# Patient Record
Sex: MALE | Race: WHITE | Employment: OTHER
[De-identification: names, ages, dates, MRNs, and addresses within clinical notes are randomized per-mention and may not be internally consistent; named-entity substitution may affect disease eponyms.]

---

## 2017-01-06 ENCOUNTER — OFFICE VISIT (OUTPATIENT)
Dept: INTERNAL MEDICINE | Facility: CLINIC | Age: 57
End: 2017-01-06

## 2017-01-06 VITALS
SYSTOLIC BLOOD PRESSURE: 138 MMHG | DIASTOLIC BLOOD PRESSURE: 70 MMHG | WEIGHT: 268 LBS | HEIGHT: 76 IN | BODY MASS INDEX: 32.63 KG/M2

## 2017-01-06 DIAGNOSIS — E11.42 TYPE 2 DIABETES MELLITUS WITH DIABETIC POLYNEUROPATHY, WITH LONG-TERM CURRENT USE OF INSULIN (HCC): Primary | ICD-10-CM

## 2017-01-06 DIAGNOSIS — N18.2 CKD (CHRONIC KIDNEY DISEASE) STAGE 2, GFR 60-89 ML/MIN: ICD-10-CM

## 2017-01-06 DIAGNOSIS — M89.8X8 ILIAC CREST BONE PAIN: ICD-10-CM

## 2017-01-06 DIAGNOSIS — Z79.4 TYPE 2 DIABETES MELLITUS WITH DIABETIC POLYNEUROPATHY, WITH LONG-TERM CURRENT USE OF INSULIN (HCC): Primary | ICD-10-CM

## 2017-01-06 DIAGNOSIS — I73.9 PVD (PERIPHERAL VASCULAR DISEASE) (HCC): ICD-10-CM

## 2017-01-06 DIAGNOSIS — E03.9 ACQUIRED HYPOTHYROIDISM: ICD-10-CM

## 2017-01-06 PROCEDURE — 99214 OFFICE O/P EST MOD 30 MIN: CPT | Performed by: INTERNAL MEDICINE

## 2017-01-06 ASSESSMENT — ENCOUNTER SYMPTOMS
COUGH: 0
SHORTNESS OF BREATH: 0
TROUBLE SWALLOWING: 0
WHEEZING: 0
BLOOD IN STOOL: 0
COLOR CHANGE: 0
DIARRHEA: 0
EYE PAIN: 0
ABDOMINAL DISTENTION: 0
EYE DISCHARGE: 0

## 2017-01-11 ENCOUNTER — TELEPHONE (OUTPATIENT)
Dept: GASTROENTEROLOGY | Facility: CLINIC | Age: 57
End: 2017-01-11

## 2017-01-12 ENCOUNTER — TELEPHONE (OUTPATIENT)
Dept: GASTROENTEROLOGY | Facility: CLINIC | Age: 57
End: 2017-01-12

## 2017-01-18 ENCOUNTER — TELEPHONE (OUTPATIENT)
Dept: INTERNAL MEDICINE | Facility: CLINIC | Age: 57
End: 2017-01-18

## 2017-01-23 DIAGNOSIS — K59.00 CONSTIPATION, UNSPECIFIED CONSTIPATION TYPE: ICD-10-CM

## 2017-01-24 RX ORDER — ISOSORBIDE MONONITRATE 30 MG/1
TABLET, EXTENDED RELEASE ORAL
Qty: 30 TABLET | Refills: 5 | Status: SHIPPED | OUTPATIENT
Start: 2017-01-24 | End: 2017-07-24 | Stop reason: SDUPTHER

## 2017-02-06 ENCOUNTER — TELEPHONE (OUTPATIENT)
Dept: GASTROENTEROLOGY | Facility: CLINIC | Age: 57
End: 2017-02-06

## 2017-02-09 ENCOUNTER — PROCEDURE VISIT (OUTPATIENT)
Dept: PODIATRY | Facility: CLINIC | Age: 57
End: 2017-02-09

## 2017-02-09 VITALS
HEIGHT: 76 IN | BODY MASS INDEX: 32.88 KG/M2 | DIASTOLIC BLOOD PRESSURE: 84 MMHG | WEIGHT: 270 LBS | HEART RATE: 77 BPM | SYSTOLIC BLOOD PRESSURE: 130 MMHG

## 2017-02-09 DIAGNOSIS — Z79.4 TYPE 2 DIABETES MELLITUS WITH DIABETIC POLYNEUROPATHY, WITH LONG-TERM CURRENT USE OF INSULIN (HCC): ICD-10-CM

## 2017-02-09 DIAGNOSIS — E11.51 TYPE 2 DIABETES MELLITUS WITH PERIPHERAL VASCULAR DISEASE (HCC): ICD-10-CM

## 2017-02-09 DIAGNOSIS — B35.1 ONYCHOMYCOSIS OF TOENAIL: Primary | ICD-10-CM

## 2017-02-09 DIAGNOSIS — E11.42 TYPE 2 DIABETES MELLITUS WITH DIABETIC POLYNEUROPATHY, WITH LONG-TERM CURRENT USE OF INSULIN (HCC): ICD-10-CM

## 2017-02-09 PROCEDURE — 11720 DEBRIDE NAIL 1-5: CPT | Performed by: PODIATRIST

## 2017-02-09 PROCEDURE — 1036F TOBACCO NON-USER: CPT | Performed by: PODIATRIST

## 2017-02-09 ASSESSMENT — ENCOUNTER SYMPTOMS
SHORTNESS OF BREATH: 0
COLOR CHANGE: 0
BACK PAIN: 0
NAUSEA: 0
DIARRHEA: 0

## 2017-03-17 ENCOUNTER — OFFICE VISIT (OUTPATIENT)
Dept: INTERNAL MEDICINE CLINIC | Age: 57
End: 2017-03-17
Payer: COMMERCIAL

## 2017-03-17 VITALS
WEIGHT: 272 LBS | DIASTOLIC BLOOD PRESSURE: 70 MMHG | SYSTOLIC BLOOD PRESSURE: 124 MMHG | BODY MASS INDEX: 33.12 KG/M2 | HEIGHT: 76 IN

## 2017-03-17 DIAGNOSIS — N18.2 CKD (CHRONIC KIDNEY DISEASE) STAGE 2, GFR 60-89 ML/MIN: Primary | ICD-10-CM

## 2017-03-17 DIAGNOSIS — E11.41 DIABETIC MONONEUROPATHY ASSOCIATED WITH TYPE 2 DIABETES MELLITUS (HCC): ICD-10-CM

## 2017-03-17 DIAGNOSIS — I73.9 PVD (PERIPHERAL VASCULAR DISEASE) (HCC): ICD-10-CM

## 2017-03-17 DIAGNOSIS — Z79.4 TYPE 2 DIABETES MELLITUS WITH DIABETIC POLYNEUROPATHY, WITH LONG-TERM CURRENT USE OF INSULIN (HCC): ICD-10-CM

## 2017-03-17 DIAGNOSIS — M25.511 CHRONIC RIGHT SHOULDER PAIN: ICD-10-CM

## 2017-03-17 DIAGNOSIS — G89.29 CHRONIC RIGHT SHOULDER PAIN: ICD-10-CM

## 2017-03-17 DIAGNOSIS — E11.42 TYPE 2 DIABETES MELLITUS WITH DIABETIC POLYNEUROPATHY, WITH LONG-TERM CURRENT USE OF INSULIN (HCC): ICD-10-CM

## 2017-03-17 PROCEDURE — G8417 CALC BMI ABV UP PARAM F/U: HCPCS | Performed by: INTERNAL MEDICINE

## 2017-03-17 PROCEDURE — G8427 DOCREV CUR MEDS BY ELIG CLIN: HCPCS | Performed by: INTERNAL MEDICINE

## 2017-03-17 PROCEDURE — 1036F TOBACCO NON-USER: CPT | Performed by: INTERNAL MEDICINE

## 2017-03-17 PROCEDURE — 3017F COLORECTAL CA SCREEN DOC REV: CPT | Performed by: INTERNAL MEDICINE

## 2017-03-17 PROCEDURE — 3045F PR MOST RECENT HEMOGLOBIN A1C LEVEL 7.0-9.0%: CPT | Performed by: INTERNAL MEDICINE

## 2017-03-17 PROCEDURE — 99214 OFFICE O/P EST MOD 30 MIN: CPT | Performed by: INTERNAL MEDICINE

## 2017-03-17 PROCEDURE — G8484 FLU IMMUNIZE NO ADMIN: HCPCS | Performed by: INTERNAL MEDICINE

## 2017-03-17 RX ORDER — PREGABALIN 100 MG/1
100 CAPSULE ORAL 3 TIMES DAILY
Qty: 90 CAPSULE | Refills: 3 | Status: SHIPPED | OUTPATIENT
Start: 2017-03-17 | End: 2017-04-20 | Stop reason: SDUPTHER

## 2017-03-17 ASSESSMENT — ENCOUNTER SYMPTOMS
BLOOD IN STOOL: 0
EYE PAIN: 0
ABDOMINAL DISTENTION: 0
COLOR CHANGE: 0
EYE DISCHARGE: 0
WHEEZING: 0
SHORTNESS OF BREATH: 0
TROUBLE SWALLOWING: 0
DIARRHEA: 0
COUGH: 0

## 2017-03-17 ASSESSMENT — PATIENT HEALTH QUESTIONNAIRE - PHQ9
1. LITTLE INTEREST OR PLEASURE IN DOING THINGS: 0
SUM OF ALL RESPONSES TO PHQ QUESTIONS 1-9: 0
SUM OF ALL RESPONSES TO PHQ9 QUESTIONS 1 & 2: 0
2. FEELING DOWN, DEPRESSED OR HOPELESS: 0

## 2017-03-21 ENCOUNTER — HOSPITAL ENCOUNTER (OUTPATIENT)
Facility: CLINIC | Age: 57
Discharge: HOME | End: 2017-03-21
Payer: COMMERCIAL

## 2017-03-21 ENCOUNTER — HOSPITAL ENCOUNTER (OUTPATIENT)
Dept: GENERAL RADIOLOGY | Facility: CLINIC | Age: 57
Discharge: HOME | End: 2017-03-21
Payer: COMMERCIAL

## 2017-03-21 DIAGNOSIS — G89.29 CHRONIC RIGHT SHOULDER PAIN: ICD-10-CM

## 2017-03-21 DIAGNOSIS — Z79.4 TYPE 2 DIABETES MELLITUS WITH DIABETIC POLYNEUROPATHY, WITH LONG-TERM CURRENT USE OF INSULIN (HCC): ICD-10-CM

## 2017-03-21 DIAGNOSIS — M25.511 CHRONIC RIGHT SHOULDER PAIN: ICD-10-CM

## 2017-03-21 DIAGNOSIS — E11.42 TYPE 2 DIABETES MELLITUS WITH DIABETIC POLYNEUROPATHY, WITH LONG-TERM CURRENT USE OF INSULIN (HCC): ICD-10-CM

## 2017-03-21 LAB
CREATININE URINE: 68.7 MG/DL (ref 39–259)
ESTIMATED AVERAGE GLUCOSE: 151 MG/DL
HBA1C MFR BLD: 6.9 % (ref 4–6)
MICROALBUMIN/CREAT 24H UR: <12 MG/L
MICROALBUMIN/CREAT UR-RTO: 17 MCG/MG CREAT

## 2017-03-21 PROCEDURE — 36415 COLL VENOUS BLD VENIPUNCTURE: CPT

## 2017-03-21 PROCEDURE — 82043 UR ALBUMIN QUANTITATIVE: CPT

## 2017-03-21 PROCEDURE — 73030 X-RAY EXAM OF SHOULDER: CPT

## 2017-03-21 PROCEDURE — 83036 HEMOGLOBIN GLYCOSYLATED A1C: CPT

## 2017-03-21 PROCEDURE — 82570 ASSAY OF URINE CREATININE: CPT

## 2017-03-24 RX ORDER — BUDESONIDE AND FORMOTEROL FUMARATE DIHYDRATE 160; 4.5 UG/1; UG/1
AEROSOL RESPIRATORY (INHALATION)
Qty: 10.2 G | Refills: 5 | Status: SHIPPED | OUTPATIENT
Start: 2017-03-24 | End: 2017-09-13 | Stop reason: SDUPTHER

## 2017-04-20 DIAGNOSIS — E11.41 DIABETIC MONONEUROPATHY ASSOCIATED WITH TYPE 2 DIABETES MELLITUS (HCC): ICD-10-CM

## 2017-04-20 RX ORDER — ATORVASTATIN CALCIUM 20 MG/1
TABLET, FILM COATED ORAL
Qty: 30 TABLET | Refills: 5 | Status: SHIPPED | OUTPATIENT
Start: 2017-04-20 | End: 2017-10-20 | Stop reason: SDUPTHER

## 2017-04-20 RX ORDER — LEVOTHYROXINE SODIUM 0.2 MG/1
TABLET ORAL
Qty: 30 TABLET | Refills: 10 | Status: SHIPPED | OUTPATIENT
Start: 2017-04-20 | End: 2018-01-10 | Stop reason: SDUPTHER

## 2017-04-24 ENCOUNTER — OFFICE VISIT (OUTPATIENT)
Dept: INTERNAL MEDICINE CLINIC | Age: 57
End: 2017-04-24
Payer: COMMERCIAL

## 2017-04-24 VITALS
WEIGHT: 267 LBS | SYSTOLIC BLOOD PRESSURE: 116 MMHG | DIASTOLIC BLOOD PRESSURE: 80 MMHG | HEIGHT: 76 IN | BODY MASS INDEX: 32.51 KG/M2

## 2017-04-24 DIAGNOSIS — G89.29 CHRONIC RIGHT SHOULDER PAIN: Primary | ICD-10-CM

## 2017-04-24 DIAGNOSIS — E11.42 TYPE 2 DIABETES MELLITUS WITH DIABETIC POLYNEUROPATHY, WITH LONG-TERM CURRENT USE OF INSULIN (HCC): ICD-10-CM

## 2017-04-24 DIAGNOSIS — I10 ESSENTIAL HYPERTENSION: ICD-10-CM

## 2017-04-24 DIAGNOSIS — M25.511 CHRONIC RIGHT SHOULDER PAIN: Primary | ICD-10-CM

## 2017-04-24 DIAGNOSIS — Z79.4 TYPE 2 DIABETES MELLITUS WITH DIABETIC POLYNEUROPATHY, WITH LONG-TERM CURRENT USE OF INSULIN (HCC): ICD-10-CM

## 2017-04-24 PROCEDURE — 99213 OFFICE O/P EST LOW 20 MIN: CPT | Performed by: INTERNAL MEDICINE

## 2017-04-24 PROCEDURE — G8427 DOCREV CUR MEDS BY ELIG CLIN: HCPCS | Performed by: INTERNAL MEDICINE

## 2017-04-24 PROCEDURE — 3044F HG A1C LEVEL LT 7.0%: CPT | Performed by: INTERNAL MEDICINE

## 2017-04-24 PROCEDURE — G8417 CALC BMI ABV UP PARAM F/U: HCPCS | Performed by: INTERNAL MEDICINE

## 2017-04-24 PROCEDURE — 20610 DRAIN/INJ JOINT/BURSA W/O US: CPT | Performed by: INTERNAL MEDICINE

## 2017-04-24 PROCEDURE — 1036F TOBACCO NON-USER: CPT | Performed by: INTERNAL MEDICINE

## 2017-04-24 PROCEDURE — 3017F COLORECTAL CA SCREEN DOC REV: CPT | Performed by: INTERNAL MEDICINE

## 2017-04-24 RX ORDER — METHYLPREDNISOLONE ACETATE 80 MG/ML
40 INJECTION, SUSPENSION INTRA-ARTICULAR; INTRALESIONAL; INTRAMUSCULAR; SOFT TISSUE ONCE
Status: COMPLETED | OUTPATIENT
Start: 2017-04-24 | End: 2017-04-24

## 2017-04-24 RX ADMIN — METHYLPREDNISOLONE ACETATE 40 MG: 80 INJECTION, SUSPENSION INTRA-ARTICULAR; INTRALESIONAL; INTRAMUSCULAR; SOFT TISSUE at 16:35

## 2017-04-25 ENCOUNTER — OFFICE VISIT (OUTPATIENT)
Dept: PODIATRY | Age: 57
End: 2017-04-25
Payer: COMMERCIAL

## 2017-04-25 VITALS
HEIGHT: 76 IN | HEART RATE: 79 BPM | WEIGHT: 266 LBS | SYSTOLIC BLOOD PRESSURE: 137 MMHG | DIASTOLIC BLOOD PRESSURE: 69 MMHG | BODY MASS INDEX: 32.39 KG/M2

## 2017-04-25 DIAGNOSIS — E11.42 TYPE 2 DIABETES MELLITUS WITH DIABETIC POLYNEUROPATHY, WITH LONG-TERM CURRENT USE OF INSULIN (HCC): ICD-10-CM

## 2017-04-25 DIAGNOSIS — B35.1 ONYCHOMYCOSIS OF TOENAIL: Primary | ICD-10-CM

## 2017-04-25 DIAGNOSIS — E11.51 TYPE 2 DIABETES MELLITUS WITH PERIPHERAL VASCULAR DISEASE (HCC): ICD-10-CM

## 2017-04-25 DIAGNOSIS — Z79.4 TYPE 2 DIABETES MELLITUS WITH DIABETIC POLYNEUROPATHY, WITH LONG-TERM CURRENT USE OF INSULIN (HCC): ICD-10-CM

## 2017-04-25 PROCEDURE — 1036F TOBACCO NON-USER: CPT | Performed by: PODIATRIST

## 2017-04-25 PROCEDURE — 11720 DEBRIDE NAIL 1-5: CPT | Performed by: PODIATRIST

## 2017-04-27 ASSESSMENT — ENCOUNTER SYMPTOMS
SHORTNESS OF BREATH: 0
BACK PAIN: 0
COLOR CHANGE: 0
DIARRHEA: 0
NAUSEA: 0

## 2017-04-29 ASSESSMENT — ENCOUNTER SYMPTOMS
EYE PAIN: 0
COUGH: 0
ABDOMINAL PAIN: 0
CHEST TIGHTNESS: 0
ABDOMINAL DISTENTION: 0
ANAL BLEEDING: 0
EYE REDNESS: 0
EYE DISCHARGE: 0
APNEA: 0
CHOKING: 0
BACK PAIN: 0
EYE ITCHING: 0

## 2017-05-01 ENCOUNTER — HOSPITAL ENCOUNTER (OUTPATIENT)
Dept: PHYSICAL THERAPY | Age: 57
Setting detail: THERAPIES SERIES
Discharge: HOME | End: 2017-05-01
Payer: COMMERCIAL

## 2017-05-01 PROCEDURE — 97162 PT EVAL MOD COMPLEX 30 MIN: CPT

## 2017-05-01 PROCEDURE — G8985 CARRY GOAL STATUS: HCPCS

## 2017-05-01 PROCEDURE — G8984 CARRY CURRENT STATUS: HCPCS

## 2017-05-01 ASSESSMENT — PAIN DESCRIPTION - INTENSITY: RATING_2: 4

## 2017-05-01 ASSESSMENT — PAIN DESCRIPTION - ORIENTATION
ORIENTATION_2: LEFT;ANTERIOR
ORIENTATION: RIGHT

## 2017-05-01 ASSESSMENT — PAIN SCALES - GENERAL: PAINLEVEL_OUTOF10: 6

## 2017-05-03 ENCOUNTER — HOSPITAL ENCOUNTER (OUTPATIENT)
Dept: PHYSICAL THERAPY | Age: 57
Setting detail: THERAPIES SERIES
Discharge: HOME | End: 2017-05-03
Payer: COMMERCIAL

## 2017-05-03 PROCEDURE — 97140 MANUAL THERAPY 1/> REGIONS: CPT

## 2017-05-03 PROCEDURE — 97110 THERAPEUTIC EXERCISES: CPT

## 2017-05-03 ASSESSMENT — PAIN SCALES - GENERAL: PAINLEVEL_OUTOF10: 5

## 2017-05-03 ASSESSMENT — PAIN DESCRIPTION - ORIENTATION
ORIENTATION: RIGHT
ORIENTATION_2: LEFT;ANTERIOR

## 2017-05-03 ASSESSMENT — PAIN DESCRIPTION - INTENSITY: RATING_2: 4

## 2017-05-09 ENCOUNTER — HOSPITAL ENCOUNTER (OUTPATIENT)
Dept: PHYSICAL THERAPY | Age: 57
Setting detail: THERAPIES SERIES
Discharge: HOME | End: 2017-05-09
Payer: COMMERCIAL

## 2017-05-09 PROCEDURE — 97110 THERAPEUTIC EXERCISES: CPT

## 2017-05-09 ASSESSMENT — PAIN DESCRIPTION - INTENSITY: RATING_2: 4

## 2017-05-09 ASSESSMENT — PAIN SCALES - GENERAL: PAINLEVEL_OUTOF10: 5

## 2017-05-09 ASSESSMENT — PAIN DESCRIPTION - ORIENTATION
ORIENTATION: RIGHT
ORIENTATION_2: ANTERIOR;LEFT

## 2017-05-11 ENCOUNTER — HOSPITAL ENCOUNTER (OUTPATIENT)
Dept: PHYSICAL THERAPY | Age: 57
Setting detail: THERAPIES SERIES
Discharge: HOME | End: 2017-05-11
Payer: COMMERCIAL

## 2017-05-23 ENCOUNTER — HOSPITAL ENCOUNTER (OUTPATIENT)
Dept: PHYSICAL THERAPY | Age: 57
Setting detail: THERAPIES SERIES
Discharge: HOME | End: 2017-05-23
Payer: COMMERCIAL

## 2017-05-23 PROCEDURE — 97110 THERAPEUTIC EXERCISES: CPT

## 2017-05-23 PROCEDURE — 97140 MANUAL THERAPY 1/> REGIONS: CPT

## 2017-05-23 ASSESSMENT — PAIN DESCRIPTION - ORIENTATION
ORIENTATION_2: ANTERIOR;LEFT
ORIENTATION: RIGHT

## 2017-05-23 ASSESSMENT — PAIN DESCRIPTION - INTENSITY: RATING_2: 4

## 2017-05-23 ASSESSMENT — PAIN SCALES - GENERAL: PAINLEVEL_OUTOF10: 4

## 2017-05-24 RX ORDER — ROPINIROLE 2 MG/1
TABLET, FILM COATED ORAL
Qty: 30 TABLET | Refills: 11 | Status: SHIPPED | OUTPATIENT
Start: 2017-05-24 | End: 2018-05-12 | Stop reason: SDUPTHER

## 2017-05-24 RX ORDER — CANAGLIFLOZIN 100 MG/1
TABLET, FILM COATED ORAL
Qty: 30 TABLET | Refills: 11 | Status: SHIPPED | OUTPATIENT
Start: 2017-05-24 | End: 2017-10-06

## 2017-05-24 RX ORDER — LOSARTAN POTASSIUM 100 MG/1
TABLET ORAL
Qty: 30 TABLET | Refills: 11 | Status: SHIPPED | OUTPATIENT
Start: 2017-05-24 | End: 2018-05-12 | Stop reason: SDUPTHER

## 2017-05-24 RX ORDER — GEMFIBROZIL 600 MG/1
TABLET, FILM COATED ORAL
Qty: 30 TABLET | Refills: 11 | Status: SHIPPED | OUTPATIENT
Start: 2017-05-24 | End: 2018-05-12 | Stop reason: SDUPTHER

## 2017-05-25 ENCOUNTER — HOSPITAL ENCOUNTER (OUTPATIENT)
Dept: PHYSICAL THERAPY | Age: 57
Setting detail: THERAPIES SERIES
Discharge: HOME | End: 2017-05-25
Payer: COMMERCIAL

## 2017-05-25 PROCEDURE — 97140 MANUAL THERAPY 1/> REGIONS: CPT

## 2017-05-25 PROCEDURE — 97110 THERAPEUTIC EXERCISES: CPT

## 2017-05-25 ASSESSMENT — PAIN SCALES - GENERAL: PAINLEVEL_OUTOF10: 4

## 2017-05-25 ASSESSMENT — PAIN DESCRIPTION - INTENSITY: RATING_2: 4

## 2017-05-25 ASSESSMENT — PAIN DESCRIPTION - ORIENTATION
ORIENTATION_2: ANTERIOR;LEFT
ORIENTATION: RIGHT

## 2017-05-30 ENCOUNTER — HOSPITAL ENCOUNTER (OUTPATIENT)
Dept: PHYSICAL THERAPY | Age: 57
Setting detail: THERAPIES SERIES
Discharge: HOME | End: 2017-05-30
Payer: COMMERCIAL

## 2017-05-30 PROCEDURE — 97140 MANUAL THERAPY 1/> REGIONS: CPT

## 2017-05-30 PROCEDURE — 97110 THERAPEUTIC EXERCISES: CPT

## 2017-05-30 ASSESSMENT — PAIN DESCRIPTION - INTENSITY: RATING_2: 4

## 2017-05-30 ASSESSMENT — PAIN DESCRIPTION - ORIENTATION
ORIENTATION: RIGHT
ORIENTATION_2: ANTERIOR;LEFT

## 2017-05-30 ASSESSMENT — PAIN SCALES - GENERAL: PAINLEVEL_OUTOF10: 4

## 2017-06-01 ENCOUNTER — HOSPITAL ENCOUNTER (OUTPATIENT)
Dept: PHYSICAL THERAPY | Age: 57
Setting detail: THERAPIES SERIES
Discharge: HOME | End: 2017-06-01
Payer: COMMERCIAL

## 2017-06-01 PROCEDURE — 97110 THERAPEUTIC EXERCISES: CPT

## 2017-06-01 PROCEDURE — 97140 MANUAL THERAPY 1/> REGIONS: CPT

## 2017-06-01 ASSESSMENT — PAIN DESCRIPTION - ORIENTATION
ORIENTATION: RIGHT
ORIENTATION_2: ANTERIOR;LEFT

## 2017-06-01 ASSESSMENT — PAIN DESCRIPTION - INTENSITY: RATING_2: 4

## 2017-06-01 ASSESSMENT — PAIN SCALES - GENERAL: PAINLEVEL_OUTOF10: 4

## 2017-06-07 ENCOUNTER — HOSPITAL ENCOUNTER (OUTPATIENT)
Dept: PHYSICAL THERAPY | Age: 57
Setting detail: THERAPIES SERIES
Discharge: HOME | End: 2017-06-07
Payer: COMMERCIAL

## 2017-06-07 PROCEDURE — 97110 THERAPEUTIC EXERCISES: CPT

## 2017-06-07 PROCEDURE — 97140 MANUAL THERAPY 1/> REGIONS: CPT

## 2017-06-07 ASSESSMENT — PAIN DESCRIPTION - INTENSITY: RATING_2: 4

## 2017-06-07 ASSESSMENT — PAIN DESCRIPTION - ORIENTATION
ORIENTATION_2: ANTERIOR;LEFT
ORIENTATION: RIGHT

## 2017-06-07 ASSESSMENT — PAIN SCALES - GENERAL: PAINLEVEL_OUTOF10: 4

## 2017-06-12 ENCOUNTER — HOSPITAL ENCOUNTER (OUTPATIENT)
Dept: PHYSICAL THERAPY | Age: 57
Setting detail: THERAPIES SERIES
Discharge: HOME | End: 2017-06-12
Payer: COMMERCIAL

## 2017-06-14 ENCOUNTER — HOSPITAL ENCOUNTER (OUTPATIENT)
Dept: PHYSICAL THERAPY | Age: 57
Setting detail: THERAPIES SERIES
Discharge: HOME | End: 2017-06-14
Payer: COMMERCIAL

## 2017-06-14 PROCEDURE — 97140 MANUAL THERAPY 1/> REGIONS: CPT

## 2017-06-14 PROCEDURE — 97110 THERAPEUTIC EXERCISES: CPT

## 2017-06-14 ASSESSMENT — PAIN DESCRIPTION - ORIENTATION
ORIENTATION_2: ANTERIOR;LEFT
ORIENTATION: RIGHT

## 2017-06-14 ASSESSMENT — PAIN SCALES - GENERAL: PAINLEVEL_OUTOF10: 4

## 2017-06-14 ASSESSMENT — PAIN DESCRIPTION - INTENSITY: RATING_2: 4

## 2017-06-19 ENCOUNTER — HOSPITAL ENCOUNTER (OUTPATIENT)
Dept: PHYSICAL THERAPY | Age: 57
Setting detail: THERAPIES SERIES
Discharge: HOME | End: 2017-06-19
Payer: COMMERCIAL

## 2017-06-19 PROCEDURE — 97140 MANUAL THERAPY 1/> REGIONS: CPT

## 2017-06-19 PROCEDURE — G8985 CARRY GOAL STATUS: HCPCS

## 2017-06-19 PROCEDURE — 97110 THERAPEUTIC EXERCISES: CPT

## 2017-06-19 PROCEDURE — G8984 CARRY CURRENT STATUS: HCPCS

## 2017-06-19 ASSESSMENT — PAIN DESCRIPTION - ORIENTATION
ORIENTATION_2: ANTERIOR;LEFT
ORIENTATION: RIGHT

## 2017-06-19 ASSESSMENT — PAIN DESCRIPTION - INTENSITY: RATING_2: 4

## 2017-06-19 ASSESSMENT — PAIN SCALES - GENERAL: PAINLEVEL_OUTOF10: 4

## 2017-06-21 ENCOUNTER — HOSPITAL ENCOUNTER (OUTPATIENT)
Dept: PHYSICAL THERAPY | Age: 57
Setting detail: THERAPIES SERIES
Discharge: HOME | End: 2017-06-21
Payer: COMMERCIAL

## 2017-06-21 PROCEDURE — G8986 CARRY D/C STATUS: HCPCS

## 2017-06-21 PROCEDURE — 97140 MANUAL THERAPY 1/> REGIONS: CPT

## 2017-06-21 PROCEDURE — G8985 CARRY GOAL STATUS: HCPCS

## 2017-06-21 PROCEDURE — 97110 THERAPEUTIC EXERCISES: CPT

## 2017-06-21 ASSESSMENT — PAIN DESCRIPTION - ORIENTATION
ORIENTATION: RIGHT
ORIENTATION_2: ANTERIOR;LEFT

## 2017-06-21 ASSESSMENT — PAIN DESCRIPTION - INTENSITY: RATING_2: 4

## 2017-06-21 ASSESSMENT — PAIN SCALES - GENERAL: PAINLEVEL_OUTOF10: 4

## 2017-06-26 ENCOUNTER — OFFICE VISIT (OUTPATIENT)
Dept: INTERNAL MEDICINE CLINIC | Age: 57
End: 2017-06-26
Payer: COMMERCIAL

## 2017-06-26 VITALS
SYSTOLIC BLOOD PRESSURE: 102 MMHG | BODY MASS INDEX: 32.76 KG/M2 | HEIGHT: 76 IN | WEIGHT: 269 LBS | DIASTOLIC BLOOD PRESSURE: 60 MMHG

## 2017-06-26 DIAGNOSIS — Z86.14 HISTORY OF MRSA INFECTION: ICD-10-CM

## 2017-06-26 DIAGNOSIS — T87.40 AMPUTATION STUMP INFECTION (HCC): Primary | ICD-10-CM

## 2017-06-26 PROCEDURE — 99213 OFFICE O/P EST LOW 20 MIN: CPT | Performed by: NURSE PRACTITIONER

## 2017-06-26 PROCEDURE — G8427 DOCREV CUR MEDS BY ELIG CLIN: HCPCS | Performed by: NURSE PRACTITIONER

## 2017-06-26 PROCEDURE — 3017F COLORECTAL CA SCREEN DOC REV: CPT | Performed by: NURSE PRACTITIONER

## 2017-06-26 PROCEDURE — G8417 CALC BMI ABV UP PARAM F/U: HCPCS | Performed by: NURSE PRACTITIONER

## 2017-06-26 PROCEDURE — 1036F TOBACCO NON-USER: CPT | Performed by: NURSE PRACTITIONER

## 2017-06-26 RX ORDER — DOXYCYCLINE HYCLATE 100 MG/1
100 CAPSULE ORAL 2 TIMES DAILY
Qty: 20 CAPSULE | Refills: 0 | Status: SHIPPED | OUTPATIENT
Start: 2017-06-26 | End: 2017-07-06

## 2017-06-29 ENCOUNTER — OFFICE VISIT (OUTPATIENT)
Dept: PODIATRY | Age: 57
End: 2017-06-29
Payer: COMMERCIAL

## 2017-06-29 VITALS
HEART RATE: 76 BPM | SYSTOLIC BLOOD PRESSURE: 135 MMHG | HEIGHT: 76 IN | BODY MASS INDEX: 32.39 KG/M2 | WEIGHT: 266 LBS | DIASTOLIC BLOOD PRESSURE: 71 MMHG

## 2017-06-29 DIAGNOSIS — Z79.4 TYPE 2 DIABETES MELLITUS WITH DIABETIC POLYNEUROPATHY, WITH LONG-TERM CURRENT USE OF INSULIN (HCC): ICD-10-CM

## 2017-06-29 DIAGNOSIS — E11.51 TYPE 2 DIABETES MELLITUS WITH PERIPHERAL VASCULAR DISEASE (HCC): ICD-10-CM

## 2017-06-29 DIAGNOSIS — B35.1 ONYCHOMYCOSIS OF TOENAIL: Primary | ICD-10-CM

## 2017-06-29 DIAGNOSIS — E11.42 TYPE 2 DIABETES MELLITUS WITH DIABETIC POLYNEUROPATHY, WITH LONG-TERM CURRENT USE OF INSULIN (HCC): ICD-10-CM

## 2017-06-29 PROCEDURE — 1036F TOBACCO NON-USER: CPT | Performed by: PODIATRIST

## 2017-06-29 PROCEDURE — 11721 DEBRIDE NAIL 6 OR MORE: CPT | Performed by: PODIATRIST

## 2017-06-29 ASSESSMENT — ENCOUNTER SYMPTOMS
BACK PAIN: 0
COLOR CHANGE: 0
NAUSEA: 0
SHORTNESS OF BREATH: 0
DIARRHEA: 0

## 2017-06-30 ENCOUNTER — OFFICE VISIT (OUTPATIENT)
Dept: INTERNAL MEDICINE CLINIC | Age: 57
End: 2017-06-30
Payer: COMMERCIAL

## 2017-06-30 VITALS
DIASTOLIC BLOOD PRESSURE: 84 MMHG | SYSTOLIC BLOOD PRESSURE: 130 MMHG | BODY MASS INDEX: 32.88 KG/M2 | WEIGHT: 270 LBS | HEIGHT: 76 IN

## 2017-06-30 DIAGNOSIS — E11.42 TYPE 2 DIABETES MELLITUS WITH DIABETIC POLYNEUROPATHY, WITH LONG-TERM CURRENT USE OF INSULIN (HCC): Primary | ICD-10-CM

## 2017-06-30 DIAGNOSIS — Z79.4 TYPE 2 DIABETES MELLITUS WITH DIABETIC POLYNEUROPATHY, WITH LONG-TERM CURRENT USE OF INSULIN (HCC): Primary | ICD-10-CM

## 2017-06-30 DIAGNOSIS — N18.2 CKD (CHRONIC KIDNEY DISEASE) STAGE 2, GFR 60-89 ML/MIN: ICD-10-CM

## 2017-06-30 DIAGNOSIS — I73.9 PVD (PERIPHERAL VASCULAR DISEASE) (HCC): ICD-10-CM

## 2017-06-30 DIAGNOSIS — E78.00 PURE HYPERCHOLESTEROLEMIA: ICD-10-CM

## 2017-06-30 DIAGNOSIS — E13.621 FOOT ULCER DUE TO SECONDARY DM (HCC): ICD-10-CM

## 2017-06-30 DIAGNOSIS — L97.509 FOOT ULCER DUE TO SECONDARY DM (HCC): ICD-10-CM

## 2017-06-30 DIAGNOSIS — E03.9 ACQUIRED HYPOTHYROIDISM: ICD-10-CM

## 2017-06-30 PROCEDURE — 1036F TOBACCO NON-USER: CPT | Performed by: INTERNAL MEDICINE

## 2017-06-30 PROCEDURE — 3046F HEMOGLOBIN A1C LEVEL >9.0%: CPT | Performed by: INTERNAL MEDICINE

## 2017-06-30 PROCEDURE — G8427 DOCREV CUR MEDS BY ELIG CLIN: HCPCS | Performed by: INTERNAL MEDICINE

## 2017-06-30 PROCEDURE — G8417 CALC BMI ABV UP PARAM F/U: HCPCS | Performed by: INTERNAL MEDICINE

## 2017-06-30 PROCEDURE — 99214 OFFICE O/P EST MOD 30 MIN: CPT | Performed by: INTERNAL MEDICINE

## 2017-06-30 PROCEDURE — 3017F COLORECTAL CA SCREEN DOC REV: CPT | Performed by: INTERNAL MEDICINE

## 2017-06-30 ASSESSMENT — ENCOUNTER SYMPTOMS
TROUBLE SWALLOWING: 0
EYE DISCHARGE: 0
WHEEZING: 0
BLOOD IN STOOL: 0
DIARRHEA: 0
SHORTNESS OF BREATH: 0
ABDOMINAL DISTENTION: 0
COLOR CHANGE: 0
COUGH: 0
EYE PAIN: 0

## 2017-07-07 RX ORDER — INSULIN HUMAN 100 [IU]/ML
INJECTION, SUSPENSION SUBCUTANEOUS
Qty: 30 ML | Refills: 11 | Status: SHIPPED | OUTPATIENT
Start: 2017-07-07 | End: 2018-04-27 | Stop reason: SDUPTHER

## 2017-07-19 ENCOUNTER — TELEPHONE (OUTPATIENT)
Dept: INTERNAL MEDICINE CLINIC | Age: 57
End: 2017-07-19

## 2017-07-24 ENCOUNTER — TELEPHONE (OUTPATIENT)
Dept: INTERNAL MEDICINE CLINIC | Age: 57
End: 2017-07-24

## 2017-07-24 ENCOUNTER — HOSPITAL ENCOUNTER (OUTPATIENT)
Age: 57
Setting detail: SPECIMEN
Discharge: HOME | End: 2017-07-24
Payer: COMMERCIAL

## 2017-07-24 DIAGNOSIS — E11.42 TYPE 2 DIABETES MELLITUS WITH DIABETIC POLYNEUROPATHY, WITH LONG-TERM CURRENT USE OF INSULIN (HCC): ICD-10-CM

## 2017-07-24 DIAGNOSIS — Z51.81 ENCOUNTER FOR THERAPEUTIC DRUG MONITORING: Primary | ICD-10-CM

## 2017-07-24 DIAGNOSIS — Z79.4 TYPE 2 DIABETES MELLITUS WITH DIABETIC POLYNEUROPATHY, WITH LONG-TERM CURRENT USE OF INSULIN (HCC): ICD-10-CM

## 2017-07-24 LAB
CREATININE URINE: 75.8 MG/DL (ref 39–259)
ESTIMATED AVERAGE GLUCOSE: 183 MG/DL
HBA1C MFR BLD: 8 % (ref 4–6)
MICROALBUMIN/CREAT 24H UR: <12 MG/L
MICROALBUMIN/CREAT UR-RTO: 16 MCG/MG CREAT

## 2017-07-24 RX ORDER — ISOSORBIDE MONONITRATE 30 MG/1
TABLET, EXTENDED RELEASE ORAL
Qty: 30 TABLET | Refills: 11 | Status: SHIPPED | OUTPATIENT
Start: 2017-07-24 | End: 2018-05-12 | Stop reason: SDUPTHER

## 2017-07-26 ENCOUNTER — HOSPITAL ENCOUNTER (OUTPATIENT)
Age: 57
Setting detail: SPECIMEN
Discharge: HOME | End: 2017-07-26
Payer: COMMERCIAL

## 2017-07-26 DIAGNOSIS — Z51.81 ENCOUNTER FOR THERAPEUTIC DRUG MONITORING: ICD-10-CM

## 2017-07-30 LAB
6-ACETYLMORPHINE, UR: NOT DETECTED
7-AMINOCLONAZEPAM, URINE: NOT DETECTED
ALPHA-OH-ALPRAZ, URINE: NOT DETECTED
ALPRAZOLAM, URINE: NOT DETECTED
AMPHETAMINES, URINE: NOT DETECTED
BARBITURATES, URINE: NOT DETECTED
BENZOYLECGONINE, UR: NOT DETECTED
BUPRENORPHINE URINE: NOT DETECTED
CARISOPRODOL, UR: NOT DETECTED
CLONAZEPAM, URINE: NOT DETECTED
CODEINE, URINE: NOT DETECTED
CREATININE URINE: 48.3 MG/DL (ref 20–400)
DIAZEPAM, URINE: NOT DETECTED
DRUGS EXPECTED, UR: NORMAL
EER HI RES INTERP UR: NORMAL
ETHYL GLUCURONIDE UR: NOT DETECTED
FENTANYL URINE: NOT DETECTED
HYDROCODONE, URINE: NOT DETECTED
HYDROMORPHONE, URINE: NOT DETECTED
LORAZEPAM, URINE: NOT DETECTED
MARIJUANA METAB, UR: NOT DETECTED
MDA, UR: NOT DETECTED
MDEA, EVE, UR: NOT DETECTED
MDMA URINE: NOT DETECTED
MEPERIDINE METAB, UR: NOT DETECTED
METHADONE, URINE: NOT DETECTED
METHAMPHETAMINE, URINE: NOT DETECTED
METHYLPHENIDATE: NOT DETECTED
MIDAZOLAM, URINE: NOT DETECTED
MORPHINE URINE: NOT DETECTED
NORBUPRENORPHINE, URINE: NOT DETECTED
NORDIAZEPAM, URINE: NOT DETECTED
NORFENTANYL, URINE: NOT DETECTED
NORHYDROCODONE, URINE: NOT DETECTED
NOROXYCODONE, URINE: NOT DETECTED
NOROXYMORPHONE, URINE: NOT DETECTED
OXAZEPAM, URINE: NOT DETECTED
OXYCODONE URINE: NOT DETECTED
OXYMORPHONE, URINE: NOT DETECTED
PAIN MANAGEMENT DRUG PANEL INTERP, URINE: NORMAL
PAIN MGT DRUG PANEL, HI RES, UR: NORMAL
PCP,URINE: NOT DETECTED
PHENTERMINE, UR: NOT DETECTED
PROPOXYPHENE, URINE: NOT DETECTED
TAPENTADOL, URINE: NOT DETECTED
TAPENTADOL-O-SULFATE, URINE: NOT DETECTED
TEMAZEPAM, URINE: NOT DETECTED
TRAMADOL, URINE: NOT DETECTED
ZOLPIDEM, URINE: NOT DETECTED

## 2017-07-31 ENCOUNTER — TELEPHONE (OUTPATIENT)
Dept: GASTROENTEROLOGY | Age: 57
End: 2017-07-31

## 2017-08-31 ENCOUNTER — PROCEDURE VISIT (OUTPATIENT)
Dept: PODIATRY | Age: 57
End: 2017-08-31
Payer: COMMERCIAL

## 2017-08-31 VITALS
SYSTOLIC BLOOD PRESSURE: 114 MMHG | BODY MASS INDEX: 32.39 KG/M2 | HEIGHT: 76 IN | WEIGHT: 266 LBS | HEART RATE: 65 BPM | DIASTOLIC BLOOD PRESSURE: 69 MMHG

## 2017-08-31 DIAGNOSIS — E11.42 TYPE 2 DIABETES MELLITUS WITH DIABETIC POLYNEUROPATHY, WITH LONG-TERM CURRENT USE OF INSULIN (HCC): ICD-10-CM

## 2017-08-31 DIAGNOSIS — E11.51 TYPE 2 DIABETES MELLITUS WITH PERIPHERAL VASCULAR DISEASE (HCC): ICD-10-CM

## 2017-08-31 DIAGNOSIS — Z79.4 TYPE 2 DIABETES MELLITUS WITH DIABETIC POLYNEUROPATHY, WITH LONG-TERM CURRENT USE OF INSULIN (HCC): ICD-10-CM

## 2017-08-31 DIAGNOSIS — B35.1 ONYCHOMYCOSIS OF TOENAIL: Primary | ICD-10-CM

## 2017-08-31 PROCEDURE — 1036F TOBACCO NON-USER: CPT | Performed by: PODIATRIST

## 2017-08-31 PROCEDURE — 11720 DEBRIDE NAIL 1-5: CPT | Performed by: PODIATRIST

## 2017-08-31 ASSESSMENT — ENCOUNTER SYMPTOMS
SHORTNESS OF BREATH: 0
BACK PAIN: 0
COLOR CHANGE: 0
NAUSEA: 0
DIARRHEA: 0

## 2017-09-13 ENCOUNTER — OFFICE VISIT (OUTPATIENT)
Dept: INTERNAL MEDICINE CLINIC | Age: 57
End: 2017-09-13
Payer: COMMERCIAL

## 2017-09-13 VITALS
BODY MASS INDEX: 32.03 KG/M2 | SYSTOLIC BLOOD PRESSURE: 110 MMHG | HEIGHT: 76 IN | DIASTOLIC BLOOD PRESSURE: 60 MMHG | OXYGEN SATURATION: 94 % | WEIGHT: 263 LBS | HEART RATE: 70 BPM

## 2017-09-13 DIAGNOSIS — B99.9 INFECTION: Primary | ICD-10-CM

## 2017-09-13 DIAGNOSIS — Z89.512 STATUS POST BELOW KNEE AMPUTATION OF LEFT LOWER EXTREMITY: ICD-10-CM

## 2017-09-13 PROCEDURE — 1036F TOBACCO NON-USER: CPT | Performed by: INTERNAL MEDICINE

## 2017-09-13 PROCEDURE — G8417 CALC BMI ABV UP PARAM F/U: HCPCS | Performed by: INTERNAL MEDICINE

## 2017-09-13 PROCEDURE — 99213 OFFICE O/P EST LOW 20 MIN: CPT | Performed by: INTERNAL MEDICINE

## 2017-09-13 PROCEDURE — 3017F COLORECTAL CA SCREEN DOC REV: CPT | Performed by: INTERNAL MEDICINE

## 2017-09-13 PROCEDURE — G8427 DOCREV CUR MEDS BY ELIG CLIN: HCPCS | Performed by: INTERNAL MEDICINE

## 2017-09-13 RX ORDER — BUDESONIDE AND FORMOTEROL FUMARATE DIHYDRATE 160; 4.5 UG/1; UG/1
AEROSOL RESPIRATORY (INHALATION)
Qty: 10.2 G | Refills: 3 | Status: SHIPPED | OUTPATIENT
Start: 2017-09-13 | End: 2017-09-21 | Stop reason: SDUPTHER

## 2017-09-13 RX ORDER — DOXYCYCLINE HYCLATE 100 MG
100 TABLET ORAL 2 TIMES DAILY
Qty: 14 TABLET | Refills: 0 | Status: SHIPPED | OUTPATIENT
Start: 2017-09-13 | End: 2017-09-20

## 2017-09-13 RX ORDER — SYRINGE AND NEEDLE,INSULIN,1ML 31 GX5/16"
SYRINGE, EMPTY DISPOSABLE MISCELLANEOUS
Qty: 100 EACH | Refills: 11 | Status: SHIPPED | OUTPATIENT
Start: 2017-09-13 | End: 2019-02-05 | Stop reason: SDUPTHER

## 2017-09-21 RX ORDER — BUDESONIDE AND FORMOTEROL FUMARATE DIHYDRATE 160; 4.5 UG/1; UG/1
AEROSOL RESPIRATORY (INHALATION)
Qty: 10.2 G | Refills: 0 | Status: SHIPPED | OUTPATIENT
Start: 2017-09-21 | End: 2017-11-17 | Stop reason: SDUPTHER

## 2017-09-24 ASSESSMENT — ENCOUNTER SYMPTOMS
COUGH: 0
CHEST TIGHTNESS: 0
DIARRHEA: 0
ABDOMINAL DISTENTION: 0
COLOR CHANGE: 0
BACK PAIN: 0
CONSTIPATION: 0
FACIAL SWELLING: 0
ABDOMINAL PAIN: 0
WHEEZING: 0
APNEA: 0
SHORTNESS OF BREATH: 0

## 2017-09-28 ENCOUNTER — OFFICE VISIT (OUTPATIENT)
Dept: INTERNAL MEDICINE CLINIC | Age: 57
End: 2017-09-28
Payer: COMMERCIAL

## 2017-09-28 VITALS
DIASTOLIC BLOOD PRESSURE: 80 MMHG | SYSTOLIC BLOOD PRESSURE: 108 MMHG | WEIGHT: 269 LBS | HEART RATE: 87 BPM | BODY MASS INDEX: 32.76 KG/M2 | OXYGEN SATURATION: 93 % | HEIGHT: 76 IN

## 2017-09-28 DIAGNOSIS — T87.40 AMPUTATION STUMP INFECTION (HCC): Primary | ICD-10-CM

## 2017-09-28 PROCEDURE — G8427 DOCREV CUR MEDS BY ELIG CLIN: HCPCS | Performed by: INTERNAL MEDICINE

## 2017-09-28 PROCEDURE — 99212 OFFICE O/P EST SF 10 MIN: CPT | Performed by: INTERNAL MEDICINE

## 2017-09-28 PROCEDURE — 1036F TOBACCO NON-USER: CPT | Performed by: INTERNAL MEDICINE

## 2017-09-28 PROCEDURE — G8417 CALC BMI ABV UP PARAM F/U: HCPCS | Performed by: INTERNAL MEDICINE

## 2017-09-28 PROCEDURE — 3017F COLORECTAL CA SCREEN DOC REV: CPT | Performed by: INTERNAL MEDICINE

## 2017-09-28 ASSESSMENT — ENCOUNTER SYMPTOMS
COUGH: 0
SHORTNESS OF BREATH: 0
WHEEZING: 0
BACK PAIN: 0
APNEA: 0
ABDOMINAL DISTENTION: 0
DIARRHEA: 0
CONSTIPATION: 0
ABDOMINAL PAIN: 0
COLOR CHANGE: 0
FACIAL SWELLING: 0
CHEST TIGHTNESS: 0

## 2017-10-06 ENCOUNTER — OFFICE VISIT (OUTPATIENT)
Dept: INTERNAL MEDICINE CLINIC | Age: 57
End: 2017-10-06
Payer: COMMERCIAL

## 2017-10-06 VITALS
HEART RATE: 82 BPM | OXYGEN SATURATION: 94 % | DIASTOLIC BLOOD PRESSURE: 78 MMHG | HEIGHT: 76 IN | SYSTOLIC BLOOD PRESSURE: 128 MMHG | BODY MASS INDEX: 32.76 KG/M2 | WEIGHT: 269 LBS

## 2017-10-06 DIAGNOSIS — E11.42 TYPE 2 DIABETES MELLITUS WITH DIABETIC POLYNEUROPATHY, WITH LONG-TERM CURRENT USE OF INSULIN (HCC): Primary | ICD-10-CM

## 2017-10-06 DIAGNOSIS — N40.1 BPH W URINARY OBS/LUTS: ICD-10-CM

## 2017-10-06 DIAGNOSIS — E03.9 ACQUIRED HYPOTHYROIDISM: ICD-10-CM

## 2017-10-06 DIAGNOSIS — Z79.4 TYPE 2 DIABETES MELLITUS WITH DIABETIC POLYNEUROPATHY, WITH LONG-TERM CURRENT USE OF INSULIN (HCC): Primary | ICD-10-CM

## 2017-10-06 DIAGNOSIS — I73.9 PVD (PERIPHERAL VASCULAR DISEASE) (HCC): ICD-10-CM

## 2017-10-06 DIAGNOSIS — N13.8 BPH W URINARY OBS/LUTS: ICD-10-CM

## 2017-10-06 DIAGNOSIS — L82.1 SEBORRHEIC KERATOSES: ICD-10-CM

## 2017-10-06 DIAGNOSIS — Z89.512 STATUS POST BELOW KNEE AMPUTATION OF LEFT LOWER EXTREMITY: ICD-10-CM

## 2017-10-06 PROBLEM — T87.40 AMPUTATION STUMP INFECTION (HCC): Status: RESOLVED | Noted: 2017-09-28 | Resolved: 2017-10-06

## 2017-10-06 PROCEDURE — G8484 FLU IMMUNIZE NO ADMIN: HCPCS | Performed by: INTERNAL MEDICINE

## 2017-10-06 PROCEDURE — G8417 CALC BMI ABV UP PARAM F/U: HCPCS | Performed by: INTERNAL MEDICINE

## 2017-10-06 PROCEDURE — 3017F COLORECTAL CA SCREEN DOC REV: CPT | Performed by: INTERNAL MEDICINE

## 2017-10-06 PROCEDURE — G8427 DOCREV CUR MEDS BY ELIG CLIN: HCPCS | Performed by: INTERNAL MEDICINE

## 2017-10-06 PROCEDURE — 1036F TOBACCO NON-USER: CPT | Performed by: INTERNAL MEDICINE

## 2017-10-06 PROCEDURE — 3046F HEMOGLOBIN A1C LEVEL >9.0%: CPT | Performed by: INTERNAL MEDICINE

## 2017-10-06 PROCEDURE — 99214 OFFICE O/P EST MOD 30 MIN: CPT | Performed by: INTERNAL MEDICINE

## 2017-10-06 RX ORDER — TAMSULOSIN HYDROCHLORIDE 0.4 MG/1
0.4 CAPSULE ORAL DAILY
Qty: 30 CAPSULE | Refills: 11 | Status: SHIPPED | OUTPATIENT
Start: 2017-10-06 | End: 2018-08-09 | Stop reason: SDUPTHER

## 2017-10-06 ASSESSMENT — ENCOUNTER SYMPTOMS
SHORTNESS OF BREATH: 0
TROUBLE SWALLOWING: 0
BLOOD IN STOOL: 0
WHEEZING: 0
ABDOMINAL DISTENTION: 0
EYE PAIN: 0
COUGH: 0
EYE DISCHARGE: 0
COLOR CHANGE: 0
DIARRHEA: 0

## 2017-10-06 NOTE — MR AVS SNAPSHOT
After Visit Summary             [facility] Colon   10/6/2017 9:00 AM   Office Visit    Description:  Male : 1960   Provider:  Cinthia Barrera MD   Department:  [facility]              Your Follow-Up and Future Appointments         Below is a list of your follow-up and future appointments. This may not be a complete list as you may have made appointments directly with providers that we are not aware of or your providers may have made some for you. Please call your providers to confirm appointments. It is important to keep your appointments. Please bring your current insurance card, photo ID, co-pay, and all medication bottles to your appointment. If self-pay, payment is expected at the time of service. Your To-Do List     Future Appointments Provider Department Dept Phone    2017 11:00 AM Onofre Duran [facility] [phone]    Please arrive 15 minutes prior to appointment, bring photo ID and insurance card. 2017 2:45 PM Cinthia Barrera MD [facility] [phone]    Please arrive 15 minutes prior to appointment, bring photo ID and insurance card. Future Orders Complete By Expires    PSA Screening [[id number] Custom]  10/6/2017 10/6/2018    Follow-Up    Return in about 1 month (around 2017) for HTN, DM.          Information from Your Visit        Department     Name Address Phone Fax    [facility] [id number] [phone]      You Were Seen for:         Comments    Type 2 diabetes mellitus with diabetic polyneuropathy, with long-term current use of insulin [facility])   [[id number]]         Vital Signs     Blood Pressure Pulse Height Weight Oxygen Saturation Body Mass Index    128/78 82 6' 3.98\" (1.93 m) 269 lb (122 kg) 94% 32.76 kg/m2    Smoking Status                   Former Smoker           Additional Information about your Body Mass Index (BMI)

## 2017-10-06 NOTE — PROGRESS NOTES
diaphoresis and fatigue. HENT: Negative for ear discharge and trouble swallowing. Eyes: Negative for pain and discharge. Respiratory: Negative for cough, shortness of breath and wheezing. Cardiovascular: Negative for chest pain and palpitations. Gastrointestinal: Negative for abdominal distention, blood in stool and diarrhea. Endocrine: Negative for polydipsia and polyphagia. Genitourinary: Positive for frequency. Negative for difficulty urinating. Musculoskeletal: Negative for gait problem, myalgias and neck pain. Skin: Negative for color change and rash. Allergic/Immunologic: Negative for environmental allergies and food allergies. Neurological: Negative for dizziness and headaches. Hematological: Negative for adenopathy. Does not bruise/bleed easily. Psychiatric/Behavioral: Negative for behavioral problems and sleep disturbance. Objective:   Physical Exam   Constitutional: He is oriented to person, place, and time. He appears well-developed and well-nourished. HENT:   Head: Normocephalic and atraumatic. Eyes: Conjunctivae and EOM are normal. Right eye exhibits no discharge. Left eye exhibits no discharge. Right conjunctiva is not injected. Left conjunctiva is not injected. Right eye exhibits normal extraocular motion. Left eye exhibits normal extraocular motion. Neck: Normal range of motion. Neck supple. No JVD present. No edema and no erythema present. No thyroid mass and no thyromegaly present. Cardiovascular: Normal rate and regular rhythm. Exam reveals no friction rub. No murmur heard. Pulmonary/Chest: Effort normal and breath sounds normal. No accessory muscle usage. No tachypnea and no bradypnea. No respiratory distress. He has no wheezes. He has no rales. Abdominal: Soft. Bowel sounds are normal. He exhibits no distension. There is no tenderness. There is no rebound. Musculoskeletal: Normal range of motion. He exhibits no edema or tenderness.    Left below knee amputaion  Rt foot toe toes amputation         Lymphadenopathy:        Head (right side): No submental and no submandibular adenopathy present. Head (left side): No submental and no submandibular adenopathy present. He has no cervical adenopathy. Neurological: He is alert and oriented to person, place, and time. He displays no atrophy. No cranial nerve deficit or sensory deficit. He exhibits normal muscle tone. Coordination normal.   Skin: Skin is warm. No bruising, no ecchymosis and no rash noted. He is not diaphoretic. No pallor. Psychiatric: He has a normal mood and affect. His behavior is normal. His mood appears not anxious. His affect is not angry. His speech is not slurred. He is not aggressive. Cognition and memory are not impaired. He expresses no homicidal ideation. I have personally reviewed and agree with the patient GUIDO, ABIDA and Ul. Maddie Chinchilla is a 62 y.o. male who presents today for follow up on his chronic medical conditions as noted below. Jonna Fernandes is c/o of   Chief Complaint   Patient presents with    Discuss Medications     pt would like to increase lyrica, also pt states the he is in class action law suit against [employer] due to the loss of his leg.      Diabetes     bs elevated        Patient Active Problem List:     Anemia     Neuropathy in diabetes ([address]     Charcot ankle     Hypothyroid     PVD (peripheral vascular disease) ([address]     Below knee amputation status (HCC)     Type 2 diabetes mellitus with diabetic polyneuropathy (HCC)     Pure hypercholesterolemia     CKD (chronic kidney disease) stage 2, GFR 60-89 ml/min     Constipation     Past Medical History:   Diagnosis Date    CAD (coronary artery disease)     Charcot foot due to diabetes mellitus ([address]     COPD (chronic obstructive pulmonary disease) ([address]     Diabetes mellitus ([address]     Diabetic neuropathy ([address]     Foot ulcer ([address]     Hyperlipidemia     Hypertension     Hypoglycemia 4/2/2015    MRSA (methicillin resistant staph aureus) culture positive 4/16/2015    ankle    OA (osteoarthritis)     Other disorders of kidney and ureter in diseases classified elsewhere     Paroxysmal atrial fibrillation (HCC)     Thyroid disease       Past Surgical History:   Procedure Laterality Date    COLONOSCOPY  06/17/2016    poor prep, done up to the IC valve, redundant colon    COLONOSCOPY  01/23/2017    VIRTUAL COLONOSCOPY DONE-no polyps or masses    FINGER AMPUTATION      FOOT SURGERY      LEG AMPUTATION BELOW KNEE Left 4/29/15    OTHER SURGICAL HISTORY Left 5-5-15 and 11/2015    Revision BKA    TEMPOROMANDIBULAR JOINT SURGERY Bilateral     TOE AMPUTATION      Right 2 and 4     Family History   Problem Relation Age of Onset    Diabetes Mother     Hypertension Mother     Cancer Mother      Stomach    Hypertension Father     Cancer Father      Kidney    Alcohol Abuse Father     Diabetes Brother      Current Outpatient Prescriptions   Medication Sig Dispense Refill    tamsulosin (FLOMAX) 0.4 MG capsule Take 1 capsule by mouth daily 30 capsule 11    SYMBICORT 160-4.5 MCG/ACT AERO INHALE 2 PUFFS TWICE A DAY 10.2 g 0    LYRICA 100 MG capsule TAKE 1 CAPSULE BY MOUTH 3 TIMES A DAY 90 capsule 0    insulin lispro (HUMALOG) 100 UNIT/ML injection vial Inject into the skin      TRUEPLUS INSULIN SYRINGE 31G X 5/16\" 1 ML MISC USE AS DIRECTED WITH HUMULIN AND HUMALOG INSULIN 5 TIMES DAILY 100 each 11    isosorbide mononitrate (IMDUR) 30 MG extended release tablet TAKE 1 TABLET BY MOUTH DAILY 30 tablet 11    HUMULIN N 100 UNIT/ML injection vial INJECT 50 UNITS EVERY MORNING AND 70 UNITS AT BEDTIME 30 mL 11    gemfibrozil (LOPID) 600 MG tablet TAKE 1 TABLET BY MOUTH ONCE DAILY 30 tablet 11    losartan (COZAAR) 100 MG tablet TAKE 1 TABLET BY MOUTH ONCE DAILY 30 tablet 11    rOPINIRole (REQUIP) 2 MG tablet TAKE 1 TABLET BY MOUTH ONCE DAILY 30 tablet 11    atorvastatin (LIPITOR) 20 MG tablet TAKE 1 TABLET BY MOUTH DAILY 30 tablet 5    levothyroxine (SYNTHROID) 200 MCG tablet TAKE 1 TABLET BY MOUTH ONCE A DAY 30 tablet 10    Omega-3 Fatty Acids (FISH OIL CONCENTRATE) 300 MG CAPS Take 300 mg by mouth daily      Glucosamine Sulfate 500 MG TABS Take 500 mg by mouth 3 times daily      atorvastatin (LIPITOR) 40 MG tablet Take 1 tablet by mouth daily 30 tablet 6    naproxen-esomeprazole (VIMOVO) 500-20 MG TBEC Take 1 tablet by mouth 2 times daily (with meals) 60 tablet 3    aspirin 81 MG tablet Take 81 mg by mouth daily      albuterol (PROVENTIL HFA;VENTOLIN HFA) 108 (90 BASE) MCG/ACT inhaler Inhale 2 puffs into the lungs every 6 hours as needed. No current facility-administered medications for this visit. ALLERGIES:  No Known Allergies    Social History   Substance Use Topics    Smoking status: Former Smoker     Packs/day: 2.00     Years: 25.00     Types: Cigars    Smokeless tobacco: Never Used      Comment: about 4-5 years    Alcohol use 0.0 oz/week     0 Standard drinks or equivalent per week      Comment: social      Body mass index is 32.76 kg/(m^2).   /78  Pulse 82  Ht 6' 3.98\" (1.93 m)  Wt 269 lb (122 kg)  SpO2 94%  BMI 32.76 kg/m2    Lab Results   Component Value Date     09/19/2016    K 3.9 09/19/2016     09/19/2016    CO2 24 09/19/2016    BUN 22 12/21/2016    CREATININE 1.19 12/21/2016    GLUCOSE 124 09/19/2016    CALCIUM 9.3 09/19/2016    PROT 7.5 09/19/2016    LABALBU 4.0 09/19/2016    BILITOT 0.41 09/19/2016    ALKPHOS 166 09/19/2016    AST 21 09/19/2016    ALT 19 09/19/2016       Lab Results   Component Value Date    WBC 8.3 09/19/2016    RBC 5.45 09/19/2016    HGB 16.4 09/19/2016    HCT 48.2 09/19/2016    MCV 88.3 09/19/2016    MCH 30.0 09/19/2016    MCHC 34.0 09/19/2016    RDW 14.1 09/19/2016     09/19/2016    MPV 9.6 09/19/2016       Lab Results   Component Value Date    LABA1C 8.0 07/24/2017       Lab Results   Component Value Date    HDL 47 03/31/2016    LDLCHOLESTEROL 46 03/31/2016       Assessment:      1. Type 2 diabetes mellitus with diabetic polyneuropathy, with long-term current use of insulin ([facility]     2. Acquired hypothyroidism     3. Status post below knee amputation of left lower extremity ([facility]     4. PVD (peripheral vascular disease) ([facility]     5. BPH w urinary obs/LUTS  PSA Screening    tamsulosin (FLOMAX) 0.4 MG capsule   6. Seborrheic keratoses             Plan:   No Follow-up on file.   Orders Placed This Encounter   Procedures    PSA Screening     Standing Status:   Future     Standing Expiration Date:   10/6/2018     Orders Placed This Encounter   Medications    tamsulosin (FLOMAX) 0.4 MG capsule     Sig: Take 1 capsule by mouth daily     Dispense:  30 capsule     Refill:  11         Uncontrolled dm  Will incrase NPH to 60 in and am and keep pm to 70  premeal insulin short acting  Per dr Teagan Vega action law suit for left BKA on [employer]

## 2017-10-21 RX ORDER — ATORVASTATIN CALCIUM 20 MG/1
TABLET, FILM COATED ORAL
Qty: 30 TABLET | Refills: 11 | Status: SHIPPED | OUTPATIENT
Start: 2017-10-21 | End: 2018-08-09 | Stop reason: SDUPTHER

## 2017-10-23 ENCOUNTER — HOSPITAL ENCOUNTER (OUTPATIENT)
Age: 57
Setting detail: SPECIMEN
Discharge: HOME | End: 2017-10-23
Payer: COMMERCIAL

## 2017-10-23 LAB — PROSTATE SPECIFIC ANTIGEN: 4.62 UG/L

## 2017-11-02 ENCOUNTER — OFFICE VISIT (OUTPATIENT)
Dept: INTERNAL MEDICINE CLINIC | Age: 57
End: 2017-11-02
Payer: COMMERCIAL

## 2017-11-02 VITALS
SYSTOLIC BLOOD PRESSURE: 130 MMHG | HEIGHT: 76 IN | OXYGEN SATURATION: 91 % | BODY MASS INDEX: 32.88 KG/M2 | WEIGHT: 270 LBS | HEART RATE: 91 BPM | DIASTOLIC BLOOD PRESSURE: 72 MMHG

## 2017-11-02 DIAGNOSIS — E03.9 ACQUIRED HYPOTHYROIDISM: ICD-10-CM

## 2017-11-02 DIAGNOSIS — R97.20 PSA ELEVATION: ICD-10-CM

## 2017-11-02 DIAGNOSIS — E11.41 DIABETIC MONONEUROPATHY ASSOCIATED WITH TYPE 2 DIABETES MELLITUS (HCC): ICD-10-CM

## 2017-11-02 DIAGNOSIS — Z79.4 TYPE 2 DIABETES MELLITUS WITH DIABETIC POLYNEUROPATHY, WITH LONG-TERM CURRENT USE OF INSULIN (HCC): Primary | ICD-10-CM

## 2017-11-02 DIAGNOSIS — L02.92 BOIL: ICD-10-CM

## 2017-11-02 DIAGNOSIS — E78.00 PURE HYPERCHOLESTEROLEMIA: ICD-10-CM

## 2017-11-02 DIAGNOSIS — E11.42 TYPE 2 DIABETES MELLITUS WITH DIABETIC POLYNEUROPATHY, WITH LONG-TERM CURRENT USE OF INSULIN (HCC): Primary | ICD-10-CM

## 2017-11-02 PROCEDURE — G8417 CALC BMI ABV UP PARAM F/U: HCPCS | Performed by: INTERNAL MEDICINE

## 2017-11-02 PROCEDURE — G8427 DOCREV CUR MEDS BY ELIG CLIN: HCPCS | Performed by: INTERNAL MEDICINE

## 2017-11-02 PROCEDURE — 3017F COLORECTAL CA SCREEN DOC REV: CPT | Performed by: INTERNAL MEDICINE

## 2017-11-02 PROCEDURE — 3045F PR MOST RECENT HEMOGLOBIN A1C LEVEL 7.0-9.0%: CPT | Performed by: INTERNAL MEDICINE

## 2017-11-02 PROCEDURE — G8484 FLU IMMUNIZE NO ADMIN: HCPCS | Performed by: INTERNAL MEDICINE

## 2017-11-02 PROCEDURE — 99214 OFFICE O/P EST MOD 30 MIN: CPT | Performed by: INTERNAL MEDICINE

## 2017-11-02 PROCEDURE — 1036F TOBACCO NON-USER: CPT | Performed by: INTERNAL MEDICINE

## 2017-11-02 RX ORDER — DOXYCYCLINE HYCLATE 100 MG/1
100 CAPSULE ORAL 2 TIMES DAILY
Qty: 20 CAPSULE | Refills: 0 | Status: SHIPPED | OUTPATIENT
Start: 2017-11-02 | End: 2017-11-12

## 2017-11-02 ASSESSMENT — ENCOUNTER SYMPTOMS
SHORTNESS OF BREATH: 0
COLOR CHANGE: 0
ABDOMINAL DISTENTION: 0
EYE PAIN: 0
EYE DISCHARGE: 0
WHEEZING: 0
COUGH: 0
BLOOD IN STOOL: 0
TROUBLE SWALLOWING: 0
DIARRHEA: 0

## 2017-11-02 NOTE — PROGRESS NOTES
Subjective:      Visit Information    Have you changed or started any medications since your last visit including any over-the-counter medicines, vitamins, or herbal medicines? no   Have you stopped taking any of your medications? Is so, why? -  no  Are you having any side effects from any of your medications? - no    Have you seen any other physician or provider since your last visit?  no   Have you had any other diagnostic tests since your last visit?  no   Have you been seen in the emergency room and/or had an admission in a hospital since we last saw you?  no   Have you had your routine dental cleaning in the past 6 months?  no     Do you have an active MyChart account? If no, what is the barrier? No: does not want    Patient Care Team:  Lina Velez MD as PCP - Narda Heck MD as PCP - S Attributed Provider    Medical History Review  Past Medical, Family, and Social History reviewed and does not contribute to the patient presenting condition    Health Maintenance   Topic Date Due    Hepatitis C screen  1960    HIV screen  01/07/1975    DTaP/Tdap/Td vaccine (1 - Tdap) 01/07/1979    Pneumococcal med risk (1 of 1 - PPSV23) 01/07/1979    Diabetic retinal exam  01/04/2017    Lipid screen  03/31/2017    Flu vaccine (1) 09/01/2017    Diabetic foot exam  06/29/2018    Diabetic hemoglobin A1C test  07/24/2018    Colon cancer screen colonoscopy  01/23/2027             Patient ID: Lenin Del Rosario is a 62 y.o. male. HTN  Onset more than 2 years ago  gustavo mild to mod  Controlled with current po meds  Not associated with headaches or blurry vision  No chest pain  Diabetes   Duration more than 7 years  Modifying factors on Glucophage and other med  Severity uncontrolled sever  Associated signs and symtoms neuropathy/ckd/ CAD. aggravated with sugar diet and better with low sugar diet             Review of Systems   Constitutional: Negative for appetite change, diaphoresis and fatigue. knee amputaion  Rt foot toe toes amputation         Lymphadenopathy:        Head (right side): No submental and no submandibular adenopathy present. Head (left side): No submental and no submandibular adenopathy present. He has no cervical adenopathy. Neurological: He is alert and oriented to person, place, and time. He displays no atrophy. No cranial nerve deficit or sensory deficit. He exhibits normal muscle tone. Coordination normal.   Skin: Skin is warm. No bruising, no ecchymosis and no rash noted. He is not diaphoretic. No pallor. Boil in nose left nostirl     Psychiatric: He has a normal mood and affect. His behavior is normal. His mood appears not anxious. His affect is not angry. His speech is not slurred. He is not aggressive. Cognition and memory are not impaired. He expresses no homicidal ideation. I have personally reviewed and agree with the patient ROS, HPI and Ul. Maddie Chinchilla is a 62 y.o. male who presents today for follow up on his chronic medical conditions as noted below. Lynn Brunner is c/o of   Chief Complaint   Patient presents with    Lesion(s)     c/o sore in lt side of nose and sore under rt arm.  pt is concerned this is MRSA due to h/o of it in past.        Patient Active Problem List:     Anemia     Neuropathy in diabetes ([address]     Charcot ankle     Hypothyroid     PVD (peripheral vascular disease) ([address]     Below knee amputation status ([address]     Type 2 diabetes mellitus with diabetic polyneuropathy (HCC)     Pure hypercholesterolemia     CKD (chronic kidney disease) stage 2, GFR 60-89 ml/min     Constipation     PSA elevation     Past Medical History:   Diagnosis Date    CAD (coronary artery disease)     Charcot foot due to diabetes mellitus ([address]     COPD (chronic obstructive pulmonary disease) ([address]     Diabetes mellitus ([address]     Diabetic neuropathy ([address]     Foot ulcer ([address]     Hyperlipidemia     Hypertension     Hypoglycemia 4/2/2015    MRSA (methicillin resistant staph aureus) culture positive 4/16/2015    ankle    OA (osteoarthritis)     Other disorders of kidney and ureter in diseases classified elsewhere     Paroxysmal atrial fibrillation ([facility]     Thyroid disease       Past Surgical History:   Procedure Laterality Date    COLONOSCOPY  06/17/2016    poor prep, done up to the IC valve, redundant colon    COLONOSCOPY  01/23/2017    VIRTUAL COLONOSCOPY DONE-no polyps or masses    FINGER AMPUTATION      FOOT SURGERY      LEG AMPUTATION BELOW KNEE Left 4/29/15    OTHER SURGICAL HISTORY Left 5-5-15 and 11/2015    Revision BKA    TEMPOROMANDIBULAR JOINT SURGERY Bilateral     TOE AMPUTATION      Right 2 and 4     Family History   Problem Relation Age of Onset    Diabetes Mother     Hypertension Mother     Cancer Mother      Stomach    Hypertension Father     Cancer Father      Kidney    Alcohol Abuse Father     Diabetes Brother      Current Outpatient Prescriptions   Medication Sig Dispense Refill    sitaGLIPtan-metformin (JANUMET)  MG per tablet Take 1 tablet by mouth 2 times daily (with meals) 60 tablet 3    doxycycline hyclate (VIBRAMYCIN) 100 MG capsule Take 1 capsule by mouth 2 times daily for 10 days Take with food, but avoid dairy, calcium and MTV's 2 hours before and after the dose 20 capsule 0    atorvastatin (LIPITOR) 20 MG tablet TAKE 1 TABLET BY MOUTH DAILY 30 tablet 11    tamsulosin (FLOMAX) 0.4 MG capsule Take 1 capsule by mouth daily 30 capsule 11    SYMBICORT 160-4.5 MCG/ACT AERO INHALE 2 PUFFS TWICE A DAY 10.2 g 0    LYRICA 100 MG capsule TAKE 1 CAPSULE BY MOUTH 3 TIMES A DAY 90 capsule 0    insulin lispro (HUMALOG) 100 UNIT/ML injection vial Inject into the skin      TRUEPLUS INSULIN SYRINGE 31G X 5/16\" 1 ML MISC USE AS DIRECTED WITH HUMULIN AND HUMALOG INSULIN 5 TIMES DAILY 100 each 11    isosorbide mononitrate (IMDUR) 30 MG extended release tablet TAKE 1 TABLET BY MOUTH DAILY 30 tablet 11    HUMULIN N 09/19/2016    MCV 88.3 09/19/2016    MCH 30.0 09/19/2016    MCHC 34.0 09/19/2016    RDW 14.1 09/19/2016     09/19/2016    MPV 9.6 09/19/2016       Lab Results   Component Value Date    LABA1C 8.0 07/24/2017       Lab Results   Component Value Date    HDL 47 03/31/2016    LDLCHOLESTEROL 46 03/31/2016       Assessment:      1. Type 2 diabetes mellitus with diabetic polyneuropathy, with long-term current use of insulin ([facility])  Hemoglobin A1C    Microalbumin, Ur   2. Pure hypercholesterolemia     3. Acquired hypothyroidism     4. Diabetic mononeuropathy associated with type 2 diabetes mellitus ([facility]     5. Boil  doxycycline hyclate (VIBRAMYCIN) 100 MG capsule   6. PSA elevation             Plan:      Return in about 1 month (around 12/2/2017) for DM, HTN.   Orders Placed This Encounter   Procedures    Hemoglobin A1C     Standing Status:   Future     Standing Expiration Date:   1/31/2018    Microalbumin, Ur     Standing Status:   Future     Standing Expiration Date:   1/31/2018     Orders Placed This Encounter   Medications    sitaGLIPtan-metformin (JANUMET)  MG per tablet     Sig: Take 1 tablet by mouth 2 times daily (with meals)     Dispense:  60 tablet     Refill:  3    doxycycline hyclate (VIBRAMYCIN) 100 MG capsule     Sig: Take 1 capsule by mouth 2 times daily for 10 days Take with food, but avoid dairy, calcium and MTV's 2 hours before and after the dose     Dispense:  20 capsule     Refill:  0   borderkline hihg psa  No family hx  No bph symtms  Will repeta in three months

## 2017-11-09 ENCOUNTER — OFFICE VISIT (OUTPATIENT)
Dept: PODIATRY | Age: 57
End: 2017-11-09
Payer: COMMERCIAL

## 2017-11-09 VITALS
HEIGHT: 76 IN | HEART RATE: 74 BPM | SYSTOLIC BLOOD PRESSURE: 127 MMHG | DIASTOLIC BLOOD PRESSURE: 74 MMHG | BODY MASS INDEX: 32.88 KG/M2 | WEIGHT: 270 LBS

## 2017-11-09 DIAGNOSIS — E11.42 TYPE 2 DIABETES MELLITUS WITH DIABETIC POLYNEUROPATHY, WITH LONG-TERM CURRENT USE OF INSULIN (HCC): ICD-10-CM

## 2017-11-09 DIAGNOSIS — E11.51 TYPE 2 DIABETES MELLITUS WITH PERIPHERAL VASCULAR DISEASE (HCC): ICD-10-CM

## 2017-11-09 DIAGNOSIS — B35.1 ONYCHOMYCOSIS OF TOENAIL: Primary | ICD-10-CM

## 2017-11-09 DIAGNOSIS — Z79.4 TYPE 2 DIABETES MELLITUS WITH DIABETIC POLYNEUROPATHY, WITH LONG-TERM CURRENT USE OF INSULIN (HCC): ICD-10-CM

## 2017-11-09 PROCEDURE — 11720 DEBRIDE NAIL 1-5: CPT | Performed by: PODIATRIST

## 2017-11-09 ASSESSMENT — ENCOUNTER SYMPTOMS
SHORTNESS OF BREATH: 0
BACK PAIN: 0
DIARRHEA: 0
NAUSEA: 0
COLOR CHANGE: 0

## 2017-11-09 NOTE — PROGRESS NOTES
SUBJECTIVE: Jere Rodriguez is a 62 y.o. male who returns to the office with chief complaint of painful fungal toenails. Patient relates toe nails are thickened/difficult to trim as well as painful with ambulation and with shoe gear. Review of Systems   Constitutional: Negative for activity change, appetite change, chills, diaphoresis, fatigue and fever. Respiratory: Negative for shortness of breath. Cardiovascular: Negative for leg swelling. Gastrointestinal: Negative for diarrhea and nausea. Endocrine: Negative for cold intolerance, heat intolerance and polyuria. Musculoskeletal: Positive for arthralgias. Negative for back pain, gait problem, joint swelling and myalgias. Skin: Negative for color change, pallor, rash and wound. Allergic/Immunologic: Negative for environmental allergies and food allergies. Neurological: Positive for numbness. Negative for dizziness, weakness and light-headedness. Hematological: Does not bruise/bleed easily. Psychiatric/Behavioral: Negative for behavioral problems, confusion and self-injury. The patient is not nervous/anxious. OBJECTIVE: Clinical evaluation of patient reveals nails 1,4,5 of the right foot to present with thickness, elongation, discoloration, brittleness, and subungual debris. There was pain with palpation and debridement of the toenails of the bilateral feet. No open lesions noted to either foot today. The right DP pulse is not palpable. The right PT pulse is not palpable. Protective sensation is absent to the right plantar foot as noted with a 5.07 [place]-Gaudencio monofilament. Glucose: Patient states that they do not know their current blood sugar level. ASSESSMENT:   1. Onychomycosis of toenail  KS DEBRIDEMENT OF NAIL(S), 1-5    HM DIABETES FOOT EXAM   2. Type 2 diabetes mellitus with peripheral vascular disease (HCC)  KS DEBRIDEMENT OF NAIL(S), 1-5    HM DIABETES FOOT EXAM   3.  Type 2 diabetes mellitus with diabetic

## 2017-11-17 ENCOUNTER — HOSPITAL ENCOUNTER (OUTPATIENT)
Age: 57
Setting detail: SPECIMEN
Discharge: HOME | End: 2017-11-17
Payer: COMMERCIAL

## 2017-11-17 DIAGNOSIS — E11.42 TYPE 2 DIABETES MELLITUS WITH DIABETIC POLYNEUROPATHY, WITH LONG-TERM CURRENT USE OF INSULIN (HCC): ICD-10-CM

## 2017-11-17 DIAGNOSIS — Z79.4 TYPE 2 DIABETES MELLITUS WITH DIABETIC POLYNEUROPATHY, WITH LONG-TERM CURRENT USE OF INSULIN (HCC): ICD-10-CM

## 2017-11-17 LAB
CREATININE URINE: 52.1 MG/DL (ref 39–259)
ESTIMATED AVERAGE GLUCOSE: 163 MG/DL
HBA1C MFR BLD: 7.3 % (ref 4–6)
MICROALBUMIN/CREAT 24H UR: <12 MG/L
MICROALBUMIN/CREAT UR-RTO: NORMAL MCG/MG CREAT

## 2017-11-17 RX ORDER — BUDESONIDE AND FORMOTEROL FUMARATE DIHYDRATE 160; 4.5 UG/1; UG/1
AEROSOL RESPIRATORY (INHALATION)
Qty: 10 INHALER | Refills: 3 | Status: SHIPPED | OUTPATIENT
Start: 2017-11-17 | End: 2018-06-10 | Stop reason: SDUPTHER

## 2017-11-22 ENCOUNTER — TELEPHONE (OUTPATIENT)
Dept: ENDOCRINOLOGY | Age: 57
End: 2017-11-22

## 2017-11-22 NOTE — TELEPHONE ENCOUNTER
Patient called asking to come back to Dr. Bryce Holden. Spoke to pt and per Dr. Bryce Holden, pt can come back to see him as Endo and we need a referral from pt's PCP. Pt verbalized complete understanding.

## 2017-12-05 ENCOUNTER — OFFICE VISIT (OUTPATIENT)
Dept: INTERNAL MEDICINE CLINIC | Age: 57
End: 2017-12-05
Payer: COMMERCIAL

## 2017-12-05 VITALS
DIASTOLIC BLOOD PRESSURE: 64 MMHG | BODY MASS INDEX: 32.63 KG/M2 | HEIGHT: 76 IN | SYSTOLIC BLOOD PRESSURE: 108 MMHG | WEIGHT: 268 LBS

## 2017-12-05 DIAGNOSIS — E11.42 TYPE 2 DIABETES MELLITUS WITH DIABETIC POLYNEUROPATHY, WITH LONG-TERM CURRENT USE OF INSULIN (HCC): Primary | ICD-10-CM

## 2017-12-05 DIAGNOSIS — I73.9 PVD (PERIPHERAL VASCULAR DISEASE) (HCC): ICD-10-CM

## 2017-12-05 DIAGNOSIS — E03.9 ACQUIRED HYPOTHYROIDISM: ICD-10-CM

## 2017-12-05 DIAGNOSIS — N18.2 CKD (CHRONIC KIDNEY DISEASE) STAGE 2, GFR 60-89 ML/MIN: ICD-10-CM

## 2017-12-05 DIAGNOSIS — Z79.4 TYPE 2 DIABETES MELLITUS WITH DIABETIC POLYNEUROPATHY, WITH LONG-TERM CURRENT USE OF INSULIN (HCC): Primary | ICD-10-CM

## 2017-12-05 DIAGNOSIS — E78.00 PURE HYPERCHOLESTEROLEMIA: ICD-10-CM

## 2017-12-05 PROCEDURE — 1036F TOBACCO NON-USER: CPT | Performed by: INTERNAL MEDICINE

## 2017-12-05 PROCEDURE — 3017F COLORECTAL CA SCREEN DOC REV: CPT | Performed by: INTERNAL MEDICINE

## 2017-12-05 PROCEDURE — G8417 CALC BMI ABV UP PARAM F/U: HCPCS | Performed by: INTERNAL MEDICINE

## 2017-12-05 PROCEDURE — 99214 OFFICE O/P EST MOD 30 MIN: CPT | Performed by: INTERNAL MEDICINE

## 2017-12-05 PROCEDURE — G8427 DOCREV CUR MEDS BY ELIG CLIN: HCPCS | Performed by: INTERNAL MEDICINE

## 2017-12-05 PROCEDURE — G8484 FLU IMMUNIZE NO ADMIN: HCPCS | Performed by: INTERNAL MEDICINE

## 2017-12-05 PROCEDURE — 3045F PR MOST RECENT HEMOGLOBIN A1C LEVEL 7.0-9.0%: CPT | Performed by: INTERNAL MEDICINE

## 2017-12-05 ASSESSMENT — ENCOUNTER SYMPTOMS
BLOOD IN STOOL: 0
EYE PAIN: 0
ABDOMINAL DISTENTION: 0
WHEEZING: 0
BACK PAIN: 1
TROUBLE SWALLOWING: 0
EYE DISCHARGE: 0
COLOR CHANGE: 0
COUGH: 0
DIARRHEA: 0
SHORTNESS OF BREATH: 0

## 2017-12-05 NOTE — PROGRESS NOTES
Subjective:      Visit Information    Have you changed or started any medications since your last visit including any over-the-counter medicines, vitamins, or herbal medicines? no   Have you stopped taking any of your medications? Is so, why? -  no  Are you having any side effects from any of your medications? - no    Have you seen any other physician or provider since your last visit?  no   Have you had any other diagnostic tests since your last visit?  no   Have you been seen in the emergency room and/or had an admission in a hospital since we last saw you?  no   Have you had your routine dental cleaning in the past 6 months?  no     Do you have an active MyChart account? If no, what is the barrier? No: does not want    Patient Care Team:  Mark Goodman MD as PCP - America Conde MD as PCP - S Attributed Provider    Medical History Review  Past Medical, Family, and Social History reviewed and does not contribute to the patient presenting condition    Health Maintenance   Topic Date Due    Hepatitis C screen  1960    HIV screen  01/07/1975    DTaP/Tdap/Td vaccine (1 - Tdap) 01/07/1979    Pneumococcal med risk (1 of 1 - PPSV23) 01/07/1979    Diabetic retinal exam  01/04/2017    Lipid screen  03/31/2017    Flu vaccine (1) 09/01/2017    Diabetic foot exam  11/09/2018    Diabetic hemoglobin A1C test  11/17/2018    Colon cancer screen colonoscopy  01/23/2027             Patient ID: Jonna Fernandes is a 62 y.o. male. Diabetes   Duration more than 7 years  Modifying factors on Glucophage and other med  Severity uncontrolled sever  Associated signs and symtoms neuropathy/ckd/ CAD.    aggravated with sugar diet and better with low sugar diet     HTN  Onset more than 2 years ago  gustavo mild to mod  Controlled with current po meds  Not associated with headaches or blurry vision  No chest pain          Review of Systems   Constitutional: Negative for appetite change, diaphoresis and fatigue. HENT: Negative for ear discharge and trouble swallowing. Eyes: Negative for pain and discharge. Respiratory: Negative for cough, shortness of breath and wheezing. Cardiovascular: Negative for chest pain and palpitations. Gastrointestinal: Negative for abdominal distention, blood in stool and diarrhea. Endocrine: Negative for polydipsia and polyphagia. Genitourinary: Negative for difficulty urinating and frequency. Musculoskeletal: Positive for arthralgias and back pain. Negative for gait problem, myalgias and neck pain. Skin: Negative for color change and rash. Allergic/Immunologic: Negative for environmental allergies and food allergies. Neurological: Negative for dizziness and headaches. Excessive sweating     Hematological: Negative for adenopathy. Does not bruise/bleed easily. Psychiatric/Behavioral: Negative for behavioral problems and sleep disturbance. Objective:   Physical Exam   Constitutional: He is oriented to person, place, and time. He appears well-developed and well-nourished. HENT:   Head: Normocephalic and atraumatic. Eyes: Conjunctivae and EOM are normal. Right eye exhibits no discharge. Left eye exhibits no discharge. Right conjunctiva is not injected. Left conjunctiva is not injected. Right eye exhibits normal extraocular motion. Left eye exhibits normal extraocular motion. Neck: Normal range of motion. Neck supple. No JVD present. No edema and no erythema present. No thyroid mass and no thyromegaly present. Cardiovascular: Normal rate and regular rhythm. Exam reveals no friction rub. No murmur heard. Pulmonary/Chest: Effort normal and breath sounds normal. No accessory muscle usage. No tachypnea and no bradypnea. No respiratory distress. He has no wheezes. He has no rales. Abdominal: Soft. Bowel sounds are normal. He exhibits no distension. There is no tenderness. There is no rebound. Musculoskeletal: Normal range of motion.  He exhibits no edema or tenderness. Left below knee amputaion  Rt foot toe toes amputation         Lymphadenopathy:        Head (right side): No submental and no submandibular adenopathy present. Head (left side): No submental and no submandibular adenopathy present. He has no cervical adenopathy. Neurological: He is alert and oriented to person, place, and time. He displays no atrophy. No cranial nerve deficit or sensory deficit. He exhibits normal muscle tone. Coordination normal.   Skin: Skin is warm. No bruising, no ecchymosis and no rash noted. He is not diaphoretic. No pallor. Psychiatric: He has a normal mood and affect. His behavior is normal. His mood appears not anxious. His affect is not angry. His speech is not slurred. He is not aggressive. Cognition and memory are not impaired. He expresses no homicidal ideation. I have personally reviewed and agree with the patient GUIDO, ABIDA and Deborah. Maddie Chinchilla is a 62 y.o. male who presents today for follow up on his chronic medical conditions as noted below. Avery Castaneda is c/o of   Chief Complaint   Patient presents with    Gastroesophageal Reflux     c/o acid reflux in the middle of the night that wakes him up, sx present for 1 month    Sweats     pt states he is sweating all the time, sx present for 1 year, sx worsening.      Diabetes     management       Patient Active Problem List:     Anemia     Neuropathy in diabetes ([address]     Charcot ankle     Hypothyroid     PVD (peripheral vascular disease) ([address]     Below knee amputation status (HCC)     Type 2 diabetes mellitus with diabetic polyneuropathy (HCC)     Pure hypercholesterolemia     CKD (chronic kidney disease) stage 2, GFR 60-89 ml/min     Constipation     PSA elevation     Past Medical History:   Diagnosis Date    CAD (coronary artery disease)     Charcot foot due to diabetes mellitus ([address]     COPD (chronic obstructive pulmonary disease) ([address]     Diabetes mellitus EVERY MORNING AND 70 UNITS AT BEDTIME 30 mL 11    gemfibrozil (LOPID) 600 MG tablet TAKE 1 TABLET BY MOUTH ONCE DAILY 30 tablet 11    losartan (COZAAR) 100 MG tablet TAKE 1 TABLET BY MOUTH ONCE DAILY 30 tablet 11    rOPINIRole (REQUIP) 2 MG tablet TAKE 1 TABLET BY MOUTH ONCE DAILY 30 tablet 11    levothyroxine (SYNTHROID) 200 MCG tablet TAKE 1 TABLET BY MOUTH ONCE A DAY 30 tablet 10    Omega-3 Fatty Acids (FISH OIL CONCENTRATE) 300 MG CAPS Take 300 mg by mouth daily      Glucosamine Sulfate 500 MG TABS Take 500 mg by mouth 3 times daily      atorvastatin (LIPITOR) 40 MG tablet Take 1 tablet by mouth daily 30 tablet 6    naproxen-esomeprazole (VIMOVO) 500-20 MG TBEC Take 1 tablet by mouth 2 times daily (with meals) 60 tablet 3    aspirin 81 MG tablet Take 81 mg by mouth daily      albuterol (PROVENTIL HFA;VENTOLIN HFA) 108 (90 BASE) MCG/ACT inhaler Inhale 2 puffs into the lungs every 6 hours as needed. No current facility-administered medications for this visit. ALLERGIES:  No Known Allergies    Social History   Substance Use Topics    Smoking status: Former Smoker     Packs/day: 2.00     Years: 25.00     Types: Cigars    Smokeless tobacco: Never Used      Comment: about 4-5 years    Alcohol use 0.0 oz/week      Comment: social      Body mass index is 32.64 kg/m².   /64   Ht 6' 3.98\" (1.93 m)   Wt 268 lb (121.6 kg)   BMI 32.64 kg/m²     Lab Results   Component Value Date     09/19/2016    K 3.9 09/19/2016     09/19/2016    CO2 24 09/19/2016    BUN 22 12/21/2016    CREATININE 1.19 12/21/2016    GLUCOSE 124 09/19/2016    CALCIUM 9.3 09/19/2016    PROT 7.5 09/19/2016    LABALBU 4.0 09/19/2016    BILITOT 0.41 09/19/2016    ALKPHOS 166 09/19/2016    AST 21 09/19/2016    ALT 19 09/19/2016       Lab Results   Component Value Date    WBC 8.3 09/19/2016    RBC 5.45 09/19/2016    HGB 16.4 09/19/2016    HCT 48.2 09/19/2016    MCV 88.3 09/19/2016    MCH 30.0 09/19/2016    MCHC 34.0 09/19/2016    RDW 14.1 09/19/2016     09/19/2016    MPV 9.6 09/19/2016       Lab Results   Component Value Date    LABA1C 7.3 11/17/2017       Lab Results   Component Value Date    HDL 47 03/31/2016    LDLCHOLESTEROL 46 03/31/2016       Assessment:      1. Type 2 diabetes mellitus with diabetic polyneuropathy, with long-term current use of insulin ([address]     2. Acquired hypothyroidism  TSH With Reflex Ft4   3. CKD (chronic kidney disease) stage 2, GFR 60-89 ml/min     4. Pure hypercholesterolemia     5. PVD (peripheral vascular disease) [facility])  Stress Test W Pharm           Plan:      No Follow-up on file. Orders Placed This Encounter   Procedures    TSH With Reflex Ft4     Standing Status:   Future     Standing Expiration Date:   12/5/2018   Tammy Root Stress Test W Pharm     Standing Status:   Future     Standing Expiration Date:   12/5/2018     Order Specific Question:   Reason for Exam?     Answer:   EKG abnormalities     No orders of the defined types were placed in this encounter.     Humulin  n   60 in am and 70 bedtime  a1c noted good  But fasting number are high in am    Will check 5 am sugar in am for jazmyn phenomenon

## 2017-12-20 ENCOUNTER — TELEPHONE (OUTPATIENT)
Dept: INTERNAL MEDICINE CLINIC | Age: 57
End: 2017-12-20

## 2017-12-20 DIAGNOSIS — E11.42 TYPE 2 DIABETES MELLITUS WITH DIABETIC POLYNEUROPATHY, WITH LONG-TERM CURRENT USE OF INSULIN (HCC): ICD-10-CM

## 2017-12-20 DIAGNOSIS — R94.31 ABNORMAL EKG: Primary | ICD-10-CM

## 2017-12-20 DIAGNOSIS — I25.10 ATHEROSCLEROSIS OF NATIVE CORONARY ARTERY, ANGINA PRESENCE UNSPECIFIED, UNSPECIFIED WHETHER NATIVE OR TRANSPLANTED HEART: ICD-10-CM

## 2017-12-20 DIAGNOSIS — Z79.4 TYPE 2 DIABETES MELLITUS WITH DIABETIC POLYNEUROPATHY, WITH LONG-TERM CURRENT USE OF INSULIN (HCC): ICD-10-CM

## 2017-12-20 DIAGNOSIS — I10 ESSENTIAL HYPERTENSION, MALIGNANT: ICD-10-CM

## 2017-12-20 DIAGNOSIS — I48.0 PAROXYSMAL ATRIAL FIBRILLATION (HCC): ICD-10-CM

## 2017-12-20 DIAGNOSIS — E78.5 HYPERLIPIDEMIA, UNSPECIFIED HYPERLIPIDEMIA TYPE: ICD-10-CM

## 2018-01-04 ENCOUNTER — HOSPITAL ENCOUNTER (OUTPATIENT)
Age: 58
Setting detail: SPECIMEN
Discharge: HOME | End: 2018-01-04
Payer: COMMERCIAL

## 2018-01-04 DIAGNOSIS — E03.9 ACQUIRED HYPOTHYROIDISM: ICD-10-CM

## 2018-01-04 LAB
THYROXINE, FREE: 1.54 NG/DL (ref 0.93–1.7)
TSH SERPL DL<=0.05 MIU/L-ACNC: 0.02 MIU/L (ref 0.3–5)

## 2018-01-05 DIAGNOSIS — E78.5 HYPERLIPIDEMIA, UNSPECIFIED HYPERLIPIDEMIA TYPE: ICD-10-CM

## 2018-01-05 DIAGNOSIS — I48.0 PAROXYSMAL ATRIAL FIBRILLATION (HCC): ICD-10-CM

## 2018-01-05 DIAGNOSIS — I10 ESSENTIAL HYPERTENSION, MALIGNANT: ICD-10-CM

## 2018-01-05 DIAGNOSIS — R94.31 ABNORMAL EKG: ICD-10-CM

## 2018-01-05 DIAGNOSIS — I25.10 ATHEROSCLEROSIS OF NATIVE CORONARY ARTERY, ANGINA PRESENCE UNSPECIFIED, UNSPECIFIED WHETHER NATIVE OR TRANSPLANTED HEART: ICD-10-CM

## 2018-01-05 DIAGNOSIS — Z79.4 TYPE 2 DIABETES MELLITUS WITH DIABETIC POLYNEUROPATHY, WITH LONG-TERM CURRENT USE OF INSULIN (HCC): ICD-10-CM

## 2018-01-05 DIAGNOSIS — E11.42 TYPE 2 DIABETES MELLITUS WITH DIABETIC POLYNEUROPATHY, WITH LONG-TERM CURRENT USE OF INSULIN (HCC): ICD-10-CM

## 2018-01-10 ENCOUNTER — OFFICE VISIT (OUTPATIENT)
Dept: INTERNAL MEDICINE CLINIC | Age: 58
End: 2018-01-10
Payer: COMMERCIAL

## 2018-01-10 VITALS
OXYGEN SATURATION: 96 % | HEIGHT: 76 IN | DIASTOLIC BLOOD PRESSURE: 80 MMHG | BODY MASS INDEX: 33.24 KG/M2 | SYSTOLIC BLOOD PRESSURE: 120 MMHG | HEART RATE: 78 BPM | WEIGHT: 273 LBS

## 2018-01-10 DIAGNOSIS — E03.9 ACQUIRED HYPOTHYROIDISM: ICD-10-CM

## 2018-01-10 DIAGNOSIS — Z89.512 STATUS POST BELOW KNEE AMPUTATION OF LEFT LOWER EXTREMITY: ICD-10-CM

## 2018-01-10 DIAGNOSIS — N18.2 CKD (CHRONIC KIDNEY DISEASE) STAGE 2, GFR 60-89 ML/MIN: ICD-10-CM

## 2018-01-10 DIAGNOSIS — E11.41 DIABETIC MONONEUROPATHY ASSOCIATED WITH TYPE 2 DIABETES MELLITUS (HCC): Primary | ICD-10-CM

## 2018-01-10 DIAGNOSIS — I73.9 PVD (PERIPHERAL VASCULAR DISEASE) (HCC): ICD-10-CM

## 2018-01-10 DIAGNOSIS — E11.42 TYPE 2 DIABETES MELLITUS WITH DIABETIC POLYNEUROPATHY, WITH LONG-TERM CURRENT USE OF INSULIN (HCC): ICD-10-CM

## 2018-01-10 DIAGNOSIS — E78.00 PURE HYPERCHOLESTEROLEMIA: ICD-10-CM

## 2018-01-10 DIAGNOSIS — Z79.4 TYPE 2 DIABETES MELLITUS WITH DIABETIC POLYNEUROPATHY, WITH LONG-TERM CURRENT USE OF INSULIN (HCC): ICD-10-CM

## 2018-01-10 PROCEDURE — 3046F HEMOGLOBIN A1C LEVEL >9.0%: CPT | Performed by: INTERNAL MEDICINE

## 2018-01-10 PROCEDURE — G8598 ASA/ANTIPLAT THER USED: HCPCS | Performed by: INTERNAL MEDICINE

## 2018-01-10 PROCEDURE — 90686 IIV4 VACC NO PRSV 0.5 ML IM: CPT | Performed by: INTERNAL MEDICINE

## 2018-01-10 PROCEDURE — G8417 CALC BMI ABV UP PARAM F/U: HCPCS | Performed by: INTERNAL MEDICINE

## 2018-01-10 PROCEDURE — 90471 IMMUNIZATION ADMIN: CPT | Performed by: INTERNAL MEDICINE

## 2018-01-10 PROCEDURE — 1036F TOBACCO NON-USER: CPT | Performed by: INTERNAL MEDICINE

## 2018-01-10 PROCEDURE — G8484 FLU IMMUNIZE NO ADMIN: HCPCS | Performed by: INTERNAL MEDICINE

## 2018-01-10 PROCEDURE — 3017F COLORECTAL CA SCREEN DOC REV: CPT | Performed by: INTERNAL MEDICINE

## 2018-01-10 PROCEDURE — G8427 DOCREV CUR MEDS BY ELIG CLIN: HCPCS | Performed by: INTERNAL MEDICINE

## 2018-01-10 PROCEDURE — 99214 OFFICE O/P EST MOD 30 MIN: CPT | Performed by: INTERNAL MEDICINE

## 2018-01-10 RX ORDER — LEVOTHYROXINE SODIUM 0.2 MG/1
175 TABLET ORAL DAILY
Qty: 30 TABLET | Refills: 10 | Status: SHIPPED | OUTPATIENT
Start: 2018-01-10 | End: 2018-01-11 | Stop reason: SDUPTHER

## 2018-01-10 ASSESSMENT — ENCOUNTER SYMPTOMS
DIARRHEA: 0
ABDOMINAL DISTENTION: 0
COLOR CHANGE: 0
TROUBLE SWALLOWING: 0
SHORTNESS OF BREATH: 0
WHEEZING: 0
EYE PAIN: 0
COUGH: 0
EYE DISCHARGE: 0
BLOOD IN STOOL: 0

## 2018-01-10 NOTE — PROGRESS NOTES
Subjective:      Patient ID: Aquilino Fabian is a 62 y.o. male. Pt is here today for a follow up on his dm and htn. Pt states that he had a physical for work and his a1c 7.0 and his bp was a little elevated. Visit Information    Have you changed or started any medications since your last visit including any over-the-counter medicines, vitamins, or herbal medicines? no   Are you having any side effects from any of your medications? -  no  Have you stopped taking any of your medications? Is so, why? -  no    Have you seen any other physician or provider since your last visit? No  Have you had any other diagnostic tests since your last visit? Yes - Records Obtained  Have you been seen in the emergency room and/or had an admission to a hospital since we last saw you? No  Have you had your routine dental cleaning in the past 6 months? no    Have you activated your [employer] account? If not, what are your barriers?  Yes     Patient Care Team:  Baldo Clemons MD as PCP - Holly Joy MD as PCP - S Attributed Provider    Medical History Review  Past Medical, Family, and Social History reviewed and does contribute to the patient presenting condition    Health Maintenance   Topic Date Due    Hepatitis C screen  1960    HIV screen  01/07/1975    DTaP/Tdap/Td vaccine (1 - Tdap) 01/07/1979    Pneumococcal med risk (1 of 1 - PPSV23) 01/07/1979    Diabetic retinal exam  01/04/2017    Lipid screen  03/31/2017    Flu vaccine (1) 09/01/2017    Potassium monitoring  09/19/2017    Creatinine monitoring  12/21/2017    Diabetic foot exam  11/09/2018    A1C test (Diabetic or Prediabetic)  11/17/2018    TSH testing  01/04/2019    Colon cancer screen colonoscopy  01/23/2027     HTN  Onset more than 2 years ago  gustavo mild to mod  Controlled with current po meds  Not associated with headaches or blurry vision  No chest pain  Diabetes   Duration more than 7 years  Modifying factors on inslin   and other no wheezes. He has no rales. Abdominal: Soft. Bowel sounds are normal. He exhibits no distension. There is no tenderness. There is no rebound. Musculoskeletal: Normal range of motion. He exhibits no edema or tenderness. Left below knee amputaion  Rt foot toe toes amputation         Lymphadenopathy:        Head (right side): No submental and no submandibular adenopathy present. Head (left side): No submental and no submandibular adenopathy present. He has no cervical adenopathy. Neurological: He is alert and oriented to person, place, and time. He displays no atrophy. No cranial nerve deficit or sensory deficit. He exhibits normal muscle tone. Coordination normal.   Skin: Skin is warm. No bruising, no ecchymosis and no rash noted. He is not diaphoretic. No pallor. Psychiatric: He has a normal mood and affect. His behavior is normal. His mood appears not anxious. His affect is not angry. His speech is not slurred. He is not aggressive. Cognition and memory are not impaired. He expresses no homicidal ideation. I have personally reviewed and agree with the patient GUIDO, HPI and Ul. Maddie Chinchilla is a 62 y.o. male who presents today for follow up on his chronic medical conditions as noted below.   Ty Eubanks is c/o of   Chief Complaint   Patient presents with    Diabetes     1 month follow up        Patient Active Problem List:     Anemia     Neuropathy in diabetes ([address]     Charcot ankle     Hypothyroid     PVD (peripheral vascular disease) [facility]     Below knee amputation status ([facility]     Type 2 diabetes mellitus with diabetic polyneuropathy (HCC)     Pure hypercholesterolemia     CKD (chronic kidney disease) stage 2, GFR 60-89 ml/min     Constipation     PSA elevation     CAD (coronary artery disease)     Past Medical History:   Diagnosis Date    CAD (coronary artery disease)     Charcot foot due to diabetes mellitus ([address]     COPD (chronic obstructive pulmonary disease) [facility]     Diabetes mellitus ([facility]     Diabetic neuropathy ([facility]     Foot ulcer ([facility]     Hyperlipidemia     Hypertension     Hypoglycemia 4/2/2015    MRSA (methicillin resistant staph aureus) culture positive 4/16/2015    ankle    OA (osteoarthritis)     Other disorders of kidney and ureter in diseases classified elsewhere     Paroxysmal atrial fibrillation ([facility]     Thyroid disease       Past Surgical History:   Procedure Laterality Date    COLONOSCOPY  06/17/2016    poor prep, done up to the IC valve, redundant colon    COLONOSCOPY  01/23/2017    VIRTUAL COLONOSCOPY DONE-no polyps or masses    FINGER AMPUTATION      FOOT SURGERY      LEG AMPUTATION BELOW KNEE Left 4/29/15    OTHER SURGICAL HISTORY Left 5-5-15 and 11/2015    Revision BKA    TEMPOROMANDIBULAR JOINT SURGERY Bilateral     TOE AMPUTATION      Right 2 and 4     Family History   Problem Relation Age of Onset    Diabetes Mother     Hypertension Mother     Cancer Mother      Stomach    Hypertension Father     Cancer Father      Kidney    Alcohol Abuse Father     Diabetes Brother      Current Outpatient Prescriptions   Medication Sig Dispense Refill    levothyroxine (SYNTHROID) 200 MCG tablet Take 1 tablet by mouth daily 30 tablet 10    HUMALOG 100 UNIT/ML injection vial INJECT 12 UNITS UNDER THE SKIN 3 TIMES DAILY + SLIDING SCALE (TAKE 5 UNITS IF SUGAR IS OVER 150) 20 mL 3    LYRICA 100 MG capsule TAKE 1 CAPSULE BY MOUTH 3 TIMES A DAY 90 capsule 0    budesonide-formoterol (SYMBICORT) 160-4.5 MCG/ACT AERO 2 puffs twice daily  10 Inhaler 3    sitaGLIPtan-metformin (JANUMET)  MG per tablet Take 1 tablet by mouth 2 times daily (with meals) 60 tablet 3    tamsulosin (FLOMAX) 0.4 MG capsule Take 1 capsule by mouth daily 30 capsule 11    TRUEPLUS INSULIN SYRINGE 31G X 5/16\" 1 ML MISC USE AS DIRECTED WITH HUMULIN AND HUMALOG INSULIN 5 TIMES DAILY 100 each 11    isosorbide mononitrate (IMDUR) 30 MG extended release tablet TAKE 1 TABLET BY MOUTH DAILY 30 tablet 11    HUMULIN N 100 UNIT/ML injection vial INJECT 50 UNITS EVERY MORNING AND 70 UNITS AT BEDTIME 30 mL 11    gemfibrozil (LOPID) 600 MG tablet TAKE 1 TABLET BY MOUTH ONCE DAILY 30 tablet 11    losartan (COZAAR) 100 MG tablet TAKE 1 TABLET BY MOUTH ONCE DAILY 30 tablet 11    rOPINIRole (REQUIP) 2 MG tablet TAKE 1 TABLET BY MOUTH ONCE DAILY 30 tablet 11    Omega-3 Fatty Acids (FISH OIL CONCENTRATE) 300 MG CAPS Take 300 mg by mouth daily      Glucosamine Sulfate 500 MG TABS Take 500 mg by mouth 3 times daily      atorvastatin (LIPITOR) 40 MG tablet Take 1 tablet by mouth daily 30 tablet 6    naproxen-esomeprazole (VIMOVO) 500-20 MG TBEC Take 1 tablet by mouth 2 times daily (with meals) 60 tablet 3    aspirin 81 MG tablet Take 81 mg by mouth daily      albuterol (PROVENTIL HFA;VENTOLIN HFA) 108 (90 BASE) MCG/ACT inhaler Inhale 2 puffs into the lungs every 6 hours as needed.  atorvastatin (LIPITOR) 20 MG tablet TAKE 1 TABLET BY MOUTH DAILY 30 tablet 11     No current facility-administered medications for this visit. ALLERGIES:  No Known Allergies    Social History   Substance Use Topics    Smoking status: Former Smoker     Packs/day: 2.00     Years: 25.00     Types: Cigars    Smokeless tobacco: Never Used      Comment: about 4-5 years    Alcohol use 0.0 oz/week      Comment: social      Body mass index is 33.23 kg/m².   /80   Pulse 78   Ht 6' 4\" (1.93 m)   Wt 273 lb (123.8 kg)   SpO2 96%   BMI 33.23 kg/m²     Lab Results   Component Value Date     09/19/2016    K 3.9 09/19/2016     09/19/2016    CO2 24 09/19/2016    BUN 22 12/21/2016    CREATININE 1.19 12/21/2016    GLUCOSE 124 09/19/2016    CALCIUM 9.3 09/19/2016    PROT 7.5 09/19/2016    LABALBU 4.0 09/19/2016    BILITOT 0.41 09/19/2016    ALKPHOS 166 09/19/2016    AST 21 09/19/2016    ALT 19 09/19/2016       Lab Results   Component Value Date    WBC 8.3 09/19/2016    RBC 5.45

## 2018-01-11 ENCOUNTER — PROCEDURE VISIT (OUTPATIENT)
Dept: PODIATRY | Age: 58
End: 2018-01-11
Payer: COMMERCIAL

## 2018-01-11 VITALS
WEIGHT: 270 LBS | HEART RATE: 85 BPM | BODY MASS INDEX: 32.88 KG/M2 | HEIGHT: 76 IN | DIASTOLIC BLOOD PRESSURE: 72 MMHG | SYSTOLIC BLOOD PRESSURE: 114 MMHG

## 2018-01-11 DIAGNOSIS — Z79.4 TYPE 2 DIABETES MELLITUS WITH DIABETIC POLYNEUROPATHY, WITH LONG-TERM CURRENT USE OF INSULIN (HCC): ICD-10-CM

## 2018-01-11 DIAGNOSIS — E11.42 TYPE 2 DIABETES MELLITUS WITH DIABETIC POLYNEUROPATHY, WITH LONG-TERM CURRENT USE OF INSULIN (HCC): ICD-10-CM

## 2018-01-11 DIAGNOSIS — B35.1 ONYCHOMYCOSIS OF TOENAIL: Primary | ICD-10-CM

## 2018-01-11 DIAGNOSIS — E11.51 TYPE 2 DIABETES MELLITUS WITH PERIPHERAL VASCULAR DISEASE (HCC): ICD-10-CM

## 2018-01-11 PROCEDURE — 11720 DEBRIDE NAIL 1-5: CPT | Performed by: PODIATRIST

## 2018-01-11 RX ORDER — LEVOTHYROXINE SODIUM 175 UG/1
175 TABLET ORAL DAILY
Qty: 60 TABLET | Refills: 3 | Status: SHIPPED | OUTPATIENT
Start: 2018-01-11 | End: 2018-09-04 | Stop reason: SDUPTHER

## 2018-01-11 ASSESSMENT — ENCOUNTER SYMPTOMS
SHORTNESS OF BREATH: 0
BACK PAIN: 0
NAUSEA: 0
COLOR CHANGE: 0
DIARRHEA: 0

## 2018-01-12 ENCOUNTER — TELEPHONE (OUTPATIENT)
Dept: INTERNAL MEDICINE CLINIC | Age: 58
End: 2018-01-12

## 2018-02-21 ENCOUNTER — OFFICE VISIT (OUTPATIENT)
Dept: PODIATRY | Age: 58
End: 2018-02-21
Payer: COMMERCIAL

## 2018-02-21 ENCOUNTER — OFFICE VISIT (OUTPATIENT)
Dept: ORTHOPEDIC SURGERY | Age: 58
End: 2018-02-21
Payer: COMMERCIAL

## 2018-02-21 VITALS
SYSTOLIC BLOOD PRESSURE: 88 MMHG | HEART RATE: 144 BPM | HEIGHT: 76 IN | WEIGHT: 270 LBS | BODY MASS INDEX: 32.88 KG/M2 | DIASTOLIC BLOOD PRESSURE: 60 MMHG

## 2018-02-21 DIAGNOSIS — E11.51 TYPE 2 DIABETES MELLITUS WITH PERIPHERAL VASCULAR DISEASE (HCC): ICD-10-CM

## 2018-02-21 DIAGNOSIS — Z79.4 TYPE 2 DIABETES MELLITUS WITH DIABETIC POLYNEUROPATHY, WITH LONG-TERM CURRENT USE OF INSULIN (HCC): ICD-10-CM

## 2018-02-21 DIAGNOSIS — L97.512 SKIN ULCER OF RIGHT FOOT WITH FAT LAYER EXPOSED (HCC): Primary | ICD-10-CM

## 2018-02-21 DIAGNOSIS — M25.562 CHRONIC PAIN OF LEFT KNEE: Primary | ICD-10-CM

## 2018-02-21 DIAGNOSIS — G89.29 CHRONIC PAIN OF LEFT KNEE: Primary | ICD-10-CM

## 2018-02-21 DIAGNOSIS — E11.42 TYPE 2 DIABETES MELLITUS WITH DIABETIC POLYNEUROPATHY, WITH LONG-TERM CURRENT USE OF INSULIN (HCC): ICD-10-CM

## 2018-02-21 PROCEDURE — 11042 DBRDMT SUBQ TIS 1ST 20SQCM/<: CPT | Performed by: PODIATRIST

## 2018-02-21 PROCEDURE — G8427 DOCREV CUR MEDS BY ELIG CLIN: HCPCS | Performed by: PODIATRIST

## 2018-02-21 PROCEDURE — 1036F TOBACCO NON-USER: CPT | Performed by: ORTHOPAEDIC SURGERY

## 2018-02-21 PROCEDURE — 1036F TOBACCO NON-USER: CPT | Performed by: PODIATRIST

## 2018-02-21 PROCEDURE — 3017F COLORECTAL CA SCREEN DOC REV: CPT | Performed by: ORTHOPAEDIC SURGERY

## 2018-02-21 PROCEDURE — 99213 OFFICE O/P EST LOW 20 MIN: CPT | Performed by: PODIATRIST

## 2018-02-21 PROCEDURE — G8484 FLU IMMUNIZE NO ADMIN: HCPCS | Performed by: PODIATRIST

## 2018-02-21 PROCEDURE — G8427 DOCREV CUR MEDS BY ELIG CLIN: HCPCS | Performed by: ORTHOPAEDIC SURGERY

## 2018-02-21 PROCEDURE — G8417 CALC BMI ABV UP PARAM F/U: HCPCS | Performed by: PODIATRIST

## 2018-02-21 PROCEDURE — 3046F HEMOGLOBIN A1C LEVEL >9.0%: CPT | Performed by: PODIATRIST

## 2018-02-21 PROCEDURE — G8598 ASA/ANTIPLAT THER USED: HCPCS | Performed by: ORTHOPAEDIC SURGERY

## 2018-02-21 PROCEDURE — G8417 CALC BMI ABV UP PARAM F/U: HCPCS | Performed by: ORTHOPAEDIC SURGERY

## 2018-02-21 PROCEDURE — G8484 FLU IMMUNIZE NO ADMIN: HCPCS | Performed by: ORTHOPAEDIC SURGERY

## 2018-02-21 PROCEDURE — G8598 ASA/ANTIPLAT THER USED: HCPCS | Performed by: PODIATRIST

## 2018-02-21 PROCEDURE — 3017F COLORECTAL CA SCREEN DOC REV: CPT | Performed by: PODIATRIST

## 2018-02-21 PROCEDURE — 99203 OFFICE O/P NEW LOW 30 MIN: CPT | Performed by: ORTHOPAEDIC SURGERY

## 2018-02-21 ASSESSMENT — ENCOUNTER SYMPTOMS
DIARRHEA: 0
BACK PAIN: 0
COLOR CHANGE: 0
SHORTNESS OF BREATH: 0
NAUSEA: 0

## 2018-02-21 NOTE — PROGRESS NOTES
Bal Walker M.D.            [address] [place] [address]., [address] [facility], [address] [facility]             Dept Phone: [phone]             Dept Fax:  [phone]  Sonali Dudley is a 77-year-old gentleman who I seen in the past for his left knee. This was many years ago. Since that time he has gone on to have a below-knee up rotation secondary to his diabetes. This was 3 years ago. He's having increasing knee pain. No complaints regarding his prosthesis. Physical examination notes that he is about a 10° flexion contracture flexes to about 100°. No obvious instability of his stump. Obviously crepitus and grinding noted    Standing x-rays taken today reviewed by me show pretty significant degenerative joint disease left knee. He is approaching bone-on-bone but not quite there. His patellofemoral joint is markedly arthritic as well. Impression  Status post left below-knee up rotation  Significant DJD left knee    Plan  Patient was advised is get a probably require an arthroplasty future but hopefully can put this off at age 62. We will put in for prior authorization from out of this. We'll see him back here once available          Review of Systems   Constitutional: Negative. HENT: Negative. Respiratory: Negative. Cardiovascular: Negative. Musculoskeletal: Negative. Neurological: Negative.          Past Medical History:   Diagnosis Date    CAD (coronary artery disease)     Charcot foot due to diabetes mellitus ([address]     COPD (chronic obstructive pulmonary disease) ([address]     Diabetes mellitus ([address]     Diabetic neuropathy ([address]     Foot ulcer ([address]     Hyperlipidemia     Hypertension     Hypoglycemia 4/2/2015    MRSA (methicillin resistant staph aureus) culture positive 4/16/2015    ankle    OA (osteoarthritis)     Other disorders of kidney and ureter in diseases classified elsewhere     Paroxysmal atrial fibrillation ([address]

## 2018-02-22 RX ORDER — PREGABALIN 100 MG/1
100 CAPSULE ORAL 3 TIMES DAILY
Qty: 90 CAPSULE | Refills: 0 | Status: SHIPPED | OUTPATIENT
Start: 2018-02-22 | End: 2018-03-21 | Stop reason: SDUPTHER

## 2018-02-23 ENCOUNTER — TELEPHONE (OUTPATIENT)
Dept: ORTHOPEDIC SURGERY | Age: 58
End: 2018-02-23

## 2018-02-23 NOTE — TELEPHONE ENCOUNTER
No PA needed for this pt. for Monovisc L knee per Leticia at [facility].      Need to call pt. to schedule, etc.

## 2018-02-27 ENCOUNTER — OFFICE VISIT (OUTPATIENT)
Dept: PODIATRY | Age: 58
End: 2018-02-27
Payer: COMMERCIAL

## 2018-02-27 VITALS
SYSTOLIC BLOOD PRESSURE: 142 MMHG | WEIGHT: 270 LBS | BODY MASS INDEX: 32.88 KG/M2 | HEIGHT: 76 IN | HEART RATE: 77 BPM | DIASTOLIC BLOOD PRESSURE: 80 MMHG

## 2018-02-27 DIAGNOSIS — E11.42 TYPE 2 DIABETES MELLITUS WITH DIABETIC POLYNEUROPATHY, WITH LONG-TERM CURRENT USE OF INSULIN (HCC): ICD-10-CM

## 2018-02-27 DIAGNOSIS — E11.51 TYPE 2 DIABETES MELLITUS WITH PERIPHERAL VASCULAR DISEASE (HCC): ICD-10-CM

## 2018-02-27 DIAGNOSIS — L97.512 SKIN ULCER OF RIGHT FOOT WITH FAT LAYER EXPOSED (HCC): Primary | ICD-10-CM

## 2018-02-27 DIAGNOSIS — Z79.4 TYPE 2 DIABETES MELLITUS WITH DIABETIC POLYNEUROPATHY, WITH LONG-TERM CURRENT USE OF INSULIN (HCC): ICD-10-CM

## 2018-02-27 PROCEDURE — 11042 DBRDMT SUBQ TIS 1ST 20SQCM/<: CPT | Performed by: PODIATRIST

## 2018-02-28 ENCOUNTER — OFFICE VISIT (OUTPATIENT)
Dept: ORTHOPEDIC SURGERY | Age: 58
End: 2018-02-28
Payer: COMMERCIAL

## 2018-02-28 DIAGNOSIS — M25.562 CHRONIC PAIN OF LEFT KNEE: Primary | ICD-10-CM

## 2018-02-28 DIAGNOSIS — G89.29 CHRONIC PAIN OF LEFT KNEE: Primary | ICD-10-CM

## 2018-02-28 PROCEDURE — 3017F COLORECTAL CA SCREEN DOC REV: CPT | Performed by: ORTHOPAEDIC SURGERY

## 2018-02-28 PROCEDURE — 99213 OFFICE O/P EST LOW 20 MIN: CPT | Performed by: ORTHOPAEDIC SURGERY

## 2018-02-28 PROCEDURE — G8417 CALC BMI ABV UP PARAM F/U: HCPCS | Performed by: ORTHOPAEDIC SURGERY

## 2018-02-28 PROCEDURE — 1036F TOBACCO NON-USER: CPT | Performed by: ORTHOPAEDIC SURGERY

## 2018-02-28 PROCEDURE — G8598 ASA/ANTIPLAT THER USED: HCPCS | Performed by: ORTHOPAEDIC SURGERY

## 2018-02-28 PROCEDURE — G8484 FLU IMMUNIZE NO ADMIN: HCPCS | Performed by: ORTHOPAEDIC SURGERY

## 2018-02-28 PROCEDURE — 20610 DRAIN/INJ JOINT/BURSA W/O US: CPT | Performed by: ORTHOPAEDIC SURGERY

## 2018-02-28 PROCEDURE — G8427 DOCREV CUR MEDS BY ELIG CLIN: HCPCS | Performed by: ORTHOPAEDIC SURGERY

## 2018-02-28 RX ORDER — LIDOCAINE HYDROCHLORIDE 10 MG/ML
2 INJECTION, SOLUTION EPIDURAL; INFILTRATION; INTRACAUDAL; PERINEURAL ONCE
Status: COMPLETED | OUTPATIENT
Start: 2018-02-28 | End: 2018-02-28

## 2018-02-28 RX ORDER — BETAMETHASONE SODIUM PHOSPHATE AND BETAMETHASONE ACETATE 3; 3 MG/ML; MG/ML
12 INJECTION, SUSPENSION INTRA-ARTICULAR; INTRALESIONAL; INTRAMUSCULAR; SOFT TISSUE ONCE
Status: DISCONTINUED | OUTPATIENT
Start: 2018-02-28 | End: 2018-05-31

## 2018-02-28 RX ORDER — BUPIVACAINE HYDROCHLORIDE 5 MG/ML
2 INJECTION, SOLUTION PERINEURAL ONCE
Status: COMPLETED | OUTPATIENT
Start: 2018-02-28 | End: 2018-02-28

## 2018-02-28 RX ADMIN — LIDOCAINE HYDROCHLORIDE 2 ML: 10 INJECTION, SOLUTION EPIDURAL; INFILTRATION; INTRACAUDAL; PERINEURAL at 11:57

## 2018-02-28 RX ADMIN — BUPIVACAINE HYDROCHLORIDE 10 MG: 5 INJECTION, SOLUTION PERINEURAL at 11:57

## 2018-03-01 ASSESSMENT — ENCOUNTER SYMPTOMS
NAUSEA: 0
DIARRHEA: 0
COLOR CHANGE: 0
BACK PAIN: 0
SHORTNESS OF BREATH: 0

## 2018-03-01 NOTE — PROGRESS NOTES
Follow-Up Wound Progress Note   Supirya Newsome  AGE: 62 y.o. GENDER: male  : 1960  TODAY'S DATE:  3/1/2018  Subjective:    Supriya Newsome is a 62 y.o. male who presents today for wound check. Wound Location : Right foot. The patients pain is 0 out of 10. Patient states that they have been changing the dressing to the right lower extremity(s) as instructed since last visit. Patient denies any fever, chills, nausea, vomiting, or night sweats. Review of Systems   Constitutional: Negative for activity change, appetite change, chills, diaphoresis, fatigue and fever. Respiratory: Negative for shortness of breath. Cardiovascular: Negative for leg swelling. Gastrointestinal: Negative for diarrhea and nausea. Endocrine: Negative for cold intolerance, heat intolerance and polyuria. Musculoskeletal: Positive for arthralgias. Negative for back pain, gait problem, joint swelling and myalgias. Skin: Positive for wound. Negative for color change, pallor and rash. Allergic/Immunologic: Negative for environmental allergies and food allergies. Neurological: Negative for dizziness, weakness, light-headedness and numbness. Hematological: Does not bruise/bleed easily. Psychiatric/Behavioral: Negative for behavioral problems, confusion and self-injury. The patient is not nervous/anxious. Objective:   Exam:  Clinical evaluation of the patient reveals full-thickness wound submetatarsal head 5 of the right foot. There is no erythema or calor surrounding this wound. There is extensive hyperkeratotic tissue surrounding this wound. There is no tunneling or undermining noted to the wound of the right foot. Excisional debridement of this wound with a curette produces adequate bleeding. There is no purulence or malodor noted to this wound. The wound base is granular. There is no tunneling or undermining noted. This wound measures 0.9 cm x 0.8 cm x 0.2 cm.   Wound Care Documentation:  Diabetic Ulcer 07/10/13 Foot Distal;Right dime sise wound scab to area no drain (Active)   Number of days: 1695       Incision 04/29/15 Leg Left (Active)   Number of days: 1037       Procedure: The wound(s) to the right foot was debrided with removal of fibrotic, necrotic, and hyperkeratotic tissue, including a layer of surrounding healthy tissue down through and including subcutaneous tissue,using curette. Excisional Debridement  Percent of Wound Debrided: 100%    Wound: is unchanged    Bleeding: Minimal    Hemostasis:   by pressure    Response to treatment:  Well tolerated by patient. Assessment:     1. Skin ulcer of right foot with fat layer exposed ([address]    2. Type 2 diabetes mellitus with peripheral vascular disease ([address]    3. Type 2 diabetes mellitus with diabetic polyneuropathy, with long-term current use of insulin ([address]            Plan:   Plan for wound -   Treatment:                Dressed with: Antibiotic ointment and a dry sterile dressing. Home care: Patient to change this dressing daily. Abx :No Cultures :were notobtained. Return in about 1 week (around 3/6/2018) for Wound Care.    2/27/2018        Steve Mike DPM

## 2018-03-06 ENCOUNTER — OFFICE VISIT (OUTPATIENT)
Dept: PODIATRY | Age: 58
End: 2018-03-06
Payer: COMMERCIAL

## 2018-03-06 ENCOUNTER — OFFICE VISIT (OUTPATIENT)
Dept: INTERNAL MEDICINE CLINIC | Age: 58
End: 2018-03-06
Payer: COMMERCIAL

## 2018-03-06 VITALS
SYSTOLIC BLOOD PRESSURE: 114 MMHG | HEIGHT: 76 IN | BODY MASS INDEX: 33.36 KG/M2 | DIASTOLIC BLOOD PRESSURE: 66 MMHG | WEIGHT: 274 LBS

## 2018-03-06 VITALS
BODY MASS INDEX: 32.88 KG/M2 | HEART RATE: 83 BPM | SYSTOLIC BLOOD PRESSURE: 132 MMHG | WEIGHT: 270 LBS | HEIGHT: 76 IN | DIASTOLIC BLOOD PRESSURE: 74 MMHG

## 2018-03-06 DIAGNOSIS — N18.2 CKD (CHRONIC KIDNEY DISEASE) STAGE 2, GFR 60-89 ML/MIN: ICD-10-CM

## 2018-03-06 DIAGNOSIS — E11.51 TYPE 2 DIABETES MELLITUS WITH PERIPHERAL VASCULAR DISEASE (HCC): ICD-10-CM

## 2018-03-06 DIAGNOSIS — E78.00 PURE HYPERCHOLESTEROLEMIA: Primary | ICD-10-CM

## 2018-03-06 DIAGNOSIS — L97.512 SKIN ULCER OF RIGHT FOOT WITH FAT LAYER EXPOSED (HCC): Primary | ICD-10-CM

## 2018-03-06 DIAGNOSIS — E03.9 ACQUIRED HYPOTHYROIDISM: ICD-10-CM

## 2018-03-06 DIAGNOSIS — E11.42 TYPE 2 DIABETES MELLITUS WITH DIABETIC POLYNEUROPATHY, WITH LONG-TERM CURRENT USE OF INSULIN (HCC): ICD-10-CM

## 2018-03-06 DIAGNOSIS — Z79.4 TYPE 2 DIABETES MELLITUS WITH DIABETIC POLYNEUROPATHY, WITH LONG-TERM CURRENT USE OF INSULIN (HCC): ICD-10-CM

## 2018-03-06 DIAGNOSIS — Z11.59 NEED FOR HEPATITIS C SCREENING TEST: ICD-10-CM

## 2018-03-06 DIAGNOSIS — E11.41 DIABETIC MONONEUROPATHY ASSOCIATED WITH TYPE 2 DIABETES MELLITUS (HCC): ICD-10-CM

## 2018-03-06 PROCEDURE — G8484 FLU IMMUNIZE NO ADMIN: HCPCS | Performed by: PODIATRIST

## 2018-03-06 PROCEDURE — 3046F HEMOGLOBIN A1C LEVEL >9.0%: CPT | Performed by: INTERNAL MEDICINE

## 2018-03-06 PROCEDURE — G8598 ASA/ANTIPLAT THER USED: HCPCS | Performed by: INTERNAL MEDICINE

## 2018-03-06 PROCEDURE — 99213 OFFICE O/P EST LOW 20 MIN: CPT | Performed by: INTERNAL MEDICINE

## 2018-03-06 PROCEDURE — G8427 DOCREV CUR MEDS BY ELIG CLIN: HCPCS | Performed by: INTERNAL MEDICINE

## 2018-03-06 PROCEDURE — 3017F COLORECTAL CA SCREEN DOC REV: CPT | Performed by: PODIATRIST

## 2018-03-06 PROCEDURE — G8427 DOCREV CUR MEDS BY ELIG CLIN: HCPCS | Performed by: PODIATRIST

## 2018-03-06 PROCEDURE — 3017F COLORECTAL CA SCREEN DOC REV: CPT | Performed by: INTERNAL MEDICINE

## 2018-03-06 PROCEDURE — 1036F TOBACCO NON-USER: CPT | Performed by: INTERNAL MEDICINE

## 2018-03-06 PROCEDURE — G8598 ASA/ANTIPLAT THER USED: HCPCS | Performed by: PODIATRIST

## 2018-03-06 PROCEDURE — 1036F TOBACCO NON-USER: CPT | Performed by: PODIATRIST

## 2018-03-06 PROCEDURE — G8484 FLU IMMUNIZE NO ADMIN: HCPCS | Performed by: INTERNAL MEDICINE

## 2018-03-06 PROCEDURE — G8417 CALC BMI ABV UP PARAM F/U: HCPCS | Performed by: PODIATRIST

## 2018-03-06 PROCEDURE — G8417 CALC BMI ABV UP PARAM F/U: HCPCS | Performed by: INTERNAL MEDICINE

## 2018-03-06 PROCEDURE — 99213 OFFICE O/P EST LOW 20 MIN: CPT | Performed by: PODIATRIST

## 2018-03-06 PROCEDURE — 3046F HEMOGLOBIN A1C LEVEL >9.0%: CPT | Performed by: PODIATRIST

## 2018-03-06 ASSESSMENT — ENCOUNTER SYMPTOMS
COUGH: 0
COLOR CHANGE: 0
BLOOD IN STOOL: 0
EYE PAIN: 0
WHEEZING: 0
TROUBLE SWALLOWING: 0
EYE DISCHARGE: 0
ABDOMINAL DISTENTION: 0
DIARRHEA: 0
SHORTNESS OF BREATH: 0

## 2018-03-06 NOTE — PROGRESS NOTES
tablet Take 1 tablet by mouth daily 60 tablet 3    HUMALOG 100 UNIT/ML injection vial INJECT 12 UNITS UNDER THE SKIN 3 TIMES DAILY + SLIDING SCALE (TAKE 5 UNITS IF SUGAR IS OVER 150) 20 mL 3    budesonide-formoterol (SYMBICORT) 160-4.5 MCG/ACT AERO 2 puffs twice daily  10 Inhaler 3    sitaGLIPtan-metformin (JANUMET)  MG per tablet Take 1 tablet by mouth 2 times daily (with meals) 60 tablet 3    atorvastatin (LIPITOR) 20 MG tablet TAKE 1 TABLET BY MOUTH DAILY 30 tablet 11    tamsulosin (FLOMAX) 0.4 MG capsule Take 1 capsule by mouth daily 30 capsule 11    TRUEPLUS INSULIN SYRINGE 31G X 5/16\" 1 ML MISC USE AS DIRECTED WITH HUMULIN AND HUMALOG INSULIN 5 TIMES DAILY 100 each 11    isosorbide mononitrate (IMDUR) 30 MG extended release tablet TAKE 1 TABLET BY MOUTH DAILY 30 tablet 11    HUMULIN N 100 UNIT/ML injection vial INJECT 50 UNITS EVERY MORNING AND 70 UNITS AT BEDTIME (Patient taking differently: INJECT 60 UNITS EVERY MORNING AND 70 UNITS AT BEDTIME) 30 mL 11    gemfibrozil (LOPID) 600 MG tablet TAKE 1 TABLET BY MOUTH ONCE DAILY 30 tablet 11    losartan (COZAAR) 100 MG tablet TAKE 1 TABLET BY MOUTH ONCE DAILY 30 tablet 11    rOPINIRole (REQUIP) 2 MG tablet TAKE 1 TABLET BY MOUTH ONCE DAILY 30 tablet 11    Omega-3 Fatty Acids (FISH OIL CONCENTRATE) 300 MG CAPS Take 300 mg by mouth daily      Glucosamine Sulfate 500 MG TABS Take 500 mg by mouth 3 times daily      atorvastatin (LIPITOR) 40 MG tablet Take 1 tablet by mouth daily 30 tablet 6    naproxen-esomeprazole (VIMOVO) 500-20 MG TBEC Take 1 tablet by mouth 2 times daily (with meals) 60 tablet 3    aspirin 81 MG tablet Take 81 mg by mouth daily      albuterol (PROVENTIL HFA;VENTOLIN HFA) 108 (90 BASE) MCG/ACT inhaler Inhale 2 puffs into the lungs every 6 hours as needed.        Current Facility-Administered Medications   Medication Dose Route Frequency Provider Last Rate Last Dose    betamethasone acetate-betamethasone sodium phosphate (CELESTONE) injection 12 mg  12 mg Intramuscular Once Tim Cohen MD         ALLERGIES:  No Known Allergies    Social History   Substance Use Topics    Smoking status: Former Smoker     Packs/day: 2.00     Years: 25.00     Types: Cigars    Smokeless tobacco: Never Used      Comment: about 4-5 years    Alcohol use 0.0 oz/week      Comment: social      Body mass index is 33.37 kg/m². /66   Ht 6' 3.98\" (1.93 m)   Wt 274 lb (124.3 kg)   BMI 33.37 kg/m²     Lab Results   Component Value Date     09/19/2016    K 3.9 09/19/2016     09/19/2016    CO2 24 09/19/2016    BUN 22 12/21/2016    CREATININE 1.19 12/21/2016    GLUCOSE 124 09/19/2016    CALCIUM 9.3 09/19/2016    PROT 7.5 09/19/2016    LABALBU 4.0 09/19/2016    BILITOT 0.41 09/19/2016    ALKPHOS 166 09/19/2016    AST 21 09/19/2016    ALT 19 09/19/2016       Lab Results   Component Value Date    WBC 8.3 09/19/2016    RBC 5.45 09/19/2016    HGB 16.4 09/19/2016    HCT 48.2 09/19/2016    MCV 88.3 09/19/2016    MCH 30.0 09/19/2016    MCHC 34.0 09/19/2016    RDW 14.1 09/19/2016     09/19/2016    MPV 9.6 09/19/2016       Lab Results   Component Value Date    LABA1C 7.3 11/17/2017       Lab Results   Component Value Date    HDL 47 03/31/2016    LDLCHOLESTEROL 46 03/31/2016       Assessment:      1. Pure hypercholesterolemia  Lipid Panel   2. CKD (chronic kidney disease) stage 2, GFR 60-89 ml/min     3. Type 2 diabetes mellitus with diabetic polyneuropathy, with long-term current use of insulin (HCC)  Hemoglobin A1C    Microalbumin, Ur   4. Diabetic mononeuropathy associated with type 2 diabetes mellitus ([address]     5. Acquired hypothyroidism  TSH With Reflex Ft4   6. Need for hepatitis C screening test  Hepatitis C Antibody           Plan:      No Follow-up on file.   Orders Placed This Encounter   Procedures    Lipid Panel     Standing Status:   Future     Standing Expiration Date:   3/6/2019     Order Specific Question:   Is Patient

## 2018-03-07 ASSESSMENT — ENCOUNTER SYMPTOMS
NAUSEA: 0
DIARRHEA: 0
COLOR CHANGE: 0
BACK PAIN: 0
SHORTNESS OF BREATH: 0

## 2018-03-07 NOTE — PROGRESS NOTES
Subjective: Lissa Sales 62 y.o. male that presents for follow up evaluation of wound to the bottom of the right foot. Chief Complaint   Patient presents with    Wound Check     right foot     Other     last blood sugar 185    Patient's treatment thus far has included daily dressing changes with antibiotic ointment and a Band-Aid. Pain is rated 0 out of 10 and is described as none. Patient has been following my prescribed course of therapy as instructed. Patient states that he thinks the wound has healed. Review of Systems   Constitutional: Negative for activity change, appetite change, chills, diaphoresis, fatigue and fever. Respiratory: Negative for shortness of breath. Cardiovascular: Negative for leg swelling. Gastrointestinal: Negative for diarrhea and nausea. Endocrine: Negative for cold intolerance, heat intolerance and polyuria. Musculoskeletal: Positive for arthralgias. Negative for back pain, gait problem, joint swelling and myalgias. Skin: Positive for wound. Negative for color change, pallor and rash. Allergic/Immunologic: Negative for environmental allergies and food allergies. Neurological: Negative for dizziness, weakness, light-headedness and numbness. Hematological: Does not bruise/bleed easily. Psychiatric/Behavioral: Negative for behavioral problems, confusion and self-injury. The patient is not nervous/anxious. Objective: Clinical evaluation of the patient reveals eschar formation to the lateral aspect of the right foot submetatarsal head one. There is no fluctuance or instability noted with manipulation of this eschar. There is no surrounding erythema or calor noted. There is no drainage or malodor noted. There is no pain with manipulation of this lesion as the patient is neuropathic. Assessment:   1. Skin ulcer of right foot with fat layer exposed ([facility]     2. Type 2 diabetes mellitus with peripheral vascular disease ([facility]     3.  Type 2 diabetes

## 2018-03-09 ENCOUNTER — HOSPITAL ENCOUNTER (OUTPATIENT)
Age: 58
Setting detail: SPECIMEN
Discharge: HOME | End: 2018-03-09
Payer: COMMERCIAL

## 2018-03-09 DIAGNOSIS — E78.00 PURE HYPERCHOLESTEROLEMIA: ICD-10-CM

## 2018-03-09 DIAGNOSIS — Z79.4 TYPE 2 DIABETES MELLITUS WITH DIABETIC POLYNEUROPATHY, WITH LONG-TERM CURRENT USE OF INSULIN (HCC): ICD-10-CM

## 2018-03-09 DIAGNOSIS — E11.42 TYPE 2 DIABETES MELLITUS WITH DIABETIC POLYNEUROPATHY, WITH LONG-TERM CURRENT USE OF INSULIN (HCC): ICD-10-CM

## 2018-03-09 DIAGNOSIS — E03.9 ACQUIRED HYPOTHYROIDISM: ICD-10-CM

## 2018-03-09 DIAGNOSIS — Z11.59 NEED FOR HEPATITIS C SCREENING TEST: ICD-10-CM

## 2018-03-09 LAB
CHOLESTEROL/HDL RATIO: 2.9
CHOLESTEROL: 134 MG/DL
CREATININE URINE: 71.9 MG/DL (ref 39–259)
ESTIMATED AVERAGE GLUCOSE: 157 MG/DL
HBA1C MFR BLD: 7.1 % (ref 4–6)
HDLC SERPL-MCNC: 47 MG/DL
HEPATITIS C ANTIBODY: NONREACTIVE
LDL CHOLESTEROL: 56 MG/DL (ref 0–130)
MICROALBUMIN/CREAT 24H UR: <12 MG/L
MICROALBUMIN/CREAT UR-RTO: NORMAL MCG/MG CREAT
TRIGL SERPL-MCNC: 156 MG/DL
TSH SERPL DL<=0.05 MIU/L-ACNC: 0.08 MIU/L (ref 0.3–5)
VLDLC SERPL CALC-MCNC: ABNORMAL MG/DL (ref 1–30)

## 2018-03-10 LAB — THYROXINE, FREE: 1.14 NG/DL (ref 0.93–1.7)

## 2018-03-12 ENCOUNTER — OFFICE VISIT (OUTPATIENT)
Dept: ORTHOPEDIC SURGERY | Age: 58
End: 2018-03-12
Payer: COMMERCIAL

## 2018-03-12 VITALS — WEIGHT: 274 LBS | HEIGHT: 76 IN | BODY MASS INDEX: 33.36 KG/M2

## 2018-03-12 DIAGNOSIS — M75.41 ROTATOR CUFF IMPINGEMENT SYNDROME, RIGHT: Primary | ICD-10-CM

## 2018-03-12 PROCEDURE — 99203 OFFICE O/P NEW LOW 30 MIN: CPT | Performed by: ORTHOPAEDIC SURGERY

## 2018-03-12 NOTE — PROGRESS NOTES
assessed  RTC = Rotator cuff  RCT = Rotator cuff tear  ER = External rotation  IR = Internal rotation  AC = Acromioclavicular  GH = Glenohumeral  n = No  y = Yes

## 2018-03-20 ENCOUNTER — OFFICE VISIT (OUTPATIENT)
Dept: PODIATRY | Age: 58
End: 2018-03-20
Payer: COMMERCIAL

## 2018-03-20 VITALS
BODY MASS INDEX: 32.27 KG/M2 | HEART RATE: 82 BPM | DIASTOLIC BLOOD PRESSURE: 62 MMHG | HEIGHT: 76 IN | SYSTOLIC BLOOD PRESSURE: 129 MMHG | WEIGHT: 265 LBS

## 2018-03-20 DIAGNOSIS — Z79.4 TYPE 2 DIABETES MELLITUS WITH DIABETIC POLYNEUROPATHY, WITH LONG-TERM CURRENT USE OF INSULIN (HCC): ICD-10-CM

## 2018-03-20 DIAGNOSIS — E11.42 TYPE 2 DIABETES MELLITUS WITH DIABETIC POLYNEUROPATHY, WITH LONG-TERM CURRENT USE OF INSULIN (HCC): ICD-10-CM

## 2018-03-20 DIAGNOSIS — L97.512 SKIN ULCER OF RIGHT FOOT WITH FAT LAYER EXPOSED (HCC): Primary | ICD-10-CM

## 2018-03-20 DIAGNOSIS — E11.51 TYPE 2 DIABETES MELLITUS WITH PERIPHERAL VASCULAR DISEASE (HCC): ICD-10-CM

## 2018-03-20 PROCEDURE — 11042 DBRDMT SUBQ TIS 1ST 20SQCM/<: CPT | Performed by: PODIATRIST

## 2018-03-20 RX ORDER — SITAGLIPTIN AND METFORMIN HYDROCHLORIDE 1000; 50 MG/1; MG/1
TABLET, FILM COATED ORAL
Qty: 60 TABLET | Refills: 11 | Status: SHIPPED | OUTPATIENT
Start: 2018-03-20 | End: 2019-03-22 | Stop reason: SDUPTHER

## 2018-03-21 NOTE — TELEPHONE ENCOUNTER
Tried to run oarrs on 03/21/18 but was not able to see report. System was not working properly and kept logging me out while I was in the middle of running report along with freezing up.

## 2018-03-22 RX ORDER — PREGABALIN 100 MG/1
100 CAPSULE ORAL 3 TIMES DAILY
Qty: 90 CAPSULE | Refills: 0 | Status: SHIPPED | OUTPATIENT
Start: 2018-03-22 | End: 2018-04-23 | Stop reason: SDUPTHER

## 2018-03-22 ASSESSMENT — ENCOUNTER SYMPTOMS
BACK PAIN: 0
NAUSEA: 0
COLOR CHANGE: 0
DIARRHEA: 0
SHORTNESS OF BREATH: 0

## 2018-03-27 ENCOUNTER — OFFICE VISIT (OUTPATIENT)
Dept: PODIATRY | Age: 58
End: 2018-03-27
Payer: COMMERCIAL

## 2018-03-27 ENCOUNTER — HOSPITAL ENCOUNTER (OUTPATIENT)
Dept: MRI IMAGING | Age: 58
Discharge: HOME | End: 2018-03-29
Payer: COMMERCIAL

## 2018-03-27 VITALS
SYSTOLIC BLOOD PRESSURE: 112 MMHG | WEIGHT: 260 LBS | DIASTOLIC BLOOD PRESSURE: 71 MMHG | HEIGHT: 76 IN | BODY MASS INDEX: 31.66 KG/M2 | HEART RATE: 97 BPM

## 2018-03-27 DIAGNOSIS — Z79.4 TYPE 2 DIABETES MELLITUS WITH DIABETIC POLYNEUROPATHY, WITH LONG-TERM CURRENT USE OF INSULIN (HCC): ICD-10-CM

## 2018-03-27 DIAGNOSIS — E11.42 TYPE 2 DIABETES MELLITUS WITH DIABETIC POLYNEUROPATHY, WITH LONG-TERM CURRENT USE OF INSULIN (HCC): ICD-10-CM

## 2018-03-27 DIAGNOSIS — E11.51 TYPE 2 DIABETES MELLITUS WITH PERIPHERAL VASCULAR DISEASE (HCC): ICD-10-CM

## 2018-03-27 DIAGNOSIS — M75.41 ROTATOR CUFF IMPINGEMENT SYNDROME, RIGHT: ICD-10-CM

## 2018-03-27 DIAGNOSIS — L97.512 SKIN ULCER OF RIGHT FOOT WITH FAT LAYER EXPOSED (HCC): Primary | ICD-10-CM

## 2018-03-27 PROCEDURE — 11042 DBRDMT SUBQ TIS 1ST 20SQCM/<: CPT | Performed by: PODIATRIST

## 2018-03-27 PROCEDURE — 73221 MRI JOINT UPR EXTREM W/O DYE: CPT

## 2018-04-02 ASSESSMENT — ENCOUNTER SYMPTOMS
NAUSEA: 0
DIARRHEA: 0
BACK PAIN: 0
COLOR CHANGE: 0
SHORTNESS OF BREATH: 0

## 2018-04-05 ENCOUNTER — PROCEDURE VISIT (OUTPATIENT)
Dept: PODIATRY | Age: 58
End: 2018-04-05
Payer: COMMERCIAL

## 2018-04-05 VITALS
HEART RATE: 98 BPM | WEIGHT: 250 LBS | BODY MASS INDEX: 30.44 KG/M2 | SYSTOLIC BLOOD PRESSURE: 107 MMHG | HEIGHT: 76 IN | DIASTOLIC BLOOD PRESSURE: 69 MMHG

## 2018-04-05 DIAGNOSIS — E11.42 TYPE 2 DIABETES MELLITUS WITH DIABETIC POLYNEUROPATHY, WITH LONG-TERM CURRENT USE OF INSULIN (HCC): ICD-10-CM

## 2018-04-05 DIAGNOSIS — E11.51 TYPE 2 DIABETES MELLITUS WITH PERIPHERAL VASCULAR DISEASE (HCC): ICD-10-CM

## 2018-04-05 DIAGNOSIS — L85.3 XEROSIS OF SKIN: ICD-10-CM

## 2018-04-05 DIAGNOSIS — Z79.4 TYPE 2 DIABETES MELLITUS WITH DIABETIC POLYNEUROPATHY, WITH LONG-TERM CURRENT USE OF INSULIN (HCC): ICD-10-CM

## 2018-04-05 DIAGNOSIS — M79.674 PAIN OF TOE OF RIGHT FOOT: ICD-10-CM

## 2018-04-05 DIAGNOSIS — B35.1 ONYCHOMYCOSIS OF TOENAIL: ICD-10-CM

## 2018-04-05 DIAGNOSIS — L84 CORNS AND CALLOSITIES: Primary | ICD-10-CM

## 2018-04-05 PROCEDURE — 11720 DEBRIDE NAIL 1-5: CPT | Performed by: PODIATRIST

## 2018-04-05 PROCEDURE — 11055 PARING/CUTG B9 HYPRKER LES 1: CPT | Performed by: PODIATRIST

## 2018-04-05 RX ORDER — UREA 200 MG/G
GEL TOPICAL
Qty: 480 G | Refills: 3 | Status: SHIPPED | OUTPATIENT
Start: 2018-04-05 | End: 2019-09-24

## 2018-04-05 ASSESSMENT — ENCOUNTER SYMPTOMS
COLOR CHANGE: 0
BACK PAIN: 0
NAUSEA: 0
DIARRHEA: 0
SHORTNESS OF BREATH: 0

## 2018-04-06 ENCOUNTER — OFFICE VISIT (OUTPATIENT)
Dept: ORTHOPEDIC SURGERY | Age: 58
End: 2018-04-06
Payer: COMMERCIAL

## 2018-04-06 VITALS — HEIGHT: 76 IN | WEIGHT: 250 LBS | BODY MASS INDEX: 30.44 KG/M2

## 2018-04-06 DIAGNOSIS — M75.41 ROTATOR CUFF IMPINGEMENT SYNDROME, RIGHT: Primary | ICD-10-CM

## 2018-04-06 PROCEDURE — 99212 OFFICE O/P EST SF 10 MIN: CPT | Performed by: ORTHOPAEDIC SURGERY

## 2018-04-06 PROCEDURE — 20610 DRAIN/INJ JOINT/BURSA W/O US: CPT | Performed by: ORTHOPAEDIC SURGERY

## 2018-04-06 RX ORDER — TRIAMCINOLONE ACETONIDE 40 MG/ML
40 INJECTION, SUSPENSION INTRA-ARTICULAR; INTRAMUSCULAR ONCE
Status: COMPLETED | OUTPATIENT
Start: 2018-04-06 | End: 2018-04-06

## 2018-04-06 RX ORDER — LIDOCAINE HYDROCHLORIDE 10 MG/ML
5 INJECTION, SOLUTION INFILTRATION; PERINEURAL ONCE
Status: COMPLETED | OUTPATIENT
Start: 2018-04-06 | End: 2018-04-06

## 2018-04-06 RX ADMIN — TRIAMCINOLONE ACETONIDE 40 MG: 40 INJECTION, SUSPENSION INTRA-ARTICULAR; INTRAMUSCULAR at 12:25

## 2018-04-06 RX ADMIN — LIDOCAINE HYDROCHLORIDE 5 ML: 10 INJECTION, SOLUTION INFILTRATION; PERINEURAL at 12:25

## 2018-04-09 ENCOUNTER — HOSPITAL ENCOUNTER (OUTPATIENT)
Dept: PHYSICAL THERAPY | Age: 58
Setting detail: THERAPIES SERIES
End: 2018-04-09
Payer: COMMERCIAL

## 2018-04-24 RX ORDER — PREGABALIN 100 MG/1
100 CAPSULE ORAL 3 TIMES DAILY
Qty: 90 CAPSULE | Refills: 0 | Status: SHIPPED | OUTPATIENT
Start: 2018-04-24 | End: 2018-05-22 | Stop reason: SDUPTHER

## 2018-04-25 ENCOUNTER — HOSPITAL ENCOUNTER (OUTPATIENT)
Dept: PHYSICAL THERAPY | Age: 58
Setting detail: THERAPIES SERIES
Discharge: HOME | End: 2018-04-25
Payer: COMMERCIAL

## 2018-04-25 PROCEDURE — 97161 PT EVAL LOW COMPLEX 20 MIN: CPT

## 2018-04-25 PROCEDURE — G0283 ELEC STIM OTHER THAN WOUND: HCPCS

## 2018-04-25 PROCEDURE — 97110 THERAPEUTIC EXERCISES: CPT

## 2018-04-25 PROCEDURE — G8991 OTHER PT/OT GOAL STATUS: HCPCS

## 2018-04-25 PROCEDURE — G8990 OTHER PT/OT CURRENT STATUS: HCPCS

## 2018-04-25 ASSESSMENT — PAIN DESCRIPTION - PAIN TYPE: TYPE: CHRONIC PAIN

## 2018-04-25 ASSESSMENT — PAIN DESCRIPTION - DESCRIPTORS: DESCRIPTORS: SHARP;CONSTANT

## 2018-04-25 ASSESSMENT — PAIN SCALES - GENERAL: PAINLEVEL_OUTOF10: 8

## 2018-04-25 ASSESSMENT — PAIN DESCRIPTION - ORIENTATION: ORIENTATION: RIGHT

## 2018-04-25 ASSESSMENT — PAIN DESCRIPTION - LOCATION: LOCATION: SHOULDER

## 2018-04-25 ASSESSMENT — PAIN DESCRIPTION - FREQUENCY: FREQUENCY: CONTINUOUS

## 2018-04-27 ENCOUNTER — OFFICE VISIT (OUTPATIENT)
Dept: INTERNAL MEDICINE CLINIC | Age: 58
End: 2018-04-27
Payer: COMMERCIAL

## 2018-04-27 VITALS
BODY MASS INDEX: 30.44 KG/M2 | HEIGHT: 76 IN | DIASTOLIC BLOOD PRESSURE: 78 MMHG | WEIGHT: 250 LBS | SYSTOLIC BLOOD PRESSURE: 120 MMHG

## 2018-04-27 DIAGNOSIS — M25.521 RIGHT ELBOW PAIN: Primary | ICD-10-CM

## 2018-04-27 PROCEDURE — G8427 DOCREV CUR MEDS BY ELIG CLIN: HCPCS | Performed by: INTERNAL MEDICINE

## 2018-04-27 PROCEDURE — G8417 CALC BMI ABV UP PARAM F/U: HCPCS | Performed by: INTERNAL MEDICINE

## 2018-04-27 PROCEDURE — G8598 ASA/ANTIPLAT THER USED: HCPCS | Performed by: INTERNAL MEDICINE

## 2018-04-27 PROCEDURE — 1036F TOBACCO NON-USER: CPT | Performed by: INTERNAL MEDICINE

## 2018-04-27 PROCEDURE — 3017F COLORECTAL CA SCREEN DOC REV: CPT | Performed by: INTERNAL MEDICINE

## 2018-04-27 PROCEDURE — 99213 OFFICE O/P EST LOW 20 MIN: CPT | Performed by: INTERNAL MEDICINE

## 2018-04-27 RX ORDER — INSULIN HUMAN 100 [IU]/ML
INJECTION, SUSPENSION SUBCUTANEOUS
Qty: 30 ML | Refills: 11 | Status: ON HOLD | OUTPATIENT
Start: 2018-04-27 | End: 2018-05-21

## 2018-04-27 RX ORDER — PREDNISONE 20 MG/1
20 TABLET ORAL DAILY
Qty: 5 TABLET | Refills: 0 | Status: SHIPPED | OUTPATIENT
Start: 2018-04-27 | End: 2018-05-02

## 2018-04-27 ASSESSMENT — PATIENT HEALTH QUESTIONNAIRE - PHQ9
SUM OF ALL RESPONSES TO PHQ9 QUESTIONS 1 & 2: 0
SUM OF ALL RESPONSES TO PHQ QUESTIONS 1-9: 0
1. LITTLE INTEREST OR PLEASURE IN DOING THINGS: 0
2. FEELING DOWN, DEPRESSED OR HOPELESS: 0

## 2018-04-27 ASSESSMENT — ENCOUNTER SYMPTOMS
CHEST TIGHTNESS: 0
SHORTNESS OF BREATH: 0
BACK PAIN: 0
FACIAL SWELLING: 0
ABDOMINAL DISTENTION: 0
COUGH: 0
APNEA: 0
DIARRHEA: 0
ABDOMINAL PAIN: 0
CONSTIPATION: 0
COLOR CHANGE: 0
WHEEZING: 0

## 2018-04-30 ENCOUNTER — HOSPITAL ENCOUNTER (OUTPATIENT)
Facility: CLINIC | Age: 58
Discharge: HOME | End: 2018-05-02
Payer: COMMERCIAL

## 2018-04-30 ENCOUNTER — HOSPITAL ENCOUNTER (OUTPATIENT)
Age: 58
Setting detail: SPECIMEN
Discharge: HOME | End: 2018-04-30
Payer: COMMERCIAL

## 2018-04-30 ENCOUNTER — HOSPITAL ENCOUNTER (OUTPATIENT)
Dept: GENERAL RADIOLOGY | Facility: CLINIC | Age: 58
Discharge: HOME | End: 2018-05-02
Payer: COMMERCIAL

## 2018-04-30 DIAGNOSIS — M25.521 RIGHT ELBOW PAIN: ICD-10-CM

## 2018-04-30 LAB — URIC ACID: 5 MG/DL (ref 3.4–7)

## 2018-04-30 PROCEDURE — 73070 X-RAY EXAM OF ELBOW: CPT

## 2018-05-01 ENCOUNTER — HOSPITAL ENCOUNTER (OUTPATIENT)
Dept: PHYSICAL THERAPY | Age: 58
Setting detail: THERAPIES SERIES
Discharge: HOME | End: 2018-05-01
Payer: COMMERCIAL

## 2018-05-03 ENCOUNTER — OFFICE VISIT (OUTPATIENT)
Dept: INTERNAL MEDICINE CLINIC | Age: 58
End: 2018-05-03
Payer: COMMERCIAL

## 2018-05-03 VITALS
DIASTOLIC BLOOD PRESSURE: 71 MMHG | BODY MASS INDEX: 30.44 KG/M2 | SYSTOLIC BLOOD PRESSURE: 102 MMHG | WEIGHT: 250 LBS | HEIGHT: 76 IN | TEMPERATURE: 97.5 F

## 2018-05-03 DIAGNOSIS — N18.2 CKD (CHRONIC KIDNEY DISEASE) STAGE 2, GFR 60-89 ML/MIN: ICD-10-CM

## 2018-05-03 DIAGNOSIS — M70.31 BURSITIS OF RIGHT ELBOW, UNSPECIFIED BURSA: ICD-10-CM

## 2018-05-03 DIAGNOSIS — L02.416 CELLULITIS AND ABSCESS OF LEFT LEG: ICD-10-CM

## 2018-05-03 DIAGNOSIS — Z89.512 HX OF BKA, LEFT (HCC): ICD-10-CM

## 2018-05-03 DIAGNOSIS — Z87.39 HISTORY OF OSTEOMYELITIS: ICD-10-CM

## 2018-05-03 DIAGNOSIS — Z79.4 TYPE 2 DIABETES MELLITUS WITH DIABETIC POLYNEUROPATHY, WITH LONG-TERM CURRENT USE OF INSULIN (HCC): ICD-10-CM

## 2018-05-03 DIAGNOSIS — L03.116 CELLULITIS AND ABSCESS OF LEFT LEG: ICD-10-CM

## 2018-05-03 DIAGNOSIS — T87.40 AMPUTATION STUMP INFECTION (HCC): ICD-10-CM

## 2018-05-03 DIAGNOSIS — E11.42 TYPE 2 DIABETES MELLITUS WITH DIABETIC POLYNEUROPATHY, WITH LONG-TERM CURRENT USE OF INSULIN (HCC): ICD-10-CM

## 2018-05-03 DIAGNOSIS — I73.9 PVD (PERIPHERAL VASCULAR DISEASE) (HCC): Primary | ICD-10-CM

## 2018-05-03 PROCEDURE — G8417 CALC BMI ABV UP PARAM F/U: HCPCS | Performed by: INTERNAL MEDICINE

## 2018-05-03 PROCEDURE — 3017F COLORECTAL CA SCREEN DOC REV: CPT | Performed by: INTERNAL MEDICINE

## 2018-05-03 PROCEDURE — G8598 ASA/ANTIPLAT THER USED: HCPCS | Performed by: INTERNAL MEDICINE

## 2018-05-03 PROCEDURE — 3045F PR MOST RECENT HEMOGLOBIN A1C LEVEL 7.0-9.0%: CPT | Performed by: INTERNAL MEDICINE

## 2018-05-03 PROCEDURE — 1036F TOBACCO NON-USER: CPT | Performed by: INTERNAL MEDICINE

## 2018-05-03 PROCEDURE — G8427 DOCREV CUR MEDS BY ELIG CLIN: HCPCS | Performed by: INTERNAL MEDICINE

## 2018-05-03 PROCEDURE — 99213 OFFICE O/P EST LOW 20 MIN: CPT | Performed by: INTERNAL MEDICINE

## 2018-05-03 PROCEDURE — 2022F DILAT RTA XM EVC RTNOPTHY: CPT | Performed by: INTERNAL MEDICINE

## 2018-05-03 RX ORDER — CLINDAMYCIN HYDROCHLORIDE 300 MG/1
300 CAPSULE ORAL 3 TIMES DAILY
Qty: 42 CAPSULE | Refills: 0 | Status: SHIPPED | OUTPATIENT
Start: 2018-05-03 | End: 2018-05-17

## 2018-05-03 RX ORDER — SULFAMETHOXAZOLE AND TRIMETHOPRIM 400; 80 MG/1; MG/1
2 TABLET ORAL 2 TIMES DAILY
Qty: 20 TABLET | Refills: 0 | Status: SHIPPED | OUTPATIENT
Start: 2018-05-03 | End: 2018-05-03 | Stop reason: DRUGHIGH

## 2018-05-04 ENCOUNTER — HOSPITAL ENCOUNTER (OUTPATIENT)
Dept: GENERAL RADIOLOGY | Facility: CLINIC | Age: 58
Discharge: HOME | End: 2018-05-06
Payer: COMMERCIAL

## 2018-05-04 ENCOUNTER — HOSPITAL ENCOUNTER (OUTPATIENT)
Facility: CLINIC | Age: 58
Discharge: HOME | End: 2018-05-06
Payer: COMMERCIAL

## 2018-05-04 ENCOUNTER — HOSPITAL ENCOUNTER (OUTPATIENT)
Age: 58
Setting detail: SPECIMEN
Discharge: HOME | End: 2018-05-04
Payer: COMMERCIAL

## 2018-05-04 ENCOUNTER — HOSPITAL ENCOUNTER (OUTPATIENT)
Dept: WOUND CARE | Age: 58
Discharge: HOME | End: 2018-05-04
Payer: COMMERCIAL

## 2018-05-04 VITALS
SYSTOLIC BLOOD PRESSURE: 113 MMHG | TEMPERATURE: 99 F | RESPIRATION RATE: 18 BRPM | BODY MASS INDEX: 31.08 KG/M2 | WEIGHT: 250 LBS | HEART RATE: 97 BPM | HEIGHT: 75 IN | DIASTOLIC BLOOD PRESSURE: 78 MMHG

## 2018-05-04 DIAGNOSIS — L02.416 CELLULITIS AND ABSCESS OF LEFT LEG: ICD-10-CM

## 2018-05-04 DIAGNOSIS — L03.116 CELLULITIS AND ABSCESS OF LEFT LEG: ICD-10-CM

## 2018-05-04 DIAGNOSIS — T87.40 AMPUTATION STUMP INFECTION (HCC): ICD-10-CM

## 2018-05-04 DIAGNOSIS — E11.42 TYPE 2 DIABETES MELLITUS WITH DIABETIC POLYNEUROPATHY, WITH LONG-TERM CURRENT USE OF INSULIN (HCC): ICD-10-CM

## 2018-05-04 DIAGNOSIS — Z87.39 HISTORY OF OSTEOMYELITIS: ICD-10-CM

## 2018-05-04 DIAGNOSIS — E11.628 TYPE 2 DIABETES MELLITUS WITH LEFT DIABETIC FOOT INFECTION (HCC): ICD-10-CM

## 2018-05-04 DIAGNOSIS — L08.9 TYPE 2 DIABETES MELLITUS WITH LEFT DIABETIC FOOT INFECTION (HCC): ICD-10-CM

## 2018-05-04 DIAGNOSIS — Z89.512 STATUS POST BELOW KNEE AMPUTATION OF LEFT LOWER EXTREMITY: Primary | ICD-10-CM

## 2018-05-04 DIAGNOSIS — Z79.4 TYPE 2 DIABETES MELLITUS WITH DIABETIC POLYNEUROPATHY, WITH LONG-TERM CURRENT USE OF INSULIN (HCC): ICD-10-CM

## 2018-05-04 LAB
ABSOLUTE EOS #: 0.12 K/UL (ref 0–0.44)
ABSOLUTE IMMATURE GRANULOCYTE: 0.1 K/UL (ref 0–0.3)
ABSOLUTE LYMPH #: 0.86 K/UL (ref 1.1–3.7)
ABSOLUTE MONO #: 1.2 K/UL (ref 0.1–1.2)
ANION GAP SERPL CALCULATED.3IONS-SCNC: 18 MMOL/L (ref 9–17)
BASOPHILS # BLD: 0 % (ref 0–2)
BASOPHILS ABSOLUTE: 0.04 K/UL (ref 0–0.2)
BUN BLDV-MCNC: 30 MG/DL (ref 6–20)
BUN/CREAT BLD: ABNORMAL (ref 9–20)
C-REACTIVE PROTEIN: 171 MG/L (ref 0–5)
CALCIUM SERPL-MCNC: 8.5 MG/DL (ref 8.6–10.4)
CHLORIDE BLD-SCNC: 102 MMOL/L (ref 98–107)
CO2: 19 MMOL/L (ref 20–31)
CREAT SERPL-MCNC: 1.42 MG/DL (ref 0.7–1.2)
DIFFERENTIAL TYPE: ABNORMAL
EOSINOPHILS RELATIVE PERCENT: 1 % (ref 1–4)
GFR AFRICAN AMERICAN: >60 ML/MIN
GFR NON-AFRICAN AMERICAN: 51 ML/MIN
GFR SERPL CREATININE-BSD FRML MDRD: ABNORMAL ML/MIN/{1.73_M2}
GFR SERPL CREATININE-BSD FRML MDRD: ABNORMAL ML/MIN/{1.73_M2}
GLUCOSE BLD-MCNC: 168 MG/DL (ref 70–99)
HCT VFR BLD CALC: 44.8 % (ref 40.7–50.3)
HEMOGLOBIN: 14.5 G/DL (ref 13–17)
IMMATURE GRANULOCYTES: 1 %
LYMPHOCYTES # BLD: 7 % (ref 24–43)
MCH RBC QN AUTO: 29 PG (ref 25.2–33.5)
MCHC RBC AUTO-ENTMCNC: 32.4 G/DL (ref 28.4–34.8)
MCV RBC AUTO: 89.6 FL (ref 82.6–102.9)
MONOCYTES # BLD: 9 % (ref 3–12)
NRBC AUTOMATED: 0 PER 100 WBC
PDW BLD-RTO: 14.3 % (ref 11.8–14.4)
PLATELET # BLD: 242 K/UL (ref 138–453)
PLATELET ESTIMATE: ABNORMAL
PMV BLD AUTO: 12 FL (ref 8.1–13.5)
POTASSIUM SERPL-SCNC: 4.7 MMOL/L (ref 3.7–5.3)
RBC # BLD: 5 M/UL (ref 4.21–5.77)
RBC # BLD: ABNORMAL 10*6/UL
SEG NEUTROPHILS: 82 % (ref 36–65)
SEGMENTED NEUTROPHILS ABSOLUTE COUNT: 10.99 K/UL (ref 1.5–8.1)
SODIUM BLD-SCNC: 139 MMOL/L (ref 135–144)
WBC # BLD: 13.3 K/UL (ref 3.5–11.3)
WBC # BLD: ABNORMAL 10*3/UL

## 2018-05-04 PROCEDURE — 73590 X-RAY EXAM OF LOWER LEG: CPT

## 2018-05-04 PROCEDURE — 86403 PARTICLE AGGLUT ANTBDY SCRN: CPT

## 2018-05-04 PROCEDURE — 10060 I&D ABSCESS SIMPLE/SINGLE: CPT

## 2018-05-04 PROCEDURE — 99214 OFFICE O/P EST MOD 30 MIN: CPT

## 2018-05-04 PROCEDURE — 87075 CULTR BACTERIA EXCEPT BLOOD: CPT

## 2018-05-04 PROCEDURE — 87205 SMEAR GRAM STAIN: CPT

## 2018-05-04 PROCEDURE — 87070 CULTURE OTHR SPECIMN AEROBIC: CPT

## 2018-05-04 PROCEDURE — 10060 I&D ABSCESS SIMPLE/SINGLE: CPT | Performed by: PODIATRIST

## 2018-05-04 PROCEDURE — 99213 OFFICE O/P EST LOW 20 MIN: CPT | Performed by: PODIATRIST

## 2018-05-04 PROCEDURE — 87186 SC STD MICRODIL/AGAR DIL: CPT

## 2018-05-04 ASSESSMENT — ENCOUNTER SYMPTOMS
ABDOMINAL PAIN: 0
SHORTNESS OF BREATH: 0

## 2018-05-04 ASSESSMENT — PAIN SCALES - GENERAL: PAINLEVEL_OUTOF10: 5

## 2018-05-04 ASSESSMENT — PAIN DESCRIPTION - LOCATION: LOCATION: LEG

## 2018-05-04 ASSESSMENT — PAIN DESCRIPTION - ORIENTATION: ORIENTATION: LEFT;LOWER

## 2018-05-04 ASSESSMENT — PAIN DESCRIPTION - PAIN TYPE: TYPE: ACUTE PAIN

## 2018-05-04 ASSESSMENT — PAIN DESCRIPTION - DESCRIPTORS: DESCRIPTORS: SHOOTING;SHARP

## 2018-05-04 ASSESSMENT — PAIN DESCRIPTION - FREQUENCY: FREQUENCY: INTERMITTENT

## 2018-05-09 LAB
CULTURE: ABNORMAL
DIRECT EXAM: ABNORMAL
DIRECT EXAM: ABNORMAL
Lab: ABNORMAL
ORGANISM: ABNORMAL
SPECIMEN DESCRIPTION: ABNORMAL
STATUS: ABNORMAL

## 2018-05-09 RX ORDER — SULFAMETHOXAZOLE AND TRIMETHOPRIM 800; 160 MG/1; MG/1
1 TABLET ORAL 2 TIMES DAILY
Qty: 20 TABLET | Refills: 0 | Status: ON HOLD
Start: 2018-05-09 | End: 2018-05-21 | Stop reason: HOSPADM

## 2018-05-11 ENCOUNTER — HOSPITAL ENCOUNTER (OUTPATIENT)
Dept: WOUND CARE | Age: 58
Discharge: HOME | End: 2018-05-11
Payer: COMMERCIAL

## 2018-05-11 VITALS
TEMPERATURE: 97 F | WEIGHT: 250 LBS | HEART RATE: 91 BPM | BODY MASS INDEX: 31.08 KG/M2 | HEIGHT: 75 IN | DIASTOLIC BLOOD PRESSURE: 67 MMHG | SYSTOLIC BLOOD PRESSURE: 116 MMHG | RESPIRATION RATE: 18 BRPM

## 2018-05-11 DIAGNOSIS — E11.628 TYPE 2 DIABETES MELLITUS WITH LEFT DIABETIC FOOT INFECTION (HCC): ICD-10-CM

## 2018-05-11 DIAGNOSIS — Z89.512 STATUS POST BELOW KNEE AMPUTATION OF LEFT LOWER EXTREMITY: ICD-10-CM

## 2018-05-11 DIAGNOSIS — Z79.4 TYPE 2 DIABETES MELLITUS WITH DIABETIC POLYNEUROPATHY, WITH LONG-TERM CURRENT USE OF INSULIN (HCC): ICD-10-CM

## 2018-05-11 DIAGNOSIS — L02.416 CELLULITIS AND ABSCESS OF LEFT LEG: Primary | ICD-10-CM

## 2018-05-11 DIAGNOSIS — L08.9 TYPE 2 DIABETES MELLITUS WITH LEFT DIABETIC FOOT INFECTION (HCC): ICD-10-CM

## 2018-05-11 DIAGNOSIS — M86.062 ACUTE HEMATOGENOUS OSTEOMYELITIS OF LEFT TIBIA (HCC): ICD-10-CM

## 2018-05-11 DIAGNOSIS — L03.116 CELLULITIS AND ABSCESS OF LEFT LEG: Primary | ICD-10-CM

## 2018-05-11 DIAGNOSIS — E11.42 TYPE 2 DIABETES MELLITUS WITH DIABETIC POLYNEUROPATHY, WITH LONG-TERM CURRENT USE OF INSULIN (HCC): ICD-10-CM

## 2018-05-11 PROCEDURE — 11042 DBRDMT SUBQ TIS 1ST 20SQCM/<: CPT | Performed by: PODIATRIST

## 2018-05-11 PROCEDURE — 11042 DBRDMT SUBQ TIS 1ST 20SQCM/<: CPT

## 2018-05-11 RX ORDER — LIDOCAINE HYDROCHLORIDE 40 MG/ML
SOLUTION TOPICAL ONCE
Status: DISCONTINUED | OUTPATIENT
Start: 2018-05-11 | End: 2018-05-12 | Stop reason: HOSPADM

## 2018-05-15 RX ORDER — CANAGLIFLOZIN 100 MG/1
TABLET, FILM COATED ORAL
Qty: 30 TABLET | Refills: 11 | Status: ON HOLD | OUTPATIENT
Start: 2018-05-15 | End: 2018-05-21 | Stop reason: HOSPADM

## 2018-05-15 RX ORDER — LOSARTAN POTASSIUM 100 MG/1
TABLET ORAL
Qty: 30 TABLET | Refills: 11 | Status: SHIPPED | OUTPATIENT
Start: 2018-05-15 | End: 2019-03-23 | Stop reason: SDUPTHER

## 2018-05-15 RX ORDER — GEMFIBROZIL 600 MG/1
TABLET, FILM COATED ORAL
Qty: 30 TABLET | Refills: 11 | Status: SHIPPED | OUTPATIENT
Start: 2018-05-15 | End: 2019-03-23 | Stop reason: SDUPTHER

## 2018-05-15 RX ORDER — ROPINIROLE 2 MG/1
TABLET, FILM COATED ORAL
Qty: 30 TABLET | Refills: 11 | Status: SHIPPED | OUTPATIENT
Start: 2018-05-15 | End: 2019-03-23 | Stop reason: SDUPTHER

## 2018-05-15 RX ORDER — ISOSORBIDE MONONITRATE 30 MG/1
TABLET, EXTENDED RELEASE ORAL
Qty: 30 TABLET | Refills: 5 | Status: SHIPPED | OUTPATIENT
Start: 2018-05-15 | End: 2018-10-11 | Stop reason: SDUPTHER

## 2018-05-16 ENCOUNTER — HOSPITAL ENCOUNTER (OUTPATIENT)
Dept: MRI IMAGING | Facility: CLINIC | Age: 58
Discharge: HOME | End: 2018-05-18
Payer: COMMERCIAL

## 2018-05-16 DIAGNOSIS — E11.42 TYPE 2 DIABETES MELLITUS WITH DIABETIC POLYNEUROPATHY, WITH LONG-TERM CURRENT USE OF INSULIN (HCC): ICD-10-CM

## 2018-05-16 DIAGNOSIS — L02.416 CELLULITIS AND ABSCESS OF LEFT LEG: ICD-10-CM

## 2018-05-16 DIAGNOSIS — E11.628 TYPE 2 DIABETES MELLITUS WITH LEFT DIABETIC FOOT INFECTION (HCC): ICD-10-CM

## 2018-05-16 DIAGNOSIS — L08.9 TYPE 2 DIABETES MELLITUS WITH LEFT DIABETIC FOOT INFECTION (HCC): ICD-10-CM

## 2018-05-16 DIAGNOSIS — L03.116 CELLULITIS AND ABSCESS OF LEFT LEG: ICD-10-CM

## 2018-05-16 DIAGNOSIS — Z79.4 TYPE 2 DIABETES MELLITUS WITH DIABETIC POLYNEUROPATHY, WITH LONG-TERM CURRENT USE OF INSULIN (HCC): ICD-10-CM

## 2018-05-16 DIAGNOSIS — Z89.512 STATUS POST BELOW KNEE AMPUTATION OF LEFT LOWER EXTREMITY: ICD-10-CM

## 2018-05-16 PROCEDURE — 73720 MRI LWR EXTREMITY W/O&W/DYE: CPT

## 2018-05-16 PROCEDURE — 6360000004 HC RX CONTRAST MEDICATION: Performed by: PODIATRIST

## 2018-05-16 PROCEDURE — A9579 GAD-BASE MR CONTRAST NOS,1ML: HCPCS | Performed by: PODIATRIST

## 2018-05-16 RX ADMIN — GADOTERIDOL 20 ML: 279.3 INJECTION, SOLUTION INTRAVENOUS at 10:06

## 2018-05-18 ENCOUNTER — HOSPITAL ENCOUNTER (INPATIENT)
Age: 58
LOS: 3 days | Discharge: HOME HEALTH | DRG: 565 | End: 2018-05-21
Attending: PODIATRIST | Admitting: PODIATRIST
Payer: COMMERCIAL

## 2018-05-18 ENCOUNTER — INITIAL CONSULT (OUTPATIENT)
Dept: VASCULAR SURGERY | Age: 58
End: 2018-05-18
Payer: COMMERCIAL

## 2018-05-18 ENCOUNTER — HOSPITAL ENCOUNTER (OUTPATIENT)
Dept: WOUND CARE | Age: 58
Discharge: HOME | End: 2018-05-18
Payer: COMMERCIAL

## 2018-05-18 VITALS
HEIGHT: 75 IN | TEMPERATURE: 96.1 F | BODY MASS INDEX: 31.08 KG/M2 | HEART RATE: 84 BPM | RESPIRATION RATE: 18 BRPM | SYSTOLIC BLOOD PRESSURE: 137 MMHG | WEIGHT: 250 LBS | DIASTOLIC BLOOD PRESSURE: 79 MMHG

## 2018-05-18 VITALS
SYSTOLIC BLOOD PRESSURE: 124 MMHG | RESPIRATION RATE: 18 BRPM | HEART RATE: 81 BPM | WEIGHT: 250 LBS | DIASTOLIC BLOOD PRESSURE: 70 MMHG | BODY MASS INDEX: 31.08 KG/M2 | HEIGHT: 75 IN | OXYGEN SATURATION: 94 %

## 2018-05-18 DIAGNOSIS — E11.628 TYPE 2 DIABETES MELLITUS WITH LEFT DIABETIC FOOT INFECTION (HCC): ICD-10-CM

## 2018-05-18 DIAGNOSIS — M86.062 ACUTE HEMATOGENOUS OSTEOMYELITIS OF LEFT TIBIA (HCC): ICD-10-CM

## 2018-05-18 DIAGNOSIS — A49.02 MRSA INFECTION: ICD-10-CM

## 2018-05-18 DIAGNOSIS — L08.9 TYPE 2 DIABETES MELLITUS WITH LEFT DIABETIC FOOT INFECTION (HCC): ICD-10-CM

## 2018-05-18 DIAGNOSIS — L02.416 CELLULITIS AND ABSCESS OF LEFT LEG: Primary | ICD-10-CM

## 2018-05-18 DIAGNOSIS — T87.9 BKA STUMP COMPLICATION (HCC): Primary | ICD-10-CM

## 2018-05-18 DIAGNOSIS — L03.116 CELLULITIS AND ABSCESS OF LEFT LEG: Primary | ICD-10-CM

## 2018-05-18 DIAGNOSIS — G89.29 CHRONIC PAIN OF LEFT KNEE: ICD-10-CM

## 2018-05-18 DIAGNOSIS — L97.922 NON-HEALING ULCER OF LOWER LEG, LEFT, WITH FAT LAYER EXPOSED (HCC): ICD-10-CM

## 2018-05-18 DIAGNOSIS — M86.062 ACUTE HEMATOGENOUS OSTEOMYELITIS OF LEFT TIBIA (HCC): Primary | ICD-10-CM

## 2018-05-18 DIAGNOSIS — M25.562 CHRONIC PAIN OF LEFT KNEE: ICD-10-CM

## 2018-05-18 DIAGNOSIS — Z89.512 STATUS POST BELOW KNEE AMPUTATION OF LEFT LOWER EXTREMITY: ICD-10-CM

## 2018-05-18 PROBLEM — I10 ESSENTIAL HYPERTENSION: Status: ACTIVE | Noted: 2018-05-18

## 2018-05-18 PROBLEM — M86.9 OSTEOMYELITIS OF LEFT TIBIA (HCC): Status: ACTIVE | Noted: 2018-05-18

## 2018-05-18 LAB
ABSOLUTE EOS #: 0.11 K/UL (ref 0–0.44)
ABSOLUTE IMMATURE GRANULOCYTE: 0.05 K/UL (ref 0–0.3)
ABSOLUTE LYMPH #: 1.5 K/UL (ref 1.1–3.7)
ABSOLUTE MONO #: 0.55 K/UL (ref 0.1–1.2)
ANION GAP SERPL CALCULATED.3IONS-SCNC: 13 MMOL/L (ref 9–17)
BASOPHILS # BLD: 1 % (ref 0–2)
BASOPHILS ABSOLUTE: 0.05 K/UL (ref 0–0.2)
BUN BLDV-MCNC: 18 MG/DL (ref 6–20)
BUN/CREAT BLD: ABNORMAL (ref 9–20)
CALCIUM SERPL-MCNC: 8.7 MG/DL (ref 8.6–10.4)
CHLORIDE BLD-SCNC: 105 MMOL/L (ref 98–107)
CO2: 25 MMOL/L (ref 20–31)
CREAT SERPL-MCNC: 0.95 MG/DL (ref 0.7–1.2)
DIFFERENTIAL TYPE: ABNORMAL
EOSINOPHILS RELATIVE PERCENT: 2 % (ref 1–4)
GFR AFRICAN AMERICAN: >60 ML/MIN
GFR NON-AFRICAN AMERICAN: >60 ML/MIN
GFR SERPL CREATININE-BSD FRML MDRD: ABNORMAL ML/MIN/{1.73_M2}
GFR SERPL CREATININE-BSD FRML MDRD: ABNORMAL ML/MIN/{1.73_M2}
GLUCOSE BLD-MCNC: 104 MG/DL (ref 75–110)
GLUCOSE BLD-MCNC: 122 MG/DL (ref 70–99)
GLUCOSE BLD-MCNC: 122 MG/DL (ref 75–110)
GLUCOSE BLD-MCNC: 261 MG/DL (ref 75–110)
HCT VFR BLD CALC: 42.2 % (ref 40.7–50.3)
HEMOGLOBIN: 14 G/DL (ref 13–17)
IMMATURE GRANULOCYTES: 1 %
LYMPHOCYTES # BLD: 20 % (ref 24–43)
MCH RBC QN AUTO: 30.2 PG (ref 25.2–33.5)
MCHC RBC AUTO-ENTMCNC: 33.2 G/DL (ref 28.4–34.8)
MCV RBC AUTO: 90.9 FL (ref 82.6–102.9)
MONOCYTES # BLD: 7 % (ref 3–12)
NRBC AUTOMATED: 0 PER 100 WBC
PDW BLD-RTO: 14.6 % (ref 11.8–14.4)
PLATELET # BLD: 228 K/UL (ref 138–453)
PLATELET ESTIMATE: ABNORMAL
PMV BLD AUTO: 11.2 FL (ref 8.1–13.5)
POTASSIUM SERPL-SCNC: 4.1 MMOL/L (ref 3.7–5.3)
RBC # BLD: 4.64 M/UL (ref 4.21–5.77)
RBC # BLD: ABNORMAL 10*6/UL
SEG NEUTROPHILS: 69 % (ref 36–65)
SEGMENTED NEUTROPHILS ABSOLUTE COUNT: 5.3 K/UL (ref 1.5–8.1)
SODIUM BLD-SCNC: 143 MMOL/L (ref 135–144)
WBC # BLD: 7.6 K/UL (ref 3.5–11.3)
WBC # BLD: ABNORMAL 10*3/UL

## 2018-05-18 PROCEDURE — 6370000000 HC RX 637 (ALT 250 FOR IP): Performed by: PODIATRIST

## 2018-05-18 PROCEDURE — 1200000000 HC SEMI PRIVATE

## 2018-05-18 PROCEDURE — 6360000002 HC RX W HCPCS: Performed by: INTERNAL MEDICINE

## 2018-05-18 PROCEDURE — 2580000003 HC RX 258: Performed by: INTERNAL MEDICINE

## 2018-05-18 PROCEDURE — G8598 ASA/ANTIPLAT THER USED: HCPCS | Performed by: SURGERY

## 2018-05-18 PROCEDURE — 11042 DBRDMT SUBQ TIS 1ST 20SQCM/<: CPT

## 2018-05-18 PROCEDURE — 11042 DBRDMT SUBQ TIS 1ST 20SQCM/<: CPT | Performed by: PODIATRIST

## 2018-05-18 PROCEDURE — 85025 COMPLETE CBC W/AUTO DIFF WBC: CPT

## 2018-05-18 PROCEDURE — 94640 AIRWAY INHALATION TREATMENT: CPT

## 2018-05-18 PROCEDURE — 80048 BASIC METABOLIC PNL TOTAL CA: CPT

## 2018-05-18 PROCEDURE — 99221 1ST HOSP IP/OBS SF/LOW 40: CPT | Performed by: INTERNAL MEDICINE

## 2018-05-18 PROCEDURE — 1036F TOBACCO NON-USER: CPT | Performed by: SURGERY

## 2018-05-18 PROCEDURE — 99203 OFFICE O/P NEW LOW 30 MIN: CPT | Performed by: SURGERY

## 2018-05-18 PROCEDURE — 82947 ASSAY GLUCOSE BLOOD QUANT: CPT

## 2018-05-18 PROCEDURE — G8427 DOCREV CUR MEDS BY ELIG CLIN: HCPCS | Performed by: SURGERY

## 2018-05-18 PROCEDURE — G8417 CALC BMI ABV UP PARAM F/U: HCPCS | Performed by: SURGERY

## 2018-05-18 PROCEDURE — 94664 DEMO&/EVAL PT USE INHALER: CPT

## 2018-05-18 PROCEDURE — 99222 1ST HOSP IP/OBS MODERATE 55: CPT | Performed by: INTERNAL MEDICINE

## 2018-05-18 PROCEDURE — 3017F COLORECTAL CA SCREEN DOC REV: CPT | Performed by: SURGERY

## 2018-05-18 PROCEDURE — 36415 COLL VENOUS BLD VENIPUNCTURE: CPT

## 2018-05-18 PROCEDURE — 6360000002 HC RX W HCPCS: Performed by: PODIATRIST

## 2018-05-18 PROCEDURE — 2580000003 HC RX 258: Performed by: PODIATRIST

## 2018-05-18 RX ORDER — BETAMETHASONE SODIUM PHOSPHATE AND BETAMETHASONE ACETATE 3; 3 MG/ML; MG/ML
12 INJECTION, SUSPENSION INTRA-ARTICULAR; INTRALESIONAL; INTRAMUSCULAR; SOFT TISSUE ONCE
Status: DISCONTINUED | OUTPATIENT
Start: 2018-05-18 | End: 2018-05-18

## 2018-05-18 RX ORDER — SULFAMETHOXAZOLE AND TRIMETHOPRIM 800; 160 MG/1; MG/1
1 TABLET ORAL 2 TIMES DAILY
Status: DISCONTINUED | OUTPATIENT
Start: 2018-05-18 | End: 2018-05-18

## 2018-05-18 RX ORDER — LOSARTAN POTASSIUM 50 MG/1
100 TABLET ORAL DAILY
Status: DISCONTINUED | OUTPATIENT
Start: 2018-05-18 | End: 2018-05-21 | Stop reason: HOSPADM

## 2018-05-18 RX ORDER — LIDOCAINE HYDROCHLORIDE 40 MG/ML
SOLUTION TOPICAL ONCE
Status: COMPLETED | OUTPATIENT
Start: 2018-05-18 | End: 2018-05-18

## 2018-05-18 RX ORDER — DEXTROSE MONOHYDRATE 50 MG/ML
100 INJECTION, SOLUTION INTRAVENOUS PRN
Status: DISCONTINUED | OUTPATIENT
Start: 2018-05-18 | End: 2018-05-21 | Stop reason: HOSPADM

## 2018-05-18 RX ORDER — GLUCAGON 1 MG/ML
1 KIT INJECTION PRN
Status: DISCONTINUED | OUTPATIENT
Start: 2018-05-18 | End: 2018-05-21 | Stop reason: HOSPADM

## 2018-05-18 RX ORDER — SODIUM CHLORIDE 0.9 % (FLUSH) 0.9 %
10 SYRINGE (ML) INJECTION EVERY 12 HOURS SCHEDULED
Status: DISCONTINUED | OUTPATIENT
Start: 2018-05-18 | End: 2018-05-21 | Stop reason: HOSPADM

## 2018-05-18 RX ORDER — METHYLPREDNISOLONE 4 MG
500 TABLET, DOSE PACK ORAL 3 TIMES DAILY
Status: DISCONTINUED | OUTPATIENT
Start: 2018-05-18 | End: 2018-05-18 | Stop reason: RX

## 2018-05-18 RX ORDER — ROPINIROLE 1 MG/1
2 TABLET, FILM COATED ORAL DAILY
Status: DISCONTINUED | OUTPATIENT
Start: 2018-05-18 | End: 2018-05-21 | Stop reason: HOSPADM

## 2018-05-18 RX ORDER — NICOTINE POLACRILEX 4 MG
15 LOZENGE BUCCAL PRN
Status: DISCONTINUED | OUTPATIENT
Start: 2018-05-18 | End: 2018-05-21 | Stop reason: HOSPADM

## 2018-05-18 RX ORDER — MULTIVITAMIN WITH IRON
300 TABLET ORAL DAILY
Status: DISCONTINUED | OUTPATIENT
Start: 2018-05-18 | End: 2018-05-18 | Stop reason: RX

## 2018-05-18 RX ORDER — GEMFIBROZIL 600 MG/1
600 TABLET, FILM COATED ORAL DAILY
Status: DISCONTINUED | OUTPATIENT
Start: 2018-05-18 | End: 2018-05-21 | Stop reason: HOSPADM

## 2018-05-18 RX ORDER — ALBUTEROL SULFATE 90 UG/1
2 AEROSOL, METERED RESPIRATORY (INHALATION) EVERY 6 HOURS PRN
Status: DISCONTINUED | OUTPATIENT
Start: 2018-05-18 | End: 2018-05-21 | Stop reason: HOSPADM

## 2018-05-18 RX ORDER — PREGABALIN 100 MG/1
100 CAPSULE ORAL 3 TIMES DAILY
Status: DISCONTINUED | OUTPATIENT
Start: 2018-05-18 | End: 2018-05-21 | Stop reason: HOSPADM

## 2018-05-18 RX ORDER — TAMSULOSIN HYDROCHLORIDE 0.4 MG/1
0.4 CAPSULE ORAL DAILY
Status: DISCONTINUED | OUTPATIENT
Start: 2018-05-18 | End: 2018-05-21 | Stop reason: HOSPADM

## 2018-05-18 RX ORDER — ISOSORBIDE MONONITRATE 30 MG/1
30 TABLET, EXTENDED RELEASE ORAL DAILY
Status: DISCONTINUED | OUTPATIENT
Start: 2018-05-18 | End: 2018-05-21 | Stop reason: HOSPADM

## 2018-05-18 RX ORDER — ONDANSETRON 2 MG/ML
4 INJECTION INTRAMUSCULAR; INTRAVENOUS EVERY 6 HOURS PRN
Status: DISCONTINUED | OUTPATIENT
Start: 2018-05-18 | End: 2018-05-21 | Stop reason: HOSPADM

## 2018-05-18 RX ORDER — ASPIRIN 81 MG/1
81 TABLET ORAL DAILY
Status: DISCONTINUED | OUTPATIENT
Start: 2018-05-18 | End: 2018-05-21 | Stop reason: HOSPADM

## 2018-05-18 RX ORDER — SODIUM CHLORIDE 0.9 % (FLUSH) 0.9 %
10 SYRINGE (ML) INJECTION PRN
Status: DISCONTINUED | OUTPATIENT
Start: 2018-05-18 | End: 2018-05-21 | Stop reason: HOSPADM

## 2018-05-18 RX ORDER — DEXTROSE MONOHYDRATE 25 G/50ML
12.5 INJECTION, SOLUTION INTRAVENOUS PRN
Status: DISCONTINUED | OUTPATIENT
Start: 2018-05-18 | End: 2018-05-21 | Stop reason: HOSPADM

## 2018-05-18 RX ORDER — HEPARIN SODIUM 5000 [USP'U]/ML
5000 INJECTION, SOLUTION INTRAVENOUS; SUBCUTANEOUS EVERY 8 HOURS SCHEDULED
Status: DISCONTINUED | OUTPATIENT
Start: 2018-05-18 | End: 2018-05-21 | Stop reason: HOSPADM

## 2018-05-18 RX ORDER — ATORVASTATIN CALCIUM 20 MG/1
20 TABLET, FILM COATED ORAL DAILY
Status: DISCONTINUED | OUTPATIENT
Start: 2018-05-18 | End: 2018-05-21 | Stop reason: HOSPADM

## 2018-05-18 RX ADMIN — Medication 10 ML: at 21:54

## 2018-05-18 RX ADMIN — HEPARIN SODIUM 5000 UNITS: 5000 INJECTION, SOLUTION INTRAVENOUS; SUBCUTANEOUS at 21:55

## 2018-05-18 RX ADMIN — ATORVASTATIN CALCIUM 20 MG: 20 TABLET, FILM COATED ORAL at 21:54

## 2018-05-18 RX ADMIN — VANCOMYCIN HYDROCHLORIDE 1750 MG: 1 INJECTION, POWDER, LYOPHILIZED, FOR SOLUTION INTRAVENOUS at 19:13

## 2018-05-18 RX ADMIN — PREGABALIN 100 MG: 100 CAPSULE ORAL at 22:08

## 2018-05-18 RX ADMIN — MOMETASONE FUROATE AND FORMOTEROL FUMARATE DIHYDRATE 2 PUFF: 200; 5 AEROSOL RESPIRATORY (INHALATION) at 21:25

## 2018-05-18 RX ADMIN — INSULIN HUMAN 70 UNITS: 100 INJECTION, SUSPENSION SUBCUTANEOUS at 21:55

## 2018-05-18 RX ADMIN — LIDOCAINE HYDROCHLORIDE 5 ML: 40 SOLUTION TOPICAL at 08:35

## 2018-05-18 ASSESSMENT — PAIN SCALES - GENERAL
PAINLEVEL_OUTOF10: 0
PAINLEVEL_OUTOF10: 0

## 2018-05-18 ASSESSMENT — PAIN DESCRIPTION - PROGRESSION
CLINICAL_PROGRESSION: NOT CHANGED

## 2018-05-18 ASSESSMENT — PAIN DESCRIPTION - FREQUENCY: FREQUENCY: CONTINUOUS

## 2018-05-18 ASSESSMENT — PAIN DESCRIPTION - ONSET: ONSET: ON-GOING

## 2018-05-18 ASSESSMENT — ENCOUNTER SYMPTOMS
ALLERGIC/IMMUNOLOGIC NEGATIVE: 1
GASTROINTESTINAL NEGATIVE: 1
EYES NEGATIVE: 1
RESPIRATORY NEGATIVE: 1

## 2018-05-18 ASSESSMENT — PAIN DESCRIPTION - LOCATION: LOCATION: LEG

## 2018-05-18 ASSESSMENT — PAIN DESCRIPTION - PAIN TYPE: TYPE: CHRONIC PAIN

## 2018-05-18 ASSESSMENT — PAIN DESCRIPTION - ORIENTATION: ORIENTATION: LEFT

## 2018-05-18 ASSESSMENT — PAIN DESCRIPTION - DESCRIPTORS: DESCRIPTORS: DISCOMFORT

## 2018-05-19 ENCOUNTER — APPOINTMENT (OUTPATIENT)
Dept: GENERAL RADIOLOGY | Age: 58
DRG: 565 | End: 2018-05-19
Attending: PODIATRIST
Payer: COMMERCIAL

## 2018-05-19 LAB
ABSOLUTE EOS #: 0.14 K/UL (ref 0–0.44)
ABSOLUTE IMMATURE GRANULOCYTE: 0.05 K/UL (ref 0–0.3)
ABSOLUTE LYMPH #: 1.09 K/UL (ref 1.1–3.7)
ABSOLUTE MONO #: 0.68 K/UL (ref 0.1–1.2)
ANION GAP SERPL CALCULATED.3IONS-SCNC: 14 MMOL/L (ref 9–17)
BASOPHILS # BLD: 1 % (ref 0–2)
BASOPHILS ABSOLUTE: 0.07 K/UL (ref 0–0.2)
BUN BLDV-MCNC: 18 MG/DL (ref 6–20)
BUN/CREAT BLD: NORMAL (ref 9–20)
C-REACTIVE PROTEIN: 3.3 MG/L (ref 0–5)
CALCIUM SERPL-MCNC: 8.9 MG/DL (ref 8.6–10.4)
CHLORIDE BLD-SCNC: 105 MMOL/L (ref 98–107)
CO2: 25 MMOL/L (ref 20–31)
CREAT SERPL-MCNC: 0.92 MG/DL (ref 0.7–1.2)
DIFFERENTIAL TYPE: ABNORMAL
EOSINOPHILS RELATIVE PERCENT: 2 % (ref 1–4)
GFR AFRICAN AMERICAN: >60 ML/MIN
GFR NON-AFRICAN AMERICAN: >60 ML/MIN
GFR SERPL CREATININE-BSD FRML MDRD: NORMAL ML/MIN/{1.73_M2}
GFR SERPL CREATININE-BSD FRML MDRD: NORMAL ML/MIN/{1.73_M2}
GLUCOSE BLD-MCNC: 118 MG/DL (ref 75–110)
GLUCOSE BLD-MCNC: 167 MG/DL (ref 75–110)
GLUCOSE BLD-MCNC: 191 MG/DL (ref 75–110)
GLUCOSE BLD-MCNC: 43 MG/DL (ref 75–110)
GLUCOSE BLD-MCNC: 94 MG/DL (ref 75–110)
GLUCOSE BLD-MCNC: 99 MG/DL (ref 70–99)
HCT VFR BLD CALC: 46.1 % (ref 40.7–50.3)
HEMOGLOBIN: 14.9 G/DL (ref 13–17)
IMMATURE GRANULOCYTES: 1 %
LYMPHOCYTES # BLD: 17 % (ref 24–43)
MCH RBC QN AUTO: 29 PG (ref 25.2–33.5)
MCHC RBC AUTO-ENTMCNC: 32.3 G/DL (ref 28.4–34.8)
MCV RBC AUTO: 89.9 FL (ref 82.6–102.9)
MONOCYTES # BLD: 10 % (ref 3–12)
NRBC AUTOMATED: 0 PER 100 WBC
PDW BLD-RTO: 14.6 % (ref 11.8–14.4)
PLATELET # BLD: 222 K/UL (ref 138–453)
PLATELET ESTIMATE: ABNORMAL
PMV BLD AUTO: 10.9 FL (ref 8.1–13.5)
POTASSIUM SERPL-SCNC: 3.9 MMOL/L (ref 3.7–5.3)
RBC # BLD: 5.13 M/UL (ref 4.21–5.77)
RBC # BLD: ABNORMAL 10*6/UL
SEDIMENTATION RATE, ERYTHROCYTE: 10 MM (ref 0–10)
SEG NEUTROPHILS: 69 % (ref 36–65)
SEGMENTED NEUTROPHILS ABSOLUTE COUNT: 4.52 K/UL (ref 1.5–8.1)
SODIUM BLD-SCNC: 144 MMOL/L (ref 135–144)
WBC # BLD: 6.6 K/UL (ref 3.5–11.3)
WBC # BLD: ABNORMAL 10*3/UL

## 2018-05-19 PROCEDURE — 6360000002 HC RX W HCPCS: Performed by: INTERNAL MEDICINE

## 2018-05-19 PROCEDURE — 94640 AIRWAY INHALATION TREATMENT: CPT

## 2018-05-19 PROCEDURE — 85651 RBC SED RATE NONAUTOMATED: CPT

## 2018-05-19 PROCEDURE — 73080 X-RAY EXAM OF ELBOW: CPT

## 2018-05-19 PROCEDURE — 2580000003 HC RX 258: Performed by: INTERNAL MEDICINE

## 2018-05-19 PROCEDURE — 36415 COLL VENOUS BLD VENIPUNCTURE: CPT

## 2018-05-19 PROCEDURE — 80048 BASIC METABOLIC PNL TOTAL CA: CPT

## 2018-05-19 PROCEDURE — 99232 SBSQ HOSP IP/OBS MODERATE 35: CPT | Performed by: INTERNAL MEDICINE

## 2018-05-19 PROCEDURE — 86140 C-REACTIVE PROTEIN: CPT

## 2018-05-19 PROCEDURE — 6360000002 HC RX W HCPCS: Performed by: STUDENT IN AN ORGANIZED HEALTH CARE EDUCATION/TRAINING PROGRAM

## 2018-05-19 PROCEDURE — 6370000000 HC RX 637 (ALT 250 FOR IP): Performed by: INTERNAL MEDICINE

## 2018-05-19 PROCEDURE — 99233 SBSQ HOSP IP/OBS HIGH 50: CPT | Performed by: INTERNAL MEDICINE

## 2018-05-19 PROCEDURE — 2580000003 HC RX 258: Performed by: PODIATRIST

## 2018-05-19 PROCEDURE — 82947 ASSAY GLUCOSE BLOOD QUANT: CPT

## 2018-05-19 PROCEDURE — 6360000002 HC RX W HCPCS: Performed by: PODIATRIST

## 2018-05-19 PROCEDURE — 1200000000 HC SEMI PRIVATE

## 2018-05-19 PROCEDURE — 85025 COMPLETE CBC W/AUTO DIFF WBC: CPT

## 2018-05-19 PROCEDURE — 6370000000 HC RX 637 (ALT 250 FOR IP): Performed by: PODIATRIST

## 2018-05-19 PROCEDURE — 99231 SBSQ HOSP IP/OBS SF/LOW 25: CPT | Performed by: ORTHOPAEDIC SURGERY

## 2018-05-19 RX ADMIN — GEMFIBROZIL 600 MG: 600 TABLET ORAL at 08:48

## 2018-05-19 RX ADMIN — DEXTROSE 15 G: 15 GEL ORAL at 07:35

## 2018-05-19 RX ADMIN — ROPINIROLE HYDROCHLORIDE 2 MG: 1 TABLET, FILM COATED ORAL at 08:47

## 2018-05-19 RX ADMIN — INSULIN LISPRO 12 UNITS: 100 INJECTION, SOLUTION INTRAVENOUS; SUBCUTANEOUS at 13:04

## 2018-05-19 RX ADMIN — LEVOTHYROXINE SODIUM 175 MCG: 125 TABLET ORAL at 08:48

## 2018-05-19 RX ADMIN — HEPARIN SODIUM 5000 UNITS: 5000 INJECTION, SOLUTION INTRAVENOUS; SUBCUTANEOUS at 21:27

## 2018-05-19 RX ADMIN — DEXTROSE 15 G: 15 GEL ORAL at 07:37

## 2018-05-19 RX ADMIN — Medication 10 ML: at 19:36

## 2018-05-19 RX ADMIN — METFORMIN HYDROCHLORIDE 1000 MG: 500 TABLET ORAL at 17:11

## 2018-05-19 RX ADMIN — TAZOBACTAM SODIUM AND PIPERACILLIN SODIUM 3.38 G: 375; 3 INJECTION, SOLUTION INTRAVENOUS at 13:55

## 2018-05-19 RX ADMIN — HEPARIN SODIUM 5000 UNITS: 5000 INJECTION, SOLUTION INTRAVENOUS; SUBCUTANEOUS at 13:54

## 2018-05-19 RX ADMIN — TAMSULOSIN HYDROCHLORIDE 0.4 MG: 0.4 CAPSULE ORAL at 08:47

## 2018-05-19 RX ADMIN — INSULIN HUMAN 50 UNITS: 100 INJECTION, SUSPENSION SUBCUTANEOUS at 21:26

## 2018-05-19 RX ADMIN — METFORMIN HYDROCHLORIDE 1000 MG: 500 TABLET ORAL at 08:48

## 2018-05-19 RX ADMIN — CEFTAROLINE FOSAMIL 600 MG: 600 POWDER, FOR SOLUTION INTRAVENOUS at 17:37

## 2018-05-19 RX ADMIN — ISOSORBIDE MONONITRATE 30 MG: 30 TABLET ORAL at 08:48

## 2018-05-19 RX ADMIN — PREGABALIN 100 MG: 100 CAPSULE ORAL at 13:54

## 2018-05-19 RX ADMIN — VANCOMYCIN HYDROCHLORIDE 1750 MG: 1 INJECTION, POWDER, LYOPHILIZED, FOR SOLUTION INTRAVENOUS at 08:42

## 2018-05-19 RX ADMIN — ASPIRIN 81 MG: 81 TABLET, COATED ORAL at 08:48

## 2018-05-19 RX ADMIN — MOMETASONE FUROATE AND FORMOTEROL FUMARATE DIHYDRATE 2 PUFF: 200; 5 AEROSOL RESPIRATORY (INHALATION) at 07:42

## 2018-05-19 RX ADMIN — MOMETASONE FUROATE AND FORMOTEROL FUMARATE DIHYDRATE 2 PUFF: 200; 5 AEROSOL RESPIRATORY (INHALATION) at 21:12

## 2018-05-19 RX ADMIN — PREGABALIN 100 MG: 100 CAPSULE ORAL at 19:35

## 2018-05-19 RX ADMIN — HEPARIN SODIUM 5000 UNITS: 5000 INJECTION, SOLUTION INTRAVENOUS; SUBCUTANEOUS at 07:30

## 2018-05-19 RX ADMIN — ATORVASTATIN CALCIUM 20 MG: 20 TABLET, FILM COATED ORAL at 19:35

## 2018-05-19 RX ADMIN — LOSARTAN POTASSIUM 100 MG: 50 TABLET, FILM COATED ORAL at 08:47

## 2018-05-19 RX ADMIN — PREGABALIN 100 MG: 100 CAPSULE ORAL at 08:47

## 2018-05-19 RX ADMIN — INSULIN LISPRO 12 UNITS: 100 INJECTION, SOLUTION INTRAVENOUS; SUBCUTANEOUS at 17:10

## 2018-05-19 RX ADMIN — LINAGLIPTIN 5 MG: 5 TABLET, FILM COATED ORAL at 08:48

## 2018-05-19 RX ADMIN — TAZOBACTAM SODIUM AND PIPERACILLIN SODIUM 3.38 G: 375; 3 INJECTION, SOLUTION INTRAVENOUS at 07:30

## 2018-05-20 LAB
ABSOLUTE EOS #: 0.13 K/UL (ref 0–0.44)
ABSOLUTE IMMATURE GRANULOCYTE: 0.03 K/UL (ref 0–0.3)
ABSOLUTE LYMPH #: 0.98 K/UL (ref 1.1–3.7)
ABSOLUTE MONO #: 0.62 K/UL (ref 0.1–1.2)
ANION GAP SERPL CALCULATED.3IONS-SCNC: 13 MMOL/L (ref 9–17)
BASOPHILS # BLD: 1 % (ref 0–2)
BASOPHILS ABSOLUTE: 0.07 K/UL (ref 0–0.2)
BUN BLDV-MCNC: 22 MG/DL (ref 6–20)
BUN/CREAT BLD: ABNORMAL (ref 9–20)
CALCIUM SERPL-MCNC: 8.7 MG/DL (ref 8.6–10.4)
CHLORIDE BLD-SCNC: 106 MMOL/L (ref 98–107)
CO2: 22 MMOL/L (ref 20–31)
CREAT SERPL-MCNC: 1.1 MG/DL (ref 0.7–1.2)
DIFFERENTIAL TYPE: ABNORMAL
EOSINOPHILS RELATIVE PERCENT: 2 % (ref 1–4)
GFR AFRICAN AMERICAN: >60 ML/MIN
GFR NON-AFRICAN AMERICAN: >60 ML/MIN
GFR SERPL CREATININE-BSD FRML MDRD: ABNORMAL ML/MIN/{1.73_M2}
GFR SERPL CREATININE-BSD FRML MDRD: ABNORMAL ML/MIN/{1.73_M2}
GLUCOSE BLD-MCNC: 101 MG/DL (ref 70–99)
GLUCOSE BLD-MCNC: 111 MG/DL (ref 75–110)
GLUCOSE BLD-MCNC: 165 MG/DL (ref 75–110)
GLUCOSE BLD-MCNC: 28 MG/DL (ref 75–110)
GLUCOSE BLD-MCNC: 33 MG/DL (ref 75–110)
GLUCOSE BLD-MCNC: 49 MG/DL (ref 75–110)
GLUCOSE BLD-MCNC: 53 MG/DL (ref 75–110)
GLUCOSE BLD-MCNC: 55 MG/DL (ref 75–110)
GLUCOSE BLD-MCNC: 63 MG/DL (ref 75–110)
GLUCOSE BLD-MCNC: 94 MG/DL (ref 75–110)
GLUCOSE BLD-MCNC: 99 MG/DL (ref 75–110)
HCT VFR BLD CALC: 46.9 % (ref 40.7–50.3)
HEMOGLOBIN: 15.1 G/DL (ref 13–17)
IMMATURE GRANULOCYTES: 1 %
LYMPHOCYTES # BLD: 16 % (ref 24–43)
MCH RBC QN AUTO: 29.7 PG (ref 25.2–33.5)
MCHC RBC AUTO-ENTMCNC: 32.2 G/DL (ref 28.4–34.8)
MCV RBC AUTO: 92.3 FL (ref 82.6–102.9)
MONOCYTES # BLD: 10 % (ref 3–12)
NRBC AUTOMATED: 0 PER 100 WBC
PDW BLD-RTO: 14.5 % (ref 11.8–14.4)
PLATELET # BLD: 225 K/UL (ref 138–453)
PLATELET ESTIMATE: ABNORMAL
PMV BLD AUTO: 11.4 FL (ref 8.1–13.5)
POTASSIUM SERPL-SCNC: 3.7 MMOL/L (ref 3.7–5.3)
RBC # BLD: 5.08 M/UL (ref 4.21–5.77)
RBC # BLD: ABNORMAL 10*6/UL
SEG NEUTROPHILS: 70 % (ref 36–65)
SEGMENTED NEUTROPHILS ABSOLUTE COUNT: 4.39 K/UL (ref 1.5–8.1)
SODIUM BLD-SCNC: 141 MMOL/L (ref 135–144)
WBC # BLD: 6.2 K/UL (ref 3.5–11.3)
WBC # BLD: ABNORMAL 10*3/UL

## 2018-05-20 PROCEDURE — 85025 COMPLETE CBC W/AUTO DIFF WBC: CPT

## 2018-05-20 PROCEDURE — 94640 AIRWAY INHALATION TREATMENT: CPT

## 2018-05-20 PROCEDURE — 99233 SBSQ HOSP IP/OBS HIGH 50: CPT | Performed by: INTERNAL MEDICINE

## 2018-05-20 PROCEDURE — 6360000002 HC RX W HCPCS: Performed by: PODIATRIST

## 2018-05-20 PROCEDURE — 1200000000 HC SEMI PRIVATE

## 2018-05-20 PROCEDURE — 2580000003 HC RX 258: Performed by: INTERNAL MEDICINE

## 2018-05-20 PROCEDURE — 80048 BASIC METABOLIC PNL TOTAL CA: CPT

## 2018-05-20 PROCEDURE — 2580000003 HC RX 258: Performed by: PODIATRIST

## 2018-05-20 PROCEDURE — 6370000000 HC RX 637 (ALT 250 FOR IP): Performed by: INTERNAL MEDICINE

## 2018-05-20 PROCEDURE — 6370000000 HC RX 637 (ALT 250 FOR IP): Performed by: PODIATRIST

## 2018-05-20 PROCEDURE — 99232 SBSQ HOSP IP/OBS MODERATE 35: CPT | Performed by: INTERNAL MEDICINE

## 2018-05-20 PROCEDURE — 36415 COLL VENOUS BLD VENIPUNCTURE: CPT

## 2018-05-20 PROCEDURE — 82947 ASSAY GLUCOSE BLOOD QUANT: CPT

## 2018-05-20 PROCEDURE — 6360000002 HC RX W HCPCS: Performed by: INTERNAL MEDICINE

## 2018-05-20 RX ORDER — DEXTROSE MONOHYDRATE 100 MG/ML
INJECTION, SOLUTION INTRAVENOUS CONTINUOUS
Status: DISCONTINUED | OUTPATIENT
Start: 2018-05-20 | End: 2018-05-21 | Stop reason: HOSPADM

## 2018-05-20 RX ORDER — DEXTROSE AND SODIUM CHLORIDE 5; .45 G/100ML; G/100ML
INJECTION, SOLUTION INTRAVENOUS CONTINUOUS
Status: DISCONTINUED | OUTPATIENT
Start: 2018-05-20 | End: 2018-05-21 | Stop reason: HOSPADM

## 2018-05-20 RX ADMIN — ASPIRIN 81 MG: 81 TABLET, COATED ORAL at 08:21

## 2018-05-20 RX ADMIN — DEXTROSE 15 G: 15 GEL ORAL at 12:25

## 2018-05-20 RX ADMIN — HEPARIN SODIUM 5000 UNITS: 5000 INJECTION, SOLUTION INTRAVENOUS; SUBCUTANEOUS at 14:05

## 2018-05-20 RX ADMIN — MOMETASONE FUROATE AND FORMOTEROL FUMARATE DIHYDRATE 2 PUFF: 200; 5 AEROSOL RESPIRATORY (INHALATION) at 07:54

## 2018-05-20 RX ADMIN — CEFTAROLINE FOSAMIL 600 MG: 600 POWDER, FOR SOLUTION INTRAVENOUS at 05:30

## 2018-05-20 RX ADMIN — METFORMIN HYDROCHLORIDE 1000 MG: 500 TABLET ORAL at 08:21

## 2018-05-20 RX ADMIN — ATORVASTATIN CALCIUM 20 MG: 20 TABLET, FILM COATED ORAL at 21:29

## 2018-05-20 RX ADMIN — HEPARIN SODIUM 5000 UNITS: 5000 INJECTION, SOLUTION INTRAVENOUS; SUBCUTANEOUS at 06:07

## 2018-05-20 RX ADMIN — DEXTROSE MONOHYDRATE: 100 INJECTION, SOLUTION INTRAVENOUS at 15:35

## 2018-05-20 RX ADMIN — DEXTROSE 15 G: 15 GEL ORAL at 13:28

## 2018-05-20 RX ADMIN — DEXTROSE AND SODIUM CHLORIDE: 5; 450 INJECTION, SOLUTION INTRAVENOUS at 14:04

## 2018-05-20 RX ADMIN — MOMETASONE FUROATE AND FORMOTEROL FUMARATE DIHYDRATE 2 PUFF: 200; 5 AEROSOL RESPIRATORY (INHALATION) at 21:37

## 2018-05-20 RX ADMIN — DEXTROSE 15 G: 15 GEL ORAL at 13:27

## 2018-05-20 RX ADMIN — LOSARTAN POTASSIUM 100 MG: 50 TABLET, FILM COATED ORAL at 08:21

## 2018-05-20 RX ADMIN — PREGABALIN 100 MG: 100 CAPSULE ORAL at 07:10

## 2018-05-20 RX ADMIN — PREGABALIN 100 MG: 100 CAPSULE ORAL at 14:05

## 2018-05-20 RX ADMIN — INSULIN HUMAN 30 UNITS: 100 INJECTION, SUSPENSION SUBCUTANEOUS at 21:30

## 2018-05-20 RX ADMIN — TAMSULOSIN HYDROCHLORIDE 0.4 MG: 0.4 CAPSULE ORAL at 08:21

## 2018-05-20 RX ADMIN — HEPARIN SODIUM 5000 UNITS: 5000 INJECTION, SOLUTION INTRAVENOUS; SUBCUTANEOUS at 21:29

## 2018-05-20 RX ADMIN — LEVOTHYROXINE SODIUM 175 MCG: 125 TABLET ORAL at 07:10

## 2018-05-20 RX ADMIN — Medication 10 ML: at 08:24

## 2018-05-20 RX ADMIN — CEFTAROLINE FOSAMIL 600 MG: 600 POWDER, FOR SOLUTION INTRAVENOUS at 17:21

## 2018-05-20 RX ADMIN — ROPINIROLE HYDROCHLORIDE 2 MG: 1 TABLET, FILM COATED ORAL at 08:21

## 2018-05-20 RX ADMIN — METFORMIN HYDROCHLORIDE 1000 MG: 500 TABLET ORAL at 17:21

## 2018-05-20 RX ADMIN — DEXTROSE MONOHYDRATE 100 ML/HR: 50 INJECTION, SOLUTION INTRAVENOUS at 13:47

## 2018-05-20 RX ADMIN — Medication 10 ML: at 21:00

## 2018-05-20 RX ADMIN — INSULIN HUMAN 60 UNITS: 100 INJECTION, SUSPENSION SUBCUTANEOUS at 08:24

## 2018-05-20 RX ADMIN — ISOSORBIDE MONONITRATE 30 MG: 30 TABLET ORAL at 08:21

## 2018-05-20 RX ADMIN — PREGABALIN 100 MG: 100 CAPSULE ORAL at 21:29

## 2018-05-20 RX ADMIN — GEMFIBROZIL 600 MG: 600 TABLET ORAL at 08:21

## 2018-05-20 RX ADMIN — LINAGLIPTIN 5 MG: 5 TABLET, FILM COATED ORAL at 08:21

## 2018-05-20 ASSESSMENT — PAIN SCALES - GENERAL: PAINLEVEL_OUTOF10: 0

## 2018-05-21 ENCOUNTER — HOSPITAL ENCOUNTER (OUTPATIENT)
Dept: WOUND CARE | Age: 58
Discharge: HOME | End: 2018-05-21
Payer: COMMERCIAL

## 2018-05-21 VITALS
TEMPERATURE: 97.8 F | DIASTOLIC BLOOD PRESSURE: 52 MMHG | OXYGEN SATURATION: 94 % | RESPIRATION RATE: 17 BRPM | SYSTOLIC BLOOD PRESSURE: 138 MMHG | HEIGHT: 76 IN | BODY MASS INDEX: 30.44 KG/M2 | WEIGHT: 250 LBS | HEART RATE: 76 BPM

## 2018-05-21 LAB
ABSOLUTE EOS #: 0.18 K/UL (ref 0–0.44)
ABSOLUTE IMMATURE GRANULOCYTE: 0.04 K/UL (ref 0–0.3)
ABSOLUTE LYMPH #: 1.51 K/UL (ref 1.1–3.7)
ABSOLUTE MONO #: 0.8 K/UL (ref 0.1–1.2)
ANION GAP SERPL CALCULATED.3IONS-SCNC: 10 MMOL/L (ref 9–17)
BASOPHILS # BLD: 1 % (ref 0–2)
BASOPHILS ABSOLUTE: 0.07 K/UL (ref 0–0.2)
BUN BLDV-MCNC: 19 MG/DL (ref 6–20)
BUN/CREAT BLD: NORMAL (ref 9–20)
CALCIUM SERPL-MCNC: 8.6 MG/DL (ref 8.6–10.4)
CHLORIDE BLD-SCNC: 105 MMOL/L (ref 98–107)
CO2: 21 MMOL/L (ref 20–31)
CREAT SERPL-MCNC: 0.99 MG/DL (ref 0.7–1.2)
DIFFERENTIAL TYPE: ABNORMAL
EOSINOPHILS RELATIVE PERCENT: 3 % (ref 1–4)
GFR AFRICAN AMERICAN: >60 ML/MIN
GFR NON-AFRICAN AMERICAN: >60 ML/MIN
GFR SERPL CREATININE-BSD FRML MDRD: NORMAL ML/MIN/{1.73_M2}
GFR SERPL CREATININE-BSD FRML MDRD: NORMAL ML/MIN/{1.73_M2}
GLUCOSE BLD-MCNC: 129 MG/DL (ref 75–110)
GLUCOSE BLD-MCNC: 232 MG/DL (ref 75–110)
GLUCOSE BLD-MCNC: 288 MG/DL (ref 75–110)
GLUCOSE BLD-MCNC: 76 MG/DL (ref 75–110)
GLUCOSE BLD-MCNC: 78 MG/DL (ref 75–110)
GLUCOSE BLD-MCNC: 85 MG/DL (ref 70–99)
HCT VFR BLD CALC: 45.7 % (ref 40.7–50.3)
HEMOGLOBIN: 14.6 G/DL (ref 13–17)
IMMATURE GRANULOCYTES: 1 %
LYMPHOCYTES # BLD: 21 % (ref 24–43)
MCH RBC QN AUTO: 30 PG (ref 25.2–33.5)
MCHC RBC AUTO-ENTMCNC: 31.9 G/DL (ref 28.4–34.8)
MCV RBC AUTO: 94 FL (ref 82.6–102.9)
MONOCYTES # BLD: 11 % (ref 3–12)
NRBC AUTOMATED: 0 PER 100 WBC
PDW BLD-RTO: 14.6 % (ref 11.8–14.4)
PLATELET # BLD: 198 K/UL (ref 138–453)
PLATELET ESTIMATE: ABNORMAL
PMV BLD AUTO: 11.3 FL (ref 8.1–13.5)
POTASSIUM SERPL-SCNC: 4.2 MMOL/L (ref 3.7–5.3)
RBC # BLD: 4.86 M/UL (ref 4.21–5.77)
RBC # BLD: ABNORMAL 10*6/UL
SEG NEUTROPHILS: 63 % (ref 36–65)
SEGMENTED NEUTROPHILS ABSOLUTE COUNT: 4.51 K/UL (ref 1.5–8.1)
SODIUM BLD-SCNC: 136 MMOL/L (ref 135–144)
WBC # BLD: 7.1 K/UL (ref 3.5–11.3)
WBC # BLD: ABNORMAL 10*3/UL

## 2018-05-21 PROCEDURE — 6360000002 HC RX W HCPCS: Performed by: INTERNAL MEDICINE

## 2018-05-21 PROCEDURE — 6370000000 HC RX 637 (ALT 250 FOR IP): Performed by: FAMILY MEDICINE

## 2018-05-21 PROCEDURE — 2580000003 HC RX 258: Performed by: INTERNAL MEDICINE

## 2018-05-21 PROCEDURE — 6370000000 HC RX 637 (ALT 250 FOR IP): Performed by: PODIATRIST

## 2018-05-21 PROCEDURE — 36569 INSJ PICC 5 YR+ W/O IMAGING: CPT

## 2018-05-21 PROCEDURE — 80048 BASIC METABOLIC PNL TOTAL CA: CPT

## 2018-05-21 PROCEDURE — 6360000002 HC RX W HCPCS: Performed by: PODIATRIST

## 2018-05-21 PROCEDURE — 85025 COMPLETE CBC W/AUTO DIFF WBC: CPT

## 2018-05-21 PROCEDURE — 99232 SBSQ HOSP IP/OBS MODERATE 35: CPT | Performed by: INTERNAL MEDICINE

## 2018-05-21 PROCEDURE — C1751 CATH, INF, PER/CENT/MIDLINE: HCPCS

## 2018-05-21 PROCEDURE — 2580000003 HC RX 258: Performed by: PODIATRIST

## 2018-05-21 PROCEDURE — 99232 SBSQ HOSP IP/OBS MODERATE 35: CPT | Performed by: FAMILY MEDICINE

## 2018-05-21 PROCEDURE — 02HV33Z INSERTION OF INFUSION DEVICE INTO SUPERIOR VENA CAVA, PERCUTANEOUS APPROACH: ICD-10-PCS | Performed by: INTERNAL MEDICINE

## 2018-05-21 PROCEDURE — 76937 US GUIDE VASCULAR ACCESS: CPT

## 2018-05-21 PROCEDURE — 36415 COLL VENOUS BLD VENIPUNCTURE: CPT

## 2018-05-21 PROCEDURE — 82947 ASSAY GLUCOSE BLOOD QUANT: CPT

## 2018-05-21 PROCEDURE — 94640 AIRWAY INHALATION TREATMENT: CPT

## 2018-05-21 RX ORDER — GREEN TEA/HOODIA GORDONII 315-12.5MG
1 CAPSULE ORAL 3 TIMES DAILY
Qty: 87 TABLET | Refills: 0 | Status: SHIPPED | OUTPATIENT
Start: 2018-05-21 | End: 2018-06-19

## 2018-05-21 RX ADMIN — INSULIN HUMAN 20 UNITS: 100 INJECTION, SUSPENSION SUBCUTANEOUS at 12:52

## 2018-05-21 RX ADMIN — METFORMIN HYDROCHLORIDE 1000 MG: 500 TABLET ORAL at 15:49

## 2018-05-21 RX ADMIN — LINAGLIPTIN 5 MG: 5 TABLET, FILM COATED ORAL at 09:50

## 2018-05-21 RX ADMIN — PREGABALIN 100 MG: 100 CAPSULE ORAL at 09:50

## 2018-05-21 RX ADMIN — PREGABALIN 100 MG: 100 CAPSULE ORAL at 15:49

## 2018-05-21 RX ADMIN — TAMSULOSIN HYDROCHLORIDE 0.4 MG: 0.4 CAPSULE ORAL at 09:51

## 2018-05-21 RX ADMIN — ISOSORBIDE MONONITRATE 30 MG: 30 TABLET ORAL at 09:51

## 2018-05-21 RX ADMIN — CEFTAROLINE FOSAMIL 600 MG: 600 POWDER, FOR SOLUTION INTRAVENOUS at 16:54

## 2018-05-21 RX ADMIN — ROPINIROLE HYDROCHLORIDE 2 MG: 1 TABLET, FILM COATED ORAL at 09:51

## 2018-05-21 RX ADMIN — LOSARTAN POTASSIUM 100 MG: 50 TABLET, FILM COATED ORAL at 09:51

## 2018-05-21 RX ADMIN — LEVOTHYROXINE SODIUM 175 MCG: 125 TABLET ORAL at 09:51

## 2018-05-21 RX ADMIN — GEMFIBROZIL 600 MG: 600 TABLET ORAL at 09:51

## 2018-05-21 RX ADMIN — Medication 10 ML: at 09:49

## 2018-05-21 RX ADMIN — METFORMIN HYDROCHLORIDE 1000 MG: 500 TABLET ORAL at 09:50

## 2018-05-21 RX ADMIN — ASPIRIN 81 MG: 81 TABLET, COATED ORAL at 09:51

## 2018-05-21 RX ADMIN — MOMETASONE FUROATE AND FORMOTEROL FUMARATE DIHYDRATE 2 PUFF: 200; 5 AEROSOL RESPIRATORY (INHALATION) at 08:21

## 2018-05-21 RX ADMIN — INSULIN LISPRO 3 UNITS: 100 INJECTION, SOLUTION INTRAVENOUS; SUBCUTANEOUS at 16:54

## 2018-05-21 RX ADMIN — CEFTAROLINE FOSAMIL 600 MG: 600 POWDER, FOR SOLUTION INTRAVENOUS at 06:00

## 2018-05-21 ASSESSMENT — ENCOUNTER SYMPTOMS
ALLERGIC/IMMUNOLOGIC NEGATIVE: 1
EYES NEGATIVE: 1
RESPIRATORY NEGATIVE: 1
DIARRHEA: 1
CONSTIPATION: 0
NAUSEA: 0
COUGH: 0
ABDOMINAL PAIN: 0
VOICE CHANGE: 0
DIARRHEA: 0
WHEEZING: 0
SHORTNESS OF BREATH: 0
BLOOD IN STOOL: 0
SINUS PRESSURE: 0
VOMITING: 0
SORE THROAT: 0

## 2018-05-21 ASSESSMENT — PAIN SCALES - GENERAL: PAINLEVEL_OUTOF10: 0

## 2018-05-22 ENCOUNTER — TELEPHONE (OUTPATIENT)
Dept: INTERNAL MEDICINE CLINIC | Age: 58
End: 2018-05-22

## 2018-05-22 ENCOUNTER — CARE COORDINATION (OUTPATIENT)
Dept: CASE MANAGEMENT | Age: 58
End: 2018-05-22

## 2018-05-22 DIAGNOSIS — M86.262 SUBACUTE OSTEOMYELITIS OF LEFT TIBIA (HCC): ICD-10-CM

## 2018-05-22 DIAGNOSIS — L97.922 NON-HEALING ULCER OF LOWER LEG, LEFT, WITH FAT LAYER EXPOSED (HCC): Primary | ICD-10-CM

## 2018-05-22 PROCEDURE — 1111F DSCHRG MED/CURRENT MED MERGE: CPT | Performed by: INTERNAL MEDICINE

## 2018-05-22 RX ORDER — PREGABALIN 100 MG/1
100 CAPSULE ORAL 3 TIMES DAILY
Qty: 90 CAPSULE | Refills: 0 | Status: SHIPPED | OUTPATIENT
Start: 2018-05-22 | End: 2018-06-19 | Stop reason: SDUPTHER

## 2018-05-25 ENCOUNTER — CARE COORDINATION (OUTPATIENT)
Dept: CASE MANAGEMENT | Age: 58
End: 2018-05-25

## 2018-05-28 LAB
BASOPHILS ABSOLUTE: NORMAL /ΜL
BASOPHILS RELATIVE PERCENT: NORMAL %
EOSINOPHILS ABSOLUTE: NORMAL /ΜL
EOSINOPHILS RELATIVE PERCENT: NORMAL %
HCT VFR BLD CALC: NORMAL % (ref 41–53)
HEMOGLOBIN: NORMAL G/DL (ref 13.5–17.5)
LYMPHOCYTES ABSOLUTE: NORMAL /ΜL
LYMPHOCYTES RELATIVE PERCENT: NORMAL %
MCH RBC QN AUTO: NORMAL PG
MCHC RBC AUTO-ENTMCNC: NORMAL G/DL
MCV RBC AUTO: NORMAL FL
MONOCYTES ABSOLUTE: NORMAL /ΜL
MONOCYTES RELATIVE PERCENT: NORMAL %
NEUTROPHILS ABSOLUTE: NORMAL /ΜL
NEUTROPHILS RELATIVE PERCENT: NORMAL %
PDW BLD-RTO: NORMAL %
PLATELET # BLD: NORMAL K/ΜL
PMV BLD AUTO: NORMAL FL
RBC # BLD: NORMAL 10^6/ΜL
WBC # BLD: NORMAL 10^3/ML

## 2018-05-29 ENCOUNTER — HOSPITAL ENCOUNTER (OUTPATIENT)
Dept: PREADMISSION TESTING | Age: 58
Setting detail: SURGERY ADMIT
Discharge: HOME | DRG: 475 | End: 2018-06-02
Payer: COMMERCIAL

## 2018-05-29 VITALS
WEIGHT: 250 LBS | SYSTOLIC BLOOD PRESSURE: 124 MMHG | RESPIRATION RATE: 16 BRPM | OXYGEN SATURATION: 95 % | HEART RATE: 76 BPM | DIASTOLIC BLOOD PRESSURE: 72 MMHG | BODY MASS INDEX: 30.44 KG/M2 | HEIGHT: 76 IN | TEMPERATURE: 98.1 F

## 2018-05-29 LAB
ABSOLUTE EOS #: 0.2 K/UL (ref 0–0.4)
ABSOLUTE IMMATURE GRANULOCYTE: ABNORMAL K/UL (ref 0–0.3)
ABSOLUTE LYMPH #: 1.1 K/UL (ref 1–4.8)
ABSOLUTE MONO #: 0.7 K/UL (ref 0.1–1.3)
ANION GAP SERPL CALCULATED.3IONS-SCNC: 13 MMOL/L (ref 9–17)
BASOPHILS # BLD: 1 % (ref 0–2)
BASOPHILS ABSOLUTE: 0.1 K/UL (ref 0–0.2)
BUN BLDV-MCNC: 18 MG/DL (ref 6–20)
BUN/CREAT BLD: ABNORMAL (ref 9–20)
CALCIUM SERPL-MCNC: 9.3 MG/DL (ref 8.6–10.4)
CHLORIDE BLD-SCNC: 105 MMOL/L (ref 98–107)
CO2: 25 MMOL/L (ref 20–31)
CREAT SERPL-MCNC: 1.12 MG/DL (ref 0.7–1.2)
DIFFERENTIAL TYPE: ABNORMAL
EKG ATRIAL RATE: 67 BPM
EKG P AXIS: 55 DEGREES
EKG P-R INTERVAL: 164 MS
EKG Q-T INTERVAL: 402 MS
EKG QRS DURATION: 88 MS
EKG QTC CALCULATION (BAZETT): 424 MS
EKG R AXIS: 54 DEGREES
EKG T AXIS: 71 DEGREES
EKG VENTRICULAR RATE: 67 BPM
EOSINOPHILS RELATIVE PERCENT: 3 % (ref 0–4)
GFR AFRICAN AMERICAN: >60 ML/MIN
GFR NON-AFRICAN AMERICAN: >60 ML/MIN
GFR SERPL CREATININE-BSD FRML MDRD: ABNORMAL ML/MIN/{1.73_M2}
GFR SERPL CREATININE-BSD FRML MDRD: ABNORMAL ML/MIN/{1.73_M2}
GLUCOSE BLD-MCNC: 152 MG/DL (ref 70–99)
HCT VFR BLD CALC: 46.4 % (ref 41–53)
HEMOGLOBIN: 15.8 G/DL (ref 13.5–17.5)
IMMATURE GRANULOCYTES: ABNORMAL %
LYMPHOCYTES # BLD: 16 % (ref 24–44)
MCH RBC QN AUTO: 31 PG (ref 26–34)
MCHC RBC AUTO-ENTMCNC: 34.1 G/DL (ref 31–37)
MCV RBC AUTO: 90.8 FL (ref 80–100)
MONOCYTES # BLD: 11 % (ref 1–7)
NRBC AUTOMATED: ABNORMAL PER 100 WBC
PARTIAL THROMBOPLASTIN TIME: 27.2 SEC (ref 23–31)
PDW BLD-RTO: 15.5 % (ref 11.5–14.9)
PLATELET # BLD: 170 K/UL (ref 150–450)
PLATELET ESTIMATE: ABNORMAL
PMV BLD AUTO: 9.8 FL (ref 6–12)
POTASSIUM SERPL-SCNC: 4.4 MMOL/L (ref 3.7–5.3)
RBC # BLD: 5.11 M/UL (ref 4.5–5.9)
RBC # BLD: ABNORMAL 10*6/UL
SEG NEUTROPHILS: 69 % (ref 36–66)
SEGMENTED NEUTROPHILS ABSOLUTE COUNT: 4.6 K/UL (ref 1.3–9.1)
SODIUM BLD-SCNC: 143 MMOL/L (ref 135–144)
WBC # BLD: 6.7 K/UL (ref 3.5–11)
WBC # BLD: ABNORMAL 10*3/UL

## 2018-05-29 PROCEDURE — 80048 BASIC METABOLIC PNL TOTAL CA: CPT

## 2018-05-29 PROCEDURE — 93005 ELECTROCARDIOGRAM TRACING: CPT

## 2018-05-29 PROCEDURE — 36415 COLL VENOUS BLD VENIPUNCTURE: CPT

## 2018-05-29 PROCEDURE — 85025 COMPLETE CBC W/AUTO DIFF WBC: CPT

## 2018-05-29 PROCEDURE — 85730 THROMBOPLASTIN TIME PARTIAL: CPT

## 2018-05-30 ENCOUNTER — ANESTHESIA EVENT (OUTPATIENT)
Dept: OPERATING ROOM | Age: 58
DRG: 475 | End: 2018-05-30
Payer: COMMERCIAL

## 2018-05-30 ENCOUNTER — ANESTHESIA (OUTPATIENT)
Dept: OPERATING ROOM | Age: 58
DRG: 475 | End: 2018-05-30
Payer: COMMERCIAL

## 2018-05-30 ENCOUNTER — HOSPITAL ENCOUNTER (INPATIENT)
Age: 58
LOS: 1 days | Discharge: HOME | DRG: 475 | End: 2018-06-01
Attending: SURGERY | Admitting: SURGERY
Payer: COMMERCIAL

## 2018-05-30 VITALS — OXYGEN SATURATION: 98 % | SYSTOLIC BLOOD PRESSURE: 110 MMHG | DIASTOLIC BLOOD PRESSURE: 55 MMHG | TEMPERATURE: 95.7 F

## 2018-05-30 DIAGNOSIS — I73.9 PVD (PERIPHERAL VASCULAR DISEASE) (HCC): Primary | ICD-10-CM

## 2018-05-30 DIAGNOSIS — M25.562 CHRONIC PAIN OF LEFT KNEE: ICD-10-CM

## 2018-05-30 DIAGNOSIS — Z89.512 STATUS POST BELOW KNEE AMPUTATION OF LEFT LOWER EXTREMITY: ICD-10-CM

## 2018-05-30 DIAGNOSIS — G89.29 CHRONIC PAIN OF LEFT KNEE: ICD-10-CM

## 2018-05-30 DIAGNOSIS — M86.062 ACUTE HEMATOGENOUS OSTEOMYELITIS OF LEFT TIBIA (HCC): ICD-10-CM

## 2018-05-30 PROBLEM — M86.9 OSTEOMYELITIS OF LEFT LEG (HCC): Status: ACTIVE | Noted: 2018-05-30

## 2018-05-30 LAB
CULTURE: NORMAL
DIRECT EXAM: NORMAL
GLUCOSE BLD-MCNC: 159 MG/DL (ref 75–110)
GLUCOSE BLD-MCNC: 170 MG/DL (ref 75–110)
GLUCOSE BLD-MCNC: 59 MG/DL (ref 75–110)
Lab: NORMAL
SPECIMEN DESCRIPTION: NORMAL
SPECIMEN DESCRIPTION: NORMAL
STATUS: NORMAL

## 2018-05-30 PROCEDURE — 6370000000 HC RX 637 (ALT 250 FOR IP): Performed by: INTERNAL MEDICINE

## 2018-05-30 PROCEDURE — 3600000004 HC SURGERY LEVEL 4 BASE: Performed by: SURGERY

## 2018-05-30 PROCEDURE — 6360000002 HC RX W HCPCS: Performed by: ANESTHESIOLOGY

## 2018-05-30 PROCEDURE — 88311 DECALCIFY TISSUE: CPT

## 2018-05-30 PROCEDURE — 87205 SMEAR GRAM STAIN: CPT

## 2018-05-30 PROCEDURE — 3700000000 HC ANESTHESIA ATTENDED CARE: Performed by: SURGERY

## 2018-05-30 PROCEDURE — 96366 THER/PROPH/DIAG IV INF ADDON: CPT

## 2018-05-30 PROCEDURE — 88307 TISSUE EXAM BY PATHOLOGIST: CPT

## 2018-05-30 PROCEDURE — G0378 HOSPITAL OBSERVATION PER HR: HCPCS

## 2018-05-30 PROCEDURE — 87070 CULTURE OTHR SPECIMN AEROBIC: CPT

## 2018-05-30 PROCEDURE — 2500000003 HC RX 250 WO HCPCS: Performed by: ANESTHESIOLOGY

## 2018-05-30 PROCEDURE — 2580000003 HC RX 258: Performed by: INTERNAL MEDICINE

## 2018-05-30 PROCEDURE — 2580000003 HC RX 258: Performed by: ANESTHESIOLOGY

## 2018-05-30 PROCEDURE — 82947 ASSAY GLUCOSE BLOOD QUANT: CPT

## 2018-05-30 PROCEDURE — 6360000002 HC RX W HCPCS: Performed by: SURGERY

## 2018-05-30 PROCEDURE — 7100000000 HC PACU RECOVERY - FIRST 15 MIN: Performed by: SURGERY

## 2018-05-30 PROCEDURE — 3600000014 HC SURGERY LEVEL 4 ADDTL 15MIN: Performed by: SURGERY

## 2018-05-30 PROCEDURE — 96365 THER/PROPH/DIAG IV INF INIT: CPT

## 2018-05-30 PROCEDURE — 87075 CULTR BACTERIA EXCEPT BLOOD: CPT

## 2018-05-30 PROCEDURE — 3700000001 HC ADD 15 MINUTES (ANESTHESIA): Performed by: SURGERY

## 2018-05-30 PROCEDURE — 6360000002 HC RX W HCPCS: Performed by: INTERNAL MEDICINE

## 2018-05-30 PROCEDURE — 2700000000 HC OXYGEN THERAPY PER DAY

## 2018-05-30 PROCEDURE — 94664 DEMO&/EVAL PT USE INHALER: CPT

## 2018-05-30 PROCEDURE — 7100000001 HC PACU RECOVERY - ADDTL 15 MIN: Performed by: SURGERY

## 2018-05-30 RX ORDER — LIDOCAINE HYDROCHLORIDE 10 MG/ML
1 INJECTION, SOLUTION EPIDURAL; INFILTRATION; INTRACAUDAL; PERINEURAL
Status: DISCONTINUED | OUTPATIENT
Start: 2018-05-30 | End: 2018-05-30 | Stop reason: HOSPADM

## 2018-05-30 RX ORDER — BUDESONIDE AND FORMOTEROL FUMARATE DIHYDRATE 160; 4.5 UG/1; UG/1
2 AEROSOL RESPIRATORY (INHALATION) 2 TIMES DAILY
Status: DISCONTINUED | OUTPATIENT
Start: 2018-05-30 | End: 2018-05-30 | Stop reason: CLARIF

## 2018-05-30 RX ORDER — DIPHENHYDRAMINE HYDROCHLORIDE 50 MG/ML
12.5 INJECTION INTRAMUSCULAR; INTRAVENOUS
Status: DISCONTINUED | OUTPATIENT
Start: 2018-05-30 | End: 2018-05-30 | Stop reason: HOSPADM

## 2018-05-30 RX ORDER — SODIUM CHLORIDE, SODIUM LACTATE, POTASSIUM CHLORIDE, CALCIUM CHLORIDE 600; 310; 30; 20 MG/100ML; MG/100ML; MG/100ML; MG/100ML
INJECTION, SOLUTION INTRAVENOUS CONTINUOUS
Status: CANCELLED | OUTPATIENT
Start: 2018-05-30

## 2018-05-30 RX ORDER — DEXTROSE MONOHYDRATE 25 G/50ML
25 INJECTION, SOLUTION INTRAVENOUS PRN
Status: DISCONTINUED | OUTPATIENT
Start: 2018-05-30 | End: 2018-05-30 | Stop reason: HOSPADM

## 2018-05-30 RX ORDER — SODIUM CHLORIDE 0.9 % (FLUSH) 0.9 %
10 SYRINGE (ML) INJECTION PRN
Status: DISCONTINUED | OUTPATIENT
Start: 2018-05-30 | End: 2018-05-30 | Stop reason: HOSPADM

## 2018-05-30 RX ORDER — ONDANSETRON 2 MG/ML
4 INJECTION INTRAMUSCULAR; INTRAVENOUS EVERY 6 HOURS PRN
Status: DISCONTINUED | OUTPATIENT
Start: 2018-05-30 | End: 2018-06-01 | Stop reason: HOSPADM

## 2018-05-30 RX ORDER — MEPERIDINE HYDROCHLORIDE 50 MG/ML
12.5 INJECTION INTRAMUSCULAR; INTRAVENOUS; SUBCUTANEOUS EVERY 5 MIN PRN
Status: DISCONTINUED | OUTPATIENT
Start: 2018-05-30 | End: 2018-05-30 | Stop reason: HOSPADM

## 2018-05-30 RX ORDER — LOSARTAN POTASSIUM 25 MG/1
25 TABLET ORAL ONCE
Status: DISCONTINUED | OUTPATIENT
Start: 2018-05-30 | End: 2018-05-31

## 2018-05-30 RX ORDER — TAMSULOSIN HYDROCHLORIDE 0.4 MG/1
0.4 CAPSULE ORAL ONCE
Status: COMPLETED | OUTPATIENT
Start: 2018-05-30 | End: 2018-05-30

## 2018-05-30 RX ORDER — ATORVASTATIN CALCIUM 10 MG/1
10 TABLET, FILM COATED ORAL ONCE
Status: COMPLETED | OUTPATIENT
Start: 2018-05-30 | End: 2018-05-30

## 2018-05-30 RX ORDER — FENTANYL CITRATE 50 UG/ML
INJECTION, SOLUTION INTRAMUSCULAR; INTRAVENOUS PRN
Status: DISCONTINUED | OUTPATIENT
Start: 2018-05-30 | End: 2018-05-30 | Stop reason: SDUPTHER

## 2018-05-30 RX ORDER — DEXTROSE MONOHYDRATE 25 G/50ML
12.5 INJECTION, SOLUTION INTRAVENOUS PRN
Status: DISCONTINUED | OUTPATIENT
Start: 2018-05-30 | End: 2018-06-01 | Stop reason: HOSPADM

## 2018-05-30 RX ORDER — ISOSORBIDE MONONITRATE 30 MG/1
30 TABLET, EXTENDED RELEASE ORAL ONCE
Status: DISCONTINUED | OUTPATIENT
Start: 2018-05-30 | End: 2018-05-31

## 2018-05-30 RX ORDER — CEFAZOLIN SODIUM 1 G/3ML
INJECTION, POWDER, FOR SOLUTION INTRAMUSCULAR; INTRAVENOUS
Status: DISPENSED
Start: 2018-05-30 | End: 2018-05-31

## 2018-05-30 RX ORDER — SODIUM CHLORIDE 0.9 % (FLUSH) 0.9 %
10 SYRINGE (ML) INJECTION EVERY 12 HOURS SCHEDULED
Status: CANCELLED | OUTPATIENT
Start: 2018-05-30

## 2018-05-30 RX ORDER — PROPOFOL 10 MG/ML
INJECTION, EMULSION INTRAVENOUS PRN
Status: DISCONTINUED | OUTPATIENT
Start: 2018-05-30 | End: 2018-05-30 | Stop reason: SDUPTHER

## 2018-05-30 RX ORDER — NICOTINE POLACRILEX 4 MG
15 LOZENGE BUCCAL PRN
Status: DISCONTINUED | OUTPATIENT
Start: 2018-05-30 | End: 2018-06-01 | Stop reason: HOSPADM

## 2018-05-30 RX ORDER — MORPHINE SULFATE 2 MG/ML
2 INJECTION, SOLUTION INTRAMUSCULAR; INTRAVENOUS EVERY 5 MIN PRN
Status: DISCONTINUED | OUTPATIENT
Start: 2018-05-30 | End: 2018-05-30 | Stop reason: HOSPADM

## 2018-05-30 RX ORDER — PREGABALIN 75 MG/1
75 CAPSULE ORAL ONCE
Status: DISCONTINUED | OUTPATIENT
Start: 2018-05-30 | End: 2018-06-01 | Stop reason: HOSPADM

## 2018-05-30 RX ORDER — OXYCODONE HYDROCHLORIDE AND ACETAMINOPHEN 5; 325 MG/1; MG/1
1 TABLET ORAL EVERY 4 HOURS PRN
Status: DISCONTINUED | OUTPATIENT
Start: 2018-05-30 | End: 2018-06-01 | Stop reason: HOSPADM

## 2018-05-30 RX ORDER — ALBUTEROL SULFATE 90 UG/1
2 AEROSOL, METERED RESPIRATORY (INHALATION) EVERY 6 HOURS PRN
Status: DISCONTINUED | OUTPATIENT
Start: 2018-05-30 | End: 2018-06-01 | Stop reason: HOSPADM

## 2018-05-30 RX ORDER — LABETALOL HYDROCHLORIDE 5 MG/ML
5 INJECTION, SOLUTION INTRAVENOUS EVERY 10 MIN PRN
Status: DISCONTINUED | OUTPATIENT
Start: 2018-05-30 | End: 2018-05-30 | Stop reason: HOSPADM

## 2018-05-30 RX ORDER — ONDANSETRON 2 MG/ML
4 INJECTION INTRAMUSCULAR; INTRAVENOUS
Status: DISCONTINUED | OUTPATIENT
Start: 2018-05-30 | End: 2018-05-30 | Stop reason: HOSPADM

## 2018-05-30 RX ORDER — EPHEDRINE SULFATE 50 MG/ML
INJECTION, SOLUTION INTRAVENOUS PRN
Status: DISCONTINUED | OUTPATIENT
Start: 2018-05-30 | End: 2018-05-30 | Stop reason: SDUPTHER

## 2018-05-30 RX ORDER — SODIUM CHLORIDE 0.9 % (FLUSH) 0.9 %
10 SYRINGE (ML) INJECTION EVERY 12 HOURS SCHEDULED
Status: DISCONTINUED | OUTPATIENT
Start: 2018-05-30 | End: 2018-05-30 | Stop reason: HOSPADM

## 2018-05-30 RX ORDER — LIDOCAINE HYDROCHLORIDE 10 MG/ML
1 INJECTION, SOLUTION EPIDURAL; INFILTRATION; INTRACAUDAL; PERINEURAL
Status: CANCELLED | OUTPATIENT
Start: 2018-05-30 | End: 2018-05-30

## 2018-05-30 RX ORDER — SODIUM CHLORIDE 0.9 % (FLUSH) 0.9 %
10 SYRINGE (ML) INJECTION PRN
Status: CANCELLED | OUTPATIENT
Start: 2018-05-30

## 2018-05-30 RX ORDER — MIDAZOLAM HYDROCHLORIDE 1 MG/ML
INJECTION INTRAMUSCULAR; INTRAVENOUS PRN
Status: DISCONTINUED | OUTPATIENT
Start: 2018-05-30 | End: 2018-05-30 | Stop reason: SDUPTHER

## 2018-05-30 RX ORDER — ROPINIROLE 0.5 MG/1
0.5 TABLET, FILM COATED ORAL ONCE
Status: COMPLETED | OUTPATIENT
Start: 2018-05-30 | End: 2018-05-30

## 2018-05-30 RX ORDER — ONDANSETRON 2 MG/ML
INJECTION INTRAMUSCULAR; INTRAVENOUS PRN
Status: DISCONTINUED | OUTPATIENT
Start: 2018-05-30 | End: 2018-05-30 | Stop reason: SDUPTHER

## 2018-05-30 RX ORDER — DEXTROSE MONOHYDRATE 50 MG/ML
100 INJECTION, SOLUTION INTRAVENOUS PRN
Status: DISCONTINUED | OUTPATIENT
Start: 2018-05-30 | End: 2018-06-01 | Stop reason: HOSPADM

## 2018-05-30 RX ORDER — SODIUM CHLORIDE, SODIUM LACTATE, POTASSIUM CHLORIDE, CALCIUM CHLORIDE 600; 310; 30; 20 MG/100ML; MG/100ML; MG/100ML; MG/100ML
INJECTION, SOLUTION INTRAVENOUS CONTINUOUS
Status: DISCONTINUED | OUTPATIENT
Start: 2018-05-30 | End: 2018-06-01

## 2018-05-30 RX ADMIN — FENTANYL CITRATE 50 MCG: 50 INJECTION, SOLUTION INTRAMUSCULAR; INTRAVENOUS at 17:27

## 2018-05-30 RX ADMIN — DEXTROSE MONOHYDRATE 25 G: 25 INJECTION, SOLUTION INTRAVENOUS at 18:02

## 2018-05-30 RX ADMIN — Medication 2 G: at 15:59

## 2018-05-30 RX ADMIN — EPHEDRINE SULFATE 10 MG: 50 INJECTION INTRAMUSCULAR; INTRAVENOUS; SUBCUTANEOUS at 17:00

## 2018-05-30 RX ADMIN — FENTANYL CITRATE 50 MCG: 50 INJECTION, SOLUTION INTRAMUSCULAR; INTRAVENOUS at 16:31

## 2018-05-30 RX ADMIN — EPHEDRINE SULFATE 10 MG: 50 INJECTION INTRAMUSCULAR; INTRAVENOUS; SUBCUTANEOUS at 16:10

## 2018-05-30 RX ADMIN — PROPOFOL 200 MG: 10 INJECTION, EMULSION INTRAVENOUS at 16:03

## 2018-05-30 RX ADMIN — TAMSULOSIN HYDROCHLORIDE 0.4 MG: 0.4 CAPSULE ORAL at 22:27

## 2018-05-30 RX ADMIN — VANCOMYCIN HYDROCHLORIDE 1750 MG: 1 INJECTION, POWDER, LYOPHILIZED, FOR SOLUTION INTRAVENOUS at 22:28

## 2018-05-30 RX ADMIN — MOMETASONE FUROATE AND FORMOTEROL FUMARATE DIHYDRATE 2 PUFF: 200; 5 AEROSOL RESPIRATORY (INHALATION) at 22:28

## 2018-05-30 RX ADMIN — INSULIN LISPRO 1 UNITS: 100 INJECTION, SOLUTION INTRAVENOUS; SUBCUTANEOUS at 22:56

## 2018-05-30 RX ADMIN — SODIUM CHLORIDE, POTASSIUM CHLORIDE, SODIUM LACTATE AND CALCIUM CHLORIDE: 600; 310; 30; 20 INJECTION, SOLUTION INTRAVENOUS at 12:53

## 2018-05-30 RX ADMIN — ATORVASTATIN CALCIUM 10 MG: 10 TABLET, FILM COATED ORAL at 22:27

## 2018-05-30 RX ADMIN — MIDAZOLAM 2 MG: 1 INJECTION INTRAMUSCULAR; INTRAVENOUS at 15:59

## 2018-05-30 RX ADMIN — ROPINIROLE HYDROCHLORIDE 0.5 MG: 0.5 TABLET, FILM COATED ORAL at 22:27

## 2018-05-30 RX ADMIN — ONDANSETRON 4 MG: 2 INJECTION INTRAMUSCULAR; INTRAVENOUS at 16:14

## 2018-05-30 RX ADMIN — EPHEDRINE SULFATE 10 MG: 50 INJECTION INTRAMUSCULAR; INTRAVENOUS; SUBCUTANEOUS at 17:19

## 2018-05-30 RX ADMIN — OXYCODONE HYDROCHLORIDE AND ACETAMINOPHEN 1 TABLET: 5; 325 TABLET ORAL at 22:27

## 2018-05-30 ASSESSMENT — PULMONARY FUNCTION TESTS
PIF_VALUE: 19
PIF_VALUE: 22
PIF_VALUE: 23
PIF_VALUE: 11
PIF_VALUE: 20
PIF_VALUE: 21
PIF_VALUE: 21
PIF_VALUE: 4
PIF_VALUE: 23
PIF_VALUE: 23
PIF_VALUE: 5
PIF_VALUE: 20
PIF_VALUE: 20
PIF_VALUE: 11
PIF_VALUE: 4
PIF_VALUE: 4
PIF_VALUE: 11
PIF_VALUE: 18
PIF_VALUE: 10
PIF_VALUE: 23
PIF_VALUE: 30
PIF_VALUE: 24
PIF_VALUE: 12
PIF_VALUE: 20
PIF_VALUE: 25
PIF_VALUE: 20
PIF_VALUE: 22
PIF_VALUE: 2
PIF_VALUE: 22
PIF_VALUE: 22
PIF_VALUE: 19
PIF_VALUE: 24
PIF_VALUE: 22
PIF_VALUE: 22
PIF_VALUE: 30
PIF_VALUE: 23
PIF_VALUE: 26
PIF_VALUE: 22
PIF_VALUE: 26
PIF_VALUE: 0
PIF_VALUE: 19
PIF_VALUE: 23
PIF_VALUE: 19
PIF_VALUE: 19
PIF_VALUE: 22
PIF_VALUE: 21
PIF_VALUE: 23
PIF_VALUE: 20
PIF_VALUE: 22
PIF_VALUE: 30
PIF_VALUE: 2
PIF_VALUE: 24
PIF_VALUE: 21
PIF_VALUE: 20
PIF_VALUE: 24
PIF_VALUE: 19
PIF_VALUE: 23
PIF_VALUE: 12
PIF_VALUE: 22
PIF_VALUE: 0
PIF_VALUE: 0
PIF_VALUE: 30
PIF_VALUE: 26
PIF_VALUE: 20
PIF_VALUE: 19
PIF_VALUE: 19
PIF_VALUE: 22
PIF_VALUE: 13
PIF_VALUE: 21
PIF_VALUE: 0
PIF_VALUE: 26
PIF_VALUE: 29
PIF_VALUE: 22
PIF_VALUE: 24
PIF_VALUE: 20
PIF_VALUE: 22
PIF_VALUE: 22
PIF_VALUE: 20
PIF_VALUE: 26
PIF_VALUE: 5
PIF_VALUE: 19
PIF_VALUE: 18
PIF_VALUE: 24
PIF_VALUE: 12
PIF_VALUE: 4
PIF_VALUE: 15
PIF_VALUE: 24
PIF_VALUE: 20
PIF_VALUE: 29
PIF_VALUE: 22
PIF_VALUE: 5
PIF_VALUE: 19
PIF_VALUE: 11

## 2018-05-30 ASSESSMENT — LIFESTYLE VARIABLES: SMOKING_STATUS: 0

## 2018-05-30 ASSESSMENT — PAIN SCALES - GENERAL
PAINLEVEL_OUTOF10: 7
PAINLEVEL_OUTOF10: 7
PAINLEVEL_OUTOF10: 5
PAINLEVEL_OUTOF10: 0
PAINLEVEL_OUTOF10: 0

## 2018-05-30 ASSESSMENT — PAIN DESCRIPTION - DESCRIPTORS: DESCRIPTORS: SHARP

## 2018-05-30 ASSESSMENT — PAIN DESCRIPTION - LOCATION: LOCATION: LEG

## 2018-05-30 ASSESSMENT — PAIN DESCRIPTION - ORIENTATION: ORIENTATION: LEFT

## 2018-05-30 ASSESSMENT — PAIN DESCRIPTION - PAIN TYPE: TYPE: SURGICAL PAIN

## 2018-05-30 ASSESSMENT — COPD QUESTIONNAIRES: CAT_SEVERITY: NO INTERVAL CHANGE

## 2018-05-30 ASSESSMENT — PAIN DESCRIPTION - FREQUENCY: FREQUENCY: INTERMITTENT

## 2018-05-30 ASSESSMENT — ENCOUNTER SYMPTOMS
SHORTNESS OF BREATH: 0
STRIDOR: 0

## 2018-05-30 ASSESSMENT — PAIN - FUNCTIONAL ASSESSMENT: PAIN_FUNCTIONAL_ASSESSMENT: 0-10

## 2018-05-31 LAB
ABSOLUTE EOS #: 0.1 K/UL (ref 0–0.4)
ABSOLUTE IMMATURE GRANULOCYTE: ABNORMAL K/UL (ref 0–0.3)
ABSOLUTE LYMPH #: 0.7 K/UL (ref 1–4.8)
ABSOLUTE MONO #: 0.8 K/UL (ref 0.1–1.3)
ANION GAP SERPL CALCULATED.3IONS-SCNC: 12 MMOL/L (ref 9–17)
BASOPHILS # BLD: 1 % (ref 0–2)
BASOPHILS ABSOLUTE: 0.1 K/UL (ref 0–0.2)
BUN BLDV-MCNC: 13 MG/DL (ref 6–20)
BUN/CREAT BLD: ABNORMAL (ref 9–20)
CALCIUM SERPL-MCNC: 8.4 MG/DL (ref 8.6–10.4)
CHLORIDE BLD-SCNC: 103 MMOL/L (ref 98–107)
CO2: 25 MMOL/L (ref 20–31)
CREAT SERPL-MCNC: 0.99 MG/DL (ref 0.7–1.2)
DIFFERENTIAL TYPE: ABNORMAL
EOSINOPHILS RELATIVE PERCENT: 2 % (ref 0–4)
GFR AFRICAN AMERICAN: >60 ML/MIN
GFR NON-AFRICAN AMERICAN: >60 ML/MIN
GFR SERPL CREATININE-BSD FRML MDRD: ABNORMAL ML/MIN/{1.73_M2}
GFR SERPL CREATININE-BSD FRML MDRD: ABNORMAL ML/MIN/{1.73_M2}
GLUCOSE BLD-MCNC: 118 MG/DL (ref 75–110)
GLUCOSE BLD-MCNC: 167 MG/DL (ref 75–110)
GLUCOSE BLD-MCNC: 234 MG/DL (ref 75–110)
GLUCOSE BLD-MCNC: 238 MG/DL (ref 70–99)
GLUCOSE BLD-MCNC: 286 MG/DL (ref 75–110)
HCT VFR BLD CALC: 41.5 % (ref 41–53)
HEMOGLOBIN: 14 G/DL (ref 13.5–17.5)
IMMATURE GRANULOCYTES: ABNORMAL %
INR BLD: 1.1
LYMPHOCYTES # BLD: 10 % (ref 24–44)
MCH RBC QN AUTO: 30.9 PG (ref 26–34)
MCHC RBC AUTO-ENTMCNC: 33.8 G/DL (ref 31–37)
MCV RBC AUTO: 91.4 FL (ref 80–100)
MONOCYTES # BLD: 11 % (ref 1–7)
NRBC AUTOMATED: ABNORMAL PER 100 WBC
PDW BLD-RTO: 15.6 % (ref 11.5–14.9)
PLATELET # BLD: 150 K/UL (ref 150–450)
PLATELET ESTIMATE: ABNORMAL
PMV BLD AUTO: 10.2 FL (ref 6–12)
POTASSIUM SERPL-SCNC: 4.7 MMOL/L (ref 3.7–5.3)
PROTHROMBIN TIME: 11.2 SEC (ref 9.7–12)
RBC # BLD: 4.54 M/UL (ref 4.5–5.9)
RBC # BLD: ABNORMAL 10*6/UL
SEG NEUTROPHILS: 76 % (ref 36–66)
SEGMENTED NEUTROPHILS ABSOLUTE COUNT: 5.6 K/UL (ref 1.3–9.1)
SODIUM BLD-SCNC: 140 MMOL/L (ref 135–144)
WBC # BLD: 7.3 K/UL (ref 3.5–11)
WBC # BLD: ABNORMAL 10*3/UL

## 2018-05-31 PROCEDURE — 6370000000 HC RX 637 (ALT 250 FOR IP): Performed by: NURSE PRACTITIONER

## 2018-05-31 PROCEDURE — 0Y6J0Z1 DETACHMENT AT LEFT LOWER LEG, HIGH, OPEN APPROACH: ICD-10-PCS | Performed by: SURGERY

## 2018-05-31 PROCEDURE — 85610 PROTHROMBIN TIME: CPT

## 2018-05-31 PROCEDURE — 80048 BASIC METABOLIC PNL TOTAL CA: CPT

## 2018-05-31 PROCEDURE — 6370000000 HC RX 637 (ALT 250 FOR IP): Performed by: INTERNAL MEDICINE

## 2018-05-31 PROCEDURE — 96372 THER/PROPH/DIAG INJ SC/IM: CPT

## 2018-05-31 PROCEDURE — 2580000003 HC RX 258: Performed by: NURSE PRACTITIONER

## 2018-05-31 PROCEDURE — 85025 COMPLETE CBC W/AUTO DIFF WBC: CPT

## 2018-05-31 PROCEDURE — 1200000000 HC SEMI PRIVATE

## 2018-05-31 PROCEDURE — 2580000003 HC RX 258: Performed by: ANESTHESIOLOGY

## 2018-05-31 PROCEDURE — 6360000002 HC RX W HCPCS: Performed by: INTERNAL MEDICINE

## 2018-05-31 PROCEDURE — 2580000003 HC RX 258: Performed by: INTERNAL MEDICINE

## 2018-05-31 PROCEDURE — 36415 COLL VENOUS BLD VENIPUNCTURE: CPT

## 2018-05-31 PROCEDURE — 82947 ASSAY GLUCOSE BLOOD QUANT: CPT

## 2018-05-31 PROCEDURE — 6360000002 HC RX W HCPCS: Performed by: NURSE PRACTITIONER

## 2018-05-31 PROCEDURE — 99255 IP/OBS CONSLTJ NEW/EST HI 80: CPT | Performed by: INTERNAL MEDICINE

## 2018-05-31 RX ORDER — MAGNESIUM SULFATE 1 G/100ML
1 INJECTION INTRAVENOUS PRN
Status: DISCONTINUED | OUTPATIENT
Start: 2018-05-31 | End: 2018-06-01 | Stop reason: HOSPADM

## 2018-05-31 RX ORDER — ASPIRIN 81 MG/1
81 TABLET ORAL DAILY
Status: DISCONTINUED | OUTPATIENT
Start: 2018-05-31 | End: 2018-06-01 | Stop reason: HOSPADM

## 2018-05-31 RX ORDER — POTASSIUM CHLORIDE 20 MEQ/1
40 TABLET, EXTENDED RELEASE ORAL PRN
Status: DISCONTINUED | OUTPATIENT
Start: 2018-05-31 | End: 2018-06-01 | Stop reason: HOSPADM

## 2018-05-31 RX ORDER — BISACODYL 10 MG
10 SUPPOSITORY, RECTAL RECTAL DAILY PRN
Status: DISCONTINUED | OUTPATIENT
Start: 2018-05-31 | End: 2018-06-01 | Stop reason: HOSPADM

## 2018-05-31 RX ORDER — LACTOBACILLUS RHAMNOSUS GG 10B CELL
1 CAPSULE ORAL 3 TIMES DAILY
Status: DISCONTINUED | OUTPATIENT
Start: 2018-05-31 | End: 2018-06-01 | Stop reason: HOSPADM

## 2018-05-31 RX ORDER — ALBUTEROL SULFATE 90 UG/1
2 AEROSOL, METERED RESPIRATORY (INHALATION) EVERY 6 HOURS PRN
Status: DISCONTINUED | OUTPATIENT
Start: 2018-05-31 | End: 2018-05-31 | Stop reason: SDUPTHER

## 2018-05-31 RX ORDER — TAMSULOSIN HYDROCHLORIDE 0.4 MG/1
0.4 CAPSULE ORAL DAILY
Status: DISCONTINUED | OUTPATIENT
Start: 2018-05-31 | End: 2018-06-01 | Stop reason: HOSPADM

## 2018-05-31 RX ORDER — NICOTINE 21 MG/24HR
1 PATCH, TRANSDERMAL 24 HOURS TRANSDERMAL DAILY PRN
Status: DISCONTINUED | OUTPATIENT
Start: 2018-05-31 | End: 2018-06-01 | Stop reason: HOSPADM

## 2018-05-31 RX ORDER — SODIUM CHLORIDE 0.9 % (FLUSH) 0.9 %
10 SYRINGE (ML) INJECTION PRN
Status: DISCONTINUED | OUTPATIENT
Start: 2018-05-31 | End: 2018-06-01 | Stop reason: HOSPADM

## 2018-05-31 RX ORDER — SODIUM CHLORIDE 0.9 % (FLUSH) 0.9 %
10 SYRINGE (ML) INJECTION EVERY 12 HOURS SCHEDULED
Status: DISCONTINUED | OUTPATIENT
Start: 2018-05-31 | End: 2018-06-01 | Stop reason: HOSPADM

## 2018-05-31 RX ORDER — POTASSIUM CHLORIDE 20MEQ/15ML
40 LIQUID (ML) ORAL PRN
Status: DISCONTINUED | OUTPATIENT
Start: 2018-05-31 | End: 2018-06-01 | Stop reason: HOSPADM

## 2018-05-31 RX ORDER — GEMFIBROZIL 600 MG/1
600 TABLET, FILM COATED ORAL DAILY
Status: DISCONTINUED | OUTPATIENT
Start: 2018-05-31 | End: 2018-06-01 | Stop reason: HOSPADM

## 2018-05-31 RX ORDER — ATORVASTATIN CALCIUM 20 MG/1
20 TABLET, FILM COATED ORAL DAILY
Status: DISCONTINUED | OUTPATIENT
Start: 2018-05-31 | End: 2018-06-01 | Stop reason: HOSPADM

## 2018-05-31 RX ORDER — POTASSIUM CHLORIDE 7.45 MG/ML
10 INJECTION INTRAVENOUS PRN
Status: DISCONTINUED | OUTPATIENT
Start: 2018-05-31 | End: 2018-06-01 | Stop reason: HOSPADM

## 2018-05-31 RX ORDER — ISOSORBIDE MONONITRATE 30 MG/1
30 TABLET, EXTENDED RELEASE ORAL DAILY
Status: DISCONTINUED | OUTPATIENT
Start: 2018-05-31 | End: 2018-06-01 | Stop reason: HOSPADM

## 2018-05-31 RX ORDER — PREGABALIN 100 MG/1
100 CAPSULE ORAL 3 TIMES DAILY
Status: DISCONTINUED | OUTPATIENT
Start: 2018-05-31 | End: 2018-06-01 | Stop reason: HOSPADM

## 2018-05-31 RX ORDER — ROPINIROLE 2 MG/1
2 TABLET, FILM COATED ORAL DAILY
Status: DISCONTINUED | OUTPATIENT
Start: 2018-05-31 | End: 2018-06-01 | Stop reason: HOSPADM

## 2018-05-31 RX ORDER — MULTIVITAMIN WITH IRON
300 TABLET ORAL DAILY
Status: DISCONTINUED | OUTPATIENT
Start: 2018-05-31 | End: 2018-05-31 | Stop reason: RX

## 2018-05-31 RX ORDER — LOSARTAN POTASSIUM 100 MG/1
100 TABLET ORAL DAILY
Status: DISCONTINUED | OUTPATIENT
Start: 2018-05-31 | End: 2018-06-01 | Stop reason: HOSPADM

## 2018-05-31 RX ADMIN — LEVOTHYROXINE SODIUM 175 MCG: 50 TABLET ORAL at 06:45

## 2018-05-31 RX ADMIN — OXYCODONE HYDROCHLORIDE AND ACETAMINOPHEN 1 TABLET: 5; 325 TABLET ORAL at 17:29

## 2018-05-31 RX ADMIN — ISOSORBIDE MONONITRATE 30 MG: 30 TABLET, EXTENDED RELEASE ORAL at 08:18

## 2018-05-31 RX ADMIN — OXYCODONE HYDROCHLORIDE AND ACETAMINOPHEN 1 TABLET: 5; 325 TABLET ORAL at 13:12

## 2018-05-31 RX ADMIN — ROPINIROLE HYDROCHLORIDE 2 MG: 2 TABLET, FILM COATED ORAL at 08:23

## 2018-05-31 RX ADMIN — Medication 1 CAPSULE: at 21:16

## 2018-05-31 RX ADMIN — MOMETASONE FUROATE AND FORMOTEROL FUMARATE DIHYDRATE 2 PUFF: 200; 5 AEROSOL RESPIRATORY (INHALATION) at 21:15

## 2018-05-31 RX ADMIN — INSULIN LISPRO 6 UNITS: 100 INJECTION, SOLUTION INTRAVENOUS; SUBCUTANEOUS at 11:27

## 2018-05-31 RX ADMIN — Medication 1 CAPSULE: at 08:18

## 2018-05-31 RX ADMIN — MOMETASONE FUROATE AND FORMOTEROL FUMARATE DIHYDRATE 2 PUFF: 200; 5 AEROSOL RESPIRATORY (INHALATION) at 08:18

## 2018-05-31 RX ADMIN — INSULIN HUMAN 50 UNITS: 100 INJECTION, SUSPENSION SUBCUTANEOUS at 08:23

## 2018-05-31 RX ADMIN — Medication 10 ML: at 08:29

## 2018-05-31 RX ADMIN — LOSARTAN POTASSIUM 100 MG: 100 TABLET ORAL at 08:18

## 2018-05-31 RX ADMIN — PREGABALIN 100 MG: 100 CAPSULE ORAL at 21:16

## 2018-05-31 RX ADMIN — INSULIN LISPRO 4 UNITS: 100 INJECTION, SOLUTION INTRAVENOUS; SUBCUTANEOUS at 08:19

## 2018-05-31 RX ADMIN — PREGABALIN 100 MG: 100 CAPSULE ORAL at 13:12

## 2018-05-31 RX ADMIN — OXYCODONE HYDROCHLORIDE AND ACETAMINOPHEN 1 TABLET: 5; 325 TABLET ORAL at 03:32

## 2018-05-31 RX ADMIN — OXYCODONE HYDROCHLORIDE AND ACETAMINOPHEN 1 TABLET: 5; 325 TABLET ORAL at 08:18

## 2018-05-31 RX ADMIN — ENOXAPARIN SODIUM 30 MG: 30 INJECTION SUBCUTANEOUS at 08:18

## 2018-05-31 RX ADMIN — INSULIN LISPRO 2 UNITS: 100 INJECTION, SOLUTION INTRAVENOUS; SUBCUTANEOUS at 17:15

## 2018-05-31 RX ADMIN — Medication 10 ML: at 21:18

## 2018-05-31 RX ADMIN — ATORVASTATIN CALCIUM 20 MG: 20 TABLET, FILM COATED ORAL at 08:18

## 2018-05-31 RX ADMIN — GEMFIBROZIL 600 MG: 600 TABLET ORAL at 08:18

## 2018-05-31 RX ADMIN — Medication 1 CAPSULE: at 13:12

## 2018-05-31 RX ADMIN — VANCOMYCIN HYDROCHLORIDE 1750 MG: 1 INJECTION, POWDER, LYOPHILIZED, FOR SOLUTION INTRAVENOUS at 21:17

## 2018-05-31 RX ADMIN — SODIUM CHLORIDE, POTASSIUM CHLORIDE, SODIUM LACTATE AND CALCIUM CHLORIDE: 600; 310; 30; 20 INJECTION, SOLUTION INTRAVENOUS at 08:21

## 2018-05-31 RX ADMIN — PREGABALIN 100 MG: 100 CAPSULE ORAL at 08:18

## 2018-05-31 RX ADMIN — VANCOMYCIN HYDROCHLORIDE 1750 MG: 1 INJECTION, POWDER, LYOPHILIZED, FOR SOLUTION INTRAVENOUS at 10:24

## 2018-05-31 RX ADMIN — SODIUM CHLORIDE, POTASSIUM CHLORIDE, SODIUM LACTATE AND CALCIUM CHLORIDE: 600; 310; 30; 20 INJECTION, SOLUTION INTRAVENOUS at 18:17

## 2018-05-31 RX ADMIN — TAMSULOSIN HYDROCHLORIDE 0.4 MG: 0.4 CAPSULE ORAL at 08:18

## 2018-05-31 RX ADMIN — ENOXAPARIN SODIUM 30 MG: 30 INJECTION SUBCUTANEOUS at 21:15

## 2018-05-31 ASSESSMENT — PAIN SCALES - GENERAL
PAINLEVEL_OUTOF10: 4
PAINLEVEL_OUTOF10: 0
PAINLEVEL_OUTOF10: 7
PAINLEVEL_OUTOF10: 6
PAINLEVEL_OUTOF10: 3
PAINLEVEL_OUTOF10: 4
PAINLEVEL_OUTOF10: 7
PAINLEVEL_OUTOF10: 8

## 2018-06-01 VITALS
RESPIRATION RATE: 18 BRPM | TEMPERATURE: 98.1 F | OXYGEN SATURATION: 95 % | DIASTOLIC BLOOD PRESSURE: 70 MMHG | WEIGHT: 254.19 LBS | HEART RATE: 67 BPM | SYSTOLIC BLOOD PRESSURE: 113 MMHG | BODY MASS INDEX: 30.95 KG/M2 | HEIGHT: 76 IN

## 2018-06-01 PROBLEM — M86.9 OSTEOMYELITIS OF LEFT LEG (HCC): Status: RESOLVED | Noted: 2018-06-01 | Resolved: 2018-06-01

## 2018-06-01 PROBLEM — M86.9 OSTEOMYELITIS OF LEFT LEG (HCC): Status: ACTIVE | Noted: 2018-06-01

## 2018-06-01 LAB
ABSOLUTE EOS #: 0.2 K/UL (ref 0–0.4)
ABSOLUTE IMMATURE GRANULOCYTE: ABNORMAL K/UL (ref 0–0.3)
ABSOLUTE LYMPH #: 0.8 K/UL (ref 1–4.8)
ABSOLUTE MONO #: 0.8 K/UL (ref 0.1–1.3)
ANION GAP SERPL CALCULATED.3IONS-SCNC: 10 MMOL/L (ref 9–17)
BASOPHILS # BLD: 1 % (ref 0–2)
BASOPHILS ABSOLUTE: 0.1 K/UL (ref 0–0.2)
BUN BLDV-MCNC: 11 MG/DL (ref 6–20)
BUN/CREAT BLD: ABNORMAL (ref 9–20)
CALCIUM SERPL-MCNC: 8.7 MG/DL (ref 8.6–10.4)
CHLORIDE BLD-SCNC: 102 MMOL/L (ref 98–107)
CO2: 26 MMOL/L (ref 20–31)
CREAT SERPL-MCNC: 0.88 MG/DL (ref 0.7–1.2)
DIFFERENTIAL TYPE: ABNORMAL
EOSINOPHILS RELATIVE PERCENT: 3 % (ref 0–4)
GFR AFRICAN AMERICAN: >60 ML/MIN
GFR NON-AFRICAN AMERICAN: >60 ML/MIN
GFR SERPL CREATININE-BSD FRML MDRD: ABNORMAL ML/MIN/{1.73_M2}
GFR SERPL CREATININE-BSD FRML MDRD: ABNORMAL ML/MIN/{1.73_M2}
GLUCOSE BLD-MCNC: 227 MG/DL (ref 75–110)
GLUCOSE BLD-MCNC: 241 MG/DL (ref 75–110)
GLUCOSE BLD-MCNC: 270 MG/DL (ref 70–99)
HCT VFR BLD CALC: 42.5 % (ref 41–53)
HEMOGLOBIN: 14.4 G/DL (ref 13.5–17.5)
IMMATURE GRANULOCYTES: ABNORMAL %
LYMPHOCYTES # BLD: 12 % (ref 24–44)
MCH RBC QN AUTO: 30.9 PG (ref 26–34)
MCHC RBC AUTO-ENTMCNC: 33.9 G/DL (ref 31–37)
MCV RBC AUTO: 91.1 FL (ref 80–100)
MONOCYTES # BLD: 13 % (ref 1–7)
NRBC AUTOMATED: ABNORMAL PER 100 WBC
PDW BLD-RTO: 15.1 % (ref 11.5–14.9)
PLATELET # BLD: 155 K/UL (ref 150–450)
PLATELET ESTIMATE: ABNORMAL
PMV BLD AUTO: 9.7 FL (ref 6–12)
POTASSIUM SERPL-SCNC: 4.1 MMOL/L (ref 3.7–5.3)
RBC # BLD: 4.67 M/UL (ref 4.5–5.9)
RBC # BLD: ABNORMAL 10*6/UL
SEG NEUTROPHILS: 71 % (ref 36–66)
SEGMENTED NEUTROPHILS ABSOLUTE COUNT: 4.7 K/UL (ref 1.3–9.1)
SODIUM BLD-SCNC: 138 MMOL/L (ref 135–144)
VANCOMYCIN TROUGH DATE LAST DOSE: ABNORMAL
VANCOMYCIN TROUGH DOSE AMOUNT: ABNORMAL
VANCOMYCIN TROUGH TIME LAST DOSE: 2117
VANCOMYCIN TROUGH: 21.7 UG/ML (ref 10–20)
WBC # BLD: 6.6 K/UL (ref 3.5–11)
WBC # BLD: ABNORMAL 10*3/UL

## 2018-06-01 PROCEDURE — 80048 BASIC METABOLIC PNL TOTAL CA: CPT

## 2018-06-01 PROCEDURE — 6370000000 HC RX 637 (ALT 250 FOR IP): Performed by: NURSE PRACTITIONER

## 2018-06-01 PROCEDURE — 99254 IP/OBS CNSLTJ NEW/EST MOD 60: CPT | Performed by: INTERNAL MEDICINE

## 2018-06-01 PROCEDURE — 36415 COLL VENOUS BLD VENIPUNCTURE: CPT

## 2018-06-01 PROCEDURE — 80202 ASSAY OF VANCOMYCIN: CPT

## 2018-06-01 PROCEDURE — 2580000003 HC RX 258: Performed by: ANESTHESIOLOGY

## 2018-06-01 PROCEDURE — 6370000000 HC RX 637 (ALT 250 FOR IP): Performed by: INTERNAL MEDICINE

## 2018-06-01 PROCEDURE — 2580000003 HC RX 258: Performed by: NURSE PRACTITIONER

## 2018-06-01 PROCEDURE — 85025 COMPLETE CBC W/AUTO DIFF WBC: CPT

## 2018-06-01 PROCEDURE — 99239 HOSP IP/OBS DSCHRG MGMT >30: CPT | Performed by: INTERNAL MEDICINE

## 2018-06-01 PROCEDURE — 6360000002 HC RX W HCPCS: Performed by: NURSE PRACTITIONER

## 2018-06-01 PROCEDURE — 82947 ASSAY GLUCOSE BLOOD QUANT: CPT

## 2018-06-01 RX ORDER — NICOTINE 21 MG/24HR
1 PATCH, TRANSDERMAL 24 HOURS TRANSDERMAL DAILY PRN
Qty: 30 PATCH | Refills: 3 | Status: CANCELLED | OUTPATIENT
Start: 2018-06-01

## 2018-06-01 RX ORDER — OXYCODONE HYDROCHLORIDE AND ACETAMINOPHEN 5; 325 MG/1; MG/1
1 TABLET ORAL EVERY 8 HOURS PRN
Qty: 21 TABLET | Refills: 0 | Status: SHIPPED | OUTPATIENT
Start: 2018-06-01 | End: 2018-06-08

## 2018-06-01 RX ORDER — SODIUM HYPOCHLORITE 1.25 MG/ML
SOLUTION TOPICAL 2 TIMES DAILY
Status: DISCONTINUED | OUTPATIENT
Start: 2018-06-01 | End: 2018-06-01 | Stop reason: HOSPADM

## 2018-06-01 RX ADMIN — INSULIN LISPRO 4 UNITS: 100 INJECTION, SOLUTION INTRAVENOUS; SUBCUTANEOUS at 12:24

## 2018-06-01 RX ADMIN — Medication 1 CAPSULE: at 07:51

## 2018-06-01 RX ADMIN — LOSARTAN POTASSIUM 100 MG: 100 TABLET ORAL at 07:52

## 2018-06-01 RX ADMIN — SODIUM CHLORIDE, POTASSIUM CHLORIDE, SODIUM LACTATE AND CALCIUM CHLORIDE: 600; 310; 30; 20 INJECTION, SOLUTION INTRAVENOUS at 04:22

## 2018-06-01 RX ADMIN — ENOXAPARIN SODIUM 30 MG: 30 INJECTION SUBCUTANEOUS at 07:52

## 2018-06-01 RX ADMIN — Medication 10 ML: at 07:56

## 2018-06-01 RX ADMIN — DAKIN'S SOLUTION 0.125% (QUARTER STRENGTH) 473 ML: 0.12 SOLUTION at 07:57

## 2018-06-01 RX ADMIN — LEVOTHYROXINE SODIUM 175 MCG: 50 TABLET ORAL at 06:18

## 2018-06-01 RX ADMIN — TAMSULOSIN HYDROCHLORIDE 0.4 MG: 0.4 CAPSULE ORAL at 07:51

## 2018-06-01 RX ADMIN — MOMETASONE FUROATE AND FORMOTEROL FUMARATE DIHYDRATE 2 PUFF: 200; 5 AEROSOL RESPIRATORY (INHALATION) at 07:52

## 2018-06-01 RX ADMIN — ATORVASTATIN CALCIUM 20 MG: 20 TABLET, FILM COATED ORAL at 07:52

## 2018-06-01 RX ADMIN — PREGABALIN 100 MG: 100 CAPSULE ORAL at 07:51

## 2018-06-01 RX ADMIN — INSULIN LISPRO 4 UNITS: 100 INJECTION, SOLUTION INTRAVENOUS; SUBCUTANEOUS at 07:52

## 2018-06-01 RX ADMIN — INSULIN HUMAN 50 UNITS: 100 INJECTION, SUSPENSION SUBCUTANEOUS at 07:52

## 2018-06-01 RX ADMIN — ASPIRIN 81 MG: 81 TABLET, COATED ORAL at 07:51

## 2018-06-01 RX ADMIN — PREGABALIN 100 MG: 100 CAPSULE ORAL at 13:45

## 2018-06-01 RX ADMIN — ROPINIROLE HYDROCHLORIDE 2 MG: 2 TABLET, FILM COATED ORAL at 07:51

## 2018-06-01 RX ADMIN — ISOSORBIDE MONONITRATE 30 MG: 30 TABLET, EXTENDED RELEASE ORAL at 07:52

## 2018-06-01 RX ADMIN — Medication 1 CAPSULE: at 13:45

## 2018-06-01 RX ADMIN — GEMFIBROZIL 600 MG: 600 TABLET ORAL at 07:52

## 2018-06-01 RX ADMIN — OXYCODONE HYDROCHLORIDE AND ACETAMINOPHEN 1 TABLET: 5; 325 TABLET ORAL at 07:51

## 2018-06-01 ASSESSMENT — PAIN SCALES - GENERAL
PAINLEVEL_OUTOF10: 3
PAINLEVEL_OUTOF10: 6

## 2018-06-02 ENCOUNTER — CARE COORDINATION (OUTPATIENT)
Dept: CASE MANAGEMENT | Age: 58
End: 2018-06-02

## 2018-06-02 DIAGNOSIS — M86.9 OSTEOMYELITIS OF LEFT LEG (HCC): Primary | ICD-10-CM

## 2018-06-02 PROCEDURE — 1111F DSCHRG MED/CURRENT MED MERGE: CPT | Performed by: INTERNAL MEDICINE

## 2018-06-04 LAB
CULTURE: ABNORMAL
CULTURE: ABNORMAL
DIRECT EXAM: ABNORMAL
DIRECT EXAM: ABNORMAL
Lab: ABNORMAL
SPECIMEN DESCRIPTION: ABNORMAL
STATUS: ABNORMAL

## 2018-06-07 ENCOUNTER — OFFICE VISIT (OUTPATIENT)
Dept: PODIATRY | Age: 58
End: 2018-06-07
Payer: COMMERCIAL

## 2018-06-07 VITALS
BODY MASS INDEX: 30.44 KG/M2 | HEART RATE: 83 BPM | DIASTOLIC BLOOD PRESSURE: 71 MMHG | WEIGHT: 250 LBS | HEIGHT: 76 IN | SYSTOLIC BLOOD PRESSURE: 120 MMHG

## 2018-06-07 DIAGNOSIS — M79.674 PAIN OF TOE OF RIGHT FOOT: ICD-10-CM

## 2018-06-07 DIAGNOSIS — Z79.4 TYPE 2 DIABETES MELLITUS WITH DIABETIC POLYNEUROPATHY, WITH LONG-TERM CURRENT USE OF INSULIN (HCC): ICD-10-CM

## 2018-06-07 DIAGNOSIS — E11.42 TYPE 2 DIABETES MELLITUS WITH DIABETIC POLYNEUROPATHY, WITH LONG-TERM CURRENT USE OF INSULIN (HCC): ICD-10-CM

## 2018-06-07 DIAGNOSIS — B35.1 ONYCHOMYCOSIS OF TOENAIL: Primary | ICD-10-CM

## 2018-06-07 DIAGNOSIS — E11.51 TYPE 2 DIABETES MELLITUS WITH PERIPHERAL VASCULAR DISEASE (HCC): ICD-10-CM

## 2018-06-07 PROCEDURE — 99999 PR OFFICE/OUTPT VISIT,PROCEDURE ONLY: CPT | Performed by: PODIATRIST

## 2018-06-07 PROCEDURE — 11720 DEBRIDE NAIL 1-5: CPT | Performed by: PODIATRIST

## 2018-06-08 DIAGNOSIS — M86.062 ACUTE HEMATOGENOUS OSTEOMYELITIS OF LEFT TIBIA (HCC): ICD-10-CM

## 2018-06-08 LAB — SURGICAL PATHOLOGY REPORT: NORMAL

## 2018-06-08 ASSESSMENT — ENCOUNTER SYMPTOMS
NAUSEA: 0
BACK PAIN: 0
COLOR CHANGE: 0
SHORTNESS OF BREATH: 0
DIARRHEA: 0

## 2018-06-12 RX ORDER — BUDESONIDE AND FORMOTEROL FUMARATE DIHYDRATE 160; 4.5 UG/1; UG/1
AEROSOL RESPIRATORY (INHALATION)
Qty: 10.2 INHALER | Refills: 3 | Status: SHIPPED | OUTPATIENT
Start: 2018-06-12 | End: 2018-10-22 | Stop reason: SDUPTHER

## 2018-06-13 ENCOUNTER — TELEPHONE (OUTPATIENT)
Dept: INFECTIOUS DISEASES | Age: 58
End: 2018-06-13

## 2018-06-13 ENCOUNTER — OFFICE VISIT (OUTPATIENT)
Dept: INFECTIOUS DISEASES | Age: 58
End: 2018-06-13
Payer: COMMERCIAL

## 2018-06-13 VITALS
HEART RATE: 84 BPM | SYSTOLIC BLOOD PRESSURE: 107 MMHG | HEIGHT: 60 IN | OXYGEN SATURATION: 95 % | TEMPERATURE: 97.8 F | DIASTOLIC BLOOD PRESSURE: 68 MMHG

## 2018-06-13 DIAGNOSIS — M86.9 OSTEOMYELITIS OF LEFT LEG (HCC): Primary | ICD-10-CM

## 2018-06-13 PROCEDURE — G8598 ASA/ANTIPLAT THER USED: HCPCS | Performed by: INTERNAL MEDICINE

## 2018-06-13 PROCEDURE — 99214 OFFICE O/P EST MOD 30 MIN: CPT | Performed by: INTERNAL MEDICINE

## 2018-06-13 PROCEDURE — 3017F COLORECTAL CA SCREEN DOC REV: CPT | Performed by: INTERNAL MEDICINE

## 2018-06-13 PROCEDURE — 1111F DSCHRG MED/CURRENT MED MERGE: CPT | Performed by: INTERNAL MEDICINE

## 2018-06-13 PROCEDURE — 1036F TOBACCO NON-USER: CPT | Performed by: INTERNAL MEDICINE

## 2018-06-13 PROCEDURE — G8417 CALC BMI ABV UP PARAM F/U: HCPCS | Performed by: INTERNAL MEDICINE

## 2018-06-13 PROCEDURE — G8427 DOCREV CUR MEDS BY ELIG CLIN: HCPCS | Performed by: INTERNAL MEDICINE

## 2018-06-13 RX ORDER — DOXYCYCLINE HYCLATE 100 MG
100 TABLET ORAL 2 TIMES DAILY
Qty: 20 TABLET | Refills: 0 | Status: SHIPPED | OUTPATIENT
Start: 2018-06-13 | End: 2018-06-23

## 2018-06-18 ASSESSMENT — ENCOUNTER SYMPTOMS
RESPIRATORY NEGATIVE: 1
GASTROINTESTINAL NEGATIVE: 1
EYES NEGATIVE: 1
ALLERGIC/IMMUNOLOGIC NEGATIVE: 1

## 2018-06-19 ENCOUNTER — OFFICE VISIT (OUTPATIENT)
Dept: INTERNAL MEDICINE CLINIC | Age: 58
End: 2018-06-19
Payer: COMMERCIAL

## 2018-06-19 VITALS
BODY MASS INDEX: 30.44 KG/M2 | WEIGHT: 250 LBS | HEART RATE: 81 BPM | HEIGHT: 76 IN | DIASTOLIC BLOOD PRESSURE: 70 MMHG | SYSTOLIC BLOOD PRESSURE: 110 MMHG

## 2018-06-19 DIAGNOSIS — M86.9 OSTEOMYELITIS OF RIGHT LEG (HCC): ICD-10-CM

## 2018-06-19 DIAGNOSIS — I10 ESSENTIAL HYPERTENSION: ICD-10-CM

## 2018-06-19 DIAGNOSIS — Z79.4 TYPE 2 DIABETES MELLITUS WITH DIABETIC POLYNEUROPATHY, WITH LONG-TERM CURRENT USE OF INSULIN (HCC): Primary | ICD-10-CM

## 2018-06-19 DIAGNOSIS — E11.42 TYPE 2 DIABETES MELLITUS WITH DIABETIC POLYNEUROPATHY, WITH LONG-TERM CURRENT USE OF INSULIN (HCC): Primary | ICD-10-CM

## 2018-06-19 DIAGNOSIS — E11.41 DIABETIC MONONEUROPATHY ASSOCIATED WITH TYPE 2 DIABETES MELLITUS (HCC): ICD-10-CM

## 2018-06-19 LAB — HBA1C MFR BLD: 6.6 %

## 2018-06-19 PROCEDURE — 83036 HEMOGLOBIN GLYCOSYLATED A1C: CPT | Performed by: INTERNAL MEDICINE

## 2018-06-19 PROCEDURE — 1111F DSCHRG MED/CURRENT MED MERGE: CPT | Performed by: INTERNAL MEDICINE

## 2018-06-19 PROCEDURE — 99214 OFFICE O/P EST MOD 30 MIN: CPT | Performed by: INTERNAL MEDICINE

## 2018-06-19 RX ORDER — PREGABALIN 100 MG/1
100 CAPSULE ORAL 3 TIMES DAILY
Qty: 90 CAPSULE | Refills: 0 | Status: SHIPPED | OUTPATIENT
Start: 2018-06-19 | End: 2018-07-24 | Stop reason: SDUPTHER

## 2018-06-19 ASSESSMENT — ENCOUNTER SYMPTOMS
FACIAL SWELLING: 0
SHORTNESS OF BREATH: 0
APNEA: 0
CONSTIPATION: 0
ABDOMINAL PAIN: 0
WHEEZING: 0
ABDOMINAL DISTENTION: 0
DIARRHEA: 0
COUGH: 0
COLOR CHANGE: 0
BACK PAIN: 0
CHEST TIGHTNESS: 0

## 2018-06-19 ASSESSMENT — PATIENT HEALTH QUESTIONNAIRE - PHQ9
SUM OF ALL RESPONSES TO PHQ9 QUESTIONS 1 & 2: 0
1. LITTLE INTEREST OR PLEASURE IN DOING THINGS: 0
SUM OF ALL RESPONSES TO PHQ QUESTIONS 1-9: 0
2. FEELING DOWN, DEPRESSED OR HOPELESS: 0

## 2018-07-24 DIAGNOSIS — E11.41 DIABETIC MONONEUROPATHY ASSOCIATED WITH TYPE 2 DIABETES MELLITUS (HCC): ICD-10-CM

## 2018-07-24 RX ORDER — PREGABALIN 100 MG/1
100 CAPSULE ORAL 3 TIMES DAILY
Qty: 90 CAPSULE | Refills: 0 | Status: SHIPPED | OUTPATIENT
Start: 2018-07-24 | End: 2018-08-28 | Stop reason: SDUPTHER

## 2018-07-24 NOTE — TELEPHONE ENCOUNTER
Medication Requested: lyrica      Last visit: 06/19/18  Next visit: 9/21/2018  Last refill: 06/19/18    Med contract on file:  [x] yes   [] no    Last urine drug screen: 07/26/17  Consistent with medication(s):    [x] yes   [] no--lyrica does not show in uds but no other medications were present in system    Last OARRS ran: unknown    Quantity of medication remaining: 3 days?     Who will be picking rx up: self    Pharmacy if escribed: [address]

## 2018-08-09 ENCOUNTER — OFFICE VISIT (OUTPATIENT)
Dept: PODIATRY | Age: 58
End: 2018-08-09
Payer: COMMERCIAL

## 2018-08-09 VITALS — SYSTOLIC BLOOD PRESSURE: 131 MMHG | HEIGHT: 76 IN | HEART RATE: 85 BPM | DIASTOLIC BLOOD PRESSURE: 78 MMHG

## 2018-08-09 DIAGNOSIS — M79.674 PAIN OF TOE OF RIGHT FOOT: Primary | ICD-10-CM

## 2018-08-09 DIAGNOSIS — N40.1 BENIGN PROSTATIC HYPERPLASIA WITH URINARY OBSTRUCTION: ICD-10-CM

## 2018-08-09 DIAGNOSIS — Z79.4 TYPE 2 DIABETES MELLITUS WITH DIABETIC POLYNEUROPATHY, WITH LONG-TERM CURRENT USE OF INSULIN (HCC): ICD-10-CM

## 2018-08-09 DIAGNOSIS — E11.42 TYPE 2 DIABETES MELLITUS WITH DIABETIC POLYNEUROPATHY, WITH LONG-TERM CURRENT USE OF INSULIN (HCC): ICD-10-CM

## 2018-08-09 DIAGNOSIS — N13.8 BENIGN PROSTATIC HYPERPLASIA WITH URINARY OBSTRUCTION: ICD-10-CM

## 2018-08-09 PROCEDURE — 11720 DEBRIDE NAIL 1-5: CPT | Performed by: PODIATRIST

## 2018-08-09 PROCEDURE — 99999 PR OFFICE/OUTPT VISIT,PROCEDURE ONLY: CPT | Performed by: PODIATRIST

## 2018-08-09 RX ORDER — ATORVASTATIN CALCIUM 20 MG/1
TABLET, FILM COATED ORAL
Qty: 30 TABLET | Refills: 3 | Status: SHIPPED | OUTPATIENT
Start: 2018-08-09 | End: 2018-11-12 | Stop reason: SDUPTHER

## 2018-08-09 RX ORDER — TAMSULOSIN HYDROCHLORIDE 0.4 MG/1
CAPSULE ORAL
Qty: 30 CAPSULE | Refills: 3 | Status: SHIPPED | OUTPATIENT
Start: 2018-08-09 | End: 2018-11-12 | Stop reason: SDUPTHER

## 2018-08-13 ASSESSMENT — ENCOUNTER SYMPTOMS
SHORTNESS OF BREATH: 0
NAUSEA: 0
DIARRHEA: 0
COLOR CHANGE: 0
BACK PAIN: 0

## 2018-08-21 DIAGNOSIS — E11.42 TYPE 2 DIABETES MELLITUS WITH DIABETIC POLYNEUROPATHY, WITH LONG-TERM CURRENT USE OF INSULIN (HCC): Primary | ICD-10-CM

## 2018-08-21 DIAGNOSIS — Z79.4 TYPE 2 DIABETES MELLITUS WITH DIABETIC POLYNEUROPATHY, WITH LONG-TERM CURRENT USE OF INSULIN (HCC): Primary | ICD-10-CM

## 2018-08-23 DIAGNOSIS — Z79.4 TYPE 2 DIABETES MELLITUS WITH DIABETIC POLYNEUROPATHY, WITH LONG-TERM CURRENT USE OF INSULIN (HCC): ICD-10-CM

## 2018-08-23 DIAGNOSIS — E11.42 TYPE 2 DIABETES MELLITUS WITH DIABETIC POLYNEUROPATHY, WITH LONG-TERM CURRENT USE OF INSULIN (HCC): ICD-10-CM

## 2018-08-23 NOTE — TELEPHONE ENCOUNTER
These were sent to [address] [place] [address] and the prescription needs to go to [facility].  Please send.

## 2018-08-24 ENCOUNTER — TELEPHONE (OUTPATIENT)
Dept: INTERNAL MEDICINE CLINIC | Age: 58
End: 2018-08-24

## 2018-08-24 NOTE — TELEPHONE ENCOUNTER
[employer] faxed a CMN on 8/14/18 for his diabetic testing supplies and did not receive it back yet. They said we keep sending them new orders for the testing supplies, but that's not what they need. Please complete CMN and send chart notes also need chart notes to support the need for him to be testing QID.     Fax to [phone]

## 2018-08-28 DIAGNOSIS — E11.41 DIABETIC MONONEUROPATHY ASSOCIATED WITH TYPE 2 DIABETES MELLITUS (HCC): ICD-10-CM

## 2018-08-28 RX ORDER — PREGABALIN 100 MG/1
100 CAPSULE ORAL 3 TIMES DAILY
Qty: 90 CAPSULE | Refills: 0 | Status: SHIPPED | OUTPATIENT
Start: 2018-08-28 | End: 2018-09-28 | Stop reason: SDUPTHER

## 2018-08-28 NOTE — TELEPHONE ENCOUNTER
Medication Requested: Lyrica    Last visit: 6/19/18  Next visit: 9/21/2018  Last refill: 7/24/18    Med contract on file:  [x] yes   [] no    Last urine drug screen: 7/26/17  Consistent with medication(s):    [] yes   [] no no--lyrica does not show in uds but no other medications were present in system    Last OARRS ran: unknown    Quantity of medication remaining: 3 days    Who will be picking rx up: self    Pharmacy [facility] escribed[facility]

## 2018-09-04 ENCOUNTER — TELEPHONE (OUTPATIENT)
Dept: INTERNAL MEDICINE CLINIC | Age: 58
End: 2018-09-04

## 2018-09-04 DIAGNOSIS — Z79.4 TYPE 2 DIABETES MELLITUS WITH DIABETIC POLYNEUROPATHY, WITH LONG-TERM CURRENT USE OF INSULIN (HCC): ICD-10-CM

## 2018-09-04 DIAGNOSIS — E11.42 TYPE 2 DIABETES MELLITUS WITH DIABETIC POLYNEUROPATHY, WITH LONG-TERM CURRENT USE OF INSULIN (HCC): ICD-10-CM

## 2018-09-04 RX ORDER — LEVOTHYROXINE SODIUM 175 UG/1
175 TABLET ORAL DAILY
Qty: 60 TABLET | Refills: 3 | Status: SHIPPED | OUTPATIENT
Start: 2018-09-04 | End: 2018-11-05 | Stop reason: SDUPTHER

## 2018-09-04 NOTE — TELEPHONE ENCOUNTER
Patient called stating that the CM order qty for the glucose test strips was written incorrectly , it is suppose to be written for 6 bottles / 90 day supply.

## 2018-09-05 ENCOUNTER — TELEPHONE (OUTPATIENT)
Dept: INTERNAL MEDICINE CLINIC | Age: 58
End: 2018-09-05

## 2018-09-05 ENCOUNTER — OFFICE VISIT (OUTPATIENT)
Dept: PHYSICAL MEDICINE AND REHAB | Age: 58
End: 2018-09-05
Payer: COMMERCIAL

## 2018-09-05 VITALS
HEIGHT: 76 IN | HEART RATE: 120 BPM | SYSTOLIC BLOOD PRESSURE: 116 MMHG | DIASTOLIC BLOOD PRESSURE: 72 MMHG | BODY MASS INDEX: 32.42 KG/M2 | WEIGHT: 266.2 LBS | TEMPERATURE: 98.4 F

## 2018-09-05 DIAGNOSIS — I10 ESSENTIAL HYPERTENSION: ICD-10-CM

## 2018-09-05 DIAGNOSIS — M25.562 CHRONIC PAIN OF LEFT KNEE: ICD-10-CM

## 2018-09-05 DIAGNOSIS — M14.671 CHARCOT'S JOINT OF RIGHT ANKLE: Chronic | ICD-10-CM

## 2018-09-05 DIAGNOSIS — G89.29 CHRONIC PAIN OF LEFT KNEE: ICD-10-CM

## 2018-09-05 DIAGNOSIS — Z89.512 STATUS POST BELOW KNEE AMPUTATION OF LEFT LOWER EXTREMITY: Primary | ICD-10-CM

## 2018-09-05 DIAGNOSIS — E08.42 DIABETIC POLYNEUROPATHY ASSOCIATED WITH DIABETES MELLITUS DUE TO UNDERLYING CONDITION (HCC): ICD-10-CM

## 2018-09-05 PROCEDURE — 2022F DILAT RTA XM EVC RTNOPTHY: CPT | Performed by: PHYSICAL MEDICINE & REHABILITATION

## 2018-09-05 PROCEDURE — G8598 ASA/ANTIPLAT THER USED: HCPCS | Performed by: PHYSICAL MEDICINE & REHABILITATION

## 2018-09-05 PROCEDURE — 1036F TOBACCO NON-USER: CPT | Performed by: PHYSICAL MEDICINE & REHABILITATION

## 2018-09-05 PROCEDURE — 99214 OFFICE O/P EST MOD 30 MIN: CPT | Performed by: PHYSICAL MEDICINE & REHABILITATION

## 2018-09-05 PROCEDURE — G8417 CALC BMI ABV UP PARAM F/U: HCPCS | Performed by: PHYSICAL MEDICINE & REHABILITATION

## 2018-09-05 PROCEDURE — 3017F COLORECTAL CA SCREEN DOC REV: CPT | Performed by: PHYSICAL MEDICINE & REHABILITATION

## 2018-09-05 PROCEDURE — G8427 DOCREV CUR MEDS BY ELIG CLIN: HCPCS | Performed by: PHYSICAL MEDICINE & REHABILITATION

## 2018-09-05 NOTE — LETTER
[facility]  [address] [place] [address]  Dept: [phone]  Dept Fax: [phone]      9/8/18    Patient: Neela Multani  YOB: 1960    Dear  Gilbert Bronson MD ,    I had the pleasure of seeing your patient, Neela Multani today in the office today. Below are the relevant portions of my assessment and plan of care. IMPRESSION:      Diagnosis Orders   1. Status post below knee amputation of left lower extremity ([address]     2. Diabetic polyneuropathy associated with diabetes mellitus due to underlying condition ([address]     3. Essential hypertension     4. Chronic pain of left knee     5. Charcot's joint of right ankle       PLAN:     1. Patient underwent revision left below-knee amputation due to infected distal stump. This is improved. Stump site is well healed. Patient would benefit new liners and socket replacement due to revision of his below knee amputation. He appears to be a K3 ambulator with ability for ambulation of variable katlyn. We discussed consideration of resuming physical therapy, however, he feels as if he is doing well and does not need further therapies at this time. He will notify us if there is any changes once he pays his prosthesis. 2.  Osteoarthritis of left knee. Continue follow-up orthopedics and possible need for total knee arthroscopy. He has tried injections with minimal help. 3.  Fall precautions, stump precautions and skin care as well as footcare were discussed at length with the patient  4. Follow with patient approximately 6 weeks to follow-up on new prosthesis and monitor stump and need for therapies. He will receive his new prosthesis approximately 4 weeks per prosthetists    Follow-up as needed        Thank you for allowing me to participate in the care of this patient.   I will keep you updated on this patient's follow up and I look forward to

## 2018-09-08 NOTE — PROGRESS NOTES
closure on 5/5/2015. He was discharged at [facility] rehab from 5/7/2015 through 5/15/2015. He had progressed well. He underwent revision left BKA approximately May 2018 due to infected distal stump.     Past Medical History:   Diagnosis Date    CAD (coronary artery disease)     Charcot foot due to diabetes mellitus ([address]     COPD (chronic obstructive pulmonary disease) (HCC)     Diabetes mellitus ([address]     Diabetic neuropathy ([address]     Foot ulcer ([address]     Hyperlipidemia     Hypertension     Hypoglycemia 4/2/2015    MRSA (methicillin resistant staph aureus) culture positive 4/16/2015    ankle    OA (osteoarthritis)     Osteomyelitis (HCC)     left stump  BKA    Other disorders of kidney and ureter in diseases classified elsewhere     Paroxysmal atrial fibrillation ([facility]     Thyroid disease     Wears dentures     upper    Wears glasses       Past Surgical History:   Procedure Laterality Date    CARDIAC CATHETERIZATION      no stenting    COLONOSCOPY  06/17/2016    poor prep, done up to the IC valve, redundant colon    COLONOSCOPY  01/23/2017    VIRTUAL COLONOSCOPY DONE-no polyps or masses    FINGER AMPUTATION Right     middle    FOOT SURGERY Bilateral     r-bone removed      PICC [facility] DOUBLE  5/21/2018         LEG AMPUTATION BELOW KNEE Left 4/29/15    OTHER SURGICAL HISTORY Left 5-5-15 and 11/2015    Revision BKA    OTHER SURGICAL HISTORY      left stump revision 5/30/2018    OR RE-AMPUTATION LOWER LEG Left 5/30/2018    LEG AMPUTATION BELOW KNEE REVISION performed by Luis Pham MD at [address] Bilateral     TOE AMPUTATION      Right 2 and 4       Family History   Problem Relation Age of Onset    Diabetes Mother     Hypertension Mother     Cancer Mother         Stomach    Hypertension Father     Cancer Father         Kidney    Alcohol Abuse Father     Diabetes Brother        Social History   Substance Use Topics    Smoking status: Former Smoker     Packs/day: 2.00     Years: 25.00     Types: Cigars     Quit date: 5/29/2011    Smokeless tobacco: Never Used    Alcohol use 0.0 oz/week      Comment: social and not that often        Current Outpatient Prescriptions   Medication Sig Dispense Refill    levothyroxine (SYNTHROID) 175 MCG tablet TAKE 1 TABLET BY MOUTH DAILY 60 tablet 3    blood glucose test strips (ASCENSIA AUTODISC VI;ONE TOUCH ULTRA TEST VI) strip 1 each by In Vitro route 4 times daily As needed. 400 each 3    pregabalin (LYRICA) 100 MG capsule Take 1 capsule by mouth 3 times daily for 30 days. . 90 capsule 0    atorvastatin (LIPITOR) 20 MG tablet TAKE 1 TABLET BY MOUTH DAILY 30 tablet 3    tamsulosin (FLOMAX) 0.4 MG capsule TAKE 1 CAPSULE DAILY 30 capsule 3    HUMALOG 100 UNIT/ML injection vial INJECT 12 UNITS UNDER THE SKIN 3 TIMES DAILY + SLIDING SCALE (TAKE 5 UNITS IF SUGAR IS OVER 150) 20 mL 3    SYMBICORT 160-4.5 MCG/ACT AERO INHALE 2 PUFFS TWICE DAILY BY MOUTH 10.2 Inhaler 3    insulin NPH (HUMULIN N;NOVOLIN N) 100 UNIT/ML injection vial Inject 70 Units into the skin nightly      insulin lispro (HUMALOG KWIKPEN) 100 UNIT/ML pen Blood Sugar - 151 to 199 = 2 Units , Blood Sugar - 200 to 249 = 4 units , Blood Sugar - 250  to 299 = 6 Units , Blood Sugar - 300 to 349 = 8 Units , Blood Sugar - 350 to 399 = 10 Units , Blood Sugar - 400 to 449 = 12 Units , Blood Sugar - > 450 - 15 Units and call Physician or go to ER 5 pen 3    insulin NPH (HUMULIN N) 100 UNIT/ML injection vial Inject 20 Units into the skin 2 times daily (before meals) (Patient taking differently: Inject 50 Units into the skin every morning ) 30 mL 1    losartan (COZAAR) 100 MG tablet TAKE 1 TABLET BY MOUTH ONCE DAILY 30 tablet 11    rOPINIRole (REQUIP) 2 MG tablet TAKE 1 TABLET BY MOUTH ONCE DAILY 30 tablet 11    isosorbide mononitrate (IMDUR) 30 MG extended release tablet TAKE 1 TABLET BY MOUTH DAILY 30 tablet 5    gemfibrozil (LOPID) 600 MG tablet TAKE 1 TABLET BY MOUTH ONCE DAILY 30 tablet 11    urea (CARMOL) 20 % cream Apply topically as needed. 480 g 3    JANUMET  MG per tablet TAKE (1) TABLET TWICE A DAY WITH MEALS 60 tablet 11    TRUEPLUS INSULIN SYRINGE 31G X 5/16\" 1 ML MISC USE AS DIRECTED WITH HUMULIN AND HUMALOG INSULIN 5 TIMES DAILY 100 each 11    Omega-3 Fatty Acids (FISH OIL CONCENTRATE) 300 MG CAPS Take 300 mg by mouth daily      Glucosamine Sulfate 500 MG TABS Take 500 mg by mouth 3 times daily      aspirin 81 MG tablet Take 81 mg by mouth daily      albuterol (PROVENTIL HFA;VENTOLIN HFA) 108 (90 BASE) MCG/ACT inhaler Inhale 2 puffs into the lungs every 6 hours as needed. No current facility-administered medications for this visit. No Known Allergies      Subjective:      Review of Systems  CONSTITUTIONAL: Negative for fever, chills, sweat, fatigue and unexpected weight change. HEENT:  Negative for diplopia, blind spots, blurred vision, hearing loss, trouble chewing or swallowing, denies coughing while eating or drinking denies nosebleed    RESPIRATORY: Negative for wheezing, coughing or shortness of breath    CARDIOVASCULAR: Negative for chest pain, palpitations, lightheadedness  GASTROINTESTINAL: Negative for heartburn, nausea, constipation, diarrhea, abdominal pain  or incontinence  GENTIOURNIARY: Negative for dysuria, urgency, frequency, incontinence and difficulty urinating. ENDOCRINE: Negative for hot or cold intolerance, denies any significant weight change  MUSCULOSKELETAL: Negative for focal joint pain, back pain, neck pain and arthralgias.    NUEROLOGIIC: Negative for focal weakness, numbness, tingling, balance loss, headaches or falls  BEHAVIOR/PSYCY: Denies depression, anxiety, memory loss or insomnia  SKIN: Denies ulcers, rashes or bruises         Objective:     Physical Exam  /72   Pulse 120   Temp 98.4 °F (36.9 °C) (Tympanic)   Ht 6' 4\" (1.93 m)   Wt 266 lb 3.2 oz (120.7 kg) Comment: wearing prost. leg  BMI 32.40 kg/m²     Nursing notes and vitals reviewed    CONSTITUTIONAL: He is oriented to person, place, and time. He appears well-developed and well-nourished. HEENT: Pupils equal reactive to light, Extraocular motion intact, visual fields are full, no facial asymmetry, speech fluent, no dysarthria, comprehension intact, object naming intact, repetition intact, basic cognition intact  CARDIOVASCULAR: Heart regular rate and rhythm with no murmurs rubs or gallops   PULMONARY/CHEST: Clear to auscultation with no wheezes or crackles noted  ABDOMEN: Soft, nontender with positive bowel sounds, no guarding or rebound   NEUROLOGIC:    Orientation: Alert and oriented ×3,    cranial nerves:  II through XII are intact,   Strength:5 out of 5 throughout bilateral upper extremities except for intrinsics are 4 out of 5-no change   Sensation: Intact to light touch and pinprick-upper extremities   Balance: Impaired   Deep tendon reflexes: Bilaterally equal and symmetric of upper extremities, diminished to absent in lower extremities,   EXTREMITIES: No calf tenderness, , pulses are intact on the right. There is mild crepitus of the right knee. As well as left knee. .  Left knee has a 12° extension lag - 110° flexion  Left stump is clean dry and intact, no skin irritation or breakdown noted. Skin is intact. Right lower extremity notes amputation of 2  toes. Pulses are intact, skin is intact, there is limited range of motion in the ankle due to Charcot joint, strength proximally is 5 out of 5-no change  Upper extremities reveals flexion contracture fifth digits bilaterally, atrophy of intrinsic muscles bilaterally otherwise strength is 5 out of 5 proximally has good range of motion-no change  SKIN: Skin is warm and dry.    PSYCIATIRIC: normal mood and affect, behavior is normal. Thought content normal.   He ambulates with good gait pattern without the use of of a single-point cane in his left upper

## 2018-09-11 ENCOUNTER — TELEPHONE (OUTPATIENT)
Dept: INTERNAL MEDICINE CLINIC | Age: 58
End: 2018-09-11

## 2018-09-11 NOTE — TELEPHONE ENCOUNTER
Patient called stating that [id number] received fax back on his glucose test strips with testing only 3 times a day, when he tests 4 times a day. Contacted pharmacy and pharmacist informed me that they received office notes 6/19/18 and Rx for testing 4 times a day along with CMN but without the mention of patient notes stating that he tests 4 times a day, other wise insurance company will not cover.  Please advise

## 2018-09-14 NOTE — TELEPHONE ENCOUNTER
Can you please addend office note from June to reflect pt needs to test BS 4 times a day? Thank you!

## 2018-09-28 ENCOUNTER — HOSPITAL ENCOUNTER (OUTPATIENT)
Age: 58
Setting detail: SPECIMEN
Discharge: HOME | End: 2018-09-28
Payer: COMMERCIAL

## 2018-09-28 ENCOUNTER — OFFICE VISIT (OUTPATIENT)
Dept: INTERNAL MEDICINE CLINIC | Age: 58
End: 2018-09-28
Payer: COMMERCIAL

## 2018-09-28 VITALS
BODY MASS INDEX: 32.51 KG/M2 | WEIGHT: 267 LBS | SYSTOLIC BLOOD PRESSURE: 134 MMHG | TEMPERATURE: 97.9 F | HEART RATE: 84 BPM | HEIGHT: 76 IN | DIASTOLIC BLOOD PRESSURE: 82 MMHG

## 2018-09-28 DIAGNOSIS — E11.41 DIABETIC MONONEUROPATHY ASSOCIATED WITH TYPE 2 DIABETES MELLITUS (HCC): ICD-10-CM

## 2018-09-28 DIAGNOSIS — Z01.00 DIABETIC EYE EXAM (HCC): ICD-10-CM

## 2018-09-28 DIAGNOSIS — E11.9 DIABETIC EYE EXAM (HCC): ICD-10-CM

## 2018-09-28 DIAGNOSIS — L03.115 CELLULITIS OF RIGHT LOWER EXTREMITY: ICD-10-CM

## 2018-09-28 DIAGNOSIS — Z87.891 PERSONAL HISTORY OF TOBACCO USE: Primary | ICD-10-CM

## 2018-09-28 PROCEDURE — 2022F DILAT RTA XM EVC RTNOPTHY: CPT | Performed by: PHYSICIAN ASSISTANT

## 2018-09-28 PROCEDURE — 1036F TOBACCO NON-USER: CPT | Performed by: PHYSICIAN ASSISTANT

## 2018-09-28 PROCEDURE — G0296 VISIT TO DETERM LDCT ELIG: HCPCS | Performed by: PHYSICIAN ASSISTANT

## 2018-09-28 PROCEDURE — 3044F HG A1C LEVEL LT 7.0%: CPT | Performed by: PHYSICIAN ASSISTANT

## 2018-09-28 PROCEDURE — G8417 CALC BMI ABV UP PARAM F/U: HCPCS | Performed by: PHYSICIAN ASSISTANT

## 2018-09-28 PROCEDURE — 99214 OFFICE O/P EST MOD 30 MIN: CPT | Performed by: PHYSICIAN ASSISTANT

## 2018-09-28 PROCEDURE — 3017F COLORECTAL CA SCREEN DOC REV: CPT | Performed by: PHYSICIAN ASSISTANT

## 2018-09-28 PROCEDURE — G8598 ASA/ANTIPLAT THER USED: HCPCS | Performed by: PHYSICIAN ASSISTANT

## 2018-09-28 PROCEDURE — G8427 DOCREV CUR MEDS BY ELIG CLIN: HCPCS | Performed by: PHYSICIAN ASSISTANT

## 2018-09-28 RX ORDER — PREGABALIN 100 MG/1
100 CAPSULE ORAL 3 TIMES DAILY
Qty: 90 CAPSULE | Refills: 0 | Status: SHIPPED | OUTPATIENT
Start: 2018-09-28 | End: 2018-10-30 | Stop reason: SDUPTHER

## 2018-09-28 RX ORDER — SULFAMETHOXAZOLE AND TRIMETHOPRIM 800; 160 MG/1; MG/1
1 TABLET ORAL 2 TIMES DAILY
Qty: 20 TABLET | Refills: 0 | Status: SHIPPED | OUTPATIENT
Start: 2018-09-28 | End: 2018-10-08

## 2018-09-28 ASSESSMENT — ENCOUNTER SYMPTOMS
COUGH: 0
DOUBLE VISION: 0
SINUS PAIN: 0
VOMITING: 0
SORE THROAT: 0
ABDOMINAL PAIN: 0
SHORTNESS OF BREATH: 0
DIARRHEA: 0
BACK PAIN: 0
BLURRED VISION: 0
NAUSEA: 0
CONSTIPATION: 0
BLOOD IN STOOL: 0

## 2018-09-28 NOTE — PROGRESS NOTES
DIABETES and HYPERTENSION visit    BP Readings from Last 3 Encounters:   09/05/18 116/72   08/09/18 131/78   06/19/18 110/70        Hemoglobin A1C (%)   Date Value   06/19/2018 6.6   03/09/2018 7.1 (H)   11/17/2017 7.3 (H)     Microalb/Crt. Ratio (mcg/mg creat)   Date Value   03/09/2018 CANNOT BE CALCULATED     LDL Cholesterol (mg/dL)   Date Value   03/09/2018 56     HDL (mg/dL)   Date Value   03/09/2018 47     BUN (mg/dL)   Date Value   06/01/2018 11     CREATININE (mg/dL)   Date Value   06/01/2018 0.88     Glucose (mg/dL)   Date Value   06/01/2018 270 (H)            Have you changed or started any medications since your last visit including any over-the-counter medicines, vitamins, or herbal medicines? no   Have you stopped taking any of your medications? Is so, why? -  no  Are you having any side effects from any of your medications? - no    Have you seen any other physician or provider since your last visit? yes -    Have you had any other diagnostic tests since your last visit?  no   Have you been seen in the emergency room and/or had an admission in a hospital since we last saw you?  no   Have you had your routine dental cleaning in the past 6 months?  no     Have you had your annual diabetic retinal (eye) exam? No   (ensure copy of exam is in the chart)    Do you have an active MyChart account? If no, what is the barrier?   Yes    Patient Care Team:  Myesha Cole MD as PCP - Bryan Lockwood MD as PCP - S Attributed Provider  Joaquin Hogan MD as Consulting Physician (Infectious Diseases)    Medical History Review  Past Medical, Family, and Social History reviewed and does not contribute to the patient presenting condition    Health Maintenance   Topic Date Due    HIV screen  01/07/1975    Shingles Vaccine (1 of 2 - 2 Dose Series) 01/07/2010    Low dose CT lung screening  01/07/2015    Diabetic retinal exam  01/04/2017    Flu vaccine (1) 09/01/2018    DTaP/Tdap/Td vaccine (1 - Tdap) 03/05/2019 (Originally 1/7/1979)    Lipid screen  03/09/2019    TSH testing  03/09/2019    Potassium monitoring  06/01/2019    Diabetic foot exam  06/08/2019    Creatinine monitoring  06/18/2019    A1C test (Diabetic or Prediabetic)  06/19/2019    Colon cancer screen colonoscopy  01/23/2027    Pneumococcal med risk  Completed    Hepatitis C screen  Completed

## 2018-09-28 NOTE — TELEPHONE ENCOUNTER
Medication Requested: allan    Last visit: 06/19/18  Next visit: 10/8/2018  Last refill: 08/28/18    Med contract on file:  [x] yes   [] no    Last urine drug screen: 07/26/17  Consistent with medication(s):    [] yes   [] no Lyrica does not show is UDS    Last OARRS ran: unknown    Quantity of medication remaining: until Monday    Who will be picking rx up: self    [facility]

## 2018-10-01 ENCOUNTER — TELEPHONE (OUTPATIENT)
Dept: ONCOLOGY | Age: 58
End: 2018-10-01

## 2018-10-01 NOTE — TELEPHONE ENCOUNTER
Patient does not meet criteria for lung screening. Criteria is smokes/smoked cigarettes, patient smoked cigars.

## 2018-10-03 ENCOUNTER — TELEPHONE (OUTPATIENT)
Dept: INTERNAL MEDICINE CLINIC | Age: 58
End: 2018-10-03

## 2018-10-08 ENCOUNTER — OFFICE VISIT (OUTPATIENT)
Dept: INTERNAL MEDICINE CLINIC | Age: 58
End: 2018-10-08
Payer: COMMERCIAL

## 2018-10-08 VITALS
TEMPERATURE: 98.2 F | WEIGHT: 268 LBS | DIASTOLIC BLOOD PRESSURE: 68 MMHG | BODY MASS INDEX: 32.63 KG/M2 | HEIGHT: 76 IN | SYSTOLIC BLOOD PRESSURE: 132 MMHG

## 2018-10-08 DIAGNOSIS — L08.9 INFECTION OF SKIN DUE TO METHICILLIN RESISTANT STAPHYLOCOCCUS AUREUS (MRSA): Primary | ICD-10-CM

## 2018-10-08 DIAGNOSIS — E08.42 DIABETIC POLYNEUROPATHY ASSOCIATED WITH DIABETES MELLITUS DUE TO UNDERLYING CONDITION (HCC): ICD-10-CM

## 2018-10-08 DIAGNOSIS — Z79.4 TYPE 2 DIABETES MELLITUS WITH DIABETIC POLYNEUROPATHY, WITH LONG-TERM CURRENT USE OF INSULIN (HCC): ICD-10-CM

## 2018-10-08 DIAGNOSIS — E03.9 ACQUIRED HYPOTHYROIDISM: ICD-10-CM

## 2018-10-08 DIAGNOSIS — B95.62 INFECTION OF SKIN DUE TO METHICILLIN RESISTANT STAPHYLOCOCCUS AUREUS (MRSA): Primary | ICD-10-CM

## 2018-10-08 DIAGNOSIS — Z89.512 STATUS POST BELOW KNEE AMPUTATION OF LEFT LOWER EXTREMITY: ICD-10-CM

## 2018-10-08 DIAGNOSIS — E11.42 TYPE 2 DIABETES MELLITUS WITH DIABETIC POLYNEUROPATHY, WITH LONG-TERM CURRENT USE OF INSULIN (HCC): ICD-10-CM

## 2018-10-08 PROCEDURE — 1036F TOBACCO NON-USER: CPT | Performed by: INTERNAL MEDICINE

## 2018-10-08 PROCEDURE — 3044F HG A1C LEVEL LT 7.0%: CPT | Performed by: INTERNAL MEDICINE

## 2018-10-08 PROCEDURE — G8427 DOCREV CUR MEDS BY ELIG CLIN: HCPCS | Performed by: INTERNAL MEDICINE

## 2018-10-08 PROCEDURE — G8598 ASA/ANTIPLAT THER USED: HCPCS | Performed by: INTERNAL MEDICINE

## 2018-10-08 PROCEDURE — G8417 CALC BMI ABV UP PARAM F/U: HCPCS | Performed by: INTERNAL MEDICINE

## 2018-10-08 PROCEDURE — 99214 OFFICE O/P EST MOD 30 MIN: CPT | Performed by: INTERNAL MEDICINE

## 2018-10-08 PROCEDURE — 3017F COLORECTAL CA SCREEN DOC REV: CPT | Performed by: INTERNAL MEDICINE

## 2018-10-08 PROCEDURE — 2022F DILAT RTA XM EVC RTNOPTHY: CPT | Performed by: INTERNAL MEDICINE

## 2018-10-08 PROCEDURE — G8484 FLU IMMUNIZE NO ADMIN: HCPCS | Performed by: INTERNAL MEDICINE

## 2018-10-08 RX ORDER — SULFAMETHOXAZOLE AND TRIMETHOPRIM 800; 160 MG/1; MG/1
1 TABLET ORAL 2 TIMES DAILY
Qty: 20 TABLET | Refills: 0 | Status: SHIPPED | OUTPATIENT
Start: 2018-10-08 | End: 2018-10-18

## 2018-10-08 ASSESSMENT — ENCOUNTER SYMPTOMS
EYE DISCHARGE: 0
ABDOMINAL DISTENTION: 0
TROUBLE SWALLOWING: 0
BLOOD IN STOOL: 0
DIARRHEA: 0
SHORTNESS OF BREATH: 0
EYE PAIN: 0
WHEEZING: 0
COLOR CHANGE: 0
COUGH: 0

## 2018-10-08 ASSESSMENT — PATIENT HEALTH QUESTIONNAIRE - PHQ9
SUM OF ALL RESPONSES TO PHQ QUESTIONS 1-9: 0
2. FEELING DOWN, DEPRESSED OR HOPELESS: 0
1. LITTLE INTEREST OR PLEASURE IN DOING THINGS: 0
SUM OF ALL RESPONSES TO PHQ QUESTIONS 1-9: 0
SUM OF ALL RESPONSES TO PHQ9 QUESTIONS 1 & 2: 0

## 2018-10-08 NOTE — PROGRESS NOTES
tachypnea and no bradypnea. No respiratory distress. He has no wheezes. He has no rales. Abdominal: Soft. Bowel sounds are normal. He exhibits no distension. There is no tenderness. There is no rebound. Musculoskeletal: Normal range of motion. He exhibits no edema or tenderness. Left bka     Lymphadenopathy:        Head (right side): No submental and no submandibular adenopathy present. Head (left side): No submental and no submandibular adenopathy present. He has no cervical adenopathy. Neurological: He is alert and oriented to person, place, and time. He displays no atrophy. No cranial nerve deficit or sensory deficit. He exhibits normal muscle tone. Coordination normal.   Skin: Skin is warm. No bruising, no ecchymosis and no rash noted. He is not diaphoretic. No pallor. Rt thigh skn boil with mrsa     Psychiatric: He has a normal mood and affect. His behavior is normal. His mood appears not anxious. His affect is not angry. His speech is not slurred. He is not aggressive. Cognition and memory are not impaired. He expresses no homicidal ideation. I have personally reviewed and agree with the patient ROS, HPI and Ul. Maddie Chinchilla is a 62 y.o. male who presents today for follow up on his chronic medical conditions as noted below. Manasa Christy is c/o of   Chief Complaint   Patient presents with    Wound Check     pt had wound culture done last week positive for MRSA - on bactrim. pt denies pain at wound site, rt upper leg.         Patient Active Problem List:     Anemia     Neuropathy in diabetes ([facility] [address]     Charcot ankle     Hypothyroid     PVD (peripheral vascular disease) ([facility] [address]     Below knee amputation status ([facility] [address]     Type 2 diabetes mellitus with diabetic polyneuropathy (HCC)     Pure hypercholesterolemia     Constipation     PSA elevation     CAD (coronary artery disease)     Cellulitis and abscess of left leg     Type 2 diabetes mellitus with left diabetic foot infection tablet TAKE 1 TABLET BY MOUTH ONCE DAILY 30 tablet 11    isosorbide mononitrate (IMDUR) 30 MG extended release tablet TAKE 1 TABLET BY MOUTH DAILY 30 tablet 5    gemfibrozil (LOPID) 600 MG tablet TAKE 1 TABLET BY MOUTH ONCE DAILY 30 tablet 11    urea (CARMOL) 20 % cream Apply topically as needed. 480 g 3    JANUMET  MG per tablet TAKE (1) TABLET TWICE A DAY WITH MEALS 60 tablet 11    TRUEPLUS INSULIN SYRINGE 31G X 5/16\" 1 ML MISC USE AS DIRECTED WITH HUMULIN AND HUMALOG INSULIN 5 TIMES DAILY 100 each 11    Omega-3 Fatty Acids (FISH OIL CONCENTRATE) 300 MG CAPS Take 300 mg by mouth daily      Glucosamine Sulfate 500 MG TABS Take 500 mg by mouth 3 times daily      aspirin 81 MG tablet Take 81 mg by mouth daily      albuterol (PROVENTIL HFA;VENTOLIN HFA) 108 (90 BASE) MCG/ACT inhaler Inhale 2 puffs into the lungs every 6 hours as needed. No current facility-administered medications for this visit. ALLERGIES:  No Known Allergies    Social History   Substance Use Topics    Smoking status: Former Smoker     Packs/day: 2.00     Years: 25.00     Types: Cigars     Quit date: 5/29/2011    Smokeless tobacco: Never Used    Alcohol use 0.0 oz/week      Comment: social and not that often      Body mass index is 32.62 kg/m².   /68   Temp 98.2 °F (36.8 °C) (Oral)   Ht 6' 4\" (1.93 m)   Wt 268 lb (121.6 kg)   BMI 32.62 kg/m²     Lab Results   Component Value Date     06/01/2018    K 4.1 06/01/2018     06/01/2018    CO2 26 06/01/2018    BUN 11 06/01/2018    CREATININE 0.88 06/01/2018    GLUCOSE 270 06/01/2018    CALCIUM 8.7 06/01/2018    PROT 7.5 09/19/2016    LABALBU 4.0 09/19/2016    BILITOT 0.41 09/19/2016    ALKPHOS 166 09/19/2016    AST 21 09/19/2016    ALT 19 09/19/2016       Lab Results   Component Value Date    WBC 6.6 06/01/2018    RBC 4.67 06/01/2018    HGB 14.4 06/01/2018    HCT 42.5 06/01/2018    MCV 91.1 06/01/2018    MCH 30.9 06/01/2018    MCHC 33.9 06/01/2018    RDW

## 2018-10-11 ENCOUNTER — OFFICE VISIT (OUTPATIENT)
Dept: PODIATRY | Age: 58
End: 2018-10-11
Payer: COMMERCIAL

## 2018-10-11 VITALS
HEART RATE: 75 BPM | BODY MASS INDEX: 32.63 KG/M2 | DIASTOLIC BLOOD PRESSURE: 74 MMHG | WEIGHT: 268 LBS | SYSTOLIC BLOOD PRESSURE: 115 MMHG | HEIGHT: 76 IN

## 2018-10-11 DIAGNOSIS — E11.42 TYPE 2 DIABETES MELLITUS WITH DIABETIC POLYNEUROPATHY, WITH LONG-TERM CURRENT USE OF INSULIN (HCC): ICD-10-CM

## 2018-10-11 DIAGNOSIS — E11.51 TYPE 2 DIABETES MELLITUS WITH PERIPHERAL VASCULAR DISEASE (HCC): ICD-10-CM

## 2018-10-11 DIAGNOSIS — M79.674 PAIN OF TOE OF RIGHT FOOT: ICD-10-CM

## 2018-10-11 DIAGNOSIS — B35.1 ONYCHOMYCOSIS OF TOENAIL: Primary | ICD-10-CM

## 2018-10-11 DIAGNOSIS — Z79.4 TYPE 2 DIABETES MELLITUS WITH DIABETIC POLYNEUROPATHY, WITH LONG-TERM CURRENT USE OF INSULIN (HCC): ICD-10-CM

## 2018-10-11 PROCEDURE — 11720 DEBRIDE NAIL 1-5: CPT | Performed by: PODIATRIST

## 2018-10-11 PROCEDURE — 99999 PR OFFICE/OUTPT VISIT,PROCEDURE ONLY: CPT | Performed by: PODIATRIST

## 2018-10-11 RX ORDER — ISOSORBIDE MONONITRATE 30 MG/1
TABLET, EXTENDED RELEASE ORAL
Qty: 30 TABLET | Refills: 2 | Status: SHIPPED | OUTPATIENT
Start: 2018-10-11 | End: 2019-01-14 | Stop reason: SDUPTHER

## 2018-10-11 ASSESSMENT — ENCOUNTER SYMPTOMS
COLOR CHANGE: 0
SHORTNESS OF BREATH: 0
NAUSEA: 0
BACK PAIN: 0
DIARRHEA: 0

## 2018-10-18 ENCOUNTER — HOSPITAL ENCOUNTER (OUTPATIENT)
Age: 58
Setting detail: SPECIMEN
Discharge: HOME | End: 2018-10-18
Payer: COMMERCIAL

## 2018-10-18 DIAGNOSIS — Z79.4 TYPE 2 DIABETES MELLITUS WITH DIABETIC POLYNEUROPATHY, WITH LONG-TERM CURRENT USE OF INSULIN (HCC): ICD-10-CM

## 2018-10-18 DIAGNOSIS — B95.62 INFECTION OF SKIN DUE TO METHICILLIN RESISTANT STAPHYLOCOCCUS AUREUS (MRSA): ICD-10-CM

## 2018-10-18 DIAGNOSIS — L08.9 INFECTION OF SKIN DUE TO METHICILLIN RESISTANT STAPHYLOCOCCUS AUREUS (MRSA): ICD-10-CM

## 2018-10-18 DIAGNOSIS — E11.42 TYPE 2 DIABETES MELLITUS WITH DIABETIC POLYNEUROPATHY, WITH LONG-TERM CURRENT USE OF INSULIN (HCC): ICD-10-CM

## 2018-10-18 LAB
ANION GAP SERPL CALCULATED.3IONS-SCNC: 16 MMOL/L (ref 9–17)
BUN BLDV-MCNC: 23 MG/DL (ref 6–20)
BUN/CREAT BLD: ABNORMAL (ref 9–20)
CALCIUM SERPL-MCNC: 8.9 MG/DL (ref 8.6–10.4)
CHLORIDE BLD-SCNC: 108 MMOL/L (ref 98–107)
CO2: 20 MMOL/L (ref 20–31)
CREAT SERPL-MCNC: 1.18 MG/DL (ref 0.7–1.2)
CREATININE URINE: 68.6 MG/DL (ref 39–259)
ESTIMATED AVERAGE GLUCOSE: 163 MG/DL
GFR AFRICAN AMERICAN: >60 ML/MIN
GFR NON-AFRICAN AMERICAN: >60 ML/MIN
GFR SERPL CREATININE-BSD FRML MDRD: ABNORMAL ML/MIN/{1.73_M2}
GFR SERPL CREATININE-BSD FRML MDRD: ABNORMAL ML/MIN/{1.73_M2}
GLUCOSE BLD-MCNC: 236 MG/DL (ref 70–99)
HBA1C MFR BLD: 7.3 % (ref 4–6)
MICROALBUMIN/CREAT 24H UR: <12 MG/L
MICROALBUMIN/CREAT UR-RTO: NORMAL MCG/MG CREAT
POTASSIUM SERPL-SCNC: 4.9 MMOL/L (ref 3.7–5.3)
SODIUM BLD-SCNC: 144 MMOL/L (ref 135–144)

## 2018-10-22 ENCOUNTER — OFFICE VISIT (OUTPATIENT)
Dept: INTERNAL MEDICINE CLINIC | Age: 58
End: 2018-10-22
Payer: COMMERCIAL

## 2018-10-22 VITALS
DIASTOLIC BLOOD PRESSURE: 62 MMHG | WEIGHT: 268 LBS | SYSTOLIC BLOOD PRESSURE: 104 MMHG | BODY MASS INDEX: 32.63 KG/M2 | HEIGHT: 76 IN

## 2018-10-22 DIAGNOSIS — I10 ESSENTIAL HYPERTENSION: ICD-10-CM

## 2018-10-22 DIAGNOSIS — Z87.891 PERSONAL HISTORY OF TOBACCO USE: ICD-10-CM

## 2018-10-22 DIAGNOSIS — F17.200 SMOKER: ICD-10-CM

## 2018-10-22 DIAGNOSIS — E11.42 TYPE 2 DIABETES MELLITUS WITH DIABETIC POLYNEUROPATHY, WITH LONG-TERM CURRENT USE OF INSULIN (HCC): ICD-10-CM

## 2018-10-22 DIAGNOSIS — E08.42 DIABETIC POLYNEUROPATHY ASSOCIATED WITH DIABETES MELLITUS DUE TO UNDERLYING CONDITION (HCC): Primary | ICD-10-CM

## 2018-10-22 DIAGNOSIS — Z79.4 TYPE 2 DIABETES MELLITUS WITH DIABETIC POLYNEUROPATHY, WITH LONG-TERM CURRENT USE OF INSULIN (HCC): ICD-10-CM

## 2018-10-22 DIAGNOSIS — E03.9 ACQUIRED HYPOTHYROIDISM: ICD-10-CM

## 2018-10-22 DIAGNOSIS — L97.922 NON-HEALING ULCER OF LOWER LEG, LEFT, WITH FAT LAYER EXPOSED (HCC): ICD-10-CM

## 2018-10-22 PROCEDURE — 90471 IMMUNIZATION ADMIN: CPT | Performed by: INTERNAL MEDICINE

## 2018-10-22 PROCEDURE — G8598 ASA/ANTIPLAT THER USED: HCPCS | Performed by: INTERNAL MEDICINE

## 2018-10-22 PROCEDURE — 3017F COLORECTAL CA SCREEN DOC REV: CPT | Performed by: INTERNAL MEDICINE

## 2018-10-22 PROCEDURE — 90686 IIV4 VACC NO PRSV 0.5 ML IM: CPT | Performed by: INTERNAL MEDICINE

## 2018-10-22 PROCEDURE — G8417 CALC BMI ABV UP PARAM F/U: HCPCS | Performed by: INTERNAL MEDICINE

## 2018-10-22 PROCEDURE — 99214 OFFICE O/P EST MOD 30 MIN: CPT | Performed by: INTERNAL MEDICINE

## 2018-10-22 PROCEDURE — 2022F DILAT RTA XM EVC RTNOPTHY: CPT | Performed by: INTERNAL MEDICINE

## 2018-10-22 PROCEDURE — 1036F TOBACCO NON-USER: CPT | Performed by: INTERNAL MEDICINE

## 2018-10-22 PROCEDURE — G0296 VISIT TO DETERM LDCT ELIG: HCPCS | Performed by: INTERNAL MEDICINE

## 2018-10-22 PROCEDURE — G8427 DOCREV CUR MEDS BY ELIG CLIN: HCPCS | Performed by: INTERNAL MEDICINE

## 2018-10-22 PROCEDURE — 3045F PR MOST RECENT HEMOGLOBIN A1C LEVEL 7.0-9.0%: CPT | Performed by: INTERNAL MEDICINE

## 2018-10-22 PROCEDURE — G8482 FLU IMMUNIZE ORDER/ADMIN: HCPCS | Performed by: INTERNAL MEDICINE

## 2018-10-22 RX ORDER — BUDESONIDE AND FORMOTEROL FUMARATE DIHYDRATE 160; 4.5 UG/1; UG/1
2 AEROSOL RESPIRATORY (INHALATION) 2 TIMES DAILY
Qty: 3 INHALER | Refills: 3 | Status: SHIPPED | OUTPATIENT
Start: 2018-10-22 | End: 2019-10-29 | Stop reason: SDUPTHER

## 2018-10-22 ASSESSMENT — ENCOUNTER SYMPTOMS
SHORTNESS OF BREATH: 0
DIARRHEA: 0
TROUBLE SWALLOWING: 0
COLOR CHANGE: 0
COUGH: 0
EYE DISCHARGE: 0
BLOOD IN STOOL: 0
EYE PAIN: 0
ABDOMINAL DISTENTION: 0
WHEEZING: 0

## 2018-10-22 NOTE — PROGRESS NOTES
Ct uSubjective:      Visit Information    Have you changed or started any medications since your last visit including any over-the-counter medicines, vitamins, or herbal medicines? no   Have you stopped taking any of your medications? Is so, why? -  no  Are you having any side effects from any of your medications? - no    Have you seen any other physician or provider since your last visit?  no   Have you had any other diagnostic tests since your last visit?  no   Have you been seen in the emergency room and/or had an admission in a hospital since we last saw you?  no   Have you had your routine dental cleaning in the past 6 months?  no     Do you have an active MyChart account? If no, what is the barrier? Yes    Patient Care Team:  Boni Webber MD as PCP - Wilner Basilio MD as PCP - S Attributed Provider  Kike De La Torre MD as Consulting Physician (Infectious Diseases)    Medical History Review  Past Medical, Family, and Social History reviewed and does not contribute to the patient presenting condition    Health Maintenance   Topic Date Due    HIV screen  01/07/1975    Shingles Vaccine (1 of 2 - 2 Dose Series) 01/07/2010    Low dose CT lung screening  01/07/2015    Diabetic retinal exam  01/04/2017    Flu vaccine (1) 09/01/2018    DTaP/Tdap/Td vaccine (1 - Tdap) 03/05/2019 (Originally 1/7/1979)    Lipid screen  03/09/2019    TSH testing  03/09/2019    Diabetic foot exam  10/11/2019    A1C test (Diabetic or Prediabetic)  10/18/2019    Diabetic microalbuminuria test  10/18/2019    Potassium monitoring  10/18/2019    Creatinine monitoring  10/18/2019    Colon cancer screen colonoscopy  01/23/2027    Pneumococcal med risk  Completed    Hepatitis C screen  Completed     Vaccine Information Sheet, \"Influenza - Inactivated\"  given to Colletta Marry, or parent/legal guardian of  Colletta Marry and verbalized understanding.     Patient responses:    Have you ever had a reaction to a  OTHER SURGICAL HISTORY      left stump revision 5/30/2018    PA RE-AMPUTATION LOWER LEG Left 5/30/2018    LEG AMPUTATION BELOW KNEE REVISION performed by Craig Choi MD at [address] Bilateral     TOE AMPUTATION      Right 2 and 4     Family History   Problem Relation Age of Onset    Diabetes Mother     Hypertension Mother     Cancer Mother         Stomach    Hypertension Father     Cancer Father         Kidney    Alcohol Abuse Father     Diabetes Brother      Current Outpatient Prescriptions   Medication Sig Dispense Refill    isosorbide mononitrate (IMDUR) 30 MG extended release tablet TAKE 1 TABLET BY MOUTH DAILY 30 tablet 2    pregabalin (LYRICA) 100 MG capsule Take 1 capsule by mouth 3 times daily for 30 days. . 90 capsule 0    levothyroxine (SYNTHROID) 175 MCG tablet TAKE 1 TABLET BY MOUTH DAILY 60 tablet 3    blood glucose test strips (ASCENSIA AUTODISC VI;ONE TOUCH ULTRA TEST VI) strip 1 each by In Vitro route 4 times daily As needed.  400 each 3    atorvastatin (LIPITOR) 20 MG tablet TAKE 1 TABLET BY MOUTH DAILY 30 tablet 3    tamsulosin (FLOMAX) 0.4 MG capsule TAKE 1 CAPSULE DAILY 30 capsule 3    HUMALOG 100 UNIT/ML injection vial INJECT 12 UNITS UNDER THE SKIN 3 TIMES DAILY + SLIDING SCALE (TAKE 5 UNITS IF SUGAR IS OVER 150) 20 mL 3    SYMBICORT 160-4.5 MCG/ACT AERO INHALE 2 PUFFS TWICE DAILY BY MOUTH 10.2 Inhaler 3    insulin NPH (HUMULIN N;NOVOLIN N) 100 UNIT/ML injection vial Inject 70 Units into the skin nightly      insulin lispro (HUMALOG KWIKPEN) 100 UNIT/ML pen Blood Sugar - 151 to 199 = 2 Units , Blood Sugar - 200 to 249 = 4 units , Blood Sugar - 250  to 299 = 6 Units , Blood Sugar - 300 to 349 = 8 Units , Blood Sugar - 350 to 399 = 10 Units , Blood Sugar - 400 to 449 = 12 Units , Blood Sugar - > 450 - 15 Units and call Physician or go to ER 5 pen 3    insulin NPH (HUMULIN N) 100 UNIT/ML injection vial Inject 20 Units into the skin 2 times daily (before meals) (Patient taking differently: Inject 50 Units into the skin every morning Taking 60 units qam and 70 unites qpm) 30 mL 1    losartan (COZAAR) 100 MG tablet TAKE 1 TABLET BY MOUTH ONCE DAILY 30 tablet 11    rOPINIRole (REQUIP) 2 MG tablet TAKE 1 TABLET BY MOUTH ONCE DAILY 30 tablet 11    gemfibrozil (LOPID) 600 MG tablet TAKE 1 TABLET BY MOUTH ONCE DAILY 30 tablet 11    urea (CARMOL) 20 % cream Apply topically as needed. 480 g 3    JANUMET  MG per tablet TAKE (1) TABLET TWICE A DAY WITH MEALS 60 tablet 11    TRUEPLUS INSULIN SYRINGE 31G X 5/16\" 1 ML MISC USE AS DIRECTED WITH HUMULIN AND HUMALOG INSULIN 5 TIMES DAILY 100 each 11    Omega-3 Fatty Acids (FISH OIL CONCENTRATE) 300 MG CAPS Take 300 mg by mouth daily      Glucosamine Sulfate 500 MG TABS Take 500 mg by mouth 3 times daily      aspirin 81 MG tablet Take 81 mg by mouth daily      albuterol (PROVENTIL HFA;VENTOLIN HFA) 108 (90 BASE) MCG/ACT inhaler Inhale 2 puffs into the lungs every 6 hours as needed. No current facility-administered medications for this visit. ALLERGIES:  No Known Allergies    Social History   Substance Use Topics    Smoking status: Former Smoker     Packs/day: 2.00     Years: 25.00     Types: Cigars     Quit date: 5/29/2011    Smokeless tobacco: Never Used    Alcohol use 0.0 oz/week      Comment: social and not that often      Body mass index is 32.64 kg/m².   /62   Ht 6' 3.98\" (1.93 m)   Wt 268 lb (121.6 kg)   BMI 32.64 kg/m²     Lab Results   Component Value Date     10/18/2018    K 4.9 10/18/2018     10/18/2018    CO2 20 10/18/2018    BUN 23 10/18/2018    CREATININE 1.18 10/18/2018    GLUCOSE 236 10/18/2018    CALCIUM 8.9 10/18/2018    PROT 7.5 09/19/2016    LABALBU 4.0 09/19/2016    BILITOT 0.41 09/19/2016    ALKPHOS 166 09/19/2016    AST 21 09/19/2016    ALT 19 09/19/2016       Lab Results   Component Value Date    WBC 6.6 06/01/2018    RBC 4.67 Smoker                                                              Packs/day: 2.00      Years: 25.00        Types: Cigars     Quit date: 5/29/2011  Smokeless tobacco: Never Used                      Alcohol use: Yes           0.0 oz/week     Comment: social and not that often   Pack years: 50     Standing Status:   Future     Standing Expiration Date:   10/22/2019     Order Specific Question:   Is there documentation of shared decision making? Answer:   Yes     Order Specific Question:   Is this a low dose CT or a routine CT? Answer:   Low Dose CT [1]     Order Specific Question:   Is this the first (baseline) CT or an annual exam?     Answer: Annual [2]     Order Specific Question:   Does the patient show any signs or symptoms of lung cancer? Answer:   No     Order Specific Question:   Smoking Status? Answer: Former Smoker [4]     Order Specific Question:   Date quit smoking? Answer:   5/29/2011     Order Specific Question:   Smoking packs per day? Answer:   2     Order Specific Question:   Years smoking? Answer:   25    INFLUENZA, QUADV, 3 YRS AND OLDER, IM, PF, PREFILL SYR OR SDV, 0.5ML (FLUZONE QUADV, PF)    Hemoglobin A1C     Standing Status:   Future     Standing Expiration Date:   10/22/2019    Microalbumin, Ur     Standing Status:   Future     Standing Expiration Date:   10/22/2019    [place] VISIT TO DISCUSS LUNG CA SCREEN W LDCT     novolin N  60in am and  70 at night  Lunch supper  12 novolog plus scale          Renny Calvin Jaime MD    Low Dose CT (LDCT) Lung Screening criteria met   Age 50-69   Pack year smoking >30   Still smoking or less than 15 year since quit   No sign or symptoms of lung cancer   > 11 months since last LDCT     Risks and benefits of lung cancer screening with LDCT scans discussed:    Significance of positive screen - False-positive LDCT results often occur. 95% of all positive results do not lead to a diagnosis of cancer.  Usually further imaging can

## 2018-10-30 DIAGNOSIS — E11.41 DIABETIC MONONEUROPATHY ASSOCIATED WITH TYPE 2 DIABETES MELLITUS (HCC): ICD-10-CM

## 2018-10-30 NOTE — TELEPHONE ENCOUNTER
Medication Requested: Diane    Last visit:10/22/2018  Next visit: 2019  Last refill:  2018    Med contract on file:  [x] yes   [] no    Last urine drug screen: 17  Consistent with medication(s):    [] yes   [] no    Last OARRS ran: ?     Quantity of medication remainin days    Who will be picking rx up: self    Pharmacy [facility] escribed: [facility]

## 2018-10-31 RX ORDER — PREGABALIN 100 MG/1
100 CAPSULE ORAL 3 TIMES DAILY
Qty: 90 CAPSULE | Refills: 0 | Status: SHIPPED | OUTPATIENT
Start: 2018-10-31 | End: 2018-11-27 | Stop reason: SDUPTHER

## 2018-11-01 ENCOUNTER — HOSPITAL ENCOUNTER (OUTPATIENT)
Dept: CT IMAGING | Age: 58
Discharge: HOME | End: 2018-11-03
Payer: COMMERCIAL

## 2018-11-01 DIAGNOSIS — Z87.891 PERSONAL HISTORY OF TOBACCO USE: ICD-10-CM

## 2018-11-01 PROCEDURE — G0297 LDCT FOR LUNG CA SCREEN: HCPCS

## 2018-11-02 ENCOUNTER — OFFICE VISIT (OUTPATIENT)
Dept: INTERNAL MEDICINE CLINIC | Age: 58
End: 2018-11-02
Payer: COMMERCIAL

## 2018-11-02 VITALS
WEIGHT: 272 LBS | BODY MASS INDEX: 33.12 KG/M2 | SYSTOLIC BLOOD PRESSURE: 98 MMHG | HEIGHT: 76 IN | DIASTOLIC BLOOD PRESSURE: 50 MMHG

## 2018-11-02 DIAGNOSIS — E03.9 ACQUIRED HYPOTHYROIDISM: ICD-10-CM

## 2018-11-02 DIAGNOSIS — Z79.4 TYPE 2 DIABETES MELLITUS WITH DIABETIC POLYNEUROPATHY, WITH LONG-TERM CURRENT USE OF INSULIN (HCC): ICD-10-CM

## 2018-11-02 DIAGNOSIS — E08.42 DIABETIC POLYNEUROPATHY ASSOCIATED WITH DIABETES MELLITUS DUE TO UNDERLYING CONDITION (HCC): ICD-10-CM

## 2018-11-02 DIAGNOSIS — J40 TRACHEOBRONCHITIS: Primary | ICD-10-CM

## 2018-11-02 DIAGNOSIS — E11.42 TYPE 2 DIABETES MELLITUS WITH DIABETIC POLYNEUROPATHY, WITH LONG-TERM CURRENT USE OF INSULIN (HCC): ICD-10-CM

## 2018-11-02 PROCEDURE — G8482 FLU IMMUNIZE ORDER/ADMIN: HCPCS | Performed by: INTERNAL MEDICINE

## 2018-11-02 PROCEDURE — 1036F TOBACCO NON-USER: CPT | Performed by: INTERNAL MEDICINE

## 2018-11-02 PROCEDURE — G8417 CALC BMI ABV UP PARAM F/U: HCPCS | Performed by: INTERNAL MEDICINE

## 2018-11-02 PROCEDURE — G8427 DOCREV CUR MEDS BY ELIG CLIN: HCPCS | Performed by: INTERNAL MEDICINE

## 2018-11-02 PROCEDURE — 99214 OFFICE O/P EST MOD 30 MIN: CPT | Performed by: INTERNAL MEDICINE

## 2018-11-02 PROCEDURE — 3045F PR MOST RECENT HEMOGLOBIN A1C LEVEL 7.0-9.0%: CPT | Performed by: INTERNAL MEDICINE

## 2018-11-02 PROCEDURE — 3017F COLORECTAL CA SCREEN DOC REV: CPT | Performed by: INTERNAL MEDICINE

## 2018-11-02 PROCEDURE — 2022F DILAT RTA XM EVC RTNOPTHY: CPT | Performed by: INTERNAL MEDICINE

## 2018-11-02 PROCEDURE — G8598 ASA/ANTIPLAT THER USED: HCPCS | Performed by: INTERNAL MEDICINE

## 2018-11-02 RX ORDER — BENZONATATE 200 MG/1
200 CAPSULE ORAL 3 TIMES DAILY PRN
Qty: 21 CAPSULE | Refills: 0 | Status: SHIPPED | OUTPATIENT
Start: 2018-11-02 | End: 2018-11-09

## 2018-11-02 RX ORDER — CEPHALEXIN 500 MG/1
500 CAPSULE ORAL 3 TIMES DAILY
Qty: 21 CAPSULE | Refills: 0 | Status: SHIPPED | OUTPATIENT
Start: 2018-11-02 | End: 2018-11-09

## 2018-11-02 ASSESSMENT — PATIENT HEALTH QUESTIONNAIRE - PHQ9
SUM OF ALL RESPONSES TO PHQ9 QUESTIONS 1 & 2: 0
SUM OF ALL RESPONSES TO PHQ QUESTIONS 1-9: 0
SUM OF ALL RESPONSES TO PHQ QUESTIONS 1-9: 0
1. LITTLE INTEREST OR PLEASURE IN DOING THINGS: 0
2. FEELING DOWN, DEPRESSED OR HOPELESS: 0

## 2018-11-02 ASSESSMENT — ENCOUNTER SYMPTOMS
SHORTNESS OF BREATH: 0
COUGH: 0
WHEEZING: 0
EYE DISCHARGE: 0
TROUBLE SWALLOWING: 0
BLOOD IN STOOL: 0
EYE PAIN: 0
ABDOMINAL DISTENTION: 0
COLOR CHANGE: 0
DIARRHEA: 0

## 2018-11-02 NOTE — PROGRESS NOTES
Constipation     PSA elevation     CAD (coronary artery disease)     Cellulitis and abscess of left leg     Type 2 diabetes mellitus with left diabetic foot infection (HCC)     Acute hematogenous osteomyelitis of left tibia (HCC)     Non-healing ulcer of lower leg, left, with fat layer exposed ([address]     Osteomyelitis of left tibia (HCC)     Chronic pain of left knee     Acute hematogenous osteomyelitis of left fibula (HCC)     Essential hypertension     Olecranon bursitis of right elbow     Osteomyelitis of left leg (HCC)     Past Medical History:   Diagnosis Date    CAD (coronary artery disease)     Charcot foot due to diabetes mellitus ([address]     COPD (chronic obstructive pulmonary disease) ([address]     Diabetes mellitus ([address]     Diabetic neuropathy ([address]     Foot ulcer ([address]     Hyperlipidemia     Hypertension     Hypoglycemia 4/2/2015    MRSA (methicillin resistant staph aureus) culture positive 4/16/2015    ankle    OA (osteoarthritis)     Osteomyelitis (HCC)     left stump  BKA    Other disorders of kidney and ureter in diseases classified elsewhere     Paroxysmal atrial fibrillation ([address]     Thyroid disease     Wears dentures     upper    Wears glasses       Past Surgical History:   Procedure Laterality Date    CARDIAC CATHETERIZATION      no stenting    COLONOSCOPY  06/17/2016    poor prep, done up to the IC valve, redundant colon    COLONOSCOPY  01/23/2017    VIRTUAL COLONOSCOPY DONE-no polyps or masses    FINGER AMPUTATION Right     middle    FOOT SURGERY Bilateral     r-bone removed    HC  PICC [facility] DOUBLE  5/21/2018         LEG AMPUTATION BELOW KNEE Left 4/29/15    OTHER SURGICAL HISTORY Left 5-5-15 and 11/2015    Revision BKA    OTHER SURGICAL HISTORY      left stump revision 5/30/2018    VA RE-AMPUTATION LOWER LEG Left 5/30/2018    LEG AMPUTATION BELOW KNEE REVISION performed by Kishore Maguire MD at [address] Bilateral     TOE 14.4 06/01/2018    HCT 42.5 06/01/2018    MCV 91.1 06/01/2018    MCH 30.9 06/01/2018    MCHC 33.9 06/01/2018    RDW 15.1 06/01/2018     06/01/2018    MPV 9.7 06/01/2018       Lab Results   Component Value Date    LABA1C 7.3 10/18/2018       Lab Results   Component Value Date    HDL 47 03/09/2018    LDLCHOLESTEROL 56 03/09/2018       Assessment:       Diagnosis Orders   1. Tracheobronchitis  cephALEXin (KEFLEX) 500 MG capsule    benzonatate (TESSALON) 200 MG capsule   2. Type 2 diabetes mellitus with diabetic polyneuropathy, with long-term current use of insulin ([facility] [address]     3. Diabetic polyneuropathy associated with diabetes mellitus due to underlying condition ([facility] [address]     4. Acquired hypothyroidism  TSH With Reflex Ft4           Plan:      No Follow-up on file.   Orders Placed This Encounter   Procedures    TSH With Reflex Ft4     Standing Status:   Future     Standing Expiration Date:   11/2/2019     Orders Placed This Encounter   Medications    cephALEXin (KEFLEX) 500 MG capsule     Sig: Take 1 capsule by mouth 3 times daily for 7 days     Dispense:  21 capsule     Refill:  0    benzonatate (TESSALON) 200 MG capsule     Sig: Take 1 capsule by mouth 3 times daily as needed for Cough     Dispense:  21 capsule     Refill:  0     raepteat tsh may need to but back on synthroid          Willie Norwood MD

## 2018-11-05 RX ORDER — LEVOTHYROXINE SODIUM 175 UG/1
175 TABLET ORAL DAILY
Qty: 90 TABLET | Refills: 3 | Status: SHIPPED | OUTPATIENT
Start: 2018-11-05 | End: 2019-01-23 | Stop reason: SDUPTHER

## 2018-11-05 NOTE — TELEPHONE ENCOUNTER
Medication: levothyroxine  Last visit: 11/02/18  Next visit: 12/6/2018  Last refill: 09/2018  [facility]

## 2018-11-12 DIAGNOSIS — N13.8 BENIGN PROSTATIC HYPERPLASIA WITH URINARY OBSTRUCTION: ICD-10-CM

## 2018-11-12 DIAGNOSIS — N40.1 BENIGN PROSTATIC HYPERPLASIA WITH URINARY OBSTRUCTION: ICD-10-CM

## 2018-11-13 RX ORDER — TAMSULOSIN HYDROCHLORIDE 0.4 MG/1
0.4 CAPSULE ORAL DAILY
Qty: 90 CAPSULE | Refills: 3 | Status: SHIPPED | OUTPATIENT
Start: 2018-11-13 | End: 2019-11-20 | Stop reason: SDUPTHER

## 2018-11-13 RX ORDER — ATORVASTATIN CALCIUM 20 MG/1
20 TABLET, FILM COATED ORAL DAILY
Qty: 90 TABLET | Refills: 3 | Status: SHIPPED | OUTPATIENT
Start: 2018-11-13 | End: 2019-11-20 | Stop reason: SDUPTHER

## 2018-11-27 DIAGNOSIS — E11.41 DIABETIC MONONEUROPATHY ASSOCIATED WITH TYPE 2 DIABETES MELLITUS (HCC): ICD-10-CM

## 2018-11-28 RX ORDER — PREGABALIN 100 MG/1
100 CAPSULE ORAL 3 TIMES DAILY
Qty: 90 CAPSULE | Refills: 0 | Status: SHIPPED | OUTPATIENT
Start: 2018-11-28 | End: 2018-12-19 | Stop reason: SDUPTHER

## 2018-12-13 ENCOUNTER — OFFICE VISIT (OUTPATIENT)
Dept: PODIATRY | Age: 58
End: 2018-12-13
Payer: COMMERCIAL

## 2018-12-13 VITALS — BODY MASS INDEX: 32.27 KG/M2 | HEIGHT: 76 IN | WEIGHT: 265 LBS

## 2018-12-13 DIAGNOSIS — M79.674 PAIN OF TOE OF RIGHT FOOT: ICD-10-CM

## 2018-12-13 DIAGNOSIS — Z79.4 TYPE 2 DIABETES MELLITUS WITH DIABETIC POLYNEUROPATHY, WITH LONG-TERM CURRENT USE OF INSULIN (HCC): ICD-10-CM

## 2018-12-13 DIAGNOSIS — E11.51 TYPE 2 DIABETES MELLITUS WITH PERIPHERAL VASCULAR DISEASE (HCC): ICD-10-CM

## 2018-12-13 DIAGNOSIS — B35.1 ONYCHOMYCOSIS OF TOENAIL: Primary | ICD-10-CM

## 2018-12-13 DIAGNOSIS — E11.42 TYPE 2 DIABETES MELLITUS WITH DIABETIC POLYNEUROPATHY, WITH LONG-TERM CURRENT USE OF INSULIN (HCC): ICD-10-CM

## 2018-12-13 PROCEDURE — 11720 DEBRIDE NAIL 1-5: CPT | Performed by: PODIATRIST

## 2018-12-13 PROCEDURE — 99999 PR OFFICE/OUTPT VISIT,PROCEDURE ONLY: CPT | Performed by: PODIATRIST

## 2018-12-13 ASSESSMENT — ENCOUNTER SYMPTOMS
SHORTNESS OF BREATH: 0
NAUSEA: 0
COLOR CHANGE: 0
BACK PAIN: 0
DIARRHEA: 0

## 2018-12-19 DIAGNOSIS — E11.41 DIABETIC MONONEUROPATHY ASSOCIATED WITH TYPE 2 DIABETES MELLITUS (HCC): ICD-10-CM

## 2018-12-19 NOTE — TELEPHONE ENCOUNTER
Medication Requested: allan    Last visit: 11/02/18  Next visit: 1/23/2019  Last refill: 11/28/18    Med contract on file:  [x] yes   [] no    Last urine drug screen: 07/26/17  Consistent with medication(s):    [] yes   [] no    Last OARRS ran: unsure    Quantity of medication remaining: until the 12/28    Who will be picking rx up: self    [facility]

## 2018-12-20 RX ORDER — PREGABALIN 100 MG/1
100 CAPSULE ORAL 3 TIMES DAILY
Qty: 90 CAPSULE | Refills: 0 | Status: SHIPPED | OUTPATIENT
Start: 2018-12-20 | End: 2019-01-23 | Stop reason: SDUPTHER

## 2018-12-21 NOTE — TELEPHONE ENCOUNTER
rx was printed and not signed, writer called into [employer] and printed rx destroyed in presence of Itz Whitaker, [address].

## 2019-01-07 ENCOUNTER — TELEPHONE (OUTPATIENT)
Dept: INTERNAL MEDICINE CLINIC | Age: 59
End: 2019-01-07

## 2019-01-07 DIAGNOSIS — L03.313 CELLULITIS OF CHEST WALL: Primary | ICD-10-CM

## 2019-01-08 RX ORDER — CLINDAMYCIN HYDROCHLORIDE 300 MG/1
300 CAPSULE ORAL 3 TIMES DAILY
Qty: 30 CAPSULE | Refills: 0 | Status: SHIPPED | OUTPATIENT
Start: 2019-01-08 | End: 2019-04-18 | Stop reason: SDUPTHER

## 2019-01-15 ENCOUNTER — HOSPITAL ENCOUNTER (OUTPATIENT)
Age: 59
Setting detail: SPECIMEN
Discharge: HOME | End: 2019-01-15
Payer: COMMERCIAL

## 2019-01-15 DIAGNOSIS — Z79.4 TYPE 2 DIABETES MELLITUS WITH DIABETIC POLYNEUROPATHY, WITH LONG-TERM CURRENT USE OF INSULIN (HCC): ICD-10-CM

## 2019-01-15 DIAGNOSIS — E11.42 TYPE 2 DIABETES MELLITUS WITH DIABETIC POLYNEUROPATHY, WITH LONG-TERM CURRENT USE OF INSULIN (HCC): ICD-10-CM

## 2019-01-15 LAB
CREATININE URINE: 85.3 MG/DL (ref 39–259)
ESTIMATED AVERAGE GLUCOSE: 154 MG/DL
HBA1C MFR BLD: 7 % (ref 4–6)
MICROALBUMIN/CREAT 24H UR: 15 MG/L
MICROALBUMIN/CREAT UR-RTO: 18 MCG/MG CREAT

## 2019-01-15 RX ORDER — ISOSORBIDE MONONITRATE 30 MG/1
30 TABLET, EXTENDED RELEASE ORAL DAILY
Qty: 90 TABLET | Refills: 3 | Status: SHIPPED | OUTPATIENT
Start: 2019-01-15 | End: 2020-01-16

## 2019-01-16 ENCOUNTER — HOSPITAL ENCOUNTER (OUTPATIENT)
Age: 59
Setting detail: SPECIMEN
Discharge: HOME | End: 2019-01-16
Payer: COMMERCIAL

## 2019-01-16 DIAGNOSIS — E03.9 ACQUIRED HYPOTHYROIDISM: ICD-10-CM

## 2019-01-16 LAB
THYROXINE, FREE: 1.54 NG/DL (ref 0.93–1.7)
TSH SERPL DL<=0.05 MIU/L-ACNC: 0.06 MIU/L (ref 0.3–5)

## 2019-01-23 ENCOUNTER — OFFICE VISIT (OUTPATIENT)
Dept: INTERNAL MEDICINE CLINIC | Age: 59
End: 2019-01-23
Payer: COMMERCIAL

## 2019-01-23 VITALS
SYSTOLIC BLOOD PRESSURE: 130 MMHG | WEIGHT: 272 LBS | HEIGHT: 76 IN | BODY MASS INDEX: 33.12 KG/M2 | DIASTOLIC BLOOD PRESSURE: 70 MMHG

## 2019-01-23 DIAGNOSIS — E08.42 DIABETIC POLYNEUROPATHY ASSOCIATED WITH DIABETES MELLITUS DUE TO UNDERLYING CONDITION (HCC): ICD-10-CM

## 2019-01-23 DIAGNOSIS — L97.922 NON-HEALING ULCER OF LOWER LEG, LEFT, WITH FAT LAYER EXPOSED (HCC): ICD-10-CM

## 2019-01-23 DIAGNOSIS — E11.41 DIABETIC MONONEUROPATHY ASSOCIATED WITH TYPE 2 DIABETES MELLITUS (HCC): ICD-10-CM

## 2019-01-23 DIAGNOSIS — E11.628 TYPE 2 DIABETES MELLITUS WITH LEFT DIABETIC FOOT INFECTION (HCC): ICD-10-CM

## 2019-01-23 DIAGNOSIS — L02.92 BOIL: ICD-10-CM

## 2019-01-23 DIAGNOSIS — E78.00 PURE HYPERCHOLESTEROLEMIA: ICD-10-CM

## 2019-01-23 DIAGNOSIS — E03.9 ACQUIRED HYPOTHYROIDISM: ICD-10-CM

## 2019-01-23 DIAGNOSIS — L08.9 TYPE 2 DIABETES MELLITUS WITH LEFT DIABETIC FOOT INFECTION (HCC): ICD-10-CM

## 2019-01-23 DIAGNOSIS — E11.42 TYPE 2 DIABETES MELLITUS WITH DIABETIC POLYNEUROPATHY, UNSPECIFIED WHETHER LONG TERM INSULIN USE (HCC): Primary | ICD-10-CM

## 2019-01-23 DIAGNOSIS — I73.9 PVD (PERIPHERAL VASCULAR DISEASE) (HCC): ICD-10-CM

## 2019-01-23 DIAGNOSIS — I10 ESSENTIAL HYPERTENSION: ICD-10-CM

## 2019-01-23 DIAGNOSIS — Z89.512 STATUS POST BELOW KNEE AMPUTATION OF LEFT LOWER EXTREMITY: ICD-10-CM

## 2019-01-23 PROCEDURE — G8427 DOCREV CUR MEDS BY ELIG CLIN: HCPCS | Performed by: INTERNAL MEDICINE

## 2019-01-23 PROCEDURE — 2022F DILAT RTA XM EVC RTNOPTHY: CPT | Performed by: INTERNAL MEDICINE

## 2019-01-23 PROCEDURE — 1036F TOBACCO NON-USER: CPT | Performed by: INTERNAL MEDICINE

## 2019-01-23 PROCEDURE — 99214 OFFICE O/P EST MOD 30 MIN: CPT | Performed by: INTERNAL MEDICINE

## 2019-01-23 PROCEDURE — 3017F COLORECTAL CA SCREEN DOC REV: CPT | Performed by: INTERNAL MEDICINE

## 2019-01-23 PROCEDURE — G8417 CALC BMI ABV UP PARAM F/U: HCPCS | Performed by: INTERNAL MEDICINE

## 2019-01-23 PROCEDURE — 3045F PR MOST RECENT HEMOGLOBIN A1C LEVEL 7.0-9.0%: CPT | Performed by: INTERNAL MEDICINE

## 2019-01-23 PROCEDURE — G8482 FLU IMMUNIZE ORDER/ADMIN: HCPCS | Performed by: INTERNAL MEDICINE

## 2019-01-23 PROCEDURE — G8598 ASA/ANTIPLAT THER USED: HCPCS | Performed by: INTERNAL MEDICINE

## 2019-01-23 RX ORDER — PREGABALIN 100 MG/1
100 CAPSULE ORAL 3 TIMES DAILY
Qty: 90 CAPSULE | Refills: 0 | Status: SHIPPED | OUTPATIENT
Start: 2019-01-23 | End: 2019-02-25 | Stop reason: SDUPTHER

## 2019-01-23 RX ORDER — LEVOTHYROXINE SODIUM 0.15 MG/1
150 TABLET ORAL DAILY
Qty: 90 TABLET | Refills: 1 | Status: SHIPPED | OUTPATIENT
Start: 2019-01-23 | End: 2019-07-18 | Stop reason: SDUPTHER

## 2019-01-23 RX ORDER — DOXYCYCLINE HYCLATE 100 MG/1
100 CAPSULE ORAL 2 TIMES DAILY
Qty: 20 CAPSULE | Refills: 0 | Status: SHIPPED | OUTPATIENT
Start: 2019-01-23 | End: 2019-02-02

## 2019-01-23 ASSESSMENT — PATIENT HEALTH QUESTIONNAIRE - PHQ9
SUM OF ALL RESPONSES TO PHQ9 QUESTIONS 1 & 2: 0
SUM OF ALL RESPONSES TO PHQ QUESTIONS 1-9: 0
2. FEELING DOWN, DEPRESSED OR HOPELESS: 0
SUM OF ALL RESPONSES TO PHQ QUESTIONS 1-9: 0
1. LITTLE INTEREST OR PLEASURE IN DOING THINGS: 0

## 2019-01-23 ASSESSMENT — ENCOUNTER SYMPTOMS
COLOR CHANGE: 0
COUGH: 0
ABDOMINAL DISTENTION: 0
DIARRHEA: 0
TROUBLE SWALLOWING: 0
SHORTNESS OF BREATH: 0
EYE PAIN: 0
EYE DISCHARGE: 0
BLOOD IN STOOL: 0
WHEEZING: 0

## 2019-02-05 RX ORDER — SYRINGE AND NEEDLE,INSULIN,1ML 31 GX5/16"
SYRINGE, EMPTY DISPOSABLE MISCELLANEOUS
Qty: 100 EACH | Refills: 3 | Status: SHIPPED | OUTPATIENT
Start: 2019-02-05 | End: 2020-01-27

## 2019-02-15 ENCOUNTER — OFFICE VISIT (OUTPATIENT)
Dept: PODIATRY | Age: 59
End: 2019-02-15
Payer: COMMERCIAL

## 2019-02-15 VITALS — WEIGHT: 270 LBS | BODY MASS INDEX: 32.88 KG/M2 | HEIGHT: 76 IN

## 2019-02-15 DIAGNOSIS — E11.42 TYPE 2 DIABETES MELLITUS WITH DIABETIC POLYNEUROPATHY, WITH LONG-TERM CURRENT USE OF INSULIN (HCC): ICD-10-CM

## 2019-02-15 DIAGNOSIS — E11.51 TYPE 2 DIABETES MELLITUS WITH PERIPHERAL VASCULAR DISEASE (HCC): ICD-10-CM

## 2019-02-15 DIAGNOSIS — Z79.4 TYPE 2 DIABETES MELLITUS WITH DIABETIC POLYNEUROPATHY, WITH LONG-TERM CURRENT USE OF INSULIN (HCC): ICD-10-CM

## 2019-02-15 DIAGNOSIS — B35.1 ONYCHOMYCOSIS OF TOENAIL: Primary | ICD-10-CM

## 2019-02-15 DIAGNOSIS — M79.674 PAIN OF TOE OF RIGHT FOOT: ICD-10-CM

## 2019-02-15 PROCEDURE — 11720 DEBRIDE NAIL 1-5: CPT | Performed by: PODIATRIST

## 2019-02-15 PROCEDURE — 99999 PR OFFICE/OUTPT VISIT,PROCEDURE ONLY: CPT | Performed by: PODIATRIST

## 2019-02-15 ASSESSMENT — ENCOUNTER SYMPTOMS
DIARRHEA: 0
BACK PAIN: 0
NAUSEA: 0
SHORTNESS OF BREATH: 0
COLOR CHANGE: 0

## 2019-02-25 DIAGNOSIS — E11.41 DIABETIC MONONEUROPATHY ASSOCIATED WITH TYPE 2 DIABETES MELLITUS (HCC): ICD-10-CM

## 2019-02-26 RX ORDER — PREGABALIN 100 MG/1
100 CAPSULE ORAL 3 TIMES DAILY
Qty: 90 CAPSULE | Refills: 3 | Status: SHIPPED | OUTPATIENT
Start: 2019-02-26 | End: 2019-04-04 | Stop reason: SDUPTHER

## 2019-02-28 RX ORDER — INSULIN HUMAN 100 [IU]/ML
INJECTION, SUSPENSION SUBCUTANEOUS
Qty: 30 ML | Refills: 11 | Status: SHIPPED | OUTPATIENT
Start: 2019-02-28 | End: 2019-09-24 | Stop reason: DRUGHIGH

## 2019-03-22 RX ORDER — SITAGLIPTIN AND METFORMIN HYDROCHLORIDE 1000; 50 MG/1; MG/1
TABLET, FILM COATED ORAL
Qty: 60 TABLET | Refills: 11 | Status: SHIPPED | OUTPATIENT
Start: 2019-03-22 | End: 2020-05-22

## 2019-03-25 RX ORDER — LOSARTAN POTASSIUM 100 MG/1
TABLET ORAL
Qty: 30 TABLET | Refills: 10 | Status: SHIPPED | OUTPATIENT
Start: 2019-03-25 | End: 2019-11-21

## 2019-03-25 RX ORDER — GEMFIBROZIL 600 MG/1
TABLET, FILM COATED ORAL
Qty: 30 TABLET | Refills: 10 | Status: SHIPPED | OUTPATIENT
Start: 2019-03-25 | End: 2019-12-17 | Stop reason: SDUPTHER

## 2019-03-25 RX ORDER — ROPINIROLE 2 MG/1
TABLET, FILM COATED ORAL
Qty: 30 TABLET | Refills: 10 | Status: SHIPPED | OUTPATIENT
Start: 2019-03-25 | End: 2019-12-17 | Stop reason: SDUPTHER

## 2019-04-01 DIAGNOSIS — E11.41 DIABETIC MONONEUROPATHY ASSOCIATED WITH TYPE 2 DIABETES MELLITUS (HCC): ICD-10-CM

## 2019-04-01 DIAGNOSIS — Z02.83 ENCOUNTER FOR DRUG SCREENING: Primary | ICD-10-CM

## 2019-04-01 NOTE — TELEPHONE ENCOUNTER
Medication Requested:  Diane    Last visit:  1/23/2019  Next visit: 4/23/2019  Last refill: : 2/26/2019    Med contract on file:  [x] yes   [] no    Last urine drug screen: 7/26/17 will order future lab  Consistent with medication(s):    [x] yes   [] no    Last OARRS ran: ?   Quantity of medication remaining: 3  Who will be picking rx up: self    Pharmacy if escribed: [facility] [place]

## 2019-04-04 RX ORDER — PREGABALIN 100 MG/1
100 CAPSULE ORAL 3 TIMES DAILY
Qty: 90 CAPSULE | Refills: 3 | Status: SHIPPED | OUTPATIENT
Start: 2019-04-04 | End: 2019-07-01 | Stop reason: SDUPTHER

## 2019-04-17 ENCOUNTER — TELEPHONE (OUTPATIENT)
Dept: INTERNAL MEDICINE CLINIC | Age: 59
End: 2019-04-17

## 2019-04-17 DIAGNOSIS — L03.313 CELLULITIS OF CHEST WALL: ICD-10-CM

## 2019-04-17 NOTE — TELEPHONE ENCOUNTER
Sick Call    Mae Duane   1960   [id number]   Chuck Doe MD   No Known Allergies     Last Visit: 1/23/19  Reason for No Same Day Appt: does not want to come in with this    Complaint:patient states that he is sure he has MRSA on his buttocks. States he has open sores and they are seeping. He is prone to this often, last time was in January. Requesting atbx. States that Nina usually gives him clindamycin.       Pharmacy: [facility]

## 2019-04-18 RX ORDER — CLINDAMYCIN HYDROCHLORIDE 300 MG/1
300 CAPSULE ORAL 3 TIMES DAILY
Qty: 30 CAPSULE | Refills: 0 | Status: SHIPPED | OUTPATIENT
Start: 2019-04-18 | End: 2019-04-28

## 2019-04-26 ENCOUNTER — OFFICE VISIT (OUTPATIENT)
Dept: PODIATRY | Age: 59
End: 2019-04-26
Payer: COMMERCIAL

## 2019-04-26 VITALS — HEIGHT: 76 IN | WEIGHT: 270 LBS | BODY MASS INDEX: 32.88 KG/M2

## 2019-04-26 DIAGNOSIS — Z79.4 TYPE 2 DIABETES MELLITUS WITH DIABETIC POLYNEUROPATHY, WITH LONG-TERM CURRENT USE OF INSULIN (HCC): ICD-10-CM

## 2019-04-26 DIAGNOSIS — B35.1 ONYCHOMYCOSIS OF TOENAIL: Primary | ICD-10-CM

## 2019-04-26 DIAGNOSIS — M79.674 PAIN OF TOE OF RIGHT FOOT: ICD-10-CM

## 2019-04-26 DIAGNOSIS — E11.42 TYPE 2 DIABETES MELLITUS WITH DIABETIC POLYNEUROPATHY, WITH LONG-TERM CURRENT USE OF INSULIN (HCC): ICD-10-CM

## 2019-04-26 DIAGNOSIS — E11.51 TYPE 2 DIABETES MELLITUS WITH PERIPHERAL VASCULAR DISEASE (HCC): ICD-10-CM

## 2019-04-26 PROCEDURE — 99999 PR OFFICE/OUTPT VISIT,PROCEDURE ONLY: CPT | Performed by: PODIATRIST

## 2019-04-26 PROCEDURE — 11720 DEBRIDE NAIL 1-5: CPT | Performed by: PODIATRIST

## 2019-04-26 RX ORDER — LANOLIN ALCOHOL/MO/W.PET/CERES
1 CREAM (GRAM) TOPICAL DAILY
COMMUNITY
End: 2019-10-30 | Stop reason: CLARIF

## 2019-04-26 RX ORDER — LEVOTHYROXINE SODIUM 175 UG/1
TABLET ORAL
COMMUNITY
Start: 2019-02-20 | End: 2019-04-26 | Stop reason: DRUGHIGH

## 2019-04-26 ASSESSMENT — ENCOUNTER SYMPTOMS
COLOR CHANGE: 0
DIARRHEA: 0
BACK PAIN: 0
SHORTNESS OF BREATH: 0
NAUSEA: 0

## 2019-04-26 NOTE — PROGRESS NOTES
SUBJECTIVE: Verner Embs is a 61 y.o. male who returns to the office with chief complaint of painful fungal toenails. Patient relates toe nails are thickened/difficult to trim as well as painful with ambulation and with shoe gear. Chief Complaint   Patient presents with    Nail Problem     B/L DIABETIC FOOT CHECK/NAIL TRIM    Diabetes     B/L DIABETIC FOOT CHECK/BLOODSUGAR: 80    Other     PCP: LAST VISIT 01/23/2019 DR Joel Valdovinos     Review of Systems   Constitutional: Negative for activity change, appetite change, chills, diaphoresis, fatigue and fever. Respiratory: Negative for shortness of breath. Cardiovascular: Negative for leg swelling. Gastrointestinal: Negative for diarrhea and nausea. Endocrine: Negative for cold intolerance, heat intolerance and polyuria. Musculoskeletal: Positive for arthralgias and gait problem. Negative for back pain, joint swelling and myalgias. Skin: Negative for color change, pallor, rash and wound. Allergic/Immunologic: Negative for environmental allergies and food allergies. Neurological: Positive for numbness. Negative for dizziness, weakness and light-headedness. Hematological: Does not bruise/bleed easily. Psychiatric/Behavioral: Negative for behavioral problems, confusion and self-injury. The patient is not nervous/anxious. OBJECTIVE: Clinical evaluation of patient reveals nails 1,4,5 of the right foot to present with thickness, elongation, discoloration, brittleness, and subungual debris. There was pain with palpation and debridement of the toenails of the right foot. The right DP pulse is not palpable.    The right PT pulse is not palpable.    Protective sensation is absent to the right plantar foot as noted with a 5.07 [place] monofilament. Glucose: 82 mg/dl. ASSESSMENT:    Diagnosis Orders   1. Onychomycosis of toenail  MN DEBRIDEMENT OF NAIL(S), 1-5    HM DIABETES FOOT EXAM   2.  Pain of toe of right foot  MN DEBRIDEMENT OF NAIL(S), 1-5    HM DIABETES FOOT EXAM   3. Type 2 diabetes mellitus with peripheral vascular disease (HCC)  MI DEBRIDEMENT OF NAIL(S), 1-5    HM DIABETES FOOT EXAM   4. Type 2 diabetes mellitus with diabetic polyneuropathy, with long-term current use of insulin (HCC)  MI DEBRIDEMENT OF NAIL(S), 1-5    HM DIABETES FOOT EXAM     PLAN: Toenails 1,4,5 of the right foot were debrided in length and thickness using a nail nipper and a . Return in about 2 months (around 6/28/2019) for At risk diabetic foot care.    4/26/2019      Donal Arora DPM

## 2019-05-01 ENCOUNTER — HOSPITAL ENCOUNTER (OUTPATIENT)
Age: 59
Setting detail: SPECIMEN
Discharge: HOME | End: 2019-05-01
Payer: COMMERCIAL

## 2019-05-01 DIAGNOSIS — E11.42 TYPE 2 DIABETES MELLITUS WITH DIABETIC POLYNEUROPATHY, UNSPECIFIED WHETHER LONG TERM INSULIN USE (HCC): ICD-10-CM

## 2019-05-01 DIAGNOSIS — E11.41 DIABETIC MONONEUROPATHY ASSOCIATED WITH TYPE 2 DIABETES MELLITUS (HCC): ICD-10-CM

## 2019-05-01 LAB
ESTIMATED AVERAGE GLUCOSE: 154 MG/DL
HBA1C MFR BLD: 7 % (ref 4–6)

## 2019-05-13 ENCOUNTER — OFFICE VISIT (OUTPATIENT)
Dept: INTERNAL MEDICINE CLINIC | Age: 59
End: 2019-05-13
Payer: COMMERCIAL

## 2019-05-13 VITALS
HEIGHT: 76 IN | WEIGHT: 278 LBS | DIASTOLIC BLOOD PRESSURE: 70 MMHG | BODY MASS INDEX: 33.85 KG/M2 | SYSTOLIC BLOOD PRESSURE: 118 MMHG

## 2019-05-13 DIAGNOSIS — E08.42 DIABETIC POLYNEUROPATHY ASSOCIATED WITH DIABETES MELLITUS DUE TO UNDERLYING CONDITION (HCC): ICD-10-CM

## 2019-05-13 DIAGNOSIS — E78.00 PURE HYPERCHOLESTEROLEMIA: ICD-10-CM

## 2019-05-13 DIAGNOSIS — E03.9 ACQUIRED HYPOTHYROIDISM: ICD-10-CM

## 2019-05-13 DIAGNOSIS — E11.628 TYPE 2 DIABETES MELLITUS WITH LEFT DIABETIC FOOT INFECTION (HCC): Primary | ICD-10-CM

## 2019-05-13 DIAGNOSIS — L08.9 TYPE 2 DIABETES MELLITUS WITH LEFT DIABETIC FOOT INFECTION (HCC): Primary | ICD-10-CM

## 2019-05-13 PROCEDURE — G8427 DOCREV CUR MEDS BY ELIG CLIN: HCPCS | Performed by: INTERNAL MEDICINE

## 2019-05-13 PROCEDURE — 99214 OFFICE O/P EST MOD 30 MIN: CPT | Performed by: INTERNAL MEDICINE

## 2019-05-13 PROCEDURE — G8598 ASA/ANTIPLAT THER USED: HCPCS | Performed by: INTERNAL MEDICINE

## 2019-05-13 PROCEDURE — 1036F TOBACCO NON-USER: CPT | Performed by: INTERNAL MEDICINE

## 2019-05-13 PROCEDURE — 2022F DILAT RTA XM EVC RTNOPTHY: CPT | Performed by: INTERNAL MEDICINE

## 2019-05-13 PROCEDURE — G8417 CALC BMI ABV UP PARAM F/U: HCPCS | Performed by: INTERNAL MEDICINE

## 2019-05-13 PROCEDURE — 3045F PR MOST RECENT HEMOGLOBIN A1C LEVEL 7.0-9.0%: CPT | Performed by: INTERNAL MEDICINE

## 2019-05-13 PROCEDURE — 3017F COLORECTAL CA SCREEN DOC REV: CPT | Performed by: INTERNAL MEDICINE

## 2019-05-13 ASSESSMENT — ENCOUNTER SYMPTOMS
COLOR CHANGE: 0
WHEEZING: 0
DIARRHEA: 0
EYE DISCHARGE: 0
SHORTNESS OF BREATH: 0
TROUBLE SWALLOWING: 0
ABDOMINAL DISTENTION: 0
EYE PAIN: 0
BLOOD IN STOOL: 0
COUGH: 0

## 2019-05-20 ENCOUNTER — HOSPITAL ENCOUNTER (OUTPATIENT)
Dept: VASCULAR LAB | Age: 59
Discharge: HOME | End: 2019-05-20
Payer: COMMERCIAL

## 2019-05-20 DIAGNOSIS — E08.42 DIABETIC POLYNEUROPATHY ASSOCIATED WITH DIABETES MELLITUS DUE TO UNDERLYING CONDITION (HCC): ICD-10-CM

## 2019-05-20 DIAGNOSIS — E11.628 TYPE 2 DIABETES MELLITUS WITH LEFT DIABETIC FOOT INFECTION (HCC): ICD-10-CM

## 2019-05-20 DIAGNOSIS — L08.9 TYPE 2 DIABETES MELLITUS WITH LEFT DIABETIC FOOT INFECTION (HCC): ICD-10-CM

## 2019-05-20 DIAGNOSIS — E78.00 PURE HYPERCHOLESTEROLEMIA: ICD-10-CM

## 2019-05-20 PROCEDURE — 93880 EXTRACRANIAL BILAT STUDY: CPT

## 2019-05-21 ENCOUNTER — HOSPITAL ENCOUNTER (OUTPATIENT)
Age: 59
Setting detail: SPECIMEN
Discharge: HOME | End: 2019-05-21
Payer: COMMERCIAL

## 2019-05-21 DIAGNOSIS — E03.9 ACQUIRED HYPOTHYROIDISM: ICD-10-CM

## 2019-05-21 DIAGNOSIS — E78.00 PURE HYPERCHOLESTEROLEMIA: ICD-10-CM

## 2019-05-21 LAB
CHOLESTEROL/HDL RATIO: 3
CHOLESTEROL: 134 MG/DL
HDLC SERPL-MCNC: 44 MG/DL
LDL CHOLESTEROL: 68 MG/DL (ref 0–130)
TRIGL SERPL-MCNC: 109 MG/DL
TSH SERPL DL<=0.05 MIU/L-ACNC: 1.33 MIU/L (ref 0.3–5)
VLDLC SERPL CALC-MCNC: NORMAL MG/DL (ref 1–30)

## 2019-05-29 ENCOUNTER — NURSE ONLY (OUTPATIENT)
Dept: PODIATRY | Age: 59
End: 2019-05-29

## 2019-06-03 ENCOUNTER — TELEPHONE (OUTPATIENT)
Dept: INTERNAL MEDICINE CLINIC | Age: 59
End: 2019-06-03

## 2019-06-03 DIAGNOSIS — R94.39 ABNORMAL CAROTID DUPLEX SCAN: Primary | ICD-10-CM

## 2019-06-03 NOTE — TELEPHONE ENCOUNTER
Pt called & is requesting results of carotid scan. Pt states he is sleeping all the time & is concerned. Please advise.

## 2019-06-10 NOTE — TELEPHONE ENCOUNTER
Ref to dr Neida Foster pt has more than 50 percent block  May nee3d surg vs watch  referigng to phu for opinion

## 2019-06-11 ENCOUNTER — NURSE ONLY (OUTPATIENT)
Dept: PODIATRY | Age: 59
End: 2019-06-11
Payer: COMMERCIAL

## 2019-06-11 DIAGNOSIS — E11.51 TYPE 2 DIABETES MELLITUS WITH PERIPHERAL VASCULAR DISEASE (HCC): Primary | ICD-10-CM

## 2019-06-11 DIAGNOSIS — E11.42 TYPE 2 DIABETES MELLITUS WITH DIABETIC POLYNEUROPATHY, WITH LONG-TERM CURRENT USE OF INSULIN (HCC): ICD-10-CM

## 2019-06-11 DIAGNOSIS — Z79.4 TYPE 2 DIABETES MELLITUS WITH DIABETIC POLYNEUROPATHY, WITH LONG-TERM CURRENT USE OF INSULIN (HCC): ICD-10-CM

## 2019-06-11 DIAGNOSIS — R09.89 POOR CIRCULATION: ICD-10-CM

## 2019-06-11 PROCEDURE — A5513 MULTI DEN INSERT CUSTOM MOLD: HCPCS | Performed by: PODIATRIST

## 2019-06-11 PROCEDURE — A5500 DIAB SHOE FOR DENSITY INSERT: HCPCS | Performed by: PODIATRIST

## 2019-06-17 ENCOUNTER — OFFICE VISIT (OUTPATIENT)
Dept: INTERNAL MEDICINE CLINIC | Age: 59
End: 2019-06-17
Payer: COMMERCIAL

## 2019-06-17 VITALS
HEIGHT: 76 IN | SYSTOLIC BLOOD PRESSURE: 110 MMHG | BODY MASS INDEX: 33.63 KG/M2 | WEIGHT: 276.2 LBS | DIASTOLIC BLOOD PRESSURE: 68 MMHG

## 2019-06-17 DIAGNOSIS — I65.23 CAROTID STENOSIS, ASYMPTOMATIC, BILATERAL: ICD-10-CM

## 2019-06-17 DIAGNOSIS — E03.9 ACQUIRED HYPOTHYROIDISM: ICD-10-CM

## 2019-06-17 DIAGNOSIS — E78.00 PURE HYPERCHOLESTEROLEMIA: ICD-10-CM

## 2019-06-17 DIAGNOSIS — E11.42 TYPE 2 DIABETES MELLITUS WITH DIABETIC POLYNEUROPATHY, WITH LONG-TERM CURRENT USE OF INSULIN (HCC): Primary | ICD-10-CM

## 2019-06-17 DIAGNOSIS — Z89.512 STATUS POST BELOW KNEE AMPUTATION OF LEFT LOWER EXTREMITY: ICD-10-CM

## 2019-06-17 DIAGNOSIS — I73.9 PVD (PERIPHERAL VASCULAR DISEASE) (HCC): ICD-10-CM

## 2019-06-17 DIAGNOSIS — Z79.4 TYPE 2 DIABETES MELLITUS WITH DIABETIC POLYNEUROPATHY, WITH LONG-TERM CURRENT USE OF INSULIN (HCC): Primary | ICD-10-CM

## 2019-06-17 DIAGNOSIS — I10 ESSENTIAL HYPERTENSION: ICD-10-CM

## 2019-06-17 DIAGNOSIS — R53.83 OTHER FATIGUE: ICD-10-CM

## 2019-06-17 PROBLEM — E11.628 TYPE 2 DIABETES MELLITUS WITH LEFT DIABETIC FOOT INFECTION (HCC): Status: RESOLVED | Noted: 2018-05-04 | Resolved: 2019-06-17

## 2019-06-17 PROBLEM — L08.9 TYPE 2 DIABETES MELLITUS WITH LEFT DIABETIC FOOT INFECTION (HCC): Status: RESOLVED | Noted: 2018-05-04 | Resolved: 2019-06-17

## 2019-06-17 PROBLEM — M86.9 OSTEOMYELITIS OF LEFT TIBIA (HCC): Status: RESOLVED | Noted: 2018-05-18 | Resolved: 2019-06-17

## 2019-06-17 PROCEDURE — G8417 CALC BMI ABV UP PARAM F/U: HCPCS | Performed by: INTERNAL MEDICINE

## 2019-06-17 PROCEDURE — G8427 DOCREV CUR MEDS BY ELIG CLIN: HCPCS | Performed by: INTERNAL MEDICINE

## 2019-06-17 PROCEDURE — G8598 ASA/ANTIPLAT THER USED: HCPCS | Performed by: INTERNAL MEDICINE

## 2019-06-17 PROCEDURE — 3017F COLORECTAL CA SCREEN DOC REV: CPT | Performed by: INTERNAL MEDICINE

## 2019-06-17 PROCEDURE — 2022F DILAT RTA XM EVC RTNOPTHY: CPT | Performed by: INTERNAL MEDICINE

## 2019-06-17 PROCEDURE — 1036F TOBACCO NON-USER: CPT | Performed by: INTERNAL MEDICINE

## 2019-06-17 PROCEDURE — 3045F PR MOST RECENT HEMOGLOBIN A1C LEVEL 7.0-9.0%: CPT | Performed by: INTERNAL MEDICINE

## 2019-06-17 PROCEDURE — 99214 OFFICE O/P EST MOD 30 MIN: CPT | Performed by: INTERNAL MEDICINE

## 2019-06-17 ASSESSMENT — ENCOUNTER SYMPTOMS
COUGH: 0
BLOOD IN STOOL: 0
TROUBLE SWALLOWING: 0
EYE DISCHARGE: 0
SHORTNESS OF BREATH: 0
ABDOMINAL DISTENTION: 0
DIARRHEA: 0
EYE PAIN: 0
COLOR CHANGE: 0
WHEEZING: 0

## 2019-06-17 NOTE — PROGRESS NOTES
HENT: Negative for ear discharge and trouble swallowing. Eyes: Negative for pain and discharge. Respiratory: Negative for cough, shortness of breath and wheezing. Cardiovascular: Negative for chest pain and palpitations. Gastrointestinal: Negative for abdominal distention, blood in stool and diarrhea. Endocrine: Negative for polydipsia and polyphagia. Genitourinary: Negative for difficulty urinating and frequency. Musculoskeletal: Positive for arthralgias. Negative for gait problem, myalgias and neck pain. Skin: Negative for color change and rash. Allergic/Immunologic: Negative for environmental allergies and food allergies. Neurological: Positive for numbness. Negative for dizziness and headaches. Hematological: Negative for adenopathy. Does not bruise/bleed easily. Psychiatric/Behavioral: Negative for behavioral problems and sleep disturbance. Objective:   Physical Exam   Constitutional: He is oriented to person, place, and time. He appears well-developed and well-nourished. obese   HENT:   Head: Normocephalic and atraumatic. Eyes: Conjunctivae and EOM are normal. Right eye exhibits no discharge. Left eye exhibits no discharge. Right conjunctiva is not injected. Left conjunctiva is not injected. Right eye exhibits normal extraocular motion. Left eye exhibits normal extraocular motion. Neck: Normal range of motion. Neck supple. No JVD present. No edema and no erythema present. No thyroid mass and no thyromegaly present. Cardiovascular: Normal rate and regular rhythm. Exam reveals no friction rub. No murmur heard. Pulmonary/Chest: Effort normal and breath sounds normal. No accessory muscle usage. No tachypnea and no bradypnea. No respiratory distress. He has no wheezes. He has no rales. Abdominal: Soft. Bowel sounds are normal. He exhibits no distension. There is no tenderness. There is no rebound. Musculoskeletal: Normal range of motion.  He exhibits no edema or tenderness. Left bka     Lymphadenopathy:        Head (right side): No submental and no submandibular adenopathy present. Head (left side): No submental and no submandibular adenopathy present. He has no cervical adenopathy. Neurological: He is alert and oriented to person, place, and time. He displays no atrophy. No cranial nerve deficit or sensory deficit. He exhibits normal muscle tone. Coordination normal.   Skin: Skin is warm. No bruising, no ecchymosis and no rash noted. He is not diaphoretic. No pallor. Psychiatric: He has a normal mood and affect. His behavior is normal. His mood appears not anxious. His affect is not angry. His speech is not slurred. He is not aggressive. Cognition and memory are not impaired. He expresses no homicidal ideation. I have personally reviewed and agree with the patient GUIDO, ABIDA and Ul. Maddie Chinchilla is a 61 y.o. male who presents today for follow up on his chronic medical conditions as noted below.   Darlene Rice is c/o of   Chief Complaint   Patient presents with    1 Month 100 [facility]     labs in [facility]       Patient Active Problem List:     Anemia     Neuropathy in diabetes ([facility] [place] [address]     Charcot ankle     Hypothyroid     PVD (peripheral vascular disease) ([facility] [place] [address]     Below knee amputation status ([facility] [address]     Type 2 diabetes mellitus with diabetic polyneuropathy (HCC)     Pure hypercholesterolemia     Constipation     PSA elevation     CAD (coronary artery disease)     Cellulitis and abscess of left leg     Acute hematogenous osteomyelitis of left tibia (HCC)     Non-healing ulcer of lower leg, left, with fat layer exposed ([address]     Chronic pain of left knee     Acute hematogenous osteomyelitis of left fibula (HCC)     Essential hypertension     Olecranon bursitis of right elbow     Osteomyelitis of left leg (HCC)     Acquired hypothyroidism     Carotid stenosis, asymptomatic, bilateral     Past Medical History:   Diagnosis Date    CAD (coronary artery disease)     Charcot foot due to diabetes mellitus (HCC)     COPD (chronic obstructive pulmonary disease) (HCC)     Diabetes mellitus [facility]     Diabetic neuropathy (HCC)     Foot ulcer ([facility]     Hyperlipidemia     Hypertension     Hypoglycemia 4/2/2015    MRSA (methicillin resistant staph aureus) culture positive 4/16/2015    ankle    OA (osteoarthritis)     Osteomyelitis (HCC)     left stump  BKA    Other disorders of kidney and ureter in diseases classified elsewhere     Paroxysmal atrial fibrillation ([facility]     Thyroid disease     Wears dentures     upper    Wears glasses       Past Surgical History:   Procedure Laterality Date    CARDIAC CATHETERIZATION      no stenting    COLONOSCOPY  06/17/2016    poor prep, done up to the IC valve, redundant colon    COLONOSCOPY  01/23/2017    VIRTUAL COLONOSCOPY DONE-no polyps or masses    FINGER AMPUTATION Right     middle    FOOT SURGERY Bilateral     r-bone removed    HC  PICC POWERPICC DOUBLE  5/21/2018         LEG AMPUTATION BELOW KNEE Left 4/29/15    OTHER SURGICAL HISTORY Left 5-5-15 and 11/2015    Revision BKA    OTHER SURGICAL HISTORY      left stump revision 5/30/2018    [place] RE-AMPUTATION LOWER LEG Left 5/30/2018    LEG AMPUTATION BELOW KNEE REVISION performed by Moraima Herman MD at [address] Bilateral     TOE AMPUTATION      Right 2 and 4     Family History   Problem Relation Age of Onset    Diabetes Mother     Hypertension Mother     Cancer Mother         Stomach    Hypertension Father     Cancer Father         Kidney    Alcohol Abuse Father     Diabetes Brother      Current Outpatient Medications   Medication Sig Dispense Refill    glucosamine-chondroitin 500-400 MG tablet Take 1 tablet by mouth      pregabalin (LYRICA) 100 MG capsule Take 1 capsule by mouth 3 times daily for 120 days.  90 capsule 3    gemfibrozil (LOPID) 600 MG tablet TAKE 1 TABLET BY MOUTH ONCE DAILY 30 tablet 10    rOPINIRole (REQUIP) 2 MG tablet TAKE 1 TABLET BY MOUTH ONCE DAILY 30 tablet 10    losartan (COZAAR) 100 MG tablet TAKE 1 TABLET BY MOUTH ONCE DAILY 30 tablet 10    JANUMET  MG per tablet TAKE (1) TABLET TWICE A DAY WITH MEALS 60 tablet 11    HUMULIN N 100 UNIT/ML injection vial INJECT 50 UNITS EVERY MORNING AND 70 UNITS AT BEDTIME 30 mL 11    ULTICARE INSULIN SYRINGE 31G X 5/16\" 1 ML MISC USE AS DIRECTED WITH HUMULIN AND HUMALOG INSULIN 5 TIMES DAILY 100 each 3    levothyroxine (SYNTHROID) 150 MCG tablet Take 1 tablet by mouth daily 90 tablet 1    isosorbide mononitrate (IMDUR) 30 MG extended release tablet Take 1 tablet by mouth daily 90 tablet 3    tamsulosin (FLOMAX) 0.4 MG capsule Take 1 capsule by mouth daily 90 capsule 3    atorvastatin (LIPITOR) 20 MG tablet Take 1 tablet by mouth daily 90 tablet 3    budesonide-formoterol (SYMBICORT) 160-4.5 MCG/ACT AERO Inhale 2 puffs into the lungs 2 times daily 3 Inhaler 3    blood glucose test strips (ASCENSIA AUTODISC VI;ONE TOUCH ULTRA TEST VI) strip 1 each by In Vitro route 4 times daily As needed.  400 each 3    HUMALOG 100 UNIT/ML injection vial INJECT 12 UNITS UNDER THE SKIN 3 TIMES DAILY + SLIDING SCALE (TAKE 5 UNITS IF SUGAR IS OVER 150) 20 mL 3    insulin NPH (HUMULIN N;NOVOLIN N) 100 UNIT/ML injection vial Inject 70 Units into the skin nightly      insulin lispro (HUMALOG KWIKPEN) 100 UNIT/ML pen Blood Sugar - 151 to 199 = 2 Units , Blood Sugar - 200 to 249 = 4 units , Blood Sugar - 250  to 299 = 6 Units , Blood Sugar - 300 to 349 = 8 Units , Blood Sugar - 350 to 399 = 10 Units , Blood Sugar - 400 to 449 = 12 Units , Blood Sugar - > 450 - 15 Units and call Physician or go to ER 5 pen 3    insulin NPH (HUMULIN N) 100 UNIT/ML injection vial Inject 20 Units into the skin 2 times daily (before meals) (Patient taking differently: Inject 50 Units into the skin every morning Taking 60 units qam and 70 unites qpm) 30 mL 1    urea (CARMOL) 20 % cream Apply topically as needed. 480 g 3    Omega-3 Fatty Acids (FISH OIL CONCENTRATE) 300 MG CAPS Take 300 mg by mouth daily      Glucosamine Sulfate 500 MG TABS Take 500 mg by mouth 3 times daily      aspirin 81 MG tablet Take 81 mg by mouth daily      albuterol (PROVENTIL HFA;VENTOLIN HFA) 108 (90 BASE) MCG/ACT inhaler Inhale 2 puffs into the lungs every 6 hours as needed. No current facility-administered medications for this visit. ALLERGIES:  No Known Allergies    Social History     Tobacco Use    Smoking status: Former Smoker     Packs/day: 2.00     Years: 25.00     Pack years: 50.00     Types: Cigars     Last attempt to quit: 2011     Years since quittin.0    Smokeless tobacco: Never Used   Substance Use Topics    Alcohol use: Yes     Alcohol/week: 0.0 oz     Comment: social and not that often      Body mass index is 33.63 kg/m². /68 (Site: Left Upper Arm, Position: Sitting, Cuff Size: Large Adult)   Ht 6' 3.98\" (1.93 m)   Wt 276 lb 3.2 oz (125.3 kg)   BMI 33.63 kg/m²     Lab Results   Component Value Date     10/18/2018    K 4.9 10/18/2018     10/18/2018    CO2 20 10/18/2018    BUN 23 10/18/2018    CREATININE 1.18 10/18/2018    GLUCOSE 236 10/18/2018    CALCIUM 8.9 10/18/2018    PROT 7.5 2016    LABALBU 4.0 2016    BILITOT 0.41 2016    ALKPHOS 166 2016    AST 21 2016    ALT 19 2016       Lab Results   Component Value Date    WBC 6.6 2018    RBC 4.67 2018    HGB 14.4 2018    HCT 42.5 2018    MCV 91.1 2018    MCH 30.9 2018    MCHC 33.9 2018    RDW 15.1 2018     2018    MPV 9.7 2018       Lab Results   Component Value Date    LABA1C 7.0 2019       Lab Results   Component Value Date    HDL 44 2019    LDLCHOLESTEROL 68 2019       Assessment / Plan:       Diagnosis Orders   1.  Type 2 diabetes mellitus with diabetic polyneuropathy, with long-term current use of insulin ([address]     2. Pure hypercholesterolemia     3. Acquired hypothyroidism     4. Essential hypertension     5. Status post below knee amputation of left lower extremity (HCC)     6. Carotid stenosis, asymptomatic, bilateral     7. Other fatigue       No follow-ups on file. No orders of the defined types were placed in this encounter. No orders of the defined types were placed in this encounter.        fatiguee rule oout angina equivalent  Will do stress test  caroid scan done 50 percent blo  Vascular ref    tsh is good  a1c is 7.0  Lipid panel good controll  On low dose asa

## 2019-06-24 ENCOUNTER — INITIAL CONSULT (OUTPATIENT)
Dept: VASCULAR SURGERY | Age: 59
End: 2019-06-24
Payer: COMMERCIAL

## 2019-06-24 VITALS
BODY MASS INDEX: 33.64 KG/M2 | WEIGHT: 276.24 LBS | HEIGHT: 76 IN | TEMPERATURE: 97.6 F | DIASTOLIC BLOOD PRESSURE: 79 MMHG | SYSTOLIC BLOOD PRESSURE: 139 MMHG | HEART RATE: 81 BPM

## 2019-06-24 DIAGNOSIS — I65.23 CAROTID STENOSIS, ASYMPTOMATIC, BILATERAL: Primary | ICD-10-CM

## 2019-06-24 PROCEDURE — G8417 CALC BMI ABV UP PARAM F/U: HCPCS | Performed by: SURGERY

## 2019-06-24 PROCEDURE — 99243 OFF/OP CNSLTJ NEW/EST LOW 30: CPT | Performed by: SURGERY

## 2019-06-24 PROCEDURE — G8427 DOCREV CUR MEDS BY ELIG CLIN: HCPCS | Performed by: SURGERY

## 2019-06-24 ASSESSMENT — ENCOUNTER SYMPTOMS
COLOR CHANGE: 0
CHEST TIGHTNESS: 0
ALLERGIC/IMMUNOLOGIC NEGATIVE: 1
SHORTNESS OF BREATH: 0
ABDOMINAL PAIN: 0

## 2019-06-24 NOTE — PROGRESS NOTES
Division of Vascular Surgery        New Consult      Physician Requesting Consult:  Dr. Loretta Cardona    Reason for Consult:   Carotid stenosis    Chief Complaint:     Sleepy all the time    History of Present Illness:      Megan Saul is a 61 y.o. gentleman who presents with asymptomatic bilateral carotid stenosis. He had a carotid duplex done for workup of being tired all the time. He denies any unilateral weakness/numbness, facial droop, thick/slurred speech. He has some blurry vision, related to cataracts. He has long standing DM, has undergone toe amputation on the right and left BKA. Most recently revised due to an infection 1 year ago.      Medical History:     Past Medical History:   Diagnosis Date    CAD (coronary artery disease)     Charcot foot due to diabetes mellitus ([address]     [facility]     Diabetes mellitus ([address]     Diabetic neuropathy ([address]     Foot ulcer ([address]     Hyperlipidemia     Hypertension     Hypoglycemia 4/2/2015    MRSA (methicillin resistant staph aureus) culture positive 4/16/2015    ankle    OA (osteoarthritis)     Osteomyelitis ([facility])     left stump  BKA    Other disorders of kidney and ureter in diseases classified elsewhere     Paroxysmal atrial fibrillation ([address]     Thyroid disease     Wears dentures     upper    Wears glasses        Surgical History:     Past Surgical History:   Procedure Laterality Date    CARDIAC CATHETERIZATION      no stenting    COLONOSCOPY  06/17/2016    poor prep, done up to the IC valve, redundant colon    COLONOSCOPY  01/23/2017    VIRTUAL COLONOSCOPY DONE-no polyps or masses    FINGER AMPUTATION Right     middle    FOOT SURGERY Bilateral     r-bone removed    HC  PICC [address] DOUBLE  5/21/2018         LEG AMPUTATION BELOW KNEE Left 4/29/15    OTHER SURGICAL HISTORY Left 5-5-15 and 11/2015    Revision BKA    OTHER SURGICAL HISTORY      left stump revision 5/30/2018    [place] RE-AMPUTATION LOWER LEG Left 5/30/2018    LEG AMPUTATION BELOW KNEE REVISION performed by Leonard Cook MD at [address] Bilateral     TOE AMPUTATION      Right 2 and 4       Family History:     Family History   Problem Relation Age of Onset    Diabetes Mother     Hypertension Mother     Cancer Mother         Stomach    Hypertension Father     Cancer Father         Kidney    Alcohol Abuse Father     Diabetes Brother        Allergies:       Patient has no known allergies. Medications:      Current Outpatient Medications   Medication Sig Dispense Refill    glucosamine-chondroitin 500-400 MG tablet Take 1 tablet by mouth      pregabalin (LYRICA) 100 MG capsule Take 1 capsule by mouth 3 times daily for 120 days.  90 capsule 3    gemfibrozil (LOPID) 600 MG tablet TAKE 1 TABLET BY MOUTH ONCE DAILY 30 tablet 10    rOPINIRole (REQUIP) 2 MG tablet TAKE 1 TABLET BY MOUTH ONCE DAILY 30 tablet 10    losartan (COZAAR) 100 MG tablet TAKE 1 TABLET BY MOUTH ONCE DAILY 30 tablet 10    JANUMET  MG per tablet TAKE (1) TABLET TWICE A DAY WITH MEALS 60 tablet 11    HUMULIN N 100 UNIT/ML injection vial INJECT 50 UNITS EVERY MORNING AND 70 UNITS AT BEDTIME 30 mL 11    ULTICARE INSULIN SYRINGE 31G X 5/16\" 1 ML MISC USE AS DIRECTED WITH HUMULIN AND HUMALOG INSULIN 5 TIMES DAILY 100 each 3    levothyroxine (SYNTHROID) 150 MCG tablet Take 1 tablet by mouth daily 90 tablet 1    isosorbide mononitrate (IMDUR) 30 MG extended release tablet Take 1 tablet by mouth daily 90 tablet 3    tamsulosin (FLOMAX) 0.4 MG capsule Take 1 capsule by mouth daily 90 capsule 3    atorvastatin (LIPITOR) 20 MG tablet Take 1 tablet by mouth daily 90 tablet 3    budesonide-formoterol (SYMBICORT) 160-4.5 MCG/ACT AERO Inhale 2 puffs into the lungs 2 times daily 3 Inhaler 3    blood glucose test strips (ASCENSIA AUTODISC VI;ONE TOUCH ULTRA TEST VI) strip 1 each by In Vitro route 4 times daily As needed. 400 each 3    HUMALOG 100 UNIT/ML injection vial INJECT 12 UNITS UNDER THE SKIN 3 TIMES DAILY + SLIDING SCALE (TAKE 5 UNITS IF SUGAR IS OVER 150) 20 mL 3    insulin NPH (HUMULIN N;NOVOLIN N) 100 UNIT/ML injection vial Inject 70 Units into the skin nightly      insulin lispro (HUMALOG KWIKPEN) 100 UNIT/ML pen Blood Sugar - 151 to 199 = 2 Units , Blood Sugar - 200 to 249 = 4 units , Blood Sugar - 250  to 299 = 6 Units , Blood Sugar - 300 to 349 = 8 Units , Blood Sugar - 350 to 399 = 10 Units , Blood Sugar - 400 to 449 = 12 Units , Blood Sugar - > 450 - 15 Units and call Physician or go to ER 5 pen 3    insulin NPH (HUMULIN N) 100 UNIT/ML injection vial Inject 20 Units into the skin 2 times daily (before meals) (Patient taking differently: Inject 50 Units into the skin every morning Taking 60 units qam and 70 unites qpm) 30 mL 1    urea (CARMOL) 20 % cream Apply topically as needed. 480 g 3    Omega-3 Fatty Acids (FISH OIL CONCENTRATE) 300 MG CAPS Take 300 mg by mouth daily      Glucosamine Sulfate 500 MG TABS Take 500 mg by mouth 3 times daily      aspirin 81 MG tablet Take 81 mg by mouth daily      albuterol (PROVENTIL HFA;VENTOLIN HFA) 108 (90 BASE) MCG/ACT inhaler Inhale 2 puffs into the lungs every 6 hours as needed. No current facility-administered medications for this visit. Social History:     Tobacco:    reports that he quit smoking about 8 years ago. His smoking use included cigars. He has a 50.00 pack-year smoking history. He has never used smokeless tobacco.  Alcohol:      reports that he drinks alcohol. Drug Use:  reports that he does not use drugs. Review of Systems:     Review of Systems   Constitutional: Negative for chills and fever. HENT: Negative. Eyes: Positive for visual disturbance. Respiratory: Negative for chest tightness and shortness of breath. Cardiovascular: Negative for chest pain and leg swelling. Gastrointestinal: Negative for abdominal pain. for asymptomatic carotid stenosis when it is 80-99% for best stroke reduction benefits along with optimal medical therapy    Optimal medical management is an important part of overall treatment of all patients with carotid bifurcation disease, regardless of the degree of stenosis or the plan for intervention. This therapy is directed both at the reduction of stroke and overall cardiovascular events, including cardiovascular-related mortality. Clinical guidelines issued by the American Heart Association and the American Stroke Association recommends:    I. Lowering blood pressure to a target 140/90 mm Hg by lifestyle interventions and antihypertensive treatment is recommended in individuals who have hypertension with asymptomatic carotid atherosclerosis. II. Glucose control to nearly normoglycemic levels (target hemoglobin A1C 7%) is recommended among diabetic patients to reduce microvascular complications and, with lesser certainty, macrovascular complications other than stroke. III. Patients with known atherosclerosis have demonstrated reduced stroke rates when treated with lipid-lowering therapy. And continued low dose statin will help stabilize plaque and decrease the inflammatory response of atherosclerosis. IV. Smoking nearly doubles the risk of stroke and with cessation there is a reduction in the risk of stroke. V. Antiplatelet therapy in asymptomatic patients with carotid atherosclerosis is recommended to reduce overall cardiovascular morbidity although it has not been shown to be effective in the primary prevention of stroke. Electronically signed by John Wisdom MD on 6/24/19 at 11:00 AM      [address] [place] [address],[address] [facility]: [phone]  C: [phone]  Email: [email or web address]

## 2019-06-25 ENCOUNTER — TELEPHONE (OUTPATIENT)
Dept: INTERNAL MEDICINE CLINIC | Age: 59
End: 2019-06-25

## 2019-06-25 DIAGNOSIS — I25.119 CORONARY ARTERY DISEASE WITH ANGINA PECTORIS, UNSPECIFIED VESSEL OR LESION TYPE, UNSPECIFIED WHETHER NATIVE OR TRANSPLANTED HEART (HCC): Primary | ICD-10-CM

## 2019-06-25 NOTE — TELEPHONE ENCOUNTER
Per [facility], dx codes used to place order for stress test by Dr. Maria Del Carmen Hagen are not approved by pts ins Medicare. Writer placed new orders using CAD as a new dx which is approved.

## 2019-06-27 DIAGNOSIS — I25.119 CORONARY ARTERY DISEASE WITH ANGINA PECTORIS, UNSPECIFIED VESSEL OR LESION TYPE, UNSPECIFIED WHETHER NATIVE OR TRANSPLANTED HEART (HCC): Primary | ICD-10-CM

## 2019-06-28 ENCOUNTER — HOSPITAL ENCOUNTER (OUTPATIENT)
Dept: NUCLEAR MEDICINE | Age: 59
Discharge: HOME | End: 2019-06-30
Payer: COMMERCIAL

## 2019-06-28 ENCOUNTER — HOSPITAL ENCOUNTER (OUTPATIENT)
Dept: NON INVASIVE DIAGNOSTICS | Age: 59
Discharge: HOME | End: 2019-06-28
Payer: COMMERCIAL

## 2019-06-28 VITALS — WEIGHT: 275 LBS | HEIGHT: 76 IN | BODY MASS INDEX: 33.49 KG/M2

## 2019-06-28 VITALS — HEART RATE: 76 BPM

## 2019-06-28 DIAGNOSIS — I25.119 CORONARY ARTERY DISEASE WITH ANGINA PECTORIS, UNSPECIFIED VESSEL OR LESION TYPE, UNSPECIFIED WHETHER NATIVE OR TRANSPLANTED HEART (HCC): ICD-10-CM

## 2019-06-28 LAB
LV EF: 40 %
LVEF MODALITY: NORMAL

## 2019-06-28 PROCEDURE — 78452 HT MUSCLE IMAGE SPECT MULT: CPT

## 2019-06-28 PROCEDURE — 2580000003 HC RX 258: Performed by: INTERNAL MEDICINE

## 2019-06-28 PROCEDURE — 93017 CV STRESS TEST TRACING ONLY: CPT

## 2019-06-28 PROCEDURE — A9500 TC99M SESTAMIBI: HCPCS | Performed by: INTERNAL MEDICINE

## 2019-06-28 PROCEDURE — 3430000000 HC RX DIAGNOSTIC RADIOPHARMACEUTICAL: Performed by: INTERNAL MEDICINE

## 2019-06-28 PROCEDURE — 6360000002 HC RX W HCPCS: Performed by: INTERNAL MEDICINE

## 2019-06-28 RX ORDER — SODIUM CHLORIDE 0.9 % (FLUSH) 0.9 %
10 SYRINGE (ML) INJECTION PRN
Status: ACTIVE | OUTPATIENT
Start: 2019-06-28 | End: 2019-06-28

## 2019-06-28 RX ORDER — ATROPINE SULFATE 0.1 MG/ML
0.5 INJECTION INTRAVENOUS EVERY 5 MIN PRN
Status: ACTIVE | OUTPATIENT
Start: 2019-06-28 | End: 2019-06-28

## 2019-06-28 RX ORDER — NITROGLYCERIN 0.4 MG/1
0.4 TABLET SUBLINGUAL EVERY 5 MIN PRN
Status: ACTIVE | OUTPATIENT
Start: 2019-06-28 | End: 2019-06-28

## 2019-06-28 RX ORDER — SODIUM CHLORIDE 9 MG/ML
500 INJECTION, SOLUTION INTRAVENOUS CONTINUOUS PRN
Status: ACTIVE | OUTPATIENT
Start: 2019-06-28 | End: 2019-06-28

## 2019-06-28 RX ORDER — METOPROLOL TARTRATE 5 MG/5ML
5 INJECTION INTRAVENOUS EVERY 5 MIN PRN
Status: ACTIVE | OUTPATIENT
Start: 2019-06-28 | End: 2019-06-28

## 2019-06-28 RX ORDER — SODIUM CHLORIDE 0.9 % (FLUSH) 0.9 %
10 SYRINGE (ML) INJECTION PRN
Status: DISCONTINUED | OUTPATIENT
Start: 2019-06-28 | End: 2019-07-01 | Stop reason: HOSPADM

## 2019-06-28 RX ORDER — AMINOPHYLLINE DIHYDRATE 25 MG/ML
50 INJECTION, SOLUTION INTRAVENOUS PRN
Status: ACTIVE | OUTPATIENT
Start: 2019-06-28 | End: 2019-06-28

## 2019-06-28 RX ORDER — ALBUTEROL SULFATE 90 UG/1
2 AEROSOL, METERED RESPIRATORY (INHALATION) PRN
Status: ACTIVE | OUTPATIENT
Start: 2019-06-28 | End: 2019-06-28

## 2019-06-28 RX ADMIN — Medication 10 ML: at 07:07

## 2019-06-28 RX ADMIN — Medication 10 ML: at 08:34

## 2019-06-28 RX ADMIN — Medication 10 ML: at 07:08

## 2019-06-28 RX ADMIN — TETRAKIS(2-METHOXYISOBUTYLISOCYANIDE)COPPER(I) TETRAFLUOROBORATE 11.3 MILLICURIE: 1 INJECTION, POWDER, LYOPHILIZED, FOR SOLUTION INTRAVENOUS at 07:08

## 2019-06-28 RX ADMIN — Medication 10 ML: at 10:14

## 2019-06-28 RX ADMIN — TETRAKIS(2-METHOXYISOBUTYLISOCYANIDE)COPPER(I) TETRAFLUOROBORATE 35.6 MILLICURIE: 1 INJECTION, POWDER, LYOPHILIZED, FOR SOLUTION INTRAVENOUS at 10:15

## 2019-06-28 RX ADMIN — REGADENOSON 0.4 MG: 0.08 INJECTION, SOLUTION INTRAVENOUS at 10:14

## 2019-06-28 NOTE — PROGRESS NOTES
PATIENT EXPLAINED LEXISCNA STRESS TEST AND CONSENT SIGNED, PLACED ON ECG AND VITLAS MACHINE, IV FLUSHED WITH NORMAL SALINE, PATIENT DENIES CHEST PAIN AND SHORTNESS OF BREATH AT THIS TIME.   PATIENT IS NSR ON MONITOR, /78, HR 76

## 2019-06-28 NOTE — PROGRESS NOTES
LEXISCAN COMPLETED, /59, HR 88, PATIENT C/O SHORTNESS OF BREATH, CAFFEINE GIVEN AFTER ONE MINUTE AND HE STATED HE STARTED TO FEEL BETTER AND SHORTNESS OF BREATH WAS BETTER. PATIENT REMAINED NSR ON MONITOR.

## 2019-06-28 NOTE — PROCEDURES
[address] [place], [place] [address]                              CARDIAC STRESS TEST    PATIENT NAME: Lorena Rojas                   :        1960  MED REC NO:   [id number]                              ROOM:  ACCOUNT NO:   [de-identified]                           ADMIT DATE: 2019  PROVIDER:     Elta Spatz    DATE OF STUDY:  2019    ORDERING PROVIDER:  Osiris Garcia MD    INTERPRETING PHYSICIAN:  MAC Rain MD    LEXISCAN STRESS TEST    INDICATION:  SHORTNESS OF BREATH. INTERPRETATION:  Resting heart rate:  76 bpm.  Resting blood pressure:  134/78 mmhg. Resting Ekg:  Normal sinus rhythm at 76 bpm.  Stress heart response:  Normal heart rate response. Blood pressure response:  Appropriate. Stress EKGs:  No changes seen. No ischemic Ekg changes. IMPRESSION:  NEGATIVE TREADMILL STRESS TEST. THE NUCLEAR SCAN TO FOLLOW.         Codi Taylor    D: 2019 12:09:35       T: 2019 12:10:35     AS/JOHNATHAN  Job#: [id number]     Doc#: Unknown    CC:    (Retain this field even if not dictated or not decipherable)

## 2019-07-01 ENCOUNTER — OFFICE VISIT (OUTPATIENT)
Dept: PODIATRY | Age: 59
End: 2019-07-01
Payer: COMMERCIAL

## 2019-07-01 VITALS — HEIGHT: 76 IN | BODY MASS INDEX: 32.88 KG/M2 | WEIGHT: 270 LBS

## 2019-07-01 DIAGNOSIS — E11.42 TYPE 2 DIABETES MELLITUS WITH DIABETIC POLYNEUROPATHY, WITH LONG-TERM CURRENT USE OF INSULIN (HCC): ICD-10-CM

## 2019-07-01 DIAGNOSIS — E11.41 DIABETIC MONONEUROPATHY ASSOCIATED WITH TYPE 2 DIABETES MELLITUS (HCC): ICD-10-CM

## 2019-07-01 DIAGNOSIS — Z79.4 TYPE 2 DIABETES MELLITUS WITH DIABETIC POLYNEUROPATHY, WITH LONG-TERM CURRENT USE OF INSULIN (HCC): ICD-10-CM

## 2019-07-01 DIAGNOSIS — E11.51 TYPE 2 DIABETES MELLITUS WITH PERIPHERAL VASCULAR DISEASE (HCC): ICD-10-CM

## 2019-07-01 DIAGNOSIS — B35.1 ONYCHOMYCOSIS OF TOENAIL: Primary | ICD-10-CM

## 2019-07-01 DIAGNOSIS — M79.674 PAIN OF TOE OF RIGHT FOOT: ICD-10-CM

## 2019-07-01 PROCEDURE — 11720 DEBRIDE NAIL 1-5: CPT | Performed by: PODIATRIST

## 2019-07-01 PROCEDURE — 99999 PR OFFICE/OUTPT VISIT,PROCEDURE ONLY: CPT | Performed by: PODIATRIST

## 2019-07-01 RX ORDER — PREGABALIN 100 MG/1
100 CAPSULE ORAL 3 TIMES DAILY
Qty: 90 CAPSULE | Refills: 3 | Status: SHIPPED | OUTPATIENT
Start: 2019-07-01 | End: 2019-11-04 | Stop reason: SDUPTHER

## 2019-07-02 ASSESSMENT — ENCOUNTER SYMPTOMS
COLOR CHANGE: 0
DIARRHEA: 0
SHORTNESS OF BREATH: 0
BACK PAIN: 0
NAUSEA: 0

## 2019-07-17 ENCOUNTER — OFFICE VISIT (OUTPATIENT)
Dept: INTERNAL MEDICINE CLINIC | Age: 59
End: 2019-07-17
Payer: COMMERCIAL

## 2019-07-17 VITALS
HEIGHT: 76 IN | DIASTOLIC BLOOD PRESSURE: 68 MMHG | WEIGHT: 277 LBS | BODY MASS INDEX: 33.73 KG/M2 | SYSTOLIC BLOOD PRESSURE: 124 MMHG

## 2019-07-17 DIAGNOSIS — I10 ESSENTIAL HYPERTENSION: ICD-10-CM

## 2019-07-17 DIAGNOSIS — R94.39 ABNORMAL STRESS TEST: ICD-10-CM

## 2019-07-17 DIAGNOSIS — E11.42 TYPE 2 DIABETES MELLITUS WITH DIABETIC POLYNEUROPATHY, WITH LONG-TERM CURRENT USE OF INSULIN (HCC): Primary | ICD-10-CM

## 2019-07-17 DIAGNOSIS — E03.9 ACQUIRED HYPOTHYROIDISM: ICD-10-CM

## 2019-07-17 DIAGNOSIS — Z79.4 TYPE 2 DIABETES MELLITUS WITH DIABETIC POLYNEUROPATHY, WITH LONG-TERM CURRENT USE OF INSULIN (HCC): Primary | ICD-10-CM

## 2019-07-17 DIAGNOSIS — I73.9 PVD (PERIPHERAL VASCULAR DISEASE) (HCC): ICD-10-CM

## 2019-07-17 DIAGNOSIS — R53.83 OTHER FATIGUE: ICD-10-CM

## 2019-07-17 PROCEDURE — 1036F TOBACCO NON-USER: CPT | Performed by: INTERNAL MEDICINE

## 2019-07-17 PROCEDURE — G8427 DOCREV CUR MEDS BY ELIG CLIN: HCPCS | Performed by: INTERNAL MEDICINE

## 2019-07-17 PROCEDURE — G8417 CALC BMI ABV UP PARAM F/U: HCPCS | Performed by: INTERNAL MEDICINE

## 2019-07-17 PROCEDURE — 3045F PR MOST RECENT HEMOGLOBIN A1C LEVEL 7.0-9.0%: CPT | Performed by: INTERNAL MEDICINE

## 2019-07-17 PROCEDURE — 2022F DILAT RTA XM EVC RTNOPTHY: CPT | Performed by: INTERNAL MEDICINE

## 2019-07-17 PROCEDURE — G8598 ASA/ANTIPLAT THER USED: HCPCS | Performed by: INTERNAL MEDICINE

## 2019-07-17 PROCEDURE — 3017F COLORECTAL CA SCREEN DOC REV: CPT | Performed by: INTERNAL MEDICINE

## 2019-07-17 PROCEDURE — 99214 OFFICE O/P EST MOD 30 MIN: CPT | Performed by: INTERNAL MEDICINE

## 2019-07-17 ASSESSMENT — ENCOUNTER SYMPTOMS
WHEEZING: 0
TROUBLE SWALLOWING: 0
COLOR CHANGE: 0
EYE PAIN: 0
BLOOD IN STOOL: 0
SHORTNESS OF BREATH: 0
COUGH: 0
EYE DISCHARGE: 0
ABDOMINAL DISTENTION: 0
DIARRHEA: 0

## 2019-07-17 NOTE — PROGRESS NOTES
sever  Associated signs and symtoms neuropathy/ckd/ CAD. aggravated with sugar diet and better with low sugar diet     HTN  Onset more than 2 years ago  gustavo mild to mod  Controlled with current po meds  Not associated with headaches or blurry vision  No chest pain  fatigube  Sleepy all day        Review of Systems   Constitutional: Positive for fatigue. Negative for appetite change and diaphoresis. HENT: Negative for ear discharge and trouble swallowing. Eyes: Negative for pain and discharge. Respiratory: Negative for cough, shortness of breath and wheezing. Cardiovascular: Negative for chest pain and palpitations. Gastrointestinal: Negative for abdominal distention, blood in stool and diarrhea. Endocrine: Negative for polydipsia and polyphagia. Genitourinary: Negative for difficulty urinating and frequency. Musculoskeletal: Positive for arthralgias. Negative for gait problem, myalgias and neck pain. Skin: Negative for color change and rash. Allergic/Immunologic: Negative for environmental allergies and food allergies. Neurological: Negative for dizziness and headaches. Hematological: Negative for adenopathy. Does not bruise/bleed easily. Psychiatric/Behavioral: Negative for behavioral problems and sleep disturbance. Objective:   Physical Exam   Constitutional: He is oriented to person, place, and time. He appears well-developed and well-nourished. obese   HENT:   Head: Normocephalic and atraumatic. Eyes: Conjunctivae and EOM are normal. Right eye exhibits no discharge. Left eye exhibits no discharge. Right conjunctiva is not injected. Left conjunctiva is not injected. Right eye exhibits normal extraocular motion. Left eye exhibits normal extraocular motion. Neck: Normal range of motion. Neck supple. No JVD present. No edema and no erythema present. No thyroid mass and no thyromegaly present. Cardiovascular: Normal rate and regular rhythm. Exam reveals no friction rub.

## 2019-07-18 RX ORDER — LEVOTHYROXINE SODIUM 0.15 MG/1
150 TABLET ORAL DAILY
Qty: 90 TABLET | Refills: 3 | Status: SHIPPED | OUTPATIENT
Start: 2019-07-18 | End: 2020-03-18

## 2019-08-20 ENCOUNTER — HOSPITAL ENCOUNTER (OUTPATIENT)
Dept: SLEEP CENTER | Age: 59
Discharge: HOME | End: 2019-08-22
Payer: COMMERCIAL

## 2019-08-20 DIAGNOSIS — G47.33 OSA (OBSTRUCTIVE SLEEP APNEA): Primary | ICD-10-CM

## 2019-08-20 PROCEDURE — 95810 POLYSOM 6/> YRS 4/> PARAM: CPT

## 2019-08-21 VITALS
BODY MASS INDEX: 33.73 KG/M2 | RESPIRATION RATE: 22 BRPM | SYSTOLIC BLOOD PRESSURE: 124 MMHG | HEART RATE: 73 BPM | HEIGHT: 76 IN | WEIGHT: 277 LBS | OXYGEN SATURATION: 93 % | DIASTOLIC BLOOD PRESSURE: 68 MMHG

## 2019-08-21 ASSESSMENT — SLEEP AND FATIGUE QUESTIONNAIRES
HOW LIKELY ARE YOU TO NOD OFF OR FALL ASLEEP WHEN YOU ARE A PASSENGER IN A CAR FOR AN HOUR WITHOUT A BREAK: 1
HOW LIKELY ARE YOU TO NOD OFF OR FALL ASLEEP WHILE LYING DOWN TO REST IN THE AFTERNOON WHEN CIRCUMSTANCES PERMIT: 3
HOW LIKELY ARE YOU TO NOD OFF OR FALL ASLEEP IN A CAR, WHILE STOPPED FOR A FEW MINUTES IN TRAFFIC: 0
HOW LIKELY ARE YOU TO NOD OFF OR FALL ASLEEP WHILE SITTING QUIETLY AFTER LUNCH WITHOUT ALCOHOL: 3
HOW LIKELY ARE YOU TO NOD OFF OR FALL ASLEEP WHILE SITTING INACTIVE IN A PUBLIC PLACE: 2
HOW LIKELY ARE YOU TO NOD OFF OR FALL ASLEEP WHILE SITTING AND READING: 3
HOW LIKELY ARE YOU TO NOD OFF OR FALL ASLEEP WHILE SITTING AND TALKING TO SOMEONE: 3
HOW LIKELY ARE YOU TO NOD OFF OR FALL ASLEEP WHILE WATCHING TV: 3
ESS TOTAL SCORE: 18

## 2019-09-03 ENCOUNTER — OFFICE VISIT (OUTPATIENT)
Dept: PODIATRY | Age: 59
End: 2019-09-03
Payer: COMMERCIAL

## 2019-09-03 VITALS — WEIGHT: 280 LBS | BODY MASS INDEX: 34.1 KG/M2 | HEIGHT: 76 IN

## 2019-09-03 DIAGNOSIS — M79.674 PAIN OF TOE OF RIGHT FOOT: ICD-10-CM

## 2019-09-03 DIAGNOSIS — Z79.4 TYPE 2 DIABETES MELLITUS WITH DIABETIC POLYNEUROPATHY, WITH LONG-TERM CURRENT USE OF INSULIN (HCC): ICD-10-CM

## 2019-09-03 DIAGNOSIS — B35.1 ONYCHOMYCOSIS OF TOENAIL: Primary | ICD-10-CM

## 2019-09-03 DIAGNOSIS — E11.51 TYPE 2 DIABETES MELLITUS WITH PERIPHERAL VASCULAR DISEASE (HCC): ICD-10-CM

## 2019-09-03 DIAGNOSIS — E11.42 TYPE 2 DIABETES MELLITUS WITH DIABETIC POLYNEUROPATHY, WITH LONG-TERM CURRENT USE OF INSULIN (HCC): ICD-10-CM

## 2019-09-03 PROCEDURE — 11720 DEBRIDE NAIL 1-5: CPT | Performed by: PODIATRIST

## 2019-09-03 ASSESSMENT — ENCOUNTER SYMPTOMS
SHORTNESS OF BREATH: 0
COLOR CHANGE: 0
DIARRHEA: 0
BACK PAIN: 0
NAUSEA: 0

## 2019-09-04 ENCOUNTER — OFFICE VISIT (OUTPATIENT)
Dept: INTERNAL MEDICINE CLINIC | Age: 59
End: 2019-09-04
Payer: COMMERCIAL

## 2019-09-04 VITALS
DIASTOLIC BLOOD PRESSURE: 72 MMHG | SYSTOLIC BLOOD PRESSURE: 118 MMHG | BODY MASS INDEX: 33.36 KG/M2 | HEIGHT: 76 IN | WEIGHT: 274 LBS

## 2019-09-04 DIAGNOSIS — I73.9 PVD (PERIPHERAL VASCULAR DISEASE) (HCC): ICD-10-CM

## 2019-09-04 DIAGNOSIS — Z79.4 TYPE 2 DIABETES MELLITUS WITH DIABETIC POLYNEUROPATHY, WITH LONG-TERM CURRENT USE OF INSULIN (HCC): ICD-10-CM

## 2019-09-04 DIAGNOSIS — E11.42 TYPE 2 DIABETES MELLITUS WITH DIABETIC POLYNEUROPATHY, WITH LONG-TERM CURRENT USE OF INSULIN (HCC): ICD-10-CM

## 2019-09-04 DIAGNOSIS — M54.41 CHRONIC MIDLINE LOW BACK PAIN WITH RIGHT-SIDED SCIATICA: Primary | ICD-10-CM

## 2019-09-04 DIAGNOSIS — G89.29 CHRONIC MIDLINE LOW BACK PAIN WITH RIGHT-SIDED SCIATICA: Primary | ICD-10-CM

## 2019-09-04 DIAGNOSIS — E03.9 ACQUIRED HYPOTHYROIDISM: ICD-10-CM

## 2019-09-04 DIAGNOSIS — E08.41 DIABETIC MONONEUROPATHY ASSOCIATED WITH DIABETES MELLITUS DUE TO UNDERLYING CONDITION (HCC): ICD-10-CM

## 2019-09-04 DIAGNOSIS — E78.00 PURE HYPERCHOLESTEROLEMIA: ICD-10-CM

## 2019-09-04 PROCEDURE — G8417 CALC BMI ABV UP PARAM F/U: HCPCS | Performed by: INTERNAL MEDICINE

## 2019-09-04 PROCEDURE — 2022F DILAT RTA XM EVC RTNOPTHY: CPT | Performed by: INTERNAL MEDICINE

## 2019-09-04 PROCEDURE — 3017F COLORECTAL CA SCREEN DOC REV: CPT | Performed by: INTERNAL MEDICINE

## 2019-09-04 PROCEDURE — G8598 ASA/ANTIPLAT THER USED: HCPCS | Performed by: INTERNAL MEDICINE

## 2019-09-04 PROCEDURE — 99214 OFFICE O/P EST MOD 30 MIN: CPT | Performed by: INTERNAL MEDICINE

## 2019-09-04 PROCEDURE — 1036F TOBACCO NON-USER: CPT | Performed by: INTERNAL MEDICINE

## 2019-09-04 PROCEDURE — G8427 DOCREV CUR MEDS BY ELIG CLIN: HCPCS | Performed by: INTERNAL MEDICINE

## 2019-09-04 PROCEDURE — 3045F PR MOST RECENT HEMOGLOBIN A1C LEVEL 7.0-9.0%: CPT | Performed by: INTERNAL MEDICINE

## 2019-09-04 ASSESSMENT — ENCOUNTER SYMPTOMS
WHEEZING: 0
BLOOD IN STOOL: 0
SHORTNESS OF BREATH: 0
ABDOMINAL DISTENTION: 0
EYE PAIN: 0
COUGH: 0
COLOR CHANGE: 0
TROUBLE SWALLOWING: 0
DIARRHEA: 0
EYE DISCHARGE: 0

## 2019-09-04 NOTE — PROGRESS NOTES
low back pain for a long time getting worse       Back pain  Onset more than 1year ago  Severity is moderate to severe  Not associated wt lossbut associated with radiation of pain on the legs  No bowel bladder incontinence   Aggravated with bending and better with pain meds and rest.  Controlled with current pain meds. HTN  Onset more than 2 years ago  gustavo mild to mod  Controlled with current po meds  Not associated with headaches or blurry vision  No chest pain  Diabetes   Duration more than 30 years  Modifying factors on  isnlin    Severity uncontrolled sever  Associated signs and symtoms neuropathy/ckd/ CAD. aggravated with sugar diet and better with low sugar diet           Review of Systems   Constitutional: Positive for fatigue. Negative for appetite change and diaphoresis. HENT: Negative for ear discharge and trouble swallowing. Eyes: Negative for pain and discharge. Respiratory: Negative for cough, shortness of breath and wheezing. Cardiovascular: Negative for chest pain and palpitations. Gastrointestinal: Negative for abdominal distention, blood in stool and diarrhea. Endocrine: Negative for polydipsia and polyphagia. Genitourinary: Negative for difficulty urinating and frequency. Musculoskeletal: Positive for arthralgias. Negative for gait problem, myalgias and neck pain. Post left bka     Skin: Negative for color change and rash. Allergic/Immunologic: Negative for environmental allergies and food allergies. Neurological: Negative for dizziness and headaches. Hematological: Negative for adenopathy. Does not bruise/bleed easily. Psychiatric/Behavioral: Negative for behavioral problems and sleep disturbance. Objective:   Physical Exam   Constitutional: He is oriented to person, place, and time. He appears well-developed and well-nourished. obese   HENT:   Head: Normocephalic and atraumatic.    Eyes: Conjunctivae and EOM are normal. Right eye exhibits no

## 2019-09-08 LAB — STATUS: NORMAL

## 2019-09-11 ENCOUNTER — TELEPHONE (OUTPATIENT)
Dept: INTERNAL MEDICINE CLINIC | Age: 59
End: 2019-09-11

## 2019-09-17 ENCOUNTER — HOSPITAL ENCOUNTER (OUTPATIENT)
Dept: MRI IMAGING | Age: 59
Discharge: HOME | End: 2019-09-19
Payer: COMMERCIAL

## 2019-09-17 ENCOUNTER — TELEPHONE (OUTPATIENT)
Dept: INTERNAL MEDICINE CLINIC | Age: 59
End: 2019-09-17

## 2019-09-17 DIAGNOSIS — G89.29 CHRONIC MIDLINE LOW BACK PAIN WITH RIGHT-SIDED SCIATICA: ICD-10-CM

## 2019-09-17 DIAGNOSIS — M54.41 CHRONIC MIDLINE LOW BACK PAIN WITH RIGHT-SIDED SCIATICA: ICD-10-CM

## 2019-09-17 DIAGNOSIS — N28.89 RENAL MASS: Primary | ICD-10-CM

## 2019-09-17 PROCEDURE — 72148 MRI LUMBAR SPINE W/O DYE: CPT

## 2019-09-23 ENCOUNTER — OFFICE VISIT (OUTPATIENT)
Dept: UROLOGY | Age: 59
End: 2019-09-23
Payer: COMMERCIAL

## 2019-09-23 VITALS
DIASTOLIC BLOOD PRESSURE: 81 MMHG | HEIGHT: 76 IN | WEIGHT: 274 LBS | HEART RATE: 83 BPM | SYSTOLIC BLOOD PRESSURE: 138 MMHG | BODY MASS INDEX: 33.36 KG/M2 | TEMPERATURE: 97.9 F

## 2019-09-23 DIAGNOSIS — N28.89 RENAL MASS: Primary | ICD-10-CM

## 2019-09-23 DIAGNOSIS — R35.0 FREQUENCY OF URINATION: ICD-10-CM

## 2019-09-23 PROCEDURE — G8427 DOCREV CUR MEDS BY ELIG CLIN: HCPCS | Performed by: UROLOGY

## 2019-09-23 PROCEDURE — G8417 CALC BMI ABV UP PARAM F/U: HCPCS | Performed by: UROLOGY

## 2019-09-23 PROCEDURE — 1036F TOBACCO NON-USER: CPT | Performed by: UROLOGY

## 2019-09-23 PROCEDURE — 99204 OFFICE O/P NEW MOD 45 MIN: CPT | Performed by: UROLOGY

## 2019-09-23 PROCEDURE — G8598 ASA/ANTIPLAT THER USED: HCPCS | Performed by: UROLOGY

## 2019-09-23 PROCEDURE — 3017F COLORECTAL CA SCREEN DOC REV: CPT | Performed by: UROLOGY

## 2019-09-23 ASSESSMENT — ENCOUNTER SYMPTOMS
ABDOMINAL PAIN: 0
VOMITING: 0
BACK PAIN: 1
COLOR CHANGE: 0
COUGH: 0
WHEEZING: 0
NAUSEA: 0
EYE PAIN: 0
SHORTNESS OF BREATH: 0
EYE REDNESS: 0

## 2019-09-25 ENCOUNTER — ANESTHESIA EVENT (OUTPATIENT)
Dept: OPERATING ROOM | Age: 59
End: 2019-09-25
Payer: COMMERCIAL

## 2019-09-25 ENCOUNTER — ANESTHESIA (OUTPATIENT)
Dept: OPERATING ROOM | Age: 59
End: 2019-09-25
Payer: COMMERCIAL

## 2019-09-25 ENCOUNTER — HOSPITAL ENCOUNTER (OUTPATIENT)
Age: 59
Setting detail: OUTPATIENT SURGERY
Discharge: HOME | End: 2019-09-25
Attending: OPHTHALMOLOGY | Admitting: OPHTHALMOLOGY
Payer: COMMERCIAL

## 2019-09-25 VITALS
OXYGEN SATURATION: 94 % | HEART RATE: 65 BPM | TEMPERATURE: 97.3 F | BODY MASS INDEX: 32.88 KG/M2 | HEIGHT: 76 IN | DIASTOLIC BLOOD PRESSURE: 77 MMHG | WEIGHT: 270 LBS | SYSTOLIC BLOOD PRESSURE: 147 MMHG | RESPIRATION RATE: 14 BRPM

## 2019-09-25 VITALS — DIASTOLIC BLOOD PRESSURE: 86 MMHG | OXYGEN SATURATION: 100 % | SYSTOLIC BLOOD PRESSURE: 147 MMHG

## 2019-09-25 LAB
EKG ATRIAL RATE: 73 BPM
EKG P AXIS: 37 DEGREES
EKG P-R INTERVAL: 174 MS
EKG Q-T INTERVAL: 384 MS
EKG QRS DURATION: 86 MS
EKG QTC CALCULATION (BAZETT): 423 MS
EKG R AXIS: 25 DEGREES
EKG T AXIS: 75 DEGREES
EKG VENTRICULAR RATE: 73 BPM
GFR NON-AFRICAN AMERICAN: >60 ML/MIN
GFR SERPL CREATININE-BSD FRML MDRD: >60 ML/MIN
GFR SERPL CREATININE-BSD FRML MDRD: NORMAL ML/MIN/{1.73_M2}
GLUCOSE BLD-MCNC: 149 MG/DL (ref 74–100)
GLUCOSE BLD-MCNC: 154 MG/DL (ref 75–110)
POC CREATININE: 0.88 MG/DL (ref 0.51–1.19)

## 2019-09-25 PROCEDURE — 6370000000 HC RX 637 (ALT 250 FOR IP): Performed by: OPHTHALMOLOGY

## 2019-09-25 PROCEDURE — 3600000004 HC SURGERY LEVEL 4 BASE: Performed by: OPHTHALMOLOGY

## 2019-09-25 PROCEDURE — 2580000003 HC RX 258: Performed by: ANESTHESIOLOGY

## 2019-09-25 PROCEDURE — 93005 ELECTROCARDIOGRAM TRACING: CPT

## 2019-09-25 PROCEDURE — 7100000041 HC SPAR PHASE II RECOVERY - ADDTL 15 MIN: Performed by: OPHTHALMOLOGY

## 2019-09-25 PROCEDURE — 3700000000 HC ANESTHESIA ATTENDED CARE: Performed by: OPHTHALMOLOGY

## 2019-09-25 PROCEDURE — 3700000001 HC ADD 15 MINUTES (ANESTHESIA): Performed by: OPHTHALMOLOGY

## 2019-09-25 PROCEDURE — 7100000040 HC SPAR PHASE II RECOVERY - FIRST 15 MIN: Performed by: OPHTHALMOLOGY

## 2019-09-25 PROCEDURE — 2500000003 HC RX 250 WO HCPCS: Performed by: NURSE ANESTHETIST, CERTIFIED REGISTERED

## 2019-09-25 PROCEDURE — 3600000014 HC SURGERY LEVEL 4 ADDTL 15MIN: Performed by: OPHTHALMOLOGY

## 2019-09-25 PROCEDURE — 82947 ASSAY GLUCOSE BLOOD QUANT: CPT

## 2019-09-25 PROCEDURE — 2500000003 HC RX 250 WO HCPCS: Performed by: OPHTHALMOLOGY

## 2019-09-25 PROCEDURE — 6360000002 HC RX W HCPCS: Performed by: OPHTHALMOLOGY

## 2019-09-25 PROCEDURE — 6360000002 HC RX W HCPCS: Performed by: NURSE ANESTHETIST, CERTIFIED REGISTERED

## 2019-09-25 PROCEDURE — 2580000003 HC RX 258: Performed by: OPHTHALMOLOGY

## 2019-09-25 PROCEDURE — 2709999900 HC NON-CHARGEABLE SUPPLY: Performed by: OPHTHALMOLOGY

## 2019-09-25 PROCEDURE — 82565 ASSAY OF CREATININE: CPT

## 2019-09-25 RX ORDER — CYCLOPENTOLATE HYDROCHLORIDE 10 MG/ML
SOLUTION/ DROPS OPHTHALMIC PRN
Status: DISCONTINUED | OUTPATIENT
Start: 2019-09-25 | End: 2019-09-25 | Stop reason: ALTCHOICE

## 2019-09-25 RX ORDER — SCOLOPAMINE TRANSDERMAL SYSTEM 1 MG/1
1 PATCH, EXTENDED RELEASE TRANSDERMAL ONCE
Status: DISCONTINUED | OUTPATIENT
Start: 2019-09-25 | End: 2019-09-25 | Stop reason: HOSPADM

## 2019-09-25 RX ORDER — 0.9 % SODIUM CHLORIDE 0.9 %
VIAL (ML) INJECTION PRN
Status: DISCONTINUED | OUTPATIENT
Start: 2019-09-25 | End: 2019-09-25 | Stop reason: ALTCHOICE

## 2019-09-25 RX ORDER — POLYMYXIN B SULFATE AND TRIMETHOPRIM 1; 10000 MG/ML; [USP'U]/ML
1 SOLUTION OPHTHALMIC
Status: COMPLETED | OUTPATIENT
Start: 2019-09-25 | End: 2019-09-25

## 2019-09-25 RX ORDER — CYCLOPENTOLATE HYDROCHLORIDE 10 MG/ML
1 SOLUTION/ DROPS OPHTHALMIC
Status: COMPLETED | OUTPATIENT
Start: 2019-09-25 | End: 2019-09-25

## 2019-09-25 RX ORDER — SODIUM CHLORIDE, SODIUM LACTATE, POTASSIUM CHLORIDE, CALCIUM CHLORIDE 600; 310; 30; 20 MG/100ML; MG/100ML; MG/100ML; MG/100ML
INJECTION, SOLUTION INTRAVENOUS CONTINUOUS
Status: DISCONTINUED | OUTPATIENT
Start: 2019-09-25 | End: 2019-09-25 | Stop reason: HOSPADM

## 2019-09-25 RX ORDER — TROPICAMIDE 10 MG/ML
1 SOLUTION/ DROPS OPHTHALMIC
Status: COMPLETED | OUTPATIENT
Start: 2019-09-25 | End: 2019-09-25

## 2019-09-25 RX ORDER — NEOMYCIN SULFATE, POLYMYXIN B SULFATE, AND DEXAMETHASONE 3.5; 10000; 1 MG/G; [USP'U]/G; MG/G
OINTMENT OPHTHALMIC PRN
Status: DISCONTINUED | OUTPATIENT
Start: 2019-09-25 | End: 2019-09-25 | Stop reason: ALTCHOICE

## 2019-09-25 RX ORDER — VANCOMYCIN HYDROCHLORIDE 1 G/20ML
INJECTION, POWDER, LYOPHILIZED, FOR SOLUTION INTRAVENOUS PRN
Status: DISCONTINUED | OUTPATIENT
Start: 2019-09-25 | End: 2019-09-25 | Stop reason: ALTCHOICE

## 2019-09-25 RX ORDER — PROPOFOL 10 MG/ML
INJECTION, EMULSION INTRAVENOUS PRN
Status: DISCONTINUED | OUTPATIENT
Start: 2019-09-25 | End: 2019-09-25 | Stop reason: SDUPTHER

## 2019-09-25 RX ORDER — LIDOCAINE HYDROCHLORIDE 10 MG/ML
1 INJECTION, SOLUTION EPIDURAL; INFILTRATION; INTRACAUDAL; PERINEURAL
Status: DISCONTINUED | OUTPATIENT
Start: 2019-09-25 | End: 2019-09-25 | Stop reason: HOSPADM

## 2019-09-25 RX ORDER — FENTANYL CITRATE 50 UG/ML
INJECTION, SOLUTION INTRAMUSCULAR; INTRAVENOUS PRN
Status: DISCONTINUED | OUTPATIENT
Start: 2019-09-25 | End: 2019-09-25 | Stop reason: SDUPTHER

## 2019-09-25 RX ORDER — LIDOCAINE HYDROCHLORIDE 10 MG/ML
INJECTION, SOLUTION EPIDURAL; INFILTRATION; INTRACAUDAL; PERINEURAL PRN
Status: DISCONTINUED | OUTPATIENT
Start: 2019-09-25 | End: 2019-09-25 | Stop reason: SDUPTHER

## 2019-09-25 RX ORDER — BUPIVACAINE HYDROCHLORIDE 7.5 MG/ML
INJECTION, SOLUTION EPIDURAL; RETROBULBAR PRN
Status: DISCONTINUED | OUTPATIENT
Start: 2019-09-25 | End: 2019-09-25 | Stop reason: ALTCHOICE

## 2019-09-25 RX ORDER — DEXTROSE MONOHYDRATE 25 G/50ML
INJECTION, SOLUTION INTRAVENOUS PRN
Status: DISCONTINUED | OUTPATIENT
Start: 2019-09-25 | End: 2019-09-25 | Stop reason: ALTCHOICE

## 2019-09-25 RX ORDER — ONDANSETRON 2 MG/ML
4 INJECTION INTRAMUSCULAR; INTRAVENOUS DAILY PRN
Status: DISCONTINUED | OUTPATIENT
Start: 2019-09-25 | End: 2019-09-25 | Stop reason: HOSPADM

## 2019-09-25 RX ORDER — FENTANYL CITRATE 50 UG/ML
25 INJECTION, SOLUTION INTRAMUSCULAR; INTRAVENOUS EVERY 5 MIN PRN
Status: DISCONTINUED | OUTPATIENT
Start: 2019-09-25 | End: 2019-09-25 | Stop reason: HOSPADM

## 2019-09-25 RX ORDER — BALANCED SALT SOLUTION ENRICHED WITH BICARBONATE, DEXTROSE, AND GLUTATHIONE
KIT INTRAOCULAR PRN
Status: DISCONTINUED | OUTPATIENT
Start: 2019-09-25 | End: 2019-09-25 | Stop reason: ALTCHOICE

## 2019-09-25 RX ORDER — TETRACAINE HYDROCHLORIDE 5 MG/ML
SOLUTION OPHTHALMIC PRN
Status: DISCONTINUED | OUTPATIENT
Start: 2019-09-25 | End: 2019-09-25 | Stop reason: ALTCHOICE

## 2019-09-25 RX ORDER — PHENYLEPHRINE HYDROCHLORIDE 100 MG/ML
1 SOLUTION/ DROPS OPHTHALMIC
Status: COMPLETED | OUTPATIENT
Start: 2019-09-25 | End: 2019-09-25

## 2019-09-25 RX ORDER — MIDAZOLAM HYDROCHLORIDE 1 MG/ML
1 INJECTION INTRAMUSCULAR; INTRAVENOUS EVERY 10 MIN PRN
Status: DISCONTINUED | OUTPATIENT
Start: 2019-09-25 | End: 2019-09-25 | Stop reason: HOSPADM

## 2019-09-25 RX ADMIN — CYCLOPENTOLATE HYDROCHLORIDE 1 DROP: 10 SOLUTION/ DROPS OPHTHALMIC at 07:45

## 2019-09-25 RX ADMIN — FENTANYL CITRATE 25 MCG: 50 INJECTION INTRAMUSCULAR; INTRAVENOUS at 09:16

## 2019-09-25 RX ADMIN — TROPICAMIDE 1 DROP: 10 SOLUTION/ DROPS OPHTHALMIC at 07:44

## 2019-09-25 RX ADMIN — FENTANYL CITRATE 25 MCG: 50 INJECTION INTRAMUSCULAR; INTRAVENOUS at 08:36

## 2019-09-25 RX ADMIN — POLYMYXIN B SULFATE, TRIMETHOPRIM SULFATE 1 DROP: 10000; 1 SOLUTION/ DROPS OPHTHALMIC at 07:30

## 2019-09-25 RX ADMIN — TROPICAMIDE 1 DROP: 10 SOLUTION/ DROPS OPHTHALMIC at 07:01

## 2019-09-25 RX ADMIN — FENTANYL CITRATE 50 MCG: 50 INJECTION INTRAMUSCULAR; INTRAVENOUS at 08:30

## 2019-09-25 RX ADMIN — PHENYLEPHRINE HYDROCHLORIDE 1 DROP: 100 SOLUTION/ DROPS OPHTHALMIC at 07:30

## 2019-09-25 RX ADMIN — POLYMYXIN B SULFATE, TRIMETHOPRIM SULFATE 1 DROP: 10000; 1 SOLUTION/ DROPS OPHTHALMIC at 07:00

## 2019-09-25 RX ADMIN — PHENYLEPHRINE HYDROCHLORIDE 1 DROP: 100 SOLUTION/ DROPS OPHTHALMIC at 07:02

## 2019-09-25 RX ADMIN — FENTANYL CITRATE 25 MCG: 50 INJECTION INTRAMUSCULAR; INTRAVENOUS at 09:09

## 2019-09-25 RX ADMIN — PROPOFOL 20 MG: 10 INJECTION, EMULSION INTRAVENOUS at 08:30

## 2019-09-25 RX ADMIN — POLYMYXIN B SULFATE, TRIMETHOPRIM SULFATE 1 DROP: 10000; 1 SOLUTION/ DROPS OPHTHALMIC at 07:45

## 2019-09-25 RX ADMIN — CYCLOPENTOLATE HYDROCHLORIDE 1 DROP: 10 SOLUTION/ DROPS OPHTHALMIC at 07:17

## 2019-09-25 RX ADMIN — POLYMYXIN B SULFATE, TRIMETHOPRIM SULFATE 1 DROP: 10000; 1 SOLUTION/ DROPS OPHTHALMIC at 07:18

## 2019-09-25 RX ADMIN — PHENYLEPHRINE HYDROCHLORIDE 1 DROP: 100 SOLUTION/ DROPS OPHTHALMIC at 07:18

## 2019-09-25 RX ADMIN — CYCLOPENTOLATE HYDROCHLORIDE 1 DROP: 10 SOLUTION/ DROPS OPHTHALMIC at 07:02

## 2019-09-25 RX ADMIN — LIDOCAINE HYDROCHLORIDE 50 MG: 10 INJECTION, SOLUTION EPIDURAL; INFILTRATION; INTRACAUDAL; PERINEURAL at 08:30

## 2019-09-25 RX ADMIN — PROPOFOL 80 MG: 10 INJECTION, EMULSION INTRAVENOUS at 08:36

## 2019-09-25 RX ADMIN — CYCLOPENTOLATE HYDROCHLORIDE 1 DROP: 10 SOLUTION/ DROPS OPHTHALMIC at 07:29

## 2019-09-25 RX ADMIN — FENTANYL CITRATE 25 MCG: 50 INJECTION INTRAMUSCULAR; INTRAVENOUS at 08:57

## 2019-09-25 RX ADMIN — FENTANYL CITRATE 25 MCG: 50 INJECTION INTRAMUSCULAR; INTRAVENOUS at 08:52

## 2019-09-25 RX ADMIN — TROPICAMIDE 1 DROP: 10 SOLUTION/ DROPS OPHTHALMIC at 07:19

## 2019-09-25 RX ADMIN — SODIUM CHLORIDE, POTASSIUM CHLORIDE, SODIUM LACTATE AND CALCIUM CHLORIDE: 600; 310; 30; 20 INJECTION, SOLUTION INTRAVENOUS at 08:03

## 2019-09-25 RX ADMIN — TROPICAMIDE 1 DROP: 10 SOLUTION/ DROPS OPHTHALMIC at 07:31

## 2019-09-25 RX ADMIN — FENTANYL CITRATE 25 MCG: 50 INJECTION INTRAMUSCULAR; INTRAVENOUS at 09:01

## 2019-09-25 ASSESSMENT — PULMONARY FUNCTION TESTS
PIF_VALUE: 1

## 2019-09-25 ASSESSMENT — PAIN SCALES - GENERAL
PAINLEVEL_OUTOF10: 0

## 2019-09-25 ASSESSMENT — ENCOUNTER SYMPTOMS
SHORTNESS OF BREATH: 0
STRIDOR: 0

## 2019-09-25 ASSESSMENT — LIFESTYLE VARIABLES: SMOKING_STATUS: 0

## 2019-09-25 ASSESSMENT — PAIN - FUNCTIONAL ASSESSMENT: PAIN_FUNCTIONAL_ASSESSMENT: 0-10

## 2019-09-25 ASSESSMENT — COPD QUESTIONNAIRES: CAT_SEVERITY: NO INTERVAL CHANGE

## 2019-09-25 NOTE — ANESTHESIA PRE PROCEDURE
Department of Anesthesiology  Preprocedure Note       Name:  Lg Lo   Age:  61 y.o.  :  1960                                          MRN:  [id number]         Date:  2019      Surgeon: Chandni Sepulveda):  Jeremías Reed MD    Procedure: Procedure(s):  LEG AMPUTATION BELOW KNEE REVISION    Medications prior to admission:   Prior to Admission medications    Medication Sig Start Date End Date Taking? Authorizing Provider   insulin NPH (HUMULIN N) 100 UNIT/ML injection vial Inject 60 Units into the skin daily Indications: IN THE MORNING    Historical Provider, MD   insulin NPH (HUMULIN N) 100 UNIT/ML injection vial Inject 70 Units into the skin nightly    Historical Provider, MD   blood glucose test strips (ASCENSIA AUTODISC VI;ONE TOUCH ULTRA TEST VI) strip 1 each by In Vitro route 4 times daily As needed. 19   Vlad Brasher MD   levothyroxine (SYNTHROID) 150 MCG tablet Take 1 tablet by mouth daily 19  Vlad Brasher MD   pregabalin (LYRICA) 100 MG capsule Take 1 capsule by mouth 3 times daily for 120 days.  7/1/19 10/29/19  Vlad Brasher MD   glucosamine-chondroitin 500-400 MG tablet Take 1 tablet by mouth daily     Historical Provider, MD   gemfibrozil (LOPID) 600 MG tablet TAKE 1 TABLET BY MOUTH ONCE DAILY  Patient taking differently: Take 600 mg by mouth daily  3/25/19   Pratibha Natarajan PA-C   rOPINIRole (REQUIP) 2 MG tablet TAKE 1 TABLET BY MOUTH ONCE DAILY  Patient taking differently: Take 2 mg by mouth daily  3/25/19   Pratibha Natarajan PA-C   losartan (COZAAR) 100 MG tablet TAKE 1 TABLET BY MOUTH ONCE DAILY  Patient taking differently: Take 100 mg by mouth nightly  3/25/19   Mary Johnson PA-C   JANUMET  MG per tablet TAKE (1) TABLET TWICE A DAY WITH MEALS  Patient taking differently: Take 1 tablet by mouth  3/22/19   MD Amaury Briggs INSULIN SYRINGE 31G X \" 1 ML MISC USE AS DIRECTED WITH HUMULIN AND HUMALOG INSULIN 5 TIMES DAILY 19 Terence Bush MD   isosorbide mononitrate (IMDUR) 30 MG extended release tablet Take 1 tablet by mouth daily 1/15/19   Terence Bush MD   tamsulosin (FLOMAX) 0.4 MG capsule Take 1 capsule by mouth daily 11/13/18   Terence Bush MD   atorvastatin (LIPITOR) 20 MG tablet Take 1 tablet by mouth daily 11/13/18   Terence Bush MD   budesonide-formoterol (SYMBICORT) 160-4.5 MCG/ACT AERO Inhale 2 puffs into the lungs 2 times daily 10/22/18   Shahrzad Meyers MD   HUMALOG 100 UNIT/ML injection vial INJECT 12 UNITS UNDER THE SKIN 3 TIMES DAILY + SLIDING SCALE (TAKE 5 UNITS IF SUGAR IS OVER 150) 8/6/18   Terence Bush MD   Omega-3 Fatty Acids (FISH OIL CONCENTRATE) 300 MG CAPS Take 300 mg by mouth nightly     Pipe Thomson MD   Glucosamine Sulfate 500 MG TABS Take 500 mg by mouth 3 times daily    Christina Ba MD   aspirin 81 MG tablet Take 81 mg by mouth nightly     Kiera Henderson MD       Current medications:    No current outpatient medications on file. No current facility-administered medications for this visit.         Allergies:  No Known Allergies    Problem List:    Patient Active Problem List   Diagnosis Code    Anemia D64.9    Neuropathy in diabetes ([facility] E11.40    Charcot ankle M14.679    Hypothyroid E03.9    PVD (peripheral vascular disease) ([facility] I73.9    Below knee amputation status ([facility] Z89.519    Type 2 diabetes mellitus with diabetic polyneuropathy ([facility] E11.42    Pure hypercholesterolemia E78.00    Constipation K59.00    PSA elevation R97.20    CAD (coronary artery disease) I25.10    Cellulitis and abscess of left leg L03.116, L02.416    Acute hematogenous osteomyelitis of left tibia (HCC) M86.062    Non-healing ulcer of lower leg, left, with fat layer exposed ([facility] X47.151    Chronic pain of left knee M25.562, G89.29    Acute hematogenous osteomyelitis of left fibula (HCC) M86.062    Essential hypertension I10    Olecranon bursitis of right elbow

## 2019-09-25 NOTE — ANESTHESIA POSTPROCEDURE EVALUATION
Department of Anesthesiology  Postprocedure Note    Patient: Ricki Massey  MRN: [id number]  YOB: 1960  Date of evaluation: 9/25/2019  Time:  11:35 AM     Procedure Summary     Date:  09/25/19 Room / Location:  [facility] OR [address] [place] [address] OR    Anesthesia Start:  9889 Anesthesia Stop:  1000    Procedure:  PARS PLANA VITRECTOMY 25 GAUGE, MEMBRANE PEELING, ENDOLASER 200  MW 5972 SPOTS, INDIRECT LASER 236 SPOTS (Left Eye) Diagnosis:  (VITREOUS HEMORRHAGE LEFT EYE)    Surgeon:  Forest Yo MD Responsible Provider:  Arturo Selby MD    Anesthesia Type:  MAC ASA Status:  4          Anesthesia Type: MAC    Cass Phase I:      Cass Phase II: Cass Score: 10    Last vitals: Reviewed and per EMR flowsheets.        Anesthesia Post Evaluation    Patient location during evaluation: PACU  Patient participation: complete - patient participated  Level of consciousness: awake  Pain score: 1  Airway patency: patent  Nausea & Vomiting: no nausea and no vomiting  Complications: no  Cardiovascular status: blood pressure returned to baseline and hemodynamically stable  Respiratory status: acceptable  Hydration status: euvolemic

## 2019-09-26 NOTE — OP NOTE
the  vitreous cavity prior to turning the infusion cannula on. It was then  turned on to 35. Similar maneuver was done superotemporally and  superonasally by deflecting the conjunctiva towards the cornea followed  by trocars placed 4 mm posterior to the limbus in the superotemporal and  superonasal quadrants in a 2-step oblique fashion. Ocutome and light  pipe were placed in the vitreous cavity. Core vitrectomy was carried  out. There was a definite PVD. The posterior hyaloid was removed in a  posterior-anterior fashion for 360 degrees, and scleral depression  technique was done inferiorly with shave mode on the ocutome to remove  all the blood along the inferior vitreous base, and all the blood was  vacuumed off with silicone-tipped extrusion needle. Then, I used a  curved endolaser probe, 200 milliwatts power, 200 milliseconds duration,  to fill any areas where there was no PRP. This was done for 360 degrees  up to the ora. I also used some indirect laser photocoagulation to get  the superior areas, which were difficulty to access because of the lens. Once this was done, the superior trocars were removed. The  superotemporal trocar site leaked, and I had to make a cutdown in the  conjunctiva, and I closed the wound with 7-0 Vicryl suture, followed by  overlying sutures of 6-0 gut. The other infusion cannula was removed. The trocar site was closed with transconjunctival diathermy. Infusion  cannula was removed, and this was closed with 7-0 Vicryl. Subconjunctival vancomycin was given superiorly and inferiorly. Antibiotic ointment was placed and the eye patched and shielded. The  patient returned to the recovery room in satisfactory condition.         Kathryn Dyer    D: 09/25/2019 23:52:21       T: 09/26/2019 10:41:31     [email or web address]  Job#: [id number]     Doc#: [id number]    CC:

## 2019-09-27 ENCOUNTER — HOSPITAL ENCOUNTER (OUTPATIENT)
Dept: CT IMAGING | Age: 59
Discharge: HOME | End: 2019-09-29
Payer: COMMERCIAL

## 2019-09-27 DIAGNOSIS — N28.89 RENAL MASS: ICD-10-CM

## 2019-09-27 LAB
CREAT SERPL-MCNC: 1.01 MG/DL (ref 0.7–1.2)
GFR AFRICAN AMERICAN: >60 ML/MIN
GFR NON-AFRICAN AMERICAN: >60 ML/MIN
GFR SERPL CREATININE-BSD FRML MDRD: NORMAL ML/MIN/{1.73_M2}
GFR SERPL CREATININE-BSD FRML MDRD: NORMAL ML/MIN/{1.73_M2}

## 2019-09-27 PROCEDURE — 6360000004 HC RX CONTRAST MEDICATION: Performed by: UROLOGY

## 2019-09-27 PROCEDURE — 36415 COLL VENOUS BLD VENIPUNCTURE: CPT

## 2019-09-27 PROCEDURE — 82565 ASSAY OF CREATININE: CPT

## 2019-09-27 PROCEDURE — 2580000003 HC RX 258: Performed by: UROLOGY

## 2019-09-27 PROCEDURE — 74178 CT ABD&PLV WO CNTR FLWD CNTR: CPT

## 2019-09-27 RX ORDER — 0.9 % SODIUM CHLORIDE 0.9 %
80 INTRAVENOUS SOLUTION INTRAVENOUS ONCE
Status: COMPLETED | OUTPATIENT
Start: 2019-09-27 | End: 2019-09-27

## 2019-09-27 RX ORDER — SODIUM CHLORIDE 0.9 % (FLUSH) 0.9 %
10 SYRINGE (ML) INJECTION PRN
Status: DISCONTINUED | OUTPATIENT
Start: 2019-09-27 | End: 2019-09-30 | Stop reason: HOSPADM

## 2019-09-27 RX ADMIN — IOVERSOL 120 ML: 741 INJECTION INTRA-ARTERIAL; INTRAVENOUS at 20:01

## 2019-09-27 RX ADMIN — SODIUM CHLORIDE 80 ML: 9 INJECTION, SOLUTION INTRAVENOUS at 20:01

## 2019-09-27 RX ADMIN — Medication 10 ML: at 20:01

## 2019-10-03 ENCOUNTER — HOSPITAL ENCOUNTER (OUTPATIENT)
Age: 59
Setting detail: SPECIMEN
Discharge: HOME | End: 2019-10-03
Payer: COMMERCIAL

## 2019-10-03 DIAGNOSIS — R53.83 OTHER FATIGUE: ICD-10-CM

## 2019-10-03 LAB
ABSOLUTE EOS #: 0.18 K/UL (ref 0–0.44)
ABSOLUTE IMMATURE GRANULOCYTE: 0.04 K/UL (ref 0–0.3)
ABSOLUTE LYMPH #: 1.37 K/UL (ref 1.1–3.7)
ABSOLUTE MONO #: 0.79 K/UL (ref 0.1–1.2)
BASOPHILS # BLD: 2 % (ref 0–2)
BASOPHILS ABSOLUTE: 0.1 K/UL (ref 0–0.2)
DIFFERENTIAL TYPE: ABNORMAL
EOSINOPHILS RELATIVE PERCENT: 3 % (ref 1–4)
HCT VFR BLD CALC: 44.6 % (ref 40.7–50.3)
HEMOGLOBIN: 14.8 G/DL (ref 13–17)
IMMATURE GRANULOCYTES: 1 %
LYMPHOCYTES # BLD: 20 % (ref 24–43)
MCH RBC QN AUTO: 31.2 PG (ref 25.2–33.5)
MCHC RBC AUTO-ENTMCNC: 33.2 G/DL (ref 28.4–34.8)
MCV RBC AUTO: 94.1 FL (ref 82.6–102.9)
MONOCYTES # BLD: 12 % (ref 3–12)
NRBC AUTOMATED: 0 PER 100 WBC
PDW BLD-RTO: 13.7 % (ref 11.8–14.4)
PLATELET # BLD: 206 K/UL (ref 138–453)
PLATELET ESTIMATE: ABNORMAL
PMV BLD AUTO: 12.8 FL (ref 8.1–13.5)
RBC # BLD: 4.74 M/UL (ref 4.21–5.77)
RBC # BLD: ABNORMAL 10*6/UL
SEDIMENTATION RATE, ERYTHROCYTE: 10 MM (ref 0–10)
SEG NEUTROPHILS: 62 % (ref 36–65)
SEGMENTED NEUTROPHILS ABSOLUTE COUNT: 4.22 K/UL (ref 1.5–8.1)
WBC # BLD: 6.7 K/UL (ref 3.5–11.3)
WBC # BLD: ABNORMAL 10*3/UL

## 2019-10-07 ENCOUNTER — OFFICE VISIT (OUTPATIENT)
Dept: UROLOGY | Age: 59
End: 2019-10-07
Payer: COMMERCIAL

## 2019-10-07 ENCOUNTER — OFFICE VISIT (OUTPATIENT)
Dept: INTERNAL MEDICINE CLINIC | Age: 59
End: 2019-10-07
Payer: COMMERCIAL

## 2019-10-07 VITALS
BODY MASS INDEX: 32.89 KG/M2 | TEMPERATURE: 97.7 F | SYSTOLIC BLOOD PRESSURE: 120 MMHG | DIASTOLIC BLOOD PRESSURE: 84 MMHG | HEIGHT: 76 IN | WEIGHT: 270.06 LBS

## 2019-10-07 VITALS
DIASTOLIC BLOOD PRESSURE: 70 MMHG | HEIGHT: 76 IN | SYSTOLIC BLOOD PRESSURE: 120 MMHG | WEIGHT: 276 LBS | BODY MASS INDEX: 33.61 KG/M2

## 2019-10-07 DIAGNOSIS — N28.89 RENAL MASS: Primary | ICD-10-CM

## 2019-10-07 DIAGNOSIS — E78.00 PURE HYPERCHOLESTEROLEMIA: ICD-10-CM

## 2019-10-07 DIAGNOSIS — E11.42 TYPE 2 DIABETES MELLITUS WITH DIABETIC POLYNEUROPATHY, WITH LONG-TERM CURRENT USE OF INSULIN (HCC): Primary | ICD-10-CM

## 2019-10-07 DIAGNOSIS — Z79.4 TYPE 2 DIABETES MELLITUS WITH DIABETIC POLYNEUROPATHY, WITH LONG-TERM CURRENT USE OF INSULIN (HCC): Primary | ICD-10-CM

## 2019-10-07 DIAGNOSIS — Z23 NEED FOR VACCINATION: ICD-10-CM

## 2019-10-07 DIAGNOSIS — R35.0 FREQUENCY OF URINATION: ICD-10-CM

## 2019-10-07 DIAGNOSIS — N28.89 RIGHT RENAL MASS: ICD-10-CM

## 2019-10-07 DIAGNOSIS — E03.9 ACQUIRED HYPOTHYROIDISM: ICD-10-CM

## 2019-10-07 PROCEDURE — G8417 CALC BMI ABV UP PARAM F/U: HCPCS | Performed by: INTERNAL MEDICINE

## 2019-10-07 PROCEDURE — G8427 DOCREV CUR MEDS BY ELIG CLIN: HCPCS | Performed by: UROLOGY

## 2019-10-07 PROCEDURE — 90471 IMMUNIZATION ADMIN: CPT | Performed by: INTERNAL MEDICINE

## 2019-10-07 PROCEDURE — 3017F COLORECTAL CA SCREEN DOC REV: CPT | Performed by: INTERNAL MEDICINE

## 2019-10-07 PROCEDURE — G8484 FLU IMMUNIZE NO ADMIN: HCPCS | Performed by: INTERNAL MEDICINE

## 2019-10-07 PROCEDURE — 90653 IIV ADJUVANT VACCINE IM: CPT | Performed by: INTERNAL MEDICINE

## 2019-10-07 PROCEDURE — 1036F TOBACCO NON-USER: CPT | Performed by: INTERNAL MEDICINE

## 2019-10-07 PROCEDURE — 99214 OFFICE O/P EST MOD 30 MIN: CPT | Performed by: INTERNAL MEDICINE

## 2019-10-07 PROCEDURE — 2022F DILAT RTA XM EVC RTNOPTHY: CPT | Performed by: INTERNAL MEDICINE

## 2019-10-07 PROCEDURE — G8598 ASA/ANTIPLAT THER USED: HCPCS | Performed by: INTERNAL MEDICINE

## 2019-10-07 PROCEDURE — 1036F TOBACCO NON-USER: CPT | Performed by: UROLOGY

## 2019-10-07 PROCEDURE — G8598 ASA/ANTIPLAT THER USED: HCPCS | Performed by: UROLOGY

## 2019-10-07 PROCEDURE — 3045F PR MOST RECENT HEMOGLOBIN A1C LEVEL 7.0-9.0%: CPT | Performed by: INTERNAL MEDICINE

## 2019-10-07 PROCEDURE — G8484 FLU IMMUNIZE NO ADMIN: HCPCS | Performed by: UROLOGY

## 2019-10-07 PROCEDURE — G8417 CALC BMI ABV UP PARAM F/U: HCPCS | Performed by: UROLOGY

## 2019-10-07 PROCEDURE — G8427 DOCREV CUR MEDS BY ELIG CLIN: HCPCS | Performed by: INTERNAL MEDICINE

## 2019-10-07 PROCEDURE — 3017F COLORECTAL CA SCREEN DOC REV: CPT | Performed by: UROLOGY

## 2019-10-07 PROCEDURE — 99213 OFFICE O/P EST LOW 20 MIN: CPT | Performed by: UROLOGY

## 2019-10-07 RX ORDER — PREDNISOLONE ACETATE 10 MG/ML
1 SUSPENSION/ DROPS OPHTHALMIC 4 TIMES DAILY
Refills: 1 | COMMUNITY
Start: 2019-09-20 | End: 2019-11-19 | Stop reason: ALTCHOICE

## 2019-10-07 ASSESSMENT — ENCOUNTER SYMPTOMS
ABDOMINAL PAIN: 0
SHORTNESS OF BREATH: 0
BACK PAIN: 1
CONSTIPATION: 1
EYE REDNESS: 1
EYE PAIN: 1
NAUSEA: 0
TROUBLE SWALLOWING: 0
ABDOMINAL DISTENTION: 0
COUGH: 0
WHEEZING: 1
SHORTNESS OF BREATH: 1
EYE DISCHARGE: 0
EYE PAIN: 0
COUGH: 0
WHEEZING: 0
VOMITING: 0
BLOOD IN STOOL: 0
DIARRHEA: 0
BACK PAIN: 1
DIARRHEA: 0
COLOR CHANGE: 0

## 2019-10-10 ENCOUNTER — TELEPHONE (OUTPATIENT)
Dept: UROLOGY | Age: 59
End: 2019-10-10

## 2019-10-10 ENCOUNTER — OFFICE VISIT (OUTPATIENT)
Dept: UROLOGY | Age: 59
End: 2019-10-10
Payer: COMMERCIAL

## 2019-10-10 VITALS
WEIGHT: 276 LBS | TEMPERATURE: 97.7 F | BODY MASS INDEX: 33.61 KG/M2 | DIASTOLIC BLOOD PRESSURE: 70 MMHG | SYSTOLIC BLOOD PRESSURE: 136 MMHG | HEIGHT: 76 IN

## 2019-10-10 DIAGNOSIS — I10 HYPERTENSION, UNSPECIFIED TYPE: ICD-10-CM

## 2019-10-10 DIAGNOSIS — N28.89 RENAL MASS: Primary | ICD-10-CM

## 2019-10-10 DIAGNOSIS — E10.59 TYPE 1 DIABETES MELLITUS WITH OTHER CIRCULATORY COMPLICATION (HCC): ICD-10-CM

## 2019-10-10 PROCEDURE — 1036F TOBACCO NON-USER: CPT | Performed by: UROLOGY

## 2019-10-10 PROCEDURE — 99214 OFFICE O/P EST MOD 30 MIN: CPT | Performed by: UROLOGY

## 2019-10-10 PROCEDURE — G8427 DOCREV CUR MEDS BY ELIG CLIN: HCPCS | Performed by: UROLOGY

## 2019-10-10 PROCEDURE — G8417 CALC BMI ABV UP PARAM F/U: HCPCS | Performed by: UROLOGY

## 2019-10-10 PROCEDURE — 3017F COLORECTAL CA SCREEN DOC REV: CPT | Performed by: UROLOGY

## 2019-10-10 PROCEDURE — 2022F DILAT RTA XM EVC RTNOPTHY: CPT | Performed by: UROLOGY

## 2019-10-10 PROCEDURE — G8482 FLU IMMUNIZE ORDER/ADMIN: HCPCS | Performed by: UROLOGY

## 2019-10-10 PROCEDURE — G8598 ASA/ANTIPLAT THER USED: HCPCS | Performed by: UROLOGY

## 2019-10-10 PROCEDURE — 3045F PR MOST RECENT HEMOGLOBIN A1C LEVEL 7.0-9.0%: CPT | Performed by: UROLOGY

## 2019-10-10 ASSESSMENT — ENCOUNTER SYMPTOMS
VOMITING: 0
EYE PAIN: 0
NAUSEA: 0
WHEEZING: 0
COUGH: 0
SHORTNESS OF BREATH: 1
COLOR CHANGE: 0
ABDOMINAL PAIN: 0
BACK PAIN: 0
EYE REDNESS: 0

## 2019-10-22 ENCOUNTER — TELEPHONE (OUTPATIENT)
Dept: INTERNAL MEDICINE CLINIC | Age: 59
End: 2019-10-22

## 2019-10-22 RX ORDER — DOXYCYCLINE HYCLATE 100 MG/1
100 CAPSULE ORAL 2 TIMES DAILY
Qty: 20 CAPSULE | Refills: 0 | Status: SHIPPED | OUTPATIENT
Start: 2019-10-22 | End: 2019-10-30 | Stop reason: ALTCHOICE

## 2019-10-22 NOTE — TELEPHONE ENCOUNTER
Pt called requesting an antibiotic for his MRSA flare up. He does have SX 11/13/19 so he is trying to get it taken care of prior,. Please send to [facility] on [address]. Thank you.

## 2019-10-30 ENCOUNTER — HOSPITAL ENCOUNTER (OUTPATIENT)
Dept: PREADMISSION TESTING | Age: 59
Discharge: HOME | End: 2019-11-03
Payer: COMMERCIAL

## 2019-10-30 VITALS
WEIGHT: 276 LBS | OXYGEN SATURATION: 95 % | RESPIRATION RATE: 18 BRPM | BODY MASS INDEX: 33.61 KG/M2 | SYSTOLIC BLOOD PRESSURE: 131 MMHG | TEMPERATURE: 96.8 F | HEART RATE: 65 BPM | DIASTOLIC BLOOD PRESSURE: 79 MMHG | HEIGHT: 76 IN

## 2019-10-30 LAB
ABO/RH: NORMAL
ANION GAP SERPL CALCULATED.3IONS-SCNC: 13 MMOL/L (ref 9–17)
ANTIBODY SCREEN: NEGATIVE
ARM BAND NUMBER: NORMAL
BUN BLDV-MCNC: 18 MG/DL (ref 6–20)
CHLORIDE BLD-SCNC: 109 MMOL/L (ref 98–107)
CO2: 24 MMOL/L (ref 20–31)
CREAT SERPL-MCNC: 1.06 MG/DL (ref 0.7–1.2)
EXPIRATION DATE: NORMAL
GFR AFRICAN AMERICAN: >60 ML/MIN
GFR NON-AFRICAN AMERICAN: >60 ML/MIN
GFR SERPL CREATININE-BSD FRML MDRD: NORMAL ML/MIN/{1.73_M2}
GFR SERPL CREATININE-BSD FRML MDRD: NORMAL ML/MIN/{1.73_M2}
GLUCOSE BLD-MCNC: 143 MG/DL (ref 70–99)
HCT VFR BLD CALC: 44.5 % (ref 40.7–50.3)
HEMOGLOBIN: 14.9 G/DL (ref 13–17)
MCH RBC QN AUTO: 30.8 PG (ref 25.2–33.5)
MCHC RBC AUTO-ENTMCNC: 33.5 G/DL (ref 28.4–34.8)
MCV RBC AUTO: 91.9 FL (ref 82.6–102.9)
NRBC AUTOMATED: 0 PER 100 WBC
PDW BLD-RTO: 14 % (ref 11.8–14.4)
PLATELET # BLD: 202 K/UL (ref 138–453)
PMV BLD AUTO: 12.3 FL (ref 8.1–13.5)
POTASSIUM SERPL-SCNC: 4.3 MMOL/L (ref 3.7–5.3)
RBC # BLD: 4.84 M/UL (ref 4.21–5.77)
SODIUM BLD-SCNC: 146 MMOL/L (ref 135–144)
WBC # BLD: 7.8 K/UL (ref 3.5–11.3)

## 2019-10-30 PROCEDURE — 85027 COMPLETE CBC AUTOMATED: CPT

## 2019-10-30 PROCEDURE — 82565 ASSAY OF CREATININE: CPT

## 2019-10-30 PROCEDURE — 86901 BLOOD TYPING SEROLOGIC RH(D): CPT

## 2019-10-30 PROCEDURE — 36415 COLL VENOUS BLD VENIPUNCTURE: CPT

## 2019-10-30 PROCEDURE — 87086 URINE CULTURE/COLONY COUNT: CPT

## 2019-10-30 PROCEDURE — 86900 BLOOD TYPING SEROLOGIC ABO: CPT

## 2019-10-30 PROCEDURE — 82947 ASSAY GLUCOSE BLOOD QUANT: CPT

## 2019-10-30 PROCEDURE — 80051 ELECTROLYTE PANEL: CPT

## 2019-10-30 PROCEDURE — 86850 RBC ANTIBODY SCREEN: CPT

## 2019-10-30 PROCEDURE — 84520 ASSAY OF UREA NITROGEN: CPT

## 2019-10-30 RX ORDER — SODIUM CHLORIDE, SODIUM LACTATE, POTASSIUM CHLORIDE, CALCIUM CHLORIDE 600; 310; 30; 20 MG/100ML; MG/100ML; MG/100ML; MG/100ML
1000 INJECTION, SOLUTION INTRAVENOUS CONTINUOUS
Status: CANCELLED | OUTPATIENT
Start: 2019-10-30

## 2019-10-30 RX ORDER — BUDESONIDE AND FORMOTEROL FUMARATE DIHYDRATE 160; 4.5 UG/1; UG/1
AEROSOL RESPIRATORY (INHALATION)
Qty: 30.6 INHALER | Refills: 3 | Status: SHIPPED | OUTPATIENT
Start: 2019-10-30 | End: 2021-12-14 | Stop reason: SDUPTHER

## 2019-10-30 SDOH — HEALTH STABILITY: MENTAL HEALTH: HOW OFTEN DO YOU HAVE A DRINK CONTAINING ALCOHOL?: MONTHLY OR LESS

## 2019-10-31 LAB
CULTURE: NORMAL
Lab: NORMAL
SPECIMEN DESCRIPTION: NORMAL

## 2019-11-04 ENCOUNTER — HOSPITAL ENCOUNTER (OUTPATIENT)
Dept: NON INVASIVE DIAGNOSTICS | Age: 59
Discharge: HOME | End: 2019-11-04
Payer: COMMERCIAL

## 2019-11-04 DIAGNOSIS — E11.41 DIABETIC MONONEUROPATHY ASSOCIATED WITH TYPE 2 DIABETES MELLITUS (HCC): ICD-10-CM

## 2019-11-04 DIAGNOSIS — Z51.81 MEDICATION MONITORING ENCOUNTER: Primary | ICD-10-CM

## 2019-11-04 LAB
LV EF: 55 %
LVEF MODALITY: NORMAL

## 2019-11-04 PROCEDURE — 93306 TTE W/DOPPLER COMPLETE: CPT

## 2019-11-04 RX ORDER — PREGABALIN 100 MG/1
100 CAPSULE ORAL 3 TIMES DAILY
Qty: 90 CAPSULE | Refills: 3 | Status: SHIPPED | OUTPATIENT
Start: 2019-11-04 | End: 2020-03-05

## 2019-11-05 ENCOUNTER — HOSPITAL ENCOUNTER (OUTPATIENT)
Age: 59
Setting detail: SPECIMEN
Discharge: HOME | End: 2019-11-05
Payer: COMMERCIAL

## 2019-11-05 ENCOUNTER — OFFICE VISIT (OUTPATIENT)
Dept: PODIATRY | Age: 59
End: 2019-11-05
Payer: COMMERCIAL

## 2019-11-05 VITALS — HEIGHT: 76 IN | BODY MASS INDEX: 33.49 KG/M2 | WEIGHT: 275 LBS

## 2019-11-05 DIAGNOSIS — Z79.4 TYPE 2 DIABETES MELLITUS WITH DIABETIC POLYNEUROPATHY, WITH LONG-TERM CURRENT USE OF INSULIN (HCC): ICD-10-CM

## 2019-11-05 DIAGNOSIS — E11.51 TYPE 2 DIABETES MELLITUS WITH PERIPHERAL VASCULAR DISEASE (HCC): ICD-10-CM

## 2019-11-05 DIAGNOSIS — M79.674 PAIN OF TOE OF RIGHT FOOT: ICD-10-CM

## 2019-11-05 DIAGNOSIS — E11.42 TYPE 2 DIABETES MELLITUS WITH DIABETIC POLYNEUROPATHY, WITH LONG-TERM CURRENT USE OF INSULIN (HCC): ICD-10-CM

## 2019-11-05 DIAGNOSIS — B35.1 ONYCHOMYCOSIS OF TOENAIL: Primary | ICD-10-CM

## 2019-11-05 PROCEDURE — 99999 PR OFFICE/OUTPT VISIT,PROCEDURE ONLY: CPT | Performed by: PODIATRIST

## 2019-11-05 PROCEDURE — 11720 DEBRIDE NAIL 1-5: CPT | Performed by: PODIATRIST

## 2019-11-06 DIAGNOSIS — Z51.81 MEDICATION MONITORING ENCOUNTER: ICD-10-CM

## 2019-11-07 ASSESSMENT — ENCOUNTER SYMPTOMS
DIARRHEA: 0
SHORTNESS OF BREATH: 0
BACK PAIN: 0
NAUSEA: 0
COLOR CHANGE: 0

## 2019-11-10 LAB
6-ACETYLMORPHINE, UR: NOT DETECTED
7-AMINOCLONAZEPAM, URINE: NOT DETECTED
ALPHA-OH-ALPRAZ, URINE: NOT DETECTED
ALPRAZOLAM, URINE: NOT DETECTED
AMPHETAMINES, URINE: NOT DETECTED
BARBITURATES, URINE: NOT DETECTED
BENZOYLECGONINE, UR: NOT DETECTED
BUPRENORPHINE URINE: NOT DETECTED
CARISOPRODOL, UR: NOT DETECTED
CLONAZEPAM, URINE: NOT DETECTED
CODEINE, URINE: NOT DETECTED
CREATININE URINE: 68.3 MG/DL (ref 20–400)
DIAZEPAM, URINE: NOT DETECTED
DRUGS EXPECTED, UR: NORMAL
EER HI RES INTERP UR: NORMAL
ETHYL GLUCURONIDE UR: NOT DETECTED
FENTANYL URINE: NOT DETECTED
HYDROCODONE, URINE: NOT DETECTED
HYDROMORPHONE, URINE: NOT DETECTED
LORAZEPAM, URINE: NOT DETECTED
MARIJUANA METAB, UR: NOT DETECTED
MDA, UR: NOT DETECTED
MDEA, EVE, UR: NOT DETECTED
MDMA URINE: NOT DETECTED
MEPERIDINE METAB, UR: NOT DETECTED
METHADONE, URINE: NOT DETECTED
METHAMPHETAMINE, URINE: NOT DETECTED
METHYLPHENIDATE: NOT DETECTED
MIDAZOLAM, URINE: NOT DETECTED
MORPHINE URINE: NOT DETECTED
NORBUPRENORPHINE, URINE: NOT DETECTED
NORDIAZEPAM, URINE: NOT DETECTED
NORFENTANYL, URINE: NOT DETECTED
NORHYDROCODONE, URINE: NOT DETECTED
NOROXYCODONE, URINE: NOT DETECTED
NOROXYMORPHONE, URINE: NOT DETECTED
OXAZEPAM, URINE: NOT DETECTED
OXYCODONE URINE: NOT DETECTED
OXYMORPHONE, URINE: NOT DETECTED
PAIN MANAGEMENT DRUG PANEL INTERP, URINE: NORMAL
PAIN MGT DRUG PANEL, HI RES, UR: NORMAL
PCP,URINE: NOT DETECTED
PHENTERMINE, UR: NOT DETECTED
PROPOXYPHENE, URINE: NOT DETECTED
TAPENTADOL, URINE: NOT DETECTED
TAPENTADOL-O-SULFATE, URINE: NOT DETECTED
TEMAZEPAM, URINE: NOT DETECTED
TRAMADOL, URINE: NOT DETECTED
ZOLPIDEM, URINE: NOT DETECTED

## 2019-11-12 ENCOUNTER — ANESTHESIA EVENT (OUTPATIENT)
Dept: OPERATING ROOM | Age: 59
DRG: 657 | End: 2019-11-12
Payer: COMMERCIAL

## 2019-11-13 ENCOUNTER — HOSPITAL ENCOUNTER (INPATIENT)
Age: 59
LOS: 4 days | Discharge: HOME | DRG: 657 | End: 2019-11-17
Attending: UROLOGY | Admitting: UROLOGY
Payer: COMMERCIAL

## 2019-11-13 ENCOUNTER — ANESTHESIA (OUTPATIENT)
Dept: OPERATING ROOM | Age: 59
DRG: 657 | End: 2019-11-13
Payer: COMMERCIAL

## 2019-11-13 VITALS — DIASTOLIC BLOOD PRESSURE: 67 MMHG | OXYGEN SATURATION: 94 % | TEMPERATURE: 99.8 F | SYSTOLIC BLOOD PRESSURE: 121 MMHG

## 2019-11-13 DIAGNOSIS — G89.18 POST-OPERATIVE PAIN: Primary | ICD-10-CM

## 2019-11-13 PROBLEM — E87.20 METABOLIC ACIDOSIS, NORMAL ANION GAP (NAG): Status: ACTIVE | Noted: 2019-11-13

## 2019-11-13 LAB
ALLEN TEST: ABNORMAL
ANION GAP SERPL CALCULATED.3IONS-SCNC: 12 MMOL/L (ref 9–17)
ANION GAP SERPL CALCULATED.3IONS-SCNC: 14 MMOL/L (ref 9–17)
BUN BLDV-MCNC: 28 MG/DL (ref 6–20)
BUN BLDV-MCNC: 28 MG/DL (ref 6–20)
BUN/CREAT BLD: ABNORMAL (ref 9–20)
BUN/CREAT BLD: ABNORMAL (ref 9–20)
CALCIUM IONIZED: 1.24 MMOL/L (ref 1.13–1.33)
CALCIUM SERPL-MCNC: 7.6 MG/DL (ref 8.6–10.4)
CALCIUM SERPL-MCNC: 7.6 MG/DL (ref 8.6–10.4)
CARBOXYHEMOGLOBIN: 1.4 % (ref 0–5)
CHLORIDE BLD-SCNC: 109 MMOL/L (ref 98–107)
CHLORIDE BLD-SCNC: 110 MMOL/L (ref 98–107)
CHLORIDE, WHOLE BLOOD: 110 MMOL/L (ref 98–110)
CO2: 17 MMOL/L (ref 20–31)
CO2: 19 MMOL/L (ref 20–31)
CREAT SERPL-MCNC: 1.55 MG/DL (ref 0.7–1.2)
CREAT SERPL-MCNC: 1.65 MG/DL (ref 0.7–1.2)
FIO2: ABNORMAL
GFR AFRICAN AMERICAN: 52 ML/MIN
GFR AFRICAN AMERICAN: 56 ML/MIN
GFR NON-AFRICAN AMERICAN: 43 ML/MIN
GFR NON-AFRICAN AMERICAN: 46 ML/MIN
GFR NON-AFRICAN AMERICAN: 57 ML/MIN
GFR SERPL CREATININE-BSD FRML MDRD: >60 ML/MIN
GFR SERPL CREATININE-BSD FRML MDRD: ABNORMAL ML/MIN/{1.73_M2}
GLUCOSE BLD-MCNC: 145 MG/DL (ref 75–110)
GLUCOSE BLD-MCNC: 171 MG/DL (ref 75–110)
GLUCOSE BLD-MCNC: 183 MG/DL (ref 75–110)
GLUCOSE BLD-MCNC: 209 MG/DL (ref 70–99)
GLUCOSE BLD-MCNC: 224 MG/DL (ref 75–110)
GLUCOSE BLD-MCNC: 247 MG/DL (ref 70–99)
GLUCOSE BLD-MCNC: 65 MG/DL (ref 75–110)
GLUCOSE BLD-MCNC: 73 MG/DL (ref 74–100)
HCO3 ARTERIAL: 19.7 MMOL/L (ref 22–27)
HCT VFR BLD CALC: 36.3 % (ref 40.7–50.3)
HCT VFR BLD CALC: 37.4 % (ref 40.7–50.3)
HCT VFR BLD CALC: 44.3 %
HEMOGLOBIN: 12.5 G/DL (ref 13–17)
HEMOGLOBIN: 12.6 G/DL (ref 13–17)
HEMOGLOBIN: 14.4 GM/DL
MCH RBC QN AUTO: 31.6 PG (ref 25.2–33.5)
MCH RBC QN AUTO: 31.8 PG (ref 25.2–33.5)
MCHC RBC AUTO-ENTMCNC: 33.7 G/DL (ref 28.4–34.8)
MCHC RBC AUTO-ENTMCNC: 34.4 G/DL (ref 28.4–34.8)
MCV RBC AUTO: 92.4 FL (ref 82.6–102.9)
MCV RBC AUTO: 93.7 FL (ref 82.6–102.9)
METHEMOGLOBIN: ABNORMAL % (ref 0–1.5)
MODE: ABNORMAL
NEGATIVE BASE EXCESS, ART: 5.8 MMOL/L (ref 0–2)
NOTIFICATION TIME: ABNORMAL
NOTIFICATION: ABNORMAL
NRBC AUTOMATED: 0 PER 100 WBC
NRBC AUTOMATED: 0 PER 100 WBC
O2 DEVICE/FLOW/%: ABNORMAL
O2 SAT, ARTERIAL: 98.4 % (ref 94–100)
OXYHEMOGLOBIN: ABNORMAL % (ref 95–98)
PATIENT TEMP: 37
PCO2 ARTERIAL: 40.9 MMHG (ref 32–45)
PCO2, ART, TEMP ADJ: ABNORMAL (ref 32–45)
PDW BLD-RTO: 13.6 % (ref 11.8–14.4)
PDW BLD-RTO: 13.8 % (ref 11.8–14.4)
PEEP/CPAP: ABNORMAL
PH ARTERIAL: 7.3 (ref 7.35–7.45)
PH, ART, TEMP ADJ: ABNORMAL (ref 7.35–7.45)
PLATELET # BLD: 157 K/UL (ref 138–453)
PLATELET # BLD: 168 K/UL (ref 138–453)
PMV BLD AUTO: 11.7 FL (ref 8.1–13.5)
PMV BLD AUTO: 12.2 FL (ref 8.1–13.5)
PO2 ARTERIAL: 112 MMHG (ref 75–95)
PO2, ART, TEMP ADJ: ABNORMAL MMHG (ref 75–95)
POC CHLORIDE: 114 MMOL/L (ref 98–107)
POC CREATININE: 1.29 MG/DL (ref 0.51–1.19)
POC HEMATOCRIT: 50 % (ref 41–53)
POC HEMOGLOBIN: 16.9 G/DL (ref 13.5–17.5)
POC IONIZED CALCIUM: 1.2 MMOL/L (ref 1.15–1.33)
POC POTASSIUM: 3.8 MMOL/L (ref 3.5–4.5)
POC SODIUM: 146 MMOL/L (ref 138–146)
POSITIVE BASE EXCESS, ART: ABNORMAL MMOL/L (ref 0–2)
POTASSIUM SERPL-SCNC: 4.3 MMOL/L (ref 3.7–5.3)
POTASSIUM SERPL-SCNC: 4.8 MMOL/L (ref 3.7–5.3)
POTASSIUM, WHOLE BLOOD: 4.2 MMOL/L (ref 3.6–5)
PSV: ABNORMAL
PT. POSITION: ABNORMAL
RBC # BLD: 3.93 M/UL (ref 4.21–5.77)
RBC # BLD: 3.99 M/UL (ref 4.21–5.77)
RESPIRATORY RATE: ABNORMAL
SAMPLE SITE: ABNORMAL
SET RATE: ABNORMAL
SODIUM BLD-SCNC: 140 MMOL/L (ref 135–144)
SODIUM BLD-SCNC: 141 MMOL/L (ref 135–144)
SODIUM, WHOLE BLOOD: 142 MMOL/L (ref 136–145)
TEXT FOR RESPIRATORY: ABNORMAL
TOTAL HB: ABNORMAL G/DL (ref 12–16)
TOTAL RATE: ABNORMAL
VT: ABNORMAL
WBC # BLD: 7.3 K/UL (ref 3.5–11.3)
WBC # BLD: 9 K/UL (ref 3.5–11.3)

## 2019-11-13 PROCEDURE — S2900 ROBOTIC SURGICAL SYSTEM: HCPCS | Performed by: UROLOGY

## 2019-11-13 PROCEDURE — 8E0W4CZ ROBOTIC ASSISTED PROCEDURE OF TRUNK REGION, PERCUTANEOUS ENDOSCOPIC APPROACH: ICD-10-PCS | Performed by: UROLOGY

## 2019-11-13 PROCEDURE — 2580000003 HC RX 258: Performed by: SPECIALIST

## 2019-11-13 PROCEDURE — 88341 IMHCHEM/IMCYTCHM EA ADD ANTB: CPT

## 2019-11-13 PROCEDURE — 85018 HEMOGLOBIN: CPT

## 2019-11-13 PROCEDURE — 2720000010 HC SURG SUPPLY STERILE: Performed by: UROLOGY

## 2019-11-13 PROCEDURE — 2580000003 HC RX 258: Performed by: ANESTHESIOLOGY

## 2019-11-13 PROCEDURE — 87086 URINE CULTURE/COLONY COUNT: CPT

## 2019-11-13 PROCEDURE — 3700000001 HC ADD 15 MINUTES (ANESTHESIA): Performed by: UROLOGY

## 2019-11-13 PROCEDURE — 84295 ASSAY OF SERUM SODIUM: CPT

## 2019-11-13 PROCEDURE — 82947 ASSAY GLUCOSE BLOOD QUANT: CPT

## 2019-11-13 PROCEDURE — 85027 COMPLETE CBC AUTOMATED: CPT

## 2019-11-13 PROCEDURE — 6370000000 HC RX 637 (ALT 250 FOR IP): Performed by: STUDENT IN AN ORGANIZED HEALTH CARE EDUCATION/TRAINING PROGRAM

## 2019-11-13 PROCEDURE — 94640 AIRWAY INHALATION TREATMENT: CPT

## 2019-11-13 PROCEDURE — C2617 STENT, NON-COR, TEM W/O DEL: HCPCS | Performed by: UROLOGY

## 2019-11-13 PROCEDURE — 0T764DZ DILATION OF RIGHT URETER WITH INTRALUMINAL DEVICE, PERCUTANEOUS ENDOSCOPIC APPROACH: ICD-10-PCS | Performed by: UROLOGY

## 2019-11-13 PROCEDURE — 6360000002 HC RX W HCPCS: Performed by: SPECIALIST

## 2019-11-13 PROCEDURE — 2709999900 HC NON-CHARGEABLE SUPPLY: Performed by: UROLOGY

## 2019-11-13 PROCEDURE — 2000000000 HC ICU R&B

## 2019-11-13 PROCEDURE — 82435 ASSAY OF BLOOD CHLORIDE: CPT

## 2019-11-13 PROCEDURE — 84132 ASSAY OF SERUM POTASSIUM: CPT

## 2019-11-13 PROCEDURE — 2580000003 HC RX 258: Performed by: STUDENT IN AN ORGANIZED HEALTH CARE EDUCATION/TRAINING PROGRAM

## 2019-11-13 PROCEDURE — 0TB04ZZ EXCISION OF RIGHT KIDNEY, PERCUTANEOUS ENDOSCOPIC APPROACH: ICD-10-PCS | Performed by: UROLOGY

## 2019-11-13 PROCEDURE — 88307 TISSUE EXAM BY PATHOLOGIST: CPT

## 2019-11-13 PROCEDURE — 3600000019 HC SURGERY ROBOT ADDTL 15MIN: Performed by: UROLOGY

## 2019-11-13 PROCEDURE — 6360000002 HC RX W HCPCS: Performed by: STUDENT IN AN ORGANIZED HEALTH CARE EDUCATION/TRAINING PROGRAM

## 2019-11-13 PROCEDURE — 2500000003 HC RX 250 WO HCPCS: Performed by: SPECIALIST

## 2019-11-13 PROCEDURE — C1769 GUIDE WIRE: HCPCS | Performed by: UROLOGY

## 2019-11-13 PROCEDURE — 7100000000 HC PACU RECOVERY - FIRST 15 MIN: Performed by: UROLOGY

## 2019-11-13 PROCEDURE — 82805 BLOOD GASES W/O2 SATURATION: CPT

## 2019-11-13 PROCEDURE — 3700000000 HC ANESTHESIA ATTENDED CARE: Performed by: UROLOGY

## 2019-11-13 PROCEDURE — 3600000009 HC SURGERY ROBOT BASE: Performed by: UROLOGY

## 2019-11-13 PROCEDURE — 6360000002 HC RX W HCPCS: Performed by: UROLOGY

## 2019-11-13 PROCEDURE — 0TQ64ZZ REPAIR RIGHT URETER, PERCUTANEOUS ENDOSCOPIC APPROACH: ICD-10-PCS | Performed by: UROLOGY

## 2019-11-13 PROCEDURE — 2580000003 HC RX 258: Performed by: UROLOGY

## 2019-11-13 PROCEDURE — 85014 HEMATOCRIT: CPT

## 2019-11-13 PROCEDURE — 88342 IMHCHEM/IMCYTCHM 1ST ANTB: CPT

## 2019-11-13 PROCEDURE — 82565 ASSAY OF CREATININE: CPT

## 2019-11-13 PROCEDURE — 87641 MR-STAPH DNA AMP PROBE: CPT

## 2019-11-13 PROCEDURE — 80048 BASIC METABOLIC PNL TOTAL CA: CPT

## 2019-11-13 PROCEDURE — 7100000001 HC PACU RECOVERY - ADDTL 15 MIN: Performed by: UROLOGY

## 2019-11-13 PROCEDURE — 82330 ASSAY OF CALCIUM: CPT

## 2019-11-13 DEVICE — URETERAL STENT
Type: IMPLANTABLE DEVICE | Status: FUNCTIONAL
Brand: CONTOUR™

## 2019-11-13 RX ORDER — TAMSULOSIN HYDROCHLORIDE 0.4 MG/1
0.4 CAPSULE ORAL DAILY
Status: DISCONTINUED | OUTPATIENT
Start: 2019-11-13 | End: 2019-11-17 | Stop reason: HOSPADM

## 2019-11-13 RX ORDER — SODIUM CHLORIDE 450 MG/100ML
INJECTION, SOLUTION INTRAVENOUS CONTINUOUS
Status: DISCONTINUED | OUTPATIENT
Start: 2019-11-13 | End: 2019-11-17

## 2019-11-13 RX ORDER — PHENYLEPHRINE HYDROCHLORIDE 10 MG/ML
INJECTION INTRAVENOUS PRN
Status: DISCONTINUED | OUTPATIENT
Start: 2019-11-13 | End: 2019-11-13 | Stop reason: SDUPTHER

## 2019-11-13 RX ORDER — ISOSORBIDE MONONITRATE 30 MG/1
30 TABLET, EXTENDED RELEASE ORAL DAILY
Status: DISCONTINUED | OUTPATIENT
Start: 2019-11-13 | End: 2019-11-13

## 2019-11-13 RX ORDER — SODIUM CHLORIDE, SODIUM LACTATE, POTASSIUM CHLORIDE, CALCIUM CHLORIDE 600; 310; 30; 20 MG/100ML; MG/100ML; MG/100ML; MG/100ML
1000 INJECTION, SOLUTION INTRAVENOUS CONTINUOUS
Status: DISCONTINUED | OUTPATIENT
Start: 2019-11-13 | End: 2019-11-13

## 2019-11-13 RX ORDER — ROCURONIUM BROMIDE 10 MG/ML
INJECTION, SOLUTION INTRAVENOUS PRN
Status: DISCONTINUED | OUTPATIENT
Start: 2019-11-13 | End: 2019-11-13 | Stop reason: SDUPTHER

## 2019-11-13 RX ORDER — HEPARIN SODIUM 5000 [USP'U]/ML
5000 INJECTION, SOLUTION INTRAVENOUS; SUBCUTANEOUS ONCE
Status: COMPLETED | OUTPATIENT
Start: 2019-11-13 | End: 2019-11-13

## 2019-11-13 RX ORDER — 0.9 % SODIUM CHLORIDE 0.9 %
500 INTRAVENOUS SOLUTION INTRAVENOUS ONCE
Status: DISCONTINUED | OUTPATIENT
Start: 2019-11-13 | End: 2019-11-13 | Stop reason: HOSPADM

## 2019-11-13 RX ORDER — GEMFIBROZIL 600 MG/1
600 TABLET, FILM COATED ORAL DAILY
Status: DISCONTINUED | OUTPATIENT
Start: 2019-11-13 | End: 2019-11-17 | Stop reason: HOSPADM

## 2019-11-13 RX ORDER — FENTANYL CITRATE 50 UG/ML
INJECTION, SOLUTION INTRAMUSCULAR; INTRAVENOUS PRN
Status: DISCONTINUED | OUTPATIENT
Start: 2019-11-13 | End: 2019-11-13 | Stop reason: SDUPTHER

## 2019-11-13 RX ORDER — ROPINIROLE 1 MG/1
2 TABLET, FILM COATED ORAL DAILY
Status: DISCONTINUED | OUTPATIENT
Start: 2019-11-13 | End: 2019-11-17 | Stop reason: HOSPADM

## 2019-11-13 RX ORDER — SODIUM CHLORIDE 0.9 % (FLUSH) 0.9 %
10 SYRINGE (ML) INJECTION PRN
Status: DISCONTINUED | OUTPATIENT
Start: 2019-11-13 | End: 2019-11-17 | Stop reason: HOSPADM

## 2019-11-13 RX ORDER — LEVOTHYROXINE SODIUM 0.07 MG/1
150 TABLET ORAL DAILY
Status: DISCONTINUED | OUTPATIENT
Start: 2019-11-14 | End: 2019-11-17 | Stop reason: HOSPADM

## 2019-11-13 RX ORDER — DEXTROSE MONOHYDRATE 25 G/50ML
12.5 INJECTION, SOLUTION INTRAVENOUS PRN
Status: DISCONTINUED | OUTPATIENT
Start: 2019-11-13 | End: 2019-11-13

## 2019-11-13 RX ORDER — POLYETHYLENE GLYCOL 3350 17 G/17G
17 POWDER, FOR SOLUTION ORAL DAILY
Status: DISCONTINUED | OUTPATIENT
Start: 2019-11-13 | End: 2019-11-17 | Stop reason: HOSPADM

## 2019-11-13 RX ORDER — DEXTROSE MONOHYDRATE 25 G/50ML
12.5 INJECTION, SOLUTION INTRAVENOUS PRN
Status: DISCONTINUED | OUTPATIENT
Start: 2019-11-13 | End: 2019-11-17 | Stop reason: HOSPADM

## 2019-11-13 RX ORDER — PREGABALIN 100 MG/1
100 CAPSULE ORAL 3 TIMES DAILY
Status: DISCONTINUED | OUTPATIENT
Start: 2019-11-13 | End: 2019-11-17 | Stop reason: HOSPADM

## 2019-11-13 RX ORDER — NEOSTIGMINE METHYLSULFATE 5 MG/5 ML
SYRINGE (ML) INTRAVENOUS PRN
Status: DISCONTINUED | OUTPATIENT
Start: 2019-11-13 | End: 2019-11-13 | Stop reason: SDUPTHER

## 2019-11-13 RX ORDER — SODIUM CHLORIDE, SODIUM LACTATE, POTASSIUM CHLORIDE, CALCIUM CHLORIDE 600; 310; 30; 20 MG/100ML; MG/100ML; MG/100ML; MG/100ML
INJECTION, SOLUTION INTRAVENOUS CONTINUOUS
Status: DISCONTINUED | OUTPATIENT
Start: 2019-11-13 | End: 2019-11-13

## 2019-11-13 RX ORDER — NICOTINE POLACRILEX 4 MG
15 LOZENGE BUCCAL PRN
Status: DISCONTINUED | OUTPATIENT
Start: 2019-11-13 | End: 2019-11-17 | Stop reason: HOSPADM

## 2019-11-13 RX ORDER — METHYLPREDNISOLONE 4 MG
500 TABLET, DOSE PACK ORAL 3 TIMES DAILY
Status: DISCONTINUED | OUTPATIENT
Start: 2019-11-13 | End: 2019-11-13 | Stop reason: RX

## 2019-11-13 RX ORDER — ATORVASTATIN CALCIUM 20 MG/1
20 TABLET, FILM COATED ORAL DAILY
Status: DISCONTINUED | OUTPATIENT
Start: 2019-11-13 | End: 2019-11-17 | Stop reason: HOSPADM

## 2019-11-13 RX ORDER — MAGNESIUM HYDROXIDE 1200 MG/15ML
LIQUID ORAL PRN
Status: DISCONTINUED | OUTPATIENT
Start: 2019-11-13 | End: 2019-11-13 | Stop reason: HOSPADM

## 2019-11-13 RX ORDER — MIDAZOLAM HYDROCHLORIDE 1 MG/ML
INJECTION INTRAMUSCULAR; INTRAVENOUS PRN
Status: DISCONTINUED | OUTPATIENT
Start: 2019-11-13 | End: 2019-11-13 | Stop reason: SDUPTHER

## 2019-11-13 RX ORDER — MAGNESIUM HYDROXIDE 1200 MG/15ML
LIQUID ORAL CONTINUOUS PRN
Status: COMPLETED | OUTPATIENT
Start: 2019-11-13 | End: 2019-11-13

## 2019-11-13 RX ORDER — METOPROLOL TARTRATE 50 MG/1
50 TABLET, FILM COATED ORAL 2 TIMES DAILY
Status: DISCONTINUED | OUTPATIENT
Start: 2019-11-13 | End: 2019-11-17 | Stop reason: HOSPADM

## 2019-11-13 RX ORDER — PROPOFOL 10 MG/ML
INJECTION, EMULSION INTRAVENOUS PRN
Status: DISCONTINUED | OUTPATIENT
Start: 2019-11-13 | End: 2019-11-13 | Stop reason: SDUPTHER

## 2019-11-13 RX ORDER — SODIUM CHLORIDE 0.9 % (FLUSH) 0.9 %
10 SYRINGE (ML) INJECTION EVERY 12 HOURS SCHEDULED
Status: DISCONTINUED | OUTPATIENT
Start: 2019-11-13 | End: 2019-11-17 | Stop reason: HOSPADM

## 2019-11-13 RX ORDER — OXYCODONE HYDROCHLORIDE 5 MG/1
5 TABLET ORAL EVERY 6 HOURS PRN
Status: DISCONTINUED | OUTPATIENT
Start: 2019-11-13 | End: 2019-11-17 | Stop reason: HOSPADM

## 2019-11-13 RX ORDER — ONDANSETRON 2 MG/ML
4 INJECTION INTRAMUSCULAR; INTRAVENOUS EVERY 6 HOURS PRN
Status: DISCONTINUED | OUTPATIENT
Start: 2019-11-13 | End: 2019-11-17 | Stop reason: HOSPADM

## 2019-11-13 RX ORDER — CEFAZOLIN SODIUM 1 G/3ML
INJECTION, POWDER, FOR SOLUTION INTRAMUSCULAR; INTRAVENOUS PRN
Status: DISCONTINUED | OUTPATIENT
Start: 2019-11-13 | End: 2019-11-13 | Stop reason: SDUPTHER

## 2019-11-13 RX ORDER — DEXTROSE MONOHYDRATE 50 MG/ML
100 INJECTION, SOLUTION INTRAVENOUS PRN
Status: DISCONTINUED | OUTPATIENT
Start: 2019-11-13 | End: 2019-11-17 | Stop reason: HOSPADM

## 2019-11-13 RX ORDER — SODIUM CHLORIDE 9 MG/ML
INJECTION, SOLUTION INTRAVENOUS CONTINUOUS
Status: DISCONTINUED | OUTPATIENT
Start: 2019-11-13 | End: 2019-11-13

## 2019-11-13 RX ORDER — ACETAMINOPHEN 325 MG/1
650 TABLET ORAL EVERY 6 HOURS
Status: DISCONTINUED | OUTPATIENT
Start: 2019-11-13 | End: 2019-11-17 | Stop reason: HOSPADM

## 2019-11-13 RX ORDER — LIDOCAINE HYDROCHLORIDE 10 MG/ML
INJECTION, SOLUTION EPIDURAL; INFILTRATION; INTRACAUDAL; PERINEURAL PRN
Status: DISCONTINUED | OUTPATIENT
Start: 2019-11-13 | End: 2019-11-13 | Stop reason: SDUPTHER

## 2019-11-13 RX ORDER — PREDNISOLONE ACETATE 10 MG/ML
1 SUSPENSION/ DROPS OPHTHALMIC 4 TIMES DAILY
Status: DISCONTINUED | OUTPATIENT
Start: 2019-11-13 | End: 2019-11-17 | Stop reason: HOSPADM

## 2019-11-13 RX ORDER — GLYCOPYRROLATE 1 MG/5 ML
SYRINGE (ML) INTRAVENOUS PRN
Status: DISCONTINUED | OUTPATIENT
Start: 2019-11-13 | End: 2019-11-13 | Stop reason: SDUPTHER

## 2019-11-13 RX ORDER — OXYCODONE HYDROCHLORIDE 5 MG/1
10 TABLET ORAL EVERY 6 HOURS PRN
Status: DISCONTINUED | OUTPATIENT
Start: 2019-11-13 | End: 2019-11-17 | Stop reason: HOSPADM

## 2019-11-13 RX ADMIN — ROPINIROLE HYDROCHLORIDE 2 MG: 1 TABLET, FILM COATED ORAL at 20:39

## 2019-11-13 RX ADMIN — FENTANYL CITRATE 25 MCG: 50 INJECTION, SOLUTION INTRAMUSCULAR; INTRAVENOUS at 10:13

## 2019-11-13 RX ADMIN — ROCURONIUM BROMIDE 20 MG: 10 INJECTION INTRAVENOUS at 10:17

## 2019-11-13 RX ADMIN — MIDAZOLAM HYDROCHLORIDE 1 MG: 1 INJECTION, SOLUTION INTRAMUSCULAR; INTRAVENOUS at 09:41

## 2019-11-13 RX ADMIN — GEMFIBROZIL 600 MG: 600 TABLET ORAL at 20:40

## 2019-11-13 RX ADMIN — PREGABALIN 100 MG: 100 CAPSULE ORAL at 20:40

## 2019-11-13 RX ADMIN — CEFAZOLIN 3000 MG: 1 INJECTION, POWDER, FOR SOLUTION INTRAMUSCULAR; INTRAVENOUS at 09:12

## 2019-11-13 RX ADMIN — SODIUM CHLORIDE: 9 INJECTION, SOLUTION INTRAVENOUS at 15:19

## 2019-11-13 RX ADMIN — FENTANYL CITRATE 50 MCG: 50 INJECTION, SOLUTION INTRAMUSCULAR; INTRAVENOUS at 11:01

## 2019-11-13 RX ADMIN — DEXTROSE MONOHYDRATE 12.5 G: 25 INJECTION, SOLUTION INTRAVENOUS at 07:38

## 2019-11-13 RX ADMIN — INSULIN LISPRO 1 UNITS: 100 INJECTION, SOLUTION INTRAVENOUS; SUBCUTANEOUS at 20:52

## 2019-11-13 RX ADMIN — SODIUM CHLORIDE, POTASSIUM CHLORIDE, SODIUM LACTATE AND CALCIUM CHLORIDE: 600; 310; 30; 20 INJECTION, SOLUTION INTRAVENOUS at 11:14

## 2019-11-13 RX ADMIN — ROCURONIUM BROMIDE 20 MG: 10 INJECTION INTRAVENOUS at 09:52

## 2019-11-13 RX ADMIN — PHENYLEPHRINE HYDROCHLORIDE 100 MCG: 10 INJECTION INTRAVENOUS at 09:53

## 2019-11-13 RX ADMIN — MOMETASONE FUROATE AND FORMOTEROL FUMARATE DIHYDRATE 2 PUFF: 100; 5 AEROSOL RESPIRATORY (INHALATION) at 19:37

## 2019-11-13 RX ADMIN — PHENYLEPHRINE HYDROCHLORIDE 50 MCG: 10 INJECTION INTRAVENOUS at 10:33

## 2019-11-13 RX ADMIN — SODIUM CHLORIDE: 9 INJECTION, SOLUTION INTRAVENOUS at 13:00

## 2019-11-13 RX ADMIN — SODIUM CHLORIDE: 4.5 INJECTION, SOLUTION INTRAVENOUS at 20:35

## 2019-11-13 RX ADMIN — PHENYLEPHRINE HYDROCHLORIDE 200 MCG: 10 INJECTION INTRAVENOUS at 09:11

## 2019-11-13 RX ADMIN — PHENYLEPHRINE HYDROCHLORIDE 200 MCG: 10 INJECTION INTRAVENOUS at 09:51

## 2019-11-13 RX ADMIN — PHENYLEPHRINE HYDROCHLORIDE 200 MCG: 10 INJECTION INTRAVENOUS at 09:41

## 2019-11-13 RX ADMIN — ROCURONIUM BROMIDE 30 MG: 10 INJECTION INTRAVENOUS at 09:42

## 2019-11-13 RX ADMIN — PHENYLEPHRINE HYDROCHLORIDE 100 MCG: 10 INJECTION INTRAVENOUS at 11:18

## 2019-11-13 RX ADMIN — ACETAMINOPHEN 650 MG: 325 TABLET ORAL at 20:39

## 2019-11-13 RX ADMIN — PHENYLEPHRINE HYDROCHLORIDE 70 MCG/MIN: 10 INJECTION INTRAVENOUS at 09:12

## 2019-11-13 RX ADMIN — TAMSULOSIN HYDROCHLORIDE 0.4 MG: 0.4 CAPSULE ORAL at 20:39

## 2019-11-13 RX ADMIN — ROCURONIUM BROMIDE 20 MG: 10 INJECTION INTRAVENOUS at 11:30

## 2019-11-13 RX ADMIN — PROPOFOL 100 MG: 10 INJECTION, EMULSION INTRAVENOUS at 09:05

## 2019-11-13 RX ADMIN — Medication 0.4 MG: at 12:22

## 2019-11-13 RX ADMIN — ROCURONIUM BROMIDE 20 MG: 10 INJECTION INTRAVENOUS at 10:50

## 2019-11-13 RX ADMIN — ROCURONIUM BROMIDE 50 MG: 10 INJECTION INTRAVENOUS at 09:05

## 2019-11-13 RX ADMIN — LIDOCAINE HYDROCHLORIDE 50 MG: 10 INJECTION, SOLUTION EPIDURAL; INFILTRATION; INTRACAUDAL; PERINEURAL at 09:05

## 2019-11-13 RX ADMIN — MIDAZOLAM HYDROCHLORIDE 1 MG: 1 INJECTION, SOLUTION INTRAMUSCULAR; INTRAVENOUS at 12:15

## 2019-11-13 RX ADMIN — Medication 10 ML: at 20:54

## 2019-11-13 RX ADMIN — FENTANYL CITRATE 50 MCG: 50 INJECTION, SOLUTION INTRAMUSCULAR; INTRAVENOUS at 09:42

## 2019-11-13 RX ADMIN — Medication 4 MG: at 12:21

## 2019-11-13 RX ADMIN — HEPARIN SODIUM 5000 UNITS: 5000 INJECTION INTRAVENOUS; SUBCUTANEOUS at 07:38

## 2019-11-13 RX ADMIN — SODIUM CHLORIDE, POTASSIUM CHLORIDE, SODIUM LACTATE AND CALCIUM CHLORIDE: 600; 310; 30; 20 INJECTION, SOLUTION INTRAVENOUS at 09:12

## 2019-11-13 RX ADMIN — DEXTROSE MONOHYDRATE 2 G: 50 INJECTION, SOLUTION INTRAVENOUS at 20:49

## 2019-11-13 RX ADMIN — ATORVASTATIN CALCIUM 20 MG: 20 TABLET, FILM COATED ORAL at 20:40

## 2019-11-13 RX ADMIN — DEXTROSE MONOHYDRATE 12.5 G: 25 INJECTION, SOLUTION INTRAVENOUS at 08:32

## 2019-11-13 RX ADMIN — SODIUM CHLORIDE, POTASSIUM CHLORIDE, SODIUM LACTATE AND CALCIUM CHLORIDE: 600; 310; 30; 20 INJECTION, SOLUTION INTRAVENOUS at 08:50

## 2019-11-13 RX ADMIN — FENTANYL CITRATE 25 MCG: 50 INJECTION, SOLUTION INTRAMUSCULAR; INTRAVENOUS at 10:53

## 2019-11-13 RX ADMIN — FENTANYL CITRATE 100 MCG: 50 INJECTION, SOLUTION INTRAMUSCULAR; INTRAVENOUS at 09:06

## 2019-11-13 RX ADMIN — SODIUM CHLORIDE, POTASSIUM CHLORIDE, SODIUM LACTATE AND CALCIUM CHLORIDE: 600; 310; 30; 20 INJECTION, SOLUTION INTRAVENOUS at 07:38

## 2019-11-13 RX ADMIN — PHENYLEPHRINE HYDROCHLORIDE 200 MCG: 10 INJECTION INTRAVENOUS at 09:31

## 2019-11-13 ASSESSMENT — PULMONARY FUNCTION TESTS
PIF_VALUE: 32
PIF_VALUE: 33
PIF_VALUE: 32
PIF_VALUE: 28
PIF_VALUE: 33
PIF_VALUE: 28
PIF_VALUE: 31
PIF_VALUE: 27
PIF_VALUE: 10
PIF_VALUE: 22
PIF_VALUE: 28
PIF_VALUE: 28
PIF_VALUE: 22
PIF_VALUE: 28
PIF_VALUE: 5
PIF_VALUE: 32
PIF_VALUE: 28
PIF_VALUE: 30
PIF_VALUE: 32
PIF_VALUE: 25
PIF_VALUE: 21
PIF_VALUE: 28
PIF_VALUE: 3
PIF_VALUE: 26
PIF_VALUE: 33
PIF_VALUE: 32
PIF_VALUE: 32
PIF_VALUE: 31
PIF_VALUE: 20
PIF_VALUE: 22
PIF_VALUE: 20
PIF_VALUE: 28
PIF_VALUE: 22
PIF_VALUE: 2
PIF_VALUE: 32
PIF_VALUE: 22
PIF_VALUE: 27
PIF_VALUE: 28
PIF_VALUE: 1
PIF_VALUE: 33
PIF_VALUE: 32
PIF_VALUE: 33
PIF_VALUE: 30
PIF_VALUE: 29
PIF_VALUE: 28
PIF_VALUE: 27
PIF_VALUE: 34
PIF_VALUE: 1
PIF_VALUE: 29
PIF_VALUE: 24
PIF_VALUE: 5
PIF_VALUE: 28
PIF_VALUE: 29
PIF_VALUE: 24
PIF_VALUE: 28
PIF_VALUE: 22
PIF_VALUE: 1
PIF_VALUE: 38
PIF_VALUE: 21
PIF_VALUE: 17
PIF_VALUE: 28
PIF_VALUE: 21
PIF_VALUE: 28
PIF_VALUE: 23
PIF_VALUE: 33
PIF_VALUE: 18
PIF_VALUE: 30
PIF_VALUE: 22
PIF_VALUE: 1
PIF_VALUE: 30
PIF_VALUE: 17
PIF_VALUE: 33
PIF_VALUE: 32
PIF_VALUE: 25
PIF_VALUE: 33
PIF_VALUE: 32
PIF_VALUE: 22
PIF_VALUE: 27
PIF_VALUE: 33
PIF_VALUE: 35
PIF_VALUE: 28
PIF_VALUE: 23
PIF_VALUE: 28
PIF_VALUE: 27
PIF_VALUE: 15
PIF_VALUE: 33
PIF_VALUE: 28
PIF_VALUE: 23
PIF_VALUE: 15
PIF_VALUE: 1
PIF_VALUE: 18
PIF_VALUE: 32
PIF_VALUE: 29
PIF_VALUE: 29
PIF_VALUE: 27
PIF_VALUE: 28
PIF_VALUE: 27
PIF_VALUE: 28
PIF_VALUE: 28
PIF_VALUE: 3
PIF_VALUE: 2
PIF_VALUE: 33
PIF_VALUE: 31
PIF_VALUE: 32
PIF_VALUE: 3
PIF_VALUE: 2
PIF_VALUE: 3
PIF_VALUE: 28
PIF_VALUE: 28
PIF_VALUE: 5
PIF_VALUE: 27
PIF_VALUE: 33
PIF_VALUE: 28
PIF_VALUE: 28
PIF_VALUE: 32
PIF_VALUE: 29
PIF_VALUE: 21
PIF_VALUE: 28
PIF_VALUE: 30
PIF_VALUE: 33
PIF_VALUE: 27
PIF_VALUE: 33
PIF_VALUE: 28
PIF_VALUE: 32
PIF_VALUE: 23
PIF_VALUE: 24
PIF_VALUE: 32
PIF_VALUE: 28
PIF_VALUE: 32
PIF_VALUE: 28
PIF_VALUE: 13
PIF_VALUE: 6
PIF_VALUE: 22
PIF_VALUE: 18
PIF_VALUE: 32
PIF_VALUE: 23
PIF_VALUE: 31
PIF_VALUE: 35
PIF_VALUE: 28
PIF_VALUE: 33
PIF_VALUE: 33
PIF_VALUE: 22
PIF_VALUE: 29
PIF_VALUE: 16
PIF_VALUE: 2
PIF_VALUE: 3
PIF_VALUE: 32
PIF_VALUE: 22
PIF_VALUE: 33
PIF_VALUE: 31
PIF_VALUE: 29
PIF_VALUE: 27
PIF_VALUE: 32
PIF_VALUE: 21
PIF_VALUE: 29
PIF_VALUE: 22
PIF_VALUE: 1
PIF_VALUE: 28
PIF_VALUE: 32
PIF_VALUE: 33
PIF_VALUE: 30
PIF_VALUE: 31
PIF_VALUE: 28
PIF_VALUE: 22
PIF_VALUE: 35
PIF_VALUE: 31
PIF_VALUE: 33
PIF_VALUE: 26
PIF_VALUE: 15
PIF_VALUE: 3
PIF_VALUE: 1
PIF_VALUE: 22
PIF_VALUE: 1
PIF_VALUE: 22
PIF_VALUE: 22
PIF_VALUE: 2
PIF_VALUE: 22
PIF_VALUE: 29
PIF_VALUE: 3
PIF_VALUE: 26
PIF_VALUE: 22
PIF_VALUE: 28
PIF_VALUE: 22
PIF_VALUE: 30
PIF_VALUE: 5
PIF_VALUE: 28
PIF_VALUE: 27
PIF_VALUE: 3
PIF_VALUE: 28
PIF_VALUE: 32
PIF_VALUE: 5
PIF_VALUE: 29
PIF_VALUE: 32
PIF_VALUE: 28
PIF_VALUE: 27
PIF_VALUE: 2
PIF_VALUE: 28
PIF_VALUE: 33
PIF_VALUE: 28
PIF_VALUE: 32
PIF_VALUE: 1
PIF_VALUE: 3
PIF_VALUE: 28
PIF_VALUE: 5
PIF_VALUE: 33
PIF_VALUE: 12
PIF_VALUE: 32
PIF_VALUE: 22
PIF_VALUE: 32
PIF_VALUE: 34
PIF_VALUE: 3
PIF_VALUE: 32
PIF_VALUE: 28
PIF_VALUE: 32
PIF_VALUE: 27
PIF_VALUE: 34
PIF_VALUE: 28
PIF_VALUE: 14
PIF_VALUE: 28
PIF_VALUE: 32
PIF_VALUE: 22
PIF_VALUE: 32
PIF_VALUE: 4
PIF_VALUE: 33
PIF_VALUE: 28
PIF_VALUE: 28
PIF_VALUE: 22
PIF_VALUE: 29
PIF_VALUE: 28
PIF_VALUE: 29

## 2019-11-13 ASSESSMENT — PAIN DESCRIPTION - LOCATION
LOCATION: ABDOMEN

## 2019-11-13 ASSESSMENT — PAIN DESCRIPTION - ORIENTATION: ORIENTATION: RIGHT

## 2019-11-13 ASSESSMENT — PAIN DESCRIPTION - PAIN TYPE
TYPE: SURGICAL PAIN

## 2019-11-13 ASSESSMENT — PAIN SCALES - GENERAL
PAINLEVEL_OUTOF10: 0
PAINLEVEL_OUTOF10: 2
PAINLEVEL_OUTOF10: 0
PAINLEVEL_OUTOF10: 0
PAINLEVEL_OUTOF10: 2
PAINLEVEL_OUTOF10: 2

## 2019-11-13 ASSESSMENT — COPD QUESTIONNAIRES: CAT_SEVERITY: MODERATE

## 2019-11-14 LAB
ANION GAP SERPL CALCULATED.3IONS-SCNC: 14 MMOL/L (ref 9–17)
BUN BLDV-MCNC: 25 MG/DL (ref 6–20)
BUN/CREAT BLD: ABNORMAL (ref 9–20)
CALCIUM SERPL-MCNC: 7.5 MG/DL (ref 8.6–10.4)
CHLORIDE BLD-SCNC: 109 MMOL/L (ref 98–107)
CO2: 16 MMOL/L (ref 20–31)
CREAT SERPL-MCNC: 1.45 MG/DL (ref 0.7–1.2)
CREATININE FLUID: 1.4 MG/DL
CULTURE: NO GROWTH
GFR AFRICAN AMERICAN: >60 ML/MIN
GFR NON-AFRICAN AMERICAN: 50 ML/MIN
GFR SERPL CREATININE-BSD FRML MDRD: ABNORMAL ML/MIN/{1.73_M2}
GFR SERPL CREATININE-BSD FRML MDRD: ABNORMAL ML/MIN/{1.73_M2}
GLUCOSE BLD-MCNC: 262 MG/DL (ref 75–110)
GLUCOSE BLD-MCNC: 310 MG/DL (ref 75–110)
GLUCOSE BLD-MCNC: 317 MG/DL (ref 70–99)
GLUCOSE BLD-MCNC: 325 MG/DL (ref 75–110)
GLUCOSE BLD-MCNC: 327 MG/DL (ref 75–110)
HCT VFR BLD CALC: 34.3 % (ref 40.7–50.3)
HCT VFR BLD CALC: 34.9 % (ref 40.7–50.3)
HEMOGLOBIN: 11.6 G/DL (ref 13–17)
HEMOGLOBIN: 11.7 G/DL (ref 13–17)
Lab: NORMAL
MCH RBC QN AUTO: 31.8 PG (ref 25.2–33.5)
MCHC RBC AUTO-ENTMCNC: 34.1 G/DL (ref 28.4–34.8)
MCV RBC AUTO: 93.2 FL (ref 82.6–102.9)
MRSA, DNA, NASAL: NORMAL
NRBC AUTOMATED: 0 PER 100 WBC
PDW BLD-RTO: 13.5 % (ref 11.8–14.4)
PLATELET # BLD: 139 K/UL (ref 138–453)
PMV BLD AUTO: 12.3 FL (ref 8.1–13.5)
POTASSIUM SERPL-SCNC: 4 MMOL/L (ref 3.7–5.3)
RBC # BLD: 3.68 M/UL (ref 4.21–5.77)
SODIUM BLD-SCNC: 139 MMOL/L (ref 135–144)
SPECIMEN DESCRIPTION: NORMAL
SPECIMEN DESCRIPTION: NORMAL
SPECIMEN TYPE: NORMAL
WBC # BLD: 5.7 K/UL (ref 3.5–11.3)

## 2019-11-14 PROCEDURE — 85014 HEMATOCRIT: CPT

## 2019-11-14 PROCEDURE — 6370000000 HC RX 637 (ALT 250 FOR IP): Performed by: STUDENT IN AN ORGANIZED HEALTH CARE EDUCATION/TRAINING PROGRAM

## 2019-11-14 PROCEDURE — 2580000003 HC RX 258: Performed by: STUDENT IN AN ORGANIZED HEALTH CARE EDUCATION/TRAINING PROGRAM

## 2019-11-14 PROCEDURE — 6370000000 HC RX 637 (ALT 250 FOR IP): Performed by: INTERNAL MEDICINE

## 2019-11-14 PROCEDURE — 6360000002 HC RX W HCPCS: Performed by: STUDENT IN AN ORGANIZED HEALTH CARE EDUCATION/TRAINING PROGRAM

## 2019-11-14 PROCEDURE — 1200000000 HC SEMI PRIVATE

## 2019-11-14 PROCEDURE — 85027 COMPLETE CBC AUTOMATED: CPT

## 2019-11-14 PROCEDURE — 94640 AIRWAY INHALATION TREATMENT: CPT

## 2019-11-14 PROCEDURE — 82947 ASSAY GLUCOSE BLOOD QUANT: CPT

## 2019-11-14 PROCEDURE — 80048 BASIC METABOLIC PNL TOTAL CA: CPT

## 2019-11-14 PROCEDURE — 99291 CRITICAL CARE FIRST HOUR: CPT | Performed by: INTERNAL MEDICINE

## 2019-11-14 PROCEDURE — 82570 ASSAY OF URINE CREATININE: CPT

## 2019-11-14 PROCEDURE — 85018 HEMOGLOBIN: CPT

## 2019-11-14 RX ORDER — ALBUTEROL SULFATE 90 UG/1
2 AEROSOL, METERED RESPIRATORY (INHALATION) EVERY 6 HOURS PRN
COMMUNITY
End: 2020-05-22 | Stop reason: SDUPTHER

## 2019-11-14 RX ADMIN — SODIUM CHLORIDE: 4.5 INJECTION, SOLUTION INTRAVENOUS at 08:06

## 2019-11-14 RX ADMIN — INSULIN LISPRO 12 UNITS: 100 INJECTION, SOLUTION INTRAVENOUS; SUBCUTANEOUS at 13:27

## 2019-11-14 RX ADMIN — Medication 10 ML: at 09:01

## 2019-11-14 RX ADMIN — METOPROLOL TARTRATE 50 MG: 50 TABLET, FILM COATED ORAL at 08:59

## 2019-11-14 RX ADMIN — DEXTROSE MONOHYDRATE 2 G: 50 INJECTION, SOLUTION INTRAVENOUS at 02:59

## 2019-11-14 RX ADMIN — INSULIN LISPRO 9 UNITS: 100 INJECTION, SOLUTION INTRAVENOUS; SUBCUTANEOUS at 20:38

## 2019-11-14 RX ADMIN — INSULIN LISPRO 12 UNITS: 100 INJECTION, SOLUTION INTRAVENOUS; SUBCUTANEOUS at 09:16

## 2019-11-14 RX ADMIN — ACETAMINOPHEN 650 MG: 325 TABLET ORAL at 06:16

## 2019-11-14 RX ADMIN — PREGABALIN 100 MG: 100 CAPSULE ORAL at 20:38

## 2019-11-14 RX ADMIN — GEMFIBROZIL 600 MG: 600 TABLET ORAL at 08:59

## 2019-11-14 RX ADMIN — INSULIN HUMAN 60 UNITS: 100 INJECTION, SUSPENSION SUBCUTANEOUS at 09:18

## 2019-11-14 RX ADMIN — LEVOTHYROXINE SODIUM 150 MCG: 75 TABLET ORAL at 06:16

## 2019-11-14 RX ADMIN — ACETAMINOPHEN 650 MG: 325 TABLET ORAL at 13:24

## 2019-11-14 RX ADMIN — Medication 10 ML: at 21:28

## 2019-11-14 RX ADMIN — TAMSULOSIN HYDROCHLORIDE 0.4 MG: 0.4 CAPSULE ORAL at 08:59

## 2019-11-14 RX ADMIN — MOMETASONE FUROATE AND FORMOTEROL FUMARATE DIHYDRATE 2 PUFF: 100; 5 AEROSOL RESPIRATORY (INHALATION) at 07:44

## 2019-11-14 RX ADMIN — ATORVASTATIN CALCIUM 20 MG: 20 TABLET, FILM COATED ORAL at 08:58

## 2019-11-14 RX ADMIN — DEXTROSE MONOHYDRATE 2 G: 50 INJECTION, SOLUTION INTRAVENOUS at 11:02

## 2019-11-14 RX ADMIN — ACETAMINOPHEN 650 MG: 325 TABLET ORAL at 17:43

## 2019-11-14 RX ADMIN — INSULIN LISPRO 12 UNITS: 100 INJECTION, SOLUTION INTRAVENOUS; SUBCUTANEOUS at 17:34

## 2019-11-14 RX ADMIN — PREGABALIN 100 MG: 100 CAPSULE ORAL at 08:59

## 2019-11-14 RX ADMIN — ROPINIROLE HYDROCHLORIDE 2 MG: 1 TABLET, FILM COATED ORAL at 08:59

## 2019-11-14 RX ADMIN — INSULIN HUMAN 70 UNITS: 100 INJECTION, SUSPENSION SUBCUTANEOUS at 21:27

## 2019-11-14 RX ADMIN — MOMETASONE FUROATE AND FORMOTEROL FUMARATE DIHYDRATE 2 PUFF: 100; 5 AEROSOL RESPIRATORY (INHALATION) at 20:59

## 2019-11-14 RX ADMIN — PREGABALIN 100 MG: 100 CAPSULE ORAL at 13:24

## 2019-11-14 ASSESSMENT — PAIN SCALES - GENERAL
PAINLEVEL_OUTOF10: 0
PAINLEVEL_OUTOF10: 0
PAINLEVEL_OUTOF10: 3
PAINLEVEL_OUTOF10: 3
PAINLEVEL_OUTOF10: 0
PAINLEVEL_OUTOF10: 0

## 2019-11-15 ENCOUNTER — APPOINTMENT (OUTPATIENT)
Dept: GENERAL RADIOLOGY | Age: 59
DRG: 657 | End: 2019-11-15
Attending: UROLOGY
Payer: COMMERCIAL

## 2019-11-15 LAB
ANION GAP SERPL CALCULATED.3IONS-SCNC: 15 MMOL/L (ref 9–17)
BUN BLDV-MCNC: 20 MG/DL (ref 6–20)
BUN/CREAT BLD: ABNORMAL (ref 9–20)
CALCIUM SERPL-MCNC: 8.3 MG/DL (ref 8.6–10.4)
CHLORIDE BLD-SCNC: 108 MMOL/L (ref 98–107)
CO2: 18 MMOL/L (ref 20–31)
CREAT SERPL-MCNC: 1.23 MG/DL (ref 0.7–1.2)
GFR AFRICAN AMERICAN: >60 ML/MIN
GFR NON-AFRICAN AMERICAN: >60 ML/MIN
GFR SERPL CREATININE-BSD FRML MDRD: ABNORMAL ML/MIN/{1.73_M2}
GFR SERPL CREATININE-BSD FRML MDRD: ABNORMAL ML/MIN/{1.73_M2}
GLUCOSE BLD-MCNC: 211 MG/DL (ref 70–99)
GLUCOSE BLD-MCNC: 220 MG/DL (ref 75–110)
GLUCOSE BLD-MCNC: 228 MG/DL (ref 75–110)
GLUCOSE BLD-MCNC: 228 MG/DL (ref 75–110)
GLUCOSE BLD-MCNC: 233 MG/DL (ref 75–110)
GLUCOSE BLD-MCNC: 287 MG/DL (ref 75–110)
GLUCOSE BLD-MCNC: 289 MG/DL (ref 75–110)
HCT VFR BLD CALC: 36.8 % (ref 40.7–50.3)
HCT VFR BLD CALC: 38 % (ref 40.7–50.3)
HEMOGLOBIN: 12.3 G/DL (ref 13–17)
HEMOGLOBIN: 12.5 G/DL (ref 13–17)
MAGNESIUM: 1.8 MG/DL (ref 1.6–2.6)
MCH RBC QN AUTO: 31.2 PG (ref 25.2–33.5)
MCH RBC QN AUTO: 31.6 PG (ref 25.2–33.5)
MCHC RBC AUTO-ENTMCNC: 32.9 G/DL (ref 28.4–34.8)
MCHC RBC AUTO-ENTMCNC: 33.4 G/DL (ref 28.4–34.8)
MCV RBC AUTO: 93.4 FL (ref 82.6–102.9)
MCV RBC AUTO: 96 FL (ref 82.6–102.9)
NRBC AUTOMATED: 0 PER 100 WBC
NRBC AUTOMATED: 0 PER 100 WBC
PDW BLD-RTO: 13.2 % (ref 11.8–14.4)
PDW BLD-RTO: 13.2 % (ref 11.8–14.4)
PLATELET # BLD: 162 K/UL (ref 138–453)
PLATELET # BLD: 210 K/UL (ref 138–453)
PMV BLD AUTO: 11.9 FL (ref 8.1–13.5)
PMV BLD AUTO: 12.1 FL (ref 8.1–13.5)
POTASSIUM SERPL-SCNC: 3.9 MMOL/L (ref 3.7–5.3)
RBC # BLD: 3.94 M/UL (ref 4.21–5.77)
RBC # BLD: 3.96 M/UL (ref 4.21–5.77)
SODIUM BLD-SCNC: 141 MMOL/L (ref 135–144)
SURGICAL PATHOLOGY REPORT: NORMAL
TROPONIN INTERP: ABNORMAL
TROPONIN T: ABNORMAL NG/ML
TROPONIN, HIGH SENSITIVITY: 39 NG/L (ref 0–22)
TROPONIN, HIGH SENSITIVITY: 41 NG/L (ref 0–22)
TROPONIN, HIGH SENSITIVITY: 47 NG/L (ref 0–22)
WBC # BLD: 8.6 K/UL (ref 3.5–11.3)
WBC # BLD: 8.9 K/UL (ref 3.5–11.3)

## 2019-11-15 PROCEDURE — 82947 ASSAY GLUCOSE BLOOD QUANT: CPT

## 2019-11-15 PROCEDURE — 80048 BASIC METABOLIC PNL TOTAL CA: CPT

## 2019-11-15 PROCEDURE — 2060000000 HC ICU INTERMEDIATE R&B

## 2019-11-15 PROCEDURE — 93005 ELECTROCARDIOGRAM TRACING: CPT | Performed by: STUDENT IN AN ORGANIZED HEALTH CARE EDUCATION/TRAINING PROGRAM

## 2019-11-15 PROCEDURE — 2580000003 HC RX 258: Performed by: INTERNAL MEDICINE

## 2019-11-15 PROCEDURE — 2500000003 HC RX 250 WO HCPCS: Performed by: STUDENT IN AN ORGANIZED HEALTH CARE EDUCATION/TRAINING PROGRAM

## 2019-11-15 PROCEDURE — 97535 SELF CARE MNGMENT TRAINING: CPT

## 2019-11-15 PROCEDURE — 2500000003 HC RX 250 WO HCPCS: Performed by: INTERNAL MEDICINE

## 2019-11-15 PROCEDURE — 83735 ASSAY OF MAGNESIUM: CPT

## 2019-11-15 PROCEDURE — 6360000002 HC RX W HCPCS: Performed by: STUDENT IN AN ORGANIZED HEALTH CARE EDUCATION/TRAINING PROGRAM

## 2019-11-15 PROCEDURE — 2700000000 HC OXYGEN THERAPY PER DAY

## 2019-11-15 PROCEDURE — 6370000000 HC RX 637 (ALT 250 FOR IP): Performed by: STUDENT IN AN ORGANIZED HEALTH CARE EDUCATION/TRAINING PROGRAM

## 2019-11-15 PROCEDURE — 6370000000 HC RX 637 (ALT 250 FOR IP): Performed by: INTERNAL MEDICINE

## 2019-11-15 PROCEDURE — 85027 COMPLETE CBC AUTOMATED: CPT

## 2019-11-15 PROCEDURE — 6360000002 HC RX W HCPCS: Performed by: INTERNAL MEDICINE

## 2019-11-15 PROCEDURE — 97165 OT EVAL LOW COMPLEX 30 MIN: CPT

## 2019-11-15 PROCEDURE — 71045 X-RAY EXAM CHEST 1 VIEW: CPT

## 2019-11-15 PROCEDURE — 6370000000 HC RX 637 (ALT 250 FOR IP): Performed by: PHYSICIAN ASSISTANT

## 2019-11-15 PROCEDURE — 2580000003 HC RX 258: Performed by: STUDENT IN AN ORGANIZED HEALTH CARE EDUCATION/TRAINING PROGRAM

## 2019-11-15 PROCEDURE — 94640 AIRWAY INHALATION TREATMENT: CPT

## 2019-11-15 PROCEDURE — 84484 ASSAY OF TROPONIN QUANT: CPT

## 2019-11-15 PROCEDURE — 36415 COLL VENOUS BLD VENIPUNCTURE: CPT

## 2019-11-15 PROCEDURE — 94761 N-INVAS EAR/PLS OXIMETRY MLT: CPT

## 2019-11-15 PROCEDURE — 6360000002 HC RX W HCPCS: Performed by: PHYSICIAN ASSISTANT

## 2019-11-15 RX ORDER — POLYETHYLENE GLYCOL 3350 17 G/17G
17 POWDER, FOR SOLUTION ORAL DAILY
Qty: 527 G | Refills: 1 | Status: SHIPPED | OUTPATIENT
Start: 2019-11-16 | End: 2019-12-16

## 2019-11-15 RX ORDER — METOPROLOL TARTRATE 5 MG/5ML
5 INJECTION INTRAVENOUS ONCE
Status: COMPLETED | OUTPATIENT
Start: 2019-11-15 | End: 2019-11-15

## 2019-11-15 RX ORDER — OXYCODONE HYDROCHLORIDE AND ACETAMINOPHEN 5; 325 MG/1; MG/1
1 TABLET ORAL EVERY 6 HOURS PRN
Qty: 20 TABLET | Refills: 0 | Status: SHIPPED | OUTPATIENT
Start: 2019-11-15 | End: 2019-11-20

## 2019-11-15 RX ORDER — HEPARIN SODIUM 5000 [USP'U]/ML
5000 INJECTION, SOLUTION INTRAVENOUS; SUBCUTANEOUS 2 TIMES DAILY
Status: DISCONTINUED | OUTPATIENT
Start: 2019-11-15 | End: 2019-11-17

## 2019-11-15 RX ORDER — GINSENG 100 MG
CAPSULE ORAL 3 TIMES DAILY
Status: DISCONTINUED | OUTPATIENT
Start: 2019-11-15 | End: 2019-11-17 | Stop reason: HOSPADM

## 2019-11-15 RX ORDER — PROPAFENONE HYDROCHLORIDE 150 MG/1
150 TABLET, FILM COATED ORAL EVERY 8 HOURS SCHEDULED
Status: DISCONTINUED | OUTPATIENT
Start: 2019-11-15 | End: 2019-11-17 | Stop reason: HOSPADM

## 2019-11-15 RX ORDER — DIGOXIN 0.25 MG/ML
250 INJECTION INTRAMUSCULAR; INTRAVENOUS ONCE
Status: COMPLETED | OUTPATIENT
Start: 2019-11-15 | End: 2019-11-15

## 2019-11-15 RX ADMIN — PREGABALIN 100 MG: 100 CAPSULE ORAL at 12:50

## 2019-11-15 RX ADMIN — INSULIN LISPRO 9 UNITS: 100 INJECTION, SOLUTION INTRAVENOUS; SUBCUTANEOUS at 21:18

## 2019-11-15 RX ADMIN — ATORVASTATIN CALCIUM 20 MG: 20 TABLET, FILM COATED ORAL at 08:34

## 2019-11-15 RX ADMIN — Medication 10 ML: at 21:19

## 2019-11-15 RX ADMIN — ACETAMINOPHEN 650 MG: 325 TABLET ORAL at 17:06

## 2019-11-15 RX ADMIN — METOPROLOL TARTRATE 50 MG: 50 TABLET, FILM COATED ORAL at 08:34

## 2019-11-15 RX ADMIN — ACETAMINOPHEN 650 MG: 325 TABLET ORAL at 00:57

## 2019-11-15 RX ADMIN — ROPINIROLE HYDROCHLORIDE 2 MG: 1 TABLET, FILM COATED ORAL at 08:34

## 2019-11-15 RX ADMIN — ACETAMINOPHEN 650 MG: 325 TABLET ORAL at 12:50

## 2019-11-15 RX ADMIN — METOPROLOL TARTRATE 50 MG: 50 TABLET, FILM COATED ORAL at 21:19

## 2019-11-15 RX ADMIN — LEVOTHYROXINE SODIUM 150 MCG: 75 TABLET ORAL at 06:49

## 2019-11-15 RX ADMIN — PREGABALIN 100 MG: 100 CAPSULE ORAL at 21:20

## 2019-11-15 RX ADMIN — INSULIN LISPRO 6 UNITS: 100 INJECTION, SOLUTION INTRAVENOUS; SUBCUTANEOUS at 01:44

## 2019-11-15 RX ADMIN — INSULIN HUMAN 60 UNITS: 100 INJECTION, SUSPENSION SUBCUTANEOUS at 09:13

## 2019-11-15 RX ADMIN — MOMETASONE FUROATE AND FORMOTEROL FUMARATE DIHYDRATE 2 PUFF: 100; 5 AEROSOL RESPIRATORY (INHALATION) at 21:23

## 2019-11-15 RX ADMIN — INSULIN LISPRO 9 UNITS: 100 INJECTION, SOLUTION INTRAVENOUS; SUBCUTANEOUS at 17:05

## 2019-11-15 RX ADMIN — INSULIN HUMAN 70 UNITS: 100 INJECTION, SUSPENSION SUBCUTANEOUS at 22:01

## 2019-11-15 RX ADMIN — PROPAFENONE HYDROCHLORIDE 150 MG: 150 TABLET, FILM COATED ORAL at 21:20

## 2019-11-15 RX ADMIN — TAMSULOSIN HYDROCHLORIDE 0.4 MG: 0.4 CAPSULE ORAL at 08:34

## 2019-11-15 RX ADMIN — BACITRACIN: 500 OINTMENT TOPICAL at 21:17

## 2019-11-15 RX ADMIN — AMIODARONE HYDROCHLORIDE 0.5 MG/MIN: 50 INJECTION, SOLUTION INTRAVENOUS at 22:50

## 2019-11-15 RX ADMIN — METOPROLOL TARTRATE 5 MG: 5 INJECTION, SOLUTION INTRAVENOUS at 03:26

## 2019-11-15 RX ADMIN — BACITRACIN: 500 OINTMENT TOPICAL at 12:58

## 2019-11-15 RX ADMIN — METOPROLOL TARTRATE 5 MG: 5 INJECTION, SOLUTION INTRAVENOUS at 12:52

## 2019-11-15 RX ADMIN — PREGABALIN 100 MG: 100 CAPSULE ORAL at 08:34

## 2019-11-15 RX ADMIN — INSULIN LISPRO 9 UNITS: 100 INJECTION, SOLUTION INTRAVENOUS; SUBCUTANEOUS at 12:51

## 2019-11-15 RX ADMIN — GEMFIBROZIL 600 MG: 600 TABLET ORAL at 08:34

## 2019-11-15 RX ADMIN — AMIODARONE HYDROCHLORIDE 1 MG/MIN: 50 INJECTION, SOLUTION INTRAVENOUS at 14:08

## 2019-11-15 RX ADMIN — PROPAFENONE HYDROCHLORIDE 150 MG: 150 TABLET, FILM COATED ORAL at 17:10

## 2019-11-15 RX ADMIN — POLYETHYLENE GLYCOL 3350 17 G: 17 POWDER, FOR SOLUTION ORAL at 08:34

## 2019-11-15 RX ADMIN — DIGOXIN 250 MCG: 0.25 INJECTION INTRAMUSCULAR; INTRAVENOUS at 04:40

## 2019-11-15 RX ADMIN — HEPARIN SODIUM 5000 UNITS: 5000 INJECTION INTRAVENOUS; SUBCUTANEOUS at 21:18

## 2019-11-15 RX ADMIN — HEPARIN SODIUM 5000 UNITS: 5000 INJECTION INTRAVENOUS; SUBCUTANEOUS at 17:04

## 2019-11-15 RX ADMIN — MOMETASONE FUROATE AND FORMOTEROL FUMARATE DIHYDRATE 2 PUFF: 100; 5 AEROSOL RESPIRATORY (INHALATION) at 09:09

## 2019-11-15 RX ADMIN — ACETAMINOPHEN 650 MG: 325 TABLET ORAL at 06:48

## 2019-11-15 RX ADMIN — AMIODARONE HYDROCHLORIDE 150 MG: 50 INJECTION, SOLUTION INTRAVENOUS at 13:54

## 2019-11-15 RX ADMIN — INSULIN LISPRO 6 UNITS: 100 INJECTION, SOLUTION INTRAVENOUS; SUBCUTANEOUS at 08:36

## 2019-11-15 ASSESSMENT — PAIN SCALES - GENERAL
PAINLEVEL_OUTOF10: 1
PAINLEVEL_OUTOF10: 0
PAINLEVEL_OUTOF10: 4
PAINLEVEL_OUTOF10: 3
PAINLEVEL_OUTOF10: 0
PAINLEVEL_OUTOF10: 3
PAINLEVEL_OUTOF10: 0
PAINLEVEL_OUTOF10: 0

## 2019-11-16 LAB
ANION GAP SERPL CALCULATED.3IONS-SCNC: 11 MMOL/L (ref 9–17)
BUN BLDV-MCNC: 17 MG/DL (ref 6–20)
BUN/CREAT BLD: ABNORMAL (ref 9–20)
CALCIUM SERPL-MCNC: 8.1 MG/DL (ref 8.6–10.4)
CHLORIDE BLD-SCNC: 110 MMOL/L (ref 98–107)
CO2: 20 MMOL/L (ref 20–31)
CREAT SERPL-MCNC: 1.15 MG/DL (ref 0.7–1.2)
EKG ATRIAL RATE: 51 BPM
EKG Q-T INTERVAL: 324 MS
EKG QRS DURATION: 92 MS
EKG QTC CALCULATION (BAZETT): 531 MS
EKG R AXIS: 13 DEGREES
EKG T AXIS: -159 DEGREES
EKG VENTRICULAR RATE: 162 BPM
GFR AFRICAN AMERICAN: >60 ML/MIN
GFR NON-AFRICAN AMERICAN: >60 ML/MIN
GFR SERPL CREATININE-BSD FRML MDRD: ABNORMAL ML/MIN/{1.73_M2}
GFR SERPL CREATININE-BSD FRML MDRD: ABNORMAL ML/MIN/{1.73_M2}
GLUCOSE BLD-MCNC: 105 MG/DL (ref 70–99)
GLUCOSE BLD-MCNC: 117 MG/DL (ref 75–110)
GLUCOSE BLD-MCNC: 118 MG/DL (ref 75–110)
GLUCOSE BLD-MCNC: 147 MG/DL (ref 75–110)
GLUCOSE BLD-MCNC: 166 MG/DL (ref 75–110)
GLUCOSE BLD-MCNC: 167 MG/DL (ref 75–110)
GLUCOSE BLD-MCNC: 190 MG/DL (ref 75–110)
HCT VFR BLD CALC: 35.2 % (ref 40.7–50.3)
HEMOGLOBIN: 12 G/DL (ref 13–17)
MCH RBC QN AUTO: 31.4 PG (ref 25.2–33.5)
MCHC RBC AUTO-ENTMCNC: 34.1 G/DL (ref 28.4–34.8)
MCV RBC AUTO: 92.1 FL (ref 82.6–102.9)
NRBC AUTOMATED: 0 PER 100 WBC
PDW BLD-RTO: 13.4 % (ref 11.8–14.4)
PLATELET # BLD: 185 K/UL (ref 138–453)
PMV BLD AUTO: 11.8 FL (ref 8.1–13.5)
POTASSIUM SERPL-SCNC: 3.7 MMOL/L (ref 3.7–5.3)
RBC # BLD: 3.82 M/UL (ref 4.21–5.77)
SODIUM BLD-SCNC: 141 MMOL/L (ref 135–144)
WBC # BLD: 9 K/UL (ref 3.5–11.3)

## 2019-11-16 PROCEDURE — 6360000002 HC RX W HCPCS: Performed by: PHYSICIAN ASSISTANT

## 2019-11-16 PROCEDURE — 94640 AIRWAY INHALATION TREATMENT: CPT

## 2019-11-16 PROCEDURE — 6370000000 HC RX 637 (ALT 250 FOR IP): Performed by: STUDENT IN AN ORGANIZED HEALTH CARE EDUCATION/TRAINING PROGRAM

## 2019-11-16 PROCEDURE — 80048 BASIC METABOLIC PNL TOTAL CA: CPT

## 2019-11-16 PROCEDURE — 85027 COMPLETE CBC AUTOMATED: CPT

## 2019-11-16 PROCEDURE — 6370000000 HC RX 637 (ALT 250 FOR IP): Performed by: INTERNAL MEDICINE

## 2019-11-16 PROCEDURE — 93010 ELECTROCARDIOGRAM REPORT: CPT | Performed by: INTERNAL MEDICINE

## 2019-11-16 PROCEDURE — 36415 COLL VENOUS BLD VENIPUNCTURE: CPT

## 2019-11-16 PROCEDURE — 82947 ASSAY GLUCOSE BLOOD QUANT: CPT

## 2019-11-16 PROCEDURE — 94760 N-INVAS EAR/PLS OXIMETRY 1: CPT

## 2019-11-16 PROCEDURE — 2060000000 HC ICU INTERMEDIATE R&B

## 2019-11-16 PROCEDURE — 2580000003 HC RX 258: Performed by: STUDENT IN AN ORGANIZED HEALTH CARE EDUCATION/TRAINING PROGRAM

## 2019-11-16 RX ORDER — ACETAMINOPHEN 325 MG/1
TABLET ORAL
Status: DISPENSED
Start: 2019-11-16 | End: 2019-11-16

## 2019-11-16 RX ADMIN — TAMSULOSIN HYDROCHLORIDE 0.4 MG: 0.4 CAPSULE ORAL at 09:39

## 2019-11-16 RX ADMIN — METOPROLOL TARTRATE 50 MG: 50 TABLET, FILM COATED ORAL at 09:41

## 2019-11-16 RX ADMIN — ACETAMINOPHEN 650 MG: 325 TABLET ORAL at 06:00

## 2019-11-16 RX ADMIN — PROPAFENONE HYDROCHLORIDE 150 MG: 150 TABLET, FILM COATED ORAL at 06:01

## 2019-11-16 RX ADMIN — GEMFIBROZIL 600 MG: 600 TABLET ORAL at 09:10

## 2019-11-16 RX ADMIN — ROPINIROLE HYDROCHLORIDE 2 MG: 1 TABLET, FILM COATED ORAL at 09:40

## 2019-11-16 RX ADMIN — PREGABALIN 100 MG: 100 CAPSULE ORAL at 09:10

## 2019-11-16 RX ADMIN — BACITRACIN: 500 OINTMENT TOPICAL at 14:23

## 2019-11-16 RX ADMIN — INSULIN HUMAN 70 UNITS: 100 INJECTION, SUSPENSION SUBCUTANEOUS at 21:24

## 2019-11-16 RX ADMIN — MOMETASONE FUROATE AND FORMOTEROL FUMARATE DIHYDRATE 2 PUFF: 100; 5 AEROSOL RESPIRATORY (INHALATION) at 08:26

## 2019-11-16 RX ADMIN — POLYETHYLENE GLYCOL 3350 17 G: 17 POWDER, FOR SOLUTION ORAL at 09:39

## 2019-11-16 RX ADMIN — ATORVASTATIN CALCIUM 20 MG: 20 TABLET, FILM COATED ORAL at 09:40

## 2019-11-16 RX ADMIN — Medication 10 ML: at 21:26

## 2019-11-16 RX ADMIN — INSULIN LISPRO 3 UNITS: 100 INJECTION, SOLUTION INTRAVENOUS; SUBCUTANEOUS at 01:20

## 2019-11-16 RX ADMIN — ACETAMINOPHEN 650 MG: 325 TABLET ORAL at 01:20

## 2019-11-16 RX ADMIN — INSULIN LISPRO 3 UNITS: 100 INJECTION, SOLUTION INTRAVENOUS; SUBCUTANEOUS at 14:23

## 2019-11-16 RX ADMIN — INSULIN HUMAN 60 UNITS: 100 INJECTION, SUSPENSION SUBCUTANEOUS at 09:50

## 2019-11-16 RX ADMIN — HEPARIN SODIUM 5000 UNITS: 5000 INJECTION INTRAVENOUS; SUBCUTANEOUS at 21:24

## 2019-11-16 RX ADMIN — METOPROLOL TARTRATE 50 MG: 50 TABLET, FILM COATED ORAL at 21:23

## 2019-11-16 RX ADMIN — BACITRACIN: 500 OINTMENT TOPICAL at 21:24

## 2019-11-16 RX ADMIN — HEPARIN SODIUM 5000 UNITS: 5000 INJECTION INTRAVENOUS; SUBCUTANEOUS at 09:40

## 2019-11-16 RX ADMIN — PREGABALIN 100 MG: 100 CAPSULE ORAL at 21:24

## 2019-11-16 RX ADMIN — PROPAFENONE HYDROCHLORIDE 150 MG: 150 TABLET, FILM COATED ORAL at 21:23

## 2019-11-16 RX ADMIN — ACETAMINOPHEN 650 MG: 325 TABLET ORAL at 14:22

## 2019-11-16 RX ADMIN — MOMETASONE FUROATE AND FORMOTEROL FUMARATE DIHYDRATE 2 PUFF: 100; 5 AEROSOL RESPIRATORY (INHALATION) at 20:55

## 2019-11-16 RX ADMIN — LEVOTHYROXINE SODIUM 150 MCG: 75 TABLET ORAL at 06:01

## 2019-11-16 RX ADMIN — BACITRACIN: 500 OINTMENT TOPICAL at 09:41

## 2019-11-16 RX ADMIN — PREGABALIN 100 MG: 100 CAPSULE ORAL at 14:23

## 2019-11-16 RX ADMIN — PROPAFENONE HYDROCHLORIDE 150 MG: 150 TABLET, FILM COATED ORAL at 14:23

## 2019-11-16 ASSESSMENT — PAIN SCALES - GENERAL
PAINLEVEL_OUTOF10: 0
PAINLEVEL_OUTOF10: 7

## 2019-11-17 VITALS
HEIGHT: 76 IN | DIASTOLIC BLOOD PRESSURE: 67 MMHG | SYSTOLIC BLOOD PRESSURE: 133 MMHG | HEART RATE: 74 BPM | WEIGHT: 268.08 LBS | OXYGEN SATURATION: 94 % | BODY MASS INDEX: 32.65 KG/M2 | TEMPERATURE: 98.6 F | RESPIRATION RATE: 16 BRPM

## 2019-11-17 LAB
ANION GAP SERPL CALCULATED.3IONS-SCNC: 12 MMOL/L (ref 9–17)
BUN BLDV-MCNC: 16 MG/DL (ref 6–20)
BUN/CREAT BLD: ABNORMAL (ref 9–20)
CALCIUM SERPL-MCNC: 8.3 MG/DL (ref 8.6–10.4)
CHLORIDE BLD-SCNC: 108 MMOL/L (ref 98–107)
CO2: 23 MMOL/L (ref 20–31)
CREAT SERPL-MCNC: 1.18 MG/DL (ref 0.7–1.2)
GFR AFRICAN AMERICAN: >60 ML/MIN
GFR NON-AFRICAN AMERICAN: >60 ML/MIN
GFR SERPL CREATININE-BSD FRML MDRD: ABNORMAL ML/MIN/{1.73_M2}
GFR SERPL CREATININE-BSD FRML MDRD: ABNORMAL ML/MIN/{1.73_M2}
GLUCOSE BLD-MCNC: 165 MG/DL (ref 75–110)
GLUCOSE BLD-MCNC: 55 MG/DL (ref 75–110)
GLUCOSE BLD-MCNC: 62 MG/DL (ref 70–99)
GLUCOSE BLD-MCNC: 72 MG/DL (ref 75–110)
HCT VFR BLD CALC: 35.1 % (ref 40.7–50.3)
HEMOGLOBIN: 12 G/DL (ref 13–17)
MCH RBC QN AUTO: 31.3 PG (ref 25.2–33.5)
MCHC RBC AUTO-ENTMCNC: 34.2 G/DL (ref 28.4–34.8)
MCV RBC AUTO: 91.6 FL (ref 82.6–102.9)
NRBC AUTOMATED: 0 PER 100 WBC
PDW BLD-RTO: 13.2 % (ref 11.8–14.4)
PLATELET # BLD: 208 K/UL (ref 138–453)
PMV BLD AUTO: 11.8 FL (ref 8.1–13.5)
POTASSIUM SERPL-SCNC: 3.6 MMOL/L (ref 3.7–5.3)
RBC # BLD: 3.83 M/UL (ref 4.21–5.77)
SODIUM BLD-SCNC: 143 MMOL/L (ref 135–144)
WBC # BLD: 9.2 K/UL (ref 3.5–11.3)

## 2019-11-17 PROCEDURE — 6360000002 HC RX W HCPCS: Performed by: PHYSICIAN ASSISTANT

## 2019-11-17 PROCEDURE — 80048 BASIC METABOLIC PNL TOTAL CA: CPT

## 2019-11-17 PROCEDURE — 82947 ASSAY GLUCOSE BLOOD QUANT: CPT

## 2019-11-17 PROCEDURE — 94640 AIRWAY INHALATION TREATMENT: CPT

## 2019-11-17 PROCEDURE — 36415 COLL VENOUS BLD VENIPUNCTURE: CPT

## 2019-11-17 PROCEDURE — 85027 COMPLETE CBC AUTOMATED: CPT

## 2019-11-17 PROCEDURE — 2580000003 HC RX 258: Performed by: STUDENT IN AN ORGANIZED HEALTH CARE EDUCATION/TRAINING PROGRAM

## 2019-11-17 PROCEDURE — 94760 N-INVAS EAR/PLS OXIMETRY 1: CPT

## 2019-11-17 PROCEDURE — 6370000000 HC RX 637 (ALT 250 FOR IP): Performed by: STUDENT IN AN ORGANIZED HEALTH CARE EDUCATION/TRAINING PROGRAM

## 2019-11-17 PROCEDURE — 6370000000 HC RX 637 (ALT 250 FOR IP): Performed by: INTERNAL MEDICINE

## 2019-11-17 RX ORDER — PROPAFENONE HYDROCHLORIDE 150 MG/1
150 TABLET, FILM COATED ORAL EVERY 8 HOURS SCHEDULED
Qty: 90 TABLET | Refills: 3 | Status: ON HOLD | OUTPATIENT
Start: 2019-11-17 | End: 2021-12-31 | Stop reason: HOSPADM

## 2019-11-17 RX ADMIN — BACITRACIN: 500 OINTMENT TOPICAL at 08:12

## 2019-11-17 RX ADMIN — PREGABALIN 100 MG: 100 CAPSULE ORAL at 08:10

## 2019-11-17 RX ADMIN — ROPINIROLE HYDROCHLORIDE 2 MG: 1 TABLET, FILM COATED ORAL at 08:11

## 2019-11-17 RX ADMIN — ACETAMINOPHEN 650 MG: 325 TABLET ORAL at 12:24

## 2019-11-17 RX ADMIN — INSULIN LISPRO 1 UNITS: 100 INJECTION, SOLUTION INTRAVENOUS; SUBCUTANEOUS at 12:16

## 2019-11-17 RX ADMIN — Medication 10 ML: at 08:15

## 2019-11-17 RX ADMIN — ATORVASTATIN CALCIUM 20 MG: 20 TABLET, FILM COATED ORAL at 08:09

## 2019-11-17 RX ADMIN — LEVOTHYROXINE SODIUM 150 MCG: 75 TABLET ORAL at 05:44

## 2019-11-17 RX ADMIN — METOPROLOL TARTRATE 50 MG: 50 TABLET, FILM COATED ORAL at 08:11

## 2019-11-17 RX ADMIN — TAMSULOSIN HYDROCHLORIDE 0.4 MG: 0.4 CAPSULE ORAL at 08:09

## 2019-11-17 RX ADMIN — MOMETASONE FUROATE AND FORMOTEROL FUMARATE DIHYDRATE 2 PUFF: 100; 5 AEROSOL RESPIRATORY (INHALATION) at 08:56

## 2019-11-17 RX ADMIN — HEPARIN SODIUM 5000 UNITS: 5000 INJECTION INTRAVENOUS; SUBCUTANEOUS at 08:12

## 2019-11-17 RX ADMIN — GEMFIBROZIL 600 MG: 600 TABLET ORAL at 08:09

## 2019-11-17 RX ADMIN — PROPAFENONE HYDROCHLORIDE 150 MG: 150 TABLET, FILM COATED ORAL at 05:44

## 2019-11-17 RX ADMIN — POLYETHYLENE GLYCOL 3350 17 G: 17 POWDER, FOR SOLUTION ORAL at 08:10

## 2019-11-17 ASSESSMENT — PAIN SCALES - GENERAL: PAINLEVEL_OUTOF10: 7

## 2019-11-18 ENCOUNTER — CARE COORDINATION (OUTPATIENT)
Dept: CARE COORDINATION | Age: 59
End: 2019-11-18

## 2019-11-18 ENCOUNTER — CARE COORDINATION (OUTPATIENT)
Dept: CASE MANAGEMENT | Age: 59
End: 2019-11-18

## 2019-11-18 DIAGNOSIS — N28.89 RIGHT RENAL MASS: Primary | ICD-10-CM

## 2019-11-19 ENCOUNTER — OFFICE VISIT (OUTPATIENT)
Dept: UROLOGY | Age: 59
End: 2019-11-19

## 2019-11-19 VITALS
BODY MASS INDEX: 33.8 KG/M2 | WEIGHT: 277.6 LBS | HEIGHT: 76 IN | DIASTOLIC BLOOD PRESSURE: 58 MMHG | SYSTOLIC BLOOD PRESSURE: 86 MMHG | TEMPERATURE: 97.9 F

## 2019-11-19 DIAGNOSIS — D30.01 BENIGN NEOPLASM OF RIGHT KIDNEY: Primary | ICD-10-CM

## 2019-11-19 PROCEDURE — 99024 POSTOP FOLLOW-UP VISIT: CPT | Performed by: UROLOGY

## 2019-11-19 ASSESSMENT — ENCOUNTER SYMPTOMS
NAUSEA: 0
WHEEZING: 0
SHORTNESS OF BREATH: 0
EYE REDNESS: 0
VOMITING: 0
COLOR CHANGE: 0
BACK PAIN: 0
COUGH: 0
EYE PAIN: 0
ABDOMINAL PAIN: 0

## 2019-11-20 DIAGNOSIS — N13.8 BENIGN PROSTATIC HYPERPLASIA WITH URINARY OBSTRUCTION: ICD-10-CM

## 2019-11-20 DIAGNOSIS — N40.1 BENIGN PROSTATIC HYPERPLASIA WITH URINARY OBSTRUCTION: ICD-10-CM

## 2019-11-20 RX ORDER — TAMSULOSIN HYDROCHLORIDE 0.4 MG/1
CAPSULE ORAL
Qty: 90 CAPSULE | Refills: 3 | Status: SHIPPED | OUTPATIENT
Start: 2019-11-20 | End: 2020-08-17

## 2019-11-20 RX ORDER — ATORVASTATIN CALCIUM 20 MG/1
TABLET, FILM COATED ORAL
Qty: 90 TABLET | Refills: 3 | Status: SHIPPED | OUTPATIENT
Start: 2019-11-20 | End: 2020-11-13 | Stop reason: SDUPTHER

## 2019-11-21 ENCOUNTER — TELEPHONE (OUTPATIENT)
Dept: UROLOGY | Age: 59
End: 2019-11-21

## 2019-11-21 ENCOUNTER — OFFICE VISIT (OUTPATIENT)
Dept: INTERNAL MEDICINE CLINIC | Age: 59
End: 2019-11-21
Payer: COMMERCIAL

## 2019-11-21 VITALS
BODY MASS INDEX: 33 KG/M2 | WEIGHT: 271 LBS | SYSTOLIC BLOOD PRESSURE: 80 MMHG | HEIGHT: 76 IN | DIASTOLIC BLOOD PRESSURE: 50 MMHG

## 2019-11-21 DIAGNOSIS — Z79.4 TYPE 2 DIABETES MELLITUS WITH DIABETIC POLYNEUROPATHY, WITH LONG-TERM CURRENT USE OF INSULIN (HCC): Primary | ICD-10-CM

## 2019-11-21 DIAGNOSIS — I95.2 HYPOTENSION DUE TO DRUGS: ICD-10-CM

## 2019-11-21 DIAGNOSIS — E11.42 TYPE 2 DIABETES MELLITUS WITH DIABETIC POLYNEUROPATHY, WITH LONG-TERM CURRENT USE OF INSULIN (HCC): Primary | ICD-10-CM

## 2019-11-21 DIAGNOSIS — E78.00 PURE HYPERCHOLESTEROLEMIA: ICD-10-CM

## 2019-11-21 DIAGNOSIS — E03.9 ACQUIRED HYPOTHYROIDISM: ICD-10-CM

## 2019-11-21 PROCEDURE — 2022F DILAT RTA XM EVC RTNOPTHY: CPT | Performed by: INTERNAL MEDICINE

## 2019-11-21 PROCEDURE — 99214 OFFICE O/P EST MOD 30 MIN: CPT | Performed by: INTERNAL MEDICINE

## 2019-11-21 PROCEDURE — 3045F PR MOST RECENT HEMOGLOBIN A1C LEVEL 7.0-9.0%: CPT | Performed by: INTERNAL MEDICINE

## 2019-11-21 PROCEDURE — G8427 DOCREV CUR MEDS BY ELIG CLIN: HCPCS | Performed by: INTERNAL MEDICINE

## 2019-11-21 PROCEDURE — 3017F COLORECTAL CA SCREEN DOC REV: CPT | Performed by: INTERNAL MEDICINE

## 2019-11-21 PROCEDURE — 1111F DSCHRG MED/CURRENT MED MERGE: CPT | Performed by: INTERNAL MEDICINE

## 2019-11-21 PROCEDURE — G8482 FLU IMMUNIZE ORDER/ADMIN: HCPCS | Performed by: INTERNAL MEDICINE

## 2019-11-21 PROCEDURE — G8598 ASA/ANTIPLAT THER USED: HCPCS | Performed by: INTERNAL MEDICINE

## 2019-11-21 PROCEDURE — G8417 CALC BMI ABV UP PARAM F/U: HCPCS | Performed by: INTERNAL MEDICINE

## 2019-11-21 PROCEDURE — 1036F TOBACCO NON-USER: CPT | Performed by: INTERNAL MEDICINE

## 2019-11-21 ASSESSMENT — ENCOUNTER SYMPTOMS
TROUBLE SWALLOWING: 0
BACK PAIN: 1
ABDOMINAL DISTENTION: 0
COLOR CHANGE: 0
EYE PAIN: 0
BLOOD IN STOOL: 0
WHEEZING: 0
SHORTNESS OF BREATH: 0
COUGH: 0
EYE DISCHARGE: 0
DIARRHEA: 0

## 2019-11-22 ENCOUNTER — CARE COORDINATION (OUTPATIENT)
Dept: CASE MANAGEMENT | Age: 59
End: 2019-11-22

## 2019-11-26 ENCOUNTER — HOSPITAL ENCOUNTER (OUTPATIENT)
Facility: CLINIC | Age: 59
Discharge: HOME | End: 2019-11-28
Payer: COMMERCIAL

## 2019-11-26 ENCOUNTER — HOSPITAL ENCOUNTER (OUTPATIENT)
Dept: GENERAL RADIOLOGY | Facility: CLINIC | Age: 59
Discharge: HOME | End: 2019-11-28
Payer: COMMERCIAL

## 2019-11-26 ENCOUNTER — CARE COORDINATION (OUTPATIENT)
Dept: CASE MANAGEMENT | Age: 59
End: 2019-11-26

## 2019-11-26 DIAGNOSIS — I10 HYPERTENSION, UNSPECIFIED TYPE: ICD-10-CM

## 2019-11-26 DIAGNOSIS — R06.02 SOB (SHORTNESS OF BREATH): ICD-10-CM

## 2019-11-26 PROCEDURE — 71046 X-RAY EXAM CHEST 2 VIEWS: CPT

## 2019-12-04 ENCOUNTER — CARE COORDINATION (OUTPATIENT)
Dept: CASE MANAGEMENT | Age: 59
End: 2019-12-04

## 2019-12-05 ENCOUNTER — TELEPHONE (OUTPATIENT)
Dept: UROLOGY | Age: 59
End: 2019-12-05

## 2019-12-05 NOTE — TELEPHONE ENCOUNTER
Check if he wants to come in for Dr Jaspreet Loredo to look at tomorrow.  I would have worked him in today had I seen this earlier

## 2019-12-05 NOTE — TELEPHONE ENCOUNTER
The pt called with c/o The big incision on his abdomen  Is tender . He thinks it is red but his wife does not . Tiffany Peopels It is not hot to the touch , no fevers. Had some seeping  the other day but none today . It is still intact , no noticeable swelling . He is concerned . And doesn't want to get an infection. Please advise

## 2019-12-06 RX ORDER — CEPHALEXIN 500 MG/1
500 CAPSULE ORAL 2 TIMES DAILY
Qty: 6 CAPSULE | Refills: 0 | Status: SHIPPED | OUTPATIENT
Start: 2019-12-06 | End: 2019-12-09

## 2019-12-06 NOTE — TELEPHONE ENCOUNTER
Keflex was called in per DR Bella . He wanted the pt to come in next week to see Chuck Shoulders however the pt stated today that he is doing much better and will just come to See DR Merry Zamudio for his follow up on 12/17/2019 .  He will call is he needs to come sooner

## 2019-12-09 ENCOUNTER — HOSPITAL ENCOUNTER (OUTPATIENT)
Age: 59
Setting detail: SPECIMEN
Discharge: HOME | End: 2019-12-09
Payer: COMMERCIAL

## 2019-12-09 DIAGNOSIS — D30.01 BENIGN NEOPLASM OF RIGHT KIDNEY: ICD-10-CM

## 2019-12-09 LAB
ANION GAP SERPL CALCULATED.3IONS-SCNC: 16 MMOL/L (ref 9–17)
BUN BLDV-MCNC: 14 MG/DL (ref 6–20)
BUN/CREAT BLD: ABNORMAL (ref 9–20)
CALCIUM SERPL-MCNC: 9.4 MG/DL (ref 8.6–10.4)
CHLORIDE BLD-SCNC: 108 MMOL/L (ref 98–107)
CO2: 23 MMOL/L (ref 20–31)
CREAT SERPL-MCNC: 1.16 MG/DL (ref 0.7–1.2)
GFR AFRICAN AMERICAN: >60 ML/MIN
GFR NON-AFRICAN AMERICAN: >60 ML/MIN
GFR SERPL CREATININE-BSD FRML MDRD: ABNORMAL ML/MIN/{1.73_M2}
GFR SERPL CREATININE-BSD FRML MDRD: ABNORMAL ML/MIN/{1.73_M2}
GLUCOSE BLD-MCNC: 206 MG/DL (ref 70–99)
HCT VFR BLD CALC: 39.5 % (ref 40.7–50.3)
HEMOGLOBIN: 12.5 G/DL (ref 13–17)
MCH RBC QN AUTO: 30.6 PG (ref 25.2–33.5)
MCHC RBC AUTO-ENTMCNC: 31.6 G/DL (ref 28.4–34.8)
MCV RBC AUTO: 96.8 FL (ref 82.6–102.9)
NRBC AUTOMATED: 0 PER 100 WBC
PDW BLD-RTO: 13.9 % (ref 11.8–14.4)
PLATELET # BLD: 295 K/UL (ref 138–453)
PMV BLD AUTO: 12 FL (ref 8.1–13.5)
POTASSIUM SERPL-SCNC: 4.8 MMOL/L (ref 3.7–5.3)
RBC # BLD: 4.08 M/UL (ref 4.21–5.77)
SODIUM BLD-SCNC: 147 MMOL/L (ref 135–144)
WBC # BLD: 8.3 K/UL (ref 3.5–11.3)

## 2019-12-10 ENCOUNTER — CARE COORDINATION (OUTPATIENT)
Dept: CARE COORDINATION | Age: 59
End: 2019-12-10

## 2019-12-12 ENCOUNTER — TELEPHONE (OUTPATIENT)
Dept: UROLOGY | Age: 59
End: 2019-12-12

## 2019-12-17 ENCOUNTER — OFFICE VISIT (OUTPATIENT)
Dept: UROLOGY | Age: 59
End: 2019-12-17
Payer: COMMERCIAL

## 2019-12-17 VITALS
HEART RATE: 77 BPM | WEIGHT: 271.17 LBS | HEIGHT: 76 IN | SYSTOLIC BLOOD PRESSURE: 123 MMHG | DIASTOLIC BLOOD PRESSURE: 74 MMHG | BODY MASS INDEX: 33.02 KG/M2 | TEMPERATURE: 98.1 F

## 2019-12-17 DIAGNOSIS — Z96.0 URETERAL STENT RETAINED: Primary | ICD-10-CM

## 2019-12-17 DIAGNOSIS — N28.89 RENAL MASS: ICD-10-CM

## 2019-12-17 PROCEDURE — 52310 CYSTOSCOPY AND TREATMENT: CPT | Performed by: UROLOGY

## 2019-12-17 RX ORDER — APIXABAN 5 MG/1
1 TABLET, FILM COATED ORAL 2 TIMES DAILY
Refills: 2 | COMMUNITY
Start: 2019-11-26 | End: 2021-07-09

## 2019-12-17 RX ORDER — CEPHALEXIN 500 MG/1
500 CAPSULE ORAL 2 TIMES DAILY
Qty: 8 CAPSULE | Refills: 0 | Status: SHIPPED | OUTPATIENT
Start: 2019-12-17 | End: 2020-01-14 | Stop reason: ALTCHOICE

## 2019-12-18 RX ORDER — ROPINIROLE 2 MG/1
TABLET, FILM COATED ORAL
Qty: 90 TABLET | Refills: 3 | Status: SHIPPED | OUTPATIENT
Start: 2019-12-18 | End: 2020-03-13

## 2019-12-18 RX ORDER — LOSARTAN POTASSIUM 100 MG/1
TABLET ORAL
Qty: 90 TABLET | Refills: 3 | Status: SHIPPED | OUTPATIENT
Start: 2019-12-18 | End: 2020-03-18 | Stop reason: DRUGHIGH

## 2019-12-18 RX ORDER — GEMFIBROZIL 600 MG/1
TABLET, FILM COATED ORAL
Qty: 90 TABLET | Refills: 3 | Status: SHIPPED | OUTPATIENT
Start: 2019-12-18 | End: 2020-02-21 | Stop reason: SDUPTHER

## 2020-01-02 ENCOUNTER — OFFICE VISIT (OUTPATIENT)
Dept: INTERNAL MEDICINE CLINIC | Age: 60
End: 2020-01-02
Payer: COMMERCIAL

## 2020-01-02 VITALS
RESPIRATION RATE: 15 BRPM | HEART RATE: 76 BPM | WEIGHT: 257 LBS | SYSTOLIC BLOOD PRESSURE: 124 MMHG | BODY MASS INDEX: 31.29 KG/M2 | OXYGEN SATURATION: 97 % | HEIGHT: 76 IN | DIASTOLIC BLOOD PRESSURE: 74 MMHG

## 2020-01-02 PROCEDURE — 2022F DILAT RTA XM EVC RTNOPTHY: CPT | Performed by: INTERNAL MEDICINE

## 2020-01-02 PROCEDURE — 3017F COLORECTAL CA SCREEN DOC REV: CPT | Performed by: INTERNAL MEDICINE

## 2020-01-02 PROCEDURE — G8417 CALC BMI ABV UP PARAM F/U: HCPCS | Performed by: INTERNAL MEDICINE

## 2020-01-02 PROCEDURE — 99213 OFFICE O/P EST LOW 20 MIN: CPT | Performed by: INTERNAL MEDICINE

## 2020-01-02 PROCEDURE — G8482 FLU IMMUNIZE ORDER/ADMIN: HCPCS | Performed by: INTERNAL MEDICINE

## 2020-01-02 PROCEDURE — 3046F HEMOGLOBIN A1C LEVEL >9.0%: CPT | Performed by: INTERNAL MEDICINE

## 2020-01-02 PROCEDURE — G8427 DOCREV CUR MEDS BY ELIG CLIN: HCPCS | Performed by: INTERNAL MEDICINE

## 2020-01-02 PROCEDURE — 1036F TOBACCO NON-USER: CPT | Performed by: INTERNAL MEDICINE

## 2020-01-02 RX ORDER — METHOCARBAMOL 750 MG/1
750 TABLET, FILM COATED ORAL 2 TIMES DAILY PRN
Qty: 20 TABLET | Refills: 0 | Status: SHIPPED | OUTPATIENT
Start: 2020-01-02 | End: 2020-01-12

## 2020-01-02 ASSESSMENT — PATIENT HEALTH QUESTIONNAIRE - PHQ9
SUM OF ALL RESPONSES TO PHQ QUESTIONS 1-9: 0
1. LITTLE INTEREST OR PLEASURE IN DOING THINGS: 0
2. FEELING DOWN, DEPRESSED OR HOPELESS: 0
SUM OF ALL RESPONSES TO PHQ9 QUESTIONS 1 & 2: 0
SUM OF ALL RESPONSES TO PHQ QUESTIONS 1-9: 0

## 2020-01-02 NOTE — PROGRESS NOTES
Visit Information    Have you changed or started any medications since your last visit including any over-the-counter medicines, vitamins, or herbal medicines? no   Are you having any side effects from any of your medications? -  no  Have you stopped taking any of your medications? Is so, why? -  no    Have you seen any other physician or provider since your last visit? No  Have you had any other diagnostic tests since your last visit? No  Have you been seen in the emergency room and/or had an admission to a hospital since we last saw you? No  Have you had your routine dental cleaning in the past 6 months? no    Have you activated your [employer] account? If not, what are your barriers?  Yes     Patient Care Team:  Mayco Amezquita MD as PCP - Kary Philippe MD as PCP - [facility]  Dequan Lomax MD as Consulting Physician (Infectious Diseases)    Medical History Review  Past Medical, Family, and Social History reviewed and does not contribute to the patient presenting condition    Health Maintenance   Topic Date Due    DTaP/Tdap/Td vaccine (1 - Tdap) 01/07/1971    HIV screen  01/07/1975    Hepatitis B vaccine (1 of 3 - Risk 3-dose series) 01/07/1979    Shingles Vaccine (1 of 2) 10/23/2014    Diabetic retinal exam  01/04/2017    Low dose CT lung screening  11/01/2019    Diabetic microalbuminuria test  01/15/2020    A1C test (Diabetic or Prediabetic)  05/01/2020    Lipid screen  05/21/2020    TSH testing  05/21/2020    Diabetic foot exam  11/07/2020    Potassium monitoring  12/09/2020    Creatinine monitoring  12/09/2020    Colon cancer screen colonoscopy  01/23/2027    Flu vaccine  Completed    Pneumococcal 0-64 years Vaccine  Completed    Hepatitis C screen  Completed     SUBJECTIVE:  Virgil Gonzalez is a 61 y.o. male patient who  comes for complaints of   Chief Complaint   Patient presents with    Back Pain     pinched nerve        Back pain  2 days  Has prosthetic leg- was getting out of wife's car, and seems to have twisted it  No meds tried  Heating pad did not help  No radiation of pain, no numbness/tingling  Denies any bowel/bladder problems. No saddle anesthesia. Type 2 dm  Generally well controlled  S/p left BKA ~5yr ago due to diabetes      REVIEW OF SYSTEMS (except Subjective (HPI))  GENERAL: No fevers / chills  RESPIRATORY: Negative for cough, wheezing or shortness of breath  CARDIOVASCULAR: Negative for chest pain or palpitations. GI: no nausea, vomiting, or diarrhea  NEURO: No history of headaches    Past Medical History:   Diagnosis Date    Asymptomatic bilateral carotid artery stenosis     Boil, thigh 10/2019    10/30/19: right inner thigh region, says PCP Rxd Doxy for this, area is much better, has a couple days left to complete Doxy    CAD (coronary artery disease)     saw Dr[facility] Chris Grimm ([facility]) on Oct. 22/2019 for pre-op clearance, to be cleared pending cardiac echo, not yet schedule for this as of 10/30/19    Charcot foot due to diabetes mellitus ([address] 2014    LEFT    COPD (chronic obstructive pulmonary disease) ([address] 2009    INHALER USE DAILY    Diabetes mellitus ([address] 1989    IDDM, On Insulin    Diabetic neuropathy ([address]     Difficult intravenous access     VEINS ROLL    Employs prosthetic leg     s/p left BKA    Foot ulcer ([address] PRIOR TO 2015    BILAT    Full dentures     UPPER ONLY    Hyperlipidemia 2004    ON RX    Hypertension 2004    ON RX, PCP Dr. Arvin Longoria, seen Oct. 2019    Hypoglycemia 4/2/2015    MRSA (methicillin resistant staph aureus) culture positive 4/16/2015    ankle    OA (osteoarthritis)     Osteomyelitis ([address]     left stump  BKA    Paroxysmal atrial fibrillation ([address]     Renal mass 09/2019    ACCIDENTAL  FINDING IS FOLLOWING UP WITH KIDNEY DR John Ledezma     Suspected sleep apnea     had a sleep study in august, says no results have been sent to PCP yet!     Thyroid disease 2004    PT HAD HYPERTHYROIDISM-UNCONTROLED THYROID Medications   Medication Sig Dispense Refill    losartan (COZAAR) 100 MG tablet TAKE 1 TABLET BY MOUTH EVERY DAY 90 tablet 3    rOPINIRole (REQUIP) 2 MG tablet TAKE 1 TABLET BY MOUTH EVERY DAY 90 tablet 3    gemfibrozil (LOPID) 600 MG tablet TAKE 1 TABLET BY MOUTH EVERY DAY 90 tablet 3    ELIQUIS 5 MG TABS tablet Take 1 tablet by mouth 2 times daily  2    cephALEXin (KEFLEX) 500 MG capsule Take 1 capsule by mouth 2 times daily 8 capsule 0    atorvastatin (LIPITOR) 20 MG tablet TAKE 1 TABLET BY MOUTH EVERY DAY 90 tablet 3    tamsulosin (FLOMAX) 0.4 MG capsule TAKE 1 CAPSULE BY MOUTH EVERY DAY 90 capsule 3    propafenone (RYTHMOL) 150 MG tablet Take 1 tablet by mouth every 8 hours 90 tablet 3    albuterol sulfate  (90 Base) MCG/ACT inhaler Inhale 2 puffs into the lungs every 6 hours as needed for Wheezing      pregabalin (LYRICA) 100 MG capsule Take 1 capsule by mouth 3 times daily for 121 days. 90 capsule 3    SYMBICORT 160-4.5 MCG/ACT AERO TAKE 2 PUFFS BY MOUTH TWICE A DAY (Patient taking differently: Inhale 2 puffs into the lungs 2 times daily ) 30.6 Inhaler 3    METOPROLOL TARTRATE PO Take 50 mg by mouth 2 times daily      insulin NPH (HUMULIN N) 100 UNIT/ML injection vial Inject into the skin 2 times daily (before meals) Indications: 60 Units in AM, 70 Units bed time       blood glucose test strips (ASCENSIA AUTODISC VI;ONE TOUCH ULTRA TEST VI) strip 1 each by In Vitro route 4 times daily As needed.  400 each 3    JANUMET  MG per tablet TAKE (1) TABLET TWICE A DAY WITH MEALS (Patient taking differently: Take 1 tablet by mouth 2 times daily (with meals) ) 60 tablet 11    ULTICARE INSULIN SYRINGE 31G X 5/16\" 1 ML MISC USE AS DIRECTED WITH HUMULIN AND HUMALOG INSULIN 5 TIMES DAILY 100 each 3    isosorbide mononitrate (IMDUR) 30 MG extended release tablet Take 1 tablet by mouth daily 90 tablet 3    HUMALOG 100 UNIT/ML injection vial INJECT 12 UNITS UNDER THE SKIN 3 TIMES DAILY + SLIDING SCALE (TAKE 5 UNITS IF SUGAR IS OVER 150) (Patient taking differently: Inject into the skin 3 times daily (before meals) ) 20 mL 3    Omega-3 Fatty Acids (FISH OIL CONCENTRATE) 300 MG CAPS Take 300 mg by mouth nightly       Glucosamine Sulfate 500 MG TABS Take 500 mg by mouth 3 times daily      aspirin 81 MG tablet Take 81 mg by mouth nightly       levothyroxine (SYNTHROID) 150 MCG tablet Take 1 tablet by mouth daily 90 tablet 3     No current facility-administered medications for this visit. OBJECTIVE:  Vitals:    01/02/20 1525   BP: 124/74   Pulse: 76   Resp: 15   SpO2: 97%     Body mass index is 31.3 kg/m². Focal exam:    General exam (except above):  General appearance - well appearing, alert, in no acute distress  Head - Atraumatic, normocephalic  Eyes - EOMI, no jaundice or pallor  Lungs - Lungs clear to auscultation. No wheezing, rhonchi, rales  Heart - RRR without murmur, gallop, or rubs. No ectopy  Abdomen - Abdomen soft, non-tender. Bowel sounds normal. No masses, organomegaly  Extremities -No significant edema, or skin discoloration. Good capillary refill. Neuro - Pt Alert, awake and oriented x 3. No gross focal neurological deficits    ASSESSMENT AND PLAN (MEDICAL DECISION MAKING):         Discussed with patient potential side effects of increased drowsiness/dizziness. Do not combine with alcohol  Do not drive or operate heavy machinery if it makes you drowsy. Follow up in:     Greater than 25 minutes spent face-to-face with patient of which more than 50% was spent reviewing the past medical history, addressing the current concerns, counseling and formulating a mutual plan to help coordinate the care    This note was partially generated using Dragon voice recognition system, any errors noted are unintentional and are due to this technology.     Carlos Gabriel MD

## 2020-01-03 ENCOUNTER — TELEPHONE (OUTPATIENT)
Dept: ONCOLOGY | Age: 60
End: 2020-01-03

## 2020-01-03 NOTE — TELEPHONE ENCOUNTER
Our records indicate that your patient is overdue for their annual lung cancer screening follow up testing. For your convenience, we have pended the order for the scan for you. If you do not agree with the need for the test, please cancel the order and let us know.      Sincerely,    [address] [facility]

## 2020-01-07 NOTE — TELEPHONE ENCOUNTER
Order received for ct lung screening does not contain all CMS required data. Order must include NPI number. New Pending order pended, please have a provider sign so that required information shows on order.   Thank you

## 2020-01-08 ENCOUNTER — OFFICE VISIT (OUTPATIENT)
Dept: INTERNAL MEDICINE CLINIC | Age: 60
End: 2020-01-08
Payer: COMMERCIAL

## 2020-01-08 VITALS
SYSTOLIC BLOOD PRESSURE: 136 MMHG | DIASTOLIC BLOOD PRESSURE: 82 MMHG | BODY MASS INDEX: 32.88 KG/M2 | OXYGEN SATURATION: 96 % | HEART RATE: 90 BPM | HEIGHT: 76 IN | WEIGHT: 270 LBS

## 2020-01-08 PROCEDURE — G8417 CALC BMI ABV UP PARAM F/U: HCPCS | Performed by: INTERNAL MEDICINE

## 2020-01-08 PROCEDURE — G8427 DOCREV CUR MEDS BY ELIG CLIN: HCPCS | Performed by: INTERNAL MEDICINE

## 2020-01-08 PROCEDURE — 1036F TOBACCO NON-USER: CPT | Performed by: INTERNAL MEDICINE

## 2020-01-08 PROCEDURE — 2022F DILAT RTA XM EVC RTNOPTHY: CPT | Performed by: INTERNAL MEDICINE

## 2020-01-08 PROCEDURE — 3046F HEMOGLOBIN A1C LEVEL >9.0%: CPT | Performed by: INTERNAL MEDICINE

## 2020-01-08 PROCEDURE — G8482 FLU IMMUNIZE ORDER/ADMIN: HCPCS | Performed by: INTERNAL MEDICINE

## 2020-01-08 PROCEDURE — 3017F COLORECTAL CA SCREEN DOC REV: CPT | Performed by: INTERNAL MEDICINE

## 2020-01-08 PROCEDURE — 99214 OFFICE O/P EST MOD 30 MIN: CPT | Performed by: INTERNAL MEDICINE

## 2020-01-08 RX ORDER — DOXYCYCLINE HYCLATE 100 MG/1
100 CAPSULE ORAL 2 TIMES DAILY
Qty: 20 CAPSULE | Refills: 0 | Status: SHIPPED | OUTPATIENT
Start: 2020-01-08 | End: 2020-01-18

## 2020-01-08 RX ORDER — CLINDAMYCIN HYDROCHLORIDE 300 MG/1
300 CAPSULE ORAL 3 TIMES DAILY
Qty: 30 CAPSULE | Refills: 0 | Status: SHIPPED | OUTPATIENT
Start: 2020-01-08 | End: 2020-01-18

## 2020-01-08 ASSESSMENT — ENCOUNTER SYMPTOMS
EYE DISCHARGE: 0
COUGH: 0
COLOR CHANGE: 0
ABDOMINAL DISTENTION: 0
DIARRHEA: 0
EYE PAIN: 0
SHORTNESS OF BREATH: 0
TROUBLE SWALLOWING: 0
BLOOD IN STOOL: 0
WHEEZING: 0

## 2020-01-08 NOTE — PROGRESS NOTES
[address] [address]  Dept: [phone]  Dept Fax: [phone]     Eddie Mack is a 61 y.o. male who presents today for his medical conditions/complaintsas noted below. Eddie Mack is c/o of   Chief Complaint   Patient presents with    Back Pain    Other     MRSA          HPI:      HTN  Onset more than 2 years ago  gustavo mild to mod  Controlled with current po meds  Not associated with headaches or blurry vision  No chest pain  Diabetes   Duration more than 7 years  Modifying factors on Glucophage and other med  Severity uncontrolled sever  Associated signs and symtoms neuropathy/ckd/ CAD. aggravated with sugar diet and better with low sugar diet           Hemoglobin A1C (%)   Date Value   05/01/2019 7.0 (H)   01/15/2019 7.0 (H)   10/18/2018 7.3 (H)             ( goal A1Cis < 7)   Microalb/Crt.  Ratio (mcg/mg creat)   Date Value   01/15/2019 18 (H)     LDL Cholesterol (mg/dL)   Date Value   05/21/2019 68   03/09/2018 56   03/31/2016 46       (goal LDL is <100)   AST (U/L)   Date Value   09/19/2016 21     ALT (U/L)   Date Value   09/19/2016 19     BUN (mg/dL)   Date Value   12/09/2019 14     BP Readings from Last 3 Encounters:   01/08/20 136/82   01/02/20 124/74   12/17/19 123/74          (goal 120/80)    Past Medical History:   Diagnosis Date    Asymptomatic bilateral carotid artery stenosis     Boil, thigh 10/2019    10/30/19: right inner thigh region, says PCP Rxd Doxy for this, area is much better, has a couple days left to complete Doxy    CAD (coronary artery disease)     saw Dr. Justino Mccord ([facility]) on Oct. 22/2019 for pre-op clearance, to be cleared pending cardiac echo, not yet schedule for this as of 10/30/19    Charcot foot due to diabetes mellitus ([address] 2014    LEFT    COPD (chronic obstructive pulmonary disease) ([address] 2009    INHALER USE DAILY    Diabetes mellitus ([address] 1989    IDDM, On Insulin    Diabetic neuropathy ([address]     Difficult intravenous access     VEINS ROLL    Employs prosthetic leg     s/p left BKA    Foot ulcer ([address] PRIOR TO 2015    BILAT    Full dentures     UPPER ONLY    Hyperlipidemia 2004    ON RX    Hypertension 2004    ON RX, PCP Dr. Deedee Shaw, seen Oct. 2019    Hypoglycemia 4/2/2015    MRSA (methicillin resistant staph aureus) culture positive 4/16/2015    ankle    OA (osteoarthritis)     Osteomyelitis ([address]     left stump  BKA    Paroxysmal atrial fibrillation ([address]     Renal mass 09/2019    ACCIDENTAL  FINDING IS FOLLOWING UP WITH KIDNEY DR Enoch Ledezma     Suspected sleep apnea     had a sleep study in august, says no results have been sent to PCP yet!     Thyroid disease 2004    PT HAD HYPERTHYROIDISM-UNCONTROLED THYROID DESTROYED WITH RADI IODINE NOW HAS HYPOTHYRIDISM    Vision abnormalities 09/2019    LEFT NO VISION    Vitreous hemorrhage of left eye due to diabetes mellitus ([address] 09/2019    s/p Vitrectomy 9/2019    Wears glasses     Wears partial dentures     upper      Past Surgical History:   Procedure Laterality Date    CARDIAC CATHETERIZATION      no stenting    CARDIOVASCULAR STRESS TEST  06/28/2019    small fixed apical, likely normal, EF 48%    COLONOSCOPY  06/17/2016    poor prep, done up to the IC valve, redundant colon    COLONOSCOPY  01/23/2017    VIRTUAL COLONOSCOPY DONE-no polyps or masses    FINGER AMPUTATION Right 1995    middle-CRUSHING INJURY AT WORK    FOOT SURGERY Right     r-bone removed    HC  PICC [address] DOUBLE  5/21/2018         KIDNEY SURGERY Right 11/13/2019    XI ROBOTIC PARTIAL NEPHRECTOMY, INTRAOP ULTRASOUND, RIGHT URETEROURETEROSTOMY, RIGHT URETERAL STENT PLACEMENT **SHORT STAY** performed by Jeremías Duncan MD at [address] Left 4/29/15    OTHER SURGICAL HISTORY Left 5-5-15 and 11/2015    Revision BKA    OTHER SURGICAL HISTORY      left stump revision 5/30/2018    PARTIAL NEPHRECTOMY Right 11/13/2019    ROBOTIC PARTIAL NEPHRECTOMY    TN RE-AMPUTATION LOWER LEG Left 2018    LEG AMPUTATION BELOW KNEE REVISION performed by Kelly Story MD at [address] Bilateral 80'S    TOE AMPUTATION      Right 2 and 4    URETEROURETEROSTOMY Right 2019    RIGHT URETEROURETEROSTOMY, RIGHT URETERAL STENT PLACEMENT     VITRECTOMY Left 2019    PARS PLANA VITRECTOMY 25 GAUGE, MEMBRANE PEELING, ENDOLASER 200  MW 1044 SPOTS, INDIRECT LASER 26 SPOTS performed by Aiden Burgos MD at [place] History   Problem Relation Age of Onset    Diabetes Mother     Hypertension Mother     Stomach Cancer Mother     Hypertension Father     Alcohol Abuse Father     COPD Father     Kidney Cancer Father     Diabetes Brother         IDDM-PUMP    Other Sister         BRAIN TUMOR          Social History     Tobacco Use    Smoking status: Former Smoker     Packs/day: 2.00     Years: 25.00     Pack years: 50.00     Types: Cigars     Start date:      Last attempt to quit: 2011     Years since quittin.6    Smokeless tobacco: Never Used   Substance Use Topics    Alcohol use:  Yes     Alcohol/week: 0.0 standard drinks     Frequency: Monthly or less     Comment: BEER 1 A MONTH         Current Outpatient Medications   Medication Sig Dispense Refill    clindamycin (CLEOCIN) 300 MG capsule Take 1 capsule by mouth 3 times daily for 10 days 30 capsule 0    doxycycline hyclate (VIBRAMYCIN) 100 MG capsule Take 1 capsule by mouth 2 times daily for 10 days Take with food, but avoid dairy, calcium and MTV's 2 hours before and after the dose 20 capsule 0    methocarbamol (ROBAXIN-750) 750 MG tablet Take 1 tablet by mouth 2 times daily as needed (muscle spasm) 20 tablet 0    Handicap Placard MISC by Does not apply route Duration - 5years  Reason- prosthetic leg 1 each 0    losartan (COZAAR) 100 MG tablet TAKE 1 TABLET BY MOUTH EVERY DAY 90 tablet 3    rOPINIRole (REQUIP) 2 MG tablet TAKE 1 TABLET BY MOUTH EVERY DAY 90 tablet 3    gemfibrozil (LOPID) 600 MG tablet TAKE 1 TABLET BY MOUTH EVERY DAY 90 tablet 3    ELIQUIS 5 MG TABS tablet Take 1 tablet by mouth 2 times daily  2    cephALEXin (KEFLEX) 500 MG capsule Take 1 capsule by mouth 2 times daily 8 capsule 0    atorvastatin (LIPITOR) 20 MG tablet TAKE 1 TABLET BY MOUTH EVERY DAY 90 tablet 3    tamsulosin (FLOMAX) 0.4 MG capsule TAKE 1 CAPSULE BY MOUTH EVERY DAY 90 capsule 3    propafenone (RYTHMOL) 150 MG tablet Take 1 tablet by mouth every 8 hours 90 tablet 3    albuterol sulfate  (90 Base) MCG/ACT inhaler Inhale 2 puffs into the lungs every 6 hours as needed for Wheezing      pregabalin (LYRICA) 100 MG capsule Take 1 capsule by mouth 3 times daily for 121 days. 90 capsule 3    SYMBICORT 160-4.5 MCG/ACT AERO TAKE 2 PUFFS BY MOUTH TWICE A DAY (Patient taking differently: Inhale 2 puffs into the lungs 2 times daily ) 30.6 Inhaler 3    METOPROLOL TARTRATE PO Take 50 mg by mouth 2 times daily      insulin NPH (HUMULIN N) 100 UNIT/ML injection vial Inject into the skin 2 times daily (before meals) Indications: 60 Units in AM, 70 Units bed time       blood glucose test strips (ASCENSIA AUTODISC VI;ONE TOUCH ULTRA TEST VI) strip 1 each by In Vitro route 4 times daily As needed.  400 each 3    JANUMET  MG per tablet TAKE (1) TABLET TWICE A DAY WITH MEALS (Patient taking differently: Take 1 tablet by mouth 2 times daily (with meals) ) 60 tablet 11    ULTICARE INSULIN SYRINGE 31G X 5/16\" 1 ML MISC USE AS DIRECTED WITH HUMULIN AND HUMALOG INSULIN 5 TIMES DAILY 100 each 3    isosorbide mononitrate (IMDUR) 30 MG extended release tablet Take 1 tablet by mouth daily 90 tablet 3    HUMALOG 100 UNIT/ML injection vial INJECT 12 UNITS UNDER THE SKIN 3 TIMES DAILY + SLIDING SCALE (TAKE 5 UNITS IF SUGAR IS OVER 150) (Patient taking differently: Inject into the skin 3 times daily (before meals) ) 20 mL 3    Omega-3 Fatty Acids Psychiatric/Behavioral: Negative for behavioral problems and sleep disturbance. Objective:     Physical Exam  Constitutional:       Appearance: He is well-developed. He is not diaphoretic. Comments: obese   HENT:      Head: Normocephalic and atraumatic. Eyes:      General:         Right eye: No discharge. Left eye: No discharge. Extraocular Movements:      Right eye: Normal extraocular motion. Left eye: Normal extraocular motion. Conjunctiva/sclera: Conjunctivae normal.      Right eye: Right conjunctiva is not injected. Left eye: Left conjunctiva is not injected. Neck:      Musculoskeletal: Normal range of motion and neck supple. No edema or erythema. Thyroid: No thyroid mass or thyromegaly. Vascular: No JVD. Cardiovascular:      Rate and Rhythm: Normal rate and regular rhythm. Heart sounds: No murmur. No friction rub. Pulmonary:      Effort: Pulmonary effort is normal. No tachypnea, bradypnea, accessory muscle usage or respiratory distress. Breath sounds: Normal breath sounds. No wheezing or rales. Abdominal:      General: Bowel sounds are normal. There is no distension. Palpations: Abdomen is soft. Tenderness: There is no tenderness. There is no rebound. Musculoskeletal: Normal range of motion. General: No tenderness. Comments: Left bka     Lymphadenopathy:      Head:      Right side of head: No submental or submandibular adenopathy. Left side of head: No submental or submandibular adenopathy. Cervical: No cervical adenopathy. Skin:     General: Skin is warm. Coloration: Skin is not pale. Findings: No bruising, ecchymosis or rash. Comments: CELLulits   skin mrsa     Neurological:      Mental Status: He is alert and oriented to person, place, and time. Cranial Nerves: No cranial nerve deficit. Sensory: No sensory deficit. Motor: No atrophy or abnormal muscle tone. Coordination: Coordination normal.   Psychiatric:         Mood and Affect: Mood is not anxious. Affect is not angry. Speech: Speech is not slurred. Behavior: Behavior normal. Behavior is not aggressive. Thought Content: Thought content does not include homicidal ideation. Cognition and Memory: Memory is not impaired. /82   Pulse 90   Ht 6' 3.98\" (1.93 m)   Wt 270 lb (122.5 kg)   SpO2 96%   BMI 32.88 kg/m²     Assessment:          Diagnosis Orders   1. Cellulitis of skin     2. PVD (peripheral vascular disease) ([facility]     3. Type 2 diabetes mellitus with diabetic polyneuropathy, with long-term current use of insulin ([facility])  Microalbumin, Ur    Lipid Panel    Hemoglobin A1C    Microalbumin, Ur    Comprehensive Metabolic Panel    CBC Auto Differential    Ambulatory referral to Physical Therapy   4. Diabetic mononeuropathy associated with diabetes mellitus due to underlying condition ([facility]     5. Chronic pain of left knee     6. Charcot's joint of ankle, unspecified laterality     7. Essential hypertension               Plan:      No follow-ups on file.     Orders Placed This Encounter   Procedures    Microalbumin, Ur     Standing Status:   Future     Standing Expiration Date:   1/7/2021    Lipid Panel     Standing Status:   Future     Standing Expiration Date:   4/7/2020     Order Specific Question:   Is Patient Fasting?/# of Hours     Answer:   10 to 12    Hemoglobin A1C     Standing Status:   Future     Standing Expiration Date:   4/7/2020    Microalbumin, Ur     Standing Status:   Future     Standing Expiration Date:   4/7/2020    Comprehensive Metabolic Panel     Standing Status:   Future     Standing Expiration Date:   4/7/2020    CBC Auto Differential     Standing Status:   Future     Standing Expiration Date:   4/7/2020    Ambulatory referral to Physical Therapy     Referral Priority:   Routine     Referral Type:   Physical Medicine     Requested Specialty:

## 2020-01-13 ENCOUNTER — HOSPITAL ENCOUNTER (OUTPATIENT)
Age: 60
Setting detail: SPECIMEN
Discharge: HOME | End: 2020-01-13
Payer: COMMERCIAL

## 2020-01-13 LAB
ABSOLUTE EOS #: 0.22 K/UL (ref 0–0.44)
ABSOLUTE IMMATURE GRANULOCYTE: 0.04 K/UL (ref 0–0.3)
ABSOLUTE LYMPH #: 1.15 K/UL (ref 1.1–3.7)
ABSOLUTE MONO #: 0.69 K/UL (ref 0.1–1.2)
ALBUMIN SERPL-MCNC: 4.4 G/DL (ref 3.5–5.2)
ALBUMIN/GLOBULIN RATIO: 1.1 (ref 1–2.5)
ALP BLD-CCNC: 145 U/L (ref 40–129)
ALT SERPL-CCNC: 28 U/L (ref 5–41)
ANION GAP SERPL CALCULATED.3IONS-SCNC: 16 MMOL/L (ref 9–17)
ANION GAP SERPL CALCULATED.3IONS-SCNC: 16 MMOL/L (ref 9–17)
AST SERPL-CCNC: 39 U/L
BASOPHILS # BLD: 1 % (ref 0–2)
BASOPHILS ABSOLUTE: 0.11 K/UL (ref 0–0.2)
BILIRUB SERPL-MCNC: 0.27 MG/DL (ref 0.3–1.2)
BUN BLDV-MCNC: 19 MG/DL (ref 8–23)
BUN BLDV-MCNC: 19 MG/DL (ref 8–23)
BUN/CREAT BLD: ABNORMAL (ref 9–20)
BUN/CREAT BLD: ABNORMAL (ref 9–20)
CALCIUM SERPL-MCNC: 9.7 MG/DL (ref 8.6–10.4)
CALCIUM SERPL-MCNC: 9.7 MG/DL (ref 8.6–10.4)
CHLORIDE BLD-SCNC: 108 MMOL/L (ref 98–107)
CHLORIDE BLD-SCNC: 108 MMOL/L (ref 98–107)
CHOLESTEROL/HDL RATIO: 2.7
CHOLESTEROL: 139 MG/DL
CO2: 23 MMOL/L (ref 20–31)
CO2: 23 MMOL/L (ref 20–31)
CREAT SERPL-MCNC: 1.25 MG/DL (ref 0.7–1.2)
CREAT SERPL-MCNC: 1.25 MG/DL (ref 0.7–1.2)
CREATININE URINE: 91.6 MG/DL (ref 39–259)
DIFFERENTIAL TYPE: ABNORMAL
EOSINOPHILS RELATIVE PERCENT: 3 % (ref 1–4)
ESTIMATED AVERAGE GLUCOSE: 140 MG/DL
GFR AFRICAN AMERICAN: >60 ML/MIN
GFR AFRICAN AMERICAN: >60 ML/MIN
GFR NON-AFRICAN AMERICAN: 59 ML/MIN
GFR NON-AFRICAN AMERICAN: 59 ML/MIN
GFR SERPL CREATININE-BSD FRML MDRD: ABNORMAL ML/MIN/{1.73_M2}
GLUCOSE BLD-MCNC: 123 MG/DL (ref 70–99)
GLUCOSE BLD-MCNC: 123 MG/DL (ref 70–99)
HBA1C MFR BLD: 6.5 % (ref 4–6)
HCT VFR BLD CALC: 41.1 % (ref 40.7–50.3)
HDLC SERPL-MCNC: 51 MG/DL
HEMOGLOBIN: 13.7 G/DL (ref 13–17)
IMMATURE GRANULOCYTES: 1 %
LDL CHOLESTEROL: 70 MG/DL (ref 0–130)
LYMPHOCYTES # BLD: 14 % (ref 24–43)
MCH RBC QN AUTO: 31.6 PG (ref 25.2–33.5)
MCHC RBC AUTO-ENTMCNC: 33.3 G/DL (ref 28.4–34.8)
MCV RBC AUTO: 94.7 FL (ref 82.6–102.9)
MICROALBUMIN/CREAT 24H UR: 75 MG/L
MICROALBUMIN/CREAT UR-RTO: 82 MCG/MG CREAT
MONOCYTES # BLD: 9 % (ref 3–12)
NRBC AUTOMATED: 0 PER 100 WBC
PDW BLD-RTO: 14.4 % (ref 11.8–14.4)
PLATELET # BLD: 218 K/UL (ref 138–453)
PLATELET ESTIMATE: ABNORMAL
PMV BLD AUTO: 12.6 FL (ref 8.1–13.5)
POTASSIUM SERPL-SCNC: 4.4 MMOL/L (ref 3.7–5.3)
POTASSIUM SERPL-SCNC: 4.4 MMOL/L (ref 3.7–5.3)
RBC # BLD: 4.34 M/UL (ref 4.21–5.77)
RBC # BLD: ABNORMAL 10*6/UL
SEG NEUTROPHILS: 73 % (ref 36–65)
SEGMENTED NEUTROPHILS ABSOLUTE COUNT: 5.87 K/UL (ref 1.5–8.1)
SODIUM BLD-SCNC: 147 MMOL/L (ref 135–144)
SODIUM BLD-SCNC: 147 MMOL/L (ref 135–144)
TOTAL PROTEIN: 8.3 G/DL (ref 6.4–8.3)
TRIGL SERPL-MCNC: 89 MG/DL
VLDLC SERPL CALC-MCNC: NORMAL MG/DL (ref 1–30)
WBC # BLD: 8.1 K/UL (ref 3.5–11.3)
WBC # BLD: ABNORMAL 10*3/UL

## 2020-01-14 ENCOUNTER — OFFICE VISIT (OUTPATIENT)
Dept: PODIATRY | Age: 60
End: 2020-01-14
Payer: COMMERCIAL

## 2020-01-14 VITALS — WEIGHT: 270 LBS | BODY MASS INDEX: 32.88 KG/M2 | HEIGHT: 76 IN

## 2020-01-14 PROCEDURE — 11720 DEBRIDE NAIL 1-5: CPT | Performed by: PODIATRIST

## 2020-01-14 PROCEDURE — 99999 PR OFFICE/OUTPT VISIT,PROCEDURE ONLY: CPT | Performed by: PODIATRIST

## 2020-01-14 RX ORDER — LEVOTHYROXINE SODIUM 0.15 MG/1
TABLET ORAL
COMMUNITY
Start: 2020-01-08 | End: 2020-07-13 | Stop reason: SDUPTHER

## 2020-01-14 NOTE — PROGRESS NOTES
New order placed per Dr Helen Olivia. Registration used previous order yesterday during a blood draw ordered by PCP. New order placed per Dr Helen Olivia, to be completed at the time of CT scan, prior to office visit 4/21/20.

## 2020-01-15 ENCOUNTER — HOSPITAL ENCOUNTER (OUTPATIENT)
Dept: PHYSICAL THERAPY | Age: 60
Setting detail: THERAPIES SERIES
Discharge: HOME | End: 2020-01-15
Payer: COMMERCIAL

## 2020-01-15 PROCEDURE — 97110 THERAPEUTIC EXERCISES: CPT

## 2020-01-15 PROCEDURE — 97161 PT EVAL LOW COMPLEX 20 MIN: CPT

## 2020-01-15 ASSESSMENT — ENCOUNTER SYMPTOMS
DIARRHEA: 0
SHORTNESS OF BREATH: 0
BACK PAIN: 0
COLOR CHANGE: 0
NAUSEA: 0

## 2020-01-15 NOTE — PROGRESS NOTES
SUBJECTIVE: Ha Pizano is a 61 y.o. male who returns to the office with chief complaint of painful fungal toenails. Patient relates toe nails are thickened/difficult to trim as well as painful with ambulation and with shoe gear. Chief Complaint   Patient presents with    Nail Problem     B/L NAIL TRIM    Diabetes     B/L DM FOOT CHECK/BS: 200 (last night)    Other     PCP LAST VISIT 01/08/2020 DR Mirian Goodson     Review of Systems   Constitutional: Negative for activity change, appetite change, chills, diaphoresis, fatigue and fever. Respiratory: Negative for shortness of breath. Cardiovascular: Negative for leg swelling. Gastrointestinal: Negative for diarrhea and nausea. Endocrine: Negative for cold intolerance, heat intolerance and polyuria. Musculoskeletal: Positive for arthralgias and gait problem. Negative for back pain, joint swelling and myalgias. Skin: Negative for color change, pallor, rash and wound. Allergic/Immunologic: Negative for environmental allergies and food allergies. Neurological: Positive for numbness. Negative for dizziness, weakness and light-headedness. Hematological: Does not bruise/bleed easily. Psychiatric/Behavioral: Negative for behavioral problems, confusion and self-injury. The patient is not nervous/anxious. OBJECTIVE: Clinical evaluation of patient reveals nails 1,4,5 of the right foot to present with thickness, elongation, discoloration, brittleness, and subungual debris. There was pain with palpation and debridement of the toenails of the right foot. The right DP pulse is not palpable.    The right PT pulse is not palpable.    Protective sensation is absent to the right plantar foot as noted with a 5.07 [place] monofilament. Glucose: 200 mg/dl. ASSESSMENT:    Diagnosis Orders   1. Onychomycosis of toenail  MT DEBRIDEMENT OF NAIL(S), 1-5   2. Pain of toe of right foot  MT DEBRIDEMENT OF NAIL(S), 1-5   3.  Type 2 diabetes mellitus with peripheral vascular disease (HCC)  ND DEBRIDEMENT OF NAIL(S), 1-5   4. Type 2 diabetes mellitus with diabetic polyneuropathy, with long-term current use of insulin (HCC)  ND DEBRIDEMENT OF NAIL(S), 1-5     PLAN: Toenails 1,4,5 of the right foot were debrided in length and thickness using a nail nipper and a . Return in about 9 weeks (around 3/17/2020) for At risk diabetic foot care.    1/14/2020      Saqib Martines DPM

## 2020-01-15 NOTE — CONSULTS
Location Low back   Pain Rating currently 2/10   Pain at worse 7/10   Pain at best 0/10   Description of pain Shooting, stabbing, sharp   Altered Sensation Numbness and tingling in legs d/t diabetic neuropathy   What makes it worse Standing up   What makes it better --   Symptom progression Gotten better   Sleep Sleeping okay             Objective:    ROM  ° A/P STRENGTH    Left Right Left Right   Hip Flex   4-/5 4/5   Ext       ER       IR       ABD   4/5 4/5   ADD       Knee Flex    5/5   Ext    5/5   Ankle DF       PF       INV       EVER                  Lumbar ROM: Flexion limited 75% with pain    Sidebend limited 50% with pain, L >R    Rotation limited 50% with no pain      OBSERVATION No Deficit Deficit Not Tested Comments   Posture       Forward Head [] [] []    Rounded Shoulders [] [] []    Kyphosis [] [] []    Lordosis [] [] []    Lateral Shift [] [] []    Scoliosis [] [] []    Iliac Crest [] [] []    PSIS [] [] []    ASIS [] [] []    Genu Valgus [] [] []    Genu Varus [] [] []    Genu Recurvatum [] [] []    Pronation [] [] []    Supination [] [] []    Leg Length Discrp [] [] []    Slumped Sitting [] [] []    Palpation [x] [] []    Sensation [] [x] [] Numbness and tingling present, but due to diabetic neuropathy   Edema [] [] [x]    Neurological [] [] [x]        FUNCTION Normal Difficult Unable   Sitting [] [] []   Standing [] [] []   Ambulation [] [] []   Groom/Dress [] [] []   Lift/Carry [] [] []   Stairs [] [] []   Bending [] [x] []   Squat [] [] []   Kneel [] [] []          Flexibility Normal Left tight Right tight   Hip flexor [] [] []   quad [] [] [x]   HS [] [] [x]   piriformis [] [x] [x]   ITB [] [] []   gastroc [] [] [x]   Soleus  [] [] [x]    [] [] []    [] [] []        Assessment:        STG: (to be met in 6 treatments)  1. ? Pain: Decrease pain levels to 3/10 with all activities to ease ability to perform ADLs.   2. ? ROM: Increase pain free AROM of lumbar spine to limited 25% in all motions to improve mechanics with ADLs to avoid compensatory strategies. 3. ? Strength: Pt will demonstrate hip and core strength of 5/5 to improve core stability with ADL completion to decrease need for compensatory techniques. 4. Independent with Home Exercise Programs  5. Demonstrate Knowledge of fall prevention    LTG: (to be met in 12 treatments)  1. Improve score on Modified Oswestry from 62% impaired to <30% impaired to ease ability to complete ADLs. 2. Reduce pain levels to 0/10                   Patient goals: \"No pain\"    Rehab Potential:  [x] Good  [] Fair  [] Poor   Suggested Professional Referral:  [x] No  [] Yes:  Barriers to Goal Achievement[de-identified]  [x] No  [] Yes:  Domestic Concerns:  [x] No  [] Yes:    Pt. Education:  [x] Plans/Goals, Risks/Benefits discussed  [x] Home exercise program    Method of Education: [x] Verbal  [x] Demo  [x] Written  Comprehension of Education:  [x] Verbalizes understanding. [x] Demonstrates understanding. [x] Needs Review. [] Demonstrates/verbalizes understanding of HEP/Ed previously given. Treatment Plan:  [x] Therapeutic Exercise    [] Aquatic Therapy   [x] Manual Therapy     [] Electrical Stimulation  [x] Instruction in HEP      [] Lumbar/Cervical Traction  [] Neuromuscular Re-education [x] Cold/hotpack  [] Iontophoresis: 4 mg/mL  [] Vasocompression (GameReady)                    Dexamethasone Sodium  [] Gait Training             Phosphate 40-80 mAmin         []  Medication allergies reviewed for use of    Dexamethasone Sodium Phosphate 4mg/ml     with iontophoresis treatments. Pt is not allergic.     Frequency:  2 x/week for 12 visits    Todays Treatment:    Exercises:  Exercise  Low Back   Reps/ Time Weight/ Level Comments   Step HS S 3x30\"  R Only   SB S 3x30\"  R Only   Seated towel gastroc S 3x30\"  R Only   Supine trunk rot 5x10\"                       SL Heel hover                                                Other:    Specific Instructions for next treatment: Continue tx per POC, Complete AMIN fall risk     Evaluation Complexity:  History (Personal factors, comorbidities) [] 0 [] 1-2 [x] 3+   Exam (limitations, restrictions) [x] 1-2 [] 3 [] 4+   Clinical presentation (progression) [x] Stable [] Evolving  [] Unstable   Decision Making [x] Low [] Moderate [] High    [x] Low Complexity [] Moderate Complexity [] High Complexity       Treatment Charges: Mins Units   [x] Evaluation       [x]  Low       []  Moderate       []  High 43 1   []  Modalities     [x]  Ther Exercise 12 1   []  Manual Therapy     []  Ther Activities     []  Aquatics     []  Vasocompression     []  Other       TOTAL TREATMENT TIME: 55    Time in: 0535   Time Out: 1268    Electronically signed by: Bailey Longoria PT        Physician Signature:________________________________Date:__________________  By signing above or cosigning this note, I have reviewed this plan of care and certify a need for medically necessary rehabilitation services.      *PLEASE SIGN ABOVE AND FAX BACK ALL PAGES*

## 2020-01-16 RX ORDER — ISOSORBIDE MONONITRATE 30 MG/1
TABLET, EXTENDED RELEASE ORAL
Qty: 90 TABLET | Refills: 3 | Status: SHIPPED | OUTPATIENT
Start: 2020-01-16 | End: 2021-01-28

## 2020-01-17 ENCOUNTER — HOSPITAL ENCOUNTER (OUTPATIENT)
Dept: PHYSICAL THERAPY | Age: 60
Setting detail: THERAPIES SERIES
Discharge: HOME | End: 2020-01-17
Payer: COMMERCIAL

## 2020-01-17 PROCEDURE — 97110 THERAPEUTIC EXERCISES: CPT

## 2020-01-17 NOTE — FLOWSHEET NOTE
[address] [facility]   [address] [facility] [address]   Phone: [phone]   Fax: [phone]    Physical Therapy Daily Treatment Note      Date:  2020  Patient Name:  Andrew Goddard    :  1960  MRN: [id number]  Physician: Attila Crook MD                                         Insurance: Newton Harris (Medicare secondary)  Medical Diagnosis: Type 2 diabetes mellitus with diabetic polyneuropathy, with long-term current use of insulin ([facility] Low back pain                     Rehab Codes: E11.42, Z79.4; M54. 5  Onset date: 2019                            Next 's appt. : 20  Visit# / total visits: 2  Cancels/No Shows: 0/0    Subjective:    Pain:  [x] Yes  [] No Location: LB Pain Rating: (0-10 scale) 4/10  Pain altered Tx:  [x] No  [] Yes  Action:  Comments: Patient reports today feeling well overall. Noted pain is worse in morning. Objective:  Modalities:   Precautions:  Exercises:  Exercise Reps/ Time Weight/ Level Comments   Step HS S 3x30\"     SB S 3x30\"     Seated Towel Gastroc S 3x30\"     Supine Trunk Rotation 5x10\"           PPT 10x5\"     SL Heel Hover 10x     PPT with march 10x     3-way hip 10x2     Mini Squats 10x     Supine Clamshells 10x3 Blue 1 set without band     Other:    Specific Instructions for next treatment:    Treatment Charges: Mins Units   []  Modalities     []  Ther Exercise     []  Manual Therapy     [x]  Ther Activities 45 3   []  Aquatics     []  Neuromuscular     [] Vasocompression     []  Other     Total Treatment time 45 3       Assessment: [x] Progressing toward goals. [] No change. [x] Other: Initiated exercises to increase core and hip strength. Cues provided through standing exercises to improve posture with min improvement demonstrated with cues. Patient noted no pain post exercises.       STG: (to be met in 6 treatments)  1. ? Pain: Decrease pain levels to 3/10 with all activities to ease ability to perform

## 2020-01-20 ENCOUNTER — HOSPITAL ENCOUNTER (OUTPATIENT)
Dept: PHYSICAL THERAPY | Age: 60
Setting detail: THERAPIES SERIES
Discharge: HOME | End: 2020-01-20
Payer: COMMERCIAL

## 2020-01-20 PROCEDURE — 97110 THERAPEUTIC EXERCISES: CPT

## 2020-01-20 NOTE — FLOWSHEET NOTE
[facility]   [address] [facility] [address]   Phone: [phone]   Fax: [phone]    Physical Therapy Daily Treatment Note      Date:  2020  Patient Name:  Edilson Peterson    :  1960  MRN: [id number]  Physician: Cristobal Ramos MD                                         Insurance: Raz Panchal (Medicare secondary)  Medical Diagnosis: Type 2 diabetes mellitus with diabetic polyneuropathy, with long-term current use of insulin ([facility] Low back pain                     Rehab Codes: E11.42, Z79.4; M54. 5  Onset date: 2019                            Next 's appt. : 20  Visit# / total visits: 3/12  Cancels/No Shows: 0/0    Subjective:    Pain:  [x] Yes  [] No Location: LB Pain Rating: (0-10 scale) 4/10  Pain altered Tx:  [x] No  [] Yes  Action:  Comments: Patient has no new complaints this date. States he is overall sore in lower back. Objective:  Modalities:   Precautions:  Exercises:  Exercise Reps/ Time Weight/ Level Comments   Step HS S 3x30\"     SB S 3x30\"     Seated Towel Gastroc S 3x30\"     Supine Trunk Rotation 5x10\"           PPT 10x5\"     SL Heel Hover 10x     PPT with march 10x     3-way hip 10x2     Mini Squats 10x     Supine Clamshells 10x3 Blue 1 set without band     Other:    Specific Instructions for next treatment:    Treatment Charges: Mins Units   []  Modalities     []  Ther Exercise     []  Manual Therapy     [x]  Ther Activities 45 3   []  Aquatics     []  Neuromuscular     [] Vasocompression     []  Other     Total Treatment time 45 3       Assessment: [x] Progressing toward goals. [] No change. [x] Other: Continued with most recent progressions with cueing required for postural awareness during standing exercise. STG: (to be met in 6 treatments)  1. ? Pain: Decrease pain levels to 3/10 with all activities to ease ability to perform ADLs.   2. ? ROM: Increase pain free AROM of lumbar spine to limited 25% in all motions

## 2020-01-21 ENCOUNTER — HOSPITAL ENCOUNTER (OUTPATIENT)
Dept: CT IMAGING | Age: 60
Discharge: HOME | End: 2020-01-23
Payer: COMMERCIAL

## 2020-01-21 PROCEDURE — G0297 LDCT FOR LUNG CA SCREEN: HCPCS

## 2020-01-23 ENCOUNTER — HOSPITAL ENCOUNTER (OUTPATIENT)
Dept: PHYSICAL THERAPY | Age: 60
Setting detail: THERAPIES SERIES
Discharge: HOME | End: 2020-01-23
Payer: COMMERCIAL

## 2020-01-23 PROCEDURE — 97110 THERAPEUTIC EXERCISES: CPT

## 2020-01-23 RX ORDER — INSULIN HUMAN 100 [IU]/ML
INJECTION, SUSPENSION SUBCUTANEOUS
Qty: 30 ML | Refills: 11 | Status: SHIPPED | OUTPATIENT
Start: 2020-01-23 | End: 2020-12-21 | Stop reason: SDUPTHER

## 2020-01-23 NOTE — FLOWSHEET NOTE
509 [facility]   [address]   Phone: [phone]   Fax: [phone]    Physical Therapy Daily Treatment Note      Date:  2020  Patient Name:  Rigoberto Sanford    :  1960  MRN: [id number]  Physician: Sonali Fontanez MD                                         Insurance: [facility] (Medicare secondary)  Medical Diagnosis: Type 2 diabetes mellitus with diabetic polyneuropathy, with long-term current use of insulin ([facility] [address] Low back pain                     Rehab Codes: E11.42, Z79.4; M54. 5  Onset date: 2019                            Next Dr's appt. : 20  Visit# / total visits:   Cancels/No Shows: 0/0    Subjective: Pt reports to PT with no pain today, stating that he is feeling pretty good. Pt reports that he feels that the exercises he has been completing have been helping him thus far. Pain:  [] Yes  [x] No  Location: [facility]  Pain Rating: (0-10 scale) 0/10  Pain altered Tx:  [x] No  [] Yes  Action:  Comments:       Objective:  Modalities:   Precautions:  Exercises:  Exercise Reps/ Time Weight/ Level Comments   Step HS S 3x30\"     SB S 3x30\"     Seated Towel Gastroc S 3x30\"     Supine Trunk Rotation 10x10\"           PPT 10x5\"     SL Heel Hover 2x10     PPT with march 2x10     3-way hip 10x2     Mini Squats 10x     Supine Clamshells 10x3 Blue 1 set without band   Clamshells 2x10       Other:    Specific Instructions for next treatment: Continue tx per POC    Treatment Charges: Mins Units   []  Modalities     []  Ther Exercise     []  Manual Therapy     [x]  Ther Activities 44 3   []  Aquatics     []  Neuromuscular     [] Vasocompression     []  Other     Total Treatment time 44 3       Assessment: [x] Progressing toward goals. [] No change. [x] Other: Continued per log above. Added clamshells this date with no discomfort reported. Increased reps with PPT marches and SL heel hovers with good tolerance.  Pt denies any pain following exercises today. STG: (to be met in 6 treatments)  1. ? Pain: Decrease pain levels to 3/10 with all activities to ease ability to perform ADLs. 2. ? ROM: Increase pain free AROM of lumbar spine to limited 25% in all motions to improve mechanics with ADLs to avoid compensatory strategies. 3. ? Strength: Pt will demonstrate hip and core strength of 5/5 to improve core stability with ADL completion to decrease need for compensatory techniques. 4. Independent with Home Exercise Programs  5. Demonstrate Knowledge of fall prevention     LTG: (to be met in 12 treatments)  1. Improve score on Modified Oswestry from 62% impaired to <30% impaired to ease ability to complete ADLs. 2. Reduce pain levels to 0/10                    Patient goals: \"No pain\"    Pt. Education:  [x] Yes  [] No  [x] Reviewed Prior HEP/Ed  Method of Education: [x] Verbal  [x] Demo  [] Written  Comprehension of Education: Educated patient on importance of posture with exercises to help decrease pain. [x] Verbalizes understanding. [x] Demonstrates understanding. [x] Needs review. [] Demonstrates/verbalizes HEP/Ed previously given. Plan: [x] Continue per plan of care. [] Other:      Time In: 0830            Time Out: 6181    Electronically signed by:   Camille Kimbrough PT

## 2020-01-27 ENCOUNTER — HOSPITAL ENCOUNTER (OUTPATIENT)
Dept: PHYSICAL THERAPY | Age: 60
Setting detail: THERAPIES SERIES
Discharge: HOME | End: 2020-01-27
Payer: COMMERCIAL

## 2020-01-27 PROCEDURE — 97110 THERAPEUTIC EXERCISES: CPT

## 2020-01-27 RX ORDER — BLOOD SUGAR DIAGNOSTIC
STRIP MISCELLANEOUS
Qty: 1 EACH | Refills: 3 | Status: SHIPPED | OUTPATIENT
Start: 2020-01-27 | End: 2021-07-06 | Stop reason: SDUPTHER

## 2020-01-27 NOTE — FLOWSHEET NOTE
509 [facility]   [address]   Phone: [phone]   Fax: [phone]    Physical Therapy Daily Treatment Note      Date:  2020  Patient Name:  Pal Cavanaugh    :  1960  MRN: [id number]  Physician: Rebecca Bower MD                                         Insurance: [facility] (Medicare secondary)  Medical Diagnosis: Type 2 diabetes mellitus with diabetic polyneuropathy, with long-term current use of insulin ([facility] [address] Low back pain                     Rehab Codes: E11.42, Z79.4; M54. 5  Onset date: 2019                            Next 's appt. : 20  Visit# / total visits:   Cancels/No Shows: 0/0    Subjective: Pt reports to therapy with no pain in low back this morning, stating that he is still feeling really good. States that he has been without pain over the last week after starting therapy and completing exercises in HEP. Pain:  [] Yes  [x] No  Location: [facility]  Pain Rating: (0-10 scale) 0/10  Pain altered Tx:  [x] No  [] Yes  Action:  Comments:       Objective:  Modalities:   Precautions:  Exercises:  Exercise Reps/ Time Weight/ Level Comments   Step HS S 3x30\"     SB S 3x30\"     Seated Towel Gastroc S 3x30\"     Supine Trunk Rotation 10x10\"           PPT 10x5\"     SL Heel Hover 2x12     PPT with march 2x10     3-way hip 10x2     Mini Squats 10x     Supine Clamshells 10x3 Blue 1 set without band   Clamshells 2x10     SL hip Abd x10     Other:    Specific Instructions for next treatment: Continue tx per POC    Treatment Charges: Mins Units   []  Modalities     []  Ther Exercise     []  Manual Therapy     [x]  Ther Activities 40 3   []  Aquatics     []  Neuromuscular     [] Vasocompression     []  Other     Total Treatment time 40 3       Assessment: [x] Progressing toward goals. [] No change. [x] Other: Continued per log above. Added SL hip abduction today with good tolerance from pt.  Pt continues to deny pain following

## 2020-01-30 ENCOUNTER — HOSPITAL ENCOUNTER (OUTPATIENT)
Dept: PHYSICAL THERAPY | Age: 60
Setting detail: THERAPIES SERIES
Discharge: HOME | End: 2020-01-30
Payer: COMMERCIAL

## 2020-01-30 PROCEDURE — 97110 THERAPEUTIC EXERCISES: CPT

## 2020-01-30 NOTE — FLOWSHEET NOTE
509 [facility] Out[facility] Therapy   [address]   Phone: [phone]   Fax: [phone]    Physical Therapy Daily Treatment Note/ Discharge Note      Date:  2020  Patient Name:  Rigoberto Sanford    :  1960  MRN: [id number]  Physician: Sonali Fontanez MD                                         Insurance: [facility] (Medicare secondary)  Medical Diagnosis: Type 2 diabetes mellitus with diabetic polyneuropathy, with long-term current use of insulin ([facility] [address] Low back pain                     Rehab Codes: E11.42, Z79.4; M54. 5  Onset date: 2019                            Next Dr's appt. : 20  Visit# / total visits:   Cancels/No Shows: 0/0    Subjective: Pt reports today feeling well overall. Noted that he has been happy with progress since beginning therapy and that he feel that he can proceed on own. Pain:  [] Yes  [x] No  Location: [facility]  Pain Rating: (0-10 scale) 0/10  Pain altered Tx:  [x] No  [] Yes  Action:  Comments:       Objective:  Modalities:   Precautions:  Exercises:  Exercise Reps/ Time Weight/ Level Comments   Step HS S 3x30\"     SB S 3x30\"     Seated Towel Gastroc S      Supine Trunk Rotation            PPT      SL Heel Hover      PPT with march      3-way hip 10x2 Blue    Mini Squats 10x3     Supine Clamshells  Blue 1 set without band   Clamshells 2x10 Blue    SL hip Abd x10     Other:     MMT: 5/5 gross hip strength    Administered Oswestry: Score of 5 or 10% disability    Specific Instructions for next treatment: Continue tx per POC    Treatment Charges: Mins Units   []  Modalities     []  Ther Exercise     []  Manual Therapy     [x]  Ther Activities 40 3   []  Aquatics     []  Neuromuscular     [] Vasocompression     []  Other     Total Treatment time 40 3       Assessment: [x] Progressing toward goals. [] No change. [x] Other: Continued exercises per log above. Progressed 3-way hip to PRE by adding resistance band.  Patient with good tolerance to exercises overall, with no pain post.    - At this time, pt to be discharged from therapy secondary to meeting all goals for PT. Educated pt to continue with exercises at home to maintain progress made in therapy.- SB, PT    All goals assessed by Danette Rdz, PT  STG: (to be met in 6 treatments)  1. ? Pain: Decrease pain levels to 3/10 with all activities to ease ability to perform ADLs. : MET (1/30/20)  2. ? ROM: Increase pain free AROM of lumbar spine to limited 25% in all motions to improve mechanics with ADLs to avoid compensatory strategies.: MET (1/30/20)  3. ? Strength: Pt will demonstrate hip and core strength of 5/5 to improve core stability with ADL completion to decrease need for compensatory techniques.: MET (1/30/20)   4. Independent with Home Exercise Programs: MET (1/30/20)  5. Demonstrate Knowledge of fall prevention: MET (1/30/20)     LTG: (to be met in 12 treatments)  1. Improve score on Modified Oswestry from 62% impaired to <30% impaired to ease ability to complete ADLs. : MET (1/30/20)  2. Reduce pain levels to 0/10: MET (1/30/20)                    Patient goals: \"No pain\"    Pt. Education:  [x] Yes  [] No  [x] Reviewed Prior HEP/Ed  Method of Education: [x] Verbal  [x] Demo  [] Written  Comprehension of Education: Reviewed exercises to perform on own. Patient demonstrated recall of exercises and demonstrated proper technique. Patient denied any questions with HEP   [x] Verbalizes understanding. [x] Demonstrates understanding. [] Needs review. [] Demonstrates/verbalizes HEP/Ed previously given. Plan: [] Continue per plan of care. [x] Other: Patient wishes to be discharged at this time.        Time In: 6409            Time Out: 9:30    Electronically signed by:  Adalid Espinoza PTA

## 2020-02-03 RX ORDER — GLUCOSAMINE HCL/CHONDROITIN SU 500-400 MG
CAPSULE ORAL 4 TIMES DAILY
Qty: 100 STRIP | Refills: 11 | Status: SHIPPED | OUTPATIENT
Start: 2020-02-03 | End: 2020-11-24

## 2020-02-18 ENCOUNTER — OFFICE VISIT (OUTPATIENT)
Dept: INTERNAL MEDICINE CLINIC | Age: 60
End: 2020-02-18
Payer: COMMERCIAL

## 2020-02-18 VITALS
OXYGEN SATURATION: 97 % | SYSTOLIC BLOOD PRESSURE: 132 MMHG | DIASTOLIC BLOOD PRESSURE: 80 MMHG | HEIGHT: 76 IN | HEART RATE: 72 BPM | BODY MASS INDEX: 33.61 KG/M2 | WEIGHT: 276 LBS

## 2020-02-18 PROCEDURE — G8417 CALC BMI ABV UP PARAM F/U: HCPCS | Performed by: INTERNAL MEDICINE

## 2020-02-18 PROCEDURE — 1036F TOBACCO NON-USER: CPT | Performed by: INTERNAL MEDICINE

## 2020-02-18 PROCEDURE — 3044F HG A1C LEVEL LT 7.0%: CPT | Performed by: INTERNAL MEDICINE

## 2020-02-18 PROCEDURE — 99214 OFFICE O/P EST MOD 30 MIN: CPT | Performed by: INTERNAL MEDICINE

## 2020-02-18 PROCEDURE — 2022F DILAT RTA XM EVC RTNOPTHY: CPT | Performed by: INTERNAL MEDICINE

## 2020-02-18 PROCEDURE — G8482 FLU IMMUNIZE ORDER/ADMIN: HCPCS | Performed by: INTERNAL MEDICINE

## 2020-02-18 PROCEDURE — 3017F COLORECTAL CA SCREEN DOC REV: CPT | Performed by: INTERNAL MEDICINE

## 2020-02-18 PROCEDURE — G8427 DOCREV CUR MEDS BY ELIG CLIN: HCPCS | Performed by: INTERNAL MEDICINE

## 2020-02-18 ASSESSMENT — ENCOUNTER SYMPTOMS
BACK PAIN: 1
COUGH: 0
EYE PAIN: 0
ABDOMINAL PAIN: 1
WHEEZING: 0
BLOOD IN STOOL: 0
SHORTNESS OF BREATH: 0
ABDOMINAL DISTENTION: 0
EYE DISCHARGE: 0
DIARRHEA: 1
TROUBLE SWALLOWING: 0
COLOR CHANGE: 0

## 2020-02-18 NOTE — PROGRESS NOTES
[address] [address]  Dept: [phone]  Dept Fax: [phone]     Austin Pinedo is a 61 y.o. male who presents today for his medical conditions/complaintsas noted below. Austin Pinedo is c/o of   Chief Complaint   Patient presents with    Diabetes     a1c on 1/13/20 was 6.5         HPI:      HTN  Onset more than 2 years ago  gustavo mild to mod  Controlled with current po meds  Not associated with headaches or blurry vision  No chest pain  Diabetes   Duration more than 7 years  Modifying factors on Glucophage and other med  Severity uncontrolled sever  Associated signs and symtoms neuropathy/ckd/ CAD. aggravated with sugar diet and better with low sugar diet           Hemoglobin A1C (%)   Date Value   01/13/2020 6.5 (H)   05/01/2019 7.0 (H)   01/15/2019 7.0 (H)             ( goal A1Cis < 7)   Microalb/Crt.  Ratio (mcg/mg creat)   Date Value   01/13/2020 82 (H)     LDL Cholesterol (mg/dL)   Date Value   01/13/2020 70   05/21/2019 68   03/09/2018 56       (goal LDL is <100)   AST (U/L)   Date Value   01/13/2020 39     ALT (U/L)   Date Value   01/13/2020 28     BUN (mg/dL)   Date Value   01/13/2020 19   01/13/2020 19     BP Readings from Last 3 Encounters:   02/18/20 132/80   01/08/20 136/82   01/02/20 124/74          (goal 120/80)    Past Medical History:   Diagnosis Date    Asymptomatic bilateral carotid artery stenosis     Boil, thigh 10/2019    10/30/19: right inner thigh region, says PCP Rxd Doxy for this, area is much better, has a couple days left to complete Doxy    CAD (coronary artery disease)     saw Dr. Julius Abel ([facility]) on Oct. 22/2019 for pre-op clearance, to be cleared pending cardiac echo, not yet schedule for this as of 10/30/19    Charcot foot due to diabetes mellitus ([address] 2014    LEFT    COPD (chronic obstructive pulmonary disease) ([address] 2009    INHALER USE DAILY    Diabetes mellitus ([address] 1989    IDDM, On Insulin    Diabetic neuropathy ([address]     Difficult intravenous access     VEINS ROLL    Employs prosthetic leg     s/p left BKA    Foot ulcer ([address] PRIOR TO 2015    BILAT    Full dentures     UPPER ONLY    Hyperlipidemia 2004    ON RX    Hypertension 2004    ON RX, PCP Dr. Deedee Shaw, seen Oct. 2019    Hypoglycemia 4/2/2015    MRSA (methicillin resistant staph aureus) culture positive 4/16/2015    ankle    OA (osteoarthritis)     Osteomyelitis ([address]     left stump  BKA    Paroxysmal atrial fibrillation ([address]     Renal mass 09/2019    ACCIDENTAL  FINDING IS FOLLOWING UP WITH KIDNEY DR GUTIERREZ [facility] [place]     Suspected sleep apnea     had a sleep study in august, says no results have been sent to PCP yet!     Thyroid disease 2004    PT HAD HYPERTHYROIDISM-UNCONTROLED THYROID DESTROYED WITH RADI IODINE NOW HAS HYPOTHYRIDISM    Vision abnormalities 09/2019    LEFT NO VISION    Vitreous hemorrhage of left eye due to diabetes mellitus ([address] 09/2019    s/p Vitrectomy 9/2019    Wears glasses     Wears partial dentures     upper      Past Surgical History:   Procedure Laterality Date    CARDIAC CATHETERIZATION      no stenting    CARDIOVASCULAR STRESS TEST  06/28/2019    small fixed apical, likely normal, EF 48%    COLONOSCOPY  06/17/2016    poor prep, done up to the IC valve, redundant colon    COLONOSCOPY  01/23/2017    VIRTUAL COLONOSCOPY DONE-no polyps or masses    FINGER AMPUTATION Right 1995    middle-CRUSHING INJURY AT WORK    FOOT SURGERY Right     r-bone removed      PICC [facility] DOUBLE  5/21/2018         KIDNEY SURGERY Right 11/13/2019    XI ROBOTIC PARTIAL NEPHRECTOMY, INTRAOP ULTRASOUND, RIGHT URETEROURETEROSTOMY, RIGHT URETERAL STENT PLACEMENT **SHORT STAY** performed by Jeremías Duncan MD at [address] Left 4/29/15    OTHER SURGICAL HISTORY Left 5-5-15 and 11/2015    Revision BKA    OTHER SURGICAL HISTORY      left stump revision 5/30/2018    PARTIAL NEPHRECTOMY Right 11/13/2019 each 3    HUMULIN N 100 UNIT/ML injection vial INJECT 50 UNITS EVERY MORNING AND 70 UNITS AT BEDTIME 30 mL 11    isosorbide mononitrate (IMDUR) 30 MG extended release tablet TAKE 1 TABLET BY MOUTH EVERY DAY 90 tablet 3    levothyroxine (SYNTHROID) 150 MCG tablet       Handicap Placard MISC by Does not apply route Duration - 5years  Reason- prosthetic leg 1 each 0    losartan (COZAAR) 100 MG tablet TAKE 1 TABLET BY MOUTH EVERY DAY 90 tablet 3    rOPINIRole (REQUIP) 2 MG tablet TAKE 1 TABLET BY MOUTH EVERY DAY 90 tablet 3    gemfibrozil (LOPID) 600 MG tablet TAKE 1 TABLET BY MOUTH EVERY DAY 90 tablet 3    ELIQUIS 5 MG TABS tablet Take 1 tablet by mouth 2 times daily  2    atorvastatin (LIPITOR) 20 MG tablet TAKE 1 TABLET BY MOUTH EVERY DAY 90 tablet 3    tamsulosin (FLOMAX) 0.4 MG capsule TAKE 1 CAPSULE BY MOUTH EVERY DAY 90 capsule 3    propafenone (RYTHMOL) 150 MG tablet Take 1 tablet by mouth every 8 hours 90 tablet 3    albuterol sulfate  (90 Base) MCG/ACT inhaler Inhale 2 puffs into the lungs every 6 hours as needed for Wheezing      pregabalin (LYRICA) 100 MG capsule Take 1 capsule by mouth 3 times daily for 121 days. 90 capsule 3    SYMBICORT 160-4.5 MCG/ACT AERO TAKE 2 PUFFS BY MOUTH TWICE A DAY (Patient taking differently: Inhale 2 puffs into the lungs 2 times daily ) 30.6 Inhaler 3    METOPROLOL TARTRATE PO Take 50 mg by mouth 2 times daily      blood glucose test strips (ASCENSIA AUTODISC VI;ONE TOUCH ULTRA TEST VI) strip 1 each by In Vitro route 4 times daily As needed.  400 each 3    JANUMET  MG per tablet TAKE (1) TABLET TWICE A DAY WITH MEALS (Patient taking differently: Take 1 tablet by mouth 2 times daily (with meals) ) 60 tablet 11    HUMALOG 100 UNIT/ML injection vial INJECT 12 UNITS UNDER THE SKIN 3 TIMES DAILY + SLIDING SCALE (TAKE 5 UNITS IF SUGAR IS OVER 150) (Patient taking differently: Inject into the skin 3 times daily (before meals) ) 20 mL 3    Omega-3 Fatty is alert and oriented to person, place, and time. Cranial Nerves: No cranial nerve deficit. Sensory: No sensory deficit. Motor: No atrophy or abnormal muscle tone. Coordination: Coordination normal.   Psychiatric:         Mood and Affect: Mood is not anxious. Affect is not angry. Speech: Speech is not slurred. Behavior: Behavior normal. Behavior is not aggressive. Thought Content: Thought content does not include homicidal ideation. Cognition and Memory: Memory is not impaired. /80   Pulse 72   Ht 6' 3.98\" (1.93 m)   Wt 276 lb (125.2 kg)   SpO2 97%   BMI 33.61 kg/m²     Assessment:          Diagnosis Orders   1. Type 2 diabetes mellitus with diabetic polyneuropathy, with long-term current use of insulin ([facility] [address]     2. Diabetic mononeuropathy associated with diabetes mellitus due to underlying condition ([facility] [address]     3. Acquired hypothyroidism     4. Essential hypertension     5. Hyperactive bowel sounds     6. Bloating symptom     7. Tinnitus of both ears     8. Impacted cerumen of left ear               Plan:      Return in about 3 months (around 5/18/2020). No orders of the defined types were placed in this encounter. Orders Placed This Encounter   Medications    carbamide peroxide (DEBROX) 6.5 % otic solution     Sig: Place 5 drops in ear(s) 2 times daily     Dispense:  1 Bottle     Refill:  0    blood glucose test strips (ASCENSIA AUTODISC VI;ONE TOUCH ULTRA TEST VI) strip     Sig: Check blood sugar four times a day     Dispense:  100 strip     Refill:  11    LEFT EAR IMPACTED crumen  debox and see nruse for washout  tininits  Needs to see ent  He had seen before  Dm labs rev  conucelled   Bloating and gas  Will do a trial of stopping of jaumet  Ok to restart in two weeks     Patient given educational materials - see patient instructions. Discussed use, benefit, and side effects of prescribed medications. All patientquestions answered.  Pt

## 2020-02-24 RX ORDER — GEMFIBROZIL 600 MG/1
TABLET, FILM COATED ORAL
Qty: 90 TABLET | Refills: 3 | Status: SHIPPED | OUTPATIENT
Start: 2020-02-24 | End: 2021-03-04 | Stop reason: SDUPTHER

## 2020-02-28 NOTE — TELEPHONE ENCOUNTER
Medication: Verio One Touch test strips  Last visit: 2/18/2020  Next visit: 6/9/2020  Last refill: 1/8/2020  Pharmacy: [facility] (pt wants to pickup rx)

## 2020-03-05 RX ORDER — PREGABALIN 100 MG/1
CAPSULE ORAL
Qty: 90 CAPSULE | Refills: 2 | Status: SHIPPED | OUTPATIENT
Start: 2020-03-05 | End: 2020-07-23 | Stop reason: SDUPTHER

## 2020-03-13 RX ORDER — ROPINIROLE 2 MG/1
TABLET, FILM COATED ORAL
Qty: 90 TABLET | Refills: 3 | Status: SHIPPED | OUTPATIENT
Start: 2020-03-13 | End: 2021-04-08 | Stop reason: SDUPTHER

## 2020-03-18 ENCOUNTER — OFFICE VISIT (OUTPATIENT)
Dept: INTERNAL MEDICINE CLINIC | Age: 60
End: 2020-03-18
Payer: COMMERCIAL

## 2020-03-18 ENCOUNTER — TELEPHONE (OUTPATIENT)
Dept: INTERNAL MEDICINE CLINIC | Age: 60
End: 2020-03-18

## 2020-03-18 VITALS
TEMPERATURE: 97.6 F | DIASTOLIC BLOOD PRESSURE: 62 MMHG | OXYGEN SATURATION: 98 % | BODY MASS INDEX: 33.61 KG/M2 | SYSTOLIC BLOOD PRESSURE: 96 MMHG | HEIGHT: 76 IN | HEART RATE: 64 BPM

## 2020-03-18 PROCEDURE — 3017F COLORECTAL CA SCREEN DOC REV: CPT | Performed by: INTERNAL MEDICINE

## 2020-03-18 PROCEDURE — 3044F HG A1C LEVEL LT 7.0%: CPT | Performed by: INTERNAL MEDICINE

## 2020-03-18 PROCEDURE — 99214 OFFICE O/P EST MOD 30 MIN: CPT | Performed by: INTERNAL MEDICINE

## 2020-03-18 PROCEDURE — 2022F DILAT RTA XM EVC RTNOPTHY: CPT | Performed by: INTERNAL MEDICINE

## 2020-03-18 PROCEDURE — 1036F TOBACCO NON-USER: CPT | Performed by: INTERNAL MEDICINE

## 2020-03-18 PROCEDURE — G8482 FLU IMMUNIZE ORDER/ADMIN: HCPCS | Performed by: INTERNAL MEDICINE

## 2020-03-18 PROCEDURE — G8427 DOCREV CUR MEDS BY ELIG CLIN: HCPCS | Performed by: INTERNAL MEDICINE

## 2020-03-18 PROCEDURE — G8417 CALC BMI ABV UP PARAM F/U: HCPCS | Performed by: INTERNAL MEDICINE

## 2020-03-18 RX ORDER — LOSARTAN POTASSIUM 50 MG/1
50 TABLET ORAL DAILY
Qty: 90 TABLET | Refills: 1 | Status: SHIPPED | OUTPATIENT
Start: 2020-03-18 | End: 2020-05-22 | Stop reason: ALTCHOICE

## 2020-03-18 NOTE — TELEPHONE ENCOUNTER
Per Dr. Ivone Malik communication, work note issued and e-mailed to:           [email or web address]. [email or web address]  Cc: [email or web address]

## 2020-03-18 NOTE — PATIENT INSTRUCTIONS
Change you losartan dose from 100mg to 50mg daily. Lower dose pills have been ordered to your pharmacy. Come back in 1 week for BP recheck. Sooner if dizziness persists.

## 2020-03-18 NOTE — PROGRESS NOTES
colonoscopy  01/23/2027    Flu vaccine  Completed    Pneumococcal 0-64 years Vaccine  Completed    Hepatitis C screen  Completed    Hepatitis A vaccine  Aged Out    Hib vaccine  Aged Out    Meningococcal (ACWY) vaccine  Aged Out     SUBJECTIVE:  Erinn Delarosa is a 61 y.o. male patient who  comes for complaints of   Chief Complaint   Patient presents with    Dizziness     sx started today     Hypertension     Pt was told at wound care his BP was elevated, BP was normal today     Fatigue     started today, Pt states his body feel different, He do not have any SOB cough fever chest congestion or body aches        Dizziness  Denies spinning  Feels lightheaded  Duration-few hours  Severity-  Context-h/o A-fib/CAD, type 2 DM, with polyneuropathy- on nsulin 60in am, 70 at night, prandial 12U TID + SSI  H/o LLE amputation BKA      Associated signs and symptoms-  Started when he was standing, then later in the day dizzy even sitting at a desk  Reports weakness in upper body  No new visiion changes  Denies any CP/palpitaions/sweating   sugars have been up and down today    Denies fevers. No recent sinusitis/ear infection/hearing changes    Modifying factors-no changes with neck movement  Worse if he stands up    No recent med changes   Has been eating and drinking ok  deneis any diarrhea/Urinary symptoms      REVIEW OF SYSTEMS (except Subjective (HPI))  GENERAL: No fevers / chills  RESPIRATORY: Negative for cough, wheezing or shortness of breath  CARDIOVASCULAR: Negative for chest pain or palpitations.   GI: no nausea, vomiting, or diarrhea  NEURO: No history of headaches    Past Medical History:   Diagnosis Date    Asymptomatic bilateral carotid artery stenosis     Boil, thigh 10/2019    10/30/19: right inner thigh region, says PCP Rxd Doxy for this, area is much better, has a couple days left to complete Doxy    CAD (coronary artery disease)     saw [facility] Leander Booker ([facility] on Oct. 22/2019 for pre-op clearance, to be cleared pending cardiac echo, not yet schedule for this as of 10/30/19    Charcot foot due to diabetes mellitus ([address] 2014    LEFT    COPD (chronic obstructive pulmonary disease) ([address] 2009    INHALER USE DAILY    Diabetes mellitus ([address] 1989    IDDM, On Insulin    Diabetic neuropathy ([address]     Difficult intravenous access     VEINS ROLL    Employs prosthetic leg     s/p left BKA    Foot ulcer ([address] PRIOR TO 2015    BILAT    Full dentures     UPPER ONLY    Hyperlipidemia 2004    ON RX    Hypertension 2004    ON RX, PCP Dr. Antonino France, seen Oct. 2019    Hypoglycemia 4/2/2015    MRSA (methicillin resistant staph aureus) culture positive 4/16/2015    ankle    OA (osteoarthritis)     Osteomyelitis ([address]     left stump  BKA    Paroxysmal atrial fibrillation ([address]     Renal mass 09/2019    ACCIDENTAL  FINDING IS FOLLOWING UP WITH KIDNEY DR GUTIERREZ [facility] Snores     Suspected sleep apnea     had a sleep study in august, says no results have been sent to PCP yet!     Thyroid disease 2004    PT HAD HYPERTHYROIDISM-UNCONTROLED THYROID DESTROYED WITH RADI IODINE NOW HAS HYPOTHYRIDISM    Vision abnormalities 09/2019    LEFT NO VISION    Vitreous hemorrhage of left eye due to diabetes mellitus ([address] 09/2019    s/p Vitrectomy 9/2019    Wears glasses     Wears partial dentures     upper       SOCIAL HISTORY:  Social History     Socioeconomic History    Marital status:      Spouse name: Bear Mcdermott Number of children: 2    Years of education: Not on file    Highest education level: Not on file   Occupational History    Occupation: Disabled   Social Needs    Financial resource strain: Not on file    Food insecurity     Worry: Not on file     Inability: Not on file   [employer] needs     Medical: Not on file     Non-medical: Not on file   Tobacco Use    Smoking status: Former Smoker     Packs/day: 2.00     Years: 25.00     Pack years: 50.00     Types: Cigars     Start date: 1978     Last attempt to quit: 2011     Years since quittin.8    Smokeless tobacco: Never Used   Substance and Sexual Activity    Alcohol use: Yes     Alcohol/week: 0.0 standard drinks     Frequency: Monthly or less     Comment: BEER 1 A MONTH    Drug use: Not Currently    Sexual activity: Yes     Partners: Female   Lifestyle    Physical activity     Days per week: Not on file     Minutes per session: Not on file    Stress: Not on file   Relationships    Social connections     Talks on phone: Not on file     Gets together: Not on file     Attends Tenriism service: Not on file     Active member of club or organization: Not on file     Attends meetings of clubs or organizations: Not on file     Relationship status: Not on file    Intimate partner violence     Fear of current or ex partner: Not on file     Emotionally abused: Not on file     Physically abused: Not on file     Forced sexual activity: Not on file   Other Topics Concern    Not on file   Social History Narrative    Not on file           CURRENT MEDICATIONS:  Current Outpatient Medications   Medication Sig Dispense Refill    rOPINIRole (REQUIP) 2 MG tablet TAKE 1 TABLET BY MOUTH EVERY DAY 90 tablet 3    pregabalin (LYRICA) 100 MG capsule TAKE 1 CAPSULE BY MOUTH 3 TIMES A DAY 90 capsule 2    blood glucose test strips (ASCENSIA AUTODISC VI;ONE TOUCH ULTRA TEST VI) strip 1 each by In Vitro route 4 times daily As needed.  400 each 3    gemfibrozil (LOPID) 600 MG tablet TAKE 1 TABLET BY MOUTH EVERY DAY 90 tablet 3    carbamide peroxide (DEBROX) 6.5 % otic solution Place 5 drops in ear(s) 2 times daily 1 Bottle 0    blood glucose monitor strips by Other route 4 times daily Test strips to use w/ Accucheck Smart View Glucometer (Patient taking differently: by Other route 4 times daily Test strips to use w/ ONE TOUCH VERIO TEST STRIPS) 100 strip 11    Insulin Syringe-Needle U-100 (ULTICARE INSULIN SYRINGE) 31G X 16\" 1 ML MISC USE AS DIRECTED WITH HUMULIN AND HUMALOG INSULIN 5 TIMES DAILY 1 each 3    HUMULIN N 100 UNIT/ML injection vial INJECT 50 UNITS EVERY MORNING AND 70 UNITS AT BEDTIME 30 mL 11    isosorbide mononitrate (IMDUR) 30 MG extended release tablet TAKE 1 TABLET BY MOUTH EVERY DAY 90 tablet 3    levothyroxine (SYNTHROID) 150 MCG tablet       Handicap Placard MISC by Does not apply route Duration - 5years  Reason- prosthetic leg 1 each 0    losartan (COZAAR) 100 MG tablet TAKE 1 TABLET BY MOUTH EVERY DAY 90 tablet 3    ELIQUIS 5 MG TABS tablet Take 1 tablet by mouth 2 times daily  2    atorvastatin (LIPITOR) 20 MG tablet TAKE 1 TABLET BY MOUTH EVERY DAY 90 tablet 3    tamsulosin (FLOMAX) 0.4 MG capsule TAKE 1 CAPSULE BY MOUTH EVERY DAY 90 capsule 3    propafenone (RYTHMOL) 150 MG tablet Take 1 tablet by mouth every 8 hours 90 tablet 3    albuterol sulfate  (90 Base) MCG/ACT inhaler Inhale 2 puffs into the lungs every 6 hours as needed for Wheezing      SYMBICORT 160-4.5 MCG/ACT AERO TAKE 2 PUFFS BY MOUTH TWICE A DAY (Patient taking differently: Inhale 2 puffs into the lungs 2 times daily ) 30.6 Inhaler 3    METOPROLOL TARTRATE PO Take 50 mg by mouth 2 times daily      JANUMET  MG per tablet TAKE (1) TABLET TWICE A DAY WITH MEALS (Patient taking differently: Take 1 tablet by mouth 2 times daily (with meals) ) 60 tablet 11    HUMALOG 100 UNIT/ML injection vial INJECT 12 UNITS UNDER THE SKIN 3 TIMES DAILY + SLIDING SCALE (TAKE 5 UNITS IF SUGAR IS OVER 150) (Patient taking differently: Inject into the skin 3 times daily (before meals) ) 20 mL 3    Omega-3 Fatty Acids (FISH OIL CONCENTRATE) 300 MG CAPS Take 300 mg by mouth nightly       Glucosamine Sulfate 500 MG TABS Take 500 mg by mouth 3 times daily      aspirin 81 MG tablet Take 81 mg by mouth nightly        No current facility-administered medications for this visit.           OBJECTIVE:  Vitals:    03/18/20 1535   BP: 96/62   Pulse: 64   Temp:    SpO2: 98%     Body mass index is 33.61

## 2020-03-24 ENCOUNTER — TELEPHONE (OUTPATIENT)
Dept: INTERNAL MEDICINE CLINIC | Age: 60
End: 2020-03-24

## 2020-03-25 RX ORDER — BLOOD PRESSURE TEST KIT
1 KIT MISCELLANEOUS WEEKLY
Qty: 1 KIT | Refills: 0 | Status: SHIPPED | OUTPATIENT
Start: 2020-03-25

## 2020-05-11 ENCOUNTER — TELEPHONE (OUTPATIENT)
Dept: INTERNAL MEDICINE CLINIC | Age: 60
End: 2020-05-11

## 2020-05-21 ENCOUNTER — TELEPHONE (OUTPATIENT)
Dept: INTERNAL MEDICINE CLINIC | Age: 60
End: 2020-05-21

## 2020-05-21 ENCOUNTER — HOSPITAL ENCOUNTER (OUTPATIENT)
Age: 60
Discharge: HOME | End: 2020-05-21
Payer: COMMERCIAL

## 2020-05-21 ENCOUNTER — HOSPITAL ENCOUNTER (OUTPATIENT)
Dept: CT IMAGING | Age: 60
Discharge: HOME | End: 2020-05-23
Payer: COMMERCIAL

## 2020-05-21 LAB
ABSOLUTE EOS #: 0.2 K/UL (ref 0–0.4)
ABSOLUTE IMMATURE GRANULOCYTE: ABNORMAL K/UL (ref 0–0.3)
ABSOLUTE LYMPH #: 1.3 K/UL (ref 1–4.8)
ABSOLUTE MONO #: 0.8 K/UL (ref 0.1–1.3)
ALBUMIN SERPL-MCNC: 4.2 G/DL (ref 3.5–5.2)
ALBUMIN/GLOBULIN RATIO: ABNORMAL (ref 1–2.5)
ALP BLD-CCNC: 127 U/L (ref 40–129)
ALT SERPL-CCNC: 19 U/L (ref 5–41)
ANION GAP SERPL CALCULATED.3IONS-SCNC: 12 MMOL/L (ref 9–17)
AST SERPL-CCNC: 24 U/L
BASOPHILS # BLD: 1 % (ref 0–2)
BASOPHILS ABSOLUTE: 0.1 K/UL (ref 0–0.2)
BILIRUB SERPL-MCNC: 0.42 MG/DL (ref 0.3–1.2)
BUN BLDV-MCNC: 33 MG/DL (ref 8–23)
BUN/CREAT BLD: ABNORMAL (ref 9–20)
CALCIUM SERPL-MCNC: 8.9 MG/DL (ref 8.6–10.4)
CHLORIDE BLD-SCNC: 110 MMOL/L (ref 98–107)
CO2: 24 MMOL/L (ref 20–31)
CREAT SERPL-MCNC: 2.21 MG/DL (ref 0.7–1.2)
DIFFERENTIAL TYPE: ABNORMAL
EOSINOPHILS RELATIVE PERCENT: 3 % (ref 0–4)
GFR AFRICAN AMERICAN: 37 ML/MIN
GFR NON-AFRICAN AMERICAN: 31 ML/MIN
GFR SERPL CREATININE-BSD FRML MDRD: ABNORMAL ML/MIN/{1.73_M2}
GFR SERPL CREATININE-BSD FRML MDRD: ABNORMAL ML/MIN/{1.73_M2}
GLUCOSE BLD-MCNC: 103 MG/DL (ref 70–99)
HCT VFR BLD CALC: 39.6 % (ref 41–53)
HEMOGLOBIN: 13.5 G/DL (ref 13.5–17.5)
IMMATURE GRANULOCYTES: ABNORMAL %
LYMPHOCYTES # BLD: 19 % (ref 24–44)
MCH RBC QN AUTO: 30.8 PG (ref 26–34)
MCHC RBC AUTO-ENTMCNC: 34.1 G/DL (ref 31–37)
MCV RBC AUTO: 90.5 FL (ref 80–100)
MONOCYTES # BLD: 11 % (ref 1–7)
NRBC AUTOMATED: ABNORMAL PER 100 WBC
PDW BLD-RTO: 14.9 % (ref 11.5–14.9)
PLATELET # BLD: 174 K/UL (ref 150–450)
PLATELET ESTIMATE: ABNORMAL
PMV BLD AUTO: 9.9 FL (ref 6–12)
POTASSIUM SERPL-SCNC: 4.5 MMOL/L (ref 3.7–5.3)
RBC # BLD: 4.38 M/UL (ref 4.5–5.9)
RBC # BLD: ABNORMAL 10*6/UL
SEG NEUTROPHILS: 66 % (ref 36–66)
SEGMENTED NEUTROPHILS ABSOLUTE COUNT: 4.8 K/UL (ref 1.3–9.1)
SODIUM BLD-SCNC: 146 MMOL/L (ref 135–144)
TOTAL PROTEIN: 7.4 G/DL (ref 6.4–8.3)
TROPONIN INTERP: ABNORMAL
TROPONIN T: ABNORMAL NG/ML
TROPONIN, HIGH SENSITIVITY: 50 NG/L (ref 0–22)
WBC # BLD: 7.2 K/UL (ref 3.5–11)
WBC # BLD: ABNORMAL 10*3/UL

## 2020-05-21 PROCEDURE — 74176 CT ABD & PELVIS W/O CONTRAST: CPT

## 2020-05-21 PROCEDURE — 84484 ASSAY OF TROPONIN QUANT: CPT

## 2020-05-21 PROCEDURE — 80053 COMPREHEN METABOLIC PANEL: CPT

## 2020-05-21 PROCEDURE — 6360000004 HC RX CONTRAST MEDICATION: Performed by: UROLOGY

## 2020-05-21 PROCEDURE — 85025 COMPLETE CBC W/AUTO DIFF WBC: CPT

## 2020-05-21 PROCEDURE — 36415 COLL VENOUS BLD VENIPUNCTURE: CPT

## 2020-05-21 RX ADMIN — IOHEXOL 50 ML: 240 INJECTION, SOLUTION INTRATHECAL; INTRAVASCULAR; INTRAVENOUS; ORAL at 07:45

## 2020-05-22 ENCOUNTER — OFFICE VISIT (OUTPATIENT)
Dept: INTERNAL MEDICINE CLINIC | Age: 60
End: 2020-05-22
Payer: COMMERCIAL

## 2020-05-22 VITALS
DIASTOLIC BLOOD PRESSURE: 70 MMHG | SYSTOLIC BLOOD PRESSURE: 126 MMHG | WEIGHT: 285.2 LBS | TEMPERATURE: 97.4 F | HEIGHT: 76 IN | OXYGEN SATURATION: 96 % | HEART RATE: 64 BPM | BODY MASS INDEX: 34.73 KG/M2

## 2020-05-22 PROCEDURE — 99214 OFFICE O/P EST MOD 30 MIN: CPT | Performed by: PHYSICIAN ASSISTANT

## 2020-05-22 PROCEDURE — G8417 CALC BMI ABV UP PARAM F/U: HCPCS | Performed by: PHYSICIAN ASSISTANT

## 2020-05-22 PROCEDURE — 3017F COLORECTAL CA SCREEN DOC REV: CPT | Performed by: PHYSICIAN ASSISTANT

## 2020-05-22 PROCEDURE — G8427 DOCREV CUR MEDS BY ELIG CLIN: HCPCS | Performed by: PHYSICIAN ASSISTANT

## 2020-05-22 PROCEDURE — 1036F TOBACCO NON-USER: CPT | Performed by: PHYSICIAN ASSISTANT

## 2020-05-22 RX ORDER — ACETAMINOPHEN 325 MG/1
650 TABLET ORAL
COMMUNITY
Start: 2019-11-13 | End: 2020-07-16 | Stop reason: ALTCHOICE

## 2020-05-22 RX ORDER — GINSENG 100 MG
CAPSULE ORAL
COMMUNITY
Start: 2019-11-15 | End: 2020-05-22 | Stop reason: ALTCHOICE

## 2020-05-22 RX ORDER — ONDANSETRON 2 MG/ML
4 INJECTION INTRAMUSCULAR; INTRAVENOUS
COMMUNITY
Start: 2019-11-13 | End: 2020-05-22 | Stop reason: ALTCHOICE

## 2020-05-22 RX ORDER — ALBUTEROL SULFATE 90 UG/1
2 AEROSOL, METERED RESPIRATORY (INHALATION) EVERY 6 HOURS PRN
Qty: 1 INHALER | Refills: 0 | Status: SHIPPED | OUTPATIENT
Start: 2020-05-22 | End: 2020-06-15

## 2020-05-22 RX ORDER — PREGABALIN 100 MG/1
CAPSULE ORAL
COMMUNITY
Start: 2020-05-08 | End: 2020-06-09

## 2020-05-22 RX ORDER — TRAMADOL HYDROCHLORIDE 50 MG/1
50 TABLET ORAL EVERY 6 HOURS PRN
Qty: 20 TABLET | Refills: 0 | Status: SHIPPED | OUTPATIENT
Start: 2020-05-22 | End: 2020-05-27

## 2020-05-22 NOTE — PROGRESS NOTES
bursitis of right elbow    Osteomyelitis of left leg (HCC)    Acquired hypothyroidism    Carotid stenosis, asymptomatic, bilateral    Right renal mass    Metabolic acidosis, normal anion gap (NAG)    Postoperative hypotension    Hypotension due to drugs     Health Maintenance Due   Topic Date Due    HIV screen  01/07/1975    DTaP/Tdap/Td vaccine (1 - Tdap) 01/07/1979    Shingles Vaccine (1 of 2) 10/23/2014    Diabetic retinal exam  01/04/2017    PSA counseling  10/23/2018    TSH testing  05/21/2020     MEDICATIONS:      Current Outpatient Medications   Medication Sig Dispense Refill    glucagon, rDNA, 1 MG injection Inject 1 mL into the muscle      albuterol sulfate  (90 Base) MCG/ACT inhaler Inhale 2 puffs into the lungs every 6 hours as needed for Wheezing or Shortness of Breath 1 Inhaler 0    traMADol (ULTRAM) 50 MG tablet Take 1 tablet by mouth every 6 hours as needed for Pain for up to 5 days. Intended supply: 5 days. Take lowest dose possible to manage pain 20 tablet 0    Blood Pressure KIT 1 actuation by Does not apply route once a week 1 kit 0    rOPINIRole (REQUIP) 2 MG tablet TAKE 1 TABLET BY MOUTH EVERY DAY 90 tablet 3    pregabalin (LYRICA) 100 MG capsule TAKE 1 CAPSULE BY MOUTH 3 TIMES A DAY 90 capsule 2    blood glucose test strips (ASCENSIA AUTODISC VI;ONE TOUCH ULTRA TEST VI) strip 1 each by In Vitro route 4 times daily As needed.  400 each 3    gemfibrozil (LOPID) 600 MG tablet TAKE 1 TABLET BY MOUTH EVERY DAY 90 tablet 3    Insulin Syringe-Needle U-100 (ULTICARE INSULIN SYRINGE) 31G X 5/16\" 1 ML MISC USE AS DIRECTED WITH HUMULIN AND HUMALOG INSULIN 5 TIMES DAILY 1 each 3    HUMULIN N 100 UNIT/ML injection vial INJECT 50 UNITS EVERY MORNING AND 70 UNITS AT BEDTIME 30 mL 11    isosorbide mononitrate (IMDUR) 30 MG extended release tablet TAKE 1 TABLET BY MOUTH EVERY DAY 90 tablet 3    levothyroxine (SYNTHROID) 150 MCG tablet       Handicap Placard MISC by Does not apply murmur  Abdomen - soft, nontender, nondistended, no masses or organomegaly  Back - no flank tenderness bilaterally   Neurological - alert, oriented, normal speech, no focal sensory or motor deficit  Extremities - left bka, peripheral pulses normal, no pedal edema    DIAGNOSTIC IMAGING:      Narrative   EXAMINATION:   CT OF THE ABDOMEN AND PELVIS WITHOUT CONTRAST 5/21/2020 7:39 am       TECHNIQUE:   CT of the abdomen and pelvis was performed without the administration of   intravenous contrast. Multiplanar reformatted images are provided for review.    Dose modulation, iterative reconstruction, and/or weight based adjustment of   the mA/kV was utilized to reduce the radiation dose to as low as reasonably   achievable.       COMPARISON:   27 September 2019       HISTORY:   ORDERING SYSTEM PROVIDED HISTORY: Renal mass   TECHNOLOGIST PROVIDED HISTORY:       Reason for Exam: Pt states had mass removed from right kidney and this is   follow up       FINDINGS:   Lower Chest: Mild bilateral posterior basilar pleural thickening.  No   effusions, significant nodules or areas of focal consolidation are noted.       Organs: Lack of IV contrast limits assessment.  Liver, spleen, gallbladder,   and adrenals are unremarkable.  Pancreas is fatty infiltrated without focal   mass, although stable calcifications are noted in the area of the pancreatic   head.  The left kidney is unremarkable without nephrolithiasis,   hydronephrosis or mass.  Right kidney demonstrates hypodense mass in the   inferior pole left kidney of 4.2 x 5.6 cm with heterogeneous appearance and   some scattered calcifications, some peripheral.  Findings may be related to   complicated cyst although other etiology is not excluded.  Further imaging is   required for best characterization Moderate right hydronephrosis and   hydroureter are noted extending to the right UP junction.  No ureteral   obstructing calculus is noted.  Findings may be related to recently Value Date     05/21/2020    K 4.5 05/21/2020     05/21/2020    CO2 24 05/21/2020    BUN 33 05/21/2020    CREATININE 2.21 05/21/2020    GLUCOSE 103 05/21/2020     HEMOGLOBIN A1C:   Lab Results   Component Value Date    LABA1C 6.5 01/13/2020     FASTING LIPID PANEL:  Lab Results   Component Value Date    CHOL 139 01/13/2020    HDL 51 01/13/2020    TRIG 89 01/13/2020     ASSESSMENT AND PLAN:      1. MARK (acute kidney injury) ([facility]  2. Right renal mass  - Discussed with Dr. Iram Manzano, secondary to renal mass/hydronephrosis, stop Losartan, repeat labs, f/u urologist   -- Urinalysis; Future  -- Basic Metabolic Panel; Future  - Advised to present to hospital for new/worsening symptoms, patient understands and agrees     3. Essential hypertension  - Basic Metabolic Panel; Future    4. Acquired hypothyroidism  - TSH without Reflex; Future    5. Chronic midline low back pain without sciatica  - traMADol (ULTRAM) 50 MG tablet; Take 1 tablet by mouth every 6 hours as needed for Pain for up to 5 days. Intended supply: 5 days. Take lowest dose possible to manage pain  Dispense: 20 tablet; Refill: 0    FOLLOW UP AND INSTRUCTIONS:   Return in about 2 weeks (around 6/5/2020), earlier if needed. Patient educated on signs and symptoms which require more urgent evaluation and treatment, instructed to present to emergency room should these develop, he understands and agrees with plan. Deneen Horan received counseling on the following healthy behaviors: medication adherence    NATAN Garcia [facility]  5/23/2020, 2:16 PM    Please note that this chart was generated using voice recognition Dragon dictation software. Although everyeffort was made to ensure the accuracy of this automated transcription, some errors in transcription may have occurred.

## 2020-05-23 ASSESSMENT — ENCOUNTER SYMPTOMS
SORE THROAT: 0
CONSTIPATION: 0
BLOOD IN STOOL: 0
NAUSEA: 0
EYE DISCHARGE: 0
EYE PAIN: 0
VOMITING: 0
CHEST TIGHTNESS: 0
EYE ITCHING: 0
COUGH: 0
RHINORRHEA: 0
COLOR CHANGE: 0
BACK PAIN: 0
DIARRHEA: 0
ABDOMINAL PAIN: 0
SHORTNESS OF BREATH: 0
SINUS PAIN: 0

## 2020-05-26 ENCOUNTER — OFFICE VISIT (OUTPATIENT)
Dept: PODIATRY | Age: 60
End: 2020-05-26
Payer: COMMERCIAL

## 2020-05-26 VITALS — BODY MASS INDEX: 34.1 KG/M2 | HEIGHT: 76 IN | WEIGHT: 280 LBS

## 2020-05-26 PROCEDURE — 11720 DEBRIDE NAIL 1-5: CPT | Performed by: PODIATRIST

## 2020-05-26 ASSESSMENT — ENCOUNTER SYMPTOMS
NAUSEA: 0
SHORTNESS OF BREATH: 0
DIARRHEA: 0
COLOR CHANGE: 0
BACK PAIN: 0

## 2020-05-26 NOTE — PROGRESS NOTES
pulse   3. [] Advanced trophic changes; three of the following are required:   ·         [] hair growth (decrease or absence)   ·         [] nail changes (thickening)   ·         [] pigmentary changes (discoloration)   ·         [] skin texture (thin, shiny)   ·         [] skin color (rubor or redness)   [x] Q8.      Class C Findings (1 Class B, 2 Class C needed)   1. [] Claudication   2. [] Temperature changes   3. [] Edema   4. [] Paresthesia   5. [] Burning   [] Q9.     ASSESSMENT:    Diagnosis Orders   1. Onychomycosis of toenail  TN DEBRIDEMENT OF NAIL(S), 1-5   2. Pain of toe of right foot  TN DEBRIDEMENT OF NAIL(S), 1-5   3. Type 2 diabetes mellitus with peripheral vascular disease (HCC)  TN DEBRIDEMENT OF NAIL(S), 1-5   4. Type 2 diabetes mellitus with diabetic polyneuropathy, with long-term current use of insulin (HCC)  TN DEBRIDEMENT OF NAIL(S), 1-5     PLAN: Toenails 1,4,5 of the right foot were debrided in length and thickness using a nail nipper and a . Return in about 9 weeks (around 7/28/2020) for At risk diabetic foot care.    5/26/2020      Pal Castro DPM

## 2020-05-28 ENCOUNTER — OFFICE VISIT (OUTPATIENT)
Dept: UROLOGY | Age: 60
End: 2020-05-28
Payer: COMMERCIAL

## 2020-05-28 VITALS — HEIGHT: 76 IN | TEMPERATURE: 98.3 F | BODY MASS INDEX: 34.09 KG/M2 | WEIGHT: 279.98 LBS

## 2020-05-28 PROCEDURE — G8427 DOCREV CUR MEDS BY ELIG CLIN: HCPCS | Performed by: UROLOGY

## 2020-05-28 PROCEDURE — 1036F TOBACCO NON-USER: CPT | Performed by: UROLOGY

## 2020-05-28 PROCEDURE — 99214 OFFICE O/P EST MOD 30 MIN: CPT | Performed by: UROLOGY

## 2020-05-28 PROCEDURE — G8417 CALC BMI ABV UP PARAM F/U: HCPCS | Performed by: UROLOGY

## 2020-05-28 PROCEDURE — 3017F COLORECTAL CA SCREEN DOC REV: CPT | Performed by: UROLOGY

## 2020-05-28 ASSESSMENT — ENCOUNTER SYMPTOMS
COUGH: 0
NAUSEA: 0
WHEEZING: 0
VOMITING: 0
ABDOMINAL PAIN: 0
DIARRHEA: 0
EYE PAIN: 0
SHORTNESS OF BREATH: 0
CONSTIPATION: 0
EYE REDNESS: 0
BACK PAIN: 0

## 2020-05-28 NOTE — PROGRESS NOTES
[facility]  [facility] - [address] [address]  Dept: [address] [facility] Urology Office Note - Established    Patient:  Monie Castillo  YOB: 1960  Date: 5/28/2020    The patient is a 61 y.o. male who presents todayfor evaluation of the following problems:   Chief Complaint   Patient presents with    Results     CT       HPI  Had robo partial neph on right side in past. Now here with surveillance imaging. Has unusual appreance to right kidney with hydro also. Also having flank pain, interittent and 3/10  Summary of old records: N/A    Additional History: N/A    Procedures Today: N/A    Urinalysis today:  No results found for this visit on 05/28/20.   Last several PSA's:  Lab Results   Component Value Date    PSA 4.62 (H) 10/23/2017     Last total testosterone:  No results found for: TESTOSTERONE    AUA Symptom Score (5/28/2020):  INCOMPLETE EMPTYING: How often have you had the sensation of not emptying your bladder?: Not at all  FREQUENCY: How often do you have to urinate less than every two hours?: Less than 1 to 5 times  INTERMITTENCY: How often have you found you stopped and started again several times when you urinated?: Not at all  URGENCY: How often have you found it difficult to postpone urination?: Less than 1 to 5 times  WEAK STREAM: How often have you had a weak urinary stream?: Not at all  STRAINING: How often have you had to strain to start  urination?: Not at all  NOCTURIA: How many times did you typically get up at night to uriniate?: 2 Times  TOTAL I-PSS SCORE[de-identified] 4  How would you feel if you were to spend the rest of your life with your urinary condition?: Mostly Satisfied    Last BUN and creatinine:  Lab Results   Component Value Date    BUN 33 (H) 05/21/2020     Lab Results   Component Value Date    CREATININE 2.21 (H) 05/21/2020       Additional Lab/Culture results: none    Imaging Reviewed during this Office Visit:

## 2020-06-01 ENCOUNTER — TELEPHONE (OUTPATIENT)
Dept: INTERNAL MEDICINE CLINIC | Age: 60
End: 2020-06-01

## 2020-06-01 ENCOUNTER — OFFICE VISIT (OUTPATIENT)
Dept: INTERNAL MEDICINE CLINIC | Age: 60
End: 2020-06-01
Payer: COMMERCIAL

## 2020-06-01 VITALS
WEIGHT: 285 LBS | TEMPERATURE: 96.5 F | DIASTOLIC BLOOD PRESSURE: 68 MMHG | BODY MASS INDEX: 34.7 KG/M2 | OXYGEN SATURATION: 96 % | HEART RATE: 63 BPM | HEIGHT: 76 IN | SYSTOLIC BLOOD PRESSURE: 122 MMHG

## 2020-06-01 PROCEDURE — G8427 DOCREV CUR MEDS BY ELIG CLIN: HCPCS | Performed by: PHYSICIAN ASSISTANT

## 2020-06-01 PROCEDURE — 1036F TOBACCO NON-USER: CPT | Performed by: PHYSICIAN ASSISTANT

## 2020-06-01 PROCEDURE — G8417 CALC BMI ABV UP PARAM F/U: HCPCS | Performed by: PHYSICIAN ASSISTANT

## 2020-06-01 PROCEDURE — 99213 OFFICE O/P EST LOW 20 MIN: CPT | Performed by: PHYSICIAN ASSISTANT

## 2020-06-01 PROCEDURE — 3017F COLORECTAL CA SCREEN DOC REV: CPT | Performed by: PHYSICIAN ASSISTANT

## 2020-06-01 RX ORDER — MUPIROCIN CALCIUM 20 MG/G
CREAM TOPICAL
Qty: 30 G | Refills: 0 | Status: SHIPPED | OUTPATIENT
Start: 2020-06-01 | End: 2020-06-04 | Stop reason: ALTCHOICE

## 2020-06-01 ASSESSMENT — ENCOUNTER SYMPTOMS
CHEST TIGHTNESS: 0
BACK PAIN: 1
VOMITING: 0
SHORTNESS OF BREATH: 0
COUGH: 0
ABDOMINAL PAIN: 0
NAUSEA: 0
WHEEZING: 0

## 2020-06-01 NOTE — PROGRESS NOTES
[address] [address]  Dept: [phone]  Dept Fax: [phone]    OfficeProgress/Follow Up Note  Date of patient's visit: 6/1/2020  Patient's Name:  Erin Huffman YOB: 1960            Patient Care Team:  Rolanda Vogt MD as PCP - Roseanne Alvarez MD as PCP - [facility]  Jasmin Espinal MD as Consulting Physician (Infectious Diseases)    REASON FOR VISIT:  Same day visit    HISTORY OF PRESENT ILLNESS:      Chief Complaint   Patient presents with    Blister     blister the size of a quarter at the bottom of the stump, left leg, no bleeding or seeping     History was obtained from the patient. Erin Huffman is a 61 y.o. male here today for above. Skin wound. Located left leg stump. Present for the past 2-3 days. Described as small blister which opened. He denies redness, warmth or pain. Patient has follow up with wound care clinic this week. No fever/chills. No nausea or vomiting. MEDICATIONS:      Current Outpatient Medications   Medication Sig Dispense Refill    mupirocin (BACTROBAN) 2 % cream Apply 3 times daily. 30 g 0    pregabalin (LYRICA) 100 MG capsule       acetaminophen (TYLENOL) 325 MG tablet Take 650 mg by mouth      Aspirin Buf,CaCarb-MgCarb-MgO, 81 MG TABS Take by mouth      glucagon, rDNA, 1 MG injection Inject 1 mL into the muscle      albuterol sulfate  (90 Base) MCG/ACT inhaler Inhale 2 puffs into the lungs every 6 hours as needed for Wheezing or Shortness of Breath 1 Inhaler 0    Blood Pressure KIT 1 actuation by Does not apply route once a week 1 kit 0    rOPINIRole (REQUIP) 2 MG tablet TAKE 1 TABLET BY MOUTH EVERY DAY 90 tablet 3    blood glucose test strips (ASCENSIA AUTODISC VI;ONE TOUCH ULTRA TEST VI) strip 1 each by In Vitro route 4 times daily As needed.  400 each 3    gemfibrozil (LOPID) 600 MG tablet TAKE 1 TABLET BY MOUTH EVERY DAY 90 tablet 3

## 2020-06-03 ENCOUNTER — TELEPHONE (OUTPATIENT)
Dept: INTERNAL MEDICINE CLINIC | Age: 60
End: 2020-06-03

## 2020-06-03 ENCOUNTER — FOLLOWUP TELEPHONE ENCOUNTER (OUTPATIENT)
Dept: WOUND CARE | Age: 60
End: 2020-06-03

## 2020-06-03 NOTE — TELEPHONE ENCOUNTER
Old humalog may not work  See if we hve any short actiing insulin  humalog or novolog  Give him if we have

## 2020-06-03 NOTE — TELEPHONE ENCOUNTER
Pt called & stated that he has been using humalog samples that he got from his previous pcp before Dr. Arielle Hayes that have . Pt wants to know if the  humalog may posslibly not be effective. Pt's bs was 190 @ 8 am this morning before breakfast and medications. Pt chris'd his bs @ 12:15 pm and his bs is 276 w/ no lunch. Pt has taken 60 units of humalin and 12 units of humalog and then used a sliding scale & took a few extra units of humalog     Please advise.

## 2020-06-04 ENCOUNTER — HOSPITAL ENCOUNTER (OUTPATIENT)
Dept: WOUND CARE | Age: 60
Discharge: HOME | End: 2020-06-04
Payer: COMMERCIAL

## 2020-06-04 VITALS
DIASTOLIC BLOOD PRESSURE: 68 MMHG | HEART RATE: 71 BPM | SYSTOLIC BLOOD PRESSURE: 127 MMHG | TEMPERATURE: 97 F | RESPIRATION RATE: 20 BRPM

## 2020-06-04 PROCEDURE — 99202 OFFICE O/P NEW SF 15 MIN: CPT | Performed by: SURGERY

## 2020-06-04 PROCEDURE — 99211 OFF/OP EST MAY X REQ PHY/QHP: CPT

## 2020-06-04 NOTE — TELEPHONE ENCOUNTER
Medication: Humalog  Last visit: 6/1/2020  Next visit: 6/9/2020  Last refill: 8/6/2018  Pharmacy: [facility]

## 2020-06-04 NOTE — TELEPHONE ENCOUNTER
Patient called back and informed that we could give samples. Mr James Greene informed to just put in a refill request on the Humalog.  Please review dosing in pended refill request

## 2020-06-04 NOTE — PROGRESS NOTES
Judy Perry 37   Progress Note and Procedure Note      [facility] RECORD NUMBER:  [id number]  AGE: 61 y.o. GENDER: male  : 1960  EPISODE DATE:  2020    Subjective:     Chief Complaint   Patient presents with    Wound Check     Left stump         HISTORY of PRESENT ILLNESS HPI     Taylor Knox is a 61 y.o. male who presents today for wound/ulcer evaluation. History of Wound Context: Patient seen today for reported skin tear of a left BKA site. Approximately 4 days ago after taking off his prosthetic he noticed an small skin defect of his left stump. He denies any drainage or bleeding. He has had previous revisions.           PAST MEDICAL HISTORY        Diagnosis Date    Asymptomatic bilateral carotid artery stenosis     Boil, thigh 10/2019    10/30/19: right inner thigh region, says PCP Rxd Doxy for this, area is much better, has a couple days left to complete Doxy    CAD (coronary artery disease)     saw Dr. Elizabeth Lam ([facility]) on Oct.  for pre-op clearance, to be cleared pending cardiac echo, not yet schedule for this as of 10/30/19    Charcot foot due to diabetes mellitus ([address]     LEFT    COPD (chronic obstructive pulmonary disease) ([address]     INHALER USE DAILY    Diabetes mellitus ([address]     IDDM, On Insulin    Diabetic neuropathy ([address]     Difficult intravenous access     VEINS ROLL    Employs prosthetic leg     s/p left BKA    Foot ulcer ([address] PRIOR TO     BILAT    Full dentures     UPPER ONLY    Hyperlipidemia 2004    ON RX    Hypertension     ON RX, PCP Dr. Heladio Hess, seen Oct. 2019    Hypoglycemia 2015    MRSA (methicillin resistant staph aureus) culture positive 2015    ankle    OA (osteoarthritis)     Osteomyelitis ([address]     left stump  BKA    Paroxysmal atrial fibrillation ([address]     Renal mass 2019    ACCIDENTAL  FINDING IS FOLLOWING UP WITH KIDNEY DR Berna Ledezma     Suspected sleep apnea     had a sleep study Syringe-Needle U-100 ([employer] INSULIN SYRINGE) 31G X 5/16\" 1 ML MISC USE AS DIRECTED WITH HUMULIN AND HUMALOG INSULIN 5 TIMES DAILY 1 each 3    HUMULIN N 100 UNIT/ML injection vial INJECT 50 UNITS EVERY MORNING AND 70 UNITS AT BEDTIME 30 mL 11    isosorbide mononitrate (IMDUR) 30 MG extended release tablet TAKE 1 TABLET BY MOUTH EVERY DAY 90 tablet 3    levothyroxine (SYNTHROID) 150 MCG tablet       Handicap Placard MISC by Does not apply route Duration - 5years  Reason- prosthetic leg 1 each 0    ELIQUIS 5 MG TABS tablet Take 1 tablet by mouth 2 times daily  2    atorvastatin (LIPITOR) 20 MG tablet TAKE 1 TABLET BY MOUTH EVERY DAY 90 tablet 3    tamsulosin (FLOMAX) 0.4 MG capsule TAKE 1 CAPSULE BY MOUTH EVERY DAY 90 capsule 3    propafenone (RYTHMOL) 150 MG tablet Take 1 tablet by mouth every 8 hours 90 tablet 3    SYMBICORT 160-4.5 MCG/ACT AERO TAKE 2 PUFFS BY MOUTH TWICE A DAY (Patient taking differently: Inhale 2 puffs into the lungs 2 times daily ) 30.6 Inhaler 3    METOPROLOL TARTRATE PO Take 50 mg by mouth 2 times daily      HUMALOG 100 UNIT/ML injection vial INJECT 12 UNITS UNDER THE SKIN 3 TIMES DAILY + SLIDING SCALE (TAKE 5 UNITS IF SUGAR IS OVER 150) (Patient taking differently: Inject into the skin 3 times daily (before meals) ) 20 mL 3    Omega-3 Fatty Acids (FISH OIL CONCENTRATE) 300 MG CAPS Take 300 mg by mouth nightly       Glucosamine Sulfate 500 MG TABS Take 500 mg by mouth 3 times daily       No current facility-administered medications on file prior to encounter. REVIEW OF SYSTEMS    Pertinent items are noted in HPI.     Objective:      /68   Pulse 71   Temp 97 °F (36.1 °C) (Tympanic)   Resp 20     Wt Readings from Last 3 Encounters:   06/01/20 285 lb (129.3 kg)   05/28/20 279 lb 15.8 oz (127 kg)   05/26/20 280 lb (127 kg)       PHYSICAL EXAM    General Appearance: alert and oriented to person, place and time, well-developed and well-nourished, in no acute Dressing Orders: No dressing needed. Keep prosthetic off for a few days to allow to fully heal and make appointment to have prosthesis checked out     Frequency:       Additional Orders: Increase protein to diet (meat, cheese, eggs, fish, peanut butter, nuts and beans)  Multivitamin daily    MY CHART []     Smart Device  []     HYPERBARIC TREATMENT-                TREATMENT #     Your next appointment with the [address] [place] [address] is as needed                                                                                                   ROOM TYPE   [] CHAIR     [] BED   [] EITHER        TIME [] 45 MIN     [] 60 MIN     (Please note your next appointment above and if you are unable to keep, kindly give a 24 hour notice. Thank you.)     If you experience any of the following, please call the [address] [place] [address] during business hours:  [phone]     * Increase in Pain  * Temperature over 101  * Increase in drainage from your wound  * Drainage with a foul odor  * Bleeding  * Increase in swelling  * Need for compression bandage changes due to slippage, breakthrough drainage. If you need medical attention outside of the business hours of the [address] [place] [address] please contact your PCP or go to the nearest emergency room. The information contained in the After Visit Summary has been reviewed with me, the patient and/or responsible adult, by my health care provider(s). I had the opportunity to ask questions regarding this information.  I have elected to receive;      []After Visit Summary  [x]Comprehensive Discharge Instruction      Patient signature______________________________________Date:________  Electronically signed by Marley Villalobos RN on 6/4/2020 at 8:57 AM              Electronically signed by Martina Cruz MD on 6/4/2020 at 8:59 AM

## 2020-06-08 ENCOUNTER — TELEPHONE (OUTPATIENT)
Dept: INTERNAL MEDICINE CLINIC | Age: 60
End: 2020-06-08

## 2020-06-08 ENCOUNTER — HOSPITAL ENCOUNTER (OUTPATIENT)
Dept: MRI IMAGING | Facility: CLINIC | Age: 60
Discharge: HOME | End: 2020-06-10
Payer: COMMERCIAL

## 2020-06-08 ENCOUNTER — HOSPITAL ENCOUNTER (OUTPATIENT)
Age: 60
Setting detail: SPECIMEN
Discharge: HOME | End: 2020-06-08
Payer: COMMERCIAL

## 2020-06-08 LAB
-: NORMAL
AMORPHOUS: NORMAL
ANION GAP SERPL CALCULATED.3IONS-SCNC: 20 MMOL/L (ref 9–17)
BACTERIA: NORMAL
BILIRUBIN URINE: NEGATIVE
BUN BLDV-MCNC: 29 MG/DL (ref 8–23)
BUN/CREAT BLD: ABNORMAL (ref 9–20)
CALCIUM SERPL-MCNC: 9.1 MG/DL (ref 8.6–10.4)
CASTS UA: NORMAL /LPF (ref 0–8)
CHLORIDE BLD-SCNC: 108 MMOL/L (ref 98–107)
CO2: 21 MMOL/L (ref 20–31)
COLOR: YELLOW
COMMENT UA: ABNORMAL
CREAT SERPL-MCNC: 2.18 MG/DL (ref 0.7–1.2)
CRYSTALS, UA: NORMAL /HPF
EPITHELIAL CELLS UA: NORMAL /HPF (ref 0–5)
GFR AFRICAN AMERICAN: 38 ML/MIN
GFR NON-AFRICAN AMERICAN: 31 ML/MIN
GFR SERPL CREATININE-BSD FRML MDRD: ABNORMAL ML/MIN/{1.73_M2}
GFR SERPL CREATININE-BSD FRML MDRD: ABNORMAL ML/MIN/{1.73_M2}
GLUCOSE BLD-MCNC: 77 MG/DL (ref 70–99)
GLUCOSE URINE: NEGATIVE
KETONES, URINE: NEGATIVE
LEUKOCYTE ESTERASE, URINE: NEGATIVE
MUCUS: NORMAL
NITRITE, URINE: NEGATIVE
OTHER OBSERVATIONS UA: NORMAL
PH UA: 5 (ref 5–8)
POTASSIUM SERPL-SCNC: 4 MMOL/L (ref 3.7–5.3)
PROTEIN UA: ABNORMAL
RBC UA: NORMAL /HPF (ref 0–4)
RENAL EPITHELIAL, UA: NORMAL /HPF
SODIUM BLD-SCNC: 149 MMOL/L (ref 135–144)
SPECIFIC GRAVITY UA: 1.02 (ref 1–1.03)
TRICHOMONAS: NORMAL
TSH SERPL DL<=0.05 MIU/L-ACNC: 33.76 MIU/L (ref 0.3–5)
TURBIDITY: CLEAR
URINE HGB: NEGATIVE
UROBILINOGEN, URINE: NORMAL
WBC UA: NORMAL /HPF (ref 0–5)
YEAST: NORMAL

## 2020-06-08 PROCEDURE — 74181 MRI ABDOMEN W/O CONTRAST: CPT

## 2020-06-08 NOTE — TELEPHONE ENCOUNTER
Pt would like to ask you a question in reguards to recent  Urinalysis? Pt wanted to speak with you personally about this.         [phone]

## 2020-06-09 ENCOUNTER — VIRTUAL VISIT (OUTPATIENT)
Dept: INTERNAL MEDICINE CLINIC | Age: 60
End: 2020-06-09
Payer: COMMERCIAL

## 2020-06-09 PROCEDURE — 99214 OFFICE O/P EST MOD 30 MIN: CPT | Performed by: INTERNAL MEDICINE

## 2020-06-09 PROCEDURE — 2022F DILAT RTA XM EVC RTNOPTHY: CPT | Performed by: INTERNAL MEDICINE

## 2020-06-09 PROCEDURE — G8427 DOCREV CUR MEDS BY ELIG CLIN: HCPCS | Performed by: INTERNAL MEDICINE

## 2020-06-09 PROCEDURE — 3017F COLORECTAL CA SCREEN DOC REV: CPT | Performed by: INTERNAL MEDICINE

## 2020-06-09 PROCEDURE — 3044F HG A1C LEVEL LT 7.0%: CPT | Performed by: INTERNAL MEDICINE

## 2020-06-09 RX ORDER — PREGABALIN 100 MG/1
CAPSULE ORAL
Qty: 90 CAPSULE | Refills: 0 | Status: SHIPPED | OUTPATIENT
Start: 2020-06-09 | End: 2020-07-09

## 2020-06-09 ASSESSMENT — ENCOUNTER SYMPTOMS
COLOR CHANGE: 0
DIARRHEA: 0
TROUBLE SWALLOWING: 0
SHORTNESS OF BREATH: 0
COUGH: 0
EYE PAIN: 0
WHEEZING: 0
ABDOMINAL DISTENTION: 0
EYE DISCHARGE: 0
BLOOD IN STOOL: 0

## 2020-06-09 NOTE — PROGRESS NOTES
[address] [facility] [address]  [place] [address]  Dept: [phone]  Dept Fax: [phone]     Nicholas Willis is a 61 y.o. male who presents today for his medical conditions/complaintsas noted below. Nicholas Willis is c/o of   Chief Complaint   Patient presents with    Results     labs done yesterday          HPI:      HTN  Onset more than 2 years ago  gustavo mild to mod  Controlled with current po meds  Not associated with headaches or blurry vision  No chest pain  Diabetes   Duration more than 7 years  Modifying factors on insulin and other med  Severity uncontrolled sever  Associated signs and symtoms neuropathy/ckd/ CAD. aggravated with sugar diet and better with low sugar diet       I called and talked to pt  Change his 11 am apt to phone visit  He dont have smart phone      Hemoglobin A1C (%)   Date Value   01/13/2020 6.5 (H)   05/01/2019 7.0 (H)   01/15/2019 7.0 (H)             ( goal A1Cis < 7)   Microalb/Crt.  Ratio (mcg/mg creat)   Date Value   01/13/2020 82 (H)     LDL Cholesterol (mg/dL)   Date Value   01/13/2020 70   05/21/2019 68   03/09/2018 56       (goal LDL is <100)   AST (U/L)   Date Value   05/21/2020 24     ALT (U/L)   Date Value   05/21/2020 19     BUN (mg/dL)   Date Value   06/08/2020 29 (H)     BP Readings from Last 3 Encounters:   06/04/20 127/68   06/01/20 122/68   05/22/20 126/70          (goal 120/80)    Past Medical History:   Diagnosis Date    Asymptomatic bilateral carotid artery stenosis     Boil, thigh 10/2019    10/30/19: right inner thigh region, says PCP Rxd Doxy for this, area is much better, has a couple days left to complete Doxy    CAD (coronary artery disease)     saw Dr. Ermelinda Calloway ([facility]) on Oct. 22/2019 for pre-op clearance, to be cleared pending cardiac echo, not yet schedule for this as of 10/30/19    Charcot foot due to diabetes mellitus ([address] 2014    LEFT    COPD (chronic obstructive pulmonary disease) ([address] 2009    INHALER USE DAILY

## 2020-06-11 ENCOUNTER — OFFICE VISIT (OUTPATIENT)
Dept: UROLOGY | Age: 60
End: 2020-06-11
Payer: COMMERCIAL

## 2020-06-11 VITALS — HEIGHT: 76 IN | WEIGHT: 280 LBS | TEMPERATURE: 97.9 F | BODY MASS INDEX: 34.1 KG/M2

## 2020-06-11 PROCEDURE — 3017F COLORECTAL CA SCREEN DOC REV: CPT | Performed by: UROLOGY

## 2020-06-11 PROCEDURE — 1036F TOBACCO NON-USER: CPT | Performed by: UROLOGY

## 2020-06-11 PROCEDURE — 99214 OFFICE O/P EST MOD 30 MIN: CPT | Performed by: UROLOGY

## 2020-06-11 PROCEDURE — G8417 CALC BMI ABV UP PARAM F/U: HCPCS | Performed by: UROLOGY

## 2020-06-11 PROCEDURE — G8427 DOCREV CUR MEDS BY ELIG CLIN: HCPCS | Performed by: UROLOGY

## 2020-06-11 ASSESSMENT — ENCOUNTER SYMPTOMS
NAUSEA: 0
EYE PAIN: 0
VOMITING: 0
ABDOMINAL PAIN: 0
BACK PAIN: 0
EYE REDNESS: 0
COUGH: 0
WHEEZING: 0
COLOR CHANGE: 0
SHORTNESS OF BREATH: 0

## 2020-06-11 NOTE — PROGRESS NOTES
Review of Systems   Constitutional: Negative for appetite change, chills and fever. Eyes: Negative for pain, redness and visual disturbance. Respiratory: Negative for cough, shortness of breath and wheezing. Cardiovascular: Negative for chest pain and leg swelling. Gastrointestinal: Negative for abdominal pain, nausea and vomiting. Genitourinary: Positive for flank pain, frequency (depends on sugars ) and urgency. Negative for difficulty urinating, discharge, dysuria, hematuria, scrotal swelling and testicular pain. Musculoskeletal: Negative for back pain, joint swelling and myalgias. Skin: Negative for color change, rash and wound. Neurological: Negative for dizziness, tremors and numbness. Hematological: Negative for adenopathy. Does not bruise/bleed easily.
EVERY DAY 90 capsule 3    propafenone (RYTHMOL) 150 MG tablet Take 1 tablet by mouth every 8 hours 90 tablet 3    SYMBICORT 160-4.5 MCG/ACT AERO TAKE 2 PUFFS BY MOUTH TWICE A DAY (Patient taking differently: Inhale 2 puffs into the lungs 2 times daily ) 30.6 Inhaler 3    METOPROLOL TARTRATE PO Take 50 mg by mouth 2 times daily      Omega-3 Fatty Acids (FISH OIL CONCENTRATE) 300 MG CAPS Take 300 mg by mouth nightly       Glucosamine Sulfate 500 MG TABS Take 500 mg by mouth 3 times daily         Patient has no known allergies. Social History     Tobacco Use   Smoking Status Former Smoker    Packs/day: 2.00    Years: 25.00    Pack years: 50.00    Types: Cigars    Start date: 1    Last attempt to quit: 2011    Years since quittin.0   Smokeless Tobacco Never Used   Tobacco Comment    quit in      (Ifpatient a smoker, smoking cessation counseling offered)    Social History     Substance and Sexual Activity   Alcohol Use Yes    Alcohol/week: 0.0 standard drinks    Frequency: Monthly or less    Comment: BEER 1 A MONTH       REVIEW OF SYSTEMS:  Review of Systems    Physical Exam:      Vitals:    20 1000   Temp: 97.9 °F (36.6 °C)     Body mass index is 34.08 kg/m². Patient is a 61 y.o. male in no acute distress and alert and oriented to person, place and time. Physical Exam  Constitutional: Patient in no acute distress. Neuro: Alert and oriented to person, place and time. Psych: Mood normal, affect normal  Skin: No rash noted  HEENT: Head: Normocephalic andatraumatic  Conjunctivae and EOM are normal. Pupils are equal, round  Nose:Normal  Right External Ear: Normal; Left External Ear: Normal  Mouth: Mucosa Moist  Neck: Supple  Lungs: Respiratory effort is normal  Cardiovascular: Warm & Pink  Abdomen: Soft, non-tender, non-distended with no CVA,  No flank tenderness,  Or hepatosplenomegaly   Lymphatics: No palpablelymphadenopathy. Assessment and Plan      1. Renal mass    2.

## 2020-06-12 ENCOUNTER — HOSPITAL ENCOUNTER (OUTPATIENT)
Dept: PREADMISSION TESTING | Age: 60
Setting detail: SPECIMEN
Discharge: HOME | End: 2020-06-16
Payer: COMMERCIAL

## 2020-06-12 ENCOUNTER — TELEPHONE (OUTPATIENT)
Dept: UROLOGY | Age: 60
End: 2020-06-12

## 2020-06-12 PROCEDURE — U0004 COV-19 TEST NON-CDC HGH THRU: HCPCS

## 2020-06-13 LAB
SARS-COV-2, PCR: NOT DETECTED
SARS-COV-2, RAPID: NORMAL
SARS-COV-2: NORMAL
SOURCE: NORMAL

## 2020-06-14 ENCOUNTER — TELEPHONE (OUTPATIENT)
Dept: PRIMARY CARE CLINIC | Age: 60
End: 2020-06-14

## 2020-06-15 RX ORDER — ALBUTEROL SULFATE 90 UG/1
2 AEROSOL, METERED RESPIRATORY (INHALATION) EVERY 6 HOURS PRN
Qty: 6.7 INHALER | Refills: 0 | Status: SHIPPED | OUTPATIENT
Start: 2020-06-15 | End: 2020-07-08

## 2020-06-16 ENCOUNTER — TELEPHONE (OUTPATIENT)
Dept: UROLOGY | Age: 60
End: 2020-06-16

## 2020-06-16 ENCOUNTER — APPOINTMENT (OUTPATIENT)
Dept: GENERAL RADIOLOGY | Age: 60
End: 2020-06-16
Attending: UROLOGY
Payer: COMMERCIAL

## 2020-06-16 ENCOUNTER — ANESTHESIA EVENT (OUTPATIENT)
Dept: OPERATING ROOM | Age: 60
End: 2020-06-16
Payer: COMMERCIAL

## 2020-06-16 ENCOUNTER — ANESTHESIA (OUTPATIENT)
Dept: OPERATING ROOM | Age: 60
End: 2020-06-16
Payer: COMMERCIAL

## 2020-06-16 ENCOUNTER — HOSPITAL ENCOUNTER (OUTPATIENT)
Age: 60
Setting detail: OUTPATIENT SURGERY
Discharge: HOME | End: 2020-06-16
Attending: UROLOGY | Admitting: UROLOGY
Payer: COMMERCIAL

## 2020-06-16 VITALS
SYSTOLIC BLOOD PRESSURE: 116 MMHG | DIASTOLIC BLOOD PRESSURE: 61 MMHG | OXYGEN SATURATION: 94 % | TEMPERATURE: 96.4 F | RESPIRATION RATE: 2 BRPM

## 2020-06-16 VITALS
HEIGHT: 76 IN | BODY MASS INDEX: 34.1 KG/M2 | WEIGHT: 280 LBS | RESPIRATION RATE: 13 BRPM | HEART RATE: 67 BPM | DIASTOLIC BLOOD PRESSURE: 71 MMHG | TEMPERATURE: 97.3 F | SYSTOLIC BLOOD PRESSURE: 133 MMHG | OXYGEN SATURATION: 93 %

## 2020-06-16 LAB
GLUCOSE BLD-MCNC: 104 MG/DL (ref 75–110)
GLUCOSE BLD-MCNC: 120 MG/DL (ref 75–110)

## 2020-06-16 PROCEDURE — 3600000012 HC SURGERY LEVEL 2 ADDTL 15MIN: Performed by: UROLOGY

## 2020-06-16 PROCEDURE — C1758 CATHETER, URETERAL: HCPCS | Performed by: UROLOGY

## 2020-06-16 PROCEDURE — 2709999900 HC NON-CHARGEABLE SUPPLY: Performed by: UROLOGY

## 2020-06-16 PROCEDURE — 2500000003 HC RX 250 WO HCPCS: Performed by: NURSE ANESTHETIST, CERTIFIED REGISTERED

## 2020-06-16 PROCEDURE — 3600000002 HC SURGERY LEVEL 2 BASE: Performed by: UROLOGY

## 2020-06-16 PROCEDURE — 74420 UROGRAPHY RTRGR +-KUB: CPT

## 2020-06-16 PROCEDURE — 2580000003 HC RX 258: Performed by: ANESTHESIOLOGY

## 2020-06-16 PROCEDURE — 76000 FLUOROSCOPY <1 HR PHYS/QHP: CPT

## 2020-06-16 PROCEDURE — 7100000000 HC PACU RECOVERY - FIRST 15 MIN: Performed by: UROLOGY

## 2020-06-16 PROCEDURE — 7100000031 HC ASPR PHASE II RECOVERY - ADDTL 15 MIN: Performed by: UROLOGY

## 2020-06-16 PROCEDURE — 7100000001 HC PACU RECOVERY - ADDTL 15 MIN: Performed by: UROLOGY

## 2020-06-16 PROCEDURE — 7100000030 HC ASPR PHASE II RECOVERY - FIRST 15 MIN: Performed by: UROLOGY

## 2020-06-16 PROCEDURE — 3700000000 HC ANESTHESIA ATTENDED CARE: Performed by: UROLOGY

## 2020-06-16 PROCEDURE — C1769 GUIDE WIRE: HCPCS | Performed by: UROLOGY

## 2020-06-16 PROCEDURE — 6360000002 HC RX W HCPCS: Performed by: NURSE ANESTHETIST, CERTIFIED REGISTERED

## 2020-06-16 PROCEDURE — 6360000004 HC RX CONTRAST MEDICATION: Performed by: UROLOGY

## 2020-06-16 PROCEDURE — 3700000001 HC ADD 15 MINUTES (ANESTHESIA): Performed by: UROLOGY

## 2020-06-16 PROCEDURE — 82947 ASSAY GLUCOSE BLOOD QUANT: CPT

## 2020-06-16 RX ORDER — ONDANSETRON 2 MG/ML
INJECTION INTRAMUSCULAR; INTRAVENOUS PRN
Status: DISCONTINUED | OUTPATIENT
Start: 2020-06-16 | End: 2020-06-16 | Stop reason: SDUPTHER

## 2020-06-16 RX ORDER — DEXAMETHASONE SODIUM PHOSPHATE 4 MG/ML
INJECTION, SOLUTION INTRA-ARTICULAR; INTRALESIONAL; INTRAMUSCULAR; INTRAVENOUS; SOFT TISSUE PRN
Status: DISCONTINUED | OUTPATIENT
Start: 2020-06-16 | End: 2020-06-16 | Stop reason: SDUPTHER

## 2020-06-16 RX ORDER — HYDROCODONE BITARTRATE AND ACETAMINOPHEN 5; 325 MG/1; MG/1
2 TABLET ORAL EVERY 6 HOURS PRN
Qty: 20 TABLET | Refills: 0 | Status: SHIPPED | OUTPATIENT
Start: 2020-06-16 | End: 2020-06-19

## 2020-06-16 RX ORDER — LABETALOL 20 MG/4 ML (5 MG/ML) INTRAVENOUS SYRINGE
5 EVERY 10 MIN PRN
Status: DISCONTINUED | OUTPATIENT
Start: 2020-06-16 | End: 2020-06-16 | Stop reason: HOSPADM

## 2020-06-16 RX ORDER — SODIUM CHLORIDE 9 MG/ML
INJECTION, SOLUTION INTRAVENOUS CONTINUOUS
Status: DISCONTINUED | OUTPATIENT
Start: 2020-06-16 | End: 2020-06-16 | Stop reason: HOSPADM

## 2020-06-16 RX ORDER — OXYCODONE HYDROCHLORIDE AND ACETAMINOPHEN 5; 325 MG/1; MG/1
2 TABLET ORAL PRN
Status: DISCONTINUED | OUTPATIENT
Start: 2020-06-16 | End: 2020-06-16 | Stop reason: HOSPADM

## 2020-06-16 RX ORDER — CEPHALEXIN 500 MG/1
500 CAPSULE ORAL 2 TIMES DAILY
Qty: 10 CAPSULE | Refills: 0 | Status: SHIPPED | OUTPATIENT
Start: 2020-06-16 | End: 2020-07-09 | Stop reason: ALTCHOICE

## 2020-06-16 RX ORDER — SUCCINYLCHOLINE/SOD CL,ISO/PF 200MG/10ML
SYRINGE (ML) INTRAVENOUS PRN
Status: DISCONTINUED | OUTPATIENT
Start: 2020-06-16 | End: 2020-06-16 | Stop reason: SDUPTHER

## 2020-06-16 RX ORDER — DIPHENHYDRAMINE HYDROCHLORIDE 50 MG/ML
12.5 INJECTION INTRAMUSCULAR; INTRAVENOUS
Status: DISCONTINUED | OUTPATIENT
Start: 2020-06-16 | End: 2020-06-16 | Stop reason: HOSPADM

## 2020-06-16 RX ORDER — EPHEDRINE SULFATE/0.9% NACL/PF 50 MG/5 ML
SYRINGE (ML) INTRAVENOUS PRN
Status: DISCONTINUED | OUTPATIENT
Start: 2020-06-16 | End: 2020-06-16 | Stop reason: SDUPTHER

## 2020-06-16 RX ORDER — OXYCODONE HYDROCHLORIDE AND ACETAMINOPHEN 5; 325 MG/1; MG/1
1 TABLET ORAL PRN
Status: DISCONTINUED | OUTPATIENT
Start: 2020-06-16 | End: 2020-06-16 | Stop reason: HOSPADM

## 2020-06-16 RX ORDER — EPHEDRINE SULFATE/0.9% NACL/PF 50 MG/5 ML
SYRINGE (ML) INTRAVENOUS PRN
Status: DISCONTINUED | OUTPATIENT
Start: 2020-06-16 | End: 2020-06-16

## 2020-06-16 RX ORDER — LIDOCAINE HYDROCHLORIDE 10 MG/ML
INJECTION, SOLUTION EPIDURAL; INFILTRATION; INTRACAUDAL; PERINEURAL PRN
Status: DISCONTINUED | OUTPATIENT
Start: 2020-06-16 | End: 2020-06-16 | Stop reason: SDUPTHER

## 2020-06-16 RX ORDER — ONDANSETRON 2 MG/ML
4 INJECTION INTRAMUSCULAR; INTRAVENOUS
Status: DISCONTINUED | OUTPATIENT
Start: 2020-06-16 | End: 2020-06-16 | Stop reason: HOSPADM

## 2020-06-16 RX ORDER — CEFAZOLIN SODIUM 1 G/3ML
INJECTION, POWDER, FOR SOLUTION INTRAMUSCULAR; INTRAVENOUS PRN
Status: DISCONTINUED | OUTPATIENT
Start: 2020-06-16 | End: 2020-06-16 | Stop reason: SDUPTHER

## 2020-06-16 RX ORDER — FENTANYL CITRATE 50 UG/ML
INJECTION, SOLUTION INTRAMUSCULAR; INTRAVENOUS PRN
Status: DISCONTINUED | OUTPATIENT
Start: 2020-06-16 | End: 2020-06-16 | Stop reason: SDUPTHER

## 2020-06-16 RX ORDER — PROPOFOL 10 MG/ML
INJECTION, EMULSION INTRAVENOUS PRN
Status: DISCONTINUED | OUTPATIENT
Start: 2020-06-16 | End: 2020-06-16 | Stop reason: SDUPTHER

## 2020-06-16 RX ORDER — MIDAZOLAM HYDROCHLORIDE 1 MG/ML
INJECTION INTRAMUSCULAR; INTRAVENOUS PRN
Status: DISCONTINUED | OUTPATIENT
Start: 2020-06-16 | End: 2020-06-16 | Stop reason: SDUPTHER

## 2020-06-16 RX ADMIN — ONDANSETRON 4 MG: 2 INJECTION INTRAMUSCULAR; INTRAVENOUS at 09:01

## 2020-06-16 RX ADMIN — LIDOCAINE HYDROCHLORIDE 50 MG: 10 INJECTION, SOLUTION EPIDURAL; INFILTRATION; INTRACAUDAL; PERINEURAL at 08:55

## 2020-06-16 RX ADMIN — Medication 120 MG: at 08:55

## 2020-06-16 RX ADMIN — Medication 20 MG: at 09:11

## 2020-06-16 RX ADMIN — FENTANYL CITRATE 25 MCG: 50 INJECTION, SOLUTION INTRAMUSCULAR; INTRAVENOUS at 08:55

## 2020-06-16 RX ADMIN — DEXAMETHASONE SODIUM PHOSPHATE 4 MG: 4 INJECTION, SOLUTION INTRA-ARTICULAR; INTRALESIONAL; INTRAMUSCULAR; INTRAVENOUS; SOFT TISSUE at 09:01

## 2020-06-16 RX ADMIN — PROPOFOL 150 MG: 10 INJECTION, EMULSION INTRAVENOUS at 08:55

## 2020-06-16 RX ADMIN — CEFAZOLIN 2000 MG: 1 INJECTION, POWDER, FOR SOLUTION INTRAMUSCULAR; INTRAVENOUS at 09:15

## 2020-06-16 RX ADMIN — SODIUM CHLORIDE: 9 INJECTION, SOLUTION INTRAVENOUS at 07:28

## 2020-06-16 RX ADMIN — MIDAZOLAM 2 MG: 1 INJECTION INTRAMUSCULAR; INTRAVENOUS at 08:49

## 2020-06-16 ASSESSMENT — PAIN SCALES - GENERAL
PAINLEVEL_OUTOF10: 0

## 2020-06-16 ASSESSMENT — PULMONARY FUNCTION TESTS
PIF_VALUE: 3
PIF_VALUE: 2
PIF_VALUE: 28
PIF_VALUE: 0
PIF_VALUE: 3
PIF_VALUE: 4
PIF_VALUE: 3
PIF_VALUE: 4
PIF_VALUE: 15
PIF_VALUE: 24
PIF_VALUE: 3
PIF_VALUE: 0
PIF_VALUE: 3
PIF_VALUE: 1
PIF_VALUE: 0
PIF_VALUE: 12
PIF_VALUE: 3
PIF_VALUE: 24
PIF_VALUE: 3
PIF_VALUE: 4
PIF_VALUE: 1
PIF_VALUE: 3
PIF_VALUE: 0

## 2020-06-16 ASSESSMENT — PAIN - FUNCTIONAL ASSESSMENT: PAIN_FUNCTIONAL_ASSESSMENT: 0-10

## 2020-06-16 ASSESSMENT — PAIN DESCRIPTION - DESCRIPTORS: DESCRIPTORS: ACHING

## 2020-06-16 NOTE — ANESTHESIA POSTPROCEDURE EVALUATION
Department of Anesthesiology  Postprocedure Note    Patient: Corinne Baldy  MRN: [id number]  YOB: 1960  Date of evaluation: 6/16/2020  Time:  1:03 PM     Procedure Summary     Date:  06/16/20 Room / Location:  [address] [place] [address]: GISELLA GIRALDO    Anesthesia Start:  3005 Anesthesia Stop:  3244    Procedure:  CYSTOSCOPY RETROGRADE PYELOGRAM URETEROSCOPY (Bilateral ) Diagnosis:       (HYDRONEPHROSIS WITH UPJ OBSTRUCTION)      (NOTES IN EPIC)    Surgeon:  Beatriz Banks MD Responsible Provider:  Levi Haddad MD    Anesthesia Type:  general ASA Status:  3          Anesthesia Type: general    Cass Phase I: Cass Score: 10    Cass Phase II: Cass Score: 10    Last vitals: Reviewed and per EMR flowsheets.        Anesthesia Post Evaluation    Comments: POST- ANESTHESIA EVALUATION       Pt Name: Corinne Baldy  MRN: [id number]  YOB: 1960  Date of evaluation: 6/16/2020  Time:  1:03 PM      /71   Pulse 67   Temp 97.3 °F (36.3 °C)   Resp 13   Ht 6' 4\" (1.93 m)   Wt 280 lb (127 kg)   SpO2 93%   BMI 34.08 kg/m²      Consciousness Level  Awake  Cardiopulmonary Status  Stable  Pain Adequately Treated YES  Nausea / Vomiting  NO  Adequate Hydration  YES  Anesthesia Related Complications NONE      Electronically signed by Levi Haddad MD on 6/16/2020 at 1:03 PM

## 2020-06-16 NOTE — H&P (VIEW-ONLY)
DE RE-AMPUTATION LOWER LEG Left 2018    LEG AMPUTATION BELOW KNEE REVISION performed by Ronan Bates MD at [address] Bilateral 80'S    TOE AMPUTATION      Right 2 and 4    URETEROURETEROSTOMY Right 2019    RIGHT URETEROURETEROSTOMY, RIGHT URETERAL STENT PLACEMENT     VITRECTOMY Left 2019    PARS PLANA VITRECTOMY 25 GAUGE, MEMBRANE PEELING, ENDOLASER 200  MW 1044 SPOTS, INDIRECT LASER 26 SPOTS performed by Jorge Valverde MD at [place] [address] History   Problem Relation Age of Onset    Diabetes Mother     Hypertension Mother     Stomach Cancer Mother     Hypertension Father     Alcohol Abuse Father     COPD Father     Kidney Cancer Father     Diabetes Brother         IDDM-PUMP    Other Sister         BRAIN TUMOR       SOCIAL HISTORY       Social History     Socioeconomic History    Marital status:      Spouse name: Ricky Khalil Number of children: 2    Years of education: None    Highest education level: None   Occupational History    Occupation: Disabled   Social Needs    Financial resource strain: None    Food insecurity     Worry: None     Inability: None    Transportation needs     Medical: None     Non-medical: None   Tobacco Use    Smoking status: Former Smoker     Packs/day: 2.00     Years: 25.00     Pack years: 50.00     Types: Cigars     Start date:      Last attempt to quit: 2011     Years since quittin.0    Smokeless tobacco: Never Used    Tobacco comment: quit in    Substance and Sexual Activity    Alcohol use:  Yes     Alcohol/week: 0.0 standard drinks     Frequency: Monthly or less     Comment: BEER 1 A MONTH    Drug use: Not Currently    Sexual activity: Yes     Partners: Female   Lifestyle    Physical activity     Days per week: None     Minutes per session: None    Stress: None   Relationships    Social connections     Talks on phone: None     Gets together: None     Attends Religion service: None     Active member of club or organization: None     Attends meetings of clubs or organizations: None     Relationship status: None    Intimate partner violence     Fear of current or ex partner: None     Emotionally abused: None     Physically abused: None     Forced sexual activity: None   Other Topics Concern    None   Social History Narrative    None        REVIEW OF SYSTEMS      No Known Allergies    No current facility-administered medications on file prior to encounter.       Current Outpatient Medications on File Prior to Encounter   Medication Sig Dispense Refill    pregabalin (LYRICA) 100 MG capsule TAKE 1 CAPSULE BY MOUTH THREE TIMES A DAY 90 capsule 0    insulin lispro (HUMALOG) 100 UNIT/ML injection vial INJECT 12 UNITS UNDER THE SKIN 3 TIMES DAILY + SLIDING SCALE (TAKE 5 UNITS IF SUGAR IS OVER 150) 20 mL 3    rOPINIRole (REQUIP) 2 MG tablet TAKE 1 TABLET BY MOUTH EVERY DAY 90 tablet 3    gemfibrozil (LOPID) 600 MG tablet TAKE 1 TABLET BY MOUTH EVERY DAY 90 tablet 3    HUMULIN N 100 UNIT/ML injection vial INJECT 50 UNITS EVERY MORNING AND 70 UNITS AT BEDTIME 30 mL 11    isosorbide mononitrate (IMDUR) 30 MG extended release tablet TAKE 1 TABLET BY MOUTH EVERY DAY 90 tablet 3    levothyroxine (SYNTHROID) 150 MCG tablet       ELIQUIS 5 MG TABS tablet Take 1 tablet by mouth 2 times daily  2    atorvastatin (LIPITOR) 20 MG tablet TAKE 1 TABLET BY MOUTH EVERY DAY 90 tablet 3    tamsulosin (FLOMAX) 0.4 MG capsule TAKE 1 CAPSULE BY MOUTH EVERY DAY 90 capsule 3    propafenone (RYTHMOL) 150 MG tablet Take 1 tablet by mouth every 8 hours 90 tablet 3    SYMBICORT 160-4.5 MCG/ACT AERO TAKE 2 PUFFS BY MOUTH TWICE A DAY (Patient taking differently: Inhale 2 puffs into the lungs 2 times daily ) 30.6 Inhaler 3    METOPROLOL TARTRATE PO Take 50 mg by mouth 2 times daily      Omega-3 Fatty Acids (FISH OIL CONCENTRATE) 300 MG CAPS Take 300 mg by mouth knee ([facility] 05/08/2015    Charcot ankle 04/28/2015    Neuropathy in diabetes ([facility]     Anemia 07/02/2013           RADHA Nava - CNP on 6/16/2020 at 7:21 AM

## 2020-06-16 NOTE — ANESTHESIA PRE PROCEDURE
2019    RIGHT URETEROURETEROSTOMY, RIGHT URETERAL STENT PLACEMENT     VITRECTOMY Left 2019    PARS PLANA VITRECTOMY 25 GAUGE, MEMBRANE PEELING, ENDOLASER 200  MW 1044 SPOTS, INDIRECT LASER 26 SPOTS performed by Martha Reyes MD at [address] [place] [address] History:    Social History     Tobacco Use    Smoking status: Former Smoker     Packs/day: 2.00     Years: 25.00     Pack years: 50.00     Types: Cigars     Start date:      Last attempt to quit: 2011     Years since quittin.0    Smokeless tobacco: Never Used    Tobacco comment: quit in    Substance Use Topics    Alcohol use: Yes     Alcohol/week: 0.0 standard drinks     Frequency: Monthly or less     Comment: BEER 1 A MONTH                                Counseling given: Not Answered  Comment: quit in       Vital Signs (Current):   Vitals:    20 0703   BP: (!) 142/78   Pulse: 66   Resp: 16   Temp: 97.7 °F (36.5 °C)   TempSrc: Oral   SpO2: 98%   Weight: 280 lb (127 kg)   Height: 6' 4\" (1.93 m)                                              BP Readings from Last 3 Encounters:   20 (!) 142/78   20 127/68   20 122/68       NPO Status: Time of last liquid consumption:                         Time of last solid consumption:                         Date of last liquid consumption: 06/15/20                        Date of last solid food consumption: 06/15/20    BMI:   Wt Readings from Last 3 Encounters:   20 280 lb (127 kg)   20 280 lb (127 kg)   20 285 lb (129.3 kg)     Body mass index is 34.08 kg/m².     CBC:   Lab Results   Component Value Date    WBC 7.2 2020    RBC 4.38 2020    HGB 13.5 2020    HCT 39.6 2020    MCV 90.5 2020    RDW 14.9 2020     2020       CMP:   Lab Results   Component Value Date     2020    K 4.0 2020     2020    CO2 21 2020    BUN 29 2020    CREATININE 2.18

## 2020-06-16 NOTE — PROGRESS NOTES
Wife called inquiring if I spoke with Dr. Laxmi Solis which I told her I did and it was ok for discharge. Also informed her that he said he would call her/them later. Dr. Laxmi Solis still here in OR.

## 2020-06-16 NOTE — TELEPHONE ENCOUNTER
Per Dr. Melania Zavaleta please place order for Interventional Radiology (Rt) Percutaneous Nephrostomy tube placement. Antegrade nephrostogram. Possible percutaneous dilation of ureteral stricture, placement of antegrade stent. Patient is to follow up after.

## 2020-06-16 NOTE — PROGRESS NOTES
Called wife at home and requested her to come inside to go over home instructions. On arrival, family upset that doctor did not tell them that he would be go home with pringle catheter. Dr. Jimmy Munguia called earlier but in surgery. Gave cysto and pringle care instructions to family and patient. Told I would call them after I heard from Dr. Jimmy Munguia. 11:30 Lauri returned call and explained situation. He stated he would call them at home when he got to office. Will call family later.

## 2020-06-16 NOTE — TELEPHONE ENCOUNTER
Patient LMOM wanting to know more information on the surgery he had done today and what would be the follow up plan moving forward.

## 2020-06-16 NOTE — H&P
no acute abnormality.  The left kidney is normal in size,   contour, and signal intensity.       The right kidney demonstrates a T1 dark, T2 bright lesion in the lower pole   measuring 4.7 x 4.7 x 3.9 cm.  Evaluation for internal enhancement is limited   given the lack of IV contrast.  Moderate right hydronephrosis is also seen. The finding is unchanged since May 21, 2020.       Stomach and visualized small and large bowel:  No acute abnormality.       Peritoneal cavity/Retroperitoneum:  Inflammatory stranding is seen adjacent   to the right kidney.  No loculated fluid collections to suggest abscess.  No   pathologic lymphadenopathy.  Vasculature is grossly unremarkable.       Soft tissues/bones:  No acute or aggressive osseous lesion.           Impression   1. Cystic lesion identified in the right kidney measuring up to 4.7 cm. Findings favored to represent post treatment changes.  Recurrence is   considered less likely but not entirely excluded given the lack of IV   contrast.  Continued surveillance is needed. 2. Moderate right hydronephrosis, stable since May 2020.  Findings are likely   related to scarring/inflammation at the UPJ from prior intervention. 3. No evidence of metastatic disease in the upper abdomen.        PAST MEDICAL HISTORY     Past Medical History:   Diagnosis Date    Asymptomatic bilateral carotid artery stenosis     Boil, thigh 10/2019    10/30/19: right inner thigh region, says PCP Rxd Doxy for this, area is much better, has a couple days left to complete Doxy    CAD (coronary artery disease)     saw Dr. Luiza Landon ([facility]) on Oct. 22/2019 for pre-op clearance, to be cleared pending cardiac echo, not yet schedule for this as of 10/30/19    Charcot foot due to diabetes mellitus ([address] 2014    LEFT    COPD (chronic obstructive pulmonary disease) ([address] 2009    INHALER USE DAILY    Diabetes mellitus ([address] 1989    IDDM, On Insulin    Diabetic neuropathy ([address]     Difficult intravenous access VEINS ROLL    Employs prosthetic leg     s/p left BKA    Foot ulcer ([address] PRIOR TO 2015    BILAT    Full dentures     UPPER ONLY    Hyperlipidemia 2004    ON RX    Hypertension 2004    ON RX, PCP Dr. Evelia Horan, seen Oct. 2019    Hypoglycemia 4/2/2015    MRSA (methicillin resistant staph aureus) culture positive 4/16/2015    ankle    OA (osteoarthritis)     Osteomyelitis ([address]     left stump  BKA    Paroxysmal atrial fibrillation ([address]     Renal mass 09/2019    ACCIDENTAL  FINDING IS FOLLOWING UP WITH KIDNEY DR Christoph Ledezma     Suspected sleep apnea     had a sleep study in august, says no results have been sent to PCP yet!     Thyroid disease 2004    PT HAD HYPERTHYROIDISM-UNCONTROLED THYROID DESTROYED WITH RADI IODINE NOW HAS HYPOTHYRIDISM    Vision abnormalities 09/2019    LEFT NO VISION    Vitreous hemorrhage of left eye due to diabetes mellitus ([address] 09/2019    s/p Vitrectomy 9/2019    Wears glasses     Wears partial dentures     upper       SURGICAL HISTORY       Past Surgical History:   Procedure Laterality Date    CARDIAC CATHETERIZATION      no stenting    CARDIOVASCULAR STRESS TEST  06/28/2019    small fixed apical, likely normal, EF 48%    COLONOSCOPY  06/17/2016    poor prep, done up to the IC valve, redundant colon    COLONOSCOPY  01/23/2017    VIRTUAL COLONOSCOPY DONE-no polyps or masses    FINGER AMPUTATION Right 1995    middle-CRUSHING INJURY AT WORK    FOOT SURGERY Right     r-bone removed    HC  PICC [facility] DOUBLE  5/21/2018         KIDNEY SURGERY Right 11/13/2019    XI ROBOTIC PARTIAL NEPHRECTOMY, INTRAOP ULTRASOUND, RIGHT URETEROURETEROSTOMY, RIGHT URETERAL STENT PLACEMENT **SHORT STAY** performed by Percy Vizcarra MD at [address] Left 4/29/15    OTHER SURGICAL HISTORY Left 5-5-15 and 11/2015    Revision BKA    OTHER SURGICAL HISTORY      left stump revision 5/30/2018    PARTIAL NEPHRECTOMY Right 11/13/2019    ROBOTIC PARTIAL NEPHRECTOMY    TX RE-AMPUTATION LOWER LEG Left 2018    LEG AMPUTATION BELOW KNEE REVISION performed by Darlene Glass MD at [address] Bilateral 80'S    TOE AMPUTATION      Right 2 and 4    URETEROURETEROSTOMY Right 2019    RIGHT URETEROURETEROSTOMY, RIGHT URETERAL STENT PLACEMENT     VITRECTOMY Left 2019    PARS PLANA VITRECTOMY 25 GAUGE, MEMBRANE PEELING, ENDOLASER 200  MW 1044 SPOTS, INDIRECT LASER 26 SPOTS performed by Mohan Gaytan MD at [place] [address] History   Problem Relation Age of Onset    Diabetes Mother     Hypertension Mother     Stomach Cancer Mother     Hypertension Father     Alcohol Abuse Father     COPD Father     Kidney Cancer Father     Diabetes Brother         IDDM-PUMP    Other Sister         BRAIN TUMOR       SOCIAL HISTORY       Social History     Socioeconomic History    Marital status:      Spouse name: Tennille Henderson Number of children: 2    Years of education: None    Highest education level: None   Occupational History    Occupation: Disabled   Social Needs    Financial resource strain: None    Food insecurity     Worry: None     Inability: None    Transportation needs     Medical: None     Non-medical: None   Tobacco Use    Smoking status: Former Smoker     Packs/day: 2.00     Years: 25.00     Pack years: 50.00     Types: Cigars     Start date:      Last attempt to quit: 2011     Years since quittin.0    Smokeless tobacco: Never Used    Tobacco comment: quit in    Substance and Sexual Activity    Alcohol use:  Yes     Alcohol/week: 0.0 standard drinks     Frequency: Monthly or less     Comment: BEER 1 A MONTH    Drug use: Not Currently    Sexual activity: Yes     Partners: Female   Lifestyle    Physical activity     Days per week: None     Minutes per session: None    Stress: None   Relationships    Social connections     Talks on phone: None     Gets together: None     Attends Gnosticist service: None     Active member of club or organization: None     Attends meetings of clubs or organizations: None     Relationship status: None    Intimate partner violence     Fear of current or ex partner: None     Emotionally abused: None     Physically abused: None     Forced sexual activity: None   Other Topics Concern    None   Social History Narrative    None        REVIEW OF SYSTEMS      No Known Allergies    No current facility-administered medications on file prior to encounter.       Current Outpatient Medications on File Prior to Encounter   Medication Sig Dispense Refill    pregabalin (LYRICA) 100 MG capsule TAKE 1 CAPSULE BY MOUTH THREE TIMES A DAY 90 capsule 0    insulin lispro (HUMALOG) 100 UNIT/ML injection vial INJECT 12 UNITS UNDER THE SKIN 3 TIMES DAILY + SLIDING SCALE (TAKE 5 UNITS IF SUGAR IS OVER 150) 20 mL 3    rOPINIRole (REQUIP) 2 MG tablet TAKE 1 TABLET BY MOUTH EVERY DAY 90 tablet 3    gemfibrozil (LOPID) 600 MG tablet TAKE 1 TABLET BY MOUTH EVERY DAY 90 tablet 3    HUMULIN N 100 UNIT/ML injection vial INJECT 50 UNITS EVERY MORNING AND 70 UNITS AT BEDTIME 30 mL 11    isosorbide mononitrate (IMDUR) 30 MG extended release tablet TAKE 1 TABLET BY MOUTH EVERY DAY 90 tablet 3    levothyroxine (SYNTHROID) 150 MCG tablet       ELIQUIS 5 MG TABS tablet Take 1 tablet by mouth 2 times daily  2    atorvastatin (LIPITOR) 20 MG tablet TAKE 1 TABLET BY MOUTH EVERY DAY 90 tablet 3    tamsulosin (FLOMAX) 0.4 MG capsule TAKE 1 CAPSULE BY MOUTH EVERY DAY 90 capsule 3    propafenone (RYTHMOL) 150 MG tablet Take 1 tablet by mouth every 8 hours 90 tablet 3    SYMBICORT 160-4.5 MCG/ACT AERO TAKE 2 PUFFS BY MOUTH TWICE A DAY (Patient taking differently: Inhale 2 puffs into the lungs 2 times daily ) 30.6 Inhaler 3    METOPROLOL TARTRATE PO Take 50 mg by mouth 2 times daily      Omega-3 Fatty Acids (FISH OIL CONCENTRATE) 300 MG CAPS Take 300 mg by mouth nightly       Glucosamine Sulfate 500 MG TABS Take 500 mg by mouth 3 times daily      acetaminophen (TYLENOL) 325 MG tablet Take 650 mg by mouth      glucagon, rDNA, 1 MG injection Inject 1 mL into the muscle      Blood Pressure KIT 1 actuation by Does not apply route once a week 1 kit 0    pregabalin (LYRICA) 100 MG capsule TAKE 1 CAPSULE BY MOUTH 3 TIMES A DAY 90 capsule 2    blood glucose test strips (ASCENSIA AUTODISC VI;ONE TOUCH ULTRA TEST VI) strip 1 each by In Vitro route 4 times daily As needed. 400 each 3    blood glucose monitor strips by Other route 4 times daily Test strips to use w/ AccViacor Smart View Glucometer (Patient taking differently: by Other route 4 times daily Test strips to use w/ ONE TOUCH VERIO TEST STRIPS) 100 strip 11    Insulin Syringe-Needle U-100 (ULTICARE INSULIN SYRINGE) 31G X 5/16\" 1 ML MISC USE AS DIRECTED WITH HUMULIN AND HUMALOG INSULIN 5 TIMES DAILY 1 each 3    Handicap Placard MISC by Does not apply route Duration - 5years  Reason- prosthetic leg 1 each 0     Negative except for what is mentioned in the HPI. GENERAL PHYSICAL EXAM     Vitals: BP (!) 142/78   Pulse 66   Temp 97.7 °F (36.5 °C) (Oral)   Resp 16   Ht 6' 4\" (1.93 m)   Wt 280 lb (127 kg)   SpO2 98%   BMI 34.08 kg/m²  Body mass index is 34.08 kg/m². GENERAL APPEARANCE:   [facility] 61 y.o.,  male, mildly obese, nourished, conscious, alert. Does not appear to be in any distress or pain at this time. SKIN:  Warm, dry, no cyanosis or jaundice. HEAD:  Normocephalic, atraumatic. EYES:  Pupils equal, reactive to light. EARS:  No discharge, no marked hearing loss. NOSE:  No rhinorrhea, epistaxis or septal deformity. THROAT:  Upper dentures. Uvula midline, no erythema or exudate.                   NECK:  No stiffness, trachea central.  No palpable masses or L.N.                 CHEST: Symmetrical and equal on expansion. HEART:  RRR S1 > S2. No audible murmurs or gallops. LUNGS:  Equal on expansion, normal breath sounds. No wheezing, rhonchi or rales. ABDOMEN:  NABS x 4 quads. Soft on palpation. No localized tenderness. No guarding or rigidity. LYMPHATICS:  No palpable cervical lymphadenopathy. LOCOMOTOR, BACK AND SPINE:  No tenderness or deformities. EXTREMITIES:  Left leg prosthetic. Symmetrical, no pretibial edema. Antonios sign negative. No discoloration or ulcerations. NEUROLOGIC:  The patient is conscious, alert, oriented,Cranial nerve II-XII intact, taste and smell were not examined. No apparent focal sensory or motor deficits.              PROVISIONAL DIAGNOSES / SURGERY:      HYDRONEPHROSIS WITH UPJ OBSTRUCTION    CYSTOSCOPY RETROGRADE PYELOGRAM POSSIBLE URETEROSCOPY POSS URETERAL BALLOON DILATATION POSS STENT PLACEMENT Right      Patient Active Problem List    Diagnosis Date Noted    Hypotension due to drugs 11/21/2019    Postoperative hypotension     Metabolic acidosis, normal anion gap (NAG) 11/13/2019    Right renal mass 10/07/2019    Carotid stenosis, asymptomatic, bilateral 06/17/2019    Acquired hypothyroidism 01/23/2019    Osteomyelitis of left leg ([address] 05/30/2018    Olecranon bursitis of right elbow     Non-healing ulcer of lower leg, left, with fat layer exposed ([address] 05/18/2018    Essential hypertension 05/18/2018    Chronic pain of left knee     Acute hematogenous osteomyelitis of left fibula (HCC)     Acute hematogenous osteomyelitis of left tibia (HCC)     Cellulitis and abscess of left leg 05/04/2018    CAD (coronary artery disease)     PSA elevation 11/02/2017    Constipation 06/02/2016    Pure hypercholesterolemia 12/10/2015    Type 2 diabetes mellitus with diabetic polyneuropathy ([address] 12/03/2015    PVD (peripheral vascular disease) ([address] 05/08/2015    Amputation below

## 2020-06-16 NOTE — BRIEF OP NOTE
Brief Postoperative Note      Patient: Anu Sandy  YOB: 1960  MRN: [id number]    Date of Procedure: 6/16/2020    Pre-Op Diagnosis: HYDRONEPHROSIS WITH UPJ OBSTRUCTION  NOTES IN EPIC    Post-Op Diagnosis: Same       Procedure(s):  CYSTOSCOPY RETROGRADE PYELOGRAM POSSIBLE URETEROSCOPY POSS URETERAL BALLOON DILATATION POSS STENT PLACEMENT    Surgeon(s):  Cristal Frank MD    Assistant:  * No surgical staff found *    Anesthesia: General    Estimated Blood Loss (mL): less than 50     Complications: None    Specimens:   * No specimens in log *    Implants:  * No implants in log *      Drains:   [REMOVED] Closed/Suction Drain Right RUQ Bulb 15 Indian (Removed)       Findings: d    Electronically signed by Cristal Frank MD on 6/16/2020 at 8:53 AM

## 2020-06-17 ENCOUNTER — PROCEDURE VISIT (OUTPATIENT)
Dept: UROLOGY | Age: 60
End: 2020-06-17

## 2020-06-17 VITALS — TEMPERATURE: 97.8 F

## 2020-06-17 PROCEDURE — 99999 PR OFFICE/OUTPT VISIT,PROCEDURE ONLY: CPT | Performed by: NURSE PRACTITIONER

## 2020-06-17 NOTE — OP NOTE
[address]                 [address] [place], [address]                                OPERATIVE REPORT    PATIENT NAME: Rosie Okeefe                   :        1960  MED REC NO:   [id number]                              ROOM:  ACCOUNT NO:   [de-identified]                           ADMIT DATE: 2020  PROVIDER:     Lucita Soares    DATE OF PROCEDURE:  2020    PREOPERATIVE DIAGNOSES:  Right-sided flank pain, right hydronephrosis,  history of renal cell carcinoma. POSTOPERATIVE DIAGNOSES:  Right-sided flank pain, right hydronephrosis,  history of renal cell carcinoma. OPERATION PERFORMED:  Cystoscopy, bilateral retrograde pyelogram, right  ureteroscopy. SURGEON:  Lucita Soares MD    ASSISTANT:  None. ANESTHESIA:  General.    COMPLICATIONS:  None. ESTIMATED BLOOD LOSS:  Minimal.    INDICATION:  This is a 59-year-old white male who has a history of a  renal cell carcinoma status post right partial nephrectomy. This was  approximately one year ago. He has been having some right flank pain. Imaging was obtained, which showed right hydronephrosis with possible  stricture in the right proximal ureter. Given this, the above procedure  was scheduled. All the risks and benefits were explained to the patient  and informed consent was obtained. OPERATIVE PROCEDURE:  This 59-year-old white male who was brought back  to the operating room and positioned in the modified dorsal lithotomy  position. After general anesthesia was started, he was sterilely  prepped and draped in the standard fashion. A 22-Mexican rigid  cystoscope was inserted into his urethra and carefully advanced into his  bladder. Bladder was examined. No tumors or stones were noted. Both  the ureteral orifices were normal.  I then advanced a Cone Tip catheter  and shot a left retrograde pyelogram.  No filling defects were seen. All the calyces were sharp.   No points of obstruction were seen. I did  the same on the right side and the distal ureter was normal although in  the proximal ureter, there was a sharp cutoff at the proximal one-third  of the ureter. There was no contrast going past this area. I then  removed the Cone Tip catheter and I advanced flexible Glidewire into the  right ureter and advanced it towards the stricture. Then, I removed the  cystoscope. I back-loaded the ureteroscope on to the wire and I  advanced the ureteroscope into the right ureter. I examined the ureter. I could not identify the lumen into the kidney. I then removed the  ureteroscope and that was the end of the procedure. The patient has  ureteral stricture, possibly from scar tissue from his partial  nephrectomy. Given this, the plan will be to have Interventional  Radiology place a right percutaneous nephrostomy tube and try an  antegrade stent during that procedure and a nephrostogram.  At that  point, I can determine long-term treatment plan. I was present and  scrubbed throughout the entire case. Jovanni Robles    D: 06/17/2020 15:37:45       T: 06/17/2020 16:11:04     [id number]  Job#: [id number]     Doc#: [id number]    CC:   Lyric Roman

## 2020-06-17 NOTE — PROGRESS NOTES
Pt arrived with a 16f cath in place post surgery . Balloon was deflated and catheter was removed. Patient tolerated the procedure well and without complications.

## 2020-06-18 ENCOUNTER — TELEPHONE (OUTPATIENT)
Dept: UROLOGY | Age: 60
End: 2020-06-18

## 2020-06-18 NOTE — TELEPHONE ENCOUNTER
Follow up call this am with PT and spouse on speaker phone after speaking with DR Andres Martinez this morning to review plan. 1. IR for nephrostomy tube, attempt wire passage Rt ureter, balloon and stent insertion then will follow up in office at next available appt  with Dr Andres Martinez post procedure    2. If unable to pass wire in IR, will have nephrostomy tube and will still follow up with DR Andres Martinez to discuss next step     3. Is aware [facility] interventional radiology will call him to schedule this procedure at [facility]. 4. Is aware both pt/spouse need to be present for post procedure office visit     5. Both pt/spouse verbalized understanding and will call with any additional questions.

## 2020-06-22 ENCOUNTER — HOSPITAL ENCOUNTER (OUTPATIENT)
Dept: PREADMISSION TESTING | Age: 60
Discharge: HOME | End: 2020-06-26
Payer: COMMERCIAL

## 2020-06-22 PROCEDURE — U0003 INFECTIOUS AGENT DETECTION BY NUCLEIC ACID (DNA OR RNA); SEVERE ACUTE RESPIRATORY SYNDROME CORONAVIRUS 2 (SARS-COV-2) (CORONAVIRUS DISEASE [COVID-19]), AMPLIFIED PROBE TECHNIQUE, MAKING USE OF HIGH THROUGHPUT TECHNOLOGIES AS DESCRIBED BY CMS-2020-01-R: HCPCS

## 2020-06-25 LAB — SARS-COV-2, NAA: NOT DETECTED

## 2020-06-25 RX ORDER — CIPROFLOXACIN 2 MG/ML
400 INJECTION, SOLUTION INTRAVENOUS
Status: CANCELLED | OUTPATIENT
Start: 2020-06-25 | End: 2020-06-25

## 2020-06-25 RX ORDER — SODIUM CHLORIDE 9 MG/ML
INJECTION, SOLUTION INTRAVENOUS CONTINUOUS
Status: CANCELLED | OUTPATIENT
Start: 2020-06-25

## 2020-06-26 ENCOUNTER — HOSPITAL ENCOUNTER (OUTPATIENT)
Dept: INTERVENTIONAL RADIOLOGY/VASCULAR | Age: 60
Discharge: HOME | End: 2020-06-28
Payer: COMMERCIAL

## 2020-06-26 VITALS
HEART RATE: 56 BPM | SYSTOLIC BLOOD PRESSURE: 134 MMHG | TEMPERATURE: 96.6 F | RESPIRATION RATE: 14 BRPM | OXYGEN SATURATION: 96 % | DIASTOLIC BLOOD PRESSURE: 78 MMHG

## 2020-06-26 LAB
INR BLD: 1
PARTIAL THROMBOPLASTIN TIME: 29.9 SEC (ref 24–36)
PLATELET # BLD: 177 K/UL (ref 150–450)
PROTHROMBIN TIME: 13.5 SEC (ref 11.8–14.6)

## 2020-06-26 PROCEDURE — 7100000031 HC ASPR PHASE II RECOVERY - ADDTL 15 MIN

## 2020-06-26 PROCEDURE — 2709999900 IR GUIDED NEPHROSTOMY CATH PLACEMENT RIGHT

## 2020-06-26 PROCEDURE — 85049 AUTOMATED PLATELET COUNT: CPT

## 2020-06-26 PROCEDURE — 50432 PLMT NEPHROSTOMY CATHETER: CPT | Performed by: RADIOLOGY

## 2020-06-26 PROCEDURE — 6360000002 HC RX W HCPCS: Performed by: RADIOLOGY

## 2020-06-26 PROCEDURE — 7100000011 HC PHASE II RECOVERY - ADDTL 15 MIN

## 2020-06-26 PROCEDURE — 85610 PROTHROMBIN TIME: CPT

## 2020-06-26 PROCEDURE — 7100000030 HC ASPR PHASE II RECOVERY - FIRST 15 MIN

## 2020-06-26 PROCEDURE — 85730 THROMBOPLASTIN TIME PARTIAL: CPT

## 2020-06-26 PROCEDURE — 7100000010 HC PHASE II RECOVERY - FIRST 15 MIN

## 2020-06-26 PROCEDURE — 2580000003 HC RX 258: Performed by: RADIOLOGY

## 2020-06-26 PROCEDURE — 6360000004 HC RX CONTRAST MEDICATION: Performed by: RADIOLOGY

## 2020-06-26 PROCEDURE — 36415 COLL VENOUS BLD VENIPUNCTURE: CPT

## 2020-06-26 RX ORDER — CIPROFLOXACIN 2 MG/ML
400 INJECTION, SOLUTION INTRAVENOUS
Status: COMPLETED | OUTPATIENT
Start: 2020-06-26 | End: 2020-06-26

## 2020-06-26 RX ORDER — SODIUM CHLORIDE 9 MG/ML
INJECTION, SOLUTION INTRAVENOUS CONTINUOUS
Status: DISCONTINUED | OUTPATIENT
Start: 2020-06-26 | End: 2020-06-29 | Stop reason: HOSPADM

## 2020-06-26 RX ADMIN — SODIUM CHLORIDE: 9 INJECTION, SOLUTION INTRAVENOUS at 10:39

## 2020-06-26 RX ADMIN — IOVERSOL 100 ML: 678 INJECTION INTRA-ARTERIAL; INTRAVENOUS at 13:05

## 2020-06-26 RX ADMIN — CIPROFLOXACIN 400 MG: 2 INJECTION, SOLUTION INTRAVENOUS at 10:54

## 2020-06-26 ASSESSMENT — PAIN SCALES - GENERAL
PAINLEVEL_OUTOF10: 0

## 2020-06-26 NOTE — INTERVAL H&P NOTE
Update History & Physical    The patient's History and Physical of June 16, 2020 was reviewed with the patient and I examined the patient. Pt here today for IR Guided Right Nephrostomy cath placement, nephrostogram with possible dilation of ureteral stricture, possible antegrade stent placement. Pt underwent a cystoscopy with ureteroscopy but surgeon was unable to pass catheter through proximal ureter at that time. Pt continues to have bilateral flank pain, urinary hesitancy, weak stream. Rating pain 6/10. Reports having hematuria presently. Reports burning pain with urination. Otherwise, no changes. Vitals pending per RN.     NPO. No blood thinners for 3 days. Took all medications with exception of blood thinners this am with sip of water. Denies any current chest pain/pressure, palpitations, recent URI, nausea, vomiting, diarrhea, constipation, fever or chills. Reports SOB at baseline with exertion.        Electronically signed by RADHA Whittington CNP on 6/26/2020 at 10:04 AM

## 2020-06-26 NOTE — PROGRESS NOTES
Teaching done for nrphrostomy care. Written info provided and verbal teaching done.  Pt voiced understanding

## 2020-06-30 ENCOUNTER — OFFICE VISIT (OUTPATIENT)
Dept: UROLOGY | Age: 60
End: 2020-06-30
Payer: COMMERCIAL

## 2020-06-30 VITALS — TEMPERATURE: 97.7 F

## 2020-06-30 PROCEDURE — 3017F COLORECTAL CA SCREEN DOC REV: CPT | Performed by: UROLOGY

## 2020-06-30 PROCEDURE — G8417 CALC BMI ABV UP PARAM F/U: HCPCS | Performed by: UROLOGY

## 2020-06-30 PROCEDURE — 99214 OFFICE O/P EST MOD 30 MIN: CPT | Performed by: UROLOGY

## 2020-06-30 PROCEDURE — G8427 DOCREV CUR MEDS BY ELIG CLIN: HCPCS | Performed by: UROLOGY

## 2020-06-30 PROCEDURE — 1036F TOBACCO NON-USER: CPT | Performed by: UROLOGY

## 2020-06-30 ASSESSMENT — ENCOUNTER SYMPTOMS
EYE PAIN: 0
VOMITING: 0
COLOR CHANGE: 0
WHEEZING: 0
NAUSEA: 0
BACK PAIN: 0
SHORTNESS OF BREATH: 0
ABDOMINAL PAIN: 0
EYE REDNESS: 0
COUGH: 0

## 2020-06-30 NOTE — PROGRESS NOTES
[facility]  [facility] - [facility]  [address] [facility] [address]  Dept: [address] [facility] Urology Office Note - Established    Patient:  Brenda Escalante  YOB: 1960  Date: 6/30/2020    The patient is a 61 y.o. male who presents todayfor evaluation of the following problems:   Chief Complaint   Patient presents with    Cancer     kidney post nephrectomy discuss blood thinners        HPI  Has right ureteral obstruction, cannot be cannulated from below or above. Pain is improved. Urine draining well    Summary of old records: N/A    Additional History: N/A    Procedures Today: N/A    Urinalysis today:  No results found for this visit on 06/30/20.   Last several PSA's:  Lab Results   Component Value Date    PSA 4.62 (H) 10/23/2017     Last total testosterone:  No results found for: TESTOSTERONE    AUA Symptom Score (6/30/2020):  INCOMPLETE EMPTYING: How often have you had the sensation of not emptying your bladder?: Not at all  FREQUENCY: How often do you have to urinate less than every two hours?: Not at all  INTERMITTENCY: How often have you found you stopped and started again several times when you urinated?: Not at all  URGENCY: How often have you found it difficult to postpone urination?: Not at all  WEAK STREAM: How often have you had a weak urinary stream?: Not at all  STRAINING: How often have you had to strain to start  urination?: Not at all  NOCTURIA: How many times did you typically get up at night to uriniate?: NONE  TOTAL I-PSS SCORE[de-identified] 0  How would you feel if you were to spend the rest of your life with your urinary condition?: Pleased    Last BUN and creatinine:  Lab Results   Component Value Date    BUN 29 (H) 06/08/2020     Lab Results   Component Value Date    CREATININE 2.18 (H) 06/08/2020       Additional Lab/Culture results: none    Imaging Reviewed during this Office Visit: none  (results were independently reviewed by physician and prep, done up to the IC valve, redundant colon    COLONOSCOPY  01/23/2017    VIRTUAL COLONOSCOPY DONE-no polyps or masses    CYSTOSCOPY Bilateral 6/16/2020    CYSTOSCOPY RETROGRADE PYELOGRAM URETEROSCOPY performed by Kierra Young MD at [address] Right     r-bone removed    HC  PICC [address] DOUBLE  5/21/2018         KIDNEY SURGERY Right 11/13/2019    XI ROBOTIC PARTIAL NEPHRECTOMY, INTRAOP ULTRASOUND, RIGHT URETEROURETEROSTOMY, RIGHT URETERAL STENT PLACEMENT **SHORT STAY** performed by Kierra Young MD at [facility] [facility] Left 4/29/15    OTHER SURGICAL HISTORY Left 5-5-15 and 11/2015    Revision BKA    OTHER SURGICAL HISTORY      left stump revision 5/30/2018    PARTIAL NEPHRECTOMY Right 11/13/2019    ROBOTIC PARTIAL NEPHRECTOMY    UT RE-AMPUTATION LOWER LEG Left 5/30/2018    LEG AMPUTATION BELOW KNEE REVISION performed by Kesha Bassett MD at [address] Bilateral 80'S    TOE AMPUTATION      Right 2 and 4    URETEROURETEROSTOMY Right 11/13/2019    RIGHT URETEROURETEROSTOMY, RIGHT URETERAL STENT PLACEMENT     VITRECTOMY Left 9/25/2019    PARS PLANA VITRECTOMY 25 GAUGE, MEMBRANE PEELING, ENDOLASER 200  MW 1044 SPOTS, INDIRECT LASER 26 SPOTS performed by Norah Higgins MD at [address] [facility] History   Problem Relation Age of Onset    Diabetes Mother     Hypertension Mother     Stomach Cancer Mother     Hypertension Father     Alcohol Abuse Father     COPD Father     Kidney Cancer Father     Diabetes Brother         IDDM-PUMP    Other Sister         BRAIN TUMOR     Outpatient Medications Marked as Taking for the 6/30/20 encounter (Office Visit) with Kierra Young MD   Medication Sig Dispense Refill    cephALEXin (KEFLEX) 500 MG capsule Take 1 capsule by mouth 2 times daily 10 capsule 0    albuterol sulfate  (90 Base) MCG/ACT prep  Also have IR change neph tube one week prior to procedure  Need med clearance  Return for Surgery. Prescriptions Ordered:  No orders of the defined types were placed in this encounter. Orders Placed:  No orders of the defined types were placed in this encounter. Esther Preciado MD    Agree with the ROS entered by the MA.

## 2020-07-01 ENCOUNTER — TELEPHONE (OUTPATIENT)
Dept: UROLOGY | Age: 60
End: 2020-07-01

## 2020-07-01 NOTE — TELEPHONE ENCOUNTER
IR neph tube exchange @ [facility] 7/23/20 11:00am **STOP BLOOD THINNERS 7/16/20**   COVID-19 test @ [facility] flu clinic 7/19/20 1:00pm   1st procedure

## 2020-07-08 RX ORDER — ALBUTEROL SULFATE 90 UG/1
2 AEROSOL, METERED RESPIRATORY (INHALATION) EVERY 6 HOURS PRN
Qty: 6.7 INHALER | Refills: 0 | Status: SHIPPED | OUTPATIENT
Start: 2020-07-08 | End: 2020-11-24

## 2020-07-09 ENCOUNTER — OFFICE VISIT (OUTPATIENT)
Dept: UROLOGY | Age: 60
End: 2020-07-09
Payer: COMMERCIAL

## 2020-07-09 ENCOUNTER — TELEPHONE (OUTPATIENT)
Dept: UROLOGY | Age: 60
End: 2020-07-09

## 2020-07-09 VITALS — TEMPERATURE: 97.7 F

## 2020-07-09 PROCEDURE — 99213 OFFICE O/P EST LOW 20 MIN: CPT | Performed by: NURSE PRACTITIONER

## 2020-07-09 PROCEDURE — 3017F COLORECTAL CA SCREEN DOC REV: CPT | Performed by: NURSE PRACTITIONER

## 2020-07-09 PROCEDURE — G8417 CALC BMI ABV UP PARAM F/U: HCPCS | Performed by: NURSE PRACTITIONER

## 2020-07-09 PROCEDURE — 1036F TOBACCO NON-USER: CPT | Performed by: NURSE PRACTITIONER

## 2020-07-09 PROCEDURE — G8427 DOCREV CUR MEDS BY ELIG CLIN: HCPCS | Performed by: NURSE PRACTITIONER

## 2020-07-09 RX ORDER — PREGABALIN 100 MG/1
CAPSULE ORAL
Qty: 90 CAPSULE | Refills: 0 | Status: SHIPPED | OUTPATIENT
Start: 2020-07-09 | End: 2020-08-10

## 2020-07-09 ASSESSMENT — ENCOUNTER SYMPTOMS
EYE PAIN: 0
SHORTNESS OF BREATH: 0
BACK PAIN: 0
EYE REDNESS: 0
VOMITING: 0
WHEEZING: 0
ABDOMINAL PAIN: 0
NAUSEA: 0
COUGH: 0
COLOR CHANGE: 0

## 2020-07-09 NOTE — TELEPHONE ENCOUNTER
Pt called nurse line left a message stating\" I have tubes coming from my back. I have a window dressing on it. The dressing I think its causing me to have blisters around it. Please call back. \"       Valeriekarely Backers returned call to pt. Pt stated \"well I had the orders to change the dressing every 7th day using window dressing that has adhesive. With in an hour we noticed it started blistering and turning red. changed to guaze and paper tape. Window dressing helped keep tube in placed and allowed me to shower. Now with this guaze and paper tape I am unable to do that. \" [address] [place] [address] asked pt if area around the tube had any redness or blistering as well. Pt stated \"no the redness and blistering is only were the window dressing laid on. \" informed pt will discuss with Sofia PETE. Verbal understanding given call ended.            Pt is scheduled for IR on 7/23/2020

## 2020-07-09 NOTE — PROGRESS NOTES
Review of Systems   Constitutional: Negative for activity change, chills and fever. Eyes: Negative for pain, redness and visual disturbance. Respiratory: Negative for cough, shortness of breath and wheezing. Cardiovascular: Negative for chest pain and leg swelling. Gastrointestinal: Negative for abdominal pain, nausea and vomiting. Genitourinary: Negative for difficulty urinating, discharge, dysuria, flank pain, frequency, hematuria, scrotal swelling and testicular pain. Musculoskeletal: Negative for back pain, joint swelling and myalgias. Skin: Positive for wound (skin irriation from Tegaderm around neph tube). Negative for color change and rash. Neurological: Negative for dizziness, tremors and numbness. Hematological: Negative for adenopathy. Does not bruise/bleed easily.

## 2020-07-10 NOTE — PATIENT INSTRUCTIONS
cover area with 4x4'S and using paper tape to avoid further skin integrity compromise,  keep area clean and dry, routine dressing changes as discussed, try to avoid taping over areas of perviosu skin breakdown.     Report and redness, swelling, increased tenderness, fever or other changes     Neph tube change as scheduled.

## 2020-07-10 NOTE — PROGRESS NOTES
clearance, to be cleared pending cardiac echo, not yet schedule for this as of 10/30/19    Charcot foot due to diabetes mellitus ([address] 2014    LEFT    COPD (chronic obstructive pulmonary disease) ([address] 2009    INHALER USE DAILY    Diabetes mellitus ([address] 1989    IDDM, On Insulin    Diabetic neuropathy ([address]     Difficult intravenous access     VEINS ROLL    Employs prosthetic leg     s/p left BKA    Foot ulcer ([address] PRIOR TO 2015    BILAT    Full dentures     UPPER ONLY    Hyperlipidemia 2004    ON RX    Hypertension 2004    ON RX, PCP Dr. Almaz Al, seen Oct. 2019    Hypoglycemia 4/2/2015    MRSA (methicillin resistant staph aureus) culture positive 4/16/2015    ankle    OA (osteoarthritis)     Osteomyelitis ([address]     left stump  BKA    Paroxysmal atrial fibrillation ([address]     Renal mass 09/2019    ACCIDENTAL  FINDING IS FOLLOWING UP WITH KIDNEY DR Enoch Ledezma     Suspected sleep apnea     had a sleep study in august, says no results have been sent to PCP yet!     Thyroid disease 2004    PT HAD HYPERTHYROIDISM-UNCONTROLED THYROID DESTROYED WITH RADI IODINE NOW HAS HYPOTHYRIDISM    Vision abnormalities 09/2019    LEFT NO VISION    Vitreous hemorrhage of left eye due to diabetes mellitus ([address] 09/2019    s/p Vitrectomy 9/2019    Wears glasses     Wears partial dentures     upper     Past Surgical History:   Procedure Laterality Date    CARDIAC CATHETERIZATION      no stenting    CARDIOVASCULAR STRESS TEST  06/28/2019    small fixed apical, likely normal, EF 48%    COLONOSCOPY  06/17/2016    poor prep, done up to the IC valve, redundant colon    COLONOSCOPY  01/23/2017    VIRTUAL COLONOSCOPY DONE-no polyps or masses    CYSTOSCOPY Bilateral 6/16/2020    CYSTOSCOPY RETROGRADE PYELOGRAM URETEROSCOPY performed by Neno Archibald MD at [address] Right     r-bone removed    HC  PICC [address] DOUBLE  5/21/2018         KIDNEY SURGERY Right 11/13/2019    XI ROBOTIC PARTIAL NEPHRECTOMY, INTRAOP ULTRASOUND, RIGHT URETEROURETEROSTOMY, RIGHT URETERAL STENT PLACEMENT **SHORT STAY** performed by Percy Vizcarra MD at [facility] Left 4/29/15    OTHER SURGICAL HISTORY Left 5-5-15 and 11/2015    Revision BKA    OTHER SURGICAL HISTORY      left stump revision 5/30/2018    PARTIAL NEPHRECTOMY Right 11/13/2019    ROBOTIC PARTIAL NEPHRECTOMY    MT RE-AMPUTATION LOWER LEG Left 5/30/2018    LEG AMPUTATION BELOW KNEE REVISION performed by El Martínez MD at [address] Bilateral 80'S    TOE AMPUTATION      Right 2 and 4    URETEROURETEROSTOMY Right 11/13/2019    RIGHT URETEROURETEROSTOMY, RIGHT URETERAL STENT PLACEMENT     VITRECTOMY Left 9/25/2019    PARS PLANA VITRECTOMY 25 GAUGE, MEMBRANE PEELING, ENDOLASER 200  MW 1044 SPOTS, INDIRECT LASER 26 SPOTS performed by Cornelio Villagran MD at [facility] History   Problem Relation Age of Onset    Diabetes Mother     Hypertension Mother     Stomach Cancer Mother     Hypertension Father     Alcohol Abuse Father     COPD Father     Kidney Cancer Father     Diabetes Brother         IDDM-PUMP    Other Sister         BRAIN TUMOR     Outpatient Medications Marked as Taking for the 7/9/20 encounter (Office Visit) with RADHA London - CNP   Medication Sig Dispense Refill    pregabalin (LYRICA) 100 MG capsule TAKE 1 CAPSULE BY MOUTH THREE TIMES A DAY 90 capsule 0    albuterol sulfate  (90 Base) MCG/ACT inhaler INHALE 2 PUFFS INTO THE LUNGS EVERY 6 HOURS AS NEEDED FOR WHEEZING OR SHORTNESS OF BREATH 6.7 Inhaler 0    insulin lispro (HUMALOG) 100 UNIT/ML injection vial INJECT 12 UNITS UNDER THE SKIN 3 TIMES DAILY + SLIDING SCALE (TAKE 5 UNITS IF SUGAR IS OVER 150) 20 mL 3    acetaminophen (TYLENOL) 325 MG tablet Take 650 mg by mouth      glucagon, rDNA, 1 MG injection Inject 1 mL into the Status Former Smoker    Packs/day: 2.00    Years: 25.00    Pack years: 50.00    Types: Cigars    Start date: 1    Last attempt to quit: 2011    Years since quittin.1   Smokeless Tobacco Never Used   Tobacco Comment    quit in      (Ifpatient a smoker, smoking cessation counseling offered)    Social History     Substance and Sexual Activity   Alcohol Use Yes    Alcohol/week: 0.0 standard drinks    Frequency: Monthly or less    Comment: BEER 1 A MONTH       REVIEW OF SYSTEMS:  Review of Systems    Physical Exam:      Vitals:    20 1604   Temp: 97.7 °F (36.5 °C)     There is no height or weight on file to calculate BMI. Patient is a 61 y.o. male in no acute distress and alert and oriented to person, place and time. Physical Exam  Constitutional: Patient in no acute distress. Neuro: Alert and oriented to person, place and time. Psych: Mood normal, affect normal  Skin: around neph tube site various raised red areas outlining previous dressing site. No erythema or induration at insertion site or around insertion site, no drng from insertion site. HEENT: Head: Normocephalic andatraumatic  Conjunctivae and EOM are normal. Pupils are equal, round  Nose:Normal  Right External Ear: Normal; Left External Ear: Normal  Mouth: Mucosa Moist  Neck: Supple  Lungs: Respiratory effort is normal  Cardiovascular: Warm & Pink  Abdomen: Soft, non-tender, non-distended   Bladder non-tender and not distended. Musculoskeletal: Normal gait and station with amputation      Assessment and Plan      1. Skin breakdown           Plan:     cover area with 4x4'S and using paper tape to avoid further skin integrity compromise,  keep area clean and dry, routine dressing changes as discussed, try to avoid taping over areas of perviosu skin breakdown. Report and redness, swelling, increased tenderness, fever or other changes    Neph tube change as scheduled. No follow-ups on file.     Prescriptions Ordered:  No orders of the defined types were placed in this encounter. Orders Placed:  No orders of the defined types were placed in this encounter. RADHA Patino CNP    reviewed and Agree with the ROS entered by the MA.

## 2020-07-13 NOTE — TELEPHONE ENCOUNTER
Medication: levothyroxine  Last visit: 06/09/20  Next visit: Visit date not found  Last refill: 01/08/20  Pharmacy: [facility]

## 2020-07-14 RX ORDER — LEVOTHYROXINE SODIUM 0.15 MG/1
150 TABLET ORAL DAILY
Qty: 90 TABLET | Refills: 1 | Status: SHIPPED | OUTPATIENT
Start: 2020-07-14 | End: 2021-02-18

## 2020-07-16 ENCOUNTER — HOSPITAL ENCOUNTER (OUTPATIENT)
Dept: PREADMISSION TESTING | Age: 60
Discharge: HOME | End: 2020-07-20
Payer: COMMERCIAL

## 2020-07-16 VITALS
WEIGHT: 282 LBS | DIASTOLIC BLOOD PRESSURE: 53 MMHG | TEMPERATURE: 97.3 F | OXYGEN SATURATION: 98 % | BODY MASS INDEX: 34.34 KG/M2 | RESPIRATION RATE: 20 BRPM | SYSTOLIC BLOOD PRESSURE: 90 MMHG | HEIGHT: 76 IN | HEART RATE: 64 BPM

## 2020-07-16 LAB
ABO/RH: NORMAL
ANION GAP SERPL CALCULATED.3IONS-SCNC: 14 MMOL/L (ref 9–17)
ANTIBODY SCREEN: NEGATIVE
ARM BAND NUMBER: NORMAL
BUN BLDV-MCNC: 35 MG/DL (ref 8–23)
CHLORIDE BLD-SCNC: 112 MMOL/L (ref 98–107)
CO2: 24 MMOL/L (ref 20–31)
CREAT SERPL-MCNC: 1.95 MG/DL (ref 0.7–1.2)
EXPIRATION DATE: NORMAL
GFR AFRICAN AMERICAN: 43 ML/MIN
GFR NON-AFRICAN AMERICAN: 35 ML/MIN
GFR SERPL CREATININE-BSD FRML MDRD: ABNORMAL ML/MIN/{1.73_M2}
GFR SERPL CREATININE-BSD FRML MDRD: ABNORMAL ML/MIN/{1.73_M2}
GLUCOSE BLD-MCNC: 69 MG/DL (ref 70–99)
HCT VFR BLD CALC: 45.5 % (ref 40.7–50.3)
HEMOGLOBIN: 14.8 G/DL (ref 13–17)
POTASSIUM SERPL-SCNC: 4.8 MMOL/L (ref 3.7–5.3)
SODIUM BLD-SCNC: 150 MMOL/L (ref 135–144)

## 2020-07-16 PROCEDURE — 85014 HEMATOCRIT: CPT

## 2020-07-16 PROCEDURE — 84520 ASSAY OF UREA NITROGEN: CPT

## 2020-07-16 PROCEDURE — 82947 ASSAY GLUCOSE BLOOD QUANT: CPT

## 2020-07-16 PROCEDURE — 82565 ASSAY OF CREATININE: CPT

## 2020-07-16 PROCEDURE — 85018 HEMOGLOBIN: CPT

## 2020-07-16 PROCEDURE — 86900 BLOOD TYPING SEROLOGIC ABO: CPT

## 2020-07-16 PROCEDURE — 80051 ELECTROLYTE PANEL: CPT

## 2020-07-16 PROCEDURE — 93005 ELECTROCARDIOGRAM TRACING: CPT | Performed by: ANESTHESIOLOGY

## 2020-07-16 PROCEDURE — 86850 RBC ANTIBODY SCREEN: CPT

## 2020-07-16 PROCEDURE — 86901 BLOOD TYPING SEROLOGIC RH(D): CPT

## 2020-07-16 PROCEDURE — 36415 COLL VENOUS BLD VENIPUNCTURE: CPT

## 2020-07-16 PROCEDURE — 87086 URINE CULTURE/COLONY COUNT: CPT

## 2020-07-16 RX ORDER — SODIUM CHLORIDE 9 MG/ML
INJECTION, SOLUTION INTRAVENOUS CONTINUOUS
Status: CANCELLED | OUTPATIENT
Start: 2020-07-16

## 2020-07-16 NOTE — H&P
History and Physical    Pt Name: Rolando Farooq  MRN: [id number]  YOB: 1960  Date of evaluation: 7/16/2020    SUBJECTIVE:     History of Chief Complaint:    Patient complains of a history of a tumor of his right kidney last fall. He had a partial nephrectomy. He has since developed obstructing scar tissue in the right ureter. This was treated with placement of a right nephrostomy tube. He has been scheduled for replacement of the nephrostomy tube and subsequent procedures to relieve the stricture of his ureter. Past Medical History    has a past medical history of Asymptomatic bilateral carotid artery stenosis, Boil, thigh, CAD (coronary artery disease), Charcot foot due to diabetes mellitus ([address] COPD (chronic obstructive pulmonary disease) ([address] Diabetes mellitus ([address] Diabetic neuropathy ([address] Difficult intravenous access, Employs prosthetic leg, Foot ulcer ([address] Full dentures, Hyperlipidemia, Hypertension, Hypoglycemia, MRSA (methicillin resistant staph aureus) culture positive, OA (osteoarthritis), Osteomyelitis ([address] Paroxysmal atrial fibrillation ([address] Renal mass, Snores, Suspected sleep apnea, Thyroid disease, Vision abnormalities, Vitreous hemorrhage of left eye due to diabetes mellitus ([address] Wears glasses, Wears partial dentures, and Wellness examination. Past Surgical History   has a past surgical history that includes Foot surgery (Right); Toe amputation; Finger amputation (Right, 1995); Leg amputation below knee (Left, 4/29/15); other surgical history (Left, 5-5-15 and 11/2015); Temporomandibular joint surgery (Bilateral, 80'S); Colonoscopy (06/17/2016); Colonoscopy (01/23/2017);   picc powerpicc double (5/21/2018); Cardiac catheterization; other surgical history; pr re-amputation lower leg (Left, 5/30/2018); vitrectomy (Left, 9/25/2019); cardiovascular stress test (06/28/2019); Kidney surgery (Right, 11/13/2019); and Cystoscopy (Bilateral, 6/16/2020).   Medications    Current Outpatient Medications:     levothyroxine (SYNTHROID) 150 MCG tablet, Take 1 tablet by mouth Daily, Disp: 90 tablet, Rfl: 1    albuterol sulfate  (90 Base) MCG/ACT inhaler, INHALE 2 PUFFS INTO THE LUNGS EVERY 6 HOURS AS NEEDED FOR WHEEZING OR SHORTNESS OF BREATH, Disp: 6.7 Inhaler, Rfl: 0    insulin lispro (HUMALOG) 100 UNIT/ML injection vial, INJECT 12 UNITS UNDER THE SKIN 3 TIMES DAILY + SLIDING SCALE (TAKE 5 UNITS IF SUGAR IS OVER 150), Disp: 20 mL, Rfl: 3    glucagon, rDNA, 1 MG injection, Inject 1 mL into the muscle, Disp: , Rfl:     Blood Pressure KIT, 1 actuation by Does not apply route once a week, Disp: 1 kit, Rfl: 0    rOPINIRole (REQUIP) 2 MG tablet, TAKE 1 TABLET BY MOUTH EVERY DAY, Disp: 90 tablet, Rfl: 3    pregabalin (LYRICA) 100 MG capsule, TAKE 1 CAPSULE BY MOUTH 3 TIMES A DAY, Disp: 90 capsule, Rfl: 2    blood glucose test strips (ASCENSIA AUTODISC VI;ONE TOUCH ULTRA TEST VI) strip, 1 each by In Vitro route 4 times daily As needed. , Disp: 400 each, Rfl: 3    gemfibrozil (LOPID) 600 MG tablet, TAKE 1 TABLET BY MOUTH EVERY DAY, Disp: 90 tablet, Rfl: 3    blood glucose monitor strips, by Other route 4 times daily Test strips to use w/ Accucheck Smart View Glucometer (Patient taking differently: by Other route 4 times daily Test strips to use w/ ONE TOUCH VERIO TEST STRIPS), Disp: 100 strip, Rfl: 11    Insulin Syringe-Needle U-100 (ULTICARE INSULIN SYRINGE) 31G X 5/16\" 1 ML MISC, USE AS DIRECTED WITH HUMULIN AND HUMALOG INSULIN 5 TIMES DAILY, Disp: 1 each, Rfl: 3    HUMULIN N 100 UNIT/ML injection vial, INJECT 50 UNITS EVERY MORNING AND 70 UNITS AT BEDTIME (Patient taking differently: 60 units in the AM , 70 units in PM), Disp: 30 mL, Rfl: 11    isosorbide mononitrate (IMDUR) 30 MG extended release tablet, TAKE 1 TABLET BY MOUTH EVERY DAY, Disp: 90 tablet, Rfl: 3    Handicap Placard MISC, by Does not apply route Duration - 5years Reason- prosthetic leg, Disp: 1 each, Rfl: 0   atorvastatin (LIPITOR) 20 MG tablet, TAKE 1 TABLET BY MOUTH EVERY DAY, Disp: 90 tablet, Rfl: 3    tamsulosin (FLOMAX) 0.4 MG capsule, TAKE 1 CAPSULE BY MOUTH EVERY DAY, Disp: 90 capsule, Rfl: 3    propafenone (RYTHMOL) 150 MG tablet, Take 1 tablet by mouth every 8 hours, Disp: 90 tablet, Rfl: 3    SYMBICORT 160-4.5 MCG/ACT AERO, TAKE 2 PUFFS BY MOUTH TWICE A DAY (Patient taking differently: Inhale 2 puffs into the lungs 2 times daily ), Disp: 30.6 Inhaler, Rfl: 3    METOPROLOL TARTRATE PO, Take 50 mg by mouth 2 times daily, Disp: , Rfl:     Omega-3 Fatty Acids (FISH OIL CONCENTRATE) 300 MG CAPS, Take 300 mg by mouth nightly , Disp: , Rfl:     Glucosamine Sulfate 500 MG TABS, Take 500 mg by mouth 3 times daily, Disp: , Rfl:     pregabalin (LYRICA) 100 MG capsule, TAKE 1 CAPSULE BY MOUTH THREE TIMES A DAY, Disp: 90 capsule, Rfl: 0    ELIQUIS 5 MG TABS tablet, Take 1 tablet by mouth 2 times daily, Disp: , Rfl: 2  Allergies  is allergic to adhesive tape. Family History  family history includes Alcohol Abuse in his father; COPD in his father; Diabetes in his brother and mother; Hypertension in his father and mother; Kidney Cancer in his father; Other in his sister; Stomach Cancer in his mother. Social History   reports that he quit smoking about 9 years ago. His smoking use included cigars. He started smoking about 42 years ago. He has a 50.00 pack-year smoking history. He has never used smokeless tobacco.   reports current alcohol use. reports previous drug use.     Marital Status:   Children: two sons and five grandchildren  Occupation: disabled    Review of Systems  CONSTITUTIONAL:  negative   EYES:  glasses  HENT:  hearing loss  RESPIRATORY:  negative   CARDIOVASCULAR:  negative   GASTROINTESTINAL:  negative   GENITOURINARY:  negative   INTEGUMENT/BREAST:  negative   HEMATOLOGIC/LYMPHATIC:  negative   ALLERGIC/IMMUNOLOGIC:  Adhesive allergy  ENDOCRINE:  negative   MUSCULOSKELETAL:  Left BKA  NEUROLOGICAL:  negative   BEHAVIOR/PSYCH:  negative     OBJECTIVE:     VITALS:  height is 6' 4\" (1.93 m) and weight is 282 lb (127.9 kg). His temporal temperature is 97.3 °F (36.3 °C). His blood pressure is 90/53 (abnormal) and his pulse is 64. His respiration is 20 and oxygen saturation is 98%. CONSTITUTIONAL: Alert & oriented x 3, no acute distress. Prosthetic left lower leg. Walks with a cane. SKIN:  Warm and dry, no rash or erythema. HEAD:  Normocephalic, atraumatic. EYES: PERRLA. EOMs intact. EARS:  Hearing grossly normal.    NOSE:  Nares patent. Septum midline. No rhinorrhea   MOUTH/THROAT:  Unremarkable   NECK: Supple with no lymphadenopathy. LUNGS: Clear to auscultation throughout. No wheezes, rales or rhonchi. CARDIOVASCULAR: Heart rate regular. Rhythm without murmur, click, gallop or rub. ABDOMEN: Soft, non tender, non distended, no masses or organomegaly. EXTREMITIES: No clubbing, cyanosis or edema. TESTING:     EK2020  Labs pending: drawn 2020     IMPRESSION:   1. Right ureteral stricture  2.  Right renal cancer  3.  has a past medical history of Asymptomatic bilateral carotid artery stenosis, Boil, thigh (10/2019), CAD (coronary artery disease), Charcot foot due to diabetes mellitus ([address] (), COPD (chronic obstructive pulmonary disease) ([address] (), Diabetes mellitus ([address] (), Diabetic neuropathy ([address] Difficult intravenous access, Employs prosthetic leg, Foot ulcer ([address] (PRIOR TO ), Full dentures, Hyperlipidemia (), Hypertension (), Hypoglycemia (2015), MRSA (methicillin resistant staph aureus) culture positive (2015), OA (osteoarthritis), Osteomyelitis ([address] Paroxysmal atrial fibrillation ([address] Renal mass (2019), Snores, Suspected sleep apnea, Thyroid disease (), Vision abnormalities (2019), Vitreous hemorrhage of left eye due to diabetes mellitus ([address] (2019), Wears glasses, Wears partial dentures, and

## 2020-07-16 NOTE — PROGRESS NOTES
Anesthesia Focused Assessment    STOP-BANG Sleep Apnea Questionnaire    SNORE loudly (heard through closed doors)? Yes  TIRED, fatigued, sleepy during daytime? No  OBSERVED stopping breathing during sleep? No  High blood PRESSURE being treated? Yes    BMI over 35? No  AGE over 48? Yes  NECK circumference over 16\"? No  GENDER (male)? Yes             Total 4  High risk 5-8  Intermediate risk 3-4  Low risk 0-2    Obstructive Sleep Apnea: no  If YES, machine used: no     Type 1 DM:   no  T2DM:  yes    Coronary Artery Disease:  yes  Hypertension:  yes    Active smoker:  Quit in 2011  Drinks Alcohol:  3-4 beers per month    Dentition: Full upper denture    Defib / AICD / Pacemaker: no      Renal Failure/dialysis:  no    Patient was evaluated in PAT & anesthesia guidelines were applied. NPO guidelines, medication instructions and scheduled arrival time were reviewed with patient.     Hx of anesthesia complications:  no  Family hx of anesthesia complications:  no                                                                                                                     Anesthesia contacted:   no  Medical or cardiac clearance ordered: on file    МАРИНА Galan  7/16/20  1:37 PM

## 2020-07-17 ENCOUNTER — OFFICE VISIT (OUTPATIENT)
Dept: INTERNAL MEDICINE CLINIC | Age: 60
End: 2020-07-17
Payer: COMMERCIAL

## 2020-07-17 VITALS
HEIGHT: 76 IN | SYSTOLIC BLOOD PRESSURE: 110 MMHG | BODY MASS INDEX: 32.88 KG/M2 | WEIGHT: 270 LBS | DIASTOLIC BLOOD PRESSURE: 66 MMHG | OXYGEN SATURATION: 97 % | TEMPERATURE: 97.8 F | HEART RATE: 89 BPM

## 2020-07-17 PROBLEM — N13.30 HYDRONEPHROSIS OF RIGHT KIDNEY: Status: ACTIVE | Noted: 2020-07-17

## 2020-07-17 PROBLEM — Z89.512 STATUS POST BELOW-KNEE AMPUTATION OF LEFT LOWER EXTREMITY (HCC): Status: ACTIVE | Noted: 2020-07-17

## 2020-07-17 PROBLEM — L02.416 CELLULITIS AND ABSCESS OF LEFT LEG: Status: RESOLVED | Noted: 2018-05-04 | Resolved: 2020-07-17

## 2020-07-17 PROBLEM — L97.922 NON-HEALING ULCER OF LOWER LEG, LEFT, WITH FAT LAYER EXPOSED (HCC): Status: RESOLVED | Noted: 2018-05-18 | Resolved: 2020-07-17

## 2020-07-17 PROBLEM — I48.91 ATRIAL FIBRILLATION (HCC): Status: ACTIVE | Noted: 2020-07-17

## 2020-07-17 PROBLEM — I95.0 IDIOPATHIC HYPOTENSION: Status: ACTIVE | Noted: 2020-07-17

## 2020-07-17 PROBLEM — L03.116 CELLULITIS AND ABSCESS OF LEFT LEG: Status: RESOLVED | Noted: 2018-05-04 | Resolved: 2020-07-17

## 2020-07-17 LAB
EKG ATRIAL RATE: 64 BPM
EKG P AXIS: 43 DEGREES
EKG P-R INTERVAL: 180 MS
EKG Q-T INTERVAL: 408 MS
EKG QRS DURATION: 96 MS
EKG QTC CALCULATION (BAZETT): 420 MS
EKG R AXIS: 30 DEGREES
EKG T AXIS: 72 DEGREES
EKG VENTRICULAR RATE: 64 BPM

## 2020-07-17 PROCEDURE — G8427 DOCREV CUR MEDS BY ELIG CLIN: HCPCS | Performed by: INTERNAL MEDICINE

## 2020-07-17 PROCEDURE — 93010 ELECTROCARDIOGRAM REPORT: CPT | Performed by: INTERNAL MEDICINE

## 2020-07-17 PROCEDURE — 2022F DILAT RTA XM EVC RTNOPTHY: CPT | Performed by: INTERNAL MEDICINE

## 2020-07-17 PROCEDURE — 99214 OFFICE O/P EST MOD 30 MIN: CPT | Performed by: INTERNAL MEDICINE

## 2020-07-17 PROCEDURE — 3017F COLORECTAL CA SCREEN DOC REV: CPT | Performed by: INTERNAL MEDICINE

## 2020-07-17 PROCEDURE — 3044F HG A1C LEVEL LT 7.0%: CPT | Performed by: INTERNAL MEDICINE

## 2020-07-17 PROCEDURE — G8417 CALC BMI ABV UP PARAM F/U: HCPCS | Performed by: INTERNAL MEDICINE

## 2020-07-17 PROCEDURE — 1036F TOBACCO NON-USER: CPT | Performed by: INTERNAL MEDICINE

## 2020-07-17 ASSESSMENT — ENCOUNTER SYMPTOMS
DIARRHEA: 0
SHORTNESS OF BREATH: 0
BLOOD IN STOOL: 0
EYE DISCHARGE: 0
ABDOMINAL DISTENTION: 0
WHEEZING: 0
EYE PAIN: 0
COLOR CHANGE: 0
COUGH: 0
TROUBLE SWALLOWING: 0

## 2020-07-17 NOTE — PROGRESS NOTES
[address]  [place] [address]  Dept: [phone]  Dept Fax: [phone]    Jia Hauser is a 61 y.o. male who presents today for his medical conditions/complaintsas noted below. Jia Hauser is c/o of   Chief Complaint   Patient presents with    Hypotension     needs clearance     Irregular Heart Beat     HCC GAP         HPI:     Diabetes   Duration more than 7 years  Modifying factors on Glucophage and other med  Severity uncontrolled sever  Associated signs and symtoms neuropathy/ckd/ CAD. aggravated with sugar diet and better with low sugar diet     HTN  Onset more than 2 years ago  gustavo mild to mod  Controlled with current po meds  Not associated with headaches or blurry vision  No chest pain  Low bp now  Dizzy due to low bp      Hemoglobin A1C (%)   Date Value   01/13/2020 6.5 (H)   05/01/2019 7.0 (H)   01/15/2019 7.0 (H)             ( goal A1Cis < 7)   Microalb/Crt.  Ratio (mcg/mg creat)   Date Value   01/13/2020 82 (H)     LDL Cholesterol (mg/dL)   Date Value   01/13/2020 70   05/21/2019 68   03/09/2018 56       (goal LDL is <100)   AST (U/L)   Date Value   05/21/2020 24     ALT (U/L)   Date Value   05/21/2020 19     BUN (mg/dL)   Date Value   07/16/2020 35 (H)     BP Readings from Last 3 Encounters:   07/17/20 110/66   07/16/20 (!) 90/53   06/26/20 134/78          (goal 120/80)    Past Medical History:   Diagnosis Date    Asymptomatic bilateral carotid artery stenosis     Boil, thigh 10/2019    10/30/19: right inner thigh region, says PCP Rxd Doxy for this, area is much better, has a couple days left to complete Doxy    CAD (coronary artery disease)     saw Dr. Nhan Nunes ([facility]) on Oct. 22/2019 for pre-op clearance, to be cleared pending cardiac echo, not yet schedule for this as of 10/30/19    Charcot foot due to diabetes mellitus ([address] 2014    LEFT    COPD (chronic obstructive pulmonary disease) ([facility] 2009    INHALER USE DAILY    Diabetes mellitus ([address] 1989    IDDM, On Insulin Dr. Delfina Dior Diabetic neuropathy ([address]     Difficult intravenous access     VEINS ROLL    Employs prosthetic leg     s/p left BKA    Foot ulcer ([address] PRIOR TO 2015    BILAT    Full dentures     UPPER ONLY    Hyperlipidemia 2004    ON RX    Hypertension 2004    ON RX, PCP Dr. Malathi Hoff, seen Oct. 2019    Hypoglycemia 4/2/2015    MRSA (methicillin resistant staph aureus) culture positive 4/16/2015    ankle    OA (osteoarthritis)     Osteomyelitis ([address]     left stump  BKA    Paroxysmal atrial fibrillation ([address]     Renal mass 09/2019    ACCIDENTAL  FINDING IS FOLLOWING UP WITH KIDNEY DR Ascencion Ledezma     Suspected sleep apnea     had a sleep study in august, says no results have been sent to PCP yet!     Thyroid disease 2004    PT HAD HYPERTHYROIDISM-UNCONTROLED THYROID DESTROYED WITH RADI IODINE NOW HAS HYPOTHYRIDISM    Vision abnormalities 09/2019    LEFT NO VISION    Vitreous hemorrhage of left eye due to diabetes mellitus ([address] 09/2019    s/p Vitrectomy 9/2019    Wears glasses     Wears partial dentures     full upper, does not wear lower partial    Wellness examination     Dr. Mendy Desai seen within last 1 1/2 mos      Past Surgical History:   Procedure Laterality Date    CARDIAC CATHETERIZATION      no stenting    CARDIOVASCULAR STRESS TEST  06/28/2019    small fixed apical, likely normal, EF 48%    COLONOSCOPY  06/17/2016    poor prep, done up to the IC valve, redundant colon    COLONOSCOPY  01/23/2017    VIRTUAL COLONOSCOPY DONE-no polyps or masses    CYSTOSCOPY Bilateral 6/16/2020    CYSTOSCOPY RETROGRADE PYELOGRAM URETEROSCOPY performed by La Freitas MD at [address] Right     r-bone removed    [facility]  PICC [address] DOUBLE  5/21/2018         KIDNEY SURGERY Right 11/13/2019    XI ROBOTIC PARTIAL NEPHRECTOMY, INTRAOP ULTRASOUND, RIGHT URETEROURETEROSTOMY, RIGHT URETERAL STENT PLACEMENT **SHORT STAY** performed by Reinaldo Bennett MD at [address] Left 4/29/15    OTHER SURGICAL HISTORY Left 5-5-15 and 2015    Revision BKA    OTHER SURGICAL HISTORY      left stump revision 2018    UT RE-AMPUTATION LOWER LEG Left 2018    LEG AMPUTATION BELOW KNEE REVISION performed by Tello Castanon MD at [address] Bilateral 80'S    TOE AMPUTATION      Right 2 and 4    VITRECTOMY Left 2019    PARS PLANA VITRECTOMY 25 GAUGE, MEMBRANE PEELING, ENDOLASER 200  MW 1044 SPOTS, INDIRECT LASER 26 SPOTS performed by Joseph Patel MD at [place] History   Problem Relation Age of Onset    Diabetes Mother     Hypertension Mother     Stomach Cancer Mother     Hypertension Father     Alcohol Abuse Father     COPD Father     Kidney Cancer Father     Diabetes Brother         IDDM-PUMP    Other Sister         BRAIN TUMOR       Social History     Tobacco Use    Smoking status: Former Smoker     Packs/day: 2.00     Years: 25.00     Pack years: 50.00     Types: Cigars     Start date:      Last attempt to quit: 2011     Years since quittin.1    Smokeless tobacco: Never Used    Tobacco comment: quit in    Substance Use Topics    Alcohol use:  Yes     Alcohol/week: 0.0 standard drinks     Frequency: Monthly or less     Comment: BEER 1 A MONTH      Current Outpatient Medications   Medication Sig Dispense Refill    levothyroxine (SYNTHROID) 150 MCG tablet Take 1 tablet by mouth Daily 90 tablet 1    pregabalin (LYRICA) 100 MG capsule TAKE 1 CAPSULE BY MOUTH THREE TIMES A DAY 90 capsule 0    albuterol sulfate  (90 Base) MCG/ACT inhaler INHALE 2 PUFFS INTO THE LUNGS EVERY 6 HOURS AS NEEDED FOR WHEEZING OR SHORTNESS OF BREATH 6.7 Inhaler 0    insulin lispro (HUMALOG) 100 UNIT/ML injection vial INJECT 12 UNITS UNDER THE SKIN 3 TIMES DAILY + SLIDING SCALE (TAKE 5 UNITS IF SUGAR IS OVER 150) 20 mL 3    glucagon, rDNA, 1 MG injection Inject 1 mL into the muscle      Blood Pressure KIT 1 actuation by Does not apply route once a week 1 kit 0    rOPINIRole (REQUIP) 2 MG tablet TAKE 1 TABLET BY MOUTH EVERY DAY 90 tablet 3    blood glucose test strips (ASCENSIA AUTODISC VI;ONE TOUCH ULTRA TEST VI) strip 1 each by In Vitro route 4 times daily As needed.  400 each 3    gemfibrozil (LOPID) 600 MG tablet TAKE 1 TABLET BY MOUTH EVERY DAY 90 tablet 3    blood glucose monitor strips by Other route 4 times daily Test strips to use w/ AcceCardio Smart View Glucometer (Patient taking differently: by Other route 4 times daily Test strips to use w/ ONE TOUCH VERIO TEST STRIPS) 100 strip 11    Insulin Syringe-Needle U-100 (ULTICARE INSULIN SYRINGE) 31G X 5/16\" 1 ML MISC USE AS DIRECTED WITH HUMULIN AND HUMALOG INSULIN 5 TIMES DAILY 1 each 3    HUMULIN N 100 UNIT/ML injection vial INJECT 50 UNITS EVERY MORNING AND 70 UNITS AT BEDTIME (Patient taking differently: 60 units in the AM , 70 units in PM) 30 mL 11    isosorbide mononitrate (IMDUR) 30 MG extended release tablet TAKE 1 TABLET BY MOUTH EVERY DAY 90 tablet 3    Handicap Placard MISC by Does not apply route Duration - 5years  Reason- prosthetic leg 1 each 0    ELIQUIS 5 MG TABS tablet Take 1 tablet by mouth 2 times daily  2    atorvastatin (LIPITOR) 20 MG tablet TAKE 1 TABLET BY MOUTH EVERY DAY 90 tablet 3    tamsulosin (FLOMAX) 0.4 MG capsule TAKE 1 CAPSULE BY MOUTH EVERY DAY 90 capsule 3    propafenone (RYTHMOL) 150 MG tablet Take 1 tablet by mouth every 8 hours 90 tablet 3    SYMBICORT 160-4.5 MCG/ACT AERO TAKE 2 PUFFS BY MOUTH TWICE A DAY (Patient taking differently: Inhale 2 puffs into the lungs 2 times daily ) 30.6 Inhaler 3    METOPROLOL TARTRATE PO Take 50 mg by mouth 2 times daily      Omega-3 Fatty Acids (FISH OIL CONCENTRATE) 300 MG CAPS Take 300 mg by mouth nightly       Glucosamine Sulfate 500 MG TABS Take 500 mg by mouth 3 times daily      pregabalin (LYRICA) 100 MG capsule TAKE 1 CAPSULE BY MOUTH 3 TIMES A DAY 90 capsule 2     No current facility-administered medications for this visit. Allergies   Allergen Reactions    Adhesive Tape      tegaderm causes blisters. Use paper tape       Health Maintenance   Topic Date Due    HIV screen  01/07/1975    DTaP/Tdap/Td vaccine (1 - Tdap) 01/07/1979    Shingles Vaccine (1 of 2) 10/23/2014    Diabetic retinal exam  01/04/2017    PSA counseling  10/23/2018    Flu vaccine (1) 09/01/2020    Diabetic foot exam  11/07/2020    A1C test (Diabetic or Prediabetic)  01/13/2021    Diabetic microalbuminuria test  01/13/2021    Lipid screen  01/13/2021    Low dose CT lung screening  01/21/2021    TSH testing  06/08/2021    Potassium monitoring  07/16/2021    Creatinine monitoring  07/16/2021    Colon cancer screen colonoscopy  01/23/2027    Pneumococcal 0-64 years Vaccine  Completed    Hepatitis C screen  Completed    Hepatitis A vaccine  Aged Out    Hib vaccine  Aged Out    Meningococcal (ACWY) vaccine  Aged Out       Subjective:     Review of Systems   Constitutional: Negative for appetite change, diaphoresis and fatigue. HENT: Negative for ear discharge and trouble swallowing. Eyes: Negative for pain and discharge. Respiratory: Negative for cough, shortness of breath and wheezing. Cardiovascular: Negative for chest pain and palpitations. Gastrointestinal: Negative for abdominal distention, blood in stool and diarrhea. Endocrine: Negative for polydipsia and polyphagia. Genitourinary: Negative for difficulty urinating and frequency. Musculoskeletal: Positive for arthralgias. Negative for gait problem, myalgias and neck pain. Skin: Negative for color change and rash. Allergic/Immunologic: Negative for environmental allergies and food allergies. Neurological: Positive for dizziness and numbness. Negative for headaches.    Hematological: Negative for Coordination: Coordination normal.   Psychiatric:         Mood and Affect: Mood is not anxious. Affect is not angry. Speech: Speech is not slurred. Behavior: Behavior normal. Behavior is not aggressive. Thought Content: Thought content does not include homicidal ideation. Cognition and Memory: Memory is not impaired. /66   Pulse 89   Temp 97.8 °F (36.6 °C)   Ht 6' 4\" (1.93 m)   Wt 270 lb (122.5 kg)   SpO2 97%   BMI 32.87 kg/m²     Assessment:       Diagnosis Orders   1. Idiopathic hypotension     2. Type 2 diabetes mellitus with diabetic polyneuropathy, with long-term current use of insulin ([address]     3. PVD (peripheral vascular disease) ([address]     4. Diabetic mononeuropathy associated with diabetes mellitus due to underlying condition ([address]     5. Coronary artery disease with angina pectoris, unspecified vessel or lesion type, unspecified whether native or transplanted heart ([address]     6. Amputation below knee ([address]     7. Acquired hypothyroidism     8. Hydronephrosis of right kidney     9. MARK (acute kidney injury) ([address]     10. Dizziness     11. Status post below-knee amputation of left lower extremity ([address]     12. Atrial fibrillation, unspecified type ([address]               Plan:      No follow-ups on file. No orders of the defined types were placed in this encounter. No orders of the defined types were placed in this encounter.    low bp leading to dizzienss  Will cut back on lopressor to 25 bid   And if needed imdur to 15 bid  Form 30  Written instruction gtiven to pt  Right  hydrneprhos due to scarred ureter  Due to surg robitic end of the month with dr Laura Méndez  Medically clear for surg intermediate risk  Cardio clearance given by dr Costa Axon due to above obstrution  Need above procedure  baselien cr is 1.19 now  1.90   No nsaids  Dm is better  Will rev after surg  Stop eliuquis a four days before  Start next day if no bleeding  psa per urologist     Diagnosis Orders   1. Idiopathic hypotension     2. Type 2 diabetes mellitus with diabetic polyneuropathy, with long-term current use of insulin ([facility]     3. PVD (peripheral vascular disease) ([facility] [facility]     4. Diabetic mononeuropathy associated with diabetes mellitus due to underlying condition ([facility] [facility]     5. Coronary artery disease with angina pectoris, unspecified vessel or lesion type, unspecified whether native or transplanted heart ([facility] [facility]     6. Amputation below knee ([facility] [address]     7. Acquired hypothyroidism     8. Hydronephrosis of right kidney     9. MARK (acute kidney injury) ([facility] [address]     10. Dizziness     11. Status post below-knee amputation of left lower extremity (HCC)  Post bka with prosthesis   12. Atrial fibrillation, unspecified [facility]  On propaefone and eliquis          Patient given educational materials - see patient instructions. Discussed use, benefit, and side effects of prescribed medications. All patientquestions answered. Pt voiced understanding. Reviewed health maintenance. Instructedto continue current medications, diet and exercise. Patient agreed with treatmentplan. Follow up as directed. Electronically signed by Tim Drew MD on 7/17/2020 at 9:38 AMVisit Information    Have you changed or started any medications since your last visit including any over-the-counter medicines, vitamins, or herbal medicines? no   Are you having any side effects from any of your medications? -  no  Have you stopped taking any of your medications? Is so, why? -  no    Have you seen any other physician or provider since your last visit? Yes - Records Obtained  Have you had any other diagnostic tests since your last visit? Yes - Records Obtained  Have you been seen in the emergency room and/or had an admission to a hospital since we last saw you? No  Have you had your routine dental cleaning in the past 6 months? no    Have you activated your [employer] account? If not, what are your barriers?  Yes     Patient Care

## 2020-07-18 LAB
CULTURE: NORMAL
Lab: NORMAL
SPECIMEN DESCRIPTION: NORMAL

## 2020-07-19 ENCOUNTER — HOSPITAL ENCOUNTER (OUTPATIENT)
Dept: PREADMISSION TESTING | Age: 60
Setting detail: SPECIMEN
Discharge: HOME | End: 2020-07-23
Payer: COMMERCIAL

## 2020-07-19 PROCEDURE — U0003 INFECTIOUS AGENT DETECTION BY NUCLEIC ACID (DNA OR RNA); SEVERE ACUTE RESPIRATORY SYNDROME CORONAVIRUS 2 (SARS-COV-2) (CORONAVIRUS DISEASE [COVID-19]), AMPLIFIED PROBE TECHNIQUE, MAKING USE OF HIGH THROUGHPUT TECHNOLOGIES AS DESCRIBED BY CMS-2020-01-R: HCPCS

## 2020-07-21 LAB — SARS-COV-2, NAA: NOT DETECTED

## 2020-07-22 RX ORDER — CIPROFLOXACIN 2 MG/ML
400 INJECTION, SOLUTION INTRAVENOUS
Status: CANCELLED | OUTPATIENT
Start: 2020-07-22 | End: 2020-07-22

## 2020-07-22 RX ORDER — SODIUM CHLORIDE 9 MG/ML
INJECTION, SOLUTION INTRAVENOUS CONTINUOUS
Status: CANCELLED | OUTPATIENT
Start: 2020-07-22

## 2020-07-23 ENCOUNTER — HOSPITAL ENCOUNTER (OUTPATIENT)
Dept: INTERVENTIONAL RADIOLOGY/VASCULAR | Age: 60
Discharge: HOME | End: 2020-07-25
Payer: COMMERCIAL

## 2020-07-23 VITALS
HEART RATE: 71 BPM | DIASTOLIC BLOOD PRESSURE: 76 MMHG | WEIGHT: 275 LBS | TEMPERATURE: 97.3 F | RESPIRATION RATE: 20 BRPM | SYSTOLIC BLOOD PRESSURE: 148 MMHG | BODY MASS INDEX: 33.49 KG/M2 | HEIGHT: 76 IN | OXYGEN SATURATION: 96 %

## 2020-07-23 PROCEDURE — 2580000003 HC RX 258: Performed by: RADIOLOGY

## 2020-07-23 PROCEDURE — 2709999900 IR TUBE/CATH CHANGE W CONTRAST

## 2020-07-23 PROCEDURE — 2500000003 HC RX 250 WO HCPCS: Performed by: RADIOLOGY

## 2020-07-23 PROCEDURE — 6360000002 HC RX W HCPCS: Performed by: RADIOLOGY

## 2020-07-23 PROCEDURE — 6360000004 HC RX CONTRAST MEDICATION: Performed by: UROLOGY

## 2020-07-23 PROCEDURE — 50435 EXCHANGE NEPHROSTOMY CATH: CPT | Performed by: RADIOLOGY

## 2020-07-23 PROCEDURE — 7100000030 HC ASPR PHASE II RECOVERY - FIRST 15 MIN

## 2020-07-23 PROCEDURE — 7100000031 HC ASPR PHASE II RECOVERY - ADDTL 15 MIN

## 2020-07-23 RX ORDER — CIPROFLOXACIN 2 MG/ML
400 INJECTION, SOLUTION INTRAVENOUS
Status: COMPLETED | OUTPATIENT
Start: 2020-07-23 | End: 2020-07-23

## 2020-07-23 RX ORDER — ACETAMINOPHEN 325 MG/1
650 TABLET ORAL EVERY 4 HOURS PRN
Status: DISCONTINUED | OUTPATIENT
Start: 2020-07-23 | End: 2020-07-26 | Stop reason: HOSPADM

## 2020-07-23 RX ORDER — LIDOCAINE HYDROCHLORIDE 10 MG/ML
INJECTION, SOLUTION INFILTRATION; PERINEURAL
Status: COMPLETED | OUTPATIENT
Start: 2020-07-23 | End: 2020-07-23

## 2020-07-23 RX ORDER — SODIUM CHLORIDE 9 MG/ML
INJECTION, SOLUTION INTRAVENOUS CONTINUOUS
Status: DISCONTINUED | OUTPATIENT
Start: 2020-07-23 | End: 2020-07-26 | Stop reason: HOSPADM

## 2020-07-23 RX ADMIN — CIPROFLOXACIN 400 MG: 2 INJECTION, SOLUTION INTRAVENOUS at 10:57

## 2020-07-23 RX ADMIN — LIDOCAINE HYDROCHLORIDE 5 ML: 10 INJECTION, SOLUTION INFILTRATION; PERINEURAL at 12:24

## 2020-07-23 RX ADMIN — SODIUM CHLORIDE: 9 INJECTION, SOLUTION INTRAVENOUS at 10:56

## 2020-07-23 RX ADMIN — IOVERSOL 50 ML: 678 INJECTION INTRA-ARTERIAL; INTRAVENOUS at 14:07

## 2020-07-23 ASSESSMENT — PAIN SCALES - GENERAL: PAINLEVEL_OUTOF10: 0

## 2020-07-23 NOTE — H&P
HISTORY and [facility]47       NAME:  Latoya Méndez  MRN: [id number]   YOB: 1960   Date: 7/23/2020   Age: 61 y.o. Gender: male       COMPLAINT AND PRESENT HISTORY:     Latoya Méndez is 61 y.o.,  male, here for IR nephrostomy tube exchange. Pt initially had nephrostomy tube placed in June 2020. History of hydronephrosis. Pt had partial right nephrectomy. Reports urinary frequency, urgency, nocturia x 2 per night. Denies dysuria, hematuria. Denies flank pain. Redness and blistering around nephrostomy site. Pt reports Tegaderm irritating to skin. Pt reports moderate output from nephrostomy tube. BKA left leg. Using cane. NPO. Reports took morning medications. No blood thinners in last 7 days. Denies any current chest pain/pressure, palpitations, recent URI, nausea, vomiting, diarrhea, constipation, fever or chills. History of COPD with chronic SOB. COVID testing negative on 07/19/2020.      PAST MEDICAL HISTORY     Past Medical History:   Diagnosis Date    Asymptomatic bilateral carotid artery stenosis     Boil, thigh 10/2019    10/30/19: right inner thigh region, says PCP Rxd Doxy for this, area is much better, has a couple days left to complete Doxy    CAD (coronary artery disease)     saw Dr. Valentin Ko ([facility]) on Oct. 22/2019 for pre-op clearance, to be cleared pending cardiac echo, not yet schedule for this as of 10/30/19    Charcot foot due to diabetes mellitus ([address] 2014    LEFT    COPD (chronic obstructive pulmonary disease) ([address] 2009    INHALER USE DAILY    Diabetes mellitus ([address] 1989    IDDM, On Insulin Dr. Dejuan Medrano Diabetic neuropathy ([address]     Difficult intravenous access     VEINS ROLL    Employs prosthetic leg     s/p left BKA    Foot ulcer ([address] PRIOR TO 2015    BILAT    Full dentures     UPPER ONLY    Hyperlipidemia 2004    ON RX    Hypertension 2004    ON RX, PCP Dr. Amirah Suero, seen Oct. 2019    Hypoglycemia 4/2/2015    MRSA (methicillin resistant staph aureus) culture positive 4/16/2015    ankle    OA (osteoarthritis)     Osteomyelitis (HCC)     left stump  BKA    [facility]     Renal mass 09/2019    ACCIDENTAL  FINDING IS FOLLOWING UP WITH KIDNEY DR Lalo Ledezma     Suspected sleep apnea     had a sleep study in august, says no results have been sent to PCP yet!     Thyroid disease 2004    PT HAD HYPERTHYROIDISM-UNCONTROLED THYROID DESTROYED WITH RADI IODINE NOW HAS HYPOTHYRIDISM    Vision abnormalities 09/2019    LEFT NO VISION    Vitreous hemorrhage of left eye due to diabetes mellitus ([facility] 09/2019    s/p Vitrectomy 9/2019    Wears glasses     Wears partial dentures     full upper, does not wear lower partial    Wellness examination     Dr. Helena Vásquez seen within last 1 1/2 mos       SURGICAL HISTORY       Past Surgical History:   Procedure Laterality Date    CARDIAC CATHETERIZATION      no stenting    CARDIOVASCULAR STRESS TEST  06/28/2019    small fixed apical, likely normal, EF 48%    COLONOSCOPY  06/17/2016    poor prep, done up to the IC valve, redundant colon    COLONOSCOPY  01/23/2017    VIRTUAL COLONOSCOPY DONE-no polyps or masses    CYSTOSCOPY Bilateral 6/16/2020    CYSTOSCOPY RETROGRADE PYELOGRAM URETEROSCOPY performed by Hanny Montgomery MD at [facility] [address] [place] [address] Right     r-bone removed    HC  PICC [address] DOUBLE  5/21/2018         KIDNEY SURGERY Right 11/13/2019    XI ROBOTIC PARTIAL NEPHRECTOMY, INTRAOP ULTRASOUND, RIGHT URETEROURETEROSTOMY, RIGHT URETERAL STENT PLACEMENT **SHORT STAY** performed by Hanny Montgomery MD at [address] Left 4/29/15    OTHER SURGICAL HISTORY Left 5-5-15 and 11/2015    Revision BKA    OTHER SURGICAL HISTORY      left stump revision 5/30/2018    WA RE-AMPUTATION LOWER LEG Left 5/30/2018    LEG AMPUTATION BELOW KNEE REVISION performed by Alexia Shahid Vani Noonan MD at [address] Bilateral 80'S    TOE AMPUTATION      Right 2 and 4    VITRECTOMY Left 2019    PARS PLANA VITRECTOMY 25 GAUGE, MEMBRANE PEELING, ENDOLASER 200  MW 1044 SPOTS, INDIRECT LASER 26 SPOTS performed by Luann Monique MD at [facility] 71 History   Problem Relation Age of Onset    Diabetes Mother     Hypertension Mother     Stomach Cancer Mother     Hypertension Father     Alcohol Abuse Father     COPD Father     Kidney Cancer Father     Diabetes Brother         IDDM-PUMP    Other Sister         BRAIN TUMOR       SOCIAL HISTORY       Social History     Socioeconomic History    Marital status:      Spouse name: Kraig Ibarra Number of children: 2    Years of education: Not on file    Highest education level: Not on file   Occupational History    Occupation: Disabled   Social Needs    Financial resource strain: Not on file    Food insecurity     Worry: Not on file     Inability: Not on file   [employer] needs     Medical: Not on file     Non-medical: Not on file   Tobacco Use    Smoking status: Former Smoker     Packs/day: 2.00     Years: 25.00     Pack years: 50.00     Types: Cigars     Start date:      Last attempt to quit: 2011     Years since quittin.1    Smokeless tobacco: Never Used    Tobacco comment: quit in    Substance and Sexual Activity    Alcohol use:  Yes     Alcohol/week: 0.0 standard drinks     Frequency: Monthly or less     Comment: BEER 1 A MONTH    Drug use: Not Currently    Sexual activity: Yes     Partners: Female   Lifestyle    Physical activity     Days per week: Not on file     Minutes per session: Not on file    Stress: Not on file   Relationships    Social connections     Talks on phone: Not on file     Gets together: Not on file     Attends Mu-ism service: Not on file     Active member of club or organization: Not on file     Attends meetings TARTRATE PO Take 50 mg by mouth 2 times daily      Omega-3 Fatty Acids (FISH OIL CONCENTRATE) 300 MG CAPS Take 300 mg by mouth nightly       Glucosamine Sulfate 500 MG TABS Take 500 mg by mouth 3 times daily      glucagon, rDNA, 1 MG injection Inject 1 mL into the muscle      Blood Pressure KIT 1 actuation by Does not apply route once a week 1 kit 0    blood glucose test strips (ASCENSIA AUTODISC VI;ONE TOUCH ULTRA TEST VI) strip 1 each by In Vitro route 4 times daily As needed. 400 each 3    blood glucose monitor strips by Other route 4 times daily Test strips to use w/ Accucheck Smart View Glucometer (Patient taking differently: by Other route 4 times daily Test strips to use w/ ONE TOUCH VERIO TEST STRIPS) 100 strip 11    Insulin Syringe-Needle U-100 (ULTICARE INSULIN SYRINGE) 31G X 5/16\" 1 ML MISC USE AS DIRECTED WITH HUMULIN AND HUMALOG INSULIN 5 TIMES DAILY 1 each 3    Handicap Placard MISC by Does not apply route Duration - 5years  Reason- prosthetic leg 1 each 0    ELIQUIS 5 MG TABS tablet Take 1 tablet by mouth 2 times daily  2     No current facility-administered medications on file prior to encounter. Negative except for what is mentioned in the HPI. GENERAL PHYSICAL EXAM     Vitals: BP (!) 152/77   Pulse 74   Temp 97.9 °F (36.6 °C) (Temporal)   Resp 20   Ht 6' 4\" (1.93 m)   Wt 275 lb (124.7 kg)   SpO2 95%   BMI 33.47 kg/m²  Body mass index is 33.47 kg/m². GENERAL APPEARANCE:   Nicky Snell is 61 y.o.,  male, moderately obese, nourished, conscious, alert. Does not appear to be in any distress or pain at this time. SKIN:  Redness and blistering around nephrostomy site. Right nephrostomy tube secured with paper tape, 4x4 dressing with old drainage around insertion site. Clear yellow urine in bag. Warm, dry, no cyanosis or jaundice. HEAD:  Normocephalic, atraumatic. EYES:  Pupils equal, reactive to light. EARS:  No discharge, no marked hearing loss. NOSE:  No rhinorrhea, epistaxis or septal deformity. THROAT:  Not congested. No ulceration bleeding or discharge. NECK:  No stiffness, trachea central.  No palpable masses or L.N.                 CHEST:  Symmetrical and equal on expansion. HEART:  Hypertensive. RRR S1 > S2. No audible murmurs or gallops. LUNGS:  Equal on expansion, normal breath sounds. No wheezing, rhonchi or rales. ABDOMEN:  Obese. NABS x 4 quads. Firm on palpation. No localized tenderness. No guarding or rigidity. LYMPHATICS:  No palpable cervical lymphadenopathy. LOCOMOTOR, BACK AND SPINE:  Antalgic gait. Using cane for ambulation. No tenderness or deformities. EXTREMITIES:  Left BKA with prosthetic in place. No RLE edema. No calf tenderness. No discoloration or ulcerations. NEUROLOGIC:  The patient is conscious, alert, oriented. Speech clear. No facial drooping. No apparent focal sensory or motor deficits.              PROVISIONAL DIAGNOSES / SURGERY:      Hydronephrosis of right kidney    IR Nephrostomy tube exchange    Patient Active Problem List    Diagnosis Date Noted    Hydronephrosis of right kidney 07/17/2020    Idiopathic hypotension 07/17/2020    Status post below-knee amputation of left lower extremity ([address] 07/17/2020    Atrial fibrillation ([address] 07/17/2020    Hypotension due to drugs 90/36/8568    Metabolic acidosis, normal anion gap (NAG) 11/13/2019    Right renal mass 10/07/2019    Carotid stenosis, asymptomatic, bilateral 06/17/2019    Acquired hypothyroidism 01/23/2019    Osteomyelitis of left leg ([address] 05/30/2018    Olecranon bursitis of right elbow     Essential hypertension 05/18/2018    Chronic pain of left knee     Acute hematogenous osteomyelitis of left fibula (HCC)     Acute hematogenous osteomyelitis of left tibia (HCC)     CAD (coronary artery disease)     PSA elevation 11/02/2017    Constipation 06/02/2016    Pure hypercholesterolemia 12/10/2015    Type 2 diabetes mellitus with diabetic polyneuropathy ([facility] 12/03/2015    PVD (peripheral vascular disease) ([facility] 05/08/2015    Amputation below knee ([facility] 05/08/2015    Charcot ankle 04/28/2015    Neuropathy in diabetes ([facility]     MARK (acute kidney injury) ([facility] 07/02/2013    Anemia 07/02/2013           RADHA Lloyd - CNP on 7/23/2020 at 9:59 AM

## 2020-07-23 NOTE — H&P (VIEW-ONLY)
HISTORY and Michael [facility]ello [facility]47       NAME:  Francois Tay  MRN: [id number]   YOB: 1960   Date: 7/23/2020   Age: 61 y.o. Gender: male       COMPLAINT AND PRESENT HISTORY:     Francois Tay is 61 y.o.,  male, here for IR nephrostomy tube exchange. Pt initially had nephrostomy tube placed in June 2020. History of hydronephrosis. Pt had partial right nephrectomy. Reports urinary frequency, urgency, nocturia x 2 per night. Denies dysuria, hematuria. Denies flank pain. Redness and blistering around nephrostomy site. Pt reports Tegaderm irritating to skin. Pt reports moderate output from nephrostomy tube. BKA left leg. Using cane. NPO. Reports took morning medications. No blood thinners in last 7 days. Denies any current chest pain/pressure, palpitations, recent URI, nausea, vomiting, diarrhea, constipation, fever or chills. History of COPD with chronic SOB. COVID testing negative on 07/19/2020.      PAST MEDICAL HISTORY     Past Medical History:   Diagnosis Date    Asymptomatic bilateral carotid artery stenosis     Boil, thigh 10/2019    10/30/19: right inner thigh region, says PCP Rxd Doxy for this, area is much better, has a couple days left to complete Doxy    CAD (coronary artery disease)     saw Dr. Felecia Francis ([facility]) on Oct. 22/2019 for pre-op clearance, to be cleared pending cardiac echo, not yet schedule for this as of 10/30/19    Charcot foot due to diabetes mellitus ([address] 2014    LEFT    COPD (chronic obstructive pulmonary disease) ([address] 2009    INHALER USE DAILY    Diabetes mellitus ([address] 1989    IDDM, On Insulin Dr. Ashleigh Landry Diabetic neuropathy ([address]     Difficult intravenous access     VEINS ROLL    Employs prosthetic leg     s/p left BKA    Foot ulcer ([address] PRIOR TO 2015    BILAT    Full dentures     UPPER ONLY    Hyperlipidemia 2004    ON RX    Hypertension 2004    ON RX, PCP Dr. Zenia Ventura, seen Oct. 2019    Hypoglycemia 4/2/2015    MRSA Court Young MD at [address] Bilateral 80'S    TOE AMPUTATION      Right 2 and 4    VITRECTOMY Left 2019    PARS PLANA VITRECTOMY 25 GAUGE, MEMBRANE PEELING, ENDOLASER 200  MW 1044 SPOTS, INDIRECT LASER 26 SPOTS performed by Linh Burleson MD at [facility] 71 History   Problem Relation Age of Onset    Diabetes Mother     Hypertension Mother     Stomach Cancer Mother     Hypertension Father     Alcohol Abuse Father     COPD Father     Kidney Cancer Father     Diabetes Brother         IDDM-PUMP    Other Sister         BRAIN TUMOR       SOCIAL HISTORY       Social History     Socioeconomic History    Marital status:      Spouse name: Madhu Pacheco Number of children: 2    Years of education: Not on file    Highest education level: Not on file   Occupational History    Occupation: Disabled   Social Needs    Financial resource strain: Not on file    Food insecurity     Worry: Not on file     Inability: Not on file   [employer] needs     Medical: Not on file     Non-medical: Not on file   Tobacco Use    Smoking status: Former Smoker     Packs/day: 2.00     Years: 25.00     Pack years: 50.00     Types: Cigars     Start date:      Last attempt to quit: 2011     Years since quittin.1    Smokeless tobacco: Never Used    Tobacco comment: quit in    Substance and Sexual Activity    Alcohol use:  Yes     Alcohol/week: 0.0 standard drinks     Frequency: Monthly or less     Comment: BEER 1 A MONTH    Drug use: Not Currently    Sexual activity: Yes     Partners: Female   Lifestyle    Physical activity     Days per week: Not on file     Minutes per session: Not on file    Stress: Not on file   Relationships    Social connections     Talks on phone: Not on file     Gets together: Not on file     Attends Moravian service: Not on file     Active member of club or organization: Not on file     Attends meetings of clubs or organizations: Not on file     Relationship status: Not on file    Intimate partner violence     Fear of current or ex partner: Not on file     Emotionally abused: Not on file     Physically abused: Not on file     Forced sexual activity: Not on file   Other Topics Concern    Not on file   Social History Narrative    Not on file        REVIEW OF SYSTEMS      Allergies   Allergen Reactions    Adhesive Tape      tegaderm causes blisters.  Use paper tape       Current Outpatient Medications on File Prior to Encounter   Medication Sig Dispense Refill    levothyroxine (SYNTHROID) 150 MCG tablet Take 1 tablet by mouth Daily 90 tablet 1    pregabalin (LYRICA) 100 MG capsule TAKE 1 CAPSULE BY MOUTH THREE TIMES A DAY 90 capsule 0    albuterol sulfate  (90 Base) MCG/ACT inhaler INHALE 2 PUFFS INTO THE LUNGS EVERY 6 HOURS AS NEEDED FOR WHEEZING OR SHORTNESS OF BREATH 6.7 Inhaler 0    insulin lispro (HUMALOG) 100 UNIT/ML injection vial INJECT 12 UNITS UNDER THE SKIN 3 TIMES DAILY + SLIDING SCALE (TAKE 5 UNITS IF SUGAR IS OVER 150) 20 mL 3    rOPINIRole (REQUIP) 2 MG tablet TAKE 1 TABLET BY MOUTH EVERY DAY 90 tablet 3    gemfibrozil (LOPID) 600 MG tablet TAKE 1 TABLET BY MOUTH EVERY DAY 90 tablet 3    HUMULIN N 100 UNIT/ML injection vial INJECT 50 UNITS EVERY MORNING AND 70 UNITS AT BEDTIME (Patient taking differently: 60 units in the AM , 70 units in PM) 30 mL 11    isosorbide mononitrate (IMDUR) 30 MG extended release tablet TAKE 1 TABLET BY MOUTH EVERY DAY 90 tablet 3    atorvastatin (LIPITOR) 20 MG tablet TAKE 1 TABLET BY MOUTH EVERY DAY 90 tablet 3    tamsulosin (FLOMAX) 0.4 MG capsule TAKE 1 CAPSULE BY MOUTH EVERY DAY 90 capsule 3    propafenone (RYTHMOL) 150 MG tablet Take 1 tablet by mouth every 8 hours 90 tablet 3    SYMBICORT 160-4.5 MCG/ACT AERO TAKE 2 PUFFS BY MOUTH TWICE A DAY (Patient taking differently: Inhale 2 puffs into the lungs 2 times daily ) 30.6 Inhaler 3    METOPROLOL (coronary artery disease)     PSA elevation 11/02/2017    Constipation 06/02/2016    Pure hypercholesterolemia 12/10/2015    Type 2 diabetes mellitus with diabetic polyneuropathy ([facility] 12/03/2015    PVD (peripheral vascular disease) ([facility] 05/08/2015    Amputation below knee ([facility] 05/08/2015    Charcot ankle 04/28/2015    Neuropathy in diabetes ([facility]     MARK (acute kidney injury) ([facility] 07/02/2013    Anemia 07/02/2013           RADHA Rojas - CNP on 7/23/2020 at 9:59 AM

## 2020-07-23 NOTE — BRIEF OP NOTE
Brief Postoperative Note    eMgan Warren  YOB: 1960  [id number]    Pre-operative Diagnosis: Rt PCN for routine exchange    Post-operative Diagnosis: Same    Procedure: Rt PCN exchange    Medications Given: none    Anesthesia: Local    Surgeons/Assistants: Aaron Paula MD    Estimated Blood Loss: minimal    Complications: none    Specimens: were not obtained    Findings: Existing rt PCN exchanged for a new tube without incident. Good position and function demonstrated. Pt tolerated well.       Electronically signed by Aaron Paula on 7/23/2020 at 12:40 PM

## 2020-07-26 ENCOUNTER — HOSPITAL ENCOUNTER (OUTPATIENT)
Dept: PREADMISSION TESTING | Age: 60
Setting detail: SPECIMEN
Discharge: HOME | End: 2020-07-30
Payer: COMMERCIAL

## 2020-07-26 PROCEDURE — U0003 INFECTIOUS AGENT DETECTION BY NUCLEIC ACID (DNA OR RNA); SEVERE ACUTE RESPIRATORY SYNDROME CORONAVIRUS 2 (SARS-COV-2) (CORONAVIRUS DISEASE [COVID-19]), AMPLIFIED PROBE TECHNIQUE, MAKING USE OF HIGH THROUGHPUT TECHNOLOGIES AS DESCRIBED BY CMS-2020-01-R: HCPCS

## 2020-07-28 ENCOUNTER — OFFICE VISIT (OUTPATIENT)
Dept: PODIATRY | Age: 60
End: 2020-07-28
Payer: COMMERCIAL

## 2020-07-28 VITALS — BODY MASS INDEX: 32.88 KG/M2 | WEIGHT: 270 LBS | HEIGHT: 76 IN

## 2020-07-28 LAB — SARS-COV-2, NAA: NOT DETECTED

## 2020-07-28 PROCEDURE — 99999 PR OFFICE/OUTPT VISIT,PROCEDURE ONLY: CPT | Performed by: PODIATRIST

## 2020-07-28 PROCEDURE — 11720 DEBRIDE NAIL 1-5: CPT | Performed by: PODIATRIST

## 2020-07-28 ASSESSMENT — ENCOUNTER SYMPTOMS
COLOR CHANGE: 0
BACK PAIN: 0
SHORTNESS OF BREATH: 0
DIARRHEA: 0
NAUSEA: 0

## 2020-07-28 NOTE — PROGRESS NOTES
SUBJECTIVE: Sarwat Andrade is a 61 y.o. male who returns to the office with chief complaint of painful fungal toenails. Patient relates toe nails are thickened/difficult to trim as well as painful with ambulation and with shoe gear. Chief Complaint   Patient presents with    Nail Problem     NAIL TRIM/LAST SAW Jacqueline Wyatt 7/17/2020    Diabetes     BLOOD [id number]     Review of Systems   Constitutional: Negative for activity change, appetite change, chills, diaphoresis, fatigue and fever. Respiratory: Negative for shortness of breath. Cardiovascular: Negative for leg swelling. Gastrointestinal: Negative for diarrhea and nausea. Endocrine: Negative for cold intolerance, heat intolerance and polyuria. Musculoskeletal: Positive for arthralgias and gait problem. Negative for back pain, joint swelling and myalgias. Skin: Negative for color change, pallor, rash and wound. Allergic/Immunologic: Negative for environmental allergies and food allergies. Neurological: Positive for numbness. Negative for dizziness, weakness and light-headedness. Hematological: Does not bruise/bleed easily. Psychiatric/Behavioral: Negative for behavioral problems, confusion and self-injury. The patient is not nervous/anxious. OBJECTIVE: Clinical evaluation of patient reveals nails 1,4,5 of the right foot to present with thickness, elongation, discoloration, brittleness, and subungual debris. There was pain with palpation and debridement of the toenails of the right foot. The right DP pulse is not palpable.    The right PT pulse is not palpable.    Protective sensation is absent to the right plantar foot as noted with a 5.07 Bradford-Gaudencio monofilament. Glucose: 142 mg/dl. Class A Findings (1 needed)   [x] Non-traumatic amputation of foot or integral skeleton portion thereof. [x] Q7.      Class B Findings (2 needed)   1. [x] Absent posterior tibial pulse   2. [x] Absent dorsalis pedis pulse   3.  [] Advanced trophic changes; three of the following are required:   ·         [] hair growth (decrease or absence)   ·         [] nail changes (thickening)   ·         [] pigmentary changes (discoloration)   ·         [] skin texture (thin, shiny)   ·         [] skin color (rubor or redness)   [] Q8.      Class C Findings (1 Class B, 2 Class C needed)   1. [] Claudication   2. [] Temperature changes   3. [] Edema   4. [] Paresthesia   5. [] Burning   [] Q9.       ASSESSMENT:    Diagnosis Orders   1. Onychomycosis of toenail  GA DEBRIDEMENT OF NAIL(S), 1-5    HM DIABETES FOOT EXAM   2. Pain of toe of right foot  GA DEBRIDEMENT OF NAIL(S), 1-5    HM DIABETES FOOT EXAM   3. Type 2 diabetes mellitus with peripheral vascular disease (HCC)  GA DEBRIDEMENT OF NAIL(S), 1-5    HM DIABETES FOOT EXAM   4. Type 2 diabetes mellitus with diabetic polyneuropathy, with long-term current use of insulin (HCC)  GA DEBRIDEMENT OF NAIL(S), 1-5    HM DIABETES FOOT EXAM     PLAN: Toenails 1,4,5 of the right foot were debrided in length and thickness using a nail nipper and a . Return in about 9 weeks (around 9/29/2020) for At risk diabetic foot care.    7/28/2020      Tegan Ibarra DPM

## 2020-07-29 ENCOUNTER — ANESTHESIA EVENT (OUTPATIENT)
Dept: OPERATING ROOM | Age: 60
DRG: 661 | End: 2020-07-29
Payer: COMMERCIAL

## 2020-07-29 ENCOUNTER — TELEPHONE (OUTPATIENT)
Dept: PRIMARY CARE CLINIC | Age: 60
End: 2020-07-29

## 2020-07-30 ENCOUNTER — APPOINTMENT (OUTPATIENT)
Dept: GENERAL RADIOLOGY | Age: 60
DRG: 661 | End: 2020-07-30
Attending: UROLOGY
Payer: COMMERCIAL

## 2020-07-30 ENCOUNTER — HOSPITAL ENCOUNTER (INPATIENT)
Age: 60
LOS: 2 days | Discharge: HOME | DRG: 661 | End: 2020-08-01
Attending: UROLOGY | Admitting: UROLOGY
Payer: COMMERCIAL

## 2020-07-30 ENCOUNTER — ANESTHESIA (OUTPATIENT)
Dept: OPERATING ROOM | Age: 60
DRG: 661 | End: 2020-07-30
Payer: COMMERCIAL

## 2020-07-30 VITALS — SYSTOLIC BLOOD PRESSURE: 129 MMHG | OXYGEN SATURATION: 98 % | TEMPERATURE: 97.4 F | DIASTOLIC BLOOD PRESSURE: 65 MMHG

## 2020-07-30 PROBLEM — N13.5 URETERAL STRICTURE, RIGHT: Status: ACTIVE | Noted: 2020-07-30

## 2020-07-30 LAB
ANION GAP SERPL CALCULATED.3IONS-SCNC: 13 MMOL/L (ref 9–17)
BUN BLDV-MCNC: 24 MG/DL (ref 8–23)
BUN/CREAT BLD: ABNORMAL (ref 9–20)
CALCIUM SERPL-MCNC: 8.4 MG/DL (ref 8.6–10.4)
CHLORIDE BLD-SCNC: 108 MMOL/L (ref 98–107)
CO2: 22 MMOL/L (ref 20–31)
CREAT SERPL-MCNC: 1.68 MG/DL (ref 0.7–1.2)
GFR AFRICAN AMERICAN: 51 ML/MIN
GFR NON-AFRICAN AMERICAN: 42 ML/MIN
GFR SERPL CREATININE-BSD FRML MDRD: ABNORMAL ML/MIN/{1.73_M2}
GFR SERPL CREATININE-BSD FRML MDRD: ABNORMAL ML/MIN/{1.73_M2}
GLUCOSE BLD-MCNC: 106 MG/DL (ref 75–110)
GLUCOSE BLD-MCNC: 112 MG/DL (ref 75–110)
GLUCOSE BLD-MCNC: 139 MG/DL (ref 75–110)
GLUCOSE BLD-MCNC: 167 MG/DL (ref 75–110)
GLUCOSE BLD-MCNC: 172 MG/DL (ref 75–110)
GLUCOSE BLD-MCNC: 191 MG/DL (ref 75–110)
GLUCOSE BLD-MCNC: 195 MG/DL (ref 70–99)
GLUCOSE BLD-MCNC: 47 MG/DL (ref 75–110)
GLUCOSE BLD-MCNC: 57 MG/DL (ref 75–110)
GLUCOSE BLD-MCNC: 78 MG/DL (ref 75–110)
HCT VFR BLD CALC: 40.5 % (ref 40.7–50.3)
HEMOGLOBIN: 13.3 G/DL (ref 13–17)
MCH RBC QN AUTO: 30.3 PG (ref 25.2–33.5)
MCHC RBC AUTO-ENTMCNC: 32.8 G/DL (ref 28.4–34.8)
MCV RBC AUTO: 92.3 FL (ref 82.6–102.9)
NRBC AUTOMATED: 0 PER 100 WBC
PDW BLD-RTO: 13.5 % (ref 11.8–14.4)
PLATELET # BLD: 191 K/UL (ref 138–453)
PMV BLD AUTO: 11.7 FL (ref 8.1–13.5)
POTASSIUM SERPL-SCNC: 4.9 MMOL/L (ref 3.7–5.3)
RBC # BLD: 4.39 M/UL (ref 4.21–5.77)
SODIUM BLD-SCNC: 143 MMOL/L (ref 135–144)
WBC # BLD: 11.8 K/UL (ref 3.5–11.3)

## 2020-07-30 PROCEDURE — 3600000019 HC SURGERY ROBOT ADDTL 15MIN: Performed by: UROLOGY

## 2020-07-30 PROCEDURE — 82947 ASSAY GLUCOSE BLOOD QUANT: CPT

## 2020-07-30 PROCEDURE — 85027 COMPLETE CBC AUTOMATED: CPT

## 2020-07-30 PROCEDURE — 2580000003 HC RX 258: Performed by: NURSE ANESTHETIST, CERTIFIED REGISTERED

## 2020-07-30 PROCEDURE — S2900 ROBOTIC SURGICAL SYSTEM: HCPCS | Performed by: UROLOGY

## 2020-07-30 PROCEDURE — 2720000010 HC SURG SUPPLY STERILE: Performed by: UROLOGY

## 2020-07-30 PROCEDURE — 2500000003 HC RX 250 WO HCPCS: Performed by: NURSE ANESTHETIST, CERTIFIED REGISTERED

## 2020-07-30 PROCEDURE — 2500000003 HC RX 250 WO HCPCS: Performed by: UROLOGY

## 2020-07-30 PROCEDURE — 2580000003 HC RX 258: Performed by: ANESTHESIOLOGY

## 2020-07-30 PROCEDURE — 6360000002 HC RX W HCPCS: Performed by: PHYSICIAN ASSISTANT

## 2020-07-30 PROCEDURE — 6360000002 HC RX W HCPCS: Performed by: STUDENT IN AN ORGANIZED HEALTH CARE EDUCATION/TRAINING PROGRAM

## 2020-07-30 PROCEDURE — 6360000002 HC RX W HCPCS: Performed by: NURSE ANESTHETIST, CERTIFIED REGISTERED

## 2020-07-30 PROCEDURE — C2617 STENT, NON-COR, TEM W/O DEL: HCPCS | Performed by: UROLOGY

## 2020-07-30 PROCEDURE — 0TU647Z SUPPLEMENT RIGHT URETER WITH AUTOLOGOUS TISSUE SUBSTITUTE, PERCUTANEOUS ENDOSCOPIC APPROACH: ICD-10-PCS | Performed by: UROLOGY

## 2020-07-30 PROCEDURE — C1769 GUIDE WIRE: HCPCS | Performed by: UROLOGY

## 2020-07-30 PROCEDURE — 88342 IMHCHEM/IMCYTCHM 1ST ANTB: CPT

## 2020-07-30 PROCEDURE — 3600000009 HC SURGERY ROBOT BASE: Performed by: UROLOGY

## 2020-07-30 PROCEDURE — 6360000002 HC RX W HCPCS: Performed by: ANESTHESIOLOGY

## 2020-07-30 PROCEDURE — 74018 RADEX ABDOMEN 1 VIEW: CPT

## 2020-07-30 PROCEDURE — 6370000000 HC RX 637 (ALT 250 FOR IP): Performed by: NURSE ANESTHETIST, CERTIFIED REGISTERED

## 2020-07-30 PROCEDURE — 3700000001 HC ADD 15 MINUTES (ANESTHESIA): Performed by: UROLOGY

## 2020-07-30 PROCEDURE — 2580000003 HC RX 258: Performed by: UROLOGY

## 2020-07-30 PROCEDURE — 2709999900 HC NON-CHARGEABLE SUPPLY: Performed by: UROLOGY

## 2020-07-30 PROCEDURE — 6360000002 HC RX W HCPCS: Performed by: UROLOGY

## 2020-07-30 PROCEDURE — BT1DZZZ FLUOROSCOPY OF RIGHT KIDNEY, URETER AND BLADDER: ICD-10-PCS | Performed by: UROLOGY

## 2020-07-30 PROCEDURE — 7100000001 HC PACU RECOVERY - ADDTL 15 MIN: Performed by: UROLOGY

## 2020-07-30 PROCEDURE — 2580000003 HC RX 258: Performed by: STUDENT IN AN ORGANIZED HEALTH CARE EDUCATION/TRAINING PROGRAM

## 2020-07-30 PROCEDURE — 74420 UROGRAPHY RTRGR +-KUB: CPT

## 2020-07-30 PROCEDURE — 6360000002 HC RX W HCPCS: Performed by: SPECIALIST

## 2020-07-30 PROCEDURE — 7100000000 HC PACU RECOVERY - FIRST 15 MIN: Performed by: UROLOGY

## 2020-07-30 PROCEDURE — 88305 TISSUE EXAM BY PATHOLOGIST: CPT

## 2020-07-30 PROCEDURE — 1200000000 HC SEMI PRIVATE

## 2020-07-30 PROCEDURE — 3700000000 HC ANESTHESIA ATTENDED CARE: Performed by: UROLOGY

## 2020-07-30 PROCEDURE — C1758 CATHETER, URETERAL: HCPCS | Performed by: UROLOGY

## 2020-07-30 PROCEDURE — 6370000000 HC RX 637 (ALT 250 FOR IP): Performed by: STUDENT IN AN ORGANIZED HEALTH CARE EDUCATION/TRAINING PROGRAM

## 2020-07-30 PROCEDURE — 80048 BASIC METABOLIC PNL TOTAL CA: CPT

## 2020-07-30 DEVICE — URETERAL STENT
Type: IMPLANTABLE DEVICE | Site: URETER | Status: FUNCTIONAL
Brand: POLARIS™ ULTRA

## 2020-07-30 RX ORDER — ONDANSETRON 2 MG/ML
INJECTION INTRAMUSCULAR; INTRAVENOUS PRN
Status: DISCONTINUED | OUTPATIENT
Start: 2020-07-30 | End: 2020-07-30 | Stop reason: SDUPTHER

## 2020-07-30 RX ORDER — DEXAMETHASONE SODIUM PHOSPHATE 10 MG/ML
INJECTION INTRAMUSCULAR; INTRAVENOUS PRN
Status: DISCONTINUED | OUTPATIENT
Start: 2020-07-30 | End: 2020-07-30 | Stop reason: SDUPTHER

## 2020-07-30 RX ORDER — DEXTROSE MONOHYDRATE 25 G/50ML
12.5 INJECTION, SOLUTION INTRAVENOUS PRN
Status: DISCONTINUED | OUTPATIENT
Start: 2020-07-30 | End: 2020-08-01 | Stop reason: HOSPADM

## 2020-07-30 RX ORDER — PROPOFOL 10 MG/ML
INJECTION, EMULSION INTRAVENOUS PRN
Status: DISCONTINUED | OUTPATIENT
Start: 2020-07-30 | End: 2020-07-30 | Stop reason: SDUPTHER

## 2020-07-30 RX ORDER — ROPINIROLE 1 MG/1
2 TABLET, FILM COATED ORAL DAILY
Status: DISCONTINUED | OUTPATIENT
Start: 2020-07-31 | End: 2020-08-01 | Stop reason: HOSPADM

## 2020-07-30 RX ORDER — PHENYLEPHRINE HYDROCHLORIDE 10 MG/ML
INJECTION INTRAVENOUS PRN
Status: DISCONTINUED | OUTPATIENT
Start: 2020-07-30 | End: 2020-07-30 | Stop reason: SDUPTHER

## 2020-07-30 RX ORDER — ACETAMINOPHEN 325 MG/1
650 TABLET ORAL EVERY 6 HOURS
Status: DISCONTINUED | OUTPATIENT
Start: 2020-07-30 | End: 2020-08-01 | Stop reason: HOSPADM

## 2020-07-30 RX ORDER — ONDANSETRON 2 MG/ML
4 INJECTION INTRAMUSCULAR; INTRAVENOUS
Status: DISCONTINUED | OUTPATIENT
Start: 2020-07-30 | End: 2020-07-30 | Stop reason: HOSPADM

## 2020-07-30 RX ORDER — SODIUM CHLORIDE 9 MG/ML
INJECTION, SOLUTION INTRAVENOUS CONTINUOUS
Status: DISCONTINUED | OUTPATIENT
Start: 2020-07-30 | End: 2020-08-01 | Stop reason: HOSPADM

## 2020-07-30 RX ORDER — ROCURONIUM BROMIDE 10 MG/ML
INJECTION, SOLUTION INTRAVENOUS PRN
Status: DISCONTINUED | OUTPATIENT
Start: 2020-07-30 | End: 2020-07-30 | Stop reason: SDUPTHER

## 2020-07-30 RX ORDER — MORPHINE SULFATE 2 MG/ML
2 INJECTION, SOLUTION INTRAMUSCULAR; INTRAVENOUS EVERY 5 MIN PRN
Status: DISCONTINUED | OUTPATIENT
Start: 2020-07-30 | End: 2020-07-30 | Stop reason: HOSPADM

## 2020-07-30 RX ORDER — PROPAFENONE HYDROCHLORIDE 150 MG/1
150 TABLET, FILM COATED ORAL EVERY 8 HOURS SCHEDULED
Status: DISCONTINUED | OUTPATIENT
Start: 2020-07-30 | End: 2020-08-01 | Stop reason: HOSPADM

## 2020-07-30 RX ORDER — LIDOCAINE HYDROCHLORIDE 20 MG/ML
JELLY TOPICAL PRN
Status: DISCONTINUED | OUTPATIENT
Start: 2020-07-30 | End: 2020-07-30 | Stop reason: SDUPTHER

## 2020-07-30 RX ORDER — SODIUM CHLORIDE 0.9 % (FLUSH) 0.9 %
10 SYRINGE (ML) INJECTION EVERY 12 HOURS SCHEDULED
Status: DISCONTINUED | OUTPATIENT
Start: 2020-07-30 | End: 2020-08-01 | Stop reason: HOSPADM

## 2020-07-30 RX ORDER — NICOTINE POLACRILEX 4 MG
15 LOZENGE BUCCAL PRN
Status: DISCONTINUED | OUTPATIENT
Start: 2020-07-30 | End: 2020-08-01 | Stop reason: HOSPADM

## 2020-07-30 RX ORDER — SODIUM CHLORIDE 0.9 % (FLUSH) 0.9 %
10 SYRINGE (ML) INJECTION PRN
Status: DISCONTINUED | OUTPATIENT
Start: 2020-07-30 | End: 2020-08-01 | Stop reason: HOSPADM

## 2020-07-30 RX ORDER — FENTANYL CITRATE 50 UG/ML
25 INJECTION, SOLUTION INTRAMUSCULAR; INTRAVENOUS EVERY 5 MIN PRN
Status: DISCONTINUED | OUTPATIENT
Start: 2020-07-30 | End: 2020-07-30 | Stop reason: HOSPADM

## 2020-07-30 RX ORDER — CEFAZOLIN SODIUM 1 G/3ML
INJECTION, POWDER, FOR SOLUTION INTRAMUSCULAR; INTRAVENOUS PRN
Status: DISCONTINUED | OUTPATIENT
Start: 2020-07-30 | End: 2020-07-30 | Stop reason: SDUPTHER

## 2020-07-30 RX ORDER — DEXTROSE MONOHYDRATE 25 G/50ML
12.5 INJECTION, SOLUTION INTRAVENOUS PRN
Status: COMPLETED | OUTPATIENT
Start: 2020-07-30 | End: 2020-07-30

## 2020-07-30 RX ORDER — METOPROLOL TARTRATE 50 MG/1
50 TABLET, FILM COATED ORAL 2 TIMES DAILY
Status: DISCONTINUED | OUTPATIENT
Start: 2020-07-30 | End: 2020-08-01 | Stop reason: HOSPADM

## 2020-07-30 RX ORDER — DEXTROSE MONOHYDRATE 25 G/50ML
INJECTION, SOLUTION INTRAVENOUS PRN
Status: DISCONTINUED | OUTPATIENT
Start: 2020-07-30 | End: 2020-07-30 | Stop reason: SDUPTHER

## 2020-07-30 RX ORDER — OXYCODONE HYDROCHLORIDE AND ACETAMINOPHEN 5; 325 MG/1; MG/1
1 TABLET ORAL PRN
Status: DISCONTINUED | OUTPATIENT
Start: 2020-07-30 | End: 2020-07-30 | Stop reason: HOSPADM

## 2020-07-30 RX ORDER — ALBUTEROL SULFATE 2.5 MG/3ML
2.5 SOLUTION RESPIRATORY (INHALATION) 4 TIMES DAILY
Status: DISCONTINUED | OUTPATIENT
Start: 2020-07-30 | End: 2020-08-01 | Stop reason: HOSPADM

## 2020-07-30 RX ORDER — LEVOTHYROXINE SODIUM 0.07 MG/1
150 TABLET ORAL DAILY
Status: DISCONTINUED | OUTPATIENT
Start: 2020-07-31 | End: 2020-08-01 | Stop reason: HOSPADM

## 2020-07-30 RX ORDER — FENTANYL CITRATE 50 UG/ML
INJECTION, SOLUTION INTRAMUSCULAR; INTRAVENOUS PRN
Status: DISCONTINUED | OUTPATIENT
Start: 2020-07-30 | End: 2020-07-30 | Stop reason: SDUPTHER

## 2020-07-30 RX ORDER — SODIUM CHLORIDE, SODIUM LACTATE, POTASSIUM CHLORIDE, CALCIUM CHLORIDE 600; 310; 30; 20 MG/100ML; MG/100ML; MG/100ML; MG/100ML
INJECTION, SOLUTION INTRAVENOUS CONTINUOUS PRN
Status: DISCONTINUED | OUTPATIENT
Start: 2020-07-30 | End: 2020-07-30 | Stop reason: SDUPTHER

## 2020-07-30 RX ORDER — ONDANSETRON 2 MG/ML
4 INJECTION INTRAMUSCULAR; INTRAVENOUS EVERY 6 HOURS PRN
Status: DISCONTINUED | OUTPATIENT
Start: 2020-07-30 | End: 2020-08-01 | Stop reason: HOSPADM

## 2020-07-30 RX ORDER — EPHEDRINE SULFATE/0.9% NACL/PF 50 MG/5 ML
SYRINGE (ML) INTRAVENOUS PRN
Status: DISCONTINUED | OUTPATIENT
Start: 2020-07-30 | End: 2020-07-30 | Stop reason: SDUPTHER

## 2020-07-30 RX ORDER — SODIUM CHLORIDE 9 MG/ML
INJECTION, SOLUTION INTRAVENOUS CONTINUOUS
Status: DISCONTINUED | OUTPATIENT
Start: 2020-07-30 | End: 2020-07-30

## 2020-07-30 RX ORDER — TAMSULOSIN HYDROCHLORIDE 0.4 MG/1
0.4 CAPSULE ORAL NIGHTLY
Status: DISCONTINUED | OUTPATIENT
Start: 2020-07-30 | End: 2020-08-01 | Stop reason: HOSPADM

## 2020-07-30 RX ORDER — MAGNESIUM HYDROXIDE 1200 MG/15ML
LIQUID ORAL CONTINUOUS PRN
Status: COMPLETED | OUTPATIENT
Start: 2020-07-30 | End: 2020-07-30

## 2020-07-30 RX ORDER — MAGNESIUM HYDROXIDE 1200 MG/15ML
LIQUID ORAL CONTINUOUS PRN
Status: DISCONTINUED | OUTPATIENT
Start: 2020-07-30 | End: 2020-07-30 | Stop reason: ALTCHOICE

## 2020-07-30 RX ORDER — DIPHENHYDRAMINE HYDROCHLORIDE 50 MG/ML
12.5 INJECTION INTRAMUSCULAR; INTRAVENOUS
Status: DISCONTINUED | OUTPATIENT
Start: 2020-07-30 | End: 2020-07-30 | Stop reason: HOSPADM

## 2020-07-30 RX ORDER — HEPARIN SODIUM 5000 [USP'U]/ML
5000 INJECTION, SOLUTION INTRAVENOUS; SUBCUTANEOUS ONCE
Status: COMPLETED | OUTPATIENT
Start: 2020-07-30 | End: 2020-07-30

## 2020-07-30 RX ORDER — OXYCODONE HYDROCHLORIDE 5 MG/1
5 TABLET ORAL EVERY 4 HOURS PRN
Status: DISCONTINUED | OUTPATIENT
Start: 2020-07-30 | End: 2020-08-01 | Stop reason: HOSPADM

## 2020-07-30 RX ORDER — ISOSORBIDE MONONITRATE 30 MG/1
30 TABLET, EXTENDED RELEASE ORAL DAILY
Status: DISCONTINUED | OUTPATIENT
Start: 2020-07-31 | End: 2020-08-01 | Stop reason: HOSPADM

## 2020-07-30 RX ORDER — LIDOCAINE HYDROCHLORIDE 10 MG/ML
INJECTION, SOLUTION EPIDURAL; INFILTRATION; INTRACAUDAL; PERINEURAL PRN
Status: DISCONTINUED | OUTPATIENT
Start: 2020-07-30 | End: 2020-07-30 | Stop reason: SDUPTHER

## 2020-07-30 RX ORDER — DEXTROSE MONOHYDRATE 50 MG/ML
100 INJECTION, SOLUTION INTRAVENOUS PRN
Status: DISCONTINUED | OUTPATIENT
Start: 2020-07-30 | End: 2020-08-01 | Stop reason: HOSPADM

## 2020-07-30 RX ORDER — BUDESONIDE AND FORMOTEROL FUMARATE DIHYDRATE 160; 4.5 UG/1; UG/1
2 AEROSOL RESPIRATORY (INHALATION) 2 TIMES DAILY
Status: DISCONTINUED | OUTPATIENT
Start: 2020-07-30 | End: 2020-08-01 | Stop reason: HOSPADM

## 2020-07-30 RX ORDER — PREGABALIN 100 MG/1
100 CAPSULE ORAL 3 TIMES DAILY
Status: DISCONTINUED | OUTPATIENT
Start: 2020-07-30 | End: 2020-08-01 | Stop reason: HOSPADM

## 2020-07-30 RX ORDER — GLYCOPYRROLATE 1 MG/5 ML
SYRINGE (ML) INTRAVENOUS PRN
Status: DISCONTINUED | OUTPATIENT
Start: 2020-07-30 | End: 2020-07-30 | Stop reason: SDUPTHER

## 2020-07-30 RX ORDER — LABETALOL HYDROCHLORIDE 5 MG/ML
5 INJECTION, SOLUTION INTRAVENOUS EVERY 10 MIN PRN
Status: DISCONTINUED | OUTPATIENT
Start: 2020-07-30 | End: 2020-07-30 | Stop reason: HOSPADM

## 2020-07-30 RX ORDER — ATORVASTATIN CALCIUM 20 MG/1
20 TABLET, FILM COATED ORAL DAILY
Status: DISCONTINUED | OUTPATIENT
Start: 2020-07-31 | End: 2020-08-01 | Stop reason: HOSPADM

## 2020-07-30 RX ORDER — OXYCODONE HYDROCHLORIDE AND ACETAMINOPHEN 5; 325 MG/1; MG/1
2 TABLET ORAL PRN
Status: DISCONTINUED | OUTPATIENT
Start: 2020-07-30 | End: 2020-07-30 | Stop reason: HOSPADM

## 2020-07-30 RX ORDER — OXYCODONE HYDROCHLORIDE 5 MG/1
10 TABLET ORAL EVERY 4 HOURS PRN
Status: DISCONTINUED | OUTPATIENT
Start: 2020-07-30 | End: 2020-08-01 | Stop reason: HOSPADM

## 2020-07-30 RX ORDER — INDOCYANINE GREEN AND WATER 25 MG
KIT INJECTION PRN
Status: DISCONTINUED | OUTPATIENT
Start: 2020-07-30 | End: 2020-07-30 | Stop reason: ALTCHOICE

## 2020-07-30 RX ORDER — GEMFIBROZIL 600 MG/1
600 TABLET, FILM COATED ORAL DAILY
Status: DISCONTINUED | OUTPATIENT
Start: 2020-07-31 | End: 2020-08-01 | Stop reason: HOSPADM

## 2020-07-30 RX ORDER — INDOCYANINE GREEN AND WATER 25 MG
5 KIT INJECTION
Status: ACTIVE | OUTPATIENT
Start: 2020-07-30 | End: 2020-07-31

## 2020-07-30 RX ORDER — POLYETHYLENE GLYCOL 3350 17 G/17G
17 POWDER, FOR SOLUTION ORAL DAILY
Status: DISCONTINUED | OUTPATIENT
Start: 2020-07-31 | End: 2020-08-01 | Stop reason: HOSPADM

## 2020-07-30 RX ADMIN — PHENYLEPHRINE HYDROCHLORIDE 200 MCG: 10 INJECTION INTRAVENOUS at 20:02

## 2020-07-30 RX ADMIN — FENTANYL CITRATE 50 MCG: 50 INJECTION, SOLUTION INTRAMUSCULAR; INTRAVENOUS at 14:49

## 2020-07-30 RX ADMIN — ROCURONIUM BROMIDE 20 MG: 10 INJECTION INTRAVENOUS at 15:26

## 2020-07-30 RX ADMIN — PHENYLEPHRINE HYDROCHLORIDE 100 MCG: 10 INJECTION INTRAVENOUS at 15:30

## 2020-07-30 RX ADMIN — SUGAMMADEX 200 MG: 100 INJECTION, SOLUTION INTRAVENOUS at 20:22

## 2020-07-30 RX ADMIN — MORPHINE SULFATE 2 MG: 2 INJECTION, SOLUTION INTRAMUSCULAR; INTRAVENOUS at 21:13

## 2020-07-30 RX ADMIN — Medication 1 G: at 22:26

## 2020-07-30 RX ADMIN — SODIUM CHLORIDE, POTASSIUM CHLORIDE, SODIUM LACTATE AND CALCIUM CHLORIDE: 600; 310; 30; 20 INJECTION, SOLUTION INTRAVENOUS at 13:57

## 2020-07-30 RX ADMIN — OXYCODONE HYDROCHLORIDE 10 MG: 5 TABLET ORAL at 22:30

## 2020-07-30 RX ADMIN — SODIUM CHLORIDE, POTASSIUM CHLORIDE, SODIUM LACTATE AND CALCIUM CHLORIDE: 600; 310; 30; 20 INJECTION, SOLUTION INTRAVENOUS at 13:31

## 2020-07-30 RX ADMIN — ROCURONIUM BROMIDE 50 MG: 10 INJECTION INTRAVENOUS at 13:24

## 2020-07-30 RX ADMIN — MORPHINE SULFATE 2 MG: 2 INJECTION, SOLUTION INTRAMUSCULAR; INTRAVENOUS at 21:01

## 2020-07-30 RX ADMIN — PHENYLEPHRINE HYDROCHLORIDE 100 MCG: 10 INJECTION INTRAVENOUS at 14:04

## 2020-07-30 RX ADMIN — DEXTROSE MONOHYDRATE 12.5 G: 25 INJECTION, SOLUTION INTRAVENOUS at 16:34

## 2020-07-30 RX ADMIN — ONDANSETRON 4 MG: 2 INJECTION, SOLUTION INTRAMUSCULAR; INTRAVENOUS at 20:00

## 2020-07-30 RX ADMIN — CEFAZOLIN 1000 MG: 1 INJECTION, POWDER, FOR SOLUTION INTRAMUSCULAR; INTRAVENOUS at 17:54

## 2020-07-30 RX ADMIN — ROCURONIUM BROMIDE 20 MG: 10 INJECTION INTRAVENOUS at 14:49

## 2020-07-30 RX ADMIN — PHENYLEPHRINE HYDROCHLORIDE 100 MCG: 10 INJECTION INTRAVENOUS at 15:00

## 2020-07-30 RX ADMIN — HEPARIN SODIUM 5000 UNITS: 5000 INJECTION INTRAVENOUS; SUBCUTANEOUS at 13:02

## 2020-07-30 RX ADMIN — ROCURONIUM BROMIDE 20 MG: 10 INJECTION INTRAVENOUS at 16:25

## 2020-07-30 RX ADMIN — Medication 10 MG: at 16:06

## 2020-07-30 RX ADMIN — PHENYLEPHRINE HYDROCHLORIDE 100 MCG: 10 INJECTION INTRAVENOUS at 14:42

## 2020-07-30 RX ADMIN — Medication 0.2 MG: at 13:46

## 2020-07-30 RX ADMIN — Medication 0.2 MG: at 14:39

## 2020-07-30 RX ADMIN — Medication 10 MG: at 15:27

## 2020-07-30 RX ADMIN — PHENYLEPHRINE HYDROCHLORIDE 100 MCG: 10 INJECTION INTRAVENOUS at 13:53

## 2020-07-30 RX ADMIN — FENTANYL CITRATE 50 MCG: 50 INJECTION, SOLUTION INTRAMUSCULAR; INTRAVENOUS at 20:30

## 2020-07-30 RX ADMIN — CEFAZOLIN 2 G: 1 INJECTION, POWDER, FOR SOLUTION INTRAMUSCULAR; INTRAVENOUS at 17:54

## 2020-07-30 RX ADMIN — DEXAMETHASONE SODIUM PHOSPHATE 10 MG: 10 INJECTION INTRAMUSCULAR; INTRAVENOUS at 14:01

## 2020-07-30 RX ADMIN — SODIUM CHLORIDE: 9 INJECTION, SOLUTION INTRAVENOUS at 13:09

## 2020-07-30 RX ADMIN — TAMSULOSIN HYDROCHLORIDE 0.4 MG: 0.4 CAPSULE ORAL at 22:30

## 2020-07-30 RX ADMIN — SODIUM CHLORIDE: 9 INJECTION, SOLUTION INTRAVENOUS at 22:26

## 2020-07-30 RX ADMIN — DEXTROSE MONOHYDRATE 12.5 G: 25 INJECTION, SOLUTION INTRAVENOUS at 13:04

## 2020-07-30 RX ADMIN — PHENYLEPHRINE HYDROCHLORIDE 100 MCG: 10 INJECTION INTRAVENOUS at 15:17

## 2020-07-30 RX ADMIN — Medication 0.2 MG: at 14:01

## 2020-07-30 RX ADMIN — SODIUM CHLORIDE, POTASSIUM CHLORIDE, SODIUM LACTATE AND CALCIUM CHLORIDE: 600; 310; 30; 20 INJECTION, SOLUTION INTRAVENOUS at 19:44

## 2020-07-30 RX ADMIN — INSULIN LISPRO 1 UNITS: 100 INJECTION, SOLUTION INTRAVENOUS; SUBCUTANEOUS at 22:50

## 2020-07-30 RX ADMIN — LIDOCAINE HYDROCHLORIDE 2 ML: 20 JELLY TOPICAL at 13:34

## 2020-07-30 RX ADMIN — PROPAFENONE HYDROCHLORIDE 150 MG: 150 TABLET, COATED ORAL at 22:30

## 2020-07-30 RX ADMIN — Medication 10 MG: at 15:43

## 2020-07-30 RX ADMIN — PROPOFOL 200 MG: 10 INJECTION, EMULSION INTRAVENOUS at 13:24

## 2020-07-30 RX ADMIN — PHENYLEPHRINE HYDROCHLORIDE 2 SPRAY: 0.5 SPRAY NASAL at 13:33

## 2020-07-30 RX ADMIN — SODIUM CHLORIDE, POTASSIUM CHLORIDE, SODIUM LACTATE AND CALCIUM CHLORIDE: 600; 310; 30; 20 INJECTION, SOLUTION INTRAVENOUS at 16:06

## 2020-07-30 RX ADMIN — PHENYLEPHRINE HYDROCHLORIDE 100 MCG: 10 INJECTION INTRAVENOUS at 13:37

## 2020-07-30 RX ADMIN — PHENYLEPHRINE HYDROCHLORIDE 100 MCG: 10 INJECTION INTRAVENOUS at 14:44

## 2020-07-30 RX ADMIN — PREGABALIN 100 MG: 100 CAPSULE ORAL at 22:30

## 2020-07-30 RX ADMIN — ROCURONIUM BROMIDE 10 MG: 10 INJECTION INTRAVENOUS at 17:26

## 2020-07-30 RX ADMIN — FENTANYL CITRATE 50 MCG: 50 INJECTION, SOLUTION INTRAMUSCULAR; INTRAVENOUS at 20:07

## 2020-07-30 RX ADMIN — PHENYLEPHRINE HYDROCHLORIDE 100 MCG: 10 INJECTION INTRAVENOUS at 15:07

## 2020-07-30 RX ADMIN — FENTANYL CITRATE 100 MCG: 50 INJECTION, SOLUTION INTRAMUSCULAR; INTRAVENOUS at 13:24

## 2020-07-30 RX ADMIN — FENTANYL CITRATE 100 MCG: 50 INJECTION, SOLUTION INTRAMUSCULAR; INTRAVENOUS at 18:37

## 2020-07-30 RX ADMIN — CEFAZOLIN 3 G: 1 INJECTION, POWDER, FOR SOLUTION INTRAMUSCULAR; INTRAVENOUS at 13:56

## 2020-07-30 RX ADMIN — PHENYLEPHRINE HYDROCHLORIDE 100 MCG: 10 INJECTION INTRAVENOUS at 18:58

## 2020-07-30 RX ADMIN — ROCURONIUM BROMIDE 30 MG: 10 INJECTION INTRAVENOUS at 14:18

## 2020-07-30 RX ADMIN — PHENYLEPHRINE HYDROCHLORIDE 100 MCG: 10 INJECTION INTRAVENOUS at 13:35

## 2020-07-30 RX ADMIN — METOPROLOL TARTRATE 50 MG: 50 TABLET, FILM COATED ORAL at 22:30

## 2020-07-30 RX ADMIN — ACETAMINOPHEN 650 MG: 325 TABLET ORAL at 22:30

## 2020-07-30 RX ADMIN — LIDOCAINE HYDROCHLORIDE 50 MG: 10 INJECTION, SOLUTION EPIDURAL; INFILTRATION; INTRACAUDAL; PERINEURAL at 13:24

## 2020-07-30 RX ADMIN — ROCURONIUM BROMIDE 20 MG: 10 INJECTION INTRAVENOUS at 17:59

## 2020-07-30 RX ADMIN — PHENYLEPHRINE HYDROCHLORIDE 100 MCG: 10 INJECTION INTRAVENOUS at 13:48

## 2020-07-30 ASSESSMENT — PULMONARY FUNCTION TESTS
PIF_VALUE: 27
PIF_VALUE: 19
PIF_VALUE: 29
PIF_VALUE: 28
PIF_VALUE: 30
PIF_VALUE: 1
PIF_VALUE: 27
PIF_VALUE: 22
PIF_VALUE: 27
PIF_VALUE: 22
PIF_VALUE: 27
PIF_VALUE: 28
PIF_VALUE: 29
PIF_VALUE: 28
PIF_VALUE: 23
PIF_VALUE: 28
PIF_VALUE: 29
PIF_VALUE: 18
PIF_VALUE: 13
PIF_VALUE: 29
PIF_VALUE: 25
PIF_VALUE: 26
PIF_VALUE: 27
PIF_VALUE: 27
PIF_VALUE: 22
PIF_VALUE: 29
PIF_VALUE: 23
PIF_VALUE: 28
PIF_VALUE: 27
PIF_VALUE: 24
PIF_VALUE: 5
PIF_VALUE: 26
PIF_VALUE: 29
PIF_VALUE: 27
PIF_VALUE: 30
PIF_VALUE: 24
PIF_VALUE: 28
PIF_VALUE: 21
PIF_VALUE: 27
PIF_VALUE: 0
PIF_VALUE: 22
PIF_VALUE: 22
PIF_VALUE: 27
PIF_VALUE: 0
PIF_VALUE: 27
PIF_VALUE: 28
PIF_VALUE: 28
PIF_VALUE: 27
PIF_VALUE: 29
PIF_VALUE: 23
PIF_VALUE: 29
PIF_VALUE: 27
PIF_VALUE: 4
PIF_VALUE: 24
PIF_VALUE: 29
PIF_VALUE: 28
PIF_VALUE: 26
PIF_VALUE: 27
PIF_VALUE: 22
PIF_VALUE: 22
PIF_VALUE: 28
PIF_VALUE: 30
PIF_VALUE: 19
PIF_VALUE: 28
PIF_VALUE: 28
PIF_VALUE: 29
PIF_VALUE: 21
PIF_VALUE: 23
PIF_VALUE: 28
PIF_VALUE: 27
PIF_VALUE: 28
PIF_VALUE: 22
PIF_VALUE: 27
PIF_VALUE: 28
PIF_VALUE: 29
PIF_VALUE: 27
PIF_VALUE: 26
PIF_VALUE: 27
PIF_VALUE: 28
PIF_VALUE: 28
PIF_VALUE: 22
PIF_VALUE: 29
PIF_VALUE: 27
PIF_VALUE: 26
PIF_VALUE: 22
PIF_VALUE: 29
PIF_VALUE: 26
PIF_VALUE: 28
PIF_VALUE: 27
PIF_VALUE: 26
PIF_VALUE: 22
PIF_VALUE: 28
PIF_VALUE: 21
PIF_VALUE: 21
PIF_VALUE: 23
PIF_VALUE: 27
PIF_VALUE: 28
PIF_VALUE: 27
PIF_VALUE: 28
PIF_VALUE: 23
PIF_VALUE: 26
PIF_VALUE: 24
PIF_VALUE: 27
PIF_VALUE: 23
PIF_VALUE: 25
PIF_VALUE: 26
PIF_VALUE: 27
PIF_VALUE: 0
PIF_VALUE: 33
PIF_VALUE: 20
PIF_VALUE: 23
PIF_VALUE: 27
PIF_VALUE: 21
PIF_VALUE: 23
PIF_VALUE: 27
PIF_VALUE: 27
PIF_VALUE: 22
PIF_VALUE: 27
PIF_VALUE: 28
PIF_VALUE: 29
PIF_VALUE: 27
PIF_VALUE: 21
PIF_VALUE: 22
PIF_VALUE: 27
PIF_VALUE: 23
PIF_VALUE: 27
PIF_VALUE: 26
PIF_VALUE: 28
PIF_VALUE: 23
PIF_VALUE: 28
PIF_VALUE: 27
PIF_VALUE: 29
PIF_VALUE: 26
PIF_VALUE: 28
PIF_VALUE: 27
PIF_VALUE: 27
PIF_VALUE: 28
PIF_VALUE: 27
PIF_VALUE: 26
PIF_VALUE: 23
PIF_VALUE: 22
PIF_VALUE: 27
PIF_VALUE: 30
PIF_VALUE: 27
PIF_VALUE: 28
PIF_VALUE: 29
PIF_VALUE: 27
PIF_VALUE: 28
PIF_VALUE: 27
PIF_VALUE: 23
PIF_VALUE: 27
PIF_VALUE: 23
PIF_VALUE: 29
PIF_VALUE: 29
PIF_VALUE: 26
PIF_VALUE: 28
PIF_VALUE: 26
PIF_VALUE: 23
PIF_VALUE: 29
PIF_VALUE: 28
PIF_VALUE: 1
PIF_VALUE: 22
PIF_VALUE: 26
PIF_VALUE: 27
PIF_VALUE: 29
PIF_VALUE: 27
PIF_VALUE: 27
PIF_VALUE: 22
PIF_VALUE: 7
PIF_VALUE: 28
PIF_VALUE: 25
PIF_VALUE: 26
PIF_VALUE: 27
PIF_VALUE: 29
PIF_VALUE: 1
PIF_VALUE: 25
PIF_VALUE: 28
PIF_VALUE: 27
PIF_VALUE: 21
PIF_VALUE: 23
PIF_VALUE: 29
PIF_VALUE: 22
PIF_VALUE: 26
PIF_VALUE: 27
PIF_VALUE: 29
PIF_VALUE: 22
PIF_VALUE: 23
PIF_VALUE: 23
PIF_VALUE: 27
PIF_VALUE: 27
PIF_VALUE: 29
PIF_VALUE: 27
PIF_VALUE: 29
PIF_VALUE: 28
PIF_VALUE: 21
PIF_VALUE: 20
PIF_VALUE: 27
PIF_VALUE: 26
PIF_VALUE: 2
PIF_VALUE: 27
PIF_VALUE: 23
PIF_VALUE: 29
PIF_VALUE: 27
PIF_VALUE: 27
PIF_VALUE: 19
PIF_VALUE: 27
PIF_VALUE: 26
PIF_VALUE: 4
PIF_VALUE: 28
PIF_VALUE: 22
PIF_VALUE: 22
PIF_VALUE: 29
PIF_VALUE: 23
PIF_VALUE: 28
PIF_VALUE: 28
PIF_VALUE: 29
PIF_VALUE: 27
PIF_VALUE: 29
PIF_VALUE: 23
PIF_VALUE: 29
PIF_VALUE: 27
PIF_VALUE: 27
PIF_VALUE: 22
PIF_VALUE: 23
PIF_VALUE: 23
PIF_VALUE: 29
PIF_VALUE: 28
PIF_VALUE: 22
PIF_VALUE: 21
PIF_VALUE: 24
PIF_VALUE: 24
PIF_VALUE: 28
PIF_VALUE: 19
PIF_VALUE: 26
PIF_VALUE: 31
PIF_VALUE: 31
PIF_VALUE: 27
PIF_VALUE: 19
PIF_VALUE: 27
PIF_VALUE: 28
PIF_VALUE: 28
PIF_VALUE: 30
PIF_VALUE: 29
PIF_VALUE: 26
PIF_VALUE: 28
PIF_VALUE: 27
PIF_VALUE: 26
PIF_VALUE: 27
PIF_VALUE: 29
PIF_VALUE: 29
PIF_VALUE: 0
PIF_VALUE: 27
PIF_VALUE: 26
PIF_VALUE: 27
PIF_VALUE: 26
PIF_VALUE: 28
PIF_VALUE: 28
PIF_VALUE: 26
PIF_VALUE: 28
PIF_VALUE: 20
PIF_VALUE: 30
PIF_VALUE: 26
PIF_VALUE: 29
PIF_VALUE: 27
PIF_VALUE: 5
PIF_VALUE: 20
PIF_VALUE: 26
PIF_VALUE: 27
PIF_VALUE: 29
PIF_VALUE: 1
PIF_VALUE: 27
PIF_VALUE: 27
PIF_VALUE: 1
PIF_VALUE: 27
PIF_VALUE: 27
PIF_VALUE: 28
PIF_VALUE: 27
PIF_VALUE: 29
PIF_VALUE: 1
PIF_VALUE: 29
PIF_VALUE: 28
PIF_VALUE: 3
PIF_VALUE: 22
PIF_VALUE: 28
PIF_VALUE: 28
PIF_VALUE: 27
PIF_VALUE: 27
PIF_VALUE: 29
PIF_VALUE: 0
PIF_VALUE: 29
PIF_VALUE: 29
PIF_VALUE: 27
PIF_VALUE: 22
PIF_VALUE: 0
PIF_VALUE: 29
PIF_VALUE: 26
PIF_VALUE: 28
PIF_VALUE: 22
PIF_VALUE: 29
PIF_VALUE: 23
PIF_VALUE: 22
PIF_VALUE: 28
PIF_VALUE: 28
PIF_VALUE: 29
PIF_VALUE: 27
PIF_VALUE: 19
PIF_VALUE: 28
PIF_VALUE: 27
PIF_VALUE: 26
PIF_VALUE: 28
PIF_VALUE: 22
PIF_VALUE: 0
PIF_VALUE: 28
PIF_VALUE: 27
PIF_VALUE: 27
PIF_VALUE: 29
PIF_VALUE: 21
PIF_VALUE: 24
PIF_VALUE: 28
PIF_VALUE: 27
PIF_VALUE: 28
PIF_VALUE: 28
PIF_VALUE: 22
PIF_VALUE: 28
PIF_VALUE: 26
PIF_VALUE: 29
PIF_VALUE: 28
PIF_VALUE: 28
PIF_VALUE: 22
PIF_VALUE: 27
PIF_VALUE: 21
PIF_VALUE: 19
PIF_VALUE: 27
PIF_VALUE: 23
PIF_VALUE: 24
PIF_VALUE: 28
PIF_VALUE: 26
PIF_VALUE: 24
PIF_VALUE: 21
PIF_VALUE: 19
PIF_VALUE: 27
PIF_VALUE: 28
PIF_VALUE: 28
PIF_VALUE: 29
PIF_VALUE: 1
PIF_VALUE: 27
PIF_VALUE: 29
PIF_VALUE: 0
PIF_VALUE: 28
PIF_VALUE: 23
PIF_VALUE: 22
PIF_VALUE: 28
PIF_VALUE: 28
PIF_VALUE: 27
PIF_VALUE: 24
PIF_VALUE: 20
PIF_VALUE: 30
PIF_VALUE: 26
PIF_VALUE: 23
PIF_VALUE: 24
PIF_VALUE: 27
PIF_VALUE: 20
PIF_VALUE: 27
PIF_VALUE: 27
PIF_VALUE: 22
PIF_VALUE: 28
PIF_VALUE: 29
PIF_VALUE: 28
PIF_VALUE: 22
PIF_VALUE: 29
PIF_VALUE: 1
PIF_VALUE: 23
PIF_VALUE: 26
PIF_VALUE: 21
PIF_VALUE: 29
PIF_VALUE: 28
PIF_VALUE: 27
PIF_VALUE: 28
PIF_VALUE: 23
PIF_VALUE: 23
PIF_VALUE: 25
PIF_VALUE: 26
PIF_VALUE: 22
PIF_VALUE: 28
PIF_VALUE: 21
PIF_VALUE: 27
PIF_VALUE: 28
PIF_VALUE: 30
PIF_VALUE: 28
PIF_VALUE: 27
PIF_VALUE: 27
PIF_VALUE: 28
PIF_VALUE: 25
PIF_VALUE: 27
PIF_VALUE: 28
PIF_VALUE: 27
PIF_VALUE: 28
PIF_VALUE: 28
PIF_VALUE: 27
PIF_VALUE: 26
PIF_VALUE: 27
PIF_VALUE: 25
PIF_VALUE: 22
PIF_VALUE: 29
PIF_VALUE: 0
PIF_VALUE: 27
PIF_VALUE: 24
PIF_VALUE: 29
PIF_VALUE: 28
PIF_VALUE: 27
PIF_VALUE: 30
PIF_VALUE: 28
PIF_VALUE: 27
PIF_VALUE: 29
PIF_VALUE: 22
PIF_VALUE: 27
PIF_VALUE: 27
PIF_VALUE: 28
PIF_VALUE: 27
PIF_VALUE: 29
PIF_VALUE: 2
PIF_VALUE: 20
PIF_VALUE: 27
PIF_VALUE: 26

## 2020-07-30 ASSESSMENT — PAIN - FUNCTIONAL ASSESSMENT: PAIN_FUNCTIONAL_ASSESSMENT: 0-10

## 2020-07-30 ASSESSMENT — PAIN DESCRIPTION - FREQUENCY: FREQUENCY: CONTINUOUS

## 2020-07-30 ASSESSMENT — PAIN DESCRIPTION - ORIENTATION: ORIENTATION: RIGHT

## 2020-07-30 ASSESSMENT — PAIN DESCRIPTION - DESCRIPTORS: DESCRIPTORS: ACHING;DISCOMFORT

## 2020-07-30 ASSESSMENT — PAIN SCALES - GENERAL
PAINLEVEL_OUTOF10: 10
PAINLEVEL_OUTOF10: 7
PAINLEVEL_OUTOF10: 10

## 2020-07-30 ASSESSMENT — PAIN DESCRIPTION - ONSET: ONSET: ON-GOING

## 2020-07-30 ASSESSMENT — PAIN SCALES - WONG BAKER: WONGBAKER_NUMERICALRESPONSE: 4

## 2020-07-30 ASSESSMENT — COPD QUESTIONNAIRES: CAT_SEVERITY: MODERATE

## 2020-07-30 ASSESSMENT — PAIN DESCRIPTION - LOCATION: LOCATION: SHOULDER

## 2020-07-30 NOTE — ANESTHESIA PRE PROCEDURE
Department of Anesthesiology  Preprocedure Note       Name:  Lucía Valverde   Age:  61 y.o.  :  1960                                          MRN:  [id number]         Date:  2020      Surgeon: Yvrose Newsome):  Percy Vizcarra MD    Procedure: XI LAPAROSCOPIC ROBOTIC URETEROLYSIS, URETERAL STENT EXCISION, POSSIBLE URETERAL OCCLUSION BALLOON  (DR. Nathan Herbert TO ASSIST) (N/A )  CYSTOSCOPY URETERAL STENT INSERTION (Right )    Department of Anesthesiology  Pre-Anesthesia Evaluation/Consultation         Name:  Lucía Valverde                                         Age:  61 y.o. MRN:  [id number]             Medications  Current Facility-Administered Medications   Medication Dose Route Frequency Provider Last Rate Last Dose    ceFAZolin (ANCEF) 2 g in dextrose 5 % 50 mL IVPB  2 g Intravenous On Call to [address] [place] [address]           Allergies   Allergen Reactions    Adhesive Tape      tegaderm causes blisters.  Use paper tape    Ramipril      Other reaction(s): swelling of the lips      Patient Active Problem List   Diagnosis    MARK (acute kidney injury) ([address]    Anemia    Neuropathy in diabetes ([address]    Charcot ankle    PVD (peripheral vascular disease) ([address]    Amputation below knee (HCC)    Type 2 diabetes mellitus with diabetic polyneuropathy ([address]    Pure hypercholesterolemia    Constipation    PSA elevation    CAD (coronary artery disease)    Acute hematogenous osteomyelitis of left tibia (HCC)    Chronic pain of left knee    Acute hematogenous osteomyelitis of left fibula (HCC)    Essential hypertension    Olecranon bursitis of right elbow    Osteomyelitis of left leg (HCC)    Acquired hypothyroidism    Carotid stenosis, asymptomatic, bilateral    Right renal mass    Metabolic acidosis, normal anion gap (NAG)    Hypotension due to drugs    Hydronephrosis of right kidney    Idiopathic hypotension    Status post below-knee amputation of left lower extremity (HCC)    Atrial fibrillation [facility])     Past Medical History:   Diagnosis Date    Asymptomatic bilateral carotid artery stenosis     Boil, thigh 10/2019    10/30/19: right inner thigh region, says PCP Rxd Doxy for this, area is much better, has a couple days left to complete Doxy    CAD (coronary artery disease)     saw Dr. Luis Alberto Chavarria ([facility]) on Oct. 22/2019 for pre-op clearance, to be cleared pending cardiac echo, not yet schedule for this as of 10/30/19    Charcot foot due to diabetes mellitus ([address] 2014    LEFT    COPD (chronic obstructive pulmonary disease) ([address] 2009    INHALER USE DAILY    Diabetes mellitus ([address] 1989    IDDM, On Insulin Dr. Delfina Dior Diabetic neuropathy ([address]     Difficult intravenous access     VEINS ROLL    Employs prosthetic leg     s/p left BKA    Foot ulcer ([address] PRIOR TO 2015    BILAT    Full dentures     UPPER ONLY    Hyperlipidemia 2004    ON RX    Hypertension 2004    ON RX, PCP Dr. Malathi Hoff, seen Oct. 2019    Hypoglycemia 4/2/2015    MRSA (methicillin resistant staph aureus) culture positive 4/16/2015    ankle    OA (osteoarthritis)     Osteomyelitis ([address]     left stump  BKA    Paroxysmal atrial fibrillation ([address]     Renal mass 09/2019    ACCIDENTAL  FINDING IS FOLLOWING UP WITH KIDNEY DR GUTIERREZ [facility]     Suspected sleep apnea     had a sleep study in august, says no results have been sent to PCP yet!     Thyroid disease 2004    PT HAD HYPERTHYROIDISM-UNCONTROLED THYROID DESTROYED WITH RADI IODINE NOW HAS HYPOTHYRIDISM    Vision abnormalities 09/2019    LEFT NO VISION    Vitreous hemorrhage of left eye due to diabetes mellitus ([address] 09/2019    s/p Vitrectomy 9/2019    Wears glasses     Wears partial dentures     full upper, does not wear lower partial    Wellness examination     Dr. Mendy Desai seen within last 1 1/2 mos     Past Surgical History:   Procedure Laterality Date    CARDIAC CATHETERIZATION      no stenting    CARDIOVASCULAR STRESS TEST  06/28/2019    small fixed apical, likely 148/76   07/16/20 (!) 90/53   07/17/20 110/66       BMI  There is no height or weight on file to calculate BMI. CBC   Lab Results   Component Value Date    WBC 7.2 05/21/2020    RBC 4.38 05/21/2020    HGB 14.8 07/16/2020    HCT 45.5 07/16/2020    MCV 90.5 05/21/2020    RDW 14.9 05/21/2020     06/26/2020       CMP    Lab Results   Component Value Date     07/16/2020    K 4.8 07/16/2020     07/16/2020    CO2 24 07/16/2020    BUN 35 07/16/2020    CREATININE 1.95 07/16/2020    GFRAA 43 07/16/2020    LABGLOM 35 07/16/2020    GLUCOSE 69 07/16/2020    PROT 7.4 05/21/2020    CALCIUM 9.1 06/08/2020    BILITOT 0.42 05/21/2020    ALKPHOS 127 05/21/2020    AST 24 05/21/2020    ALT 19 05/21/2020       BMP    Lab Results   Component Value Date     07/16/2020    K 4.8 07/16/2020     07/16/2020    CO2 24 07/16/2020    BUN 35 07/16/2020    CREATININE 1.95 07/16/2020    CALCIUM 9.1 06/08/2020    GFRAA 43 07/16/2020    LABGLOM 35 07/16/2020    GLUCOSE 69 07/16/2020       POC Testing  No results for input(s): POCGLU, POCNA, POCK, POCCL, POCBUN, POCHEMO, POCHCT in the last 72 hours. Coags    Lab Results   Component Value Date    PROTIME 13.5 06/26/2020    INR 1.0 06/26/2020    APTT 29.9 06/26/2020       HCG (If Applicable) No results found for: PREGTESTUR, PREGSERUM, HCG, HCGQUANT     ABGs   Lab Results   Component Value Date    PHART 7.305 11/13/2019    PO2ART 112.0 11/13/2019    NSM8YMO 40.9 11/13/2019    DNZ2KWU 19.7 11/13/2019    G1FRVCDN 98.4 11/13/2019        Type & Screen (If Applicable)  No results found for: [facility]    Radiology (If Applicable)    Cardiac Testing (If Applicable)     EKG (If Applicable) AF,NSST changes's inf leads          Medications prior to admission:   Prior to Admission medications    Medication Sig Start Date End Date Taking?  Authorizing Provider   levothyroxine (SYNTHROID) 150 MCG tablet Take 1 tablet by mouth Daily 7/14/20   Steffi Powell MD   pregabalin (LYRICA) 100 MG capsule TAKE 1 CAPSULE BY MOUTH THREE TIMES A DAY 7/9/20 8/8/20  Eloina Arnold MD   albuterol sulfate  (90 Base) MCG/ACT inhaler INHALE 2 PUFFS INTO THE LUNGS EVERY 6 HOURS AS NEEDED FOR WHEEZING OR SHORTNESS OF BREATH 7/8/20   Eloina Arnold MD   insulin lispro (HUMALOG) 100 UNIT/ML injection vial INJECT 12 UNITS UNDER THE SKIN 3 TIMES DAILY + SLIDING SCALE (TAKE 5 UNITS IF SUGAR IS OVER 150) 6/5/20   Eloina Arnold MD   glucagon, rDNA, 1 MG injection Inject 1 mL into the muscle 11/13/19   Historical Provider, MD   Blood Pressure KIT 1 actuation by Does not apply route once a week 3/25/20   Shruti Chahal MD   rOPINIRole (REQUIP) 2 MG tablet TAKE 1 TABLET BY MOUTH EVERY DAY 3/13/20   Eloina Arnold MD   blood glucose test strips (ASCENSIA AUTODISC VI;ONE TOUCH ULTRA TEST VI) strip 1 each by In Vitro route 4 times daily As needed.  2/28/20   Eloina Arnold MD   gemfibrozil (LOPID) 600 MG tablet TAKE 1 TABLET BY MOUTH EVERY DAY 2/24/20   Eloina Arnold MD   blood glucose monitor strips by Other route 4 times daily Test strips to use w/ Accucheck Smart View Glucometer  Patient taking differently: by Other route 4 times daily Test strips to use w/ ONE TOUCH VERIO TEST STRIPS 2/3/20   Eloina Arnold MD   Insulin Syringe-Needle U-100 (ULTICARE INSULIN SYRINGE) 31G X 5/16\" 1 ML MISC USE AS DIRECTED WITH HUMULIN AND HUMALOG INSULIN 5 TIMES DAILY 1/27/20   Renny May MD   HUMULIN N 100 UNIT/ML injection vial INJECT 50 UNITS EVERY MORNING AND 70 UNITS AT BEDTIME  Patient taking differently: 60 units in the AM , 70 units in PM 1/23/20   Eloina Arnold MD   isosorbide mononitrate (IMDUR) 30 MG extended release tablet TAKE 1 TABLET BY MOUTH EVERY DAY 1/16/20   Eloina Arnold MD   Handicap Placard MISC by Does not apply route Duration - 5years  Reason- prosthetic leg 1/2/20   Shruti Chahal MD   ELIQUIS 5 MG TABS tablet Take 1 tablet by mouth 2 times daily 11/26/19 M70.21    Osteomyelitis of left leg (HCC) M86.9    Acquired hypothyroidism E03.9    Carotid stenosis, asymptomatic, bilateral I65.23    Right renal mass A90.83    Metabolic acidosis, normal anion gap (NAG) E87.2    Hypotension due to drugs I95.2    Hydronephrosis of right kidney N13.30    Idiopathic hypotension I95.0    Status post below-knee amputation of left lower extremity (HCC) Z89.512    Atrial fibrillation ([facility]) I48.91       Past Medical History:        Diagnosis Date    Asymptomatic bilateral carotid artery stenosis     Boil, thigh 10/2019    10/30/19: right inner thigh region, says PCP Rxd Doxy for this, area is much better, has a couple days left to complete Doxy    CAD (coronary artery disease)     saw Dr. William Farah ([facility]) on Oct. 22/2019 for pre-op clearance, to be cleared pending cardiac echo, not yet schedule for this as of 10/30/19    Charcot foot due to diabetes mellitus ([address] 2014    LEFT    COPD (chronic obstructive pulmonary disease) ([address] 2009    INHALER USE DAILY    Diabetes mellitus ([address] 1989    IDDM, On Insulin Dr. Mcallister Level Diabetic neuropathy ([address]     Difficult intravenous access     VEINS ROLL    Employs prosthetic leg     s/p left BKA    Foot ulcer ([address] PRIOR TO 2015    BILAT    Full dentures     UPPER ONLY    Hyperlipidemia 2004    ON RX    Hypertension 2004    ON RX, PCP Dr. Garret Palma, seen Oct. 2019    Hypoglycemia 4/2/2015    MRSA (methicillin resistant staph aureus) culture positive 4/16/2015    ankle    OA (osteoarthritis)     Osteomyelitis ([address]     left stump  BKA    Paroxysmal atrial fibrillation ([address]     Renal mass 09/2019    ACCIDENTAL  FINDING IS FOLLOWING UP WITH KIDNEY DR GUTIERREZ [facility] Snores     Suspected sleep apnea     had a sleep study in august, says no results have been sent to PCP yet!     Thyroid disease 2004    PT HAD HYPERTHYROIDISM-UNCONTROLED THYROID DESTROYED WITH RADI IODINE NOW HAS HYPOTHYRIDISM    Vision abnormalities 09/2019    LEFT NO VISION  Vitreous hemorrhage of left eye due to diabetes mellitus ([facility] 09/2019    s/p Vitrectomy 9/2019    Wears glasses     Wears partial dentures     full upper, does not wear lower partial    Wellness examination     Dr. Cheryle Field seen within last 1 1/2 mos       Past Surgical History:        Procedure Laterality Date    CARDIAC CATHETERIZATION      no stenting    CARDIOVASCULAR STRESS TEST  06/28/2019    small fixed apical, likely normal, EF 48%    COLONOSCOPY  06/17/2016    poor prep, done up to the IC valve, redundant colon    COLONOSCOPY  01/23/2017    VIRTUAL COLONOSCOPY DONE-no polyps or masses    CYSTOSCOPY Bilateral 6/16/2020    CYSTOSCOPY RETROGRADE PYELOGRAM URETEROSCOPY performed by Luciano Orlando MD at [facility] Right 1995    [address] Right     r-bone removed    HC  PICC [address] [facility] DOUBLE  5/21/2018         KIDNEY CYST REMOVAL      KIDNEY SURGERY Right 11/13/2019    XI ROBOTIC PARTIAL NEPHRECTOMY, INTRAOP ULTRASOUND, RIGHT URETEROURETEROSTOMY, RIGHT URETERAL STENT PLACEMENT **SHORT STAY** performed by Luciano Orlando MD at [address] Left 4/29/15    OTHER SURGICAL HISTORY Left 5-5-15 and 11/2015    Revision BKA    OTHER SURGICAL HISTORY      left stump revision 5/30/2018    NJ RE-AMPUTATION LOWER LEG Left 5/30/2018    LEG AMPUTATION BELOW KNEE REVISION performed by Augustine Correa MD at [address] Bilateral 80'S    TOE AMPUTATION      Right 2 and 4    VITRECTOMY Left 9/25/2019    PARS PLANA VITRECTOMY 25 GAUGE, MEMBRANE PEELING, ENDOLASER 200  MW 1044 SPOTS, INDIRECT LASER 26 SPOTS performed by Ana M Flanagan MD at [address] [facility] History:    Social History     Tobacco Use    Smoking status: Former Smoker     Packs/day: 2.00     Years: 25.00     Pack years: 50.00     Types: Cigars     Start date: 1978     Last attempt to quit: 2011     Years since quittin.1    Smokeless tobacco: Never Used    Tobacco comment: quit in    Substance Use Topics    Alcohol use: Yes     Alcohol/week: 0.0 standard drinks     Frequency: Monthly or less     Comment: BEER 1 A MONTH                                Counseling given: Not Answered  Comment: quit in       Vital Signs (Current): There were no vitals filed for this visit. BP Readings from Last 3 Encounters:   20 (!) 148/76   20 (!) 90/53   20 110/66       NPO Status:                                                                                 BMI:   Wt Readings from Last 3 Encounters:   20 270 lb (122.5 kg)   20 275 lb (124.7 kg)   20 282 lb (127.9 kg)     There is no height or weight on file to calculate BMI.    CBC:   Lab Results   Component Value Date    WBC 7.2 2020    RBC 4.38 2020    HGB 14.8 2020    HCT 45.5 2020    MCV 90.5 2020    RDW 14.9 2020     2020       CMP:   Lab Results   Component Value Date     2020    K 4.8 2020     2020    CO2 24 2020    BUN 35 2020    CREATININE 1.95 2020    GFRAA 43 2020    LABGLOM 35 2020    GLUCOSE 69 2020    PROT 7.4 2020    CALCIUM 9.1 2020    BILITOT 0.42 2020    ALKPHOS 127 2020    AST 24 2020    ALT 19 2020       POC Tests:   No results for input(s): POCGLU, POCNA, POCK, POCCL, POCBUN, POCHEMO, POCHCT in the last 72 hours.     Coags:   Lab Results   Component Value Date    PROTIME 13.5 2020    INR 1.0 2020    APTT 29.9 2020       HCG (If Applicable): No results found for: PREGTESTUR, PREGSERUM, HCG, HCGQUANT     ABGs:   Lab Results   Component Value Date    PHART 7.305 2019    PO2ART 112.0 2019    KDL0RZO 40.9 2019    ZAA0HRB 19.7 2019    W4GGSILJ 98.4 2019 Type & Screen (If Applicable):  No results found for: [facility], [address]    Anesthesia Evaluation  Patient summary reviewed and Nursing notes reviewed no history of anesthetic complications:   Airway: Mallampati: II  TM distance: >3 FB   Neck ROM: full  Mouth opening: > = 3 FB Dental:          Pulmonary:normal exam    (+) COPD: moderate,                             Cardiovascular:  Exercise tolerance: good (>4 METS),   (+) hypertension:, CAD:, dysrhythmias: atrial fibrillation, CHF: diastolic,     (-) CABG/stent and  angina    ECG reviewed  Rhythm: regular  Rate: normal  Echocardiogram reviewed  Stress test reviewed  Cleared by cardiology     Beta Blocker:  Dose within 24 Hrs         Neuro/Psych:   (+) neuromuscular disease:,              ROS comment: neuropathy GI/Hepatic/Renal:   (+) renal disease:,      (-) GERD and liver disease       Endo/Other:    (+) DiabetesType I DM, poorly controlled, using insulin, hypothyroidism::., malignancy/cancer. Abdominal:           Vascular:   + PVD, aortic or cerebral, .                                              Gerardo Lebron MD   7/30/2020

## 2020-07-30 NOTE — INTERVAL H&P NOTE
Update History & Physical    The patient's History and Physical of July 23, 2020 was reviewed with the patient and I examined the patient. There was no change. The surgical site was confirmed by the patient and me. Pt had right nephrostomy tube exchanged on 7/23/20. Hx right partial nephrectomy last year with right hydronephrosis due to ureteral stricture. Plan: The risks, benefits, expected outcome, and alternative to the recommended procedure have been discussed with the patient. Patient understands and wants to proceed with the procedure. Here for RIGHT robotic ureterolysis, ureteral excision, stent placement with Alfonso Fairchild and Herminio Dallas. ROSCOPIC ROBOTIC URETEROLYSIS, URETERAL STENT EXCISION, POSSIBLE URETERAL OCCLUSION BALLOON (DR. Elyssa Garcia TO ASSIST)    Electronically signed by Newton Michele PA-C on 7/30/2020 at 11:20 AM

## 2020-07-30 NOTE — H&P
History and Physical Update    Pt Name: Danuta Bateman  MRN: [id number]  YOB: 1960  Date of evaluation: 7/30/2020    [x] I have examined the patient and reviewed the H&P/Consult and there are no changes to the patient or plans. [] I have examined the patient and reviewed the H&P/Consult and have noted the following changes:        [facility]  electronically signed 7/30/2020 at 12:03 PM      History and Physical     Pt Name: Danuta Bateman  MRN: [id number]  YOB: 1960  Date of evaluation: 7/16/2020     SUBJECTIVE:      History of Chief Complaint:    Patient complains of a history of a tumor of his right kidney last fall. He had a partial nephrectomy. He has since developed obstructing scar tissue in the right ureter. This was treated with placement of a right nephrostomy tube. He has been scheduled for replacement of the nephrostomy tube and subsequent procedures to relieve the stricture of his ureter. Past Medical History    has a past medical history of Asymptomatic bilateral carotid artery stenosis, Boil, thigh, CAD (coronary artery disease), Charcot foot due to diabetes mellitus ([address] COPD (chronic obstructive pulmonary disease) ([address] Diabetes mellitus ([address] Diabetic neuropathy ([address] Difficult intravenous access, Employs prosthetic leg, Foot ulcer ([address] Full dentures, Hyperlipidemia, Hypertension, Hypoglycemia, MRSA (methicillin resistant staph aureus) culture positive, OA (osteoarthritis), Osteomyelitis ([address] Paroxysmal atrial fibrillation ([address] Renal mass, Snores, Suspected sleep apnea, Thyroid disease, Vision abnormalities, Vitreous hemorrhage of left eye due to diabetes mellitus ([address] Wears glasses, Wears partial dentures, and Wellness examination. Past Surgical History   has a past surgical history that includes Foot surgery (Right); Toe amputation; Finger amputation (Right, 1995);  Leg amputation below knee (Left, 4/29/15); other surgical history (Left, 5-5-15 and 11/2015); Temporomandibular joint surgery (Bilateral, 80'S); Colonoscopy (06/17/2016); Colonoscopy (01/23/2017);   picc powerpicc double (5/21/2018); Cardiac catheterization; other surgical history; pr re-amputation lower leg (Left, 5/30/2018); vitrectomy (Left, 9/25/2019); cardiovascular stress test (06/28/2019); Kidney surgery (Right, 11/13/2019); and Cystoscopy (Bilateral, 6/16/2020). Medications  Current Medication     Current Outpatient Medications:     levothyroxine (SYNTHROID) 150 MCG tablet, Take 1 tablet by mouth Daily, Disp: 90 tablet, Rfl: 1    albuterol sulfate  (90 Base) MCG/ACT inhaler, INHALE 2 PUFFS INTO THE LUNGS EVERY 6 HOURS AS NEEDED FOR WHEEZING OR SHORTNESS OF BREATH, Disp: 6.7 Inhaler, Rfl: 0    insulin lispro (HUMALOG) 100 UNIT/ML injection vial, INJECT 12 UNITS UNDER THE SKIN 3 TIMES DAILY + SLIDING SCALE (TAKE 5 UNITS IF SUGAR IS OVER 150), Disp: 20 mL, Rfl: 3    glucagon, rDNA, 1 MG injection, Inject 1 mL into the muscle, Disp: , Rfl:     Blood Pressure KIT, 1 actuation by Does not apply route once a week, Disp: 1 kit, Rfl: 0    rOPINIRole (REQUIP) 2 MG tablet, TAKE 1 TABLET BY MOUTH EVERY DAY, Disp: 90 tablet, Rfl: 3    pregabalin (LYRICA) 100 MG capsule, TAKE 1 CAPSULE BY MOUTH 3 TIMES A DAY, Disp: 90 capsule, Rfl: 2    blood glucose test strips (ASCENSIA AUTODISC VI;ONE TOUCH ULTRA TEST VI) strip, 1 each by In Vitro route 4 times daily As needed. , Disp: 400 each, Rfl: 3    gemfibrozil (LOPID) 600 MG tablet, TAKE 1 TABLET BY MOUTH EVERY DAY, Disp: 90 tablet, Rfl: 3    blood glucose monitor strips, by Other route 4 times daily Test strips to use w/ Accucheck Smart View Glucometer (Patient taking differently: by Other route 4 times daily Test strips to use w/ ONE TOUCH VERIO TEST STRIPS), Disp: 100 strip, Rfl: 11    Insulin Syringe-Needle U-100 (ULTICARE INSULIN SYRINGE) 31G X 5/16\" 1 ML MISC, USE AS DIRECTED WITH HUMULIN AND HUMALOG INSULIN 5 TIMES DAILY, Disp: 1 each, Rfl: 3    HUMULIN N 100 UNIT/ML injection vial, INJECT 50 UNITS EVERY MORNING AND 70 UNITS AT BEDTIME (Patient taking differently: 60 units in the AM , 70 units in PM), Disp: 30 mL, Rfl: 11    isosorbide mononitrate (IMDUR) 30 MG extended release tablet, TAKE 1 TABLET BY MOUTH EVERY DAY, Disp: 90 tablet, Rfl: 3    Handicap Placard MISC, by Does not apply route Duration - 5years Reason- prosthetic leg, Disp: 1 each, Rfl: 0    atorvastatin (LIPITOR) 20 MG tablet, TAKE 1 TABLET BY MOUTH EVERY DAY, Disp: 90 tablet, Rfl: 3    tamsulosin (FLOMAX) 0.4 MG capsule, TAKE 1 CAPSULE BY MOUTH EVERY DAY, Disp: 90 capsule, Rfl: 3    propafenone (RYTHMOL) 150 MG tablet, Take 1 tablet by mouth every 8 hours, Disp: 90 tablet, Rfl: 3    SYMBICORT 160-4.5 MCG/ACT AERO, TAKE 2 PUFFS BY MOUTH TWICE A DAY (Patient taking differently: Inhale 2 puffs into the lungs 2 times daily ), Disp: 30.6 Inhaler, Rfl: 3    METOPROLOL TARTRATE PO, Take 50 mg by mouth 2 times daily, Disp: , Rfl:     Omega-3 Fatty Acids (FISH OIL CONCENTRATE) 300 MG CAPS, Take 300 mg by mouth nightly , Disp: , Rfl:     Glucosamine Sulfate 500 MG TABS, Take 500 mg by mouth 3 times daily, Disp: , Rfl:     pregabalin (LYRICA) 100 MG capsule, TAKE 1 CAPSULE BY MOUTH THREE TIMES A DAY, Disp: 90 capsule, Rfl: 0    ELIQUIS 5 MG TABS tablet, Take 1 tablet by mouth 2 times daily, Disp: , Rfl: 2     Allergies  is allergic to adhesive tape. Family History  family history includes Alcohol Abuse in his father; COPD in his father; Diabetes in his brother and mother; Hypertension in his father and mother; Kidney Cancer in his father; Other in his sister; Stomach Cancer in his mother. Social History   reports that he quit smoking about 9 years ago. His smoking use included cigars. He started smoking about 42 years ago. He has a 50.00 pack-year smoking history. He has never used smokeless tobacco.   reports current alcohol use.    reports previous drug use.     Marital Status:   Children: two sons and five grandchildren  Occupation: disabled     Review of Systems  CONSTITUTIONAL:  negative   EYES:  glasses  HENT:  hearing loss  RESPIRATORY:  negative   CARDIOVASCULAR:  negative   GASTROINTESTINAL:  negative   GENITOURINARY:  negative   INTEGUMENT/BREAST:  negative   HEMATOLOGIC/LYMPHATIC:  negative   ALLERGIC/IMMUNOLOGIC:  Adhesive allergy  ENDOCRINE:  negative   MUSCULOSKELETAL:  Left BKA  NEUROLOGICAL:  negative   BEHAVIOR/PSYCH:  negative      OBJECTIVE:      VITALS:  height is 6' 4\" (1.93 m) and weight is 282 lb (127.9 kg). His temporal temperature is 97.3 °F (36.3 °C). His blood pressure is 90/53 (abnormal) and his pulse is 64. His respiration is 20 and oxygen saturation is 98%. CONSTITUTIONAL: Alert & oriented x 3, no acute distress. Prosthetic left lower leg. Walks with a cane. SKIN:  Warm and dry, no rash or erythema. HEAD:  Normocephalic, atraumatic. EYES: PERRLA. EOMs intact. EARS:  Hearing grossly normal.    NOSE:  Nares patent. Septum midline. No rhinorrhea   MOUTH/THROAT:  Unremarkable   NECK: Supple with no lymphadenopathy. LUNGS: Clear to auscultation throughout. No wheezes, rales or rhonchi. CARDIOVASCULAR: Heart rate regular. Rhythm without murmur, click, gallop or rub. ABDOMEN: Soft, non tender, non distended, no masses or organomegaly. EXTREMITIES: No clubbing, cyanosis or edema.       TESTING:      [id number]  Labs pending: drawn 2020      IMPRESSION:   1. Right ureteral stricture  2. Right renal cancer  3.    Past Medical History in prose (no negatives)    has a past medical history of Asymptomatic bilateral carotid artery stenosis, Boil, thigh (10/2019), CAD (coronary artery disease), Charcot foot due to diabetes mellitus ([address] (), COPD (chronic obstructive pulmonary disease) ([address] (), Diabetes mellitus ([address] (), Diabetic neuropathy ([facility] [address] Difficult intravenous access, Employs prosthetic leg, Foot ulcer ([address] (PRIOR TO 2015), Full dentures, Hyperlipidemia (2004), Hypertension (2004), Hypoglycemia (4/2/2015), MRSA (methicillin resistant staph aureus) culture positive (4/16/2015), OA (osteoarthritis), Osteomyelitis ([address] Paroxysmal atrial fibrillation ([address] Renal mass (09/2019), Snores, Suspected sleep apnea, Thyroid disease (2004), Vision abnormalities (09/2019), Vitreous hemorrhage of left eye due to diabetes mellitus ([address] (09/2019), Wears glasses, Wears partial dentures, and Wellness examination. PLAN:   1. Cystoscopy  2. Right ureteral stent placement  3. Robotic right ureterolysis and ureteral stricture excision  4.  Possible right ureteral occlusion balloon insertion     Anurag HUNT, AEHollyC

## 2020-07-30 NOTE — ANESTHESIA PRE PROCEDURE
Department of Anesthesiology  Preprocedure Note       Name:  Debbie Frost   Age:  61 y.o.  :  1960                                          MRN:  [id number]         Date:  2020      Surgeon: Evelia Ag):  Pawan Posadas MD    Procedure: Procedure(s):  XI LAPAROSCOPIC ROBOTIC URETEROLYSIS, URETERAL STENT EXCISION, POSSIBLE URETERAL OCCLUSION BALLOON  (DR. Brayan Chowdhury TO ASSIST)  CYSTOSCOPY URETERAL STENT INSERTION    Department of Anesthesiology  Pre-Anesthesia Evaluation/Consultation         Name:  Debbie Frost                                         Age:  61 y.o. MRN:  [id number]             Medications  Current Facility-Administered Medications   Medication Dose Route Frequency Provider Last Rate Last Dose    heparin (porcine) injection 5,000 Units  5,000 Units Subcutaneous Once Pawan Posadas MD        ceFAZolin (ANCEF) 2 g in dextrose 5 % 50 mL IVPB  2 g Intravenous On Call to [address] [place] [address]        0.9 % sodium chloride infusion   Intravenous Continuous Cory Brasher MD        indocyanine green (IC-GREEN) syringe 5 mg  5 mg Intravenous On Call to Glo Patton MD           Allergies   Allergen Reactions    Adhesive Tape      tegaderm causes blisters.  Use paper tape    Ramipril      Other reaction(s): swelling of the lips      Patient Active Problem List   Diagnosis    MARK (acute kidney injury) ([address]    Anemia    Neuropathy in diabetes ([address]    Charcot ankle    PVD (peripheral vascular disease) ([address]    Amputation below knee (HCC)    Type 2 diabetes mellitus with diabetic polyneuropathy ([address]    Pure hypercholesterolemia    Constipation    PSA elevation    CAD (coronary artery disease)    Acute hematogenous osteomyelitis of left tibia (HCC)    Chronic pain of left knee    Acute hematogenous osteomyelitis of left fibula (HCC)    Essential hypertension    Olecranon bursitis of right elbow    Osteomyelitis of left leg (HCC)    Acquired hypothyroidism    Carotid stenosis, asymptomatic, bilateral    Right renal mass    Metabolic acidosis, normal anion gap (NAG)    Hypotension due to drugs    Hydronephrosis of right kidney    Idiopathic hypotension    Status post below-knee amputation of left lower extremity (HCC)    Atrial fibrillation [facility])     Past Medical History:   Diagnosis Date    Asymptomatic bilateral carotid artery stenosis     Boil, thigh 10/2019    10/30/19: right inner thigh region, says PCP Rxd Doxy for this, area is much better, has a couple days left to complete Doxy    CAD (coronary artery disease)     saw Dr. Tunde Garcia ([facility]) on Oct. 22/2019 for pre-op clearance, to be cleared pending cardiac echo, not yet schedule for this as of 10/30/19    Charcot foot due to diabetes mellitus ([address] 2014    LEFT    COPD (chronic obstructive pulmonary disease) ([address] 2009    INHALER USE DAILY    Diabetes mellitus ([address] 1989    IDDM, On Insulin Dr. Refugio Mathew Diabetic neuropathy ([address]     Difficult intravenous access     VEINS ROLL    Employs prosthetic leg     s/p left BKA    Foot ulcer ([address] PRIOR TO 2015    BILAT    Full dentures     UPPER ONLY    Hyperlipidemia 2004    ON RX    Hypertension 2004    ON RX, PCP Dr. Fern Leos, seen Oct. 2019    Hypoglycemia 4/2/2015    MRSA (methicillin resistant staph aureus) culture positive 4/16/2015    ankle    OA (osteoarthritis)     Osteomyelitis ([address]     left stump  BKA    Paroxysmal atrial fibrillation ([address]     Renal mass 09/2019    ACCIDENTAL  FINDING IS FOLLOWING UP WITH KIDNEY DR GUTIERREZ [facility] Snores     Suspected sleep apnea     had a sleep study in august, says no results have been sent to PCP yet!     Thyroid disease 2004    PT HAD HYPERTHYROIDISM-UNCONTROLED THYROID DESTROYED WITH RADI IODINE NOW HAS HYPOTHYRIDISM    Vision abnormalities 09/2019    LEFT NO VISION    Vitreous hemorrhage of left eye due to diabetes mellitus ([address] 09/2019    s/p Vitrectomy 9/2019    Wears glasses     Wears partial dentures     full upper, does not wear lower partial    Wellness examination     Dr. Roberta Quezada seen within last 1 1/2 mos     Past Surgical History:   Procedure Laterality Date    CARDIAC CATHETERIZATION      no stenting    CARDIOVASCULAR STRESS TEST  2019    small fixed apical, likely normal, EF 48%    COLONOSCOPY  2016    poor prep, done up to the IC valve, redundant colon    COLONOSCOPY  2017    VIRTUAL COLONOSCOPY DONE-no polyps or masses    CYSTOSCOPY Bilateral 2020    CYSTOSCOPY RETROGRADE PYELOGRAM URETEROSCOPY performed by Reinaldo Bennett MD at [facility] Right     middle-CRUSHING INJURY AT [address] [place] [address] Right     r-bone removed    HC  PICC [facility] DOUBLE  2018         KIDNEY CYST REMOVAL      KIDNEY SURGERY Right 2019    XI ROBOTIC PARTIAL NEPHRECTOMY, INTRAOP ULTRASOUND, RIGHT URETEROURETEROSTOMY, RIGHT URETERAL STENT PLACEMENT **SHORT STAY** performed by Reinaldo Bennett MD at [facility] Left 4/29/15    OTHER SURGICAL HISTORY Left 5-5-15 and 2015    Revision BKA    OTHER SURGICAL HISTORY      left stump revision 2018    SD RE-AMPUTATION LOWER LEG Left 2018    LEG AMPUTATION BELOW KNEE REVISION performed by Tello Castanon MD at [address] Bilateral 80'S    TOE AMPUTATION      Right 2 and 4    VITRECTOMY Left 2019    PARS PLANA VITRECTOMY 25 GAUGE, MEMBRANE PEELING, ENDOLASER 200  MW 1044 SPOTS, INDIRECT LASER 26 SPOTS performed by Joseph Patel MD at [address] [place] [address] History     Tobacco Use    Smoking status: Former Smoker     Packs/day: 2.00     Years: 25.00     Pack years: 50.00     Types: Cigars     Start date:      Last attempt to quit: 2011     Years since quittin.1    Smokeless tobacco: Never Used    Tobacco comment: quit in    Substance Use Topics    Alcohol use:  Yes     Alcohol/week: 0.0 standard drinks Frequency: Monthly or less     Comment: BEER 1 A MONTH    Drug use: Not Currently         Vital Signs (Current)   Vitals:    20 1241   BP: 138/67   Pulse: 61   Resp: 16   Temp: 97.9 °F (36.6 °C)   SpO2: 96%     Vital Signs Statistics (for past 48 hrs)     Temp  Av.9 °F (36.6 °C)  Min: 97.9 °F (36.6 °C)   Min taken time: 20 1241  Max: 97.9 °F (36.6 °C)   Max taken time: 20 1241  Pulse  Av  Min: 64   Min taken time: 20 1241  Max: 64   Max taken time: 20 1241  Resp  Av  Min: 16   Min taken time: 20 1241  Max: 16   Max taken time: 20 1241  BP  Min: 138/67   Min taken time: 20 1241  Max: 138/67   Max taken time: 20 1241  SpO2  Av %  Min: 96 %   Min taken time: 20 1241  Max: 96 %   Max taken time: 20 1241  BP Readings from Last 3 Encounters:   20 138/67   20 (!) 148/76   20 (!) 90/53       BMI  Body mass index is 33.47 kg/m². CBC   Lab Results   Component Value Date    WBC 7.2 2020    RBC 4.38 2020    HGB 14.8 2020    HCT 45.5 2020    MCV 90.5 2020    RDW 14.9 2020     2020       CMP    Lab Results   Component Value Date     2020    K 4.8 2020     2020    CO2 24 2020    BUN 35 2020    CREATININE 1.95 2020    GFRAA 43 2020    LABGLOM 35 2020    GLUCOSE 69 2020    PROT 7.4 2020    CALCIUM 9.1 2020    BILITOT 0.42 2020    ALKPHOS 127 2020    AST 24 2020    ALT 19 2020       BMP    Lab Results   Component Value Date     2020    K 4.8 2020     2020    CO2 24 2020    BUN 35 2020    CREATININE 1.95 2020    CALCIUM 9.1 2020    GFRAA 43 2020    LABGLOM 35 2020    GLUCOSE 69 2020       POC Testing  No results for input(s): POCGLU, POCNA, POCK, POCCL, POCBUN, POCHEMO, POCHCT in the last 72 hours.     Coags Lab Results   Component Value Date    PROTIME 13.5 06/26/2020    INR 1.0 06/26/2020    APTT 29.9 06/26/2020       HCG (If Applicable) No results found for: PREGTESTUR, PREGSERUM, HCG, HCGQUANT     ABGs   Lab Results   Component Value Date    PHART 7.305 11/13/2019    PO2ART 112.0 11/13/2019    FFK0SFQ 40.9 11/13/2019    PMK9YTW 19.7 11/13/2019    B9VWMCPX 98.4 11/13/2019        Type & Screen (If Applicable)  No results found for: [facility]    Radiology (If Applicable)    Cardiac Testing (If Applicable) EF 33%,CWB stress test 6/19,intermediate risk    EKG (If Applicable) AF          Medications prior to admission:   Prior to Admission medications    Medication Sig Start Date End Date Taking? Authorizing Provider   levothyroxine (SYNTHROID) 150 MCG tablet Take 1 tablet by mouth Daily 7/14/20   Ramonita Vernon MD   pregabalin (LYRICA) 100 MG capsule TAKE 1 CAPSULE BY MOUTH THREE TIMES A DAY 7/9/20 8/8/20  Ramonita Vernon MD   albuterol sulfate  (90 Base) MCG/ACT inhaler INHALE 2 PUFFS INTO THE LUNGS EVERY 6 HOURS AS NEEDED FOR WHEEZING OR SHORTNESS OF BREATH 7/8/20   Ramonita Vernon MD   insulin lispro (HUMALOG) 100 UNIT/ML injection vial INJECT 12 UNITS UNDER THE SKIN 3 TIMES DAILY + SLIDING SCALE (TAKE 5 UNITS IF SUGAR IS OVER 150) 6/5/20   Ramonita Vernon MD   glucagon, rDNA, 1 MG injection Inject 1 mL into the muscle 11/13/19   Historical Provider, MD   Blood Pressure KIT 1 actuation by Does not apply route once a week 3/25/20   Christen Colvin MD   rOPINIRole (REQUIP) 2 MG tablet TAKE 1 TABLET BY MOUTH EVERY DAY 3/13/20   Ramonita Vernon MD   blood glucose test strips (ASCENSIA AUTODISC VI;ONE TOUCH ULTRA TEST VI) strip 1 each by In Vitro route 4 times daily As needed.  2/28/20   Ramonita Vernon MD   gemfibrozil (LOPID) 600 MG tablet TAKE 1 TABLET BY MOUTH EVERY DAY 2/24/20   Ramonita Vernon MD   blood glucose monitor strips by Other route 4 times daily Test strips to use w/ Accucheck Smart View Glucometer  Patient taking differently: by Other route 4 times daily Test strips to use w/ ONE TOUCH VERIO TEST STRIPS 2/3/20   Tim Drew MD   Insulin Syringe-Needle U-100 (ULTICARE INSULIN SYRINGE) 31G X 5/16\" 1 ML MISC USE AS DIRECTED WITH HUMULIN AND HUMALOG INSULIN 5 TIMES DAILY 1/27/20   Renny Tam MD   HUMULIN N 100 UNIT/ML injection vial INJECT 50 UNITS EVERY MORNING AND 70 UNITS AT BEDTIME  Patient taking differently: 60 units in the AM , 70 units in PM 1/23/20   Tim Drew MD   isosorbide mononitrate (IMDUR) 30 MG extended release tablet TAKE 1 TABLET BY MOUTH EVERY DAY 1/16/20   Tim Drew MD   Handicap Placard MISC by Does not apply route Duration - 5years  Reason- prosthetic leg 1/2/20   Angel Finney MD   ELIQUIS 5 MG TABS tablet Take 1 tablet by mouth 2 times daily 11/26/19   Historical Provider, MD   atorvastatin (LIPITOR) 20 MG tablet TAKE 1 TABLET BY MOUTH EVERY DAY 11/20/19   Tim Drew MD   tamsulosin (FLOMAX) 0.4 MG capsule TAKE 1 CAPSULE BY MOUTH EVERY DAY 11/20/19   Tim Drew MD   propafenone (RYTHMOL) 150 MG tablet Take 1 tablet by mouth every 8 hours 11/17/19   Brigido Mendiola MD   SYMBICORT 160-4.5 MCG/ACT AERO TAKE 2 [address] A DAY  Patient taking differently: Inhale 2 puffs into the lungs 2 times daily  10/30/19   Tim Drew MD   METOPROLOL TARTRATE PO Take 50 mg by mouth 2 times daily    Historical Provider, MD   Omega-3 Fatty Acids (FISH OIL CONCENTRATE) 300 MG CAPS Take 300 mg by mouth nightly     Claude Bansal MD   Glucosamine Sulfate 500 MG TABS Take 500 mg by mouth 3 times daily    Maria Luisa Lake MD       Current medications:    Current Facility-Administered Medications   Medication Dose Route Frequency Provider Last Rate Last Dose    ceFAZolin (ANCEF) 2 g in dextrose 5 % 50 mL IVPB  2 g Intravenous On Call to [address] [place] [address],7Gws, PA-C           Allergies:     Allergies   Allergen Reactions    Adhesive Tape      tegaderm causes blisters.  Use paper tape    Ramipril      Other reaction(s): swelling of the lips        Problem List:    Patient Active Problem List   Diagnosis Code    MARK (acute kidney injury) ([facility] N17.9    Anemia D64.9    Neuropathy in diabetes ([facility] E11.40    Charcot ankle M14.679    PVD (peripheral vascular disease) ([facility] I73.9    Amputation below knee ([facility] C35.132S    Type 2 diabetes mellitus with diabetic polyneuropathy ([facility] E11.42    Pure hypercholesterolemia E78.00    Constipation K59.00    PSA elevation R97.20    CAD (coronary artery disease) I25.10    Acute hematogenous osteomyelitis of left tibia ([facility]) M86.062    Chronic pain of left knee M25.562, G89.29    Acute hematogenous osteomyelitis of left fibula ([facility]) M86.062    Essential hypertension I10    Olecranon bursitis of right elbow M70.21    Osteomyelitis of left leg ([facility]) M86.9    Acquired hypothyroidism E03.9    Carotid stenosis, asymptomatic, bilateral I65.23    Right renal mass A12.08    Metabolic acidosis, normal anion gap (NAG) E87.2    Hypotension due to drugs I95.2    Hydronephrosis of right kidney N13.30    Idiopathic hypotension I95.0    Status post below-knee amputation of left lower extremity ([facility]) Z89.512    Atrial fibrillation ([facility]) I48.91       Past Medical History:        Diagnosis Date    Asymptomatic bilateral carotid artery stenosis     Boil, thigh 10/2019    10/30/19: right inner thigh region, says PCP Rxd Doxy for this, area is much better, has a couple days left to complete Doxy    CAD (coronary artery disease)     saw Dr. Arnoldo Hinds ([facility]) on Oct. 22/2019 for pre-op clearance, to be cleared pending cardiac echo, not yet schedule for this as of 10/30/19    Charcot foot due to diabetes mellitus ([facility] 2014    LEFT    COPD (chronic obstructive pulmonary disease) ([facility] 2009    INHALER USE DAILY    Diabetes mellitus ([facility] 1989    IDDM, On Insulin Dr. Rudy Becerra    Diabetic neuropathy ([facility]  Difficult intravenous access     VEINS ROLL    Employs prosthetic leg     s/p left BKA    Foot ulcer ([address] PRIOR TO 2015    BILAT    Full dentures     UPPER ONLY    Hyperlipidemia 2004    ON RX    Hypertension 2004    ON RX, PCP Dr. Kathreen Homans, seen Oct. 2019    Hypoglycemia 4/2/2015    MRSA (methicillin resistant staph aureus) culture positive 4/16/2015    ankle    OA (osteoarthritis)     Osteomyelitis ([address]     left stump  BKA    Paroxysmal atrial fibrillation ([address]     Renal mass 09/2019    ACCIDENTAL  FINDING IS FOLLOWING UP WITH KIDNEY DR Maria E Ledezma     Suspected sleep apnea     had a sleep study in august, says no results have been sent to PCP yet!     Thyroid disease 2004    PT HAD HYPERTHYROIDISM-UNCONTROLED THYROID DESTROYED WITH RADI IODINE NOW HAS HYPOTHYRIDISM    Vision abnormalities 09/2019    LEFT NO VISION    Vitreous hemorrhage of left eye due to diabetes mellitus ([address] 09/2019    s/p Vitrectomy 9/2019    Wears glasses     Wears partial dentures     full upper, does not wear lower partial    Wellness examination     Dr. Reinier Vyas seen within last 1 1/2 mos       Past Surgical History:        Procedure Laterality Date    CARDIAC CATHETERIZATION      no stenting    CARDIOVASCULAR STRESS TEST  06/28/2019    small fixed apical, likely normal, EF 48%    COLONOSCOPY  06/17/2016    poor prep, done up to the IC valve, redundant colon    COLONOSCOPY  01/23/2017    VIRTUAL COLONOSCOPY DONE-no polyps or masses    CYSTOSCOPY Bilateral 6/16/2020    CYSTOSCOPY RETROGRADE PYELOGRAM [facility]RETEROSCOPY [facility]     r-bone removed      PICC [address] DOUBLE  5/21/2018         KIDNEY CYST REMOVAL      KIDNEY SURGERY Right 11/13/2019    XI ROBOTIC PARTIAL NEPHRECTOMY, INTRAOP ULTRASOUND, RIGHT URETEROURETEROSTOMY, RIGHT URETERAL STENT PLACEMENT **SHORT STAY** performed by Hanny Montgomery MD at [address] Left 4/29/15    OTHER SURGICAL HISTORY Left 5-5-15 and 2015    Revision BKA    OTHER SURGICAL HISTORY      left stump revision 2018    DC RE-AMPUTATION LOWER LEG Left 2018    LEG AMPUTATION BELOW KNEE REVISION performed by Santiago Abdul MD at [address] Bilateral 80'S    TOE AMPUTATION      Right 2 and 4    VITRECTOMY Left 2019    PARS PLANA VITRECTOMY 25 GAUGE, MEMBRANE PEELING, ENDOLASER 200  MW 1044 SPOTS, INDIRECT LASER 26 SPOTS performed by Doretha Dela Cruz MD at [place] [address] History:    Social History     Tobacco Use    Smoking status: Former Smoker     Packs/day: 2.00     Years: 25.00     Pack years: 50.00     Types: Cigars     Start date:      Last attempt to quit: 2011     Years since quittin.1    Smokeless tobacco: Never Used    Tobacco comment: quit in    Substance Use Topics    Alcohol use: Yes     Alcohol/week: 0.0 standard drinks     Frequency: Monthly or less     Comment: BEER 1 A MONTH                                Counseling given: Not Answered  Comment: quit in       Vital Signs (Current): There were no vitals filed for this visit.                                            BP Readings from Last 3 Encounters:   20 (!) 148/76   20 (!) 90/53   20 110/66       NPO Status:  MN                                                                               BMI:   Wt Readings from Last 3 Encounters:   20 270 lb (122.5 kg)   20 275 lb (124.7 kg)   20 282 lb (127.9 kg)     There is no height or weight on file to calculate BMI.    CBC:   Lab Results   Component Value Date    WBC 7.2 2020    RBC 4.38 2020    HGB 14.8 2020    HCT 45.5 2020    MCV 90.5 2020    RDW 14.9 2020     2020       CMP:   Lab Results   Component Value Date     2020 K 4.8 07/16/2020     07/16/2020    CO2 24 07/16/2020    BUN 35 07/16/2020    CREATININE 1.95 07/16/2020    GFRAA 43 07/16/2020    LABGLOM 35 07/16/2020    GLUCOSE 69 07/16/2020    PROT 7.4 05/21/2020    CALCIUM 9.1 06/08/2020    BILITOT 0.42 05/21/2020    ALKPHOS 127 05/21/2020    AST 24 05/21/2020    ALT 19 05/21/2020       POC Tests: No results for input(s): POCGLU, POCNA, POCK, POCCL, POCBUN, POCHEMO, POCHCT in the last 72 hours. Coags:   Lab Results   Component Value Date    PROTIME 13.5 06/26/2020    INR 1.0 06/26/2020    APTT 29.9 06/26/2020       HCG (If Applicable): No results found for: PREGTESTUR, PREGSERUM, HCG, HCGQUANT     ABGs:   Lab Results   Component Value Date    PHART 7.305 11/13/2019    PO2ART 112.0 11/13/2019    IMI7FJF 40.9 11/13/2019    CDY8VTO 19.7 11/13/2019    O4WXAUZW 98.4 11/13/2019        Type & Screen (If Applicable):  No results found for: LABABO, LABRH    Drug/Infectious Status (If Applicable):  Lab Results   Component Value Date    HEPCAB NONREACTIVE 03/09/2018       COVID-19 Screening (If Applicable):   Lab Results   Component Value Date    COVID19 Not Detected 07/26/2020    COVID19 Not Detected 06/12/2020         Anesthesia Evaluation   no history of anesthetic complications:   Airway: Mallampati: III     Neck ROM: full   Dental:    (+) upper dentures      Pulmonary:   (+) COPD:      (-) recent URI                          ROS comment: DENNISE status unclear   Cardiovascular:    (+) hypertension:, CAD:, dysrhythmias: atrial fibrillation,                   Neuro/Psych:   (+) neuromuscular disease:,              ROS comment: neuropathy GI/Hepatic/Renal:   (+) GERD: well controlled,           Endo/Other:    (+) DiabetesType I DM, well controlled, using insulin, hypothyroidism: arthritis:., .                 Abdominal:           Vascular:   + PVD, aortic or cerebral, .                                    Anesthesia Plan      general     ASA 4     (ASA 4)  Induction:

## 2020-07-31 LAB
ANION GAP SERPL CALCULATED.3IONS-SCNC: 14 MMOL/L (ref 9–17)
BUN BLDV-MCNC: 27 MG/DL (ref 8–23)
BUN/CREAT BLD: ABNORMAL (ref 9–20)
CALCIUM SERPL-MCNC: 8.1 MG/DL (ref 8.6–10.4)
CHLORIDE BLD-SCNC: 106 MMOL/L (ref 98–107)
CO2: 20 MMOL/L (ref 20–31)
CREAT SERPL-MCNC: 1.66 MG/DL (ref 0.7–1.2)
CREATININE FLUID: 1.7 MG/DL
GFR AFRICAN AMERICAN: 51 ML/MIN
GFR NON-AFRICAN AMERICAN: 42 ML/MIN
GFR SERPL CREATININE-BSD FRML MDRD: ABNORMAL ML/MIN/{1.73_M2}
GFR SERPL CREATININE-BSD FRML MDRD: ABNORMAL ML/MIN/{1.73_M2}
GLUCOSE BLD-MCNC: 145 MG/DL (ref 75–110)
GLUCOSE BLD-MCNC: 270 MG/DL (ref 70–99)
GLUCOSE BLD-MCNC: 300 MG/DL (ref 75–110)
GLUCOSE BLD-MCNC: 324 MG/DL (ref 75–110)
HCT VFR BLD CALC: 40 % (ref 40.7–50.3)
HEMOGLOBIN: 13 G/DL (ref 13–17)
MCH RBC QN AUTO: 30.7 PG (ref 25.2–33.5)
MCHC RBC AUTO-ENTMCNC: 32.5 G/DL (ref 28.4–34.8)
MCV RBC AUTO: 94.6 FL (ref 82.6–102.9)
NRBC AUTOMATED: 0 PER 100 WBC
PDW BLD-RTO: 13.7 % (ref 11.8–14.4)
PLATELET # BLD: 205 K/UL (ref 138–453)
PMV BLD AUTO: 11.9 FL (ref 8.1–13.5)
POTASSIUM SERPL-SCNC: 5 MMOL/L (ref 3.7–5.3)
RBC # BLD: 4.23 M/UL (ref 4.21–5.77)
SODIUM BLD-SCNC: 140 MMOL/L (ref 135–144)
SPECIMEN TYPE: NORMAL
WBC # BLD: 10.9 K/UL (ref 3.5–11.3)

## 2020-07-31 PROCEDURE — 2580000003 HC RX 258: Performed by: STUDENT IN AN ORGANIZED HEALTH CARE EDUCATION/TRAINING PROGRAM

## 2020-07-31 PROCEDURE — 6360000002 HC RX W HCPCS: Performed by: STUDENT IN AN ORGANIZED HEALTH CARE EDUCATION/TRAINING PROGRAM

## 2020-07-31 PROCEDURE — 85027 COMPLETE CBC AUTOMATED: CPT

## 2020-07-31 PROCEDURE — 94640 AIRWAY INHALATION TREATMENT: CPT

## 2020-07-31 PROCEDURE — 6370000000 HC RX 637 (ALT 250 FOR IP): Performed by: STUDENT IN AN ORGANIZED HEALTH CARE EDUCATION/TRAINING PROGRAM

## 2020-07-31 PROCEDURE — 6370000000 HC RX 637 (ALT 250 FOR IP): Performed by: PHYSICIAN ASSISTANT

## 2020-07-31 PROCEDURE — 1200000000 HC SEMI PRIVATE

## 2020-07-31 PROCEDURE — 36415 COLL VENOUS BLD VENIPUNCTURE: CPT

## 2020-07-31 PROCEDURE — 80048 BASIC METABOLIC PNL TOTAL CA: CPT

## 2020-07-31 PROCEDURE — 82570 ASSAY OF URINE CREATININE: CPT

## 2020-07-31 PROCEDURE — 82947 ASSAY GLUCOSE BLOOD QUANT: CPT

## 2020-07-31 RX ORDER — POLYETHYLENE GLYCOL 3350 17 G/17G
17 POWDER, FOR SOLUTION ORAL DAILY
Qty: 527 G | Refills: 1 | Status: SHIPPED | OUTPATIENT
Start: 2020-08-01 | End: 2020-08-31

## 2020-07-31 RX ORDER — OXYBUTYNIN CHLORIDE 10 MG/1
10 TABLET, EXTENDED RELEASE ORAL NIGHTLY
Status: DISCONTINUED | OUTPATIENT
Start: 2020-07-31 | End: 2020-08-01 | Stop reason: HOSPADM

## 2020-07-31 RX ORDER — OXYBUTYNIN CHLORIDE 10 MG/1
10 TABLET, EXTENDED RELEASE ORAL NIGHTLY
Qty: 7 TABLET | Refills: 0 | Status: SHIPPED | OUTPATIENT
Start: 2020-07-31 | End: 2020-08-28 | Stop reason: ALTCHOICE

## 2020-07-31 RX ORDER — OXYCODONE HYDROCHLORIDE AND ACETAMINOPHEN 5; 325 MG/1; MG/1
1 TABLET ORAL EVERY 6 HOURS PRN
Qty: 20 TABLET | Refills: 0 | Status: SHIPPED | OUTPATIENT
Start: 2020-07-31 | End: 2020-08-05

## 2020-07-31 RX ADMIN — INSULIN HUMAN 60 UNITS: 100 INJECTION, SUSPENSION SUBCUTANEOUS at 14:07

## 2020-07-31 RX ADMIN — INSULIN HUMAN 70 UNITS: 100 INJECTION, SUSPENSION SUBCUTANEOUS at 23:32

## 2020-07-31 RX ADMIN — PREGABALIN 100 MG: 100 CAPSULE ORAL at 14:33

## 2020-07-31 RX ADMIN — TAMSULOSIN HYDROCHLORIDE 0.4 MG: 0.4 CAPSULE ORAL at 21:11

## 2020-07-31 RX ADMIN — OXYBUTYNIN CHLORIDE 10 MG: 10 TABLET, EXTENDED RELEASE ORAL at 21:11

## 2020-07-31 RX ADMIN — PROPAFENONE HYDROCHLORIDE 150 MG: 150 TABLET, COATED ORAL at 21:11

## 2020-07-31 RX ADMIN — PROPAFENONE HYDROCHLORIDE 150 MG: 150 TABLET, COATED ORAL at 14:33

## 2020-07-31 RX ADMIN — PREGABALIN 100 MG: 100 CAPSULE ORAL at 08:43

## 2020-07-31 RX ADMIN — ATORVASTATIN CALCIUM 20 MG: 20 TABLET, FILM COATED ORAL at 08:43

## 2020-07-31 RX ADMIN — OXYCODONE HYDROCHLORIDE 10 MG: 5 TABLET ORAL at 17:43

## 2020-07-31 RX ADMIN — INSULIN LISPRO 6 UNITS: 100 INJECTION, SOLUTION INTRAVENOUS; SUBCUTANEOUS at 08:52

## 2020-07-31 RX ADMIN — METOPROLOL TARTRATE 50 MG: 50 TABLET, FILM COATED ORAL at 08:44

## 2020-07-31 RX ADMIN — PROPAFENONE HYDROCHLORIDE 150 MG: 150 TABLET, COATED ORAL at 06:33

## 2020-07-31 RX ADMIN — ACETAMINOPHEN 650 MG: 325 TABLET ORAL at 04:10

## 2020-07-31 RX ADMIN — POLYETHYLENE GLYCOL 3350 17 G: 17 POWDER, FOR SOLUTION ORAL at 08:52

## 2020-07-31 RX ADMIN — ACETAMINOPHEN 650 MG: 325 TABLET ORAL at 17:43

## 2020-07-31 RX ADMIN — Medication 1 G: at 14:33

## 2020-07-31 RX ADMIN — LEVOTHYROXINE SODIUM 150 MCG: 75 TABLET ORAL at 06:33

## 2020-07-31 RX ADMIN — PREGABALIN 100 MG: 100 CAPSULE ORAL at 23:33

## 2020-07-31 RX ADMIN — ROPINIROLE HYDROCHLORIDE 2 MG: 1 TABLET, FILM COATED ORAL at 21:11

## 2020-07-31 RX ADMIN — Medication 10 ML: at 08:46

## 2020-07-31 RX ADMIN — SODIUM CHLORIDE: 9 INJECTION, SOLUTION INTRAVENOUS at 09:07

## 2020-07-31 RX ADMIN — INSULIN LISPRO 8 UNITS: 100 INJECTION, SOLUTION INTRAVENOUS; SUBCUTANEOUS at 17:13

## 2020-07-31 RX ADMIN — BUDESONIDE AND FORMOTEROL FUMARATE DIHYDRATE 2 PUFF: 160; 4.5 AEROSOL RESPIRATORY (INHALATION) at 09:06

## 2020-07-31 RX ADMIN — OXYCODONE HYDROCHLORIDE 10 MG: 5 TABLET ORAL at 02:39

## 2020-07-31 RX ADMIN — GEMFIBROZIL 600 MG: 600 TABLET ORAL at 08:45

## 2020-07-31 RX ADMIN — INSULIN LISPRO 8 UNITS: 100 INJECTION, SOLUTION INTRAVENOUS; SUBCUTANEOUS at 12:27

## 2020-07-31 RX ADMIN — SODIUM CHLORIDE: 9 INJECTION, SOLUTION INTRAVENOUS at 23:32

## 2020-07-31 RX ADMIN — Medication 10 ML: at 21:12

## 2020-07-31 RX ADMIN — OXYCODONE HYDROCHLORIDE 10 MG: 5 TABLET ORAL at 12:04

## 2020-07-31 RX ADMIN — ACETAMINOPHEN 650 MG: 325 TABLET ORAL at 12:00

## 2020-07-31 RX ADMIN — OXYCODONE HYDROCHLORIDE 10 MG: 5 TABLET ORAL at 06:58

## 2020-07-31 RX ADMIN — ISOSORBIDE MONONITRATE 30 MG: 30 TABLET ORAL at 08:44

## 2020-07-31 RX ADMIN — Medication 1 G: at 05:31

## 2020-07-31 RX ADMIN — ACETAMINOPHEN 650 MG: 325 TABLET ORAL at 21:21

## 2020-07-31 ASSESSMENT — PAIN SCALES - GENERAL
PAINLEVEL_OUTOF10: 2
PAINLEVEL_OUTOF10: 5
PAINLEVEL_OUTOF10: 7
PAINLEVEL_OUTOF10: 4
PAINLEVEL_OUTOF10: 4
PAINLEVEL_OUTOF10: 2
PAINLEVEL_OUTOF10: 5
PAINLEVEL_OUTOF10: 7

## 2020-07-31 NOTE — PLAN OF CARE
Problem: Skin Integrity:  Goal: Will show no infection signs and symptoms  Description: Will show no infection signs and symptoms  7/31/2020 1654 by Dandy Ray RN  Outcome: Ongoing  7/31/2020 0304 by Maye Garland RN  Outcome: Ongoing  Goal: Absence of new skin breakdown  Description: Absence of new skin breakdown  Outcome: Ongoing     Problem: Falls - Risk of:  Goal: Will remain free from falls  Description: Will remain free from falls  7/31/2020 1654 by Dandy Ray RN  Outcome: Ongoing  7/31/2020 0304 by Maye Garland RN  Outcome: Ongoing  Goal: Absence of physical injury  Description: Absence of physical injury  7/31/2020 1654 by Dandy Ray RN  Outcome: Ongoing  7/31/2020 0304 by Maye Garland RN  Outcome: Ongoing     Problem: Pain:  Goal: Pain level will decrease  Description: Pain level will decrease  Outcome: Ongoing  Goal: Control of acute pain  Description: Control of acute pain  Outcome: Ongoing  Goal: Control of chronic pain  Description: Control of chronic pain  Outcome: Ongoing

## 2020-07-31 NOTE — ANESTHESIA POSTPROCEDURE EVALUATION
Department of Anesthesiology  Postprocedure Note    Patient: Fletcher Logan  MRN: [id number]  YOB: 1960  Date of evaluation: 7/31/2020  Time:  6:50 AM     Procedure Summary     Date:  07/30/20 Room / Location:  [facility] / [facility]    Anesthesia Start:  7295 Anesthesia Stop:  2043    Procedures:       XI LAPAROSCOPIC ROBOTIC URETEROLYSIS, ROBOTIC ASSISTED BUCCAL URETEROPLASTY  (DR. Mercedez Garcia TO ASSIST) (N/A )      CYSTOSCOPY, RIGHT RETROGRADE PYELOGRAM,  URETERAL CATHETER  INSERTION (Right ) Diagnosis:  (URETERAL STRICTURE, KIDNEY CANCER)    Surgeon:  Héctor Justice MD Responsible Provider:  João Wayne MD    Anesthesia Type:  general ASA Status:  4          Anesthesia Type: general    Cass Phase I: Cass Score: 8    Cass Phase II:      Last vitals: Reviewed and per EMR flowsheets.        Anesthesia Post Evaluation    Patient location during evaluation: PACU  Patient participation: complete - patient participated  Level of consciousness: awake and alert  Pain score: 7  Nausea & Vomiting: no nausea  Cardiovascular status: hemodynamically stable  Respiratory status: nasal cannula

## 2020-07-31 NOTE — FLOWSHEET NOTE
07/31/20 1423   Encounter Summary   Services provided to: Patient   Referral/Consult From: Multi-disciplinary team   Support System Spouse; Children   Place of [employer]astrianism   (Unknown)   Contact [employer] No   Continue Visiting   (07/31/2020)   Complexity of Encounter Low   Length of Encounter 15 minutes   Spiritual Assessment Completed Yes   Routine   Type Initial   Assessment Calm   Intervention Explored feelings, thoughts, concerns;Provided reading materials/devotional materials   Outcome Expressed gratitude   Assessment: Patient is a 61year old male, according to chart information patient diagnosis Ureteral stricture, right. Intervention:  Explored patient feeling and any concerns. Patient stated that he was doing better and looking forward to being discharged maybe today. Provided patient with a pray of healing devotional.    Outcome: Patient was appreciative.

## 2020-07-31 NOTE — CARE COORDINATION
Case Management Initial Discharge Plan  Veronica Cedeno,         Met with:patient to discuss discharge plans. Information verified: address, contacts, phone number, , insurance Yes    Emergency Contact/Next of Kin name & number: pt's wife Minerva Solano [phone]    PCP: Steffi Powell MD  Date of last visit: Last week    Insurance Provider: [employer]it - primary, Medicare- secondary    Discharge Planning    Living Arrangements:  Spouse/Significant Other   Support Systems:  Spouse/Significant Other, Children    Home has 1 story  1 stop to climb to get into front door    Patient able to perform ADL's:Independent    Current Services (outpatient & in home) none  DME equipment: cane, wheelchair, glucometer, walker, shower chair (waiting for C-PAP to be ordered)  DME provider: n/a    Receiving oral anticoagulation therapy? Yes, Elequis    If indicated:   Physician managing anticoagulation treatment: Dr. Temitope Pedroza  Where does patient obtain lab work for ATC treatment? n/a      Potential Assistance Needed:  N/A    Patient agreeable to home care: No  [place] of choice provided:  n/a    Prior SNF/Rehab Placement and Facility: none  Agreeable to SNF/Rehab: No  [place] of choice provided: n/a     Evaluation: n/a    Expected Discharge date:  20    Patient expects to be discharged to:  Home  Follow Up Appointment: Best Day/ Time: Monday AM    Transportation provider: wife  Transportation arrangements needed for discharge: No    Readmission Risk              Risk of Unplanned Readmission:        20             Does patient have a readmission risk score greater than 14?: Yes  If yes, follow-up appointment must be made within 7 days of discharge. Goals of Care: to go home       Discharge Plan: Home independently, refuses Home Care, will need PCP F/U appt.            Electronically signed by Kwame Doshi RN on 20 at 11:14 AM EDT

## 2020-07-31 NOTE — PLAN OF CARE
Problem: Skin Integrity:  Goal: Will show no infection signs and symptoms  Description: Will show no infection signs and symptoms  Outcome: Ongoing     Problem: Falls - Risk of:  Goal: Will remain free from falls  Description: Will remain free from falls  Outcome: Ongoing     Problem: Falls - Risk of:  Goal: Absence of physical injury  Description: Absence of physical injury  Outcome: Ongoing

## 2020-07-31 NOTE — PROGRESS NOTES
Alejandra Marie, Kandi Funes, Vesta Fischer, Alondra Nicole  Urology Progress Note     Subjective:   Diet: General  No acute events overnight  Denies nausea, vomiting, fever and chills   C/O pain at the graft harvest site     Ambulation : None   Flatus/ BM : None     Patient Vitals for the past 24 hrs:   BP Temp Temp src Pulse Resp SpO2 Height Weight   07/31/20 0410 121/73 98.2 °F (36.8 °C) Oral 69 17 92 % -- --   07/31/20 0056 127/72 98.2 °F (36.8 °C) Oral 75 17 92 % -- --   07/30/20 2143 (!) 154/85 97.9 °F (36.6 °C) Oral 77 19 93 % -- --   07/30/20 2115 (!) 156/76 97.3 °F (36.3 °C) -- 77 19 94 % -- --   07/30/20 2100 (!) 157/82 -- -- 77 22 94 % -- --   07/30/20 2045 (!) 147/77 -- -- 78 23 95 % -- --   07/30/20 2037 (!) 144/70 97 °F (36.1 °C) Temporal 92 -- 93 % -- --   07/30/20 1241 138/67 97.9 °F (36.6 °C) Temporal 61 16 96 % -- --   07/30/20 1230 -- -- -- -- -- -- 6' 4\" (1.93 m) 275 lb (124.7 kg)       Intake/Output Summary (Last 24 hours) at 7/31/2020 0558  Last data filed at 7/31/2020 0532  Gross per 24 hour   Intake 4585.43 ml   Output 1755 ml   Net 2830.43 ml       Recent Labs     07/30/20 2103   WBC 11.8*   HGB 13.3   HCT 40.5*   MCV 92.3        Recent Labs     07/30/20 2103      K 4.9   *   CO2 22   BUN 24*   CREATININE 1.68*       No results for input(s): COLORU, PHUR, LABCAST, WBCUA, RBCUA, MUCUS, TRICHOMONAS, YEAST, BACTERIA, CLARITYU, SPECGRAV, LEUKOCYTESUR, UROBILINOGEN, BILIRUBINUR, BLOODU in the last 72 hours. Invalid input(s): NITRATE, GLUCOSEUKETONESUAMORPHOUS    Additional Lab/culture results:    Physical Exam:   AO X 3  Neck: Supple   Chest: bilateral symmetrical chest rise/ Non labored breathing   Circulatory: Peripheries warm , well perfused   P/A: soft, Non distended, appropriately tender  Drain output 55 ml   Patrick 1755 ml / pink , transparent       Interval Imaging Findings:    Impression:    POD 1 from   Robotic assisted laparoscopic left ureteral lysis.   Robotic assisted laparoscopic left buccal mucosal ureteroplasty.   [place] of full-thickness graft from the cheek (CPT 65534)  Cystoscopy with right retrograde pyelogram.  Right antegrade nephrostogram    Plan:   Diet: general  IV fluids: at 125 ml/ hr, will decrease to 75 ml/hr   Antibiotics: Ancef for 24 hours   Pain control: Tylenol aldo and Roxicodone prn   Drain output : 55 ml   Ambulation / IS 10 times per hour   AM labs: pending   Oral pack removed , will remove pringle before Laly Busch 81  5:58 AM 7/31/2020

## 2020-08-01 VITALS
SYSTOLIC BLOOD PRESSURE: 130 MMHG | OXYGEN SATURATION: 92 % | HEART RATE: 72 BPM | RESPIRATION RATE: 16 BRPM | BODY MASS INDEX: 33.49 KG/M2 | HEIGHT: 76 IN | WEIGHT: 275 LBS | DIASTOLIC BLOOD PRESSURE: 77 MMHG | TEMPERATURE: 98.2 F

## 2020-08-01 LAB
ANION GAP SERPL CALCULATED.3IONS-SCNC: 12 MMOL/L (ref 9–17)
BUN BLDV-MCNC: 29 MG/DL (ref 8–23)
BUN/CREAT BLD: ABNORMAL (ref 9–20)
CALCIUM SERPL-MCNC: 7.6 MG/DL (ref 8.6–10.4)
CHLORIDE BLD-SCNC: 109 MMOL/L (ref 98–107)
CO2: 23 MMOL/L (ref 20–31)
CREAT SERPL-MCNC: 1.74 MG/DL (ref 0.7–1.2)
GFR AFRICAN AMERICAN: 49 ML/MIN
GFR NON-AFRICAN AMERICAN: 40 ML/MIN
GFR SERPL CREATININE-BSD FRML MDRD: ABNORMAL ML/MIN/{1.73_M2}
GFR SERPL CREATININE-BSD FRML MDRD: ABNORMAL ML/MIN/{1.73_M2}
GLUCOSE BLD-MCNC: 167 MG/DL (ref 75–110)
GLUCOSE BLD-MCNC: 37 MG/DL (ref 75–110)
GLUCOSE BLD-MCNC: 59 MG/DL (ref 75–110)
GLUCOSE BLD-MCNC: 87 MG/DL (ref 75–110)
GLUCOSE BLD-MCNC: 89 MG/DL (ref 70–99)
HCT VFR BLD CALC: 38 % (ref 40.7–50.3)
HEMOGLOBIN: 12.2 G/DL (ref 13–17)
MAGNESIUM: 1.6 MG/DL (ref 1.6–2.6)
MCH RBC QN AUTO: 30.3 PG (ref 25.2–33.5)
MCHC RBC AUTO-ENTMCNC: 32.1 G/DL (ref 28.4–34.8)
MCV RBC AUTO: 94.5 FL (ref 82.6–102.9)
NRBC AUTOMATED: 0 PER 100 WBC
PDW BLD-RTO: 13.9 % (ref 11.8–14.4)
PLATELET # BLD: 202 K/UL (ref 138–453)
PMV BLD AUTO: 11.7 FL (ref 8.1–13.5)
POTASSIUM SERPL-SCNC: 3.1 MMOL/L (ref 3.7–5.3)
RBC # BLD: 4.02 M/UL (ref 4.21–5.77)
SODIUM BLD-SCNC: 144 MMOL/L (ref 135–144)
WBC # BLD: 8.6 K/UL (ref 3.5–11.3)

## 2020-08-01 PROCEDURE — 80048 BASIC METABOLIC PNL TOTAL CA: CPT

## 2020-08-01 PROCEDURE — 94640 AIRWAY INHALATION TREATMENT: CPT

## 2020-08-01 PROCEDURE — 82947 ASSAY GLUCOSE BLOOD QUANT: CPT

## 2020-08-01 PROCEDURE — 36415 COLL VENOUS BLD VENIPUNCTURE: CPT

## 2020-08-01 PROCEDURE — 83735 ASSAY OF MAGNESIUM: CPT

## 2020-08-01 PROCEDURE — 6370000000 HC RX 637 (ALT 250 FOR IP): Performed by: STUDENT IN AN ORGANIZED HEALTH CARE EDUCATION/TRAINING PROGRAM

## 2020-08-01 PROCEDURE — 85027 COMPLETE CBC AUTOMATED: CPT

## 2020-08-01 PROCEDURE — G0378 HOSPITAL OBSERVATION PER HR: HCPCS

## 2020-08-01 RX ORDER — CEPHALEXIN 500 MG/1
500 CAPSULE ORAL 4 TIMES DAILY
Qty: 28 CAPSULE | Refills: 0 | Status: SHIPPED | OUTPATIENT
Start: 2020-08-01 | End: 2020-08-08

## 2020-08-01 RX ORDER — POTASSIUM CHLORIDE 7.45 MG/ML
10 INJECTION INTRAVENOUS PRN
Status: DISCONTINUED | OUTPATIENT
Start: 2020-08-01 | End: 2020-08-01 | Stop reason: HOSPADM

## 2020-08-01 RX ORDER — POTASSIUM CHLORIDE 20 MEQ/1
40 TABLET, EXTENDED RELEASE ORAL PRN
Status: DISCONTINUED | OUTPATIENT
Start: 2020-08-01 | End: 2020-08-01 | Stop reason: HOSPADM

## 2020-08-01 RX ADMIN — PREGABALIN 100 MG: 100 CAPSULE ORAL at 09:59

## 2020-08-01 RX ADMIN — PREGABALIN 100 MG: 100 CAPSULE ORAL at 14:04

## 2020-08-01 RX ADMIN — ACETAMINOPHEN 650 MG: 325 TABLET ORAL at 05:52

## 2020-08-01 RX ADMIN — LEVOTHYROXINE SODIUM 150 MCG: 75 TABLET ORAL at 05:57

## 2020-08-01 RX ADMIN — INSULIN HUMAN 60 UNITS: 100 INJECTION, SUSPENSION SUBCUTANEOUS at 10:01

## 2020-08-01 RX ADMIN — PROPAFENONE HYDROCHLORIDE 150 MG: 150 TABLET, COATED ORAL at 05:52

## 2020-08-01 RX ADMIN — BUDESONIDE AND FORMOTEROL FUMARATE DIHYDRATE 2 PUFF: 160; 4.5 AEROSOL RESPIRATORY (INHALATION) at 08:10

## 2020-08-01 RX ADMIN — ACETAMINOPHEN 650 MG: 325 TABLET ORAL at 10:00

## 2020-08-01 RX ADMIN — DEXTROSE 15 G: 15 GEL ORAL at 06:28

## 2020-08-01 RX ADMIN — GEMFIBROZIL 600 MG: 600 TABLET ORAL at 10:00

## 2020-08-01 RX ADMIN — OXYCODONE HYDROCHLORIDE 10 MG: 5 TABLET ORAL at 10:00

## 2020-08-01 RX ADMIN — ISOSORBIDE MONONITRATE 30 MG: 30 TABLET ORAL at 10:00

## 2020-08-01 RX ADMIN — POTASSIUM CHLORIDE 40 MEQ: 1500 TABLET, EXTENDED RELEASE ORAL at 10:00

## 2020-08-01 RX ADMIN — POLYETHYLENE GLYCOL 3350 17 G: 17 POWDER, FOR SOLUTION ORAL at 10:00

## 2020-08-01 RX ADMIN — OXYCODONE HYDROCHLORIDE 10 MG: 5 TABLET ORAL at 14:04

## 2020-08-01 RX ADMIN — METOPROLOL TARTRATE 50 MG: 50 TABLET, FILM COATED ORAL at 10:00

## 2020-08-01 RX ADMIN — ROPINIROLE HYDROCHLORIDE 2 MG: 1 TABLET, FILM COATED ORAL at 10:00

## 2020-08-01 RX ADMIN — OXYCODONE HYDROCHLORIDE 10 MG: 5 TABLET ORAL at 05:52

## 2020-08-01 RX ADMIN — PROPAFENONE HYDROCHLORIDE 150 MG: 150 TABLET, COATED ORAL at 14:04

## 2020-08-01 RX ADMIN — INSULIN LISPRO 2 UNITS: 100 INJECTION, SOLUTION INTRAVENOUS; SUBCUTANEOUS at 12:54

## 2020-08-01 RX ADMIN — ATORVASTATIN CALCIUM 20 MG: 20 TABLET, FILM COATED ORAL at 10:00

## 2020-08-01 ASSESSMENT — PAIN SCALES - GENERAL
PAINLEVEL_OUTOF10: 4
PAINLEVEL_OUTOF10: 7
PAINLEVEL_OUTOF10: 8
PAINLEVEL_OUTOF10: 7

## 2020-08-01 NOTE — DISCHARGE SUMMARY
Handicap Placard MISC  by Does not apply route Duration - 5years  Reason- prosthetic leg             HUMULIN N 100 UNIT/ML injection vial  INJECT 50 UNITS EVERY MORNING AND 70 UNITS AT BEDTIME             insulin lispro (HUMALOG) 100 UNIT/ML injection vial  INJECT 12 UNITS UNDER THE SKIN 3 TIMES DAILY + SLIDING SCALE (TAKE 5 UNITS IF SUGAR IS OVER 150)             Insulin Syringe-Needle U-100 (ULTICARE INSULIN SYRINGE) 31G X 5/16\" 1 ML MISC  USE AS DIRECTED WITH HUMULIN AND HUMALOG INSULIN 5 TIMES DAILY             isosorbide mononitrate (IMDUR) 30 MG extended release tablet  TAKE 1 TABLET BY MOUTH EVERY DAY             levothyroxine (SYNTHROID) 150 MCG tablet  Take 1 tablet by mouth Daily             METOPROLOL TARTRATE PO  Take 50 mg by mouth 2 times daily             Omega-3 Fatty Acids (FISH OIL CONCENTRATE) 300 MG CAPS  Take 300 mg by mouth nightly              oxybutynin (DITROPAN-XL) 10 MG extended release tablet  Take 1 tablet by mouth nightly for 7 days             oxyCODONE-acetaminophen (PERCOCET) 5-325 MG per tablet  Take 1 tablet by mouth every 6 hours as needed for Pain for up to 5 days. May take 2 tablets if needed             polyethylene glycol (GLYCOLAX) 17 g packet  Take 17 g by mouth daily Use to maintain regular bowel movements while taking narcotic pain medication             pregabalin (LYRICA) 100 MG capsule  TAKE 1 CAPSULE BY MOUTH THREE TIMES A DAY             propafenone (RYTHMOL) 150 MG tablet  Take 1 tablet by mouth every 8 hours             rOPINIRole (REQUIP) 2 MG tablet  TAKE 1 TABLET BY MOUTH EVERY DAY             SYMBICORT 160-4.5 MCG/ACT AERO  TAKE 2 PUFFS BY MOUTH TWICE A DAY             tamsulosin (FLOMAX) 0.4 MG capsule  TAKE 1 CAPSULE BY MOUTH [facility] course: The patient did well in their hospital course. Admitted after buccal right ureteroplasty. Tolerated procedure well. YAZ Cr on POD #1 was 1.7 and consistent with serum.  By POD #2, the date of

## 2020-08-01 NOTE — PLAN OF CARE
Problem: Skin Integrity:  Goal: Will show no infection signs and symptoms  Description: Will show no infection signs and symptoms  8/1/2020 0534 by Blanca Meza RN  Outcome: Ongoing  7/31/2020 1654 by Funmi Lemon RN  Outcome: Ongoing  Goal: Absence of new skin breakdown  Description: Absence of new skin breakdown  8/1/2020 0534 by Blanca Meza RN  Outcome: Ongoing  7/31/2020 1654 by Funmi Lemon RN  Outcome: Ongoing     Problem: Falls - Risk of:  Goal: Will remain free from falls  Description: Will remain free from falls  8/1/2020 0534 by Blanca Meza RN  Outcome: Ongoing  7/31/2020 1654 by Funmi Lemon RN  Outcome: Ongoing  Goal: Absence of physical injury  Description: Absence of physical injury  8/1/2020 0534 by Blanca Meza RN  Outcome: Ongoing  7/31/2020 1654 by Funmi Lemon RN  Outcome: Ongoing     Problem: Pain:  Goal: Pain level will decrease  Description: Pain level will decrease  8/1/2020 0534 by Blanca Meza RN  Outcome: Ongoing  7/31/2020 1654 by Funmi Lemon RN  Outcome: Ongoing  Goal: Control of acute pain  Description: Control of acute pain  8/1/2020 0534 by Blanca Meza RN  Outcome: Ongoing  7/31/2020 1654 by Funmi Lemon RN  Outcome: Ongoing  Goal: Control of chronic pain  Description: Control of chronic pain  8/1/2020 0534 by Blanca Meza RN  Outcome: Ongoing  7/31/2020 1654 by Funmi Lemon RN  Outcome: Ongoing

## 2020-08-01 NOTE — PROGRESS NOTES
Jasmeet Carlisle, Pete Bella, Sharron Jenkins  Urology Progress Note     Subjective:   Diet: General  No acute events overnight  Denies nausea, vomiting, fever and chills   Ambulating  Tolerating soft diet  +Flatus -BM    Output:  R PCNT 1000 mL  1600 mL Patrick catheter       Patient Vitals for the past 24 hrs:   BP Temp Temp src Pulse Resp SpO2   07/31/20 2100 (!) 102/59 98.1 °F (36.7 °C) Oral 64 16 94 %   07/31/20 1718 138/74 98.5 °F (36.9 °C) -- 64 18 97 %   07/31/20 1207 137/60 98.7 °F (37.1 °C) -- 68 19 93 %   07/31/20 0853 129/61 98.1 °F (36.7 °C) -- 69 16 92 %       Intake/Output Summary (Last 24 hours) at 8/1/2020 0828  Last data filed at 8/1/2020 0541  Gross per 24 hour   Intake 1433 ml   Output 2680 ml   Net -1247 ml       Recent Labs     07/30/20 2103 07/31/20  0622 08/01/20  0709   WBC 11.8* 10.9 8.6   HGB 13.3 13.0 12.2*   HCT 40.5* 40.0* 38.0*   MCV 92.3 94.6 94.5    205 202     Recent Labs     07/30/20 2103 07/31/20  0622 08/01/20  0709    140 144   K 4.9 5.0 3.1*   * 106 109*   CO2 22 20 23   BUN 24* 27* 29*   CREATININE 1.68* 1.66* 1.74*       No results for input(s): COLORU, PHUR, LABCAST, WBCUA, RBCUA, MUCUS, TRICHOMONAS, YEAST, BACTERIA, CLARITYU, SPECGRAV, LEUKOCYTESUR, UROBILINOGEN, BILIRUBINUR, BLOODU in the last 72 hours. Invalid input(s): NITRATE, GLUCOSEUKETONESUAMORPHOUS    Additional Lab/culture results:    Physical Exam:   AAOx3  NAD  Unlabored breathing  Normal rate  Abdomen soft, appropriately tender, nondistended  Incisions clean, dry, intact with skin glue  YAZ drain serosang  No calf tenderness to palpation        Interval Imaging Findings:    Impression:    POD 2 from   Robotic assisted laparoscopic left ureteral lysis. Robotic assisted laparoscopic left buccal mucosal ureteroplasty.   [place] of full-thickness graft from the cheek (CPT 03304)  Cystoscopy with right retrograde pyelogram.  Right antegrade nephrostogram    Plan:   Diet: general  DC IVF  PO pain control   Ambulation / IS 10 times per hour   Cr stable.  Hgb 12.2 from 13  DC today with Darnell, YAZ, and right PCNT in place  Follow up with Dr. Toni Bansal in 1 week        Anya Amanda  8:28 AM 8/1/2020

## 2020-08-03 ENCOUNTER — TELEPHONE (OUTPATIENT)
Dept: UROLOGY | Age: 60
End: 2020-08-03

## 2020-08-03 ENCOUNTER — TELEPHONE (OUTPATIENT)
Dept: INTERNAL MEDICINE CLINIC | Age: 60
End: 2020-08-03

## 2020-08-03 LAB — SURGICAL PATHOLOGY REPORT: NORMAL

## 2020-08-03 NOTE — TELEPHONE ENCOUNTER
[facility] Transitions Initial Follow Up Call    Outreach made within 2 business days of discharge: yes    Patient: Tone Choudhary Patient : 1960   MRN: [id number]  Reason for Admission: There are no discharge diagnoses documented for the most recent discharge.   Discharge Date: 20       Spoke with: kate      Discharge department/facility: [facility]    TCM Interactive Patient Contact: yes  Was patient able to fill all prescriptions: no  Was patient instructed to bring all medications to the follow-up visit: No    Is patient taking all medications as directed in the discharge summary yes  Does patient understand their discharge instructions: yes  Does patient have questions or concerns that need addressed prior to 7-14 day follow up office visit: no  Scheduled appointment with PCP within 7-14 days    Follow Up  Future Appointments   Date Time Provider Prema Diggs   2020 10:30 AM Tarah Rasmussen PA-C [address]   2020  9:30 AM Anny Tello MD [address]   2020  9:00 AM Skinny Franco DPM [address], [place]

## 2020-08-05 ENCOUNTER — HOSPITAL ENCOUNTER (OUTPATIENT)
Age: 60
Discharge: HOME | End: 2020-08-05
Payer: COMMERCIAL

## 2020-08-05 LAB
CREATININE FLUID: 1.8 MG/DL
SPECIMEN TYPE: NORMAL

## 2020-08-05 PROCEDURE — 82570 ASSAY OF URINE CREATININE: CPT

## 2020-08-05 NOTE — TELEPHONE ENCOUNTER
Per Dania Landaverde, Dr. Deidra Palomo pringle catheter removal on 8/6/2020, Creatinine, body fluid lab to be done fron YAZ drainage on 8/5/2020. Pt to have Keflex order to start on 8/9/2020 for YAZ drainage and Neph tube removal on 8/11/2020. Okay to remove YAZ if Creatinine less then 2, out put doesn't go up after pringle removal.   Harris Gomez APRN-CNP informed as well, verified with Dr. Julia Hussein pt discussed plan informed pt to have Creatinine lab fromo YAZ drainage on 8/5/2020 after 9AM if any issues to call office and ask for [facility]. Verbal understanding given on 8/4/2020. Pt called stating \" no one knows what is going on. Pt wife in the background yelling at Sharath Russell, \" writer informed pt and pt wife due to the order being under VINNY Bird and not Dr. Joann Meraz. Informed pt verified with [facility] outpatient lab will be doing Creatinine body fluid lab order. Ciera called from [facility] main registration stating unable to use current order, new order was placed.

## 2020-08-06 ENCOUNTER — PROCEDURE VISIT (OUTPATIENT)
Dept: UROLOGY | Age: 60
End: 2020-08-06

## 2020-08-06 VITALS — TEMPERATURE: 98.2 F

## 2020-08-06 PROCEDURE — 99024 POSTOP FOLLOW-UP VISIT: CPT | Performed by: NURSE PRACTITIONER

## 2020-08-06 RX ORDER — CEPHALEXIN 500 MG/1
500 CAPSULE ORAL 3 TIMES DAILY
Qty: 15 CAPSULE | Refills: 0 | Status: SHIPPED | OUTPATIENT
Start: 2020-08-06 | End: 2020-09-09 | Stop reason: ALTCHOICE

## 2020-08-06 RX ORDER — ONDANSETRON HYDROCHLORIDE 8 MG/1
TABLET, FILM COATED ORAL
COMMUNITY
Start: 2020-08-03 | End: 2020-08-28 | Stop reason: ALTCHOICE

## 2020-08-06 ASSESSMENT — ENCOUNTER SYMPTOMS
EYE PAIN: 0
VOMITING: 0
DIARRHEA: 0
EYE REDNESS: 0
CONSTIPATION: 1
ABDOMINAL PAIN: 0
SHORTNESS OF BREATH: 0
BACK PAIN: 0
COUGH: 0
WHEEZING: 0
NAUSEA: 0

## 2020-08-06 NOTE — LETTER
[facility]  [facility] - [address]  Dept: [phone]  Dept Fax: [phone]        8/6/20    Patient: Veronica Cedeno  YOB: 1960    Dear Steffi Powell MD,    I had the pleasure of seeing one of your patients, Cristobal Ortiz today in the office today. Below are the relevant portions of my assessment and plan of care. IMPRESSION:  1. Ureteral stricture    2. Indwelling Patrick catheter present        PLAN:  urethral cath removal per DR Anabela Iqbal track of Tracy Shove drain and nephrostomy tube drainage amount over the weekend (speak to Elizabeth)     If increased drainage in tubes over the weekend report to office Monday morning. Start Keflex on the Sunday 7/9 and finish all pills   Return in about 5 days (around 8/11/2020). Prescriptions Ordered:  Orders Placed This Encounter   Medications    cephALEXin (KEFLEX) 500 MG capsule     Sig: Take 1 capsule by mouth 3 times daily     Dispense:  15 capsule     Refill:  0     Orders Placed:  No orders of the defined types were placed in this encounter. Thank you for allowing me to participate in the care of this patient. I will keep you updated on this patient's follow up and I look forward to serving you and your patients again in the future.     RADHA Shin - CNP

## 2020-08-06 NOTE — PROGRESS NOTES
Review of Systems   Constitutional: Positive for fatigue. Negative for appetite change and chills. Eyes: Negative for pain, redness and visual disturbance. Respiratory: Negative for cough, shortness of breath and wheezing. Cardiovascular: Negative for chest pain and leg swelling. Gastrointestinal: Positive for constipation. Negative for abdominal pain, diarrhea, nausea and vomiting. Genitourinary: Negative for difficulty urinating, dysuria, flank pain, frequency, hematuria and urgency. Has a pringle catheter    Musculoskeletal: Negative for back pain, joint swelling and myalgias. Skin: Negative for rash and wound. Neurological: Positive for dizziness and weakness. Negative for numbness. Hematological: Does not bruise/bleed easily.

## 2020-08-06 NOTE — PATIENT INSTRUCTIONS
urethral cath removal per DR Richardson Marie Seen drain and nephrostomy tube drainage amount over the weekend (speak to [facility])     If increased drainage in tubes over the weekend report to office Monday morning.

## 2020-08-06 NOTE — PROGRESS NOTES
[facility]  [facility] - [facility]  Concha Epps [address] [address]  Dept: [address] [facility] Urology Office Note - Established    Patient:  Yamilet Lane  YOB: 1960  Date: 8/6/2020    The patient is a 61 y.o. male who presents todayfor evaluation of the following problems:   Chief Complaint   Patient presents with    Procedure     pringle removal        HPI  Here for follow up on Robotic assisted ureteral lysis. Here for urethral cath removal. YAZ amador done yesterday and called to Dr. Jacquie Pérez. Summary of old records: N/A    Additional History: N/A    Procedures Today: N/A    Urinalysis today:  No results found for this visit on 08/06/20.   Last several PSA's:  Lab Results   Component Value Date    PSA 4.62 (H) 10/23/2017     Last total testosterone:  No results found for: TESTOSTERONE    AUA Symptom Score (8/6/2020):                               Last BUN and creatinine:  Lab Results   Component Value Date    BUN 29 (H) 08/01/2020     Lab Results   Component Value Date    CREATININE 1.74 (H) 08/01/2020       Additional Lab/Culture results: Creat Fluid- 1.8 8/5/2020    Imaging Reviewed during this Office Visit:   (results were independently reviewed by physician and radiology report verified)    PAST MEDICAL, FAMILY AND SOCIAL HISTORY UPDATE:  Past Medical History:   Diagnosis Date    Asymptomatic bilateral carotid artery stenosis     Boil, thigh 10/2019    10/30/19: right inner thigh region, says PCP Rxd Doxy for this, area is much better, has a couple days left to complete Doxy    CAD (coronary artery disease)     saw Dr. Luis Alberto Chavarria ([facility]) on Oct. 22/2019 for pre-op clearance, to be cleared pending cardiac echo, not yet schedule for this as of 10/30/19    Charcot foot due to diabetes mellitus ([address] 2014    LEFT    COPD (chronic obstructive pulmonary disease) ([address] 2009    INHALER USE DAILY    Diabetes mellitus ([address] 1989    IDDM, On Insulin Dr. Malathi Hoff REMOVAL      KIDNEY SURGERY Right 11/13/2019    XI ROBOTIC PARTIAL NEPHRECTOMY, INTRAOP ULTRASOUND, RIGHT URETEROURETEROSTOMY, RIGHT URETERAL STENT PLACEMENT **SHORT STAY** performed by Neno Archibald MD at [address] N/A 7/30/2020    XI LAPAROSCOPIC ROBOTIC URETEROLYSIS, ROBOTIC ASSISTED BUCCAL URETEROPLASTY  (DR. COBIAN TO ASSIST) performed by Neno Archibald MD at [address] Left 4/29/15    OTHER SURGICAL HISTORY Left 5-5-15 and 11/2015    Revision BKA    OTHER SURGICAL HISTORY      left stump revision 5/30/2018    [place] RE-AMPUTATION LOWER LEG Left 5/30/2018    LEG AMPUTATION BELOW KNEE REVISION performed by Carolina Dean MD at [address] Bilateral 80'S    TOE AMPUTATION      Right 2 and 4    VITRECTOMY Left 9/25/2019    PARS PLANA VITRECTOMY 25 GAUGE, MEMBRANE PEELING, ENDOLASER 200  MW 1044 SPOTS, INDIRECT LASER 26 SPOTS performed by Rafael Pizano MD at [address] [facility] History   Problem Relation Age of Onset    Diabetes Mother     Hypertension Mother     Stomach Cancer Mother     Hypertension Father     Alcohol Abuse Father     COPD Father     Kidney Cancer Father     Diabetes Brother         IDDM-PUMP    Other Sister         BRAIN TUMOR     Outpatient Medications Marked as Taking for the 8/6/20 encounter (Procedure visit) with RADHA Banks CNP   Medication Sig Dispense Refill    ondansetron (ZOFRAN) 8 MG tablet       cephALEXin (KEFLEX) 500 MG capsule Take 1 capsule by mouth 3 times daily 15 capsule 0    cephALEXin (KEFLEX) 500 MG capsule Take 1 capsule by mouth 4 times daily for 7 days 28 capsule 0    polyethylene glycol (GLYCOLAX) 17 g packet Take 17 g by mouth daily Use to maintain regular bowel movements while taking narcotic pain medication 527 g 1    oxybutynin (DITROPAN-XL) 10 MG extended release tablet Take 1 tablet by mouth nightly for 7 days 7 tablet 0    levothyroxine (SYNTHROID) 150 MCG tablet Take 1 tablet by mouth Daily 90 tablet 1    pregabalin (LYRICA) 100 MG capsule TAKE 1 CAPSULE BY MOUTH THREE TIMES A DAY 90 capsule 0    albuterol sulfate  (90 Base) MCG/ACT inhaler INHALE 2 PUFFS INTO THE LUNGS EVERY 6 HOURS AS NEEDED FOR WHEEZING OR SHORTNESS OF BREATH 6.7 Inhaler 0    insulin lispro (HUMALOG) 100 UNIT/ML injection vial INJECT 12 UNITS UNDER THE SKIN 3 TIMES DAILY + SLIDING SCALE (TAKE 5 UNITS IF SUGAR IS OVER 150) 20 mL 3    glucagon, rDNA, 1 MG injection Inject 1 mL into the muscle      Blood Pressure KIT 1 actuation by Does not apply route once a week 1 kit 0    rOPINIRole (REQUIP) 2 MG tablet TAKE 1 TABLET BY MOUTH EVERY DAY 90 tablet 3    blood glucose test strips (ASCENSIA AUTODISC VI;ONE TOUCH ULTRA TEST VI) strip 1 each by In Vitro route 4 times daily As needed.  400 each 3    gemfibrozil (LOPID) 600 MG tablet TAKE 1 TABLET BY MOUTH EVERY DAY 90 tablet 3    blood glucose monitor strips by Other route 4 times daily Test strips to use w/ Accucheck Smart View Glucometer (Patient taking differently: by Other route 4 times daily Test strips to use w/ ONE TOUCH VERIO TEST STRIPS) 100 strip 11    Insulin Syringe-Needle U-100 (ULTICARE INSULIN SYRINGE) 31G X 5/16\" 1 ML MISC USE AS DIRECTED WITH HUMULIN AND HUMALOG INSULIN 5 TIMES DAILY 1 each 3    HUMULIN N 100 UNIT/ML injection vial INJECT 50 UNITS EVERY MORNING AND 70 UNITS AT BEDTIME (Patient taking differently: 60 units in the AM , 70 units in PM) 30 mL 11    isosorbide mononitrate (IMDUR) 30 MG extended release tablet TAKE 1 TABLET BY MOUTH EVERY DAY 90 tablet 3    Handicap Placard MISC by Does not apply route Duration - 5years  Reason- prosthetic leg 1 each 0    ELIQUIS 5 MG TABS tablet Take 1 tablet by mouth 2 times daily  2    atorvastatin (LIPITOR) 20 MG tablet TAKE 1 TABLET BY MOUTH EVERY DAY 90 tablet 3    tamsulosin (FLOMAX) 0.4 MG capsule TAKE 1 CAPSULE BY MOUTH EVERY DAY 90 capsule 3  propafenone (RYTHMOL) 150 MG tablet Take 1 tablet by mouth every 8 hours 90 tablet 3    SYMBICORT 160-4.5 MCG/ACT AERO TAKE 2 PUFFS BY MOUTH TWICE A DAY (Patient taking differently: Inhale 2 puffs into the lungs 2 times daily ) 30.6 Inhaler 3    METOPROLOL TARTRATE PO Take 50 mg by mouth 2 times daily      Omega-3 Fatty Acids (FISH OIL CONCENTRATE) 300 MG CAPS Take 300 mg by mouth nightly       Glucosamine Sulfate 500 MG TABS Take 500 mg by mouth 3 times daily         Adhesive tape and Ramipril  Social History     Tobacco Use   Smoking Status Former Smoker    Packs/day: 2.00    Years: 25.00    Pack years: 50.00    Types: Cigars    Start date: 1    Last attempt to quit: 2011    Years since quittin.1   Smokeless Tobacco Never Used   Tobacco Comment    quit in      (Ifpatient a smoker, smoking cessation counseling offered)    Social History     Substance and Sexual Activity   Alcohol Use Yes    Alcohol/week: 0.0 standard drinks    Frequency: Monthly or less    Comment: BEER 1 A MONTH       REVIEW OF SYSTEMS:  Review of Systems    Physical Exam:      Vitals:    20 0902   Temp: 98.2 °F (36.8 °C)     There is no height or weight on file to calculate BMI. Patient is a 61 y.o. male in no acute distress and alert and oriented to person, place and time. Physical Exam  Constitutional: Patient in no acute distress. Neuro: Alert and oriented to person, place and time. Psych: Mood normal, affect normal  Skin:warm, pink,  No rash noted  HEENT: Head: Normocephalic andatraumatic  Conjunctivae and EOM are normal. Pupils are equal, round  Nose:Normal  Right External Ear: Normal; Left External Ear: Normal  Mouth: Mucosa Moist  Neck: Supple  Lungs: Respiratory effort is normal  Cardiovascular: Warm & Pink  Abdomen: Soft, non-tender, non-distended. YAZ dressing saturated. Dressing removed, site without redness or edema, no drng. Several skin tears where tape has been removed.    Bladder non-tender

## 2020-08-06 NOTE — TELEPHONE ENCOUNTER
Writer called pt informed him to start taking Keflex TID on Sunday 8/9/2020, for 5 days. Pt gave verbal understanding call ended.

## 2020-08-10 ENCOUNTER — TELEPHONE (OUTPATIENT)
Dept: UROLOGY | Age: 60
End: 2020-08-10

## 2020-08-10 RX ORDER — PREGABALIN 100 MG/1
CAPSULE ORAL
Qty: 90 CAPSULE | Refills: 0 | Status: SHIPPED | OUTPATIENT
Start: 2020-08-10 | End: 2020-09-03 | Stop reason: SDUPTHER

## 2020-08-10 NOTE — TELEPHONE ENCOUNTER
Patient called in office stating that his YAZ tube broke again. Patient states that the tubing that connects to the drainage ball broke apart. Patient states that he has the tube now draining into a bucket. Patient states not much is draining, but tube is still draining and he is getting some drainage at the incision site. Patient is coming in tomorrow to get tubing removed. Should patient be concerned about now?

## 2020-08-10 NOTE — TELEPHONE ENCOUNTER
Per Dr Alpesh Huynh patient should be ok until tomorrow when he comes in to get drains out.  Patient notified and verbalized understanding

## 2020-08-11 ENCOUNTER — TELEPHONE (OUTPATIENT)
Dept: UROLOGY | Age: 60
End: 2020-08-11

## 2020-08-11 ENCOUNTER — OFFICE VISIT (OUTPATIENT)
Dept: UROLOGY | Age: 60
End: 2020-08-11

## 2020-08-11 VITALS — TEMPERATURE: 97.8 F

## 2020-08-11 PROCEDURE — 99024 POSTOP FOLLOW-UP VISIT: CPT | Performed by: NURSE PRACTITIONER

## 2020-08-11 ASSESSMENT — ENCOUNTER SYMPTOMS
CONSTIPATION: 0
DIARRHEA: 0
ABDOMINAL PAIN: 0
EYE PAIN: 0
EYE REDNESS: 0
NAUSEA: 0
SHORTNESS OF BREATH: 0
WHEEZING: 0
COUGH: 0
BACK PAIN: 0
VOMITING: 0

## 2020-08-11 NOTE — PROGRESS NOTES
[facility]  [facility] UROLOGY SPECIALISTS - [facility]  [address] [place] [id number]  Dept: [address] [facility] Office Note - Established    Patient:  Woody Carolina  YOB: 1960  Date: 8/11/2020    The patient is a 61 y.o. male who presents todayfor evaluation of the following problems:   Chief Complaint   Patient presents with    Procedure     YAZ drainage, Neph tube pull       HPI  Here for follow up on ureteral stricture repair 7/30/2020  SP partial RT Nephrectomy. He has not restarted Eloquis. He is taking antibiotics as ordered. His bulb fell of his YAZ on Sunday morning and he has had a clamp on it. He is having drainage around toe side and some from the end since bulb lost. He is having no fevers or chills, his appetite is returning. He is voiding well, he is not drinking as much as he should, his taste is not quite back yet. Summary of old records: N/A    Additional History: N/A    Procedures Today: N/A    Urinalysis today:  No results found for this visit on 08/11/20.   Last several PSA's:  Lab Results   Component Value Date    PSA 4.62 (H) 10/23/2017     Last total testosterone:  No results found for: TESTOSTERONE    AUA Symptom Score (8/11/2020):  INCOMPLETE EMPTYING: How often have you had the sensation of not emptying your bladder?: Not at all  FREQUENCY: How often do you have to urinate less than every two hours?: Not at all  INTERMITTENCY: How often have you found you stopped and started again several times when you urinated?: Not at all  URGENCY: How often have you found it difficult to postpone urination?: Less than 1 to 5 times  WEAK STREAM: How often have you had a weak urinary stream?: Not at all  STRAINING: How often have you had to strain to start  urination?: Not at all  NOCTURIA: How many times did you typically get up at night to uriniate?: 1 Time  TOTAL I-PSS SCORE[de-identified] 2  How would you feel if you were to spend the rest of your life with your urinary condition?: Mostly Satisfied    Last BUN and creatinine:  Lab Results   Component Value Date    BUN 29 (H) 08/01/2020     Lab Results   Component Value Date    CREATININE 1.74 (H) 08/01/2020       Additional Lab/Culture results:     Imaging Reviewed during this Office Visit}  (results were independently reviewed by physician and radiology report verified)    PAST MEDICAL, FAMILY AND SOCIAL HISTORY UPDATE:  Past Medical History:   Diagnosis Date    Asymptomatic bilateral carotid artery stenosis     Boil, thigh 10/2019    10/30/19: right inner thigh region, says PCP Rxd Doxy for this, area is much better, has a couple days left to complete Doxy    CAD (coronary artery disease)     saw Dr. Nati Mayfield ([facility]) on Oct. 22/2019 for pre-op clearance, to be cleared pending cardiac echo, not yet schedule for this as of 10/30/19    Charcot foot due to diabetes mellitus ([address] 2014    LEFT    COPD (chronic obstructive pulmonary disease) ([address] 2009    INHALER USE DAILY    Diabetes mellitus ([address] 1989    IDDM, On Insulin Dr. Mandi Navas Diabetic neuropathy ([address]     Difficult intravenous access     VEINS ROLL    Employs prosthetic leg     s/p left BKA    Foot ulcer ([address] PRIOR TO 2015    BILAT    Full dentures     UPPER ONLY    Hyperlipidemia 2004    ON RX    Hypertension 2004    ON RX, PCP Dr. Jenifer Sampson, seen Oct. 2019    Hypoglycemia 4/2/2015    MRSA (methicillin resistant staph aureus) culture positive 4/16/2015    ankle    OA (osteoarthritis)     Osteomyelitis ([address]     left stump  BKA    Paroxysmal atrial fibrillation ([address]     Renal mass 09/2019    ACCIDENTAL  FINDING IS FOLLOWING UP WITH KIDNEY    [address] [place] [address] [place]     Suspected sleep apnea     had a sleep study in august, says no results have been sent to PCP yet!     Thyroid disease 2004    PT HAD HYPERTHYROIDISM-UNCONTROLED THYROID DESTROYED WITH RADI IODINE NOW HAS HYPOTHYRIDISM    Vision abnormalities 09/2019    LEFT NO VISION    Vitreous hemorrhage of INDIRECT LASER 236 SPOTS performed by Luann Monique MD at [facility] History   Problem Relation Age of Onset    Diabetes Mother     Hypertension Mother     Stomach Cancer Mother     Hypertension Father     Alcohol Abuse Father     COPD Father     Kidney Cancer Father     Diabetes Brother         IDDM-PUMP    Other Sister         BRAIN TUMOR     Outpatient Medications Marked as Taking for the 8/11/20 encounter (Office Visit) with RADHA Pace - CNP   Medication Sig Dispense Refill    pregabalin (LYRICA) 100 MG capsule TAKE 1 CAPSULE BY MOUTH THREE TIMES A DAY 90 capsule 0    ondansetron (ZOFRAN) 8 MG tablet       cephALEXin (KEFLEX) 500 MG capsule Take 1 capsule by mouth 3 times daily 15 capsule 0    polyethylene glycol (GLYCOLAX) 17 g packet Take 17 g by mouth daily Use to maintain regular bowel movements while taking narcotic pain medication 527 g 1    levothyroxine (SYNTHROID) 150 MCG tablet Take 1 tablet by mouth Daily 90 tablet 1    albuterol sulfate  (90 Base) MCG/ACT inhaler INHALE 2 PUFFS INTO THE LUNGS EVERY 6 HOURS AS NEEDED FOR WHEEZING OR SHORTNESS OF BREATH 6.7 Inhaler 0    insulin lispro (HUMALOG) 100 UNIT/ML injection vial INJECT 12 UNITS UNDER THE SKIN 3 TIMES DAILY + SLIDING SCALE (TAKE 5 UNITS IF SUGAR IS OVER 150) 20 mL 3    glucagon, rDNA, 1 MG injection Inject 1 mL into the muscle      Blood Pressure KIT 1 actuation by Does not apply route once a week 1 kit 0    rOPINIRole (REQUIP) 2 MG tablet TAKE 1 TABLET BY MOUTH EVERY DAY 90 tablet 3    blood glucose test strips (ASCENSIA AUTODISC VI;ONE TOUCH ULTRA TEST VI) strip 1 each by In Vitro route 4 times daily As needed.  400 each 3    gemfibrozil (LOPID) 600 MG tablet TAKE 1 TABLET BY MOUTH EVERY DAY 90 tablet 3    blood glucose monitor strips by Other route 4 times daily Test strips to use w/ Accucheck Smart View Glucometer (Patient taking differently: by Other route 4 times daily Test strips to use w/ ONE TOUCH VERIO TEST STRIPS) 100 strip 11    Insulin Syringe-Needle U-100 (ULTICARE INSULIN SYRINGE) 31G X 5/16\" 1 ML MISC USE AS DIRECTED WITH HUMULIN AND HUMALOG INSULIN 5 TIMES DAILY 1 each 3    HUMULIN N 100 UNIT/ML injection vial INJECT 50 UNITS EVERY MORNING AND 70 UNITS AT BEDTIME (Patient taking differently: 60 units in the AM , 70 units in PM) 30 mL 11    isosorbide mononitrate (IMDUR) 30 MG extended release tablet TAKE 1 TABLET BY MOUTH EVERY DAY 90 tablet 3    Handicap Placard MISC by Does not apply route Duration - 5years  Reason- prosthetic leg 1 each 0    ELIQUIS 5 MG TABS tablet Take 1 tablet by mouth 2 times daily  2    atorvastatin (LIPITOR) 20 MG tablet TAKE 1 TABLET BY MOUTH EVERY DAY 90 tablet 3    tamsulosin (FLOMAX) 0.4 MG capsule TAKE 1 CAPSULE BY MOUTH EVERY DAY 90 capsule 3    propafenone (RYTHMOL) 150 MG tablet Take 1 tablet by mouth every 8 hours 90 tablet 3    SYMBICORT 160-4.5 MCG/ACT AERO TAKE 2 PUFFS BY MOUTH TWICE A DAY (Patient taking differently: Inhale 2 puffs into the lungs 2 times daily ) 30.6 Inhaler 3    METOPROLOL TARTRATE PO Take 50 mg by mouth 2 times daily      Omega-3 Fatty Acids (FISH OIL CONCENTRATE) 300 MG CAPS Take 300 mg by mouth nightly       Glucosamine Sulfate 500 MG TABS Take 500 mg by mouth 3 times daily         Adhesive tape and Ramipril  Social History     Tobacco Use   Smoking Status Former Smoker    Packs/day: 2.00    Years: 25.00    Pack years: 50.00    Types: Cigars    Start date: 1    Last attempt to quit: 2011    Years since quittin.2   Smokeless Tobacco Never Used   Tobacco Comment    quit in      (Ifpatient a smoker, smoking cessation counseling offered)    Social History     Substance and Sexual Activity   Alcohol Use Yes    Alcohol/week: 0.0 standard drinks    Frequency: Monthly or less    Comment: BEER 1 A MONTH       REVIEW OF SYSTEMS:  Review of Systems    Physical Exam:      Vitals:    20 0805

## 2020-08-11 NOTE — TELEPHONE ENCOUNTER
Restart Eloquis per Dr Sabi Meyer today 8/11/2020    3 weeks cysto retrograde pyelogram and stent removal with DR Faustino Bustamante. - pt aware and surgical scheduler will set that up

## 2020-08-11 NOTE — LETTER
[address] [address]  [address] [facility]  Dept: [phone]  Dept Fax: [phone]        8/11/20    Patient: Latoya Méndez  YOB: 1960    Dear Mica Saul MD,    I had the pleasure of seeing one of your patients, Pasquale Schaumann today in the office today. Below are the relevant portions of my assessment and plan of care. IMPRESSION:  1. Ureteral stricture        PLAN:  Neph tube removed    YAZ removed     Restart Eloquis per Dr Clifford Staff    3 weeks cysto retrograde pyelogram and stent removal with DR Estefany Maldonado. - pt aware and surgical scheduler will set that up   No follow-ups on file. Prescriptions Ordered:  No orders of the defined types were placed in this encounter. Orders Placed:  No orders of the defined types were placed in this encounter. Thank you for allowing me to participate in the care of this patient. I will keep you updated on this patient's follow up and I look forward to serving you and your patients again in the future.     Carlie Shaw, APRN - CNP

## 2020-08-17 ENCOUNTER — OFFICE VISIT (OUTPATIENT)
Dept: INTERNAL MEDICINE CLINIC | Age: 60
End: 2020-08-17
Payer: COMMERCIAL

## 2020-08-17 VITALS
WEIGHT: 275 LBS | BODY MASS INDEX: 33.49 KG/M2 | HEART RATE: 83 BPM | SYSTOLIC BLOOD PRESSURE: 108 MMHG | TEMPERATURE: 97.7 F | OXYGEN SATURATION: 96 % | DIASTOLIC BLOOD PRESSURE: 78 MMHG | HEIGHT: 76 IN

## 2020-08-17 PROBLEM — R42 DIZZINESS: Status: ACTIVE | Noted: 2020-08-17

## 2020-08-17 LAB — HBA1C MFR BLD: 7.2 %

## 2020-08-17 PROCEDURE — 1111F DSCHRG MED/CURRENT MED MERGE: CPT | Performed by: INTERNAL MEDICINE

## 2020-08-17 PROCEDURE — 3051F HG A1C>EQUAL 7.0%<8.0%: CPT | Performed by: INTERNAL MEDICINE

## 2020-08-17 PROCEDURE — 2022F DILAT RTA XM EVC RTNOPTHY: CPT | Performed by: INTERNAL MEDICINE

## 2020-08-17 PROCEDURE — G8427 DOCREV CUR MEDS BY ELIG CLIN: HCPCS | Performed by: INTERNAL MEDICINE

## 2020-08-17 PROCEDURE — G8417 CALC BMI ABV UP PARAM F/U: HCPCS | Performed by: INTERNAL MEDICINE

## 2020-08-17 PROCEDURE — 3017F COLORECTAL CA SCREEN DOC REV: CPT | Performed by: INTERNAL MEDICINE

## 2020-08-17 PROCEDURE — 83036 HEMOGLOBIN GLYCOSYLATED A1C: CPT | Performed by: INTERNAL MEDICINE

## 2020-08-17 PROCEDURE — 99214 OFFICE O/P EST MOD 30 MIN: CPT | Performed by: INTERNAL MEDICINE

## 2020-08-17 PROCEDURE — 1036F TOBACCO NON-USER: CPT | Performed by: INTERNAL MEDICINE

## 2020-08-17 RX ORDER — FINASTERIDE 5 MG/1
5 TABLET, FILM COATED ORAL DAILY
Qty: 30 TABLET | Refills: 3 | Status: SHIPPED | OUTPATIENT
Start: 2020-08-17 | End: 2020-12-17

## 2020-08-17 ASSESSMENT — PATIENT HEALTH QUESTIONNAIRE - PHQ9
SUM OF ALL RESPONSES TO PHQ9 QUESTIONS 1 & 2: 0
SUM OF ALL RESPONSES TO PHQ QUESTIONS 1-9: 0
SUM OF ALL RESPONSES TO PHQ QUESTIONS 1-9: 0
2. FEELING DOWN, DEPRESSED OR HOPELESS: 0
1. LITTLE INTEREST OR PLEASURE IN DOING THINGS: 0

## 2020-08-17 ASSESSMENT — ENCOUNTER SYMPTOMS
ABDOMINAL DISTENTION: 0
DIARRHEA: 0
EYE DISCHARGE: 0
COUGH: 0
EYE PAIN: 0
SHORTNESS OF BREATH: 0
COLOR CHANGE: 0
TROUBLE SWALLOWING: 0
WHEEZING: 0
BLOOD IN STOOL: 0

## 2020-08-17 NOTE — PROGRESS NOTES
[address]  [place] [address]  Dept: [phone]  Dept Fax: [phone]    Alexis Ray is a 61 y.o. male who presents today for his medical conditions/complaintsas noted below. Alexis Ray is c/o of   Chief Complaint   Patient presents with    Diabetes    Other     [address] GAP, PVD, CAD          HPI:     HTN  Onset more than 2 years ago  gustavo mild to mod  Controlled with current po meds  Not associated with headaches or blurry vision  No chest pain  Diabetes   Duration more than 7 years  Modifying factors on Glucophage and other med  Severity uncontrolled sever  Associated signs and symtoms neuropathy/ckd/ CAD. aggravated with sugar diet and better with low sugar diet     Dizziness on standing        Hemoglobin A1C (%)   Date Value   08/17/2020 7.2   01/13/2020 6.5 (H)   05/01/2019 7.0 (H)             ( goal A1Cis < 7)   Microalb/Crt.  Ratio (mcg/mg creat)   Date Value   01/13/2020 82 (H)     LDL Cholesterol (mg/dL)   Date Value   01/13/2020 70   05/21/2019 68   03/09/2018 56       (goal LDL is <100)   AST (U/L)   Date Value   05/21/2020 24     ALT (U/L)   Date Value   05/21/2020 19     BUN (mg/dL)   Date Value   08/01/2020 29 (H)     BP Readings from Last 3 Encounters:   08/17/20 136/80   08/01/20 130/77   07/30/20 129/65          (goal 120/80)    Past Medical History:   Diagnosis Date    Asymptomatic bilateral carotid artery stenosis     Boil, thigh 10/2019    10/30/19: right inner thigh region, says PCP Rxd Doxy for this, area is much better, has a couple days left to complete Doxy    CAD (coronary artery disease)     saw [facility]. Racquel Maria ([facility]) on Oct. 22/2019 for pre-op clearance, to be cleared pending cardiac echo, not yet schedule for this as of 10/30/19    Charcot foot due to diabetes mellitus ([address] 2014    LEFT    COPD (chronic obstructive pulmonary disease) ([address] 2009    INHALER USE DAILY    Diabetes mellitus ([address] 1989    IDDM, On Insulin  CYST REMOVAL      KIDNEY SURGERY Right 2019    XI ROBOTIC PARTIAL NEPHRECTOMY, INTRAOP ULTRASOUND, RIGHT URETEROURETEROSTOMY, RIGHT URETERAL STENT PLACEMENT **SHORT STAY** performed by Gracie Wilder MD at [address] N/A 2020    XI LAPAROSCOPIC ROBOTIC URETEROLYSIS, ROBOTIC ASSISTED BUCCAL URETEROPLASTY  (DR. COBIAN TO ASSIST) performed by Gracie Wilder MD at [address] Left 4/29/15    OTHER SURGICAL HISTORY Left 5-5-15 and 2015    Revision BKA    OTHER SURGICAL HISTORY      left stump revision 2018    SD RE-AMPUTATION LOWER LEG Left 2018    LEG AMPUTATION BELOW KNEE REVISION performed by Liv Hammond MD at [address] Bilateral 80'S    TOE AMPUTATION      Right 2 and 4    VITRECTOMY Left 2019    PARS PLANA VITRECTOMY 25 GAUGE, MEMBRANE PEELING, ENDOLASER 200  MW 1044 SPOTS, INDIRECT LASER 26 SPOTS performed by Beatriz Morton MD at [facility] History   Problem Relation Age of Onset    Diabetes Mother     Hypertension Mother     Stomach Cancer Mother     Hypertension Father     Alcohol Abuse Father     COPD Father     Kidney Cancer Father     Diabetes Brother         IDDM-PUMP    Other Sister         BRAIN TUMOR       Social History     Tobacco Use    Smoking status: Former Smoker     Packs/day: 2.00     Years: 25.00     Pack years: 50.00     Types: Cigars     Start date:      Last attempt to quit: 2011     Years since quittin.2    Smokeless tobacco: Never Used    Tobacco comment: quit in    Substance Use Topics    Alcohol use:  Yes     Alcohol/week: 0.0 standard drinks     Frequency: Monthly or less     Comment: BEER 1 A MONTH      Current Outpatient Medications   Medication Sig Dispense Refill    pregabalin (LYRICA) 100 MG capsule TAKE 1 CAPSULE BY MOUTH THREE TIMES A DAY 90 capsule 0    ondansetron (ZOFRAN) 8 MG tablet       cephALEXin (KEFLEX) 500 MG capsule Take 1 capsule by mouth 3 times daily 15 capsule 0    polyethylene glycol (GLYCOLAX) 17 g packet Take 17 g by mouth daily Use to maintain regular bowel movements while taking narcotic pain medication 527 g 1    levothyroxine (SYNTHROID) 150 MCG tablet Take 1 tablet by mouth Daily 90 tablet 1    albuterol sulfate  (90 Base) MCG/ACT inhaler INHALE 2 PUFFS INTO THE LUNGS EVERY 6 HOURS AS NEEDED FOR WHEEZING OR SHORTNESS OF BREATH 6.7 Inhaler 0    insulin lispro (HUMALOG) 100 UNIT/ML injection vial INJECT 12 UNITS UNDER THE SKIN 3 TIMES DAILY + SLIDING SCALE (TAKE 5 UNITS IF SUGAR IS OVER 150) 20 mL 3    glucagon, rDNA, 1 MG injection Inject 1 mL into the muscle      Blood Pressure KIT 1 actuation by Does not apply route once a week 1 kit 0    rOPINIRole (REQUIP) 2 MG tablet TAKE 1 TABLET BY MOUTH EVERY DAY 90 tablet 3    blood glucose test strips (ASCENSIA AUTODISC VI;ONE TOUCH ULTRA TEST VI) strip 1 each by In Vitro route 4 times daily As needed.  400 each 3    gemfibrozil (LOPID) 600 MG tablet TAKE 1 TABLET BY MOUTH EVERY DAY 90 tablet 3    blood glucose monitor strips by Other route 4 times daily Test strips to use w/ Accucheck Smart View Glucometer (Patient taking differently: by Other route 4 times daily Test strips to use w/ ONE TOUCH VERIO TEST STRIPS) 100 strip 11    Insulin Syringe-Needle U-100 (ULTICARE INSULIN SYRINGE) 31G X 5/16\" 1 ML MISC USE AS DIRECTED WITH HUMULIN AND HUMALOG INSULIN 5 TIMES DAILY 1 each 3    HUMULIN N 100 UNIT/ML injection vial INJECT 50 UNITS EVERY MORNING AND 70 UNITS AT BEDTIME (Patient taking differently: 60 units in the AM , 70 units in PM) 30 mL 11    isosorbide mononitrate (IMDUR) 30 MG extended release tablet TAKE 1 TABLET BY MOUTH EVERY DAY 90 tablet 3    Handicap Placard MISC by Does not apply route Duration - 5years  Reason- prosthetic leg 1 each 0    ELIQUIS 5 MG TABS tablet Take 1 tablet by mouth 2 times daily  2    atorvastatin (LIPITOR) 20 MG tablet TAKE 1 TABLET BY MOUTH EVERY DAY 90 tablet 3    tamsulosin (FLOMAX) 0.4 MG capsule TAKE 1 CAPSULE BY MOUTH EVERY DAY 90 capsule 3    propafenone (RYTHMOL) 150 MG tablet Take 1 tablet by mouth every 8 hours 90 tablet 3    SYMBICORT 160-4.5 MCG/ACT AERO TAKE 2 PUFFS BY MOUTH TWICE A DAY (Patient taking differently: Inhale 2 puffs into the lungs 2 times daily ) 30.6 Inhaler 3    METOPROLOL TARTRATE PO Take 50 mg by mouth 2 times daily      Omega-3 Fatty Acids (FISH OIL CONCENTRATE) 300 MG CAPS Take 300 mg by mouth nightly       Glucosamine Sulfate 500 MG TABS Take 500 mg by mouth 3 times daily      oxybutynin (DITROPAN-XL) 10 MG extended release tablet Take 1 tablet by mouth nightly for 7 days 7 tablet 0     No current facility-administered medications for this visit. Allergies   Allergen Reactions    Adhesive Tape      tegaderm causes blisters. Use paper tape    Ramipril      Other reaction(s): swelling of the lips        Health Maintenance   Topic Date Due    HIV screen  01/07/1975    DTaP/Tdap/Td vaccine (1 - Tdap) 01/07/1979    Shingles Vaccine (1 of 2) 10/23/2014    Diabetic retinal exam  01/04/2017    PSA counseling  10/23/2018    Flu vaccine (1) 09/01/2020    A1C test (Diabetic or Prediabetic)  01/13/2021    Diabetic microalbuminuria test  01/13/2021    Lipid screen  01/13/2021    Low dose CT lung screening  01/21/2021    TSH testing  06/08/2021    Diabetic foot exam  07/28/2021    Potassium monitoring  08/01/2021    Creatinine monitoring  08/01/2021    Colon cancer screen colonoscopy  01/23/2027    Pneumococcal 0-64 years Vaccine  Completed    Hepatitis C screen  Completed    Hepatitis A vaccine  Aged Out    Hib vaccine  Aged Out    Meningococcal (ACWY) vaccine  Aged Out       Subjective:     Review of Systems   Constitutional: Negative for appetite change, diaphoresis and fatigue. HENT: Negative for ear discharge and trouble swallowing. Eyes: Negative for pain and discharge. Respiratory: Negative for cough, shortness of breath and wheezing. Cardiovascular: Negative for chest pain and palpitations. Gastrointestinal: Negative for abdominal distention, blood in stool and diarrhea. Endocrine: Negative for polydipsia and polyphagia. Genitourinary: Negative for difficulty urinating and frequency. Musculoskeletal: Positive for arthralgias. Negative for gait problem, myalgias and neck pain. Skin: Negative for color change and rash. Allergic/Immunologic: Negative for environmental allergies and food allergies. Neurological: Positive for dizziness. Negative for headaches. Hematological: Negative for adenopathy. Does not bruise/bleed easily. Psychiatric/Behavioral: Negative for behavioral problems and sleep disturbance. Objective:     Physical Exam  Constitutional:       Appearance: He is well-developed. He is not diaphoretic. HENT:      Head: Normocephalic and atraumatic. Eyes:      General:         Right eye: No discharge. Left eye: No discharge. Extraocular Movements:      Right eye: Normal extraocular motion. Left eye: Normal extraocular motion. Conjunctiva/sclera: Conjunctivae normal.      Right eye: Right conjunctiva is not injected. Left eye: Left conjunctiva is not injected. Neck:      Musculoskeletal: Normal range of motion and neck supple. No edema or erythema. Thyroid: No thyroid mass or thyromegaly. Vascular: No JVD. Cardiovascular:      Rate and Rhythm: Normal rate and regular rhythm. Heart sounds: No murmur. No friction rub. Pulmonary:      Effort: Pulmonary effort is normal. No tachypnea, bradypnea, accessory muscle usage or respiratory distress. Breath sounds: Normal breath sounds. No wheezing or rales. Abdominal:      General: Bowel sounds are normal. There is no distension. Palpations: Abdomen is soft. Tenderness:  There is no abdominal tenderness. There is no rebound. Musculoskeletal: Normal range of motion. General: No tenderness. Comments: Hx of left bka     Lymphadenopathy:      Head:      Right side of head: No submental or submandibular adenopathy. Left side of head: No submental or submandibular adenopathy. Cervical: No cervical adenopathy. Skin:     General: Skin is warm. Coloration: Skin is not pale. Findings: No bruising, ecchymosis or rash. Neurological:      Mental Status: He is alert and oriented to person, place, and time. Cranial Nerves: No cranial nerve deficit. Sensory: No sensory deficit. Motor: No atrophy or abnormal muscle tone. Coordination: Coordination normal.   Psychiatric:         Mood and Affect: Mood is not anxious. Affect is not angry. Speech: Speech is not slurred. Behavior: Behavior normal. Behavior is not aggressive. Thought Content: Thought content does not include homicidal ideation. Cognition and Memory: Memory is not impaired. /80   Pulse 83   Temp 97.7 °F (36.5 °C)   Ht 6' 4\" (1.93 m)   Wt 275 lb (124.7 kg)   SpO2 96%   BMI 33.47 kg/m²     Assessment:       Diagnosis Orders   1. Type 2 diabetes mellitus with diabetic polyneuropathy, with long-term current use of insulin ([facility])  POCT glycosylated hemoglobin (Hb A1C)   2. PVD (peripheral vascular disease) ([facility]     3. Essential hypertension     4. Atrial fibrillation, unspecified type ([facility]     5. Acquired hypothyroidism     6. Coronary artery disease with angina pectoris, unspecified vessel or lesion type, unspecified whether native or transplanted heart [facility])               Plan:      No follow-ups on file. Orders Placed This Encounter   Procedures    POCT glycosylated hemoglobin (Hb A1C)     No orders of the defined types were placed in this encounter.    postive low bp onstanding  Will dc flomax and do proscar  Risk warned   And cut back lopressor to 25 bid  Will  Back in two weeks     Patient given educational materials - see patient instructions. Discussed use, benefit, and side effects of prescribed medications. All patientquestions answered. Pt voiced understanding. Reviewed health maintenance. Instructedto continue current medications, diet and exercise. Patient agreed with treatmentplan. Follow up as directed.      Electronically signed by Chandler Nelson MD on 8/17/2020 at 9:53 AM

## 2020-08-17 NOTE — PROGRESS NOTES
Visit Information    Have you changed or started any medications since your last visit including any over-the-counter medicines, vitamins, or herbal medicines? no   Are you having any side effects from any of your medications? -  no  Have you stopped taking any of your medications? Is so, why? -  no    Have you seen any other physician or provider since your last visit? Yes - Records Obtained  Have you had any other diagnostic tests since your last visit? Yes - Records Obtained  Have you been seen in the emergency room and/or had an admission to a hospital since we last saw you? Yes - Records Obtained  Have you had your routine dental cleaning in the past 6 months? no    Have you activated your [employer] account? If not, what are your barriers?  Yes     Patient Care Team:  Cristy Givens MD as PCP - Jam Garibay MD as PCP - [facility]  Aileen Wylie MD as Consulting Physician (Infectious Diseases)    Medical History Review  Past Medical, Family, and Social History reviewed and does not contribute to the patient presenting condition    Health Maintenance   Topic Date Due    HIV screen  01/07/1975    DTaP/Tdap/Td vaccine (1 - Tdap) 01/07/1979    Shingles Vaccine (1 of 2) 10/23/2014    Diabetic retinal exam  01/04/2017    PSA counseling  10/23/2018    Flu vaccine (1) 09/01/2020    A1C test (Diabetic or Prediabetic)  01/13/2021    Diabetic microalbuminuria test  01/13/2021    Lipid screen  01/13/2021    Low dose CT lung screening  01/21/2021    TSH testing  06/08/2021    Diabetic foot exam  07/28/2021    Potassium monitoring  08/01/2021    Creatinine monitoring  08/01/2021    Colon cancer screen colonoscopy  01/23/2027    Pneumococcal 0-64 years Vaccine  Completed    Hepatitis C screen  Completed    Hepatitis A vaccine  Aged Out    Hib vaccine  Aged Out    Meningococcal (ACWY) vaccine  Aged Out

## 2020-08-18 ENCOUNTER — TELEPHONE (OUTPATIENT)
Dept: UROLOGY | Age: 60
End: 2020-08-18

## 2020-08-18 NOTE — TELEPHONE ENCOUNTER
Cysto, retrograde pyelogram with stent removal @ [facility] 9/1/2020 1:00pm  COVID test 8/28/2020 @ [facility] 8:40am      Spoke with patient regarding procedure information. Procedure info emailed 8/18/2020.

## 2020-08-24 ENCOUNTER — HOSPITAL ENCOUNTER (OUTPATIENT)
Age: 60
Setting detail: SPECIMEN
Discharge: HOME | End: 2020-08-24
Payer: COMMERCIAL

## 2020-08-24 ENCOUNTER — TELEPHONE (OUTPATIENT)
Dept: UROLOGY | Age: 60
End: 2020-08-24

## 2020-08-24 NOTE — TELEPHONE ENCOUNTER
Patient called in office stating that he feels he may have a UTI. Patient states he is having urinary frequency, burning, and a foul odor. Patient also states that he has been running a fever around 100.9 along with stomach pains, chills, nausea and vomitting. Adv patient to give UA today and to contact PCP.  Patient is due to have surgery 9/1/20 with Dr Junior Espinosa

## 2020-08-25 LAB
CULTURE: ABNORMAL
CULTURE: ABNORMAL
Lab: ABNORMAL
SPECIMEN DESCRIPTION: ABNORMAL

## 2020-08-26 ENCOUNTER — OFFICE VISIT (OUTPATIENT)
Dept: INTERNAL MEDICINE CLINIC | Age: 60
End: 2020-08-26
Payer: COMMERCIAL

## 2020-08-26 VITALS
TEMPERATURE: 97.8 F | HEART RATE: 79 BPM | HEIGHT: 76 IN | WEIGHT: 264 LBS | SYSTOLIC BLOOD PRESSURE: 138 MMHG | BODY MASS INDEX: 32.15 KG/M2 | DIASTOLIC BLOOD PRESSURE: 86 MMHG | OXYGEN SATURATION: 98 %

## 2020-08-26 PROCEDURE — 99214 OFFICE O/P EST MOD 30 MIN: CPT | Performed by: INTERNAL MEDICINE

## 2020-08-26 PROCEDURE — 2022F DILAT RTA XM EVC RTNOPTHY: CPT | Performed by: INTERNAL MEDICINE

## 2020-08-26 PROCEDURE — 3051F HG A1C>EQUAL 7.0%<8.0%: CPT | Performed by: INTERNAL MEDICINE

## 2020-08-26 PROCEDURE — G8417 CALC BMI ABV UP PARAM F/U: HCPCS | Performed by: INTERNAL MEDICINE

## 2020-08-26 PROCEDURE — G8427 DOCREV CUR MEDS BY ELIG CLIN: HCPCS | Performed by: INTERNAL MEDICINE

## 2020-08-26 PROCEDURE — 3017F COLORECTAL CA SCREEN DOC REV: CPT | Performed by: INTERNAL MEDICINE

## 2020-08-26 PROCEDURE — 1111F DSCHRG MED/CURRENT MED MERGE: CPT | Performed by: INTERNAL MEDICINE

## 2020-08-26 PROCEDURE — 1036F TOBACCO NON-USER: CPT | Performed by: INTERNAL MEDICINE

## 2020-08-26 RX ORDER — CIPROFLOXACIN 500 MG/1
500 TABLET, FILM COATED ORAL 2 TIMES DAILY
Qty: 14 TABLET | Refills: 0 | Status: SHIPPED | OUTPATIENT
Start: 2020-08-26 | End: 2020-08-28 | Stop reason: ALTCHOICE

## 2020-08-26 ASSESSMENT — ENCOUNTER SYMPTOMS
BLOOD IN STOOL: 0
EYE PAIN: 0
TROUBLE SWALLOWING: 0
EYE DISCHARGE: 0
COLOR CHANGE: 0
NAUSEA: 1
WHEEZING: 0
DIARRHEA: 0
SHORTNESS OF BREATH: 0
COUGH: 0
ABDOMINAL DISTENTION: 0

## 2020-08-26 NOTE — PROGRESS NOTES
[address]  [place] [address]  Dept: [phone]  Dept Fax: [phone]    Royal Rodriguez is a 61 y.o. male who presents today for his medical conditions/complaintsas noted below. Royal Rodriguez is c/o of   Chief Complaint   Patient presents with    Urinary Tract Infection         HPI:     uti  Dysuria  Frequency    Diabetes   Duration more than 7 years  Modifying factors on Glucophage and other med  Severity uncontrolled sever  Associated signs and symtoms neuropathy/ckd/ CAD. aggravated with sugar diet and better with low sugar diet     HTN  Onset more than 2 years ago  gustavo mild to mod  Controlled with current po meds  Not associated with headaches or blurry vision  No chest pain        Hemoglobin A1C (%)   Date Value   08/17/2020 7.2   01/13/2020 6.5 (H)   05/01/2019 7.0 (H)             ( goal A1Cis < 7)   Microalb/Crt.  Ratio (mcg/mg creat)   Date Value   01/13/2020 82 (H)     LDL Cholesterol (mg/dL)   Date Value   01/13/2020 70   05/21/2019 68   03/09/2018 56       (goal LDL is <100)   AST (U/L)   Date Value   05/21/2020 24     ALT (U/L)   Date Value   05/21/2020 19     BUN (mg/dL)   Date Value   08/01/2020 29 (H)     BP Readings from Last 3 Encounters:   08/26/20 138/86   08/17/20 108/78   08/01/20 130/77          (goal 120/80)    Past Medical History:   Diagnosis Date    Asymptomatic bilateral carotid artery stenosis     Boil, thigh 10/2019    10/30/19: right inner thigh region, says PCP Rxd Doxy for this, area is much better, has a couple days left to complete Doxy    CAD (coronary artery disease)     saw Dr. Joseph Yin ([facility]) on Oct. 22/2019 for pre-op clearance, to be cleared pending cardiac echo, not yet schedule for this as of 10/30/19    Charcot foot due to diabetes mellitus ([address] 2014    LEFT    COPD (chronic obstructive pulmonary disease) ([address] 2009    INHALER USE DAILY    Diabetes mellitus ([address] 1989    IDDM, On Insulin Dr. Dalton Buchanan Diabetic neuropathy ([address]     Difficult intravenous access     VEINS ROLL    Employs prosthetic leg     s/p left BKA    Foot ulcer ([address] PRIOR TO 2015    BILAT    Full dentures     UPPER ONLY    Hyperlipidemia 2004    ON RX    Hypertension 2004    ON RX, PCP Dr. Gita Shipley, seen Oct. 2019    Hypoglycemia 4/2/2015    MRSA (methicillin resistant staph aureus) culture positive 4/16/2015    ankle    OA (osteoarthritis)     Osteomyelitis ([address]     left stump  BKA    Paroxysmal atrial fibrillation ([address]     Renal mass 09/2019    ACCIDENTAL  FINDING IS FOLLOWING UP WITH KIDNEY DR Enoch Ledezma     Suspected sleep apnea     had a sleep study in august, says no results have been sent to PCP yet!     Thyroid disease 2004    PT HAD HYPERTHYROIDISM-UNCONTROLED THYROID DESTROYED WITH RADI IODINE NOW HAS HYPOTHYRIDISM    Vision abnormalities 09/2019    LEFT NO VISION    Vitreous hemorrhage of left eye due to diabetes mellitus ([address] 09/2019    s/p Vitrectomy 9/2019    Wears glasses     Wears partial dentures     full upper, does not wear lower partial    Wellness examination     Dr. Maryana Bustillosp seen within last 1 1/2 mos      Past Surgical History:   Procedure Laterality Date    CARDIAC CATHETERIZATION      no stenting    CARDIOVASCULAR STRESS TEST  06/28/2019    small fixed apical, likely normal, EF 48%    COLONOSCOPY  06/17/2016    poor prep, done up to the IC valve, redundant colon    COLONOSCOPY  01/23/2017    VIRTUAL COLONOSCOPY DONE-no polyps or masses    CYSTOSCOPY Bilateral 6/16/2020    CYSTOSCOPY RETROGRADE PYELOGRAM URETEROSCOPY performed by Rhett Laws MD at [address] Right 7/30/2020    CYSTOSCOPY, RIGHT RETROGRADE PYELOGRAM,  URETERAL CATHETER  INSERTION performed by Rhett Laws MD at [address] [facility] Right 1995    [id number] Right     r-bone removed    HC  PICC [facility] DOUBLE  5/21/2018         KIDNEY CYST REMOVAL  KIDNEY SURGERY Right 2019    XI ROBOTIC PARTIAL NEPHRECTOMY, INTRAOP ULTRASOUND, RIGHT URETEROURETEROSTOMY, RIGHT URETERAL STENT PLACEMENT **SHORT STAY** performed by La Freitas MD at [address] N/A 2020    XI LAPAROSCOPIC ROBOTIC URETEROLYSIS, ROBOTIC ASSISTED BUCCAL URETEROPLASTY  (DR. COBIAN TO ASSIST) performed by La Freitas MD at [address] [facility] Left 4/29/15    OTHER SURGICAL HISTORY Left 5-5-15 and 2015    Revision BKA    OTHER SURGICAL HISTORY      left stump revision 2018    ID RE-AMPUTATION LOWER LEG Left 2018    LEG AMPUTATION BELOW KNEE REVISION performed by Vasu Alvarez MD at [address] Bilateral 80'S    TOE AMPUTATION      Right 2 and 4    VITRECTOMY Left 2019    PARS PLANA VITRECTOMY 25 GAUGE, MEMBRANE PEELING, ENDOLASER 200  MW 1044 SPOTS, INDIRECT LASER 26 SPOTS performed by Odette Haney MD at [place] History   Problem Relation Age of Onset    Diabetes Mother     Hypertension Mother     Stomach Cancer Mother     Hypertension Father     Alcohol Abuse Father     COPD Father     Kidney Cancer Father     Diabetes Brother         IDDM-PUMP    Other Sister         BRAIN TUMOR       Social History     Tobacco Use    Smoking status: Former Smoker     Packs/day: 2.00     Years: 25.00     Pack years: 50.00     Types: Cigars     Start date:      Last attempt to quit: 2011     Years since quittin.2    Smokeless tobacco: Never Used    Tobacco comment: quit in    Substance Use Topics    Alcohol use:  Yes     Alcohol/week: 0.0 standard drinks     Frequency: Monthly or less     Comment: BEER 1 A MONTH      Current Outpatient Medications   Medication Sig Dispense Refill    ciprofloxacin (CIPRO) 500 MG tablet Take 1 tablet by mouth 2 times daily for 7 days 14 tablet 0    finasteride (PROSCAR) 5 MG tablet Take 1 tablet by mouth daily 30 tablet 3    metoprolol tartrate (LOPRESSOR) 25 MG tablet Take 2 tablets by mouth 2 times daily 60 tablet 3    pregabalin (LYRICA) 100 MG capsule TAKE 1 CAPSULE BY MOUTH THREE TIMES A DAY 90 capsule 0    ondansetron (ZOFRAN) 8 MG tablet       cephALEXin (KEFLEX) 500 MG capsule Take 1 capsule by mouth 3 times daily 15 capsule 0    polyethylene glycol (GLYCOLAX) 17 g packet Take 17 g by mouth daily Use to maintain regular bowel movements while taking narcotic pain medication 527 g 1    levothyroxine (SYNTHROID) 150 MCG tablet Take 1 tablet by mouth Daily 90 tablet 1    albuterol sulfate  (90 Base) MCG/ACT inhaler INHALE 2 PUFFS INTO THE LUNGS EVERY 6 HOURS AS NEEDED FOR WHEEZING OR SHORTNESS OF BREATH 6.7 Inhaler 0    insulin lispro (HUMALOG) 100 UNIT/ML injection vial INJECT 12 UNITS UNDER THE SKIN 3 TIMES DAILY + SLIDING SCALE (TAKE 5 UNITS IF SUGAR IS OVER 150) 20 mL 3    glucagon, rDNA, 1 MG injection Inject 1 mL into the muscle      Blood Pressure KIT 1 actuation by Does not apply route once a week 1 kit 0    rOPINIRole (REQUIP) 2 MG tablet TAKE 1 TABLET BY MOUTH EVERY DAY 90 tablet 3    blood glucose test strips (ASCENSIA AUTODISC VI;ONE TOUCH ULTRA TEST VI) strip 1 each by In Vitro route 4 times daily As needed.  400 each 3    gemfibrozil (LOPID) 600 MG tablet TAKE 1 TABLET BY MOUTH EVERY DAY 90 tablet 3    blood glucose monitor strips by Other route 4 times daily Test strips to use w/ Accucheck Smart View Glucometer (Patient taking differently: by Other route 4 times daily Test strips to use w/ ONE TOUCH VERIO TEST STRIPS) 100 strip 11    Insulin Syringe-Needle U-100 (ULTICARE INSULIN SYRINGE) 31G X 5/16\" 1 ML MISC USE AS DIRECTED WITH HUMULIN AND HUMALOG INSULIN 5 TIMES DAILY 1 each 3    HUMULIN N 100 UNIT/ML injection vial INJECT 50 UNITS EVERY MORNING AND 70 UNITS AT BEDTIME (Patient taking differently: 60 units in the AM , 70 units in PM) 30 mL 11    isosorbide mononitrate (IMDUR) 30 MG extended release tablet TAKE 1 TABLET BY MOUTH EVERY DAY 90 tablet 3    Handicap Placard MISC by Does not apply route Duration - 5years  Reason- prosthetic leg 1 each 0    ELIQUIS 5 MG TABS tablet Take 1 tablet by mouth 2 times daily  2    atorvastatin (LIPITOR) 20 MG tablet TAKE 1 TABLET BY MOUTH EVERY DAY 90 tablet 3    propafenone (RYTHMOL) 150 MG tablet Take 1 tablet by mouth every 8 hours 90 tablet 3    SYMBICORT 160-4.5 MCG/ACT AERO TAKE 2 PUFFS BY MOUTH TWICE A DAY (Patient taking differently: Inhale 2 puffs into the lungs 2 times daily ) 30.6 Inhaler 3    Omega-3 Fatty Acids (FISH OIL CONCENTRATE) 300 MG CAPS Take 300 mg by mouth nightly       Glucosamine Sulfate 500 MG TABS Take 500 mg by mouth 3 times daily      oxybutynin (DITROPAN-XL) 10 MG extended release tablet Take 1 tablet by mouth nightly for 7 days 7 tablet 0     No current facility-administered medications for this visit. Allergies   Allergen Reactions    Adhesive Tape      tegaderm causes blisters.  Use paper tape    Ramipril      Other reaction(s): swelling of the lips        Health Maintenance   Topic Date Due    HIV screen  01/07/1975    DTaP/Tdap/Td vaccine (1 - Tdap) 01/07/1979    Shingles Vaccine (1 of 2) 10/23/2014    Diabetic retinal exam  01/04/2017    PSA counseling  10/23/2018    Flu vaccine (1) 09/01/2020    Diabetic microalbuminuria test  01/13/2021    Lipid screen  01/13/2021    Low dose CT lung screening  01/21/2021    TSH testing  06/08/2021    Diabetic foot exam  07/28/2021    Potassium monitoring  08/01/2021    Creatinine monitoring  08/01/2021    A1C test (Diabetic or Prediabetic)  08/17/2021    Colon cancer screen colonoscopy  01/23/2027    Pneumococcal 0-64 years Vaccine  Completed    Hepatitis C screen  Completed    Hepatitis A vaccine  Aged Out    Hib vaccine  Aged Out    Meningococcal (ACWY) vaccine  Aged Out       Subjective:     Review of Systems   Constitutional: Positive for fatigue. Negative for appetite change and diaphoresis. HENT: Negative for ear discharge and trouble swallowing. Eyes: Negative for pain and discharge. Respiratory: Negative for cough, shortness of breath and wheezing. Cardiovascular: Negative for chest pain and palpitations. Gastrointestinal: Positive for nausea. Negative for abdominal distention, blood in stool and diarrhea. Endocrine: Negative for polydipsia and polyphagia. Genitourinary: Negative for difficulty urinating and frequency. Musculoskeletal: Positive for arthralgias. Negative for gait problem, myalgias and neck pain. Skin: Negative for color change and rash. Allergic/Immunologic: Negative for environmental allergies and food allergies. Neurological: Negative for dizziness and headaches. Hematological: Negative for adenopathy. Does not bruise/bleed easily. Psychiatric/Behavioral: Negative for behavioral problems and sleep disturbance. Objective:     Physical Exam  Constitutional:       Appearance: He is well-developed. He is not diaphoretic. HENT:      Head: Normocephalic and atraumatic. Eyes:      General:         Right eye: No discharge. Left eye: No discharge. Extraocular Movements:      Right eye: Normal extraocular motion. Left eye: Normal extraocular motion. Conjunctiva/sclera: Conjunctivae normal.      Right eye: Right conjunctiva is not injected. Left eye: Left conjunctiva is not injected. Neck:      Musculoskeletal: Normal range of motion and neck supple. No edema or erythema. Thyroid: No thyroid mass or thyromegaly. Vascular: No JVD. Cardiovascular:      Rate and Rhythm: Normal rate and regular rhythm. Heart sounds: No murmur. No friction rub. Pulmonary:      Effort: Pulmonary effort is normal. No tachypnea, bradypnea, accessory muscle usage or respiratory distress. Breath sounds: Normal breath sounds. No wheezing or rales.    Abdominal:      General: 1 tablet by mouth 2 times daily for 7 days     Dispense:  14 tablet     Refill:  0    utgi with klebseila  abax orfal  Check psa in two weeks     Patient given educational materials - see patient instructions. Discussed use, benefit, and side effects of prescribed medications. All patientquestions answered. Pt voiced understanding. Reviewed health maintenance. Instructedto continue current medications, diet and exercise. Patient agreed with treatmentplan. Follow up as directed.      Electronically signed by Tim Drew MD on 8/26/2020 at 1:47 PM

## 2020-08-26 NOTE — PROGRESS NOTES
Visit Information    Have you changed or started any medications since your last visit including any over-the-counter medicines, vitamins, or herbal medicines? no   Are you having any side effects from any of your medications? -  no  Have you stopped taking any of your medications? Is so, why? -  no    Have you seen any other physician or provider since your last visit? Yes - Records Obtained  Have you had any other diagnostic tests since your last visit? Yes - Records Obtained  Have you been seen in the emergency room and/or had an admission to a hospital since we last saw you? No  Have you had your routine dental cleaning in the past 6 months? no    Have you activated your [employer] account? If not, what are your barriers?  Yes     Patient Care Team:  Rissa Dior MD as PCP - Lisa Toro MD [facility]  Robert Naidu MD as Consulting Physician (Infectious Diseases)    Medical History Review  Past Medical, Family, and Social History reviewed and does not contribute to the patient presenting condition    Health Maintenance   Topic Date Due    HIV screen  01/07/1975    DTaP/Tdap/Td vaccine (1 - Tdap) 01/07/1979    Shingles Vaccine (1 of 2) 10/23/2014    Diabetic retinal exam  01/04/2017    PSA counseling  10/23/2018    Flu vaccine (1) 09/01/2020    Diabetic microalbuminuria test  01/13/2021    Lipid screen  01/13/2021    Low dose CT lung screening  01/21/2021    TSH testing  06/08/2021    Diabetic foot exam  07/28/2021    Potassium monitoring  08/01/2021    Creatinine monitoring  08/01/2021    A1C test (Diabetic or Prediabetic)  08/17/2021    Colon cancer screen colonoscopy  01/23/2027    Pneumococcal 0-64 years Vaccine  Completed    Hepatitis C screen  Completed    Hepatitis A vaccine  Aged Out    Hib vaccine  Aged Out    Meningococcal (ACWY) vaccine  Aged Out

## 2020-08-28 ENCOUNTER — TELEPHONE (OUTPATIENT)
Dept: INTERNAL MEDICINE CLINIC | Age: 60
End: 2020-08-28

## 2020-08-28 ENCOUNTER — HOSPITAL ENCOUNTER (OUTPATIENT)
Dept: PREADMISSION TESTING | Age: 60
Setting detail: SPECIMEN
Discharge: HOME | End: 2020-09-01
Payer: COMMERCIAL

## 2020-08-28 PROCEDURE — U0003 INFECTIOUS AGENT DETECTION BY NUCLEIC ACID (DNA OR RNA); SEVERE ACUTE RESPIRATORY SYNDROME CORONAVIRUS 2 (SARS-COV-2) (CORONAVIRUS DISEASE [COVID-19]), AMPLIFIED PROBE TECHNIQUE, MAKING USE OF HIGH THROUGHPUT TECHNOLOGIES AS DESCRIBED BY CMS-2020-01-R: HCPCS

## 2020-08-28 NOTE — TELEPHONE ENCOUNTER
metoprolol tartrate (LOPRESSOR) 25 MG tablet [[id number]]     Order Details   Dose: 50 mg  Route: Oral  Frequency: 2 TIMES DAILY    Dispense Quantity: 180 tablet  Refills: 3  Fills remaining: --             Sig: Take 2 tablets by mouth 2 times daily                 Pharmacy wants to know if directions are correct on this?

## 2020-08-30 LAB — SARS-COV-2, NAA: NOT DETECTED

## 2020-09-01 ENCOUNTER — ANESTHESIA EVENT (OUTPATIENT)
Dept: OPERATING ROOM | Age: 60
End: 2020-09-01
Payer: COMMERCIAL

## 2020-09-01 ENCOUNTER — ANESTHESIA (OUTPATIENT)
Dept: OPERATING ROOM | Age: 60
End: 2020-09-01
Payer: COMMERCIAL

## 2020-09-01 ENCOUNTER — HOSPITAL ENCOUNTER (OUTPATIENT)
Age: 60
Setting detail: OUTPATIENT SURGERY
Discharge: HOME | End: 2020-09-01
Attending: UROLOGY | Admitting: UROLOGY
Payer: COMMERCIAL

## 2020-09-01 ENCOUNTER — APPOINTMENT (OUTPATIENT)
Dept: GENERAL RADIOLOGY | Age: 60
End: 2020-09-01
Attending: UROLOGY
Payer: COMMERCIAL

## 2020-09-01 VITALS — OXYGEN SATURATION: 99 % | SYSTOLIC BLOOD PRESSURE: 104 MMHG | DIASTOLIC BLOOD PRESSURE: 69 MMHG

## 2020-09-01 VITALS
DIASTOLIC BLOOD PRESSURE: 75 MMHG | BODY MASS INDEX: 32.27 KG/M2 | TEMPERATURE: 97 F | HEART RATE: 72 BPM | HEIGHT: 76 IN | RESPIRATION RATE: 18 BRPM | WEIGHT: 265 LBS | SYSTOLIC BLOOD PRESSURE: 126 MMHG | OXYGEN SATURATION: 94 %

## 2020-09-01 LAB — GLUCOSE BLD-MCNC: 179 MG/DL (ref 75–110)

## 2020-09-01 PROCEDURE — 6360000004 HC RX CONTRAST MEDICATION: Performed by: UROLOGY

## 2020-09-01 PROCEDURE — 2500000003 HC RX 250 WO HCPCS: Performed by: NURSE ANESTHETIST, CERTIFIED REGISTERED

## 2020-09-01 PROCEDURE — 82947 ASSAY GLUCOSE BLOOD QUANT: CPT

## 2020-09-01 PROCEDURE — C2617 STENT, NON-COR, TEM W/O DEL: HCPCS | Performed by: UROLOGY

## 2020-09-01 PROCEDURE — 7100000041 HC SPAR PHASE II RECOVERY - ADDTL 15 MIN: Performed by: UROLOGY

## 2020-09-01 PROCEDURE — 6360000002 HC RX W HCPCS: Performed by: NURSE ANESTHETIST, CERTIFIED REGISTERED

## 2020-09-01 PROCEDURE — 3700000001 HC ADD 15 MINUTES (ANESTHESIA): Performed by: UROLOGY

## 2020-09-01 PROCEDURE — 3600000012 HC SURGERY LEVEL 2 ADDTL 15MIN: Performed by: UROLOGY

## 2020-09-01 PROCEDURE — 2709999900 HC NON-CHARGEABLE SUPPLY: Performed by: UROLOGY

## 2020-09-01 PROCEDURE — 2580000003 HC RX 258: Performed by: NURSE ANESTHETIST, CERTIFIED REGISTERED

## 2020-09-01 PROCEDURE — 7100000040 HC SPAR PHASE II RECOVERY - FIRST 15 MIN: Performed by: UROLOGY

## 2020-09-01 PROCEDURE — 2580000003 HC RX 258: Performed by: UROLOGY

## 2020-09-01 PROCEDURE — 6370000000 HC RX 637 (ALT 250 FOR IP): Performed by: UROLOGY

## 2020-09-01 PROCEDURE — 74420 UROGRAPHY RTRGR +-KUB: CPT

## 2020-09-01 PROCEDURE — 3700000000 HC ANESTHESIA ATTENDED CARE: Performed by: UROLOGY

## 2020-09-01 PROCEDURE — 3600000002 HC SURGERY LEVEL 2 BASE: Performed by: UROLOGY

## 2020-09-01 PROCEDURE — 6360000002 HC RX W HCPCS: Performed by: PHYSICIAN ASSISTANT

## 2020-09-01 PROCEDURE — C1758 CATHETER, URETERAL: HCPCS | Performed by: UROLOGY

## 2020-09-01 DEVICE — URETERAL STENT
Type: IMPLANTABLE DEVICE | Status: FUNCTIONAL
Brand: CONTOUR™

## 2020-09-01 RX ORDER — FENTANYL CITRATE 50 UG/ML
25 INJECTION, SOLUTION INTRAMUSCULAR; INTRAVENOUS EVERY 5 MIN PRN
Status: DISCONTINUED | OUTPATIENT
Start: 2020-09-01 | End: 2020-09-01 | Stop reason: HOSPADM

## 2020-09-01 RX ORDER — HYDRALAZINE HYDROCHLORIDE 20 MG/ML
5 INJECTION INTRAMUSCULAR; INTRAVENOUS EVERY 10 MIN PRN
Status: DISCONTINUED | OUTPATIENT
Start: 2020-09-01 | End: 2020-09-01 | Stop reason: HOSPADM

## 2020-09-01 RX ORDER — OXYCODONE HYDROCHLORIDE AND ACETAMINOPHEN 5; 325 MG/1; MG/1
2 TABLET ORAL PRN
Status: DISCONTINUED | OUTPATIENT
Start: 2020-09-01 | End: 2020-09-01 | Stop reason: HOSPADM

## 2020-09-01 RX ORDER — MEPERIDINE HYDROCHLORIDE 50 MG/ML
12.5 INJECTION INTRAMUSCULAR; INTRAVENOUS; SUBCUTANEOUS EVERY 5 MIN PRN
Status: DISCONTINUED | OUTPATIENT
Start: 2020-09-01 | End: 2020-09-01 | Stop reason: HOSPADM

## 2020-09-01 RX ORDER — FENTANYL CITRATE 50 UG/ML
INJECTION, SOLUTION INTRAMUSCULAR; INTRAVENOUS PRN
Status: DISCONTINUED | OUTPATIENT
Start: 2020-09-01 | End: 2020-09-01 | Stop reason: SDUPTHER

## 2020-09-01 RX ORDER — SODIUM CHLORIDE, SODIUM LACTATE, POTASSIUM CHLORIDE, CALCIUM CHLORIDE 600; 310; 30; 20 MG/100ML; MG/100ML; MG/100ML; MG/100ML
INJECTION, SOLUTION INTRAVENOUS CONTINUOUS PRN
Status: DISCONTINUED | OUTPATIENT
Start: 2020-09-01 | End: 2020-09-01 | Stop reason: SDUPTHER

## 2020-09-01 RX ORDER — LIDOCAINE HYDROCHLORIDE 10 MG/ML
INJECTION, SOLUTION EPIDURAL; INFILTRATION; INTRACAUDAL; PERINEURAL PRN
Status: DISCONTINUED | OUTPATIENT
Start: 2020-09-01 | End: 2020-09-01 | Stop reason: SDUPTHER

## 2020-09-01 RX ORDER — DEXAMETHASONE SODIUM PHOSPHATE 4 MG/ML
4 INJECTION, SOLUTION INTRA-ARTICULAR; INTRALESIONAL; INTRAMUSCULAR; INTRAVENOUS; SOFT TISSUE
Status: DISCONTINUED | OUTPATIENT
Start: 2020-09-01 | End: 2020-09-01 | Stop reason: HOSPADM

## 2020-09-01 RX ORDER — LABETALOL HYDROCHLORIDE 5 MG/ML
5 INJECTION, SOLUTION INTRAVENOUS EVERY 10 MIN PRN
Status: DISCONTINUED | OUTPATIENT
Start: 2020-09-01 | End: 2020-09-01 | Stop reason: HOSPADM

## 2020-09-01 RX ORDER — DIPHENHYDRAMINE HYDROCHLORIDE 50 MG/ML
12.5 INJECTION INTRAMUSCULAR; INTRAVENOUS
Status: DISCONTINUED | OUTPATIENT
Start: 2020-09-01 | End: 2020-09-01 | Stop reason: HOSPADM

## 2020-09-01 RX ORDER — ULTRASOUND COUPLING MEDIUM
GEL (GRAM) TOPICAL PRN
Status: DISCONTINUED | OUTPATIENT
Start: 2020-09-01 | End: 2020-09-01 | Stop reason: ALTCHOICE

## 2020-09-01 RX ORDER — CIPROFLOXACIN 500 MG/1
500 TABLET, FILM COATED ORAL 2 TIMES DAILY
Qty: 14 TABLET | Refills: 0 | Status: SHIPPED | OUTPATIENT
Start: 2020-09-01 | End: 2020-09-08

## 2020-09-01 RX ORDER — ONDANSETRON 2 MG/ML
4 INJECTION INTRAMUSCULAR; INTRAVENOUS
Status: DISCONTINUED | OUTPATIENT
Start: 2020-09-01 | End: 2020-09-01 | Stop reason: HOSPADM

## 2020-09-01 RX ORDER — OXYCODONE HYDROCHLORIDE AND ACETAMINOPHEN 5; 325 MG/1; MG/1
1 TABLET ORAL PRN
Status: DISCONTINUED | OUTPATIENT
Start: 2020-09-01 | End: 2020-09-01 | Stop reason: HOSPADM

## 2020-09-01 RX ORDER — PROPOFOL 10 MG/ML
INJECTION, EMULSION INTRAVENOUS PRN
Status: DISCONTINUED | OUTPATIENT
Start: 2020-09-01 | End: 2020-09-01 | Stop reason: SDUPTHER

## 2020-09-01 RX ORDER — MAGNESIUM HYDROXIDE 1200 MG/15ML
LIQUID ORAL CONTINUOUS PRN
Status: COMPLETED | OUTPATIENT
Start: 2020-09-01 | End: 2020-09-01

## 2020-09-01 RX ADMIN — PROPOFOL 50 MG: 10 INJECTION, EMULSION INTRAVENOUS at 12:25

## 2020-09-01 RX ADMIN — FENTANYL CITRATE 50 MCG: 50 INJECTION INTRAMUSCULAR; INTRAVENOUS at 12:22

## 2020-09-01 RX ADMIN — PROPOFOL 150 MG: 10 INJECTION, EMULSION INTRAVENOUS at 12:34

## 2020-09-01 RX ADMIN — SODIUM CHLORIDE, POTASSIUM CHLORIDE, SODIUM LACTATE AND CALCIUM CHLORIDE: 600; 310; 30; 20 INJECTION, SOLUTION INTRAVENOUS at 12:17

## 2020-09-01 RX ADMIN — LIDOCAINE HYDROCHLORIDE 50 MG: 10 INJECTION, SOLUTION EPIDURAL; INFILTRATION; INTRACAUDAL; PERINEURAL at 12:25

## 2020-09-01 RX ADMIN — Medication 3 G: at 12:25

## 2020-09-01 ASSESSMENT — PULMONARY FUNCTION TESTS
PIF_VALUE: 0
PIF_VALUE: 1
PIF_VALUE: 0

## 2020-09-01 ASSESSMENT — PAIN - FUNCTIONAL ASSESSMENT: PAIN_FUNCTIONAL_ASSESSMENT: 0-10

## 2020-09-01 NOTE — OP NOTE
FACILITY:  [address] [place] [address] [address]  1960  [id number]    DATE: 09/01/20  SURGEON:  Dr. Evgeny Garcia MD    ASSISTANT: Dr. Harvie Duverney MD  PREOPERATIVE DIAGNOSIS: S/p right buccal mucosal urethroplasty, with indwelling ureteral stent.    POSTOPERATIVE DIAGNOSIS:   Patent right upper ureter and ureteropelvic junction  Kinking at the ureteropelvic junction  PROCEDURES PERFORMED:  1. Cystourethroscopy. 2. Rt retrograde pyelogram.  3.  Right ureteral stent exchange  ANESTHESIA:  MAC    COMPLICATIONS:  None.   DRAINS:  None. SPECIMEN: None  ESTIMATED BLOOD LOSS:  Less than 5 mL.      INDICATIONS FOR THE PROCEDURE:  Royal Rodriguez is a 61 y.o. male presents with a history of right upper ureteral stricture, status post right pyeloplasty, he underwent robotic assisted laparoscopic right ureteral lysis, right buccal mucosal urethroplasty on 7/30/2020. The patient follows up today for cystourethroscopy with Rt retrograde pyelogram to assess the status of right upper ureter. the risks and benefits of the procedure, as well as possible alternatives and complications were discussed and he consented.          DETAILS OF THE PROCEDURE:  The patient was correctly identified in the preoperative holding area. The patient was brought back to the operating room and placed in the dorsal lithotomy position. Anesthesia was administered; antibiotics administered by Anesthesia. EPC cuffs  were on and functional. The patient was then prepped and draped in the usual sterile fashion. Once an appropriate time out had been performed, with all parties  consenting, a 25 Tajik cystoscope with a 30-degree lens was placed through the urethra into the bladder. We identified the indwelling right ureteral stent which was grasped using a flexible grasper and removed. We then inserted a 5 [place] cone-tip ureteral catheter into the right ureteral orifice all the way up to the upper ureter.   We injected 10 cc of contrast and identified good caliber of upper ureter at the site of buccal mucosal augmentation. There was slight kinking noted in the right ureteropelvic junction . We decided to leave a stent for 2 more weeks. We advanced a 0.035 Glidewire through the cone-tip ureteral catheter and subsequently withdrew it. We then advanced a 7 x 28 cm stent over the guidewire and and showed good coils both in the pelvis as well as in the bladder. The bladder was drained and the cystoscope was removed.  The patient tolerated the procedure well and was sent to the PACU for postoperative monitoring.      Plan:  The patient was discharged home in stable condition with instructions to follow up in in 2 weeks with Dr. Ira Fairchild in office for stent removal.

## 2020-09-01 NOTE — ANESTHESIA PRE PROCEDURE
Department of Anesthesiology  Preprocedure Note       Name:  Jonathon Doss   Age:  61 y.o.  :  1960                                          MRN:  [id number]         Date:  2020      Surgeon: Nimo Castro):  Criss Mtz MD    Procedure: Procedure(s):  CYSTOSCOPY RETROGRADE PYELOGRAM    Medications prior to admission:   Prior to Admission medications    Medication Sig Start Date End Date Taking?  Authorizing Provider   metoprolol tartrate (LOPRESSOR) 25 MG tablet Take 2 tablets by mouth 2 times daily 20  Yes Rodney Segovia MD   finasteride (PROSCAR) 5 MG tablet Take 1 tablet by mouth daily 20  Yes Rodney Segovia MD   pregabalin (LYRICA) 100 MG capsule TAKE 1 CAPSULE BY MOUTH THREE TIMES A DAY 8/10/20 9/9/20 Yes Rodney Segovia MD   cephALEXin (KEFLEX) 500 MG capsule Take 1 capsule by mouth 3 times daily 20  Yes RADHA Balderas - CNP   levothyroxine (SYNTHROID) 150 MCG tablet Take 1 tablet by mouth Daily 20  Yes Rodney Segovia MD   albuterol sulfate  (90 Base) MCG/ACT inhaler INHALE 2 PUFFS INTO THE LUNGS EVERY 6 HOURS AS NEEDED FOR WHEEZING OR SHORTNESS OF BREATH 20  Yes Rodney Segovia MD   insulin lispro (HUMALOG) 100 UNIT/ML injection vial INJECT 12 UNITS UNDER THE SKIN 3 TIMES DAILY + SLIDING SCALE (TAKE 5 UNITS IF SUGAR IS OVER 150) 20  Yes Rodney Segovia MD   rOPINIRole (REQUIP) 2 MG tablet TAKE 1 TABLET BY MOUTH EVERY DAY 3/13/20  Yes Rodney Segovia MD   gemfibrozil (LOPID) 600 MG tablet TAKE 1 TABLET BY MOUTH EVERY DAY 20  Yes Rodney Segovia MD   Insulin Syringe-Needle U-100 (ULTICARE INSULIN SYRINGE) 31G X 5/16\" 1 ML MISC USE AS DIRECTED WITH HUMULIN AND HUMALOG INSULIN 5 TIMES DAILY 20  Yes Rodney Segovia MD   HUMULIN N 100 UNIT/ML injection vial INJECT 50 UNITS EVERY MORNING AND 70 UNITS AT BEDTIME  Patient taking differently: 60 units in the AM , 70 units in PM 20  Yes Rodney Segovia MD   isosorbide mononitrate (IMDUR) 30 MG extended release tablet TAKE 1 TABLET BY MOUTH EVERY DAY 1/16/20  Yes Mica Saul MD   ELIQUIS 5 MG TABS tablet Take 1 tablet by mouth 2 times daily 11/26/19  Yes Historical Provider, MD   atorvastatin (LIPITOR) 20 MG tablet TAKE 1 TABLET BY MOUTH EVERY DAY 11/20/19  Yes Mica Saul MD   propafenone (RYTHMOL) 150 MG tablet Take 1 tablet by mouth every 8 hours 11/17/19  Yes Bud Soni MD   SYMBICORT 160-4.5 MCG/ACT AERO TAKE 2 PUFFS BY MOUTH TWICE A DAY  Patient taking differently: Inhale 2 puffs into the lungs 2 times daily  10/30/19  Yes Mica Saul MD   Omega-3 Fatty Acids (FISH OIL CONCENTRATE) 300 MG CAPS Take 300 mg by mouth nightly    Yes Omar Camilo MD   glucagon, rDNA, 1 MG injection Inject 1 mL into the muscle 11/13/19   Historical Provider, MD   Blood Pressure KIT 1 actuation by Does not apply route once a week 3/25/20   Andrez Braden MD   blood glucose test strips (ASCENSIA AUTODISC VI;ONE TOUCH ULTRA TEST VI) strip 1 each by In Vitro route 4 times daily As needed. 2/28/20   Mica Saul MD   blood glucose monitor strips by Other route 4 times daily Test strips to use w/ Accucheck Smart View Glucometer  Patient taking differently: by Other route 4 times daily Test strips to use w/ ONE TOUCH VERIO TEST STRIPS 2/3/20   Mica Saul MD   Handicap Placard MISC by Does not apply route Duration - 5years  Reason- prosthetic leg 1/2/20   Andrez Braden MD   Glucosamine Sulfate 500 MG TABS Take 500 mg by mouth 3 times daily    Monika Cervantes MD       Current medications:    Current Facility-Administered Medications   Medication Dose Route Frequency Provider Last Rate Last Dose    ceFAZolin (ANCEF) 3 g in dextrose 5 % 100 mL IVPB  3 g Intravenous On Call to [facility],7Gws, PA-C           Allergies: Allergies   Allergen Reactions    Ramipril      Other reaction(s): swelling of the lips     Adhesive Tape      tegaderm causes blisters.  Use paper ROBOTIC ASSISTED BUCCAL URETEROPLASTY  (DR. COBIAN TO ASSIST) performed by Cristal Frank MD at [address] Left 4/29/15    OTHER SURGICAL HISTORY Left 5-5-15 and 2015    Revision BKA    OTHER SURGICAL HISTORY      left stump revision 2018    ME RE-AMPUTATION LOWER LEG Left 2018    LEG AMPUTATION BELOW KNEE REVISION performed by Mor Early MD at [address] Bilateral 80'S    TOE AMPUTATION      Right 2 and 4    VITRECTOMY Left 2019    PARS PLANA VITRECTOMY 25 GAUGE, MEMBRANE PEELING, ENDOLASER 200  MW 1044 SPOTS, INDIRECT LASER 26 SPOTS performed by Shelbie Camilo MD at [facility] History:    Social History     Tobacco Use    Smoking status: Former Smoker     Packs/day: 2.00     Years: 25.00     Pack years: 50.00     Types: Cigars     Start date:      Last attempt to quit: 2011     Years since quittin.2    Smokeless tobacco: Never Used    Tobacco comment: quit in    Substance Use Topics    Alcohol use:  Yes     Alcohol/week: 0.0 standard drinks     Frequency: Monthly or less     Comment: BEER 1 A MONTH                                Counseling given: Not Answered  Comment: quit in       Vital Signs (Current):   Vitals:    20 1137 20 1113   BP:  117/80   Pulse:  73   Resp:  18   Temp:  97.2 °F (36.2 °C)   TempSrc:  Temporal   SpO2:  98%   Weight: 265 lb (120.2 kg) 265 lb (120.2 kg)   Height: 6' 4\" (1.93 m) 6' 4\" (1.93 m)                                              BP Readings from Last 3 Encounters:   20 117/80   20 138/86   20 108/78       NPO Status: Time of last liquid consumption:                         Time of last solid consumption:                         Date of last liquid consumption: 20                        Date of last solid food consumption: 20    BMI:   Wt Readings from Last 3 Encounters:   20 265 lb (120.2 regular  Rate: normal                    Neuro/Psych:   Negative Neuro/Psych ROS              GI/Hepatic/Renal: Neg GI/Hepatic/Renal ROS            Endo/Other: Negative Endo/Other ROS                    Abdominal:       Abdomen: soft. Vascular:                                        Anesthesia Plan      general and MAC     ASA 3       Induction: intravenous. MIPS: Postoperative opioids intended and Prophylactic antiemetics administered. Anesthetic plan and risks discussed with patient. Use of blood products discussed with patient whom consented to blood products. Plan discussed with attending and CRNA.     Attending anesthesiologist reviewed and agrees with Ivana Ware MD   9/1/2020

## 2020-09-01 NOTE — H&P
09/2019    LEFT NO VISION    Vitreous hemorrhage of left eye due to diabetes mellitus ([address] 09/2019    s/p Vitrectomy 9/2019    Wears glasses     Wears partial dentures     full upper, does not wear lower partial    Wellness examination     Dr. Emily Craig seen within last 1 1/2 mos       Past Surgical History:    Past Surgical History:   Procedure Laterality Date    CARDIAC CATHETERIZATION      no stenting    CARDIOVASCULAR STRESS TEST  06/28/2019    small fixed apical, likely normal, EF 48%    COLONOSCOPY  06/17/2016    poor prep, done up to the IC valve, redundant colon    COLONOSCOPY  01/23/2017    VIRTUAL COLONOSCOPY DONE-no polyps or masses    CYSTOSCOPY Bilateral 6/16/2020    CYSTOSCOPY RETROGRADE PYELOGRAM URETEROSCOPY performed by Codey Ovalles MD at [facility] Right 7/30/2020    CYSTOSCOPY, RIGHT RETROGRADE PYELOGRAM,  URETERAL CATHETER  INSERTION performed by Codey Ovalles MD at [facility] Right     r-bone removed    HC  PICC [facility] DOUBLE  5/21/2018         KIDNEY CYST REMOVAL      KIDNEY SURGERY Right 11/13/2019    XI ROBOTIC PARTIAL NEPHRECTOMY, INTRAOP ULTRASOUND, RIGHT URETEROURETEROSTOMY, RIGHT URETERAL STENT PLACEMENT **SHORT STAY** performed by Codey Ovalles MD at [place] [address] 7/30/2020    XI LAPAROSCOPIC ROBOTIC URETEROLYSIS, ROBOTIC ASSISTED BUCCAL URETEROPLASTY  (DR. COBIAN TO ASSIST) performed by Codey Ovalles MD at [address] [facility] Left 4/29/15    OTHER SURGICAL HISTORY Left 5-5-15 and 11/2015    Revision BKA    OTHER SURGICAL HISTORY      left stump revision 5/30/2018    NJ RE-AMPUTATION LOWER LEG Left 5/30/2018    LEG AMPUTATION BELOW KNEE REVISION performed by Ofelia Cat MD at [address] Bilateral 80'S    TOE AMPUTATION      Right 2 and 4    VITRECTOMY Left 9/25/2019    PARS PLANA VITRECTOMY 25 GAUGE, MEMBRANE PEELING, ENDOLASER 200  MW 1044 SPOTS, INDIRECT LASER 236 SPOTS performed by Joseph Patel MD at [place] [address]       Medications Prior to Admission:    Prior to Admission medications    Medication Sig Start Date End Date Taking?  Authorizing Provider   metoprolol tartrate (LOPRESSOR) 25 MG tablet Take 2 tablets by mouth 2 times daily 8/26/20  Yes Steffi Powell MD   finasteride (PROSCAR) 5 MG tablet Take 1 tablet by mouth daily 8/17/20  Yes Steffi Powell MD   pregabalin (LYRICA) 100 MG capsule TAKE 1 CAPSULE BY MOUTH THREE TIMES A DAY 8/10/20 9/9/20 Yes Steffi Powell MD   levothyroxine (SYNTHROID) 150 MCG tablet Take 1 tablet by mouth Daily 7/14/20  Yes Steffi Powell MD   albuterol sulfate  (90 Base) MCG/ACT inhaler INHALE 2 PUFFS INTO THE LUNGS EVERY 6 HOURS AS NEEDED FOR WHEEZING OR SHORTNESS OF BREATH 7/8/20  Yes Steffi Powell MD   insulin lispro (HUMALOG) 100 UNIT/ML injection vial INJECT 12 UNITS UNDER THE SKIN 3 TIMES DAILY + SLIDING SCALE (TAKE 5 UNITS IF SUGAR IS OVER 150) 6/5/20  Yes Steffi Powell MD   rOPINIRole (REQUIP) 2 MG tablet TAKE 1 TABLET BY MOUTH EVERY DAY 3/13/20  Yes Steffi Powell MD   gemfibrozil (LOPID) 600 MG tablet TAKE 1 TABLET BY MOUTH EVERY DAY 2/24/20  Yes Steffi Powell MD   HUMULIN N 100 UNIT/ML injection vial INJECT 50 UNITS EVERY MORNING AND 70 UNITS AT BEDTIME  Patient taking differently: 60 units in the AM , 70 units in PM 1/23/20  Yes Steffi Powell MD   isosorbide mononitrate (IMDUR) 30 MG extended release tablet TAKE 1 TABLET BY MOUTH EVERY DAY 1/16/20  Yes Steffi Powell MD   ELIQUIS 5 MG TABS tablet Take 1 tablet by mouth 2 times daily 11/26/19  Yes Historical Provider, MD   atorvastatin (LIPITOR) 20 MG tablet TAKE 1 TABLET BY MOUTH EVERY DAY 11/20/19  Yes Steffi Powell MD   propafenone (RYTHMOL) 150 MG tablet Take 1 tablet by mouth every 8 hours 11/17/19  Yes Lawrence Zaidi MD   SYMBICORT 160-4.5 MCG/ACT AERO TAKE 2 PUFFS BY MOUTH TWICE A DAY  Patient taking differently: Inhale 2 puffs into the lungs 2 times daily  10/30/19  Yes Marie Spence MD   Omega-3 Fatty Acids (FISH OIL CONCENTRATE) 300 MG CAPS Take 300 mg by mouth nightly    Yes Timothy Prather MD   cephALEXin (KEFLEX) 500 MG capsule Take 1 capsule by mouth 3 times daily 8/6/20   Tre Carpenter APRN - CNP   glucagon, rDNA, 1 MG injection Inject 1 mL into the muscle 11/13/19   Historical Provider, MD   Blood Pressure KIT 1 actuation by Does not apply route once a week 3/25/20   Issa Trujillo MD   blood glucose test strips (ASCENSIA AUTODISC VI;ONE TOUCH ULTRA TEST VI) strip 1 each by In Vitro route 4 times daily As needed.  2/28/20   Marie Spence MD   blood glucose monitor strips by Other route 4 times daily Test strips to use w/ Accucheck Smart View Glucometer  Patient taking differently: by Other route 4 times daily Test strips to use w/ ONE TOUCH VERIO TEST STRIPS 2/3/20   Marie Spence MD   Insulin Syringe-Needle U-100 (ULTICARE INSULIN SYRINGE) 31G X 5/16\" 1 ML MISC USE AS DIRECTED WITH HUMULIN AND HUMALOG INSULIN 5 TIMES DAILY 1/27/20   Marie Spence MD   Handicap Placard MISC by Does not apply route Duration - 5years  Reason- prosthetic leg 1/2/20   Issa Trujillo MD   Glucosamine Sulfate 500 MG TABS Take 500 mg by mouth 3 times daily    Eda Shay MD       Allergies:  Ramipril and Adhesive tape    Social History:    Social History     Socioeconomic History    Marital status:      Spouse name: Jael Brown Number of children: 2    Years of education: Not on file    Highest education level: Not on file   Occupational History    Occupation: Disabled   Social Needs    Financial resource strain: Not on file    Food insecurity     Worry: Not on file     Inability: Not on file   [employer] needs     Medical: Not on file     Non-medical: Not on file   Tobacco Use    Smoking status: Former Smoker     Packs/day: 2.00 Years: 25.00     Pack years: 50.00     Types: Cigars     Start date: 1     Last attempt to quit: 2011     Years since quittin.2    Smokeless tobacco: Never Used    Tobacco comment: quit in    Substance and Sexual Activity    Alcohol use: Yes     Alcohol/week: 0.0 standard drinks     Frequency: Monthly or less     Comment: BEER 1 A MONTH    Drug use: Not Currently    Sexual activity: Yes     Partners: Female   Lifestyle    Physical activity     Days per week: Not on file     Minutes per session: Not on file    Stress: Not on file   Relationships    Social connections     Talks on phone: Not on file     Gets together: Not on file     Attends Yarsani service: Not on file     Active member of club or organization: Not on file     Attends meetings of clubs or organizations: Not on file     Relationship status: Not on file    Intimate partner violence     Fear of current or ex partner: Not on file     Emotionally abused: Not on file     Physically abused: Not on file     Forced sexual activity: Not on file   Other Topics Concern    Not on file   Social History Narrative    Not on file       Family History:    Family History   Problem Relation Age of Onset    Diabetes Mother     Hypertension Mother     Stomach Cancer Mother     Hypertension Father     Alcohol Abuse Father     COPD Father     Kidney Cancer Father     Diabetes Brother         IDDM-PUMP    Other Sister         BRAIN TUMOR       REVIEW OF SYSTEMS:  Constitutional: negative  Eyes: negative  Respiratory: negative  Cardiovascular: negative  Gastrointestinal: negative  Genitourinary: no acute issues  Musculoskeletal: negative  Skin: negative   Neurological: negative  Hematological/Lymphatic: negative  Psychological: negative    PHYSICAL EXAM:    No data found.   Constitutional: Patient in NAD  Neuro: Alert and oriented to person, place, and time  Psych: Mood and affect normal  Skin: Clean, dry, intact   Lungs: Respiratory effort normal, CTA  Cardiovascular:  Normal peripheral pulses; no murmur. Normal rhythm  Abdomen: Soft, non-tender, non-distended, no hepatosplenomegaly or hernia, CVA tenderness right mild from stent   Bladder: Non-tender and non-disdended   : Non-tender, skin intact, no lesions       LABS:   No results for input(s): WBC, HGB, HCT, MCV, PLT in the last 72 hours. No results for input(s): NA, K, CL, CO2, PHOS, BUN, CREATININE in the last 72 hours. Invalid input(s): CA  Lab Results   Component Value Date    PSA 4.62 (H) 10/23/2017         Urinalysis: No results for input(s): COLORU, PHUR, LABCAST, WBCUA, RBCUA, MUCUS, TRICHOMONAS, YEAST, BACTERIA, CLARITYU, SPECGRAV, LEUKOCYTESUR, UROBILINOGEN, Marques Kandice in the last 72 hours. Invalid input(s): NITRATE, GLUCOSEUKETONESUAMORPHOUS     -----------------------------------------------------------------    ASSESSMENT AND PLAN:    Impression:    Right ureteral stent s/p partial nephrectomy and ureteroplasty with buccal graft    Plan: Cysto with retrograde pyelogram and stent removal in OR today. Consent obtained    The patient was counseled at length about the risks of samanta Covid-19 during their perioperative period and any recovery window from their procedure. The patient was made aware that samanta Covid-19  may worsen their prognosis for recovering from their procedure  and lend to a higher morbidity and/or mortality risk. All material risks, benefits, and reasonable alternatives including postponing the procedure were discussed. The patient does wish to proceed with the procedure at this time.     Cristal Grande PA-C  9:46 AM 9/1/2020

## 2020-09-03 RX ORDER — PREGABALIN 100 MG/1
100 CAPSULE ORAL 3 TIMES DAILY
Qty: 90 CAPSULE | Refills: 0 | Status: SHIPPED | OUTPATIENT
Start: 2020-09-03 | End: 2020-11-25 | Stop reason: SDUPTHER

## 2020-09-03 RX ORDER — PREGABALIN 100 MG/1
CAPSULE ORAL
Qty: 90 CAPSULE | Refills: 0 | Status: SHIPPED | OUTPATIENT
Start: 2020-09-03 | End: 2020-10-12 | Stop reason: SDUPTHER

## 2020-09-03 NOTE — TELEPHONE ENCOUNTER
Medication Requested: Kalyna    Last visit: 8/26/20  Next visit: 9/14/2020  Last refill: 8/10/20    Med contract on file:  [x] yes   [] no    Last urine drug screen: 11/05/19  Consistent with medication(s):    [x] yes   [] no    Last OARRS ran: 11/16/17    Quantity of medication remaining: enough to get to 9/6/20    Who will be picking rx up: pt requesting medication be sent to pharmacy    [facility] escribed[facility]

## 2020-09-04 RX ORDER — LOSARTAN POTASSIUM 50 MG/1
TABLET ORAL
Qty: 90 TABLET | Refills: 1 | Status: SHIPPED | OUTPATIENT
Start: 2020-09-04 | End: 2020-09-14 | Stop reason: SDUPTHER

## 2020-09-08 ENCOUNTER — TELEPHONE (OUTPATIENT)
Dept: INTERNAL MEDICINE CLINIC | Age: 60
End: 2020-09-08

## 2020-09-08 NOTE — TELEPHONE ENCOUNTER
Patient called and wanted to know which blood pressure you want him on he just got sent a losartan, and he has been on  Metoprolol.  Which medication did you want him on?

## 2020-09-10 ENCOUNTER — HOSPITAL ENCOUNTER (OUTPATIENT)
Age: 60
Setting detail: SPECIMEN
Discharge: HOME | End: 2020-09-10
Payer: COMMERCIAL

## 2020-09-10 LAB — PROSTATE SPECIFIC ANTIGEN: 3.56 UG/L

## 2020-09-10 NOTE — ANESTHESIA POSTPROCEDURE EVALUATION
Department of Anesthesiology  Postprocedure Note    Patient: Megan Warren  MRN: [id number]  YOB: 1960  Date of evaluation: 9/9/2020  Time:  9:56 PM     Procedure Summary     Date:  09/01/20 Room / Location:  [address]    Anesthesia Start:  6709 Anesthesia Stop:  5788    Procedure:  CYSTOSCOPY RETROGRADE PYELOGRAM, RIGHT URETERAL STENT EXCHANGE (N/A ) Diagnosis:  (URETERAL STRICTURE)    Surgeon:  Charles Thompson MD Responsible Provider:  Sen Lipscomb MD    Anesthesia Type:  general, MAC ASA Status:  3          Anesthesia Type: general, MAC    Cass Phase I:      Cass Phase II: Cass Score: 10    Last vitals: Reviewed and per EMR flowsheets.        Anesthesia Post Evaluation    Patient location during evaluation: PACU  Patient participation: complete - patient participated  Level of consciousness: awake  Pain score: 0  Airway patency: patent  Nausea & Vomiting: no nausea and no vomiting  Complications: no  Cardiovascular status: blood pressure returned to baseline  Respiratory status: acceptable  Hydration status: euvolemic

## 2020-09-14 ENCOUNTER — OFFICE VISIT (OUTPATIENT)
Dept: INTERNAL MEDICINE CLINIC | Age: 60
End: 2020-09-14
Payer: COMMERCIAL

## 2020-09-14 VITALS
HEART RATE: 75 BPM | HEIGHT: 76 IN | SYSTOLIC BLOOD PRESSURE: 138 MMHG | BODY MASS INDEX: 33.36 KG/M2 | WEIGHT: 274 LBS | DIASTOLIC BLOOD PRESSURE: 80 MMHG | OXYGEN SATURATION: 94 % | TEMPERATURE: 97.5 F

## 2020-09-14 PROCEDURE — 2022F DILAT RTA XM EVC RTNOPTHY: CPT | Performed by: INTERNAL MEDICINE

## 2020-09-14 PROCEDURE — 1036F TOBACCO NON-USER: CPT | Performed by: INTERNAL MEDICINE

## 2020-09-14 PROCEDURE — 99214 OFFICE O/P EST MOD 30 MIN: CPT | Performed by: INTERNAL MEDICINE

## 2020-09-14 PROCEDURE — G8427 DOCREV CUR MEDS BY ELIG CLIN: HCPCS | Performed by: INTERNAL MEDICINE

## 2020-09-14 PROCEDURE — G8417 CALC BMI ABV UP PARAM F/U: HCPCS | Performed by: INTERNAL MEDICINE

## 2020-09-14 PROCEDURE — 3051F HG A1C>EQUAL 7.0%<8.0%: CPT | Performed by: INTERNAL MEDICINE

## 2020-09-14 PROCEDURE — 3017F COLORECTAL CA SCREEN DOC REV: CPT | Performed by: INTERNAL MEDICINE

## 2020-09-14 RX ORDER — LOSARTAN POTASSIUM 50 MG/1
25 TABLET ORAL DAILY
Qty: 90 TABLET | Refills: 1 | Status: SHIPPED | OUTPATIENT
Start: 2020-09-14 | End: 2021-04-08

## 2020-09-14 RX ORDER — TAMSULOSIN HYDROCHLORIDE 0.4 MG/1
0.4 CAPSULE ORAL DAILY
Qty: 30 CAPSULE | Refills: 3 | Status: SHIPPED | OUTPATIENT
Start: 2020-09-14 | End: 2020-11-13 | Stop reason: SDUPTHER

## 2020-09-14 ASSESSMENT — ENCOUNTER SYMPTOMS
COLOR CHANGE: 0
TROUBLE SWALLOWING: 0
NAUSEA: 1
EYE DISCHARGE: 0
DIARRHEA: 0
COUGH: 0
BLOOD IN STOOL: 0
EYE PAIN: 0
WHEEZING: 0
SHORTNESS OF BREATH: 0
ABDOMINAL DISTENTION: 0

## 2020-09-14 NOTE — PROGRESS NOTES
Visit Information    Have you changed or started any medications since your last visit including any over-the-counter medicines, vitamins, or herbal medicines? no   Are you having any side effects from any of your medications? -  no  Have you stopped taking any of your medications? Is so, why? -  no    Have you seen any other physician or provider since your last visit? No  Have you had any other diagnostic tests since your last visit? Yes - Records Obtained  Have you been seen in the emergency room and/or had an admission to a hospital since we last saw you? No  Have you had your routine dental cleaning in the past 6 months? no    Have you activated your [employer] account? If not, what are your barriers?  Yes     Patient Care Team:  Rissa Dior MD as PCP - Lisa Toro MD as PCP - [facility]  Robert Naidu MD as Consulting Physician (Infectious Diseases)    Medical History Review  Past Medical, Family, and Social History reviewed and does not contribute to the patient presenting condition    Health Maintenance   Topic Date Due    HIV screen  01/07/1975    DTaP/Tdap/Td vaccine (1 - Tdap) 01/07/1979    Shingles Vaccine (1 of 2) 10/23/2014    Diabetic retinal exam  01/04/2017    Flu vaccine (1) 09/01/2020    Diabetic microalbuminuria test  01/13/2021    Lipid screen  01/13/2021    Low dose CT lung screening  01/21/2021    TSH testing  06/08/2021    Diabetic foot exam  07/28/2021    Potassium monitoring  08/01/2021    Creatinine monitoring  08/01/2021    A1C test (Diabetic or Prediabetic)  08/17/2021    Colon cancer screen colonoscopy  01/23/2027    Pneumococcal 0-64 years Vaccine  Completed    Hepatitis C screen  Completed    Hepatitis A vaccine  Aged Out    Hib vaccine  Aged Out    Meningococcal (ACWY) vaccine  Aged Out

## 2020-09-14 NOTE — PROGRESS NOTES
[address] [facility] [address]  [place] [address]  Dept: [phone]  Dept Fax: [phone]    Yamilet Lane is a 61 y.o. male who presents today for his medical conditions/complaintsas noted below. Yamilet Lane is c/o of   Chief Complaint   Patient presents with    Diabetes     a1c on 8/17/20 was 7.2         HPI:     HTN  Onset more than 2 years ago  gustavo mild to mod  Controlled with current po meds  Not associated with headaches or blurry vision  No chest pain  Diabetes   Duration more than 7 years  Modifying factors on insulin  and other med  Severity uncontrolled sever  Associated signs and symtoms neuropathy/ckd/ CAD. aggravated with sugar diet and better with low sugar diet           Hemoglobin A1C (%)   Date Value   08/17/2020 7.2   01/13/2020 6.5 (H)   05/01/2019 7.0 (H)             ( goal A1Cis < 7)   Microalb/Crt.  Ratio (mcg/mg creat)   Date Value   01/13/2020 82 (H)     LDL Cholesterol (mg/dL)   Date Value   01/13/2020 70   05/21/2019 68   03/09/2018 56       (goal LDL is <100)   AST (U/L)   Date Value   05/21/2020 24     ALT (U/L)   Date Value   05/21/2020 19     BUN (mg/dL)   Date Value   08/01/2020 29 (H)     BP Readings from Last 3 Encounters:   09/14/20 138/80   09/01/20 126/75   09/01/20 104/69          (goal 120/80)    Past Medical History:   Diagnosis Date    Asymptomatic bilateral carotid artery stenosis     Boil, thigh 10/2019    10/30/19: right inner thigh region, says PCP Rxd Doxy for this, area is much better, has a couple days left to complete Doxy    CAD (coronary artery disease)     saw Dr. Luis Alberto Chavarria [facility]     Charcot foot due to diabetes mellitus ([address] 2014    LEFT    COPD (chronic obstructive pulmonary disease) ([address] 2009    INHALER USE DAILY    Diabetes mellitus ([address] 1989    IDDM, On Insulin Dr. Delfina Dior Diabetic neuropathy [facility]     Difficult intravenous access     VEINS ROLL    Employs prosthetic leg     s/p left BKA    Foot ulcer ([address] PRIOR TO 2015    BILAT    Full dentures     UPPER ONLY    Hyperlipidemia 2004    ON RX    Hypertension 2004    ON RX, PCP Dr. Mariano Sawant, seen Oct. 2019    Hypoglycemia 4/2/2015    MRSA (methicillin resistant staph aureus) culture positive 4/16/2015    ankle    OA (osteoarthritis)     Osteomyelitis ([address]     left stump  BKA    Paroxysmal atrial fibrillation ([address]     Renal mass 09/2019    ACCIDENTAL  FINDING IS FOLLOWING UP WITH [facility]     Suspected sleep apnea     Thyroid disease 2004    PT HAD HYPERTHYROIDISM-UNCONTROLED THYROID DESTROYED WITH RADI IODINE NOW HAS HYPOTHYRIDISM    Vision abnormalities 09/2019    LEFT NO VISION    Vitreous hemorrhage of left eye due to diabetes mellitus ([address] 09/2019    s/p Vitrectomy 9/2019    Wears glasses     Wears partial dentures     full upper, does not wear lower partial    Wellness examination     Dr. Gwendolyn Hassan seen within last 1 1/2 mos      Past Surgical History:   Procedure Laterality Date    CARDIAC CATHETERIZATION      no stenting    CARDIOVASCULAR STRESS TEST  06/28/2019    small fixed apical, likely normal, EF 48%    COLONOSCOPY  06/17/2016    poor prep, done up to the IC valve, redundant colon    COLONOSCOPY  01/23/2017    VIRTUAL COLONOSCOPY DONE-no polyps or masses    CYSTOSCOPY Bilateral 6/16/2020    CYSTOSCOPY RETROGRADE PYELOGRAM URETEROSCOPY performed by Nadir Glass MD at [facility] Right 7/30/2020    CYSTOSCOPY, RIGHT RETROGRADE PYELOGRAM,  URETERAL CATHETER  INSERTION performed by [facility] MD [facility] 9/1/2020    CYSTOSCOPY RETROGRADE PYELOGRAM, RIGHT URETERAL STENT EXCHANGE performed by Nadir Glass MD at [facility] Right     r-bone removed    [facility]  5/21/2018         KIDNEY CYST REMOVAL      KIDNEY SURGERY Right 11/13/2019    XI ROBOTIC PARTIAL NEPHRECTOMY, INTRAOP ULTRASOUND, RIGHT URETEROURETEROSTOMY, RIGHT URETERAL STENT PLACEMENT **SHORT STAY** performed by Charles Thompson MD at [address] N/A 2020    XI LAPAROSCOPIC ROBOTIC URETEROLYSIS, ROBOTIC ASSISTED BUCCAL URETEROPLASTY  (DR. COBIAN TO ASSIST) performed by Charles Thompson MD at [address] Left 4/29/15    OTHER SURGICAL HISTORY Left 5-5-15 and 2015    Revision BKA    OTHER SURGICAL HISTORY      left stump revision 2018    DE RE-AMPUTATION LOWER LEG Left 2018    LEG AMPUTATION BELOW KNEE REVISION performed by Maryse Borja MD at [address] Bilateral 80'S    TOE AMPUTATION      Right 2 and 4    VITRECTOMY Left 2019    PARS PLANA VITRECTOMY 25 GAUGE, MEMBRANE PEELING, ENDOLASER 200  MW 1044 SPOTS, INDIRECT LASER 26 SPOTS performed by Linh Burleson MD at [place] History   Problem Relation Age of Onset    Diabetes Mother     Hypertension Mother     Stomach Cancer Mother     Hypertension Father     Alcohol Abuse Father     COPD Father     Kidney Cancer Father     Diabetes Brother         IDDM-PUMP    Other Sister         BRAIN TUMOR       Social History     Tobacco Use    Smoking status: Former Smoker     Packs/day: 2.00     Years: 25.00     Pack years: 50.00     Types: Cigars     Start date:      Last attempt to quit: 2011     Years since quittin.3    Smokeless tobacco: Never Used    Tobacco comment: quit in    Substance Use Topics    Alcohol use:  Yes     Alcohol/week: 0.0 standard drinks     Frequency: Monthly or less     Comment: BEER 1 A MONTH      Current Outpatient Medications   Medication Sig Dispense Refill    tamsulosin (FLOMAX) 0.4 MG capsule Take 1 capsule by mouth daily 30 capsule 3    losartan (COZAAR) 50 MG tablet Take 0.5 tablets by mouth daily 90 tablet 1    pregabalin (LYRICA) 100 MG capsule TAKE 1 CAPSULE BY MOUTH THREE TIMES A DAY 90 tablet Take 1 tablet by mouth 2 times daily  2    atorvastatin (LIPITOR) 20 MG tablet TAKE 1 TABLET BY MOUTH EVERY DAY 90 tablet 3    propafenone (RYTHMOL) 150 MG tablet Take 1 tablet by mouth every 8 hours 90 tablet 3    SYMBICORT 160-4.5 MCG/ACT AERO TAKE 2 PUFFS BY MOUTH TWICE A DAY (Patient taking differently: Inhale 2 puffs into the lungs 2 times daily ) 30.6 Inhaler 3    Omega-3 Fatty Acids (FISH OIL CONCENTRATE) 300 MG CAPS Take 300 mg by mouth nightly       Glucosamine Sulfate 500 MG TABS Take 500 mg by mouth 3 times daily       No current facility-administered medications for this visit. Allergies   Allergen Reactions    Ramipril      Other reaction(s): swelling of the lips     Adhesive Tape      tegaderm causes blisters. Use paper tape       Health Maintenance   Topic Date Due    HIV screen  01/07/1975    DTaP/Tdap/Td vaccine (1 - Tdap) 01/07/1979    Shingles Vaccine (1 of 2) 10/23/2014    Diabetic retinal exam  01/04/2017    Flu vaccine (1) 09/01/2020    Diabetic microalbuminuria test  01/13/2021    Lipid screen  01/13/2021    Low dose CT lung screening  01/21/2021    TSH testing  06/08/2021    Diabetic foot exam  07/28/2021    Potassium monitoring  08/01/2021    Creatinine monitoring  08/01/2021    A1C test (Diabetic or Prediabetic)  08/17/2021    Colon cancer screen colonoscopy  01/23/2027    Pneumococcal 0-64 years Vaccine  Completed    Hepatitis C screen  Completed    Hepatitis A vaccine  Aged Out    Hib vaccine  Aged Out    Meningococcal (ACWY) vaccine  Aged Out       Subjective:     Review of Systems   Constitutional: Positive for fatigue. Negative for appetite change and diaphoresis. HENT: Negative for ear discharge and trouble swallowing. Eyes: Negative for pain and discharge. Respiratory: Negative for cough, shortness of breath and wheezing. Cardiovascular: Negative for chest pain and palpitations. Gastrointestinal: Positive for nausea.  Negative for submandibular adenopathy. Left side of head: No submental or submandibular adenopathy. Cervical: No cervical adenopathy. Skin:     General: Skin is warm. Coloration: Skin is not pale. Findings: No bruising, ecchymosis or rash. Neurological:      Mental Status: He is alert and oriented to person, place, and time. Cranial Nerves: No cranial nerve deficit. Sensory: No sensory deficit. Motor: No atrophy or abnormal muscle tone. Coordination: Coordination normal.   Psychiatric:         Mood and Affect: Mood is not anxious. Affect is not angry. Speech: Speech is not slurred. Behavior: Behavior normal. Behavior is not aggressive. Thought Content: Thought content does not include homicidal ideation. Cognition and Memory: Memory is not impaired. /80   Pulse 75   Temp 97.5 °F (36.4 °C)   Ht 6' 4\" (1.93 m)   Wt 274 lb (124.3 kg)   SpO2 94%   BMI 33.35 kg/m²     Assessment:       Diagnosis Orders   1. Diabetic mononeuropathy associated with diabetes mellitus due to underlying condition ([facility]     2. Type 2 diabetes mellitus with diabetic polyneuropathy, with long-term current use of insulin ([facility]     3. Essential hypertension     4. Acquired hypothyroidism     5. Idiopathic hypotension     6. Atrial fibrillation, unspecified type ([facility]               Plan:      No follow-ups on file. No orders of the defined types were placed in this encounter.     Orders Placed This Encounter   Medications    tamsulosin (FLOMAX) 0.4 MG capsule     Sig: Take 1 capsule by mouth daily     Dispense:  30 capsule     Refill:  3    losartan (COZAAR) 50 MG tablet     Sig: Take 0.5 tablets by mouth daily     Dispense:  90 tablet     Refill:  1    orthostiac hypotenion with flomax  With luts without flomax  On proscar but not helping yet  Will cut back on losartan to 25 adbn restat flomax  Due to see dr Hallie Cedeno on sep 24     Patient given educational materials - see patient instructions. Discussed use, benefit, and side effects of prescribed medications. All patientquestions answered. Pt voiced understanding. Reviewed health maintenance. Instructedto continue current medications, diet and exercise. Patient agreed with treatmentplan. Follow up as directed.      Electronically signed by Luis Alberto Strauss MD on 9/14/2020 at 10:04 AM

## 2020-09-24 ENCOUNTER — PROCEDURE VISIT (OUTPATIENT)
Dept: UROLOGY | Age: 60
End: 2020-09-24
Payer: COMMERCIAL

## 2020-09-24 VITALS — DIASTOLIC BLOOD PRESSURE: 73 MMHG | SYSTOLIC BLOOD PRESSURE: 122 MMHG | TEMPERATURE: 98 F | HEART RATE: 123 BPM

## 2020-09-24 PROCEDURE — 52310 CYSTOSCOPY AND TREATMENT: CPT | Performed by: UROLOGY

## 2020-09-24 PROCEDURE — 99999 PR OFFICE/OUTPT VISIT,PROCEDURE ONLY: CPT | Performed by: UROLOGY

## 2020-09-24 RX ORDER — CEPHALEXIN 500 MG/1
500 CAPSULE ORAL 2 TIMES DAILY
Qty: 20 CAPSULE | Refills: 0 | Status: SHIPPED | OUTPATIENT
Start: 2020-09-24 | End: 2020-10-19

## 2020-09-24 NOTE — PROGRESS NOTES
Flexible Cystoscopy right Stent Removal    Risks, benefits, and some of the potential complications of the procedure were discussed at length with the patient including infection, bleeding, voiding discomfort, urinary retention, fever, chills, sepsis, and others. All questions were answered. Informed consent was obtained. Antibiotic prophylaxis was given. Sterile technique and intraurethral analgesia were used. Meatus:    Normal size. Normal location. Normal condition. Urethra:   No hypermobility. No leakage. Uretal Orifices: Normal location. Normal Size. Normal shape. Effluxed clear urine. Bladder:  No trabeculation. Normal mucosa. No Stones. Uretal stent(s) were carefully removed with a grasping instrument. The lower urinary tract was carefully examined. The procedure was well-tolerated and without complications. Antibiotic instructions were given. Instructions were given to call the office immediately for bloody urine, difficulty urinating, urinary retention, painful or frequent urination, fever, chills, nausea, vomiting or other illness. The patient stated that she understood these instructions and would comply with them. Agree with the ROS entered by the MA.

## 2020-09-29 ENCOUNTER — OFFICE VISIT (OUTPATIENT)
Dept: PODIATRY | Age: 60
End: 2020-09-29
Payer: COMMERCIAL

## 2020-09-29 VITALS — HEIGHT: 76 IN | WEIGHT: 265 LBS | BODY MASS INDEX: 32.27 KG/M2

## 2020-09-29 PROCEDURE — 11720 DEBRIDE NAIL 1-5: CPT | Performed by: PODIATRIST

## 2020-10-01 ASSESSMENT — ENCOUNTER SYMPTOMS
NAUSEA: 0
SHORTNESS OF BREATH: 0
DIARRHEA: 0
BACK PAIN: 0
COLOR CHANGE: 0

## 2020-10-01 NOTE — PROGRESS NOTES
SUBJECTIVE: Tammy Suarez is a 61 y.o. male who returns to the office with chief complaint of painful fungal toenails. Patient relates toe nails are thickened/difficult to trim as well as painful with ambulation and with shoe gear. Chief Complaint   Patient presents with    Diabetes     B/L DM FOOT CHECK/BS: 208    Nail Problem     RIGHT FOOT NAIL TRIM    Other     PCP: LAST VISIT 09/14/2020 DR Sara Jenkins     Review of Systems   Constitutional: Negative for activity change, appetite change, chills, diaphoresis, fatigue and fever. Respiratory: Negative for shortness of breath. Cardiovascular: Negative for leg swelling. Gastrointestinal: Negative for diarrhea and nausea. Endocrine: Negative for cold intolerance, heat intolerance and polyuria. Musculoskeletal: Positive for arthralgias and gait problem. Negative for back pain, joint swelling and myalgias. Skin: Negative for color change, pallor, rash and wound. Allergic/Immunologic: Negative for environmental allergies and food allergies. Neurological: Positive for numbness. Negative for dizziness, weakness and light-headedness. Hematological: Does not bruise/bleed easily. Psychiatric/Behavioral: Negative for behavioral problems, confusion and self-injury. The patient is not nervous/anxious. OBJECTIVE: Clinical evaluation of patient reveals nails 1,4,5 of the right foot to present with thickness, elongation, discoloration, brittleness, and subungual debris. There was pain with palpation and debridement of the toenails of the right foot. The right DP pulse is not palpable.    The right PT pulse is not palpable.    Protective sensation is absent to the right plantar foot as noted with a 5.07 Courtland-Gaudencio monofilament. Glucose: 208 mg/dl. Class A Findings (1 needed)   [x] Non-traumatic amputation of foot or integral skeleton portion thereof. [x] Q7.      Class B Findings (2 needed)   1.  [x] Absent posterior tibial pulse

## 2020-10-07 ENCOUNTER — OFFICE VISIT (OUTPATIENT)
Dept: INTERNAL MEDICINE CLINIC | Age: 60
End: 2020-10-07
Payer: COMMERCIAL

## 2020-10-07 VITALS
WEIGHT: 274 LBS | DIASTOLIC BLOOD PRESSURE: 80 MMHG | SYSTOLIC BLOOD PRESSURE: 132 MMHG | BODY MASS INDEX: 33.36 KG/M2 | OXYGEN SATURATION: 97 % | HEIGHT: 76 IN | HEART RATE: 79 BPM | TEMPERATURE: 98.1 F

## 2020-10-07 PROBLEM — M86.9 OSTEOMYELITIS OF LEFT LEG (HCC): Status: RESOLVED | Noted: 2018-05-30 | Resolved: 2020-10-07

## 2020-10-07 PROBLEM — B35.9 DERMATOPHYTOSES: Status: ACTIVE | Noted: 2020-10-07

## 2020-10-07 PROCEDURE — 1036F TOBACCO NON-USER: CPT | Performed by: INTERNAL MEDICINE

## 2020-10-07 PROCEDURE — 99214 OFFICE O/P EST MOD 30 MIN: CPT | Performed by: INTERNAL MEDICINE

## 2020-10-07 PROCEDURE — 3051F HG A1C>EQUAL 7.0%<8.0%: CPT | Performed by: INTERNAL MEDICINE

## 2020-10-07 PROCEDURE — 90686 IIV4 VACC NO PRSV 0.5 ML IM: CPT | Performed by: INTERNAL MEDICINE

## 2020-10-07 PROCEDURE — 2022F DILAT RTA XM EVC RTNOPTHY: CPT | Performed by: INTERNAL MEDICINE

## 2020-10-07 PROCEDURE — G8482 FLU IMMUNIZE ORDER/ADMIN: HCPCS | Performed by: INTERNAL MEDICINE

## 2020-10-07 PROCEDURE — 3017F COLORECTAL CA SCREEN DOC REV: CPT | Performed by: INTERNAL MEDICINE

## 2020-10-07 PROCEDURE — G8417 CALC BMI ABV UP PARAM F/U: HCPCS | Performed by: INTERNAL MEDICINE

## 2020-10-07 PROCEDURE — G8427 DOCREV CUR MEDS BY ELIG CLIN: HCPCS | Performed by: INTERNAL MEDICINE

## 2020-10-07 PROCEDURE — 90471 IMMUNIZATION ADMIN: CPT | Performed by: INTERNAL MEDICINE

## 2020-10-07 RX ORDER — CLOTRIMAZOLE AND BETAMETHASONE DIPROPIONATE 10; .64 MG/G; MG/G
CREAM TOPICAL
Qty: 1 TUBE | Refills: 3 | Status: SHIPPED | OUTPATIENT
Start: 2020-10-07 | End: 2020-10-19 | Stop reason: SDUPTHER

## 2020-10-07 ASSESSMENT — ENCOUNTER SYMPTOMS
BLOOD IN STOOL: 0
EYE PAIN: 0
TROUBLE SWALLOWING: 0
DIARRHEA: 0
SHORTNESS OF BREATH: 0
COLOR CHANGE: 0
EYE DISCHARGE: 0
COUGH: 0
ABDOMINAL DISTENTION: 0
WHEEZING: 0

## 2020-10-07 NOTE — PROGRESS NOTES
[address]  [place] [address]  Dept: [phone]  Dept Fax: [phone]    Lisa Kumari is a 61 y.o. male who presents today for his medical conditions/complaintsas noted below. Lisa Kumari is c/o of   Chief Complaint   Patient presents with    Diabetes     a1c on 8/17/20 was 7.2    Rash     left leg          HPI:     HTN  Onset more than 2 years ago  gustavo mild to mod  Controlled with current po meds  Not associated with headaches or blurry vision  No chest pain  Diabetes   Duration more than 7 years  Modifying factors on Glucophage and other med  Severity uncontrolled sever  Associated signs and symtoms neuropathy/ckd/ CAD. aggravated with sugar diet and better with low sugar diet     Rash on stump        Hemoglobin A1C (%)   Date Value   08/17/2020 7.2   01/13/2020 6.5 (H)   05/01/2019 7.0 (H)             ( goal A1Cis < 7)   Microalb/Crt.  Ratio (mcg/mg creat)   Date Value   01/13/2020 82 (H)     LDL Cholesterol (mg/dL)   Date Value   01/13/2020 70   05/21/2019 68   03/09/2018 56       (goal LDL is <100)   AST (U/L)   Date Value   05/21/2020 24     ALT (U/L)   Date Value   05/21/2020 19     BUN (mg/dL)   Date Value   08/01/2020 29 (H)     BP Readings from Last 3 Encounters:   10/07/20 132/80   09/24/20 122/73   09/14/20 138/80          (goal 120/80)    Past Medical History:   Diagnosis Date    Asymptomatic bilateral carotid artery stenosis     Boil, thigh 10/2019    10/30/19: right inner thigh region, says PCP Rxd Doxy for this, area is much better, has a couple days left to complete Doxy    CAD (coronary artery disease)     saw Dr. John [facility] ([facility])     Charcot foot due to diabetes mellitus ([address] 2014    LEFT    COPD (chronic obstructive pulmonary disease) [facility] 2009    INHALER USE DAILY    Diabetes mellitus ([facility] 1989    IDDM, On Insulin Dr. Evi Bateman Diabetic neuropathy ([facility]     Difficult intravenous access     VEINS ROLL    Employs prosthetic leg     s/p left BKA    Foot ulcer ([address] PRIOR TO 2015    BILAT    Full dentures     UPPER ONLY    Hyperlipidemia 2004    ON RX    Hypertension 2004    ON RX, PCP Dr. Renetta Flannery, seen Oct. 2019    Hypoglycemia 4/2/2015    MRSA (methicillin resistant staph aureus) culture positive 4/16/2015    ankle    OA (osteoarthritis)     Osteomyelitis ([address]     left stump  BKA    Paroxysmal atrial fibrillation ([address]     Renal mass 09/2019    ACCIDENTAL  FINDING IS FOLLOWING UP WITH KIDNEY DR Ricardo Ledezma     Suspected sleep apnea     Thyroid disease 2004    PT HAD HYPERTHYROIDISM-UNCONTROLED THYROID DESTROYED WITH RADI IODINE NOW HAS HYPOTHYRIDISM    Vision abnormalities 09/2019    LEFT NO VISION    Vitreous hemorrhage of left eye due to diabetes mellitus ([address] 09/2019    s/p Vitrectomy 9/2019    Wears glasses     Wears partial dentures     full upper, does not wear lower partial    Wellness examination     Dr. Fiordaliza Beebe seen within last 1 1/2 mos      Past Surgical History:   Procedure Laterality Date    CARDIAC CATHETERIZATION      no stenting    CARDIOVASCULAR STRESS TEST  06/28/2019    small fixed apical, likely normal, EF 48%    COLONOSCOPY  06/17/2016    poor prep, done up to the IC valve, redundant colon    COLONOSCOPY  01/23/2017    VIRTUAL COLONOSCOPY DONE-no polyps or masses    CYSTOSCOPY Bilateral 6/16/2020    CYSTOSCOPY RETROGRADE PYELOGRAM URETEROSCOPY performed by Frankie Talbert MD at [facility] Right 7/30/2020    CYSTOSCOPY, RIGHT RETROGRADE PYELOGRAM,  URETERAL CATHETER  INSERTION performed by Frankie Talbert MD at [address] [facility] 9/1/2020    CYSTOSCOPY RETROGRADE PYELOGRAM, RIGHT URETERAL STENT EXCHANGE performed by Frankie Talbert MD at [facility] Right     r-bone removed    [facility]  PICC [facility] DOUBLE  5/21/2018         KIDNEY CYST REMOVAL      KIDNEY SURGERY Right 11/13/2019    XI ROBOTIC PARTIAL NEPHRECTOMY, INTRAOP ULTRASOUND, RIGHT URETEROURETEROSTOMY, RIGHT URETERAL STENT PLACEMENT **SHORT STAY** performed by Jaz Hayden MD at [facility] N/A 2020    XI LAPAROSCOPIC ROBOTIC URETEROLYSIS, ROBOTIC ASSISTED BUCCAL URETEROPLASTY  (DR. COBIAN TO ASSIST) performed by Jaz Hayden MD at [address] Left 4/29/15    OTHER SURGICAL HISTORY Left 5-5-15 and 2015    Revision BKA    OTHER SURGICAL HISTORY      left stump revision 2018    PA RE-AMPUTATION LOWER LEG Left 2018    LEG AMPUTATION BELOW KNEE REVISION performed by Melisa Cardona MD at [address] Bilateral 80'S    TOE AMPUTATION      Right 2 and 4    VITRECTOMY Left 2019    PARS PLANA VITRECTOMY 25 GAUGE, MEMBRANE PEELING, ENDOLASER 200  MW 1044 SPOTS, INDIRECT LASER 26 SPOTS performed by Elba Del Rio MD at [place] History   Problem Relation Age of Onset    Diabetes Mother     Hypertension Mother     Stomach Cancer Mother     Hypertension Father     Alcohol Abuse Father     COPD Father     Kidney Cancer Father     Diabetes Brother         IDDM-PUMP    Other Sister         BRAIN TUMOR       Social History     Tobacco Use    Smoking status: Former Smoker     Packs/day: 2.00     Years: 25.00     Pack years: 50.00     Types: Cigars     Start date:      Last attempt to quit: 2011     Years since quittin.3    Smokeless tobacco: Never Used    Tobacco comment: quit in    Substance Use Topics    Alcohol use: Yes     Alcohol/week: 0.0 standard drinks     Frequency: Monthly or less     Comment: BEER 1 A MONTH      Current Outpatient Medications   Medication Sig Dispense Refill    clotrimazole-betamethasone (LOTRISONE) 1-0.05 % cream Apply topically 2 times daily.  1 Tube 3    tamsulosin (FLOMAX) 0.4 MG capsule Take 1 capsule by mouth daily 30 capsule 3    losartan (COZAAR) 50 MG tablet Take 0.5 tablets by mouth daily 90 tablet 1    metoprolol tartrate (LOPRESSOR) 25 MG tablet Take 1 tablet by mouth 2 times daily 180 tablet 3    finasteride (PROSCAR) 5 MG tablet Take 1 tablet by mouth daily 30 tablet 3    levothyroxine (SYNTHROID) 150 MCG tablet Take 1 tablet by mouth Daily 90 tablet 1    albuterol sulfate  (90 Base) MCG/ACT inhaler INHALE 2 PUFFS INTO THE LUNGS EVERY 6 HOURS AS NEEDED FOR WHEEZING OR SHORTNESS OF BREATH 6.7 Inhaler 0    insulin lispro (HUMALOG) 100 UNIT/ML injection vial INJECT 12 UNITS UNDER THE SKIN 3 TIMES DAILY + SLIDING SCALE (TAKE 5 UNITS IF SUGAR IS OVER 150) 20 mL 3    glucagon, rDNA, 1 MG injection Inject 1 mL into the muscle      Blood Pressure KIT 1 actuation by Does not apply route once a week 1 kit 0    rOPINIRole (REQUIP) 2 MG tablet TAKE 1 TABLET BY MOUTH EVERY DAY 90 tablet 3    blood glucose test strips (ASCENSIA AUTODISC VI;ONE TOUCH ULTRA TEST VI) strip 1 each by In Vitro route 4 times daily As needed.  400 each 3    gemfibrozil (LOPID) 600 MG tablet TAKE 1 TABLET BY MOUTH EVERY DAY 90 tablet 3    blood glucose monitor strips by Other route 4 times daily Test strips to use w/ Accucheck Smart View Glucometer (Patient taking differently: by Other route 4 times daily Test strips to use w/ ONE TOUCH VERIO TEST STRIPS) 100 strip 11    Insulin Syringe-Needle U-100 (ULTICARE INSULIN SYRINGE) 31G X 5/16\" 1 ML MISC USE AS DIRECTED WITH HUMULIN AND HUMALOG INSULIN 5 TIMES DAILY 1 each 3    HUMULIN N 100 UNIT/ML injection vial INJECT 50 UNITS EVERY MORNING AND 70 UNITS AT BEDTIME (Patient taking differently: 60 units in the AM , 70 units in PM) 30 mL 11    isosorbide mononitrate (IMDUR) 30 MG extended release tablet TAKE 1 TABLET BY MOUTH EVERY DAY 90 tablet 3    Handicap Placard MISC by Does not apply route Duration - 5years  Reason- prosthetic leg 1 each 0    ELIQUIS 5 MG TABS tablet Take 1 tablet by mouth 2 times daily  2    atorvastatin (LIPITOR) 20 MG tablet TAKE 1 TABLET BY MOUTH EVERY DAY 90 tablet 3    propafenone (RYTHMOL) 150 MG tablet Take 1 tablet by mouth every 8 hours 90 tablet 3    SYMBICORT 160-4.5 MCG/ACT AERO TAKE 2 PUFFS BY MOUTH TWICE A DAY (Patient taking differently: Inhale 2 puffs into the lungs 2 times daily ) 30.6 Inhaler 3    Omega-3 Fatty Acids (FISH OIL CONCENTRATE) 300 MG CAPS Take 300 mg by mouth nightly       Glucosamine Sulfate 500 MG TABS Take 500 mg by mouth 3 times daily      cephALEXin (KEFLEX) 500 MG capsule Take 1 capsule by mouth 2 times daily (Patient not taking: Reported on 10/7/2020) 20 capsule 0    pregabalin (LYRICA) 100 MG capsule TAKE 1 CAPSULE BY MOUTH THREE TIMES A DAY 90 capsule 0    pregabalin (LYRICA) 100 MG capsule Take 1 capsule by mouth 3 times daily for 30 days. 90 capsule 0     No current facility-administered medications for this visit. Allergies   Allergen Reactions    Ramipril      Other reaction(s): swelling of the lips     Adhesive Tape      tegaderm causes blisters. Use paper tape       Health Maintenance   Topic Date Due    HIV screen  01[birth year]07/1975    DTaP/Tdap/Td vaccine (1 - Tdap) 01/07/1979    Shingles Vaccine (1 of 2) 10/23/2014    Diabetic retinal exam  01/04/2017    Diabetic microalbuminuria test  01/13/2021    Lipid screen  01/13/2021    Low dose CT lung screening  01/21/2021    TSH testing  06/08/2021    Diabetic foot exam  07/28/2021    Potassium monitoring  08/01/2021    Creatinine monitoring  08/01/2021    A1C test (Diabetic or Prediabetic)  08/17/2021    Colon cancer screen colonoscopy  01/23/2027    Flu vaccine  Completed    Pneumococcal 0-64 years Vaccine  Completed    Hepatitis C screen  Completed    Hepatitis A vaccine  Aged Out    Hib vaccine  Aged Out    Meningococcal (ACWY) vaccine  Aged Out       Subjective:     Review of Systems   Constitutional: Negative for appetite change, diaphoresis and fatigue.    HENT: Negative for ear discharge and trouble swallowing. Eyes: Negative for pain and discharge. Respiratory: Negative for cough, shortness of breath and wheezing. Cardiovascular: Negative for chest pain and palpitations. Gastrointestinal: Negative for abdominal distention, blood in stool and diarrhea. Endocrine: Negative for polydipsia and polyphagia. Genitourinary: Negative for difficulty urinating and frequency. Musculoskeletal: Positive for arthralgias. Negative for gait problem, myalgias and neck pain. Left bka stump rash     Skin: Negative for color change and rash. Allergic/Immunologic: Negative for environmental allergies and food allergies. Neurological: Negative for dizziness and headaches. Hematological: Negative for adenopathy. Does not bruise/bleed easily. Psychiatric/Behavioral: Negative for behavioral problems and sleep disturbance. Objective:     Physical Exam  Constitutional:       Appearance: He is well-developed. He is not diaphoretic. HENT:      Head: Normocephalic and atraumatic. Eyes:      General:         Right eye: No discharge. Left eye: No discharge. Extraocular Movements:      Right eye: Normal extraocular motion. Left eye: Normal extraocular motion. Conjunctiva/sclera: Conjunctivae normal.      Right eye: Right conjunctiva is not injected. Left eye: Left conjunctiva is not injected. Neck:      Musculoskeletal: Normal range of motion and neck supple. No edema or erythema. Thyroid: No thyroid mass or thyromegaly. Vascular: No JVD. Cardiovascular:      Rate and Rhythm: Normal rate and regular rhythm. Heart sounds: No murmur. No friction rub. Pulmonary:      Effort: Pulmonary effort is normal. No tachypnea, bradypnea, accessory muscle usage or respiratory distress. Breath sounds: Normal breath sounds. No wheezing or rales. Abdominal:      General: Bowel sounds are normal. There is no distension. Palpations: Abdomen is soft. Tenderness: There is no abdominal tenderness. There is no rebound. Musculoskeletal: Normal range of motion. General: No tenderness. Comments: Left stump bka  Has fungal infection skin     Lymphadenopathy:      Head:      Right side of head: No submental or submandibular adenopathy. Left side of head: No submental or submandibular adenopathy. Cervical: No cervical adenopathy. Skin:     General: Skin is warm. Coloration: Skin is not pale. Findings: No bruising, ecchymosis or rash. Neurological:      Mental Status: He is alert and oriented to person, place, and time. Cranial Nerves: No cranial nerve deficit. Sensory: No sensory deficit. Motor: No atrophy or abnormal muscle tone. Coordination: Coordination normal.   Psychiatric:         Mood and Affect: Mood is not anxious. Affect is not angry. Speech: Speech is not slurred. Behavior: Behavior normal. Behavior is not aggressive. Thought Content: Thought content does not include homicidal ideation. Cognition and Memory: Memory is not impaired. /80   Pulse 79   Temp 98.1 °F (36.7 °C)   Ht 6' 4\" (1.93 m)   Wt 274 lb (124.3 kg)   SpO2 97%   BMI 33.35 kg/m²     Assessment:       Diagnosis Orders   1. Dermatophytoses     2. Influenza vaccine needed  INFLUENZA, QUADV, 3 YRS AND OLDER, IM PF, PREFILL SYR OR SDV, 0.5ML (AFLURIA QUADV, PF)   3. PVD (peripheral vascular disease) ([facility])  Post l;eft bka     4. Type 2 diabetes mellitus with diabetic polyneuropathy, with long-term current use of insulin ([facility])  Imp;roved     5. Essential hypertension     6. Hydronephrosis of right kidney     7. Idiopathic hypotension     8. Status post below-knee amputation of left lower extremity (HCC)  Stump has fungal infeciton   9. Atrial fibrillation, unspecified type ([address]  controlled   10.  Diabetic mononeuropathy associated with diabetes mellitus due to underlying condition ([address]  stable Plan:      No follow-ups on file. Orders Placed This Encounter   Procedures    INFLUENZA, QUADV, 3 YRS AND OLDER, IM PF, PREFILL SYR OR SDV, 0.5ML (AFLURIA QUADV, PF)     Orders Placed This Encounter   Medications    clotrimazole-betamethasone (LOTRISONE) 1-0.05 % cream     Sig: Apply topically 2 times daily. Dispense:  1 Tube     Refill:  3    skin fungal infeciton on stump due to contatn cover with clothis and tubing for prosthetic  advsid to keep open to air minimize use of coverate and prosthetic  Use above crem     Patient given educational materials - see patient instructions. Discussed use, benefit, and side effects of prescribed medications. All patientquestions answered. Pt voiced understanding. Reviewed health maintenance. Instructedto continue current medications, diet and exercise. Patient agreed with treatmentplan. Follow up as directed.      Electronically signed by Jelena Montero MD on 10/7/2020 at 4:09 PM

## 2020-10-12 ENCOUNTER — TELEPHONE (OUTPATIENT)
Dept: UROLOGY | Age: 60
End: 2020-10-12

## 2020-10-12 RX ORDER — PREGABALIN 100 MG/1
CAPSULE ORAL
Qty: 90 CAPSULE | Refills: 0 | Status: ON HOLD | OUTPATIENT
Start: 2020-10-12 | End: 2021-12-31 | Stop reason: HOSPADM

## 2020-10-12 NOTE — TELEPHONE ENCOUNTER
Medication Requested: Lyrica    Last visit: 10/07/20  Next visit: 11/11/2020  Last refill: 9/03/20    Med contract on file:  [x] yes   [] no    Last urine drug screen:  11/05/19  Consistent with medication(s):    [] yes   [] no Lyrica is undetectable    Last OARRS ran: 11/16/17    Quantity of medication remaining: ? Who will be picking rx up: ?     Pharmacy if escribed:  [facility]

## 2020-10-12 NOTE — TELEPHONE ENCOUNTER
Pt called stating \" I was told by Dr. Laura Gauthier to call if I am having issues after or pain. Dr. Laura Gauthier took the stent out on 9/24/2020. Pain is rated 7/8 when rolling over or getting up. No nausea, vomiting, chills or fevers. Only having pain. Not taking anything for pain.

## 2020-10-15 NOTE — TELEPHONE ENCOUNTER
Per pt \" Pain is ongoing, but will subside for short period but some pain still persist.\" Informed pt to complete Renal US due to new issue of dysuria Urine Cx order placed per Dr. Savanah Iraheta UTI protocol. Pt notified.

## 2020-10-17 ENCOUNTER — HOSPITAL ENCOUNTER (OUTPATIENT)
Dept: ULTRASOUND IMAGING | Age: 60
Discharge: HOME | End: 2020-10-19
Payer: COMMERCIAL

## 2020-10-17 ENCOUNTER — HOSPITAL ENCOUNTER (OUTPATIENT)
Age: 60
Discharge: HOME | End: 2020-10-17
Payer: COMMERCIAL

## 2020-10-17 PROCEDURE — 87086 URINE CULTURE/COLONY COUNT: CPT

## 2020-10-17 PROCEDURE — 76775 US EXAM ABDO BACK WALL LIM: CPT

## 2020-10-17 PROCEDURE — 87077 CULTURE AEROBIC IDENTIFY: CPT

## 2020-10-17 PROCEDURE — 87186 SC STD MICRODIL/AGAR DIL: CPT

## 2020-10-18 LAB
CULTURE: ABNORMAL
Lab: ABNORMAL
SPECIMEN DESCRIPTION: ABNORMAL

## 2020-10-19 ENCOUNTER — TELEPHONE (OUTPATIENT)
Dept: UROLOGY | Age: 60
End: 2020-10-19

## 2020-10-19 RX ORDER — CLOTRIMAZOLE AND BETAMETHASONE DIPROPIONATE 10; .64 MG/G; MG/G
CREAM TOPICAL
Qty: 45 G | Refills: 3 | Status: SHIPPED | OUTPATIENT
Start: 2020-10-19 | End: 2021-07-23

## 2020-10-19 RX ORDER — CIPROFLOXACIN 500 MG/1
500 TABLET, FILM COATED ORAL 2 TIMES DAILY
Qty: 14 TABLET | Refills: 0 | Status: SHIPPED | OUTPATIENT
Start: 2020-10-19 | End: 2020-10-26

## 2020-10-22 ENCOUNTER — OFFICE VISIT (OUTPATIENT)
Dept: UROLOGY | Age: 60
End: 2020-10-22

## 2020-10-22 VITALS
TEMPERATURE: 97 F | BODY MASS INDEX: 33.36 KG/M2 | WEIGHT: 274 LBS | HEIGHT: 76 IN | DIASTOLIC BLOOD PRESSURE: 83 MMHG | SYSTOLIC BLOOD PRESSURE: 137 MMHG | HEART RATE: 82 BPM

## 2020-10-22 PROCEDURE — 99024 POSTOP FOLLOW-UP VISIT: CPT | Performed by: UROLOGY

## 2020-10-22 RX ORDER — CIPROFLOXACIN 500 MG/1
500 TABLET, FILM COATED ORAL 2 TIMES DAILY
Qty: 10 TABLET | Refills: 0 | Status: SHIPPED | OUTPATIENT
Start: 2020-10-22 | End: 2020-10-27

## 2020-10-22 ASSESSMENT — ENCOUNTER SYMPTOMS
COUGH: 0
BACK PAIN: 1
CONSTIPATION: 1
WHEEZING: 0
VOMITING: 0
DIARRHEA: 0
SHORTNESS OF BREATH: 0
EYE REDNESS: 0
ABDOMINAL PAIN: 0
EYE PAIN: 0
NAUSEA: 0

## 2020-10-22 NOTE — PROGRESS NOTES
[address] [address]  Dept: 92 [address] [facility] Urology Office Note - Established    Patient:  Ngoc Wayne  YOB: 1960  Date: 10/22/2020    The patient is a 61 y.o. male who presents todayfor evaluation of the following problems:   Chief Complaint   Patient presents with    Cancer     right kidney cancer, B/L back pain    Dysuria     currently being treated for UTI     Urinary Tract Infection     recurrent UTI        HPI  Has new finding of uti, kleb infection cipro started, he is starting to feel better, pain is improving,     Summary of old records: N/A    Additional History: N/A    Procedures Today: N/A    Urinalysis today:  No results found for this visit on 10/22/20.   Last several PSA's:  Lab Results   Component Value Date    PSA 3.56 09/10/2020    PSA 4.62 (H) 10/23/2017     Last total testosterone:  No results found for: TESTOSTERONE    AUA Symptom Score (10/22/2020):  INCOMPLETE EMPTYING: How often have you had the sensation of not emptying your bladder?: Not at all  FREQUENCY: How often do you have to urinate less than every two hours?: Almost always  INTERMITTENCY: How often have you found you stopped and started again several times when you urinated?: Less than 1 to 5 times  URGENCY: How often have you found it difficult to postpone urination?: About Half the time  WEAK STREAM: How often have you had a weak urinary stream?: Almost always  STRAINING: How often have you had to strain to start  urination?: Not at all  NOCTURIA: How many times did you typically get up at night to uriniate?: 2 Times  TOTAL I-PSS SCORE[de-identified] 16  How would you feel if you were to spend the rest of your life with your urinary condition?: Mixe    Last BUN and creatinine:  Lab Results   Component Value Date    BUN 29 (H) 08/01/2020     Lab Results   Component Value Date    CREATININE 1.74 (H) 08/01/2020 Additional Lab/Culture results: none    Imaging Reviewed during this Office Visit: none  (results were independently reviewed by physician and radiology report verified)    PAST MEDICAL, FAMILY AND SOCIAL HISTORY UPDATE:  Past Medical History:   Diagnosis Date    Asymptomatic bilateral carotid artery stenosis     Boil, thigh 10/2019    10/30/19: right inner thigh region, says PCP Rxd Doxy for this, area is much better, has a couple days left to complete Doxy    CAD (coronary artery disease)     saw Dr. Nate Carpio ([facility])     Charcot foot due to diabetes mellitus ([address] 2014    LEFT    COPD (chronic obstructive pulmonary disease) ([address] 2009    INHALER USE DAILY    Diabetes mellitus ([address] 1989    IDDM, On Insulin Dr. Cooper Gottron Diabetic neuropathy ([address]     Difficult intravenous access     VEINS ROLL    Employs prosthetic leg     s/p left BKA    Foot ulcer ([address] PRIOR TO 2015    BILAT    Full dentures     UPPER ONLY    Hyperlipidemia 2004    ON RX    Hypertension 2004    ON RX, PCP Dr. Guillermo Nathan, seen Oct. 2019    Hypoglycemia 4/2/2015    MRSA (methicillin resistant staph aureus) culture positive 4/16/2015    ankle    OA (osteoarthritis)     Osteomyelitis ([address]     left stump  BKA    Paroxysmal atrial fibrillation ([address]     Renal mass 09/2019    ACCIDENTAL  FINDING IS FOLLOWING UP WITH KIDNEY DR Reddy Ledezma     Suspected sleep apnea     Thyroid disease 2004    PT HAD HYPERTHYROIDISM-UNCONTROLED THYROID DESTROYED WITH RADI IODINE NOW HAS HYPOTHYRIDISM    Vision abnormalities 09/2019    LEFT NO VISION    Vitreous hemorrhage of left eye due to diabetes mellitus ([address] 09/2019    s/p Vitrectomy 9/2019    Wears glasses     Wears partial dentures     full upper, does not wear lower partial    Wellness examination     Dr. Savage Calderon seen within last 1 1/2 mos     Past Surgical History:   Procedure Laterality Date    CARDIAC CATHETERIZATION      no stenting    CARDIOVASCULAR STRESS TEST  06/28/2019 small fixed apical, likely normal, EF 48%    COLONOSCOPY  06/17/2016    poor prep, done up to the IC valve, redundant colon    COLONOSCOPY  01/23/2017    VIRTUAL COLONOSCOPY DONE-no polyps or masses    CYSTOSCOPY Bilateral 6/16/2020    CYSTOSCOPY RETROGRADE PYELOGRAM URETEROSCOPY performed by Alma Blanco MD at [address] Right 7/30/2020    CYSTOSCOPY, RIGHT RETROGRADE PYELOGRAM,  URETERAL CATHETER  INSERTION performed by Alma Blanco MD at [facility] 9/1/2020    CYSTOSCOPY RETROGRADE PYELOGRAM, RIGHT URETERAL STENT EXCHANGE performed by Alma Blanco MD at [facility] Right     r-bone removed    HC  PICC [facility] DOUBLE  5/21/2018         KIDNEY CYST REMOVAL      KIDNEY SURGERY Right 11/13/2019    XI ROBOTIC PARTIAL NEPHRECTOMY, INTRAOP ULTRASOUND, RIGHT URETEROURETEROSTOMY, RIGHT URETERAL STENT PLACEMENT **SHORT STAY** performed by Alma Blanco MD at [address] [facility] N/A 7/30/2020    XI LAPAROSCOPIC ROBOTIC URETEROLYSIS, ROBOTIC ASSISTED BUCCAL URETEROPLASTY  (DR. COBIAN TO ASSIST) performed by Alma Blanco MD at [facility] Left 4/29/15    OTHER SURGICAL HISTORY Left 5-5-15 and 11/2015    Revision BKA    OTHER SURGICAL HISTORY      left stump revision 5/30/2018    NV RE-AMPUTATION LOWER LEG Left 5/30/2018    LEG AMPUTATION BELOW KNEE REVISION performed by Krzysztof Hart MD at [address] Bilateral 80'S    TOE AMPUTATION      Right 2 and 4    VITRECTOMY Left 9/25/2019    PARS PLANA VITRECTOMY 25 GAUGE, MEMBRANE PEELING, ENDOLASER 200  MW 1044 SPOTS, INDIRECT LASER 26 SPOTS performed by Nayeli Castillo MD at [address] [facility] History   Problem Relation Age of Onset    Diabetes Mother     Hypertension Mother     Stomach Cancer Mother     Hypertension Father     Alcohol Abuse Father     COPD Father  Kidney Cancer Father     Diabetes Brother         IDDM-PUMP    Other Sister         BRAIN TUMOR     Outpatient Medications Marked as Taking for the 10/22/20 encounter (Office Visit) with Geetha Kinsey MD   Medication Sig Dispense Refill    ciprofloxacin (CIPRO) 500 MG tablet Take 1 tablet by mouth 2 times daily for 5 days 10 tablet 0    insulin lispro (HUMALOG) 100 UNIT/ML injection vial INJECT 12 UNITS UNDER THE SKIN 3 TIMES DAILY + SLIDING SCALE (TAKE 5 UNITS IF SUGAR IS OVER 150) 20 mL 3    clotrimazole-betamethasone (LOTRISONE) 1-0.05 % cream Apply topically 2 times daily. 45 g 3    ciprofloxacin (CIPRO) 500 MG tablet Take 1 tablet by mouth 2 times daily for 7 days 14 tablet 0    pregabalin (LYRICA) 100 MG capsule TAKE 1 CAPSULE BY MOUTH THREE TIMES A DAY 90 capsule 0    tamsulosin (FLOMAX) 0.4 MG capsule Take 1 capsule by mouth daily 30 capsule 3    losartan (COZAAR) 50 MG tablet Take 0.5 tablets by mouth daily 90 tablet 1    metoprolol tartrate (LOPRESSOR) 25 MG tablet Take 1 tablet by mouth 2 times daily 180 tablet 3    finasteride (PROSCAR) 5 MG tablet Take 1 tablet by mouth daily 30 tablet 3    levothyroxine (SYNTHROID) 150 MCG tablet Take 1 tablet by mouth Daily 90 tablet 1    albuterol sulfate  (90 Base) MCG/ACT inhaler INHALE 2 PUFFS INTO THE LUNGS EVERY 6 HOURS AS NEEDED FOR WHEEZING OR SHORTNESS OF BREATH 6.7 Inhaler 0    glucagon, rDNA, 1 MG injection Inject 1 mL into the muscle      Blood Pressure KIT 1 actuation by Does not apply route once a week 1 kit 0    rOPINIRole (REQUIP) 2 MG tablet TAKE 1 TABLET BY MOUTH EVERY DAY 90 tablet 3    blood glucose test strips (ASCENSIA AUTODISC VI;ONE TOUCH ULTRA TEST VI) strip 1 each by In Vitro route 4 times daily As needed.  400 each 3    gemfibrozil (LOPID) 600 MG tablet TAKE 1 TABLET BY MOUTH EVERY DAY 90 tablet 3    blood glucose monitor strips by Other route 4 times daily Test strips to use w/ [employer] (Patient taking differently: by Other route 4 times daily Test strips to use w/ ONE TOUCH VERIO TEST STRIPS) 100 strip 11    Insulin Syringe-Needle U-100 (ULTICARE INSULIN SYRINGE) 31G X 5/16\" 1 ML MISC USE AS DIRECTED WITH HUMULIN AND HUMALOG INSULIN 5 TIMES DAILY 1 each 3    HUMULIN N 100 UNIT/ML injection vial INJECT 50 UNITS EVERY MORNING AND 70 UNITS AT BEDTIME (Patient taking differently: 60 units in the AM , 70 units in PM) 30 mL 11    isosorbide mononitrate (IMDUR) 30 MG extended release tablet TAKE 1 TABLET BY MOUTH EVERY DAY 90 tablet 3    Handicap Placard MISC by Does not apply route Duration - 5years  Reason- prosthetic leg 1 each 0    ELIQUIS 5 MG TABS tablet Take 1 tablet by mouth 2 times daily  2    atorvastatin (LIPITOR) 20 MG tablet TAKE 1 TABLET BY MOUTH EVERY DAY 90 tablet 3    propafenone (RYTHMOL) 150 MG tablet Take 1 tablet by mouth every 8 hours 90 tablet 3    SYMBICORT 160-4.5 MCG/ACT AERO TAKE 2 PUFFS BY MOUTH TWICE A DAY (Patient taking differently: Inhale 2 puffs into the lungs 2 times daily ) 30.6 Inhaler 3    Omega-3 Fatty Acids (FISH OIL CONCENTRATE) 300 MG CAPS Take 300 mg by mouth nightly       Glucosamine Sulfate 500 MG TABS Take 500 mg by mouth 3 times daily         Ramipril and Adhesive tape  Social History     Tobacco Use   Smoking Status Former Smoker    Packs/day: 2.00    Years: 25.00    Pack years: 50.00    Types: Cigars    Start date: 1    Last attempt to quit: 2011    Years since quittin.4   Smokeless Tobacco Never Used   Tobacco Comment    quit in      (Ifpatient a smoker, smoking cessation counseling offered)    Social History     Substance and Sexual Activity   Alcohol Use Yes    Alcohol/week: 0.0 standard drinks    Frequency: Monthly or less    Comment: BEER 1 A MONTH       REVIEW OF SYSTEMS:  Review of Systems    Physical Exam:      Vitals:    10/22/20 1130   BP: 137/83   Pulse: 82   Temp: 97 °F (36.1 °C)     Body mass index is 33.35 kg/m². Patient is a 61 y.o. male in no acute distress and alert and oriented to person, place and time. Physical Exam  Constitutional: Patient in no acute distress. Neuro: Alert and oriented to person, place and time. Psych: Mood normal, affect normal  Skin: No rash noted  HEENT: Head: Normocephalic andatraumatic  Conjunctivae and EOM are normal. Pupils are equal, round  Nose:Normal  Right External Ear: Normal; Left External Ear: Normal  Mouth: Mucosa Moist  Neck: Supple  Lungs: Respiratory effort is normal  Cardiovascular: Warm & Pink  Abdomen: Soft, non-tender, non-distended with no CVA,  No flank tenderness,  Or hepatosplenomegaly       Assessment and Plan      1. Acute cystitis without hematuria    2. Flank pain    3. UPJ obstruction, acquired           Plan:     renal u/s in 3motnhs  Return in about 3 months (around 1/22/2021) for Follow up. Prescriptions Ordered:  Orders Placed This Encounter   Medications    ciprofloxacin (CIPRO) 500 MG tablet     Sig: Take 1 tablet by mouth 2 times daily for 5 days     Dispense:  10 tablet     Refill:  0     Orders Placed:  Orders Placed This Encounter   Procedures    US RENAL LIMITED     Standing Status:   Future     Standing Expiration Date:   10/17/2021           Savannah Phelps MD    Agree with the ROS entered by the MA.

## 2020-11-14 RX ORDER — TAMSULOSIN HYDROCHLORIDE 0.4 MG/1
0.4 CAPSULE ORAL DAILY
Qty: 90 CAPSULE | Refills: 3 | Status: SHIPPED | OUTPATIENT
Start: 2020-11-14 | End: 2021-10-12

## 2020-11-14 RX ORDER — ATORVASTATIN CALCIUM 20 MG/1
TABLET, FILM COATED ORAL
Qty: 90 TABLET | Refills: 3 | Status: SHIPPED | OUTPATIENT
Start: 2020-11-14 | End: 2021-03-02 | Stop reason: SDUPTHER

## 2020-11-24 RX ORDER — ALBUTEROL SULFATE 90 UG/1
2 AEROSOL, METERED RESPIRATORY (INHALATION) EVERY 6 HOURS PRN
Qty: 6.7 INHALER | Refills: 0 | Status: SHIPPED | OUTPATIENT
Start: 2020-11-24 | End: 2020-12-23

## 2020-11-24 RX ORDER — BLOOD SUGAR DIAGNOSTIC
1 STRIP MISCELLANEOUS 4 TIMES DAILY
Qty: 400 EACH | Refills: 0 | Status: SHIPPED | OUTPATIENT
Start: 2020-11-24 | End: 2021-02-24

## 2020-11-25 RX ORDER — PREGABALIN 100 MG/1
100 CAPSULE ORAL 3 TIMES DAILY
Qty: 90 CAPSULE | Refills: 0 | Status: SHIPPED | OUTPATIENT
Start: 2020-11-25 | End: 2021-01-19 | Stop reason: SDUPTHER

## 2020-11-25 NOTE — TELEPHONE ENCOUNTER
Medication Requested: Diane    Last visit: 10/07/20  Next visit: Visit date not found  Last refill: 09/03/20    Med contract on file:  [x] yes   [] no    Last urine drug screen: 11/05/19  Consistent with medication(s):    [x] yes   [] no    Last OARRS ran: 11/16/17    Quantity of medication remaining: unkn    Who will be picking rx up: unkn    Pharm[facility] escribed[facility]    Provider out of office

## 2020-12-17 RX ORDER — FINASTERIDE 5 MG/1
TABLET, FILM COATED ORAL
Qty: 90 TABLET | Refills: 1 | Status: SHIPPED | OUTPATIENT
Start: 2020-12-17 | End: 2021-07-06

## 2020-12-23 RX ORDER — ALBUTEROL SULFATE 90 UG/1
2 AEROSOL, METERED RESPIRATORY (INHALATION) EVERY 6 HOURS PRN
Qty: 6.7 INHALER | Refills: 0 | Status: SHIPPED | OUTPATIENT
Start: 2020-12-23 | End: 2021-01-18

## 2020-12-29 ENCOUNTER — TELEPHONE (OUTPATIENT)
Dept: ONCOLOGY | Age: 60
End: 2020-12-29

## 2020-12-29 DIAGNOSIS — Z87.891 PERSONAL HISTORY OF NICOTINE DEPENDENCE: Primary | ICD-10-CM

## 2020-12-29 NOTE — TELEPHONE ENCOUNTER
Our records indicate that your patient is due for their annual lung cancer screening follow up testing. For your convenience, we have pended the order for the scan for you. If you do not agree with the need for the test, please cancel the order and let us know. Sincerely,    [address] [place] [address] Screening Program    Auto printed reminder letter sent to patient.

## 2021-01-11 ENCOUNTER — VIRTUAL VISIT (OUTPATIENT)
Dept: INTERNAL MEDICINE CLINIC | Age: 61
End: 2021-01-11
Payer: COMMERCIAL

## 2021-01-11 DIAGNOSIS — M54.50 ACUTE BILATERAL LOW BACK PAIN WITHOUT SCIATICA: ICD-10-CM

## 2021-01-11 DIAGNOSIS — E03.9 ACQUIRED HYPOTHYROIDISM: ICD-10-CM

## 2021-01-11 DIAGNOSIS — I65.23 CAROTID STENOSIS, ASYMPTOMATIC, BILATERAL: ICD-10-CM

## 2021-01-11 DIAGNOSIS — I10 ESSENTIAL HYPERTENSION: ICD-10-CM

## 2021-01-11 DIAGNOSIS — I48.91 ATRIAL FIBRILLATION, UNSPECIFIED TYPE (HCC): Primary | ICD-10-CM

## 2021-01-11 DIAGNOSIS — Z79.4 TYPE 2 DIABETES MELLITUS WITH DIABETIC POLYNEUROPATHY, WITH LONG-TERM CURRENT USE OF INSULIN (HCC): ICD-10-CM

## 2021-01-11 DIAGNOSIS — E11.42 TYPE 2 DIABETES MELLITUS WITH DIABETIC POLYNEUROPATHY, WITH LONG-TERM CURRENT USE OF INSULIN (HCC): ICD-10-CM

## 2021-01-11 DIAGNOSIS — I73.9 PVD (PERIPHERAL VASCULAR DISEASE) (HCC): ICD-10-CM

## 2021-01-11 DIAGNOSIS — I25.119 CORONARY ARTERY DISEASE WITH ANGINA PECTORIS, UNSPECIFIED VESSEL OR LESION TYPE, UNSPECIFIED WHETHER NATIVE OR TRANSPLANTED HEART (HCC): ICD-10-CM

## 2021-01-11 PROCEDURE — 99443 PR PHYS/QHP TELEPHONE EVALUATION 21-30 MIN: CPT | Performed by: INTERNAL MEDICINE

## 2021-01-11 ASSESSMENT — PATIENT HEALTH QUESTIONNAIRE - PHQ9
SUM OF ALL RESPONSES TO PHQ QUESTIONS 1-9: 0
SUM OF ALL RESPONSES TO PHQ9 QUESTIONS 1 & 2: 0
1. LITTLE INTEREST OR PLEASURE IN DOING THINGS: 0
SUM OF ALL RESPONSES TO PHQ QUESTIONS 1-9: 0
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
## 2021-01-17 DIAGNOSIS — R06.2 WHEEZING: Primary | ICD-10-CM

## 2021-01-18 RX ORDER — ALBUTEROL SULFATE 90 UG/1
2 AEROSOL, METERED RESPIRATORY (INHALATION) EVERY 6 HOURS PRN
Qty: 6.7 INHALER | Refills: 0 | Status: SHIPPED | OUTPATIENT
Start: 2021-01-18

## 2021-01-19 DIAGNOSIS — E11.41 TYPE 2 DIABETES MELLITUS WITH DIABETIC MONONEUROPATHY (HCC): ICD-10-CM

## 2021-01-19 RX ORDER — PREGABALIN 100 MG/1
100 CAPSULE ORAL 3 TIMES DAILY
Qty: 90 CAPSULE | Refills: 1 | Status: SHIPPED | OUTPATIENT
Start: 2021-01-19 | End: 2021-03-17 | Stop reason: SDUPTHER

## 2021-01-19 NOTE — TELEPHONE ENCOUNTER
Medication: lyrica  Last visit: 01/11/21  Next visit: Visit date not found  Last refill: 11/25/20  Pharmacy: [facility]

## 2021-01-25 DIAGNOSIS — R10.9 FLANK PAIN: Primary | ICD-10-CM

## 2021-01-25 NOTE — PROGRESS NOTES
Patient wanted to cancel his 21 apt and reschedule for after his 2nd COVID vaccine. He is now scheduled for 3/4/2021 and his previous Renal US order was  new order was placed.

## 2021-01-28 RX ORDER — ISOSORBIDE MONONITRATE 30 MG/1
TABLET, EXTENDED RELEASE ORAL
Qty: 90 TABLET | Refills: 3 | Status: SHIPPED | OUTPATIENT
Start: 2021-01-28 | End: 2021-12-28

## 2021-02-10 ENCOUNTER — TELEPHONE (OUTPATIENT)
Dept: INTERNAL MEDICINE CLINIC | Age: 61
End: 2021-02-10

## 2021-02-10 NOTE — TELEPHONE ENCOUNTER
Patient called to inform that his BP today is 92/50 running low, he did have some dizziness as well but feeling a bit better now. Patient did want to mention that he has been doing the Atkins diet and doesn't know if that has anything to do with how he is feeling.  Please advise

## 2021-02-18 RX ORDER — LEVOTHYROXINE SODIUM 0.15 MG/1
TABLET ORAL
Qty: 90 TABLET | Refills: 1 | Status: SHIPPED | OUTPATIENT
Start: 2021-02-18 | End: 2021-08-13

## 2021-02-24 ENCOUNTER — TELEPHONE (OUTPATIENT)
Dept: ONCOLOGY | Age: 61
End: 2021-02-24

## 2021-02-24 DIAGNOSIS — E11.42 TYPE 2 DIABETES MELLITUS WITH DIABETIC POLYNEUROPATHY, WITH LONG-TERM CURRENT USE OF INSULIN (HCC): Primary | ICD-10-CM

## 2021-02-24 DIAGNOSIS — Z79.4 TYPE 2 DIABETES MELLITUS WITH DIABETIC POLYNEUROPATHY, WITH LONG-TERM CURRENT USE OF INSULIN (HCC): Primary | ICD-10-CM

## 2021-02-24 RX ORDER — BLOOD SUGAR DIAGNOSTIC
STRIP MISCELLANEOUS
Qty: 400 STRIP | Refills: 0 | Status: SHIPPED | OUTPATIENT
Start: 2021-02-24 | End: 2021-03-26

## 2021-02-24 NOTE — LETTER
2/24/2021        [facility] [address] [place] [address]    Dear Jacob Colon: Your healthcare provider has ordered a low dose CT scan of the chest for lung cancer screening. You will find enclosed, information about CT lung screening. Please review the statement of understanding, you will be asked to sign a copy of this at the time of your CT scan    If you have not already been contacted to make the appointment for your scan, please call our scheduling department at [phone]    Keep in mind that CT lung screening does not take the place of smoking cessation. If you are a current smoker, you will find enclosed smoking cessation resources. Please do not hesitate to contact me if you have any questions or concerns.     [address] [facility],      [facility] Lung Screening Program  [phone]

## 2021-03-02 ENCOUNTER — HOSPITAL ENCOUNTER (OUTPATIENT)
Dept: ULTRASOUND IMAGING | Age: 61
Discharge: HOME | End: 2021-03-04
Payer: COMMERCIAL

## 2021-03-02 ENCOUNTER — HOSPITAL ENCOUNTER (OUTPATIENT)
Dept: CT IMAGING | Age: 61
Discharge: HOME | End: 2021-03-04
Payer: COMMERCIAL

## 2021-03-02 DIAGNOSIS — R10.9 FLANK PAIN: ICD-10-CM

## 2021-03-02 DIAGNOSIS — Z87.891 PERSONAL HISTORY OF TOBACCO USE, PRESENTING HAZARDS TO HEALTH: ICD-10-CM

## 2021-03-02 PROCEDURE — 76775 US EXAM ABDO BACK WALL LIM: CPT

## 2021-03-02 PROCEDURE — 71271 CT THORAX LUNG CANCER SCR C-: CPT

## 2021-03-02 RX ORDER — ATORVASTATIN CALCIUM 40 MG/1
TABLET, FILM COATED ORAL
Qty: 90 TABLET | Refills: 3 | Status: SHIPPED | OUTPATIENT
Start: 2021-03-02 | End: 2022-01-13

## 2021-03-04 RX ORDER — GEMFIBROZIL 600 MG/1
TABLET, FILM COATED ORAL
Qty: 90 TABLET | Refills: 3 | Status: SHIPPED | OUTPATIENT
Start: 2021-03-04 | End: 2022-03-01

## 2021-03-04 NOTE — TELEPHONE ENCOUNTER
Medication: Lopid, Humalog   Last visit: 1/11/21  Next visit: Visit date not found  Last refill: Lopid 2/24/20, Humalog 10/21/20  Pharmacy: [facility]  Holly SANTOYO

## 2021-03-10 ENCOUNTER — OFFICE VISIT (OUTPATIENT)
Dept: PHYSICAL MEDICINE AND REHAB | Age: 61
End: 2021-03-10
Payer: COMMERCIAL

## 2021-03-10 VITALS
SYSTOLIC BLOOD PRESSURE: 138 MMHG | BODY MASS INDEX: 34.58 KG/M2 | WEIGHT: 284 LBS | DIASTOLIC BLOOD PRESSURE: 79 MMHG | HEIGHT: 76 IN | HEART RATE: 67 BPM | TEMPERATURE: 97.9 F

## 2021-03-10 DIAGNOSIS — Z89.512 STATUS POST BELOW-KNEE AMPUTATION OF LEFT LOWER EXTREMITY (HCC): Primary | ICD-10-CM

## 2021-03-10 PROCEDURE — G8417 CALC BMI ABV UP PARAM F/U: HCPCS | Performed by: PHYSICAL MEDICINE & REHABILITATION

## 2021-03-10 PROCEDURE — G8482 FLU IMMUNIZE ORDER/ADMIN: HCPCS | Performed by: PHYSICAL MEDICINE & REHABILITATION

## 2021-03-10 PROCEDURE — G8427 DOCREV CUR MEDS BY ELIG CLIN: HCPCS | Performed by: PHYSICAL MEDICINE & REHABILITATION

## 2021-03-10 PROCEDURE — 1036F TOBACCO NON-USER: CPT | Performed by: PHYSICAL MEDICINE & REHABILITATION

## 2021-03-10 PROCEDURE — 99212 OFFICE O/P EST SF 10 MIN: CPT | Performed by: PHYSICAL MEDICINE & REHABILITATION

## 2021-03-10 PROCEDURE — 3017F COLORECTAL CA SCREEN DOC REV: CPT | Performed by: PHYSICAL MEDICINE & REHABILITATION

## 2021-03-10 NOTE — PROGRESS NOTES
[facility] [address] [address]  Dept: [phone]  Dept Fax: [phone]      Yessenia Sarmiento, 64 y.o., male, is c/o of Leg amputation (Left BKA)  . HPI:     Mr. 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 This took place in 2015. He does currently have a prosthesis. Any problems with current prosthesis? yes - having areas of redness and pressure over proximal tibia and fibula and dry area related to pressure from socket over distal lateral aspect of residual limb. He uses a cane for mobility/ambulation when on uneven terrain or out for long distances. He ambulates without device at home. He has participated in physical therapy for training to use prosthesis. The patient is able to don and doff the prosthesis with no assistance. He does not require assistance at home. Areas of pain or weakness: None currently but evidence of erythema/redness at pressure points from socket. Patient would like to be able to continue spending time outdoors, visiting friends and family, travel with his wife and function in the community. He cares for his home. He has gained approximately 20 pounds since getting his last prosthesis. His prosthetic foot is over 10years old and no longer a good category to accommodate his weight and function. Foot shows signs of delamination and is beyond the repair warranty time frame. Current socket shows signs of discoloration on anterior distal tibia and medial condyle which could cause skin breakdown or bodily harm with further use.      Past Medical History:   Diagnosis Date    Asymptomatic bilateral carotid artery stenosis     Boil, thigh 10/2019    10/30/19: right inner thigh region, says PCP Rxd Doxy for this, area is much better, has a couple days left to complete Doxy    CAD (coronary artery disease)     saw Dr[facility] Dale Hobbs ([facility])     Irineo foot due to diabetes mellitus ([address] 2014    LEFT    COPD (chronic obstructive pulmonary disease) ([address] 2009    INHALER USE DAILY    Diabetes mellitus ([address] 1989    IDDM, On Insulin Dr. Denny Mtz Diabetic neuropathy ([address]     Difficult intravenous access     VEINS ROLL    Employs prosthetic leg     s/p left BKA    Foot ulcer ([address] PRIOR TO 2015    BILAT    Full dentures     UPPER ONLY    Hyperlipidemia 2004    ON RX    Hypertension 2004    ON RX, PCP Dr. Immanuel Garcia, seen Oct. 2019    Hypoglycemia 4/2/2015    MRSA (methicillin resistant staph aureus) culture positive 4/16/2015    ankle    OA (osteoarthritis)     Osteomyelitis ([address]     left stump  BKA    Paroxysmal atrial fibrillation ([address]     Renal mass 09/2019    ACCIDENTAL  FINDING IS FOLLOWING UP WITH KIDNEY DR Antony Ledezma     Suspected sleep apnea     Thyroid disease 2004    PT HAD HYPERTHYROIDISM-UNCONTROLED THYROID DESTROYED WITH RADI IODINE NOW HAS HYPOTHYRIDISM    Vision abnormalities 09/2019    LEFT NO VISION    Vitreous hemorrhage of left eye due to diabetes mellitus ([address] 09/2019    s/p Vitrectomy 9/2019    Wears glasses     Wears partial dentures     full upper, does not wear lower partial    Wellness examination     Dr. Camille White seen within last 1 1/2 mos      Past Surgical History:   Procedure Laterality Date    CARDIAC CATHETERIZATION      no stenting    CARDIOVASCULAR STRESS TEST  06/28/2019    small fixed apical, likely normal, EF 48%    COLONOSCOPY  06/17/2016    poor prep, done up to the IC valve, redundant colon    COLONOSCOPY  01/23/2017    VIRTUAL COLONOSCOPY DONE-no polyps or masses    CYSTOSCOPY Bilateral 6/16/2020    CYSTOSCOPY RETROGRADE PYELOGRAM URETEROSCOPY performed by Buena Riedel, MD at [facility] Right 7/30/2020    CYSTOSCOPY, RIGHT RETROGRADE PYELOGRAM,  URETERAL CATHETER  INSERTION performed by Buena Riedel, MD at [facility] 9/1/2020    CYSTOSCOPY RETROGRADE PYELOGRAM, RIGHT URETERAL STENT EXCHANGE performed by Jamie Brooks MD at [facility] Right     r-bone removed    HC  PICC [address] DOUBLE  5/21/2018         KIDNEY CYST REMOVAL      KIDNEY SURGERY Right 11/13/2019    XI ROBOTIC PARTIAL NEPHRECTOMY, INTRAOP ULTRASOUND, RIGHT URETEROURETEROSTOMY, RIGHT URETERAL STENT PLACEMENT **SHORT STAY** performed by Jamie Brooks MD at [facility] N/A 7/30/2020    XI LAPAROSCOPIC ROBOTIC URETEROLYSIS, ROBOTIC ASSISTED BUCCAL URETEROPLASTY  (DR. Jaramillo Postal TO ASSIST) performed by Jamie Brooks MD at [address] Left 4/29/15    OTHER SURGICAL HISTORY Left 5-5-15 and 11/2015    Revision BKA    OTHER SURGICAL HISTORY      left stump revision 5/30/2018    [place] RE-AMPUTATION LOWER LEG Left 5/30/2018    LEG AMPUTATION BELOW KNEE REVISION performed by Rocio Morales MD at [address] Bilateral 80'S    TOE AMPUTATION      Right 2 and 4    VITRECTOMY Left 9/25/2019    PARS PLANA VITRECTOMY 25 GAUGE, MEMBRANE PEELING, ENDOLASER 200  MW 1044 SPOTS, INDIRECT LASER 26 SPOTS performed by Nikolas Malik MD [facility] History   Problem Relation Age of Onset    Diabetes Mother     Hypertension Mother     Stomach Cancer Mother     Hypertension Father     Alcohol Abuse Father     COPD Father     Kidney Cancer Father     Diabetes Brother         IDDM-PUMP    Other Sister         BRAIN TUMOR     Social History     Socioeconomic History    Marital status:      Spouse name: Sukumar To Number of children: 2    Years of education: None    Highest education level: None   Occupational History    Occupation: Disabled   Social Needs    Financial resource strain: None    Food insecurity     Worry: None     Inability: None    Transportation needs     Medical: None     Non-medical: None   Tobacco Use    Smoking status: Former Smoker     Packs/day: 2.00     Years: 25.00     Pack years: 50.00     Types: Cigars     Start date: 1     Quit date: 2011     Years since quittin.7    Smokeless tobacco: Never Used    Tobacco comment: quit in    Substance and Sexual Activity    Alcohol use:  Yes     Alcohol/week: 0.0 standard drinks     Frequency: Monthly or less     Comment: BEER 1 A MONTH    Drug use: Not Currently    Sexual activity: Yes     Partners: Female   Lifestyle    Physical activity     Days per week: None     Minutes per session: None    Stress: None   Relationships    Social connections     Talks on phone: None     Gets together: None     Attends [employer] service: None     Active member of club or organization: None     Attends meetings of clubs or organizations: None     Relationship status: None    Intimate partner violence     Fear of current or ex partner: None     Emotionally abused: None     Physically abused: None     Forced sexual activity: None   Other Topics Concern    None   Social History Narrative    None       Current Outpatient Medications   Medication Sig Dispense Refill    gemfibrozil (LOPID) 600 MG tablet TAKE 1 TABLET BY MOUTH EVERY DAY 90 tablet 3    insulin lispro (HUMALOG) 100 UNIT/ML injection vial INJECT 12 UNITS UNDER THE SKIN 3 TIMES DAILY + SLIDING SCALE (TAKE 5 UNITS IF SUGAR IS OVER 150) 20 mL 3    atorvastatin (LIPITOR) 40 MG tablet TAKE 1 TABLET BY MOUTH EVERY DAY 90 tablet 3    ACCU-CHEK SMARTVIEW strip USE TO TEST 4 TIMES A  strip 0    levothyroxine (SYNTHROID) 150 MCG tablet TAKE 1 TABLET BY MOUTH EVERY DAY 90 tablet 1    isosorbide mononitrate (IMDUR) 30 MG extended release tablet TAKE 1 TABLET BY MOUTH EVERY DAY 90 tablet 3    albuterol sulfate  (90 Base) MCG/ACT inhaler INHALE 2 PUFFS INTO THE LUNGS EVERY 6 HOURS AS NEEDED FOR WHEEZING OR SHORTNESS OF BREATH 6.7 Inhaler 0    insulin NPH (HUMULIN N) 100 UNIT/ML injection vial 60 units in the AM , 70 units in PM 30 mL 11    finasteride (PROSCAR) 5 MG tablet TAKE 1 TABLET BY MOUTH EVERY DAY 90 tablet 1    tamsulosin (FLOMAX) 0.4 MG capsule Take 1 capsule by mouth daily 90 capsule 3    clotrimazole-betamethasone (LOTRISONE) 1-0.05 % cream Apply topically 2 times daily. 45 g 3    losartan (COZAAR) 50 MG tablet Take 0.5 tablets by mouth daily 90 tablet 1    metoprolol tartrate (LOPRESSOR) 25 MG tablet Take 1 tablet by mouth 2 times daily 180 tablet 3    glucagon, rDNA, 1 MG injection Inject 1 mL into the muscle      Blood Pressure KIT 1 actuation by Does not apply route once a week 1 kit 0    rOPINIRole (REQUIP) 2 MG tablet TAKE 1 TABLET BY MOUTH EVERY DAY 90 tablet 3    blood glucose test strips (ASCENSIA AUTODISC VI;ONE TOUCH ULTRA TEST VI) strip 1 each by In Vitro route 4 times daily As needed. 400 each 3    Insulin Syringe-Needle U-100 (ULTICARE INSULIN SYRINGE) 31G X 5/16\" 1 ML MISC USE AS DIRECTED WITH HUMULIN AND HUMALOG INSULIN 5 TIMES DAILY 1 each 3    Handicap Placard MISC by Does not apply route Duration - 5years  Reason- prosthetic leg 1 each 0    ELIQUIS 5 MG TABS tablet Take 1 tablet by mouth 2 times daily  2    propafenone (RYTHMOL) 150 MG tablet Take 1 tablet by mouth every 8 hours 90 tablet 3    SYMBICORT 160-4.5 MCG/ACT AERO TAKE 2 PUFFS BY MOUTH TWICE A DAY (Patient taking differently: Inhale 2 puffs into the lungs 2 times daily ) 30.6 Inhaler 3    Omega-3 Fatty Acids (FISH OIL CONCENTRATE) 300 MG CAPS Take 300 mg by mouth nightly       Glucosamine Sulfate 500 MG TABS Take 500 mg by mouth 3 times daily      pregabalin (LYRICA) 100 MG capsule Take 1 capsule by mouth 3 times daily for 30 days. 90 capsule 1    pregabalin (LYRICA) 100 MG capsule TAKE 1 CAPSULE BY MOUTH THREE TIMES A DAY 90 capsule 0     No current facility-administered medications for this visit.       Allergies   Allergen Reactions    Ramipril      Other reaction(s): swelling of the lips   Other reaction(s): individual.  He is highly motivated and would like to continue to care for his home, be independent with ADLs, be active in the community, and travel with his wife. 2. After review with the prosthetist, I agree this patient would benefit from a L definitive prosthesis with total contact lightweight carbon fiber acrylic socket with alignable components. Suction suspension with 2 cushion liners, 2 knee sleeves for proper suspension and hygiene. Flex walk foot to accommodate uneven ground and accommodate variable katlyn. Shrinkers, sheaths and socks to accommodate volume changes. 3. The patient has the ability and potential to be a K3 ambulator. The patient has the ability or potential for ambulation with variable katlyn typical of the community ambulator who has the ability to traverse most environmental barriers and may have vocational, therapeutic, or exercise activity that demands prosthetic utilization beyond simple locomotion. No orders of the defined types were placed in this encounter. No orders of the defined types were placed in this encounter. Return if symptoms worsen or fail to improve. Electronically signed by Aman Alexis MD on 3/10/2021 at 4:24 PM.     Please note that this chartwas generated using voice recognition Dragon dictation software. Although everyeffort was made to ensure the accuracy of this automated transcription, some errorsin transcription may have occurred.

## 2021-03-11 ENCOUNTER — OFFICE VISIT (OUTPATIENT)
Dept: UROLOGY | Age: 61
End: 2021-03-11
Payer: COMMERCIAL

## 2021-03-11 VITALS
HEART RATE: 71 BPM | TEMPERATURE: 98 F | DIASTOLIC BLOOD PRESSURE: 89 MMHG | BODY MASS INDEX: 34.58 KG/M2 | WEIGHT: 284 LBS | SYSTOLIC BLOOD PRESSURE: 160 MMHG | HEIGHT: 76 IN

## 2021-03-11 DIAGNOSIS — N13.5 URETERAL STRICTURE: ICD-10-CM

## 2021-03-11 DIAGNOSIS — N40.1 BENIGN PROSTATIC HYPERPLASIA WITH INCOMPLETE BLADDER EMPTYING: ICD-10-CM

## 2021-03-11 DIAGNOSIS — R39.14 BENIGN PROSTATIC HYPERPLASIA WITH INCOMPLETE BLADDER EMPTYING: ICD-10-CM

## 2021-03-11 DIAGNOSIS — D36.9 ONCOCYTOMA: Primary | ICD-10-CM

## 2021-03-11 PROCEDURE — G8482 FLU IMMUNIZE ORDER/ADMIN: HCPCS | Performed by: UROLOGY

## 2021-03-11 PROCEDURE — G8427 DOCREV CUR MEDS BY ELIG CLIN: HCPCS | Performed by: UROLOGY

## 2021-03-11 PROCEDURE — 99213 OFFICE O/P EST LOW 20 MIN: CPT | Performed by: UROLOGY

## 2021-03-11 PROCEDURE — G8417 CALC BMI ABV UP PARAM F/U: HCPCS | Performed by: UROLOGY

## 2021-03-11 PROCEDURE — 1036F TOBACCO NON-USER: CPT | Performed by: UROLOGY

## 2021-03-11 PROCEDURE — 3017F COLORECTAL CA SCREEN DOC REV: CPT | Performed by: UROLOGY

## 2021-03-11 ASSESSMENT — ENCOUNTER SYMPTOMS
EYE REDNESS: 0
CONSTIPATION: 0
BACK PAIN: 0
DIARRHEA: 0
SHORTNESS OF BREATH: 0
WHEEZING: 0
EYE PAIN: 0
COUGH: 0
NAUSEA: 0
ABDOMINAL PAIN: 0
VOMITING: 0

## 2021-03-11 NOTE — PROGRESS NOTES
[address] [address]  Dept: [address] [facility] Urology Office Note - Established    Patient:  Donnie Traore  YOB: 1960  Date: 3/11/2021    The patient is a 64 y.o. male who presents todayfor evaluation of the following problems:   Chief Complaint   Patient presents with    Results     Renal US        HPI  Doing much better, had ureteral stricture and hydro, chronic and improved, has renal oncocytoma hx. No other issues, urinating well, renal u/s neg    Summary of old records: N/A    Additional History: N/A    Procedures Today: N/A    Urinalysis today:  No results found for this visit on 03/11/21. Last several PSA's:  Lab Results   Component Value Date    PSA 3.56 09/10/2020    PSA 4.62 (H) 10/23/2017     Last total testosterone:  No results found for: TESTOSTERONE    AUA Symptom Score (3/11/2021):   INCOMPLETE EMPTYING: How often have you had the sensation of not emptying your bladder?: Not at all  FREQUENCY: How often do you have to urinate less than every two hours?: Less than 1 to 5 times  INTERMITTENCY: How often have you found you stopped and started again several times when you urinated?: Not at all  URGENCY: How often have you found it difficult to postpone urination?: Almost always  WEAK STREAM: How often have you had a weak urinary stream?: Not at all  STRAINING: How often have you had to strain to start  urination?: Not at all  NOCTURIA: How many times did you typically get up at night to uriniate?: 1 Time  TOTAL I-PSS SCORE[de-identified] 7  How would you feel if you were to spend the rest of your life with your urinary condition?: Mostly Satisfied    Last BUN and creatinine:  Lab Results   Component Value Date    BUN 29 (H) 08/01/2020     Lab Results   Component Value Date    CREATININE 1.74 (H) 08/01/2020       Additional Lab/Culture results: none    Imaging Reviewed during this Office Visit: to the IC valve, redundant colon    COLONOSCOPY  01/23/2017    VIRTUAL COLONOSCOPY DONE-no polyps or masses    CYSTOSCOPY Bilateral 6/16/2020    CYSTOSCOPY RETROGRADE PYELOGRAM URETEROSCOPY performed by Gerson Pool MD at [address] [place] [address] Right 7/30/2020    CYSTOSCOPY, RIGHT RETROGRADE PYELOGRAM,  URETERAL CATHETER  INSERTION performed by Gerson Pool MD at [address] [place] [address] N/A 9/1/2020    CYSTOSCOPY RETROGRADE PYELOGRAM, RIGHT URETERAL STENT EXCHANGE performed [facility] Gerson Pool MD at [facility] Right     r-bone removed    HC  PICC [facility] DOUBLE  5/21/2018         KIDNEY CYST REMOVAL      KIDNEY SURGERY Right 11/13/2019    XI ROBOTIC PARTIAL NEPHRECTOMY, INTRAOP ULTRASOUND, RIGHT URETEROURETEROSTOMY, RIGHT URETERAL STENT PLACEMENT **SHORT STAY** performed by Gerson Pool MD at [address] [place] [address] N/A 7/30/2020    XI LAPAROSCOPIC ROBOTIC URETEROLYSIS, ROBOTIC ASSISTED BUCCAL URETEROPLASTY  (DR. COBIAN TO ASSIST) performed by Gerson Pool MD at [facility] Left 4/29/15    OTHER SURGICAL HISTORY Left 5-5-15 and 11/2015    Revision BKA    OTHER SURGICAL HISTORY      left stump revision 5/30/2018    [place] RE-AMPUTATION LOWER LEG Left 5/30/2018    LEG AMPUTATION BELOW KNEE REVISION performed by Gisel Mitchell MD at  [address] Bilateral 80'S    TOE AMPUTATION      Right 2 and 4    VITRECTOMY Left 9/25/2019    PARS PLANA VITRECTOMY 25 GAUGE, MEMBRANE PEELING, ENDOLASER 200  MW 1044 SPOTS, INDIRECT LASER 26 SPOTS performed by Neelima Barnett MD at [facility] History   Problem Relation Age of Onset    Diabetes Mother     Hypertension Mother     Stomach Cancer Mother     Hypertension Father     Alcohol Abuse Father     COPD Father     Kidney Cancer Father     Diabetes Brother         IDDM-PUMP    Other Sister BRAIN TUMOR     Outpatient Medications Marked as Taking for the 3/11/21 encounter (Office Visit) with Colon Pounds, MD   Medication Sig Dispense Refill    gemfibrozil (LOPID) 600 MG tablet TAKE 1 TABLET BY MOUTH EVERY DAY 90 tablet 3    insulin lispro (HUMALOG) 100 UNIT/ML injection vial INJECT 12 UNITS UNDER THE SKIN 3 TIMES DAILY + SLIDING SCALE (TAKE 5 UNITS IF SUGAR IS OVER 150) 20 mL 3    atorvastatin (LIPITOR) 40 MG tablet TAKE 1 TABLET BY MOUTH EVERY DAY 90 tablet 3    ACCU-CHEK SMARTVIEW strip USE TO TEST 4 TIMES A  strip 0    levothyroxine (SYNTHROID) 150 MCG tablet TAKE 1 TABLET BY MOUTH EVERY DAY 90 tablet 1    isosorbide mononitrate (IMDUR) 30 MG extended release tablet TAKE 1 TABLET BY MOUTH EVERY DAY 90 tablet 3    albuterol sulfate  (90 Base) MCG/ACT inhaler INHALE 2 PUFFS INTO THE LUNGS EVERY 6 HOURS AS NEEDED FOR WHEEZING OR SHORTNESS OF BREATH 6.7 Inhaler 0    insulin NPH (HUMULIN N) 100 UNIT/ML injection vial 60 units in the AM , 70 units in PM 30 mL 11    finasteride (PROSCAR) 5 MG tablet TAKE 1 TABLET BY MOUTH EVERY DAY 90 tablet 1    tamsulosin (FLOMAX) 0.4 MG capsule Take 1 capsule by mouth daily 90 capsule 3    clotrimazole-betamethasone (LOTRISONE) 1-0.05 % cream Apply topically 2 times daily. 45 g 3    losartan (COZAAR) 50 MG tablet Take 0.5 tablets by mouth daily 90 tablet 1    metoprolol tartrate (LOPRESSOR) 25 MG tablet Take 1 tablet by mouth 2 times daily 180 tablet 3    glucagon, rDNA, 1 MG injection Inject 1 mL into the muscle      Blood Pressure KIT 1 actuation by Does not apply route once a week 1 kit 0    rOPINIRole (REQUIP) 2 MG tablet TAKE 1 TABLET BY MOUTH EVERY DAY 90 tablet 3    blood glucose test strips (ASCENSIA AUTODISC VI;ONE TOUCH ULTRA TEST VI) strip 1 each by In Vitro route 4 times daily As needed.  400 each 3    Insulin Syringe-Needle U-100 (ULTICARE INSULIN SYRINGE) 31G X 5/16\" 1 ML MISC USE AS DIRECTED WITH HUMULIN AND HUMALOG INSULIN 5 TIMES DAILY 1 each 3    Handicap Placard MISC by Does not apply route Duration - 5years  Reason- prosthetic leg 1 each 0    ELIQUIS 5 MG TABS tablet Take 1 tablet by mouth 2 times daily  2    propafenone (RYTHMOL) 150 MG tablet Take 1 tablet by mouth every 8 hours 90 tablet 3    SYMBICORT 160-4.5 MCG/ACT AERO TAKE 2 PUFFS BY MOUTH TWICE A DAY (Patient taking differently: Inhale 2 puffs into the lungs 2 times daily ) 30.6 Inhaler 3    Omega-3 Fatty Acids (FISH OIL CONCENTRATE) 300 MG CAPS Take 300 mg by mouth nightly       Glucosamine Sulfate 500 MG TABS Take 500 mg by mouth 3 times daily         Ramipril and Adhesive tape  Social History     Tobacco Use   Smoking Status Former Smoker    Packs/day: 2.00    Years: 25.00    Pack years: 50.00    Types: Cigars    Start date: 1    Quit date: 2011    Years since quittin.7   Smokeless Tobacco Never Used   Tobacco Comment    quit in      (Ifpatient a smoker, smoking cessation counseling offered)    Social History     Substance and Sexual Activity   Alcohol Use Yes    Alcohol/week: 0.0 standard drinks    Frequency: Monthly or less    Comment: BEER 1 A MONTH       REVIEW OF SYSTEMS:  Review of Systems    Physical Exam:      Vitals:    21 0837   BP: (!) 160/89   Pulse:    Temp:      Body mass index is 34.57 kg/m². Patient is a 64 y.o. male in no acute distress and alert and oriented to person, place and time. Physical Exam  Constitutional: Patient in no acute distress. Neuro: Alert and oriented to person, place and time.   Psych: Mood normal, affect normal  Skin: No rash noted  HEENT: Head: Normocephalic andatraumatic  Conjunctivae and EOM are normal. Pupils are equal, round  Nose:Normal  Right External Ear: Normal; Left External Ear: Normal  Mouth: Mucosa Moist  Neck: Supple  Lungs: Respiratory effort is normal  Cardiovascular: Warm & Pink  Abdomen: Soft, non-tender, non-distended with no CVA,  No flank tenderness,  Or hepatosplenomegaly Assessment and Plan      1. Oncocytoma    2. Ureteral stricture    3. Benign prostatic hyperplasia with incomplete bladder emptying           Plan:       Return in about 1 year (around 3/11/2022) for Follow up. Prescriptions Ordered:  No orders of the defined types were placed in this encounter. Orders Placed:  Orders Placed This Encounter   Procedures    PSA, Diagnostic     Standing Status:   Future     Standing Expiration Date:   3/11/2022    Basic Metabolic Panel     Standing Status:   Future     Standing Expiration Date:   9/11/2022           Virlinda Burkitt, MD    Agree with the ROS entered by the MA.

## 2021-03-17 DIAGNOSIS — E11.42 TYPE 2 DIABETES MELLITUS WITH DIABETIC POLYNEUROPATHY, WITH LONG-TERM CURRENT USE OF INSULIN (HCC): ICD-10-CM

## 2021-03-17 DIAGNOSIS — Z79.4 TYPE 2 DIABETES MELLITUS WITH DIABETIC POLYNEUROPATHY, WITH LONG-TERM CURRENT USE OF INSULIN (HCC): ICD-10-CM

## 2021-03-17 DIAGNOSIS — E11.41 TYPE 2 DIABETES MELLITUS WITH DIABETIC MONONEUROPATHY (HCC): ICD-10-CM

## 2021-03-17 RX ORDER — PREGABALIN 100 MG/1
100 CAPSULE ORAL 3 TIMES DAILY
Qty: 270 CAPSULE | Refills: 3 | Status: SHIPPED | OUTPATIENT
Start: 2021-03-17 | End: 2021-09-24 | Stop reason: SDUPTHER

## 2021-03-17 NOTE — TELEPHONE ENCOUNTER
Medication:  Insulins Lyrica  Last Refill:  Recent but ins requires for him to have 90 day supply  Next Appointment:  Visit date not found  Last Appointment:  01/11/21  [facility]    Lab Results   Component Value Date    LABA1C 7.2 08/17/2020     Lab Results   Component Value Date     01/13/2020

## 2021-03-26 ENCOUNTER — TELEPHONE (OUTPATIENT)
Dept: INTERNAL MEDICINE CLINIC | Age: 61
End: 2021-03-26

## 2021-03-26 DIAGNOSIS — Z79.4 TYPE 2 DIABETES MELLITUS WITH DIABETIC POLYNEUROPATHY, WITH LONG-TERM CURRENT USE OF INSULIN (HCC): Primary | ICD-10-CM

## 2021-03-26 DIAGNOSIS — E11.42 TYPE 2 DIABETES MELLITUS WITH DIABETIC POLYNEUROPATHY, WITH LONG-TERM CURRENT USE OF INSULIN (HCC): Primary | ICD-10-CM

## 2021-03-26 RX ORDER — GLUCOSAMINE HCL/CHONDROITIN SU 500-400 MG
CAPSULE ORAL
Qty: 100 STRIP | Refills: 3 | Status: SHIPPED | OUTPATIENT
Start: 2021-03-26 | End: 2021-05-10 | Stop reason: SDUPTHER

## 2021-04-08 DIAGNOSIS — I10 ESSENTIAL HYPERTENSION: Primary | ICD-10-CM

## 2021-04-08 RX ORDER — LOSARTAN POTASSIUM 50 MG/1
TABLET ORAL
Qty: 90 TABLET | Refills: 1 | Status: SHIPPED | OUTPATIENT
Start: 2021-04-08 | End: 2021-09-29

## 2021-04-08 NOTE — TELEPHONE ENCOUNTER
Medication: Requip  Last visit: 1/11/21  Next visit: Visit date not found  Last refill: 3/13/20  Ph[facility]

## 2021-04-09 RX ORDER — ROPINIROLE 2 MG/1
TABLET, FILM COATED ORAL
Qty: 90 TABLET | Refills: 3 | Status: SHIPPED | OUTPATIENT
Start: 2021-04-09 | End: 2022-03-10

## 2021-05-04 ENCOUNTER — OFFICE VISIT (OUTPATIENT)
Dept: INTERNAL MEDICINE CLINIC | Age: 61
End: 2021-05-04
Payer: COMMERCIAL

## 2021-05-04 VITALS
TEMPERATURE: 97.7 F | HEIGHT: 76 IN | DIASTOLIC BLOOD PRESSURE: 82 MMHG | BODY MASS INDEX: 34.7 KG/M2 | HEART RATE: 71 BPM | SYSTOLIC BLOOD PRESSURE: 136 MMHG | WEIGHT: 285 LBS | OXYGEN SATURATION: 98 %

## 2021-05-04 DIAGNOSIS — Z13.220 SCREENING FOR HYPERLIPIDEMIA: ICD-10-CM

## 2021-05-04 DIAGNOSIS — E11.42 TYPE 2 DIABETES MELLITUS WITH DIABETIC POLYNEUROPATHY, WITH LONG-TERM CURRENT USE OF INSULIN (HCC): Primary | ICD-10-CM

## 2021-05-04 DIAGNOSIS — I48.91 ATRIAL FIBRILLATION, UNSPECIFIED TYPE (HCC): ICD-10-CM

## 2021-05-04 DIAGNOSIS — Z79.4 TYPE 2 DIABETES MELLITUS WITH DIABETIC POLYNEUROPATHY, WITH LONG-TERM CURRENT USE OF INSULIN (HCC): Primary | ICD-10-CM

## 2021-05-04 DIAGNOSIS — Z89.512 STATUS POST BELOW-KNEE AMPUTATION OF LEFT LOWER EXTREMITY (HCC): ICD-10-CM

## 2021-05-04 DIAGNOSIS — E03.9 ACQUIRED HYPOTHYROIDISM: ICD-10-CM

## 2021-05-04 DIAGNOSIS — I73.9 PVD (PERIPHERAL VASCULAR DISEASE) (HCC): ICD-10-CM

## 2021-05-04 DIAGNOSIS — R07.89 OTHER CHEST PAIN: ICD-10-CM

## 2021-05-04 DIAGNOSIS — E08.41 DIABETIC MONONEUROPATHY ASSOCIATED WITH DIABETES MELLITUS DUE TO UNDERLYING CONDITION (HCC): ICD-10-CM

## 2021-05-04 DIAGNOSIS — M48.062 SPINAL STENOSIS OF LUMBAR REGION WITH NEUROGENIC CLAUDICATION: ICD-10-CM

## 2021-05-04 DIAGNOSIS — C64.1 MALIGNANT NEOPLASM OF RIGHT KIDNEY (HCC): ICD-10-CM

## 2021-05-04 LAB — HBA1C MFR BLD: 7 %

## 2021-05-04 PROCEDURE — 3051F HG A1C>EQUAL 7.0%<8.0%: CPT | Performed by: INTERNAL MEDICINE

## 2021-05-04 PROCEDURE — G8417 CALC BMI ABV UP PARAM F/U: HCPCS | Performed by: INTERNAL MEDICINE

## 2021-05-04 PROCEDURE — 2022F DILAT RTA XM EVC RTNOPTHY: CPT | Performed by: INTERNAL MEDICINE

## 2021-05-04 PROCEDURE — 83036 HEMOGLOBIN GLYCOSYLATED A1C: CPT | Performed by: INTERNAL MEDICINE

## 2021-05-04 PROCEDURE — 1036F TOBACCO NON-USER: CPT | Performed by: INTERNAL MEDICINE

## 2021-05-04 PROCEDURE — 99214 OFFICE O/P EST MOD 30 MIN: CPT | Performed by: INTERNAL MEDICINE

## 2021-05-04 PROCEDURE — 3017F COLORECTAL CA SCREEN DOC REV: CPT | Performed by: INTERNAL MEDICINE

## 2021-05-04 PROCEDURE — G8427 DOCREV CUR MEDS BY ELIG CLIN: HCPCS | Performed by: INTERNAL MEDICINE

## 2021-05-04 ASSESSMENT — ENCOUNTER SYMPTOMS
WHEEZING: 0
COLOR CHANGE: 0
SHORTNESS OF BREATH: 1
DIARRHEA: 0
COUGH: 0
BACK PAIN: 1
TROUBLE SWALLOWING: 0
EYE PAIN: 0
ABDOMINAL DISTENTION: 0
BLOOD IN STOOL: 0
EYE DISCHARGE: 0

## 2021-05-04 ASSESSMENT — PATIENT HEALTH QUESTIONNAIRE - PHQ9: SUM OF ALL RESPONSES TO PHQ QUESTIONS 1-9: 0

## 2021-05-04 NOTE — PROGRESS NOTES
Visit Information    Have you changed or started any medications since your last visit including any over-the-counter medicines, vitamins, or herbal medicines? no   Are you having any side effects from any of your medications? -  no  Have you stopped taking any of your medications? Is so, why? -  no    Have you seen any other physician or provider since your last visit? No  Have you had any other diagnostic tests since your last visit? Yes - Records Obtained  Have you been seen in the emergency room and/or had an admission to a hospital since we last saw you? No  Have you had your routine dental cleaning in the past 6 months? yes -     Have you activated your [employer] account? If not, what are your barriers?  Yes     Patient Care Team:  Jose Markham MD as PCP - Rosita Duran MD as PCP - [facility] Sheila Kelly Provider  Manpreet Larkin MD as Consulting Physician (Infectious Diseases)  Helga Paget, RN as Imaging Navigator    Medical History Review  Past Medical, Family, and Social History reviewed and does not contribute to the patient presenting condition    Health Maintenance   Topic Date Due    HIV screen  Never done    DTaP/Tdap/Td vaccine (1 - Tdap) Never done    Shingles Vaccine (1 of 2) 10/23/2014    Diabetic retinal exam  01/04/2017    Diabetic microalbuminuria test  01/13/2021    Lipid screen  01/13/2021    TSH testing  06/08/2021    Diabetic foot exam  07/28/2021    Potassium monitoring  08/01/2021    Creatinine monitoring  08/01/2021    A1C test (Diabetic or Prediabetic)  08/17/2021    Low dose CT lung screening  03/02/2022    Colon cancer screen colonoscopy  01/23/2027    Flu vaccine  Completed    Pneumococcal 0-64 years Vaccine  Completed    COVID-19 Vaccine  Completed    Hepatitis C screen  Completed    Hepatitis A vaccine  Aged Out    Hib vaccine  Aged Out    Meningococcal (ACWY) vaccine  Aged Out

## 2021-05-04 NOTE — PROGRESS NOTES
[address]  [place] [address]  Dept: [phone]  Dept Fax: [phone]    Daquan Bustos is a 64 y.o. male who presents today for his medical conditions/complaintsas noted below. Daquan Bustos is c/o of   Chief Complaint   Patient presents with    Diabetes         HPI:     HTN  Onset more than 2 years ago  gustavo mild to mod  Controlled with current po meds  Not associated with headaches or blurry vision  No chest pain  Diabetes   Duration more than 7 years  Modifying factors on Glucophage and other med  Severity uncontrolled sever  Associated signs and symtoms neuropathy/ckd/ CAD. aggravated with sugar diet and better with low sugar diet     HLD  Onset more than 5 years ago  Severity is mild, not getting worse  Not associated with pancreatitis  Tolerating statin well no muscle pain        Hemoglobin A1C (%)   Date Value   05/04/2021 7.0   08/17/2020 7.2   01/13/2020 6.5 (H)             ( goal A1Cis < 7)   Microalb/Crt.  Ratio (mcg/mg creat)   Date Value   01/13/2020 82 (H)     LDL Cholesterol (mg/dL)   Date Value   01/13/2020 70   05/21/2019 68   03/09/2018 56       (goal LDL is <100)   AST (U/L)   Date Value   05/21/2020 24     ALT (U/L)   Date Value   05/21/2020 19     BUN (mg/dL)   Date Value   08/01/2020 29 (H)     BP Readings from Last 3 Encounters:   05/04/21 136/82   03/11/21 (!) 160/89   03/10/21 138/79          (goal 120/80)    Past Medical History:   Diagnosis Date    Asymptomatic bilateral carotid artery stenosis     Boil, thigh 10/2019    10/30/19: right inner thigh region, says PCP Rxd Doxy for this, area is much better, has a couple days left to complete Doxy    CAD (coronary artery disease)     saw Dr. Christy Sandy ([facility])     Charcot foot due to diabetes mellitus ([address] 2014    LEFT    COPD (chronic obstructive pulmonary disease) ([address] 2009    INHALER USE DAILY    Diabetes mellitus ([address] 1989    IDDM, On Insulin Dr. Sadaf Goodson Diabetic neuropathy ([address]     Difficult intravenous access     VEINS ROLL    Employs prosthetic leg     s/p left BKA    Foot ulcer ([address] PRIOR TO 2015    BILAT    Full dentures     UPPER ONLY    Hyperlipidemia 2004    ON RX    Hypertension 2004    ON RX, PCP Dr. Phineas Cranker, seen Oct. 2019    Hypoglycemia 4/2/2015    MRSA (methicillin resistant staph aureus) culture positive 4/16/2015    ankle    OA (osteoarthritis)     Osteomyelitis ([address]     left stump  BKA    Paroxysmal atrial fibrillation ([address]     Renal mass 09/2019    ACCIDENTAL  FINDING IS FOLLOWING UP WITH KIDNEY DR GUTIERREZ [facility]     Suspected sleep apnea     Thyroid disease 2004    PT HAD HYPERTHYROIDISM-UNCONTROLED THYROID DESTROYED WITH RADI IODINE NOW HAS HYPOTHYRIDISM    Vision abnormalities 09/2019    LEFT NO VISION    Vitreous hemorrhage of left eye due to diabetes mellitus ([address] 09/2019    s/p Vitrectomy 9/2019    Wears glasses     Wears partial dentures     full upper, does not wear lower partial    Wellness examination     Dr. Surya Castillo seen within last 1 1/2 mos      Past Surgical History:   Procedure Laterality Date    CARDIAC CATHETERIZATION      no stenting    CARDIOVASCULAR STRESS TEST  06/28/2019    small fixed apical, likely normal, EF 48%    COLONOSCOPY  06/17/2016    poor prep, done up to the IC valve, redundant colon    COLONOSCOPY  01/23/2017    VIRTUAL COLONOSCOPY DONE-no polyps or masses    CYSTOSCOPY Bilateral 6/16/2020    CYSTOSCOPY RETROGRADE PYELOGRAM URETEROSCOPY performed by Sangeetha Mora MD at [address] [place] [address] Right 7/30/2020    CYSTOSCOPY, RIGHT RETROGRADE PYELOGRAM,  URETERAL CATHETER  INSERTION performed by Sangeetha Mora MD at [address] [place] [address] [employer] 9/1/2020    CYSTOSCOPY RETROGRADE PYELOGRAM, RIGHT URETERAL STENT EXCHANGE performed by Sangeetha Mora MD at [facility] Right     r-bone removed      PICC [facility] 2018         KIDNEY CYST REMOVAL      KIDNEY SURGERY Right 2019    XI ROBOTIC PARTIAL NEPHRECTOMY, INTRAOP ULTRASOUND, RIGHT URETEROURETEROSTOMY, RIGHT URETERAL STENT PLACEMENT **SHORT STAY** performed by Gera Mancilla MD at [address] N/A 2020    XI LAPAROSCOPIC ROBOTIC URETEROLYSIS, ROBOTIC ASSISTED BUCCAL URETEROPLASTY  (DR. COBIAN TO ASSIST) performed by Gera Mancilla MD at [address] Left 4/29/15    OTHER SURGICAL HISTORY Left 5-5-15 and 2015    Revision BKA    OTHER SURGICAL HISTORY      left stump revision 2018    [place] RE-AMPUTATION LOWER LEG Left 2018    LEG AMPUTATION BELOW KNEE REVISION performed by Janay Fuentes MD at [address] Bilateral 80'S    TOE AMPUTATION      Right 2 and 4    VITRECTOMY Left 2019    PARS PLANA VITRECTOMY 25 GAUGE, MEMBRANE PEELING, ENDOLASER 200  MW 1044 SPOTS, INDIRECT LASER 26 SPOTS performed by Dayan Townsend MD at [place] History   Problem Relation Age of Onset    Diabetes Mother     Hypertension Mother     Stomach Cancer Mother     Hypertension Father     Alcohol Abuse Father     COPD Father     Kidney Cancer Father     Diabetes Brother         IDDM-PUMP    Other Sister         BRAIN TUMOR       Social History     Tobacco Use    Smoking status: Former Smoker     Packs/day: 2.00     Years: 25.00     Pack years: 50.00     Types: Cigars     Start date: 1     Quit date: 2011     Years since quittin.9    Smokeless tobacco: Never Used    Tobacco comment: quit in    Substance Use Topics    Alcohol use:  Yes     Alcohol/week: 0.0 standard drinks     Frequency: Monthly or less     Comment: BEER 1 A MONTH      Current Outpatient Medications   Medication Sig Dispense Refill    rOPINIRole (REQUIP) 2 MG tablet TAKE 1 TABLET BY MOUTH EVERY DAY 90 tablet 3    losartan (COZAAR) 50 MG tablet TAKE 1 TABLET BY MOUTH EVERY DAY 90 tablet 1    blood glucose monitor strips Contour Next test strips. Test 4 times a day 100 strip 3    insulin lispro (HUMALOG) 100 UNIT/ML injection vial INJECT 12 UNITS UNDER THE SKIN 3 TIMES DAILY + SLIDING SCALE (TAKE 5 UNITS IF SUGAR IS OVER 150) 6 vial 3    insulin NPH (HUMULIN N) 100 UNIT/ML injection vial 60 units in the AM , 70 units in PM 9 vial 3    gemfibrozil (LOPID) 600 MG tablet TAKE 1 TABLET BY MOUTH EVERY DAY 90 tablet 3    atorvastatin (LIPITOR) 40 MG tablet TAKE 1 TABLET BY MOUTH EVERY DAY 90 tablet 3    levothyroxine (SYNTHROID) 150 MCG tablet TAKE 1 TABLET BY MOUTH EVERY DAY 90 tablet 1    isosorbide mononitrate (IMDUR) 30 MG extended release tablet TAKE 1 TABLET BY MOUTH EVERY DAY 90 tablet 3    albuterol sulfate  (90 Base) MCG/ACT inhaler INHALE 2 PUFFS INTO THE LUNGS EVERY 6 HOURS AS NEEDED FOR WHEEZING OR SHORTNESS OF BREATH 6.7 Inhaler 0    finasteride (PROSCAR) 5 MG tablet TAKE 1 TABLET BY MOUTH EVERY DAY 90 tablet 1    tamsulosin (FLOMAX) 0.4 MG capsule Take 1 capsule by mouth daily 90 capsule 3    clotrimazole-betamethasone (LOTRISONE) 1-0.05 % cream Apply topically 2 times daily.  45 g 3    metoprolol tartrate (LOPRESSOR) 25 MG tablet Take 1 tablet by mouth 2 times daily 180 tablet 3    glucagon, rDNA, 1 MG injection Inject 1 mL into the muscle      Blood Pressure KIT 1 actuation by Does not apply route once a week 1 kit 0    Insulin Syringe-Needle U-100 (ULTICARE INSULIN SYRINGE) 31G X 5/16\" 1 ML MISC USE AS DIRECTED WITH HUMULIN AND HUMALOG INSULIN 5 TIMES DAILY 1 each 3    Handicap Placard MISC by Does not apply route Duration - 5years  Reason- prosthetic leg 1 each 0    ELIQUIS 5 MG TABS tablet Take 1 tablet by mouth 2 times daily  2    propafenone (RYTHMOL) 150 MG tablet Take 1 tablet by mouth every 8 hours 90 tablet 3    SYMBICORT 160-4.5 MCG/ACT AERO TAKE 2 PUFFS BY MOUTH TWICE A DAY (Patient taking differently: Inhale 2 puffs into the lungs 2 Genitourinary: Negative for difficulty urinating and frequency. Musculoskeletal: Positive for arthralgias and back pain. Negative for gait problem, myalgias and neck pain. Left bka stump rash     Skin: Negative for color change and rash. Allergic/Immunologic: Negative for environmental allergies and food allergies. Neurological: Negative for dizziness and headaches. Hematological: Negative for adenopathy. Does not bruise/bleed easily. Psychiatric/Behavioral: Negative for behavioral problems and sleep disturbance. Objective:     Physical Exam  Constitutional:       Appearance: He is well-developed. He is not diaphoretic. HENT:      Head: Normocephalic and atraumatic. Eyes:      General:         Right eye: No discharge. Left eye: No discharge. Extraocular Movements:      Right eye: Normal extraocular motion. Left eye: Normal extraocular motion. Conjunctiva/sclera: Conjunctivae normal.      Right eye: Right conjunctiva is not injected. Left eye: Left conjunctiva is not injected. Neck:      Musculoskeletal: Normal range of motion and neck supple. No edema or erythema. Thyroid: No thyroid mass or thyromegaly. Vascular: No JVD. Cardiovascular:      Rate and Rhythm: Normal rate and regular rhythm. Heart sounds: No murmur. No friction rub. Pulmonary:      Effort: Pulmonary effort is normal. No tachypnea, bradypnea, accessory muscle usage or respiratory distress. Breath sounds: Normal breath sounds. No wheezing or rales. Abdominal:      General: Bowel sounds are normal. There is no distension. Palpations: Abdomen is soft. Tenderness: There is no abdominal tenderness. There is no rebound. Musculoskeletal: Normal range of motion. General: No tenderness. Comments: Left stump bka  Has fungal infection skin     Lymphadenopathy:      Head:      Right side of head: No submental or submandibular adenopathy.       Left side of head: No submental or submandibular adenopathy. Cervical: No cervical adenopathy. Skin:     General: Skin is warm. Coloration: Skin is not pale. Findings: No bruising, ecchymosis or rash. Neurological:      Mental Status: He is alert and oriented to person, place, and time. Cranial Nerves: No cranial nerve deficit. Sensory: No sensory deficit. Motor: No atrophy or abnormal muscle tone. Coordination: Coordination normal.   Psychiatric:         Mood and Affect: Mood is not anxious. Affect is not angry. Speech: Speech is not slurred. Behavior: Behavior normal. Behavior is not aggressive. Thought Content: Thought content does not include homicidal ideation. Cognition and Memory: Memory is not impaired. /82   Pulse 71   Temp 97.7 °F (36.5 °C)   Ht 6' 4\" (1.93 m)   Wt 285 lb (129.3 kg)   SpO2 98%   BMI 34.69 kg/m²     Assessment:       Diagnosis Orders   1. Type 2 diabetes mellitus with diabetic polyneuropathy, with long-term current use of insulin (HCC)  Microalbumin, Ur    POCT glycosylated hemoglobin (Hb A1C)   2. Screening for hyperlipidemia  Lipid, Fasting   3. Acquired hypothyroidism     4. Malignant neoplasm of right kidney ([address]     5. Atrial fibrillation, unspecified type ([address]     6. PVD (peripheral vascular disease) ([address]     7. Status post below-knee amputation of left lower extremity ([address]     8. Diabetic mononeuropathy associated with diabetes mellitus due to underlying condition ([address]     9. Spinal stenosis of lumbar region with neurogenic claudication  MRI LUMBAR SPINE WO CONTRAST   10. Other chest pain  Cardiac Stress Test - w/Pharm             Plan:      No follow-ups on file.     Orders Placed This Encounter   Procedures    MRI LUMBAR SPINE WO CONTRAST     Standing Status:   Future     Standing Expiration Date:   5/4/2022     Order Specific Question:   Reason for exam:     Answer:   back pain    Microalbumin, Ur Standing Status:   Future     Standing Expiration Date:   4/30/2022    Lipid, Fasting     Standing Status:   Future     Standing Expiration Date:   4/30/2022    Cardiac Stress Test - w/Pharm     Standing Status:   Future     Standing Expiration Date:   6/4/2021     Order Specific Question:   Reason for Exam?     Answer:   Shortness of breath     Order Specific Question:   Does patient have left bundle branch block (LBBB) or left ventricular hypertrophy (LVH) based on resting ECG, a ventricular pacemaker, or is unable to exercise on a treadmill based on physical or mental limitations? Answer: Yes    POCT glycosylated hemoglobin (Hb A1C)     No orders of the defined types were placed in this encounter. fatigue  Some exertioanl dyspnea  Stress is  2019small vesel ids  Non smoker  Dm   Rule david angina equiv  Mri back  worsr back pain  Mri form past noted  incedental part renal cc ca noted on last mri  Pt got partial nephretomy  a1c 7.0 today     Patient given educational materials - see patient instructions. Discussed use, benefit, and side effects of prescribed medications. All patientquestions answered. Pt voiced understanding. Reviewed health maintenance. Instructedto continue current medications, diet and exercise. Patient agreed with treatmentplan. Follow up as directed.      Electronically signed by Camden Hernandez MD on 5/4/2021 at 2:42 PM

## 2021-05-10 DIAGNOSIS — E11.42 TYPE 2 DIABETES MELLITUS WITH DIABETIC POLYNEUROPATHY, WITH LONG-TERM CURRENT USE OF INSULIN (HCC): ICD-10-CM

## 2021-05-10 DIAGNOSIS — Z79.4 TYPE 2 DIABETES MELLITUS WITH DIABETIC POLYNEUROPATHY, WITH LONG-TERM CURRENT USE OF INSULIN (HCC): ICD-10-CM

## 2021-05-10 RX ORDER — GLUCOSAMINE HCL/CHONDROITIN SU 500-400 MG
CAPSULE ORAL
Qty: 100 STRIP | Refills: 3 | Status: SHIPPED | OUTPATIENT
Start: 2021-05-10 | End: 2022-11-01

## 2021-05-10 NOTE — TELEPHONE ENCOUNTER
Medication: Humulin N Vial, Humalog Vial, Contour Test Strips  Last visit: 5/4/21  Next visit: 6/3/2021  Last refill: Humulin N & Humalog Laurie 3/17/21, test strips 3/26/21  [facility]

## 2021-05-12 ENCOUNTER — HOSPITAL ENCOUNTER (OUTPATIENT)
Dept: NUCLEAR MEDICINE | Age: 61
Discharge: HOME | End: 2021-05-14
Payer: COMMERCIAL

## 2021-05-12 DIAGNOSIS — E11.42 TYPE 2 DIABETES MELLITUS WITH DIABETIC POLYNEUROPATHY, WITH LONG-TERM CURRENT USE OF INSULIN (HCC): ICD-10-CM

## 2021-05-12 DIAGNOSIS — Z79.4 TYPE 2 DIABETES MELLITUS WITH DIABETIC POLYNEUROPATHY, WITH LONG-TERM CURRENT USE OF INSULIN (HCC): ICD-10-CM

## 2021-05-12 DIAGNOSIS — R07.89 OTHER CHEST PAIN: ICD-10-CM

## 2021-05-12 PROCEDURE — 78452 HT MUSCLE IMAGE SPECT MULT: CPT

## 2021-05-12 PROCEDURE — A9500 TC99M SESTAMIBI: HCPCS | Performed by: INTERNAL MEDICINE

## 2021-05-12 PROCEDURE — 3430000000 HC RX DIAGNOSTIC RADIOPHARMACEUTICAL: Performed by: INTERNAL MEDICINE

## 2021-05-12 PROCEDURE — 2580000003 HC RX 258: Performed by: INTERNAL MEDICINE

## 2021-05-12 RX ORDER — SODIUM CHLORIDE 0.9 % (FLUSH) 0.9 %
10 SYRINGE (ML) INJECTION PRN
Status: DISCONTINUED | OUTPATIENT
Start: 2021-05-12 | End: 2021-05-15 | Stop reason: HOSPADM

## 2021-05-12 RX ADMIN — TETRAKIS(2-METHOXYISOBUTYLISOCYANIDE)COPPER(I) TETRAFLUOROBORATE 33.6 MILLICURIE: 1 INJECTION, POWDER, LYOPHILIZED, FOR SOLUTION INTRAVENOUS at 13:04

## 2021-05-12 RX ADMIN — Medication 10 ML: at 13:04

## 2021-05-13 ENCOUNTER — HOSPITAL ENCOUNTER (OUTPATIENT)
Dept: NUCLEAR MEDICINE | Age: 61
Discharge: HOME | End: 2021-05-15
Payer: COMMERCIAL

## 2021-05-13 ENCOUNTER — HOSPITAL ENCOUNTER (OUTPATIENT)
Dept: NON INVASIVE DIAGNOSTICS | Age: 61
Discharge: HOME | End: 2021-05-13
Payer: COMMERCIAL

## 2021-05-13 ENCOUNTER — HOSPITAL ENCOUNTER (OUTPATIENT)
Age: 61
Discharge: HOME | End: 2021-05-13
Payer: COMMERCIAL

## 2021-05-13 DIAGNOSIS — Z13.220 SCREENING FOR HYPERLIPIDEMIA: ICD-10-CM

## 2021-05-13 LAB
CHOLESTEROL, FASTING: 123 MG/DL
CHOLESTEROL/HDL RATIO: 2.8
HDLC SERPL-MCNC: 44 MG/DL
LDL CHOLESTEROL: 55 MG/DL (ref 0–130)
LV EF: 60 %
LVEF MODALITY: NORMAL
TRIGLYCERIDE, FASTING: 119 MG/DL
VLDLC SERPL CALC-MCNC: NORMAL MG/DL (ref 1–30)

## 2021-05-13 PROCEDURE — A9500 TC99M SESTAMIBI: HCPCS | Performed by: INTERNAL MEDICINE

## 2021-05-13 PROCEDURE — 2580000003 HC RX 258: Performed by: INTERNAL MEDICINE

## 2021-05-13 PROCEDURE — 6360000002 HC RX W HCPCS: Performed by: INTERNAL MEDICINE

## 2021-05-13 PROCEDURE — 80061 LIPID PANEL: CPT

## 2021-05-13 PROCEDURE — 93017 CV STRESS TEST TRACING ONLY: CPT

## 2021-05-13 PROCEDURE — 3430000000 HC RX DIAGNOSTIC RADIOPHARMACEUTICAL: Performed by: INTERNAL MEDICINE

## 2021-05-13 PROCEDURE — 36415 COLL VENOUS BLD VENIPUNCTURE: CPT

## 2021-05-13 RX ORDER — SODIUM CHLORIDE 9 MG/ML
500 INJECTION, SOLUTION INTRAVENOUS CONTINUOUS PRN
Status: DISCONTINUED | OUTPATIENT
Start: 2021-05-13 | End: 2021-05-13 | Stop reason: HOSPADM

## 2021-05-13 RX ORDER — SODIUM CHLORIDE 0.9 % (FLUSH) 0.9 %
10 SYRINGE (ML) INJECTION PRN
Status: DISCONTINUED | OUTPATIENT
Start: 2021-05-13 | End: 2021-05-15 | Stop reason: HOSPADM

## 2021-05-13 RX ORDER — ATROPINE SULFATE 0.1 MG/ML
0.5 INJECTION INTRAVENOUS EVERY 5 MIN PRN
Status: DISCONTINUED | OUTPATIENT
Start: 2021-05-13 | End: 2021-05-13 | Stop reason: HOSPADM

## 2021-05-13 RX ORDER — METOPROLOL TARTRATE 5 MG/5ML
5 INJECTION INTRAVENOUS EVERY 5 MIN PRN
Status: DISCONTINUED | OUTPATIENT
Start: 2021-05-13 | End: 2021-05-13 | Stop reason: HOSPADM

## 2021-05-13 RX ORDER — AMINOPHYLLINE DIHYDRATE 25 MG/ML
50 INJECTION, SOLUTION INTRAVENOUS PRN
Status: DISCONTINUED | OUTPATIENT
Start: 2021-05-13 | End: 2021-05-13 | Stop reason: HOSPADM

## 2021-05-13 RX ORDER — ALBUTEROL SULFATE 90 UG/1
2 AEROSOL, METERED RESPIRATORY (INHALATION) PRN
Status: DISCONTINUED | OUTPATIENT
Start: 2021-05-13 | End: 2021-05-13 | Stop reason: HOSPADM

## 2021-05-13 RX ORDER — SODIUM CHLORIDE 0.9 % (FLUSH) 0.9 %
5-40 SYRINGE (ML) INJECTION PRN
Status: DISCONTINUED | OUTPATIENT
Start: 2021-05-13 | End: 2021-05-13 | Stop reason: HOSPADM

## 2021-05-13 RX ORDER — NITROGLYCERIN 0.4 MG/1
0.4 TABLET SUBLINGUAL EVERY 5 MIN PRN
Status: DISCONTINUED | OUTPATIENT
Start: 2021-05-13 | End: 2021-05-13 | Stop reason: HOSPADM

## 2021-05-13 RX ADMIN — SODIUM CHLORIDE, PRESERVATIVE FREE 10 ML: 5 INJECTION INTRAVENOUS at 09:33

## 2021-05-13 RX ADMIN — TETRAKIS(2-METHOXYISOBUTYLISOCYANIDE)COPPER(I) TETRAFLUOROBORATE 34.3 MILLICURIE: 1 INJECTION, POWDER, LYOPHILIZED, FOR SOLUTION INTRAVENOUS at 09:32

## 2021-05-13 RX ADMIN — REGADENOSON 0.4 MG: 0.08 INJECTION, SOLUTION INTRAVENOUS at 09:31

## 2021-05-13 RX ADMIN — SODIUM CHLORIDE, PRESERVATIVE FREE 10 ML: 5 INJECTION INTRAVENOUS at 08:09

## 2021-05-13 NOTE — PROGRESS NOTES
CST Lexiscan. Stress Tech performs patient preparation of physical comfort, review test procedures, pre-stress EKG. Lung Sounds clear t/o. Consent verified by pt. .  Educated patient on test procedure and possible side effects of Lexiscan as well as s/s to report. Cardiologist reviewed pre-test EKG and is present for test.  Patient tolerated test well with minor SOB, which resolved to baseline after test with caffeine. Start /72 HR 62  Stop /74 HR 72  EKG portion of testing is completed and negative, nuc. med. portion is still pending.

## 2021-05-14 NOTE — PROCEDURES
[address] [place], [place] [address]                              CARDIAC STRESS TEST    PATIENT NAME: Marcos Bloom                   :        1960  MED REC NO:   [id number]                              ROOM:  ACCOUNT NO:   [de-identified]                           ADMIT DATE: 2021  PROVIDER:     Peter Lo    DATE OF STUDY:  2021    TEST TYPE: LEXISCAN CARDIOLYTE STRESS TEST  INDICATION: SHORTNESS OF BREATH  REFERRING PHYSICIAN: DR. Alireza Guerrero    RESTING HEART RATE: 62 BEATS PER MINUTE  RESTING BLOOD PRESSURE: 126/72    MEDICATION(S) GIVEN: 0.4MG IV LEXISCAN  REASON FOR TERMINATION: MEDICATION INFUSION COMPLETE    RESTING EKG: NORMAL  STRESS HEART RESPONSE: NORMAL RESPONSE  BLOOD PRESSURE RESPONSE: APPROPRIATE  STRESS EKGs: NORMAL  CHEST DISCOMFORT: NO PAIN  ISCHEMIC EKG CHANGES: NONE    EKG IMPRESSION: ELECTROCARDIOGRAPHICALLY NEGATIVE LEXISCAN STRESS TEST. RADIOISOTOPE RESULTS TO FOLLOW FROM THE DEPARTMENT OF NUCLEAR MEDICINE.         [facility]    D: 2021 9:02:39       T: 2021 9:04:37     /[id number]  Job#: [id number]     Doc#: Unknown    CC:    (Retain this field even if not dictated or not decipherable)

## 2021-06-01 ENCOUNTER — HOSPITAL ENCOUNTER (OUTPATIENT)
Dept: MRI IMAGING | Age: 61
Discharge: HOME | End: 2021-06-03
Payer: COMMERCIAL

## 2021-06-01 DIAGNOSIS — M48.062 SPINAL STENOSIS OF LUMBAR REGION WITH NEUROGENIC CLAUDICATION: ICD-10-CM

## 2021-06-01 PROCEDURE — 72148 MRI LUMBAR SPINE W/O DYE: CPT

## 2021-06-07 ENCOUNTER — TELEPHONE (OUTPATIENT)
Dept: INTERNAL MEDICINE CLINIC | Age: 61
End: 2021-06-07

## 2021-06-07 NOTE — TELEPHONE ENCOUNTER
Writer attempted to lm on pts phone regarding his 1st no show appt chong w/ Dr. Phineas Cranker. No answer, unable to lm. Ltr mailed.

## 2021-06-11 ENCOUNTER — OFFICE VISIT (OUTPATIENT)
Dept: INTERNAL MEDICINE CLINIC | Age: 61
End: 2021-06-11
Payer: COMMERCIAL

## 2021-06-11 VITALS
DIASTOLIC BLOOD PRESSURE: 80 MMHG | BODY MASS INDEX: 34.95 KG/M2 | OXYGEN SATURATION: 95 % | HEIGHT: 76 IN | WEIGHT: 287 LBS | HEART RATE: 67 BPM | SYSTOLIC BLOOD PRESSURE: 130 MMHG

## 2021-06-11 DIAGNOSIS — R53.83 FATIGUE, UNSPECIFIED TYPE: Primary | ICD-10-CM

## 2021-06-11 DIAGNOSIS — E11.42 TYPE 2 DIABETES MELLITUS WITH DIABETIC POLYNEUROPATHY, WITH LONG-TERM CURRENT USE OF INSULIN (HCC): ICD-10-CM

## 2021-06-11 DIAGNOSIS — M48.061 SPINAL STENOSIS OF LUMBAR REGION WITHOUT NEUROGENIC CLAUDICATION: ICD-10-CM

## 2021-06-11 DIAGNOSIS — I73.9 PVD (PERIPHERAL VASCULAR DISEASE) (HCC): ICD-10-CM

## 2021-06-11 DIAGNOSIS — I48.91 ATRIAL FIBRILLATION, UNSPECIFIED TYPE (HCC): ICD-10-CM

## 2021-06-11 DIAGNOSIS — I10 ESSENTIAL HYPERTENSION: ICD-10-CM

## 2021-06-11 DIAGNOSIS — M48.061 SPINAL STENOSIS OF LUMBAR REGION WITHOUT NEUROGENIC CLAUDICATION: Primary | ICD-10-CM

## 2021-06-11 DIAGNOSIS — Z79.4 TYPE 2 DIABETES MELLITUS WITH DIABETIC POLYNEUROPATHY, WITH LONG-TERM CURRENT USE OF INSULIN (HCC): ICD-10-CM

## 2021-06-11 PROCEDURE — 1036F TOBACCO NON-USER: CPT | Performed by: INTERNAL MEDICINE

## 2021-06-11 PROCEDURE — G8427 DOCREV CUR MEDS BY ELIG CLIN: HCPCS | Performed by: INTERNAL MEDICINE

## 2021-06-11 PROCEDURE — G8417 CALC BMI ABV UP PARAM F/U: HCPCS | Performed by: INTERNAL MEDICINE

## 2021-06-11 PROCEDURE — 3017F COLORECTAL CA SCREEN DOC REV: CPT | Performed by: INTERNAL MEDICINE

## 2021-06-11 PROCEDURE — 2022F DILAT RTA XM EVC RTNOPTHY: CPT | Performed by: INTERNAL MEDICINE

## 2021-06-11 PROCEDURE — 3051F HG A1C>EQUAL 7.0%<8.0%: CPT | Performed by: INTERNAL MEDICINE

## 2021-06-11 PROCEDURE — 99214 OFFICE O/P EST MOD 30 MIN: CPT | Performed by: INTERNAL MEDICINE

## 2021-06-11 ASSESSMENT — ENCOUNTER SYMPTOMS
ABDOMINAL DISTENTION: 0
EYE DISCHARGE: 0
BLOOD IN STOOL: 0
COLOR CHANGE: 0
COUGH: 0
SHORTNESS OF BREATH: 0
WHEEZING: 0
EYE PAIN: 0
BACK PAIN: 1
TROUBLE SWALLOWING: 0
DIARRHEA: 0

## 2021-06-11 NOTE — PROGRESS NOTES
Visit Information    Have you changed or started any medications since your last visit including any over-the-counter medicines, vitamins, or herbal medicines? no   Are you having any side effects from any of your medications? -  no  Have you stopped taking any of your medications? Is so, why? -  no    Have you seen any other physician or provider since your last visit? No  Have you had any other diagnostic tests since your last visit? Yes - Records Obtained  Have you been seen in the emergency room and/or had an admission to a hospital since we last saw you? No  Have you had your routine dental cleaning in the past 6 months? no    Have you activated your [employer] account? If not, what are your barriers?  Yes     Patient Care Team:  Santhosh Ramírez MD as PCP - [facility] - [facility] Provider  Sonny Orozco MD as Consulting Physician (Infectious Diseases)  Halima Ramirez RN as Imaging Navigator    Medical History Review  Past Medical, Family, and Social History reviewed and does not contribute to the patient presenting condition    Health Maintenance   Topic Date Due    HIV screen  Never done    DTaP/Tdap/Td vaccine (1 - Tdap) Never done    Shingles Vaccine (1 of 2) 10/23/2014    Diabetic retinal exam  01/04/2017    Diabetic microalbuminuria test  01/13/2021    TSH testing  06/08/2021    Diabetic foot exam  07/28/2021    Potassium monitoring  08/01/2021    Creatinine monitoring  08/01/2021    Low dose CT lung screening  03/02/2022    A1C test (Diabetic or Prediabetic)  05/04/2022    Lipid screen  05/13/2022    Pneumococcal 0-64 years Vaccine (2 of 2) 01/07/2025    Colon cancer screen colonoscopy  01/23/2027    Flu vaccine  Completed    COVID-19 Vaccine  Completed    Hepatitis C screen  Completed    Hepatitis A vaccine  Aged Out    Hib vaccine  Aged Out    Meningococcal (ACWY) vaccine  Aged Out

## 2021-06-11 NOTE — PROGRESS NOTES
[address] [facility] [address]  [place] [address]  Dept: [phone]  Dept Fax: [phone]    Tee Floyd is a 64 y.o. male who presents today for his medical conditions/complaintsas noted below. Tee Floyd is c/o of   Chief Complaint   Patient presents with    Diabetes     5/4/21 a1c 7.0    Fatigue         HPI:     HTN  Onset more than 2 years ago  gustavo mild to mod  Controlled with current po meds  Not associated with headaches or blurry vision  No chest pain  Diabetes   Duration more than 7 years  Modifying factors on Glucophage and other med  Severity uncontrolled sever  Associated signs and symtoms neuropathy/ckd/ CAD. aggravated with sugar diet and better with low sugar diet           Hemoglobin A1C (%)   Date Value   05/04/2021 7.0   08/17/2020 7.2   01/13/2020 6.5 (H)             ( goal A1Cis < 7)   Microalb/Crt.  Ratio (mcg/mg creat)   Date Value   01/13/2020 82 (H)     LDL Cholesterol (mg/dL)   Date Value   05/13/2021 55   01/13/2020 70   05/21/2019 68       (goal LDL is <100)   AST (U/L)   Date Value   05/21/2020 24     ALT (U/L)   Date Value   05/21/2020 19     BUN (mg/dL)   Date Value   08/01/2020 29 (H)     BP Readings from Last 3 Encounters:   06/11/21 130/80   05/04/21 136/82   03/11/21 (!) 160/89          (goal 120/80)    Past Medical History:   Diagnosis Date    Asymptomatic bilateral carotid artery stenosis     Boil, thigh 10/2019    10/30/19: right inner thigh region, says PCP Rxd Doxy for this, area is much better, has a couple days left to complete Doxy    CAD (coronary artery disease)     saw Dr. Gerda Aldrich ([facility])     Charcot foot due to diabetes mellitus ([address] 2014    LEFT    COPD (chronic obstructive pulmonary disease) ([address] 2009    INHALER USE DAILY    Diabetes mellitus ([address] 1989    IDDM, On Insulin Dr. Yohana Plummer Diabetic neuropathy ([address]     Difficult intravenous access     VEINS ROLL    Employs prosthetic leg     s/p left BKA    Foot ULTRASOUND, RIGHT URETEROURETEROSTOMY, RIGHT URETERAL STENT PLACEMENT **SHORT STAY** performed by Herman Ag MD at [address] N/A 7/30/2020    XI LAPAROSCOPIC ROBOTIC URETEROLYSIS, ROBOTIC ASSISTED BUCCAL URETEROPLASTY  (DR. COBIAN TO ASSIST) performed by Herman Ag MD at [address] Left 4/29/15    OTHER SURGICAL HISTORY Left 5-5-15 and 11/2015    Revision BKA    OTHER SURGICAL HISTORY      left stump revision 5/30/2018    NV RE-AMPUTATION LOWER LEG Left 5/30/2018    LEG AMPUTATION BELOW KNEE REVISION performed by Maikel Alvarado MD at [address] Bilateral 80'S    TOE AMPUTATION      Right 2 and 4    VITRECTOMY Left 9/25/2019    PARS PLANA VITRECTOMY 25 GAUGE, MEMBRANE PEELING, ENDOLASER 200  MW 1044 SPOTS, INDIRECT LASER 26 SPOTS performed by Alexis Alexander MD at [place] History   Problem Relation Age of Onset    Diabetes Mother     Hypertension Mother     Stomach Cancer Mother     Hypertension Father     Alcohol Abuse Father     COPD Father     Kidney Cancer Father     Diabetes Brother         IDDM-PUMP    Other Sister         BRAIN TUMOR       Social History     Tobacco Use    Smoking status: Former Smoker     Packs/day: 2.00     Years: 25.00     Pack years: 50.00     Types: Cigars     Start date: 1     Quit date: 5/29/2011     Years since quitting: 10.0    Smokeless tobacco: Never Used    Tobacco comment: quit in 2011   Substance Use Topics    Alcohol use:  Yes     Alcohol/week: 0.0 standard drinks     Comment: BEER 1 A MONTH      Current Outpatient Medications   Medication Sig Dispense Refill    insulin NPH (HUMULIN N) 100 UNIT/ML injection vial 60 units in the AM , 70 units in PM 9 vial 3    insulin lispro (HUMALOG) 100 UNIT/ML injection vial INJECT 12 UNITS UNDER THE SKIN 3 TIMES DAILY + SLIDING SCALE (TAKE 5 UNITS IF SUGAR IS OVER 150) 6 vial 3    blood glucose monitor strips T/S Contour. Test 4 times a day 100 strip 3    rOPINIRole (REQUIP) 2 MG tablet TAKE 1 TABLET BY MOUTH EVERY DAY 90 tablet 3    losartan (COZAAR) 50 MG tablet TAKE 1 TABLET BY MOUTH EVERY DAY 90 tablet 1    gemfibrozil (LOPID) 600 MG tablet TAKE 1 TABLET BY MOUTH EVERY DAY 90 tablet 3    atorvastatin (LIPITOR) 40 MG tablet TAKE 1 TABLET BY MOUTH EVERY DAY 90 tablet 3    levothyroxine (SYNTHROID) 150 MCG tablet TAKE 1 TABLET BY MOUTH EVERY DAY 90 tablet 1    isosorbide mononitrate (IMDUR) 30 MG extended release tablet TAKE 1 TABLET BY MOUTH EVERY DAY 90 tablet 3    albuterol sulfate  (90 Base) MCG/ACT inhaler INHALE 2 PUFFS INTO THE LUNGS EVERY 6 HOURS AS NEEDED FOR WHEEZING OR SHORTNESS OF BREATH 6.7 Inhaler 0    finasteride (PROSCAR) 5 MG tablet TAKE 1 TABLET BY MOUTH EVERY DAY 90 tablet 1    tamsulosin (FLOMAX) 0.4 MG capsule Take 1 capsule by mouth daily 90 capsule 3    clotrimazole-betamethasone (LOTRISONE) 1-0.05 % cream Apply topically 2 times daily.  45 g 3    metoprolol tartrate (LOPRESSOR) 25 MG tablet Take 1 tablet by mouth 2 times daily 180 tablet 3    glucagon, rDNA, 1 MG injection Inject 1 mL into the muscle      Blood Pressure KIT 1 actuation by Does not apply route once a week 1 kit 0    Insulin Syringe-Needle U-100 (ULTICARE INSULIN SYRINGE) 31G X 5/16\" 1 ML MISC USE AS DIRECTED WITH HUMULIN AND HUMALOG INSULIN 5 TIMES DAILY 1 each 3    Handicap Placard MISC by Does not apply route Duration - 5years  Reason- prosthetic leg 1 each 0    ELIQUIS 5 MG TABS tablet Take 1 tablet by mouth 2 times daily  2    propafenone (RYTHMOL) 150 MG tablet Take 1 tablet by mouth every 8 hours 90 tablet 3    SYMBICORT 160-4.5 MCG/ACT AERO TAKE 2 PUFFS BY MOUTH TWICE A DAY (Patient taking differently: Inhale 2 puffs into the lungs 2 times daily ) 30.6 Inhaler 3    Omega-3 Fatty Acids (FISH OIL CONCENTRATE) 300 MG CAPS Take 300 mg by mouth nightly       Glucosamine Sulfate 500 MG TABS Take 500 mg by mouth 3 times daily      pregabalin (LYRICA) 100 MG capsule Take 1 capsule by mouth 3 times daily for 30 days. 270 capsule 3    pregabalin (LYRICA) 100 MG capsule TAKE 1 CAPSULE BY MOUTH THREE TIMES A DAY 90 capsule 0     No current facility-administered medications for this visit. Allergies   Allergen Reactions    Ramipril      Other reaction(s): swelling of the lips   Other reaction(s): swelling of the lips     Adhesive Tape      tegaderm causes blisters. Use paper tape       Health Maintenance   Topic Date Due    HIV screen  Never done    DTaP/Tdap/Td vaccine (1 - Tdap) Never done    Shingles Vaccine (1 of 2) 10/23/2014    Diabetic retinal exam  01/04/2017    Diabetic microalbuminuria test  01/13/2021    TSH testing  06/08/2021    Diabetic foot exam  07/28/2021    Potassium monitoring  08/01/2021    Creatinine monitoring  08/01/2021    Low dose CT lung screening  03/02/2022    A1C test (Diabetic or Prediabetic)  05/04/2022    Lipid screen  05/13/2022    Pneumococcal 0-64 years Vaccine (2 of 2) 01/07/2025    Colon cancer screen colonoscopy  01/23/2027    Flu vaccine  Completed    COVID-19 Vaccine  Completed    Hepatitis C screen  Completed    Hepatitis A vaccine  Aged Out    Hib vaccine  Aged Out    Meningococcal (ACWY) vaccine  Aged Out       Subjective:     Review of Systems   Constitutional: Positive for fatigue. Negative for appetite change and diaphoresis. HENT: Negative for ear discharge and trouble swallowing. Eyes: Negative for pain and discharge. Respiratory: Negative for cough, shortness of breath and wheezing. Cardiovascular: Negative for chest pain and palpitations. Gastrointestinal: Negative for abdominal distention, blood in stool and diarrhea. Endocrine: Negative for polydipsia and polyphagia. Genitourinary: Negative for difficulty urinating and frequency. Musculoskeletal: Positive for arthralgias and back pain.  Negative for gait problem, or rash. Neurological:      Mental Status: He is alert and oriented to person, place, and time. Cranial Nerves: No cranial nerve deficit. Sensory: No sensory deficit. Motor: No atrophy or abnormal muscle tone. Coordination: Coordination normal.   Psychiatric:         Mood and Affect: Mood is not anxious. Affect is not angry. Speech: Speech is not slurred. Behavior: Behavior normal. Behavior is not aggressive. Thought Content: Thought content does not include homicidal ideation. Cognition and Memory: Memory is not impaired. /80   Pulse 67   Ht 6' 4\" (1.93 m)   Wt 287 lb (130.2 kg)   SpO2 95%   BMI 34.93 kg/m²     Assessment:       Diagnosis Orders   1. Fatigue, unspecified type     2. Atrial fibrillation, unspecified type (HCC)  TSH With Reflex Ft4   3. Type 2 diabetes mellitus with diabetic polyneuropathy, with long-term current use of insulin ([facility]     4. PVD (peripheral vascular disease) ([facility]     5. Essential hypertension     6. Spinal stenosis of lumbar region without neurogenic claudication  Ambulatory referral to Physical Therapy             Plan:      No follow-ups on file. Orders Placed This Encounter   Procedures    TSH With Reflex Ft4     Standing Status:   Future     Standing Expiration Date:   6/11/2022    Ambulatory referral to Physical Therapy     Referral Priority:   Routine     Referral Type:   Physical Medicine     Requested Specialty:   Physical Therapy     Number of Visits Requested:   1     No orders of the defined types were placed in this encounter. fatigue  Angina equivalent ruled out on stres test  tsh was hgh  cheko recheck  May need higher syntrhoid  But back lopressor to 12.5 mg bid    Back pain mri noted djd  offerd PT     Patient given educational materials - see patient instructions. Discussed use, benefit, and side effects of prescribed medications. All patientquestions answered. Pt voiced understanding. Reviewed health maintenance. Instructedto continue current medications, diet and exercise. Patient agreed with treatmentplan. Follow up as directed.      Electronically signed by Jade Douglas MD on 6/11/2021 at 2:19 PM

## 2021-06-15 ENCOUNTER — HOSPITAL ENCOUNTER (OUTPATIENT)
Age: 61
Setting detail: SPECIMEN
Discharge: HOME | End: 2021-06-15
Payer: COMMERCIAL

## 2021-06-15 DIAGNOSIS — I48.91 ATRIAL FIBRILLATION, UNSPECIFIED TYPE (HCC): ICD-10-CM

## 2021-06-15 LAB
THYROXINE, FREE: 0.8 NG/DL (ref 0.93–1.7)
TSH SERPL DL<=0.05 MIU/L-ACNC: 30.47 MIU/L (ref 0.3–5)

## 2021-06-21 ENCOUNTER — HOSPITAL ENCOUNTER (OUTPATIENT)
Dept: PHYSICAL THERAPY | Age: 61
Setting detail: THERAPIES SERIES
Discharge: HOME | End: 2021-06-21
Payer: COMMERCIAL

## 2021-06-21 PROCEDURE — 97110 THERAPEUTIC EXERCISES: CPT

## 2021-06-21 PROCEDURE — 97161 PT EVAL LOW COMPLEX 20 MIN: CPT

## 2021-06-21 NOTE — PROGRESS NOTES
[address] [facility]   Outpatient Physical Therapy  Physical Therapy Lumbar Evaluation    Date:  2021  Patient: Colten Kim  : 1960  MRN: [id number]  Physician: Jose Markham MD  Insurance: Medicare & [employer]   Medical Diagnosis: M48.061 (ICD-10-CM) - Spinal stenosis of lumbar region without neurogenic claudication   Rehab Codes: R25 pain , M25.60 stiffness, R53.1 weakness  Onset Date: chronic   Next 's appt: 21    Precautions: LEVAR POTTER     Subjective:   Pt notes he is here to address his low back pain. Notes pain has been present for years and he finally wanted to address it. States he has noticed this most when walking for extended periods of time, standing, or forward flexing to do dishes and cooking. Has modified to using WC in kitchen for prep. He feels pain is relieved with sitting. Reports he is using a lot of grabbers to get things off the floor. PMHx: [] Unremarkable [x] Diabetes [] HTN  [] Pacemaker   [x] MI/Heart Problems (afib) [] Cancer [x] Arthritis [] Other:              [x] Refer to full medical chart  In EPIC     Comorbidities:   [x] Obesity [] Dialysis  [x] Other: current    [x] Asthma/COPD [] Dementia [x] Other: L ZAIRAA    [] Stroke [] Sleep apnea [x] Other:A Fib    [x] Vascular disease [] Rheumatic disease [x] Other: kindey tumor removed      Preferred Language:   [x] English           [] Other:    Prior Imaging:   MRI may 2021:   Multilevel degenerative disc disease and multilevel degenerative   facet/ligamentous hypertrophy with canal stenosis most pronounced at L2-3 and   L4-5.       No significant foraminal narrowing. Previous Treatment: none     Home Environment: One level home that he will navigate with a SPC or WC. Has an outdoor garden that he would like to keep working in.      Medications: [x] Refer to full medical record [] None [] Other:  Allergies:      [x] Refer to full medical record [] None [] Other:    Work Status: Prior: / Crutches/cane/walker  [] None/bedrest/wheelchair/nurse 30  15  0 15   IV/Heparin Lock [] Yes  [x] No 20  0 0   Gait/Transferring [] Impaired  [] Weak  [x] Normal/bedrest/immobile 20  10  0 0   Mental Status [] Forgets limitations  [x] Oriented to own ability 15  0 0         Total: 30       Based on the Assessment score: check the appropriate box.      [x]         Use standard prevention interventions           Moderate =     Score of 24-44              [] Give patient handout and discuss fall prevention strategies              [x] Establish goal of education for patient/family RE: fall prevention strategies           Range of Motion  Left Range of Motion  Right Strength  Left Strength  Right   Lumbar Flexion 2\" above knees      Lumbar Extension Limited - <10 deg      Lumbar Rotation       Lumbar Side Bend  to knee  4\" above knee (limited by pain)      Hip Flexion   3+ 3+   Hip Abduction   4 3+   Hip Adduction       Hip Extension   glute bridge causes pain     Hip ER       Hip IR       Knee Flexion   5 5   Knee Extension   4+ 5   Dorsiflexion   -- 4   Plantar flexion   --- 5   Inversion       Eversion           LUMBAR/SIJ SPECIAL TESTS Left Right   Standing Flexion + [x]         - []           NT []   Dull/ache pain + [x]         - []           NT []   Dull/ache pain    Repeated Flexion + [x]         - []           NT []   Increases pain  + [x]         - []           NT []   Increases pain    Repeated Extension + [x]         - []           NT []   Increases pain  + [x]         - []           NT []   Increases pain    SLR + [x]         - []           NT []   Hamstring tightenss + [x]         - []           NT []   Hamstring tightness   Slump Test + []         - []           NT [x]  + []         - []           NT [x]    Prone Instability Test + []         - []           NT [x]  + []         - []           NT [x]    Anterior Gapping + []         - []           NT [x]  + []         - []           NT [x]    Posterior Achievement[de-identified]  [] No  [x] Yes: Chronicity of pain, L BKA   Domestic Concerns:  [x] No  [] Yes:    Pt. Education:  [x] Plans/Goals, Risks/Benefits discussed  [x] Home exercise program  Method of Education: [x] Verbal  [x] Demo  [x] Written  Comprehension of Education:  [x] Verbalizes understanding. [x] Demonstrates understanding. [x] Needs Review. [] Demonstrates/verbalizes understanding of HEP/Ed previously given. Treatment Plan:  [x] Therapeutic Exercise   47590  [] Iontophoresis: 4 mg/mL Dexamethasone Sodium Phosphate  mAmin  66702   [x] Therapeutic Activity  45559 [] Vasopneumatic cold with compression  43395    [] Gait Training   59716 [] Ultrasound   79542   [x] Neuromuscular Re-education  19445 [x] Electrical Stimulation Unattended  13091   [x] Manual Therapy  55255 [] Electrical Stimulation Attended  15710   [x] Instruction in HEP  [] Lumbar/Cervical Traction  01094   [] Aquatic Therapy   56988 [x] Cold/hotpack    [] Massage   93425      [] Dry Needling, 1 or 2 muscles  04647   [] Biofeedback, first 15 minutes   66716  [] Biofeedback, additional 15 minutes   39011 [] Dry Needling, 3 or more muscles  61417            Frequency: 2 x/week for 12 visits    Todays Treatment:  Modalities:     Exercises:  Exercise Reps/ Time Weight/ Level Comments   Lumbar rotations  x10  Added to home program    SKTC stretch  2x30s, ea  Added to home progrogram    Seated hamstring stretch  2x30s ea  Added to home program                Other:    Specific Instructions for next treatment: Check in on home program stretches.  Continue to determine directional preference     Treatment Charges: Mins Units   [x] Evaluation       [x]  Low       []  Moderate       []  High 40 1   []  Modalities     [x]  Ther Exercise 10 1   []  Manual Therapy     []  Ther Activities     []  Aquatics     []  Neuromuscular     []  Gait Training     []  Dry Needling           1-2 muscles     []  Dry Needling           3 or more muscles     [] Vasocompression     []  Other       Time In: 0830     Time Out: 0920   TOTAL  TIME: Minutes: 50       Total billable time: 10 min     Electronically signed by: Mary Bass PT        Physician Signature:________________________________Date:__________________  By signing above or cosigning this note, I have reviewed this plan of care and certify a need for medically necessary rehabilitation services.      *PLEASE SIGN ABOVE AND FAX BACK ALL PAGES*

## 2021-06-24 ENCOUNTER — HOSPITAL ENCOUNTER (OUTPATIENT)
Dept: PHYSICAL THERAPY | Age: 61
Setting detail: THERAPIES SERIES
Discharge: HOME | End: 2021-06-24
Payer: COMMERCIAL

## 2021-06-24 PROCEDURE — 97110 THERAPEUTIC EXERCISES: CPT

## 2021-06-24 NOTE — PROGRESS NOTES
[address] [facility] Outpatient Physical Therapy   [address]   Phone: [phone]   Fax: [phone]    Physical Therapy Daily Treatment Note      Date:  2021  Patient Name:  Nicol Santamaria    :  1960  MRN: [id number]  Physician: Chema Gonzalez MD                 Insurance: Medicare & [employer] [facility]   Medical Diagnosis: M48.061 (ICD-10-CM) - Spinal stenosis of lumbar region without neurogenic claudication           Rehab Codes: R25 pain , M25.60 stiffness, R53.1 weakness  Onset Date: chronic              Next 's appt: 21  Visit# / total visits: 2  Cancels/No Shows: 0/0    Subjective:    Patient reports he has been doing home exercises. Patient reports his lower back is painful today and he feels overall stiff. Patient states when he has a lot of pain he is usually standing or walking bent over. Pain:  [x] Yes  [] No Location:r lower lumbarPain Rating: (0-10 scale) 6/10  Pain altered Tx:  [] No  [] Yes  Action:  Comments:    Objective:  Modalities:   Precautions:  Exercises:  Exercise Reps/ Time Weight/ Level Completed  Today Comments   Lumbar rotations  10x   Reviewed 21   SKTC 2x30\"   Reviewed 21   Seated hamstring stretch  2x30\"   Reviewed 21   (B) Supine hip flexor stretch  30\"3   EOB, manual pressure from therapist   Supine hooklying bridge  10x2  5\" hold    Multi cues for glute and core activation    SLR 10x2      sidelying clamshell 10x2  3\" hold  yellow     sidelying hip AB 10x2   Min A from therapist    Supine hooklying march 10x2  3\" hold  yellow  Emphasis on core engagement    Sidelying hip extension  10x2      Standing lumbar extension  10x10\"                    Other:    Specific Instructions for next treatment: assess prosthetic fit and length, continue with lumbar stabilization interventions       Assessment:  Initiated gym program this date with patient completing all exercises at slow guarded pace.  Patient demonstrated increased fatigue, general LE weakness and poor endurance resulting in multiple rest breaks in between sets. Patient required multiple cues for postural awareness when In standing and ambulating in gym this date, R trendelenburg gait noted causing patient to \"dip down\" further into prosthetic, patient currently has holes in his suction sock that should be replaced in near future in order to prevent further drop. Patient education provided with emphasis to remain consistent with home stretches and to refrain from long periods of sitting in lazy boy despite discomfort completing home activities in order to avoid further regression with ADLs and practice proper core engagement/proper posture while standing. At end of session patient overall felt some relief of general pain/stiffness and lower lumbar pain rating 4/10     [] Progressing toward goals. [] No change. [] Other:    [] Patient would continue to benefit from skilled physical therapy services in order to: build proximal strength, optimize postural, restore muscle flexibility, and improve tolerance to standing in order to optimize his functional level and decrease pain with ADLs. STG: (to be met in 6 treatments)  Pt will self report worst pain no greater than 5/10 with standing/walking in order to better tolerate ADLs/work activities with minimal dysfunction  Pt will improve AROM of lateral side bending to equal bilaterally in order to demonstrate ability to move/reach in all planes unrestricted at PLOF          3.  Pt will be able to complete pelvic tilt and hold for > 20 seconds to demonstrate improved lumbar stability for optimal postural alignment    LTG: (to be met in 12 treatments)  Pt will demonstrate improved proximal LE strength to >/= 4/5 in order to demonstrate improved stability/strength necessary for unrestricted ADLs/work activities  Pt will self report ability to tolerate standing when doing dishes allowing him to returned to unrestricted home duties. Pt will decrease functional limitation on Modified Oswestry to </= 34% in order to demonstrate improved functional tolerances at PLOF with minimal restriction/dysfunction  Pt will demonstrate independence with a long term HEP for continued progress/maintenance after completion of PT    Pt. Education:  [x] Yes  [] No  [] Reviewed Prior HEP/Ed  Method of Education: [x] Verbal  [x] Demo  [] Written  Comprehension of Education:  [x] Verbalizes understanding. [x] Demonstrates understanding. [] Needs review. - review for consistency   [] Demonstrates/verbalizes HEP/Ed previously given. Plan: [x] Continue per plan of care.    [] Other:      Treatment Charges: Mins Units   []  Modalities     []  Ther Exercise 45 3   []  Manual Therapy     []  Ther Activities     []  Aquatics     []  Neuromuscular     [] Vasocompression     [] Gait Training     [] Dry needling        [] 1 or 2 muscles        [] 3 or more muscles     []  Other     Total Treatment time 45 3     Time In:1015am           Time Out: 1100am    Electronically signed by:  Leoncio Doe PTA      Cosigned by: Taisha Manjarrez PT, DPT   #[id number]

## 2021-06-25 ENCOUNTER — OFFICE VISIT (OUTPATIENT)
Dept: PODIATRY | Age: 61
End: 2021-06-25
Payer: COMMERCIAL

## 2021-06-25 VITALS — HEIGHT: 76 IN | BODY MASS INDEX: 34.1 KG/M2 | WEIGHT: 280 LBS

## 2021-06-25 DIAGNOSIS — Z79.4 TYPE 2 DIABETES MELLITUS WITH DIABETIC POLYNEUROPATHY, WITH LONG-TERM CURRENT USE OF INSULIN (HCC): ICD-10-CM

## 2021-06-25 DIAGNOSIS — B35.1 ONYCHOMYCOSIS OF TOENAIL: Primary | ICD-10-CM

## 2021-06-25 DIAGNOSIS — E11.42 TYPE 2 DIABETES MELLITUS WITH DIABETIC POLYNEUROPATHY, WITH LONG-TERM CURRENT USE OF INSULIN (HCC): ICD-10-CM

## 2021-06-25 DIAGNOSIS — M79.674 PAIN OF TOE OF RIGHT FOOT: ICD-10-CM

## 2021-06-25 DIAGNOSIS — E11.51 TYPE 2 DIABETES MELLITUS WITH PERIPHERAL VASCULAR DISEASE (HCC): ICD-10-CM

## 2021-06-25 PROCEDURE — 11720 DEBRIDE NAIL 1-5: CPT | Performed by: PODIATRIST

## 2021-06-25 ASSESSMENT — ENCOUNTER SYMPTOMS
BACK PAIN: 0
NAUSEA: 0
COLOR CHANGE: 0
DIARRHEA: 0
SHORTNESS OF BREATH: 0

## 2021-06-25 NOTE — PROGRESS NOTES
SUBJECTIVE: Nicol Santamaria is a 64 y.o. male who returns to the office with chief complaint of painful fungal toenails. Patient relates toe nails are thickened/difficult to trim as well as painful with ambulation and with shoe gear. Chief Complaint   Patient presents with    Nail Problem     right foot, last seen Chema Gonzalez MD 6/11/21    Diabetes         Callouses     right foot     Review of Systems   Constitutional: Negative for activity change, appetite change, chills, diaphoresis, fatigue and fever. Respiratory: Negative for shortness of breath. Cardiovascular: Negative for leg swelling. Gastrointestinal: Negative for diarrhea and nausea. Endocrine: Negative for cold intolerance, heat intolerance and polyuria. Musculoskeletal: Positive for arthralgias and gait problem. Negative for back pain, joint swelling and myalgias. Skin: Negative for color change, pallor, rash and wound. Allergic/Immunologic: Negative for environmental allergies and food allergies. Neurological: Positive for numbness. Negative for dizziness, weakness and light-headedness. Hematological: Does not bruise/bleed easily. Psychiatric/Behavioral: Negative for behavioral problems, confusion and self-injury. The patient is not nervous/anxious. OBJECTIVE: Clinical evaluation of patient reveals nails 1,4,5 of the right foot present with thickness, elongation, discoloration, brittleness, and subungual debris. There was pain with palpation and debridement of the toenails of the right foot. No open lesions noted to the right foot today. There is hyperkeratotic tissue formation submetatarsal head one of the right foot. Debridement of this lesion with a fifteen blade does not reveal any central core or pinpoint bleeding. There are no signs of bacterial infection noted to this area. The left lower extremity has been previously amputated. The right DP pulse is not palpable. The right PT pulse is not palpable. Protective sensation is absent to the right plantar foot as noted with a 5.07 Pyote-Gaudencio monofilament. Glucose: 250 mg/dl. Class A Findings (1 needed)   [x] Non-traumatic amputation of foot or integral skeleton portion thereof. [x] Q7.      Class B Findings (2 needed)   1. [] Absent posterior tibial pulse   2. [] Absent dorsalis pedis pulse   3. [] Advanced trophic changes; three of the following are required:   ·         [] hair growth (decrease or absence)   ·         [] nail changes (thickening)   ·         [] pigmentary changes (discoloration)   ·         [] skin texture (thin, shiny)   ·         [] skin color (rubor or redness)   [] Q8.      Class C Findings (1 Class B, 2 Class C needed)   1. [] Claudication   2. [] Temperature changes   3. [] Edema   4. [] Paresthesia   5. [] Burning   [] Q9.     ASSESSMENT:    Diagnosis Orders   1. Onychomycosis of toenail  OK DEBRIDEMENT OF NAIL(S), 1-5    HM DIABETES FOOT EXAM   2. Pain of toe of right foot  OK DEBRIDEMENT OF NAIL(S), 1-5    HM DIABETES FOOT EXAM   3. Type 2 diabetes mellitus with peripheral vascular disease (HCC)  OK DEBRIDEMENT OF NAIL(S), 1-5    HM DIABETES FOOT EXAM   4. Type 2 diabetes mellitus with diabetic polyneuropathy, with long-term current use of insulin (HCC)  OK DEBRIDEMENT OF NAIL(S), 1-5    HM DIABETES FOOT EXAM     PLAN: Toenails 1,4,5 of the right foot were debrided in length and thickness using a nail nipper and a . Return in about 9 weeks (around 8/27/2021) for At risk diabetic foot care.    6/25/2021      Waynetta Cheadle, DPM

## 2021-06-29 ENCOUNTER — HOSPITAL ENCOUNTER (OUTPATIENT)
Dept: PHYSICAL THERAPY | Age: 61
Setting detail: THERAPIES SERIES
Discharge: HOME | End: 2021-06-29
Payer: COMMERCIAL

## 2021-06-29 PROCEDURE — 97110 THERAPEUTIC EXERCISES: CPT

## 2021-06-29 NOTE — PROGRESS NOTES
509 [facility] Out[facility] Therapy   [address] [facility] [address]   Phone: [phone]   Fax: [phone]    Physical Therapy Daily Treatment Note      Date:  2021  Patient Name:  Iftikhar Saleh    :  1960  MRN: [id number]  Physician: Camden Hernandez MD                 Insurance: Medicare & [employer] [facility]   Medical Diagnosis: M48.061 (ICD-10-CM) - Spinal stenosis of lumbar region without neurogenic claudication           Rehab Codes: R25 pain , M25.60 stiffness, R53.1 weakness  Onset Date: chronic              Next 's appt: 21  Visit# / total visits: 3  Cancels/No Shows: 0/0    Subjective:    Patient reports his pain is more dull today its not his usual sharp pains he normally feels. Pain:  [x] Yes  [] No Location:r lower lumbarPain Rating: (0-10 scale) 6/10  Pain altered Tx:  [] No  [] Yes  Action:    Comments:Patient arrived 12 minutes late to PT session this date     Objective:  Modalities:   Precautions:  Exercises:  Exercise Reps/ Time Weight/ Level Completed  Today Comments   Lumbar rotations  10x  x Reviewed 21   SKTC 2x30\"   Reviewed 21   Seated hamstring stretch  2x30\"   Reviewed 21   (B) Supine hip flexor stretch  30\"3  x EOB, manual pressure from therapist   Supine hooklying bridge  10x2  5\" hold   x Multi cues for glute and core activation    SLR 10x2  x    sidelying clamshell 10x2  3\" hold  yellow x    sidelying hip AB 10x2  x Min A from therapist    Supine hooklying march 10x2  3\" hold  yellow  Emphasis on core engagement    Sidelying hip extension  10x2      Standing lumbar extension  10x10\"                    Other:    Specific Instructions for next treatment: assess prosthetic fit and length, continue with lumbar stabilization interventions       Assessment:  Patient completed all exercises as time allowed this date.  Slow guarded movement noted during completion of exercises due to patient continued to demonstrate LE weakness with per plan of care.    [] Other:      Treatment Charges: Mins Units   []  Modalities     []  Ther Exercise 33 2   []  Manual Therapy     []  Ther Activities     []  Aquatics     []  Neuromuscular     [] Vasocompression     [] Gait Training     [] Dry needling        [] 1 or 2 muscles        [] 3 or more muscles     []  Other     Total Treatment time 33 2     Time In:1042am         Time Out: 1115am    Electronically signed by:  Linda Daniels PTA

## 2021-07-01 ENCOUNTER — HOSPITAL ENCOUNTER (OUTPATIENT)
Dept: PHYSICAL THERAPY | Age: 61
Setting detail: THERAPIES SERIES
Discharge: HOME | End: 2021-07-01
Payer: COMMERCIAL

## 2021-07-01 PROCEDURE — 97110 THERAPEUTIC EXERCISES: CPT

## 2021-07-01 NOTE — PROGRESS NOTES
509 [facility] Out[facility] Physical Therapy   [address] [facility] [address]   Phone: [phone]   Fax: [phone]    Physical Therapy Daily Treatment Note      Date:  2021  Patient Name:  Jenn Evans    :  1960  MRN: [id number]  Physician: Ashley Browning MD                 Insurance: Medicare & [employer] [facility]   Medical Diagnosis: M48.061 (ICD-10-CM) - Spinal stenosis of lumbar region without neurogenic claudication           Rehab Codes: R25 pain , M25.60 stiffness, R53.1 weakness  Onset Date: chronic              Next 's appt: 21  Visit# / total visits:   Cancels/No Shows: 0/0    Subjective:    Patient reports his standing tolerance has improved since start of therapy. Patient reports he can now stand about 15 minutes before he needs a sitting break (due to pain) where as prior to therapy pain would be intense right upon standing.      Pain:  [x] Yes  [] No Location:r lower lumbarPain Rating: (0-10 scale) 6/10  Pain altered Tx:  [] No  [] Yes  Action:    Comments:    Objective:  Modalities:   Precautions:  Exercises:  Exercise Reps/ Time Weight/ Level Completed  Today Comments   Lumbar rotations  10x  x Reviewed 21   SKTC 2x30\"  x Reviewed 21   Seated hamstring stretch  2x30\"  x Reviewed 21   Bridge with hip abd  10x5\" Red  x    Bridge with hip add  10x5\" rainbow ball  x    (B) Supine hip flexor stretch  30\"3  x EOB, manual pressure from therapist   Supine hooklying bridge  10x2  5\" hold   x Multi cues for glute and core activation    SLR 10x2  x    sidelying clamshell 10x2  3\" hold  yellow x    sidelying hip AB 10x2  x Min A from therapist    Supine hooklying march 10x2  3\" hold  yellow x Emphasis on core engagement    Sidelying hip extension  10x2      Standing lumbar extension  10x10\"                    Other:  - : assessed pelvic alignment in standing & sitting     Specific Instructions for next treatment: assess prosthetic fit and length, continue with lumbar stabilization interventions       Assessment:   Completed exercise program to tolerance due to patients overall strength and endurance limitations, able to add bridge with hip adduction squeeze and hip abduction hold this date with patient noting \" hips feel better\" after exercise. Patient continued to note pain and stiffness relief post session. [] Progressing toward goals. [] No change. [] Other: Upon arrival patients pelvic alignment assessed in which showed a right lateral tilt patient was in standing position, patients leg length assessed with R LE 2cm longer than L LE when patient was in supine. Patient was advised to contact Prosthetist regarding leg length discrepancy and to have suction cup sleeve replaced in order for patient to have better gait mechanics. [] Patient would continue to benefit from skilled physical therapy services in order to: build proximal strength, optimize postural, restore muscle flexibility, and improve tolerance to standing in order to optimize his functional level and decrease pain with ADLs. STG: (to be met in 6 treatments)  Pt will self report worst pain no greater than 5/10 with standing/walking in order to better tolerate ADLs/work activities with minimal dysfunction  Pt will improve AROM of lateral side bending to equal bilaterally in order to demonstrate ability to move/reach in all planes unrestricted at PLOF          3. Pt will be able to complete pelvic tilt and hold for > 20 seconds to demonstrate improved lumbar stability for optimal postural alignment    LTG: (to be met in 12 treatments)  Pt will demonstrate improved proximal LE strength to >/= 4/5 in order to demonstrate improved stability/strength necessary for unrestricted ADLs/work activities  Pt will self report ability to tolerate standing when doing dishes allowing him to returned to unrestricted home duties.   Pt will decrease functional limitation on Modified Oswestry to

## 2021-07-06 ENCOUNTER — HOSPITAL ENCOUNTER (OUTPATIENT)
Dept: PHYSICAL THERAPY | Age: 61
Setting detail: THERAPIES SERIES
Discharge: HOME | End: 2021-07-06
Payer: COMMERCIAL

## 2021-07-06 RX ORDER — BLOOD SUGAR DIAGNOSTIC
STRIP MISCELLANEOUS
Qty: 1 EACH | Refills: 3 | Status: SHIPPED | OUTPATIENT
Start: 2021-07-06 | End: 2021-08-10

## 2021-07-06 RX ORDER — FINASTERIDE 5 MG/1
TABLET, FILM COATED ORAL
Qty: 90 TABLET | Refills: 1 | Status: SHIPPED | OUTPATIENT
Start: 2021-07-06 | End: 2021-12-28

## 2021-07-06 NOTE — PROGRESS NOTES
[] [address]  [address]    [phone]  F: [phone]   [] [address]  [facility] [address]   [address]  P: [phone]  F: [phone]  [] [place]  [address] [facility]  P: [phone]  F: [phone] [] [address]: [phone]  F: [phone]  [] [address] [facility]   [address]   [place]: [phone]  F: [phone]   [x] [facility]  [address] [facility] [address]  [place]: [phone]   F: [phone]     Physical Therapy Cancel/No Show note    Date: 2021  Patient: Malik Solomon  : 1960  MRN: [id number]    Cancels/No Shows to date:     For today's appointment patient:    [x]  Cancelled    [] Rescheduled appointment    [] No-show     Reason given by patient:    []  Patient ill    []  Conflicting appointment    [] No transportation      [] Conflict with work    [x] No reason given    [] Weather related    [] COVID-19    [] Other:      Comments:        [] Next appointment was confirmed    Electronically signed by: Car Martinez PT

## 2021-07-08 ENCOUNTER — HOSPITAL ENCOUNTER (OUTPATIENT)
Dept: PHYSICAL THERAPY | Age: 61
Setting detail: THERAPIES SERIES
Discharge: HOME | End: 2021-07-08
Payer: COMMERCIAL

## 2021-07-08 PROCEDURE — 97110 THERAPEUTIC EXERCISES: CPT

## 2021-07-08 NOTE — PROGRESS NOTES
[address] [place] [address] Outpatient Physical Therapy   [address] [place] [address] [address]   Phone: [phone]   Fax: [phone]    Physical Therapy Daily Treatment Note      Date:  2021  Patient Name:  Henny Wild    :  1960  MRN: [id number]  Physician: Flaco Logan MD                 Insurance: Medicare & [employer]   Medical Diagnosis: M48.061 (ICD-10-CM) - Spinal stenosis of lumbar region without neurogenic claudication           Rehab Codes: R25 pain , M25.60 stiffness, R53.1 weakness  Onset Date: chronic              Next 's appt: 21  Visit# / total visits:   Cancels/No Shows: 1    Subjective:    Pt arrives to PT ambulatory with SPC. Notes back pain is improving. Has no pain currently and feels he is able to go 30-45 minutes on feet before back begins to hurt. States he can stand to brush teeth without. Reports he has appointment scheduled for seeing prosthestist in coming weeks to make prosthesis adjustments.      Pain:  [] Yes  [x] No Location:  Pain Rating: (0-10 scale) 0/10  Pain altered Tx:  [] No  [] Yes  Action:    Comments:    Objective:  Modalities:   Precautions:  Exercises:  Exercise Reps/ Time Weight/ Level Completed  Today Comments   Lumbar rotations  10x, holding 5-10\"  x    SKTC 2x30\"  x    Seated hamstring stretch  2x30\"      Bridge with hip abd  10x5\" Red      Bridge with hip add  15x5\" rainbow ball  x Bridge with bolster    (B) Supine hip flexor stretch  30\"3   EOB, manual pressure from therapist   Supine hooklying bridge  10x2  5\" hold    Multi cues for glute and core activation    SLR 10x2      sidelying clamshell 15x2 ea    yellow x Cues for slow and controlled movements    sidelying hip AB 10x2   Min A from therapist    Supine hooklying march 10x2  3\" hold  yellow  Emphasis on core engagement    Sidelying hip extension  10x2 ea  Yellow  x    Standing lumbar extension  10x10\"      Pelvic tilts x10  x Difficult initially and needed max tactile ADLs/work activities  2. Pt will self report ability to tolerate standing when doing dishes allowing him to returned to unrestricted home duties. 3. Pt will decrease functional limitation on Modified Oswestry to </= 34% in order to demonstrate improved functional tolerances at PLOF with minimal restriction/dysfunction  4. Pt will demonstrate independence with a long term HEP for continued progress/maintenance after completion of PT    Pt. Education:  [x] Yes  [] No  [] Reviewed Prior HEP/Ed  Method of Education: [x] Verbal  [x] Demo  [] Written  Comprehension of Education:  [x] Verbalizes understanding. [x] Demonstrates understanding. [] Needs review. - review for consistency   [] Demonstrates/verbalizes HEP/Ed previously given. Plan: [x] Continue per plan of care.    [] Other:      Treatment Charges: Mins Units   []  Modalities     [x]  Ther Exercise 44 3   []  Manual Therapy     []  Ther Activities     []  Aquatics     []  Neuromuscular     [] Vasocompression     [] Gait Training     [] Dry needling        [] 1 or 2 muscles        [] 3 or more muscles     []  Other     Total Treatment time 44 3     Time In: 1137       Time Out: 8918    Electronically signed by:  Car Martinez, PT

## 2021-07-09 RX ORDER — APIXABAN 5 MG/1
TABLET, FILM COATED ORAL
Qty: 180 TABLET | Refills: 2 | Status: SHIPPED | OUTPATIENT
Start: 2021-07-09 | End: 2022-03-10

## 2021-07-13 ENCOUNTER — HOSPITAL ENCOUNTER (OUTPATIENT)
Dept: PHYSICAL THERAPY | Age: 61
Setting detail: THERAPIES SERIES
Discharge: HOME | End: 2021-07-13
Payer: COMMERCIAL

## 2021-07-13 PROCEDURE — 97110 THERAPEUTIC EXERCISES: CPT

## 2021-07-13 NOTE — PROGRESS NOTES
509 [facility] Out[facility]  [address] [place] [address]. [address]         Phone: [phone]       Fax: [phone]    Physical Therapy Progress Note    Date: 2021      Patient: Riccardo Weeks  : 1960  MRN: [id number]    Physician: Renny Oviedo MD                 Insurance: [facility]   Medical Diagnosis: M48.061 (ICD-10-CM) - Spinal stenosis of lumbar region without neurogenic claudication           Rehab Codes: R25 pain , M25.60 stiffness, R53.1 weakness  Onset Date: chronic              Next 's appt: 8/10/21  Visit# / total visits:                Cancels/No Shows:   Date of initial visit: 21                Date of PN: 21      Subjective:   Pt arrives ambulatory with SPC. States he feels his back pain is improving. Able to complete 30-45 minutes of activity before pain increases. When pain begins he feels it in the middle low back described as a cramping type pain. At the highest his low back pain will get to 8-9/10 when doing activities. Pain:  [] Yes  [x] No  Location:  N/A Pain Rating: (0-10 scale) 0/10  Pain altered Tx:  [] No  [] Yes  Action:  Comments:    Objective:  Test Measurements:  Status Current level of function Comments   Sitting [x]? Independent  []? Deficit     Standing/walking []? Independent  [x]? Deficit Pain with prolonged walking now 30-45 min tolerance   Driving [x]? Independent  []? Deficit     Housekeeping/Meal Preparation []? Independent  [x]? Deficit Now able to stand to do dishes but will come on after longer period of time    Lifting/Carrying [x]? Independent  []? Deficit     Bending/Reaching []? Independent  [x]? Deficit No pain, only with extended time bending and repetitive bending   Stair climbing []? Independent  []? Deficit Does not have stairs    Pivoting [x]? Independent  []? Deficit     Squatting [x]? Independent  []?  Deficit Using grabbers         Range of Motion  Left Range of Motion  Right Strength  Left Strength  Right   Lumbar Flexion 4\" above ankles         Lumbar Extension Limited - tightness         Lumbar Rotation 50%  50%        Lumbar Side Bend 2\" above knee  2\" above knee        Hip Flexion     4+ 4+   Hip Abduction     4+ 4+   Hip Adduction           Hip Extension     No pain with bridge   no pain with bridge   Hip ER           Hip IR               Treatment/Interventions:   Exercise Reps/ Time Weight/ Level Completed  Today Comments   Lumbar rotations  20x, holding 5-10\"   x  feet on theraball    SKTC 2x30\"        Seated hamstring stretch  2x30\"        Bridge with hip abd  10x5\" Red       Bridge with hip add  15x5\" rainbow ball   Bridge with bolster    (B) Supine hip flexor stretch  30\"3    EOB, manual pressure from therapist   Supine  bridge  x15   x Feet on theraball     SLR 10x2        sidelying clamshell x20 ea    Red  x Cues for slow and controlled movements    sidelying hip AB 10x2    Min A from therapist    Supine hooklying march 10x2  3\" hold  yellow  Emphasis on core engagement    Sidelying hip extension  10x2 ea  red x     Standing lumbar extension  10x10\"        Pelvic tilts x20   x Feet on theraball    Standing shoulder extension  2x15 green TB x Cues for posture and core engagement    Standing palloff press 2x15 Green TB x        Assessment:  Pt was initially evaluated on 6/21/21 and has completed x6 visits to date focusing on strengthening for postural alignment. He has shown improvement in back pain as he tolerates 30-45 minutes of standing activities before pain begins  Per assessment he is progressing in strength of the LEs but still having difficulty with core contraction and control. Still needing mod to max cues for appropriate abdominal bracing to support the lumbar spine. He is now able to tolerate greater movement of the spine and exercises without increases in pain during sessions.  Will need to begin to progress to more weight bearing and loading of the spine to increase functional tolerance. This patient would continue to benefit from skill PT services to continue to optimize strength of the core and LEs, optimize postural alignment, and modulate pain to increase functional tolerance to standing ADLs and community ambulation. Goals: Assessed 21  STG: (to be met in 6 treatments)  1. Pt will self report worst pain no greater than 5/10 with standing/walking in order to better tolerate ADLs/work activities with minimal dysfunction  Progressin/10 pain with >45 minutes of work   2. Pt will improve AROM of lateral side bending to equal bilaterally in order to demonstrate ability to move/reach in all planes unrestricted at [facility]   MET         3. Pt will be able to complete pelvic tilt and hold for > 20 seconds to demonstrate improved lumbar stability for optimal postural alignment    Progressing: Still having difficulty coordinating this movement    LTG: (to be met in 12 treatments)  1. Pt will demonstrate improved proximal LE strength to >/= 4/5 in order to demonstrate improved stability/strength necessary for unrestricted ADLs/work activities  2. Pt will self report ability to tolerate standing when doing dishes allowing him to returned to unrestricted home duties.   3. Pt will decrease functional limitation on Modified Oswestry to </= 34% in order to demonstrate improved functional tolerances at PLOF with minimal restriction/dysfunction  Pt will demonstrate independence with a long term HEP for continued progress/maintenance after completion of PT     Treatment to Date:  [x] Therapeutic Exercise   00240  [] Iontophoresis: 4 mg/mL Dexamethasone Sodium Phosphate  mAmin  63414   [] Therapeutic Activity  34768 [] Vasopneumatic cold with compression  63315    [] Gait Training   72105 [] Ultrasound   [id number]   [] Neuromuscular Re-education  41716 [] Electrical Stimulation Unattended  66772   [] Manual Therapy  04198 [] Electrical Stimulation Attended  25279 cosigning this note, I have reviewed this plan of care and certify a need for medically necessary rehabilitation services.      *PLEASE SIGN ABOVE AND FAX BACK ALL PAGES*

## 2021-07-15 ENCOUNTER — HOSPITAL ENCOUNTER (OUTPATIENT)
Dept: PHYSICAL THERAPY | Age: 61
Setting detail: THERAPIES SERIES
Discharge: HOME | End: 2021-07-15
Payer: COMMERCIAL

## 2021-07-15 NOTE — PROGRESS NOTES
[] [facility]) - [facility] & Therapy  [address]  [place]: [phone]   F: [phone]     Physical Therapy Cancel/No Show note    Date: 7/15/2021  Patient: Fredrick Boyer  : 1960  MRN: [id number]    Visit Count:   Cancels/No Shows to date:     For today's appointment patient:    [x]  Cancelled    [] Rescheduled appointment    [] No-show     Reason given by patient:    []  Patient ill    []  Conflicting appointment    [] No transportation      [] Conflict with work    [x] No reason given    [] Weather related    [] [id number]    [] Other:      Comments:        [] Next appointment was confirmed    Electronically signed by: Ely Winston PTA

## 2021-07-19 ENCOUNTER — TELEPHONE (OUTPATIENT)
Dept: INTERNAL MEDICINE CLINIC | Age: 61
End: 2021-07-19

## 2021-07-19 DIAGNOSIS — Z96.22 CHRONIC OTITIS MEDIA OF BOTH EARS AFTER INSERTION OF TYMPANIC VENTILATION TUBE: Primary | ICD-10-CM

## 2021-07-19 DIAGNOSIS — H66.93 CHRONIC OTITIS MEDIA OF BOTH EARS AFTER INSERTION OF TYMPANIC VENTILATION TUBE: Primary | ICD-10-CM

## 2021-07-19 NOTE — TELEPHONE ENCOUNTER
Patient requesting ENT referral.  His ear tube fell out of his ear. The ENT he used to see is no longer on this side of town. Who do you recommend?

## 2021-07-20 ENCOUNTER — HOSPITAL ENCOUNTER (OUTPATIENT)
Dept: PHYSICAL THERAPY | Age: 61
Setting detail: THERAPIES SERIES
End: 2021-07-20
Payer: COMMERCIAL

## 2021-07-22 ENCOUNTER — HOSPITAL ENCOUNTER (OUTPATIENT)
Dept: PHYSICAL THERAPY | Age: 61
Setting detail: THERAPIES SERIES
Discharge: HOME | End: 2021-07-22
Payer: COMMERCIAL

## 2021-07-22 PROCEDURE — 97110 THERAPEUTIC EXERCISES: CPT

## 2021-07-22 NOTE — PROGRESS NOTES
[facility] Outpatient Physical Therapy  [address]         Phone: [phone]       Fax: [phone]    Physical Therapy Progress Note    Date: 2021      Patient: Orquidea Riddle  : 1960  MRN: [id number]    Physician: Renny Guzman MD                 Insurance: [facility]   Medical Diagnosis: M48.061 (ICD-10-CM) - Spinal stenosis of lumbar region without neurogenic claudication           Rehab Codes: R25 pain , M25.60 stiffness, R53.1 weakness  Onset Date: chronic              Next 's appt: 8/10/21  Visit# / total visits:                Cancels/No Shows:   Date of initial visit: 21                Date of PN: 21      Subjective:   Pt states this week has been rough, he states that his deep freezer went out and a lot of food went bad so he had to bend/pick things up, clean the freezer, ryan etc. Then another day patients states his  was acting up and he had to make accomodations, patient reports with these two major things this week and constantly bending,lifting etc caused him a lot of pain and its hard to walk today.        Pain:  [] Yes  [x] No  Location:  N/A Pain Rating: (0-10 scale) 7/10  Pain altered Tx:  [] No  [] Yes  Action:  Comments:    Objective:    Treatment/Interventions:   Exercise Reps/ Time Weight/ Level Completed  Today Comments   xLumbar rotations  20x, holding 5-10\"   x  feet on theraball    SKTC 2x30\"        Seated hamstring stretch  2x30\"        Bridge with hip abd  10x5\" Red  x     Bridge with hip add  15x5\" rainbow ball   Bridge with bolster    (B) Supine hip flexor stretch  30\"3    EOB, manual pressure from therapist   Supine  bridge  x15   x    SLR 10x2   x     sidelying clamshell x20 ea    Red  x Cues for slow and controlled movements    sidelying hip AB 10x2    Min A from therapist    Supine hooklying march 10x2  3\" hold  yellow  Emphasis on core engagement    Sidelying hip dishes allowing him to returned to unrestricted home duties. 3. Pt will decrease functional limitation on Modified Oswestry to </= 34% in order to demonstrate improved functional tolerances at PLOF with minimal restriction/dysfunction  Pt will demonstrate independence with a long term HEP for continued progress/maintenance after completion of PT     Patient Status:     [x] Continue per initial plan of care. [] Additional visits necessary.     [] Other:        Treatment Charges: Mins Units   []  Modalities       [x]  Ther Exercise 50 3   []  Manual Therapy       []  Ther Activities       []  Aquatics       []  Neuromuscular       [] Vasocompression       [] Gait Training       [] Dry needling        [] 1 or 2 muscles        [] 3 or more muscles       []  Other       Total Treatment time 50 3      Time In: 8917PE   Time Out: 0143BV        Electronically signed by:   Shmuel Nava PTA

## 2021-07-23 RX ORDER — CLOTRIMAZOLE AND BETAMETHASONE DIPROPIONATE 10; .64 MG/G; MG/G
CREAM TOPICAL
Qty: 45 G | Refills: 3 | Status: SHIPPED | OUTPATIENT
Start: 2021-07-23 | End: 2022-02-13 | Stop reason: SDUPTHER

## 2021-07-28 ENCOUNTER — HOSPITAL ENCOUNTER (OUTPATIENT)
Dept: PHYSICAL THERAPY | Age: 61
Setting detail: THERAPIES SERIES
Discharge: HOME | End: 2021-07-28
Payer: COMMERCIAL

## 2021-07-28 PROCEDURE — 97110 THERAPEUTIC EXERCISES: CPT

## 2021-07-28 NOTE — PROGRESS NOTES
[facility]  [address] [place] [address]. [address]         Phone: [phone]       Fax: [phone]    Physical Therapy Progress Note    Date: 2021      Patient: Eliecer Granado  : 1960  MRN: [id number]    Physician: Renny Johnson MD                 Insurance: [facility]   Medical Diagnosis: M48.061 (ICD-10-CM) - Spinal stenosis of lumbar region without neurogenic claudication           Rehab Codes: R25 pain , M25.60 stiffness, R53.1 weakness  Onset Date: chronic              Next 's appt: 8/10/21  Visit# / total visits:                Cancels/No Shows:   Date of initial visit: 21                Date of PN: 21      Subjective:   Pt reports he feels he is at a stand still doesn't think he's making much progress since beginning of therapy. Patient states it felt like he made great improvements in beginning but nothing since. Patient states he was working in his yard and had to sit every 5 minutes because pain became to much.        Pain:  [] Yes  [x] No  Location:  N/A Pain Rating: (0-10 scale) 7/10  Pain altered Tx:  [] No  [] Yes  Action:  Comments:    Objective:    Treatment/Interventions:   Exercise Reps/ Time Weight/ Level Completed  Today Comments   xLumbar rotations  20x, holding 5-10\"   x  feet on theraball    SKTC 2x30\"        Seated hamstring stretch  2x30\"        Supine piriformis stretch  2x30\"  x Manual assist from therapist    Bridge with hip abd  10x5\" Red  x     Bridge with hip add  15x5\" rainbow ball   Bridge with bolster    (B) Supine hip flexor stretch  30\"3    EOB, manual pressure from therapist   Supine  bridge  x15   x    SLR 10x2   x     sidelying clamshell x20 ea    Red   Cues for slow and controlled movements    sidelying hip AB 10x2    Min A from therapist    Supine hooklying march 10x2  3\" hold  yellow  Emphasis on core engagement    Sidelying hip extension  10x2 ea  red x     Standing lumbar extension  10x10\"   x     Pelvic tilts x20   x Feet on theraball   Standing shoulder extension  2x15 green TB x Cues for posture and core engagement    Standing palloff press 2x15 Green TB x    Standing diagonal pull downs  2x15 Green TB X         Assessment:  Patient required education regarding consistency with correct completing of exercises given for HEP along with consistency with completing PT sessions in order to have the most optimal outcome. Patient required encouraging cues throughout to continue participating in exercises despite excess pain noted with standing exercises, patient did have substantial relief with standing hip extension and lumbar extension stretch this date. This patient would continue to benefit from skill PT services to continue to optimize strength of the core and LEs, optimize postural alignment, and modulate pain to increase functional tolerance to standing ADLs and community ambulation. Goals: Assessed 21  STG: (to be met in 6 treatments)  1. Pt will self report worst pain no greater than 5/10 with standing/walking in order to better tolerate ADLs/work activities with minimal dysfunction  Progressin/10 pain with >45 minutes of work   2. Pt will improve AROM of lateral side bending to equal bilaterally in order to demonstrate ability to move/reach in all planes unrestricted at [facility]   MET         3. Pt will be able to complete pelvic tilt and hold for > 20 seconds to demonstrate improved lumbar stability for optimal postural alignment    Progressing: Still having difficulty coordinating this movement    LTG: (to be met in 12 treatments)  1. Pt will demonstrate improved proximal LE strength to >/= 4/5 in order to demonstrate improved stability/strength necessary for unrestricted ADLs/work activities  2. Pt will self report ability to tolerate standing when doing dishes allowing him to returned to unrestricted home duties.   3. Pt will decrease functional limitation on Modified Oswestry to </= 34% in order to demonstrate improved functional tolerances at PLOF with minimal restriction/dysfunction  Pt will demonstrate independence with a long term HEP for continued progress/maintenance after completion of PT     Patient Status:     [x] Continue per initial plan of care. [] Additional visits necessary.     [] Other:        Treatment Charges: Mins Units   []  Modalities       [x]  Ther Exercise 40 3   []  Manual Therapy       []  Ther Activities       []  Aquatics       []  Neuromuscular       [] Vasocompression       [] Gait Training       [] Dry needling        [] 1 or 2 muscles        [] 3 or more muscles       []  Other       Total Treatment time 40 3      Time In: 0905am   Time Out: 4986EL        Electronically signed by:   Smitha Ferrari PTA

## 2021-07-30 ENCOUNTER — HOSPITAL ENCOUNTER (OUTPATIENT)
Dept: PHYSICAL THERAPY | Age: 61
Setting detail: THERAPIES SERIES
Discharge: HOME | End: 2021-07-30
Payer: COMMERCIAL

## 2021-07-30 PROCEDURE — 97110 THERAPEUTIC EXERCISES: CPT

## 2021-07-30 NOTE — PROGRESS NOTES
Tuck ins  x20  x Feet on theraball   sidelying hip AB 10x2    Min A from therapist    Supine hooklying march 10x2  3\" hold  yellow  Emphasis on core engagement    Sidelying hip extension  10x2 ea  red      Standing lumbar extension  10x10\"        Pelvic tilts x20    Feet on theraball   Standing shoulder extension  2x15 green TB  Cues for posture and core engagement    Standing palloff press 2x15 Green TB     Standing diagonal pull downs  2x15 Green TB         Assessment:  Due to patient noting soreness after last session, reduced the intensity to prevent flare-up of pain. Focused on more flexion positioned exercises as extension based noted to flare up pain after session. Pt still very guarded and moves slowly. Educated on the importance to follow up with prosthetist and educated how improper fit of prosthesis could increase back pain and pelvic alignment. Pt notes mild improvement after today's session. Will complete the remaining visits and then refer back to MD for further management. This patient would continue to benefit from skill PT services to continue to optimize strength of the core and LEs, optimize postural alignment, and modulate pain to increase functional tolerance to standing ADLs and community ambulation. Goals: Assessed 21  STG: (to be met in 6 treatments)  1. Pt will self report worst pain no greater than 5/10 with standing/walking in order to better tolerate ADLs/work activities with minimal dysfunction  Progressin/10 pain with >45 minutes of work   2. Pt will improve AROM of lateral side bending to equal bilaterally in order to demonstrate ability to move/reach in all planes unrestricted at [facility]   MET         3. Pt will be able to complete pelvic tilt and hold for > 20 seconds to demonstrate improved lumbar stability for optimal postural alignment    Progressing: Still having difficulty coordinating this movement    LTG: (to be met in 12 treatments)  1.  Pt will demonstrate improved proximal LE strength to >/= 4/5 in order to demonstrate improved stability/strength necessary for unrestricted ADLs/work activities  2. Pt will self report ability to tolerate standing when doing dishes allowing him to returned to unrestricted home duties. 3. Pt will decrease functional limitation on Modified Oswestry to </= 34% in order to demonstrate improved functional tolerances at PLOF with minimal restriction/dysfunction  Pt will demonstrate independence with a long term HEP for continued progress/maintenance after completion of PT     Patient Status:     [x] Continue per initial plan of care. [] Additional visits necessary.     [] Other:        Treatment Charges: Mins Units   []  Modalities       [x]  Ther Exercise 45 3   []  Manual Therapy       []  Ther Activities       []  Aquatics       []  Neuromuscular       [] Vasocompression       [] Gait Training       [] Dry needling        [] 1 or 2 muscles        [] 3 or more muscles       []  Other       Total Treatment time 45 3      Time In: 9078   Time Out: 0816        Electronically signed by: Consuelo Appiah PT, DPT   #[id number]

## 2021-08-02 ENCOUNTER — HOSPITAL ENCOUNTER (OUTPATIENT)
Dept: PHYSICAL THERAPY | Age: 61
Setting detail: THERAPIES SERIES
Discharge: HOME | End: 2021-08-02
Payer: COMMERCIAL

## 2021-08-02 PROCEDURE — 97110 THERAPEUTIC EXERCISES: CPT

## 2021-08-02 NOTE — PROGRESS NOTES
[address] [facility]  [address] [facility]         Phone: [phone]       Fax: [phone]    Physical Therapy Daily Treatment Note    Date: 2021      Patient: Rupinder Coates  : 1960  MRN: [id number]    Physician: Renny Hodgson MD                 Insurance: [facility]   Medical Diagnosis: M48.061 (ICD-10-CM) - Spinal stenosis of lumbar region without neurogenic claudication           Rehab Codes: R25 pain , M25.60 stiffness, R53.1 weakness  Onset Date: chronic              Next 's appt: 8/10/21  Visit# / total visits: 10/12               Cancels/No Shows:   Date of initial visit: 21                Date of PN: 21      Subjective:   Patient reports today with no change in pain. Reported that he gets some relief within a therapy visit with no carry over effect.        Pain:  [x] Yes  [] No  Location:  Lower back  Pain Rating: (0-10 scale) 3/10  Pain altered Tx:  [] No  [] Yes  Action:  Comments: pain reaches above 10/10 if on his feet longer than 5 minutes     Objective:  ** placed heat pack to lower back when supine for mat level exercises **    Treatment/Interventions:   Exercise Reps/ Time Weight/ Level Completed  Today Comments   Lumbar rotations  2x30s ea side   x  therapist over pressure    SKTC 2x30\"        supine hamstring stretch  2x30\"   x  therapist assist    Supine piriformis stretch  2x30\"   Manual assist from therapist    Bridge with hip abd  10x5\" Red       Bridge with hip add  15x5\" rainbow ball   Bridge with bolster    (B) Supine hip flexor stretch  30\"3    EOB, manual pressure from therapist   Supine  bridge  x15       SLR 10x ea   x     Hooklying clamshell 2x15    Green   x Cues for slow and controlled movements    Tuck ins  x20  x Feet on theraball   sidelying hip AB 10x2    Min A from therapist    Supine hooklying march 10x2  3\" hold  yellow  Emphasis on core engagement    Sidelying hip extension  10x2 ea  red      Standing lumbar extension  10x10\"        Pelvic tilts x20    Feet on theraball   Standing shoulder extension  2x15 green TB  Cues for posture and core engagement    Standing palloff press x15 Green TB x    Standing March with 2\" Hold at Top x20  x    Standing diagonal pull downs  2x15 Green TB       Specifics for next session:   - assess response to treatments and determine next steps in POC     Assessment:  Continued exercises per chart to decrease pain and increase core strength. Patient noted mild relief of pain with flexion based exercises. Performed only single set of Paloff press secondary to patient reporting increase in pain towards end of last set. Added standing marches as recorded above to further improve hip strength and stability. Goals: Assessed 21  STG: (to be met in 6 treatments)  1. Pt will self report worst pain no greater than 5/10 with standing/walking in order to better tolerate ADLs/work activities with minimal dysfunction  Progressin/10 pain with >45 minutes of work   2. Pt will improve AROM of lateral side bending to equal bilaterally in order to demonstrate ability to move/reach in all planes unrestricted at [facility]   MET         3. Pt will be able to complete pelvic tilt and hold for > 20 seconds to demonstrate improved lumbar stability for optimal postural alignment    Progressing: Still having difficulty coordinating this movement    LTG: (to be met in 12 treatments)  1. Pt will demonstrate improved proximal LE strength to >/= 4/5 in order to demonstrate improved stability/strength necessary for unrestricted ADLs/work activities  2. Pt will self report ability to tolerate standing when doing dishes allowing him to returned to unrestricted home duties.   3. Pt will decrease functional limitation on Modified Oswestry to </= 34% in order to demonstrate improved functional tolerances at OF with minimal restriction/dysfunction  Pt will demonstrate independence with a long term HEP for continued progress/maintenance after completion of PT     Patient Status:     [x] Continue per initial plan of care. [] Additional visits necessary.     [] Other:        Treatment Charges: Mins Units   []  Modalities       [x]  Ther Exercise 40 3   []  Manual Therapy     []  Ther Activities     []  Aquatics     []  Neuromuscular     [] Vasocompression     [] Gait Training     [] Dry needling        [] 1 or 2 muscles        [] 3 or more muscles     []  Other     Total Treatment time 40 3      Time In: 9:20 am  Time Out: 10:00 am        Beth Leonard PTA    Cosigned: Leann Hugo, PT, DPT   #[id number]

## 2021-08-04 ENCOUNTER — HOSPITAL ENCOUNTER (OUTPATIENT)
Dept: PHYSICAL THERAPY | Age: 61
Setting detail: THERAPIES SERIES
Discharge: HOME | End: 2021-08-04
Payer: COMMERCIAL

## 2021-08-04 PROCEDURE — 97110 THERAPEUTIC EXERCISES: CPT

## 2021-08-04 NOTE — PROGRESS NOTES
[facility]  [address] [facility]         Phone: [phone]       Fax: [phone]    Physical Therapy Daily Treatment Note/Progress Note    Date: 2021      Patient: Orquidea Riddle  : 1960  MRN: [id number]    Physician: Renny Guzman MD                 Insurance: [facility]   Medical Diagnosis: M48.061 (ICD-10-CM) - Spinal stenosis of lumbar region without neurogenic claudication           Rehab Codes: R25 pain , M25.60 stiffness, R53.1 weakness  Onset Date: chronic              Next 's appt: 8/10/21  Visit# / total visits:                Cancels/No Shows:   Date of initial visit: 21                Date of PN: 21         Date of Final Visit: 21       Subjective:   Patient reports yesterday pain was increased preventing standing for > 5 minutes. Pt notes pain present today but more a stiffness. Pt saw prosthetist yesterday that made adjustments to L prosthesis and provided with new sleeve. Pt notes he follows up with MD soon and would like to hold PT until that time. After he sees MD, he will determine next steps and then decide how to proceed with next visit.      Pain:  [x] Yes  [] No  Location:  Lower back  Pain Rating: (0-10 scale) 3/10  Pain altered Tx:  [] No  [] Yes  Action:  Comments:     Objective:    ** placed heat pack to lower back when supine for mat level exercises **    Treatment/Interventions:   Exercise Reps/ Time Weight/ Level Completed  Today Comments   Lumbar rotations  x20   x    SKTC 2x30\"   x  pt completed with towel assist    supine hamstring stretch  2x30\"        Supine piriformis stretch  2x30\"   Manual assist from therapist    Bridge with hip abd  10x5\" Red       Bridge with hip add  15x5\" rainbow ball   Bridge with bolster    (B) Supine hip flexor stretch  30\"3    EOB, manual pressure from therapist   Supine  bridge  x15       SLR 10x ea        Hooklying clamshell 2x15    Green   x Cues for slow and controlled movements    Tuck ins  x20   Feet on theraball   sidelying hip AB 10x2    Min A from therapist    Supine hooklying march 2x20  x Emphasis on core engagement    Sidelying hip extension  10x2 ea  red      Standing hip flexion x10 ea LE  red x UE support; cues for core engagement    Standing lumbar extension  10x10\"        Seated marching  X20, altn  x 3\" hold at top, cue for posture; no pain increase    Seated cat camel  x10  x Increases pain with extension aspect;held from progressing    Pelvic tilts x20    Feet on theraball   Standing shoulder extension  2x15 green TB  Cues for posture and core engagement    Standing palloff press x10 ea  Green TB x Cues for core engagement    Standing March with 2\" Hold at [employer] diagonal pull downs  2x15 Green TB     Standing rotations x6 red x Increases back pain. Specifics for next session: Will plan to hold PT until pt follows up with MD. To determine next steps in POC. Assessment:  Pt was initially evaluated  6/21/21 and has now completed x11 visits of physical therapy. He was progressing well at last progress note completed 7/13, but since this date has had increased pain and stiffness limiting function. There was no additional injury or excessive strain to lumbar spine that would have provoked pain increase. Did note some alignment concerns with prosthesis that the patient had addressed yesterday (8/4/21). Will want to continue to monitor if symptoms lessen with adjustment. Today his pain improves some with heat but still increases with standing, extension based movements, and rotation. He was initially responding well to treatment but progress has been limited in recent visits. He still has some proximal weakness and core activation deficits. Significant cues provided for tilt and pt still unable to maintain without pain.  He would benefit from a further assessment, such as imaging or orthopedic referral, for low back pain or some other type of pain management as therapy has not successfully reduced pain or functional limitation. He does have 1 remaining visit on POC that could be completed to further assess if referring MD would feel necessary to continue. At that visit will further update POC if needed. Goals: updated 8/4/21  STG: (to be met in 6 treatments)  1. Pt will self report worst pain no greater than 5/10 with standing/walking in order to better tolerate ADLs/work activities with minimal dysfunction  NOT MET: 8/10 pain with >5 minutes of standing, regression since last progress note. 2. Pt will improve AROM of lateral side bending to equal bilaterally in order to demonstrate ability to move/reach in all planes unrestricted at [facility]   MET         3. Pt will be able to complete pelvic tilt and hold for > 20 seconds to demonstrate improved lumbar stability for optimal postural alignment    NOT MET: Still having difficulty coordinating this movement despite extensive cueing    LTG: (to be met in 12 treatments)  1. Pt will demonstrate improved proximal LE strength to >/= 4/5 in order to demonstrate improved stability/strength necessary for unrestricted ADLs/work activities (progressing, still having hip and glute weakness at 3+-4/5)   2. Pt will self report ability to tolerate standing when doing dishes allowing him to returned to unrestricted home duties. ( was progressing towards but recent regression in status)   3.  Pt will decrease functional limitation on Modified Oswestry to </= 34% in order to demonstrate improved functional tolerances at PLOF with minimal restriction/dysfunction (will assess at final visit)   Pt will demonstrate independence with a long term HEP for continued progress/maintenance after completion of PT (MET - pt notes understanding)    Treatment to Date:  [x] Therapeutic Exercise   96077  [] Iontophoresis: 4 mg/mL Dexamethasone Sodium Phosphate  mAmin  75451   [] Therapeutic Activity  94663 [] Vasopneumatic cold with compression  02138    [] Gait Training   [id number] [] Ultrasound   [id number]   [x] Neuromuscular Re-education  [phone] [] Electrical Stimulation Unattended  64592   [] Manual Therapy  65384 [] Electrical Stimulation Attended  [id number]   [x] Instruction in HEP  [] Lumbar/Cervical Traction  [id number]   [] Aquatic Therapy   93485 [x] Cold/hotpack    [] Massage   63697      [] Dry Needling, 1 or 2 muscles  26717   [] Biofeedback, first 15 minutes   40165  [] Biofeedback, additional 15 minutes   69172 [] Dry Needling, 3 or more muscles  58411       Patient Status/Recommendation:     [] Continue per initial plan of care. [] Additional visits necessary. [x] Other: Pt to follow up with MD in regards to status at next weeks appointment. He was instructed to follow up with PT via phone call to office to determine if continuation of PT is warranted. Treatment Charges: Mins Units   []  Modalities       [x]  Ther Exercise 42 3   []  Manual Therapy     []  Ther Activities     []  Aquatics     []  Neuromuscular     [] Vasocompression     [] Gait Training     [] Dry needling        [] 1 or 2 muscles        [] 3 or more muscles     []  Other     Total Treatment time 42 3      Time In: 0960  Time Out: 8945      Electronically signed:   Billy Sanders PT, DPT   #[id number]      If you have any questions or concerns, please don't hesitate to call.   Thank you for your referral.

## 2021-08-09 RX ORDER — SEMAGLUTIDE 1.34 MG/ML
INJECTION, SOLUTION SUBCUTANEOUS
COMMUNITY
Start: 2021-07-29 | End: 2021-11-02

## 2021-08-09 RX ORDER — BLOOD-GLUCOSE,RECEIVER,CONT
EACH MISCELLANEOUS
COMMUNITY
Start: 2021-05-20

## 2021-08-09 RX ORDER — INSULIN GLARGINE 300 U/ML
INJECTION, SOLUTION SUBCUTANEOUS
COMMUNITY
Start: 2021-07-20 | End: 2021-09-10

## 2021-08-10 ENCOUNTER — OFFICE VISIT (OUTPATIENT)
Dept: INTERNAL MEDICINE CLINIC | Age: 61
End: 2021-08-10
Payer: COMMERCIAL

## 2021-08-10 VITALS
WEIGHT: 285 LBS | OXYGEN SATURATION: 97 % | HEART RATE: 74 BPM | HEIGHT: 76 IN | DIASTOLIC BLOOD PRESSURE: 84 MMHG | BODY MASS INDEX: 34.7 KG/M2 | SYSTOLIC BLOOD PRESSURE: 130 MMHG

## 2021-08-10 DIAGNOSIS — I73.9 PVD (PERIPHERAL VASCULAR DISEASE) (HCC): ICD-10-CM

## 2021-08-10 DIAGNOSIS — E78.00 PURE HYPERCHOLESTEROLEMIA: ICD-10-CM

## 2021-08-10 DIAGNOSIS — Z79.4 TYPE 2 DIABETES MELLITUS WITH DIABETIC POLYNEUROPATHY, WITH LONG-TERM CURRENT USE OF INSULIN (HCC): Primary | ICD-10-CM

## 2021-08-10 DIAGNOSIS — E08.41 DIABETIC MONONEUROPATHY ASSOCIATED WITH DIABETES MELLITUS DUE TO UNDERLYING CONDITION (HCC): ICD-10-CM

## 2021-08-10 DIAGNOSIS — I48.11 LONGSTANDING PERSISTENT ATRIAL FIBRILLATION (HCC): ICD-10-CM

## 2021-08-10 DIAGNOSIS — I10 ESSENTIAL HYPERTENSION: ICD-10-CM

## 2021-08-10 DIAGNOSIS — E11.42 TYPE 2 DIABETES MELLITUS WITH DIABETIC POLYNEUROPATHY, WITH LONG-TERM CURRENT USE OF INSULIN (HCC): Primary | ICD-10-CM

## 2021-08-10 DIAGNOSIS — E03.9 ACQUIRED HYPOTHYROIDISM: ICD-10-CM

## 2021-08-10 PROCEDURE — G8417 CALC BMI ABV UP PARAM F/U: HCPCS | Performed by: INTERNAL MEDICINE

## 2021-08-10 PROCEDURE — 99214 OFFICE O/P EST MOD 30 MIN: CPT | Performed by: INTERNAL MEDICINE

## 2021-08-10 PROCEDURE — 1036F TOBACCO NON-USER: CPT | Performed by: INTERNAL MEDICINE

## 2021-08-10 PROCEDURE — 3051F HG A1C>EQUAL 7.0%<8.0%: CPT | Performed by: INTERNAL MEDICINE

## 2021-08-10 PROCEDURE — 2022F DILAT RTA XM EVC RTNOPTHY: CPT | Performed by: INTERNAL MEDICINE

## 2021-08-10 PROCEDURE — G8427 DOCREV CUR MEDS BY ELIG CLIN: HCPCS | Performed by: INTERNAL MEDICINE

## 2021-08-10 PROCEDURE — 3017F COLORECTAL CA SCREEN DOC REV: CPT | Performed by: INTERNAL MEDICINE

## 2021-08-10 RX ORDER — INSULIN GLARGINE 300 U/ML
110 INJECTION, SOLUTION SUBCUTANEOUS DAILY
Qty: 5 PEN | Refills: 5 | Status: ON HOLD | OUTPATIENT
Start: 2021-08-10 | End: 2021-12-31 | Stop reason: HOSPADM

## 2021-08-10 ASSESSMENT — ENCOUNTER SYMPTOMS
COUGH: 0
TROUBLE SWALLOWING: 0
BLOOD IN STOOL: 0
WHEEZING: 0
SHORTNESS OF BREATH: 0
EYE DISCHARGE: 0
ABDOMINAL DISTENTION: 0
DIARRHEA: 0
EYE PAIN: 0
COLOR CHANGE: 0

## 2021-08-10 NOTE — PROGRESS NOTES
Visit Information    Have you changed or started any medications since your last visit including any over-the-counter medicines, vitamins, or herbal medicines? no   Are you having any side effects from any of your medications? -  no  Have you stopped taking any of your medications? Is so, why? -  no    Have you seen any other physician or provider since your last visit? Yes - Records Obtained  Have you had any other diagnostic tests since your last visit? Yes - Records Obtained  Have you been seen in the emergency room and/or had an admission to a hospital since we last saw you? No  Have you had your routine dental cleaning in the past 6 months? yes -     Have you activated your [employer] account? If not, what are your barriers?  Yes     Patient Care Team:  Francis Roy MD as PCP - Tito De Paz MD as PCP - [facility] Provider  Julian Acosta MD as Consulting Physician (Infectious Diseases)  Lata Jenkins RN as Imaging Navigator    Medical History Review  Past Medical, Family, and Social History reviewed and does contribute to the patient presenting condition    Health Maintenance   Topic Date Due    HIV screen  Never done    DTaP/Tdap/Td vaccine (1 - Tdap) Never done    Shingles Vaccine (1 of 2) 10/23/2014    Diabetic retinal exam  01/04/2017    Diabetic microalbuminuria test  01/13/2021    Potassium monitoring  08/01/2021    Creatinine monitoring  08/01/2021    Flu vaccine (1) 09/01/2021    Low dose CT lung screening  03/02/2022    A1C test (Diabetic or Prediabetic)  05/04/2022    Lipid screen  05/13/2022    TSH testing  06/15/2022    Diabetic foot exam  06/25/2022    Pneumococcal 0-64 years Vaccine (2 of 2 - PPSV23) 01/07/2025    Colon cancer screen colonoscopy  01/23/2027    COVID-19 Vaccine  Completed    Hepatitis C screen  Completed    Hepatitis A vaccine  Aged Out    Hib vaccine  Aged Out    Meningococcal (ACWY) vaccine  Aged Out

## 2021-08-10 NOTE — PROGRESS NOTES
[address]  [place] [address]  Dept: [phone]  Dept Fax: [phone]    Nell Henriquez is a 64 y.o. male who presents today for his medical conditions/complaintsas noted below. Nell Henriquez is c/o of   Chief Complaint   Patient presents with    Diabetes     sees Endo     Hypotension     around 2pm it gets low          HPI:     HTN  Onset more than 2 years ago  gustavo mild to mod  Controlled with current po meds  Not associated with headaches or blurry vision  No chest pain  Diabetes   Duration more than 7 years  Modifying factors on Glucophage and other med  Severity uncontrolled sever  Associated signs and symtoms neuropathy/ckd/ CAD. aggravated with sugar diet and better with low sugar diet           Hemoglobin A1C (%)   Date Value   05/04/2021 7.0   08/17/2020 7.2   01/13/2020 6.5 (H)             ( goal A1Cis < 7)   Microalb/Crt.  Ratio (mcg/mg creat)   Date Value   01/13/2020 82 (H)     LDL Cholesterol (mg/dL)   Date Value   05/13/2021 55   01/13/2020 70   05/21/2019 68       (goal LDL is <100)   AST (U/L)   Date Value   05/21/2020 24     ALT (U/L)   Date Value   05/21/2020 19     BUN (mg/dL)   Date Value   08/01/2020 29 (H)     BP Readings from Last 3 Encounters:   08/10/21 130/84   06/11/21 130/80   05/04/21 136/82          (goal 120/80)    Past Medical History:   Diagnosis Date    Asymptomatic bilateral carotid artery stenosis     Boil, thigh 10/2019    10/30/19: right inner thigh region, says PCP Rxd Doxy for this, area is much better, has a couple days left to complete Doxy    CAD (coronary artery disease)     saw Dr. Anamika Kimbrough ([facility])     Charcot foot due to diabetes mellitus ([address] 2014    LEFT    COPD (chronic obstructive pulmonary disease) ([address] 2009    INHALER USE DAILY    Diabetes mellitus ([address] 1989    IDDM, On Insulin Dr. Scar Ramos Diabetic neuropathy [facility]     Difficult intravenous access     VEINS ROLL    Employs prosthetic leg s/p left BKA    Foot ulcer ([address] PRIOR TO 2015    BILAT    Full dentures     UPPER ONLY    Hyperlipidemia 2004    ON RX    Hypertension 2004    ON RX, PCP Dr. Francisco Kapadia, seen Oct. 2019    Hypoglycemia 4/2/2015    MRSA (methicillin resistant staph aureus) culture positive 4/16/2015    ankle    OA (osteoarthritis)     Osteomyelitis ([address]     left stump  BKA    Paroxysmal atrial fibrillation ([address]     Renal mass 09/2019    ACCIDENTAL  FINDING IS FOLLOWING UP WITH KIDNEY DR Sara Ledezma     Suspected sleep apnea     Thyroid disease 2004    PT HAD HYPERTHYROIDISM-UNCONTROLED THYROID DESTROYED WITH RADI IODINE NOW HAS HYPOTHYRIDISM    Vision abnormalities 09/2019    LEFT NO VISION    Vitreous hemorrhage of left eye due to diabetes mellitus ([address] 09/2019    s/p Vitrectomy 9/2019    Wears glasses     Wears partial dentures     full upper, does not wear lower partial    Wellness examination     Dr. Oscar Hooper seen within last 1 1/2 mos      Past Surgical History:   Procedure Laterality Date    CARDIAC CATHETERIZATION      no stenting    CARDIOVASCULAR STRESS TEST  06/28/2019    small fixed apical, likely normal, EF 48%    COLONOSCOPY  06/17/2016    poor prep, done up to the IC valve, redundant colon    COLONOSCOPY  01/23/2017    VIRTUAL COLONOSCOPY DONE-no polyps or masses    CYSTOSCOPY Bilateral 6/16/2020    CYSTOSCOPY RETROGRADE PYELOGRAM URETEROSCOPY performed by Savannah Phelps MD at [address] [facility] Right 7/30/2020    CYSTOSCOPY, RIGHT RETROGRADE PYELOGRAM,  URETERAL CATHETER  INSERTION performed by Savannah Phelps MD at [address] [facility] 9/1/2020    CYSTOSCOPY RETROGRADE PYELOGRAM, RIGHT URETERAL STENT EXCHANGE performed by Savannah Phelps MD at [facility] Right     r-bone removed    [facility]  PICC [address] [facility] DOUBLE  5/21/2018         KIDNEY CYST REMOVAL      KIDNEY SURGERY Right 11/13/2019    XI ROBOTIC PARTIAL NEPHRECTOMY, INTRAOP ULTRASOUND, RIGHT URETEROURETEROSTOMY, RIGHT URETERAL STENT PLACEMENT **SHORT STAY** performed by Ford Kiser MD at [place] [address] 7/30/2020    XI LAPAROSCOPIC ROBOTIC URETEROLYSIS, ROBOTIC ASSISTED BUCCAL URETEROPLASTY  (DR. COBIAN TO ASSIST) performed by Ford Kiser MD at [address] Left 4/29/15    OTHER SURGICAL HISTORY Left 5-5-15 and 11/2015    Revision BKA    OTHER SURGICAL HISTORY      left stump revision 5/30/2018    IL RE-AMPUTATION LOWER LEG Left 5/30/2018    LEG AMPUTATION BELOW KNEE REVISION performed by Fartun Castro MD at [address] Bilateral 80'S    TOE AMPUTATION      Right 2 and 4    VITRECTOMY Left 9/25/2019    PARS PLANA VITRECTOMY 25 GAUGE, MEMBRANE PEELING, ENDOLASER 200  MW 1044 SPOTS, INDIRECT LASER 26 SPOTS performed by Jade Jacobs MD at [facility] History   Problem Relation Age of Onset    Diabetes Mother     Hypertension Mother     Stomach Cancer Mother     Hypertension Father     Alcohol Abuse Father     COPD Father     Kidney Cancer Father     Diabetes Brother         IDDM-PUMP    Other Sister         BRAIN TUMOR       Social History     Tobacco Use    Smoking status: Former Smoker     Packs/day: 2.00     Years: 25.00     Pack years: 50.00     Types: Cigars     Start date: 1     Quit date: 5/29/2011     Years since quitting: 10.2    Smokeless tobacco: Never Used    Tobacco comment: quit in 2011   Substance Use Topics    Alcohol use:  Yes     Alcohol/week: 0.0 standard drinks     Comment: BEER 1 A MONTH      Current Outpatient Medications   Medication Sig Dispense Refill    Insulin Glargine, 2 Unit Dial, (TOUJEO MAX SOLOSTAR) 300 UNIT/ML SOPN Inject 110 Units into the skin daily 5 pen 5    Continuous Blood Gluc  (DEXCOM G6 ) DOUGIE USE AS DIRECTED TO MONITOR BLOOD SUGAR      Insulin Glargine, 2 Unit Dial, (TOUJEO MAX SOLOSTAR) 300 UNIT/ML SOPN Inject 110 units Once Daily in the AM      Semaglutide,0.25 or 0.5MG/DOS, (OZEMPIC, 0.25 OR 0.5 MG/DOSE,) 2 MG/1.5ML SOPN Inject 0.25mg Once Weekly for 4 weeks, then increase to 0.5mg Once Weekly      clotrimazole-betamethasone (LOTRISONE) 1-0.05 % cream APPLY TO AFFECTED AREA TWICE A DAY 45 g 3    ELIQUIS 5 MG TABS tablet TAKE 1 TABLET BY MOUTH TWICE A  tablet 2    finasteride (PROSCAR) 5 MG tablet TAKE 1 TABLET BY MOUTH EVERY DAY 90 tablet 1    blood glucose monitor strips T/S Contour.  Test 4 times a day 100 strip 3    rOPINIRole (REQUIP) 2 MG tablet TAKE 1 TABLET BY MOUTH EVERY DAY 90 tablet 3    losartan (COZAAR) 50 MG tablet TAKE 1 TABLET BY MOUTH EVERY DAY 90 tablet 1    gemfibrozil (LOPID) 600 MG tablet TAKE 1 TABLET BY MOUTH EVERY DAY 90 tablet 3    atorvastatin (LIPITOR) 40 MG tablet TAKE 1 TABLET BY MOUTH EVERY DAY 90 tablet 3    levothyroxine (SYNTHROID) 150 MCG tablet TAKE 1 TABLET BY MOUTH EVERY DAY 90 tablet 1    isosorbide mononitrate (IMDUR) 30 MG extended release tablet TAKE 1 TABLET BY MOUTH EVERY DAY 90 tablet 3    albuterol sulfate  (90 Base) MCG/ACT inhaler INHALE 2 PUFFS INTO THE LUNGS EVERY 6 HOURS AS NEEDED FOR WHEEZING OR SHORTNESS OF BREATH 6.7 Inhaler 0    tamsulosin (FLOMAX) 0.4 MG capsule Take 1 capsule by mouth daily 90 capsule 3    metoprolol tartrate (LOPRESSOR) 25 MG tablet Take 1 tablet by mouth 2 times daily 180 tablet 3    glucagon, rDNA, 1 MG injection Inject 1 mL into the muscle      Blood Pressure KIT 1 actuation by Does not apply route once a week 1 kit 0    Handicap Placard MISC by Does not apply route Duration - 5years  Reason- prosthetic leg 1 each 0    propafenone (RYTHMOL) 150 MG tablet Take 1 tablet by mouth every 8 hours 90 tablet 3    SYMBICORT 160-4.5 MCG/ACT AERO TAKE 2 PUFFS BY MOUTH TWICE A DAY (Patient taking differently: Inhale 2 puffs into the lungs 2 times daily ) 30.6 Inhaler 3    Omega-3 Fatty Acids (FISH OIL CONCENTRATE) 300 MG CAPS Take 300 mg by mouth nightly       Glucosamine Sulfate 500 MG TABS Take 500 mg by mouth 3 times daily      insulin lispro (HUMALOG) 100 UNIT/ML injection vial INJECT 12 UNITS UNDER THE SKIN 3 TIMES DAILY + SLIDING SCALE (TAKE 5 UNITS IF SUGAR IS OVER 150) (Patient not taking: Reported on 8/10/2021) 6 vial 3    pregabalin (LYRICA) 100 MG capsule Take 1 capsule by mouth 3 times daily for 30 days. 270 capsule 3    pregabalin (LYRICA) 100 MG capsule TAKE 1 CAPSULE BY MOUTH THREE TIMES A DAY 90 capsule 0     No current facility-administered medications for this visit. Allergies   Allergen Reactions    Ramipril      Other reaction(s): swelling of the lips   Other reaction(s): swelling of the lips     Adhesive Tape      tegaderm causes blisters. Use paper tape       Health Maintenance   Topic Date Due    HIV screen  Never done    DTaP/Tdap/Td vaccine (1 - Tdap) Never done    Shingles Vaccine (1 of 2) 10/23/2014    Diabetic retinal exam  01/04/2017    Diabetic microalbuminuria test  01/13/2021    Potassium monitoring  08/01/2021    Creatinine monitoring  08/01/2021    Flu vaccine (1) 09/01/2021    Low dose CT lung screening  03/02/2022    A1C test (Diabetic or Prediabetic)  05/04/2022    Lipid screen  05/13/2022    TSH testing  06/15/2022    Diabetic foot exam  06/25/2022    Pneumococcal 0-64 years Vaccine (2 of 2 - PPSV23) 01/07/2025    Colon cancer screen colonoscopy  01/23/2027    COVID-19 Vaccine  Completed    Hepatitis C screen  Completed    Hepatitis A vaccine  Aged Out    Hib vaccine  Aged Out    Meningococcal (ACWY) vaccine  Aged Out       Subjective:     Review of Systems   Constitutional: Negative for appetite change, diaphoresis and fatigue. HENT: Negative for ear discharge and trouble swallowing. Eyes: Negative for pain and discharge. Respiratory: Negative for cough, shortness of breath and wheezing.     Cardiovascular: Negative for chest pain and palpitations. Gastrointestinal: Negative for abdominal distention, blood in stool and diarrhea. Endocrine: Negative for polydipsia and polyphagia. Genitourinary: Negative for difficulty urinating and frequency. Musculoskeletal: Positive for arthralgias. Negative for gait problem, myalgias and neck pain. Skin: Negative for color change and rash. Allergic/Immunologic: Negative for environmental allergies and food allergies. Neurological: Negative for dizziness and headaches. Hematological: Negative for adenopathy. Does not bruise/bleed easily. Psychiatric/Behavioral: Negative for behavioral problems and sleep disturbance. Objective:     Physical Exam  Constitutional:       Appearance: He is well-developed. He is not diaphoretic. HENT:      Head: Normocephalic and atraumatic. Eyes:      General:         Right eye: No discharge. Left eye: No discharge. Extraocular Movements:      Right eye: Normal extraocular motion. Left eye: Normal extraocular motion. Conjunctiva/sclera: Conjunctivae normal.      Right eye: Right conjunctiva is not injected. Left eye: Left conjunctiva is not injected. Neck:      Thyroid: No thyroid mass or thyromegaly. Vascular: No JVD. Cardiovascular:      Rate and Rhythm: Normal rate and regular rhythm. Heart sounds: No murmur heard. No friction rub. Pulmonary:      Effort: Pulmonary effort is normal. No tachypnea, bradypnea, accessory muscle usage or respiratory distress. Breath sounds: Normal breath sounds. No wheezing or rales. Abdominal:      General: Bowel sounds are normal. There is no distension. Palpations: Abdomen is soft. Tenderness: There is no abdominal tenderness. There is no rebound. Musculoskeletal:         General: No tenderness. Normal range of motion. Cervical back: Normal range of motion and neck supple. No edema or erythema.       Comments: Left bka  With prosthetic Lymphadenopathy:      Head:      Right side of head: No submental or submandibular adenopathy. Left side of head: No submental or submandibular adenopathy. Cervical: No cervical adenopathy. Skin:     General: Skin is warm. Coloration: Skin is not pale. Findings: No bruising, ecchymosis or rash. Neurological:      Mental Status: He is alert and oriented to person, place, and time. Cranial Nerves: No cranial nerve deficit. Sensory: No sensory deficit. Motor: No atrophy or abnormal muscle tone. Coordination: Coordination normal.   Psychiatric:         Mood and Affect: Mood is not anxious. Affect is not angry. Speech: Speech is not slurred. Behavior: Behavior normal. Behavior is not aggressive. Thought Content: Thought content does not include homicidal ideation. Cognition and Memory: Memory is not impaired. /84   Pulse 74   Ht 6' 4\" (1.93 m)   Wt 285 lb (129.3 kg)   SpO2 97%   BMI 34.69 kg/m²     Assessment:       Diagnosis Orders   1. Type 2 diabetes mellitus with diabetic polyneuropathy, with long-term current use of insulin ([address]     2. Acquired hypothyroidism     3. Diabetic mononeuropathy associated with diabetes mellitus due to underlying condition ([address]   diabetic neuropathy diabetic foot status post left below-knee amputation   4. PVD (peripheral vascular disease) ([address]   stable status post left below-knee amputation   5. Pure hypercholesterolemia     6. Essential hypertension     7. Longstanding persistent atrial fibrillation (HCC)   A. fib controlled anticoagulated with Eliquis             Plan:      No follow-ups on file. No orders of the defined types were placed in this encounter.     Orders Placed This Encounter   Medications    Insulin Glargine, 2 Unit Dial, (TOUJEO MAX SOLOSTAR) 300 UNIT/ML SOPN     Sig: Inject 110 Units into the skin daily     Dispense:  5 pen     Refill:  5   A. fib rate controlled on Eliquis A1c improved to 7.02 months ago will recheck in a month    Hypercholesterolemia on gemfibrozil  On Toujeo 110 units daily and Ozempic shots once a week  incrase to 0.5 mg in two week  Pharmacy has helped dose it       Patient given educational materials - see patient instructions. Discussed use, benefit, and side effects of prescribed medications. All patientquestions answered. Pt voiced understanding. Reviewed health maintenance. Instructedto continue current medications, diet and exercise. Patient agreed with treatmentplan. Follow up as directed.      Electronically signed by Foreign Mello MD on 8/10/2021 at 9:45 AM

## 2021-08-12 ENCOUNTER — TELEPHONE (OUTPATIENT)
Dept: INTERNAL MEDICINE CLINIC | Age: 61
End: 2021-08-12

## 2021-08-12 NOTE — TELEPHONE ENCOUNTER
----- Message from Christina Duran sent at 8/12/2021  9:45 AM EDT -----  Subject: Message to Provider    QUESTIONS  Information for Provider? pt wants to talk to the office about his   medication and the dosage. pt stated medicine losartan and he thought the   dosage was changed to 25 instead of 50 MG   ---------------------------------------------------------------------------  --------------  CALL BACK INFO  What is the best way for the office to contact you? OK to leave message on   voicemail  Preferred Call Back Phone Number? [phone]  ---------------------------------------------------------------------------  --------------  SCRIPT ANSWERS  Relationship to Patient?  Self

## 2021-08-12 NOTE — TELEPHONE ENCOUNTER
pt stated medicine losartan and he thought the   dosage was changed to 25 instead of 50 MG       What dose do you want him on?

## 2021-08-13 RX ORDER — LEVOTHYROXINE SODIUM 0.15 MG/1
TABLET ORAL
Qty: 90 TABLET | Refills: 1 | Status: ON HOLD | OUTPATIENT
Start: 2021-08-13 | End: 2021-12-31 | Stop reason: HOSPADM

## 2021-08-27 ENCOUNTER — OFFICE VISIT (OUTPATIENT)
Dept: PODIATRY | Age: 61
End: 2021-08-27
Payer: COMMERCIAL

## 2021-08-27 VITALS — BODY MASS INDEX: 34.7 KG/M2 | WEIGHT: 285 LBS | HEIGHT: 76 IN

## 2021-08-27 DIAGNOSIS — E11.42 TYPE 2 DIABETES MELLITUS WITH DIABETIC POLYNEUROPATHY, WITH LONG-TERM CURRENT USE OF INSULIN (HCC): ICD-10-CM

## 2021-08-27 DIAGNOSIS — Z79.4 TYPE 2 DIABETES MELLITUS WITH DIABETIC POLYNEUROPATHY, WITH LONG-TERM CURRENT USE OF INSULIN (HCC): ICD-10-CM

## 2021-08-27 DIAGNOSIS — B35.1 ONYCHOMYCOSIS OF TOENAIL: Primary | ICD-10-CM

## 2021-08-27 DIAGNOSIS — M79.671 PAIN IN RIGHT FOOT: ICD-10-CM

## 2021-08-27 DIAGNOSIS — E11.51 TYPE 2 DIABETES MELLITUS WITH PERIPHERAL VASCULAR DISEASE (HCC): ICD-10-CM

## 2021-08-27 DIAGNOSIS — M79.674 PAIN OF TOE OF RIGHT FOOT: ICD-10-CM

## 2021-08-27 DIAGNOSIS — L84 CORNS AND CALLOSITIES: ICD-10-CM

## 2021-08-27 PROCEDURE — 11055 PARING/CUTG B9 HYPRKER LES 1: CPT | Performed by: PODIATRIST

## 2021-08-27 PROCEDURE — 11720 DEBRIDE NAIL 1-5: CPT | Performed by: PODIATRIST

## 2021-08-27 ASSESSMENT — ENCOUNTER SYMPTOMS
BACK PAIN: 0
COLOR CHANGE: 0
NAUSEA: 0
DIARRHEA: 0
SHORTNESS OF BREATH: 0

## 2021-08-27 NOTE — PROGRESS NOTES
The right PT pulse is not palpable. Protective sensation is absent to the right plantar foot as noted with a 5.07 Avery-Gaudencio monofilament. Gluose: 281 mg/dl. Class A Findings (1 needed)   [x] Non-traumatic amputation of foot or integral skeleton portion thereof. [x] Q7.      Class B Findings (2 needed)   1. [] Absent posterior tibial pulse   2. [] Absent dorsalis pedis pulse   3. [] Advanced trophic changes; three of the following are required:   ·         [] hair growth (decrease or absence)   ·         [] nail changes (thickening)   ·         [] pigmentary changes (discoloration)   ·         [] skin texture (thin, shiny)   ·         [] skin color (rubor or redness)   [] Q8.      Class C Findings (1 Class B, 2 Class C needed)   1. [] Claudication   2. [] Temperature changes   3. [] Edema   4. [] Paresthesia   5. [] Burning   [] Q9.     ASSESSMENT:    Diagnosis Orders   1. Onychomycosis of toenail  OH DEBRIDEMENT OF NAIL(S), 1-5    HM DIABETES FOOT EXAM   2. Corns and callosities  HM DIABETES FOOT EXAM    OH TRIM HYPERKERATOTIC SKIN LESION, ONE   3. Pain of toe of right foot  OH DEBRIDEMENT OF NAIL(S), 1-5    HM DIABETES FOOT EXAM   4. Pain in right foot  HM DIABETES FOOT EXAM    OH TRIM HYPERKERATOTIC SKIN LESION, ONE   5. Type 2 diabetes mellitus with peripheral vascular disease (HCC)  OH DEBRIDEMENT OF NAIL(S), 1-5    HM DIABETES FOOT EXAM    OH TRIM HYPERKERATOTIC SKIN LESION, ONE   6. Type 2 diabetes mellitus with diabetic polyneuropathy, with long-term current use of insulin (HCC)  OH DEBRIDEMENT OF NAIL(S), 1-5    HM DIABETES FOOT EXAM    OH TRIM HYPERKERATOTIC SKIN LESION, ONE     PLAN: Toenails 1,4,5 of the right foot were debrided in length and thickness using a nail nipper and a . The lesion(s) to the right foot debrided with a 15 blade down to healthy, pink skin without event. Return in about 9 weeks (around 10/29/2021) for At risk diabetic foot care.    8/27/2021      Jessica Hooper Susanne Huber DPM

## 2021-08-30 DIAGNOSIS — I10 ESSENTIAL HYPERTENSION: Primary | ICD-10-CM

## 2021-09-10 ENCOUNTER — OFFICE VISIT (OUTPATIENT)
Dept: INTERNAL MEDICINE CLINIC | Age: 61
End: 2021-09-10
Payer: COMMERCIAL

## 2021-09-10 VITALS
WEIGHT: 280 LBS | SYSTOLIC BLOOD PRESSURE: 122 MMHG | HEART RATE: 83 BPM | HEIGHT: 76 IN | BODY MASS INDEX: 34.1 KG/M2 | DIASTOLIC BLOOD PRESSURE: 72 MMHG | OXYGEN SATURATION: 97 %

## 2021-09-10 DIAGNOSIS — G89.29 CHRONIC RIGHT-SIDED LOW BACK PAIN WITH BILATERAL SCIATICA: ICD-10-CM

## 2021-09-10 DIAGNOSIS — E03.9 ACQUIRED HYPOTHYROIDISM: ICD-10-CM

## 2021-09-10 DIAGNOSIS — Z79.4 TYPE 2 DIABETES MELLITUS WITH DIABETIC POLYNEUROPATHY, WITH LONG-TERM CURRENT USE OF INSULIN (HCC): Primary | ICD-10-CM

## 2021-09-10 DIAGNOSIS — M54.42 CHRONIC RIGHT-SIDED LOW BACK PAIN WITH BILATERAL SCIATICA: ICD-10-CM

## 2021-09-10 DIAGNOSIS — E11.42 TYPE 2 DIABETES MELLITUS WITH DIABETIC POLYNEUROPATHY, WITH LONG-TERM CURRENT USE OF INSULIN (HCC): Primary | ICD-10-CM

## 2021-09-10 DIAGNOSIS — M54.41 CHRONIC RIGHT-SIDED LOW BACK PAIN WITH BILATERAL SCIATICA: ICD-10-CM

## 2021-09-10 DIAGNOSIS — I10 ESSENTIAL HYPERTENSION: ICD-10-CM

## 2021-09-10 DIAGNOSIS — R42 VERTIGO: ICD-10-CM

## 2021-09-10 DIAGNOSIS — C64.1 MALIGNANT NEOPLASM OF RIGHT KIDNEY (HCC): ICD-10-CM

## 2021-09-10 DIAGNOSIS — Z89.512 STATUS POST BELOW-KNEE AMPUTATION OF LEFT LOWER EXTREMITY (HCC): ICD-10-CM

## 2021-09-10 LAB — HBA1C MFR BLD: 8 %

## 2021-09-10 PROCEDURE — 83036 HEMOGLOBIN GLYCOSYLATED A1C: CPT | Performed by: INTERNAL MEDICINE

## 2021-09-10 PROCEDURE — 3052F HG A1C>EQUAL 8.0%<EQUAL 9.0%: CPT | Performed by: INTERNAL MEDICINE

## 2021-09-10 PROCEDURE — 99214 OFFICE O/P EST MOD 30 MIN: CPT | Performed by: INTERNAL MEDICINE

## 2021-09-10 PROCEDURE — G8417 CALC BMI ABV UP PARAM F/U: HCPCS | Performed by: INTERNAL MEDICINE

## 2021-09-10 PROCEDURE — G8427 DOCREV CUR MEDS BY ELIG CLIN: HCPCS | Performed by: INTERNAL MEDICINE

## 2021-09-10 PROCEDURE — 1036F TOBACCO NON-USER: CPT | Performed by: INTERNAL MEDICINE

## 2021-09-10 PROCEDURE — 2022F DILAT RTA XM EVC RTNOPTHY: CPT | Performed by: INTERNAL MEDICINE

## 2021-09-10 PROCEDURE — 3017F COLORECTAL CA SCREEN DOC REV: CPT | Performed by: INTERNAL MEDICINE

## 2021-09-10 RX ORDER — PEN NEEDLE, DIABETIC 31 GX5/16"
NEEDLE, DISPOSABLE MISCELLANEOUS
COMMUNITY
Start: 2021-08-26 | End: 2021-11-15 | Stop reason: SDUPTHER

## 2021-09-10 RX ORDER — MECLIZINE HYDROCHLORIDE 25 MG/1
25 TABLET ORAL 3 TIMES DAILY PRN
Qty: 30 TABLET | Refills: 0 | Status: SHIPPED | OUTPATIENT
Start: 2021-09-10 | End: 2021-10-10

## 2021-09-10 ASSESSMENT — ENCOUNTER SYMPTOMS
EYE PAIN: 0
EYE DISCHARGE: 0
BLOOD IN STOOL: 0
WHEEZING: 0
ABDOMINAL DISTENTION: 0
SHORTNESS OF BREATH: 0
DIARRHEA: 0
COLOR CHANGE: 0
TROUBLE SWALLOWING: 0
COUGH: 0

## 2021-09-10 NOTE — PROGRESS NOTES
Visit Information    Have you changed or started any medications since your last visit including any over-the-counter medicines, vitamins, or herbal medicines? no   Are you having any side effects from any of your medications? -  no  Have you stopped taking any of your medications? Is so, why? -  no    Have you seen any other physician or provider since your last visit? Yes - Records Obtained  Have you had any other diagnostic tests since your last visit? Yes - Records Obtained  Have you been seen in the emergency room and/or had an admission to a hospital since we last saw you? No  Have you had your routine dental cleaning in the past 6 months? yes -     Have you activated your [employer] account? If not, what are your barriers?  Yes     Patient Care Team:  Reena Richardson MD as PCP - Bernard Barksdale MD as PCP - [facility]  Uzair Nielson MD as Consulting Physician (Infectious Diseases)  Natalia Avina RN as Imaging Navigator    Medical History Review  Past Medical, Family, and Social History reviewed and does contribute to the patient presenting condition    Health Maintenance   Topic Date Due    HIV screen  Never done    DTaP/Tdap/Td vaccine (1 - Tdap) Never done    Shingles Vaccine (1 of 2) 10/23/2014    Diabetic retinal exam  01/04/2017    Diabetic microalbuminuria test  01/13/2021    Potassium monitoring  08/01/2021    Creatinine monitoring  08/01/2021    Low dose CT lung screening  03/02/2022    A1C test (Diabetic or Prediabetic)  05/04/2022    Lipid screen  05/13/2022    TSH testing  06/15/2022    Diabetic foot exam  08/27/2022    Pneumococcal 0-64 years Vaccine (2 of 2 - PPSV23) 01/07/2025    Colon cancer screen colonoscopy  01/23/2027    Flu vaccine  Completed    COVID-19 Vaccine  Completed    Hepatitis C screen  Completed    Hepatitis A vaccine  Aged Out    Hib vaccine  Aged Out    Meningococcal (ACWY) vaccine  Aged Out

## 2021-09-10 NOTE — PROGRESS NOTES
 Difficult intravenous access     VEINS ROLL    Employs prosthetic leg     s/p left BKA    Foot ulcer ([address] PRIOR TO 2015    BILAT    Full dentures     UPPER ONLY    Hyperlipidemia 2004    ON RX    Hypertension 2004    ON RX, PCP Dr. Anila Pool, seen Oct. 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 Lisa Resendez seen within last 1 1/2 mos      Past Surgical History:   Procedure Laterality Date    CARDIAC CATHETERIZATION      no stenting    CARDIOVASCULAR STRESS TEST  06/28/2019    small fixed apical, likely normal, EF 48%    COLONOSCOPY  06/17/2016    poor prep, done up to the IC valve, redundant colon    COLONOSCOPY  01/23/2017    VIRTUAL COLONOSCOPY DONE-no polyps or masses    CYSTOSCOPY Bilateral 6/16/2020    CYSTOSCOPY RETROGRADE PYELOGRAM URETEROSCOPY performed by Santana Greco MD at [facility] Right 7/30/2020    CYSTOSCOPY, RIGHT RETROGRADE PYELOGRAM,  URETERAL CATHETER  INSERTION performed by Santana Greco MD at [facility] 9/1/2020    CYSTOSCOPY RETROGRADE PYELOGRAM, RIGHT URETERAL STENT EXCHANGE performed by Santana Greco MD at hospitals Right     r-bone removed    [facility]  PICC [facility] DOUBLE  5/21/2018         KIDNEY CYST REMOVAL      KIDNEY SURGERY Right 11/13/2019    XI ROBOTIC PARTIAL NEPHRECTOMY, INTRAOP ULTRASOUND, RIGHT URETEROURETEROSTOMY, RIGHT URETERAL STENT PLACEMENT **SHORT STAY** performed by Gracy Guthrie MD at [address] N/A 7/30/2020    XI LAPAROSCOPIC ROBOTIC URETEROLYSIS, ROBOTIC ASSISTED BUCCAL URETEROPLASTY  (DR. COBIAN TO ASSIST) performed by Gracy Guthrie MD at [address] Left 4/29/15    OTHER SURGICAL HISTORY Left 5-5-15 and 11/2015    Revision BKA    OTHER SURGICAL HISTORY      left stump revision 5/30/2018    ND RE-AMPUTATION LOWER LEG Left 5/30/2018    LEG AMPUTATION BELOW KNEE REVISION performed by Feliberto Torres MD at [address] Bilateral 80'S    TOE AMPUTATION      Right 2 and 4    VITRECTOMY Left 9/25/2019    PARS PLANA VITRECTOMY 25 GAUGE, MEMBRANE PEELING, ENDOLASER 200  MW 1044 SPOTS, INDIRECT LASER 26 SPOTS performed by Marissa Wolfe MD at [place] History   Problem Relation Age of Onset    Diabetes Mother     Hypertension Mother     Stomach Cancer Mother     Hypertension Father     Alcohol Abuse Father     COPD Father     Kidney Cancer Father     Diabetes Brother         IDDM-PUMP    Other Sister         BRAIN TUMOR       Social History     Tobacco Use    Smoking status: Former Smoker     Packs/day: 2.00     Years: 25.00     Pack years: 50.00     Types: Cigars     Start date: 1     Quit date: 5/29/2011     Years since quitting: 10.2    Smokeless tobacco: Never Used    Tobacco comment: quit in 2011   Substance Use Topics    Alcohol use:  Yes     Alcohol/week: 0.0 standard drinks     Comment: BEER 1 A MONTH      Current Outpatient Medications   Medication Sig Dispense Refill    B-D UF III MINI PEN NEEDLES 31G X 5 MM MISC USE AS DIRECTED ONCE DAILY TO INJECT TOUJEO      meclizine (ANTIVERT) 25 MG tablet Take 1 tablet by mouth 3 times daily as needed for Dizziness or Nausea 30 tablet 0    metoprolol tartrate (LOPRESSOR) 25 MG tablet TAKE 1 TABLET BY MOUTH TWICE A  tablet 3    levothyroxine (SYNTHROID) 150 MCG tablet TAKE 1 TABLET BY MOUTH EVERY DAY 90 tablet 1    Insulin Glargine, 2 Unit Dial, (TOUJEO MAX SOLOSTAR) 300 UNIT/ML SOPN Inject 110 Units into the skin daily 5 pen 5    Continuous Blood Gluc  (DEXCOM G6 ) DOUGIE USE AS DIRECTED TO MONITOR BLOOD SUGAR      Semaglutide,0.25 or 0.5MG/DOS, (OZEMPIC, 0.25 OR 0.5 MG/DOSE,) 2 MG/1.5ML SOPN Inject 0.25mg Once Weekly for 4 weeks, then increase to 0.5mg Once Weekly      clotrimazole-betamethasone (LOTRISONE) 1-0.05 % cream APPLY TO AFFECTED AREA TWICE A DAY 45 g 3    ELIQUIS 5 MG TABS tablet TAKE 1 TABLET BY MOUTH TWICE A  tablet 2    finasteride (PROSCAR) 5 MG tablet TAKE 1 TABLET BY MOUTH EVERY DAY 90 tablet 1    insulin lispro (HUMALOG) 100 UNIT/ML injection vial INJECT 12 UNITS UNDER THE SKIN 3 TIMES DAILY + SLIDING SCALE (TAKE 5 UNITS IF SUGAR IS OVER 150) 6 vial 3    blood glucose monitor strips T/S Contour.  Test 4 times a day 100 strip 3    rOPINIRole (REQUIP) 2 MG tablet TAKE 1 TABLET BY MOUTH EVERY DAY 90 tablet 3    losartan (COZAAR) 50 MG tablet TAKE 1 TABLET BY MOUTH EVERY DAY 90 tablet 1    gemfibrozil (LOPID) 600 MG tablet TAKE 1 TABLET BY MOUTH EVERY DAY 90 tablet 3    atorvastatin (LIPITOR) 40 MG tablet TAKE 1 TABLET BY MOUTH EVERY DAY 90 tablet 3    isosorbide mononitrate (IMDUR) 30 MG extended release tablet TAKE 1 TABLET BY MOUTH EVERY DAY 90 tablet 3    albuterol sulfate  (90 Base) MCG/ACT inhaler INHALE 2 PUFFS INTO THE LUNGS EVERY 6 HOURS AS NEEDED FOR WHEEZING OR SHORTNESS OF BREATH 6.7 Inhaler 0    tamsulosin (FLOMAX) 0.4 MG capsule Take 1 capsule by mouth daily 90 capsule 3    glucagon, rDNA, 1 MG injection Inject 1 mL into the muscle      Blood Pressure KIT 1 actuation by Does not apply route once a week 1 kit 0    Handicap Placard MISC by Does not apply route Duration - 5years  Reason- prosthetic leg 1 each 0    propafenone (RYTHMOL) 150 MG tablet Take 1 tablet by mouth every 8 hours 90 tablet 3    SYMBICORT 160-4.5 MCG/ACT AERO TAKE 2 PUFFS BY MOUTH TWICE A DAY (Patient taking differently: Inhale 2 puffs into the lungs 2 times daily ) 30.6 Inhaler 3    Omega-3 Fatty Acids (FISH OIL CONCENTRATE) 300 MG CAPS Take 300 mg by mouth nightly       Glucosamine Sulfate 500 MG TABS Take 500 mg by mouth 3 times daily      Insulin Glargine, 2 Unit Dial, (TOUJEO MAX SOLOSTAR) 300 UNIT/ML SOPN Inject 110 units Once Daily in the AM (Patient not taking: Reported on 9/10/2021)      pregabalin (LYRICA) 100 MG capsule Take 1 capsule by mouth 3 times daily for 30 days. 270 capsule 3    pregabalin (LYRICA) 100 MG capsule TAKE 1 CAPSULE BY MOUTH THREE TIMES A DAY 90 capsule 0     No current facility-administered medications for this visit. Allergies   Allergen Reactions    Ramipril      Other reaction(s): swelling of the lips   Other reaction(s): swelling of the lips     Adhesive Tape      tegaderm causes blisters.  Use paper tape       Health Maintenance   Topic Date Due    HIV screen  Never done    DTaP/Tdap/Td vaccine (1 - Tdap) Never done    Shingles Vaccine (1 of 2) 10/23/2014    Diabetic retinal exam  01/04/2017    Diabetic microalbuminuria test  01/13/2021    Potassium monitoring  08/01/2021    Creatinine monitoring  08/01/2021    Low dose CT lung screening  03/02/2022    A1C test (Diabetic or Prediabetic)  05/04/2022    Lipid screen  05/13/2022    TSH testing  06/15/2022    Diabetic foot exam  08/27/2022    Pneumococcal 0-64 years Vaccine (2 of 2 - PPSV23) 01/07/2025    Colon cancer screen colonoscopy  01/23/2027    Flu vaccine  Completed    COVID-19 Vaccine  Completed    Hepatitis C screen  Completed    Hepatitis A vaccine  Aged Out    Hib vaccine  Aged Out    Meningococcal (ACWY) vaccine  Aged Out       Subjective:     Review of Systems   Constitutional: Negative for appetite change, diaphoresis and fatigue. HENT: Negative for ear discharge and trouble swallowing. Eyes: Negative for pain and discharge. Respiratory: Negative for cough, shortness of breath and wheezing. Cardiovascular: Negative for chest pain and palpitations. Gastrointestinal: Negative for abdominal distention, blood in stool and diarrhea. Endocrine: Negative for polydipsia and polyphagia. Genitourinary: Negative for difficulty urinating and frequency. Musculoskeletal: Positive for arthralgias. Negative for gait problem, myalgias and neck pain. Skin: Negative for color change and rash. Allergic/Immunologic: Negative for environmental allergies and food allergies. Neurological: Negative for dizziness and headaches. Hematological: Negative for adenopathy. Does not bruise/bleed easily. Psychiatric/Behavioral: Negative for behavioral problems and sleep disturbance. Objective:     Physical Exam  Constitutional:       Appearance: He is well-developed. He is not diaphoretic. HENT:      Head: Normocephalic and atraumatic. Eyes:      General:         Right eye: No discharge. Left eye: No discharge. Extraocular Movements:      Right eye: Normal extraocular motion. Left eye: Normal extraocular motion. Conjunctiva/sclera: Conjunctivae normal.      Right eye: Right conjunctiva is not injected. Left eye: Left conjunctiva is not injected. Neck:      Thyroid: No thyroid mass or thyromegaly. Vascular: No JVD. Cardiovascular:      Rate and Rhythm: Normal rate and regular rhythm. Heart sounds: No murmur heard. No friction rub. Pulmonary:      Effort: Pulmonary effort is normal. No tachypnea, bradypnea, accessory muscle usage or respiratory distress. Breath sounds: Normal breath sounds. No wheezing or rales. Abdominal:      General: Bowel sounds are normal. There is no distension.       Palpations: Abdomen is soft. Tenderness: There is no abdominal tenderness. There is no rebound. Musculoskeletal:         General: No tenderness. Normal range of motion. Cervical back: Normal range of motion and neck supple. No edema or erythema. Comments: Post lef bka     Lymphadenopathy:      Head:      Right side of head: No submental or submandibular adenopathy. Left side of head: No submental or submandibular adenopathy. Cervical: No cervical adenopathy. Skin:     General: Skin is warm. Coloration: Skin is not pale. Findings: No bruising, ecchymosis or rash. Neurological:      Mental Status: He is alert and oriented to person, place, and time. Cranial Nerves: No cranial nerve deficit. Sensory: No sensory deficit. Motor: No atrophy or abnormal muscle tone. Coordination: Coordination normal.   Psychiatric:         Mood and Affect: Mood is not anxious. Affect is not angry. Speech: Speech is not slurred. Behavior: Behavior normal. Behavior is not aggressive. Thought Content: Thought content does not include homicidal ideation. Cognition and Memory: Memory is not impaired. /72   Pulse 83   Ht 6' 4\" (1.93 m)   Wt 280 lb (127 kg)   SpO2 97%   BMI 34.08 kg/m²     Assessment:       Diagnosis Orders   1. Type 2 diabetes mellitus with diabetic polyneuropathy, with long-term current use of insulin (HCC)  Microalbumin, Ur    POCT glycosylated hemoglobin (Hb A1C)   2. Acquired hypothyroidism     3. Essential hypertension     4. Status post below-knee amputation of left lower extremity ([facility]     5. Malignant neoplasm of right kidney ([facility]     6. Chronic right-sided low back pain with bilateral sciatica  Martina Chacon MD, Pain Management, [facility]   7. Vertigo  meclizine (ANTIVERT) 25 MG tablet             Plan:      No follow-ups on file.     Orders Placed This Encounter   Procedures    Microalbumin, Ur     Standing Status:   Future     Standing Expiration Date:   9/10/2022   Silvina Meade MD, Pain Management, [facility]     Referral Priority:   Routine     Referral Type:   Eval and Treat     Referral Reason:   Specialty Services Required     Referred to Provider:   Parker Ivan MD     Requested Specialty:   Pain Management     Number of Visits Requested:   1    POCT glycosylated hemoglobin (Hb A1C)     Orders Placed This Encounter   Medications    meclizine (ANTIVERT) 25 MG tablet     Sig: Take 1 tablet by mouth 3 times daily as needed for Dizziness or Nausea     Dispense:  30 tablet     Refill:  0    on ozempic once a week  toujeo  110 once a day  Wt loss advsied  On eliquis  No bleeding     Patient given educational materials - see patient instructions. Discussed use, benefit, and side effects of prescribed medications. All patientquestions answered. Pt voiced understanding. Reviewed health maintenance. Instructedto continue current medications, diet and exercise. Patient agreed with treatmentplan. Follow up as directed.      Electronically signed by Gerhardt Lovings, MD on 9/10/2021 at 4:04 PM

## 2021-09-13 ENCOUNTER — CLINICAL DOCUMENTATION (OUTPATIENT)
Dept: PHYSICAL THERAPY | Age: 61
End: 2021-09-13

## 2021-09-13 NOTE — DISCHARGE SUMMARY
[] [facility] ([facility]) @ [facility]  [address] [address] [address]  Phone [phone]  Fax [phone]    Physical Therapy Discharge Note    Date: 2021      Patient: Darylene Silos  : 1960  MRN: [id number]    Physician: Renny Vargas MD                 Insurance: [facility]   Medical Diagnosis: M48.061 (ICD-10-CM) - Spinal stenosis of lumbar region without neurogenic claudication           Rehab Codes: R25 pain , M25.60 stiffness, R53.1 weakness  Onset Date: chronic              Next 's appt: 8/10/21  Visit# / total visits:                Cancels/No Shows:   Date of initial visit: 21                Date of PN: 21         Date of Final Visit: 21                [] Patient recovered from conditions. Treatment goals were met. [] Patient received maximum benefit. No further therapy indicated at this time. [] Patient demonstrated improvement from condition with  ** Of  ** Short term goals met. []Patient demonstrated improvement from condition with **   Of **  Long term goals met. [x] Patient to continue exercise/home instructions independently. [] Therapy interrupted due to:    [] Patient has 2 or more no shows/cancels, is discontinued per our policy. [] Patient has completed prescribed number of treatment sessions. [x] Other: Pt was tp follow up with referring MD per last note 21. He has not returned to therapy so this  Episode of care will be closed at this time. If the patient wishes to return, a new referral would be needed. Pain level at evaluation was  2/10 at rest; 9/10 with activity and at discharge was  3/10    It Is My Understanding That The:  [x] Other: Pt was to follow up with MD to determine next steps in plan of care.      Treatment Included:     [x] Therapeutic Exercise   73432  [] Iontophoresis: 4 mg/mL Dexamethasone Sodium Phosphate  mAmin  99083   [x] Therapeutic Activity  06579 [] Vasopneumatic cold with compression  71752    [] Gait Training   [id number] [] Ultrasound   [id number]   [] Neuromuscular Re-education  [id number] [] Electrical Stimulation Unattended  31821   [x] Manual Therapy  66653 [] Electrical Stimulation Attended  [id number]   [x] Instruction in HEP  [] Lumbar/Cervical Traction  [id number]   [] Aquatic Therapy   35402 [x] Cold/hotpack    [] Massage   03113      [] Dry Needling, 1 or 2 muscles  44501   [] Biofeedback, first 15 minutes   58146  [] Biofeedback, additional 15 minutes   11403 [] Dry Needling, 3 or more muscles  20722                If you have any questions or concerns regarding this patient's care, please contact us.    Thank you for your referral.      Electronically signed by: Gilford Picker, PT

## 2021-09-20 ENCOUNTER — TELEPHONE (OUTPATIENT)
Dept: PAIN MANAGEMENT | Age: 61
End: 2021-09-20

## 2021-09-20 NOTE — TELEPHONE ENCOUNTER
[facility]. Pt. Outreached today, and scheduled for consultation on 10/12/2021 at 1300. Pt. Instructed to wear a mask on entrance into the the hospital, and instructed on the two entrances open for patients. Pt. Instructed not bring a companien unless needed caregiver assistance. Pt. Stated he would be coming alone. Pt. Given information on [facility] location. No questions or concerns verbalized.

## 2021-09-23 DIAGNOSIS — E11.41 TYPE 2 DIABETES MELLITUS WITH DIABETIC MONONEUROPATHY (HCC): ICD-10-CM

## 2021-09-23 NOTE — TELEPHONE ENCOUNTER
.Medication Requested: Diane    Last visit: 9/10/21  Next visit: 12/14/2021  Last refill: 3/17/21    Med contract on file:  [x] yes   [] no    Last urine drug screen:  11/05/19  Consistent with medication(s):    [] yes    [] no  UNSURE    Last OARRS ran: 11/16/17    Quantity of medication remaining: ? Who will be picking rx up: ?     Pharmacy [facility] escribed[facility]

## 2021-09-24 RX ORDER — PREGABALIN 100 MG/1
100 CAPSULE ORAL 3 TIMES DAILY
Qty: 270 CAPSULE | Refills: 3 | Status: SHIPPED | OUTPATIENT
Start: 2021-09-24 | End: 2021-12-14

## 2021-09-29 DIAGNOSIS — I10 ESSENTIAL HYPERTENSION: ICD-10-CM

## 2021-09-29 RX ORDER — LOSARTAN POTASSIUM 50 MG/1
TABLET ORAL
Qty: 30 TABLET | Refills: 5 | Status: SHIPPED | OUTPATIENT
Start: 2021-09-29 | End: 2022-03-10

## 2021-10-07 ASSESSMENT — ENCOUNTER SYMPTOMS
BACK PAIN: 1
CONSTIPATION: 1
SHORTNESS OF BREATH: 1

## 2021-10-07 ASSESSMENT — PAIN - FUNCTIONAL ASSESSMENT: PAIN_FUNCTIONAL_ASSESSMENT: PREVENTS OR INTERFERES SOME ACTIVE ACTIVITIES AND ADLS

## 2021-10-07 ASSESSMENT — PAIN DESCRIPTION - DIRECTION: RADIATING_TOWARDS: BUTTOCK

## 2021-10-07 ASSESSMENT — PAIN SCALES - GENERAL: PAINLEVEL_OUTOF10: 10

## 2021-10-07 ASSESSMENT — PAIN DESCRIPTION - FREQUENCY: FREQUENCY: INTERMITTENT

## 2021-10-07 ASSESSMENT — PAIN DESCRIPTION - DESCRIPTORS: DESCRIPTORS: SHARP;BURNING

## 2021-10-07 ASSESSMENT — PAIN DESCRIPTION - PAIN TYPE: TYPE: CHRONIC PAIN

## 2021-10-07 ASSESSMENT — PAIN DESCRIPTION - LOCATION: LOCATION: BACK

## 2021-10-07 ASSESSMENT — PAIN DESCRIPTION - ORIENTATION: ORIENTATION: LOWER;RIGHT

## 2021-10-07 NOTE — PROGRESS NOTES
[facility] Pain Management  Patient Pain Assessment  Consultation - Dr. Len Seip    Primary Care Physician: Lisa Ta MD    Chief complaint:   Chief Complaint   Patient presents with    Back Pain   . Emily Vallejo is a 64 y.o. male evaluated on 10/12/2021. Patient is referred by Marjan Abreu MD      Patient identification was verified at the start of the visit: Yes    Chief complaint: Emily Vallejo is 64 y.o.,  male, with  Back Pain  . HISTORY OF PRESENT ILLNESS:  Emily Vallejo is 64 y.o. male with    Referring diagnosis  M54.41, M54.42, G89.29 (ICD-10-CM) - Chronic right-sided low back pain with bilateral sciatica    Patient is a 60-year-old male referred to the pain clinic in consultation for low back pain of longstanding duration. Patient reports his pain is mostly in the right lower back with pain radiating to both lower extremities for a long time. The onset is somewhat insidious. He apparently did physical therapy in the past without much improvement in the pain and hence he was referred to the pain clinic. Patient also has a history of diabetes mellitus and has diabetic neuropathy he had a left below-knee amputation for infection    Back Pain  This is a chronic problem. The current episode started more than 1 year ago. The problem occurs intermittently (when he is up and moving). The problem has been gradually worsening since onset. The pain is present in the lumbar spine, sacro-iliac and gluteal. The quality of the pain is described as aching, burning and stabbing (sharp). The pain does not radiate. The pain is at a severity of 9/10 (-1-10). The pain is severe. The pain is worse during the day. The symptoms are aggravated by standing and bending (walking, Lifting. adls). Pertinent negatives include no numbness, tingling or weakness.  (Tingling involving the right buttock region) Risk factors include sedentary lifestyle, lack of exercise and obesity. Treatments tried: Physical therapy. The treatment provided no relief. RX Monitoring 11/16/2017   Attestation The Prescription Monitoring Report for this patient was reviewed today. Periodic Controlled Substance Monitoring Possible medication side effects, risk of tolerance and/or dependence, and alternative treatments discussed. ;No signs of potential drug abuse or diversion identified. Current Pain Assessment  Pain Assessment  Pain Assessment: 0-10  Pain Level: 10  Patient's Stated Pain Goal: 3 (decrease pain while being up)  Pain Type: Chronic pain  Pain Location: Back  Pain Orientation: Lower, Right (right greater than left)  Pain Radiating Towards: buttock  Pain Descriptors: Sharp, Burning  Pain Frequency: Intermittent  Functional Pain Assessment: Prevents or interferes some active activities and ADLs     BP (!) 140/83   Pulse 80   Temp 96.9 °F (36.1 °C) (Skin)   Ht 6' 4\" (1.93 m)   Wt 285 lb (129.3 kg)   SpO2 97%   BMI 34.69 kg/m²   ADVERSE MEDICATION EFFECTS:   Constipation: yes  Bowel Regimen: Yes, miralax  Diet: low sugar  Appetite: ok  Sedation: yes, lyrica  Urinary Retention: no    FOCUSED PAIN SCALE:  Highest: 10  Lowest: 0  Average: Range- 5 vary's with activity  When and What was your last procedure:  no     Was your procedure effective: not applicable    ACTIVITY/SOCIAL/EMOTIONAL:  Sleep Pattern: 6 hours per night. difficulty falling asleep and nightime awakenings  Energy Level: Tired/Fatigued  Currently attending Physical Therapy: No  Home Exercises: daily stretching  Mobility: increase pain while standing and hunching over  Do you use assistive devices? Yes, wheelchair and cane  Have you fallen in the last 30 days?   No  Currently seeing a Psychiatrist or Psychologist: No  Emotional Issues: normal   Mood: appropriate     ABERRANT BEHAVIORS SINCE LAST VISIT:  Have you ever been treated in another Pain Clinic no  Refills for prescriptions appropriate: not applicable  Lost Rx/pills: not applicable  Taking more medication than prescribed: no  Are you receiving PAIN medications from other doctors: yes Samson Lennox Dr. Roseline Felty  Last Urine/Serum Drug Screen: 11/19   Was Serum/UDS as anticipated? yes  Brought pill bottles in:not applicable   Was Pill count appropriate? :not applicable   Are currently pregnant? not applicable  Recent ER visits: No  Have you had a COVID 19 Vaccine? Yes. Booster in September  Total SOAP points: 1    Past Medical History      Diagnosis Date    A-fib [facility])     Asymptomatic bilateral carotid artery stenosis     Boil, thigh 10/2019    10/30/19: right inner thigh region, says PCP Rxd Doxy for this, area is much better, has a couple days left to complete Doxy    CAD (coronary artery disease)     saw Dr[facility] Dianelys Jane [facility])     Charcot foot due to diabetes mellitus ([address] 2014    LEFT    COPD (chronic obstructive pulmonary disease) ([address] 2009    INHALER USE DAILY    Diabetes mellitus ([address] 1989    IDDM, On Insulin Dr. Lyubov Jacobs Diabetic neuropathy ([address]     Difficult intravenous access     VEINS ROLL    Employs prosthetic leg     s/p left BKA    Foot ulcer ([address] PRIOR TO 2015    BILAT    Full dentures     UPPER ONLY    Hyperlipidemia 2004    ON RX    Hypertension 2004    ON RX, PCP Dr. Roseline Felty, seen Oct. 2019    Hypoglycemia 4/2/2015    MRSA (methicillin resistant staph aureus) culture positive 4/16/2015    ankle    OA (osteoarthritis)     Osteomyelitis ([address]     left stump  BKA    Paroxysmal atrial fibrillation ([address]     Renal mass 09/2019    ACCIDENTAL  FINDING IS FOLLOWING UP WITH KIDNEY DR Edward Ledezma     Suspected sleep apnea     Thyroid disease 2004    PT HAD HYPERTHYROIDISM-UNCONTROLED THYROID DESTROYED WITH RADI IODINE NOW HAS HYPOTHYRIDISM    Vision abnormalities 09/2019    LEFT NO VISION    Vitreous hemorrhage of left eye due to diabetes mellitus ([address] 09/2019    s/p Vitrectomy 9/2019    Wears glasses     Wears partial dentures     full upper, does not wear lower partial    Wellness examination     Dr. Augustine Huff seen within last 1 1/2 mos       Surgical History  Past Surgical History:   Procedure Laterality Date    CARDIAC CATHETERIZATION      no stenting    CARDIOVASCULAR STRESS TEST  06/28/2019    small fixed apical, likely normal, EF 48%    COLONOSCOPY  06/17/2016    poor prep, done up to the IC valve, redundant colon    COLONOSCOPY  01/23/2017    VIRTUAL COLONOSCOPY DONE-no polyps or masses    CYSTOSCOPY Bilateral 6/16/2020    CYSTOSCOPY RETROGRADE PYELOGRAM URETEROSCOPY performed by Marietta Menezes MD at [address] Right 7/30/2020    CYSTOSCOPY, RIGHT RETROGRADE PYELOGRAM,  URETERAL CATHETER  INSERTION performed by Marietta Menezes MD at [address] 9/1/2020    CYSTOSCOPY RETROGRADE PYELOGRAM, RIGHT URETERAL STENT EXCHANGE performed by Marietta Menezes MD at [facility] Right     r-bone removed    HC  PICC [facility] DOUBLE  5/21/2018         KIDNEY CYST REMOVAL      KIDNEY SURGERY Right 11/13/2019    XI ROBOTIC PARTIAL NEPHRECTOMY, INTRAOP ULTRASOUND, RIGHT URETEROURETEROSTOMY, RIGHT URETERAL STENT PLACEMENT **SHORT STAY** performed by Marietta Menezes MD at [place] [address] 7/30/2020    XI LAPAROSCOPIC ROBOTIC URETEROLYSIS, ROBOTIC ASSISTED BUCCAL URETEROPLASTY  (DR. COBIAN TO ASSIST) performed by Marietta Menezes MD at [address] Left 4/29/15    OTHER SURGICAL HISTORY Left 5-5-15 and 11/2015    Revision BKA    OTHER SURGICAL HISTORY      left stump revision 5/30/2018    NC RE-AMPUTATION LOWER LEG Left 5/30/2018    LEG AMPUTATION BELOW KNEE REVISION performed by Myles Cuenca MD at [address] Bilateral 80'S    TOE AMPUTATION      Right 2 and 4    VITRECTOMY Left 9/25/2019    PARS PLANA VITRECTOMY 25 GAUGE, MEMBRANE PEELING, ENDOLASER 200  MW 1044 SPOTS, INDIRECT LASER 236 SPOTS performed by Tiana Sales MD at [facility] OR       Medications  Current Outpatient Medications   Medication Sig Dispense Refill    losartan (COZAAR) 50 MG tablet TAKE 1 TABLET BY MOUTH EVERY DAY 30 tablet 5    pregabalin (LYRICA) 100 MG capsule Take 1 capsule by mouth 3 times daily for 30 days.  270 capsule 3    metoprolol tartrate (LOPRESSOR) 25 MG tablet TAKE 1 TABLET BY MOUTH TWICE A  tablet 3    levothyroxine (SYNTHROID) 150 MCG tablet TAKE 1 TABLET BY MOUTH EVERY DAY 90 tablet 1    Insulin Glargine, 2 Unit Dial, (TOUJEO MAX SOLOSTAR) 300 UNIT/ML SOPN Inject 110 Units into the skin daily 5 pen 5    Semaglutide,0.25 or 0.5MG/DOS, (OZEMPIC, 0.25 OR 0.5 MG/DOSE,) 2 MG/1.5ML SOPN Inject 0.25mg Once Weekly for 4 weeks, then increase to 0.5mg Once Weekly      clotrimazole-betamethasone (LOTRISONE) 1-0.05 % cream APPLY TO AFFECTED AREA TWICE A DAY 45 g 3    ELIQUIS 5 MG TABS tablet TAKE 1 TABLET BY MOUTH TWICE A  tablet 2    finasteride (PROSCAR) 5 MG tablet TAKE 1 TABLET BY MOUTH EVERY DAY 90 tablet 1    insulin lispro (HUMALOG) 100 UNIT/ML injection vial INJECT 12 UNITS UNDER THE SKIN 3 TIMES DAILY + SLIDING SCALE (TAKE 5 UNITS IF SUGAR IS OVER 150) 6 vial 3    rOPINIRole (REQUIP) 2 MG tablet TAKE 1 TABLET BY MOUTH EVERY DAY 90 tablet 3    gemfibrozil (LOPID) 600 MG tablet TAKE 1 TABLET BY MOUTH EVERY DAY 90 tablet 3    atorvastatin (LIPITOR) 40 MG tablet TAKE 1 TABLET BY MOUTH EVERY DAY 90 tablet 3    isosorbide mononitrate (IMDUR) 30 MG extended release tablet TAKE 1 TABLET BY MOUTH EVERY DAY 90 tablet 3    albuterol sulfate  (90 Base) MCG/ACT inhaler INHALE 2 PUFFS INTO THE LUNGS EVERY 6 HOURS AS NEEDED FOR WHEEZING OR SHORTNESS OF BREATH 6.7 Inhaler 0    tamsulosin (FLOMAX) 0.4 MG capsule Take 1 capsule by mouth daily 90 capsule 3    propafenone (RYTHMOL) 150 MG tablet Take 1 tablet by mouth every 8 hours 90 tablet 3    SYMBICORT 160-4.5 MCG/ACT AERO TAKE 2 PUFFS BY MOUTH TWICE A DAY (Patient taking differently: Inhale 2 puffs into the lungs 2 times daily ) 30.6 Inhaler 3    Omega-3 Fatty Acids (FISH OIL CONCENTRATE) 300 MG CAPS Take 300 mg by mouth nightly       B-D UF III MINI PEN NEEDLES 31G X 5 MM MISC USE AS DIRECTED ONCE DAILY TO INJECT TOUJEO      Continuous Blood Gluc  (DEXCOM G6 ) DOUGIE USE AS DIRECTED TO MONITOR BLOOD SUGAR      blood glucose monitor strips T/S Contour. Test 4 times a day 100 strip 3    pregabalin (LYRICA) 100 MG capsule TAKE 1 CAPSULE BY MOUTH THREE TIMES A DAY 90 capsule 0    glucagon, rDNA, 1 MG injection Inject 1 mL into the muscle      Blood Pressure KIT 1 actuation by Does not apply route once a week 1 kit 0    Handicap Placard MISC by Does not apply route Duration - 5years  Reason- prosthetic leg 1 each 0    Glucosamine Sulfate 500 MG TABS Take 500 mg by mouth 3 times daily       No current facility-administered medications for this encounter. Allergies  Ramipril and Adhesive tape    Family History  family history includes Alcohol Abuse in his father; COPD in his father; Diabetes in his brother and mother; Hypertension in his father and mother; Kidney Cancer in his father; Other in his sister; Stomach Cancer in his mother. Social History  Social History     Socioeconomic History    Marital status:      Spouse name: Orquidea Velez Number of children: 2    Years of education: None    Highest education level: None   Occupational History    Occupation: Disabled   Tobacco Use    Smoking status: Former Smoker     Packs/day: 2.00     Years: 25.00     Pack years: 50.00     Types: Cigars     Start date: 1     Quit date: 5/29/2011     Years since quitting: 10.3    Smokeless tobacco: Never Used    Tobacco comment: quit in 2011   Vaping Use    Vaping Use: Never used   Substance and Sexual Activity    Alcohol use:  Yes     Alcohol/week: 0.0 standard drinks     Comment: BEER 2 A MONTH    Drug use: Not Currently     Types: Marijuana     Comment: in 19's    Sexual activity: Yes     Partners: Female   Other Topics Concern    None   Social History Narrative    None     Social Determinants of Health     Financial Resource Strain:     Difficulty of Paying Living Expenses:    Food Insecurity:     Worried About Running Out of Food in the Last Year:     Ran Out of Food in the Last Year:    Transportation Needs:     Lack of Transportation (Medical):  Lack of Transportation (Non-Medical):    Physical Activity:     Days of Exercise per Week:     Minutes of Exercise per Session:    Stress:     Feeling of Stress :    Social Connections:     Frequency of Communication with Friends and Family:     Frequency of Social Gatherings with Friends and Family:     Attends Buddhism Services:     Active Member of Clubs or Organizations:     Attends Club or Organization Meetings:     Marital Status:    Intimate Partner Violence:     Fear of Current or Ex-Partner:     Emotionally Abused:     Physically Abused:     Sexually Abused:       reports previous drug use. Drug: Marijuana. REVIEW OF SYSTEMS:      Review of Systems   Constitutional: Negative. HENT: Negative. Eyes:        Eyeglasses   Respiratory: Positive for shortness of breath. Hx: COPD   Cardiovascular: Negative. Gastrointestinal: Positive for constipation. Pt takes miralax   Endocrine: Negative. Genitourinary: Negative. Musculoskeletal: Positive for back pain. Skin: Negative. Neurological: Negative. Negative for tingling, weakness and numbness. Hematological: Bruises/bleeds easily. Pt on Eliquis   Psychiatric/Behavioral: Positive for sleep disturbance. Pt taking Lyrica       GENERAL PHYSICAL EXAM:  Vitals: BP (!) 140/83   Pulse 80   Temp 96.9 °F (36.1 °C) (Skin)   Ht 6' 4\" (1.93 m)   Wt 285 lb (129.3 kg)   SpO2 97%   BMI 34.69 kg/m² , Body mass index is 34.69 kg/m².   Physical Exam  Constitutional:       Appearance: Normal appearance. He is obese. HENT:      Head: Normocephalic. Mouth/Throat:      Mouth: Mucous membranes are moist.   Eyes:      Extraocular Movements: Extraocular movements intact. Conjunctiva/sclera: Conjunctivae normal.      Pupils: Pupils are equal, round, and reactive to light. Cardiovascular:      Rate and Rhythm: Normal rate. Rhythm irregular. Pulses: Normal pulses. Pulmonary:      Effort: Pulmonary effort is normal. No respiratory distress. Abdominal:      General: Bowel sounds are normal. There is no distension. Tenderness: There is no abdominal tenderness. Musculoskeletal:      Lumbar back: Positive right straight leg raise test.      Right lower leg: No edema. Skin:     Findings: No rash. Neurological:      Mental Status: He is alert and oriented to person, place, and time. Cranial Nerves: Cranial nerves are intact. No cranial nerve deficit. Sensory: Sensation is intact. No sensory deficit. Motor: Motor function is intact. No weakness or tremor. Coordination: Coordination is intact. Deep Tendon Reflexes:      Reflex Scores:       Patellar reflexes are 1+ on the right side and 0 on the left side. Achilles reflexes are 1+ on the right side. Comments: Patient has left below-knee amputation with a prosthetic leg uses a cane to help with ambulation   Psychiatric:         Mood and Affect: Mood normal.         Speech: Speech normal.         Behavior: Behavior normal.         Thought Content: Thought content normal.         Judgment: Judgment normal.      Right Ankle Exam     Muscle Strength   The patient has normal right ankle strength. Right Knee Exam     Muscle Strength   The patient has normal right knee strength. Right Hip Exam     Muscle Strength   The patient has normal right hip strength. Left Hip Exam     Muscle Strength   The patient has normal left hip strength. 107 mmol/L Final 08/01/2020  7:09  [address]   CO2 23  20 - 31        11/10/2019 10:35 AM - Javon, [facility] Incoming Lab Results From [email or web address]    Component Value Ref Range & Units Status Collected Lab   Pain Management Drug Panel Interp, Urine See Note   Final 11/05/2019 10:11 PM [facility]   (NOTE)   ________________________________________________________________   NO DRUGS PROVIDED   ________________________________________________________________   Please call [address] [place] [address] at [phone] for   Medical Director interpretation if applicable. Alternatively,   please consider the PAIN HYB U ([id number]) which does not require   medication information or provide compliance interpretation. ________________________________________________________________   INTERPRETIVE INFORMATION: Pain Mgt [place], Mass Spec/EMIT, Ur,                            Interp   Interpretation depends on accuracy and completeness of patient   medication information submitted by client. 6-Acetylmorphine, Ur Not Detected   Final 11/05/2019 10:11 PM [facility]   7-Aminoclonazepam, Urine Not Detected   Final 11/05/2019 10:11 PM [facility]   Alpha-OH-Alpraz, Urine Not Detected   Final 11/05/2019 10:11 PM [facility]   Alprazolam, Urine Not Detected   Final 11/05/2019 10:11 PM [facility]   Amphetamines, urine Not Detected   Final 11/05/2019 10:11 PM [facility]   Barbiturates, Ur Not Detected   Final 11/05/2019 10:11 PM [facility]   Benzoylecgonine, Ur Not Detected   Final 11/05/2019 10:11 PM [facility]   Buprenorphine Urine Not Detected   Final 11/05/2019 10:11 PM [facility]   Carisoprodol, Ur Not Detected   Final 11/05/2019 10:11 PM [facility]   (NOTE)   The carisoprodol immunoassay has cross-reactivity to carisoprodol   and meprobamate.     Clonazepam, Urine Not Detected   Final 11/05/2019 10:11 PM [facility]   Codeine, Urine Not Detected   Final 11/05/2019 10:11 PM [facility]   MDA, Ur Not Detected   Final 11/05/2019 10:11 PM [facility]   Diazepam, Urine Not Detected   Final 11/05/2019 10:11 PM [facility]   Ethyl Glucuronide Ur Not Detected   Final 11/05/2019 10:11 PM [facility]   Fentanyl, Ur Not Detected   Final 11/05/2019 10:11 PM [facility]   Hydrocodone, Urine Not Detected   Final 11/05/2019 10:11 PM [facility]   Hydromorphone, Urine Not Detected   Final 11/05/2019 10:11 PM [facility]   Lorazepam, Urine Not Detected   Final 11/05/2019 10:11 PM [facility]   Marijuana Metab, Ur Not Detected   Final 11/05/2019 10:11 PM [facility]   MDEA, RAKESH, Ur Not Detected   Final 11/05/2019 10:11 PM [facility]   MDMA, Urine Not Detected   Final 11/05/2019 10:11 PM [facility]   Meperidine Metab, Ur Not Detected   Final 11/05/2019 10:11 PM [facility]   Methadone, Urine Not Detected   Final 11/05/2019 10:11 PM [facility]   Methamphetamine, Urine Not Detected   Final 11/05/2019 10:11 PM [facility]   Methylphenidate Not Detected   Final 11/05/2019 10:11 PM [facility]   Midazolam, Urine Not Detected   Final 11/05/2019 10:11 PM [facility]   Morphine Urine Not Detected   Final 11/05/2019 10:11 PM [facility]   Norbuprenorphine, Urine Not Detected   Final 11/05/2019 10:11 PM [facility]   Nordiazepam, Urine Not Detected   Final 11/05/2019 10:11 PM [facility]   Norfentanyl, Urine Not Detected   Final 11/05/2019 10:11 PM [facility]   NORHYDROCODONE, URINE Not Detected   Final 11/05/2019 10:11 PM [facility]   Noroxycodone, Urine Not Detected   Final 11/05/2019 10:11 PM [facility]   NOROXYMORPHONE, URINE Not Detected   Final 11/05/2019 10:11 PM [facility]   Oxazepam, Urine Not Detected   Final 11/05/2019 10:11 PM [facility]   Oxycodone Urine Not Detected   Final 11/05/2019 10:11 PM [facility]   Oxymorphone, Urine Not Detected   Final 11/05/2019 10:11 PM [facility]   PCP, Urine Not Detected   Final 11/05/2019 10:11 PM [facility]   Phentermine, Ur Not Detected   Final 11/05/2019 10:11 PM [facility]   Propoxyphene, Urine Not Detected   Final 11/05/2019 10:11 PM [facility]   Tapentadol-O-Sulfate, Urine Not Detected   Final 11/05/2019 10:11 PM [facility]   Tapentadol, Urine Not Detected   Final 11/05/2019 10:11 PM [facility]   Temazepam, Urine Not Detected   Final 11/05/2019 10:11 PM [facility]   Tramadol, Urine Not Detected   Final 11/05/2019 10:11 PM [facility]   Zolpidem, Urine Not Detected              Imaging:  Radiology Images and Reports reviewed where indicated and necessary  MRI OF THE LUMBAR SPINE WITHOUT CONTRAST, 6/1/2021 10:32 am       TECHNIQUE:   Multiplanar multisequence MRI of the lumbar spine was performed without the   administration of intravenous contrast.       COMPARISON:   Lumbar spine MRI performed 09/17/2019.       HISTORY:   ORDERING SYSTEM PROVIDED HISTORY: Spinal stenosis of lumbar region with   neurogenic claudication   TECHNOLOGIST PROVIDED HISTORY:   back pain   Reason for Exam: back pain, Spinal stenosis of lumbar region with neurogenic   claudication   Acuity: Chronic   Type of Exam: Ongoing   Additional signs and symptoms: Pt c/o low back pain after standing for a   period of time; no known injury; States been going on for years, but getting   worse now   Relevant Medical/Surgical History: No surgery to area of interest       FINDINGS:   BONES/ALIGNMENT: The vertebral body heights are maintained.  There is   age-appropriate bone marrow signal.  There is degenerative disc disease with   loss of disc signal most pronounced at the L2-3 and L5-S1 levels.  There is   no disc space narrowing.  There is no spondylolisthesis.       SPINAL CORD: The conus medullaris is normal in caliber and signal and   terminates at the L1 level.  The cauda equina is unremarkable.       SOFT TISSUES: Posterior paraspinal soft tissues are unremarkable.  The   visualized abdominal structures are unremarkable.       L1-L2: There is a circumferential disc bulge with facet and ligamentous   hypertrophy.  There is no canal stenosis or foraminal narrowing.       L2-L3: There is a circumferential disc bulge with facet and ligamentous   hypertrophy. Swetha Pod is a small superimposed midline disc protrusion.  There   is canal stenosis measuring 5 mm in AP dimension.  There is no foraminal   narrowing.       L3-L4: There is a circumferential disc bulge with facet and ligamentous   hypertrophy.  There is canal stenosis measuring 8 mm in AP dimension.  There   is no significant foraminal narrowing.       L4-L5: There is a circumferential disc bulge with facet and ligamentous   hypertrophy.  There is canal stenosis measuring 6 mm in AP dimension.  There   is no significant foraminal narrowing.       L5-S1: There is a circumferential disc bulge with facet and ligamentous   hypertrophy.  There is canal stenosis measuring 9 mm in AP dimension.  There   is no foraminal narrowing.           Impression   Multilevel degenerative disc disease and multilevel degenerative   facet/ligamentous hypertrophy with canal stenosis most pronounced at L2-3 and   L4-5.       No significant foraminal narrowing. Patient Active Problem List   Diagnosis    Anemia    Neuropathy in diabetes ([address]    Charcot ankle    PVD (peripheral vascular disease) ([address]    Type 2 diabetes mellitus with diabetic polyneuropathy ([address]    Pure hypercholesterolemia    Constipation    PSA elevation    CAD (coronary artery disease)    Chronic pain of left knee    Essential hypertension    Olecranon bursitis of right elbow    Acquired hypothyroidism    Carotid stenosis, asymptomatic, bilateral    Right renal mass    Metabolic acidosis, normal anion gap (NAG)    Hypotension due to drugs    Hydronephrosis of right kidney    Idiopathic hypotension    Status post below-knee amputation of left lower extremity (HCC)    Atrial fibrillation (HCC)    Ureteral stricture, right    Dizziness    Dermatophytoses    Acute bilateral low back pain without sciatica    Malignant neoplasm of right kidney ([address]    Spinal stenosis of lumbar region with neurogenic claudication    Other chest pain    Spinal stenosis of lumbar region without neurogenic claudication        ASSESSMENT    Tracie Castillo is a 64 y.o. male with     1. Lumbar radiculopathy    2.  Lumbar degenerative disc disease    3. Lumbar spondylosis    4. Arthropathy of lumbar facet joint    5. Lumbar adjacent segment disease with spondylolisthesis           PLAN  Patient's   [] x-ray    [] CT scan    [x] MRI  Were/was  Reviewed. These findings are consistent with the patient's symptoms and physical examination. [x] Patient's findings on the x-ray were explained to the patient    Other reports reviewed include    [] Bone scan   [] EMG and nerve conduction studies   [x] Referral reports-  I also discussed with him the following treatment options Including advantages and disadvantages of each:    [x] Physical therapy    [x] Interventional pain treatment    [] Medication management    [] Surgical options    Patient's [facility] were reviewed. It is acceptable and appears patient is not receiving prescriptions from multiple prescribers. Patient is  forthcoming regarding prescriptions for pain medication in the past  Controlled Substances Monitoring: The following screens were also reviewed  SOAPP- the score is 1. (Values:cates patient is  <4minimal potential  4-7 Moderate potential  >7 High potential  for drug addiction  Counselling/Preventive measures for pain  Control:    [x]  Spine strengthening exercises are discussed with patient in detail. Patient is counseled on importance of exercise and,core strengthening. Some  specific exercises to strengthen the abdominal muscles and low back muscles Were discussed. Also aquatic (water) physical therapy and benefits were explained to patient. including \" Water supports the body and minimizes the effect of gravity, making it easier for patients to start an exercise program.\"   The following important principles were discussed with patient:  1. Limit Bed Rest--Studies show that people with short-term low-back pain who rest feel more pain and have a harder time with daily tasks than those who stay active.   2. Keep Exercising-patient is advised to stay away from strenuous activities like gardening and avoid whatever motion caused the pain in the first place.   3. Maintain Good Posture-Exercises  to maintain good posture were shown to patient. 4. To do exercises learned in PT regularly. []Information (handout) on exercise was  given to patient. The following treatment plan was developed after discussion with patient:    We discussed Lumbar Epidural steroid Injections x 1  at L2 - L3.//L4-5    Patient tried and failed NSAIDS,Home exercises, Physical Therapy, Chiropractic manipulations without relief. Patient exhibited signs of radiculopathy with positive straight leg raising test on right    Patient has not had prior Lumbar Surgery. We will see the patient in 4 weeks after the procedures and re-evaluate symptoms. Orders Placed This Encounter   Procedures    Lumbar Epidural Steroid Injection/Caudal     Standing Status:   Future     Standing Expiration Date:   10/12/2022    FLUORO FOR SURGICAL PROCEDURES     Standing Status:   Future     Standing Expiration Date:   10/12/2022    Saline lock IV     Standing Status:   Future     Standing Expiration Date:   4/12/2023       Decision Making Process : Patient's health history and referral records thoroughly reviewed before focused physical examination and discussion with patient. Over 50% of today's visit is spent on examining the patient and counseling. Level of complexity of date to be reviewed is Moderate. The chart date reviewed include the following: Imaging Reports. Summary of Care. Time spent reviewing with patient the below reports:   Medication safety, Treatment options. Level of diagnosis and management options of this case is multiple: involving the following management options: Interventions as needed, medication management as appropriate, future visits, activity modification, heat/ice as needed, Urine drug screen as required.      [x]The patient's questions were answered to the best of my abilities. Documentation:  I communicated with the patient and/or health care decision maker about plan of care  Details of this discussion including any medical advice provided: Total Time: 45-55 minutes    I affirm this is a Patient Initiated Episode with an Established Patient who has not had a related appointment within my department in the past 7 days or scheduled within the next 24 hours. This note was created using voice recognition software. There may be inaccuracies of transcription  that are inadvertently overlooked prior to the signature. There is any questions about the transcription please contact me.     Electronically signed by Nichole Gomes MD on 10/12/2021 at 1:41 PM

## 2021-10-12 ENCOUNTER — HOSPITAL ENCOUNTER (OUTPATIENT)
Dept: PAIN MANAGEMENT | Age: 61
Discharge: HOME | End: 2021-10-12
Payer: COMMERCIAL

## 2021-10-12 VITALS
HEART RATE: 80 BPM | SYSTOLIC BLOOD PRESSURE: 140 MMHG | WEIGHT: 285 LBS | DIASTOLIC BLOOD PRESSURE: 83 MMHG | BODY MASS INDEX: 34.7 KG/M2 | TEMPERATURE: 96.9 F | HEIGHT: 76 IN | OXYGEN SATURATION: 97 %

## 2021-10-12 DIAGNOSIS — M47.816 LUMBAR SPONDYLOSIS: ICD-10-CM

## 2021-10-12 DIAGNOSIS — M47.816 ARTHROPATHY OF LUMBAR FACET JOINT: ICD-10-CM

## 2021-10-12 DIAGNOSIS — M54.16 LUMBAR RADICULOPATHY: Primary | ICD-10-CM

## 2021-10-12 DIAGNOSIS — M51.36 LUMBAR ADJACENT SEGMENT DISEASE WITH SPONDYLOLISTHESIS: ICD-10-CM

## 2021-10-12 DIAGNOSIS — M51.36 LUMBAR DEGENERATIVE DISC DISEASE: ICD-10-CM

## 2021-10-12 DIAGNOSIS — M43.16 LUMBAR ADJACENT SEGMENT DISEASE WITH SPONDYLOLISTHESIS: ICD-10-CM

## 2021-10-12 PROCEDURE — 99204 OFFICE O/P NEW MOD 45 MIN: CPT | Performed by: PAIN MEDICINE

## 2021-10-12 PROCEDURE — 99203 OFFICE O/P NEW LOW 30 MIN: CPT

## 2021-10-12 RX ORDER — TAMSULOSIN HYDROCHLORIDE 0.4 MG/1
CAPSULE ORAL
Qty: 90 CAPSULE | Refills: 3 | Status: SHIPPED | OUTPATIENT
Start: 2021-10-12 | End: 2022-10-17

## 2021-10-14 ENCOUNTER — TELEPHONE (OUTPATIENT)
Dept: INTERNAL MEDICINE CLINIC | Age: 61
End: 2021-10-14

## 2021-10-26 NOTE — PRE-PROCEDURE INSTRUCTIONS
Spoke with patient via phone regarding procedure appointment and denies need to review pre procedure instructions.

## 2021-10-29 ENCOUNTER — OFFICE VISIT (OUTPATIENT)
Dept: PODIATRY | Age: 61
End: 2021-10-29
Payer: COMMERCIAL

## 2021-10-29 VITALS — WEIGHT: 285 LBS | HEIGHT: 76 IN | BODY MASS INDEX: 34.7 KG/M2

## 2021-10-29 DIAGNOSIS — B35.1 ONYCHOMYCOSIS OF TOENAIL: Primary | ICD-10-CM

## 2021-10-29 DIAGNOSIS — Z79.4 TYPE 2 DIABETES MELLITUS WITH DIABETIC POLYNEUROPATHY, WITH LONG-TERM CURRENT USE OF INSULIN (HCC): ICD-10-CM

## 2021-10-29 DIAGNOSIS — M79.674 PAIN OF TOE OF RIGHT FOOT: ICD-10-CM

## 2021-10-29 DIAGNOSIS — M79.671 PAIN IN RIGHT FOOT: ICD-10-CM

## 2021-10-29 DIAGNOSIS — E11.42 TYPE 2 DIABETES MELLITUS WITH DIABETIC POLYNEUROPATHY, WITH LONG-TERM CURRENT USE OF INSULIN (HCC): ICD-10-CM

## 2021-10-29 DIAGNOSIS — E11.51 TYPE 2 DIABETES MELLITUS WITH PERIPHERAL VASCULAR DISEASE (HCC): ICD-10-CM

## 2021-10-29 DIAGNOSIS — L84 CORNS AND CALLOSITIES: ICD-10-CM

## 2021-10-29 PROCEDURE — 11720 DEBRIDE NAIL 1-5: CPT | Performed by: PODIATRIST

## 2021-10-29 PROCEDURE — 11055 PARING/CUTG B9 HYPRKER LES 1: CPT | Performed by: PODIATRIST

## 2021-11-01 ENCOUNTER — HOSPITAL ENCOUNTER (OUTPATIENT)
Dept: GENERAL RADIOLOGY | Age: 61
Discharge: HOME | End: 2021-11-03
Payer: COMMERCIAL

## 2021-11-01 ENCOUNTER — HOSPITAL ENCOUNTER (OUTPATIENT)
Dept: PAIN MANAGEMENT | Age: 61
Discharge: HOME | End: 2021-11-01
Payer: COMMERCIAL

## 2021-11-01 VITALS
DIASTOLIC BLOOD PRESSURE: 73 MMHG | HEIGHT: 76 IN | TEMPERATURE: 97.8 F | HEART RATE: 83 BPM | WEIGHT: 285 LBS | RESPIRATION RATE: 20 BRPM | BODY MASS INDEX: 34.7 KG/M2 | OXYGEN SATURATION: 97 % | SYSTOLIC BLOOD PRESSURE: 143 MMHG

## 2021-11-01 DIAGNOSIS — M51.36 LUMBAR DEGENERATIVE DISC DISEASE: ICD-10-CM

## 2021-11-01 DIAGNOSIS — M51.36 LUMBAR ADJACENT SEGMENT DISEASE WITH SPONDYLOLISTHESIS: ICD-10-CM

## 2021-11-01 DIAGNOSIS — M47.816 LUMBAR SPONDYLOSIS: ICD-10-CM

## 2021-11-01 DIAGNOSIS — M47.816 ARTHROPATHY OF LUMBAR FACET JOINT: ICD-10-CM

## 2021-11-01 DIAGNOSIS — M43.16 LUMBAR ADJACENT SEGMENT DISEASE WITH SPONDYLOLISTHESIS: ICD-10-CM

## 2021-11-01 DIAGNOSIS — M54.16 LUMBAR RADICULOPATHY: Primary | ICD-10-CM

## 2021-11-01 DIAGNOSIS — M54.16 LUMBAR RADICULOPATHY: ICD-10-CM

## 2021-11-01 PROCEDURE — 6360000002 HC RX W HCPCS: Performed by: PAIN MEDICINE

## 2021-11-01 PROCEDURE — 6360000004 HC RX CONTRAST MEDICATION: Performed by: PAIN MEDICINE

## 2021-11-01 PROCEDURE — 62323 NJX INTERLAMINAR LMBR/SAC: CPT

## 2021-11-01 PROCEDURE — 3209999900 FLUORO FOR SURGICAL PROCEDURES

## 2021-11-01 PROCEDURE — 62323 NJX INTERLAMINAR LMBR/SAC: CPT | Performed by: PAIN MEDICINE

## 2021-11-01 RX ORDER — TRIAMCINOLONE ACETONIDE 40 MG/ML
INJECTION, SUSPENSION INTRA-ARTICULAR; INTRAMUSCULAR
Status: COMPLETED | OUTPATIENT
Start: 2021-11-01 | End: 2021-11-01

## 2021-11-01 RX ADMIN — IOHEXOL 2 ML: 180 INJECTION INTRAVENOUS at 10:27

## 2021-11-01 RX ADMIN — TRIAMCINOLONE ACETONIDE 80 MG: 40 INJECTION, SUSPENSION INTRA-ARTICULAR; INTRAMUSCULAR at 10:27

## 2021-11-01 ASSESSMENT — ENCOUNTER SYMPTOMS
SHORTNESS OF BREATH: 0
BACK PAIN: 0
NAUSEA: 0
DIARRHEA: 0
COLOR CHANGE: 0

## 2021-11-01 ASSESSMENT — PAIN DESCRIPTION - ORIENTATION: ORIENTATION: RIGHT;LEFT

## 2021-11-01 ASSESSMENT — PAIN DESCRIPTION - ONSET: ONSET: GRADUAL

## 2021-11-01 ASSESSMENT — PAIN DESCRIPTION - FREQUENCY: FREQUENCY: CONTINUOUS

## 2021-11-01 ASSESSMENT — PAIN SCALES - GENERAL: PAINLEVEL_OUTOF10: 7

## 2021-11-01 ASSESSMENT — PAIN DESCRIPTION - LOCATION: LOCATION: BACK

## 2021-11-01 ASSESSMENT — PAIN - FUNCTIONAL ASSESSMENT: PAIN_FUNCTIONAL_ASSESSMENT: PREVENTS OR INTERFERES WITH MANY ACTIVE NOT PASSIVE ACTIVITIES

## 2021-11-01 ASSESSMENT — PAIN DESCRIPTION - PROGRESSION: CLINICAL_PROGRESSION: GRADUALLY WORSENING

## 2021-11-01 NOTE — PROCEDURES
Pre-Procedure Note    Patient Name: Xin Cadena   YOB: 1960  Room/Bed: Room/bed info not found  Medical Record Number: [id number]  Date: 11/1/2021       Indication:    1. Lumbar radiculopathy    2. Lumbar degenerative disc disease    3. Lumbar spondylosis    4. Lumbar adjacent segment disease with spondylolisthesis    5. Arthropathy of lumbar facet joint        Consent: On file. Vital Signs:   Vitals:    11/01/21 1031   BP: 116/86   Pulse: 113   Resp: 24   Temp:    SpO2: 94%       Past Medical History:   has a past medical history of A-fib ([address] Asymptomatic bilateral carotid artery stenosis, Boil, thigh, CAD (coronary artery disease), Charcot foot due to diabetes mellitus ([address] COPD (chronic obstructive pulmonary disease) ([address] Diabetes mellitus ([address] Diabetic neuropathy ([address] Difficult intravenous access, Employs prosthetic leg, Foot ulcer ([address] Full dentures, Hyperlipidemia, Hypertension, Hypoglycemia, MRSA (methicillin resistant staph aureus) culture positive, OA (osteoarthritis), Osteomyelitis ([address] Paroxysmal atrial fibrillation ([address] Renal mass, Snores, Suspected sleep apnea, Thyroid disease, Vision abnormalities, Vitreous hemorrhage of left eye due to diabetes mellitus ([address] Wears glasses, Wears partial dentures, and Wellness examination. Past Surgical History:   has a past surgical history that includes Foot surgery (Right); Toe amputation; Finger amputation (Right, 1995); Leg amputation below knee (Left, 4/29/15); other surgical history (Left, 5-5-15 and 11/2015); Temporomandibular joint surgery (Bilateral, 80'S); Colonoscopy (06/17/2016); Colonoscopy (01/23/2017);   picc powerpicc double (5/21/2018); Cardiac catheterization; other surgical history; pr re-amputation lower leg (Left, 5/30/2018); vitrectomy (Left, 9/25/2019); cardiovascular stress test (06/28/2019); Kidney surgery (Right, 11/13/2019); Cystoscopy (Bilateral, 6/16/2020);  Kidney cyst removal; Kidney surgery (N/A, 7/30/2020); Cystoscopy (Right, 7/30/2020); and Cystoscopy (N/A, 9/1/2020). normal  Pre-Sedation Documentation and Exam:   Vital signs have been reviewed (see flow sheet for vitals). Mallampati Airway Assessment:      ASA Classification:  Class 3 - A patient with severe systemic disease that limits activity but is not incapacitating    Sedation/ Anesthesia Plan:   intravenous sedation   as needed. Medications Planned:   midazolam (Versed) / Fentanyl  Intravenously  as needed. Patient is an appropriate candidate for plan of sedation: yes  Patient's History and Physical examination was reviewed and there is no change. Electronically signed by Iram Menezes MD on 11/1/2021 at 10:35 AM        Preoperative Diagnosis:    1. Lumbar radiculopathy    2. Lumbar degenerative disc disease    3. Lumbar spondylosis    4. Lumbar adjacent segment disease with spondylolisthesis    5. Arthropathy of lumbar facet joint        Postoperative Diagnosis:    1. Lumbar radiculopathy    2. Lumbar degenerative disc disease    3. Lumbar spondylosis    4. Lumbar adjacent segment disease with spondylolisthesis    5. Arthropathy of lumbar facet joint        Procedure Performed:  Lumbar epidural steroid injection under fluoroscopy guidance without IV sedation. Indication for the Procedure: The patient failed conservative management  for pain in the low back radiating to lower extremities. .    As the patient is not responding to conservative management and it is interfering with activities of daily living we decided to proceed with lumbar epidural steroid injection.  The procedure and risks were discussed with the patient and an informed consent was obtained  Current Pain Assessment  Pain Assessment  Pain Assessment: 0-10  Pain Level: 7 (worse with ambulating and standing for long periods of time.)  Patient's Stated Pain Goal: 2  Pain Location: Back (numbness in both legs)  Pain Orientation: Right, Left  Pain Radiating

## 2021-11-01 NOTE — PROGRESS NOTES
Discharge criteria met. Post procedure dressing dry and intact. Sensory and motor function intact as per pre-procedure. Patient alert and oriented x3  Instructions and follow up reviewed with pt at patient at discharge. Discharged home taken to car via wheelchair .  Home with wife driving  Discharged @ 21

## 2021-11-01 NOTE — PROGRESS NOTES
SUBJECTIVE: Wilmar Clay is a 64 y.o. male who returns to the office with chief complaint of painful fungal toenails. Patient relates toe nails are thickened/difficult to trim as well as painful with ambulation and with shoe gear. Chief Complaint   Patient presents with    Nail Problem     nail trim/last saw Michael Rolle 9/10/21    Diabetes     blood sugar 191      Review of Systems   Constitutional: Negative for activity change, appetite change, chills, diaphoresis, fatigue and fever. Respiratory: Negative for shortness of breath. Cardiovascular: Negative for leg swelling. Gastrointestinal: Negative for diarrhea and nausea. Endocrine: Negative for cold intolerance, heat intolerance and polyuria. Musculoskeletal: Positive for arthralgias and gait problem. Negative for back pain, joint swelling and myalgias. Skin: Negative for color change, pallor, rash and wound. Allergic/Immunologic: Negative for environmental allergies and food allergies. Neurological: Positive for numbness. Negative for dizziness, weakness and light-headedness. Hematological: Does not bruise/bleed easily. Psychiatric/Behavioral: Negative for behavioral problems, confusion and self-injury. The patient is not nervous/anxious. OBJECTIVE: Clinical evaluation of patient reveals nails 1,4,5 of the right foot present with thickness, elongation, discoloration, brittleness, and subungual debris. There was pain with palpation and debridement of the toenails of the right foot No open lesions noted to the right foot today. The left leg and toes 2 and 3 of the right foot have been amputated. There is hyperkeratotic tissue formation submetatarsal head one of the right foot. There is pain with palpation to this lesion. Debridement of this lesion with a fifteen blade does not reveal any central core or open wound. There are no signs of bacterial infection noted to the area. The right DP pulse is not palpable.    The right PT pulse is not palpable. Protective sensation is absent to the right plantar foot as noted with a 5.07 Mittie-Gaudencio monofilament. Gluose: 191 mg/dl. Class A Findings (1 needed)   [x] Non-traumatic amputation of foot or integral skeleton portion thereof. [x] Q7.      Class B Findings (2 needed)   1. [] Absent posterior tibial pulse   2. [] Absent dorsalis pedis pulse   3. [] Advanced trophic changes; three of the following are required:   ·         [] hair growth (decrease or absence)   ·         [] nail changes (thickening)   ·         [] pigmentary changes (discoloration)   ·         [] skin texture (thin, shiny)   ·         [] skin color (rubor or redness)   [] Q8.      Class C Findings (1 Class B, 2 Class C needed)   1. [] Claudication   2. [] Temperature changes   3. [] Edema   4. [] Paresthesia   5. [] Burning   [] Q9.     ASSESSMENT:    Diagnosis Orders   1. Onychomycosis of toenail  FL DEBRIDEMENT OF NAIL(S), 1-5    HM DIABETES FOOT EXAM   2. Corns and callosities  HM DIABETES FOOT EXAM    FL TRIM HYPERKERATOTIC SKIN LESION, ONE   3. Pain of toe of right foot  FL DEBRIDEMENT OF NAIL(S), 1-5    HM DIABETES FOOT EXAM   4. Pain in right foot  HM DIABETES FOOT EXAM    FL TRIM HYPERKERATOTIC SKIN LESION, ONE   5. Type 2 diabetes mellitus with peripheral vascular disease (HCC)  FL DEBRIDEMENT OF NAIL(S), 1-5    HM DIABETES FOOT EXAM    FL TRIM HYPERKERATOTIC SKIN LESION, ONE   6. Type 2 diabetes mellitus with diabetic polyneuropathy, with long-term current use of insulin (HCC)  FL DEBRIDEMENT OF NAIL(S), 1-5    HM DIABETES FOOT EXAM    FL TRIM HYPERKERATOTIC SKIN LESION, ONE     PLAN: Toenails 1,2,3,4,5 of the right foot and 1,2,3,4,5 of the left foot were debrided in length and thickness using a nail nipper and a . The lesion(s) to the right foot debrided with a 15 blade down to healthy, pink skin without event. Return in about 9 weeks (around 12/31/2021) for At risk diabetic foot care. 10/29/2021      Andres Godoy DPM

## 2021-11-02 ENCOUNTER — TELEPHONE (OUTPATIENT)
Dept: PAIN MANAGEMENT | Age: 61
End: 2021-11-02

## 2021-11-02 DIAGNOSIS — E11.8 TYPE 2 DIABETES MELLITUS WITH UNSPECIFIED COMPLICATIONS (HCC): ICD-10-CM

## 2021-11-02 RX ORDER — SEMAGLUTIDE 1.34 MG/ML
INJECTION, SOLUTION SUBCUTANEOUS
Qty: 5 PEN | Refills: 2 | Status: ON HOLD | OUTPATIENT
Start: 2021-11-02 | End: 2021-12-28 | Stop reason: DRUGHIGH

## 2021-11-02 NOTE — TELEPHONE ENCOUNTER
Post procedure call made. Patient states blood sugar have been elevated and monitoring them. Writer informed patient to call PCP if blood sugar becomes uncontrolled and patient understood. Patient had no concerns at the moment.

## 2021-11-03 ENCOUNTER — TELEPHONE (OUTPATIENT)
Dept: INTERNAL MEDICINE CLINIC | Age: 61
End: 2021-11-03

## 2021-11-03 NOTE — TELEPHONE ENCOUNTER
----- Message from [facility] sent at 11/2/2021  3:37 PM EDT -----  Subject: Message to Provider    QUESTIONS  Information for Provider? patient got cortisone shot and blood sugar is   high wants to know what to do please call back   ---------------------------------------------------------------------------  --------------  [address]  What is the best way for the office to contact you? OK to leave message on   voicemail  Preferred Call Back Phone Number? [phone]  ---------------------------------------------------------------------------  --------------  SCRIPT ANSWERS  Relationship to Patient?  Self

## 2021-11-03 NOTE — TELEPHONE ENCOUNTER
patient got cortisone shot and blood sugar is   high wants to know what to do please call back        I called patient to see how high his sugars have been. He said it was almost 400 yesterday. Right now it is 207.

## 2021-11-11 NOTE — PROGRESS NOTES
Abdominal: Soft. Bowel sounds are normal. He exhibits no distension and no mass. There is no tenderness. Musculoskeletal: He exhibits no edema, tenderness or deformity. Lymphadenopathy:     He has no cervical adenopathy. Neurological: He is alert and oriented to person, place, and time. No cranial nerve deficit. Skin: Skin is warm and dry. There is erythema. Lesion to right thigh papule which has opened, small amount of serosanguinous discharge, surrounding erythema, no warmth, mildly tender  Three lesions to left buttocks, scabbed over, mildly tender, no redness or warmth, bug bites ? ASSESSMENT AND PLAN:      1. Cellulitis of right lower extremity  - Wound Culture  - History of MRSA  - sulfamethoxazole-trimethoprim (BACTRIM DS) 800-160 MG per tablet; Take 1 tablet by mouth 2 times daily for 10 days  Dispense: 20 tablet;  - Patient also with lesions to left lateral buttocks x 3    2. Personal history of tobacco use  - TX VISIT TO DISCUSS LUNG CA SCREEN W LDCT  - CT Lung Screening (Annual); Future  - Low Dose CT (LDCT) Lung Screening criteria met   Age 50-69   Pack year smoking >30   Still smoking or less than 15 year since quit   No sign or symptoms of lung cancer   > 11 months since last LDCT     Risks and benefits of lung cancer screening with LDCT scans discussed:    Significance of positive screen - False-positive LDCT results often occur. 95% of all positive results do not lead to a diagnosis of cancer. Usually further imaging can resolve most false-positive results; however, some patients may require invasive procedures. Over diagnosis risk - 10% to 12% of screen-detected lung cancer cases are over diagnosed--that is, the cancer would not have been detected in the patient's lifetime without the screening.     Need for follow up screens annually to continue lung cancer screening effectiveness     Risks associated with radiation from annual LDCT- Radiation exposure is about the same as for No

## 2021-11-15 ENCOUNTER — HOSPITAL ENCOUNTER (OUTPATIENT)
Dept: PAIN MANAGEMENT | Age: 61
Discharge: HOME | End: 2021-11-15
Payer: COMMERCIAL

## 2021-11-15 DIAGNOSIS — M48.062 SPINAL STENOSIS OF LUMBAR REGION WITH NEUROGENIC CLAUDICATION: ICD-10-CM

## 2021-11-15 DIAGNOSIS — M54.16 LUMBAR RADICULOPATHY: Primary | ICD-10-CM

## 2021-11-15 PROCEDURE — 99213 OFFICE O/P EST LOW 20 MIN: CPT

## 2021-11-15 PROCEDURE — 99212 OFFICE O/P EST SF 10 MIN: CPT | Performed by: NURSE PRACTITIONER

## 2021-11-15 ASSESSMENT — ENCOUNTER SYMPTOMS
BACK PAIN: 1
SHORTNESS OF BREATH: 0
COUGH: 0
CONSTIPATION: 0

## 2021-11-15 NOTE — PROGRESS NOTES
Patient completed a telephone visit today for follow up after procedure    Chief Complaint: back pain    PMH   Pt complains of low back pain. MRI lumbar with multilevel degenerative changes with canal stenosis at L2-3 and L4-5. He has tried PT with no improvement in pain. Hx DM with left below-knee amputation for infection. He had LESI 11/1/21 and reports 80% relief. He did have an increase in blood glucose for 3-4 days but after that levels went back to baseline. Back Pain  This is a chronic problem. The current episode started more than 1 year ago. The problem occurs rarely. The problem has been gradually improving since onset. The pain is present in the lumbar spine. The quality of the pain is described as aching. The pain is at a severity of 1/10. The pain is mild. The symptoms are aggravated by position. Pertinent negatives include no chest pain or fever. Treatments tried: LESI. The treatment provided significant relief. Patient denies any new neurological symptoms. No bowel or bladder incontinence, no weakness, and no falling.     Past Medical History:   Diagnosis Date    A-fib [facility])     Asymptomatic bilateral carotid artery stenosis     Boil, thigh 10/2019    10/30/19: right inner thigh region, says PCP Rxsandeep Doxy for this, area is much better, has a couple days left to complete Doxy    CAD (coronary artery disease)     saw Dr. Bola Dow ([facility]     Charcot foot due to diabetes mellitus ([address] 2014    LEFT    COPD (chronic obstructive pulmonary disease) ([address] 2009    INHALER USE DAILY    Diabetes mellitus ([address] 1989    IDDM, On Insulin Dr. Cyrus Koenig Diabetic neuropathy ([address]     Difficult intravenous access     VEINS ROLL    Employs prosthetic leg     s/p left BKA    Foot ulcer ([address] PRIOR TO 2015    BILAT    Full dentures     UPPER ONLY    Hyperlipidemia 2004    ON RX    Hypertension 2004    ON RX, PCP Dr. eClestino Varma, seen Oct. 2019    Hypoglycemia 4/2/2015    MRSA (methicillin resistant staph aureus) culture positive 4/16/2015    ankle    OA (osteoarthritis)     Osteomyelitis (HCC)     left stump  BKA    Paroxysmal atrial fibrillation (HCC)     Renal mass 09/2019    ACCIDENTAL  FINDING IS FOLLOWING UP WITH KIDNEY DR Kerrie Ledezma     Suspected sleep apnea     Thyroid disease 2004    PT HAD HYPERTHYROIDISM-UNCONTROLED THYROID DESTROYED WITH RADI IODINE NOW HAS HYPOTHYRIDISM    Vision abnormalities 09/2019    LEFT NO VISION    Vitreous hemorrhage of left eye due to diabetes mellitus ([address] 09/2019    s/p Vitrectomy 9/2019    Wears glasses     Wears partial dentures     full upper, does not wear lower partial    Wellness examination     Dr. Judy Francis seen within last 1 1/2 mos       Past Surgical History:   Procedure Laterality Date    CARDIAC CATHETERIZATION      no stenting    CARDIOVASCULAR STRESS TEST  06/28/2019    small fixed apical, likely normal, EF 48%    COLONOSCOPY  06/17/2016    poor prep, done up to the IC valve, redundant colon    COLONOSCOPY  01/23/2017    VIRTUAL COLONOSCOPY DONE-no polyps or masses    CYSTOSCOPY Bilateral 6/16/2020    CYSTOSCOPY RETROGRADE PYELOGRAM URETEROSCOPY performed by Paresh Gonzalez MD at [facility] Right 7/30/2020    CYSTOSCOPY, RIGHT RETROGRADE PYELOGRAM,  URETERAL CATHETER  INSERTION performed by Paresh Gonzalez MD at [address] [facility] 9/1/2020    CYSTOSCOPY RETROGRADE PYELOGRAM, RIGHT URETERAL STENT EXCHANGE performed by Paresh Gonzalez MD at [facility] Right     r-bone removed      PICC [facility]  5/21/2018         KIDNEY CYST REMOVAL      KIDNEY SURGERY Right 11/13/2019    XI ROBOTIC PARTIAL NEPHRECTOMY, INTRAOP ULTRASOUND, RIGHT URETEROURETEROSTOMY, RIGHT URETERAL STENT PLACEMENT **SHORT STAY** performed by Paresh Gonzalez MD at [place] [id number] 7/30/2020    XI LAPAROSCOPIC ROBOTIC URETEROLYSIS, ROBOTIC ASSISTED BUCCAL URETEROPLASTY  (DR. COBIAN TO ASSIST) performed by Clifton Zamora MD at [address] Left 4/29/15    OTHER SURGICAL HISTORY Left 5-5-15 and 11/2015    Revision BKA    OTHER SURGICAL HISTORY      left stump revision 5/30/2018    HI RE-AMPUTATION LOWER LEG Left 5/30/2018    LEG AMPUTATION BELOW KNEE REVISION performed by Franck Patterson MD at [address] Bilateral 80'S    TOE AMPUTATION      Right 2 and 4    VITRECTOMY Left 9/25/2019    PARS PLANA VITRECTOMY 25 GAUGE, MEMBRANE PEELING, ENDOLASER 200  MW 1044 SPOTS, INDIRECT LASER 236 SPOTS performed by Denise Rice MD at [address] [facility]   Allergen Reactions    Ramipril      Other reaction(s): swelling of the lips   Other reaction(s): swelling of the lips     Adhesive Tape      tegaderm causes blisters. Use paper tape         Current Outpatient Medications:     OZEMPIC, 0.25 OR 0.5 MG/DOSE, 2 MG/1.5ML SOPN, INJECT 0.25MG ONCE WEEKLY FOR 4 WEEKS, THEN INCREASE TO 0.5MG ONCE WEEKLY, Disp: 5 pen, Rfl: 2    tamsulosin (FLOMAX) 0.4 MG capsule, TAKE 1 CAPSULE BY MOUTH EVERY DAY, Disp: 90 capsule, Rfl: 3    losartan (COZAAR) 50 MG tablet, TAKE 1 TABLET BY MOUTH EVERY DAY, Disp: 30 tablet, Rfl: 5    pregabalin (LYRICA) 100 MG capsule, Take 1 capsule by mouth 3 times daily for 30 days. , Disp: 270 capsule, Rfl: 3    B-D UF III MINI PEN NEEDLES 31G X 5 MM MISC, USE AS DIRECTED ONCE DAILY TO INJECT TOUJEO, Disp: , Rfl:     metoprolol tartrate (LOPRESSOR) 25 MG tablet, TAKE 1 TABLET BY MOUTH TWICE A DAY, Disp: 180 tablet, Rfl: 3    levothyroxine (SYNTHROID) 150 MCG tablet, TAKE 1 TABLET BY MOUTH EVERY DAY, Disp: 90 tablet, Rfl: 1    Insulin Glargine, 2 Unit Dial, (TOUJEO MAX SOLOSTAR) 300 UNIT/ML SOPN, Inject 110 Units into the skin daily, Disp: 5 pen, Rfl: 5    Continuous Blood Gluc  (DEXCOM G6 ) ODUGIE, USE AS DIRECTED TO MONITOR BLOOD SUGAR, Disp: , Rfl:    clotrimazole-betamethasone (LOTRISONE) 1-0.05 % cream, APPLY TO AFFECTED AREA TWICE A DAY, Disp: 45 g, Rfl: 3    ELIQUIS 5 MG TABS tablet, TAKE 1 TABLET BY MOUTH TWICE A DAY, Disp: 180 tablet, Rfl: 2    finasteride (PROSCAR) 5 MG tablet, TAKE 1 TABLET BY MOUTH EVERY DAY, Disp: 90 tablet, Rfl: 1    insulin lispro (HUMALOG) 100 UNIT/ML injection vial, INJECT 12 UNITS UNDER THE SKIN 3 TIMES DAILY + SLIDING SCALE (TAKE 5 UNITS IF SUGAR IS OVER 150), Disp: 6 vial, Rfl: 3    blood glucose monitor strips, T/S Contour.  Test 4 times a day, Disp: 100 strip, Rfl: 3    rOPINIRole (REQUIP) 2 MG tablet, TAKE 1 TABLET BY MOUTH EVERY DAY, Disp: 90 tablet, Rfl: 3    gemfibrozil (LOPID) 600 MG tablet, TAKE 1 TABLET BY MOUTH EVERY DAY, Disp: 90 tablet, Rfl: 3    atorvastatin (LIPITOR) 40 MG tablet, TAKE 1 TABLET BY MOUTH EVERY DAY, Disp: 90 tablet, Rfl: 3    isosorbide mononitrate (IMDUR) 30 MG extended release tablet, TAKE 1 TABLET BY MOUTH EVERY DAY, Disp: 90 tablet, Rfl: 3    albuterol sulfate  (90 Base) MCG/ACT inhaler, INHALE 2 PUFFS INTO THE LUNGS EVERY 6 HOURS AS NEEDED FOR WHEEZING OR SHORTNESS OF BREATH, Disp: 6.7 Inhaler, Rfl: 0    pregabalin (LYRICA) 100 MG capsule, TAKE 1 CAPSULE BY MOUTH THREE TIMES A DAY, Disp: 90 capsule, Rfl: 0    glucagon, rDNA, 1 MG injection, Inject 1 mL into the muscle, Disp: , Rfl:     Blood Pressure KIT, 1 actuation by Does not apply route once a week, Disp: 1 kit, Rfl: 0    Handicap Placard MISC, by Does not apply route Duration - 5years Reason- prosthetic leg, Disp: 1 each, Rfl: 0    propafenone (RYTHMOL) 150 MG tablet, Take 1 tablet by mouth every 8 hours, Disp: 90 tablet, Rfl: 3    SYMBICORT 160-4.5 MCG/ACT AERO, TAKE 2 PUFFS BY MOUTH TWICE A DAY (Patient taking differently: Inhale 2 puffs into the lungs 2 times daily ), Disp: 30.6 Inhaler, Rfl: 3    Omega-3 Fatty Acids (FISH OIL CONCENTRATE) 300 MG CAPS, Take 300 mg by mouth nightly , Disp: , Rfl:     Glucosamine Sulfate 500 MG TABS, Take 500 mg by mouth 3 times daily, Disp: , Rfl:     Family History   Problem Relation Age of Onset    Diabetes Mother     Hypertension Mother     Stomach Cancer Mother     Hypertension Father     Alcohol Abuse Father     COPD Father     Kidney Cancer Father     Diabetes Brother         IDDM-PUMP    Other Sister         BRAIN TUMOR       Social History     Socioeconomic History    Marital status:      Spouse name: Jonathon Dixon Number of children: 2    Years of education: Not on file    Highest education level: Not on file   Occupational History    Occupation: Disabled   Tobacco Use    Smoking status: Former Smoker     Packs/day: 2.00     Years: 25.00     Pack years: 50.00     Types: Cigars     Start date: 1     Quit date: 5/29/2011     Years since quitting: 10.4    Smokeless tobacco: Never Used    Tobacco comment: quit in 2011   Vaping Use    Vaping Use: Never used   Substance and Sexual Activity    Alcohol use: Yes     Alcohol/week: 0.0 standard drinks     Comment: BEER 2 A MONTH    Drug use: Not Currently     Types: Marijuana Valdene Thompson)     Comment: in 20's    Sexual activity: Yes     Partners: Female   Other Topics Concern    Not on file   Social History Narrative    Not on file     Social Determinants of Health     Financial Resource Strain:     Difficulty of Paying Living Expenses: Not on file   Food Insecurity:     Worried About Running Out of Food in the Last Year: Not on file    Curry of Food in the Last Year: Not on file   Transportation Needs:     Lack of Transportation (Medical): Not on file    Lack of Transportation (Non-Medical):  Not on file   Physical Activity:     Days of Exercise per Week: Not on file    Minutes of Exercise per Session: Not on file   Stress:     Feeling of Stress : Not on file   Social Connections:     Frequency of Communication with Friends and Family: Not on file    Frequency of Social Gatherings with Friends and Family: Not on file    Attends [employer] Services: Not on file    Active Member of Clubs or Organizations: Not on file    Attends Club or Organization Meetings: Not on file    Marital Status: Not on file   Intimate Partner Violence:     Fear of Current or Ex-Partner: Not on file    Emotionally Abused: Not on file    Physically Abused: Not on file    Sexually Abused: Not on file   Housing Stability:     Unable to Pay for Housing in the Last Year: Not on file    Number of [place] in the Last Year: Not on file    Unstable Housing in the Last Year: Not on file       Review of Systems:  Review of Systems   Constitutional: Negative for chills and fever. Cardiovascular: Negative for chest pain and palpitations. Respiratory: Negative for cough and shortness of breath. Musculoskeletal: Positive for back pain. Gastrointestinal: Negative for constipation. Neurological: Negative for disturbances in coordination and loss of balance. Physical Exam:  There were no vitals taken for this visit. Physical Exam  Neurological:      Mental Status: He is alert. Psychiatric:         Mood and Affect: Mood normal.         Record/Diagnostics Review:    As reviewed in PMH    Assessment:  Problem List Items Addressed This Visit     Spinal stenosis of lumbar region with neurogenic claudication    Lumbar radiculopathy - Primary             Treatment Plan:  Patient reports significant relief following LESI - 80% ongoing  Follow up as needed    Cora Castanon, was evaluated through a synchronous (real-time) audio-video encounter. The patient (or guardian if applicable) is aware that this is a billable service. Verbal consent to proceed has been obtained within the past 12 months. The visit was conducted pursuant to the emergency declaration under the [facility], [address] [place] [address] authority and the [employer] General Act.   Patient identification was verified, and a caregiver was present when appropriate. The patient was located in a state where the provider was credentialed to provide care. Total time spent for this encounter: 15 minutes    --RADHA Mao CNP on 11/15/2021 at 9:53 AM    An electronic signature was used to authenticate this note.

## 2021-11-15 NOTE — TELEPHONE ENCOUNTER
Medication: Pen Needles  Last visit: 9/10/21  Next visit: 12/14/2021  Last refill: 08/26/21  Pharm[facility]: [facility]

## 2021-11-16 RX ORDER — PEN NEEDLE, DIABETIC 31 GX5/16"
NEEDLE, DISPOSABLE MISCELLANEOUS
Qty: 100 EACH | Refills: 3 | Status: SHIPPED | OUTPATIENT
Start: 2021-11-16

## 2021-11-17 ENCOUNTER — TELEPHONE (OUTPATIENT)
Dept: INTERNAL MEDICINE CLINIC | Age: 61
End: 2021-11-17

## 2021-11-17 NOTE — TELEPHONE ENCOUNTER
[id number] called & stated pt was seen today & pts Ozempic was chg'd from 0.5 mg to 1 mg wkly. Humalog viles / syringes was also chg'd to Humalgon quick pen, same dose, for convenience.

## 2021-12-14 ENCOUNTER — OFFICE VISIT (OUTPATIENT)
Dept: INTERNAL MEDICINE CLINIC | Age: 61
End: 2021-12-14
Payer: COMMERCIAL

## 2021-12-14 ENCOUNTER — HOSPITAL ENCOUNTER (OUTPATIENT)
Age: 61
Setting detail: SPECIMEN
Discharge: HOME | End: 2021-12-14

## 2021-12-14 VITALS
WEIGHT: 282 LBS | OXYGEN SATURATION: 98 % | BODY MASS INDEX: 34.34 KG/M2 | SYSTOLIC BLOOD PRESSURE: 136 MMHG | HEIGHT: 76 IN | DIASTOLIC BLOOD PRESSURE: 86 MMHG | HEART RATE: 83 BPM

## 2021-12-14 DIAGNOSIS — E08.41 DIABETIC MONONEUROPATHY ASSOCIATED WITH DIABETES MELLITUS DUE TO UNDERLYING CONDITION (HCC): ICD-10-CM

## 2021-12-14 DIAGNOSIS — Z89.512 STATUS POST BELOW-KNEE AMPUTATION OF LEFT LOWER EXTREMITY (HCC): ICD-10-CM

## 2021-12-14 DIAGNOSIS — I48.11 LONGSTANDING PERSISTENT ATRIAL FIBRILLATION (HCC): ICD-10-CM

## 2021-12-14 DIAGNOSIS — E03.9 ACQUIRED HYPOTHYROIDISM: ICD-10-CM

## 2021-12-14 DIAGNOSIS — I20.8 ANGINAL EQUIVALENT (HCC): ICD-10-CM

## 2021-12-14 DIAGNOSIS — I10 ESSENTIAL HYPERTENSION: ICD-10-CM

## 2021-12-14 DIAGNOSIS — E11.8 TYPE 2 DIABETES MELLITUS WITH UNSPECIFIED COMPLICATIONS (HCC): ICD-10-CM

## 2021-12-14 DIAGNOSIS — Z79.4 TYPE 2 DIABETES MELLITUS WITH DIABETIC POLYNEUROPATHY, WITH LONG-TERM CURRENT USE OF INSULIN (HCC): ICD-10-CM

## 2021-12-14 DIAGNOSIS — E11.42 TYPE 2 DIABETES MELLITUS WITH DIABETIC POLYNEUROPATHY, WITH LONG-TERM CURRENT USE OF INSULIN (HCC): ICD-10-CM

## 2021-12-14 DIAGNOSIS — I20.8 ANGINAL EQUIVALENT (HCC): Primary | ICD-10-CM

## 2021-12-14 DIAGNOSIS — E78.00 PURE HYPERCHOLESTEROLEMIA: ICD-10-CM

## 2021-12-14 DIAGNOSIS — M54.16 LUMBAR RADICULOPATHY: ICD-10-CM

## 2021-12-14 DIAGNOSIS — M48.061 SPINAL STENOSIS OF LUMBAR REGION WITHOUT NEUROGENIC CLAUDICATION: ICD-10-CM

## 2021-12-14 DIAGNOSIS — I73.9 PVD (PERIPHERAL VASCULAR DISEASE) (HCC): ICD-10-CM

## 2021-12-14 DIAGNOSIS — C64.1 MALIGNANT NEOPLASM OF RIGHT KIDNEY (HCC): ICD-10-CM

## 2021-12-14 LAB
ABSOLUTE EOS #: 0.13 K/UL (ref 0–0.44)
ABSOLUTE IMMATURE GRANULOCYTE: 0.03 K/UL (ref 0–0.3)
ABSOLUTE LYMPH #: 0.99 K/UL (ref 1.1–3.7)
ABSOLUTE MONO #: 0.57 K/UL (ref 0.1–1.2)
ALBUMIN SERPL-MCNC: 4.3 G/DL (ref 3.5–5.2)
ALBUMIN/GLOBULIN RATIO: 1.6 (ref 1–2.5)
ALP BLD-CCNC: 130 U/L (ref 40–129)
ALT SERPL-CCNC: 32 U/L (ref 5–41)
ANION GAP SERPL CALCULATED.3IONS-SCNC: 13 MMOL/L (ref 9–17)
AST SERPL-CCNC: 50 U/L
BASOPHILS # BLD: 1 % (ref 0–2)
BASOPHILS ABSOLUTE: 0.08 K/UL (ref 0–0.2)
BILIRUB SERPL-MCNC: 0.6 MG/DL (ref 0.3–1.2)
BUN BLDV-MCNC: 24 MG/DL (ref 8–23)
BUN/CREAT BLD: ABNORMAL (ref 9–20)
CALCIUM SERPL-MCNC: 9.1 MG/DL (ref 8.6–10.4)
CHLORIDE BLD-SCNC: 105 MMOL/L (ref 98–107)
CO2: 23 MMOL/L (ref 20–31)
CREAT SERPL-MCNC: 1.31 MG/DL (ref 0.7–1.2)
CREATININE URINE: 98.8 MG/DL (ref 39–259)
DIFFERENTIAL TYPE: ABNORMAL
EOSINOPHILS RELATIVE PERCENT: 2 % (ref 1–4)
GFR AFRICAN AMERICAN: >60 ML/MIN
GFR NON-AFRICAN AMERICAN: 56 ML/MIN
GFR SERPL CREATININE-BSD FRML MDRD: ABNORMAL ML/MIN/{1.73_M2}
GFR SERPL CREATININE-BSD FRML MDRD: ABNORMAL ML/MIN/{1.73_M2}
GLUCOSE BLD-MCNC: 277 MG/DL (ref 70–99)
HBA1C MFR BLD: 7.8 %
HCT VFR BLD CALC: 42.2 % (ref 40.7–50.3)
HEMOGLOBIN: 14.6 G/DL (ref 13–17)
IMMATURE GRANULOCYTES: 1 %
LYMPHOCYTES # BLD: 16 % (ref 24–43)
MCH RBC QN AUTO: 32.7 PG (ref 25.2–33.5)
MCHC RBC AUTO-ENTMCNC: 34.6 G/DL (ref 28.4–34.8)
MCV RBC AUTO: 94.6 FL (ref 82.6–102.9)
MICROALBUMIN/CREAT 24H UR: 35 MG/L
MICROALBUMIN/CREAT UR-RTO: 35 MCG/MG CREAT
MONOCYTES # BLD: 9 % (ref 3–12)
NRBC AUTOMATED: 0 PER 100 WBC
PDW BLD-RTO: 14 % (ref 11.8–14.4)
PLATELET # BLD: ABNORMAL K/UL (ref 138–453)
PLATELET ESTIMATE: ABNORMAL
PLATELET, FLUORESCENCE: 169 K/UL (ref 138–453)
PLATELET, IMMATURE FRACTION: 9.8 % (ref 1.1–10.3)
PMV BLD AUTO: ABNORMAL FL (ref 8.1–13.5)
POTASSIUM SERPL-SCNC: 4.5 MMOL/L (ref 3.7–5.3)
RBC # BLD: 4.46 M/UL (ref 4.21–5.77)
RBC # BLD: ABNORMAL 10*6/UL
SEG NEUTROPHILS: 70 % (ref 36–65)
SEGMENTED NEUTROPHILS ABSOLUTE COUNT: 4.29 K/UL (ref 1.5–8.1)
SODIUM BLD-SCNC: 141 MMOL/L (ref 135–144)
THYROXINE, FREE: 0.97 NG/DL (ref 0.93–1.7)
TOTAL PROTEIN: 7 G/DL (ref 6.4–8.3)
TSH SERPL DL<=0.05 MIU/L-ACNC: 15.81 MIU/L (ref 0.3–5)
WBC # BLD: 6.1 K/UL (ref 3.5–11.3)
WBC # BLD: ABNORMAL 10*3/UL

## 2021-12-14 PROCEDURE — G8427 DOCREV CUR MEDS BY ELIG CLIN: HCPCS | Performed by: INTERNAL MEDICINE

## 2021-12-14 PROCEDURE — 3017F COLORECTAL CA SCREEN DOC REV: CPT | Performed by: INTERNAL MEDICINE

## 2021-12-14 PROCEDURE — G8482 FLU IMMUNIZE ORDER/ADMIN: HCPCS | Performed by: INTERNAL MEDICINE

## 2021-12-14 PROCEDURE — 3051F HG A1C>EQUAL 7.0%<8.0%: CPT | Performed by: INTERNAL MEDICINE

## 2021-12-14 PROCEDURE — 99214 OFFICE O/P EST MOD 30 MIN: CPT | Performed by: INTERNAL MEDICINE

## 2021-12-14 PROCEDURE — 83036 HEMOGLOBIN GLYCOSYLATED A1C: CPT | Performed by: INTERNAL MEDICINE

## 2021-12-14 PROCEDURE — G8417 CALC BMI ABV UP PARAM F/U: HCPCS | Performed by: INTERNAL MEDICINE

## 2021-12-14 PROCEDURE — 1036F TOBACCO NON-USER: CPT | Performed by: INTERNAL MEDICINE

## 2021-12-14 PROCEDURE — 2022F DILAT RTA XM EVC RTNOPTHY: CPT | Performed by: INTERNAL MEDICINE

## 2021-12-14 RX ORDER — BUDESONIDE AND FORMOTEROL FUMARATE DIHYDRATE 160; 4.5 UG/1; UG/1
AEROSOL RESPIRATORY (INHALATION)
Qty: 3 EACH | Refills: 3 | Status: SHIPPED | OUTPATIENT
Start: 2021-12-14

## 2021-12-14 RX ORDER — ASPIRIN 81 MG/1
81 TABLET ORAL DAILY
Qty: 30 TABLET | Refills: 5 | Status: SHIPPED | OUTPATIENT
Start: 2021-12-14 | End: 2022-01-19

## 2021-12-14 ASSESSMENT — ENCOUNTER SYMPTOMS
DIARRHEA: 0
WHEEZING: 0
TROUBLE SWALLOWING: 0
EYE PAIN: 0
EYE DISCHARGE: 0
COUGH: 0
ABDOMINAL DISTENTION: 0
SHORTNESS OF BREATH: 0
BLOOD IN STOOL: 0
COLOR CHANGE: 0

## 2021-12-14 NOTE — PROGRESS NOTES
[address]  [place] [address]  Dept: [phone]  Dept Fax: [phone]    Yin Yang is a 64 y.o. male who presents today for his medical conditions/complaintsas noted below. Yin Yang is c/o of   Chief Complaint   Patient presents with    Diabetes    Excessive Sweating     dx with afib          HPI:     Hx of afib and eiluqis  Non smoker  With dm  Sweating with cold wather  On synthroid  No cp        Hemoglobin A1C (%)   Date Value   12/14/2021 7.8   09/10/2021 8.0   05/04/2021 7.0             ( goal A1Cis < 7)   Microalb/Crt.  Ratio (mcg/mg creat)   Date Value   01/13/2020 82 (H)     LDL Cholesterol (mg/dL)   Date Value   05/13/2021 55   01/13/2020 70   05/21/2019 68       (goal LDL is <100)   AST (U/L)   Date Value   05/21/2020 24     ALT (U/L)   Date Value   05/21/2020 19     BUN (mg/dL)   Date Value   08/01/2020 29 (H)     BP Readings from Last 3 Encounters:   12/14/21 136/86   11/01/21 (!) 143/73   10/12/21 (!) 140/83          (goal 120/80)    Past Medical History:   Diagnosis Date    A-fib ([facility]     Asymptomatic bilateral carotid artery stenosis     Boil, thigh 10/2019    10/30/19: right inner thigh region, says PCP Rxd Doxy for this, area is much better, has a couple days left to complete Doxy    CAD (coronary artery disease)     saw Dr. Karina Lambert ([facility])     Charcot foot due to diabetes mellitus ([address] 2014    LEFT    COPD (chronic obstructive pulmonary disease) ([address] 2009    INHALER USE DAILY    Diabetes mellitus ([address] 1989    IDDM, On Insulin Dr. Tawanna Person Diabetic neuropathy ([facility]     Difficult intravenous access     VEINS ROLL    Employs prosthetic leg     s/p left BKA    Foot ulcer ([address] PRIOR TO 2015    BILAT    Full dentures     UPPER ONLY    Hyperlipidemia 2004    ON RX    Hypertension 2004    ON RX, PCP Dr. Nikky Tirado, seen Oct. 2019    Hypoglycemia 4/2/2015    MRSA (methicillin resistant staph aureus) culture positive 4/16/2015    ankle    OA (osteoarthritis)     Osteomyelitis (HCC)     left stump  BKA    Paroxysmal atrial fibrillation (HCC)     Renal mass 09/2019    ACCIDENTAL  FINDING IS FOLLOWING UP WITH KIDNEY DR Michael Ledezma     Suspected sleep apnea     Thyroid disease 2004    PT HAD HYPERTHYROIDISM-UNCONTROLED THYROID DESTROYED WITH RADI IODINE NOW HAS HYPOTHYRIDISM    Vision abnormalities 09/2019    LEFT NO VISION    Vitreous hemorrhage of left eye due to diabetes mellitus ([address] 09/2019    s/p Vitrectomy 9/2019    Wears glasses     Wears partial dentures     full upper, does not wear lower partial    Wellness examination     Dr. Gregg Durant seen within last 1 1/2 mos      Past Surgical History:   Procedure Laterality Date    CARDIAC CATHETERIZATION      no stenting    CARDIOVASCULAR STRESS TEST  06/28/2019    small fixed apical, likely normal, EF 48%    COLONOSCOPY  06/17/2016    poor prep, done up to the IC valve, redundant colon    COLONOSCOPY  01/23/2017    VIRTUAL COLONOSCOPY DONE-no polyps or masses    CYSTOSCOPY Bilateral 6/16/2020    CYSTOSCOPY RETROGRADE PYELOGRAM URETEROSCOPY performed by Suki Florentino MD at [address] [facility] Right 7/30/2020    CYSTOSCOPY, RIGHT RETROGRADE PYELOGRAM,  URETERAL CATHETER  INSERTION performed by Suki Florentino MD at [address] [facility] 9/1/2020    CYSTOSCOPY RETROGRADE PYELOGRAM, RIGHT URETERAL [address] performed by Suki Florentino MD at [facility] Right     r-bone removed      PICC [facility]  5/21/2018         KIDNEY CYST REMOVAL      KIDNEY SURGERY Right 11/13/2019    XI ROBOTIC PARTIAL NEPHRECTOMY, INTRAOP ULTRASOUND, RIGHT URETEROURETEROSTOMY, RIGHT URETERAL STENT PLACEMENT **SHORT STAY** performed by Suki Florentino MD at [place] [address] 7/30/2020    XI LAPAROSCOPIC ROBOTIC URETEROLYSIS, ROBOTIC ASSISTED BUCCAL URETEROPLASTY  (DR. COBIAN TO ASSIST) performed by Nuria Campos MD at [address] [place] Left 4/29/15    OTHER SURGICAL HISTORY Left 5-5-15 and 11/2015    Revision BKA    OTHER SURGICAL HISTORY      left stump revision 5/30/2018    HI RE-AMPUTATION LOWER LEG Left 5/30/2018    LEG AMPUTATION BELOW KNEE REVISION performed by Adamaris Smith MD at [address] Bilateral 80'S    TOE AMPUTATION      Right 2 and 4    VITRECTOMY Left 9/25/2019    PARS PLANA VITRECTOMY 25 GAUGE, MEMBRANE PEELING, ENDOLASER 200  MW 1044 SPOTS, INDIRECT LASER 26 SPOTS performed by Neela Jha MD at [facility] History   Problem Relation Age of Onset    Diabetes Mother     Hypertension Mother     Stomach Cancer Mother     Hypertension Father     Alcohol Abuse Father     COPD Father     Kidney Cancer Father     Diabetes Brother         IDDM-PUMP    Other Sister         BRAIN TUMOR       Social History     Tobacco Use    Smoking status: Former Smoker     Packs/day: 2.00     Years: 25.00     Pack years: 50.00     Types: Cigars     Start date: 1     Quit date: 5/29/2011     Years since quitting: 10.5    Smokeless tobacco: Never Used    Tobacco comment: quit in 2011   Substance Use Topics    Alcohol use:  Yes     Alcohol/week: 0.0 standard drinks     Comment: BEER 2 A MONTH      Current Outpatient Medications   Medication Sig Dispense Refill    budesonide-formoterol (SYMBICORT) 160-4.5 MCG/ACT AERO TAKE 2 PUFFS BY MOUTH TWICE A DAY 3 each 3    aspirin EC 81 MG EC tablet Take 1 tablet by mouth daily 30 tablet 5    B-D UF III MINI PEN NEEDLES 31G X 5 MM MISC USE AS DIRECTED ONCE DAILY TO INJECT TOUJEO 100 each 3    OZEMPIC, 0.25 OR 0.5 MG/DOSE, 2 MG/1.5ML SOPN INJECT 0.25MG ONCE WEEKLY FOR 4 WEEKS, THEN INCREASE TO 0.5MG ONCE WEEKLY 5 pen 2    tamsulosin (FLOMAX) 0.4 MG capsule TAKE 1 CAPSULE BY MOUTH EVERY DAY 90 capsule 3    losartan (COZAAR) 50 MG tablet TAKE 1 TABLET BY MOUTH EVERY DAY 30 tablet 5    metoprolol tartrate (LOPRESSOR) 25 MG tablet TAKE 1 TABLET BY MOUTH TWICE A  tablet 3    levothyroxine (SYNTHROID) 150 MCG tablet TAKE 1 TABLET BY MOUTH EVERY DAY 90 tablet 1    Insulin Glargine, 2 Unit Dial, (TOUJEO MAX SOLOSTAR) 300 UNIT/ML SOPN Inject 110 Units into the skin daily 5 pen 5    Continuous Blood Gluc  (DEXCOM G6 ) DOUGIE USE AS DIRECTED TO MONITOR BLOOD SUGAR      clotrimazole-betamethasone (LOTRISONE) 1-0.05 % cream APPLY TO AFFECTED AREA TWICE A DAY 45 g 3    ELIQUIS 5 MG TABS tablet TAKE 1 TABLET BY MOUTH TWICE A  tablet 2    finasteride (PROSCAR) 5 MG tablet TAKE 1 TABLET BY MOUTH EVERY DAY 90 tablet 1    insulin lispro (HUMALOG) 100 UNIT/ML injection vial INJECT 12 UNITS UNDER THE SKIN 3 TIMES DAILY + SLIDING SCALE (TAKE 5 UNITS IF SUGAR IS OVER 150) 6 vial 3    blood glucose monitor strips T/S Contour.  Test 4 times a day 100 strip 3    rOPINIRole (REQUIP) 2 MG tablet TAKE 1 TABLET BY MOUTH EVERY DAY 90 tablet 3    gemfibrozil (LOPID) 600 MG tablet TAKE 1 TABLET BY MOUTH EVERY DAY 90 tablet 3    atorvastatin (LIPITOR) 40 MG tablet TAKE 1 TABLET BY MOUTH EVERY DAY 90 tablet 3    isosorbide mononitrate (IMDUR) 30 MG extended release tablet TAKE 1 TABLET BY MOUTH EVERY DAY 90 tablet 3    albuterol sulfate  (90 Base) MCG/ACT inhaler INHALE 2 PUFFS INTO THE LUNGS EVERY 6 HOURS AS NEEDED FOR WHEEZING OR SHORTNESS OF BREATH 6.7 Inhaler 0    pregabalin (LYRICA) 100 MG capsule TAKE 1 CAPSULE BY MOUTH THREE TIMES A DAY 90 capsule 0    glucagon, rDNA, 1 MG injection Inject 1 mL into the muscle      Blood Pressure KIT 1 actuation by Does not apply route once a week 1 kit 0    Handicap Placard MISC by Does not apply route Duration - 5years  Reason- prosthetic leg 1 each 0    propafenone (RYTHMOL) 150 MG tablet Take 1 tablet by mouth every 8 hours 90 tablet 3    Omega-3 Fatty Acids (FISH OIL CONCENTRATE) 300 MG CAPS Take 300 mg by mouth nightly       Glucosamine Sulfate 500 MG TABS Take 500 mg by mouth 3 times daily       No current facility-administered medications for this visit. Allergies   Allergen Reactions    Ramipril      Other reaction(s): swelling of the lips   Other reaction(s): swelling of the lips     Adhesive Tape      tegaderm causes blisters. Use paper tape       Health Maintenance   Topic Date Due    HIV screen  Never done    DTaP/Tdap/Td vaccine (1 - Tdap) Never done    Shingles Vaccine (1 of 2) 10/23/2014    Diabetic retinal exam  01/04/2017    Diabetic microalbuminuria test  01/13/2021    Potassium monitoring  08/01/2021    Creatinine monitoring  08/01/2021    Low dose CT lung screening  03/02/2022    Lipid screen  05/13/2022    TSH testing  06/15/2022    Diabetic foot exam  11/01/2022    A1C test (Diabetic or Prediabetic)  12/14/2022    Pneumococcal 0-64 years Vaccine (2 of 2 - PPSV23) 01/07/2025    Colon cancer screen colonoscopy  01/23/2027    Flu vaccine  Completed    COVID-19 Vaccine  Completed    Hepatitis C screen  Completed    Hepatitis A vaccine  Aged Out    Hib vaccine  Aged Out    Meningococcal (ACWY) vaccine  Aged Out       Subjective:     Review of Systems   Constitutional: Positive for diaphoresis. Negative for appetite change and fatigue. HENT: Negative for ear discharge and trouble swallowing. Eyes: Negative for pain and discharge. Respiratory: Negative for cough, shortness of breath and wheezing. Cardiovascular: Negative for chest pain and palpitations. Gastrointestinal: Negative for abdominal distention, blood in stool and diarrhea. Endocrine: Negative for polydipsia and polyphagia. Genitourinary: Negative for difficulty urinating and frequency. Musculoskeletal: Positive for arthralgias. Negative for gait problem, myalgias and neck pain. Skin: Negative for color change and rash.    Allergic/Immunologic: Negative for environmental allergies and food allergies. Neurological: Negative for dizziness and headaches. Hematological: Negative for adenopathy. Does not bruise/bleed easily. Psychiatric/Behavioral: Negative for behavioral problems and sleep disturbance. Objective:     Physical Exam  Constitutional:       Appearance: He is well-developed. He is not diaphoretic. HENT:      Head: Normocephalic and atraumatic. Eyes:      General:         Right eye: No discharge. Left eye: No discharge. Extraocular Movements:      Right eye: Normal extraocular motion. Left eye: Normal extraocular motion. Conjunctiva/sclera: Conjunctivae normal.      Right eye: Right conjunctiva is not injected. Left eye: Left conjunctiva is not injected. Neck:      Thyroid: No thyroid mass or thyromegaly. Vascular: No JVD. Cardiovascular:      Rate and Rhythm: Normal rate and regular rhythm. Heart sounds: No murmur heard. No friction rub. Pulmonary:      Effort: Pulmonary effort is normal. No tachypnea, bradypnea, accessory muscle usage or respiratory distress. Breath sounds: Normal breath sounds. No wheezing or rales. Abdominal:      General: Bowel sounds are normal. There is no distension. Palpations: Abdomen is soft. Tenderness: There is no abdominal tenderness. There is no rebound. Musculoskeletal:         General: No tenderness. Normal range of motion. Cervical back: Normal range of motion and neck supple. No edema or erythema. Comments: P;ost left BKA     Lymphadenopathy:      Head:      Right side of head: No submental or submandibular adenopathy. Left side of head: No submental or submandibular adenopathy. Cervical: No cervical adenopathy. Skin:     General: Skin is warm. Coloration: Skin is not pale. Findings: No bruising, ecchymosis or rash. Neurological:      Mental Status: He is alert and oriented to person, place, and time. Cranial Nerves: No cranial nerve deficit. Sensory: No sensory deficit. Motor: No atrophy or abnormal muscle tone. Coordination: Coordination normal.   Psychiatric:         Mood and Affect: Mood is not anxious. Affect is not angry. Speech: Speech is not slurred. Behavior: Behavior normal. Behavior is not aggressive. Thought Content: Thought content does not include homicidal ideation. Cognition and Memory: Memory is not impaired. /86   Pulse 83   Ht 6' 4\" (1.93 m)   Wt 282 lb (127.9 kg)   SpO2 98%   BMI 34.33 kg/m²     Assessment:       Diagnosis Orders   1. Anginal equivalent ([address]  Marcela Ochoa MD, Cardiology, [place]    Comprehensive Metabolic Panel    CBC Auto Differential   2. Type 2 diabetes mellitus with unspecified complications (HCC)  Microalbumin, Ur    POCT glycosylated hemoglobin (Hb A1C)   3. Lumbar radiculopathy     4. Spinal stenosis of lumbar region without neurogenic claudication     5. Malignant neoplasm of right kidney ([address]     6. Longstanding persistent atrial fibrillation ([address]     7. Acquired hypothyroidism  TSH With Reflex Ft4   8. Essential hypertension     9. Type 2 diabetes mellitus with diabetic polyneuropathy, with long-term current use of insulin ([address]     10. Diabetic mononeuropathy associated with diabetes mellitus due to underlying condition ([address]     11. PVD (peripheral vascular disease) ([address]     12. Pure hypercholesterolemia     13. Status post below-knee amputation of left lower extremity ([address]               Plan:      No follow-ups on file.     Orders Placed This Encounter   Procedures    Microalbumin, Ur     Standing Status:   Future     Standing Expiration Date:   12/10/2022    TSH With Reflex Ft4     Standing Status:   Future     Standing Expiration Date:   12/14/2022    Comprehensive Metabolic Panel     Standing Status:   Future     Standing Expiration Date:   12/14/2022    CBC Auto Differential     Standing Status:   Future     Standing Expiration Date:   3/14/2022   Blake Adam MD, Cardiology, [facility]     Referral Priority:   Routine     Referral Type:   Eval and Treat     Referral Reason:   Specialty Services Required     Referred to Provider:   Abiodun Harrington MD     Requested Specialty:   Cardiology     Number of Visits Requested:   1    POCT glycosylated hemoglobin (Hb A1C)     Orders Placed This Encounter   Medications    budesonide-formoterol (SYMBICORT) 160-4.5 MCG/ACT AERO     Sig: TAKE 2 PUFFS BY MOUTH TWICE A DAY     Dispense:  3 each     Refill:  3    aspirin EC 81 MG EC tablet     Sig: Take 1 tablet by mouth daily     Dispense:  30 tablet     Refill:  5    diaphoresis ocation in cold   Will check tsh  May be angina equivalaent  Ref to cardio for cardiac cath  Rule out multivessel dis  Risk factors with age dm and hld  High risk  ckd cheko do bmp  Avoid LV gram f     Patient given educational materials - see patient instructions. Discussed use, benefit, and side effects of prescribed medications. All patientquestions answered. Pt voiced understanding. Reviewed health maintenance. Instructedto continue current medications, diet and exercise. Patient agreed with treatmentplan. Follow up as directed.      Electronically signed by Shahrzad Mason MD on 12/14/2021 at 9:35 AM

## 2021-12-14 NOTE — PROGRESS NOTES
Visit Information    Have you changed or started any medications since your last visit including any over-the-counter medicines, vitamins, or herbal medicines? no   Are you having any side effects from any of your medications? -  no  Have you stopped taking any of your medications? Is so, why? -  no    Have you seen any other physician or provider since your last visit? Yes - Records Obtained  Have you had any other diagnostic tests since your last visit? Yes - Records Obtained  Have you been seen in the emergency room and/or had an admission to a hospital since we last saw you? No  Have you had your routine dental cleaning in the past 6 months? yes -     Have you activated your [employer] account? If not, what are your barriers?  Yes     Patient Care Team:  Deetphi Myers MD as PCP - Bereket Silva MD as PCP - [facility] Provider  Bridget Patterson MD as Consulting Physician (Infectious Diseases)    Medical History Review  Past Medical, Family, and Social History reviewed and does contribute to the patient presenting condition    Health Maintenance   Topic Date Due    HIV screen  Never done    DTaP/Tdap/Td vaccine (1 - Tdap) Never done    Shingles Vaccine (1 of 2) 10/23/2014    Diabetic retinal exam  01/04/2017    Diabetic microalbuminuria test  01/13/2021    Potassium monitoring  08/01/2021    Creatinine monitoring  08/01/2021    Low dose CT lung screening  03/02/2022    Lipid screen  05/13/2022    TSH testing  06/15/2022    A1C test (Diabetic or Prediabetic)  09/10/2022    Diabetic foot exam  11/01/2022    Pneumococcal 0-64 years Vaccine (2 of 2 - PPSV23) 01/07/2025    Colon cancer screen colonoscopy  01/23/2027    Flu vaccine  Completed    COVID-19 Vaccine  Completed    Hepatitis C screen  Completed    Hepatitis A vaccine  Aged Out    Hib vaccine  Aged Out    Meningococcal (ACWY) vaccine  Aged Out

## 2021-12-27 ENCOUNTER — TELEPHONE (OUTPATIENT)
Dept: INTERNAL MEDICINE CLINIC | Age: 61
End: 2021-12-27

## 2021-12-27 NOTE — TELEPHONE ENCOUNTER
Dr. Dakota Booker is out of the office this week, patient is calling to report worsening symptoms of SOB (ongoing problem) and excessive sweating. Dr. Dakota Booker referred patient to see Cardiology, however they do not have an opening to see patient until 1/20/22. Patient would like to know if there is any way to treat these symptoms prior to seeing Cardiology next month?     Please advise

## 2021-12-28 ENCOUNTER — APPOINTMENT (OUTPATIENT)
Dept: GENERAL RADIOLOGY | Age: 61
DRG: 303 | End: 2021-12-28
Payer: COMMERCIAL

## 2021-12-28 ENCOUNTER — HOSPITAL ENCOUNTER (INPATIENT)
Age: 61
LOS: 3 days | Discharge: HOME | DRG: 303 | End: 2021-12-31
Attending: EMERGENCY MEDICINE | Admitting: INTERNAL MEDICINE
Payer: COMMERCIAL

## 2021-12-28 DIAGNOSIS — Z79.4 TYPE 2 DIABETES MELLITUS WITH DIABETIC POLYNEUROPATHY, WITH LONG-TERM CURRENT USE OF INSULIN (HCC): ICD-10-CM

## 2021-12-28 DIAGNOSIS — E11.42 TYPE 2 DIABETES MELLITUS WITH DIABETIC POLYNEUROPATHY, WITH LONG-TERM CURRENT USE OF INSULIN (HCC): ICD-10-CM

## 2021-12-28 DIAGNOSIS — G89.29 CHRONIC PAIN OF LEFT KNEE: ICD-10-CM

## 2021-12-28 DIAGNOSIS — Z97.10 EMPLOYS PROSTHETIC LEG: ICD-10-CM

## 2021-12-28 DIAGNOSIS — E08.41 DIABETIC MONONEUROPATHY ASSOCIATED WITH DIABETES MELLITUS DUE TO UNDERLYING CONDITION (HCC): ICD-10-CM

## 2021-12-28 DIAGNOSIS — R07.9 CHEST PAIN, UNSPECIFIED TYPE: Primary | ICD-10-CM

## 2021-12-28 DIAGNOSIS — M25.562 CHRONIC PAIN OF LEFT KNEE: ICD-10-CM

## 2021-12-28 PROBLEM — I50.32 CHRONIC DIASTOLIC HEART FAILURE (HCC): Chronic | Status: ACTIVE | Noted: 2021-12-28

## 2021-12-28 PROBLEM — I24.9 ACUTE CORONARY SYNDROME (HCC): Status: ACTIVE | Noted: 2021-12-28

## 2021-12-28 PROBLEM — I50.32 CHRONIC DIASTOLIC HEART FAILURE (HCC): Status: ACTIVE | Noted: 2021-12-28

## 2021-12-28 PROBLEM — N40.0 BPH (BENIGN PROSTATIC HYPERPLASIA): Status: ACTIVE | Noted: 2021-12-28

## 2021-12-28 PROBLEM — I48.91 ATRIAL FIBRILLATION (HCC): Chronic | Status: ACTIVE | Noted: 2020-07-17

## 2021-12-28 LAB
ABSOLUTE EOS #: 0.2 K/UL (ref 0–0.4)
ABSOLUTE IMMATURE GRANULOCYTE: ABNORMAL K/UL (ref 0–0.3)
ABSOLUTE LYMPH #: 1.2 K/UL (ref 1–4.8)
ABSOLUTE MONO #: 0.7 K/UL (ref 0.1–1.3)
ALBUMIN SERPL-MCNC: 4.1 G/DL (ref 3.5–5.2)
ALBUMIN/GLOBULIN RATIO: ABNORMAL (ref 1–2.5)
ALP BLD-CCNC: 132 U/L (ref 40–129)
ALT SERPL-CCNC: 29 U/L (ref 5–41)
ANION GAP SERPL CALCULATED.3IONS-SCNC: 11 MMOL/L (ref 9–17)
AST SERPL-CCNC: 26 U/L
BASOPHILS # BLD: 1 % (ref 0–2)
BASOPHILS ABSOLUTE: 0.1 K/UL (ref 0–0.2)
BILIRUB SERPL-MCNC: 0.43 MG/DL (ref 0.3–1.2)
BNP INTERPRETATION: ABNORMAL
BUN BLDV-MCNC: 23 MG/DL (ref 8–23)
BUN/CREAT BLD: ABNORMAL (ref 9–20)
CALCIUM SERPL-MCNC: 9 MG/DL (ref 8.6–10.4)
CHLORIDE BLD-SCNC: 108 MMOL/L (ref 98–107)
CO2: 24 MMOL/L (ref 20–31)
CREAT SERPL-MCNC: 1.24 MG/DL (ref 0.7–1.2)
DIFFERENTIAL TYPE: ABNORMAL
EOSINOPHILS RELATIVE PERCENT: 2 % (ref 0–4)
GFR AFRICAN AMERICAN: >60 ML/MIN
GFR NON-AFRICAN AMERICAN: 59 ML/MIN
GFR SERPL CREATININE-BSD FRML MDRD: ABNORMAL ML/MIN/{1.73_M2}
GFR SERPL CREATININE-BSD FRML MDRD: ABNORMAL ML/MIN/{1.73_M2}
GLUCOSE BLD-MCNC: 122 MG/DL (ref 75–110)
GLUCOSE BLD-MCNC: 219 MG/DL (ref 70–99)
GLUCOSE BLD-MCNC: 90 MG/DL (ref 75–110)
HCT VFR BLD CALC: 41.6 % (ref 41–53)
HEMOGLOBIN: 14.3 G/DL (ref 13.5–17.5)
IMMATURE GRANULOCYTES: ABNORMAL %
INR BLD: 1
LYMPHOCYTES # BLD: 19 % (ref 24–44)
MAGNESIUM: 1.9 MG/DL (ref 1.6–2.6)
MCH RBC QN AUTO: 32.3 PG (ref 26–34)
MCHC RBC AUTO-ENTMCNC: 34.5 G/DL (ref 31–37)
MCV RBC AUTO: 93.7 FL (ref 80–100)
MONOCYTES # BLD: 11 % (ref 1–7)
NRBC AUTOMATED: ABNORMAL PER 100 WBC
PDW BLD-RTO: 14.3 % (ref 11.5–14.9)
PLATELET # BLD: 156 K/UL (ref 150–450)
PLATELET ESTIMATE: ABNORMAL
PMV BLD AUTO: 10.2 FL (ref 6–12)
POTASSIUM SERPL-SCNC: 5.1 MMOL/L (ref 3.7–5.3)
PRO-BNP: 378 PG/ML
PROTHROMBIN TIME: 13.2 SEC (ref 11.8–14.6)
RBC # BLD: 4.44 M/UL (ref 4.5–5.9)
RBC # BLD: ABNORMAL 10*6/UL
SARS-COV-2, RAPID: NOT DETECTED
SEG NEUTROPHILS: 67 % (ref 36–66)
SEGMENTED NEUTROPHILS ABSOLUTE COUNT: 4.2 K/UL (ref 1.3–9.1)
SODIUM BLD-SCNC: 143 MMOL/L (ref 135–144)
SPECIMEN DESCRIPTION: NORMAL
TOTAL PROTEIN: 6.9 G/DL (ref 6.4–8.3)
TROPONIN INTERP: ABNORMAL
TROPONIN T: ABNORMAL NG/ML
TROPONIN, HIGH SENSITIVITY: 52 NG/L (ref 0–22)
TROPONIN, HIGH SENSITIVITY: 53 NG/L (ref 0–22)
TROPONIN, HIGH SENSITIVITY: 54 NG/L (ref 0–22)
WBC # BLD: 6.4 K/UL (ref 3.5–11)
WBC # BLD: ABNORMAL 10*3/UL

## 2021-12-28 PROCEDURE — 85025 COMPLETE CBC W/AUTO DIFF WBC: CPT

## 2021-12-28 PROCEDURE — 6370000000 HC RX 637 (ALT 250 FOR IP): Performed by: EMERGENCY MEDICINE

## 2021-12-28 PROCEDURE — 99223 1ST HOSP IP/OBS HIGH 75: CPT | Performed by: INTERNAL MEDICINE

## 2021-12-28 PROCEDURE — 83735 ASSAY OF MAGNESIUM: CPT

## 2021-12-28 PROCEDURE — 2580000003 HC RX 258

## 2021-12-28 PROCEDURE — 85610 PROTHROMBIN TIME: CPT

## 2021-12-28 PROCEDURE — 80053 COMPREHEN METABOLIC PANEL: CPT

## 2021-12-28 PROCEDURE — 93005 ELECTROCARDIOGRAM TRACING: CPT | Performed by: EMERGENCY MEDICINE

## 2021-12-28 PROCEDURE — 83880 ASSAY OF NATRIURETIC PEPTIDE: CPT

## 2021-12-28 PROCEDURE — 82947 ASSAY GLUCOSE BLOOD QUANT: CPT

## 2021-12-28 PROCEDURE — 87635 SARS-COV-2 COVID-19 AMP PRB: CPT

## 2021-12-28 PROCEDURE — 71045 X-RAY EXAM CHEST 1 VIEW: CPT

## 2021-12-28 PROCEDURE — 36415 COLL VENOUS BLD VENIPUNCTURE: CPT

## 2021-12-28 PROCEDURE — 94761 N-INVAS EAR/PLS OXIMETRY MLT: CPT

## 2021-12-28 PROCEDURE — 84484 ASSAY OF TROPONIN QUANT: CPT

## 2021-12-28 PROCEDURE — 6370000000 HC RX 637 (ALT 250 FOR IP)

## 2021-12-28 PROCEDURE — 1200000000 HC SEMI PRIVATE

## 2021-12-28 PROCEDURE — 94664 DEMO&/EVAL PT USE INHALER: CPT

## 2021-12-28 PROCEDURE — 94640 AIRWAY INHALATION TREATMENT: CPT

## 2021-12-28 PROCEDURE — 99283 EMERGENCY DEPT VISIT LOW MDM: CPT

## 2021-12-28 RX ORDER — ACETAMINOPHEN 325 MG/1
650 TABLET ORAL EVERY 6 HOURS PRN
Status: DISCONTINUED | OUTPATIENT
Start: 2021-12-28 | End: 2021-12-31 | Stop reason: HOSPADM

## 2021-12-28 RX ORDER — DEXTROSE MONOHYDRATE 50 MG/ML
100 INJECTION, SOLUTION INTRAVENOUS PRN
Status: DISCONTINUED | OUTPATIENT
Start: 2021-12-28 | End: 2021-12-31 | Stop reason: HOSPADM

## 2021-12-28 RX ORDER — NICOTINE POLACRILEX 4 MG
15 LOZENGE BUCCAL PRN
Status: DISCONTINUED | OUTPATIENT
Start: 2021-12-28 | End: 2021-12-31 | Stop reason: HOSPADM

## 2021-12-28 RX ORDER — ROPINIROLE 2 MG/1
2 TABLET, FILM COATED ORAL DAILY
Status: DISCONTINUED | OUTPATIENT
Start: 2021-12-29 | End: 2021-12-31 | Stop reason: HOSPADM

## 2021-12-28 RX ORDER — ISOSORBIDE MONONITRATE 30 MG/1
30 TABLET, EXTENDED RELEASE ORAL DAILY
Status: DISCONTINUED | OUTPATIENT
Start: 2021-12-29 | End: 2021-12-31 | Stop reason: HOSPADM

## 2021-12-28 RX ORDER — ISOSORBIDE MONONITRATE 30 MG/1
TABLET, EXTENDED RELEASE ORAL
Qty: 90 TABLET | Refills: 3 | Status: SHIPPED | OUTPATIENT
Start: 2021-12-28

## 2021-12-28 RX ORDER — ALBUTEROL SULFATE 90 UG/1
2 AEROSOL, METERED RESPIRATORY (INHALATION) EVERY 6 HOURS PRN
Status: DISCONTINUED | OUTPATIENT
Start: 2021-12-29 | End: 2021-12-31 | Stop reason: HOSPADM

## 2021-12-28 RX ORDER — TAMSULOSIN HYDROCHLORIDE 0.4 MG/1
0.4 CAPSULE ORAL NIGHTLY
Status: DISCONTINUED | OUTPATIENT
Start: 2021-12-28 | End: 2021-12-31 | Stop reason: HOSPADM

## 2021-12-28 RX ORDER — PROPAFENONE HYDROCHLORIDE 150 MG/1
150 TABLET, FILM COATED ORAL EVERY 8 HOURS SCHEDULED
Status: DISCONTINUED | OUTPATIENT
Start: 2021-12-28 | End: 2021-12-29

## 2021-12-28 RX ORDER — ACETAMINOPHEN 650 MG/1
650 SUPPOSITORY RECTAL EVERY 6 HOURS PRN
Status: DISCONTINUED | OUTPATIENT
Start: 2021-12-28 | End: 2021-12-31 | Stop reason: HOSPADM

## 2021-12-28 RX ORDER — PREGABALIN 100 MG/1
100 CAPSULE ORAL 3 TIMES DAILY
Status: DISCONTINUED | OUTPATIENT
Start: 2021-12-28 | End: 2021-12-28

## 2021-12-28 RX ORDER — SEMAGLUTIDE 1.34 MG/ML
1 INJECTION, SOLUTION SUBCUTANEOUS WEEKLY
Status: ON HOLD | COMMUNITY
End: 2021-12-31 | Stop reason: HOSPADM

## 2021-12-28 RX ORDER — LOSARTAN POTASSIUM 50 MG/1
50 TABLET ORAL DAILY
Status: DISCONTINUED | OUTPATIENT
Start: 2021-12-28 | End: 2021-12-31 | Stop reason: HOSPADM

## 2021-12-28 RX ORDER — CLOTRIMAZOLE AND BETAMETHASONE DIPROPIONATE 10; .64 MG/G; MG/G
CREAM TOPICAL 2 TIMES DAILY
Status: DISCONTINUED | OUTPATIENT
Start: 2021-12-28 | End: 2021-12-31 | Stop reason: HOSPADM

## 2021-12-28 RX ORDER — ASPIRIN 81 MG/1
81 TABLET ORAL DAILY
Status: DISCONTINUED | OUTPATIENT
Start: 2021-12-29 | End: 2021-12-31 | Stop reason: HOSPADM

## 2021-12-28 RX ORDER — DEXTROSE MONOHYDRATE 25 G/50ML
12.5 INJECTION, SOLUTION INTRAVENOUS PRN
Status: DISCONTINUED | OUTPATIENT
Start: 2021-12-28 | End: 2021-12-31 | Stop reason: HOSPADM

## 2021-12-28 RX ORDER — SODIUM CHLORIDE 0.9 % (FLUSH) 0.9 %
5-40 SYRINGE (ML) INJECTION PRN
Status: DISCONTINUED | OUTPATIENT
Start: 2021-12-28 | End: 2021-12-31 | Stop reason: HOSPADM

## 2021-12-28 RX ORDER — SODIUM CHLORIDE 0.9 % (FLUSH) 0.9 %
5-40 SYRINGE (ML) INJECTION EVERY 12 HOURS SCHEDULED
Status: DISCONTINUED | OUTPATIENT
Start: 2021-12-28 | End: 2021-12-31 | Stop reason: HOSPADM

## 2021-12-28 RX ORDER — POTASSIUM CHLORIDE 20 MEQ/1
40 TABLET, EXTENDED RELEASE ORAL PRN
Status: DISCONTINUED | OUTPATIENT
Start: 2021-12-28 | End: 2021-12-31 | Stop reason: HOSPADM

## 2021-12-28 RX ORDER — ONDANSETRON 2 MG/ML
4 INJECTION INTRAMUSCULAR; INTRAVENOUS EVERY 6 HOURS PRN
Status: DISCONTINUED | OUTPATIENT
Start: 2021-12-28 | End: 2021-12-31 | Stop reason: HOSPADM

## 2021-12-28 RX ORDER — SODIUM CHLORIDE 9 MG/ML
25 INJECTION, SOLUTION INTRAVENOUS PRN
Status: DISCONTINUED | OUTPATIENT
Start: 2021-12-28 | End: 2021-12-31 | Stop reason: HOSPADM

## 2021-12-28 RX ORDER — FINASTERIDE 5 MG/1
TABLET, FILM COATED ORAL
Qty: 30 TABLET | Refills: 5 | Status: SHIPPED | OUTPATIENT
Start: 2021-12-28 | End: 2022-06-22

## 2021-12-28 RX ORDER — ATORVASTATIN CALCIUM 40 MG/1
40 TABLET, FILM COATED ORAL DAILY
Status: DISCONTINUED | OUTPATIENT
Start: 2021-12-29 | End: 2021-12-31 | Stop reason: HOSPADM

## 2021-12-28 RX ORDER — GEMFIBROZIL 600 MG/1
600 TABLET, FILM COATED ORAL DAILY
Status: DISCONTINUED | OUTPATIENT
Start: 2021-12-28 | End: 2021-12-31 | Stop reason: HOSPADM

## 2021-12-28 RX ORDER — BUDESONIDE AND FORMOTEROL FUMARATE DIHYDRATE 160; 4.5 UG/1; UG/1
2 AEROSOL RESPIRATORY (INHALATION) 2 TIMES DAILY
Status: DISCONTINUED | OUTPATIENT
Start: 2021-12-28 | End: 2021-12-31 | Stop reason: HOSPADM

## 2021-12-28 RX ORDER — ASPIRIN 325 MG
325 TABLET ORAL ONCE
Status: COMPLETED | OUTPATIENT
Start: 2021-12-28 | End: 2021-12-28

## 2021-12-28 RX ORDER — ONDANSETRON 4 MG/1
4 TABLET, ORALLY DISINTEGRATING ORAL EVERY 8 HOURS PRN
Status: DISCONTINUED | OUTPATIENT
Start: 2021-12-28 | End: 2021-12-31 | Stop reason: HOSPADM

## 2021-12-28 RX ORDER — INSULIN GLARGINE 100 [IU]/ML
110 INJECTION, SOLUTION SUBCUTANEOUS DAILY
Status: DISCONTINUED | OUTPATIENT
Start: 2021-12-29 | End: 2021-12-30

## 2021-12-28 RX ORDER — POTASSIUM CHLORIDE 7.45 MG/ML
10 INJECTION INTRAVENOUS PRN
Status: DISCONTINUED | OUTPATIENT
Start: 2021-12-28 | End: 2021-12-31 | Stop reason: HOSPADM

## 2021-12-28 RX ORDER — LEVOTHYROXINE SODIUM 0.15 MG/1
150 TABLET ORAL DAILY
Status: DISCONTINUED | OUTPATIENT
Start: 2021-12-29 | End: 2021-12-28

## 2021-12-28 RX ORDER — FINASTERIDE 5 MG/1
5 TABLET, FILM COATED ORAL DAILY
Status: DISCONTINUED | OUTPATIENT
Start: 2021-12-28 | End: 2021-12-31 | Stop reason: HOSPADM

## 2021-12-28 RX ORDER — FAMOTIDINE 20 MG/1
20 TABLET, FILM COATED ORAL 2 TIMES DAILY
Status: DISCONTINUED | OUTPATIENT
Start: 2021-12-28 | End: 2021-12-31 | Stop reason: HOSPADM

## 2021-12-28 RX ORDER — POLYETHYLENE GLYCOL 3350 17 G/17G
17 POWDER, FOR SOLUTION ORAL DAILY PRN
Status: DISCONTINUED | OUTPATIENT
Start: 2021-12-28 | End: 2021-12-31 | Stop reason: HOSPADM

## 2021-12-28 RX ADMIN — LOSARTAN POTASSIUM 50 MG: 50 TABLET, FILM COATED ORAL at 18:07

## 2021-12-28 RX ADMIN — SODIUM CHLORIDE, PRESERVATIVE FREE 10 ML: 5 INJECTION INTRAVENOUS at 22:29

## 2021-12-28 RX ADMIN — ASPIRIN 325 MG ORAL TABLET 325 MG: 325 PILL ORAL at 15:14

## 2021-12-28 RX ADMIN — FAMOTIDINE 20 MG: 20 TABLET ORAL at 22:24

## 2021-12-28 RX ADMIN — GEMFIBROZIL 600 MG: 600 TABLET ORAL at 18:07

## 2021-12-28 RX ADMIN — BUDESONIDE AND FORMOTEROL FUMARATE DIHYDRATE 2 PUFF: 160; 4.5 AEROSOL RESPIRATORY (INHALATION) at 19:13

## 2021-12-28 RX ADMIN — APIXABAN 5 MG: 5 TABLET, FILM COATED ORAL at 22:25

## 2021-12-28 RX ADMIN — PROPAFENONE HYDROCHLORIDE 150 MG: 150 TABLET, COATED ORAL at 22:25

## 2021-12-28 RX ADMIN — TAMSULOSIN HYDROCHLORIDE 0.4 MG: 0.4 CAPSULE ORAL at 22:25

## 2021-12-28 RX ADMIN — METOPROLOL TARTRATE 25 MG: 25 TABLET, FILM COATED ORAL at 22:25

## 2021-12-28 RX ADMIN — FINASTERIDE 5 MG: 5 TABLET, FILM COATED ORAL at 18:07

## 2021-12-28 RX ADMIN — INSULIN LISPRO 12 UNITS: 100 INJECTION, SOLUTION INTRAVENOUS; SUBCUTANEOUS at 19:12

## 2021-12-28 ASSESSMENT — ENCOUNTER SYMPTOMS
SHORTNESS OF BREATH: 1
COUGH: 1

## 2021-12-28 ASSESSMENT — PAIN SCALES - GENERAL: PAINLEVEL_OUTOF10: 0

## 2021-12-28 ASSESSMENT — HEART SCORE: ECG: 1

## 2021-12-28 NOTE — ED NOTES
Report given to [facility], RN from ER. Report method BY EDUARDO:[id number]}   The following was reviewed with receiving RN:   Current vital signs:  /74   Pulse 80   Temp 97.6 °F (36.4 °C) (Temporal)   Resp 17   Ht 6' 4\" (1.93 m)   Wt 280 lb (127 kg)   SpO2 97%   BMI 34.08 kg/m²                MEWS Score: 1     Any medication or safety alerts were reviewed. Any pending diagnostics and notifications were also reviewed, as well as any safety concerns or issues, abnormal labs, abnormal imaging, and abnormal assessment findings. Questions were answered.             Kemi Markham RN  12/28/21 [id number]

## 2021-12-28 NOTE — H&P
[address] [place] [address]     HISTORY AND PHYSICAL EXAMINATION            Date:   12/28/2021  Patient name:  Darlene Franco  Date of admission:  12/28/2021 10:48 AM  MRN:   [id number]  Account:  [de-identified]  YOB: 1960  PCP:    Tawanna Robledo MD  Room:   8278/8660-68  Code Status:    Full Code    Chief Complaint:     Chief Complaint   Patient presents with    Shortness of Breath    Chest Pain    Sweats     History Obtained From:     patient, electronic medical record    History of Present Illness:     Orvil Skiff is a 51-year-old male with past medical history of A. fib, Charcot foot, obesity, COPD (quit smoking 2011, previously 6-8 cigars daily x20 years), suspected sleep apnea, bilateral carotid artery stenosis (16-49%), insulin-dependent diabetes mellitus, neuropathy, use of prosthetic leg status post left BKA, HLD, OA, HTN, hypothyroidism status post radioiodine treatment for hyperthyroidism, hx of kidney tumor s/p removal, and grade I diastolic heart dysfunction, who presents to [facility] ED on 12/28 complaining of worsening episodes of shortness of breath, diaphoresis, and dizziness. Symptoms associated with intermittent chest pain. Symptoms worsen with exertion and also around 6 PM.  Sweating episodes happen randomly; patient reports sweating even in cold weather and enough to drench through his shirt. Admits to leg swelling. Symptom onset was about 6 months ago but acutely worsened with increased frequency and intensity in past few days. Stress test 5/2021 was negative & showing EF 60%. Patient saw PCP, Dr. Marychuy Schultz, on 12/14 for similar symptoms and was referred for outpatient cardiac cath. Patient does follow with cardiology but next appointment available is not until 01/20/22.   Patient has been in contact with PCP office abnormalities 09/2019    LEFT NO VISION    Vitreous hemorrhage of left eye due to diabetes mellitus ([address] 09/2019    s/p Vitrectomy 9/2019    Wears glasses     Wears partial dentures     full upper, does not wear lower partial    Wellness examination     Dr. Jordyn Vasquez seen within last 1 1/2 mos        Past SurgicalHistory:     Past Surgical History:   Procedure Laterality Date    CARDIAC CATHETERIZATION      no stenting    CARDIOVASCULAR STRESS TEST  06/28/2019    small fixed apical, likely normal, EF 48%    COLONOSCOPY  06/17/2016    poor prep, done up to the IC valve, redundant colon    COLONOSCOPY  01/23/2017    VIRTUAL COLONOSCOPY DONE-no polyps or masses    CYSTOSCOPY Bilateral 6/16/2020    CYSTOSCOPY RETROGRADE PYELOGRAM URETEROSCOPY performed by Kathe Vargas MD at [place] [address] Right 7/30/2020    CYSTOSCOPY, RIGHT RETROGRADE PYELOGRAM,  URETERAL CATHETER  INSERTION performed by Kathe Vargas MD at [address] [place] [address] 9/1/2020    CYSTOSCOPY RETROGRADE PYELOGRAM, RIGHT URETERAL STENT EXCHANGE performed by Kathe Vargas MD at [facility] Right     r-bone removed    HC  PICC [facility]  5/21/2018         KIDNEY CYST REMOVAL      KIDNEY SURGERY Right 11/13/2019    XI ROBOTIC PARTIAL NEPHRECTOMY, INTRAOP ULTRASOUND, RIGHT URETEROURETEROSTOMY, RIGHT URETERAL STENT PLACEMENT **SHORT STAY** performed by Kathe Vargas MD at [place] [address] 7/30/2020    XI LAPAROSCOPIC ROBOTIC URETEROLYSIS, ROBOTIC ASSISTED BUCCAL URETEROPLASTY  (DR. COBIAN TO ASSIST) performed by Kathe Vargas MD at [address] [place] [address] Left 4/29/15    OTHER SURGICAL HISTORY Left 5-5-15 and 11/2015    Revision BKA    OTHER SURGICAL HISTORY      left stump revision 5/30/2018    MI RE-AMPUTATION LOWER LEG Left 5/30/2018    LEG AMPUTATION BELOW KNEE REVISION [facility] [place] [address] [facility]  TEMPOROMANDIBULAR JOINT SURGERY Bilateral 80'S    TOE AMPUTATION      Right 2 and 4    VITRECTOMY Left 9/25/2019    PARS PLANA VITRECTOMY 25 GAUGE, MEMBRANE PEELING, ENDOLASER 200  MW 1044 SPOTS, INDIRECT LASER 236 SPOTS performed by Rudy Elizabeth MD at [place] [address]        Medications Prior to Admission:     Prior to Admission medications    Medication Sig Start Date End Date Taking?  Authorizing Provider   finasteride (PROSCAR) 5 MG tablet TAKE 1 TABLET BY MOUTH EVERY DAY 12/28/21  Yes Tawanna Robledo MD   isosorbide mononitrate (IMDUR) 30 MG extended release tablet TAKE 1 TABLET BY MOUTH EVERY DAY 12/28/21  Yes Tawanna Robledo MD   Semaglutide, 1 MG/DOSE, (OZEMPIC, 1 MG/DOSE,) 4 MG/3ML SOPN Inject 1 mg into the skin once a week   Yes Historical Provider, MD   budesonide-formoterol (SYMBICORT) 160-4.5 MCG/ACT AERO TAKE 2 PUFFS BY MOUTH TWICE A DAY 12/14/21  Yes Tawanna Robledo MD   aspirin EC 81 MG EC tablet Take 1 tablet by mouth daily 12/14/21  Yes Tawanna Robledo MD   tamsulosin (FLOMAX) 0.4 MG capsule TAKE 1 CAPSULE BY MOUTH EVERY DAY 10/12/21  Yes Tawanna Robledo MD   losartan (COZAAR) 50 MG tablet TAKE 1 TABLET BY MOUTH EVERY DAY 9/29/21  Yes Tawanna Robledo MD   metoprolol tartrate (LOPRESSOR) 25 MG tablet TAKE 1 TABLET BY MOUTH TWICE A DAY 8/30/21  Yes Tawanna Robledo MD   levothyroxine (SYNTHROID) 150 MCG tablet TAKE 1 TABLET BY MOUTH EVERY DAY 8/13/21  Yes Tawanna Robledo MD   Insulin Glargine, 2 Unit Dial, (TOUJEO MAX SOLOSTAR) 300 UNIT/ML SOPN Inject 110 Units into the skin daily 8/10/21  Yes Tawanna Robledo MD   clotrimazole-betamethasone (LOTRISONE) 1-0.05 % cream APPLY TO AFFECTED AREA TWICE A DAY 7/23/21  Yes Tawanna Robledo MD   ELIQUIS 5 MG TABS tablet TAKE 1 TABLET BY MOUTH TWICE A DAY 7/9/21  Yes Tawanna Robledo MD   insulin lispro (HUMALOG) 100 UNIT/ML injection vial INJECT 12 UNITS UNDER THE SKIN 3 TIMES DAILY + SLIDING SCALE (TAKE 5 UNITS IF SUGAR IS OVER 150) 5/12/21  Yes Garfield Tovar MD   rOPINIRole (REQUIP) 2 MG tablet TAKE 1 TABLET BY MOUTH EVERY DAY 4/9/21  Yes Garfield Tovar MD   gemfibrozil (LOPID) 600 MG tablet TAKE 1 TABLET BY MOUTH EVERY DAY 3/4/21  Yes Garfield Tovar MD   atorvastatin (LIPITOR) 40 MG tablet TAKE 1 TABLET BY MOUTH EVERY DAY 3/2/21  Yes Garfield Tovar MD   albuterol sulfate  (90 Base) MCG/ACT inhaler INHALE 2 PUFFS INTO THE LUNGS EVERY 6 HOURS AS NEEDED FOR WHEEZING OR SHORTNESS OF BREATH 1/18/21  Yes Garfield Tovar MD   pregabalin (LYRICA) 100 MG capsule TAKE 1 CAPSULE BY MOUTH THREE TIMES A DAY 10/12/20 12/28/21 Yes Garfield Tovar MD   glucagon, rDNA, 1 MG injection Inject 1 mL into the muscle once as needed for Low blood sugar  11/13/19  Yes Historical Provider, MD   propafenone (RYTHMOL) 150 MG tablet Take 1 tablet by mouth every 8 hours 11/17/19  Yes Jackson Huynh MD   Omega-3 Fatty Acids (FISH OIL CONCENTRATE) 300 MG CAPS Take 300 mg by mouth nightly    Yes Rhonda Hernandez MD   Glucosamine Sulfate 500 MG TABS Take 500 mg by mouth 3 times daily   Yes Johnny Kapoor MD   B-D UF III MINI PEN NEEDLES 31G X 5 MM MISC USE AS DIRECTED ONCE DAILY TO INJECT TOUJEO 11/16/21   Garfield Tovar MD   Continuous Blood Gluc  (DEXCOM G6 ) DOUGIE USE AS DIRECTED TO MONITOR BLOOD SUGAR 5/20/21   Historical Provider, MD   blood glucose monitor strips T/S Contour. Test 4 times a day 5/10/21   Garfield Tovar MD   Blood Pressure KIT 1 actuation by Does not apply route once a week 3/25/20   Manuel Vogt MD   Handicap Placard MISC by Does not apply route Duration - 5years  Reason- prosthetic leg 1/2/20   Manuel Vogt MD        Allergies:     Ramipril and Adhesive tape    Social History:     Tobacco:    reports that he quit smoking about 10 years ago. His smoking use included cigars. He started smoking about 44 years ago. He has a 50.00 pack-year smoking history.  He has never used smokeless tobacco.  Alcohol:      reports current alcohol use. Drug Use:  reports previous drug use. Drug: Marijuana Doni Jayme). Family History:     Family History   Problem Relation Age of Onset    Diabetes Mother     Hypertension Mother     Stomach Cancer Mother     Hypertension Father     Alcohol Abuse Father     COPD Father     Kidney Cancer Father     Diabetes Brother         IDDM-PUMP    Other Sister         BRAIN TUMOR       Review of Systems:     Positive and Negative as described in HPI. Review of Systems   Constitutional: Positive for appetite change (decreased) and diaphoresis. Negative for unexpected weight change. HENT: Positive for rhinorrhea (last few days). Negative for congestion and sore throat. Eyes: Negative for visual disturbance. Respiratory: Positive for shortness of breath (at rest and with exertion) and wheezing. Negative for cough. Cardiovascular: Positive for chest pain (intermittent, no radiation, at 4 of 10 in intensity, sharp, usually lasts 5-10 seconds), palpitations (with diaphoresis episodes) and leg swelling. Gastrointestinal: Negative for abdominal pain, nausea and vomiting. Endocrine: Negative for cold intolerance and heat intolerance. Genitourinary: Negative for difficulty urinating, dysuria and hematuria. Musculoskeletal: Positive for arthralgias (chronic) and back pain (chronic, follows pain clinic). Neurological: Positive for dizziness and light-headedness. Negative for syncope and weakness. Psychiatric/Behavioral: Negative for confusion. The patient is not nervous/anxious.         Physical Exam:   /80   Pulse 80   Temp 97.5 °F (36.4 °C) (Oral)   Resp 16   Ht 6' 4\" (1.93 m)   Wt 281 lb 12 oz (127.8 kg)   SpO2 97%   BMI 34.30 kg/m²   Temp (24hrs), Av.6 °F (36.4 °C), Min:97.5 °F (36.4 °C), Max:97.6 °F (36.4 °C)    Recent Labs     21  1759   POCGLU 122*     No intake or output data in the 24 hours ending 21 1848  Vitals: 12/28/21 1515 12/28/21 1719 12/28/21 1802 12/28/21 1815   BP: 129/85 126/74 136/80    Pulse: 80 80 80    Resp: 12 17 16    Temp:   97.5 °F (36.4 °C)    TempSrc:   Oral    SpO2: 97% 97% 97%    Weight:    281 lb 12 oz (127.8 kg)   Height:             Physical Exam  Constitutional:       General: He is not in acute distress. Appearance: Normal appearance. He is not diaphoretic. HENT:      Head:      Comments: Wearing hat     Nose:      Comments: Wearing face mask over nose     Mouth/Throat:      Comments: Wearing face mask over mouth  Eyes:      General:         Right eye: No discharge. Left eye: No discharge. Conjunctiva/sclera: Conjunctivae normal.   Cardiovascular:      Rate and Rhythm: Normal rate and regular rhythm. Pulmonary:      Effort: Pulmonary effort is normal. No respiratory distress. Breath sounds: No wheezing or rales. Comments: Decreased breath sounds throughout  Abdominal:      General: Bowel sounds are normal. There is distension. Palpations: Abdomen is soft. Tenderness: There is no abdominal tenderness. There is no guarding. Musculoskeletal:         General: No tenderness. Right lower leg: Edema present. Comments: Left prosthetic leg in place   Skin:     General: Skin is warm and dry. Neurological:      Mental Status: He is alert.       Comments: oriented   Psychiatric:         Behavior: Behavior normal.         Investigations:     Laboratory Testing:  Recent Results (from the past 24 hour(s))   CBC Auto Differential    Collection Time: 12/28/21 12:45 PM   Result Value Ref Range    WBC 6.4 3.5 - 11.0 k/uL    RBC 4.44 (L) 4.5 - 5.9 m/uL    Hemoglobin 14.3 13.5 - 17.5 g/dL    Hematocrit 41.6 41 - 53 %    MCV 93.7 80 - 100 fL    MCH 32.3 26 - 34 pg    MCHC 34.5 31 - 37 g/dL    RDW 14.3 11.5 - 14.9 %    Platelets 346 968 - 044 k/uL    MPV 10.2 6.0 - 12.0 fL    NRBC Automated NOT REPORTED per 100 WBC    Differential Type NOT REPORTED     Seg Neutrophils 67 (H) 36 - 66 %    Lymphocytes 19 (L) 24 - 44 %    Monocytes 11 (H) 1 - 7 %    Eosinophils % 2 0 - 4 %    Basophils 1 0 - 2 %    Immature Granulocytes NOT REPORTED 0 %    Segs Absolute 4.20 1.3 - 9.1 k/uL    Absolute Lymph # 1.20 1.0 - 4.8 k/uL    Absolute Mono # 0.70 0.1 - 1.3 k/uL    Absolute Eos # 0.20 0.0 - 0.4 k/uL    Basophils Absolute 0.10 0.0 - 0.2 k/uL    Absolute Immature Granulocyte NOT REPORTED 0.00 - 0.30 k/uL    WBC Morphology NOT REPORTED     RBC Morphology NOT REPORTED     Platelet Estimate NOT REPORTED    Comprehensive Metabolic Panel    Collection Time: 12/28/21 12:45 PM   Result Value Ref Range    Glucose 219 (H) 70 - 99 mg/dL    BUN 23 8 - 23 mg/dL    CREATININE 1.24 (H) 0.70 - 1.20 mg/dL    Bun/Cre Ratio NOT REPORTED 9 - 20    Calcium 9.0 8.6 - 10.4 mg/dL    Sodium 143 135 - 144 mmol/L    Potassium 5.1 3.7 - 5.3 mmol/L    Chloride 108 (H) 98 - 107 mmol/L    CO2 24 20 - 31 mmol/L    Anion Gap 11 9 - 17 mmol/L    Alkaline Phosphatase 132 (H) 40 - 129 U/L    ALT 29 5 - 41 U/L    AST 26 <40 U/L    Total Bilirubin 0.43 0.3 - 1.2 mg/dL    Total Protein 6.9 6.4 - 8.3 g/dL    Albumin 4.1 3.5 - 5.2 g/dL    Albumin/Globulin Ratio NOT REPORTED 1.0 - 2.5    GFR Non- 59 (L) >60 mL/min    GFR African American >60 >60 mL/min    GFR Comment          GFR Staging NOT REPORTED    COVID-19, Rapid    Collection Time: 12/28/21 12:45 PM    Specimen: Nasopharyngeal Swab   Result Value Ref Range    Specimen Description . NASOPHARYNGEAL SWAB     SARS-CoV-2, Rapid Not Detected Not Detected   Troponin    Collection Time: 12/28/21 12:45 PM   Result Value Ref Range    Troponin, High Sensitivity 54 (HH) 0 - 22 ng/L    Troponin T NOT REPORTED <0.03 ng/mL    Troponin Interp NOT REPORTED    Brain Natriuretic Peptide    Collection Time: 12/28/21 12:45 PM   Result Value Ref Range    Pro- (H) <300 pg/mL    BNP Interpretation NOT REPORTED    Magnesium    Collection Time: 12/28/21 12:45 PM   Result Value Ref Range    Magnesium 1.9 1.6 - 2.6 mg/dL   Protime-INR    Collection Time: 12/28/21 12:45 PM   Result Value Ref Range    Protime 13.2 11.8 - 14.6 sec    INR 1.0    Troponin    Collection Time: 12/28/21  5:57 PM   Result Value Ref Range    Troponin, High Sensitivity 52 (HH) 0 - 22 ng/L    Troponin T NOT REPORTED <0.03 ng/mL    Troponin Interp NOT REPORTED    POC Glucose Fingerstick    Collection Time: 12/28/21  5:59 PM   Result Value Ref Range    POC Glucose 122 (H) 75 - 110 mg/dL       Imaging/Diagnostics:  XR CHEST PORTABLE    Result Date: 12/28/2021  EXAMINATION: ONE XRAY VIEW OF THE CHEST 12/28/2021 12:29 pm COMPARISON: CT lung screening from 03/02/2021, and previous chest x-ray from 11/26/2019 HISTORY: ORDERING SYSTEM PROVIDED HISTORY: dyspnea TECHNOLOGIST PROVIDED HISTORY: dyspnea Reason for Exam: dyspnea Overlying ECG monitor leads. Cardiac silhouette evident but WNL in size for AP technique. Mediastinal structures midline and unchanged. Bibasilar atelectatic appearing changes with persistent unchanged elevation right hemidiaphragm; no confluent pulmonary opacity or blunting of the costophrenic angles. No pneumothorax. Moderate DJD spine, unchanged. FINDINGS: No acute cardiopulmonary disease.        Assessment :      Primary Problem  Acute coronary syndrome [facility])    Active Hospital Problems    Diagnosis Date Noted    Chest pain [R07.9] 12/28/2021    Rule out acute coronary syndrome [I24.9] 12/28/2021    Chronic diastolic heart failure with preserved EF [I50.32] 12/28/2021    BPH  [N40.0] 12/28/2021    Employs prosthetic leg s/p left BKA [Z97.10] 12/28/2021    Atrial fibrillation  [I48.91] 07/17/2020    Acquired hypothyroidism [E03.9] 01/23/2019    Essential hypertension [I10] 05/18/2018    Pure hypercholesterolemia [E78.00] 12/10/2015    Type 2 diabetes mellitus with diabetic polyneuropathy, with long-term current use of insulin  [E11.42, Z79.4] 12/03/2015       Plan:     Patient status Admit as inpatient in the  Med/Surge    Rule out Acute Coronary Syndrome  Episodes of SOB, diaphoresis and dizziness   Intermittent mild sharp non radiating left sided chest pain  Risk factors: HTN, HLD, obesity, DM, hx b/l carotid artery stenosis  Negative stress test on 5/2021 with EF 60% (previously 48% on 6/2019 stress test)  OP referral for cardiac cath recommended by PCP  HS troponin 54  EKG showing no new changes per ED physician note   mg p.o. x1 dose given in ED  ASA 81 mg p.o. daily (home dose)  Telemetry  Trend troponins  Cardiology consulted    Possible CHF exacerbation, chronic diastolic HFpEF  SOB/diaphoresis/dizziness worse with exertion  LE edema on physical exam  Pro   Echo 11/2019: LV EF >55%, evidence of mild (grade I) distolic dysfunction, tricuspid regurgitation  Repeat echo  Metoprolol tartrate 25 mg p.o. twice daily (home dose)    Acquired Hypothyroidism  S/p radioiodine treatment for hyperthyroidism  C/o palpitations  TSH 15.81 & Free T4 0.97 on 12/14  Increase Synthroid dose to 175 mcg p.o. daily (from home dose of 150 mcg)      Comorbid conditions  HLD: Atorvastatin 40 mg p.o. daily, gemfibrozil 600 mg p.o. daily  Afib: Apixaban 5 mg p.o. twice daily, propafenone 150 mg 1 p.o. 3 times daily,  DM: Insulin glargine 110 units SQ daily, insulin lispro 12 units SQ 3 times daily with meals, medium dose insulin sliding scale, POCT glucose, hypoglycemia protocol  Anginal equivalent: Isosorbide mononitrate 30 mg p.o. daily  HTN: Current 50 mg p.o. daily  BPH: Finasteride 5 mg p.o. daily, tamsulosin 0.4 mg p.o. nightly, follows urologist  COPD: Albuterol sulfate inhaler as needed, budesonide-formoterol inhaler twice daily,  Prosthetic leg use status post left BKA: Clotrimazole-betamethasone cream twice daily, ropinirole 2 mg p.o. daily, PT/OT while inpatient    Code: Full  DVT prophylaxis: Already anticoagulated on Eliquis  GI prophylaxis: Famotidine 20 mg p.o. twice daily  Diet: cardiac & diabetic diet, NPO at midnight  Activity: Up as tolerated  Dispo: Admit to med/surg for cardiac work-up      Please note: Use of a speech recognition software was used in the creation of portions of this note and dictation errors, including those of syntax and sound alike word substitutions, may have escaped proofreading. Consultations:   IP CONSULT TO INTERNAL MEDICINE  IP CONSULT TO CARDIOLOGY  IP CONSULT TO SOCIAL WORK    Patient is admitted as inpatient status because of co-morbiditieslisted above, severity of signs and symptoms as outlined, requirement for current medical therapies and most importantly because of direct risk to patient if care not provided in a hospital setting. Stefan Quach DO  12/28/2021  6:48 PM    Copy sent to Dr. Reba Monroy MD    Attending Physician Statement  I have discussed the care of [facility] and I have examined the patient myselft and taken ros and hpi , including pertinent history and exam findings,  with the resident. I have reviewed the key elements of all parts of the encounter with the resident. I agree with the assessment, plan and orders as documented by the resident.       Electronically signed by Reba Monroy MD

## 2021-12-28 NOTE — PROGRESS NOTES
Dr Matt Tellez notified of new consult. Ensure patient is on list. No new orders at this time, depending on results of repeat troponin.

## 2021-12-28 NOTE — ED PROVIDER NOTES
EMERGENCY DEPARTMENT ENCOUNTER    Pt Name: Geetha Jonas  MRN: [id number]  [place] 1960  Date of evaluation: 12/28/21  CHIEF COMPLAINT       Chief Complaint   Patient presents with    Shortness of Breath    Chest Pain    Sweats     HISTORY OF PRESENT ILLNESS     Shortness of Breath  Severity:  Moderate  Onset quality:  Gradual  Duration:  1 week  Timing:  Constant  Progression:  Worsening  Chronicity:  New  Context: activity    Relieved by:  Rest  Worsened by:  Exertion  Ineffective treatments:  None tried  Associated symptoms: chest pain, cough and diaphoresis    chest pains, inconsistent  Fully vaccinated with booster also        REVIEW OF SYSTEMS     Review of Systems   Constitutional: Positive for diaphoresis and fatigue. Respiratory: Positive for cough and shortness of breath. Cardiovascular: Positive for chest pain. All other systems reviewed and are negative.     PASTMEDICAL HISTORY     Past Medical History:   Diagnosis Date    A-fib [facility])     Asymptomatic bilateral carotid artery stenosis     Boil, thigh 10/2019    10/30/19: right inner thigh region, says PCP Rxd Doxy for this, area is much better, has a couple days left to complete Doxy    CAD (coronary artery disease)     [facility] Steve Hendrix [facility]     Charcot foot due to diabetes mellitus ([address] 2014    LEFT    COPD (chronic obstructive pulmonary disease) ([address] 2009    INHALER USE DAILY    Diabetes mellitus ([address] 1989    IDDM, On Insulin Dr. Nicolle Borrero Diabetic neuropathy ([address]     Difficult intravenous access     VEINS ROLL    Employs prosthetic leg     s/p left BKA    Foot ulcer ([address] PRIOR TO 2015    BILAT    Full dentures     UPPER ONLY    Hyperlipidemia 2004    ON RX    Hypertension 2004    ON RX, PCP Dr. Wendie Vasquez, seen Oct. 2019    Hypoglycemia 4/2/2015    MRSA (methicillin resistant staph aureus) culture positive 4/16/2015    ankle    OA (osteoarthritis)     Osteomyelitis ([address]     left stump  BKA    [facility]     Renal mass 09/2019    ACCIDENTAL  FINDING IS FOLLOWING UP WITH KIDNEY DR Skinny Ledezma     Suspected sleep apnea     Thyroid disease 2004    PT HAD HYPERTHYROIDISM-UNCONTROLED THYROID DESTROYED WITH RADI IODINE NOW HAS HYPOTHYRIDISM    Vision abnormalities 09/2019    LEFT NO VISION    Vitreous hemorrhage of left eye due to diabetes mellitus ([facility] 09/2019    s/p Vitrectomy 9/2019    Wears glasses     Wears partial dentures     full upper, does not wear lower partial    Wellness examination     Dr. Alissa Melendez seen within last 1 1/2 mos     Past Problem List  Patient Active Problem List   Diagnosis Code    Anemia D64.9    Neuropathy in diabetes ([facility] E11.40    Charcot ankle M14.679    PVD (peripheral vascular disease) ([facility] I73.9    Type 2 diabetes mellitus with diabetic polyneuropathy ([facility] E11.42    Pure hypercholesterolemia E78.00    Constipation K59.00    PSA elevation R97.20    CAD (coronary artery disease) I25.10    Chronic pain of left knee M25.562, G89.29    Essential hypertension I10    Olecranon bursitis of right elbow M70.21    Acquired hypothyroidism E03.9    Carotid stenosis, asymptomatic, bilateral I65.23    Right renal mass F25.14    Metabolic acidosis, normal anion gap (NAG) E87.2    Hypotension due to drugs I95.2    Hydronephrosis of right kidney N13.30    Idiopathic hypotension I95.0    Status post below-knee amputation of left lower extremity (HCC) Z89.512    Atrial fibrillation ([facility]) I48.91    Ureteral stricture, right N13.5    Dizziness R42    Dermatophytoses B35.9    Acute bilateral low back pain without sciatica M54.50    Malignant neoplasm of right kidney (HCC) C64.1    Spinal stenosis of lumbar region with neurogenic claudication M48.062    Other chest pain R07.89    Spinal stenosis of lumbar region without neurogenic claudication M48.061    Lumbar radiculopathy M54.16    Chest pain R07.9     SURGICAL HISTORY       Past Surgical History:   Procedure Laterality Date    CARDIAC CATHETERIZATION      no stenting    CARDIOVASCULAR STRESS TEST  06/28/2019    small fixed apical, likely normal, EF 48%    COLONOSCOPY  06/17/2016    poor prep, done up to the IC valve, redundant colon    COLONOSCOPY  01/23/2017    VIRTUAL COLONOSCOPY DONE-no polyps or masses    CYSTOSCOPY Bilateral 6/16/2020    CYSTOSCOPY RETROGRADE PYELOGRAM URETEROSCOPY performed by Belen Vargas MD at [address] [facility]. Right 7/30/2020    CYSTOSCOPY, RIGHT RETROGRADE PYELOGRAM,  URETERAL CATHETER  INSERTION performed by Belen Vargas MD at [address] [facility] 9/1/2020    CYSTOSCOPY RETROGRADE PYELOGRAM, RIGHT URETERAL STENT EXCHANGE performed by Belen Vargas MD at [facility] Right     r-bone removed    HC  PICC [facility] DOUBLE  5/21/2018         KIDNEY CYST REMOVAL      KIDNEY SURGERY Right 11/13/2019    XI ROBOTIC PARTIAL NEPHRECTOMY, INTRAOP ULTRASOUND, RIGHT URETEROURETEROSTOMY, RIGHT URETERAL STENT PLACEMENT **SHORT STAY** performed by Belen Vargas MD at [address] N/A 7/30/2020    XI LAPAROSCOPIC ROBOTIC URETEROLYSIS, ROBOTIC ASSISTED BUCCAL URETEROPLASTY  (DR. COBIAN TO ASSIST) performed by Belen Vargas MD at [address] Left 4/29/15    OTHER SURGICAL HISTORY Left 5-5-15 and 11/2015    Revision BKA    OTHER SURGICAL HISTORY      left stump revision 5/30/2018    OR RE-AMPUTATION LOWER LEG Left 5/30/2018    LEG AMPUTATION BELOW KNEE REVISION performed by Alysia Milton MD at [address] Bilateral 80'S    TOE AMPUTATION      Right 2 and 4    VITRECTOMY Left 9/25/2019    PARS PLANA VITRECTOMY 25 GAUGE, MEMBRANE PEELING, ENDOLASER 200  MW 1044 SPOTS, INDIRECT LASER 236 SPOTS performed by hSin Hurst MD at [facility]       Previous Medications    ALBUTEROL SULFATE  (90 BASE) MCG/ACT INHALER    INHALE 2 PUFFS INTO THE LUNGS EVERY 6 HOURS AS NEEDED FOR WHEEZING OR SHORTNESS OF BREATH    ASPIRIN EC 81 MG EC TABLET    Take 1 tablet by mouth daily    ATORVASTATIN (LIPITOR) 40 MG TABLET    TAKE 1 TABLET BY MOUTH EVERY DAY    B-D UF III MINI PEN NEEDLES 31G X 5 MM MISC    USE AS DIRECTED ONCE DAILY TO INJECT [employer]    BLOOD GLUCOSE MONITOR STRIPS    T/S Contour.  Test 4 times a day    BLOOD PRESSURE KIT    1 actuation by Does not apply route once a week    BUDESONIDE-FORMOTEROL (SYMBICORT) 160-4.5 MCG/ACT AERO    TAKE 2 PUFFS BY MOUTH TWICE A DAY    CLOTRIMAZOLE-BETAMETHASONE (LOTRISONE) 1-0.05 % CREAM    APPLY TO AFFECTED AREA TWICE A DAY    CONTINUOUS BLOOD GLUC  (DEXCOM G6 ) DOUGIE    USE AS DIRECTED TO MONITOR BLOOD SUGAR    ELIQUIS 5 MG TABS TABLET    TAKE 1 TABLET BY MOUTH TWICE A DAY    GEMFIBROZIL (LOPID) 600 MG TABLET    TAKE 1 TABLET BY MOUTH EVERY DAY    GLUCAGON, RDNA, 1 MG INJECTION    Inject 1 mL into the muscle    GLUCOSAMINE SULFATE 500 MG TABS    Take 500 mg by mouth 3 times daily    HANDICAP PLACARD MISC    by Does not apply route Duration - 5years  Reason- prosthetic leg    INSULIN GLARGINE, 2 UNIT DIAL, (TOUJEO MAX SOLOSTAR) 300 UNIT/ML SOPN    Inject 110 Units into the skin daily    INSULIN LISPRO (HUMALOG) 100 UNIT/ML INJECTION VIAL    INJECT 12 UNITS UNDER THE SKIN 3 TIMES DAILY + SLIDING SCALE (TAKE 5 UNITS IF SUGAR IS OVER 150)    LEVOTHYROXINE (SYNTHROID) 150 MCG TABLET    TAKE 1 TABLET BY MOUTH EVERY DAY    LOSARTAN (COZAAR) 50 MG TABLET    TAKE 1 TABLET BY MOUTH EVERY DAY    METOPROLOL TARTRATE (LOPRESSOR) 25 MG TABLET    TAKE 1 TABLET BY MOUTH TWICE A DAY    OMEGA-3 FATTY ACIDS (FISH OIL CONCENTRATE) 300 MG CAPS    Take 300 mg by mouth nightly     OZEMPIC, 0.25 OR 0.5 MG/DOSE, 2 MG/1.5ML SOPN    INJECT 0.25MG ONCE WEEKLY FOR 4 WEEKS, THEN INCREASE TO 0.5MG ONCE WEEKLY    PREGABALIN (LYRICA) 100 MG CAPSULE    TAKE 1 CAPSULE BY Left eye: No discharge. Conjunctiva/sclera: Conjunctivae normal.      Pupils: Pupils are equal, round, and reactive to light. Neck:      Trachea: No tracheal deviation. Cardiovascular:      Rate and Rhythm: Normal rate and regular rhythm. Pulses: Normal pulses. Heart sounds: Normal heart sounds. Pulmonary:      Effort: Pulmonary effort is normal. No respiratory distress. Breath sounds: Normal breath sounds. No stridor. No wheezing or rales. Abdominal:      Palpations: Abdomen is soft. Tenderness: There is no abdominal tenderness. There is no guarding or rebound. Musculoskeletal:         General: No tenderness or deformity. Normal range of motion. Cervical back: Normal range of motion and neck supple. Skin:     General: Skin is warm and dry. Capillary Refill: Capillary refill takes less than 2 seconds. Findings: No erythema or rash. Neurological:      General: No focal deficit present. Mental Status: He is alert and oriented to person, place, and time. Cranial Nerves: No cranial nerve deficit. Coordination: Coordination normal.   Psychiatric:         Mood and Affect: Mood normal.         Behavior: Behavior normal.         Thought Content:  Thought content normal.         Judgment: Judgment normal.         MEDICAL DECISION MAKING:       ED Course as of 12/28/21 1532   Tue Dec 28, 2021   1449 HEART score 7  Giving aspirin  Admitting to pcp for eval [WM]   [address] Dr Megan Reyes for admit [WM]   [address] [place] residents for admit [WM]      ED Course User Index  [WM] Loki Cox MD    patient does not have a cardiologist  Do not suspect pe or ad    Procedures    DIAGNOSTIC RESULTS   EKG:All EKG's are interpreted by the Emergency Department Physician who either signs or Co-signs this chart in the absence of a cardiologist.  NSR, nonspecific changes, no acute ischemic changes on ST segments, no change compared to old, normal rate and normal intervals        RADIOLOGY:All plain film, CT, MRI, and formal ultrasound images (except ED bedside ultrasound) are read by the radiologist, see reports below, unless otherwisenoted in MDM or here. XR CHEST PORTABLE   Final Result        LABS: All lab results were reviewed by myself, and all abnormals are listed below. Labs Reviewed   CBC WITH AUTO DIFFERENTIAL - Abnormal; Notable for the following components:       Result Value    RBC 4.44 (*)     Seg Neutrophils 67 (*)     Lymphocytes 19 (*)     Monocytes 11 (*)     All other components within normal limits   COMPREHENSIVE METABOLIC PANEL - Abnormal; Notable for the following components:    Glucose 219 (*)     CREATININE 1.24 (*)     Chloride 108 (*)     Alkaline Phosphatase 132 (*)     GFR Non- 59 (*)     All other components within normal limits   TROPONIN - Abnormal; Notable for the following components:    Troponin, High Sensitivity 54 (*)     All other components within normal limits   BRAIN NATRIURETIC PEPTIDE - Abnormal; Notable for the following components:    Pro- (*)     All other components within normal limits   COVID-19, RAPID   MAGNESIUM   PROTIME-INR       EMERGENCY DEPARTMENTCOURSE:         Vitals:    Vitals:    12/28/21 1047 12/28/21 1515   BP: (!) 150/74 129/85   Pulse: 79 80   Resp: 20 12   Temp: 97.6 °F (36.4 °C)    TempSrc: Temporal    SpO2: 99% 97%   Weight: 280 lb (127 kg)    Height: 6' 4\" (1.93 m)        The patient was given the following medications while in the emergency department:  Orders Placed This Encounter   Medications    aspirin tablet 325 mg     CONSULTS:  Yahaira80 JUNIE Watson      1. Chest pain, unspecified type          DISPOSITION/PLAN   DISPOSITION Admitted 12/28/2021 03:23:37 PM      PATIENT REFERRED TO:  No follow-up provider specified.   DISCHARGE MEDICATIONS:  New Prescriptions    No medications on file     The care is provided during an unprecedented national emergency due to the novel coronavirus, COVID 19.   Kevin Leiva MD  Attending Emergency Physician                    Kevin Leiva MD  12/28/21 [id number]

## 2021-12-28 NOTE — PROGRESS NOTES
Medication History completed:    New medications: none    Medications discontinued: none    Changes to dosing:   Ozempic changed to 1 mg weekly    Stated allergies: As listed    Other pertinent information: Medications confirmed with [facility].      Thank you,  Hollie Osgood, PharmD, BCPS  [phone]

## 2021-12-28 NOTE — TELEPHONE ENCOUNTER
Patients number is unavailable, Left detailed message on spouses phone advising to report to the ED.

## 2021-12-29 ENCOUNTER — APPOINTMENT (OUTPATIENT)
Dept: NON INVASIVE DIAGNOSTICS | Age: 61
DRG: 303 | End: 2021-12-29
Payer: COMMERCIAL

## 2021-12-29 LAB
ABSOLUTE EOS #: 0.2 K/UL (ref 0–0.4)
ABSOLUTE IMMATURE GRANULOCYTE: ABNORMAL K/UL (ref 0–0.3)
ABSOLUTE LYMPH #: 1 K/UL (ref 1–4.8)
ABSOLUTE MONO #: 0.7 K/UL (ref 0.1–1.3)
ANION GAP SERPL CALCULATED.3IONS-SCNC: 10 MMOL/L (ref 9–17)
BASOPHILS # BLD: 1 % (ref 0–2)
BASOPHILS ABSOLUTE: 0.1 K/UL (ref 0–0.2)
BUN BLDV-MCNC: 21 MG/DL (ref 8–23)
BUN/CREAT BLD: ABNORMAL (ref 9–20)
CALCIUM SERPL-MCNC: 9.1 MG/DL (ref 8.6–10.4)
CHLORIDE BLD-SCNC: 109 MMOL/L (ref 98–107)
CO2: 24 MMOL/L (ref 20–31)
CREAT SERPL-MCNC: 1.26 MG/DL (ref 0.7–1.2)
DIFFERENTIAL TYPE: ABNORMAL
EOSINOPHILS RELATIVE PERCENT: 3 % (ref 0–4)
GFR AFRICAN AMERICAN: >60 ML/MIN
GFR NON-AFRICAN AMERICAN: 58 ML/MIN
GFR SERPL CREATININE-BSD FRML MDRD: ABNORMAL ML/MIN/{1.73_M2}
GFR SERPL CREATININE-BSD FRML MDRD: ABNORMAL ML/MIN/{1.73_M2}
GLUCOSE BLD-MCNC: 108 MG/DL (ref 75–110)
GLUCOSE BLD-MCNC: 137 MG/DL (ref 70–99)
HCT VFR BLD CALC: 41.5 % (ref 41–53)
HEMOGLOBIN: 14.7 G/DL (ref 13.5–17.5)
IMMATURE GRANULOCYTES: ABNORMAL %
LV EF: 55 %
LVEF MODALITY: NORMAL
LYMPHOCYTES # BLD: 17 % (ref 24–44)
MAGNESIUM: 1.9 MG/DL (ref 1.6–2.6)
MCH RBC QN AUTO: 33 PG (ref 26–34)
MCHC RBC AUTO-ENTMCNC: 35.5 G/DL (ref 31–37)
MCV RBC AUTO: 93 FL (ref 80–100)
MONOCYTES # BLD: 11 % (ref 1–7)
NRBC AUTOMATED: ABNORMAL PER 100 WBC
PDW BLD-RTO: 14.4 % (ref 11.5–14.9)
PHOSPHORUS: 3.1 MG/DL (ref 2.5–4.5)
PLATELET # BLD: 154 K/UL (ref 150–450)
PLATELET ESTIMATE: ABNORMAL
PMV BLD AUTO: 9.8 FL (ref 6–12)
POTASSIUM SERPL-SCNC: 4.5 MMOL/L (ref 3.7–5.3)
RBC # BLD: 4.46 M/UL (ref 4.5–5.9)
RBC # BLD: ABNORMAL 10*6/UL
SEG NEUTROPHILS: 68 % (ref 36–66)
SEGMENTED NEUTROPHILS ABSOLUTE COUNT: 4.2 K/UL (ref 1.3–9.1)
SODIUM BLD-SCNC: 143 MMOL/L (ref 135–144)
TROPONIN INTERP: ABNORMAL
TROPONIN T: ABNORMAL NG/ML
TROPONIN, HIGH SENSITIVITY: 49 NG/L (ref 0–22)
WBC # BLD: 6.2 K/UL (ref 3.5–11)
WBC # BLD: ABNORMAL 10*3/UL

## 2021-12-29 PROCEDURE — 6370000000 HC RX 637 (ALT 250 FOR IP)

## 2021-12-29 PROCEDURE — 85025 COMPLETE CBC W/AUTO DIFF WBC: CPT

## 2021-12-29 PROCEDURE — 6370000000 HC RX 637 (ALT 250 FOR IP): Performed by: INTERNAL MEDICINE

## 2021-12-29 PROCEDURE — 82947 ASSAY GLUCOSE BLOOD QUANT: CPT

## 2021-12-29 PROCEDURE — 1200000000 HC SEMI PRIVATE

## 2021-12-29 PROCEDURE — 80048 BASIC METABOLIC PNL TOTAL CA: CPT

## 2021-12-29 PROCEDURE — 6360000004 HC RX CONTRAST MEDICATION: Performed by: INTERNAL MEDICINE

## 2021-12-29 PROCEDURE — 99232 SBSQ HOSP IP/OBS MODERATE 35: CPT | Performed by: INTERNAL MEDICINE

## 2021-12-29 PROCEDURE — 93005 ELECTROCARDIOGRAM TRACING: CPT | Performed by: INTERNAL MEDICINE

## 2021-12-29 PROCEDURE — 84484 ASSAY OF TROPONIN QUANT: CPT

## 2021-12-29 PROCEDURE — 94761 N-INVAS EAR/PLS OXIMETRY MLT: CPT

## 2021-12-29 PROCEDURE — C8929 TTE W OR WO FOL WCON,DOPPLER: HCPCS

## 2021-12-29 PROCEDURE — 84100 ASSAY OF PHOSPHORUS: CPT

## 2021-12-29 PROCEDURE — 2580000003 HC RX 258

## 2021-12-29 PROCEDURE — 94640 AIRWAY INHALATION TREATMENT: CPT

## 2021-12-29 PROCEDURE — 36415 COLL VENOUS BLD VENIPUNCTURE: CPT

## 2021-12-29 PROCEDURE — 83735 ASSAY OF MAGNESIUM: CPT

## 2021-12-29 RX ORDER — AMIODARONE HYDROCHLORIDE 200 MG/1
400 TABLET ORAL ONCE
Status: COMPLETED | OUTPATIENT
Start: 2021-12-29 | End: 2021-12-29

## 2021-12-29 RX ORDER — PREGABALIN 100 MG/1
100 CAPSULE ORAL 3 TIMES DAILY
Status: DISCONTINUED | OUTPATIENT
Start: 2021-12-29 | End: 2021-12-31 | Stop reason: HOSPADM

## 2021-12-29 RX ORDER — AMIODARONE HYDROCHLORIDE 200 MG/1
200 TABLET ORAL 2 TIMES DAILY
Status: DISCONTINUED | OUTPATIENT
Start: 2021-12-30 | End: 2021-12-31 | Stop reason: HOSPADM

## 2021-12-29 RX ADMIN — BUDESONIDE AND FORMOTEROL FUMARATE DIHYDRATE 2 PUFF: 160; 4.5 AEROSOL RESPIRATORY (INHALATION) at 10:21

## 2021-12-29 RX ADMIN — DEXTROSE 15 G: 15 GEL ORAL at 21:25

## 2021-12-29 RX ADMIN — PROPAFENONE HYDROCHLORIDE 150 MG: 150 TABLET, COATED ORAL at 16:09

## 2021-12-29 RX ADMIN — LEVOTHYROXINE SODIUM 175 MCG: 0.05 TABLET ORAL at 10:38

## 2021-12-29 RX ADMIN — SODIUM CHLORIDE, PRESERVATIVE FREE 10 ML: 5 INJECTION INTRAVENOUS at 09:33

## 2021-12-29 RX ADMIN — LOSARTAN POTASSIUM 50 MG: 50 TABLET, FILM COATED ORAL at 10:41

## 2021-12-29 RX ADMIN — FAMOTIDINE 20 MG: 20 TABLET ORAL at 10:38

## 2021-12-29 RX ADMIN — PERFLUTREN 2.2 MG: 6.52 INJECTION, SUSPENSION INTRAVENOUS at 11:48

## 2021-12-29 RX ADMIN — PREGABALIN 100 MG: 100 CAPSULE ORAL at 16:09

## 2021-12-29 RX ADMIN — METOPROLOL TARTRATE 25 MG: 25 TABLET, FILM COATED ORAL at 10:41

## 2021-12-29 RX ADMIN — ROPINIROLE HYDROCHLORIDE 2 MG: 2 TABLET, FILM COATED ORAL at 10:38

## 2021-12-29 RX ADMIN — INSULIN GLARGINE 110 UNITS: 100 INJECTION, SOLUTION SUBCUTANEOUS at 12:01

## 2021-12-29 RX ADMIN — FAMOTIDINE 20 MG: 20 TABLET ORAL at 21:25

## 2021-12-29 RX ADMIN — BUDESONIDE AND FORMOTEROL FUMARATE DIHYDRATE 2 PUFF: 160; 4.5 AEROSOL RESPIRATORY (INHALATION) at 20:36

## 2021-12-29 RX ADMIN — ASPIRIN 81 MG: 81 TABLET, COATED ORAL at 10:39

## 2021-12-29 RX ADMIN — SODIUM CHLORIDE, PRESERVATIVE FREE 10 ML: 5 INJECTION INTRAVENOUS at 21:28

## 2021-12-29 RX ADMIN — APIXABAN 5 MG: 5 TABLET, FILM COATED ORAL at 21:25

## 2021-12-29 RX ADMIN — TAMSULOSIN HYDROCHLORIDE 0.4 MG: 0.4 CAPSULE ORAL at 21:25

## 2021-12-29 RX ADMIN — GEMFIBROZIL 600 MG: 600 TABLET ORAL at 10:38

## 2021-12-29 RX ADMIN — FINASTERIDE 5 MG: 5 TABLET, FILM COATED ORAL at 10:38

## 2021-12-29 RX ADMIN — ATORVASTATIN CALCIUM 40 MG: 40 TABLET, FILM COATED ORAL at 10:38

## 2021-12-29 RX ADMIN — SODIUM CHLORIDE, PRESERVATIVE FREE 10 ML: 5 INJECTION INTRAVENOUS at 21:30

## 2021-12-29 RX ADMIN — AMIODARONE HYDROCHLORIDE 400 MG: 200 TABLET ORAL at 18:17

## 2021-12-29 RX ADMIN — METOPROLOL TARTRATE 25 MG: 25 TABLET, FILM COATED ORAL at 21:24

## 2021-12-29 RX ADMIN — PREGABALIN 100 MG: 100 CAPSULE ORAL at 21:25

## 2021-12-29 RX ADMIN — APIXABAN 5 MG: 5 TABLET, FILM COATED ORAL at 10:39

## 2021-12-29 RX ADMIN — ISOSORBIDE MONONITRATE 30 MG: 30 TABLET, EXTENDED RELEASE ORAL at 10:41

## 2021-12-29 RX ADMIN — PREGABALIN 100 MG: 100 CAPSULE ORAL at 11:01

## 2021-12-29 ASSESSMENT — ENCOUNTER SYMPTOMS
SHORTNESS OF BREATH: 1
RHINORRHEA: 1
NAUSEA: 0
COUGH: 0
RHINORRHEA: 0
ABDOMINAL PAIN: 0
WHEEZING: 1
VOMITING: 0
BACK PAIN: 1
PHOTOPHOBIA: 0
SORE THROAT: 0

## 2021-12-29 NOTE — PLAN OF CARE
Problem: Falls - Risk of:  Goal: Will remain free from falls  Description: Will remain free from falls  Outcome: Ongoing  Note: Patient remains free of incidence/ injury. Bed remains in low position. Side rails up x2. Problem: Skin Integrity:  Goal: Will show no infection signs and symptoms  Description: Will show no infection signs and symptoms  Outcome: Ongoing  Note: Patient displays no new signs of infection during this shift. Goal: Absence of new skin breakdown  Description: Absence of new skin breakdown  Outcome: Ongoing  Note: No new occurrence of skin breakdown noted during this shift. Problem: Pain:  Goal: Control of acute pain  Description: Control of acute pain  Outcome: Ongoing  Note: Patient expresses relief following administration of prn pain medication.

## 2021-12-29 NOTE — PLAN OF CARE
Problem: Falls - Risk of:  Goal: Will remain free from falls  Description: Will remain free from falls  Outcome: Ongoing  Note: Patient remains free of incidence/ injury. Bed remains in low position. Side rails up x2.

## 2021-12-29 NOTE — PROGRESS NOTES
Oral Daily    metoprolol tartrate  25 mg Oral BID    propafenone  150 mg Oral 3 times per day    rOPINIRole  2 mg Oral Daily    tamsulosin  0.4 mg Oral Nightly    sodium chloride flush  5-40 mL IntraVENous 2 times per day    levothyroxine  175 mcg Oral Daily    insulin lispro  12 Units SubCUTAneous TID WC    insulin lispro  0-12 Units SubCUTAneous TID     insulin lispro  0-6 Units SubCUTAneous Nightly    famotidine  20 mg Oral BID     Continuous Infusions:    sodium chloride      dextrose       PRN Meds: albuterol sulfate HFA, sodium chloride flush, sodium chloride, ondansetron **OR** ondansetron, polyethylene glycol, acetaminophen **OR** acetaminophen, potassium chloride **OR** potassium alternative oral replacement **OR** potassium chloride, magnesium sulfate, glucose, dextrose, glucagon (rDNA), dextrose    Data:     Past Medical History:   has a past medical history of [facility] ([facility] Asymptomatic bilateral carotid artery stenosis, Boil, thigh, CAD (coronary artery disease), Charcot foot due to diabetes mellitus ([facility] COPD (chronic obstructive pulmonary disease) ([facility] Diabetes mellitus ([facility] Diabetic neuropathy ([facility] Difficult intravenous access, Employs prosthetic leg, Foot ulcer ([facility] Full dentures, Hyperlipidemia, Hypertension, Hypoglycemia, MRSA (methicillin resistant staph aureus) culture positive, OA (osteoarthritis), Osteomyelitis ([facility] Paroxysmal atrial fibrillation ([facility] Renal mass, Snores, Suspected sleep apnea, Thyroid disease, Vision abnormalities, Vitreous hemorrhage of left eye due to diabetes mellitus ([facility] Wears glasses, Wears partial dentures, and Wellness examination. Social History:   reports that he quit smoking about 10 years ago. His smoking use included cigars. He started smoking about 44 years ago. He has a 50.00 pack-year smoking history. He has never used smokeless tobacco. He reports current alcohol use. He reports previous drug use. Drug: Marijuana Gelene Chasten). Family History:   Family History   Problem Relation Age of Onset    Diabetes Mother     Hypertension Mother     Stomach Cancer Mother     Hypertension Father     Alcohol Abuse Father     COPD Father     Kidney Cancer Father     Diabetes Brother         IDDM-PUMP    Other Sister         BRAIN TUMOR       Vitals:  /85   Pulse 72   Temp 97.5 °F (36.4 °C) (Oral)   Resp 16   Ht 6' 4\" (1.93 m)   Wt 281 lb 12 oz (127.8 kg)   SpO2 92%   BMI 34.30 kg/m²   Temp (24hrs), Av.5 °F (36.4 °C), Min:97.5 °F (36.4 °C), Max:97.6 °F (36.4 °C)    Recent Labs     21  1759 21  2216 21  0603   POCGLU 122* 90 108     Vitals:    21 1815 21 1913 21 2351 21 0706   BP:   115/79 139/85   Pulse:   78 72   Resp:  16 19 16   Temp:   97.5 °F (36.4 °C) 97.5 °F (36.4 °C)   TempSrc:    Oral   SpO2:  97% 95% 92%   Weight: 281 lb 12 oz (127.8 kg)      Height:           I/O(24Hr):   No intake or output data in the 24 hours ending 21 0757    Labs:  Recent Results (from the past 24 hour(s))   CBC Auto Differential    Collection Time: 21 12:45 PM   Result Value Ref Range    WBC 6.4 3.5 - 11.0 k/uL    RBC 4.44 (L) 4.5 - 5.9 m/uL    Hemoglobin 14.3 13.5 - 17.5 g/dL    Hematocrit 41.6 41 - 53 %    MCV 93.7 80 - 100 fL    MCH 32.3 26 - 34 pg    MCHC 34.5 31 - 37 g/dL    RDW 14.3 11.5 - 14.9 %    Platelets 961 603 - 484 k/uL    MPV 10.2 6.0 - 12.0 fL    NRBC Automated NOT REPORTED per 100 WBC    Differential Type NOT REPORTED     Seg Neutrophils 67 (H) 36 - 66 %    Lymphocytes 19 (L) 24 - 44 %    Monocytes 11 (H) 1 - 7 %    Eosinophils % 2 0 - 4 %    Basophils 1 0 - 2 %    Immature Granulocytes NOT REPORTED 0 %    Segs Absolute 4.20 1.3 - 9.1 k/uL    Absolute Lymph # 1.20 1.0 - 4.8 k/uL    Absolute Mono # 0.70 0.1 - 1.3 k/uL    Absolute Eos # 0.20 0.0 - 0.4 k/uL    Basophils Absolute 0.10 0.0 - 0.2 k/uL    Absolute Immature Granulocyte NOT REPORTED 0.00 - 0.30 k/uL    WBC Morphology NOT REPORTED     RBC Morphology NOT REPORTED     Platelet Estimate NOT REPORTED    Comprehensive Metabolic Panel    Collection Time: 12/28/21 12:45 PM   Result Value Ref Range    Glucose 219 (H) 70 - 99 mg/dL    BUN 23 8 - 23 mg/dL    CREATININE 1.24 (H) 0.70 - 1.20 mg/dL    Bun/Cre Ratio NOT REPORTED 9 - 20    Calcium 9.0 8.6 - 10.4 mg/dL    Sodium 143 135 - 144 mmol/L    Potassium 5.1 3.7 - 5.3 mmol/L    Chloride 108 (H) 98 - 107 mmol/L    CO2 24 20 - 31 mmol/L    Anion Gap 11 9 - 17 mmol/L    Alkaline Phosphatase 132 (H) 40 - 129 U/L    ALT 29 5 - 41 U/L    AST 26 <40 U/L    Total Bilirubin 0.43 0.3 - 1.2 mg/dL    Total Protein 6.9 6.4 - 8.3 g/dL    Albumin 4.1 3.5 - 5.2 g/dL    Albumin/Globulin Ratio NOT REPORTED 1.0 - 2.5    GFR Non- 59 (L) >60 mL/min    GFR African American >60 >60 mL/min    GFR Comment          GFR Staging NOT REPORTED    COVID-19, Rapid    Collection Time: 12/28/21 12:45 PM    Specimen: Nasopharyngeal Swab   Result Value Ref Range    Specimen Description . NASOPHARYNGEAL SWAB     SARS-CoV-2, Rapid Not Detected Not Detected   Troponin    Collection Time: 12/28/21 12:45 PM   Result Value Ref Range    Troponin, High Sensitivity 54 (HH) 0 - 22 ng/L    Troponin T NOT REPORTED <0.03 ng/mL    Troponin Interp NOT REPORTED    Brain Natriuretic Peptide    Collection Time: 12/28/21 12:45 PM   Result Value Ref Range    Pro- (H) <300 pg/mL    BNP Interpretation NOT REPORTED    Magnesium    Collection Time: 12/28/21 12:45 PM   Result Value Ref Range    Magnesium 1.9 1.6 - 2.6 mg/dL   Protime-INR    Collection Time: 12/28/21 12:45 PM   Result Value Ref Range    Protime 13.2 11.8 - 14.6 sec    INR 1.0    Troponin    Collection Time: 12/28/21  5:57 PM   Result Value Ref Range    Troponin, High Sensitivity 52 (HH) 0 - 22 ng/L    Troponin T NOT REPORTED <0.03 ng/mL    Troponin Interp NOT REPORTED    POC Glucose Fingerstick    Collection Time: 12/28/21  5:59 PM   Result Value Ref Range    POC Glucose 122 (H) 75 - 110 mg/dL   TROPONIN    Collection Time: 12/28/21  7:01 PM   Result Value Ref Range    Troponin, High Sensitivity 53 (HH) 0 - 22 ng/L    Troponin T NOT REPORTED <0.03 ng/mL    Troponin Interp NOT REPORTED    POC Glucose Fingerstick    Collection Time: 12/28/21 10:16 PM   Result Value Ref Range    POC Glucose 90 75 - 110 mg/dL   TROPONIN    Collection Time: 12/29/21  3:18 AM   Result Value Ref Range    Troponin, High Sensitivity 49 (H) 0 - 22 ng/L    Troponin T NOT REPORTED <0.03 ng/mL    Troponin Interp NOT REPORTED    POC Glucose Fingerstick    Collection Time: 12/29/21  6:03 AM   Result Value Ref Range    POC Glucose 108 75 - 110 mg/dL         Lab Results   Component Value Date/Time    SPECIAL NOT REPORTED 10/17/2020 08:22 AM     Lab Results   Component Value Date/Time    CULTURE KLEBSIELLA PNEUMONIAE >884302 CFU/ML (A) 10/17/2020 08:22 AM         Radiology:    No results found. Physical Examination:        Physical Exam  Constitutional:       General: He is not in acute distress. Appearance: Normal appearance. HENT:      Head: Normocephalic and atraumatic. Nose: Nose normal.   Eyes:      General: No scleral icterus. Conjunctiva/sclera: Conjunctivae normal.   Cardiovascular:      Rate and Rhythm: Normal rate and regular rhythm. Pulmonary:      Effort: Pulmonary effort is normal.      Breath sounds: No wheezing or rales. Abdominal:      Palpations: Abdomen is soft. Tenderness: There is no abdominal tenderness. Musculoskeletal:         General: No tenderness. Right lower leg: Edema present. Left lower leg: No edema. Neurological:      Mental Status: He is alert. Mental status is at baseline.    Psychiatric:         Mood and Affect: Mood normal.         Behavior: Behavior normal.         Assessment:        Primary Problem  Acute coronary syndrome [facility])    Active Hospital Problems    Diagnosis Date Noted    Chest pain [R07.9] 12/28/2021  Rule out acute coronary syndrome [I24.9] 12/28/2021    Chronic diastolic heart failure with preserved EF [I50.32] 12/28/2021    BPH  [N40.0] 12/28/2021    Employs prosthetic leg s/p left BKA [Z97.10] 12/28/2021    Atrial fibrillation  [I48.91] 07/17/2020    Acquired hypothyroidism [E03.9] 01/23/2019    Essential hypertension [I10] 05/18/2018    Pure hypercholesterolemia [E78.00] 12/10/2015    Type 2 diabetes mellitus with diabetic polyneuropathy, with long-term current use of insulin  [E11.42, Z79.4] 12/03/2015       Plan:        Rule out Acute Coronary Syndrome  Episodes of SOB, diaphoresis and dizziness with associated chest pain  Risk factors: HTN, HLD, obesity, DM, hx b/l carotid artery stenosis  Negative stress test on 5/2021 with EF 60% (previously 48% on 6/2019 stress test)  HS troponin 54 > 49  ASA 81 mg p.o. daily (home dose)  Telemetry  Trend troponins  Cardiology consulted; recommendations regarding need for cardiac cath appreciated  Hold Eliquis this a.m. and restart if no cardiac procedures planned     Possible CHF exacerbation, chronic diastolic HFpEF  SOB/diaphoresis/dizziness worse with exertion  LE edema on physical exam  Pro   Echo 11/2019: LV EF >55%, evidence of mild (grade I) distolic dysfunction, tricuspid regurgitation  Repeat echo pending  Metoprolol tartrate 25 mg p.o. twice daily (home dose)     Acquired Hypothyroidism  S/p radioiodine treatment for hyperthyroidism  C/o palpitations  TSH 15.81 & Free T4 0.97 on 12/14  Synthroid 175 mcg p.o. daily (increased 12/28 from home dose of 150 mcg)        Comorbid conditions  HLD: Atorvastatin 40 mg p.o. daily, gemfibrozil 600 mg p.o. daily  Afib: Apixaban 5 mg p.o. twice daily on HOLD this AM, propafenone 150 mg 1 p.o. 3 times daily,  DM with neuropathy: Insulin glargine 110 units SQ daily, insulin lispro 12 units SQ 3 times daily with meals, medium dose insulin sliding scale, POCT glucose, hypoglycemia protocol, pregambling 100 mg p.o. 3 times daily  Anginal equivalent: Isosorbide mononitrate 30 mg p.o. daily  HTN: Current 50 mg p.o. daily  BPH: Finasteride 5 mg p.o. daily, tamsulosin 0.4 mg p.o. nightly, follows urologist  COPD: Albuterol sulfate inhaler as needed, budesonide-formoterol inhaler twice daily,  Prosthetic leg use status post left BKA: Clotrimazole-betamethasone cream twice daily, ropinirole 2 mg p.o. daily, PT/OT while inpatient     Code: Full  DVT prophylaxis: SCDs, Eliquis on hold for possible cardiac procedure  GI prophylaxis: Famotidine 20 mg p.o. twice daily  Diet: cardiac & diabetic diet, NPO at midnight  Activity: Up as tolerated  Dispo: Admit to med/surg for cardiac work-up      Please note: Use of a speech recognition software was used in the creation of portions of this note and dictation errors, including those of syntax and sound alike word substitutions, may have escaped proofreading. Sheyla Rowley DO  12/29/2021  7:57 AM     Attending Physician Statement  I have discussed the care of Kari Sidhu and I have examined the patient myselft and taken ros and hpi , including pertinent history and exam findings,  with the resident. I have reviewed the key elements of all parts of the encounter with the resident. I agree with the assessment, plan and orders as documented by the resident.   Diaphoresis with exertion angina equivalent rule out multivessel disease high risk given uncontrolled diabetes history hyperlipidemia history of below-knee amputation will need a cardiac cath before discharge    Electronically signed by Pedro Luis Junior MD

## 2021-12-29 NOTE — PLAN OF CARE
Problem: Falls - Risk of:  Goal: Will remain free from falls  Description: Will remain free from falls  12/29/2021 0408 by Ursula Singh RN  Outcome: Ongoing  Note: Patient remains free of falls and injuries throughout shift. Bed remains in the lowest position, wheels locked, call light and bedside table are within reach. 12/28/2021 1924 by Kristofer Uriarte RN  Outcome: Ongoing  Note: Patient remains free of incidence/ injury. Bed remains in low position. Side rails up x2. Goal: Absence of physical injury  Description: Absence of physical injury  Outcome: Ongoing     Problem: Skin Integrity:  Goal: Will show no infection signs and symptoms  Description: Will show no infection signs and symptoms  Outcome: Ongoing  Goal: Absence of new skin breakdown  Description: Absence of new skin breakdown  Outcome: Ongoing  Note: Assess the overall condition of the skin. Check on bony prominences such as the sacrum, trochanters, scapulae, elbows, heels, inner and outer malleolus, inner and outer knees, back of head). Reinforce the importance of turning, mobility, and ambulation.       Problem: Pain:  Goal: Pain level will decrease  Description: Pain level will decrease  Outcome: Ongoing  Goal: Control of acute pain  Description: Control of acute pain  Outcome: Ongoing  Goal: Control of chronic pain  Description: Control of chronic pain  Outcome: Ongoing

## 2021-12-29 NOTE — PROGRESS NOTES
Physical Therapy        Physical Therapy Cancel Note      DATE: 2021    NAME: Quentin Gottron  MRN: [id number]   : 1960      Patient not seen this date for Physical Therapy due to:    Patient independent with functional mobility. Will defer PT evaluation at this time. Please reorder PT if future needs arise. 21: D/C PT - pt up per self in room (confirmed with nursing). Has prosthetic and cane in room. No stairs at home, denies falls.  No skilled PT needs at this time 1130      Electronically signed by Kin Signs, PT on 2021 at 12:15 PM

## 2021-12-29 NOTE — CONSULTS
[facility]  In PatientCardiology Consult             Date:   12/29/2021  Patient name: Tiburcio Tinajero  Date of admission:  12/28/2021 10:48 AM  MRN:   [id number]  YOB: 1960      Reason for Admission:  Chest pain, SOB    CHIEF COMPLAINT:  As above     History Obtained From:  Patient / records    HISTORY OF PRESENT ILLNESS:      Reason for consult:  episodes of diaphoresis/shortness of breath/chest pain, recent negative stress test but significant cardiac hx, evaluate for cardiac cath    Patient admitted with:   Chest pain [R07.9] 12/28/2021    Rule out acute coronary syndrome [I24.9] 12/28/2021    Chronic diastolic heart failure with preserved EF [I50.32] 12/28/2021    BPH  [N40.0] 12/28/2021    Employs prosthetic leg s/p left BKA [Z97.10] 12/28/2021    Atrial fibrillation  [I48.91] 07/17/2020    Acquired hypothyroidism [E03.9] 01/23/2019    Essential hypertension [I10] 05/18/2018    Pure hypercholesterolemia [E78.00] 12/10/2015    Type 2 diabetes mellitus with diabetic polyneuropathy, with long-term current use of insulin  [E11.42, Z79.4] 12/03/2015      Has many risk factors, his Sx included SOB, sweating and minimal chest pain as he described  Known with PVD and left leg BKA    Past Medical History:   has a past medical history of A-fib ([address] Asymptomatic bilateral carotid artery stenosis, Boil, thigh, CAD (coronary artery disease), Charcot foot due to diabetes mellitus ([address] COPD (chronic obstructive pulmonary disease) ([address] Diabetes mellitus ([address] Diabetic neuropathy ([address] Difficult intravenous access, Employs prosthetic leg, Foot ulcer ([address] Full dentures, Hyperlipidemia, Hypertension, Hypoglycemia, MRSA (methicillin resistant staph aureus) culture positive, OA (osteoarthritis), Osteomyelitis ([address] Paroxysmal atrial fibrillation ([address] Renal mass, Snores, Suspected sleep apnea, Thyroid disease, Vision abnormalities, Vitreous hemorrhage of left eye due to diabetes mellitus ([facility] Wears glasses, Wears partial dentures, and Wellness examination. Past Surgical History:   has a past surgical history that includes Foot surgery (Right); Toe amputation; Finger amputation (Right, 1995); Leg amputation below knee (Left, 4/29/15); other surgical history (Left, 5-5-15 and 11/2015); Temporomandibular joint surgery (Bilateral, 80'S); Colonoscopy (06/17/2016); Colonoscopy (01/23/2017);   picc powerpicc double (5/21/2018); Cardiac catheterization; other surgical history; pr re-amputation lower leg (Left, 5/30/2018); vitrectomy (Left, 9/25/2019); cardiovascular stress test (06/28/2019); Kidney surgery (Right, 11/13/2019); Cystoscopy (Bilateral, 6/16/2020); Kidney cyst removal; Kidney surgery (N/A, 7/30/2020); Cystoscopy (Right, 7/30/2020); and Cystoscopy (N/A, 9/1/2020). Home Medications:    Prior to Admission medications    Medication Sig Start Date End Date Taking?  Authorizing Provider   finasteride (PROSCAR) 5 MG tablet TAKE 1 TABLET BY MOUTH EVERY DAY 12/28/21  Yes Chyna Saunders MD   isosorbide mononitrate (IMDUR) 30 MG extended release tablet TAKE 1 TABLET BY MOUTH EVERY DAY 12/28/21  Yes Chyna Saunders MD   Semaglutide, 1 MG/DOSE, (OZEMPIC, 1 MG/DOSE,) 4 MG/3ML SOPN Inject 1 mg into the skin once a week   Yes Historical Provider, MD   budesonide-formoterol (SYMBICORT) 160-4.5 MCG/ACT AERO TAKE 2 PUFFS BY MOUTH TWICE A DAY 12/14/21  Yes Chyna Saunders MD   aspirin EC 81 MG EC tablet Take 1 tablet by mouth daily 12/14/21  Yes Chyna Saunders MD   tamsulosin (FLOMAX) 0.4 MG capsule TAKE 1 CAPSULE BY MOUTH EVERY DAY 10/12/21  Yes Chyna Saunders MD   losartan (COZAAR) 50 MG tablet TAKE 1 TABLET BY MOUTH EVERY DAY 9/29/21  Yes Chyna Saunders MD   metoprolol tartrate (LOPRESSOR) 25 MG tablet TAKE 1 TABLET BY MOUTH TWICE A DAY 8/30/21  Yes Chyna Saunders MD   levothyroxine (SYNTHROID) 150 MCG tablet TAKE 1 TABLET BY MOUTH EVERY DAY 8/13/21  Yes Renny Josseline Irons, MD   Insulin Glargine, 2 Unit Dial, (TOUJEO MAX SOLOSTAR) 300 UNIT/ML SOPN Inject 110 Units into the skin daily 8/10/21  Yes Ladi Wheatley MD   clotrimazole-betamethasone (LOTRISONE) 1-0.05 % cream APPLY TO AFFECTED AREA TWICE A DAY 7/23/21  Yes Ladi Wheatley MD   ELIQUIS 5 MG TABS tablet TAKE 1 TABLET BY MOUTH TWICE A DAY 7/9/21  Yes Ladi Wheatley MD   insulin lispro (HUMALOG) 100 UNIT/ML injection vial INJECT 12 UNITS UNDER THE SKIN 3 TIMES DAILY + SLIDING SCALE (TAKE 5 UNITS IF SUGAR IS OVER 150) 5/12/21  Yes Ladi Wheatley MD   rOPINIRole (REQUIP) 2 MG tablet TAKE 1 TABLET BY MOUTH EVERY DAY 4/9/21  Yes Ladi Wheatley MD   gemfibrozil (LOPID) 600 MG tablet TAKE 1 TABLET BY MOUTH EVERY DAY 3/4/21  Yes Ladi Wheatley MD   atorvastatin (LIPITOR) 40 MG tablet TAKE 1 TABLET BY MOUTH EVERY DAY 3/2/21  Yes Ladi Wheatley MD   albuterol sulfate  (90 Base) MCG/ACT inhaler INHALE 2 PUFFS INTO THE LUNGS EVERY 6 HOURS AS NEEDED FOR WHEEZING OR SHORTNESS OF BREATH 1/18/21  Yes Ladi Wheatley MD   pregabalin (LYRICA) 100 MG capsule TAKE 1 CAPSULE BY MOUTH THREE TIMES A DAY 10/12/20 12/28/21 Yes Ladi Wheatley MD   glucagon, rDNA, 1 MG injection Inject 1 mL into the muscle once as needed for Low blood sugar  11/13/19  Yes Historical Provider, MD   propafenone (RYTHMOL) 150 MG tablet Take 1 tablet by mouth every 8 hours 11/17/19  Yes Tony Fernando MD   Omega-3 Fatty Acids (FISH OIL CONCENTRATE) 300 MG CAPS Take 300 mg by mouth nightly    Yes Virgen Cornell MD   Glucosamine Sulfate 500 MG TABS Take 500 mg by mouth 3 times daily   Yes Martín Hernandez MD   B-D UF III MINI PEN NEEDLES 31G X 5 MM MISC USE AS DIRECTED ONCE DAILY TO INJECT TOUJEO 11/16/21   Ladi Wheatley MD   Continuous Blood Gluc  (DEXCOM G6 ) DOUGIE USE AS DIRECTED TO MONITOR BLOOD SUGAR 5/20/21   Historical Provider, MD   blood glucose monitor strips T/S Contour.  Test 4 times a day 5/10/21   Renny MCBRIDE MD Nina   Blood Pressure KIT 1 actuation by Does not apply route once a week 3/25/20   Patsy Contreras MD   Handicap Placard MISC by Does not apply route Duration - 5years  Reason- prosthetic leg 1/2/20   Patsy Contreras MD       Allergies:  Ramipril and Adhesive tape    Social History:   reports that he quit smoking about 10 years ago. His smoking use included cigars. He started smoking about 44 years ago. He has a 50.00 pack-year smoking history. He has never used smokeless tobacco. He reports current alcohol use. He reports previous drug use. Drug: Marijuana Dulce Murray). Family History:   Positive for early CAD    REVIEW OF SYSTEMS:    · Constitutional: there has been no unanticipated weight loss. There's been No change in energy level, No change in activity level. · Eyes: No visual changes or diplopia. No scleral icterus. · ENT: No Headaches, hearing loss or vertigo. No mouth sores or sore throat. · Cardiovascular: No problem  · Respiratory: No previous reported problems  · Gastrointestinal: No abdominal pain, appetite loss, blood in stools. No change in bowel or bladder habits. · Genitourinary: No dysuria, trouble voiding, or hematuria. · Musculoskeletal:  No gait disturbance, No weakness or joint complaints. · Integumentary: No rash or pruritis. · Neurological: No headache, diplopia, change in muscle strength, numbness or tingling. No change in gait, balance, coordination, mood, affect, memory, mentation, behavior. · Psychiatric: No anxiety, or depression. · Endocrine: No temperature intolerance. No excessive thirst, fluid intake, or urination. No tremor. · Hematologic/Lymphatic: No abnormal bruising or bleeding, blood clots or swollen lymph nodes. · Allergic/Immunologic: No nasal congestion or hives.     PHYSICAL EXAM:    Physical Examination:    /85   Pulse 72   Temp 97.5 °F (36.4 °C) (Oral)   Resp 16   Ht 6' 4\" (1.93 m)   Wt 281 lb 12 oz (127.8 kg)   SpO2 92%   BMI 34.30 kg/m² Constitutional and General Appearance: alert, cooperative, no distress and appears stated age  [de-identified]: PERRL, no cervical lymphadenopathy. No masses palpable. Normal oral mucosa  Respiratory:  · Normal excursion and expansion without use of accessory muscles  Resp Auscultation: Good respiratory effort. No for increased work of breathing. On auscultation: Decreased BS, with prolong expiration. Cardiovascular:  · The apical impulse is not displaced  · Heart tones are crisp and normal. regular S1 and S2. Murmurs: SM 1/6 apical area  · Jugular venous pulsation Normal  · The carotid upstroke is normal in amplitude and contour without delay or bruit  · Peripheral pulses are symmetrical and full   Abdomen:  · No masses or tenderness  · Bowel sounds present  Extremities:  · Amputee BK left leg  Neurological:  · Alert and oriented. · Moves all extremities well  · No abnormalities of mood, affect, memory, mentation, or behavior are noted    DATA:    Diagnostics:      EKG:Not available in chart    Labs:     CBC:   Recent Labs     12/28/21  1245 12/29/21  0905   WBC 6.4 6.2   HGB 14.3 14.7   HCT 41.6 41.5    154     BMP:   Recent Labs     12/28/21  1245 12/29/21  0905    143   K 5.1 4.5   CO2 24 24   BUN 23 21   CREATININE 1.24* 1.26*   LABGLOM 59* 58*   GLUCOSE 219* 137*     BNP: No results for input(s): BNP in the last 72 hours. PT/INR:   Recent Labs     12/28/21  1245   PROTIME 13.2   INR 1.0     APTT:No results for input(s): APTT in the last 72 hours. CARDIAC ENZYMES:No results for input(s): CKTOTAL, CKMB, CKMBINDEX, TROPONINI in the last 72 hours.   FASTING LIPID PANEL:  Lab Results   Component Value Date    HDL 44 05/13/2021    TRIG 89 01/13/2020     LIVER PROFILE:  Recent Labs     12/28/21  1245   AST 26   ALT 29   LABALBU 4.1     Stress test 5/13/21:  Impression       No stress-induced ischemia.       Normal LVEF which was previously borderline low.  Borderline stress-induced   left ventricular dilatation.  TID ratio 1.38.  Previously 1. 18.       Risk stratification: Low               IMPRESSION:    1. Atypical chest pain:   1. Repeat EKG. No acute changes  2. MOnitor [facility]  3. Stress test in may 2021 was negative but has many risk factors, HTN< HLP, DM, PVD, COPD/smoking. Will recommend heart cath as OP iafter new years with instruction to stop using Eliqus for 2 days before procedure. 2. COPD exacerbation  3. CHF class II  4. DM II  5. PVD  6. H/O Atrial fibrillation. 7.     Patient Active Problem List   Diagnosis    Anemia    [facility]athy in diabetes [facility]    Charcot ankle    PVD (peripheral vascular disease) ([facility]    Type 2 diabetes mellitus with diabetic polyneuropathy, with long-term current use of insulin     Pure hypercholesterolemia    Constipation    PSA elevation    CAD (coronary artery disease)    Chronic pain of left knee    Essential hypertension    Olecranon bursitis of right elbow    Acquired hypothyroidism    Carotid stenosis, asymptomatic, bilateral    Right renal mass    Metabolic acidosis, normal anion gap (NAG)    Hypotension due to drugs    Hydronephrosis of right kidney    Idiopathic hypotension    Status post below-knee amputation of left lower extremity (HCC)    Atrial fibrillation     Ureteral stricture, right    Dizziness    Dermatophytoses    Acute bilateral low back pain without sciatica    Malignant neoplasm of right kidney ([address]    Spinal stenosis of lumbar region with neurogenic claudication    Other chest pain    Spinal stenosis of lumbar region without neurogenic claudication    Lumbar radiculopathy    Chest pain    Rule out acute coronary syndrome    Chronic diastolic heart failure with preserved EF    BPH     Employs prosthetic leg s/p left BKA       RECOMMENDATIONS:  1. Continue current trx. 2. Resume Eliqus and plan on DC  3. Plan on heart cath in 7-10 days with Eliquis on hold for few days as instructed.   4. Continue risk factors modification    Discussed with patient and nursing.     Electronically signed by Maritza Velez MD on 12/29/2021 at 9:40 AM.  [place] [facility]

## 2021-12-29 NOTE — PROGRESS NOTES
Writer talked with Dr. Hilary Ravi regarding pt recent Troponin levels and NPO status. New order to dc NPO order.  will round on pt, later today.

## 2021-12-29 NOTE — CARE COORDINATION
CASE MANAGEMENT NOTE:    Admission Date:  12/28/2021 Yuliya Marie is a 64 y.o.  male    Admitted for : Chest pain [R07.9]  Chest pain, unspecified type [R07.9]    Met with:  Patient    PCP:  Dr. Kaya Camacho:  Javier Pittman      Is patient alert and oriented at time of discussion:  Yes    Current Residence/ Living Arrangements:  independently at home             Current Services PTA:  No    Does patient go to outpatient dialysis: No  If yes, location and chair time: N/A    Is patient agreeable to VNS: No    Freedom of choice provided:  Yes    List of [address] provided: No    VNS chosen:  No    DME:  straight cane, walker, wheelchair and shower chair, glucometer and supplies    Home Oxygen: No    Nebulizer: No    CPAP/BIPAP: No    Supplier: N/A    Potential Assistance Needed: No    SNF needed: No    Freedom of choice and list provided: NA    Pharmacy:  [facility]       Does Patient want to use MEDS to BEDS? No    Is patient currently receiving oral anticoagulation therapy? Yes - Eliquis PTA    Is the Patient an [facility] with Readmission Risk Score greater than 14%? No  If yes, pt needs a follow up appointment made within 7 days. Family Members/Caregivers that pt would like involved in their care:    Yes    If yes, list name here: Wife Alberto Cardona    Transportation Provider:  Patient             Discharge Plan:  12/29: Sherry Macias - From 1-story home with spouse. He is independent and drives. DME - cane, WC, SC, walker, glucometer. Declines VNS. Eliquis PTA. NPO, cardio consult - Possible heart cath.  [employer] [employer] - 13%. Hasmukh Chandler                 Electronically signed by: Casey Mello RN on 12/29/2021 at 12:24 PM

## 2021-12-29 NOTE — PROGRESS NOTES
[address]   OCCUPATIONAL THERAPY MISSED TREATMENT NOTE   INPATIENT   Date: 21  Patient Name: Kevin Alatorre       Room: 9873/9091-23  MRN: [id number]   Account #: [de-identified]    : 1960  (64 y.o.)  Gender: male     REASON FOR MISSED TREATMENT:  Pt reports IND with self-care and functional mobility. Reports no therapy needs at this time and no concerns re: discharge. -   OT being discontinued at this time.  Patient functioning at Premorbid Level  No further needs  [facility]

## 2021-12-29 NOTE — PROGRESS NOTES
Writer speaks to Dr Helen Betancourt regarding patient's rhythm appearing to be changing from A-fib to bigeminy and then NSR. New orders received for EKG; d/c Rhythmol, start on 400mg Amiodarone now, then 200 mg BID.

## 2021-12-30 ENCOUNTER — HOSPITAL ENCOUNTER (OUTPATIENT)
Dept: CARDIAC CATH/INVASIVE PROCEDURES | Age: 61
Discharge: HOME | End: 2021-12-30
Payer: COMMERCIAL

## 2021-12-30 VITALS
SYSTOLIC BLOOD PRESSURE: 101 MMHG | HEART RATE: 72 BPM | OXYGEN SATURATION: 98 % | RESPIRATION RATE: 18 BRPM | DIASTOLIC BLOOD PRESSURE: 54 MMHG

## 2021-12-30 PROBLEM — I42.2 HYPERTROPHIC CARDIOMYOPATHY (HCC): Status: ACTIVE | Noted: 2021-12-30

## 2021-12-30 LAB
ABSOLUTE EOS #: 0.1 K/UL (ref 0–0.4)
ABSOLUTE IMMATURE GRANULOCYTE: ABNORMAL K/UL (ref 0–0.3)
ABSOLUTE LYMPH #: 0.9 K/UL (ref 1–4.8)
ABSOLUTE MONO #: 0.7 K/UL (ref 0.1–1.3)
ANION GAP SERPL CALCULATED.3IONS-SCNC: 12 MMOL/L (ref 9–17)
BASOPHILS # BLD: 1 % (ref 0–2)
BASOPHILS ABSOLUTE: 0.1 K/UL (ref 0–0.2)
BUN BLDV-MCNC: 21 MG/DL (ref 8–23)
BUN/CREAT BLD: ABNORMAL (ref 9–20)
CALCIUM SERPL-MCNC: 9.1 MG/DL (ref 8.6–10.4)
CHLORIDE BLD-SCNC: 108 MMOL/L (ref 98–107)
CO2: 22 MMOL/L (ref 20–31)
CORTISOL COLLECTION INFO: NORMAL
CORTISOL: 11.6 UG/DL (ref 2.7–18.4)
CREAT SERPL-MCNC: 1.59 MG/DL (ref 0.7–1.2)
DIFFERENTIAL TYPE: ABNORMAL
EKG ATRIAL RATE: 83 BPM
EKG P AXIS: 34 DEGREES
EKG P-R INTERVAL: 190 MS
EKG Q-T INTERVAL: 360 MS
EKG QRS DURATION: 90 MS
EKG QTC CALCULATION (BAZETT): 423 MS
EKG R AXIS: 11 DEGREES
EKG T AXIS: 86 DEGREES
EKG VENTRICULAR RATE: 83 BPM
EOSINOPHILS RELATIVE PERCENT: 2 % (ref 0–4)
GFR AFRICAN AMERICAN: 54 ML/MIN
GFR NON-AFRICAN AMERICAN: 44 ML/MIN
GFR SERPL CREATININE-BSD FRML MDRD: ABNORMAL ML/MIN/{1.73_M2}
GFR SERPL CREATININE-BSD FRML MDRD: ABNORMAL ML/MIN/{1.73_M2}
GLUCOSE BLD-MCNC: 212 MG/DL (ref 70–99)
HCT VFR BLD CALC: 42.8 % (ref 41–53)
HEMOGLOBIN: 14.6 G/DL (ref 13.5–17.5)
IMMATURE GRANULOCYTES: ABNORMAL %
LYMPHOCYTES # BLD: 12 % (ref 24–44)
MAGNESIUM: 2 MG/DL (ref 1.6–2.6)
MCH RBC QN AUTO: 31.9 PG (ref 26–34)
MCHC RBC AUTO-ENTMCNC: 34.1 G/DL (ref 31–37)
MCV RBC AUTO: 93.6 FL (ref 80–100)
MONOCYTES # BLD: 10 % (ref 1–7)
NRBC AUTOMATED: ABNORMAL PER 100 WBC
PDW BLD-RTO: 14.6 % (ref 11.5–14.9)
PLATELET # BLD: 171 K/UL (ref 150–450)
PLATELET ESTIMATE: ABNORMAL
PMV BLD AUTO: 10.2 FL (ref 6–12)
POTASSIUM SERPL-SCNC: 4.4 MMOL/L (ref 3.7–5.3)
RBC # BLD: 4.57 M/UL (ref 4.5–5.9)
RBC # BLD: ABNORMAL 10*6/UL
SEG NEUTROPHILS: 75 % (ref 36–66)
SEGMENTED NEUTROPHILS ABSOLUTE COUNT: 5.4 K/UL (ref 1.3–9.1)
SODIUM BLD-SCNC: 142 MMOL/L (ref 135–144)
WBC # BLD: 7.1 K/UL (ref 3.5–11)
WBC # BLD: ABNORMAL 10*3/UL

## 2021-12-30 PROCEDURE — 85025 COMPLETE CBC W/AUTO DIFF WBC: CPT

## 2021-12-30 PROCEDURE — 94640 AIRWAY INHALATION TREATMENT: CPT

## 2021-12-30 PROCEDURE — 6370000000 HC RX 637 (ALT 250 FOR IP)

## 2021-12-30 PROCEDURE — 83735 ASSAY OF MAGNESIUM: CPT

## 2021-12-30 PROCEDURE — 83835 ASSAY OF METANEPHRINES: CPT

## 2021-12-30 PROCEDURE — 6360000004 HC RX CONTRAST MEDICATION

## 2021-12-30 PROCEDURE — 2580000003 HC RX 258

## 2021-12-30 PROCEDURE — 80048 BASIC METABOLIC PNL TOTAL CA: CPT

## 2021-12-30 PROCEDURE — 7100000001 HC PACU RECOVERY - ADDTL 15 MIN

## 2021-12-30 PROCEDURE — 82533 TOTAL CORTISOL: CPT

## 2021-12-30 PROCEDURE — 6370000000 HC RX 637 (ALT 250 FOR IP): Performed by: INTERNAL MEDICINE

## 2021-12-30 PROCEDURE — 36415 COLL VENOUS BLD VENIPUNCTURE: CPT

## 2021-12-30 PROCEDURE — 2580000003 HC RX 258: Performed by: INTERNAL MEDICINE

## 2021-12-30 PROCEDURE — C1894 INTRO/SHEATH, NON-LASER: HCPCS

## 2021-12-30 PROCEDURE — 2060000000 HC ICU INTERMEDIATE R&B

## 2021-12-30 PROCEDURE — 2709999900 HC NON-CHARGEABLE SUPPLY

## 2021-12-30 PROCEDURE — 93458 L HRT ARTERY/VENTRICLE ANGIO: CPT

## 2021-12-30 PROCEDURE — 7100000000 HC PACU RECOVERY - FIRST 15 MIN

## 2021-12-30 PROCEDURE — 6360000002 HC RX W HCPCS

## 2021-12-30 PROCEDURE — 6360000002 HC RX W HCPCS: Performed by: INTERNAL MEDICINE

## 2021-12-30 PROCEDURE — 94761 N-INVAS EAR/PLS OXIMETRY MLT: CPT

## 2021-12-30 PROCEDURE — 93010 ELECTROCARDIOGRAM REPORT: CPT | Performed by: INTERNAL MEDICINE

## 2021-12-30 PROCEDURE — 2500000003 HC RX 250 WO HCPCS

## 2021-12-30 PROCEDURE — 99233 SBSQ HOSP IP/OBS HIGH 50: CPT | Performed by: INTERNAL MEDICINE

## 2021-12-30 RX ORDER — SODIUM CHLORIDE 9 MG/ML
25 INJECTION, SOLUTION INTRAVENOUS PRN
Status: DISCONTINUED | OUTPATIENT
Start: 2021-12-30 | End: 2021-12-31 | Stop reason: HOSPADM

## 2021-12-30 RX ORDER — INSULIN GLARGINE 100 [IU]/ML
90 INJECTION, SOLUTION SUBCUTANEOUS DAILY
Status: DISCONTINUED | OUTPATIENT
Start: 2021-12-30 | End: 2021-12-31

## 2021-12-30 RX ORDER — AMIODARONE HYDROCHLORIDE 200 MG/1
400 TABLET ORAL ONCE
Status: COMPLETED | OUTPATIENT
Start: 2021-12-30 | End: 2021-12-30

## 2021-12-30 RX ORDER — METOPROLOL TARTRATE 50 MG/1
50 TABLET, FILM COATED ORAL 2 TIMES DAILY
Status: DISCONTINUED | OUTPATIENT
Start: 2021-12-30 | End: 2021-12-31 | Stop reason: HOSPADM

## 2021-12-30 RX ORDER — SODIUM CHLORIDE 0.9 % (FLUSH) 0.9 %
5-40 SYRINGE (ML) INJECTION EVERY 12 HOURS SCHEDULED
Status: DISCONTINUED | OUTPATIENT
Start: 2021-12-30 | End: 2021-12-31 | Stop reason: HOSPADM

## 2021-12-30 RX ORDER — ACETAMINOPHEN 325 MG/1
650 TABLET ORAL EVERY 4 HOURS PRN
Status: DISCONTINUED | OUTPATIENT
Start: 2021-12-30 | End: 2021-12-31 | Stop reason: HOSPADM

## 2021-12-30 RX ORDER — SODIUM CHLORIDE 0.9 % (FLUSH) 0.9 %
5-40 SYRINGE (ML) INJECTION PRN
Status: DISCONTINUED | OUTPATIENT
Start: 2021-12-30 | End: 2021-12-31 | Stop reason: HOSPADM

## 2021-12-30 RX ADMIN — PREGABALIN 100 MG: 100 CAPSULE ORAL at 20:43

## 2021-12-30 RX ADMIN — LOSARTAN POTASSIUM 50 MG: 50 TABLET, FILM COATED ORAL at 07:23

## 2021-12-30 RX ADMIN — ASPIRIN 81 MG: 81 TABLET, COATED ORAL at 07:23

## 2021-12-30 RX ADMIN — FAMOTIDINE 20 MG: 20 TABLET ORAL at 07:23

## 2021-12-30 RX ADMIN — FINASTERIDE 5 MG: 5 TABLET, FILM COATED ORAL at 07:23

## 2021-12-30 RX ADMIN — AMIODARONE HYDROCHLORIDE 400 MG: 200 TABLET ORAL at 09:39

## 2021-12-30 RX ADMIN — FAMOTIDINE 20 MG: 20 TABLET ORAL at 20:44

## 2021-12-30 RX ADMIN — LEVOTHYROXINE SODIUM 175 MCG: 0.05 TABLET ORAL at 07:23

## 2021-12-30 RX ADMIN — INSULIN LISPRO 2 UNITS: 100 INJECTION, SOLUTION INTRAVENOUS; SUBCUTANEOUS at 11:30

## 2021-12-30 RX ADMIN — SODIUM CHLORIDE, PRESERVATIVE FREE 10 ML: 5 INJECTION INTRAVENOUS at 20:45

## 2021-12-30 RX ADMIN — PREGABALIN 100 MG: 100 CAPSULE ORAL at 07:24

## 2021-12-30 RX ADMIN — METOPROLOL TARTRATE 25 MG: 25 TABLET, FILM COATED ORAL at 07:24

## 2021-12-30 RX ADMIN — APIXABAN 5 MG: 5 TABLET, FILM COATED ORAL at 20:43

## 2021-12-30 RX ADMIN — ROPINIROLE HYDROCHLORIDE 2 MG: 2 TABLET, FILM COATED ORAL at 07:24

## 2021-12-30 RX ADMIN — APIXABAN 5 MG: 5 TABLET, FILM COATED ORAL at 07:24

## 2021-12-30 RX ADMIN — DEXTROSE MONOHYDRATE 300 MG: 50 INJECTION, SOLUTION INTRAVENOUS at 12:23

## 2021-12-30 RX ADMIN — BUDESONIDE AND FORMOTEROL FUMARATE DIHYDRATE 2 PUFF: 160; 4.5 AEROSOL RESPIRATORY (INHALATION) at 18:07

## 2021-12-30 RX ADMIN — METOPROLOL TARTRATE 50 MG: 50 TABLET, FILM COATED ORAL at 20:43

## 2021-12-30 RX ADMIN — SODIUM CHLORIDE, PRESERVATIVE FREE 10 ML: 5 INJECTION INTRAVENOUS at 07:26

## 2021-12-30 RX ADMIN — METOPROLOL TARTRATE 25 MG: 25 TABLET, FILM COATED ORAL at 09:39

## 2021-12-30 RX ADMIN — GEMFIBROZIL 600 MG: 600 TABLET ORAL at 07:23

## 2021-12-30 RX ADMIN — BUDESONIDE AND FORMOTEROL FUMARATE DIHYDRATE 2 PUFF: 160; 4.5 AEROSOL RESPIRATORY (INHALATION) at 07:45

## 2021-12-30 RX ADMIN — TAMSULOSIN HYDROCHLORIDE 0.4 MG: 0.4 CAPSULE ORAL at 20:43

## 2021-12-30 RX ADMIN — INSULIN LISPRO 1 UNITS: 100 INJECTION, SOLUTION INTRAVENOUS; SUBCUTANEOUS at 20:48

## 2021-12-30 RX ADMIN — AMIODARONE HYDROCHLORIDE 200 MG: 200 TABLET ORAL at 20:44

## 2021-12-30 RX ADMIN — ATORVASTATIN CALCIUM 40 MG: 40 TABLET, FILM COATED ORAL at 07:29

## 2021-12-30 RX ADMIN — ISOSORBIDE MONONITRATE 30 MG: 30 TABLET, EXTENDED RELEASE ORAL at 07:23

## 2021-12-30 RX ADMIN — AMIODARONE HYDROCHLORIDE 200 MG: 200 TABLET ORAL at 07:23

## 2021-12-30 RX ADMIN — INSULIN GLARGINE 90 UNITS: 100 INJECTION, SOLUTION SUBCUTANEOUS at 09:39

## 2021-12-30 ASSESSMENT — ENCOUNTER SYMPTOMS
NAUSEA: 0
SHORTNESS OF BREATH: 1
COUGH: 0
SORE THROAT: 0
VOMITING: 0
RHINORRHEA: 0
PHOTOPHOBIA: 0

## 2021-12-30 NOTE — PROGRESS NOTES
Received post procedure to [facility] to room 6. Assessment obtained. Restrictions reviewed with patient. Post procedure pathway initiated. Right radial site soft ,Vasc  band  dry and intact. No hematoma noted. Family at side. Patient without complaints.  H

## 2021-12-30 NOTE — PROGRESS NOTES
Arrived to [facility] at 1350. Consent signed and pt educated about procedure. Allergies and belongings verified.  PT taken to cath lab per Dr. Mary Solis.

## 2021-12-30 NOTE — OP NOTE
[facility]    CARDIAC CATHETERIZATION    Date:   12/30/2021  Patient name:  Alexis Soliz  Date of admission:  12/30/2021 12:30 PM  MRN:   [id number]  YOB: 1960    Operators:  Primary:   Amira Moran MD (Attending Physician)    Procedure performed:     [x] Left Heart Catheterization. [] Graft Angiography. [x] Left Ventriculography. [] Right Heart Catheterization. [x] Coronary Angiography. [] Aortic Valve Studies. [] PCI       [] Other:       Pre Procedure Conscious Sedation Data:  ASA Class:    [] I [x] II [] III [] IV    Mallampati Class:  [] I [x] II [] III [] IV      Indication:  [] STEMI      [] + Stress test  [x] ACS      [] + EKG Changes  [] Non Q MI       [] Significant Risk Factors  [] Recurrent Angina             [] Diabetes Mellitus    [] New LBBB      [] Other.  [] CHF / Low EF changes     [] Abnormal CTA / Ca Score      Procedure:  Access:  [] Femoral  [x] Radial  artery       [x] Right  [] Left    Procedure: After informed consent was obtained with explanation of the risks and benefits, patient was brought to the cath lab. The access area was prepped and draped in sterile fashion. 1% lidocaine was used for local block. The artery was cannulated with 6  Fr sheath with brisk arterial blood return. The side port was frequently flushed and aspirated with normal saline. Estimated Blood Loss:  [x] Minimal < 25 cc [] Moderate 25-50 cc  [] >50 cc    Findings:    LMCA: Normal 0% stenosis.    LAD: Mid area 40% stenosis  LCx: Dominant with distal 25% stenosis   OM's with no disease   RCA: Non dominant      Coronary Tree Dominance: Left     Conclusions:  Non obstructive minimal CAD   Preserved LV function       Recommendations:   Medical treatments       Electronically signed on 12/30/2021 at 2:33 PM by:    Oralia Noonan MD  Fellow, [address]    I have reviewed the case / procedure with resident / fellow  I have examined the patient personally  Patient agree with treatment plan as discussed before, final arrangement based on my evaluation and exam.    Risk and benefit of procedure planned were explained in details. Procedure was performed by me personally, with all aspect of the procedure being done using standard protocol. Note was modified based on my own assessment and treatment.     Laury Carter MD  [facility]

## 2021-12-30 NOTE — PROGRESS NOTES
Patient's blood sugar according to his dexcomG6 was 54 at 2100. Writer gives PRN glucose oral gel, recheck at 2225 reads blood sugar of 78. Will continue to monitor.

## 2021-12-30 NOTE — FLOWSHEET NOTE
Patient's creatinine 1.59. Spoke with Dr. Shivani Pichardo and Dr. Amanda Sumner and notified. No new orders at this time, patient OK for heart cath.

## 2021-12-30 NOTE — PROGRESS NOTES
Vasc band removed after final air removed. Pressure dressing and arm board applied. Await transport to [facility].

## 2021-12-30 NOTE — FLOWSHEET NOTE
Patient taken by [facility] per damaris to [facility].

## 2021-12-30 NOTE — CARE COORDINATION
ONGOING DISCHARGE PLAN:    Patient is alert and oriented x4. Spoke with patient regarding discharge plan and patient confirms that plan is still to return to home. Patient is now going for heart cath stat at [facility]. [place] [facility] - 13%    Will continue to follow for additional discharge needs.     Electronically signed by Carrie Carrasquillo RN on 12/30/2021 at 11:39 AM

## 2021-12-30 NOTE — CONSULTS
[facility]   Progress Note                   Date:   12/30/2021  Patient name: Chris Martínez  Date of admission:  12/28/2021 10:48 AM  MRN:   [id number]  YOB: 1960  PCP: Roxanne Cadet MD    Reason for Admission:     Subjective:       Clinical Changes / Abnormalities:  Having more episodes of chest pain and diaphoresis, unexplained. Medications:   Scheduled Meds:   insulin glargine  90 Units SubCUTAneous Daily    metoprolol tartrate  50 mg Oral BID    amiodarone bolus  300 mg IntraVENous Once    pregabalin  100 mg Oral TID    amiodarone  200 mg Oral BID    aspirin EC  81 mg Oral Daily    atorvastatin  40 mg Oral Daily    budesonide-formoterol  2 puff Inhalation BID    clotrimazole-betamethasone   Topical BID    apixaban  5 mg Oral BID    finasteride  5 mg Oral Daily    gemfibrozil  600 mg Oral Daily    isosorbide mononitrate  30 mg Oral Daily    losartan  50 mg Oral Daily    rOPINIRole  2 mg Oral Daily    tamsulosin  0.4 mg Oral Nightly    sodium chloride flush  5-40 mL IntraVENous 2 times per day    levothyroxine  175 mcg Oral Daily    insulin lispro  0-12 Units SubCUTAneous TID WC    insulin lispro  0-6 Units SubCUTAneous Nightly    famotidine  20 mg Oral BID     Continuous Infusions:   sodium chloride      dextrose       CBC:   Recent Labs     12/28/21  1245 12/29/21  0905 12/30/21  1037   WBC 6.4 6.2 7.1   HGB 14.3 14.7 14.6    154 171     BMP:    Recent Labs     12/28/21  1245 12/29/21  0905 12/30/21  1037    143 142   K 5.1 4.5 4.4   * 109* 108*   CO2 24 24 22   BUN 23 21 21   CREATININE 1.24* 1.26* 1.59*   GLUCOSE 219* 137* 212*     Hepatic:   Recent Labs     12/28/21  1245   AST 26   ALT 29   BILITOT 0.43   ALKPHOS 132*     Troponin: No results for input(s): TROPONINI in the last 72 hours. BNP: No results for input(s): BNP in the last 72 hours. Lipids: No results for input(s): CHOL, HDL in the last 72 hours.     Invalid input(s): LDLCALCU  INR:   Recent Labs     12/28/21  1245   INR 1.0          HISTORY OF PRESENT ILLNESS:      Reason for consult:  episodes of diaphoresis/shortness of breath/chest pain, recent negative stress test but significant cardiac hx, evaluate for cardiac cath    Patient admitted with:   Chest pain [R07.9] 12/28/2021    Rule out acute coronary syndrome [I24.9] 12/28/2021    Chronic diastolic heart failure with preserved EF [I50.32] 12/28/2021    BPH  [N40.0] 12/28/2021    Employs prosthetic leg s/p left BKA [Z97.10] 12/28/2021    Atrial fibrillation  [I48.91] 07/17/2020    Acquired hypothyroidism [E03.9] 01/23/2019    Essential hypertension [I10] 05/18/2018    Pure hypercholesterolemia [E78.00] 12/10/2015    Type 2 diabetes mellitus with diabetic polyneuropathy, with long-term current use of insulin  [E11.42, Z79.4] 12/03/2015      Has many risk factors, his Sx included SOB, sweating and minimal chest pain as he described  Known with PVD and left leg BKA    Past Medical History:   has a past medical history of A-fib ([address] Asymptomatic bilateral carotid artery stenosis, Boil, thigh, CAD (coronary artery disease), Charcot foot due to diabetes mellitus ([address] COPD (chronic obstructive pulmonary disease) ([address] Diabetes mellitus ([address] Diabetic neuropathy ([address] Difficult intravenous access, Employs prosthetic leg, Foot ulcer ([address] Full dentures, Hyperlipidemia, Hypertension, Hypoglycemia, MRSA (methicillin resistant staph aureus) culture positive, OA (osteoarthritis), Osteomyelitis ([address] Paroxysmal atrial fibrillation ([address] Renal mass, Snores, Suspected sleep apnea, Thyroid disease, Vision abnormalities, Vitreous hemorrhage of left eye due to diabetes mellitus ([address] Wears glasses, Wears partial dentures, and Wellness examination. Past Surgical History:   has a past surgical history that includes Foot surgery (Right); Toe amputation; Finger amputation (Right, 1995);  Leg amputation below knee (Left, 4/29/15); other surgical history (Left, 5-5-15 and 11/2015); Temporomandibular joint surgery (Bilateral, 80'S); Colonoscopy (06/17/2016); Colonoscopy (01/23/2017);   picc powerpicc double (5/21/2018); Cardiac catheterization; other surgical history; pr re-amputation lower leg (Left, 5/30/2018); vitrectomy (Left, 9/25/2019); cardiovascular stress test (06/28/2019); Kidney surgery (Right, 11/13/2019); Cystoscopy (Bilateral, 6/16/2020); Kidney cyst removal; Kidney surgery (N/A, 7/30/2020); Cystoscopy (Right, 7/30/2020); and Cystoscopy (N/A, 9/1/2020). Home Medications:    Prior to Admission medications    Medication Sig Start Date End Date Taking?  Authorizing Provider   finasteride (PROSCAR) 5 MG tablet TAKE 1 TABLET BY MOUTH EVERY DAY 12/28/21  Yes Chema Gonzalez MD   isosorbide mononitrate (IMDUR) 30 MG extended release tablet TAKE 1 TABLET BY MOUTH EVERY DAY 12/28/21  Yes Chema Gonzalez MD   Semaglutide, 1 MG/DOSE, (OZEMPIC, 1 MG/DOSE,) 4 MG/3ML SOPN Inject 1 mg into the skin once a week   Yes Historical Provider, MD   budesonide-formoterol (SYMBICORT) 160-4.5 MCG/ACT AERO TAKE 2 PUFFS BY MOUTH TWICE A DAY 12/14/21  Yes Chema Gonzalez MD   aspirin EC 81 MG EC tablet Take 1 tablet by mouth daily 12/14/21  Yes Chema Gonzalez MD   tamsulosin (FLOMAX) 0.4 MG capsule TAKE 1 CAPSULE BY MOUTH EVERY DAY 10/12/21  Yes Chema Gonzalez MD   losartan (COZAAR) 50 MG tablet TAKE 1 TABLET BY MOUTH EVERY DAY 9/29/21  Yes Chema Gonzalez MD   metoprolol tartrate (LOPRESSOR) 25 MG tablet TAKE 1 TABLET BY MOUTH TWICE A DAY 8/30/21  Yes Chema Gonzalez MD   levothyroxine (SYNTHROID) 150 MCG tablet TAKE 1 TABLET BY MOUTH EVERY DAY 8/13/21  Yes Chema Gonzalez MD   Insulin Glargine, 2 Unit Dial, (TOUJEO MAX SOLOSTAR) 300 UNIT/ML SOPN Inject 110 Units into the skin daily 8/10/21  Yes Chema Gonzalez MD   clotrimazole-betamethasone (LOTRISONE) 1-0.05 % cream APPLY TO AFFECTED AREA TWICE A DAY 7/23/21  Yes Rupinder Mcdermott MD   ELIQUIS 5 MG TABS tablet TAKE 1 TABLET BY MOUTH TWICE A DAY 7/9/21  Yes Rupinder Mcdermott MD   insulin lispro (HUMALOG) 100 UNIT/ML injection vial INJECT 12 UNITS UNDER THE SKIN 3 TIMES DAILY + SLIDING SCALE (TAKE 5 UNITS IF SUGAR IS OVER 150) 5/12/21  Yes Rupinder Mcdermott MD   rOPINIRole (REQUIP) 2 MG tablet TAKE 1 TABLET BY MOUTH EVERY DAY 4/9/21  Yes Rupinder Mcdermott MD   gemfibrozil (LOPID) 600 MG tablet TAKE 1 TABLET BY MOUTH EVERY DAY 3/4/21  Yes Rupinder Mcdermott MD   atorvastatin (LIPITOR) 40 MG tablet TAKE 1 TABLET BY MOUTH EVERY DAY 3/2/21  Yes Rupinder Mcdermott MD   albuterol sulfate  (90 Base) MCG/ACT inhaler INHALE 2 PUFFS INTO THE LUNGS EVERY 6 HOURS AS NEEDED FOR WHEEZING OR SHORTNESS OF BREATH 1/18/21  Yes Rupinder Mcdermott MD   pregabalin (LYRICA) 100 MG capsule TAKE 1 CAPSULE BY MOUTH THREE TIMES A DAY 10/12/20 12/28/21 Yes Rupinder Mcdermott MD   glucagon, rDNA, 1 MG injection Inject 1 mL into the muscle once as needed for Low blood sugar  11/13/19  Yes Historical Provider, MD   propafenone (RYTHMOL) 150 MG tablet Take 1 tablet by mouth every 8 hours 11/17/19  Yes Kerry Sharma MD   Omega-3 Fatty Acids (FISH OIL CONCENTRATE) 300 MG CAPS Take 300 mg by mouth nightly    Yes Malena Gonzales MD   Glucosamine Sulfate 500 MG TABS Take 500 mg by mouth 3 times daily   Yes Kayla Ortiz MD   B-D UF III MINI PEN NEEDLES 31G X 5 MM MISC USE AS DIRECTED ONCE DAILY TO INJECT TOUJEO 11/16/21   Rupinder Mcdermott MD   Continuous Blood Gluc  (DEXCOM G6 ) DOUGIE USE AS DIRECTED TO MONITOR BLOOD SUGAR 5/20/21   Historical Provider, MD   blood glucose monitor strips T/S Contour.  Test 4 times a day 5/10/21   Rupinder Mcdermott MD   Blood Pressure KIT 1 actuation by Does not apply route once a week 3/25/20   Venecia Jorge, MD   Handicap Placard MISC by Does not apply route Duration - 5years  Reason- prosthetic leg 1/2/20   Venecia Patel MD       Allergies: Ramipril and Adhesive tape    Social History:   reports that he quit smoking about 10 years ago. His smoking use included cigars. He started smoking about 44 years ago. He has a 50.00 pack-year smoking history. He has never used smokeless tobacco. He reports current alcohol use. He reports previous drug use. Drug: Marijuana Dulce Murray). Family History:   Positive for early CAD    REVIEW OF SYSTEMS:    · Constitutional: there has been no unanticipated weight loss. There's been No change in energy level, No change in activity level. · Eyes: No visual changes or diplopia. No scleral icterus. · ENT: No Headaches, hearing loss or vertigo. No mouth sores or sore throat. · Cardiovascular: No problem  · Respiratory: No previous reported problems  · Gastrointestinal: No abdominal pain, appetite loss, blood in stools. No change in bowel or bladder habits. · Genitourinary: No dysuria, trouble voiding, or hematuria. · Musculoskeletal:  No gait disturbance, No weakness or joint complaints. · Integumentary: No rash or pruritis. · Neurological: No headache, diplopia, change in muscle strength, numbness or tingling. No change in gait, balance, coordination, mood, affect, memory, mentation, behavior. · Psychiatric: No anxiety, or depression. · Endocrine: No temperature intolerance. No excessive thirst, fluid intake, or urination. No tremor. · Hematologic/Lymphatic: No abnormal bruising or bleeding, blood clots or swollen lymph nodes. · Allergic/Immunologic: No nasal congestion or hives. PHYSICAL EXAM:    Physical Examination:    /66   Pulse 67   Temp 97.5 °F (36.4 °C)   Resp 16   Ht 6' 4\" (1.93 m)   Wt 281 lb 12 oz (127.8 kg)   SpO2 93%   BMI 34.30 kg/m²    Constitutional and General Appearance: alert, cooperative, no distress and appears stated age  HEENT: PERRL, no cervical lymphadenopathy. No masses palpable.  Normal oral mucosa  Respiratory:  · Normal excursion and expansion without use of accessory muscles  Resp Auscultation: Good respiratory effort. No for increased work of breathing. On auscultation: Decreased BS, with prolong expiration. Cardiovascular:  · The apical impulse is not displaced  · Heart tones are crisp and normal. regular S1 and S2. Murmurs: SM 1/6 apical area  · Jugular venous pulsation Normal  · The carotid upstroke is normal in amplitude and contour without delay or bruit  · Peripheral pulses are symmetrical and full   Abdomen:  · No masses or tenderness  · Bowel sounds present  Extremities:  · Amputee BK left leg  Neurological:  · Alert and oriented. · Moves all extremities well  · No abnormalities of mood, affect, memory, mentation, or behavior are noted    DATA:    Diagnostics:      EKG:Not available in chart    Labs:     CBC:   Recent Labs     12/29/21  0905 12/30/21  1037   WBC 6.2 7.1   HGB 14.7 14.6   HCT 41.5 42.8    171     BMP:   Recent Labs     12/29/21  0905 12/30/21  1037    142   K 4.5 4.4   CO2 24 22   BUN 21 21   CREATININE 1.26* 1.59*   LABGLOM 58* 44*   GLUCOSE 137* 212*     BNP: No results for input(s): BNP in the last 72 hours. PT/INR:   Recent Labs     12/28/21  1245   PROTIME 13.2   INR 1.0     APTT:No results for input(s): APTT in the last 72 hours. CARDIAC ENZYMES:No results for input(s): CKTOTAL, CKMB, CKMBINDEX, TROPONINI in the last 72 hours. FASTING LIPID PANEL:  Lab Results   Component Value Date    HDL 44 05/13/2021    TRIG 89 01/13/2020     LIVER PROFILE:  Recent Labs     12/28/21  1245   AST 26   ALT 29   LABALBU 4.1     Stress test 5/13/21:  Impression       No stress-induced ischemia.       Normal LVEF which was previously borderline low.  Borderline stress-induced   left ventricular dilatation.  TID ratio 1.38.  Previously 1. 18.       Risk stratification: Low               IMPRESSION:    1. Atypical chest pain:   1. With worsening his Sx, will plan on immediate cath today  2. COPD exacerbation  3. CHF class II  4. DM II  5. PVD  6.  H/O

## 2021-12-30 NOTE — FLOWSHEET NOTE
Patient returned from [facility] cath lab. VSS as charted. Right radial pulse palpable. No hematoma, no drainage. Call light placed within reach. Will continue to monitor.

## 2021-12-30 NOTE — PLAN OF CARE
Problem: Falls - Risk of:  Goal: Will remain free from falls  Description: Will remain free from falls  12/30/2021 1742 by Ese Mandujano RN  Outcome: Ongoing  12/30/2021 0405 by Emeli Espinoza RN  Outcome: Ongoing  Note: Patient remains free of falls and injuries throughout shift. Bed remains in the lowest position, wheels locked, call light and bedside table are within reach. Goal: Absence of physical injury  Description: Absence of physical injury  12/30/2021 1742 by Ese Mandujano RN  Outcome: Ongoing  12/30/2021 0405 by Emeli Espinoza RN  Outcome: Ongoing     Problem: Skin Integrity:  Goal: Will show no infection signs and symptoms  Description: Will show no infection signs and symptoms  12/30/2021 1742 by Ese Mandujano RN  Outcome: Ongoing  12/30/2021 0405 by Emeli Espinoza RN  Outcome: Ongoing  Goal: Absence of new skin breakdown  Description: Absence of new skin breakdown  12/30/2021 1742 by Ese Mandujano RN  Outcome: Ongoing  12/30/2021 0405 by Emeli Espinoza RN  Outcome: Ongoing  Note: Assess the overall condition of the skin. Check on bony prominences such as the sacrum, trochanters, scapulae, elbows, heels, inner and outer malleolus, inner and outer knees, back of head). Reinforce the importance of turning, mobility, and ambulation.       Problem: Pain:  Goal: Pain level will decrease  Description: Pain level will decrease  12/30/2021 1742 by Ese Mandujano RN  Outcome: Ongoing  12/30/2021 0405 by Emeli Espinoza RN  Outcome: Ongoing  Goal: Control of acute pain  Description: Control of acute pain  12/30/2021 1742 by Ese Mandujano RN  Outcome: Ongoing  12/30/2021 0405 by Emeli Espinoza RN  Outcome: Ongoing  Goal: Control of chronic pain  Description: Control of chronic pain  12/30/2021 1742 by Ese Mandujano RN  Outcome: Ongoing  12/30/2021 0405 by Emeli Espinoza RN  Outcome: Ongoing

## 2021-12-30 NOTE — PROGRESS NOTES
[address]    PROGRESS NOTE             12/30/2021    7:37 AM    Name:   Gurdeep Omer  MRN:     [id number]     Acct:      [de-identified]   Room:   2055/2055-01  IP Day:  2  Admit Date:  12/28/2021 10:48 AM    PCP:  Yana Dasilva MD  Code Status:  Full Code    Subjective:     C/C:   Chief Complaint   Patient presents with    Shortness of Breath    Chest Pain    Sweats     Interval History Status: worsened. Patient seen and examined. Complaints of diaphoresis that started today. Admits to intermittent palpitations,  Mild intermittent 2/10 chest pain that is more like discomfort, stabbing in nature, lasting only a few seconds. EKG done this a.m.; poor data quality but does show abnormalities including atrial flutter. Per telemetry; pt having arrhythmias switching between Afib, Aflutter, and PVCs since yesterday;   cardiology aware;  Home propafenone discontinued, patient started on amiodarone. Patient also having hypoglycemic episodes yesterday;  Decreasing Lantus and discontinuing scheduled Humalog (will continue insulin sliding scale). Discussion held with cardiology yesterday in regards to cardiac cath;   Per Dr. Korey Villarreal, heart cath will not be done this week and patient can be discharged on Eliquis and have heart cath done next week as outpatient. Review of Systems:     Review of Systems   Constitutional: Positive for diaphoresis and fatigue. HENT: Negative for rhinorrhea and sore throat. Eyes: Negative for photophobia and visual disturbance. Respiratory: Positive for shortness of breath. Negative for cough. Cardiovascular: Positive for chest pain (mild, lasting few seconds) and palpitations. Gastrointestinal: Negative for nausea and vomiting. Genitourinary: Positive for frequency. Negative for dysuria. Musculoskeletal: Positive for gait problem (chronic 2/2 BKA). Negative for arthralgias. Neurological: Positive for numbness. Negative for headaches. Medications: Allergies: Allergies   Allergen Reactions    Ramipril      Other reaction(s): swelling of the lips   Other reaction(s): swelling of the lips     Adhesive Tape      tegaderm causes blisters.  Use paper tape       Current Meds:   Scheduled Meds:    insulin glargine  90 Units SubCUTAneous Daily    pregabalin  100 mg Oral TID    amiodarone  200 mg Oral BID    aspirin EC  81 mg Oral Daily    atorvastatin  40 mg Oral Daily    budesonide-formoterol  2 puff Inhalation BID    clotrimazole-betamethasone   Topical BID    apixaban  5 mg Oral BID    finasteride  5 mg Oral Daily    gemfibrozil  600 mg Oral Daily    isosorbide mononitrate  30 mg Oral Daily    losartan  50 mg Oral Daily    metoprolol tartrate  25 mg Oral BID    rOPINIRole  2 mg Oral Daily    tamsulosin  0.4 mg Oral Nightly    sodium chloride flush  5-40 mL IntraVENous 2 times per day    levothyroxine  175 mcg Oral Daily    insulin lispro  0-12 Units SubCUTAneous TID WC    insulin lispro  0-6 Units SubCUTAneous Nightly    famotidine  20 mg Oral BID     Continuous Infusions:    sodium chloride      dextrose       PRN Meds: albuterol sulfate HFA, sodium chloride flush, sodium chloride, ondansetron **OR** ondansetron, polyethylene glycol, acetaminophen **OR** acetaminophen, potassium chloride **OR** potassium alternative oral replacement **OR** potassium chloride, magnesium sulfate, glucose, dextrose, glucagon (rDNA), dextrose    Data:     Past Medical History:   has a past medical history of A-fib ([facility] [address] Asymptomatic bilateral carotid artery stenosis, Boil, thigh, CAD (coronary artery disease), Charcot foot due to diabetes mellitus ([facility] [address] COPD (chronic obstructive pulmonary disease) ([facility] [address] Diabetes mellitus ([facility] [address] Diabetic neuropathy ([facility] [address] Difficult intravenous access, Employs prosthetic leg, Foot ulcer ([facility] [address] Full dentures, Hyperlipidemia, Hypertension, Hypoglycemia, MRSA (methicillin resistant staph aureus) culture positive, OA (osteoarthritis), Osteomyelitis ([address] Paroxysmal atrial fibrillation ([address] Renal mass, Snores, Suspected sleep apnea, Thyroid disease, Vision abnormalities, Vitreous hemorrhage of left eye due to diabetes mellitus ([address] Wears glasses, Wears partial dentures, and Wellness examination. Social History:   reports that he quit smoking about 10 years ago. His smoking use included cigars. He started smoking about 44 years ago. He has a 50.00 pack-year smoking history. He has never used smokeless tobacco. He reports current alcohol use. He reports previous drug use. Drug: Marijuana [place]). Family History:   Family History   Problem Relation Age of Onset    Diabetes Mother     Hypertension Mother     Stomach Cancer Mother     Hypertension Father     Alcohol Abuse Father     COPD Father     Kidney Cancer Father     Diabetes Brother         IDDM-PUMP    Other Sister         BRAIN TUMOR       Vitals:  /80   Pulse 54   Temp 97.5 °F (36.4 °C)   Resp 16   Ht 6' 4\" (1.93 m)   Wt 281 lb 12 oz (127.8 kg)   SpO2 92%   BMI 34.30 kg/m²   Temp (24hrs), Av.9 °F (36.6 °C), Min:97.5 °F (36.4 °C), Max:98.2 °F (36.8 °C)    Recent Labs     21  1759 21  2216 21  0603   POCGLU 122* 90 108     Vitals:    21 1352 21 1910 21 2036 21 0658   BP: 129/68 111/72  119/80   Pulse: 87 85  54   Resp: 16 16  16   Temp: 98.1 °F (36.7 °C) 98.2 °F (36.8 °C)  97.5 °F (36.4 °C)   TempSrc: Oral Oral     SpO2: 94% 95% 96% 92%   Weight:       Height:           I/O(24Hr):   No intake or output data in the 24 hours ending 21 0737    Labs:  Recent Results (from the past 24 hour(s))   Basic Metabolic Panel w/ Reflex to MG    Collection Time: 21  9:05 AM   Result Value Ref Range    Glucose 137 (H) 70 - 99 mg/dL    BUN 21 8 - 23 mg/dL    CREATININE 1.26 (H) 0.70 - 1.20 mg/dL    Bun/Cre Ratio NOT REPORTED 9 - 20    Calcium 9.1 8.6 - 10.4 mg/dL    Sodium 143 135 - 144 mmol/L    Potassium 4.5 3.7 - 5.3 mmol/L    Chloride 109 (H) 98 - 107 mmol/L    CO2 24 20 - 31 mmol/L    Anion Gap 10 9 - 17 mmol/L    GFR Non-African American 58 (L) >60 mL/min    GFR African American >60 >60 mL/min    GFR Comment          GFR Staging NOT REPORTED    CBC auto differential    Collection Time: 12/29/21  9:05 AM   Result Value Ref Range    WBC 6.2 3.5 - 11.0 k/uL    RBC 4.46 (L) 4.5 - 5.9 m/uL    Hemoglobin 14.7 13.5 - 17.5 g/dL    Hematocrit 41.5 41 - 53 %    MCV 93.0 80 - 100 fL    MCH 33.0 26 - 34 pg    MCHC 35.5 31 - 37 g/dL    RDW 14.4 11.5 - 14.9 %    Platelets 671 640 - 979 k/uL    MPV 9.8 6.0 - 12.0 fL    NRBC Automated NOT REPORTED per 100 WBC    Differential Type NOT REPORTED     Seg Neutrophils 68 (H) 36 - 66 %    Lymphocytes 17 (L) 24 - 44 %    Monocytes 11 (H) 1 - 7 %    Eosinophils % 3 0 - 4 %    Basophils 1 0 - 2 %    Immature Granulocytes NOT REPORTED 0 %    Segs Absolute 4.20 1.3 - 9.1 k/uL    Absolute Lymph # 1.00 1.0 - 4.8 k/uL    Absolute Mono # 0.70 0.1 - 1.3 k/uL    Absolute Eos # 0.20 0.0 - 0.4 k/uL    Basophils Absolute 0.10 0.0 - 0.2 k/uL    Absolute Immature Granulocyte NOT REPORTED 0.00 - 0.30 k/uL    WBC Morphology NOT REPORTED     RBC Morphology NOT REPORTED     Platelet Estimate NOT REPORTED    EKG 12 Lead    Collection Time: 12/29/21  6:26 PM   Result Value Ref Range    Ventricular Rate 83 BPM    Atrial Rate 83 BPM    P-R Interval 190 ms    QRS Duration 90 ms    Q-T Interval 360 ms    QTc Calculation (Bazett) 423 ms    P Axis 34 degrees    R Axis 11 degrees    T Axis 86 degrees   Magnesium    Collection Time: 12/29/21  7:23 PM   Result Value Ref Range    Magnesium 1.9 1.6 - 2.6 mg/dL   Phosphorus    Collection Time: 12/29/21  7:23 PM   Result Value Ref Range    Phosphorus 3.1 2.5 - 4.5 mg/dL         Lab Results   Component Value Date/Time    SPECIAL NOT REPORTED 10/17/2020 08:22 AM     Lab Results   Component Value Date/Time    CULTURE KLEBSIELLA PNEUMONIAE >223852 CFU/ML (A) 10/17/2020 08:22 AM         Radiology:    No results found. Physical Examination:        Physical Exam  Constitutional:       General: He is not in acute distress. Appearance: He is diaphoretic. HENT:      Head: Normocephalic and atraumatic. Nose: Nose normal.   Eyes:      General:         Right eye: No discharge. Left eye: No discharge. Conjunctiva/sclera: Conjunctivae normal.   Cardiovascular:      Rate and Rhythm: Tachycardia present. Rhythm irregular. Pulmonary:      Effort: No respiratory distress. Breath sounds: Normal breath sounds. No wheezing or rales. Comments: Short of breath with speaking  Abdominal:      General: There is distension. Palpations: Abdomen is soft. Tenderness: There is no abdominal tenderness. Musculoskeletal:         General: No tenderness. Right lower leg: Edema (mild) present. Left lower leg: No edema. Skin:     General: Skin is warm. Neurological:      Mental Status: He is alert.       Comments: oriented   Psychiatric:         Mood and Affect: Mood normal.         Behavior: Behavior normal.           Assessment:        Primary Problem  Acute coronary syndrome [facility])    Active Hospital Problems    Diagnosis Date Noted    Chest pain [R07.9] 12/28/2021    Rule out acute coronary syndrome [I24.9] 12/28/2021    Chronic diastolic heart failure with preserved EF [I50.32] 12/28/2021    BPH  [N40.0] 12/28/2021    Employs prosthetic leg s/p left BKA [Z97.10] 12/28/2021    Atrial fibrillation  [I48.91] 07/17/2020    Acquired hypothyroidism [E03.9] 01/23/2019    Essential hypertension [I10] 05/18/2018    Pure hypercholesterolemia [E78.00] 12/10/2015    Type 2 diabetes mellitus with diabetic polyneuropathy, with long-term current use of insulin  [E11.42, Z79.4] 12/03/2015       Plan:        Rule out Acute Coronary Syndrome  Episodes of SOB, diaphoresis and dizziness with associated chest pain  Risk factors: HTN, HLD, obesity, DM, hx b/l carotid artery stenosis  Negative stress test on 5/2021 with EF 60% (previously 48% on 6/2019 stress test)  HS troponin 54 > 52 > 53 > 49  ASA 81 mg p.o. daily  Telemetry  Cardiology consulted; recommended   Resume Eliquis and DC planning   Heart cath in 7 to 10 days after holding Eliquis for a few days    Atrial fibrillation on AC - rate & rhythm uncontrolled  Runs of A. fib and PVCs per telemetry starting on 12/29   Apixaban 5 mg p.o. twice daily    Cardiology following;   Home propafenone discontinued    Started on amiodarone 200 mg p.o. twice daily  Persistently tachycardic this a.m. with heart rate consistently above 100 & up to 160s with irregular rhythm    => Cardio aware;     Amiodarone 400 mg p.o. x1 dose given    Lopressor 25 mg p.o. x1 dose given   => Will transfer patient to progressive unit   => Patient may need continuous amiodarone infusion if HR remains uncontrolled; will await for further recommendations from cardiology     Hypertrophic cardiomyopathy & chronic diastolic HFpEF  SOB/diaphoresis/dizziness worse with exertion  Pro   Echo 11/2019: LV EF >55%, evidence of mild (grade I) diastolic dysfunction, tricuspid regurgitation  Repeat echo 12/30/2021: EF 55%, small LV cavity, left ventricular hypertrophy, mild mitral and tricuspid regurgitation  Metoprolol tartrate 25 mg p.o. twice daily (home dose)     Acquired Hypothyroidism  S/p radioiodine treatment for hyperthyroidism  C/o palpitations  TSH 15.81 & Free T4 0.97 on 12/14  Synthroid 175 mcg p.o. daily (increased 12/28 from home dose of 150 mcg)  Will need TSH and free T4 re-checked in 6 weeks    DM with neuropathy  Insulin glargine 110 units SQ daily => decreased to 90 units 2/2 hypoglycemia  Insulin lispro 12 units SQ 3 times daily with meals => discontinued 2/2 hypoglycemia  Medium dose insulin sliding scale,   POCT glucose,   hypoglycemia protocol  Pregabalin 100 mg p.o. 3 times daily        Comorbid conditions  HLD: Atorvastatin 40 mg p.o. daily, gemfibrozil 600 mg p.o. daily  Anginal equivalent: Isosorbide mononitrate 30 mg p.o. daily  HTN: Current 50 mg p.o. daily  BPH: Finasteride 5 mg p.o. daily, tamsulosin 0.4 mg p.o. nightly, follows urologist  COPD: Albuterol sulfate inhaler as needed, budesonide-formoterol inhaler twice daily,  Prosthetic leg use status post left BKA: Clotrimazole-betamethasone cream twice daily, ropinirole 2 mg p.o. daily, PT/OT while inpatient       Code: Full  DVT prophylaxis: SCDs, Eliquis on hold for possible cardiac procedure  GI prophylaxis: Famotidine 20 mg p.o. twice daily  Diet: cardiac & diabetic diet, NPO at midnight  Activity: Up as tolerated  Dispo: Transfer to [facility]      Please note: Use of a speech recognition software was used in the creation of portions of this note and dictation errors, including those of syntax and sound alike word substitutions, may have escaped proofreading. Dusty Tabor DO  12/30/2021  7:37 AM       Attending Physician Statement  I have discussed the care of Amanda Proctor and I have examined the patient myselft and taken ros and hpi , including pertinent history and exam findings,  with the resident. I have reviewed the key elements of all parts of the encounter with the resident. I agree with the assessment, plan and orders as documented by the resident.   Plan for cath  arrythmias  Rule out multivessel dis      Electronically signed by Bharti Recinos MD

## 2021-12-30 NOTE — PLAN OF CARE
Problem: Falls - Risk of:  Goal: Will remain free from falls  Description: Will remain free from falls  12/30/2021 0405 by Stefanie Dickinson RN  Outcome: Ongoing  Note: Patient remains free of falls and injuries throughout shift. Bed remains in the lowest position, wheels locked, call light and bedside table are within reach. 12/29/2021 1841 by Preston Gold RN  Outcome: Ongoing  Note: Patient remains free of incidence/ injury. Bed remains in low position. Side rails up x2. Goal: Absence of physical injury  Description: Absence of physical injury  Outcome: Ongoing     Problem: Skin Integrity:  Goal: Will show no infection signs and symptoms  Description: Will show no infection signs and symptoms  12/30/2021 0405 by Stefanie Dickinson RN  Outcome: Ongoing  12/29/2021 1841 by Preston Gold RN  Outcome: Ongoing  Note: Patient displays no new signs of infection during this shift. Goal: Absence of new skin breakdown  Description: Absence of new skin breakdown  12/30/2021 0405 by Stefanie Dickinson RN  Outcome: Ongoing  Note: Assess the overall condition of the skin. Check on bony prominences such as the sacrum, trochanters, scapulae, elbows, heels, inner and outer malleolus, inner and outer knees, back of head). Reinforce the importance of turning, mobility, and ambulation. 12/29/2021 1841 by Preston Gold RN  Outcome: Ongoing  Note: No new occurrence of skin breakdown noted during this shift. Problem: Pain:  Goal: Pain level will decrease  Description: Pain level will decrease  Outcome: Ongoing  Goal: Control of acute pain  Description: Control of acute pain  12/30/2021 0405 by Stefanie Dickinson RN  Outcome: Ongoing  12/29/2021 1841 by Preston Gold RN  Outcome: Ongoing  Note: Patient expresses relief following administration of prn pain medication.    Goal: Control of chronic pain  Description: Control of chronic pain  Outcome: Ongoing

## 2021-12-30 NOTE — PROGRESS NOTES
Report called to [address] at [facility]. [employer] called.

## 2021-12-31 VITALS
RESPIRATION RATE: 16 BRPM | BODY MASS INDEX: 34.31 KG/M2 | DIASTOLIC BLOOD PRESSURE: 75 MMHG | WEIGHT: 281.75 LBS | HEIGHT: 76 IN | OXYGEN SATURATION: 91 % | TEMPERATURE: 97.6 F | SYSTOLIC BLOOD PRESSURE: 136 MMHG | HEART RATE: 74 BPM

## 2021-12-31 LAB
ABSOLUTE EOS #: 0.2 K/UL (ref 0–0.4)
ABSOLUTE IMMATURE GRANULOCYTE: ABNORMAL K/UL (ref 0–0.3)
ABSOLUTE LYMPH #: 1 K/UL (ref 1–4.8)
ABSOLUTE MONO #: 0.7 K/UL (ref 0.1–1.3)
ANION GAP SERPL CALCULATED.3IONS-SCNC: 13 MMOL/L (ref 9–17)
BASOPHILS # BLD: 1 % (ref 0–2)
BASOPHILS ABSOLUTE: 0 K/UL (ref 0–0.2)
BUN BLDV-MCNC: 22 MG/DL (ref 8–23)
BUN/CREAT BLD: ABNORMAL (ref 9–20)
CALCIUM SERPL-MCNC: 8.8 MG/DL (ref 8.6–10.4)
CHLORIDE BLD-SCNC: 109 MMOL/L (ref 98–107)
CO2: 23 MMOL/L (ref 20–31)
CREAT SERPL-MCNC: 1.4 MG/DL (ref 0.7–1.2)
DIFFERENTIAL TYPE: ABNORMAL
EKG ATRIAL RATE: 76 BPM
EKG P AXIS: 40 DEGREES
EKG P-R INTERVAL: 188 MS
EKG Q-T INTERVAL: 376 MS
EKG QRS DURATION: 90 MS
EKG QTC CALCULATION (BAZETT): 423 MS
EKG R AXIS: 39 DEGREES
EKG T AXIS: 87 DEGREES
EKG VENTRICULAR RATE: 76 BPM
EOSINOPHILS RELATIVE PERCENT: 3 % (ref 0–4)
GFR AFRICAN AMERICAN: >60 ML/MIN
GFR NON-AFRICAN AMERICAN: 52 ML/MIN
GFR SERPL CREATININE-BSD FRML MDRD: ABNORMAL ML/MIN/{1.73_M2}
GFR SERPL CREATININE-BSD FRML MDRD: ABNORMAL ML/MIN/{1.73_M2}
GLUCOSE BLD-MCNC: 63 MG/DL (ref 70–99)
HCT VFR BLD CALC: 40.8 % (ref 41–53)
HEMOGLOBIN: 14.2 G/DL (ref 13.5–17.5)
IMMATURE GRANULOCYTES: ABNORMAL %
LYMPHOCYTES # BLD: 17 % (ref 24–44)
MCH RBC QN AUTO: 32 PG (ref 26–34)
MCHC RBC AUTO-ENTMCNC: 34.7 G/DL (ref 31–37)
MCV RBC AUTO: 92 FL (ref 80–100)
MONOCYTES # BLD: 13 % (ref 1–7)
NRBC AUTOMATED: ABNORMAL PER 100 WBC
PDW BLD-RTO: 14.2 % (ref 11.5–14.9)
PLATELET # BLD: 157 K/UL (ref 150–450)
PLATELET ESTIMATE: ABNORMAL
PMV BLD AUTO: 10.8 FL (ref 6–12)
POTASSIUM SERPL-SCNC: 4.1 MMOL/L (ref 3.7–5.3)
RBC # BLD: 4.43 M/UL (ref 4.5–5.9)
RBC # BLD: ABNORMAL 10*6/UL
SEG NEUTROPHILS: 66 % (ref 36–66)
SEGMENTED NEUTROPHILS ABSOLUTE COUNT: 3.8 K/UL (ref 1.3–9.1)
SODIUM BLD-SCNC: 145 MMOL/L (ref 135–144)
WBC # BLD: 5.6 K/UL (ref 3.5–11)
WBC # BLD: ABNORMAL 10*3/UL

## 2021-12-31 PROCEDURE — 94761 N-INVAS EAR/PLS OXIMETRY MLT: CPT

## 2021-12-31 PROCEDURE — 6370000000 HC RX 637 (ALT 250 FOR IP): Performed by: INTERNAL MEDICINE

## 2021-12-31 PROCEDURE — 94640 AIRWAY INHALATION TREATMENT: CPT

## 2021-12-31 PROCEDURE — 80048 BASIC METABOLIC PNL TOTAL CA: CPT

## 2021-12-31 PROCEDURE — 36415 COLL VENOUS BLD VENIPUNCTURE: CPT

## 2021-12-31 PROCEDURE — 6370000000 HC RX 637 (ALT 250 FOR IP)

## 2021-12-31 PROCEDURE — 2580000003 HC RX 258

## 2021-12-31 PROCEDURE — 93010 ELECTROCARDIOGRAM REPORT: CPT | Performed by: INTERNAL MEDICINE

## 2021-12-31 PROCEDURE — 85025 COMPLETE CBC W/AUTO DIFF WBC: CPT

## 2021-12-31 PROCEDURE — 99239 HOSP IP/OBS DSCHRG MGMT >30: CPT | Performed by: INTERNAL MEDICINE

## 2021-12-31 RX ORDER — AMLODIPINE BESYLATE 2.5 MG/1
2.5 TABLET ORAL DAILY
Status: DISCONTINUED | OUTPATIENT
Start: 2021-12-31 | End: 2021-12-31 | Stop reason: HOSPADM

## 2021-12-31 RX ORDER — AMLODIPINE BESYLATE 2.5 MG/1
2.5 TABLET ORAL DAILY
Qty: 30 TABLET | Refills: 3 | Status: SHIPPED | OUTPATIENT
Start: 2022-01-01 | End: 2022-04-25

## 2021-12-31 RX ORDER — INSULIN GLARGINE 100 [IU]/ML
75 INJECTION, SOLUTION SUBCUTANEOUS DAILY
Status: DISCONTINUED | OUTPATIENT
Start: 2021-12-31 | End: 2021-12-31 | Stop reason: HOSPADM

## 2021-12-31 RX ORDER — METOPROLOL TARTRATE 50 MG/1
50 TABLET, FILM COATED ORAL 2 TIMES DAILY
Qty: 60 TABLET | Refills: 3 | Status: SHIPPED | OUTPATIENT
Start: 2021-12-31 | End: 2022-04-25

## 2021-12-31 RX ORDER — AMIODARONE HYDROCHLORIDE 200 MG/1
200 TABLET ORAL 2 TIMES DAILY
Qty: 60 TABLET | Refills: 0 | Status: SHIPPED | OUTPATIENT
Start: 2021-12-31 | End: 2022-11-01

## 2021-12-31 RX ORDER — INSULIN GLARGINE 100 [IU]/ML
75 INJECTION, SOLUTION SUBCUTANEOUS DAILY
Status: DISCONTINUED | OUTPATIENT
Start: 2022-01-01 | End: 2021-12-31

## 2021-12-31 RX ORDER — LEVOTHYROXINE SODIUM 175 UG/1
175 TABLET ORAL DAILY
Qty: 30 TABLET | Refills: 3 | Status: SHIPPED | OUTPATIENT
Start: 2022-01-01 | End: 2022-04-25

## 2021-12-31 RX ORDER — INSULIN GLARGINE 100 [IU]/ML
75 INJECTION, SOLUTION SUBCUTANEOUS DAILY
Qty: 10 ML | Refills: 3 | Status: SHIPPED | OUTPATIENT
Start: 2022-01-01 | End: 2022-01-19

## 2021-12-31 RX ORDER — PREGABALIN 100 MG/1
100 CAPSULE ORAL 3 TIMES DAILY
Qty: 60 CAPSULE | Refills: 0 | Status: SHIPPED | OUTPATIENT
Start: 2021-12-31 | End: 2022-03-30

## 2021-12-31 RX ADMIN — LOSARTAN POTASSIUM 50 MG: 50 TABLET, FILM COATED ORAL at 10:28

## 2021-12-31 RX ADMIN — PREGABALIN 100 MG: 100 CAPSULE ORAL at 14:38

## 2021-12-31 RX ADMIN — INSULIN LISPRO 2 UNITS: 100 INJECTION, SOLUTION INTRAVENOUS; SUBCUTANEOUS at 12:19

## 2021-12-31 RX ADMIN — APIXABAN 5 MG: 5 TABLET, FILM COATED ORAL at 10:28

## 2021-12-31 RX ADMIN — PREGABALIN 100 MG: 100 CAPSULE ORAL at 10:28

## 2021-12-31 RX ADMIN — FAMOTIDINE 20 MG: 20 TABLET ORAL at 10:28

## 2021-12-31 RX ADMIN — ISOSORBIDE MONONITRATE 30 MG: 30 TABLET, EXTENDED RELEASE ORAL at 10:28

## 2021-12-31 RX ADMIN — METOPROLOL TARTRATE 50 MG: 50 TABLET, FILM COATED ORAL at 10:28

## 2021-12-31 RX ADMIN — AMLODIPINE BESYLATE 2.5 MG: 2.5 TABLET ORAL at 10:28

## 2021-12-31 RX ADMIN — ROPINIROLE HYDROCHLORIDE 2 MG: 2 TABLET, FILM COATED ORAL at 12:21

## 2021-12-31 RX ADMIN — LEVOTHYROXINE SODIUM 175 MCG: 0.05 TABLET ORAL at 10:28

## 2021-12-31 RX ADMIN — INSULIN GLARGINE 75 UNITS: 100 INJECTION, SOLUTION SUBCUTANEOUS at 13:31

## 2021-12-31 RX ADMIN — SODIUM CHLORIDE, PRESERVATIVE FREE 10 ML: 5 INJECTION INTRAVENOUS at 10:28

## 2021-12-31 RX ADMIN — FINASTERIDE 5 MG: 5 TABLET, FILM COATED ORAL at 10:28

## 2021-12-31 RX ADMIN — BUDESONIDE AND FORMOTEROL FUMARATE DIHYDRATE 2 PUFF: 160; 4.5 AEROSOL RESPIRATORY (INHALATION) at 09:08

## 2021-12-31 RX ADMIN — ASPIRIN 81 MG: 81 TABLET, COATED ORAL at 10:28

## 2021-12-31 RX ADMIN — AMIODARONE HYDROCHLORIDE 200 MG: 200 TABLET ORAL at 10:28

## 2021-12-31 RX ADMIN — GEMFIBROZIL 600 MG: 600 TABLET ORAL at 10:28

## 2021-12-31 RX ADMIN — ATORVASTATIN CALCIUM 40 MG: 40 TABLET, FILM COATED ORAL at 10:28

## 2021-12-31 NOTE — DISCHARGE SUMMARY
[address]    Discharge Summary     Patient ID: Elly Shah  :  1960   MRN: [id number]     ACCOUNT:  [de-identified]   Patient's PCP: Nelson Webster MD  Admit Date: 2021   Discharge Date: 2021     Length of Stay: 3  Code Status:  Full Code  Admitting Physician: Nelson Webster MD  Discharge Physician: Madai Jim DO     Active Discharge Diagnoses:       Primary Problem  Acute coronary syndrome [facility])      [facility] Problems    Diagnosis Date Noted    Hypertrophic cardiomyopathy ([facility] [I42.2] 2021    Chest pain [R07.9] 2021    Rule out acute coronary syndrome [I24.9] 2021    Chronic diastolic heart failure with preserved EF [I50.32] 2021    BPH  [N40.0] 2021    Employs prosthetic leg s/p left BKA [Z97.10] 2021    Atrial fibrillation  [I48.91] 2020    Acquired hypothyroidism [E03.9] 2019    Essential hypertension [I10] 2018    Pure hypercholesterolemia [E78.00] 12/10/2015    Type 2 diabetes mellitus with diabetic polyneuropathy, with long-term current use of insulin  [E11.42, Z79.4] 2015       Admission Condition:  poor     Discharged Condition: good    Hospital Stay:       Hospital Course:  Elly Shah is a 64 y.o. male who was admitted for the management of possible Acute coronary syndrome ([facility] , presented to ER withShortness of Breath, Chest Pain, and Sweats    Patient has past medical history of A. fib, Charcot foot, obesity, COPD (quit smoking , previously 6-8 cigars daily x20 years), suspected sleep apnea, bilateral carotid artery stenosis (16-49%), insulin-dependent diabetes mellitus, neuropathy, use of prosthetic leg status post left BKA, HLD, OA, HTN, hypothyroidism status post radioiodine treatment for hyperthyroidism, hx of kidney tumor s/p removal, and grade I diastolic heart dysfunction.     He presented on 12/28 c/o recurrent episodes of diaphoresis, dizziness, shortness of breath, and intermittent chest pain/discomfort and was admitted for ruling out of acute coronary syndrome. On presentation, patient hypertensive with /74 and labs remarkable for elevated BNP and troponin. Patient continued to have episodes of diaphoresis during hospitalization as well as arrhythmias with runs of A. fib, PVCs, and persistent tachycardia needing management with IV amiodarone drip. Repeat echo done; confirmed chronic diastolic CHF with EF 84%. Cardiac cath done 12/30/2021 showing nonobstructive minimal CAD with preserved LV function. Patient additionally found to have recurrent hypoglycemic episodes; hypoglycemia potentially contributing to patient's symptoms. Medication adjustments done including decrease of Lantus dose. Patient educated on medication adjustments and blood sugar monitoring/management including target glucose levels and doses for insulin sliding scale. On day of discharge, patient's chest pain and arrhythmias had resolved, patient not having any new diaphoresis episode, blood pressure improved, cardiac cath showing nonobstructive CAD, and patient is medically stable for discharge. On discharge; Patient's prescription for pregabalin renewed for continuous management of diabetic neuropathy as well as neuropathy secondary to BKA,  and Synthroid dose increased due to patient's outpatient labs showing TSH 15.8 and free T4 of 0.97.       Significant therapeutic interventions: Cardiac cath, echo blood sugar level management and insulin medication adjustments, Synthroid dose adjustments, amiodarone    Significant Diagnostic Studies:   Labs / Micro:  CBC:   Lab Results   Component Value Date    WBC 5.6 12/31/2021    RBC 4.43 12/31/2021    HGB 14.2 12/31/2021    HCT 40.8 12/31/2021    MCV 92.0 12/31/2021    MCH 32.0 12/31/2021    MCHC 34.7 12/31/2021    RDW 14.2 12/31/2021     12/31/2021 BMP:    Lab Results   Component Value Date    GLUCOSE 63 12/31/2021     12/31/2021    K 4.1 12/31/2021     12/31/2021    CO2 23 12/31/2021    ANIONGAP 13 12/31/2021    BUN 22 12/31/2021    CREATININE 1.40 12/31/2021    BUNCRER NOT REPORTED 12/31/2021    CALCIUM 8.8 12/31/2021    LABGLOM 52 12/31/2021    GFRAA >60 12/31/2021    GFR      12/31/2021    GFR NOT REPORTED 12/31/2021     HFP:    Lab Results   Component Value Date    PROT 6.9 12/28/2021     CMP:    Lab Results   Component Value Date    GLUCOSE 63 12/31/2021     12/31/2021    K 4.1 12/31/2021     12/31/2021    CO2 23 12/31/2021    BUN 22 12/31/2021    CREATININE 1.40 12/31/2021    ANIONGAP 13 12/31/2021    ALKPHOS 132 12/28/2021    ALT 29 12/28/2021    AST 26 12/28/2021    BILITOT 0.43 12/28/2021    LABALBU 4.1 12/28/2021    ALBUMIN NOT REPORTED 12/28/2021    LABGLOM 52 12/31/2021    GFRAA >60 12/31/2021    GFR      12/31/2021    GFR NOT REPORTED 12/31/2021    PROT 6.9 12/28/2021    CALCIUM 8.8 12/31/2021     PT/INR:    Lab Results   Component Value Date    PROTIME 13.2 12/28/2021    INR 1.0 12/28/2021     PTT:   Lab Results   Component Value Date    APTT 29.9 06/26/2020     FLP:    Lab Results   Component Value Date    CHOL 139 01/13/2020    TRIG 89 01/13/2020    HDL 44 05/13/2021     U/A:    Lab Results   Component Value Date    COLORU YELLOW 06/08/2020    TURBIDITY CLEAR 06/08/2020    SPECGRAV 1.018 06/08/2020    HGBUR NEGATIVE 06/08/2020    PHUR 5.0 06/08/2020    PROTEINU TRACE 06/08/2020    GLUCOSEU NEGATIVE 06/08/2020    KETUA NEGATIVE 06/08/2020    BILIRUBINUR NEGATIVE 06/08/2020    UROBILINOGEN Normal 06/08/2020    NITRU NEGATIVE 06/08/2020    LEUKOCYTESUR NEGATIVE 06/08/2020     TSH:    Lab Results   Component Value Date    TSH 15.81 12/14/2021       Radiology:    ECHO Complete 2D W Doppler W Color    Result Date: 12/30/2021  [address] Transthoracic Echocardiography Report (TTE)  Patient Name THE [facility] Date of Study                 12/29/2021               Nataliia IBRAHIM   Date of      1960  Gender                        Male  Birth   Age          64 year(s)  Race                             Room Number  2055        Height:                       75.98 inch, 193 cm   Corporate ID [id number]    Weight:                       280 pounds, 127 kg  #   Patient Christophe [de-identified]   BSA:           2.56 m^2       BMI:      34.09  #                                                                kg/m^2   MR #         [id number]      Sonographer                   Lina Hernandez   Accession #  [id number]  Interpreting Physician        Joi Leong 61   Fellow                   Referring Nurse Practitioner   Interpreting             Referring Physician           Melva Dick *  Fellow  Type of Study   TTE procedure:2D Echocardiogram, M-Mode, Doppler, Color Doppler, Contrast  study. Procedure Date Date: 12/29/2021 Start: 11:31 AM Study Location: [address] [place] [address] Technical Quality: Limited visualization due to body habitus. Indications:Congestive heart failure. Patient Status: Inpatient Contrast Medium: Definity. Amount - 2 ml Height: 75.98 inches Weight: 279.99 pounds BSA: 2.56 m^2 BMI: 34.09 kg/m^2 Rhythm: Within normal limits HR: 77 bpm BP: 159/85 mmHg CONCLUSIONS Summary Echo contrast utilized on this technically difficult study. Small LV cavity. Normal left ventricular function with an estimated EF 55%. No segmental wall motion abnormalities seen. Moderate left ventricular hypertrophy. Left atrium is normal in size. Right atrium is normal in size. Normal right ventricular size and function. Normal aortic valve structure and function without stenosis or regurgitation. Normal aortic root dimension. Normal mitral valve structure and function. Mild mitral regurgitation. Normal tricuspid valve structure and function. Mild tricuspid regurgitation. Estimated right ventricular systolic pressure is 17 mmHg.  Normal right ventricular systolic pressure. IVC not well visualized. No significant pericardial effusion is seen. Signature ----------------------------------------------------------------------------  Electronically signed by Tigist Ayon(Interpreting physician) on  2021 09:18 AM ---------------------------------------------------------------------------- ----------------------------------------------------------------------------  Electronically signed by Lina Hernandez(Sonographer) on 2021 12:24  PM ---------------------------------------------------------------------------- FINDINGS Left Atrium Left atrium is normal in size. Left Ventricle Small LV cavity. Normal left ventricular function with an estimated EF 55%. No segmental wall motion abnormalities seen. Moderate left ventricular hypertrophy. Right Atrium Right atrium is normal in size. Right Ventricle Normal right ventricular size and function. Mitral Valve Normal mitral valve structure and function. Mild mitral regurgitation. Aortic Valve Normal aortic valve structure and function without stenosis or regurgitation. Tricuspid Valve Normal tricuspid valve structure and function. Mild tricuspid regurgitation. Estimated right ventricular systolic pressure is 17 mmHg. Normal right ventricular systolic pressure. Pulmonic Valve The pulmonic valve is normal in structure. No pulmonic insufficiency. Pericardial Effusion No significant pericardial effusion is seen. Miscellaneous Normal aortic root dimension. E/e' average 8.7 IVC not well visualized.  M-mode / 2D Measurements & Calculations:   LVIDd:3.48 cm(3.7 - 5.6 cm)      Diastolic KBQOJO:69.9 ml  CBKV.03 cm(2.2 - 4.0 cm)      Systolic YRTNLN:08.7 ml  DDMP:5.9 cm(0.6 - 1.1 cm)        Aortic Root:3.7 cm(2.0 - 3.7 cm)  LVPWd:1.49 cm(0.6 - 1.1 cm)      LA Dimension: 4.3 cm(1.9 - 4.0 cm)  Fractional Shortenin.59 %    LA volume/Index: 48.9 ml /19m^2  Calculated LVEF (%): 56.11 %     LVOT:2.7 cm   Mitral: Aortic   Peak E-Wave: 0.61 m/s                  Peak Velocity: 0.99 m/s  Peak A-Wave: 0.70 m/s                  Mean Velocity: 0.74 m/s  E/A Ratio: 0.88                        Peak Gradient: 3.93 mmHg  Peak Gradient: 1.49 mmHg               Mean Gradient: 2 mmHg  Deceleration Time: 211 msec                                          Area (continuity): 4.29 cm^2                                         AV VTI: 21.2 cm   Tricuspid:                             Pulmonic:   Estimated RVSP: 17 mmHg  Peak TR Velocity: 1.88 m/s  Peak TR Gradient: 14.1376 mmHg  Estimated RA Pressure: 3 mmHg                                         Estimated PASP: 17.14 mmHg  Septal Wall E' velocity:0.06 m/s Lateral Wall E' velocity:0.08 m/s    XR CHEST PORTABLE    Result Date: 12/28/2021  EXAMINATION: ONE XRAY VIEW OF THE CHEST 12/28/2021 12:29 pm COMPARISON: CT lung screening from 03/02/2021, and previous chest x-ray from 11/26/2019 HISTORY: ORDERING SYSTEM PROVIDED HISTORY: dyspnea TECHNOLOGIST PROVIDED HISTORY: dyspnea Reason for Exam: dyspnea Overlying ECG monitor leads. Cardiac silhouette evident but WNL in size for AP technique. Mediastinal structures midline and unchanged. Bibasilar atelectatic appearing changes with persistent unchanged elevation right hemidiaphragm; no confluent pulmonary opacity or blunting of the costophrenic angles. No pneumothorax. Moderate DJD spine, unchanged. FINDINGS: No acute cardiopulmonary disease. Consultations:    Consults:     Final Specialist Recommendations/Findings:   IP CONSULT TO INTERNAL MEDICINE  IP CONSULT TO CARDIOLOGY  IP CONSULT TO SOCIAL WORK      The patient was seen and examined on day of discharge and this discharge summary is in conjunction with any daily progress note from day of discharge.     Discharge plan:       Disposition: Home    Physician Follow Up:     Renetta Jacobson DPM  [address]    Go on 1/7/2022  [facility] Follow Up @ 9am       Requiring Further Evaluation/Follow Up POST HOSPITALIZATION/Incidental Findings: Hypoglycemia, acquired hypothyroidism, nonobstructive CAD    Diet: cardiac diet, diabetic diet and low fat, low cholesterol diet    Activity: As tolerated    Instructions to Patient:   Take all medications as prescribed. If symptoms worsen or recur, please return to the ED. Follow-up with PCP. Follow-up with cardiologist.      Discharge Medications:      Medication List      START taking these medications    amiodarone 200 MG tablet  Commonly known as: CORDARONE  Take 1 tablet by mouth 2 times daily     amLODIPine 2.5 MG tablet  Commonly known as: NORVASC  Take 1 tablet by mouth daily  Start taking on: January 1, 2022     insulin glargine 100 UNIT/ML injection vial  Commonly known as: LANTUS  Inject 75 Units into the skin daily  Start taking on: January 1, 2022  Replaces: Tocharisma Max SoloStar 300 UNIT/ML Sopn        CHANGE how you take these medications    insulin lispro 100 UNIT/ML injection vial  Commonly known as: HumaLOG  SLIDING SCALE (TAKE 5 UNITS IF SUGAR IS OVER 150)  What changed: additional instructions     levothyroxine 175 MCG tablet  Commonly known as: SYNTHROID  Take 1 tablet by mouth daily  Start taking on: January 1, 2022  What changed:   · medication strength  · See the new instructions. metoprolol tartrate 50 MG tablet  Commonly known as: LOPRESSOR  Take 1 tablet by mouth 2 times daily  What changed:   · medication strength  · See the new instructions. pregabalin 100 MG capsule  Commonly known as: LYRICA  Take 1 capsule by mouth 3 times daily for 30 days.   What changed:   · how much to take  · how to take this  · when to take this  · additional instructions        CONTINUE taking these medications    albuterol sulfate  (90 Base) MCG/ACT inhaler  INHALE 2 PUFFS INTO THE LUNGS EVERY 6 HOURS AS NEEDED FOR WHEEZING OR SHORTNESS OF BREATH     aspirin EC 81 MG EC tablet  Take 1 tablet by mouth daily     atorvastatin 40 MG tablet  Commonly known as: LIPITOR  TAKE 1 TABLET BY MOUTH EVERY DAY     B-D UF III MINI PEN NEEDLES 31G X 5 MM Misc  Generic drug: Insulin Pen Needle  USE AS DIRECTED ONCE DAILY TO INJECT TOUJEO     blood glucose test strips  T/S Contour.  Test 4 times a day     Blood Pressure Kit  1 actuation by Does not apply route once a week     budesonide-formoterol 160-4.5 MCG/ACT Aero  Commonly known as: Symbicort  TAKE 2 PUFFS BY MOUTH TWICE A DAY     clotrimazole-betamethasone 1-0.05 % cream  Commonly known as: LOTRISONE  APPLY TO AFFECTED AREA TWICE A DAY     Dexcom G6  Lory     Eliquis 5 MG Tabs tablet  Generic drug: apixaban  TAKE 1 TABLET BY MOUTH TWICE A DAY     finasteride 5 MG tablet  Commonly known as: PROSCAR  TAKE 1 TABLET BY MOUTH EVERY DAY     Fish Oil Concentrate 300 MG Caps     gemfibrozil 600 MG tablet  Commonly known as: LOPID  TAKE 1 TABLET BY MOUTH EVERY DAY     glucagon (rDNA) 1 MG injection     Glucosamine Sulfate 500 MG Tabs     Handicap Placard Misc  by Does not apply route Duration - 5years  Reason- prosthetic leg     isosorbide mononitrate 30 MG extended release tablet  Commonly known as: IMDUR  TAKE 1 TABLET BY MOUTH EVERY DAY     losartan 50 MG tablet  Commonly known as: COZAAR  TAKE 1 TABLET BY MOUTH EVERY DAY     rOPINIRole 2 MG tablet  Commonly known as: REQUIP  TAKE 1 TABLET BY MOUTH EVERY DAY     tamsulosin 0.4 MG capsule  Commonly known as: FLOMAX  TAKE 1 CAPSULE BY MOUTH EVERY DAY        STOP taking these medications    Ozempic (1 MG/DOSE) 4 MG/3ML Sopn  Generic drug: Semaglutide (1 MG/DOSE)     propafenone 150 MG tablet  Commonly known as: RYTHMOL     Toujeo Max SoloStar 300 UNIT/ML Sopn  Generic drug: Insulin Glargine (2 Unit Dial)  Replaced by: insulin glargine 100 UNIT/ML injection vial           Where to Get Your Medications      These medications were sent to [facility]/pharmacy #[id number] [phone] Laura Giron [phone]  [address], [address] [place] [address]    Phone: [phone]   · amiodarone 200 MG tablet  · amLODIPine 2.5 MG tablet  · finasteride 5 MG tablet  · insulin glargine 100 UNIT/ML injection vial  · insulin lispro 100 UNIT/ML injection vial  · isosorbide mononitrate 30 MG extended release tablet  · levothyroxine 175 MCG tablet  · metoprolol tartrate 50 MG tablet     You can get these medications from any pharmacy    Bring a paper prescription for each of these medications  · pregabalin 100 MG capsule           Electronically signed by   [facility]   12/31/2021  2:00 PM      Thank you Dr. Chema Gonzalez MD for the opportunity to be involved in this patient's care.

## 2021-12-31 NOTE — PROGRESS NOTES
Physician Progress Note      Marlena Krishnamurthy  [facility] #:                  [id number]  :                       1960  ADMIT DATE:       2021 10:48 AM  DISCH DATE:  RESPONDING  PROVIDER #:        Marlin Watkins MD          QUERY TEXT:    Patient admitted with chest pain. Noted documentation of chronic diastolic   CHF on H/P and possible CHF exacerbation on H/P. If possible, please document in progress notes and discharge summary if you   are evaluating and /or treating any of the following: The medical record reflects the following:  Risk Factors: 60yo, Hx HTN CHF CAD  Clinical Indicators: per H/P--> SOB/diaphoresis/dizziness worse with exertion,   LE edema on physical exam; CXR- negative; ; echo from   2019-->Estimated LV EF >55 %, Evidence of mild (grade I) diastolic   dysfunction. Treatment: cardio consult, repeat echo ordered, CXR, no diuretics ordered,   monitoring, hospital admission, plan for cardiac cath before d/c  Options provided:  -- Chronic diastolic CHF only confirmed and acute on chronic diastolic CHF   ruled out  -- Acute on chronic diastolic CHF confirmed and chronic diastolic CHF only   ruled out  -- Other - I will add my own diagnosis  -- Disagree - Not applicable / Not valid  -- Disagree - Clinically unable to determine / Unknown  -- Refer to Clinical Documentation Reviewer    PROVIDER RESPONSE TEXT:    After study, chronic diastolic CHF only confirmed and acute on chronic   diastolic CHF ruled out.     Query created by: Patricia Rdz on 2021 11:39 AM      Electronically signed by:  Marlin Watkins MD 2021 12:30 PM

## 2021-12-31 NOTE — PLAN OF CARE
Problem: Falls - Risk of:  Goal: Will remain free from falls  Description: Will remain free from falls  12/31/2021 0241 by Brynn Diop RN  Outcome: Ongoing  Note: Pt remained absent from falls. Call light within reach. Bed locked and in lowest position. Problem: Falls - Risk of:  Goal: Absence of physical injury  Description: Absence of physical injury  12/31/2021 0241 by Brynn Diop RN  Outcome: Ongoing  Note: Patient remains free of injury. safe environment maintained       Problem: Skin Integrity:  Goal: Will show no infection signs and symptoms  Description: Will show no infection signs and symptoms  12/31/2021 0241 by Brynn Diop RN  Outcome: Ongoing  Note: No new s/s of infection. Will continue to monitor        Problem: Skin Integrity:  Goal: Absence of new skin breakdown  Description: Absence of new skin breakdown  12/31/2021 0241 by Brynn Diop RN  Outcome: Ongoing  Note: Pt skin integrity remained intact, no new alterations noted. Head to toe completed, see chart assessment. Problem: Pain:  Goal: Pain level will decrease  Description: Pain level will decrease  12/31/2021 0241 by Brynn Diop RN  Outcome: Ongoing  Note: Denies pain at this time. Will continue to monitor      Problem: Pain:  Goal: Control of acute pain  Description: Control of acute pain  12/31/2021 0241 by Brynn Diop RN  Outcome: Ongoing  Note: Denies pain at this time. Will continue to monitor      Problem: Pain:  Goal: Control of chronic pain  Description: Control of chronic pain  12/31/2021 0241 by Brynn Diop RN  Outcome: Ongoing  Note: Denies pain at this time.  Will continue to monitor

## 2021-12-31 NOTE — CARE COORDINATION
ONGOING DISCHARGE PLAN:    Patient is alert and oriented x4. Spoke with patient regarding discharge plan and patient confirms that plan is still home without needs. Declined VNS. Per cardiology, okay for patient to be discharged home today. Will continue to follow for additional discharge needs.     Electronically signed by Toribio Holter, RN on 12/31/2021 at 11:50 AM

## 2021-12-31 NOTE — PROGRESS NOTES
[facility]   Progress Note                   Date:   12/31/2021  Patient name: Tammy Avalos  Date of admission:  12/28/2021 10:48 AM  MRN:   [id number]  YOB: 1960  PCP: Cynthia Soto MD    Reason for Admission:       Subjective:       Clinical Changes / Abnormalities: Patient denies any chest pain pressure cath done yesterday LAD 40% noncritical nonobstructed  Denies any palpitation short of breath      Medications:   Scheduled Meds:   insulin glargine  90 Units SubCUTAneous Daily    metoprolol tartrate  50 mg Oral BID    pregabalin  100 mg Oral TID    amiodarone  200 mg Oral BID    aspirin EC  81 mg Oral Daily    atorvastatin  40 mg Oral Daily    budesonide-formoterol  2 puff Inhalation BID    clotrimazole-betamethasone   Topical BID    apixaban  5 mg Oral BID    finasteride  5 mg Oral Daily    gemfibrozil  600 mg Oral Daily    isosorbide mononitrate  30 mg Oral Daily    losartan  50 mg Oral Daily    rOPINIRole  2 mg Oral Daily    tamsulosin  0.4 mg Oral Nightly    sodium chloride flush  5-40 mL IntraVENous 2 times per day    levothyroxine  175 mcg Oral Daily    insulin lispro  0-12 Units SubCUTAneous TID WC    insulin lispro  0-6 Units SubCUTAneous Nightly    famotidine  20 mg Oral BID     Continuous Infusions:   sodium chloride      dextrose       CBC:   Recent Labs     12/29/21  0905 12/30/21  1037 12/31/21  0627   WBC 6.2 7.1 5.6   HGB 14.7 14.6 14.2    171 157     BMP:    Recent Labs     12/28/21  1245 12/29/21  0905 12/30/21  1037    143 142   K 5.1 4.5 4.4   * 109* 108*   CO2 24 24 22   BUN 23 21 21   CREATININE 1.24* 1.26* 1.59*   GLUCOSE 219* 137* 212*     Hepatic:   Recent Labs     12/28/21  1245   AST 26   ALT 29   BILITOT 0.43   ALKPHOS 132*     Troponin: No results for input(s): TROPONINI in the last 72 hours. BNP: No results for input(s): BNP in the last 72 hours.   Lipids: No results for input(s): CHOL, HDL in the last 72 hours.    Invalid input(s): LDLCALCU  INR:   Recent Labs     12/28/21  1245   INR 1.0       Objective:   Vitals: /79   Pulse 74   Temp 98.2 °F (36.8 °C) (Oral)   Resp 16   Ht 6' 4\" (1.93 m)   Wt 281 lb 12 oz (127.8 kg)   SpO2 93%   BMI 34.30 kg/m²   I/O last 3 completed shifts: In: 240 [P.O.:240]  Out: 350 [Urine:350]  No intake/output data recorded.   Scheduled Meds:   insulin glargine  90 Units SubCUTAneous Daily    metoprolol tartrate  50 mg Oral BID    pregabalin  100 mg Oral TID    amiodarone  200 mg Oral BID    aspirin EC  81 mg Oral Daily    atorvastatin  40 mg Oral Daily    budesonide-formoterol  2 puff Inhalation BID    clotrimazole-betamethasone   Topical BID    apixaban  5 mg Oral BID    finasteride  5 mg Oral Daily    gemfibrozil  600 mg Oral Daily    isosorbide mononitrate  30 mg Oral Daily    losartan  50 mg Oral Daily    rOPINIRole  2 mg Oral Daily    tamsulosin  0.4 mg Oral Nightly    sodium chloride flush  5-40 mL IntraVENous 2 times per day    levothyroxine  175 mcg Oral Daily    insulin lispro  0-12 Units SubCUTAneous TID WC    insulin lispro  0-6 Units SubCUTAneous Nightly    famotidine  20 mg Oral BID     Continuous Infusions:   sodium chloride      dextrose       PRN Meds:.albuterol sulfate HFA, sodium chloride flush, sodium chloride, ondansetron **OR** ondansetron, polyethylene glycol, acetaminophen **OR** acetaminophen, potassium chloride **OR** potassium alternative oral replacement **OR** potassium chloride, magnesium sulfate, glucose, dextrose, glucagon (rDNA), dextrose    CONSTITUTIONAL: AOx4, no apparent distress, appears stated age   HEAD: normocephalic, atraumatic   EYES: PERRLA, EOMI   ENT: moist mucous membranes, uvula midline   NECK: symmetric, no midline tenderness to palpation   LUNGS: clear to auscultation bilaterally   CARDIOVASCULAR: regular rate and rhythm, no murmurs, rubs or gallops   ABDOMEN: Soft, non-tender, non-distended with normal active bowel sounds   Left leg amputee        Cardiac Angiography: 12/30/21       Findings:    LMCA: Normal 0% stenosis.    LAD: Mid area 40% stenosis  LCx: Dominant with distal 25% stenosis   OM's with no disease   RCA: Non dominant      Coronary Tree Dominance: Left      Conclusions:  Non obstructive minimal CAD   Preserved LV function       Recommendations:   Medical treatments         Assessment / Acute Cardiac Problems:       Patient Active Problem List:     Anemia     Neuropathy in diabetes ([facility]     Charcot ankle     PVD (peripheral vascular disease) ([facility]     Type 2 diabetes mellitus with diabetic polyneuropathy, with long-term current use of insulin      Pure hypercholesterolemia     Constipation     PSA elevation     CAD (coronary artery disease)     Chronic pain of left knee     Essential hypertension     Olecranon bursitis of right elbow     Acquired hypothyroidism     Carotid stenosis, asymptomatic, bilateral     Right renal mass     Metabolic acidosis, normal anion gap (NAG)     Hypotension due to drugs     Hydronephrosis of right kidney     Idiopathic hypotension     Status post below-knee amputation of left lower extremity (HCC)     Atrial fibrillation      Ureteral stricture, right     Dizziness     Dermatophytoses     Acute bilateral low back pain without sciatica     Malignant neoplasm of right kidney (HCC)     Spinal stenosis of lumbar region with neurogenic claudication     Other chest pain     Spinal stenosis of lumbar region without neurogenic claudication     Lumbar radiculopathy     Chest pain     Rule out acute coronary syndrome     Chronic diastolic heart failure with preserved EF     BPH      Employs prosthetic leg s/p left BKA     Hypertrophic cardiomyopathy (HCC)      Plan of Treatment:   Recurrent angina status post cath with noncritical LAD  Possible spasm plan to add small dose of Norvasc 2.5 daily  Hypertension fairly controlled on current medications  Patient can be discharged from cardiac point and patient already had a appointment in our office on 20th of next month    Gideon Wadsworth MD, [facility] Cardiology  [phone]

## 2021-12-31 NOTE — PROGRESS NOTES
[facility]    PROGRESS NOTE             12/31/2021    8:09 AM    Name:   Merna Sin  MRN:     [id number]     Acct:      [de-identified]   Room:   2096/2096-01   Day:  3  Admit Date:  12/28/2021 10:48 AM    PCP:  Chyna Saunders MD  Code Status:  Prior    Subjective:     C/C:   Chief Complaint   Patient presents with    Shortness of Breath    Chest Pain    Sweats     Interval History Status: improved. Patient seen and examined. Reports feeling much better; No new episodes of chest pain or diaphoresis. Cardiac cath done yesterday showing mild CAD. Heart rate now controlled,  Per nursing staff, no abnormalities on telemetry overnight. Patient's home glucometer use throughout hospitalization check BS levels; Recurrent episodes of hypoglycemia; However patient reports carb restricted diet at home could be contributing. Change diet to regular and continue to monitor. Review of Systems:     Review of Systems   Constitutional: Positive for fatigue (improved). Negative for fever. HENT: Positive for tinnitus (chronic). Negative for sore throat. Eyes: Negative for photophobia and visual disturbance. Respiratory: Negative for cough and shortness of breath. Cardiovascular: Negative for chest pain and palpitations. Gastrointestinal: Negative for abdominal pain, constipation and vomiting. Musculoskeletal: Positive for gait problem (chronic 2/2 BKA). Negative for myalgias. Neurological: Negative for dizziness and light-headedness. Psychiatric/Behavioral: Negative for sleep disturbance. Medications: Allergies: Allergies   Allergen Reactions    Ramipril      Other reaction(s): swelling of the lips   Other reaction(s): swelling of the lips     Adhesive Tape      tegaderm causes blisters.  Use paper tape       Current Meds:   Scheduled Meds:    insulin glargine  90 Units SubCUTAneous Daily    metoprolol tartrate  50 mg Oral BID    pregabalin  100 mg Oral TID    amiodarone  200 mg Oral BID    aspirin EC  81 mg Oral Daily    atorvastatin  40 mg Oral Daily    budesonide-formoterol  2 puff Inhalation BID    clotrimazole-betamethasone   Topical BID    apixaban  5 mg Oral BID    finasteride  5 mg Oral Daily    gemfibrozil  600 mg Oral Daily    isosorbide mononitrate  30 mg Oral Daily    losartan  50 mg Oral Daily    rOPINIRole  2 mg Oral Daily    tamsulosin  0.4 mg Oral Nightly    sodium chloride flush  5-40 mL IntraVENous 2 times per day    levothyroxine  175 mcg Oral Daily    insulin lispro  0-12 Units SubCUTAneous TID WC    insulin lispro  0-6 Units SubCUTAneous Nightly    famotidine  20 mg Oral BID     Continuous Infusions:    sodium chloride      dextrose       PRN Meds: albuterol sulfate HFA, sodium chloride flush, sodium chloride, ondansetron **OR** ondansetron, polyethylene glycol, acetaminophen **OR** acetaminophen, potassium chloride **OR** potassium alternative oral replacement **OR** potassium chloride, magnesium sulfate, glucose, dextrose, glucagon (rDNA), dextrose    Data:     Past Medical History:   has a past medical history of A-fib ([facility] Asymptomatic bilateral carotid artery stenosis, Boil, thigh, CAD (coronary artery disease), Charcot foot due to diabetes mellitus ([facility] COPD (chronic obstructive pulmonary disease) ([facility] Diabetes mellitus ([facility] Diabetic neuropathy ([facility] Difficult intravenous access, Employs prosthetic leg, Foot ulcer ([facility] Full dentures, Hyperlipidemia, Hypertension, Hypoglycemia, MRSA (methicillin resistant staph aureus) culture positive, OA (osteoarthritis), Osteomyelitis ([facility] Paroxysmal atrial fibrillation ([facility] Renal mass, Snores, Suspected sleep apnea, Thyroid disease, Vision abnormalities, Vitreous hemorrhage of left eye due to diabetes mellitus ([facility] Wears glasses, Wears partial dentures, and Wellness examination.     Social History:   reports that he quit smoking about 10 years ago. His smoking use included cigars. He started smoking about 44 years ago. He has a 50.00 pack-year smoking history. He has never used smokeless tobacco. He reports current alcohol use. He reports previous drug use. Drug: Marijuana Juanjose Berman). Family History:   Family History   Problem Relation Age of Onset    Diabetes Mother     Hypertension Mother     Stomach Cancer Mother     Hypertension Father     Alcohol Abuse Father     COPD Father     Kidney Cancer Father     Diabetes Brother         IDDM-PUMP    Other Sister         BRAIN TUMOR       Vitals:  /79   Pulse 74   Temp 98.2 °F (36.8 °C) (Oral)   Resp 16   Ht 6' 4\" (1.93 m)   Wt 281 lb 12 oz (127.8 kg)   SpO2 93%   BMI 34.30 kg/m²   Temp (24hrs), Av °F (36.7 °C), Min:97.6 °F (36.4 °C), Max:98.4 °F (36.9 °C)    Recent Labs     21  1759 21  2216 21  0603   POCGLU 122* 90 108     Vitals:    21 1807 21 1930 21 2330 21 0715   BP:  117/69 120/72 132/79   Pulse:  70 71 74   Resp: 16 16 16 16   Temp:  97.8 °F (36.6 °C) 97.6 °F (36.4 °C) 98.2 °F (36.8 °C)   TempSrc:  Oral Oral Oral   SpO2: 96% 93% 94% 93%   Weight:   281 lb 12 oz (127.8 kg)    Height:           I/O(24Hr):     Intake/Output Summary (Last 24 hours) at 2021 0809  Last data filed at 2021 1813  Gross per 24 hour   Intake 240 ml   Output 350 ml   Net -110 ml       Labs:  Recent Results (from the past 24 hour(s))   CBC WITH AUTO DIFFERENTIAL    Collection Time: 21 10:37 AM   Result Value Ref Range    WBC 7.1 3.5 - 11.0 k/uL    RBC 4.57 4.5 - 5.9 m/uL    Hemoglobin 14.6 13.5 - 17.5 g/dL    Hematocrit 42.8 41 - 53 %    MCV 93.6 80 - 100 fL    MCH 31.9 26 - 34 pg    MCHC 34.1 31 - 37 g/dL    RDW 14.6 11.5 - 14.9 %    Platelets 027 441 - 059 k/uL    MPV 10.2 6.0 - 12.0 fL    NRBC Automated NOT REPORTED per 100 WBC    Differential Type NOT REPORTED     Seg Neutrophils 75 (H) 36 - 66 %    Lymphocytes 12 (L) 24 - 44 %    Monocytes 10 (H) 1 - 7 %    Eosinophils % 2 0 - 4 %    Basophils 1 0 - 2 %    Immature Granulocytes NOT REPORTED 0 %    Segs Absolute 5.40 1.3 - 9.1 k/uL    Absolute Lymph # 0.90 (L) 1.0 - 4.8 k/uL    Absolute Mono # 0.70 0.1 - 1.3 k/uL    Absolute Eos # 0.10 0.0 - 0.4 k/uL    Basophils Absolute 0.10 0.0 - 0.2 k/uL    Absolute Immature Granulocyte NOT REPORTED 0.00 - 0.30 k/uL    WBC Morphology NOT REPORTED     RBC Morphology NOT REPORTED     Platelet Estimate NOT REPORTED    Basic Metabolic Panel w/ Reflex to MG    Collection Time: 12/30/21 10:37 AM   Result Value Ref Range    Glucose 212 (H) 70 - 99 mg/dL    BUN 21 8 - 23 mg/dL    CREATININE 1.59 (H) 0.70 - 1.20 mg/dL    Bun/Cre Ratio NOT REPORTED 9 - 20    Calcium 9.1 8.6 - 10.4 mg/dL    Sodium 142 135 - 144 mmol/L    Potassium 4.4 3.7 - 5.3 mmol/L    Chloride 108 (H) 98 - 107 mmol/L    CO2 22 20 - 31 mmol/L    Anion Gap 12 9 - 17 mmol/L    GFR Non-African American 44 (L) >60 mL/min    GFR  54 (L) >60 mL/min    GFR Comment          GFR Staging NOT REPORTED    Magnesium    Collection Time: 12/30/21 10:37 AM   Result Value Ref Range    Magnesium 2.0 1.6 - 2.6 mg/dL   Cortisol    Collection Time: 12/30/21  1:03 PM   Result Value Ref Range    Cortisol 11.6 2.7 - 18.4 ug/dL    Cortisol Collection Info NOT REPORTED    CBC WITH AUTO DIFFERENTIAL    Collection Time: 12/31/21  6:27 AM   Result Value Ref Range    WBC 5.6 3.5 - 11.0 k/uL    RBC 4.43 (L) 4.5 - 5.9 m/uL    Hemoglobin 14.2 13.5 - 17.5 g/dL    Hematocrit 40.8 (L) 41 - 53 %    MCV 92.0 80 - 100 fL    MCH 32.0 26 - 34 pg    MCHC 34.7 31 - 37 g/dL    RDW 14.2 11.5 - 14.9 %    Platelets 930 384 - 856 k/uL    MPV 10.8 6.0 - 12.0 fL    NRBC Automated NOT REPORTED per 100 WBC    Differential Type NOT REPORTED     Seg Neutrophils 66 36 - 66 %    Lymphocytes 17 (L) 24 - 44 %    Monocytes 13 (H) 1 - 7 %    Eosinophils % 3 0 - 4 %    Basophils 1 0 - 2 %    Immature Granulocytes NOT REPORTED 0 %    Segs Absolute 3.80 1.3 - 9.1 k/uL    Absolute Lymph # 1.00 1.0 - 4.8 k/uL    Absolute Mono # 0.70 0.1 - 1.3 k/uL    Absolute Eos # 0.20 0.0 - 0.4 k/uL    Basophils Absolute 0.00 0.0 - 0.2 k/uL    Absolute Immature Granulocyte NOT REPORTED 0.00 - 0.30 k/uL    WBC Morphology NOT REPORTED     RBC Morphology NOT REPORTED     Platelet Estimate NOT REPORTED    Basic Metabolic Panel w/ Reflex to MG    Collection Time: 12/31/21  6:27 AM   Result Value Ref Range    Glucose 63 (L) 70 - 99 mg/dL    BUN 22 8 - 23 mg/dL    CREATININE 1.40 (H) 0.70 - 1.20 mg/dL    Bun/Cre Ratio NOT REPORTED 9 - 20    Calcium 8.8 8.6 - 10.4 mg/dL    Sodium 145 (H) 135 - 144 mmol/L    Potassium 4.1 3.7 - 5.3 mmol/L    Chloride 109 (H) 98 - 107 mmol/L    CO2 23 20 - 31 mmol/L    Anion Gap 13 9 - 17 mmol/L    GFR Non-African American 52 (L) >60 mL/min    GFR African American >60 >60 mL/min    GFR Comment          GFR Staging NOT REPORTED          Lab Results   Component Value Date/Time    SPECIAL NOT REPORTED 10/17/2020 08:22 AM     Lab Results   Component Value Date/Time    CULTURE KLEBSIELLA PNEUMONIAE >500716 CFU/ML (A) 10/17/2020 08:22 AM       Radiology:    No results found. Physical Examination:        Physical Exam  Constitutional:       General: He is not in acute distress. Appearance: Normal appearance. Comments: Pt found sitting up eating breakfast, appears less fatigued/pale than yesterday   HENT:      Head: Normocephalic and atraumatic. Nose: Nose normal.   Eyes:      General: No scleral icterus. Right eye: No discharge. Left eye: No discharge. Conjunctiva/sclera: Conjunctivae normal.   Cardiovascular:      Rate and Rhythm: Normal rate. Pulmonary:      Effort: Pulmonary effort is normal. No respiratory distress. Abdominal:      General: There is no distension. Tenderness: There is no abdominal tenderness. There is no guarding.       Comments: Mild rigidity of abd to palpation (pt states this is baseline for him after eating when he feels he will soon have a BM)   Musculoskeletal:      Right lower leg: Edema present. Left lower leg: No edema. Skin:     General: Skin is warm and dry. Neurological:      Mental Status: He is alert. Mental status is at baseline.    Psychiatric:         Mood and Affect: Mood normal.         Behavior: Behavior normal.         Assessment:        Primary Problem  Acute coronary syndrome [facility])    Active Hospital Problems    Diagnosis Date Noted    Hypertrophic cardiomyopathy ([address] [I42.2] 12/30/2021    Chest pain [R07.9] 12/28/2021    Rule out acute coronary syndrome [I24.9] 12/28/2021    Chronic diastolic heart failure with preserved EF [I50.32] 12/28/2021    BPH  [N40.0] 12/28/2021    Employs prosthetic leg s/p left BKA [Z97.10] 12/28/2021    Atrial fibrillation  [I48.91] 07/17/2020    Acquired hypothyroidism [E03.9] 01/23/2019    Essential hypertension [I10] 05/18/2018    Pure hypercholesterolemia [E78.00] 12/10/2015    Type 2 diabetes mellitus with diabetic polyneuropathy, with long-term current use of insulin  [E11.42, Z79.4] 12/03/2015       Plan:        Non obstructive Coronary Artery Disease  Negative stress test on 5/2021 with EF 60% (previously 48% on 6/2019 stress test)  ASA 81 mg p.o. daily  Telemetry  Cardiac cath done 12/30 showing nonobstructive minimal CAD with preserved LV function  Cardiology following;     Norvasc 2.5 QD   OK to dc from cardiac standpoint   Pt to keep OP appointment on 1/20    Atrial fibrillation on AC - improved  Runs of A. fib and PVCs and persistent tachycardia per telemetry 12/29 - 12/30; now rate controlled   Apixaban 5 mg p.o. twice daily    Cardiology following   Amiodarone 200 mg p.o. twice daily   Amiodarone drip yesterday  Cortisol and free metanephrine levels ordered to rule out any other cause of diaphoresis/arrhythmia episodes     Hypertrophic cardiomyopathy & chronic diastolic HFpEF  Repeat echo 12/30/2021: EF 55%, small LV cavity, left ventricular hypertrophy, mild mitral and tricuspid regurgitation  Metoprolol tartrate 25 mg p.o. twice daily (home dose)     Acquired Hypothyroidism  TSH 15.81 & Free T4 0.97 on 12/14  Synthroid 175 mcg p.o. daily (increased 12/28 from home dose of 150 mcg)  Will need TSH and free T4 re-checked in 6 weeks     DM with neuropathy  Recurrent hypoglycemic episodes  Insulin glargine 110 units SQ daily => decreased to 90 units on 12/30 d/t hypoglycemia => will decrease to 75 units today  Medium dose insulin sliding scale,   POCT glucose,   hypoglycemia protocol  Pregabalin 100 mg p.o. 3 times daily      Comorbid conditions  HLD: Atorvastatin 40 mg p.o. daily, gemfibrozil 600 mg p.o. daily  Anginal equivalent: Isosorbide mononitrate 30 mg p.o. daily  HTN: Current 50 mg p.o. daily  BPH: Finasteride 5 mg p.o. daily, tamsulosin 0.4 mg p.o. nightly, follows urologist  COPD: Albuterol sulfate inhaler as needed, budesonide-formoterol inhaler twice daily,  Prosthetic leg use status post left BKA: Clotrimazole-betamethasone cream twice daily, ropinirole 2 mg p.o. daily, PT/OT while inpatient        Code: Full  DVT prophylaxis: SCDs, Eliquis on hold for possible cardiac procedure  GI prophylaxis: Famotidine 20 mg p.o. twice daily  Diet: cardiac & diabetic diet  Activity: Up as tolerated  Dispo: Anticipate possible discharge today. Please note: Use of a speech recognition software was used in the creation of portions of this note and dictation errors, including those of syntax and sound alike word substitutions, may have escaped proofreading. Darlis Goodell, DO  12/31/2021  8:09 AM     Attending Physician Statement  I have discussed the care of Ora Gil and I have examined the patient myselft and taken ros and hpi , including pertinent history and exam findings,  with the resident. I have reviewed the key elements of all parts of the encounter with the resident.   I agree

## 2022-01-02 ENCOUNTER — CARE COORDINATION (OUTPATIENT)
Dept: CASE MANAGEMENT | Age: 62
End: 2022-01-02

## 2022-01-02 DIAGNOSIS — R07.9 CHEST PAIN, UNSPECIFIED TYPE: Primary | ICD-10-CM

## 2022-01-02 LAB
METANEPH/PLASMA INTERP: NORMAL
METANEPHRINE: 0.15 NMOL/L (ref 0–0.49)
NORMETANEPHRINE PLASMA: 0.37 NMOL/L (ref 0–0.89)

## 2022-01-02 NOTE — CARE COORDINATION
Ayesha 45 Transitions Initial Follow Up Call    Call within 2 business days of discharge: Yes    Patient: Kari Sidhu Patient : 1960   MRN: [id number]  Reason for Admission: Chest pain  Discharge Date: 21 RARS: Readmission Risk Score: 14 ( )      Last Discharge [facility]       Complaint Diagnosis Description Type Department Provider    21 Shortness of Breath; Chest Pain; Sweats Chest pain, unspecified type . .. ED to Hosp-Admission (Discharged) (ADMITTED) Marko Jackson MD; Sulaiman Aden. .. Spoke with: Patient    Facility: [id number] [place] [address]    Non-face-to-face services provided:  Scheduled appointment with Specialist- with cardiology  Obtained and reviewed discharge summary and/or continuity of care documents     Spoke with patient who said he is was feeling good today. He denies having any chest pains, palpitations, shortness of breath or other symptoms since discharge. Patient states he has been vaccinated. Cardiac cath done, reveals non obstructive CAD. Right wrist unremarkable. Discharge instructions reviewed as well as medications. Patient is aware of medication changes, has all new medicine at home. Blood sugar today was 153. He has follow up with cardiology on  and was advised to move up his PCP appointment to this week. He denies any needs, questions or concerns. Transitions of Care Initial Call    Was this an external facility discharge? No Discharge Facility: NA    Challenges to be reviewed by the provider   Additional needs identified to be addressed with provider: No  none             Method of communication with provider : none      Advance Care Planning:   Does patient have an Advance Directive: patient declined education. Was this a readmission?  No  Patient stated reason for admission: Chest pain  Patients top risk factors for readmission: medical condition-CAD    Care Transition Nurse (CTN) contacted the patient by telephone to perform post hospital discharge assessment. Verified name and  with patient as identifiers. Provided introduction to self, and explanation of the CTN role. CTN reviewed discharge instructions, medical action plan and red flags with patient who verbalized understanding. Patient given an opportunity to ask questions and does not have any further questions or concerns at this time. Were discharge instructions available to patient? Yes. Reviewed appropriate site of care based on symptoms and resources available to patient including: PCP and Specialist. The patient agrees to contact the PCP office for questions related to their healthcare. Medication reconciliation was performed with patient, who verbalizes understanding of administration of home medications. Advised obtaining a 90-day supply of all daily and as-needed medications. Covid Risk Education     Educated patient about risk for severe COVID-19 due to risk factors according to CDC guidelines. CTN reviewed discharge instructions, medical action plan and red flag symptoms with the patient who verbalized understanding. Discussed COVID vaccination status: Yes. Education provided on COVID-19 vaccination as appropriate. Discussed exposure protocols and quarantine with CDC Guidelines. Patient was given an opportunity to verbalize any questions and concerns and agrees to contact CTN or health care provider for questions related to their healthcare. Reviewed and educated patient on any new and changed medications related to discharge diagnosis. Was patient discharged with a pulse oximeter? No Discussed and confirmed pulse oximeter discharge instructions and when to notify provider or seek emergency care. CTN provided contact information. Plan for follow-up call in 5-7 days based on severity of symptoms and risk factors.   Plan for next call: Routine follow up, check if PCP appointment moved up          Care Transitions 24 Hour Call    Schedule Follow Up Appointment with PCP: Completed  Do you have any ongoing symptoms?: No  Do you have a copy of your discharge instructions?: Yes  Do you have all of your prescriptions and are they filled?: Yes  Have you been contacted by a [facility] Pharmacist?: No  Have you scheduled your follow up appointment?: No  Were you discharged with any Home Care or Post Acute Services: No  Post Acute Services:  (Comment: none)  Patient DME: [employer], Wheelchair, Shower chair  Do you have support at home?: Partner/Spouse/SO  Do you feel like you have everything you need to keep you well at home?: Yes  Care Transitions Interventions         Follow Up  Future Appointments   Date Time Provider Prema Diggs   1/7/2022  9:00 [address], [address] [place]    1/19/2022  2:15 PM Cynthia Soto MD [address] [facility], RN

## 2022-01-05 ENCOUNTER — CARE COORDINATION (OUTPATIENT)
Dept: CASE MANAGEMENT | Age: 62
End: 2022-01-05

## 2022-01-05 NOTE — CARE COORDINATION
Ayesha 45 Transitions Follow Up Call    2022    Patient: Deion Calvin  Patient : 1960   MRN: [id number]  Reason for Admission: Chest pain  Discharge Date: 21 RARS: Readmission Risk Score: 14 ( )         Attempted to reach patient for follow up this week. Chart reviewed, no new appointments made with PCP. Patient has upcoming appointment with PCP on  and has follow up with cardiology on .     Follow Up  Future Appointments   Date Time Provider Prema Diggs   2022  9:00 AM Tereso Gay DPM [id number]   2022  2:15 PM Jose Armando Quezada MD [facility]       Alexys Emery RN

## 2022-01-06 NOTE — ADT AUTH CERT
Angina - Care Day 2 (12/29/2021) by Francis Bowen RN       Review Status Review Entered   Completed 12/29/2021 13:05      Criteria Review      Care Day: 2 Care Date: 12/29/2021 Level of Care: Telemetry    Guideline Day 2    Clinical Status    (X) * Hemodynamic stability    12/29/2021 1:05 PM EST by Mela Nunez      97.5 (36.4) 16 72 139/85    (X) * Dangerous arrhythmia absent    ( ) * Chest pain, dyspnea, or anginal equivalent absent    (X) * No evidence of bleeding    ( ) * No evidence of myocardial ischemia    (X) * Vascular access site without evidence of infection, aneurysm, or growing hematoma    (X) * Renal function at baseline or acceptable for next level of care    ( ) * Discharge plans and education understood    Activity    ( ) * Ambulatory or acceptable for next level of care    Routes    ( ) * Oral hydration    ( ) * Oral medications or regimen acceptable for next level of care    (X) * Oral diet or acceptable for next level of care    12/29/2021 1:05 PM EST by Mela Nunez      Symbicort 2 puffs q 12h-PO Pepcid 20mg q 12h-PO Proscar 5mg qd-SQ Insulin-PO Imdur 30mg qd-PO Synthroid 175mcg qd-PO Lyrica 100mg q 8h-PO Rythmol 150mg q8h-PO Requip 2mg qd-PO Flomax 0.4mg qd-    Interventions    ( ) * Oxygen absent    (X) Cardiac monitoring    12/29/2021 1:05 PM EST by Mela Nunez      cont    Medications    ( ) * Anticoagulants absent    (X) Antiplatelet agents (eg, aspirin, clopidogrel, ticagrelor)    12/29/2021 1:05 PM EST by Mela Nunez      PO Eliquis 5mg q 12h-PO ASA 81mg qd-    (X) Possible beta-blocker    12/29/2021 1:05 PM EST by Mela Nunez      PO Lopressor 25mg q 12h    (X) Possible ACE inhibitor or ARB    12/29/2021 1:05 PM EST by Mela Nunez      PO Cozaar 50mg qd-    (X) Possible statin    12/29/2021 1:05 PM EST by Mela Nunez      PO Lipitor 40mg hs-PO Lopid 600mg qd    * Milestone   Additional Notes   DATE:  12/29         Pertinent Updates:   Complains of severe fatigue and apnea while lying flat. Vitals:   Vitals:   /85   Pulse 72   Temp 97.5 °F (36.4 °C) (Oral)   Resp 16   Ht 6' 4\" (1.93 m)   Wt 281 lb 12 oz (127.8 kg)   SpO2 92%   BMI 34.30 kg/m²    Temp (24hrs), Av.5 °F (36.4 °C), Min:97.5 °F (36.4 °C), Max:97.6 °F (36.4 °C)          Physical Exam:   Right lower leg: Edema present.        Abnl/Pertinent Labs/Radiology/Diagnostic Studies:         Chloride: 109 (H)      Creatinine: 1.26 (H)   GFR Non-: 58 (L)   Glucose: 137 (H)   Trop 52                  MD Consults/Assessments & Plans:         Internal Med   Plan:          Rule out Acute Coronary Syndrome   Episodes of SOB, diaphoresis and dizziness with associated chest pain   Risk factors: HTN, HLD, obesity, DM, hx b/l carotid artery stenosis   Negative stress test on 2021 with EF 60% (previously 48% on 2019 stress test)   HS troponin 54 > 49   ASA 81 mg p.o. daily (home dose)   Telemetry   Trend troponins   Cardiology consulted; recommendations regarding need for cardiac cath appreciated   Hold Eliquis this a.m. and restart if no cardiac procedures planned       Possible CHF exacerbation, chronic diastolic HFpEF   SOB/diaphoresis/dizziness worse with exertion   LE edema on physical exam   Pro    Echo 2019: LV EF >55%, evidence of mild (grade I) distolic dysfunction, tricuspid regurgitation   Repeat echo pending   Metoprolol tartrate 25 mg p.o. twice daily (home dose)       Acquired Hypothyroidism   S/p radioiodine treatment for hyperthyroidism   C/o palpitations   TSH 15.81 & Free T4 0.97 on    Synthroid 175 mcg p.o. daily (increased  from home dose of 150 mcg)           Comorbid conditions   HLD: Atorvastatin 40 mg p.o. daily, gemfibrozil 600 mg p.o. daily   Afib: Apixaban 5 mg p.o. twice daily on HOLD this AM, propafenone 150 mg 1 p.o. 3 times daily,   DM with neuropathy: Insulin glargine 110 units SQ daily, insulin lispro 12 units SQ 3 times daily with meals, medium dose insulin sliding scale, POCT glucose, hypoglycemia protocol, pregambling 100 mg p.o. 3 times daily   Anginal equivalent: Isosorbide mononitrate 30 mg p.o. daily   HTN: Current 50 mg p.o. daily   BPH: Finasteride 5 mg p.o. daily, tamsulosin 0.4 mg p.o. 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 daily, PT/OT while inpatient       Code: Full   DVT prophylaxis: SCDs, Eliquis on hold for possible cardiac procedure   GI prophylaxis: Famotidine 20 mg p.o. twice daily   Diet: cardiac & diabetic diet, NPO at midnight   Activity: Up as tolerated      +++++++++++++++++++++++++++++++++++      Cardiology      Has many risk factors, his Sx included SOB, sweating and minimal chest pain as he described   Known with PVD and left leg BKA      RECOMMENDATIONS:   1. Continue current trx. 2. Resume Eliqus and plan on DC   3. Plan on heart cath in 7-10 days with Eliquis on hold for few days as instructed.    4. Continue risk factors modification

## 2022-01-07 ENCOUNTER — OFFICE VISIT (OUTPATIENT)
Dept: PODIATRY | Age: 62
End: 2022-01-07
Payer: COMMERCIAL

## 2022-01-07 VITALS — BODY MASS INDEX: 34.22 KG/M2 | HEIGHT: 76 IN | WEIGHT: 281 LBS

## 2022-01-07 DIAGNOSIS — M79.671 PAIN IN RIGHT FOOT: ICD-10-CM

## 2022-01-07 DIAGNOSIS — Z79.4 TYPE 2 DIABETES MELLITUS WITH DIABETIC POLYNEUROPATHY, WITH LONG-TERM CURRENT USE OF INSULIN (HCC): ICD-10-CM

## 2022-01-07 DIAGNOSIS — L84 CORNS AND CALLOSITIES: ICD-10-CM

## 2022-01-07 DIAGNOSIS — E11.51 TYPE 2 DIABETES MELLITUS WITH PERIPHERAL VASCULAR DISEASE (HCC): ICD-10-CM

## 2022-01-07 DIAGNOSIS — M79.674 PAIN OF TOE OF RIGHT FOOT: ICD-10-CM

## 2022-01-07 DIAGNOSIS — B35.1 ONYCHOMYCOSIS OF TOENAIL: Primary | ICD-10-CM

## 2022-01-07 DIAGNOSIS — E11.42 TYPE 2 DIABETES MELLITUS WITH DIABETIC POLYNEUROPATHY, WITH LONG-TERM CURRENT USE OF INSULIN (HCC): ICD-10-CM

## 2022-01-07 PROCEDURE — 11720 DEBRIDE NAIL 1-5: CPT | Performed by: PODIATRIST

## 2022-01-07 PROCEDURE — 11055 PARING/CUTG B9 HYPRKER LES 1: CPT | Performed by: PODIATRIST

## 2022-01-07 ASSESSMENT — ENCOUNTER SYMPTOMS
DIARRHEA: 0
NAUSEA: 0
COLOR CHANGE: 0
SHORTNESS OF BREATH: 0
BACK PAIN: 0

## 2022-01-07 NOTE — PROGRESS NOTES
SUBJECTIVE: Amanda Proctor is a 58 y.o. male who returns to the office with chief complaint of painful fungal toenails. Patient relates toe nails are thickened/difficult to trim as well as painful with ambulation and with shoe gear. Chief Complaint   Patient presents with    Nail Problem     nail trim/last saw Chuckie Dubin 12/14/2021    Diabetes     last blood sugar 203     Review of Systems   Constitutional: Negative for activity change, appetite change, chills, diaphoresis, fatigue and fever. Respiratory: Negative for shortness of breath. Cardiovascular: Negative for leg swelling. Gastrointestinal: Negative for diarrhea and nausea. Endocrine: Negative for cold intolerance, heat intolerance and polyuria. Musculoskeletal: Positive for arthralgias and gait problem. Negative for back pain, joint swelling and myalgias. Skin: Negative for color change, pallor, rash and wound. Allergic/Immunologic: Negative for environmental allergies and food allergies. Neurological: Positive for numbness. Negative for dizziness, weakness and light-headedness. Hematological: Does not bruise/bleed easily. Psychiatric/Behavioral: Negative for behavioral problems, confusion and self-injury. The patient is not nervous/anxious. OBJECTIVE: Clinical evaluation of patient reveals nails 1,4,5 of the right foot present with thickness, elongation, discoloration, brittleness, and subungual debris. There was pain with palpation and debridement of the toenails of the right foot No open lesions noted to the right foot today. The left leg and toes 2 and 3 of the right foot have been amputated. There is hyperkeratotic tissue formation submetatarsal head one of the right foot. There is pain with palpation to this lesion. Debridement of this lesion with a fifteen blade does not reveal any central core or open wound. There are no signs of bacterial infection noted to the area. The right DP pulse is not palpable.    The right PT pulse is not palpable.    Protective sensation is absent to the right plantar foot as noted with a 5.07 [place]-Gaudencio monofilament.    Gluose: 191 mg/dl. Class A Findings (1 needed)   [x] Non-traumatic amputation of foot or integral skeleton portion thereof. [x] Q7.      Class B Findings (2 needed)   1. [] Absent posterior tibial pulse   2. [] Absent dorsalis pedis pulse   3. [] Advanced trophic changes; three of the following are required:   ·         [] hair growth (decrease or absence)   ·         [] nail changes (thickening)   ·         [] pigmentary changes (discoloration)   ·         [] skin texture (thin, shiny)   ·         [] skin color (rubor or redness)   [] Q8.      Class C Findings (1 Class B, 2 Class C needed)   1. [] Claudication   2. [] Temperature changes   3. [] Edema   4. [] Paresthesia   5. [] Burning   [] Q9.     ASSESSMENT:    Diagnosis Orders   1. Onychomycosis of toenail  CO DEBRIDEMENT OF NAIL(S), 1-5    HM DIABETES FOOT EXAM   2. Corns and callosities  HM DIABETES FOOT EXAM    CO TRIM HYPERKERATOTIC SKIN LESION, ONE   3. Pain of toe of right foot  CO DEBRIDEMENT OF NAIL(S), 1-5    HM DIABETES FOOT EXAM   4. Pain in right foot  HM DIABETES FOOT EXAM    CO TRIM HYPERKERATOTIC SKIN LESION, ONE   5. Type 2 diabetes mellitus with peripheral vascular disease (HCC)  CO DEBRIDEMENT OF NAIL(S), 1-5    HM DIABETES FOOT EXAM    CO TRIM HYPERKERATOTIC SKIN LESION, ONE   6. Type 2 diabetes mellitus with diabetic polyneuropathy, with long-term current use of insulin (HCC)  CO DEBRIDEMENT OF NAIL(S), 1-5    HM DIABETES FOOT EXAM    CO TRIM HYPERKERATOTIC SKIN LESION, ONE     PLAN: Toenails 1,4,5 of the right foot were debrided in length and thickness using a nail nipper and a . The lesion(s) to the right foot debrided with a 15 blade down to healthy, pink skin without event. Return in about 9 weeks (around 3/11/2022) for At risk diabetic foot care.    1/7/2022      Edward Cosby Mary Grace Schuler DPM

## 2022-01-13 RX ORDER — ATORVASTATIN CALCIUM 40 MG/1
TABLET, FILM COATED ORAL
Qty: 30 TABLET | Refills: 11 | Status: SHIPPED | OUTPATIENT
Start: 2022-01-13

## 2022-01-14 ENCOUNTER — CARE COORDINATION (OUTPATIENT)
Dept: CASE MANAGEMENT | Age: 62
End: 2022-01-14

## 2022-01-14 NOTE — CARE COORDINATION
Rebeccal 45 Transitions Follow Up Call    2022    Patient: Garrison Okeefe  Patient : 1960   MRN: [id number]  Reason for Admission: Chest pain  Discharge Date: 21 RARS: Readmission Risk Score: 14 ( )         Spoke with: Patient    Spoke with patient who said he has been feeling well this past week. He denies any chest pains, new shortness of breath, cough, swelling, palpitations or other cardiac related symptoms. He said he has some MILLER but said that is his normal.  He will follow up with his PCP next week on Wednesday and sees cardiology on Thursday. He denies any new needs or concerns. Care Transitions Follow Up Call    Needs to be reviewed by the provider   Additional needs identified to be addressed with provider: No  none             Method of communication with provider : none      Care Transition Nurse (CTN) contacted the patient by telephone to follow up after admission on 21. Verified name and  with patient as identifiers. Addressed changes since last contact: none  Discussed follow-up appointments. If no appointment was previously scheduled, appointment scheduling offered: Yes. Is follow up appointment scheduled within 7 days of discharge? No.        CTN reviewed discharge instructions, medical action plan and red flags with patient and discussed any barriers to care and/or understanding of plan of care after discharge. Discussed appropriate site of care based on symptoms and resources available to patient including: PCP and Specialist. The patient agrees to contact the PCP office for questions related to their healthcare. Patients top risk factors for readmission: medical condition-chest pain  Interventions to address risk factors: Scheduled appointment with PCP- with PCP and Scheduled appointment with Specialist- with carediology      [facility](s):  cardiology    CTN provided contact information for future needs.  Plan for follow-up call in 7-10 days based on severity of symptoms and risk factors. Plan for next call: Routine follow up, check if any med changes          Care Transitions Subsequent and Final Call    Schedule Follow Up Appointment with PCP: Completed  Subsequent and Final Calls  Do you have any ongoing symptoms?: Yes  Onset of Patient-reported symptoms: In the past 7 days  Patient-reported symptoms: Shortness of Breath  Have your medications changed?: No  Do you have any questions related to your medications?: No  Do you currently have any active services?: No  Do you have any needs or concerns that I can assist you with?: No  Care Transitions Interventions  Other Interventions:            Follow Up  Future Appointments   Date Time Provider Prema Diggs   1/19/2022  2:15 PM Chema Gonzalez MD [address]   3/10/2022 10:40 AM Nader Joyner MD [address] [place] [address]   3/11/2022  9:00 [address], DPM [facility] Kirit Fontenot RN

## 2022-01-19 ENCOUNTER — HOSPITAL ENCOUNTER (OUTPATIENT)
Age: 62
Setting detail: SPECIMEN
Discharge: HOME | End: 2022-01-19

## 2022-01-19 ENCOUNTER — OFFICE VISIT (OUTPATIENT)
Dept: INTERNAL MEDICINE CLINIC | Age: 62
End: 2022-01-19
Payer: COMMERCIAL

## 2022-01-19 VITALS — BODY MASS INDEX: 34.33 KG/M2 | DIASTOLIC BLOOD PRESSURE: 76 MMHG | WEIGHT: 282 LBS | SYSTOLIC BLOOD PRESSURE: 120 MMHG

## 2022-01-19 DIAGNOSIS — M62.50 MUSCULAR ATROPHY, UNSPECIFIED SITE: ICD-10-CM

## 2022-01-19 DIAGNOSIS — I73.9 PVD (PERIPHERAL VASCULAR DISEASE) (HCC): ICD-10-CM

## 2022-01-19 DIAGNOSIS — I50.32 CHRONIC DIASTOLIC HEART FAILURE (HCC): ICD-10-CM

## 2022-01-19 DIAGNOSIS — Z89.512 STATUS POST BELOW-KNEE AMPUTATION OF LEFT LOWER EXTREMITY (HCC): ICD-10-CM

## 2022-01-19 DIAGNOSIS — I48.11 LONGSTANDING PERSISTENT ATRIAL FIBRILLATION (HCC): ICD-10-CM

## 2022-01-19 DIAGNOSIS — G47.33 OSA (OBSTRUCTIVE SLEEP APNEA): ICD-10-CM

## 2022-01-19 DIAGNOSIS — C64.1 MALIGNANT NEOPLASM OF RIGHT KIDNEY (HCC): ICD-10-CM

## 2022-01-19 DIAGNOSIS — I20.8 ANGINAL EQUIVALENT (HCC): ICD-10-CM

## 2022-01-19 DIAGNOSIS — D36.9 ONCOCYTOMA: ICD-10-CM

## 2022-01-19 DIAGNOSIS — E08.41 DIABETIC MONONEUROPATHY ASSOCIATED WITH DIABETES MELLITUS DUE TO UNDERLYING CONDITION (HCC): Primary | ICD-10-CM

## 2022-01-19 PROBLEM — I20.89 ANGINAL EQUIVALENT: Status: ACTIVE | Noted: 2022-01-19

## 2022-01-19 PROCEDURE — 1111F DSCHRG MED/CURRENT MED MERGE: CPT | Performed by: INTERNAL MEDICINE

## 2022-01-19 PROCEDURE — G8482 FLU IMMUNIZE ORDER/ADMIN: HCPCS | Performed by: INTERNAL MEDICINE

## 2022-01-19 PROCEDURE — 99214 OFFICE O/P EST MOD 30 MIN: CPT | Performed by: INTERNAL MEDICINE

## 2022-01-19 PROCEDURE — 3017F COLORECTAL CA SCREEN DOC REV: CPT | Performed by: INTERNAL MEDICINE

## 2022-01-19 PROCEDURE — G8427 DOCREV CUR MEDS BY ELIG CLIN: HCPCS | Performed by: INTERNAL MEDICINE

## 2022-01-19 PROCEDURE — G8417 CALC BMI ABV UP PARAM F/U: HCPCS | Performed by: INTERNAL MEDICINE

## 2022-01-19 PROCEDURE — 1036F TOBACCO NON-USER: CPT | Performed by: INTERNAL MEDICINE

## 2022-01-19 RX ORDER — ASPIRIN 81 MG/1
TABLET, COATED ORAL
Qty: 30 TABLET | Refills: 5 | Status: SHIPPED | OUTPATIENT
Start: 2022-01-19 | End: 2022-07-15 | Stop reason: SDUPTHER

## 2022-01-19 RX ORDER — SEMAGLUTIDE 1.34 MG/ML
1 INJECTION, SOLUTION SUBCUTANEOUS WEEKLY
Qty: 5 PEN | Refills: 2 | Status: SHIPPED | OUTPATIENT
Start: 2022-01-19 | End: 2022-04-19

## 2022-01-19 RX ORDER — PEN NEEDLE, DIABETIC 33 GX5/32"
110 NEEDLE, DISPOSABLE MISCELLANEOUS DAILY
Qty: 5 EACH | Refills: 1 | Status: SHIPPED | OUTPATIENT
Start: 2022-01-19

## 2022-01-19 SDOH — ECONOMIC STABILITY: FOOD INSECURITY: WITHIN THE PAST 12 MONTHS, THE FOOD YOU BOUGHT JUST DIDN'T LAST AND YOU DIDN'T HAVE MONEY TO GET MORE.: NEVER TRUE

## 2022-01-19 SDOH — ECONOMIC STABILITY: TRANSPORTATION INSECURITY
IN THE PAST 12 MONTHS, HAS LACK OF TRANSPORTATION KEPT YOU FROM MEETINGS, WORK, OR FROM GETTING THINGS NEEDED FOR DAILY LIVING?: NO

## 2022-01-19 SDOH — ECONOMIC STABILITY: TRANSPORTATION INSECURITY
IN THE PAST 12 MONTHS, HAS THE LACK OF TRANSPORTATION KEPT YOU FROM MEDICAL APPOINTMENTS OR FROM GETTING MEDICATIONS?: NO

## 2022-01-19 SDOH — ECONOMIC STABILITY: FOOD INSECURITY: WITHIN THE PAST 12 MONTHS, YOU WORRIED THAT YOUR FOOD WOULD RUN OUT BEFORE YOU GOT MONEY TO BUY MORE.: NEVER TRUE

## 2022-01-19 ASSESSMENT — ENCOUNTER SYMPTOMS
TROUBLE SWALLOWING: 0
BLOOD IN STOOL: 0
COUGH: 0
EYE DISCHARGE: 0
SHORTNESS OF BREATH: 0
EYE PAIN: 0
ABDOMINAL DISTENTION: 0
COLOR CHANGE: 0
DIARRHEA: 0
WHEEZING: 0

## 2022-01-19 ASSESSMENT — SOCIAL DETERMINANTS OF HEALTH (SDOH): HOW HARD IS IT FOR YOU TO PAY FOR THE VERY BASICS LIKE FOOD, HOUSING, MEDICAL CARE, AND HEATING?: NOT HARD AT ALL

## 2022-01-19 NOTE — PROGRESS NOTES
[address] [facility] [address]  [place] [address]  Dept: [phone]  Dept Fax: [phone]    Edgar Temple is a 58 y.o. male who presents today for his medical conditions/complaintsas noted below. Edgar Temple is c/o of   Chief Complaint   Patient presents with    Diabetes         HPI:     HTN  Onset more than 2 years ago  gustavo mild to mod  Controlled with current po meds  Not associated with headaches or blurry vision  No chest pain  Diabetes   Duration more than 7 years  Modifying factors on Glucophage and other med  Severity uncontrolled sever  Associated signs and symtoms neuropathy/ckd/ CAD. aggravated with sugar diet and better with low sugar diet             Hemoglobin A1C (%)   Date Value   12/14/2021 7.8   09/10/2021 8.0   05/04/2021 7.0             ( goal A1Cis < 7)   Microalb/Crt.  Ratio (mcg/mg creat)   Date Value   12/14/2021 35 (H)     LDL Cholesterol (mg/dL)   Date Value   05/13/2021 55   01/13/2020 70   05/21/2019 68       (goal LDL is <100)   AST (U/L)   Date Value   12/28/2021 26     ALT (U/L)   Date Value   12/28/2021 29     BUN (mg/dL)   Date Value   12/31/2021 22     BP Readings from Last 3 Encounters:   01/19/22 120/76   12/31/21 136/75   12/30/21 (!) 101/54          (goal 120/80)    Past Medical History:   Diagnosis Date    A-fib [facility])     Asymptomatic bilateral carotid artery stenosis     Boil, thigh 10/2019    10/30/19: right inner thigh region, says PCP Rxd Doxy for this, area is much better, has a couple days left to complete Doxy    CAD (coronary artery disease)     saw Dr. Clotilde Leos ([facility])     Charcot foot due to diabetes mellitus ([address] 2014    LEFT    COPD (chronic obstructive pulmonary disease) ([address] 2009    INHALER USE DAILY    Diabetes mellitus ([address] 1989    IDDM, On Insulin Dr. Mima Ryan Diabetic neuropathy [facility]     Difficult intravenous access     VEINS ROLL    Employs prosthetic leg     s/p left BKA    Foot ulcer ([facility] PRIOR TO 2015    BILAT    Full dentures     UPPER ONLY    Hyperlipidemia 2004    ON RX    Hypertension 2004    ON RX, PCP Dr. Dwain Duff, seen Oct. 2019    Hypoglycemia 4/2/2015    MRSA (methicillin resistant staph aureus) culture positive 4/16/2015    ankle    OA (osteoarthritis)     Osteomyelitis ([address]     left stump  BKA    Paroxysmal atrial fibrillation ([address]     Renal mass 09/2019    ACCIDENTAL  FINDING IS FOLLOWING UP WITH KIDNEY DR Kelly Early Sntonie     Suspected sleep apnea     Thyroid disease 2004    PT HAD HYPERTHYROIDISM-UNCONTROLED THYROID DESTROYED WITH RADI IODINE NOW HAS HYPOTHYRIDISM    Vision abnormalities 09/2019    LEFT NO VISION    Vitreous hemorrhage of left eye due to diabetes mellitus ([address] 09/2019    s/p Vitrectomy 9/2019    Wears glasses     Wears partial dentures     full upper, does not wear lower partial    Wellness examination     Dr. Chandrika Zarate seen within last 1 1/2 mos      Past Surgical History:   Procedure Laterality Date    CARDIAC CATHETERIZATION      no stenting    CARDIOVASCULAR STRESS TEST  06/28/2019    small fixed apical, likely normal, EF 48%    COLONOSCOPY  06/17/2016    poor prep, done up to the IC valve, redundant colon    COLONOSCOPY  01/23/2017    VIRTUAL COLONOSCOPY DONE-no polyps or masses    CYSTOSCOPY Bilateral 6/16/2020    CYSTOSCOPY RETROGRADE PYELOGRAM URETEROSCOPY performed by Ji Gu MD at [address] Right 7/30/2020    CYSTOSCOPY, RIGHT RETROGRADE PYELOGRAM,  URETERAL CATHETER  INSERTION performed by Ji Gu MD at [address] [place] [address] 9/1/2020    CYSTOSCOPY RETROGRADE PYELOGRAM, RIGHT URETERAL STENT EXCHANGE performed by Ji Gu MD at [facility] Right     r-bone removed    [facility].  PICC [address] DOUBLE  5/21/2018         KIDNEY CYST REMOVAL      KIDNEY SURGERY Right 11/13/2019    XI ROBOTIC PARTIAL NEPHRECTOMY, INTRAOP ULTRASOUND, RIGHT URETEROURETEROSTOMY, RIGHT URETERAL STENT PLACEMENT **SHORT STAY** performed by Ashley Bobo MD at [address] N/A 7/30/2020    XI LAPAROSCOPIC ROBOTIC URETEROLYSIS, ROBOTIC ASSISTED BUCCAL URETEROPLASTY  (DR. COBIAN TO ASSIST) performed by Ashley Bobo MD at [address] Left 4/29/15    OTHER SURGICAL HISTORY Left 5-5-15 and 11/2015    Revision BKA    OTHER SURGICAL HISTORY      left stump revision 5/30/2018    RI RE-AMPUTATION LOWER LEG Left 5/30/2018    LEG AMPUTATION BELOW KNEE REVISION performed by Joan Mcnally MD at [address] Bilateral 80'S    TOE AMPUTATION      Right 2 and 4    VITRECTOMY Left 9/25/2019    PARS PLANA VITRECTOMY 25 GAUGE, MEMBRANE PEELING, ENDOLASER 200  MW 1044 SPOTS, INDIRECT LASER 26 SPOTS performed by Eleni Aguirre MD at [place] History   Problem Relation Age of Onset    Diabetes Mother     Hypertension Mother     Stomach Cancer Mother     Hypertension Father     Alcohol Abuse Father     COPD Father     Kidney Cancer Father     Diabetes Brother         IDDM-PUMP    Other Sister         BRAIN TUMOR       Social History     Tobacco Use    Smoking status: Former Smoker     Packs/day: 2.00     Years: 25.00     Pack years: 50.00     Types: Cigars     Start date: 1     Quit date: 5/29/2011     Years since quitting: 10.6    Smokeless tobacco: Never Used    Tobacco comment: quit in 2011   Substance Use Topics    Alcohol use:  Yes     Alcohol/week: 0.0 standard drinks     Comment: BEER 2 A MONTH      Current Outpatient Medications   Medication Sig Dispense Refill    Insulin Pen Needle (COMFORT TOUCH INSULIN PEN NEED) 33G X 6 MM MISC 110 Units by Does not apply route daily 5 each 1    Semaglutide, 1 MG/DOSE, (OZEMPIC, 1 MG/DOSE,) 2 MG/1.5ML SOPN Inject 1 mg into the skin once a week 5 pen 2    atorvastatin (LIPITOR) 40 MG tablet TAKE 1 TABLET BY MOUTH EVERY DAY 30 tablet 11    amiodarone (CORDARONE) 200 MG tablet Take 1 tablet by mouth 2 times daily 60 tablet 0    pregabalin (LYRICA) 100 MG capsule Take 1 capsule by mouth 3 times daily for 30 days. 60 capsule 0    metoprolol tartrate (LOPRESSOR) 50 MG tablet Take 1 tablet by mouth 2 times daily 60 tablet 3    amLODIPine (NORVASC) 2.5 MG tablet Take 1 tablet by mouth daily 30 tablet 3    levothyroxine (SYNTHROID) 175 MCG tablet Take 1 tablet by mouth daily 30 tablet 3    insulin lispro (HUMALOG) 100 UNIT/ML injection vial SLIDING SCALE (TAKE 5 UNITS IF SUGAR IS OVER 150) 1 each 3    finasteride (PROSCAR) 5 MG tablet TAKE 1 TABLET BY MOUTH EVERY DAY 30 tablet 5    isosorbide mononitrate (IMDUR) 30 MG extended release tablet TAKE 1 TABLET BY MOUTH EVERY DAY 90 tablet 3    budesonide-formoterol (SYMBICORT) 160-4.5 MCG/ACT AERO TAKE 2 PUFFS BY MOUTH TWICE A DAY 3 each 3    aspirin EC 81 MG EC tablet Take 1 tablet by mouth daily 30 tablet 5    B-D UF III MINI PEN NEEDLES 31G X 5 MM MISC USE AS DIRECTED ONCE DAILY TO INJECT TOUJEO 100 each 3    tamsulosin (FLOMAX) 0.4 MG capsule TAKE 1 CAPSULE BY MOUTH EVERY DAY 90 capsule 3    losartan (COZAAR) 50 MG tablet TAKE 1 TABLET BY MOUTH EVERY DAY 30 tablet 5    Continuous Blood Gluc  (DEXCOM G6 ) DOUGIE USE AS DIRECTED TO MONITOR BLOOD SUGAR      clotrimazole-betamethasone (LOTRISONE) 1-0.05 % cream APPLY TO AFFECTED AREA TWICE A DAY 45 g 3    ELIQUIS 5 MG TABS tablet TAKE 1 TABLET BY MOUTH TWICE A  tablet 2    blood glucose monitor strips T/S Contour.  Test 4 times a day 100 strip 3    rOPINIRole (REQUIP) 2 MG tablet TAKE 1 TABLET BY MOUTH EVERY DAY 90 tablet 3    gemfibrozil (LOPID) 600 MG tablet TAKE 1 TABLET BY MOUTH EVERY DAY 90 tablet 3    albuterol sulfate  (90 Base) MCG/ACT inhaler INHALE 2 PUFFS INTO THE LUNGS EVERY 6 HOURS AS NEEDED FOR WHEEZING OR SHORTNESS OF BREATH 6.7 Inhaler 0    Blood Pressure KIT 1 actuation by Does not apply route once a week 1 kit 0    Handicap Placard MISC by Does not apply route Duration - 5years  Reason- prosthetic leg 1 each 0    Omega-3 Fatty Acids (FISH OIL CONCENTRATE) 300 MG CAPS Take 300 mg by mouth nightly       Glucosamine Sulfate 500 MG TABS Take 500 mg by mouth 3 times daily      glucagon, rDNA, 1 MG injection Inject 1 mL into the muscle once as needed for Low blood sugar  (Patient not taking: Reported on 1/19/2022)       No current facility-administered medications for this visit. Allergies   Allergen Reactions    Ramipril      Other reaction(s): swelling of the lips   Other reaction(s): swelling of the lips     Adhesive Tape      tegaderm causes blisters. Use paper tape       Health Maintenance   Topic Date Due    HIV screen  Never done    DTaP/Tdap/Td vaccine (1 - Tdap) Never done    Shingles Vaccine (1 of 2) 10/23/2014    Diabetic retinal exam  01/04/2017    Low dose CT lung screening  03/02/2022    Depression Screen  05/04/2022    Lipid screen  05/13/2022    A1C test (Diabetic or Prediabetic)  12/14/2022    Diabetic microalbuminuria test  12/14/2022    TSH testing  12/14/2022    Potassium monitoring  12/31/2022    Creatinine monitoring  12/31/2022    Diabetic foot exam  01/07/2023    Pneumococcal 0-64 years Vaccine (2 of 2 - PPSV23) 01/07/2025    Colon cancer screen colonoscopy  01/23/2027    Flu vaccine  Completed    COVID-19 Vaccine  Completed    Hepatitis C screen  Completed    Hepatitis A vaccine  Aged Out    Hib vaccine  Aged Out    Meningococcal (ACWY) vaccine  Aged Out       Subjective:     Review of Systems   Constitutional: Negative for appetite change, diaphoresis and fatigue. HENT: Negative for ear discharge and trouble swallowing. Eyes: Negative for pain and discharge. Respiratory: Negative for cough, shortness of breath and wheezing. Cardiovascular: Negative for chest pain and palpitations.    Gastrointestinal: Negative for abdominal distention, blood in stool and diarrhea. Endocrine: Negative for polydipsia and polyphagia. Genitourinary: Negative for difficulty urinating and frequency. Musculoskeletal: Positive for arthralgias. Negative for gait problem, myalgias and neck pain. Skin: Negative for color change and rash. Allergic/Immunologic: Negative for environmental allergies and food allergies. Neurological: Negative for dizziness and headaches. Hematological: Negative for adenopathy. Does not bruise/bleed easily. Psychiatric/Behavioral: Negative for behavioral problems and sleep disturbance. Objective:     Physical Exam  Constitutional:       Appearance: He is well-developed. He is not diaphoretic. HENT:      Head: Normocephalic and atraumatic. Eyes:      General:         Right eye: No discharge. Left eye: No discharge. Extraocular Movements:      Right eye: Normal extraocular motion. Left eye: Normal extraocular motion. Conjunctiva/sclera: Conjunctivae normal.      Right eye: Right conjunctiva is not injected. Left eye: Left conjunctiva is not injected. Neck:      Thyroid: No thyroid mass or thyromegaly. Vascular: No JVD. Cardiovascular:      Rate and Rhythm: Normal rate and regular rhythm. Heart sounds: No murmur heard. No friction rub. Pulmonary:      Effort: Pulmonary effort is normal. No tachypnea, bradypnea, accessory muscle usage or respiratory distress. Breath sounds: Normal breath sounds. No wheezing or rales. Abdominal:      General: Bowel sounds are normal. There is no distension. Palpations: Abdomen is soft. Tenderness: There is no abdominal tenderness. There is no rebound. Musculoskeletal:         General: No tenderness. Normal range of motion. Cervical back: Normal range of motion and neck supple. No edema or erythema. Lymphadenopathy:      Head:      Right side of head: No submental or submandibular adenopathy.       Left side of head: No submental or submandibular adenopathy. Cervical: No cervical adenopathy. Skin:     General: Skin is warm. Coloration: Skin is not pale. Findings: No bruising, ecchymosis or rash. Neurological:      Mental Status: He is alert and oriented to person, place, and time. Cranial Nerves: No cranial nerve deficit. Sensory: No sensory deficit. Motor: No atrophy or abnormal muscle tone. Coordination: Coordination normal.   Psychiatric:         Mood and Affect: Mood is not anxious. Affect is not angry. Speech: Speech is not slurred. Behavior: Behavior normal. Behavior is not aggressive. Thought Content: Thought content does not include homicidal ideation. Cognition and Memory: Memory is not impaired. /76 (Site: Left Upper Arm)   Wt 282 lb (127.9 kg)   BMI 34.33 kg/m²     Assessment:       Diagnosis Orders   1. Diabetic mononeuropathy associated with diabetes mellitus due to underlying condition ([address]     2. Anginal equivalent ([address]     3. Status post below-knee amputation of left lower extremity ([address]     4. Chronic diastolic heart failure ([address]     5. Malignant neoplasm of right kidney ([address]     6. PVD (peripheral vascular disease) ([address]     7. Longstanding persistent atrial fibrillation ([address]     8. Muscular atrophy, unspecified site  Testosterone   9. DENNISE (obstructive sleep apnea)  Shazia Leon MD, Pulmonology, [address]:      No follow-ups on file.     Orders Placed This Encounter   Procedures    Testosterone     Standing Status:   Future     Standing Expiration Date:   1/19/2023   Shazia Leon MD, Pulmonology, [place]     Referral Priority:   Routine     Referral Type:   Eval and Treat     Referral Reason:   Specialty Services Required     Referred to Provider:   Derick Wilde MD     Requested Specialty:   Pulmonology     Number of Visits Requested:   1     Orders Placed This Encounter   Medications    Insulin Pen Needle (COMFORT TOUCH INSULIN PEN NEED) 33G X 6 MM MISC     Si Units by Does not apply route daily     Dispense:  5 each     Refill:  1    Semaglutide, 1 MG/DOSE, (OZEMPIC, 1 MG/DOSE,) 2 MG/1.5ML SOPN     Sig: Inject 1 mg into the skin once a week     Dispense:  5 pen     Refill:  2    sweating interment ly  Cad ruled out  tsh  Check testerostarone levels  On proscar possible side affect  dyspnea is better    Uncontrolled  Dm  Pt willing go back to toujeo 110 daily and ozempc weekly     Patient given educational materials - see patient instructions. Discussed use, benefit, and side effects of prescribed medications. All patientquestions answered. Pt voiced understanding. Reviewed health maintenance. Instructedto continue current medications, diet and exercise. Patient agreed with treatmentplan. Follow up as directed.      Electronically signed by Dirk Méndez MD on 2022 at 2:37 PM

## 2022-01-20 LAB
ANION GAP SERPL CALCULATED.3IONS-SCNC: 18 MMOL/L (ref 9–17)
BUN BLDV-MCNC: 22 MG/DL (ref 8–23)
BUN/CREAT BLD: ABNORMAL (ref 9–20)
CALCIUM SERPL-MCNC: 9.5 MG/DL (ref 8.6–10.4)
CHLORIDE BLD-SCNC: 106 MMOL/L (ref 98–107)
CO2: 22 MMOL/L (ref 20–31)
CREAT SERPL-MCNC: 1.37 MG/DL (ref 0.7–1.2)
GFR AFRICAN AMERICAN: >60 ML/MIN
GFR NON-AFRICAN AMERICAN: 53 ML/MIN
GFR SERPL CREATININE-BSD FRML MDRD: ABNORMAL ML/MIN/{1.73_M2}
GFR SERPL CREATININE-BSD FRML MDRD: ABNORMAL ML/MIN/{1.73_M2}
GLUCOSE BLD-MCNC: 152 MG/DL (ref 70–99)
POTASSIUM SERPL-SCNC: 5.1 MMOL/L (ref 3.7–5.3)
PROSTATE SPECIFIC ANTIGEN: 0.52 UG/L
SODIUM BLD-SCNC: 146 MMOL/L (ref 135–144)

## 2022-01-21 ENCOUNTER — CARE COORDINATION (OUTPATIENT)
Dept: CASE MANAGEMENT | Age: 62
End: 2022-01-21

## 2022-01-21 NOTE — CARE COORDINATION
[facility] Transitions Follow Up Call    2022    Patient: Sharren Canavan  Patient : 1960   MRN: <[id number]>  Reason for Admission: Chest Pain  Discharge Date: 21 RARS: Readmission Risk Score: 14 ( )    Patient states he is doing better than last week. Denies sob, pain, swelling. Normal elimination habits. Appetite is good. States he had a virtual visit with PCP . His insulin was changed in the hospital. States that was not working out. His PCP changed it back to. He was seen by Cardiology . States appointment went well no changes. Will continues to monitor. Spoke with: [address] Transitions Subsequent and Final Call    Subsequent and Final Calls  Care Transitions Interventions  Other Interventions: Follow Up  Future Appointments   Date Time Provider [place]ma [place]   2/10/2022  9:15 AM Tyree Arvizu MD [address]   3/10/2022 10:40 AM Violeta Fall MD [facility]   3/11/2022  9:00 AM Carmen Dumont DPM [address] [facility] Transitions Follow Up Call    Needs to be reviewed by the provider   Additional needs identified to be addressed with provider: No  none             Method of communication with provider : none      Care Transition Nurse (CTN) contacted the patient by telephone to follow up after admission on . Verified name and  with patient as identifiers. Addressed changes since last contact: medications-insulin changed back to original orders by PCP. Discussed follow-up appointments. If no appointment was previously scheduled, appointment scheduling offered: Yes. Is follow up appointment scheduled within 7 days of discharge? Yes. Advance Care Planning:   Does patient have an Advance Directive: reviewed and current, reviewed and needs to be updated, not on file; education provided, not on file, patient declined education, decision maker updated and referral to internal ACP facilitator.      CTN reviewed discharge instructions, medical action plan and red flags with patient and discussed any barriers to care and/or understanding of plan of care after discharge. Discussed appropriate site of care based on symptoms and resources available to patient including: PCP, Specialist and When to call 911. The patient agrees to contact the PCP office for questions related to their healthcare. Patients top risk factors for readmission: medical condition-Chest pain  Interventions to address risk factors: currently needs met      CTN provided contact information for future needs. Plan for follow-up call in 7-10 days based on severity of symptoms and risk factors.   Plan for next call: symptom management-Chest pain          Eliseo Do RN

## 2022-01-26 ENCOUNTER — CARE COORDINATION (OUTPATIENT)
Dept: CASE MANAGEMENT | Age: 62
End: 2022-01-26

## 2022-01-26 NOTE — CARE COORDINATION
[facility] Transitions Follow Up Call    2022    Patient: Lior Garibay  Patient : 1960   MRN: [id number]  Reason for Admission: Chest pain   Discharge Date: 21 RARS: Readmission Risk Score: 14 ( )         Spoke with: Patient    Spoke with patient who said he has been feeling very well, has had mo chest pains, shortness of breath, cough, palpitations or other cardiac related symptoms. He as seen by PCP last week, his insulin was changed back to what he was taking before and he is reporting better blood sugar control now. He said his sugars have been 160 and below. He denies any needs or concerns, expressed this was final call. Care Transitions Follow Up Call    Needs to be reviewed by the provider   Additional needs identified to be addressed with provider: No  none             Method of communication with provider : none      Care Transition Nurse (CTN) contacted the patient by telephone to follow up after admission on . Verified name and  with patient as identifiers. Addressed changes since last contact: none  Discussed follow-up appointments. Is follow up appointment scheduled within 7 days of discharge? Yes. CTN reviewed discharge instructions, medical action plan and red flags with patient and discussed any barriers to care and/or understanding of plan of care after discharge. Discussed appropriate site of care based on symptoms and resources available to patient including: PCP and Specialist. The patient agrees to contact the PCP office for questions related to their healthcare. Patients top risk factors for readmission: medical condition-A. fib  Interventions to address risk factors: none      Non-[facility] follow up appointment(s): n/a    CTN provided contact information for future needs. No further follow-up call indicated based on severity of symptoms and risk factors.   Plan for next call: Final call            Care Transitions Subsequent and Final Call Schedule Follow Up Appointment with PCP: Completed  Subsequent and Final Calls  Do you have any ongoing symptoms?: No  Have your medications changed?: Yes  Patient Reports: PCP changed insulin back to what he was taking before, has better control  Do you have any questions related to your medications?: No  Do you currently have any active services?: No  Do you have any needs or concerns that I can assist you with?: No  Identified Barriers: Lack of Education  Care Transitions Interventions  Other Interventions:            Follow Up  Future Appointments   Date Time Provider Prema Diggs   2/10/2022  9:15 AM Gideon Wilkinson MD  [place]   3/9/2022  2:45 PM Salas Aguila MD [address]   3/10/2022 10:40 AM Maria Luisa Trevizo MD [address] [place] [address]   3/11/2022  9:00 [address], DPM [place] Marie Nicole RN

## 2022-01-28 RX ORDER — INSULIN GLARGINE 300 U/ML
110 INJECTION, SOLUTION SUBCUTANEOUS DAILY
Qty: 5 PEN | Refills: 5 | Status: SHIPPED | OUTPATIENT
Start: 2022-01-28 | End: 2022-08-01 | Stop reason: SDUPTHER

## 2022-01-28 NOTE — TELEPHONE ENCOUNTER
Patient stated he received his Humalog refill and it is the vials. Patient states he prefers the pens. Pens not seen on patients history, please send pens of Humalog. Toujeo pending per patient request also.      Please advise

## 2022-02-09 ENCOUNTER — HOSPITAL ENCOUNTER (OUTPATIENT)
Age: 62
Setting detail: SPECIMEN
Discharge: HOME | End: 2022-02-09

## 2022-02-09 DIAGNOSIS — M62.50 MUSCULAR ATROPHY, UNSPECIFIED SITE: ICD-10-CM

## 2022-02-09 LAB — TESTOSTERONE TOTAL: 500 NG/DL (ref 220–1000)

## 2022-02-10 ENCOUNTER — OFFICE VISIT (OUTPATIENT)
Dept: INTERNAL MEDICINE CLINIC | Age: 62
End: 2022-02-10
Payer: COMMERCIAL

## 2022-02-10 VITALS
HEART RATE: 100 BPM | OXYGEN SATURATION: 100 % | BODY MASS INDEX: 33.73 KG/M2 | DIASTOLIC BLOOD PRESSURE: 70 MMHG | WEIGHT: 277 LBS | SYSTOLIC BLOOD PRESSURE: 120 MMHG | HEIGHT: 76 IN

## 2022-02-10 DIAGNOSIS — Z79.4 TYPE 2 DIABETES MELLITUS WITH DIABETIC POLYNEUROPATHY, WITH LONG-TERM CURRENT USE OF INSULIN (HCC): ICD-10-CM

## 2022-02-10 DIAGNOSIS — R35.0 URINARY FREQUENCY: ICD-10-CM

## 2022-02-10 DIAGNOSIS — M14.679 CHARCOT'S JOINT OF ANKLE, UNSPECIFIED LATERALITY: Chronic | ICD-10-CM

## 2022-02-10 DIAGNOSIS — Z89.512 STATUS POST BELOW-KNEE AMPUTATION OF LEFT LOWER EXTREMITY (HCC): ICD-10-CM

## 2022-02-10 DIAGNOSIS — E08.41 DIABETIC MONONEUROPATHY ASSOCIATED WITH DIABETES MELLITUS DUE TO UNDERLYING CONDITION (HCC): Primary | ICD-10-CM

## 2022-02-10 DIAGNOSIS — E11.42 TYPE 2 DIABETES MELLITUS WITH DIABETIC POLYNEUROPATHY, WITH LONG-TERM CURRENT USE OF INSULIN (HCC): ICD-10-CM

## 2022-02-10 DIAGNOSIS — R10.9 RIGHT FLANK PAIN: ICD-10-CM

## 2022-02-10 DIAGNOSIS — I48.11 LONGSTANDING PERSISTENT ATRIAL FIBRILLATION (HCC): Chronic | ICD-10-CM

## 2022-02-10 DIAGNOSIS — I42.2 HYPERTROPHIC CARDIOMYOPATHY (HCC): ICD-10-CM

## 2022-02-10 DIAGNOSIS — I73.9 PVD (PERIPHERAL VASCULAR DISEASE) (HCC): ICD-10-CM

## 2022-02-10 DIAGNOSIS — I10 ESSENTIAL HYPERTENSION: ICD-10-CM

## 2022-02-10 PROCEDURE — 99214 OFFICE O/P EST MOD 30 MIN: CPT | Performed by: INTERNAL MEDICINE

## 2022-02-10 PROCEDURE — G8482 FLU IMMUNIZE ORDER/ADMIN: HCPCS | Performed by: INTERNAL MEDICINE

## 2022-02-10 PROCEDURE — 3017F COLORECTAL CA SCREEN DOC REV: CPT | Performed by: INTERNAL MEDICINE

## 2022-02-10 PROCEDURE — G8427 DOCREV CUR MEDS BY ELIG CLIN: HCPCS | Performed by: INTERNAL MEDICINE

## 2022-02-10 PROCEDURE — 2022F DILAT RTA XM EVC RTNOPTHY: CPT | Performed by: INTERNAL MEDICINE

## 2022-02-10 PROCEDURE — G8417 CALC BMI ABV UP PARAM F/U: HCPCS | Performed by: INTERNAL MEDICINE

## 2022-02-10 PROCEDURE — 3046F HEMOGLOBIN A1C LEVEL >9.0%: CPT | Performed by: INTERNAL MEDICINE

## 2022-02-10 PROCEDURE — 1036F TOBACCO NON-USER: CPT | Performed by: INTERNAL MEDICINE

## 2022-02-10 ASSESSMENT — ENCOUNTER SYMPTOMS
EYE PAIN: 0
TROUBLE SWALLOWING: 0
WHEEZING: 0
ABDOMINAL DISTENTION: 0
SHORTNESS OF BREATH: 0
BLOOD IN STOOL: 0
EYE DISCHARGE: 0
DIARRHEA: 0
COUGH: 0
COLOR CHANGE: 0

## 2022-02-10 NOTE — PROGRESS NOTES
[address]  [place] [address]  Dept: [phone]  Dept Fax: [phone]    Chris Martínez is a 58 y.o. male who presents today for his medical conditions/complaintsas noted below. Chris Martínez is c/o of   Chief Complaint   Patient presents with    Diabetes     12/14/21 a1c 7.8         HPI:     HTN  Onset more than 2 years ago  gustavo mild to mod  Controlled with current po meds  Not associated with headaches or blurry vision  No chest pain  Diabetes   Duration more than 7 years  Modifying factors on Glucophage and other med  Severity uncontrolled sever  Associated signs and symtoms neuropathy/ckd/ CAD. aggravated with sugar diet and better with low sugar diet     Right flank pain        Hemoglobin A1C (%)   Date Value   12/14/2021 7.8   09/10/2021 8.0   05/04/2021 7.0             ( goal A1Cis < 7)   Microalb/Crt.  Ratio (mcg/mg creat)   Date Value   12/14/2021 35 (H)     LDL Cholesterol (mg/dL)   Date Value   05/13/2021 55   01/13/2020 70   05/21/2019 68       (goal LDL is <100)   AST (U/L)   Date Value   12/28/2021 26     ALT (U/L)   Date Value   12/28/2021 29     BUN (mg/dL)   Date Value   01/19/2022 22     BP Readings from Last 3 Encounters:   02/10/22 120/70   01/19/22 120/76   12/31/21 136/75          (goal 120/80)    Past Medical History:   Diagnosis Date    A-fib [facility])     Asymptomatic bilateral carotid artery stenosis     Boil, thigh 10/2019    10/30/19: right inner thigh region, says PCP Rxd Doxy for this, area is much better, has a couple days left to complete Doxy    CAD (coronary artery disease)     saw Dr. Reji Perez ([facility])     Charcot foot due to diabetes mellitus ([address] 2014    LEFT    COPD (chronic obstructive pulmonary disease) ([address] 2009    INHALER USE DAILY    Diabetes mellitus ([address] 1989    IDDM, On Insulin Dr. Caryn Carias Diabetic neuropathy [facility]     Difficult intravenous access     VEINS ROLL    Employs prosthetic leg     s/p left BKA    Foot ulcer ([address] PRIOR TO 2015    BILAT    Full dentures     UPPER ONLY    Hyperlipidemia 2004    ON RX    Hypertension 2004    ON RX, PCP Dr. Piter Almanzar, seen Oct. 2019    Hypoglycemia 4/2/2015    MRSA (methicillin resistant staph aureus) culture positive 4/16/2015    ankle    OA (osteoarthritis)     Osteomyelitis ([address]     left stump  BKA    Paroxysmal atrial fibrillation ([address]     Renal mass 09/2019    ACCIDENTAL  FINDING IS FOLLOWING UP WITH KIDNEY DR Randy Ledezma     Suspected sleep apnea     Thyroid disease 2004    PT HAD HYPERTHYROIDISM-UNCONTROLED THYROID DESTROYED WITH RADI IODINE NOW HAS HYPOTHYRIDISM    Vision abnormalities 09/2019    LEFT NO VISION    Vitreous hemorrhage of left eye due to diabetes mellitus ([address] 09/2019    s/p Vitrectomy 9/2019    Wears glasses     Wears partial dentures     full upper, does not wear lower partial    Wellness examination     Dr. Meenu Steven seen within last 1 1/2 mos      Past Surgical History:   Procedure Laterality Date    CARDIAC CATHETERIZATION      no stenting    CARDIOVASCULAR STRESS TEST  06/28/2019    small fixed apical, likely normal, EF 48%    COLONOSCOPY  06/17/2016    poor prep, done up to the IC valve, redundant colon    COLONOSCOPY  01/23/2017    VIRTUAL COLONOSCOPY DONE-no polyps or masses    CYSTOSCOPY Bilateral 6/16/2020    CYSTOSCOPY RETROGRADE PYELOGRAM URETEROSCOPY performed by Tammie Polo MD at [address] Right 7/30/2020    CYSTOSCOPY, RIGHT RETROGRADE PYELOGRAM,  URETERAL CATHETER  INSERTION performed by Tammie Polo MD at [address] [facility] 9/1/2020    CYSTOSCOPY RETROGRADE PYELOGRAM, RIGHT URETERAL STENT EXCHANGE performed by Tammie Polo MD at [facility] Right     r-bone removed    [facility].  PICC [facility] DOUBLE  5/21/2018         KIDNEY CYST REMOVAL      KIDNEY SURGERY Right 11/13/2019    XI ROBOTIC PARTIAL NEPHRECTOMY, INTRAOP ULTRASOUND, RIGHT URETEROURETEROSTOMY, RIGHT URETERAL STENT PLACEMENT **SHORT STAY** performed by Tammie Polo MD at [address] N/A 7/30/2020    XI LAPAROSCOPIC ROBOTIC URETEROLYSIS, ROBOTIC ASSISTED BUCCAL URETEROPLASTY  (DR. COBIAN TO ASSIST) performed by Tammie Polo MD at [address] Left 4/29/15    OTHER SURGICAL HISTORY Left 5-5-15 and 11/2015    Revision BKA    OTHER SURGICAL HISTORY      left stump revision 5/30/2018    MS RE-AMPUTATION LOWER LEG Left 5/30/2018    LEG AMPUTATION BELOW KNEE REVISION performed by Tessy Burden MD at [address] Bilateral 80'S    TOE AMPUTATION      Right 2 and 4    VITRECTOMY Left 9/25/2019    PARS PLANA VITRECTOMY 25 GAUGE, MEMBRANE PEELING, ENDOLASER 200  MW 1044 SPOTS, INDIRECT LASER 26 SPOTS performed by Zainab Solis MD at [place] History   Problem Relation Age of Onset    Diabetes Mother     Hypertension Mother     Stomach Cancer Mother     Hypertension Father     Alcohol Abuse Father     COPD Father     Kidney Cancer Father     Diabetes Brother         IDDM-PUMP    Other Sister         BRAIN TUMOR       Social History     Tobacco Use    Smoking status: Former Smoker     Packs/day: 2.00     Years: 25.00     Pack years: 50.00     Types: Cigars     Start date: 1     Quit date: 5/29/2011     Years since quitting: 10.7    Smokeless tobacco: Never Used    Tobacco comment: quit in 2011   Substance Use Topics    Alcohol use:  Yes     Alcohol/week: 0.0 standard drinks     Comment: BEER 2 A MONTH      Current Outpatient Medications   Medication Sig Dispense Refill    Insulin Glargine, 2 Unit Dial, (TOUJEO MAX SOLOSTAR) 300 UNIT/ML SOPN Inject 110 Units into the skin daily 5 pen 5    ASPIRIN LOW DOSE 81 MG EC tablet TAKE 1 TABLET BY MOUTH EVERY DAY 30 tablet 5    Insulin Pen Needle (COMFORT TOUCH INSULIN PEN NEED) 33G X 6 MM MISC 110 Units by Does not apply route daily 5 each 1    Semaglutide, 1 MG/DOSE, (OZEMPIC, 1 MG/DOSE,) 2 MG/1.5ML SOPN Inject 1 mg into the skin once a week 5 pen 2    atorvastatin (LIPITOR) 40 MG tablet TAKE 1 TABLET BY MOUTH EVERY DAY 30 tablet 11    amiodarone (CORDARONE) 200 MG tablet Take 1 tablet by mouth 2 times daily 60 tablet 0    metoprolol tartrate (LOPRESSOR) 50 MG tablet Take 1 tablet by mouth 2 times daily 60 tablet 3    amLODIPine (NORVASC) 2.5 MG tablet Take 1 tablet by mouth daily 30 tablet 3    levothyroxine (SYNTHROID) 175 MCG tablet Take 1 tablet by mouth daily 30 tablet 3    insulin lispro (HUMALOG) 100 UNIT/ML injection vial SLIDING SCALE (TAKE 5 UNITS IF SUGAR IS OVER 150) 1 each 3    finasteride (PROSCAR) 5 MG tablet TAKE 1 TABLET BY MOUTH EVERY DAY 30 tablet 5    isosorbide mononitrate (IMDUR) 30 MG extended release tablet TAKE 1 TABLET BY MOUTH EVERY DAY 90 tablet 3    budesonide-formoterol (SYMBICORT) 160-4.5 MCG/ACT AERO TAKE 2 PUFFS BY MOUTH TWICE A DAY 3 each 3    B-D UF III MINI PEN NEEDLES 31G X 5 MM MISC USE AS DIRECTED ONCE DAILY TO INJECT TOUJEO 100 each 3    tamsulosin (FLOMAX) 0.4 MG capsule TAKE 1 CAPSULE BY MOUTH EVERY DAY 90 capsule 3    losartan (COZAAR) 50 MG tablet TAKE 1 TABLET BY MOUTH EVERY DAY 30 tablet 5    Continuous Blood Gluc  (DEXCOM G6 ) DOUGIE USE AS DIRECTED TO MONITOR BLOOD SUGAR      clotrimazole-betamethasone (LOTRISONE) 1-0.05 % cream APPLY TO AFFECTED AREA TWICE A DAY 45 g 3    ELIQUIS 5 MG TABS tablet TAKE 1 TABLET BY MOUTH TWICE A  tablet 2    blood glucose monitor strips T/S Contour.  Test 4 times a day 100 strip 3    rOPINIRole (REQUIP) 2 MG tablet TAKE 1 TABLET BY MOUTH EVERY DAY 90 tablet 3    gemfibrozil (LOPID) 600 MG tablet TAKE 1 TABLET BY MOUTH EVERY DAY 90 tablet 3    albuterol sulfate  (90 Base) MCG/ACT inhaler INHALE 2 PUFFS INTO THE LUNGS EVERY 6 HOURS AS NEEDED FOR WHEEZING OR SHORTNESS OF BREATH 6.7 Inhaler 0  glucagon, rDNA, 1 MG injection Inject 1 mL into the muscle once as needed for Low blood sugar       Blood Pressure KIT 1 actuation by Does not apply route once a week 1 kit 0    Handicap Placard MISC by Does not apply route Duration - 5years  Reason- prosthetic leg 1 each 0    Omega-3 Fatty Acids (FISH OIL CONCENTRATE) 300 MG CAPS Take 300 mg by mouth nightly       Glucosamine Sulfate 500 MG TABS Take 500 mg by mouth 3 times daily      pregabalin (LYRICA) 100 MG capsule Take 1 capsule by mouth 3 times daily for 30 days. 60 capsule 0     No current facility-administered medications for this visit. Allergies   Allergen Reactions    Ramipril      Other reaction(s): swelling of the lips   Other reaction(s): swelling of the lips     Adhesive Tape      tegaderm causes blisters. Use paper tape       Health Maintenance   Topic Date Due    HIV screen  Never done    DTaP/Tdap/Td vaccine (1 - Tdap) Never done    Shingles Vaccine (1 of 2) 10/23/2014    Diabetic retinal exam  01/04/2017    Low dose CT lung screening  03/02/2022    Depression Screen  05/04/2022    Lipid screen  05/13/2022    A1C test (Diabetic or Prediabetic)  12/14/2022    Diabetic microalbuminuria test  12/14/2022    TSH testing  12/14/2022    Diabetic foot exam  01/07/2023    Potassium monitoring  01/19/2023    Creatinine monitoring  01/19/2023    Pneumococcal 0-64 years Vaccine (2 of 2 - PPSV23) 01/07/2025    Colon cancer screen colonoscopy  01/23/2027    Flu vaccine  Completed    COVID-19 Vaccine  Completed    Hepatitis C screen  Completed    Hepatitis A vaccine  Aged Out    Hib vaccine  Aged Out    Meningococcal (ACWY) vaccine  Aged Out       Subjective:     Review of Systems   Constitutional: Negative for appetite change, diaphoresis and fatigue. HENT: Negative for ear discharge and trouble swallowing. Eyes: Negative for pain and discharge. Respiratory: Negative for cough, shortness of breath and wheezing. Cardiovascular: Negative for chest pain and palpitations. Gastrointestinal: Negative for abdominal distention, blood in stool and diarrhea. Right flank pian     Endocrine: Negative for polydipsia and polyphagia. Genitourinary: Negative for difficulty urinating and frequency. Musculoskeletal: Positive for arthralgias. Negative for gait problem, myalgias and neck pain. Skin: Negative for color change and rash. Allergic/Immunologic: Negative for environmental allergies and food allergies. Neurological: Negative for dizziness and headaches. Hematological: Negative for adenopathy. Does not bruise/bleed easily. Psychiatric/Behavioral: Negative for behavioral problems and sleep disturbance. Objective:     Physical Exam  Constitutional:       Appearance: He is well-developed. He is not diaphoretic. HENT:      Head: Normocephalic and atraumatic. Eyes:      General:         Right eye: No discharge. Left eye: No discharge. Extraocular Movements:      Right eye: Normal extraocular motion. Left eye: Normal extraocular motion. Conjunctiva/sclera: Conjunctivae normal.      Right eye: Right conjunctiva is not injected. Left eye: Left conjunctiva is not injected. Neck:      Thyroid: No thyroid mass or thyromegaly. Vascular: No JVD. Cardiovascular:      Rate and Rhythm: Normal rate and regular rhythm. Heart sounds: No murmur heard. No friction rub. Pulmonary:      Effort: Pulmonary effort is normal. No tachypnea, bradypnea, accessory muscle usage or respiratory distress. Breath sounds: Normal breath sounds. No wheezing or rales. Abdominal:      General: Bowel sounds are normal. There is no distension. Palpations: Abdomen is soft. Tenderness: There is no abdominal tenderness. There is no rebound. Musculoskeletal:         General: No tenderness. Normal range of motion. Cervical back: Normal range of motion and neck supple.  No edema or erythema. Lymphadenopathy:      Head:      Right side of head: No submental or submandibular adenopathy. Left side of head: No submental or submandibular adenopathy. Cervical: No cervical adenopathy. Skin:     General: Skin is warm. Coloration: Skin is not pale. Findings: No bruising, ecchymosis or rash. Neurological:      Mental Status: He is alert and oriented to person, place, and time. Cranial Nerves: No cranial nerve deficit. Sensory: No sensory deficit. Motor: No atrophy or abnormal muscle tone. Coordination: Coordination normal.   Psychiatric:         Mood and Affect: Mood is not anxious. Affect is not angry. Speech: Speech is not slurred. Behavior: Behavior normal. Behavior is not aggressive. Thought Content: Thought content does not include homicidal ideation. Cognition and Memory: Memory is not impaired. /70   Pulse 100   Ht 6' 4\" (1.93 m)   Wt 277 lb (125.6 kg)   SpO2 100%   BMI 33.72 kg/m²     Assessment:       Diagnosis Orders   1. Diabetic mononeuropathy associated with diabetes mellitus due to underlying condition ([address]     2. PVD (peripheral vascular disease) ([facility])     3. Charcot's joint of ankle, unspecified laterality     4. Type 2 diabetes mellitus with diabetic polyneuropathy, with long-term current use of insulin   Hemoglobin A1C   5. Essential hypertension     6. Status post below-knee amputation of left lower extremity ([address]     7. Longstanding persistent atrial fibrillation ([address]     8. Hypertrophic cardiomyopathy ([address]     9. Right flank pain  US RENAL COMPLETE    Culture, Urine   10. Urinary frequency  US RENAL COMPLETE    Culture, Urine             Plan:      No follow-ups on file. Orders Placed This Encounter   Procedures    Culture, Urine     Standing Status:   Future     Standing Expiration Date:   2/10/2023     Order Specific Question:   Specify (ex-cath, midstream, cysto, etc)? Answer:   clean cath    US RENAL COMPLETE     This procedure can be scheduled via [employer]. Access your [employer] account by visiting [email or web address]. Standing Status:   Future     Standing Expiration Date:   2/10/2023    Hemoglobin A1C     Standing Status:   Future     Standing Expiration Date:   4/10/2022     No orders of the defined types were placed in this encounter. right flank pain intermited  Hx of rigt renal surg for complex cyst  willdo us renal  Urine cx  Pt is due to see urology on  15  Dm log noted beter   sweating interment ly  Cad ruled out  tsh  Check testerostarone levels 500   Within  Normal for his age    On proscar possible side affect  dyspnea is better     Uncontrolled  Dm   toujeo 110 daily and ozempc weekly a1c 7.8  Repeat a1c next month   log is better        Patient given educational materials - see patient instructions. Discussed use, benefit, and side effects of prescribed medications. All patientquestions answered. Pt voiced understanding. Reviewed health maintenance. Instructedto continue current medications, diet and exercise. Patient agreed with treatmentplan. Follow up as directed.      Electronically signed by Rupinder Mcdermott MD on 2/10/2022 at 9:55 AM

## 2022-02-10 NOTE — PROGRESS NOTES
Visit Information    Have you changed or started any medications since your last visit including any over-the-counter medicines, vitamins, or herbal medicines? no   Are you having any side effects from any of your medications? -  no  Have you stopped taking any of your medications? Is so, why? -  no    Have you seen any other physician or provider since your last visit? No  Have you had any other diagnostic tests since your last visit? Yes - Records Obtained  Have you been seen in the emergency room and/or had an admission to a hospital since we last saw you? No  Have you had your routine dental cleaning in the past 6 months? yes -     Have you activated your [employer] account? If not, what are your barriers?  Yes     Patient Care Team:  Dirk Méndez MD as PCP - Luis Schulte MD as PCP - [facility] Provider  Jaida Rodriguez MD as Consulting Physician (Infectious Diseases)    Medical History Review  Past Medical, Family, and Social History reviewed and does contribute to the patient presenting condition    Health Maintenance   Topic Date Due    HIV screen  Never done    DTaP/Tdap/Td vaccine (1 - Tdap) Never done    Shingles Vaccine (1 of 2) 10/23/2014    Diabetic retinal exam  01/04/2017    Low dose CT lung screening  03/02/2022    Depression Screen  05/04/2022    Lipid screen  05/13/2022    A1C test (Diabetic or Prediabetic)  12/14/2022    Diabetic microalbuminuria test  12/14/2022    TSH testing  12/14/2022    Diabetic foot exam  01/07/2023    Potassium monitoring  01/19/2023    Creatinine monitoring  01/19/2023    Pneumococcal 0-64 years Vaccine (2 of 2 - PPSV23) 01/07/2025    Colon cancer screen colonoscopy  01/23/2027    Flu vaccine  Completed    COVID-19 Vaccine  Completed    Hepatitis C screen  Completed    Hepatitis A vaccine  Aged Out    Hib vaccine  Aged Out    Meningococcal (ACWY) vaccine  Aged Out

## 2022-02-14 ENCOUNTER — HOSPITAL ENCOUNTER (OUTPATIENT)
Dept: ULTRASOUND IMAGING | Age: 62
Discharge: HOME | End: 2022-02-16
Payer: COMMERCIAL

## 2022-02-14 DIAGNOSIS — R10.9 RIGHT FLANK PAIN: ICD-10-CM

## 2022-02-14 DIAGNOSIS — R35.0 URINARY FREQUENCY: ICD-10-CM

## 2022-02-14 PROCEDURE — 76770 US EXAM ABDO BACK WALL COMP: CPT

## 2022-02-15 ENCOUNTER — OFFICE VISIT (OUTPATIENT)
Dept: UROLOGY | Age: 62
End: 2022-02-15
Payer: COMMERCIAL

## 2022-02-15 VITALS
SYSTOLIC BLOOD PRESSURE: 126 MMHG | TEMPERATURE: 97.9 F | BODY MASS INDEX: 33.73 KG/M2 | HEIGHT: 76 IN | DIASTOLIC BLOOD PRESSURE: 74 MMHG | WEIGHT: 277 LBS

## 2022-02-15 DIAGNOSIS — C64.1 MALIGNANT NEOPLASM OF RIGHT KIDNEY (HCC): Primary | ICD-10-CM

## 2022-02-15 DIAGNOSIS — R10.9 FLANK PAIN: ICD-10-CM

## 2022-02-15 PROCEDURE — G8427 DOCREV CUR MEDS BY ELIG CLIN: HCPCS | Performed by: UROLOGY

## 2022-02-15 PROCEDURE — 99214 OFFICE O/P EST MOD 30 MIN: CPT | Performed by: UROLOGY

## 2022-02-15 PROCEDURE — G8482 FLU IMMUNIZE ORDER/ADMIN: HCPCS | Performed by: UROLOGY

## 2022-02-15 PROCEDURE — 3017F COLORECTAL CA SCREEN DOC REV: CPT | Performed by: UROLOGY

## 2022-02-15 PROCEDURE — G8417 CALC BMI ABV UP PARAM F/U: HCPCS | Performed by: UROLOGY

## 2022-02-15 PROCEDURE — 1036F TOBACCO NON-USER: CPT | Performed by: UROLOGY

## 2022-02-15 RX ORDER — CLOTRIMAZOLE AND BETAMETHASONE DIPROPIONATE 10; .64 MG/G; MG/G
CREAM TOPICAL
Qty: 45 G | Refills: 3 | Status: SHIPPED | OUTPATIENT
Start: 2022-02-15 | End: 2022-08-05

## 2022-02-15 ASSESSMENT — ENCOUNTER SYMPTOMS
EYE REDNESS: 0
WHEEZING: 0
CONSTIPATION: 1
EYE PAIN: 0
VOMITING: 0
COUGH: 0
NAUSEA: 0
DIARRHEA: 0
BACK PAIN: 1
SHORTNESS OF BREATH: 0
ABDOMINAL PAIN: 0

## 2022-02-15 NOTE — PROGRESS NOTES
[address] [facility] [address]  Dept: [facility] Urology Office Note - Established    Patient:  Juanita Lilly  YOB: 1960  Date: 2/15/2022    The patient is a 58 y.o. male who presents todayfor evaluation of the following problems:   Chief Complaint   Patient presents with    1 Year Follow Up     pain in surgery area    Results     psa       HPI  HAS H/O RENAL CANCERand subsequent ureteral stricture. This was repaired with buccal mucosa and doing well. Has some vague right flank pain. Renal u/s neg for hydro, cr is 1.3, psa is 0.5    Summary of old records: N/A    Additional History: N/A    Procedures Today: N/A    Urinalysis today:  No results found for this visit on 02/15/22. Last several PSA's:  Lab Results   Component Value Date    PSA 0.52 01/19/2022    PSA 3.56 09/10/2020    PSA 4.62 (H) 10/23/2017     Last total testosterone:  Lab Results   Component Value Date    TESTOSTERONE 500 02/09/2022       AUA Symptom Score (2/15/2022):   INCOMPLETE EMPTYING: How often have you had the sensation of not emptying your bladder?: Not at all  FREQUENCY: How often do you have to urinate less than every two hours?: Less than Half the time  INTERMITTENCY: How often have you found you stopped and started again several times when you urinated?: Less than 1 to 5 times  URGENCY: How often have you found it difficult to postpone urination?: Not at all  WEAK STREAM: How often have you had a weak urinary stream?: Not at all  STRAINING: How often have you had to strain to start  urination?: Less than 1 to 5 times  NOCTURIA: How many times did you typically get up at night to uriniate?: 1 Time  TOTAL I-PSS SCORE[de-identified] 5  How would you feel if you were to spend the rest of your life with your urinary condition?: Mostly Satisfied    Last BUN and creatinine:  Lab Results   Component Value Date Laterality Date    CARDIAC CATHETERIZATION      no stenting    CARDIOVASCULAR STRESS TEST  06/28/2019    small fixed apical, likely normal, EF 48%    COLONOSCOPY  06/17/2016    poor prep, done up to the IC valve, redundant colon    COLONOSCOPY  01/23/2017    VIRTUAL COLONOSCOPY DONE-no polyps or masses    CYSTOSCOPY Bilateral 6/16/2020    CYSTOSCOPY RETROGRADE PYELOGRAM URETEROSCOPY performed by Martina Urrutia MD at [address] [facility] Right 7/30/2020    CYSTOSCOPY, RIGHT RETROGRADE PYELOGRAM,  URETERAL CATHETER  INSERTION performed by Martina Urrutia MD at [address] [facility] 9/1/2020    CYSTOSCOPY RETROGRADE PYELOGRAM, RIGHT URETERAL STENT EXCHANGE performed by Martina Urrutia MD at [facility] Right     r-bone removed    HC  PICC [address] [facility]  5/21/2018         KIDNEY CYST REMOVAL      KIDNEY SURGERY Right 11/13/2019    XI ROBOTIC PARTIAL NEPHRECTOMY, INTRAOP ULTRASOUND, RIGHT URETEROURETEROSTOMY, RIGHT URETERAL STENT PLACEMENT **SHORT STAY** performed by Martina Urrutia MD at [place] [address] 7/30/2020    XI LAPAROSCOPIC ROBOTIC URETEROLYSIS, ROBOTIC ASSISTED BUCCAL URETEROPLASTY  (DR. COBIAN TO ASSIST) performed by Martina Urrutia MD at [address] [facility] Left 4/29/15    OTHER SURGICAL HISTORY Left 5-5-15 and 11/2015    Revision BKA    OTHER SURGICAL HISTORY      left stump revision 5/30/2018    NC RE-AMPUTATION LOWER LEG Left 5/30/2018    LEG AMPUTATION BELOW KNEE REVISION performed by Gosia Prince MD at [address] Bilateral 80'S    TOE AMPUTATION      Right 2 and 4    VITRECTOMY Left 9/25/2019    PARS PLANA VITRECTOMY 25 GAUGE, MEMBRANE PEELING, ENDOLASER 200  MW 1044 SPOTS, INDIRECT LASER 26 SPOTS performed by Fe Chicas MD at [address] [facility] History   Problem Relation Age of Onset    Diabetes Mother    Enoch Hypertension Mother     Stomach Cancer Mother     Hypertension Father     Alcohol Abuse Father     COPD Father     Kidney Cancer Father     Diabetes Brother         [id number]    Other Sister         BRAIN TUMOR     Outpatient Medications Marked as Taking for the 2/15/22 encounter (Office Visit) with Ben Deleon MD   Medication Sig Dispense Refill    clotrimazole-betamethasone (LOTRISONE) 1-0.05 % cream APPLY TO AFFECTED AREA TWICE A DAY 45 g 3    Insulin Glargine, 2 Unit Dial, (TOUJEO MAX SOLOSTAR) 300 UNIT/ML SOPN Inject 110 Units into the skin daily 5 pen 5    ASPIRIN LOW DOSE 81 MG EC tablet TAKE 1 TABLET BY MOUTH EVERY DAY 30 tablet 5    Insulin Pen Needle (COMFORT TOUCH INSULIN PEN NEED) 33G X 6 MM MISC 110 Units by Does not apply route daily 5 each 1    Semaglutide, 1 MG/DOSE, (OZEMPIC, 1 MG/DOSE,) 2 MG/1.5ML SOPN Inject 1 mg into the skin once a week 5 pen 2    atorvastatin (LIPITOR) 40 MG tablet TAKE 1 TABLET BY MOUTH EVERY DAY 30 tablet 11    metoprolol tartrate (LOPRESSOR) 50 MG tablet Take 1 tablet by mouth 2 times daily 60 tablet 3    amLODIPine (NORVASC) 2.5 MG tablet Take 1 tablet by mouth daily 30 tablet 3    levothyroxine (SYNTHROID) 175 MCG tablet Take 1 tablet by mouth daily 30 tablet 3    insulin lispro (HUMALOG) 100 UNIT/ML injection vial SLIDING SCALE (TAKE 5 UNITS IF SUGAR IS OVER 150) 1 each 3    finasteride (PROSCAR) 5 MG tablet TAKE 1 TABLET BY MOUTH EVERY DAY 30 tablet 5    isosorbide mononitrate (IMDUR) 30 MG extended release tablet TAKE 1 TABLET BY MOUTH EVERY DAY 90 tablet 3    budesonide-formoterol (SYMBICORT) 160-4.5 MCG/ACT AERO TAKE 2 PUFFS BY MOUTH TWICE A DAY 3 each 3    B-D UF III MINI PEN NEEDLES 31G X 5 MM MISC USE AS DIRECTED ONCE DAILY TO INJECT TOUJEO 100 each 3    tamsulosin (FLOMAX) 0.4 MG capsule TAKE 1 CAPSULE BY MOUTH EVERY DAY 90 capsule 3    losartan (COZAAR) 50 MG tablet TAKE 1 TABLET BY MOUTH EVERY DAY 30 tablet 5    Continuous Blood Gluc  (DEXCOM G6 ) DOUGIE USE AS DIRECTED TO MONITOR BLOOD SUGAR      ELIQUIS 5 MG TABS tablet TAKE 1 TABLET BY MOUTH TWICE A  tablet 2    blood glucose monitor strips T/S Contour. Test 4 times a day 100 strip 3    rOPINIRole (REQUIP) 2 MG tablet TAKE 1 TABLET BY MOUTH EVERY DAY 90 tablet 3    gemfibrozil (LOPID) 600 MG tablet TAKE 1 TABLET BY MOUTH EVERY DAY 90 tablet 3    albuterol sulfate  (90 Base) MCG/ACT inhaler INHALE 2 PUFFS INTO THE LUNGS EVERY 6 HOURS AS NEEDED FOR WHEEZING OR SHORTNESS OF BREATH 6.7 Inhaler 0    glucagon, rDNA, 1 MG injection Inject 1 mL into the muscle once as needed for Low blood sugar       Blood Pressure KIT 1 actuation by Does not apply route once a week 1 kit 0    Handicap Placard MISC by Does not apply route Duration - 5years  Reason- prosthetic leg 1 each 0    Omega-3 Fatty Acids (FISH OIL CONCENTRATE) 300 MG CAPS Take 300 mg by mouth nightly       Glucosamine Sulfate 500 MG TABS Take 500 mg by mouth 3 times daily         Ramipril and Adhesive tape  Social History     Tobacco Use   Smoking Status Former Smoker    Packs/day: 2.00    Years: 25.00    Pack years: 50.00    Types: Cigars    Start date: 1    Quit date: 5/29/2011    Years since quitting: 10.7   Smokeless Tobacco Never Used   Tobacco Comment    quit in 2011     (Ifpatient a smoker, smoking cessation counseling offered)    Social History     Substance and Sexual Activity   Alcohol Use Yes    Alcohol/week: 0.0 standard drinks    Comment: BEER 2 A MONTH       REVIEW OF SYSTEMS:  Review of Systems    Physical Exam:      Vitals:    02/15/22 1510   BP: 126/74   Temp: 97.9 °F (36.6 °C)     Body mass index is 33.72 kg/m². Patient is a 58 y.o. male in no acute distress and alert and oriented to person, place and time. Physical Exam  Constitutional: Patient in no acute distress. Neuro: Alert and oriented to person, place and time.   Psych: Mood normal, affect normal  Skin: No rash noted  HEENT: Head: Normocephalic andatraumatic  Conjunctivae and EOM are normal. Pupils are equal, round  Nose:Normal  Right External Ear: Normal; Left External Ear: Normal  Mouth: Mucosa Moist  Neck: Supple  Lungs: Respiratory effort is normal  Cardiovascular: Warm & Pink  Abdomen: Soft, non-tender, non-distended with no CVA,  No flank tenderness,  Or hepatosplenomegaly       Assessment and Plan      1. Malignant neoplasm of right kidney ([address]    2. Flank pain           Plan:       Return in about 1 year (around 2/15/2023) for Follow up. Prescriptions Ordered:  No orders of the defined types were placed in this encounter. Orders Placed:  Orders Placed This Encounter   Procedures    US RENAL COMPLETE     This procedure can be scheduled via [employer]. Access your [employer] account by visiting [email or web address]. Standing Status:   Future     Standing Expiration Date:   8/15/2023    PSA, Diagnostic     Standing Status:   Future     Standing Expiration Date:   2/15/2023    Basic Metabolic Panel     Standing Status:   Future     Standing Expiration Date:   8/15/2023           Dominick Freitas MD    Agree with the ROS entered by the MA.

## 2022-02-15 NOTE — PROGRESS NOTES
Review of Systems   Constitutional: Negative for chills, fatigue and fever. Eyes: Negative for pain, redness and visual disturbance. Respiratory: Negative for cough, shortness of breath and wheezing. Cardiovascular: Negative for chest pain and leg swelling. Gastrointestinal: Positive for constipation. Negative for abdominal pain, diarrhea, nausea and vomiting. Genitourinary: Positive for frequency and urgency. Negative for difficulty urinating, dysuria, flank pain and hematuria. Musculoskeletal: Positive for back pain. Negative for joint swelling and myalgias. Skin: Positive for rash. Negative for wound. Neurological: Negative for dizziness, tremors and numbness. Hematological: Bruises/bleeds easily. Review of Systems PT has Catheter   Constitutional: Negative for chills, fatigue and fever. Eyes: Negative for pain, redness and visual disturbance. Respiratory: Negative for cough, shortness of breath and wheezing. Cardiovascular: Negative for chest pain and leg swelling. Gastrointestinal: Negative for abdominal pain, constipation, diarrhea, nausea and vomiting. Genitourinary: Negative for difficulty urinating, dysuria, flank pain, frequency, hematuria, scrotal swelling, testicular pain and urgency. Musculoskeletal: Negative for back pain, joint swelling and myalgias. Skin: Negative for rash and wound. Neurological: Negative for dizziness, tremors and numbness. Hematological: Does not bruise/bleed easily.

## 2022-02-17 NOTE — ADDENDUM NOTE
Addended by: Vielka Bueno on: 1/7/2020 08:14 AM     Modules accepted: Orders Quality 130: Documentation Of Current Medications In The Medical Record: Current Medications Documented Detail Level: Detailed Quality 226: Preventive Care And Screening: Tobacco Use: Screening And Cessation Intervention: Patient screened for tobacco use and is an ex/non-smoker Quality 431: Preventive Care And Screening: Unhealthy Alcohol Use - Screening: Patient identified as an unhealthy alcohol user when screened for unhealthy alcohol use using a systematic screening method and received brief counseling

## 2022-03-01 ENCOUNTER — OFFICE VISIT (OUTPATIENT)
Dept: INTERNAL MEDICINE CLINIC | Age: 62
End: 2022-03-01
Payer: COMMERCIAL

## 2022-03-01 VITALS
OXYGEN SATURATION: 95 % | SYSTOLIC BLOOD PRESSURE: 104 MMHG | WEIGHT: 280 LBS | BODY MASS INDEX: 34.08 KG/M2 | TEMPERATURE: 97.2 F | DIASTOLIC BLOOD PRESSURE: 78 MMHG | HEART RATE: 79 BPM

## 2022-03-01 DIAGNOSIS — S81.801A LEG WOUND, RIGHT, INITIAL ENCOUNTER: Primary | ICD-10-CM

## 2022-03-01 PROCEDURE — 99213 OFFICE O/P EST LOW 20 MIN: CPT | Performed by: NURSE PRACTITIONER

## 2022-03-01 PROCEDURE — 1036F TOBACCO NON-USER: CPT | Performed by: NURSE PRACTITIONER

## 2022-03-01 PROCEDURE — G8482 FLU IMMUNIZE ORDER/ADMIN: HCPCS | Performed by: NURSE PRACTITIONER

## 2022-03-01 PROCEDURE — 3017F COLORECTAL CA SCREEN DOC REV: CPT | Performed by: NURSE PRACTITIONER

## 2022-03-01 PROCEDURE — G8427 DOCREV CUR MEDS BY ELIG CLIN: HCPCS | Performed by: NURSE PRACTITIONER

## 2022-03-01 PROCEDURE — G8417 CALC BMI ABV UP PARAM F/U: HCPCS | Performed by: NURSE PRACTITIONER

## 2022-03-01 RX ORDER — GEMFIBROZIL 600 MG/1
TABLET, FILM COATED ORAL
Qty: 30 TABLET | Refills: 11 | Status: SHIPPED | OUTPATIENT
Start: 2022-03-01

## 2022-03-01 RX ORDER — CEPHALEXIN 500 MG/1
500 CAPSULE ORAL 2 TIMES DAILY
Qty: 14 CAPSULE | Refills: 0 | Status: SHIPPED | OUTPATIENT
Start: 2022-03-01 | End: 2022-03-08

## 2022-03-01 NOTE — PROGRESS NOTES
Visit Information    Have you changed or started any medications since your last visit including any over-the-counter medicines, vitamins, or herbal medicines? no   Have you stopped taking any of your medications? Is so, why? -  no  Are you having any side effects from any of your medications? - no    Have you seen any other physician or provider since your last visit? yes -    Have you had any other diagnostic tests since your last visit?  no   Have you been seen in the emergency room and/or had an admission in a hospital since we last saw you?  no   Have you had your routine dental cleaning in the past 6 months?  no     Do you have an active MyChart account? If no, what is the barrier?   Yes    Patient Care Team:  Dora Warren MD as PCP - Gilmer Hinds MD as PCP - [facility] Provider  Hadley Canada MD as Consulting Physician (Infectious Diseases)    Medical History Review  Past Medical, Family, and Social History reviewed and does not contribute to the patient presenting condition    Health Maintenance   Topic Date Due    HIV screen  Never done    DTaP/Tdap/Td vaccine (1 - Tdap) Never done    Shingles Vaccine (1 of 2) 10/23/2014    Diabetic retinal exam  01/04/2017    Low dose CT lung screening  03/02/2022    Depression Screen  05/04/2022    Lipid screen  05/13/2022    A1C test (Diabetic or Prediabetic)  12/14/2022    Diabetic microalbuminuria test  12/14/2022    TSH testing  12/14/2022    Diabetic foot exam  01/07/2023    Potassium monitoring  01/19/2023    Creatinine monitoring  01/19/2023    Pneumococcal 0-64 years Vaccine (2 of 2 - PPSV23) 01/07/2025    Colorectal Cancer Screen  01/23/2027    Flu vaccine  Completed    COVID-19 Vaccine  Completed    Hepatitis C screen  Completed    Hepatitis A vaccine  Aged Out    Hib vaccine  Aged Out    Meningococcal (ACWY) vaccine  Aged Out         [address] [address]  Dept: [phone]  Dept Fax: [phone]    Office Progress/Follow Up Note  Date of patient's visit: 3/1/2022   Patient's Name:  Angelo Crystal  YOB: 1960            Patient Care Team:  Garfield Tovar MD as PCP - Ginger Nance MD as PCP - [facility]  Issa Dowell MD as Consulting Physician (Infectious Diseases)    REASON FOR VISIT: Skin Problem (Right leg infection, left leg blister)        HISTORY OF PRESENT ILLNESS:      History was obtained from the patient. Angelo Crystal is a 58 y.o. is here for Skin Problem (Right leg infection, left leg blister)    Patient with concern for infection of wound of right lower leg. He denies warmth, purulent drainage or pain, does have small amount of redness around open area, small amount of bleeding. No fever or chills. He has been putting neosporin and bandaid on. He is diabetic,  follows with podiatry and has appt with them on 3/11.   Also with CKD, last creat 1.37 in Jan.    Patient Active Problem List   Diagnosis    Anemia    Neuropathy in diabetes ([address]    Charcot ankle    PVD (peripheral vascular disease) ([address]    Type 2 diabetes mellitus with diabetic polyneuropathy, with long-term current use of insulin     Pure hypercholesterolemia    Constipation    PSA elevation    CAD (coronary artery disease)    Chronic pain of left knee    Essential hypertension    Olecranon bursitis of right elbow    Acquired hypothyroidism    Carotid stenosis, asymptomatic, bilateral    Right renal mass    Metabolic acidosis, normal anion gap (NAG)    Hypotension due to drugs    Hydronephrosis of right kidney    Idiopathic hypotension    Status post below-knee amputation of left lower extremity (HCC)    Atrial fibrillation     Ureteral stricture, right    Dizziness    Dermatophytoses    Acute bilateral low back pain without sciatica    Malignant neoplasm of right kidney ([address]    Spinal stenosis of lumbar region with neurogenic claudication    Other chest pain    Spinal stenosis of lumbar region without neurogenic claudication    Lumbar radiculopathy    Chest pain    Rule out acute coronary syndrome    Chronic diastolic heart failure with preserved EF    BPH     Employs prosthetic leg s/p left BKA    Hypertrophic cardiomyopathy (HCC)    Anginal equivalent (HCC)       Allergies   Allergen Reactions    Ramipril      Other reaction(s): swelling of the lips   Other reaction(s): swelling of the lips     Adhesive Tape      tegaderm causes blisters.  Use paper tape         MEDICATIONS:     Current Outpatient Medications   Medication Sig Dispense Refill    clotrimazole-betamethasone (LOTRISONE) 1-0.05 % cream APPLY TO AFFECTED AREA TWICE A DAY 45 g 3    Insulin Glargine, 2 Unit Dial, (TOUJEO MAX SOLOSTAR) 300 UNIT/ML SOPN Inject 110 Units into the skin daily 5 pen 5    ASPIRIN LOW DOSE 81 MG EC tablet TAKE 1 TABLET BY MOUTH EVERY DAY 30 tablet 5    Insulin Pen Needle (COMFORT TOUCH INSULIN PEN NEED) 33G X 6 MM MISC 110 Units by Does not apply route daily 5 each 1    Semaglutide, 1 MG/DOSE, (OZEMPIC, 1 MG/DOSE,) 2 MG/1.5ML SOPN Inject 1 mg into the skin once a week 5 pen 2    atorvastatin (LIPITOR) 40 MG tablet TAKE 1 TABLET BY MOUTH EVERY DAY 30 tablet 11    metoprolol tartrate (LOPRESSOR) 50 MG tablet Take 1 tablet by mouth 2 times daily 60 tablet 3    amLODIPine (NORVASC) 2.5 MG tablet Take 1 tablet by mouth daily 30 tablet 3    levothyroxine (SYNTHROID) 175 MCG tablet Take 1 tablet by mouth daily 30 tablet 3    insulin lispro (HUMALOG) 100 UNIT/ML injection vial SLIDING SCALE (TAKE 5 UNITS IF SUGAR IS OVER 150) 1 each 3    finasteride (PROSCAR) 5 MG tablet TAKE 1 TABLET BY MOUTH EVERY DAY 30 tablet 5    isosorbide mononitrate (IMDUR) 30 MG extended release tablet TAKE 1 TABLET BY MOUTH EVERY DAY 90 tablet 3    budesonide-formoterol (SYMBICORT) 160-4.5 MCG/ACT AERO TAKE 2 PUFFS BY MOUTH TWICE A DAY 3 each 3    B-D UF III MINI PEN NEEDLES 31G X 5 MM MISC USE AS DIRECTED ONCE DAILY TO INJECT TOUJEO 100 each 3    tamsulosin (FLOMAX) 0.4 MG capsule TAKE 1 CAPSULE BY MOUTH EVERY DAY 90 capsule 3    Continuous Blood Gluc  (DEXCOM G6 ) DOUGIE USE AS DIRECTED TO MONITOR BLOOD SUGAR      blood glucose monitor strips T/S Contour. Test 4 times a day 100 strip 3    albuterol sulfate  (90 Base) MCG/ACT inhaler INHALE 2 PUFFS INTO THE LUNGS EVERY 6 HOURS AS NEEDED FOR WHEEZING OR SHORTNESS OF BREATH 6.7 Inhaler 0    glucagon, rDNA, 1 MG injection Inject 1 mL into the muscle once as needed for Low blood sugar       Blood Pressure KIT 1 actuation by Does not apply route once a week 1 kit 0    Handicap Placard MISC by Does not apply route Duration - 5years  Reason- prosthetic leg 1 each 0    Omega-3 Fatty Acids (FISH OIL CONCENTRATE) 300 MG CAPS Take 300 mg by mouth nightly       Glucosamine Sulfate 500 MG TABS Take 500 mg by mouth 3 times daily      losartan (COZAAR) 50 MG tablet TAKE 1 TABLET BY MOUTH EVERY DAY 30 tablet 5    rOPINIRole (REQUIP) 2 MG tablet TAKE 1 TABLET BY MOUTH EVERY DAY 30 tablet 11    ELIQUIS 5 MG TABS tablet TAKE 1 TABLET BY MOUTH TWICE A DAY 60 tablet 8    gemfibrozil (LOPID) 600 MG tablet TAKE 1 TABLET BY MOUTH EVERY DAY 30 tablet 11    amiodarone (CORDARONE) 200 MG tablet Take 1 tablet by mouth 2 times daily 60 tablet 0    pregabalin (LYRICA) 100 MG capsule Take 1 capsule by mouth 3 times daily for 30 days. 60 capsule 0     No current facility-administered medications for this visit. SOCIAL HISTORY    Reviewed and updated. Marc Me  reports that he quit smoking about 10 years ago. His smoking use included cigars. He started smoking about 44 years ago. He has a 50.00 pack-year smoking history. He has never used smokeless tobacco.    FAMILY HISTORY:    Reviewed and updated.   family history includes Alcohol Abuse in his father; COPD in his father; Diabetes in his brother and mother; Hypertension in his father and mother; Kidney Cancer in his father; Other in his sister; Stomach Cancer in his mother. REVIEW OF SYSTEMS:      Review of Systems   Constitutional: Negative for chills, fatigue and fever. Respiratory: Negative for cough, shortness of breath and wheezing. Cardiovascular: Negative for chest pain, palpitations and leg swelling. Musculoskeletal: Negative for arthralgias. Skin: Positive for wound. PHYSICAL EXAM:      Vitals:    03/01/22 1427   BP: 104/78   Pulse: 79   Temp: 97.2 °F (36.2 °C)   SpO2: 95%   Weight: 280 lb (127 kg)     BP Readings from Last 3 Encounters:   03/01/22 104/78   02/15/22 126/74   02/10/22 120/70        Physical Exam  Constitutional:       Appearance: He is obese. HENT:      Head: Normocephalic and atraumatic. Right Ear: External ear normal.      Left Ear: External ear normal.      Nose: Nose normal.   Eyes:      Conjunctiva/sclera: Conjunctivae normal.   Cardiovascular:      Rate and Rhythm: Normal rate and regular rhythm. Pulses: Normal pulses. Heart sounds: Normal heart sounds. Pulmonary:      Effort: Pulmonary effort is normal.      Breath sounds: Normal breath sounds. Skin:     Findings: Erythema and wound present. Neurological:      Mental Status: He is alert.           LABORATORY FINDINGS:    CBC:   Lab Results   Component Value Date    WBC 5.6 12/31/2021    HGB 14.2 12/31/2021     12/31/2021     BMP:   Lab Results   Component Value Date     01/19/2022    K 5.1 01/19/2022     01/19/2022    CO2 22 01/19/2022    BUN 22 01/19/2022    CREATININE 1.37 01/19/2022    GLUCOSE 152 01/19/2022     HEMOGLOBIN A1C:   Lab Results   Component Value Date    LABA1C 7.8 12/14/2021     FASTING LIPID PANEL:   Lab Results   Component Value Date    CHOL 139 01/13/2020    HDL 44 05/13/2021    LDLCHOLESTEROL 55 05/13/2021    TRIG 89 01/13/2020       ASSESSMENT AND PLAN:      Visit Diagnoses and Associated Orders     Leg wound, right, initial encounter    -  Primary    Patient to begin Keflex, monitor area, call if s/s worsen. Keep follow up appt with podiatry. cephALEXin (KEFLEX) 500 MG capsule [9500]                  FOLLOW UP AND INSTRUCTIONS:     Return for routine follow up at next scheduled appointment, or sooner if needed. · Aixa Villalta received counseling on the following healthy behaviors: nutrition, exercise and medication adherence, signs and symptoms of worsening infection. · Discussed use, benefit, and side effects of prescribed medications. Barriers to medication compliance addressed. All patient questions answered. Pt voiced understanding. Christopher Almodovar, RADHA - CNP    3/12/2022, 6:35 PM    Please note that this chart was generated using voice recognition Dragon dictation software. Although every effort was made to ensure the accuracy of this automatedtranscription, some errors in transcription may have occurred.

## 2022-03-07 ENCOUNTER — TELEPHONE (OUTPATIENT)
Dept: INTERNAL MEDICINE CLINIC | Age: 62
End: 2022-03-07

## 2022-03-07 DIAGNOSIS — R21 RASH: Primary | ICD-10-CM

## 2022-03-07 NOTE — TELEPHONE ENCOUNTER
----- Message from Lewis Tobar sent at 3/7/2022  1:48 PM EST -----  Subject: Referral Request    QUESTIONS   Reason for referral request? message? patient needs a dermatologist for   skin rash, last seen 2/10/2022   Has the physician seen you for this condition before? No   Preferred Specialist (if applicable)? Do you already have an appointment scheduled? No  Additional Information for Provider? message? patient needs a   dermatologist for skin rash, last seen 2/10/2022  ---------------------------------------------------------------------------  --------------  CALL BACK INFO  What is the best way for the office to contact you? OK to leave message on   voicemail  Preferred Call Back Phone Number?  [phone]

## 2022-03-10 DIAGNOSIS — I10 ESSENTIAL HYPERTENSION: ICD-10-CM

## 2022-03-10 RX ORDER — ROPINIROLE 2 MG/1
TABLET, FILM COATED ORAL
Qty: 30 TABLET | Refills: 11 | Status: SHIPPED | OUTPATIENT
Start: 2022-03-10

## 2022-03-10 RX ORDER — LOSARTAN POTASSIUM 50 MG/1
TABLET ORAL
Qty: 30 TABLET | Refills: 5 | Status: SHIPPED | OUTPATIENT
Start: 2022-03-10 | End: 2022-09-09

## 2022-03-10 RX ORDER — APIXABAN 5 MG/1
TABLET, FILM COATED ORAL
Qty: 60 TABLET | Refills: 8 | Status: SHIPPED | OUTPATIENT
Start: 2022-03-10

## 2022-03-12 ASSESSMENT — ENCOUNTER SYMPTOMS
COUGH: 0
WHEEZING: 0
SHORTNESS OF BREATH: 0

## 2022-03-17 ENCOUNTER — TELEPHONE (OUTPATIENT)
Dept: ONCOLOGY | Age: 62
End: 2022-03-17

## 2022-03-17 DIAGNOSIS — Z87.891 PERSONAL HISTORY OF NICOTINE DEPENDENCE: Primary | ICD-10-CM

## 2022-03-17 NOTE — TELEPHONE ENCOUNTER
Our records indicate that your patient is due for their annual lung cancer screening follow up testing. For your convenience, we have pended the order for the scan for you. If you do not agree with the need for the test, please cancel the order and let us know. Sincerely,    [address] [place] [address] Screening Program    Auto printed reminder letter sent to patient.

## 2022-03-30 DIAGNOSIS — G89.29 CHRONIC PAIN OF LEFT KNEE: ICD-10-CM

## 2022-03-30 DIAGNOSIS — E11.42 TYPE 2 DIABETES MELLITUS WITH DIABETIC POLYNEUROPATHY, WITH LONG-TERM CURRENT USE OF INSULIN (HCC): ICD-10-CM

## 2022-03-30 DIAGNOSIS — E08.41 DIABETIC MONONEUROPATHY ASSOCIATED WITH DIABETES MELLITUS DUE TO UNDERLYING CONDITION (HCC): ICD-10-CM

## 2022-03-30 DIAGNOSIS — Z97.10 EMPLOYS PROSTHETIC LEG: ICD-10-CM

## 2022-03-30 DIAGNOSIS — M25.562 CHRONIC PAIN OF LEFT KNEE: ICD-10-CM

## 2022-03-30 DIAGNOSIS — Z79.4 TYPE 2 DIABETES MELLITUS WITH DIABETIC POLYNEUROPATHY, WITH LONG-TERM CURRENT USE OF INSULIN (HCC): ICD-10-CM

## 2022-03-30 RX ORDER — PREGABALIN 100 MG/1
CAPSULE ORAL
Qty: 90 CAPSULE | Refills: 0 | Status: SHIPPED | OUTPATIENT
Start: 2022-03-30 | End: 2022-05-03

## 2022-04-19 ENCOUNTER — HOSPITAL ENCOUNTER (OUTPATIENT)
Dept: CT IMAGING | Age: 62
Discharge: HOME | End: 2022-04-21
Payer: COMMERCIAL

## 2022-04-19 DIAGNOSIS — Z87.891 PERSONAL HISTORY OF NICOTINE DEPENDENCE: ICD-10-CM

## 2022-04-19 PROCEDURE — 71271 CT THORAX LUNG CANCER SCR C-: CPT

## 2022-04-25 RX ORDER — METOPROLOL TARTRATE 50 MG/1
TABLET, FILM COATED ORAL
Qty: 60 TABLET | Refills: 3 | Status: SHIPPED | OUTPATIENT
Start: 2022-04-25 | End: 2022-09-20 | Stop reason: SDUPTHER

## 2022-04-25 RX ORDER — LEVOTHYROXINE SODIUM 175 UG/1
TABLET ORAL
Qty: 30 TABLET | Refills: 3 | Status: SHIPPED | OUTPATIENT
Start: 2022-04-25 | End: 2022-09-20 | Stop reason: SDUPTHER

## 2022-04-25 RX ORDER — AMLODIPINE BESYLATE 2.5 MG/1
TABLET ORAL
Qty: 30 TABLET | Refills: 3 | Status: SHIPPED | OUTPATIENT
Start: 2022-04-25 | End: 2022-09-20 | Stop reason: SDUPTHER

## 2022-05-03 DIAGNOSIS — G89.29 CHRONIC PAIN OF LEFT KNEE: ICD-10-CM

## 2022-05-03 DIAGNOSIS — Z79.4 TYPE 2 DIABETES MELLITUS WITH DIABETIC POLYNEUROPATHY, WITH LONG-TERM CURRENT USE OF INSULIN (HCC): ICD-10-CM

## 2022-05-03 DIAGNOSIS — E08.41 DIABETIC MONONEUROPATHY ASSOCIATED WITH DIABETES MELLITUS DUE TO UNDERLYING CONDITION (HCC): ICD-10-CM

## 2022-05-03 DIAGNOSIS — Z97.10 EMPLOYS PROSTHETIC LEG: ICD-10-CM

## 2022-05-03 DIAGNOSIS — M25.562 CHRONIC PAIN OF LEFT KNEE: ICD-10-CM

## 2022-05-03 DIAGNOSIS — E11.42 TYPE 2 DIABETES MELLITUS WITH DIABETIC POLYNEUROPATHY, WITH LONG-TERM CURRENT USE OF INSULIN (HCC): ICD-10-CM

## 2022-05-03 RX ORDER — PREGABALIN 100 MG/1
CAPSULE ORAL
Qty: 90 CAPSULE | Refills: 2 | Status: SHIPPED | OUTPATIENT
Start: 2022-05-03 | End: 2022-08-05

## 2022-05-04 ENCOUNTER — OFFICE VISIT (OUTPATIENT)
Dept: PULMONOLOGY | Age: 62
End: 2022-05-04
Payer: COMMERCIAL

## 2022-05-04 ENCOUNTER — HOSPITAL ENCOUNTER (OUTPATIENT)
Age: 62
Setting detail: SPECIMEN
Discharge: HOME | End: 2022-05-04

## 2022-05-04 VITALS
TEMPERATURE: 97 F | HEIGHT: 76 IN | BODY MASS INDEX: 33.49 KG/M2 | RESPIRATION RATE: 16 BRPM | SYSTOLIC BLOOD PRESSURE: 133 MMHG | OXYGEN SATURATION: 98 % | DIASTOLIC BLOOD PRESSURE: 78 MMHG | WEIGHT: 275 LBS | HEART RATE: 89 BPM

## 2022-05-04 DIAGNOSIS — G47.33 OSA (OBSTRUCTIVE SLEEP APNEA): Primary | ICD-10-CM

## 2022-05-04 DIAGNOSIS — R10.9 RIGHT FLANK PAIN: ICD-10-CM

## 2022-05-04 DIAGNOSIS — R35.0 URINARY FREQUENCY: ICD-10-CM

## 2022-05-04 DIAGNOSIS — R91.1 LUNG NODULE: ICD-10-CM

## 2022-05-04 PROBLEM — N18.30 CHRONIC RENAL DISEASE, STAGE III (HCC): Status: ACTIVE | Noted: 2022-05-04

## 2022-05-04 PROCEDURE — 1036F TOBACCO NON-USER: CPT | Performed by: INTERNAL MEDICINE

## 2022-05-04 PROCEDURE — 3017F COLORECTAL CA SCREEN DOC REV: CPT | Performed by: INTERNAL MEDICINE

## 2022-05-04 PROCEDURE — G8427 DOCREV CUR MEDS BY ELIG CLIN: HCPCS | Performed by: INTERNAL MEDICINE

## 2022-05-04 PROCEDURE — 99204 OFFICE O/P NEW MOD 45 MIN: CPT | Performed by: INTERNAL MEDICINE

## 2022-05-04 PROCEDURE — G8417 CALC BMI ABV UP PARAM F/U: HCPCS | Performed by: INTERNAL MEDICINE

## 2022-05-04 ASSESSMENT — SLEEP AND FATIGUE QUESTIONNAIRES
ESS TOTAL SCORE: 12
HOW LIKELY ARE YOU TO NOD OFF OR FALL ASLEEP WHILE SITTING AND TALKING TO SOMEONE: 0
HOW LIKELY ARE YOU TO NOD OFF OR FALL ASLEEP WHILE SITTING INACTIVE IN A PUBLIC PLACE: 1
HOW LIKELY ARE YOU TO NOD OFF OR FALL ASLEEP WHILE SITTING AND READING: 2
HOW LIKELY ARE YOU TO NOD OFF OR FALL ASLEEP WHILE WATCHING TV: 3
HOW LIKELY ARE YOU TO NOD OFF OR FALL ASLEEP WHEN YOU ARE A PASSENGER IN A CAR FOR AN HOUR WITHOUT A BREAK: 0
HOW LIKELY ARE YOU TO NOD OFF OR FALL ASLEEP WHILE SITTING QUIETLY AFTER LUNCH WITHOUT ALCOHOL: 3
HOW LIKELY ARE YOU TO NOD OFF OR FALL ASLEEP IN A CAR, WHILE STOPPED FOR A FEW MINUTES IN TRAFFIC: 0
HOW LIKELY ARE YOU TO NOD OFF OR FALL ASLEEP WHILE LYING DOWN TO REST IN THE AFTERNOON WHEN CIRCUMSTANCES PERMIT: 3

## 2022-05-04 NOTE — PROGRESS NOTES
Jacques Pete REASON FOR THE CONSULTATION:  Obstructive sleep apnea syndrome  Lung nodule  Diabetes  Hypertension  Obesity  Coronary artery disease  HISTORY OF PRESENT ILLNESS:    Catarino Jones is a 58y.o. year old male here for evaluation of sleep apnea syndrome. The patient does snore loudly. He have had a sleep study done 4 years back. It was normal it revealed mild degree of apnea. Apparently it was not followed up and patient was not treated with CPAP or BiPAP. He continues to have loud snoring. Goes to bed around 11:00. Wakes up around 7. Sleep is reasonably refreshing. However he does not wake up frequently during the night he does not know what wakes her up. At times she has to go to the bathroom. He is known to have diabetes and hypertension. Also had thyroid surgery and is on thyroid replacement therapy history of coronary artery disease no angina no PND. He is on medication for the hypertension and coronary artery disease. Patient is also now diabetes with complications. He has peripheral neuropathy. He has other vasculitis also due to to diabetes. He had below-knee amputation on the left side because of any diabetic neuropathy. Patient is also known to have chronic renal insufficiency secondary to diabetes and hypertension. He have had no stroke. He smoked cigarettes for about 20 years. He could quit smoking of 2011. No alcohol abuse no drug abuse. He does take frequent naps during the daytime. He does fall asleep even when even when he does not want to fall asleep. No symptoms of cataplexy sleep paralysis or hallucination or other symptoms suggestive of REM behavior disorder. He does not sleep. He have had no significant occupational exposure. However he did work with [employer] and was exposed to grain dust.    He does have a good grade 1-2 effort dyspnea no wheezing no cough no sputum production no hemoptysis.   He is not on any bronchodilators and denies any significant dyspnea which is interfering with his lifestyle. He does have a lung nodule in the right lower lobe region which is more solid millimeter in size not calcified nodule was not seen in the CT done in 2021. Also known to have atrial fibrillation which is rate controlled with medication. He have had a renal mass noted on the right kidney which apparently was nonmalignant. However the note in the chart indicates that he is continue to monitor malignancy on the kidney. The patient tells me that it was nonmalignant and as he was told by Dr. Nallely Ventura     1. CRITERIA MET    [x]     CT ORDERED  []      2. CRITERIA NOT MET   []      3. REFUSED                    []        REASON CRITERIA NOT MET     1. SMOKING LESS THAN 30 PY  []      2. AGE LESS THAN 55 or GREATER 77 YEARS  []      3. QUIT SMOKING 15 YEARS OR GREATER   []      4. RECENT CT WITH IN 11 MONTHS    []      5. LIFE EXPECTANCY < 5 YEARS   []      6.  SIGNS  AND SYMPTOMS OF LUNG CANCER   []         Immunization   Immunization History   Administered Date(s) Administered    COVID-19, Girls Guide To top, DO NOT Dilute, Lorenzo-Sucrose, 12+ yrs, PF, 30 mcg/0.3 mL dose 04/01/2022    COVID-19, Pfizer Purple top, DILUTE for use, 12+ yrs, 30mcg/0.3mL dose 01/23/2021, 02/13/2021, 09/27/2021    Influenza A ([id number]) Vaccine PF IM 11/25/2009    Influenza Virus Vaccine 12/19/2014, 08/19/2021    Influenza, Fiordaliza Holly, IM, PF (6 mo and older Fluzone, Flulaval, Fluarix, and 3 yrs and older Afluria) 12/19/2014, 01/10/2018, 10/22/2018, 10/07/2020, 08/19/2021    Influenza, Triv, inactivated, subunit, adjuvanted, IM (Fluad 65 yrs and older) 10/07/2019    Pneumococcal Polysaccharide ([id number]) 09/09/2013, 01/04/2014    Varicella (Varivax) 08/28/2014      Have had COVID-vaccine pneumococcal Vaccine     [x] Up to date    [] Indicated   [] Refused  [] Contraindicated       Influenza Vaccine   [x] Up to date    [] Indicated   [] Refused  [] Contraindicated PAST MEDICAL HISTORY:       Diagnosis Date    A-fib [facility])     Asymptomatic bilateral carotid artery stenosis     Boil, thigh 10/2019    10/30/19: right inner thigh region, says PCP Rxd Doxy for this, area is much better, has a couple days left to complete Doxy    CAD (coronary artery disease)     saw Dr. Jack Ibar[facility] ([facility])     Charcot foot due to diabetes mellitus ([address] 2014    LEFT    COPD (chronic obstructive pulmonary disease) ([address] 2009    INHALER USE DAILY    Diabetes mellitus ([address] 1989    IDDM, On Insulin Dr. Yovanny Peck Diabetic neuropathy ([address]     Difficult intravenous access     VEINS ROLL    Employs prosthetic leg     s/p left BKA    Foot ulcer ([address] PRIOR TO 2015    BILAT    Full dentures     UPPER ONLY    Hyperlipidemia 2004    ON RX    Hypertension 2004    ON RX, PCP Dr. Eri Asher, seen Oct. 2019    Hypoglycemia 4/2/2015    MRSA (methicillin resistant staph aureus) culture positive 4/16/2015    ankle    OA (osteoarthritis)     Osteomyelitis ([address]     left stump  BKA    Paroxysmal atrial fibrillation ([address]     Renal mass 09/2019    ACCIDENTAL  FINDING IS FOLLOWING UP WITH KIDNEY DR Enoch Ledezma     Suspected sleep apnea     Thyroid disease 2004    PT HAD HYPERTHYROIDISM-UNCONTROLED THYROID DESTROYED WITH RADI IODINE NOW HAS HYPOTHYRIDISM    Vision abnormalities 09/2019    LEFT NO VISION    Vitreous hemorrhage of left eye due to diabetes mellitus ([address] 09/2019    s/p Vitrectomy 9/2019    Wears glasses     Wears partial dentures     full upper, does not wear lower partial    Wellness examination     Dr. Tabitha Wong seen within last 1 1/2 mos         Family History:       Problem Relation Age of Onset    Diabetes Mother     Hypertension Mother     Stomach Cancer Mother     Hypertension Father     Alcohol Abuse Father     COPD Father     Kidney Cancer Father     Diabetes Brother         IDDM-PUMP    Other Sister         BRAIN TUMOR       SURGICAL HISTORY:   Past Surgical History:   Procedure Laterality Date    CARDIAC CATHETERIZATION      no stenting    CARDIOVASCULAR STRESS TEST  06/28/2019    small fixed apical, likely normal, EF 48%    COLONOSCOPY  06/17/2016    poor prep, done up to the IC valve, redundant colon    COLONOSCOPY  01/23/2017    VIRTUAL COLONOSCOPY DONE-no polyps or masses    CYSTOSCOPY Bilateral 6/16/2020    CYSTOSCOPY RETROGRADE PYELOGRAM URETEROSCOPY performed by Helga Angelucci, MD at [facility] Right 7/30/2020    CYSTOSCOPY, RIGHT RETROGRADE PYELOGRAM,  URETERAL CATHETER  INSERTION performed by Helga Angelucci, MD at [facility] 9/1/2020    CYSTOSCOPY RETROGRADE PYELOGRAM, RIGHT URETERAL STENT EXCHANGE performed by Helga Angelucci, MD at [facility] Right     r-bone removed    HC  PICC [facility]  5/21/2018         KIDNEY CYST REMOVAL      KIDNEY SURGERY Right 11/13/2019    XI ROBOTIC PARTIAL NEPHRECTOMY, INTRAOP ULTRASOUND, RIGHT URETEROURETEROSTOMY, RIGHT URETERAL STENT PLACEMENT **SHORT STAY** performed by Helga Angelucci, MD at [place] [address] 7/30/2020    XI LAPAROSCOPIC ROBOTIC URETEROLYSIS, ROBOTIC ASSISTED BUCCAL URETEROPLASTY  (DR. COBIAN TO ASSIST) performed by Helga Angelucci, MD at [address] Left 4/29/15    OTHER SURGICAL HISTORY Left 5-5-15 and 11/2015    Revision BKA    OTHER SURGICAL HISTORY      left stump revision 5/30/2018    SC RE-AMPUTATION LOWER LEG Left 5/30/2018    LEG AMPUTATION BELOW KNEE REVISION performed by Colby Dow MD at [address] Bilateral 80'S    TOE AMPUTATION      Right 2 and 4    VITRECTOMY Left 9/25/2019    PARS PLANA VITRECTOMY 25 GAUGE, MEMBRANE PEELING, ENDOLASER 200  MW 1044 SPOTS, INDIRECT LASER 236 SPOTS performed by Nusrat Young MD at [address] [place] [address] Rd:      There is no history of TB or TB exposure. There is no asbestos or silica dust exposure. The patient reports there is no coal, foundry, quarry or [employer] exposure. Travel history reveals negative  There is no history of recreational or IV drug use. There is no hot tube exposure. Pets denies polyuria or polydipsia    Occupational history was exposed to grain dust    TOBACCO:   reports that he quit smoking about 10 years ago. His smoking use included cigars. He started smoking about 44 years ago. He has a 50.00 pack-year smoking history. He has never used smokeless tobacco.  ETOH:   reports current alcohol use. ALLERGIES:      Allergies   Allergen Reactions    Ramipril      Other reaction(s): swelling of the lips   Other reaction(s): swelling of the lips     Adhesive Tape      tegaderm causes blisters. Use paper tape         Home Meds:   Prior to Admission medications    Medication Sig Start Date End Date Taking?  Authorizing Provider   pregabalin (LYRICA) 100 MG capsule TAKE 1 CAPSULE BY MOUTH THREE TIMES A DAY 5/3/22 8/1/22 Yes Christopher Rachel MD   levothyroxine (SYNTHROID) 175 MCG tablet TAKE 1 TABLET BY MOUTH EVERY DAY 4/25/22  Yes Christopher Rachel MD   amLODIPine (NORVASC) 2.5 MG tablet TAKE 1 TABLET BY MOUTH EVERY DAY 4/25/22  Yes Christopher Rachel MD   metoprolol tartrate (LOPRESSOR) 50 MG tablet TAKE 1 TABLET BY MOUTH TWICE A DAY 4/25/22  Yes Christopher Rachel MD   losartan (COZAAR) 50 MG tablet TAKE 1 TABLET BY MOUTH EVERY DAY 3/10/22  Yes Christopher Rachel MD   rOPINIRole (REQUIP) 2 MG tablet TAKE 1 TABLET BY MOUTH EVERY DAY 3/10/22  Yes Christopher Rachel MD   ELIQUIS 5 MG TABS tablet TAKE 1 TABLET BY MOUTH TWICE A DAY 3/10/22  Yes Christopher Rachel MD   gemfibrozil (LOPID) 600 MG tablet TAKE 1 TABLET BY MOUTH EVERY DAY 3/1/22  Yes Mark Dorsey MD   clotrimazole-betamethasone (LOTRISONE) 1-0.05 % cream APPLY TO AFFECTED AREA TWICE A DAY 2/15/22  Yes Christopher Rachel MD   Insulin Glargine, 2 Unit Dial, (TOUJEO MAX SOLOSTAR) 300 UNIT/ML SOPN Inject 110 Units into the skin daily 1/28/22  Yes Renny Alcaraz MD   ASPIRIN LOW DOSE 81 MG EC tablet TAKE 1 TABLET BY MOUTH EVERY DAY 1/19/22  Yes Martina Haley APRN - CNP   Insulin Pen Needle (COMFORT TOUCH INSULIN PEN NEED) 33G X 6 MM MISC 110 Units by Does not apply route daily 1/19/22  Yes Gavin Guzman MD   atorvastatin (LIPITOR) 40 MG tablet TAKE 1 TABLET BY MOUTH EVERY DAY 1/13/22  Yes Gavin Guzman MD   insulin lispro (HUMALOG) 100 UNIT/ML injection vial SLIDING SCALE (TAKE 5 UNITS IF SUGAR IS OVER 150) 12/31/21  Yes Shauna Kennedy DO   finasteride (PROSCAR) 5 MG tablet TAKE 1 TABLET BY MOUTH EVERY DAY 12/28/21  Yes Gavin Guzman MD   isosorbide mononitrate (IMDUR) 30 MG extended release tablet TAKE 1 TABLET BY MOUTH EVERY DAY 12/28/21  Yes Gavin Guzman MD   budesonide-formoterol (SYMBICORT) 160-4.5 MCG/ACT AERO TAKE 2 PUFFS BY MOUTH TWICE A DAY 12/14/21  Yes Gavin Guzman MD   B-D UF III MINI PEN NEEDLES 31G X 5 MM MISC USE AS DIRECTED ONCE DAILY TO INJECT TOUJEO 11/16/21  Yes Gavin Guzman MD   tamsulosin (FLOMAX) 0.4 MG capsule TAKE 1 CAPSULE BY MOUTH EVERY DAY 10/12/21  Yes Gavin Guzman MD   Continuous Blood Gluc  (DEXCOM G6 ) [address] USE AS DIRECTED TO MONITOR BLOOD SUGAR 5/20/21  Yes Historical Provider, MD   blood glucose monitor strips T/S Contour.  Test 4 times a day 5/10/21  Yes Gavin Guzman MD   albuterol sulfate  (90 Base) MCG/ACT inhaler INHALE 2 PUFFS INTO THE LUNGS EVERY 6 HOURS AS NEEDED FOR WHEEZING OR SHORTNESS OF BREATH 1/18/21  Yes Gavin Guzman MD   glucagon, rDNA, 1 MG injection Inject 1 mL into the muscle once as needed for Low blood sugar  11/13/19  Yes Historical Provider, MD   Blood Pressure KIT 1 actuation by Does not apply route once a week 3/25/20  Yes Ciara Tyson MD   Handicap Placard MISC by Does not apply route Duration - 5years  Reason- prosthetic leg 1/2/20 Yes Demarcus Neville MD   Omega-3 Fatty Acids (FISH OIL CONCENTRATE) 300 MG CAPS Take 300 mg by mouth nightly    Yes Trina Newsome MD   Glucosamine Sulfate 500 MG TABS Take 500 mg by mouth 3 times daily   Yes Luke Lyle MD   amiodarone (CORDARONE) 200 MG tablet Take 1 tablet by mouth 2 times daily 12/31/21 2/10/22  Daphne Lim, DO              REVIEW OF SYSTEMS: The system was conducted for all other 10 system no additional information was noted other than what is mentioned above.     CONSTITUTIONAL:  negative for  fevers, chills, sweats, fatigue, malaise, anorexia and weight loss  EYES:  negative for  double vision, blurred vision, dry eyes, eye discharge and redness  HEENT:  negative for  hearing loss, tinnitus, ear drainage, earaches and nasal congestion  RESPIRATORY:  See hpi  CARDIOVASCULAR:  negative for  chest pain,, palpitations, orthopnea, PND  GASTROINTESTINAL:  negative for nausea, vomiting, change in bowel habits, diarrhea, constipation, abdominal pain, pruritus, abdominal mass and abdominal distention  GENITOURINARY:  negative for frequency, dysuria, nocturia, urinary incontinence and hesitancy  INTEGUMENT  negative for rash, skin lesion(s), dryness, skin color change, changes in lesion, pruritus and changes in hair  HEMATOLOGIC/LYMPHATIC:  negative for easy bruising, bleeding, lymphadenopathy, petechiae and swelling/edema  ALLERGIC/IMMUNOLOGIC:  negative for recurrent infections, urticaria and drug reactions  ENDOCRINE:  negative for heat intolerance, cold intolerance, tremor, weight changes and change in bowel habits  MUSCULOSKELETAL:  negative for  myalgias, arthralgias, pain, joint swelling, stiff joints and decreased range of motion  NEUROLOGICAL:  negative for headaches, dizziness, seizures, memory problems, speech problems, visual disturbance and coordination problems  BEHAVIOR/PSYCH:  negative for poor appetite, increased appetite, decreased sleep, increased sleep, decreased energy level, increased energy level and poor concentration  Skin no rash no dermatitis  Vitals:  /78   Pulse 89   Temp 97 °F (36.1 °C)   Resp 16   Ht 6' 4\" (1.93 m)   Wt 275 lb (124.7 kg)   SpO2 98%   BMI 33.47 kg/m²     PHYSICAL EXAM:  General Appearance:    Alert, cooperative, no distress, appears stated age he is obese no respiratory distress not using any accessory muscles.    Head:    Normocephalic, without obvious abnormality, atraumatic      Eyes:    PERRL, conjunctiva/corneas clear, EOM's intact no Monet syndrome no jaundice   Ears:    Normal  external ear canals, both ears   Nose:   Nares normal, septum midline, mucosa normal, no drainage        or sinus tenderness no nasal polyps   Throat:   Lips, mucosa, and tongue normal; teeth and gums normal throat is not compromised   Neck:   Supple, symmetrical, trachea midline, no adenopathy;     thyroid:  no enlargement/tenderness/nodules; no carotid    bruit , JVD not elevated peridural reflux negative neck is short and fat   Back:     Symmetric, no curvature, ROM normal, no CVA tenderness   Lungs:    AP diameter is not increased percussion note is normally resonant breathing vesicular expiration slightly prolonged no rails or rhonchi aortic borders audible no pleural friction rub is audible   Chest Wall:    No tenderness or deformity      Heart:   Irregular rate and rhythm, S1 and S2 normal, no murmur, rub        or gallop no rvh he has atrial fibrillation rate                          Abdomen:                                                 Pulses:                              Skin:                  Lymph nodes:                    Neurologic:                  Soft, non-tender, bowel sounds active all four quadrants,     no masses, no organomegaly         2+ and symmetric all extremities     Skin color, texture, turgor normal, no rashes or lesions       Cervical, supraclavicular not enlarged or matted or tender      CNII-XII intact, normal strength 5/5 .  Sensation grossly normal  and reflexes normal 2+  throughout he does have evidence of peripheral neuropathy in the arms and lower limbs which is distal and very likely secondary to diabetes     Clubbing No  Lower ext edema absent  Upper ext edema absent         Musculoskeletal no synovitis. No joint swelling or tenderness does have below-knee amputation on the left side secondary to peripheral neuropathy. CBC: No results for input(s): WBC, HGB, PLT in the last 72 hours. BMP:  No results for input(s): NA, K, CL, CO2, BUN, CREATININE, GLUCOSE in the last 72 hours. Hepatic: No results for input(s): AST, ALT, ALB, BILITOT, ALKPHOS in the last 72 hours. Amylase: No results found for: AMYLASE  Lipase: No results found for: LIPASE  Troponin: No results for input(s): TROPONINI in the last 72 hours. BNP: No results for input(s): BNP in the last 72 hours. Lipids: No results for input(s): CHOL, HDL in the last 72 hours. Invalid input(s): LDLCALCU  ABGs:   Lab Results   Component Value Date    PHART 7.305 11/13/2019    PO2ART 112.0 11/13/2019    LFJ6EXG 40.9 11/13/2019     INR: No results for input(s): INR in the last 72 hours. Thyroid:   Lab Results   Component Value Date    TSH 15.81 12/14/2021     Urinalysis: No results for input(s): BACTERIA, BLOODU, CLARITYU, COLORU, PHUR, PROTEINU, RBCUA, SPECGRAV, BILIRUBINUR, NITRU, WBCUA, LEUKOCYTESUR, GLUCOSEU in the last 72 hours.       Cultures:-  No cultures    CXR  Last chest x-ray reviewed unremarkable      CT Scans  CT scan suggestive of COPD or 7 mm nodule noted in the right lower lobe which was not present in 2021 no pleural effusion or large lymph nodes are noted    Echo    No recent echo            IMPRESSION:    Visit Diagnoses       Codes    DENNISE (obstructive sleep apnea)    -  Primary G47.33    Lung nodule     R91.1      Hypertension  Diabetes  Diabetic neuropathy  Coronary artery disease  Below-knee amputation on the left side  Peripheral vascular disease due to diabetes  Obesity  Possible COPD  :                PLAN:      Patient very likely has COPD related to chronic smoking. Over and above he was exposed to grain dust which can also cause similar symptoms to COPD. He denies any symptoms at this time. I I will suggest that we should give him rescue inhaler to use on as-needed basis and if the symptoms become persistent then anticholinergic such as Spiriva would be very helpful. I discussed with the patient regarding the nodule. We will get a repeat chest CT in about 6 months to look at the evolution of the right lower lobe nodule    He very likely has sleep apnea syndrome I did arrange for him to have a sleep study done if the sleep study does confirm the diagnosis of apnea we plan to treat him with nasal CPAP or BiPAP. He has already given up smoking. We may also find appropriate leg movements related to diabetic neuropathy. If that in that case we will try to treated with appropriate medication. Continue treatment of hypertension as before he will continue treatment of diabetes as before    Encouraged him to lose weight. Continue his cardiac medications. His sleep study does reveal the apnea that we plan to treat elevated. Treating with nasal CPAP or BiPAP. The treatment apnea will also improve the outcome of hypertension and diabetes. Will also improve the outcome of coronary artery disease. I plan to see in follow-up after the sleep study is done and I will keep you advised thank you for asking me to participate in his care      Requested Prescriptions      No prescriptions requested or ordered in this encounter       There are no discontinued medications. Ani Pedraza received counseling on the following healthy behaviors: nutrition, exercise and medication adherence    Patient given educational materials : see patient instruction       Discussed use, benefit, and side effects of prescribed medications.   Barriers to medication compliance addressed. All patient questions answered. Pt voiced understanding. I hope this updates you on my evaluation and clinical thinking. Thank you for allowing me to participate in his care. Sincerely,      Electronically signed by Isi Benítez MD on   5/4/22 at 1:50 PM EDT       Please note that this chart was generated using voice recognition Dragon dictation software. Although every effort was made to ensure the accuracy of this automated transcription, some errors in transcription may have occurred.

## 2022-05-05 ENCOUNTER — OFFICE VISIT (OUTPATIENT)
Dept: INTERNAL MEDICINE CLINIC | Age: 62
End: 2022-05-05
Payer: COMMERCIAL

## 2022-05-05 VITALS — BODY MASS INDEX: 33.6 KG/M2 | DIASTOLIC BLOOD PRESSURE: 80 MMHG | SYSTOLIC BLOOD PRESSURE: 126 MMHG | WEIGHT: 276 LBS

## 2022-05-05 DIAGNOSIS — Z13.220 SCREENING FOR HYPERLIPIDEMIA: ICD-10-CM

## 2022-05-05 DIAGNOSIS — Z79.4 TYPE 2 DIABETES MELLITUS WITH DIABETIC POLYNEUROPATHY, WITH LONG-TERM CURRENT USE OF INSULIN (HCC): ICD-10-CM

## 2022-05-05 DIAGNOSIS — E03.9 ACQUIRED HYPOTHYROIDISM: ICD-10-CM

## 2022-05-05 DIAGNOSIS — I10 ESSENTIAL HYPERTENSION: ICD-10-CM

## 2022-05-05 DIAGNOSIS — I48.11 LONGSTANDING PERSISTENT ATRIAL FIBRILLATION (HCC): Chronic | ICD-10-CM

## 2022-05-05 DIAGNOSIS — L03.115 CELLULITIS OF RIGHT LOWER EXTREMITY: Primary | ICD-10-CM

## 2022-05-05 DIAGNOSIS — N18.30 STAGE 3 CHRONIC KIDNEY DISEASE, UNSPECIFIED WHETHER STAGE 3A OR 3B CKD (HCC): ICD-10-CM

## 2022-05-05 DIAGNOSIS — E11.42 TYPE 2 DIABETES MELLITUS WITH DIABETIC POLYNEUROPATHY, WITH LONG-TERM CURRENT USE OF INSULIN (HCC): ICD-10-CM

## 2022-05-05 LAB
CULTURE: NO GROWTH
ESTIMATED AVERAGE GLUCOSE: 177 MG/DL
HBA1C MFR BLD: 7.8 % (ref 4–6)
SPECIMEN DESCRIPTION: NORMAL

## 2022-05-05 PROCEDURE — G8417 CALC BMI ABV UP PARAM F/U: HCPCS | Performed by: INTERNAL MEDICINE

## 2022-05-05 PROCEDURE — 3051F HG A1C>EQUAL 7.0%<8.0%: CPT | Performed by: INTERNAL MEDICINE

## 2022-05-05 PROCEDURE — 1036F TOBACCO NON-USER: CPT | Performed by: INTERNAL MEDICINE

## 2022-05-05 PROCEDURE — 2022F DILAT RTA XM EVC RTNOPTHY: CPT | Performed by: INTERNAL MEDICINE

## 2022-05-05 PROCEDURE — 3017F COLORECTAL CA SCREEN DOC REV: CPT | Performed by: INTERNAL MEDICINE

## 2022-05-05 PROCEDURE — 99214 OFFICE O/P EST MOD 30 MIN: CPT | Performed by: INTERNAL MEDICINE

## 2022-05-05 PROCEDURE — G8427 DOCREV CUR MEDS BY ELIG CLIN: HCPCS | Performed by: INTERNAL MEDICINE

## 2022-05-05 RX ORDER — CEPHALEXIN 500 MG/1
500 CAPSULE ORAL 3 TIMES DAILY
Qty: 21 CAPSULE | Refills: 0 | Status: SHIPPED | OUTPATIENT
Start: 2022-05-05 | End: 2022-05-12

## 2022-05-05 RX ORDER — SEMAGLUTIDE 1.34 MG/ML
INJECTION, SOLUTION SUBCUTANEOUS
COMMUNITY
Start: 2022-04-26 | End: 2022-10-10

## 2022-05-05 ASSESSMENT — PATIENT HEALTH QUESTIONNAIRE - PHQ9
SUM OF ALL RESPONSES TO PHQ QUESTIONS 1-9: 0
1. LITTLE INTEREST OR PLEASURE IN DOING THINGS: 0
SUM OF ALL RESPONSES TO PHQ QUESTIONS 1-9: 0
SUM OF ALL RESPONSES TO PHQ9 QUESTIONS 1 & 2: 0
2. FEELING DOWN, DEPRESSED OR HOPELESS: 0

## 2022-05-05 NOTE — PROGRESS NOTES
Visit Information    Have you changed or started any medications since your last visit including any over-the-counter medicines, vitamins, or herbal medicines? no   Are you having any side effects from any of your medications? -  no  Have you stopped taking any of your medications? Is so, why? -  no    Have you seen any other physician or provider since your last visit? Yes - Records Obtained  Have you had any other diagnostic tests since your last visit? yes  Have you been seen in the emergency room and/or had an admission to a hospital since we last saw you? No  Have you had your routine dental cleaning in the past 6 months? no    Have you activated your [employer] account? If not, what are your barriers?  Yes     Patient Care Team:  Ember Gil MD as PCP - Sathish Lemus MD as PCP - [facility] Provider  Geno Contreras MD as Consulting Physician (Infectious Diseases)    Medical History Review  Past Medical, Family, and Social History reviewed and does not contribute to the patient presenting condition    Health Maintenance   Topic Date Due    HIV screen  Never done    DTaP/Tdap/Td vaccine (1 - Tdap) Never done    Shingles vaccine (1 of 2) 10/23/2014    Pneumococcal 0-64 years Vaccine (2 - PCV) 01/04/2015    Diabetic retinal exam  01/04/2017    Depression Screen  05/04/2022    Lipids  05/13/2022    TSH  12/14/2022    Diabetic foot exam  01/07/2023    Potassium  01/19/2023    Creatinine  01/19/2023    Low dose CT lung screening  04/19/2023    A1C test (Diabetic or Prediabetic)  05/04/2023    Colorectal Cancer Screen  01/23/2027    Flu vaccine  Completed    COVID-19 Vaccine  Completed    Hepatitis C screen  Completed    Hepatitis A vaccine  Aged Out    Hib vaccine  Aged Out    Meningococcal (ACWY) vaccine  Aged Out     SUBJECTIVE:  Tammy Suarez is a 58 y.o. male patient who  comes for complaints of   Chief Complaint   Patient presents with    Ankle Pain     right ankle pain, fell        Right leg wound  8days ago  Scraped against screen door  Redness in lower leg  No fevers,     Pt on elliquis for Afib, asa,   Also Toujeo and lispro for Ty 2 DM  Lab Results   Component Value Date    LABA1C 7.8 (H) 05/04/2022     Lab Results   Component Value Date     05/04/2022     Was seen for a right leg wound in March- says it was a different wound        DIABETES MELLITUS:    diagnosed more than 5 years ago  Modifying factors on med:   Severity: controlled   Associated signs and symtoms: neuropathy/ckd/ CAD. aggravated with sugar diet and better with low sugar diet      - Follows a diabetic diet most of the time.   -Is compliant with medication(s) and is tolerating med(s) without any side effects.    -  Reports checking his/her glucose on a once a day schedule with sugars in the fasting   range. Hemoglobin A1C   Date Value Ref Range Status   05/04/2022 7.8 (H) 4.0 - 6.0 % Final   12/14/2021 7.8 % Final   09/10/2021 8.0 % Final     C/w lispro and lantus, ozempic once a week  Has tried other meds but there were side effects  Gets regular RAMÓN- appt nxt week  Lipid profile ordered today  Foot exam UTD, urine ACR due in DEC       HYPERTENSION:    Onset more than 2 years ago  Ngozi: mild to mod  Usually controlled with current po meds:   Not associated with headaches or blurry vision  No chest pain   -- Patient denies any side effects of their medication(s) and is compliant with their regimen. - Watches his diet for sodium, low fat and low cholesterol most of the time. Last 3 Encounter BP Readings:     Date:        BP:  BP Readings from Last 3 Encounters:   05/05/22 126/80   05/04/22 133/78   03/01/22 104/78     On norvasc, losartan, metoprolol   BMP- UTD      HYPERLIPIDEMIA:   Patient reports doing well on current therapy of statin:  lipitor 40  - Denies side effects of muscle weakness or achiness.   - Exercise  -last lipid panel  Lab Results   Component Value Date    CHOL 139 01/13/2020    CHOL 134 05/21/2019    CHOL 134 03/09/2018     Lab Results   Component Value Date    TRIG 89 01/13/2020    TRIG 109 05/21/2019    TRIG 156 (H) 03/09/2018     Lab Results   Component Value Date    HDL 44 05/13/2021    HDL 51 01/13/2020    HDL 44 05/21/2019     Lab Results   Component Value Date    LDLCHOLESTEROL 55 05/13/2021    LDLCHOLESTEROL 70 01/13/2020    LDLCHOLESTEROL 68 05/21/2019     Lab Results   Component Value Date    VLDL NOT REPORTED 05/13/2021    VLDL NOT REPORTED 01/13/2020    VLDL NOT REPORTED 05/21/2019     Lab Results   Component Value Date    CHOLHDLRATIO 2.8 05/13/2021    CHOLHDLRATIO 2.7 01/13/2020    CHOLHDLRATIO 3.0 05/21/2019         CKD  St 3  Stable    afib  No CP/palpitaoins  On amiodaroine, metoprolol, Eliquis      hypothyorid  Lab Results   Component Value Date    TSH 15.81 (H) 12/14/2021     On synthroid 175mcg- dose was increased in Dec 2021- pre pateint  H/o RI tmt        REVIEW OF SYSTEMS (except Subjective (HPI))  GENERAL: No fevers / chills  RESPIRATORY: Negative for cough, wheezing or shortness of breath  CARDIOVASCULAR: Negative for chest pain or palpitations.   GI: no nausea, vomiting, or diarrhea  NEURO: No history of headaches    Past Medical History:   Diagnosis Date    A-fib [facility])     Asymptomatic bilateral carotid artery stenosis     Boil, thigh 10/2019    10/30/19: right inner thigh region, says PCP Rxd Doxy for this, area is much better, has a couple days left to complete Doxy    CAD (coronary artery disease)     saw [facility] Olivia [facility]ar ([facility])     Charcot foot due to diabetes mellitus ([facility] [address] 2014    LEFT    COPD (chronic obstructive pulmonary disease) ([facility] [address] 2009    INHALER USE DAILY    Diabetes mellitus ([facility] [address] 1989    IDDM, On Insulin Dr. China Arambula Diabetic neuropathy ([facility] [address]     Difficult intravenous access     VEINS ROLL    Employs prosthetic leg     s/p left BKA    Foot ulcer ([facility] [address] PRIOR TO 2015    BILAT    Full dentures     UPPER ONLY    Hyperlipidemia 2004 ON RX    Hypertension 2004    ON RX, PCP Dr. Jeff Carolina, seen Oct. 2019    Hypoglycemia 4/2/2015    MRSA (methicillin resistant staph aureus) culture positive 4/16/2015    ankle    OA (osteoarthritis)     Osteomyelitis ([address]     left stump  BKA    Paroxysmal atrial fibrillation ([address]     Renal mass 09/2019    ACCIDENTAL  FINDING IS FOLLOWING UP WITH KIDNEY DR Leo Her Snores     Suspected sleep apnea     Thyroid disease 2004    PT HAD HYPERTHYROIDISM-UNCONTROLED THYROID DESTROYED WITH RADI IODINE NOW HAS HYPOTHYRIDISM    Vision abnormalities 09/2019    LEFT NO VISION    Vitreous hemorrhage of left eye due to diabetes mellitus ([address] 09/2019    s/p Vitrectomy 9/2019    Wears glasses     Wears partial dentures     full upper, does not wear lower partial    Wellness examination     Dr. Princess Bob seen within last 1 1/2 mos       SOCIAL HISTORY:  Social History     Socioeconomic History    Marital status:      Spouse name: Garrett Byrd Number of children: 2    Years of education: Not on file    Highest education level: Not on file   Occupational History    Occupation: Disabled   Tobacco Use    Smoking status: Former Smoker     Packs/day: 2.00     Years: 25.00     Pack years: 50.00     Types: Cigars     Start date: 1     Quit date: 5/29/2011     Years since quitting: 10.9    Smokeless tobacco: Never Used    Tobacco comment: quit in 2011   Vaping Use    Vaping Use: Never used   Substance and Sexual Activity    Alcohol use:  Yes     Alcohol/week: 0.0 standard drinks     Comment: BEER 2 A MONTH    Drug use: Not Currently     Types: Marijuana [place])     Comment: in 20's    Sexual activity: Yes     Partners: Female   Other Topics Concern    Not on file   Social History Narrative    Not on file     Social Determinants of Health     Financial Resource Strain: Low Risk     Difficulty of Paying Living Expenses: Not hard at all   Food Insecurity: No Food Insecurity    Worried About Running Out of [employer] in the Last Year: Never true    Ran Out of Food in the Last Year: Never true   Transportation Needs: No Transportation Needs    Lack of Transportation (Medical): No    Lack of Transportation (Non-Medical):  No   Physical Activity:     Days of Exercise per Week: Not on file    Minutes of Exercise per Session: Not on file   Stress:     Feeling of Stress : Not on file   Social Connections:     Frequency of Communication with Friends and Family: Not on file    Frequency of Social Gatherings with Friends and Family: Not on file    Attends Oriental orthodox Services: Not on file    Active Member of Clubs or Organizations: Not on file    Attends Club or Organization Meetings: Not on file    Marital Status: Not on file   Intimate Partner Violence:     Fear of Current or Ex-Partner: Not on file    Emotionally Abused: Not on file    Physically Abused: Not on file    Sexually Abused: Not on file   Housing Stability:     Unable to Pay for Housing in the Last Year: Not on file    Number of Places Lived in the Last Year: Not on file    Unstable Housing in the Last Year: Not on file           CURRENT MEDICATIONS:  Current Outpatient Medications   Medication Sig Dispense Refill    OZEMPIC, 1 MG/DOSE, 4 MG/3ML SOPN INJECT 1 MG INTO THE SKIN ONCE A WEEK      pregabalin (LYRICA) 100 MG capsule TAKE 1 CAPSULE BY MOUTH THREE TIMES A DAY 90 capsule 2    levothyroxine (SYNTHROID) 175 MCG tablet TAKE 1 TABLET BY MOUTH EVERY DAY 30 tablet 3    amLODIPine (NORVASC) 2.5 MG tablet TAKE 1 TABLET BY MOUTH EVERY DAY 30 tablet 3    metoprolol tartrate (LOPRESSOR) 50 MG tablet TAKE 1 TABLET BY MOUTH TWICE A DAY 60 tablet 3    losartan (COZAAR) 50 MG tablet TAKE 1 TABLET BY MOUTH EVERY DAY 30 tablet 5    rOPINIRole (REQUIP) 2 MG tablet TAKE 1 TABLET BY MOUTH EVERY DAY 30 tablet 11    ELIQUIS 5 MG TABS tablet TAKE 1 TABLET BY MOUTH TWICE A DAY 60 tablet 8    gemfibrozil (LOPID) 600 MG tablet TAKE 1 TABLET BY MOUTH EVERY DAY 30 tablet 11    clotrimazole-betamethasone (LOTRISONE) 1-0.05 % cream APPLY TO AFFECTED AREA TWICE A DAY 45 g 3    Insulin Glargine, 2 Unit Dial, (TOUJEO MAX SOLOSTAR) 300 UNIT/ML SOPN Inject 110 Units into the skin daily 5 pen 5    ASPIRIN LOW DOSE 81 MG EC tablet TAKE 1 TABLET BY MOUTH EVERY DAY 30 tablet 5    Insulin Pen Needle (COMFORT TOUCH INSULIN PEN NEED) 33G X 6 MM MISC 110 Units by Does not apply route daily 5 each 1    atorvastatin (LIPITOR) 40 MG tablet TAKE 1 TABLET BY MOUTH EVERY DAY 30 tablet 11    insulin lispro (HUMALOG) 100 UNIT/ML injection vial SLIDING SCALE (TAKE 5 UNITS IF SUGAR IS OVER 150) 1 each 3    finasteride (PROSCAR) 5 MG tablet TAKE 1 TABLET BY MOUTH EVERY DAY 30 tablet 5    isosorbide mononitrate (IMDUR) 30 MG extended release tablet TAKE 1 TABLET BY MOUTH EVERY DAY 90 tablet 3    budesonide-formoterol (SYMBICORT) 160-4.5 MCG/ACT AERO TAKE 2 PUFFS BY MOUTH TWICE A DAY 3 each 3    B-D UF III MINI PEN NEEDLES 31G X 5 MM MISC USE AS DIRECTED ONCE DAILY TO INJECT TOUJEO 100 each 3    tamsulosin (FLOMAX) 0.4 MG capsule TAKE 1 CAPSULE BY MOUTH EVERY DAY 90 capsule 3    Continuous Blood Gluc  (DEXCOM G6 ) DOUGIE USE AS DIRECTED TO MONITOR BLOOD SUGAR      blood glucose monitor strips T/S Contour.  Test 4 times a day 100 strip 3    albuterol sulfate  (90 Base) MCG/ACT inhaler INHALE 2 PUFFS INTO THE LUNGS EVERY 6 HOURS AS NEEDED FOR WHEEZING OR SHORTNESS OF BREATH 6.7 Inhaler 0    glucagon, rDNA, 1 MG injection Inject 1 mL into the muscle once as needed for Low blood sugar       Blood Pressure KIT 1 actuation by Does not apply route once a week 1 kit 0    Handicap Placard MISC by Does not apply route Duration - 5years  Reason- prosthetic leg 1 each 0    Omega-3 Fatty Acids (FISH OIL CONCENTRATE) 300 MG CAPS Take 300 mg by mouth nightly       Glucosamine Sulfate 500 MG TABS Take 500 mg by mouth 3 times daily      amiodarone (CORDARONE) 200 MG tablet Take 1 tablet by mouth 2 times daily 60 tablet 0     No current facility-administered medications for this visit. OBJECTIVE:  Vitals:    05/05/22 1529   BP: 126/80     Body mass index is 33.6 kg/m². Focal exam:    Shallow wound with surrounding cellulitis, not purulent- see media    General exam (except above):  General appearance - well appearing, alert, in no acute distress  Head - Atraumatic, normocephalic  Eyes - EOMI, no jaundice or pallor  Lungs - Lungs clear to auscultation. No wheezing, rhonchi, rales  Heart - RRR without murmur, gallop, or rubs. No ectopy  Abdomen - Abdomen soft, non-tender. Bowel sounds normal. No masses, organomegaly  Extremities -No significant edema, or skin discoloration. Good capillary refill. Neuro - Pt Alert, awake and oriented x 3. No gross focal neurological deficits    ASSESSMENT AND PLAN (MEDICAL DECISION MAKING):   Newton Stovall was seen today for ankle pain. Diagnoses and all orders for this visit:    Cellulitis of right lower extremity  -     cephALEXin (KEFLEX) 500 MG capsule; Take 1 capsule by mouth 3 times daily for 7 days    Acquired hypothyroidism  Comments:  tsh not available after dose change- will order  Orders:  -     TSH; Future    Essential hypertension  Comments:  stable, controlled wi current meds, c/w mi tmeds    Stage 3 chronic kidney disease, unspecified whether stage 3a or 3b CKD ([facility] [place] [address]    Screening for hyperlipidemia  -     Lipid Panel;  Future    Longstanding persistent atrial fibrillation (HCC)  Comments:  rate controlled, asymptomatic    Type 2 diabetes mellitus with diabetic polyneuropathy, with long-term current use of insulin          Follow up in: Shea Cabrales MD

## 2022-05-17 ENCOUNTER — OFFICE VISIT (OUTPATIENT)
Dept: INTERNAL MEDICINE CLINIC | Age: 62
End: 2022-05-17
Payer: MEDICARE

## 2022-05-17 VITALS
WEIGHT: 272 LBS | DIASTOLIC BLOOD PRESSURE: 72 MMHG | HEIGHT: 76 IN | BODY MASS INDEX: 33.12 KG/M2 | HEART RATE: 88 BPM | SYSTOLIC BLOOD PRESSURE: 110 MMHG | OXYGEN SATURATION: 98 %

## 2022-05-17 DIAGNOSIS — I48.11 LONGSTANDING PERSISTENT ATRIAL FIBRILLATION (HCC): Chronic | ICD-10-CM

## 2022-05-17 DIAGNOSIS — L03.115 CELLULITIS OF RIGHT LOWER EXTREMITY: Primary | ICD-10-CM

## 2022-05-17 DIAGNOSIS — N18.31 STAGE 3A CHRONIC KIDNEY DISEASE (HCC): ICD-10-CM

## 2022-05-17 DIAGNOSIS — I10 ESSENTIAL HYPERTENSION: ICD-10-CM

## 2022-05-17 DIAGNOSIS — E03.9 ACQUIRED HYPOTHYROIDISM: ICD-10-CM

## 2022-05-17 DIAGNOSIS — R91.1 SOLID NODULE OF LUNG 6 MM TO 8 MM IN DIAMETER: ICD-10-CM

## 2022-05-17 PROCEDURE — 3017F COLORECTAL CA SCREEN DOC REV: CPT | Performed by: INTERNAL MEDICINE

## 2022-05-17 PROCEDURE — G8427 DOCREV CUR MEDS BY ELIG CLIN: HCPCS | Performed by: INTERNAL MEDICINE

## 2022-05-17 PROCEDURE — G8417 CALC BMI ABV UP PARAM F/U: HCPCS | Performed by: INTERNAL MEDICINE

## 2022-05-17 PROCEDURE — 1036F TOBACCO NON-USER: CPT | Performed by: INTERNAL MEDICINE

## 2022-05-17 PROCEDURE — 99214 OFFICE O/P EST MOD 30 MIN: CPT | Performed by: INTERNAL MEDICINE

## 2022-05-17 ASSESSMENT — ENCOUNTER SYMPTOMS
COLOR CHANGE: 0
SHORTNESS OF BREATH: 0
COUGH: 0
DIARRHEA: 0
EYE PAIN: 0
WHEEZING: 0
BLOOD IN STOOL: 0
EYE DISCHARGE: 0
ABDOMINAL DISTENTION: 0
TROUBLE SWALLOWING: 0

## 2022-05-17 NOTE — PROGRESS NOTES
Visit Information    Have you changed or started any medications since your last visit including any over-the-counter medicines, vitamins, or herbal medicines? no   Are you having any side effects from any of your medications? -  no  Have you stopped taking any of your medications? Is so, why? -  no    Have you seen any other physician or provider since your last visit? No  Have you had any other diagnostic tests since your last visit? Yes - Records Obtained  Have you been seen in the emergency room and/or had an admission to a hospital since we last saw you? No  Have you had your routine dental cleaning in the past 6 months? yes -     Have you activated your [employer] account? If not, what are your barriers?  Yes     Patient Care Team:  Carol Kahn MD as PCP - Nelly Acosta MD as PCP - [facility]  Sohail Sandra MD as Consulting Physician (Infectious Diseases)    Medical History Review  Past Medical, Family, and Social History reviewed and does contribute to the patient presenting condition    Health Maintenance   Topic Date Due    HIV screen  Never done    DTaP/Tdap/Td vaccine (1 - Tdap) Never done    Shingles vaccine (1 of 2) 10/23/2014    Pneumococcal 0-64 years Vaccine (2 - PCV) 01/04/2015    Diabetic retinal exam  01/04/2017    Lipids  05/13/2022    Diabetic foot exam  01/07/2023    Low dose CT lung screening  04/19/2023    A1C test (Diabetic or Prediabetic)  05/04/2023    Depression Screen  05/05/2023    Colorectal Cancer Screen  01/23/2027    Flu vaccine  Completed    COVID-19 Vaccine  Completed    Hepatitis C screen  Completed    Hepatitis A vaccine  Aged Out    Hib vaccine  Aged Out    Meningococcal (ACWY) vaccine  Aged Out

## 2022-05-17 NOTE — PROGRESS NOTES
[address] [facility]  [place] [id number]  Dept: [phone]  Dept Fax: [phone]    Roxanna Kocher is a 58 y.o. male who presents today for his medical conditions/complaintsas noted below. Roxanna Kocher is c/o of   Chief Complaint   Patient presents with    Cellulitis     follow up     Diabetes     5/4/22 a1c 7.8         HPI:     Diabetes   Duration more than 7 years  Modifying factors on Glucophage and other med  Severity uncontrolled sever  Associated signs and symtoms neuropathy/ckd/ CAD. aggravated with sugar diet and better with low sugar diet     HTN  Onset more than 2 years ago  gustavo mild to mod  Controlled with current po meds  Not associated with headaches or blurry vision  No chest pain        Hemoglobin A1C (%)   Date Value   05/04/2022 7.8 (H)   12/14/2021 7.8   09/10/2021 8.0             ( goal A1Cis < 7)   Microalb/Crt.  Ratio (mcg/mg creat)   Date Value   12/14/2021 35 (H)     LDL Cholesterol (mg/dL)   Date Value   05/13/2021 55   01/13/2020 70   05/21/2019 68       (goal LDL is <100)   AST (U/L)   Date Value   12/28/2021 26     ALT (U/L)   Date Value   12/28/2021 29     BUN (mg/dL)   Date Value   01/19/2022 22     BP Readings from Last 3 Encounters:   05/17/22 110/72   05/05/22 126/80   05/04/22 133/78          (goal 120/80)    Past Medical History:   Diagnosis Date    A-fib [facility])     Asymptomatic bilateral carotid artery stenosis     Boil, thigh 10/2019    10/30/19: right inner thigh region, says PCP Rxd Doxy for this, area is much better, has a couple days left to complete Doxy    CAD (coronary artery disease)     saw Dr. Dayna St ([facility])     Charcot foot due to diabetes mellitus ([address] 2014    LEFT    COPD (chronic obstructive pulmonary disease) ([address] 2009    INHALER USE DAILY    Diabetes mellitus ([address] 1989    IDDM, On Insulin Dr. Pinto Letters Diabetic neuropathy ([address]     Difficult intravenous access     VEINS ROLL    Employs prosthetic leg s/p left BKA    Foot ulcer ([address] PRIOR TO 2015    BILAT    Full dentures     UPPER ONLY    Hyperlipidemia 2004    ON RX    Hypertension 2004    ON RX, PCP Dr. Francisco Kapadia, seen Oct. 2019    Hypoglycemia 4/2/2015    MRSA (methicillin resistant staph aureus) culture positive 4/16/2015    ankle    OA (osteoarthritis)     Osteomyelitis ([address]     left stump  BKA    Paroxysmal atrial fibrillation ([address]     Renal mass 09/2019    ACCIDENTAL  FINDING IS FOLLOWING UP WITH KIDNEY DR [facility]     Suspected sleep apnea     Thyroid disease 2004    PT HAD HYPERTHYROIDISM-UNCONTROLED THYROID DESTROYED WITH RADI IODINE NOW HAS HYPOTHYRIDISM    Vision abnormalities 09/2019    LEFT NO VISION    Vitreous hemorrhage of left eye due to diabetes mellitus ([address] 09/2019    s/p Vitrectomy 9/2019    Wears glasses     Wears partial dentures     full upper, does not wear lower partial    Wellness examination     Dr. Oscar Hooper seen within last 1 1/2 mos      Past Surgical History:   Procedure Laterality Date    CARDIAC CATHETERIZATION      no stenting    CARDIOVASCULAR STRESS TEST  06/28/2019    small fixed apical, likely normal, EF 48%    COLONOSCOPY  06/17/2016    poor prep, done up to the IC valve, redundant colon    COLONOSCOPY  01/23/2017    VIRTUAL COLONOSCOPY DONE-no polyps or masses    CYSTOSCOPY Bilateral 6/16/2020    CYSTOSCOPY RETROGRADE PYELOGRAM URETEROSCOPY performed by Savannah Phelps MD at [address] [facility] Right 7/30/2020    CYSTOSCOPY, RIGHT RETROGRADE PYELOGRAM,  URETERAL CATHETER  INSERTION performed by Savannah Phelps MD at [address] [place] [address] 9/1/2020    CYSTOSCOPY RETROGRADE PYELOGRAM, RIGHT URETERAL STENT EXCHANGE performed by Savannah Phelps MD at [facility] Right     r-bone removed    [facility] [place]  PICC [facility] DOUBLE  5/21/2018         KIDNEY CYST REMOVAL      KIDNEY SURGERY Right 11/13/2019    XI ROBOTIC PARTIAL NEPHRECTOMY, INTRAOP ULTRASOUND, RIGHT URETEROURETEROSTOMY, RIGHT URETERAL STENT PLACEMENT **SHORT STAY** performed by Dalia Kemp MD at [facility] 7/30/2020    XI LAPAROSCOPIC ROBOTIC URETEROLYSIS, ROBOTIC ASSISTED BUCCAL URETEROPLASTY  (DR. COBIAN TO ASSIST) performed by Dalia Kemp MD at [address] Left 4/29/15    OTHER SURGICAL HISTORY Left 5-5-15 and 11/2015    Revision BKA    OTHER SURGICAL HISTORY      left stump revision 5/30/2018    ME RE-AMPUTATION LOWER LEG Left 5/30/2018    LEG AMPUTATION BELOW KNEE REVISION performed by Prachi Reaves MD at [address] Bilateral 80'S    TOE AMPUTATION      Right 2 and 4    VITRECTOMY Left 9/25/2019    PARS PLANA VITRECTOMY 25 GAUGE, MEMBRANE PEELING, ENDOLASER 200  MW 1044 SPOTS, INDIRECT LASER 26 SPOTS performed by Rajendra Rodriguez MD at [place] History   Problem Relation Age of Onset    Diabetes Mother     Hypertension Mother     Stomach Cancer Mother     Hypertension Father     Alcohol Abuse Father     COPD Father     Kidney Cancer Father     Diabetes Brother         IDDM-PUMP    Other Sister         BRAIN TUMOR       Social History     Tobacco Use    Smoking status: Former Smoker     Packs/day: 2.00     Years: 25.00     Pack years: 50.00     Types: Cigars     Start date: 1     Quit date: 5/29/2011     Years since quitting: 10.9    Smokeless tobacco: Never Used    Tobacco comment: quit in 2011   Substance Use Topics    Alcohol use:  Yes     Alcohol/week: 0.0 standard drinks     Comment: BEER 2 A MONTH      Current Outpatient Medications   Medication Sig Dispense Refill    OZEMPIC, 1 MG/DOSE, 4 MG/3ML SOPN INJECT 1 MG INTO THE SKIN ONCE A WEEK      pregabalin (LYRICA) 100 MG capsule TAKE 1 CAPSULE BY MOUTH THREE TIMES A DAY 90 capsule 2    levothyroxine (SYNTHROID) 175 MCG tablet TAKE 1 TABLET BY MOUTH EVERY DAY 30 tablet 3    amLODIPine (NORVASC) 2.5 MG tablet TAKE 1 TABLET BY MOUTH EVERY DAY 30 tablet 3    metoprolol tartrate (LOPRESSOR) 50 MG tablet TAKE 1 TABLET BY MOUTH TWICE A DAY 60 tablet 3    losartan (COZAAR) 50 MG tablet TAKE 1 TABLET BY MOUTH EVERY DAY 30 tablet 5    rOPINIRole (REQUIP) 2 MG tablet TAKE 1 TABLET BY MOUTH EVERY DAY 30 tablet 11    ELIQUIS 5 MG TABS tablet TAKE 1 TABLET BY MOUTH TWICE A DAY 60 tablet 8    gemfibrozil (LOPID) 600 MG tablet TAKE 1 TABLET BY MOUTH EVERY DAY 30 tablet 11    clotrimazole-betamethasone (LOTRISONE) 1-0.05 % cream APPLY TO AFFECTED AREA TWICE A DAY 45 g 3    Insulin Glargine, 2 Unit Dial, (TOUJEO MAX SOLOSTAR) 300 UNIT/ML SOPN Inject 110 Units into the skin daily 5 pen 5    ASPIRIN LOW DOSE 81 MG EC tablet TAKE 1 TABLET BY MOUTH EVERY DAY 30 tablet 5    Insulin Pen Needle (COMFORT TOUCH INSULIN PEN NEED) 33G X 6 MM MISC 110 Units by Does not apply route daily 5 each 1    atorvastatin (LIPITOR) 40 MG tablet TAKE 1 TABLET BY MOUTH EVERY DAY 30 tablet 11    amiodarone (CORDARONE) 200 MG tablet Take 1 tablet by mouth 2 times daily 60 tablet 0    insulin lispro (HUMALOG) 100 UNIT/ML injection vial SLIDING SCALE (TAKE 5 UNITS IF SUGAR IS OVER 150) 1 each 3    finasteride (PROSCAR) 5 MG tablet TAKE 1 TABLET BY MOUTH EVERY DAY 30 tablet 5    isosorbide mononitrate (IMDUR) 30 MG extended release tablet TAKE 1 TABLET BY MOUTH EVERY DAY 90 tablet 3    budesonide-formoterol (SYMBICORT) 160-4.5 MCG/ACT AERO TAKE 2 PUFFS BY MOUTH TWICE A DAY 3 each 3    B-D UF III MINI PEN NEEDLES 31G X 5 MM MISC USE AS DIRECTED ONCE DAILY TO INJECT TOUJEO 100 each 3    tamsulosin (FLOMAX) 0.4 MG capsule TAKE 1 CAPSULE BY MOUTH EVERY DAY 90 capsule 3    Continuous Blood Gluc  (DEXCOM G6 ) DOUGIE USE AS DIRECTED TO MONITOR BLOOD SUGAR      blood glucose monitor strips T/S Contour.  Test 4 times a day 100 strip 3    albuterol sulfate  (90 Base) MCG/ACT inhaler INHALE 2 PUFFS INTO THE LUNGS EVERY 6 HOURS AS NEEDED FOR WHEEZING OR SHORTNESS OF BREATH 6.7 Inhaler 0    glucagon, rDNA, 1 MG injection Inject 1 mL into the muscle once as needed for Low blood sugar       Blood Pressure KIT 1 actuation by Does not apply route once a week 1 kit 0    Handicap Placard MISC by Does not apply route Duration - 5years  Reason- prosthetic leg 1 each 0    Omega-3 Fatty Acids (FISH OIL CONCENTRATE) 300 MG CAPS Take 300 mg by mouth nightly       Glucosamine Sulfate 500 MG TABS Take 500 mg by mouth 3 times daily       No current facility-administered medications for this visit. Allergies   Allergen Reactions    Ramipril      Other reaction(s): swelling of the lips   Other reaction(s): swelling of the lips     Adhesive Tape      tegaderm causes blisters. Use paper tape       Health Maintenance   Topic Date Due    HIV screen  Never done    DTaP/Tdap/Td vaccine (1 - Tdap) Never done    Shingles vaccine (1 of 2) 10/23/2014    Pneumococcal 0-64 years Vaccine (2 - PCV) 01/04/2015    Diabetic retinal exam  01/04/2017    Lipids  05/13/2022    Diabetic foot exam  01/07/2023    Low dose CT lung screening  04/19/2023    A1C test (Diabetic or Prediabetic)  05/04/2023    Depression Screen  05/05/2023    Colorectal Cancer Screen  01/23/2027    Flu vaccine  Completed    COVID-19 Vaccine  Completed    Hepatitis C screen  Completed    Hepatitis A vaccine  Aged Out    Hib vaccine  Aged Out    Meningococcal (ACWY) vaccine  Aged Out       Subjective:     Review of Systems   Constitutional: Negative for appetite change, diaphoresis and fatigue. HENT: Negative for ear discharge and trouble swallowing. Eyes: Negative for pain and discharge. Respiratory: Negative for cough, shortness of breath and wheezing. Cardiovascular: Negative for chest pain and palpitations. Gastrointestinal: Negative for abdominal distention, blood in stool and diarrhea. Endocrine: Negative for polydipsia and polyphagia. Genitourinary: Negative for difficulty urinating and frequency. Musculoskeletal: Negative for gait problem, myalgias and neck pain. Bka left  Wound celltus right healing     Skin: Negative for color change and rash. Allergic/Immunologic: Negative for environmental allergies and food allergies. Neurological: Negative for dizziness and headaches. Hematological: Negative for adenopathy. Does not bruise/bleed easily. Psychiatric/Behavioral: Negative for behavioral problems and sleep disturbance. Objective:     Physical Exam  Constitutional:       Appearance: He is well-developed. He is not diaphoretic. HENT:      Head: Normocephalic and atraumatic. Eyes:      General:         Right eye: No discharge. Left eye: No discharge. Extraocular Movements:      Right eye: Normal extraocular motion. Left eye: Normal extraocular motion. Conjunctiva/sclera: Conjunctivae normal.      Right eye: Right conjunctiva is not injected. Left eye: Left conjunctiva is not injected. Neck:      Thyroid: No thyroid mass or thyromegaly. Vascular: No JVD. Cardiovascular:      Rate and Rhythm: Normal rate and regular rhythm. Heart sounds: No murmur heard. No friction rub. Pulmonary:      Effort: Pulmonary effort is normal. No tachypnea, bradypnea, accessory muscle usage or respiratory distress. Breath sounds: Normal breath sounds. No wheezing or rales. Abdominal:      General: Bowel sounds are normal. There is no distension. Palpations: Abdomen is soft. Tenderness: There is no abdominal tenderness. There is no rebound. Musculoskeletal:         General: No tenderness. Normal range of motion. Cervical back: Normal range of motion and neck supple. No edema or erythema. Comments: Post left bka  And cellultis improving     Lymphadenopathy:      Head:      Right side of head: No submental or submandibular adenopathy.       Left side of head: No submental or submandibular adenopathy. Cervical: No cervical adenopathy. Skin:     General: Skin is warm. Coloration: Skin is not pale. Findings: No bruising, ecchymosis or rash. Neurological:      Mental Status: He is alert and oriented to person, place, and time. Cranial Nerves: No cranial nerve deficit. Sensory: No sensory deficit. Motor: No atrophy or abnormal muscle tone. Coordination: Coordination normal.   Psychiatric:         Mood and Affect: Mood is not anxious. Affect is not angry. Speech: Speech is not slurred. Behavior: Behavior normal. Behavior is not aggressive. Thought Content: Thought content does not include homicidal ideation. Cognition and Memory: Memory is not impaired. /72   Pulse 88   Ht 6' 4\" (1.93 m)   Wt 272 lb (123.4 kg)   SpO2 98%   BMI 33.11 kg/m²     Assessment:       Diagnosis Orders   1. Cellulitis of right lower extremity     2. Stage 3a chronic kidney disease ([address]     3. Essential hypertension  Lipid Panel   4. Acquired hypothyroidism  TSH With Reflex Ft4   5. Longstanding persistent atrial fibrillation ([facility]     6. Solid nodule of lung 6 mm to 8 mm in diameter               Plan:      No follow-ups on file. Orders Placed This Encounter   Procedures    TSH With Reflex Ft4     Standing Status:   Future     Standing Expiration Date:   5/17/2023    Lipid Panel     Standing Status:   Future     Standing Expiration Date:   8/15/2022     Order Specific Question:   Is Patient Fasting?/# of Hours     Answer:   10 to 12     No orders of the defined types were placed in this encounter. a1c noetd 7.8  Log good  Skin cellutlis healing rt leg  Post injury  Lung nodule 7 mm  New  Repeat ct in October  Pt will see pulm for this     Patient given educational materials - see patient instructions. Discussed use, benefit, and side effects of prescribed medications. All patientquestions answered.  Pt voiced understanding. Reviewed health maintenance. Instructedto continue current medications, diet and exercise. Patient agreed with treatmentplan. Follow up as directed. Please note that this chart was generated using voice recognition Dragon dictation software. Although every effort was made to ensure the accuracy of this automated transcription, some errors in transcription may have occurred.      Electronically signed by Stephany Frazier MD on 5/17/2022 at 2:32 PM

## 2022-06-15 RX ORDER — AMLODIPINE BESYLATE 2.5 MG/1
TABLET ORAL
Qty: 30 TABLET | Refills: 3 | OUTPATIENT
Start: 2022-06-15

## 2022-06-16 ENCOUNTER — OFFICE VISIT (OUTPATIENT)
Dept: INTERNAL MEDICINE CLINIC | Age: 62
End: 2022-06-16
Payer: COMMERCIAL

## 2022-06-16 VITALS
BODY MASS INDEX: 33.12 KG/M2 | DIASTOLIC BLOOD PRESSURE: 78 MMHG | OXYGEN SATURATION: 97 % | HEIGHT: 76 IN | WEIGHT: 272 LBS | SYSTOLIC BLOOD PRESSURE: 120 MMHG | HEART RATE: 87 BPM

## 2022-06-16 DIAGNOSIS — S81.801D WOUND OF RIGHT LOWER EXTREMITY, SUBSEQUENT ENCOUNTER: Primary | ICD-10-CM

## 2022-06-16 DIAGNOSIS — N18.30 STAGE 3 CHRONIC KIDNEY DISEASE, UNSPECIFIED WHETHER STAGE 3A OR 3B CKD (HCC): ICD-10-CM

## 2022-06-16 DIAGNOSIS — Z89.512 STATUS POST BELOW-KNEE AMPUTATION OF LEFT LOWER EXTREMITY (HCC): ICD-10-CM

## 2022-06-16 DIAGNOSIS — R14.0 BLOATING: ICD-10-CM

## 2022-06-16 DIAGNOSIS — E11.42 TYPE 2 DIABETES MELLITUS WITH DIABETIC POLYNEUROPATHY, WITH LONG-TERM CURRENT USE OF INSULIN (HCC): ICD-10-CM

## 2022-06-16 DIAGNOSIS — R19.5 LOOSE STOOLS: ICD-10-CM

## 2022-06-16 DIAGNOSIS — Z79.4 TYPE 2 DIABETES MELLITUS WITH DIABETIC POLYNEUROPATHY, WITH LONG-TERM CURRENT USE OF INSULIN (HCC): ICD-10-CM

## 2022-06-16 PROCEDURE — G8417 CALC BMI ABV UP PARAM F/U: HCPCS | Performed by: INTERNAL MEDICINE

## 2022-06-16 PROCEDURE — 3051F HG A1C>EQUAL 7.0%<8.0%: CPT | Performed by: INTERNAL MEDICINE

## 2022-06-16 PROCEDURE — G8427 DOCREV CUR MEDS BY ELIG CLIN: HCPCS | Performed by: INTERNAL MEDICINE

## 2022-06-16 PROCEDURE — 3017F COLORECTAL CA SCREEN DOC REV: CPT | Performed by: INTERNAL MEDICINE

## 2022-06-16 PROCEDURE — 1036F TOBACCO NON-USER: CPT | Performed by: INTERNAL MEDICINE

## 2022-06-16 PROCEDURE — 99213 OFFICE O/P EST LOW 20 MIN: CPT | Performed by: INTERNAL MEDICINE

## 2022-06-16 PROCEDURE — 2022F DILAT RTA XM EVC RTNOPTHY: CPT | Performed by: INTERNAL MEDICINE

## 2022-06-16 RX ORDER — GREEN TEA/HOODIA GORDONII 315-12.5MG
1 CAPSULE ORAL 2 TIMES DAILY
Qty: 60 TABLET | Refills: 0 | Status: SHIPPED | OUTPATIENT
Start: 2022-06-16 | End: 2022-08-05

## 2022-06-16 NOTE — PROGRESS NOTES
Visit Information    Have you changed or started any medications since your last visit including any over-the-counter medicines, vitamins, or herbal medicines? no   Are you having any side effects from any of your medications? -  no  Have you stopped taking any of your medications? Is so, why? -  no    Have you seen any other physician or provider since your last visit? No  Have you had any other diagnostic tests since your last visit? No  Have you been seen in the emergency room and/or had an admission to a hospital since we last saw you? No  Have you had your routine dental cleaning in the past 6 months? no    Have you activated your [employer] account? If not, what are your barriers?  Yes     Patient Care Team:  Ant Almanzar MD as PCP - Candance Blare, MD as PCP - [facility]  Jim Goodne MD as Consulting Physician (Infectious Diseases)    Medical History Review  Past Medical, Family, and Social History reviewed and does contribute to the patient presenting condition    Health Maintenance   Topic Date Due    HIV screen  Never done    DTaP/Tdap/Td vaccine (1 - Tdap) Never done    Shingles vaccine (1 of 2) 10/23/2014    Pneumococcal 0-64 years Vaccine (2 - PCV) 01/04/2015    Diabetic retinal exam  01/04/2017    Lipids  05/13/2022    Diabetic foot exam  01/07/2023    Prostate Specific Antigen (PSA) Screening or Monitoring  01/19/2023    Low dose CT lung screening  04/19/2023    A1C test (Diabetic or Prediabetic)  05/04/2023    Depression Screen  05/05/2023    Colorectal Cancer Screen  01/23/2027    Flu vaccine  Completed    COVID-19 Vaccine  Completed    Hepatitis C screen  Completed    Hepatitis A vaccine  Aged Out    Hib vaccine  Aged Out    Meningococcal (ACWY) vaccine  Aged Out     SUBJECTIVE:  Tri Blount is a 58 y.o. male patient who  comes for complaints of   Chief Complaint   Patient presents with    Abrasion     Left ankle wound check    Diabetes A1C- 5/4/22= 7.8       Right leg redness  Recent wound, with cellulitis,. Was treated with abx  redolved at the time  Wound is getting better  Examined today  Some surrounding inflamation,but not concerning for cellulitis  Ok to W&W for now      Loose stool  3-4Xa day  3wks  Feels bloated, and flatulence  No vomiting no abd pain  No fever  recfnet abx treatment        REVIEW OF SYSTEMS (except Subjective (HPI))  GENERAL: No fevers / chills  RESPIRATORY: Negative for cough, wheezing or shortness of breath  CARDIOVASCULAR: Negative for chest pain or palpitations.   GI: no nausea, vomiting, or diarrhea  NEURO: No history of headaches    Past Medical History:   Diagnosis Date    A-fib [facility])     Asymptomatic bilateral carotid artery stenosis     Boil, thigh 10/2019    10/30/19: right inner thigh region, says PCP Rxd Doxy for this, area is much better, has a couple days left to complete Doxy    CAD (coronary artery disease)     saw Dr. Tyler Mcneil ([facility])     Charcot foot due to diabetes mellitus ([address] 2014    LEFT    COPD (chronic obstructive pulmonary disease) ([address] 2009    INHALER USE DAILY    Diabetes mellitus ([address] 1989    IDDM, On Insulin Dr. Erendira Preston Diabetic neuropathy ([address]     Difficult intravenous access     VEINS ROLL    Employs prosthetic leg     s/p left BKA    Foot ulcer ([address] PRIOR TO 2015    BILAT    Full dentures     UPPER ONLY    Hyperlipidemia 2004    ON RX    Hypertension 2004    ON RX, PCP Dr. Soham Chavarria, seen Oct. 2019    Hypoglycemia 4/2/2015    MRSA (methicillin resistant staph aureus) culture positive 4/16/2015    ankle    OA (osteoarthritis)     Osteomyelitis ([address]     left stump  BKA    Paroxysmal atrial fibrillation ([address]     Renal mass 09/2019    ACCIDENTAL  FINDING IS FOLLOWING UP WITH KIDNEY DR Enoch Ledezma     Suspected sleep apnea     Thyroid disease 2004    PT HAD HYPERTHYROIDISM-UNCONTROLED THYROID DESTROYED WITH RADI IODINE NOW HAS HYPOTHYRIDISM    Vision abnormalities 09/2019 LEFT NO VISION    Vitreous hemorrhage of left eye due to diabetes mellitus ([address] 2019    s/p Vitrectomy 2019    Wears glasses     Wears partial dentures     full upper, does not wear lower partial    Wellness examination     Dr. Sarah Ramos seen within last 1 1/2 mos       SOCIAL HISTORY:  Social History     Socioeconomic History    Marital status:      Spouse name: Andrez Costa Number of children: 2    Years of education: Not on file    Highest education level: Not on file   Occupational History    Occupation: Disabled   Tobacco Use    Smoking status: Former Smoker     Packs/day: 2.00     Years: 25.00     Pack years: 50.00     Types: Cigars     Start date: 1     Quit date: 2011     Years since quittin.0    Smokeless tobacco: Never Used    Tobacco comment: quit in    Vaping Use    Vaping Use: Never used   Substance and Sexual Activity    Alcohol use: Yes     Alcohol/week: 0.0 standard drinks     Comment: BEER 2 A MONTH    Drug use: Not Currently     Types: Marijuana Sherry Kales)     Comment: in 20's    Sexual activity: Yes     Partners: Female   Other Topics Concern    Not on file   Social History Narrative    Not on file     Social Determinants of Health     Financial Resource Strain: Low Risk     Difficulty of Paying Living Expenses: Not hard at all   Food Insecurity: No Food Insecurity    Worried About [address] in the Last Year: Never true    [address] in the Last Year: Never true   Transportation Needs: No Transportation Needs    Lack of Transportation (Medical): No    Lack of Transportation (Non-Medical):  No   Physical Activity:     Days of Exercise per Week: Not on file    Minutes of Exercise per Session: Not on file   Stress:     Feeling of Stress : Not on file   Social Connections:     Frequency of Communication with Friends and Family: Not on file    Frequency of Social Gatherings with Friends and Family: Not on file    Attends Gnosticism Services: Not on file    Active Member of Clubs or Organizations: Not on file    Attends Club or Organization Meetings: Not on file    Marital Status: Not on file   Intimate Partner Violence:     Fear of Current or Ex-Partner: Not on file    Emotionally Abused: Not on file    Physically Abused: Not on file    Sexually Abused: Not on file   Housing Stability:     Unable to Pay for Housing in the Last Year: Not on file    Number of Places Lived in the Last Year: Not on file    Unstable Housing in the Last Year: Not on file           CURRENT MEDICATIONS:  Current Outpatient Medications   Medication Sig Dispense Refill    OZEMPIC, 1 MG/DOSE, 4 MG/3ML SOPN INJECT 1 MG INTO THE SKIN ONCE A WEEK      pregabalin (LYRICA) 100 MG capsule TAKE 1 CAPSULE BY MOUTH THREE TIMES A DAY 90 capsule 2    levothyroxine (SYNTHROID) 175 MCG tablet TAKE 1 TABLET BY MOUTH EVERY DAY 30 tablet 3    amLODIPine (NORVASC) 2.5 MG tablet TAKE 1 TABLET BY MOUTH EVERY DAY 30 tablet 3    metoprolol tartrate (LOPRESSOR) 50 MG tablet TAKE 1 TABLET BY MOUTH TWICE A DAY 60 tablet 3    losartan (COZAAR) 50 MG tablet TAKE 1 TABLET BY MOUTH EVERY DAY 30 tablet 5    rOPINIRole (REQUIP) 2 MG tablet TAKE 1 TABLET BY MOUTH EVERY DAY 30 tablet 11    ELIQUIS 5 MG TABS tablet TAKE 1 TABLET BY MOUTH TWICE A DAY 60 tablet 8    gemfibrozil (LOPID) 600 MG tablet TAKE 1 TABLET BY MOUTH EVERY DAY 30 tablet 11    clotrimazole-betamethasone (LOTRISONE) 1-0.05 % cream APPLY TO AFFECTED AREA TWICE A DAY 45 g 3    Insulin Glargine, 2 Unit Dial, (TOUJEO MAX SOLOSTAR) 300 UNIT/ML SOPN Inject 110 Units into the skin daily 5 pen 5    ASPIRIN LOW DOSE 81 MG EC tablet TAKE 1 TABLET BY MOUTH EVERY DAY 30 tablet 5    Insulin Pen Needle (COMFORT TOUCH INSULIN PEN NEED) 33G X 6 MM MISC 110 Units by Does not apply route daily 5 each 1    atorvastatin (LIPITOR) 40 MG tablet TAKE 1 TABLET BY MOUTH EVERY DAY 30 tablet 11    insulin lispro (HUMALOG) 100 UNIT/ML injection vial SLIDING SCALE (TAKE 5 UNITS IF SUGAR IS OVER 150) 1 each 3    finasteride (PROSCAR) 5 MG tablet TAKE 1 TABLET BY MOUTH EVERY DAY 30 tablet 5    isosorbide mononitrate (IMDUR) 30 MG extended release tablet TAKE 1 TABLET BY MOUTH EVERY DAY 90 tablet 3    budesonide-formoterol (SYMBICORT) 160-4.5 MCG/ACT AERO TAKE 2 PUFFS BY MOUTH TWICE A DAY 3 each 3    B-D UF III MINI PEN NEEDLES 31G X 5 MM MISC USE AS DIRECTED ONCE DAILY TO INJECT TOUJEO 100 each 3    tamsulosin (FLOMAX) 0.4 MG capsule TAKE 1 CAPSULE BY MOUTH EVERY DAY 90 capsule 3    Continuous Blood Gluc  (DEXCOM G6 ) DOUGIE USE AS DIRECTED TO MONITOR BLOOD SUGAR      blood glucose monitor strips T/S Contour. Test 4 times a day 100 strip 3    albuterol sulfate  (90 Base) MCG/ACT inhaler INHALE 2 PUFFS INTO THE LUNGS EVERY 6 HOURS AS NEEDED FOR WHEEZING OR SHORTNESS OF BREATH 6.7 Inhaler 0    glucagon, rDNA, 1 MG injection Inject 1 mL into the muscle once as needed for Low blood sugar       Blood Pressure KIT 1 actuation by Does not apply route once a week 1 kit 0    Handicap Placard MISC by Does not apply route Duration - 5years  Reason- prosthetic leg 1 each 0    Omega-3 Fatty Acids (FISH OIL CONCENTRATE) 300 MG CAPS Take 300 mg by mouth nightly       Glucosamine Sulfate 500 MG TABS Take 500 mg by mouth 3 times daily      amiodarone (CORDARONE) 200 MG tablet Take 1 tablet by mouth 2 times daily 60 tablet 0     No current facility-administered medications for this visit. OBJECTIVE:  Vitals:    06/16/22 1048   BP: 120/78   Pulse: 87   SpO2: 97%     Body mass index is 33.11 kg/m². Focal exam:   see media image re wound    General exam (except above):  General appearance - well appearing, alert, in no acute distress  Head - Atraumatic, normocephalic  Eyes - EOMI, no jaundice or pallor  bdomen - Abdomen soft, non-tender.  Bowel sounds normal. No masses, organomegaly  Extremities -No significant edema, or skin discoloration. Good capillary refill. Neuro - Pt Alert, awake and oriented x 3. No gross focal neurological deficits    ASSESSMENT AND PLAN (MEDICAL DECISION MAKING):   Bladimir Sharma was seen today for abrasion and diabetes.     Diagnoses and all orders for this visit:    Wound of right lower extremity, subsequent encounter  Comments:  overall improving  W&W for now  no abx needed today      Bloating  -     Probiotic Acidophilus (FLORANEX) TABS; Take 1 tablet by mouth 2 times daily    Loose stools  -     Probiotic Acidophilus (FLORANEX) TABS; Take 1 tablet by mouth 2 times daily    Status post below-knee amputation of left lower extremity (HCC)    Stage 3 chronic kidney disease, unspecified whether stage 3a or 3b CKD ([facility]    Type 2 diabetes mellitus with diabetic polyneuropathy, with long-term current use of insulin            Follow up in:       Beny Lopez MD

## 2022-06-22 RX ORDER — FINASTERIDE 5 MG/1
TABLET, FILM COATED ORAL
Qty: 30 TABLET | Refills: 5 | Status: SHIPPED | OUTPATIENT
Start: 2022-06-22

## 2022-07-05 ENCOUNTER — TELEMEDICINE (OUTPATIENT)
Dept: INTERNAL MEDICINE CLINIC | Age: 62
End: 2022-07-05
Payer: COMMERCIAL

## 2022-07-05 ENCOUNTER — NURSE TRIAGE (OUTPATIENT)
Dept: OTHER | Facility: CLINIC | Age: 62
End: 2022-07-05

## 2022-07-05 DIAGNOSIS — I25.119 CORONARY ARTERY DISEASE WITH ANGINA PECTORIS, UNSPECIFIED VESSEL OR LESION TYPE, UNSPECIFIED WHETHER NATIVE OR TRANSPLANTED HEART (HCC): ICD-10-CM

## 2022-07-05 DIAGNOSIS — I48.11 LONGSTANDING PERSISTENT ATRIAL FIBRILLATION (HCC): Primary | Chronic | ICD-10-CM

## 2022-07-05 DIAGNOSIS — U07.1 COVID-19 VIRUS INFECTION: ICD-10-CM

## 2022-07-05 DIAGNOSIS — I50.32 CHRONIC DIASTOLIC HEART FAILURE (HCC): Chronic | ICD-10-CM

## 2022-07-05 DIAGNOSIS — I10 ESSENTIAL HYPERTENSION: ICD-10-CM

## 2022-07-05 PROCEDURE — G8417 CALC BMI ABV UP PARAM F/U: HCPCS | Performed by: INTERNAL MEDICINE

## 2022-07-05 PROCEDURE — 99213 OFFICE O/P EST LOW 20 MIN: CPT | Performed by: INTERNAL MEDICINE

## 2022-07-05 PROCEDURE — 3017F COLORECTAL CA SCREEN DOC REV: CPT | Performed by: INTERNAL MEDICINE

## 2022-07-05 PROCEDURE — 1036F TOBACCO NON-USER: CPT | Performed by: INTERNAL MEDICINE

## 2022-07-05 PROCEDURE — G8427 DOCREV CUR MEDS BY ELIG CLIN: HCPCS | Performed by: INTERNAL MEDICINE

## 2022-07-05 RX ORDER — AZITHROMYCIN 500 MG/1
500 TABLET, FILM COATED ORAL DAILY
Qty: 3 TABLET | Refills: 0 | Status: SHIPPED | OUTPATIENT
Start: 2022-07-05 | End: 2022-07-08

## 2022-07-05 RX ORDER — BUDESONIDE AND FORMOTEROL FUMARATE DIHYDRATE 160; 4.5 UG/1; UG/1
2 AEROSOL RESPIRATORY (INHALATION) 2 TIMES DAILY
Qty: 10.2 G | Refills: 5 | Status: SHIPPED | OUTPATIENT
Start: 2022-07-05

## 2022-07-05 ASSESSMENT — ENCOUNTER SYMPTOMS
COUGH: 1
GASTROINTESTINAL NEGATIVE: 1
WHEEZING: 1
EYES NEGATIVE: 1

## 2022-07-05 NOTE — PROGRESS NOTES
Subjective:      Patient ID: Sherryle Hymen is a 58 y.o. male. The patient was recently diagnosed to have COVID-19 infection. He did home diagnostic test.  He complains of dizziness and aches and pains. He denied any diarrhea or loss of taste. He denied any significant shortness of breath. He is known to have multiple medical problems which are documented      Review of Systems   Constitutional: Positive for fatigue. HENT: Negative. Eyes: Negative. Respiratory: Positive for cough and wheezing. Cardiovascular: Negative. Gastrointestinal: Negative. Endocrine: Negative. Genitourinary: Negative. Musculoskeletal: Negative. Neurological: Negative. Hematological: Negative. Psychiatric/Behavioral: Negative. Objective:   Physical Exam= virtual visit and his color was normal and he was not in acute distress    Assessment / Plan:      Diagnosis Orders   1. Longstanding persistent atrial fibrillation ([facility]     2. COVID-19 virus infection = he could have a superadded bacterial infection, he was given azithromycin and Symbicort    3. Chronic diastolic heart failure with preserved EF     4. Coronary artery disease with angina pectoris, unspecified vessel or lesion type, unspecified whether native or transplanted heart ([facility]     5. Essential hypertension = he was advised that if he is dizzy he can hold his blood pressure meds for 1 or 2 days. He should check his blood pressure twice daily at least        Return if symptoms worsen or fail to improve, for Follow Up. No orders of the defined types were placed in this encounter.     Orders Placed This Encounter   Medications    azithromycin (ZITHROMAX) 500 MG tablet     Sig: Take 1 tablet by mouth daily for 3 days     Dispense:  3 tablet     Refill:  0    budesonide-formoterol (SYMBICORT) 160-4.5 MCG/ACT AERO     Sig: Inhale 2 puffs into the lungs 2 times daily     Dispense:  10.2 g     Refill:  5       Was advised that if his clinical condition gets worse he will go to nearest emergency center  Visit Information    Have you changed or started any medications since your last visit including any over-the-counter medicines, vitamins, or herbal medicines? no   Are you having any side effects from any of your medications? -  no  Have you stopped taking any of your medications? Is so, why? -  no    Have you seen any other physician or provider since your last visit? No  Have you had any other diagnostic tests since your last visit? No  Have you been seen in the emergency room and/or had an admission to a hospital since we last saw you? No  Have you had your routine dental cleaning in the past 6 months? no    Have you activated your [employer] account? If not, what are your barriers?  Yes     Patient Care Team:  Candice Santo MD as PCP - Felicita Desir MD as PCP - [facility] Provider  Hanna Ross MD as Consulting Physician (Infectious Diseases)    Medical History Review  Past Medical, Family, and Social History reviewed and does contribute to the patient presenting condition    Health Maintenance   Topic Date Due    Annual Wellness Visit (AWV)  Never done    HIV screen  Never done    DTaP/Tdap/Td vaccine (1 - Tdap) Never done    Shingles vaccine (1 of 2) 10/23/2014    Pneumococcal 0-64 years Vaccine (2 - PCV) 01/04/2015    Diabetic retinal exam  01/04/2017    Lipids  05/13/2022    Flu vaccine (1) 09/01/2022    Diabetic foot exam  01/07/2023    Prostate Specific Antigen (PSA) Screening or Monitoring  01/19/2023    Low dose CT lung screening  04/19/2023    A1C test (Diabetic or Prediabetic)  05/04/2023    Depression Screen  05/05/2023    Colorectal Cancer Screen  01/23/2027    COVID-19 Vaccine  Completed    Hepatitis C screen  Completed    Hepatitis A vaccine  Aged Out    Hib vaccine  Aged Out    Meningococcal (ACWY) vaccine  Aged Out

## 2022-07-05 NOTE — TELEPHONE ENCOUNTER
[facility] [employer] Subjective: Caller states \"Yesterday I started getting really sick. Stuffy head, cough, sore throat. The dizziness has been going on for a couple, three days. My BP this morning was 109/70. I started getting this dizziness, the base of my neck really starts hurting. My jaws get a little sore when I'm chewing. \"     Current Symptoms: dizziness with standing and sitting at times, cough, sore throat, head congestion, NO difficulty breathing, jaw soreness with chewing-both sides, neck pain, chills    Patient on blood thinners    Hx of A-fib    Hx of tears in rotator cuff and shoulder pain-NOT new    Onset: 3 days ago; worsening    Associated Symptoms: reduced activity    Pain Severity: 2/10; neck aching; constant    Temperature: Chills-Denies fever    What has been tried: Hanging head to relieve neck pain- helps     LMP: NA Pregnant: NA    Recommended disposition: Go to ED/UCC Now (Or to Office with PCP Approval). Patient agreeable. Care advice provided, patient verbalizes understanding; denies any other questions or concerns; instructed to call back for any new or worsening symptoms. Writer provided warm transfer to [place] at [facility] for 2nd Level Triage. Attention Provider: Thank you for allowing me to participate in the care of your patient. The patient was connected to triage in response to information provided to the ECC/PSC. Please do not respond through this encounter as the response is not directed to a shared pool.     Reason for Disposition   Patient sounds very sick or weak to the triager    Protocols used: [id number]

## 2022-07-13 ENCOUNTER — TELEPHONE (OUTPATIENT)
Dept: INTERNAL MEDICINE CLINIC | Age: 62
End: 2022-07-13

## 2022-07-13 RX ORDER — AMOXICILLIN AND CLAVULANATE POTASSIUM 875; 125 MG/1; MG/1
1 TABLET, FILM COATED ORAL 2 TIMES DAILY
Qty: 10 TABLET | Refills: 0 | Status: SHIPPED | OUTPATIENT
Start: 2022-07-13 | End: 2022-07-18

## 2022-07-15 RX ORDER — ASPIRIN 81 MG/1
81 TABLET ORAL DAILY
Qty: 30 TABLET | Refills: 5 | Status: SHIPPED | OUTPATIENT
Start: 2022-07-15

## 2022-07-22 ENCOUNTER — TELEPHONE (OUTPATIENT)
Dept: INTERNAL MEDICINE CLINIC | Age: 62
End: 2022-07-22

## 2022-07-22 NOTE — TELEPHONE ENCOUNTER
[address] Calling to request an updated Medication list for patient. Faxed to [phone] as requested.

## 2022-08-01 RX ORDER — INSULIN GLARGINE 300 U/ML
110 INJECTION, SOLUTION SUBCUTANEOUS DAILY
Qty: 5 PEN | Refills: 5 | Status: SHIPPED | OUTPATIENT
Start: 2022-08-01

## 2022-08-05 DIAGNOSIS — Z97.10 EMPLOYS PROSTHETIC LEG: ICD-10-CM

## 2022-08-05 DIAGNOSIS — R19.5 LOOSE STOOLS: ICD-10-CM

## 2022-08-05 DIAGNOSIS — E08.41 DIABETIC MONONEUROPATHY ASSOCIATED WITH DIABETES MELLITUS DUE TO UNDERLYING CONDITION (HCC): ICD-10-CM

## 2022-08-05 DIAGNOSIS — M25.562 CHRONIC PAIN OF LEFT KNEE: ICD-10-CM

## 2022-08-05 DIAGNOSIS — Z79.4 TYPE 2 DIABETES MELLITUS WITH DIABETIC POLYNEUROPATHY, WITH LONG-TERM CURRENT USE OF INSULIN (HCC): ICD-10-CM

## 2022-08-05 DIAGNOSIS — G89.29 CHRONIC PAIN OF LEFT KNEE: ICD-10-CM

## 2022-08-05 DIAGNOSIS — R14.0 BLOATING: ICD-10-CM

## 2022-08-05 DIAGNOSIS — E11.42 TYPE 2 DIABETES MELLITUS WITH DIABETIC POLYNEUROPATHY, WITH LONG-TERM CURRENT USE OF INSULIN (HCC): ICD-10-CM

## 2022-08-05 RX ORDER — CLOTRIMAZOLE AND BETAMETHASONE DIPROPIONATE 10; .64 MG/G; MG/G
CREAM TOPICAL
Qty: 45 G | Refills: 3 | Status: SHIPPED | OUTPATIENT
Start: 2022-08-05

## 2022-08-05 RX ORDER — GREEN TEA/HOODIA GORDONII 315-12.5MG
CAPSULE ORAL
Qty: 60 TABLET | Refills: 0 | Status: SHIPPED | OUTPATIENT
Start: 2022-08-05

## 2022-08-05 RX ORDER — PREGABALIN 100 MG/1
CAPSULE ORAL
Qty: 90 CAPSULE | Refills: 2 | Status: SHIPPED | OUTPATIENT
Start: 2022-08-05 | End: 2022-10-10 | Stop reason: SDUPTHER

## 2022-08-05 NOTE — TELEPHONE ENCOUNTER
Patient called and stated he will be completely out as of tomorrow 08/06. He is very nervous that he will not get it filled in time.

## 2022-08-10 ENCOUNTER — HOSPITAL ENCOUNTER (OUTPATIENT)
Age: 62
Setting detail: SPECIMEN
Discharge: HOME | End: 2022-08-10

## 2022-08-10 DIAGNOSIS — E03.9 ACQUIRED HYPOTHYROIDISM: ICD-10-CM

## 2022-08-10 DIAGNOSIS — I10 ESSENTIAL HYPERTENSION: ICD-10-CM

## 2022-08-11 LAB
CHOLESTEROL/HDL RATIO: 2.8
CHOLESTEROL: 110 MG/DL
HDLC SERPL-MCNC: 40 MG/DL
LDL CHOLESTEROL: 48 MG/DL (ref 0–130)
TRIGL SERPL-MCNC: 112 MG/DL
TSH SERPL DL<=0.05 MIU/L-ACNC: 0.41 UIU/ML (ref 0.3–5)

## 2022-08-24 ENCOUNTER — OFFICE VISIT (OUTPATIENT)
Dept: INTERNAL MEDICINE CLINIC | Age: 62
End: 2022-08-24
Payer: MEDICARE

## 2022-08-24 VITALS
OXYGEN SATURATION: 96 % | HEIGHT: 76 IN | BODY MASS INDEX: 31.66 KG/M2 | HEART RATE: 95 BPM | DIASTOLIC BLOOD PRESSURE: 66 MMHG | WEIGHT: 260 LBS | SYSTOLIC BLOOD PRESSURE: 118 MMHG

## 2022-08-24 DIAGNOSIS — N18.30 STAGE 3 CHRONIC KIDNEY DISEASE, UNSPECIFIED WHETHER STAGE 3A OR 3B CKD (HCC): ICD-10-CM

## 2022-08-24 DIAGNOSIS — Z89.512 STATUS POST BELOW-KNEE AMPUTATION OF LEFT LOWER EXTREMITY (HCC): ICD-10-CM

## 2022-08-24 DIAGNOSIS — E11.42 TYPE 2 DIABETES MELLITUS WITH DIABETIC POLYNEUROPATHY, WITH LONG-TERM CURRENT USE OF INSULIN (HCC): Primary | ICD-10-CM

## 2022-08-24 DIAGNOSIS — R19.7 DIARRHEA, UNSPECIFIED TYPE: ICD-10-CM

## 2022-08-24 DIAGNOSIS — I42.2 HYPERTROPHIC CARDIOMYOPATHY (HCC): ICD-10-CM

## 2022-08-24 DIAGNOSIS — C64.1 MALIGNANT NEOPLASM OF RIGHT KIDNEY (HCC): ICD-10-CM

## 2022-08-24 DIAGNOSIS — I20.8 ANGINAL EQUIVALENT (HCC): ICD-10-CM

## 2022-08-24 DIAGNOSIS — I73.9 PVD (PERIPHERAL VASCULAR DISEASE) (HCC): ICD-10-CM

## 2022-08-24 DIAGNOSIS — Z79.4 TYPE 2 DIABETES MELLITUS WITH DIABETIC POLYNEUROPATHY, WITH LONG-TERM CURRENT USE OF INSULIN (HCC): Primary | ICD-10-CM

## 2022-08-24 DIAGNOSIS — E08.41 DIABETIC MONONEUROPATHY ASSOCIATED WITH DIABETES MELLITUS DUE TO UNDERLYING CONDITION (HCC): ICD-10-CM

## 2022-08-24 LAB — HBA1C MFR BLD: 6.9 %

## 2022-08-24 RX ORDER — METRONIDAZOLE 500 MG/1
500 TABLET ORAL 3 TIMES DAILY
Qty: 30 TABLET | Refills: 0 | Status: SHIPPED | OUTPATIENT
Start: 2022-08-24 | End: 2022-09-03

## 2022-08-24 RX ORDER — L. ACIDOPHILUS/PECTIN, CITRUS 25MM-100MG
1 TABLET ORAL
COMMUNITY

## 2022-08-24 RX ORDER — BLACK COHOSH ROOT EXTRACT 80 MG
CAPSULE ORAL
COMMUNITY
Start: 2022-08-05

## 2022-08-24 ASSESSMENT — ENCOUNTER SYMPTOMS
TROUBLE SWALLOWING: 0
WHEEZING: 0
BLOOD IN STOOL: 0
SHORTNESS OF BREATH: 0
COLOR CHANGE: 0
EYE PAIN: 0
COUGH: 0
ABDOMINAL DISTENTION: 0
EYE DISCHARGE: 0
DIARRHEA: 1

## 2022-08-24 NOTE — PROGRESS NOTES
Visit Information    Have you changed or started any medications since your last visit including any over-the-counter medicines, vitamins, or herbal medicines? no   Are you having any side effects from any of your medications? -  no  Have you stopped taking any of your medications? Is so, why? -  no    Have you seen any other physician or provider since your last visit? No  Have you had any other diagnostic tests since your last visit? Yes, records obtains  Have you been seen in the emergency room and/or had an admission to a hospital since we last saw you? No  Have you had your routine dental cleaning in the past 6 months? no    Have you activated your [employer] account? If not, what are your barriers?  Yes     Patient Care Team:  Courtney Cordon MD as PCP - Saray Muhammad MD as PCP - [facility] [place] [facility]  Ravinder Montelongo MD as Consulting Physician (Infectious Diseases)    Medical History Review  Past Medical, Family, and Social History reviewed and does contribute to the patient presenting condition    Health Maintenance   Topic Date Due    Annual Wellness Visit (AWV)  Never done    HIV screen  Never done    DTaP/Tdap/Td vaccine (1 - Tdap) Never done    Shingles vaccine (1 of 2) 10/23/2014    Pneumococcal 0-64 years Vaccine (2 - PCV) 01/04/2015    Diabetic retinal exam  03/25/2022    Flu vaccine (1) 09/01/2022    Diabetic foot exam  01/07/2023    Low dose CT lung screening  04/19/2023    A1C test (Diabetic or Prediabetic)  05/04/2023    Depression Screen  05/05/2023    Lipids  08/10/2023    Colorectal Cancer Screen  01/23/2027    COVID-19 Vaccine  Completed    Hepatitis C screen  Completed    Hepatitis A vaccine  Aged Out    Hib vaccine  Aged Out    Meningococcal (ACWY) vaccine  Aged Out

## 2022-08-24 NOTE — PROGRESS NOTES
[address]  [place] [address]  Dept: [phone]  Dept Fax: [phone]    Emily Vallejo is a 58 y.o. male who presents today for his medical conditions/complaintsas noted below. Emily Vallejo is c/o of   Chief Complaint   Patient presents with    Diabetes     A1C- 5/4/22= 7.8    Diarrhea     Diarrhea and gas          HPI:     Diabetes   Duration more than 7 years  Modifying factors on Glucophage and other med  Severity uncontrolled sever  Associated signs and symtoms neuropathy/ckd/ CAD. aggravated with sugar diet and better with low sugar diet       HTN  Onset more than 2 years ago  gustavo mild to mod  Controlled with current po meds  Not associated with headaches or blurry vision  No chest pain    Diarrhea  Four to five times a day  No fever   No blood ins stool  For few months        Diarrhea  Had bowel movement has 4-5 per day    Diabetes Mellitus Type II, Follow-up: Patient here for follow-up of Type 2 diabetes mellitus. Current symptoms/problems include he lost 10 pounds since last visit. No poluyuria and polydypsia. Known diabetic complications: peripheral neuropathy and taking gabapentin  Cardiovascular risk factors: advanced age (older than 54 for men, 72 for women), diabetes mellitus, hypertension, male gender, and obesity (BMI >= 30 kg/m2)  Current diabetic medications include insulin injections: Lantus :   .     Eye exam current (within one year): yes  Weight trend: increasing steadily  Prior visit with dietician:   Current diet:   Current exercise:     Current monitoring regimen:  Home blood sugar records:   Any episodes of hypoglycemia? Is He on ACE inhibitor or angiotensin II receptor blocker?    Yes   lisinopril (Prinivil)  losartan (Cozaar)    HTN  Onset more than 2 years ago  gustavo mild to mod  Controlled with current po meds  Not associated with headaches or blurry vision  No chest pain              Hemoglobin A1C (%)   Date Value   08/24/2022 6.9   05/04/2022 7.8 (H)   12/14/2021 7.8             ( goal A1Cis < 7)   Microalb/Crt.  Ratio (mcg/mg creat)   Date Value   12/14/2021 35 (H)     LDL Cholesterol (mg/dL)   Date Value   08/10/2022 48   05/13/2021 55   01/13/2020 70       (goal LDL is <100)   AST (U/L)   Date Value   12/28/2021 26     ALT (U/L)   Date Value   12/28/2021 29     BUN (mg/dL)   Date Value   01/19/2022 22     BP Readings from Last 3 Encounters:   08/24/22 118/66   06/16/22 120/78   05/17/22 110/72          (goal 120/80)    Past Medical History:   Diagnosis Date    A-fib [facility])     Asymptomatic bilateral carotid artery stenosis     Boil, thigh 10/2019    10/30/19: right inner thigh region, says PCP Rxd Doxy for this, area is much better, has a couple days left to complete Doxy    CAD (coronary artery disease)     saw Dr. John Das ([facility])     Charcot foot due to diabetes mellitus ([address] 2014    LEFT    COPD (chronic obstructive pulmonary disease) ([address] 2009    INHALER USE DAILY    Diabetes mellitus ([address] 1989    IDDM, On Insulin Dr. Edward Saleh    Diabetic neuropathy ([address]     Difficult intravenous access     VEINS ROLL    Employs prosthetic leg     s/p left BKA    Foot ulcer ([address] PRIOR TO 2015    BILAT    Full dentures     UPPER ONLY    Hyperlipidemia 2004    ON RX    Hypertension 2004    ON RX, PCP Dr. Edward Saleh, seen Oct. 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 Macarena Mccord seen within last 1 1/2 mos      Past Surgical History:   Procedure Laterality Date    CARDIAC CATHETERIZATION      no stenting    CARDIOVASCULAR STRESS TEST  06/28/2019    small fixed apical, likely normal, EF 48%    COLONOSCOPY  06/17/2016    poor prep, done up to the IC valve, redundant colon    COLONOSCOPY  01/23/2017    VIRTUAL COLONOSCOPY DONE-no polyps or masses    CYSTOSCOPY Bilateral 6/16/2020    CYSTOSCOPY RETROGRADE PYELOGRAM URETEROSCOPY performed by Johnathan Kam MD at [address] Right 7/30/2020    CYSTOSCOPY, RIGHT RETROGRADE PYELOGRAM,  URETERAL CATHETER  INSERTION performed by Johnathan Kam MD at [place] [address] 9/1/2020    CYSTOSCOPY RETROGRADE PYELOGRAM, RIGHT URETERAL STENT EXCHANGE performed by Johnathan Kam MD at [address] [facility] Right     r-bone removed    HC  PICC [facility]  5/21/2018         KIDNEY CYST REMOVAL      KIDNEY SURGERY Right 11/13/2019    XI ROBOTIC PARTIAL NEPHRECTOMY, INTRAOP ULTRASOUND, RIGHT URETEROURETEROSTOMY, RIGHT URETERAL STENT PLACEMENT **SHORT STAY** performed by Johnathan Kam MD at [address] [place] [address],[facility] Floor 7/30/2020    XI LAPAROSCOPIC ROBOTIC URETEROLYSIS, ROBOTIC ASSISTED BUCCAL URETEROPLASTY  (DR. COBIAN TO ASSIST) performed by Johnathan Kam MD at [place] [address] Left 4/29/15    OTHER SURGICAL HISTORY Left 5-5-15 and 11/2015    Revision BKA    OTHER SURGICAL HISTORY      left stump revision 5/30/2018    [place]Y RE-AMPUTATION LOWER LEG Left 5/30/2018    LEG AMPUTATION BELOW KNEE REVISION performed by Mahsa Peterson MD at [address] Bilateral 80'S    TOE AMPUTATION      Right 2 and 4    VITRECTOMY Left 9/25/2019    PARS PLANA VITRECTOMY 25 GAUGE, MEMBRANE PEELING, ENDOLASER 200  MW 1044 SPOTS, INDIRECT LASER 236 SPOTS performed by Radha Schmidt MD at [place] History   Problem Relation Age of Onset    Diabetes Mother     Hypertension Mother     Stomach Cancer Mother     Hypertension Father     Alcohol Abuse Father     COPD Father     Kidney Cancer Father     Diabetes Brother         IDDM-PUMP    Other Sister         BRAIN TUMOR       Social History     Tobacco Use    Smoking status: Former     Packs/day: 2.00     Years: 25.00     Pack years: 50.00     Types: Cigars, Cigarettes     Start date: 1     Quit date: 2011     Years since quittin.2    Smokeless tobacco: Never    Tobacco comments:     quit in    Substance Use Topics    Alcohol use: Yes     Alcohol/week: 0.0 standard drinks     Comment: BEER 2 A MONTH      Current Outpatient Medications   Medication Sig Dispense Refill    Lactobacillus (ACIDOPHILUS PROBIOTIC) 100 MG CAPS TAKE 1 CAPSULE BY MOUTH TWICE A DAY      Lactobacillus Acid-Pectin (ACIDOPHILUS/CITRUS PECTIN) TABS Take 1 tablet by mouth daily (with breakfast)      metroNIDAZOLE (FLAGYL) 500 MG tablet Take 1 tablet by mouth 3 times daily for 10 days 30 tablet 0    clotrimazole-betamethasone (LOTRISONE) 1-0.05 % cream APPLY TO AFFECTED AREA TWICE A DAY 45 g 3    pregabalin (LYRICA) 100 MG capsule TAKE 1 CAPSULE BY MOUTH THREE TIMES A DAY 90 capsule 2    Probiotic Acidophilus (FLORANEX) TABS TAKE 1 TABLET BY MOUTH TWICE A DAY 60 tablet 0    Insulin Glargine, 2 Unit Dial, (TOUJEO MAX SOLOSTAR) 300 UNIT/ML SOPN Inject 110 Units into the skin daily 5 pen 5    aspirin (ASPIRIN LOW DOSE) 81 MG EC tablet Take 1 tablet by mouth in the morning.  30 tablet 5    budesonide-formoterol (SYMBICORT) 160-4.5 MCG/ACT AERO Inhale 2 puffs into the lungs 2 times daily 10.2 g 5    finasteride (PROSCAR) 5 MG tablet TAKE 1 TABLET BY MOUTH EVERY DAY 30 tablet 5    OZEMPIC, 1 MG/DOSE, 4 MG/3ML SOPN INJECT 1 MG INTO THE SKIN ONCE A WEEK      levothyroxine (SYNTHROID) 175 MCG tablet TAKE 1 TABLET BY MOUTH EVERY DAY 30 tablet 3    amLODIPine (NORVASC) 2.5 MG tablet TAKE 1 TABLET BY MOUTH EVERY DAY 30 tablet 3    metoprolol tartrate (LOPRESSOR) 50 MG tablet TAKE 1 TABLET BY MOUTH TWICE A DAY 60 tablet 3    losartan (COZAAR) 50 MG tablet TAKE 1 TABLET BY MOUTH EVERY DAY 30 tablet 5    rOPINIRole (REQUIP) 2 MG tablet TAKE 1 TABLET BY MOUTH EVERY DAY 30 tablet 11    ELIQUIS 5 MG TABS tablet TAKE 1 TABLET BY MOUTH TWICE A DAY 60 tablet 8    gemfibrozil (LOPID) 600 MG tablet TAKE 1 TABLET BY MOUTH EVERY DAY 30 tablet 11    Insulin Pen Needle (COMFORT TOUCH INSULIN PEN NEED) 33G X 6 MM MISC 110 Units by Does not apply route daily 5 each 1    atorvastatin (LIPITOR) 40 MG tablet TAKE 1 TABLET BY MOUTH EVERY DAY 30 tablet 11    insulin lispro (HUMALOG) 100 UNIT/ML injection vial SLIDING SCALE (TAKE 5 UNITS IF SUGAR IS OVER 150) 1 each 3    isosorbide mononitrate (IMDUR) 30 MG extended release tablet TAKE 1 TABLET BY MOUTH EVERY DAY 90 tablet 3    budesonide-formoterol (SYMBICORT) 160-4.5 MCG/ACT AERO TAKE 2 PUFFS BY MOUTH TWICE A DAY 3 each 3    B-D UF III MINI PEN NEEDLES 31G X 5 MM MISC USE AS DIRECTED ONCE DAILY TO INJECT TOUJEO 100 each 3    tamsulosin (FLOMAX) 0.4 MG capsule TAKE 1 CAPSULE BY MOUTH EVERY DAY 90 capsule 3    Continuous Blood Gluc  (DEXCOM G6 ) DOUGIE USE AS DIRECTED TO MONITOR BLOOD SUGAR      blood glucose monitor strips T/S Contour.  Test 4 times a day 100 strip 3    albuterol sulfate  (90 Base) MCG/ACT inhaler INHALE 2 PUFFS INTO THE LUNGS EVERY 6 HOURS AS NEEDED FOR WHEEZING OR SHORTNESS OF BREATH 6.7 Inhaler 0    glucagon, rDNA, 1 MG injection Inject 1 mL into the muscle once as needed for Low blood sugar       Blood Pressure KIT 1 actuation by Does not apply route once a week 1 kit 0    Handicap Placard MISC by Does not apply route Duration - 5years  Reason- prosthetic leg 1 each 0    Omega-3 Fatty Acids (FISH OIL CONCENTRATE) 300 MG CAPS Take 300 mg by mouth nightly       Glucosamine Sulfate 500 MG TABS Take 500 mg by mouth 3 times daily      amiodarone (CORDARONE) 200 MG tablet Take 1 tablet by mouth 2 times daily 60 tablet 0     No current facility-administered medications for this visit. Allergies   Allergen Reactions    Ramipril      Other reaction(s): swelling of the lips   Other reaction(s): swelling of the lips     Adhesive Tape      tegaderm causes blisters. Use paper tape       Health Maintenance   Topic Date Due    Annual Wellness Visit (AWV)  Never done    HIV screen  Never done    DTaP/Tdap/Td vaccine (1 - Tdap) Never done    Shingles vaccine (1 of 2) 10/23/2014    Pneumococcal 0-64 years Vaccine (2 - PCV) 01/04/2015    Diabetic retinal exam  03/25/2022    Flu vaccine (1) 09/01/2022    Diabetic foot exam  01/07/2023    Low dose CT lung screening  04/19/2023    Depression Screen  05/05/2023    Lipids  08/10/2023    A1C test (Diabetic or Prediabetic)  08/24/2023    Colorectal Cancer Screen  01/23/2027    COVID-19 Vaccine  Completed    Hepatitis C screen  Completed    Hepatitis A vaccine  Aged Out    Hib vaccine  Aged Out    Meningococcal (ACWY) vaccine  Aged Out       Subjective:     Review of Systems   Constitutional:  Negative for appetite change, diaphoresis and fatigue. HENT:  Negative for ear discharge and trouble swallowing. Eyes:  Negative for pain and discharge. Respiratory:  Negative for cough, shortness of breath and wheezing. Cardiovascular:  Negative for chest pain and palpitations. Gastrointestinal:  Positive for diarrhea. Negative for abdominal distention and blood in stool. Endocrine: Negative for polydipsia and polyphagia. Genitourinary:  Negative for difficulty urinating and frequency. Musculoskeletal:  Negative for gait problem, myalgias and neck pain. Skin:  Negative for color change and rash. Allergic/Immunologic: Negative for environmental allergies and food allergies.    Neurological:  Negative for dizziness and headaches. Hematological:  Negative for adenopathy. Does not bruise/bleed easily. Psychiatric/Behavioral:  Negative for behavioral problems and sleep disturbance. Objective:     Physical Exam  Constitutional:       Appearance: He is well-developed. He is not diaphoretic. HENT:      Head: Normocephalic and atraumatic. Eyes:      General:         Right eye: No discharge. Left eye: No discharge. Extraocular Movements:      Right eye: Normal extraocular motion. Left eye: Normal extraocular motion. Conjunctiva/sclera: Conjunctivae normal.      Right eye: Right conjunctiva is not injected. Left eye: Left conjunctiva is not injected. Neck:      Thyroid: No thyroid mass or thyromegaly. Vascular: No JVD. Cardiovascular:      Rate and Rhythm: Normal rate and regular rhythm. Heart sounds: No murmur heard. No friction rub. Pulmonary:      Effort: Pulmonary effort is normal. No tachypnea, bradypnea, accessory muscle usage or respiratory distress. Breath sounds: Normal breath sounds. No wheezing or rales. Abdominal:      General: Bowel sounds are normal. There is no distension. Palpations: Abdomen is soft. Tenderness: There is no abdominal tenderness. There is no rebound. Musculoskeletal:         General: No tenderness. Normal range of motion. Cervical back: Normal range of motion and neck supple. No edema or erythema. Comments: Post left bka  And cellultis improving     Lymphadenopathy:      Head:      Right side of head: No submental or submandibular adenopathy. Left side of head: No submental or submandibular adenopathy. Cervical: No cervical adenopathy. Skin:     General: Skin is warm. Coloration: Skin is not pale. Findings: No bruising, ecchymosis or rash. Neurological:      Mental Status: He is alert and oriented to person, place, and time. Cranial Nerves: No cranial nerve deficit.       Sensory: No sensory deficit. Motor: No atrophy or abnormal muscle tone. Coordination: Coordination normal.   Psychiatric:         Mood and Affect: Mood is not anxious. Affect is not angry. Speech: Speech is not slurred. Behavior: Behavior normal. Behavior is not aggressive. Thought Content: Thought content does not include homicidal ideation. Cognition and Memory: Memory is not impaired. /66   Pulse 95   Ht 6' 4\" (1.93 m)   Wt 260 lb (117.9 kg)   SpO2 96%   BMI 31.65 kg/m²     Assessment:       Diagnosis Orders   1. Type 2 diabetes mellitus with diabetic polyneuropathy, with long-term current use of insulin   POCT glycosylated hemoglobin (Hb A1C)      2. Status post below-knee amputation of left lower extremity ([facility]        3. Malignant neoplasm of right kidney (HCC)        4. Stage 3 chronic kidney disease, unspecified whether stage 3a or 3b CKD ([facility]        5. Diabetic mononeuropathy associated with diabetes mellitus due to underlying condition ([facility]  POCT glycosylated hemoglobin (Hb A1C)      6. Anginal equivalent ([facility]        7. Hypertrophic cardiomyopathy ([facility]        8. PVD (peripheral vascular disease) ([facility]        9. Diarrhea, unspecified type  Clostridium Difficile Toxin/Antigen    Gastrointestinal Panel, Molecular                Plan:      Return in about 1 month (around 9/24/2022).     Orders Placed This Encounter   Procedures    Clostridium Difficile Toxin/Antigen     Standing Status:   Future     Standing Expiration Date:   8/24/2023    Gastrointestinal Panel, Molecular     Standing Status:   Future     Standing Expiration Date:   8/24/2023    POCT glycosylated hemoglobin (Hb A1C)     Orders Placed This Encounter   Medications    metroNIDAZOLE (FLAGYL) 500 MG tablet     Sig: Take 1 tablet by mouth 3 times daily for 10 days     Dispense:  30 tablet     Refill:  0    Diarrhea  Four to five times a day  No fever   No blood ins stool  For few months    On ozempic wt loss 9  A1c 6.9       Patient given educational materials - see patient instructions. Discussed use, benefit, and side effects of prescribed medications. All patientquestions answered. Pt voiced understanding. Reviewed health maintenance. Instructedto continue current medications, diet and exercise. Patient agreed with treatmentplan. Follow up as directed. Please note that this chart was generated using voice recognition Dragon dictation software. Although every effort was made to ensure the accuracy of this automated transcription, some errors in transcription may have occurred.      Electronically signed by Jazmín Garcia MD on 8/24/2022 at 1:56 PM

## 2022-08-26 ENCOUNTER — OFFICE VISIT (OUTPATIENT)
Dept: PODIATRY | Age: 62
End: 2022-08-26
Payer: COMMERCIAL

## 2022-08-26 ENCOUNTER — HOSPITAL ENCOUNTER (OUTPATIENT)
Age: 62
Setting detail: SPECIMEN
Discharge: HOME | End: 2022-08-26

## 2022-08-26 VITALS — WEIGHT: 240 LBS | HEIGHT: 76 IN | BODY MASS INDEX: 29.22 KG/M2

## 2022-08-26 DIAGNOSIS — E11.51 TYPE 2 DIABETES MELLITUS WITH PERIPHERAL VASCULAR DISEASE (HCC): ICD-10-CM

## 2022-08-26 DIAGNOSIS — M79.674 PAIN OF TOE OF RIGHT FOOT: ICD-10-CM

## 2022-08-26 DIAGNOSIS — Z79.4 TYPE 2 DIABETES MELLITUS WITH DIABETIC POLYNEUROPATHY, WITH LONG-TERM CURRENT USE OF INSULIN (HCC): ICD-10-CM

## 2022-08-26 DIAGNOSIS — B35.1 ONYCHOMYCOSIS OF TOENAIL: Primary | ICD-10-CM

## 2022-08-26 DIAGNOSIS — R19.7 DIARRHEA, UNSPECIFIED TYPE: ICD-10-CM

## 2022-08-26 DIAGNOSIS — E11.42 TYPE 2 DIABETES MELLITUS WITH DIABETIC POLYNEUROPATHY, WITH LONG-TERM CURRENT USE OF INSULIN (HCC): ICD-10-CM

## 2022-08-26 PROCEDURE — 99999 PR OFFICE/OUTPT VISIT,PROCEDURE ONLY: CPT | Performed by: PODIATRIST

## 2022-08-26 PROCEDURE — 11720 DEBRIDE NAIL 1-5: CPT | Performed by: PODIATRIST

## 2022-08-26 ASSESSMENT — ENCOUNTER SYMPTOMS
SHORTNESS OF BREATH: 0
BACK PAIN: 0
NAUSEA: 0
COLOR CHANGE: 0
DIARRHEA: 0

## 2022-08-26 NOTE — PROGRESS NOTES
SUBJECTIVE: Zoe Ibarra is a 58 y.o. male who returns to the office with chief complaint of painful fungal toenails. Patient relates toe nails are thickened/difficult to trim as well as painful with ambulation and with shoe gear. Chief Complaint   Patient presents with    Nail Problem     Right foot nail trim/ last seen Dr. Victoria Schaumann 8/24/2022    Callouses     right    Diabetes     Last BS: 156     Review of Systems   Constitutional:  Negative for activity change, appetite change, chills, diaphoresis, fatigue and fever. Respiratory:  Negative for shortness of breath. Cardiovascular:  Negative for leg swelling. Gastrointestinal:  Negative for diarrhea and nausea. Endocrine: Negative for cold intolerance, heat intolerance and polyuria. Musculoskeletal:  Positive for arthralgias and gait problem. Negative for back pain, joint swelling and myalgias. Skin:  Negative for color change, pallor, rash and wound. Allergic/Immunologic: Negative for environmental allergies and food allergies. Neurological:  Positive for numbness. Negative for dizziness, weakness and light-headedness. Hematological:  Does not bruise/bleed easily. Psychiatric/Behavioral:  Negative for behavioral problems, confusion and self-injury. The patient is not nervous/anxious. OBJECTIVE: Clinical evaluation of patient reveals nails 1,4,5 of the right foot present with thickness, elongation, discoloration, brittleness, and subungual debris. There was pain with palpation and debridement of the toenails of the right foot No open lesions noted to the right foot today. The left leg and toes 2 and 3 of the right foot have been amputated. The right DP pulse is not palpable. The right PT pulse is not palpable. Protective sensation is absent to the right plantar foot as noted with a 5.07 Alton-Gaudencio monofilament. Gluose: 156 mg/dl.     Class A Findings (1 needed)   [x] Non-traumatic amputation of foot or integral skeleton portion thereof. [x] Q7.      Class B Findings (2 needed)   1. [] Absent posterior tibial pulse   2. [] Absent dorsalis pedis pulse   3. [] Advanced trophic changes; three of the following are required:   ·         [] hair growth (decrease or absence)   ·         [] nail changes (thickening)   ·         [] pigmentary changes (discoloration)   ·         [] skin texture (thin, shiny)   ·         [] skin color (rubor or redness)   [] Q8.      Class C Findings (1 Class B, 2 Class C needed)   1. [] Claudication   2. [] Temperature changes   3. [] Edema   4. [] Paresthesia   5. [] Burning   [] Q9.     NO CLASS FINDINGS ARE NOTED    ASSESSMENT:    Diagnosis Orders   1. Onychomycosis of toenail  NC DEBRIDEMENT OF NAIL(S), 1-5    HM DIABETES FOOT EXAM      2. Pain of toe of right foot  NC DEBRIDEMENT OF NAIL(S), 1-5    HM DIABETES FOOT EXAM      3. Type 2 diabetes mellitus with peripheral vascular disease (HCC)  NC DEBRIDEMENT OF NAIL(S), 1-5    HM DIABETES FOOT EXAM      4. Type 2 diabetes mellitus with diabetic polyneuropathy, with long-term current use of insulin (HCC)  NC DEBRIDEMENT OF NAIL(S), 1-5    HM DIABETES FOOT EXAM        PLAN: Toenails 1,2,3,4,5 of the right foot and 1,2,3,4,5 of the left foot were debrided in length and thickness using a nail nipper and a . Return in about 3 months (around 11/26/2022) for At risk diabetic foot care.    8/26/2022      Steven Mina DPM

## 2022-08-27 LAB
C DIFF AG + TOXIN: NEGATIVE
CAMPYLOBACTER PCR: NORMAL
E COLI ENTEROTOXIGENIC PCR: NORMAL
PLESIOMONAS SHIGELLOIDES PCR: NORMAL
SALMONELLA PCR: NORMAL
SHIGATOXIN GENE PCR: NORMAL
SHIGELLA SP PCR: NORMAL
SPECIMEN DESCRIPTION: NORMAL
SPECIMEN DESCRIPTION: NORMAL
VIBRIO PCR: NORMAL
YERSINIA ENTEROCOLITICA PCR: NORMAL

## 2022-09-09 DIAGNOSIS — I10 ESSENTIAL HYPERTENSION: ICD-10-CM

## 2022-09-09 RX ORDER — LOSARTAN POTASSIUM 50 MG/1
TABLET ORAL
Qty: 90 TABLET | Refills: 1 | Status: SHIPPED | OUTPATIENT
Start: 2022-09-09

## 2022-09-14 ENCOUNTER — TELEPHONE (OUTPATIENT)
Dept: PULMONOLOGY | Age: 62
End: 2022-09-14

## 2022-09-14 DIAGNOSIS — G47.33 OSA (OBSTRUCTIVE SLEEP APNEA): Primary | ICD-10-CM

## 2022-09-14 NOTE — TELEPHONE ENCOUNTER
Pt is requesting a new sleep study order. The previous order is . Pt would like to get a home sleep study. Once order is placed the writer will contact the pt to inform him.

## 2022-09-15 RX ORDER — AMLODIPINE BESYLATE 2.5 MG/1
TABLET ORAL
Qty: 30 TABLET | Refills: 3 | OUTPATIENT
Start: 2022-09-15

## 2022-09-19 RX ORDER — LEVOTHYROXINE SODIUM 175 UG/1
TABLET ORAL
Qty: 30 TABLET | Refills: 3 | OUTPATIENT
Start: 2022-09-19

## 2022-09-19 RX ORDER — METOPROLOL TARTRATE 50 MG/1
TABLET, FILM COATED ORAL
Qty: 60 TABLET | Refills: 3 | OUTPATIENT
Start: 2022-09-19

## 2022-09-19 RX ORDER — AMLODIPINE BESYLATE 2.5 MG/1
TABLET ORAL
Qty: 30 TABLET | Refills: 3 | OUTPATIENT
Start: 2022-09-19

## 2022-09-20 RX ORDER — METOPROLOL TARTRATE 50 MG/1
TABLET, FILM COATED ORAL
Qty: 60 TABLET | Refills: 3 | Status: SHIPPED | OUTPATIENT
Start: 2022-09-20

## 2022-09-20 RX ORDER — AMLODIPINE BESYLATE 2.5 MG/1
TABLET ORAL
Qty: 30 TABLET | Refills: 3 | Status: SHIPPED | OUTPATIENT
Start: 2022-09-20

## 2022-09-20 RX ORDER — LEVOTHYROXINE SODIUM 175 UG/1
TABLET ORAL
Qty: 30 TABLET | Refills: 3 | Status: SHIPPED | OUTPATIENT
Start: 2022-09-20

## 2022-09-27 ENCOUNTER — HOSPITAL ENCOUNTER (OUTPATIENT)
Dept: CT IMAGING | Age: 62
Discharge: HOME | End: 2022-09-29
Payer: MEDICARE

## 2022-09-27 DIAGNOSIS — R91.1 LUNG NODULE: ICD-10-CM

## 2022-09-27 PROCEDURE — 71250 CT THORAX DX C-: CPT

## 2022-10-05 ENCOUNTER — TELEPHONE (OUTPATIENT)
Dept: INTERNAL MEDICINE CLINIC | Age: 62
End: 2022-10-05

## 2022-10-05 DIAGNOSIS — R19.7 DIARRHEA OF PRESUMED INFECTIOUS ORIGIN: Primary | ICD-10-CM

## 2022-10-05 RX ORDER — METRONIDAZOLE 500 MG/1
500 TABLET ORAL 3 TIMES DAILY
Qty: 30 TABLET | Refills: 0 | Status: SHIPPED | OUTPATIENT
Start: 2022-10-05 | End: 2022-10-10

## 2022-10-05 NOTE — TELEPHONE ENCOUNTER
Patient called stating that his diarrhea has gotten worse since last visit , has a follow up appointment on 10/10/2022   Is there anything that can be prescribed to help.

## 2022-10-06 ENCOUNTER — HOSPITAL ENCOUNTER (OUTPATIENT)
Age: 62
Setting detail: SPECIMEN
Discharge: HOME | End: 2022-10-06

## 2022-10-06 DIAGNOSIS — R19.7 DIARRHEA OF PRESUMED INFECTIOUS ORIGIN: ICD-10-CM

## 2022-10-10 ENCOUNTER — OFFICE VISIT (OUTPATIENT)
Dept: INTERNAL MEDICINE CLINIC | Age: 62
End: 2022-10-10
Payer: MEDICARE

## 2022-10-10 VITALS
SYSTOLIC BLOOD PRESSURE: 132 MMHG | HEART RATE: 80 BPM | DIASTOLIC BLOOD PRESSURE: 76 MMHG | HEIGHT: 76 IN | WEIGHT: 263 LBS | BODY MASS INDEX: 32.03 KG/M2 | OXYGEN SATURATION: 96 %

## 2022-10-10 DIAGNOSIS — I73.9 PVD (PERIPHERAL VASCULAR DISEASE) (HCC): ICD-10-CM

## 2022-10-10 DIAGNOSIS — Z89.512 STATUS POST BELOW-KNEE AMPUTATION OF LEFT LOWER EXTREMITY (HCC): ICD-10-CM

## 2022-10-10 DIAGNOSIS — I42.2 HYPERTROPHIC CARDIOMYOPATHY (HCC): ICD-10-CM

## 2022-10-10 DIAGNOSIS — G89.29 CHRONIC PAIN OF LEFT KNEE: ICD-10-CM

## 2022-10-10 DIAGNOSIS — E08.41 DIABETIC MONONEUROPATHY ASSOCIATED WITH DIABETES MELLITUS DUE TO UNDERLYING CONDITION (HCC): ICD-10-CM

## 2022-10-10 DIAGNOSIS — M25.562 CHRONIC PAIN OF LEFT KNEE: ICD-10-CM

## 2022-10-10 DIAGNOSIS — I20.8 ANGINAL EQUIVALENT (HCC): ICD-10-CM

## 2022-10-10 DIAGNOSIS — R19.7 DIARRHEA, UNSPECIFIED TYPE: Primary | ICD-10-CM

## 2022-10-10 DIAGNOSIS — N18.30 STAGE 3 CHRONIC KIDNEY DISEASE, UNSPECIFIED WHETHER STAGE 3A OR 3B CKD (HCC): ICD-10-CM

## 2022-10-10 DIAGNOSIS — Z79.4 TYPE 2 DIABETES MELLITUS WITH DIABETIC POLYNEUROPATHY, WITH LONG-TERM CURRENT USE OF INSULIN (HCC): ICD-10-CM

## 2022-10-10 DIAGNOSIS — Z97.10 EMPLOYS PROSTHETIC LEG: ICD-10-CM

## 2022-10-10 DIAGNOSIS — Z00.00 INITIAL MEDICARE ANNUAL WELLNESS VISIT: ICD-10-CM

## 2022-10-10 DIAGNOSIS — E11.42 TYPE 2 DIABETES MELLITUS WITH DIABETIC POLYNEUROPATHY, WITH LONG-TERM CURRENT USE OF INSULIN (HCC): ICD-10-CM

## 2022-10-10 DIAGNOSIS — C64.1 MALIGNANT NEOPLASM OF RIGHT KIDNEY (HCC): ICD-10-CM

## 2022-10-10 PROCEDURE — 3044F HG A1C LEVEL LT 7.0%: CPT | Performed by: INTERNAL MEDICINE

## 2022-10-10 PROCEDURE — 3017F COLORECTAL CA SCREEN DOC REV: CPT | Performed by: INTERNAL MEDICINE

## 2022-10-10 PROCEDURE — G8484 FLU IMMUNIZE NO ADMIN: HCPCS | Performed by: INTERNAL MEDICINE

## 2022-10-10 PROCEDURE — G0438 PPPS, INITIAL VISIT: HCPCS | Performed by: INTERNAL MEDICINE

## 2022-10-10 RX ORDER — PREGABALIN 100 MG/1
100 CAPSULE ORAL 3 TIMES DAILY
Qty: 270 CAPSULE | Refills: 2 | Status: SHIPPED | OUTPATIENT
Start: 2022-10-10 | End: 2023-01-08

## 2022-10-10 RX ORDER — SEMAGLUTIDE 2.68 MG/ML
INJECTION, SOLUTION SUBCUTANEOUS
COMMUNITY
Start: 2022-09-16

## 2022-10-10 ASSESSMENT — PATIENT HEALTH QUESTIONNAIRE - PHQ9
SUM OF ALL RESPONSES TO PHQ9 QUESTIONS 1 & 2: 0
SUM OF ALL RESPONSES TO PHQ QUESTIONS 1-9: 0
SUM OF ALL RESPONSES TO PHQ QUESTIONS 1-9: 0
2. FEELING DOWN, DEPRESSED OR HOPELESS: 0
SUM OF ALL RESPONSES TO PHQ QUESTIONS 1-9: 0
1. LITTLE INTEREST OR PLEASURE IN DOING THINGS: 0
SUM OF ALL RESPONSES TO PHQ QUESTIONS 1-9: 0

## 2022-10-10 ASSESSMENT — LIFESTYLE VARIABLES
HOW MANY STANDARD DRINKS CONTAINING ALCOHOL DO YOU HAVE ON A TYPICAL DAY: 1 OR 2
HOW OFTEN DO YOU HAVE A DRINK CONTAINING ALCOHOL: MONTHLY OR LESS

## 2022-10-10 NOTE — PROGRESS NOTES
Medicare Annual Wellness Visit    Barrera Ruth is here for Medicare AWV and GI Problem (Abdominal pain every morning, diarrhea, taking antibiotics currently )    Assessment & Plan   Diarrhea, unspecified type  Stage 3 chronic kidney disease, unspecified whether stage 3a or 3b CKD (HCC)  Malignant neoplasm of right kidney ([facility]  Status post below-knee amputation of left lower extremity (HCC)  Hypertrophic cardiomyopathy ([facility]  PVD (peripheral vascular disease) ([facility]  Type 2 diabetes mellitus with diabetic polyneuropathy, with long-term current use of insulin   -     pregabalin (LYRICA) 100 MG capsule; Take 1 capsule by mouth in the morning, at noon, and at bedtime for 90 days. , Disp-270 capsule, R-2Print  Anginal equivalent ([facility]  Diabetic mononeuropathy associated with diabetes mellitus due to underlying condition (HCC)  -     pregabalin (LYRICA) 100 MG capsule; Take 1 capsule by mouth in the morning, at noon, and at bedtime for 90 days. , Disp-270 capsule, R-2Print  Chronic pain of left knee  -     pregabalin (LYRICA) 100 MG capsule; Take 1 capsule by mouth in the morning, at noon, and at bedtime for 90 days. , Disp-270 capsule, R-2Print  Employs prosthetic leg s/p left BKA  -     pregabalin (LYRICA) 100 MG capsule; Take 1 capsule by mouth in the morning, at noon, and at bedtime for 90 days. , Disp-270 capsule, R-2Print  Initial Medicare annual wellness visit    Recommendations for Preventive Services Due: see orders and patient instructions/AVS.  Recommended screening schedule for the next 5-10 years is provided to the patient in written form: see Patient Instructions/AVS.     Return in 4 weeks (on 11/7/2022) for Medicare Annual Wellness Visit in 1 year. Subjective   Dm  Uncontrolled  Low bp      Patient's complete Health Risk Assessment and screening values have been reviewed and are found in Flowsheets.  The following problems were reviewed today and where indicated follow up appointments were made and/or referrals ordered. Positive Risk Factor Screenings with Interventions:             General Health and ACP:  General  In general, how would you say your health is?: Fair  In the past 7 days, have you experienced any of the following: New or Increased Pain, New or Increased Fatigue, Loneliness, Social Isolation, Stress or Anger?: No  Do you get the social and emotional support that you need?: Yes  Do you have a Living Will?: (!) No    Advance Directives       Power of [facility] Will ACP-Advance Directive ACP-Power of     Not on File Not on File Not on File Not on File          General Health Risk Interventions:  Post left leg amputation    Health Habits/Nutrition:  Physical Activity: Insufficiently Active    Days of Exercise per Week: 1 day    Minutes of Exercise per Session: 60 min     Have you lost any weight without trying in the past 3 months?: No  Body mass index: (!) 32.01  Have you seen the dentist within the past year?: Yes  Health Habits/Nutrition Interventions:  none    Hearing/Vision:  Do you or your family notice any trouble with your hearing that hasn't been managed with hearing aids?: No  Do you have difficulty driving, watching TV, or doing any of your daily activities because of your eyesight?: (!) Yes  Have you had an eye exam within the past year?: Yes  No results found. Hearing/Vision Interventions:  none            Objective   Vitals:    10/10/22 1027   BP: 132/76   Pulse: 80   SpO2: 96%   Weight: 263 lb (119.3 kg)   Height: 6' 4\" (1.93 m)      Body mass index is 32.01 kg/m².       General Appearance: alert and oriented to person, place and time, well developed and well- nourished, in no acute distress  Skin: warm and dry, no rash or erythema  Head: normocephalic and atraumatic  Eyes: pupils equal, round, and reactive to light, extraocular eye movements intact, conjunctivae normal  ENT: tympanic membrane, external ear and ear canal normal bilaterally, nose without deformity, nasal mucosa and turbinates normal without polyps  Neck: supple and non-tender without mass, no thyromegaly or thyroid nodules, no cervical lymphadenopathy  Pulmonary/Chest: clear to auscultation bilaterally- no wheezes, rales or rhonchi, normal air movement, no respiratory distress  Cardiovascular: normal rate, regular rhythm, normal S1 and S2, no murmurs, rubs, clicks, or gallops, distal pulses intact, no carotid bruits  Abdomen: soft, non-tender, non-distended, normal bowel sounds, no masses or organomegaly  Extremities: no cyanosis, clubbing or edema  Musculoskeletal: normal range of motion, no joint swelling, deformity or tenderness  Post left BKA    Neurologic: reflexes normal and symmetric, no cranial nerve deficit, gait, coordination and speech normal       Allergies   Allergen Reactions    Ramipril      Other reaction(s): swelling of the lips   Other reaction(s): swelling of the lips     Adhesive Tape      tegaderm causes blisters. Use paper tape     Prior to Visit Medications    Medication Sig Taking? Authorizing Provider   OZEMPIC, 2 MG/DOSE, 8 MG/3ML SOPN INJECT 2 MG UNDER THE SKIN EVERY 7 DAYS. Yes Historical Provider, MD   pregabalin (LYRICA) 100 MG capsule Take 1 capsule by mouth in the morning, at noon, and at bedtime for 90 days.  Yes Miller Kemp MD   amLODIPine (NORVASC) 2.5 MG tablet TAKE 1 TABLET BY MOUTH EVERY DAY Yes Miller Kemp MD   metoprolol tartrate (LOPRESSOR) 50 MG tablet TAKE 1 TABLET BY MOUTH TWICE A DAY Yes Miller Kemp MD   levothyroxine (SYNTHROID) 175 MCG tablet TAKE 1 TABLET BY MOUTH EVERY DAY Yes Miller Kemp MD   losartan (COZAAR) 50 MG tablet TAKE 1 TABLET BY MOUTH EVERY DAY Yes Miller Kemp MD   Lactobacillus (ACIDOPHILUS PROBIOTIC) 100 MG CAPS TAKE 1 CAPSULE BY MOUTH TWICE A DAY Yes Historical Provider, MD   Lactobacillus Acid-Pectin (ACIDOPHILUS/CITRUS PECTIN) TABS Take 1 tablet by mouth daily (with breakfast) Yes Historical Provider, MD clotrimazole-betamethasone (LOTRISONE) 1-0.05 % cream APPLY TO AFFECTED AREA TWICE A DAY Yes Jude Ervin MD   Probiotic Acidophilus (FLORANEX) TABS TAKE 1 TABLET BY MOUTH TWICE A DAY Yes Jude Ervin MD   Insulin Glargine, 2 Unit Dial, (TOUJEO MAX SOLOSTAR) 300 UNIT/ML SOPN Inject 110 Units into the skin daily Yes Jude Ervin MD   aspirin (ASPIRIN LOW DOSE) 81 MG EC tablet Take 1 tablet by mouth in the morning. Yes Jude Ervin MD   budesonide-formoterol (SYMBICORT) 160-4.5 MCG/ACT AERO Inhale 2 puffs into the lungs 2 times daily Yes Bhavana Hinton MD   finasteride (PROSCAR) 5 MG tablet TAKE 1 TABLET BY MOUTH EVERY DAY Yes Jude Ervin MD   rOPINIRole (REQUIP) 2 MG tablet TAKE 1 TABLET BY MOUTH EVERY DAY Yes Jude Ervin MD   ELIQUIS 5 MG TABS tablet TAKE 1 TABLET BY MOUTH TWICE A DAY Yes Jude Ervin MD   gemfibrozil (LOPID) 600 MG tablet TAKE 1 TABLET BY MOUTH EVERY DAY Yes Emanuel Shultz MD   Insulin Pen Needle (COMFORT TOUCH INSULIN PEN NEED) 33G X 6 MM MISC 110 Units by Does not apply route daily Yes Jude Ervin MD   atorvastatin (LIPITOR) 40 MG tablet TAKE 1 TABLET BY MOUTH EVERY DAY Yes Jude Ervin MD   insulin lispro (HUMALOG) 100 UNIT/ML injection vial SLIDING SCALE (TAKE 5 UNITS IF SUGAR IS OVER 150) Yes Marii Padilla DO   isosorbide mononitrate (IMDUR) 30 MG extended release tablet TAKE 1 TABLET BY MOUTH EVERY DAY Yes Jude Ervin MD   budesonide-formoterol (SYMBICORT) 160-4.5 MCG/ACT AERO TAKE 2 PUFFS BY MOUTH TWICE A DAY Yes Jude Ervin MD   B-D UF III MINI PEN NEEDLES 31G X 5 MM MISC USE AS DIRECTED ONCE DAILY TO INJECT TOUJEO Yes Jude Ervin MD   tamsulosin (FLOMAX) 0.4 MG capsule TAKE 1 CAPSULE BY MOUTH EVERY DAY Yes Jude Ervin MD   Continuous Blood Gluc  (DEXCOM G6 ) DOUGIE USE AS DIRECTED TO MONITOR BLOOD SUGAR Yes Historical Provider, MD   blood glucose monitor strips T/S Contour.  Test 4 times a day Yes Tessa Munguia MD   albuterol sulfate  (90 Base) MCG/ACT inhaler INHALE 2 PUFFS INTO THE LUNGS EVERY 6 HOURS AS NEEDED FOR WHEEZING OR SHORTNESS OF BREATH Yes Tessa Munguia MD   glucagon, rDNA, 1 MG injection Inject 1 mL into the muscle once as needed for Low blood sugar  Yes Historical Provider, MD   Blood Pressure KIT 1 actuation by Does not apply route once a week Yes Ana Jensen MD   Handicap Placard MISC by Does not apply route Duration - 5years  Reason- prosthetic leg Yes Ana Jensen MD   Omega-3 Fatty Acids (FISH OIL CONCENTRATE) 300 MG CAPS Take 300 mg by mouth nightly  Yes Teofilo Gonzalez MD   Glucosamine Sulfate 500 MG TABS Take 500 mg by mouth 3 times daily Yes Alana Silva MD   amiodarone (CORDARONE) 200 MG tablet Take 1 tablet by mouth 2 times daily  DO Paola Lopez (Including outside providers/suppliers regularly involved in providing care):   Patient Care Team:  Tessa Munguia MD as PCP - Libertad Rousseau MD as PCP - [facility] Empaneled Provider  Catherine Massey MD as Consulting Physician (Infectious Diseases)     Reviewed and updated this visit:  Tobacco  Allergies  Meds  Med Hx  Surg Hx  Soc Hx  Fam Hx

## 2022-10-10 NOTE — PROGRESS NOTES
Visit Information    Have you changed or started any medications since your last visit including any over-the-counter medicines, vitamins, or herbal medicines? no   Are you having any side effects from any of your medications? -  no  Have you stopped taking any of your medications? Is so, why? -  no    Have you seen any other physician or provider since your last visit? No  Have you had any other diagnostic tests since your last visit? No  Have you been seen in the emergency room and/or had an admission to a hospital since we last saw you? No  Have you had your routine dental cleaning in the past 6 months? no    Have you activated your [employer] account? If not, what are your barriers?  Yes     Patient Care Team:  Puja Quijano MD as PCP - Kelvin Perry MD as PCP - [facility] Provider  Purvi Macias MD as Consulting Physician (Infectious Diseases)    Medical History Review  Past Medical, Family, and Social History reviewed and does contribute to the patient presenting condition    Health Maintenance   Topic Date Due    HIV screen  Never done    DTaP/Tdap/Td vaccine (1 - Tdap) Never done    Shingles vaccine (1 of 2) 10/23/2014    Pneumococcal 0-64 years Vaccine (2 - PCV) 01/04/2015    Diabetic retinal exam  03/25/2022    Flu vaccine (1) 08/01/2022    Annual Wellness Visit (AWV)  10/10/2022    Low dose CT lung screening  04/19/2023    Depression Screen  05/05/2023    Lipids  08/10/2023    A1C test (Diabetic or Prediabetic)  08/24/2023    Diabetic foot exam  08/26/2023    Colorectal Cancer Screen  01/23/2027    COVID-19 Vaccine  Completed    Hepatitis C screen  Completed    Hepatitis A vaccine  Aged Out    Hib vaccine  Aged Out    Meningococcal (ACWY) vaccine  Aged Out

## 2022-10-10 NOTE — PATIENT INSTRUCTIONS
Personalized Preventive Plan for Jhon Campos - 10/10/2022  Medicare offers a range of preventive health benefits. Some of the tests and screenings are paid in full while other may be subject to a deductible, co-insurance, and/or copay. Some of these benefits include a comprehensive review of your medical history including lifestyle, illnesses that may run in your family, and various assessments and screenings as appropriate. After reviewing your medical record and screening and assessments performed today your provider may have ordered immunizations, labs, imaging, and/or referrals for you. A list of these orders (if applicable) as well as your Preventive Care list are included within your After Visit Summary for your review. Other Preventive Recommendations:    A preventive eye exam performed by an eye specialist is recommended every 1-2 years to screen for glaucoma; cataracts, macular degeneration, and other eye disorders. A preventive dental visit is recommended every 6 months. Try to get at least 150 minutes of exercise per week or 10,000 steps per day on a pedometer . Order or download the FREE \"Exercise & Physical Activity: Your Everyday Guide\" from [employer]. Call 8-200.866.8113 or search [employer] online. You need 4667-4165 mg of calcium and 8091-0298 IU of vitamin D per day. It is possible to meet your calcium requirement with diet alone, but a vitamin D supplement is usually necessary to meet this goal.  When exposed to the sun, use a sunscreen that protects against both UVA and UVB radiation with an SPF of 30 or greater. Reapply every 2 to 3 hours or after sweating, drying off with a towel, or swimming. Always wear a seat belt when traveling in a car. Always wear a helmet when riding a bicycle or motorcycle.

## 2022-10-17 RX ORDER — TAMSULOSIN HYDROCHLORIDE 0.4 MG/1
CAPSULE ORAL
Qty: 30 CAPSULE | Refills: 11 | Status: SHIPPED | OUTPATIENT
Start: 2022-10-17

## 2022-10-18 ENCOUNTER — HOSPITAL ENCOUNTER (OUTPATIENT)
Age: 62
Setting detail: SPECIMEN
Discharge: HOME | End: 2022-10-18

## 2022-10-18 ENCOUNTER — OFFICE VISIT (OUTPATIENT)
Dept: INTERNAL MEDICINE CLINIC | Age: 62
End: 2022-10-18
Payer: MEDICARE

## 2022-10-18 VITALS
WEIGHT: 267 LBS | HEART RATE: 76 BPM | SYSTOLIC BLOOD PRESSURE: 130 MMHG | OXYGEN SATURATION: 97 % | BODY MASS INDEX: 32.5 KG/M2 | DIASTOLIC BLOOD PRESSURE: 72 MMHG

## 2022-10-18 DIAGNOSIS — N30.00 ACUTE CYSTITIS WITHOUT HEMATURIA: Primary | ICD-10-CM

## 2022-10-18 DIAGNOSIS — E11.42 TYPE 2 DIABETES MELLITUS WITH DIABETIC POLYNEUROPATHY, WITH LONG-TERM CURRENT USE OF INSULIN (HCC): ICD-10-CM

## 2022-10-18 DIAGNOSIS — N30.00 ACUTE CYSTITIS WITHOUT HEMATURIA: ICD-10-CM

## 2022-10-18 DIAGNOSIS — Z79.4 TYPE 2 DIABETES MELLITUS WITH DIABETIC POLYNEUROPATHY, WITH LONG-TERM CURRENT USE OF INSULIN (HCC): ICD-10-CM

## 2022-10-18 DIAGNOSIS — L03.818 CELLULITIS OF OTHER SPECIFIED SITE: ICD-10-CM

## 2022-10-18 LAB
BACTERIA URINE, POC: ABNORMAL
BILIRUBIN URINE: 0 MG/DL
BLOOD, URINE: NEGATIVE
CASTS URINE, POC: ABNORMAL
CLARITY: ABNORMAL
COLOR: YELLOW
CRYSTALS URINE, POC: ABNORMAL
EPI CELLS URINE, POC: ABNORMAL
GLUCOSE URINE: ABNORMAL
KETONES, URINE: NEGATIVE
LEUKOCYTE EST, POC: ABNORMAL
NITRITE, URINE: NEGATIVE
PH UA: 6 (ref 4.5–8)
PROTEIN UA: NEGATIVE
RBC URINE, POC: ABNORMAL
SPECIFIC GRAVITY UA: 1.03 (ref 1–1.03)
UROBILINOGEN, URINE: NORMAL
WBC URINE, POC: ABNORMAL
YEAST URINE, POC: ABNORMAL

## 2022-10-18 PROCEDURE — 81000 URINALYSIS NONAUTO W/SCOPE: CPT | Performed by: INTERNAL MEDICINE

## 2022-10-18 PROCEDURE — G8484 FLU IMMUNIZE NO ADMIN: HCPCS | Performed by: INTERNAL MEDICINE

## 2022-10-18 PROCEDURE — 2022F DILAT RTA XM EVC RTNOPTHY: CPT | Performed by: INTERNAL MEDICINE

## 2022-10-18 PROCEDURE — 3017F COLORECTAL CA SCREEN DOC REV: CPT | Performed by: INTERNAL MEDICINE

## 2022-10-18 PROCEDURE — G8417 CALC BMI ABV UP PARAM F/U: HCPCS | Performed by: INTERNAL MEDICINE

## 2022-10-18 PROCEDURE — G8427 DOCREV CUR MEDS BY ELIG CLIN: HCPCS | Performed by: INTERNAL MEDICINE

## 2022-10-18 PROCEDURE — 1036F TOBACCO NON-USER: CPT | Performed by: INTERNAL MEDICINE

## 2022-10-18 PROCEDURE — 3044F HG A1C LEVEL LT 7.0%: CPT | Performed by: INTERNAL MEDICINE

## 2022-10-18 PROCEDURE — 99213 OFFICE O/P EST LOW 20 MIN: CPT | Performed by: INTERNAL MEDICINE

## 2022-10-18 RX ORDER — DOXYCYCLINE HYCLATE 100 MG
100 TABLET ORAL 2 TIMES DAILY
Qty: 20 TABLET | Refills: 0 | Status: SHIPPED | OUTPATIENT
Start: 2022-10-18 | End: 2022-10-28 | Stop reason: ALTCHOICE

## 2022-10-18 NOTE — PROGRESS NOTES
Visit Information    Have you changed or started any medications since your last visit including any over-the-counter medicines, vitamins, or herbal medicines? no   Are you having any side effects from any of your medications? -  no  Have you stopped taking any of your medications? Is so, why? -  no    Have you seen any other physician or provider since your last visit? No  Have you had any other diagnostic tests since your last visit? No  Have you been seen in the emergency room and/or had an admission to a hospital since we last saw you? No  Have you had your routine dental cleaning in the past 6 months? no    Have you activated your [employer] account? If not, what are your barriers?  Yes     Patient Care Team:  Jodie Gates MD as PCP - Virgil Maddox MD as PCP - [facility] Provider  Walker Sarmiento MD as Consulting Physician (Infectious Diseases)    Medical History Review  Past Medical, Family, and Social History reviewed and does not contribute to the patient presenting condition    Health Maintenance   Topic Date Due    HIV screen  Never done    DTaP/Tdap/Td vaccine (1 - Tdap) Never done    Shingles vaccine (1 of 2) 10/23/2014    Pneumococcal 0-64 years Vaccine (2 - PCV) 01/04/2015    Diabetic retinal exam  03/25/2022    Flu vaccine (1) 08/01/2022    Low dose CT lung screening  04/19/2023    Lipids  08/10/2023    A1C test (Diabetic or Prediabetic)  08/24/2023    Diabetic foot exam  08/26/2023    Depression Screen  10/10/2023    Colorectal Cancer Screen  01/23/2027    COVID-19 Vaccine  Completed    Hepatitis C screen  Completed    Hepatitis A vaccine  Aged Out    Hib vaccine  Aged Out    Meningococcal (ACWY) vaccine  Aged Out

## 2022-10-18 NOTE — PROGRESS NOTES
[address]  [place] [address]  Dept: [phone]  Dept Fax: [phone]    Office Progress/Follow Up Note  Date of patient's visit: 10/18/2022  Patient's Name:  Monty Ruiz YOB: 1960            Patient Care Team:  Krysten Parson MD as PCP - Rex Friedman MD as PCP - [facility] Empaneled Provider  Shandra Neil MD as Consulting Physician (Infectious Diseases)    REASON FOR VISIT:  same day   HISTORY OF PRESENT ILLNESS:      Chief Complaint   Patient presents with    Swelling     Right foot noticed yesterday open blister on top of foot    Urinary Tract Infection     Dark urine for 4 days      Patient came for same day  Noticed swelling on the right foot   Had open blister which he said popped open yesterday  No fever chills  Area surrounding wound red  No pain  Has a history of neuropathy    Also complaining of dark urine  Did not see any blood  No other urinary symptoms    Patient Active Problem List   Diagnosis    Anemia    Neuropathy in diabetes ([address]    Charcot ankle    PVD (peripheral vascular disease) ([address]    Type 2 diabetes mellitus with diabetic polyneuropathy, with long-term current use of insulin     Pure hypercholesterolemia    Constipation    PSA elevation    CAD (coronary artery disease)    Chronic pain of left knee    Essential hypertension    Olecranon bursitis of right elbow    Acquired hypothyroidism    Carotid stenosis, asymptomatic, bilateral    Right renal mass    Metabolic acidosis, normal anion gap (NAG)    Hypotension due to drugs    Hydronephrosis of right kidney    Idiopathic hypotension    Status post below-knee amputation of left lower extremity (HCC)    Atrial fibrillation     Ureteral stricture, right    Dizziness    Dermatophytoses    Acute bilateral low back pain without sciatica    Malignant neoplasm of right kidney ([address]    Spinal stenosis of lumbar region with neurogenic claudication    Other chest pain    Spinal stenosis of lumbar region without neurogenic claudication    Lumbar radiculopathy    Chest pain    Rule out acute coronary syndrome    Chronic diastolic heart failure with preserved EF    BPH     Employs prosthetic leg s/p left BKA    Hypertrophic cardiomyopathy (HCC)    Anginal equivalent (HCC)    Chronic renal disease, stage III (HCC) [388552]    Solid nodule of lung 6 mm to 8 mm in diameter    COVID-19 virus infection       Health Maintenance Due   Topic Date Due    HIV screen  Never done    DTaP/Tdap/Td vaccine (1 - Tdap) Never done    Shingles vaccine (1 of 2) 10/23/2014    Pneumococcal 0-64 years Vaccine (2 - PCV) 01/04/2015    Diabetic retinal exam  03/25/2022    Flu vaccine (1) 08/01/2022       Allergies   Allergen Reactions    Ramipril      Other reaction(s): swelling of the lips   Other reaction(s): swelling of the lips     Adhesive Tape      tegaderm causes blisters. Use paper tape         MEDICATIONS:     Current Outpatient Medications   Medication Sig Dispense Refill    tamsulosin (FLOMAX) 0.4 MG capsule TAKE 1 CAPSULE BY MOUTH EVERY DAY 30 capsule 11    OZEMPIC, 2 MG/DOSE, 8 MG/3ML SOPN INJECT 2 MG UNDER THE SKIN EVERY 7 DAYS. pregabalin (LYRICA) 100 MG capsule Take 1 capsule by mouth in the morning, at noon, and at bedtime for 90 days.  270 capsule 2    amLODIPine (NORVASC) 2.5 MG tablet TAKE 1 TABLET BY MOUTH EVERY DAY 30 tablet 3    metoprolol tartrate (LOPRESSOR) 50 MG tablet TAKE 1 TABLET BY MOUTH TWICE A DAY 60 tablet 3    levothyroxine (SYNTHROID) 175 MCG tablet TAKE 1 TABLET BY MOUTH EVERY DAY 30 tablet 3    losartan (COZAAR) 50 MG tablet TAKE 1 TABLET BY MOUTH EVERY DAY 90 tablet 1    Lactobacillus (ACIDOPHILUS PROBIOTIC) 100 MG CAPS TAKE 1 CAPSULE BY MOUTH TWICE A DAY      Lactobacillus Acid-Pectin (ACIDOPHILUS/CITRUS PECTIN) TABS Take 1 tablet by mouth daily (with breakfast)      clotrimazole-betamethasone (LOTRISONE) 1-0.05 % cream APPLY TO AFFECTED AREA TWICE A DAY 45 g 3    Probiotic Acidophilus (FLORANEX) TABS TAKE 1 TABLET BY MOUTH TWICE A DAY 60 tablet 0    Insulin Glargine, 2 Unit Dial, (TOUJEO MAX SOLOSTAR) 300 UNIT/ML SOPN Inject 110 Units into the skin daily 5 pen 5    aspirin (ASPIRIN LOW DOSE) 81 MG EC tablet Take 1 tablet by mouth in the morning. 30 tablet 5    budesonide-formoterol (SYMBICORT) 160-4.5 MCG/ACT AERO Inhale 2 puffs into the lungs 2 times daily 10.2 g 5    finasteride (PROSCAR) 5 MG tablet TAKE 1 TABLET BY MOUTH EVERY DAY 30 tablet 5    rOPINIRole (REQUIP) 2 MG tablet TAKE 1 TABLET BY MOUTH EVERY DAY 30 tablet 11    ELIQUIS 5 MG TABS tablet TAKE 1 TABLET BY MOUTH TWICE A DAY 60 tablet 8    gemfibrozil (LOPID) 600 MG tablet TAKE 1 TABLET BY MOUTH EVERY DAY 30 tablet 11    Insulin Pen Needle (COMFORT TOUCH INSULIN PEN NEED) 33G X 6 MM MISC 110 Units by Does not apply route daily 5 each 1    atorvastatin (LIPITOR) 40 MG tablet TAKE 1 TABLET BY MOUTH EVERY DAY 30 tablet 11    amiodarone (CORDARONE) 200 MG tablet Take 1 tablet by mouth 2 times daily 60 tablet 0    insulin lispro (HUMALOG) 100 UNIT/ML injection vial SLIDING SCALE (TAKE 5 UNITS IF SUGAR IS OVER 150) 1 each 3    isosorbide mononitrate (IMDUR) 30 MG extended release tablet TAKE 1 TABLET BY MOUTH EVERY DAY 90 tablet 3    budesonide-formoterol (SYMBICORT) 160-4.5 MCG/ACT AERO TAKE 2 PUFFS BY MOUTH TWICE A DAY 3 each 3    B-D UF III MINI PEN NEEDLES 31G X 5 MM MISC USE AS DIRECTED ONCE DAILY TO INJECT TOUJEO 100 each 3    Continuous Blood Gluc  (DEXCOM G6 ) DOUGIE USE AS DIRECTED TO MONITOR BLOOD SUGAR      blood glucose monitor strips T/S Contour.  Test 4 times a day 100 strip 3    albuterol sulfate  (90 Base) MCG/ACT inhaler INHALE 2 PUFFS INTO THE LUNGS EVERY 6 HOURS AS NEEDED FOR WHEEZING OR SHORTNESS OF BREATH 6.7 Inhaler 0    glucagon, rDNA, 1 MG injection Inject 1 mL into the muscle once as needed for Low blood sugar       Blood Pressure KIT 1 actuation by Does not apply route once a week 1 kit 0    Handicap Placard MISC by Does not apply route Duration - 5years  Reason- prosthetic leg 1 each 0    Omega-3 Fatty Acids (FISH OIL CONCENTRATE) 300 MG CAPS Take 300 mg by mouth nightly       Glucosamine Sulfate 500 MG TABS Take 500 mg by mouth 3 times daily       No current facility-administered medications for this visit. SOCIAL HISTORY    Reviewed and no change from previous record. Sathish Richards  reports that he quit smoking about 11 years ago. His smoking use included cigars and cigarettes. He started smoking about 44 years ago. He has a 50.00 pack-year smoking history. He has never used smokeless tobacco.    FAMILY HISTORY:    Reviewed and No change from previous visit  family history includes Alcohol Abuse in his father; COPD in his father; Diabetes in his brother and mother; Hypertension in his father and mother; Kidney Cancer in his father; Other in his sister; Stomach Cancer in his mother.     OF SYSTEMS:    General : Negative for fatigue, weight loss, appetite change  HEENT : Negative for nasal congestion, sneezing, runny nose, sinus pain  Respiratory : Negative for shortness of breath cough, congestion, wheezing  Cardiovascular: Negative for chest pain, palpitations, shortness of breath  GI: Negative for abdominal pain, nausea, vomiting, diarrhea, constipation  Genitourinary : negative for dysuria, urinary frequency, urinary urgency, hematuria  Neurological : Negative for headache, dizziness, gait change,   Psych : Negative for depression, anxiety  Skin: Positive for skin lesions on the leg   musculoskeletal : Negative for joint pain, muscle pain      PHYSICAL EXAM:      Vitals:    10/18/22 0911   BP: 130/72   Site: Right Upper Arm   Position: Sitting   Pulse: 76   SpO2: 97%   Weight: 267 lb (121.1 kg)     BP Readings from Last 3 Encounters:   10/18/22 130/72   10/10/22 132/76   08/24/22 118/66        Physical Exam  Musculoskeletal:        Legs:       Physical exam  General : Patient alert awake in no acute distress  HEENT : Atraumatic, normocephalic , oral mucosa membrane moist,  Eyes : Extraocular movements intact  Neck : Supple, range of motion intact,  Cardiovascular : Regular rate and rhythm, no murmur appreciated  Respiratory : Bilateral clear breath sounds, no wheezing crackles appreciated  Gastrointestinal  : Abdomen soft, nontender, bowel sounds present  Extremities : S/p left BKA, right foot no pitting edema appreciated currently  Nontender  Small Open wound present on the right shin, with surrounding area of erythema, no drainage present  Skin : Warm and dry, no skin lesions appreciated  Psych : Mood normal  Neurological, no focal motor deficit present  Lab Results   Component Value Date    LABA1C 6.9 08/24/2022     Lab Results   Component Value Date     05/04/2022      LABORATORY FINDINGS:    CBC:  Lab Results   Component Value Date/Time    WBC 5.6 12/31/2021 06:27 AM    HGB 14.2 12/31/2021 06:27 AM     12/31/2021 06:27 AM       BMP:    Lab Results   Component Value Date/Time     01/19/2022 01:46 PM    K 5.1 01/19/2022 01:46 PM     01/19/2022 01:46 PM    CO2 22 01/19/2022 01:46 PM    BUN 22 01/19/2022 01:46 PM    CREATININE 1.37 01/19/2022 01:46 PM    GLUCOSE 152 01/19/2022 01:46 PM       HEMOGLOBIN A1C:   Lab Results   Component Value Date/Time    LABA1C 6.9 08/24/2022 01:39 PM       FASTING LIPID PANEL:  Lab Results   Component Value Date    CHOL 110 08/10/2022    HDL 40 (L) 08/10/2022    TRIG 112 08/10/2022       ASSESSMENT AND PLAN:      1. Acute cystitis without hematuria    - Urinalysis with Reflex to Culture; Future  - doxycycline hyclate (VIBRA-TABS) 100 MG tablet; Take 1 tablet by mouth 2 times daily for 10 days  Dispense: 20 tablet; Refill: 0  - POCT Urine with Microscopic    2. Cellulitis of other specified site    - doxycycline hyclate (VIBRA-TABS) 100 MG tablet;  Take 1 tablet by mouth 2 times daily for 10 days  Dispense: 20 tablet; Refill: 0    3. Type 2 diabetes mellitus with diabetic polyneuropathy, with long-term current use of insulin       Will give antibiotics  Patient was advised if no improvement or if the symptom gets worse in the next couple of days, he should call the office or go to ER. Patient voiced understanding. We will follow-up in 2 weeks  FOLLOW UP AND INSTRUCTIONS:   Return in about 2 weeks (around 11/1/2022). Gaston Sparks received counseling on the following healthy behaviors: nutrition    Discussed use, benefit, and side effects of prescribed medications. Barriers to medication compliance addressed. All patient questions answered. Pt voiced understanding. Patient given educational materials - see patient instructions    MD NANNETTE [facility]  10/18/2022, 9:28 AM    Please note that this chart was generated using voice recognition Dragon dictation software. Although every effort was made to ensure the accuracy of this automatedtranscription, some errors in transcription may have occurred.

## 2022-10-19 LAB
BILIRUBIN URINE: NEGATIVE
CASTS UA: NORMAL /LPF (ref 0–8)
COLOR: YELLOW
EPITHELIAL CELLS UA: NORMAL /HPF (ref 0–5)
GLUCOSE URINE: ABNORMAL
KETONES, URINE: NEGATIVE
LEUKOCYTE ESTERASE, URINE: ABNORMAL
NITRITE, URINE: NEGATIVE
PH UA: 5.5 (ref 5–8)
PROTEIN UA: ABNORMAL
RBC UA: NORMAL /HPF (ref 0–4)
SPECIFIC GRAVITY UA: 1.02 (ref 1–1.03)
TURBIDITY: CLEAR
URINE HGB: NEGATIVE
UROBILINOGEN, URINE: NORMAL
WBC UA: NORMAL /HPF (ref 0–5)

## 2022-10-20 ENCOUNTER — OFFICE VISIT (OUTPATIENT)
Dept: PODIATRY | Age: 62
End: 2022-10-20
Payer: MEDICARE

## 2022-10-20 VITALS — HEIGHT: 76 IN | WEIGHT: 267 LBS | BODY MASS INDEX: 32.51 KG/M2

## 2022-10-20 DIAGNOSIS — M79.604 PAIN IN RIGHT LEG: ICD-10-CM

## 2022-10-20 DIAGNOSIS — L97.911 ULCER OF RIGHT LOWER EXTREMITY, LIMITED TO BREAKDOWN OF SKIN (HCC): Primary | ICD-10-CM

## 2022-10-20 PROCEDURE — 99213 OFFICE O/P EST LOW 20 MIN: CPT | Performed by: PODIATRIST

## 2022-10-20 PROCEDURE — G8427 DOCREV CUR MEDS BY ELIG CLIN: HCPCS | Performed by: PODIATRIST

## 2022-10-20 PROCEDURE — 3017F COLORECTAL CA SCREEN DOC REV: CPT | Performed by: PODIATRIST

## 2022-10-20 PROCEDURE — G8417 CALC BMI ABV UP PARAM F/U: HCPCS | Performed by: PODIATRIST

## 2022-10-20 PROCEDURE — 1036F TOBACCO NON-USER: CPT | Performed by: PODIATRIST

## 2022-10-20 PROCEDURE — G8484 FLU IMMUNIZE NO ADMIN: HCPCS | Performed by: PODIATRIST

## 2022-10-20 ASSESSMENT — ENCOUNTER SYMPTOMS
COLOR CHANGE: 0
DIARRHEA: 0
BACK PAIN: 0
NAUSEA: 0
SHORTNESS OF BREATH: 0

## 2022-10-20 NOTE — PROGRESS NOTES
[address] [facility] Podiatry  Return Patient Progress Note    Subjective: Gail Wang 58 y.o. male that presents with chief complaint of a wound to the right leg. Chief Complaint   Patient presents with    Wound Check     Wound right leg, leg has been swelling    Other     PCP put him on doxycycline    Diabetes     Blood sugar 247lb    Patient states that his right leg has been swelling and he is unsure as to why. Patient states that he developed a wound to the front of the right leg and it is very painful. Patient is concerned as he is a diabetic and previously underwent amputation of his left leg. Pain is rated 5 out of 10 and is described as intermittent. Review of Systems   Constitutional:  Negative for activity change, appetite change, chills, diaphoresis, fatigue and fever. Respiratory:  Negative for shortness of breath. Cardiovascular:  Negative for leg swelling. Gastrointestinal:  Negative for diarrhea and nausea. Endocrine: Negative for cold intolerance, heat intolerance and polyuria. Musculoskeletal:  Positive for arthralgias and gait problem. Negative for back pain, joint swelling and myalgias. Skin:  Positive for wound. Negative for color change, pallor and rash. Allergic/Immunologic: Negative for environmental allergies and food allergies. Neurological:  Positive for numbness. Negative for dizziness, weakness and light-headedness. Hematological:  Does not bruise/bleed easily. Psychiatric/Behavioral:  Negative for behavioral problems, confusion and self-injury. The patient is not nervous/anxious. Objective: Clinical evaluation of the patient reveals two small, superficial wounds to the anterior medial aspect of the right leg just proximal to the ankle joint. These wounds do not penetrate through the dermis. There is erythema surrounding these wounds, but there is no calor noted. There is no drainage or malodor noted to either of these wounds.  There is pain with palpation to the leg surrounding these wounds. There is no edema noted to the right leg today. Assessment:    Diagnosis Orders   1. Ulcer of right lower extremity, limited to breakdown of skin (HCC)        2. Pain in right leg              Plan: 1. Clinical evaluation of the patient. 2. The wounds to the right leg was dressed with antibiotic ointment and a band aid. Patient to change this dressing daily until the wound heals. Patient advised to see his PCP for a work up of edema to the right leg. 3. Return if symptoms worsen or fail to improve.    10/20/2022      Elder Yusuf DPM

## 2022-10-28 ENCOUNTER — OFFICE VISIT (OUTPATIENT)
Dept: PODIATRY | Age: 62
End: 2022-10-28
Payer: COMMERCIAL

## 2022-10-28 VITALS — HEIGHT: 76 IN | BODY MASS INDEX: 32.51 KG/M2 | WEIGHT: 267 LBS

## 2022-10-28 DIAGNOSIS — E11.42 TYPE 2 DIABETES MELLITUS WITH DIABETIC POLYNEUROPATHY, WITH LONG-TERM CURRENT USE OF INSULIN (HCC): ICD-10-CM

## 2022-10-28 DIAGNOSIS — M79.604 PAIN IN RIGHT LEG: ICD-10-CM

## 2022-10-28 DIAGNOSIS — L97.911 ULCER OF RIGHT LOWER EXTREMITY, LIMITED TO BREAKDOWN OF SKIN (HCC): Primary | ICD-10-CM

## 2022-10-28 DIAGNOSIS — Z79.4 TYPE 2 DIABETES MELLITUS WITH DIABETIC POLYNEUROPATHY, WITH LONG-TERM CURRENT USE OF INSULIN (HCC): ICD-10-CM

## 2022-10-28 DIAGNOSIS — R60.0 EDEMA, LOWER EXTREMITY: ICD-10-CM

## 2022-10-28 DIAGNOSIS — E11.51 TYPE 2 DIABETES MELLITUS WITH PERIPHERAL VASCULAR DISEASE (HCC): ICD-10-CM

## 2022-10-28 PROCEDURE — 29580 STRAPPING UNNA BOOT: CPT | Performed by: PODIATRIST

## 2022-10-28 PROCEDURE — 99999 PR OFFICE/OUTPT VISIT,PROCEDURE ONLY: CPT | Performed by: PODIATRIST

## 2022-10-28 ASSESSMENT — ENCOUNTER SYMPTOMS
DIARRHEA: 0
SHORTNESS OF BREATH: 0
NAUSEA: 0
BACK PAIN: 0
COLOR CHANGE: 0

## 2022-10-28 NOTE — PROGRESS NOTES
[address] [facility] Podiatry  Return Patient Progress Note    Subjective: Richa Last 58 y.o. male that presents for follow up evaluation of wounds and swelling to the right leg. Chief Complaint   Patient presents with    Wound Check     Right leg    Diabetes     Blood sugar 131     Patient's treatment thus far has included daily dressing changes with antibiotic ointment and band aids daily. Patient states that his wounds have not healed and he is still having swelling to the right leg. Pain is rated 4 out of 10 and is described as none. Patient has been following my prescribed course of therapy as instructed. Review of Systems   Constitutional:  Negative for activity change, appetite change, chills, diaphoresis, fatigue and fever. Respiratory:  Negative for shortness of breath. Cardiovascular:  Negative for leg swelling. Gastrointestinal:  Negative for diarrhea and nausea. Endocrine: Negative for cold intolerance, heat intolerance and polyuria. Musculoskeletal:  Positive for arthralgias and gait problem. Negative for back pain, joint swelling and myalgias. Skin:  Positive for wound. Negative for color change, pallor and rash. Allergic/Immunologic: Negative for environmental allergies and food allergies. Neurological:  Positive for numbness. Negative for dizziness, weakness and light-headedness. Hematological:  Does not bruise/bleed easily. Psychiatric/Behavioral:  Negative for behavioral problems, confusion and self-injury. The patient is not nervous/anxious. Objective: Clinical evaluation of the patient reveals two small, superficial wounds to the anterior medial aspect of the right leg just proximal to the ankle joint. These wounds do not penetrate through the dermis. There is erythema surrounding these wounds, but there is no calor noted. There is no drainage or malodor noted to either of these wounds. There is pain with palpation to the leg surrounding these wounds.  There is 1+ edema noted to the right leg today. Pedal pulses to the right foot are not palpable. Doppler exam produces a monophasic sound to the DP and a biphasic sound to the PT. Assessment:    Diagnosis Orders   1. Ulcer of right lower extremity, limited to breakdown of skin (HCC)  [place] APPLY OF PASTE BOOT      2. Pain in right leg  [place] APPLY OF PASTE BOOT      3. Edema, lower extremity  [place] APPLY OF PASTE BOOT      4. Type 2 diabetes mellitus with peripheral vascular disease ([facility])  Amb External Referral For Orthotics    [place] APPLY OF PASTE BOOT      5. Type 2 diabetes mellitus with diabetic polyneuropathy, with long-term current use of insulin ([facility])  Amb External Referral For Orthotics    [place] APPLY OF PASTE BOOT            Plan: 1. Clinical evaluation of the patient. 2. I applied a zinc Unna boot under compression to the RLE. Patient to keep this dressing clean, dry, and intact until next visit. Patient is to remove this dressing if he experiences any pain as it could be hypoxia of the tissues. Patient has an appointment to see his PCP on November 1 and he is to discuss this issue with him to see what can be done systemically. Patient qualifies for diabetic shoes and inserts and was given a note for these today. 3. Return in about 4 days (around 11/1/2022) for Wound Care.    10/28/2022      Faby Colon DPM

## 2022-11-01 ENCOUNTER — OFFICE VISIT (OUTPATIENT)
Dept: INTERNAL MEDICINE CLINIC | Age: 62
End: 2022-11-01
Payer: COMMERCIAL

## 2022-11-01 ENCOUNTER — OFFICE VISIT (OUTPATIENT)
Dept: PODIATRY | Age: 62
End: 2022-11-01
Payer: COMMERCIAL

## 2022-11-01 VITALS — WEIGHT: 261 LBS | BODY MASS INDEX: 31.78 KG/M2 | HEIGHT: 76 IN

## 2022-11-01 VITALS
OXYGEN SATURATION: 96 % | HEART RATE: 85 BPM | WEIGHT: 261 LBS | HEIGHT: 76 IN | TEMPERATURE: 97.5 F | BODY MASS INDEX: 31.78 KG/M2 | SYSTOLIC BLOOD PRESSURE: 110 MMHG | DIASTOLIC BLOOD PRESSURE: 72 MMHG

## 2022-11-01 DIAGNOSIS — Z79.4 TYPE 2 DIABETES MELLITUS WITH DIABETIC POLYNEUROPATHY, WITH LONG-TERM CURRENT USE OF INSULIN (HCC): ICD-10-CM

## 2022-11-01 DIAGNOSIS — Z23 NEED FOR VACCINATION: Primary | ICD-10-CM

## 2022-11-01 DIAGNOSIS — E11.51 TYPE 2 DIABETES MELLITUS WITH PERIPHERAL VASCULAR DISEASE (HCC): ICD-10-CM

## 2022-11-01 DIAGNOSIS — L02.611 CELLULITIS AND ABSCESS OF TOE OF RIGHT FOOT: ICD-10-CM

## 2022-11-01 DIAGNOSIS — E11.42 TYPE 2 DIABETES MELLITUS WITH DIABETIC POLYNEUROPATHY, WITH LONG-TERM CURRENT USE OF INSULIN (HCC): ICD-10-CM

## 2022-11-01 DIAGNOSIS — M79.604 PAIN IN RIGHT LEG: ICD-10-CM

## 2022-11-01 DIAGNOSIS — R60.0 EDEMA, LOWER EXTREMITY: ICD-10-CM

## 2022-11-01 DIAGNOSIS — N18.32 STAGE 3B CHRONIC KIDNEY DISEASE (HCC): ICD-10-CM

## 2022-11-01 DIAGNOSIS — L03.031 CELLULITIS AND ABSCESS OF TOE OF RIGHT FOOT: ICD-10-CM

## 2022-11-01 DIAGNOSIS — L97.911 ULCER OF RIGHT LOWER EXTREMITY, LIMITED TO BREAKDOWN OF SKIN (HCC): Primary | ICD-10-CM

## 2022-11-01 PROCEDURE — 2022F DILAT RTA XM EVC RTNOPTHY: CPT | Performed by: PODIATRIST

## 2022-11-01 PROCEDURE — 99213 OFFICE O/P EST LOW 20 MIN: CPT | Performed by: PODIATRIST

## 2022-11-01 PROCEDURE — 99213 OFFICE O/P EST LOW 20 MIN: CPT | Performed by: INTERNAL MEDICINE

## 2022-11-01 PROCEDURE — 3017F COLORECTAL CA SCREEN DOC REV: CPT | Performed by: INTERNAL MEDICINE

## 2022-11-01 PROCEDURE — 1036F TOBACCO NON-USER: CPT | Performed by: INTERNAL MEDICINE

## 2022-11-01 PROCEDURE — 3017F COLORECTAL CA SCREEN DOC REV: CPT | Performed by: PODIATRIST

## 2022-11-01 PROCEDURE — 3074F SYST BP LT 130 MM HG: CPT | Performed by: INTERNAL MEDICINE

## 2022-11-01 PROCEDURE — G8482 FLU IMMUNIZE ORDER/ADMIN: HCPCS | Performed by: INTERNAL MEDICINE

## 2022-11-01 PROCEDURE — 3078F DIAST BP <80 MM HG: CPT | Performed by: INTERNAL MEDICINE

## 2022-11-01 PROCEDURE — G8417 CALC BMI ABV UP PARAM F/U: HCPCS | Performed by: INTERNAL MEDICINE

## 2022-11-01 PROCEDURE — 3044F HG A1C LEVEL LT 7.0%: CPT | Performed by: PODIATRIST

## 2022-11-01 PROCEDURE — G8427 DOCREV CUR MEDS BY ELIG CLIN: HCPCS | Performed by: PODIATRIST

## 2022-11-01 PROCEDURE — 90674 CCIIV4 VAC NO PRSV 0.5 ML IM: CPT | Performed by: INTERNAL MEDICINE

## 2022-11-01 PROCEDURE — 1036F TOBACCO NON-USER: CPT | Performed by: PODIATRIST

## 2022-11-01 PROCEDURE — G8482 FLU IMMUNIZE ORDER/ADMIN: HCPCS | Performed by: PODIATRIST

## 2022-11-01 PROCEDURE — 90471 IMMUNIZATION ADMIN: CPT | Performed by: INTERNAL MEDICINE

## 2022-11-01 PROCEDURE — G8427 DOCREV CUR MEDS BY ELIG CLIN: HCPCS | Performed by: INTERNAL MEDICINE

## 2022-11-01 PROCEDURE — G8417 CALC BMI ABV UP PARAM F/U: HCPCS | Performed by: PODIATRIST

## 2022-11-01 RX ORDER — PERPHENAZINE 16 MG/1
TABLET, FILM COATED ORAL
Qty: 100 STRIP | Refills: 3 | Status: SHIPPED | OUTPATIENT
Start: 2022-11-01

## 2022-11-01 ASSESSMENT — PATIENT HEALTH QUESTIONNAIRE - PHQ9
1. LITTLE INTEREST OR PLEASURE IN DOING THINGS: 0
2. FEELING DOWN, DEPRESSED OR HOPELESS: 0
SUM OF ALL RESPONSES TO PHQ QUESTIONS 1-9: 0
SUM OF ALL RESPONSES TO PHQ QUESTIONS 1-9: 0
SUM OF ALL RESPONSES TO PHQ9 QUESTIONS 1 & 2: 0
SUM OF ALL RESPONSES TO PHQ QUESTIONS 1-9: 0
SUM OF ALL RESPONSES TO PHQ QUESTIONS 1-9: 0

## 2022-11-01 ASSESSMENT — ENCOUNTER SYMPTOMS
COLOR CHANGE: 0
NAUSEA: 0
BACK PAIN: 0
SHORTNESS OF BREATH: 0
DIARRHEA: 0

## 2022-11-01 NOTE — PROGRESS NOTES
Visit Information    Have you changed or started any medications since your last visit including any over-the-counter medicines, vitamins, or herbal medicines? no   Have you stopped taking any of your medications? Is so, why? -  no  Are you having any side effects from any of your medications? - no    Have you seen any other physician or provider since your last visit?  no   Have you had any other diagnostic tests since your last visit?  no   Have you been seen in the emergency room and/or had an admission in a hospital since we last saw you?  no   Have you had your routine dental cleaning in the past 6 months?  no     Do you have an active MyChart account? If no, what is the barrier?  yes    Patient Care Team:  Elizabeth Siddiqui MD as PCP - Mary Christiansen MD as PCP [place] [facility] [facility]  Meliza Goodwin MD as Consulting Physician (Infectious Diseases)    Medical History Review  Past Medical, Family, and Social History reviewed and does contribute to the patient presenting condition    Health Maintenance   Topic Date Due    HIV screen  Never done    DTaP/Tdap/Td vaccine (1 - Tdap) Never done    Shingles vaccine (1 of 2) 10/23/2014    Pneumococcal 0-64 years Vaccine (2 - PCV) 01/04/2015    Diabetic retinal exam  03/25/2022    COVID-19 Vaccine (5 - Booster for Pfizer series) 05/27/2022    Flu vaccine (1) 08/01/2022    Low dose CT lung screening  04/19/2023    Lipids  08/10/2023    A1C test (Diabetic or Prediabetic)  08/24/2023    Diabetic foot exam  08/26/2023    Depression Screen  10/10/2023    Annual Wellness Visit (AWV)  10/11/2023    Colorectal Cancer Screen  01/23/2027    Hepatitis C screen  Completed    Hepatitis A vaccine  Aged Out    Hib vaccine  Aged Out    Meningococcal (ACWY) vaccine  Aged Out

## 2022-11-01 NOTE — PROGRESS NOTES
[address]  [place] [address]  Dept: [phone]  Dept Fax: [phone]    Office Progress/Follow Up Note  Date of patient's visit: 11/1/2022  Patient's Name:  Bhanu Bustamante YOB: 1960            Patient Care Team:  Jesus Andrews MD as PCP - Brenda Larkin MD as PCP - [facility]  Landry Dixon MD as Consulting Physician (Infectious Diseases)    REASON FOR VISIT:  f/u     HISTORY OF PRESENT ILLNESS:      Chief Complaint   Patient presents with    Swelling     Rt foot 2 weeks f/u patient state he has seen Padiotry and the wrapped his leg for swelling      Came for follow-up  Was seen 2 weeks ago because of right foot cellulitis  Completed doxycycline  Patient was seen by podiatrist couple of days later  Does have Ace bandage in place today, I did not take it off  Patient has appointment with podiatrist later today  Patient states that cellulitis has improved  Stated that right leg got swollen  That we would address with the Ace bandage in place  Denies any shortness of breath chest pain  I told patient if no improvement in the swelling will consider low-dose of Lasix    Patient Active Problem List   Diagnosis    Anemia    Neuropathy in diabetes ([facility] [address]    Charcot ankle    PVD (peripheral vascular disease) ([facility] [address]    Type 2 diabetes mellitus with diabetic polyneuropathy, with long-term current use of insulin     Pure hypercholesterolemia    Constipation    PSA elevation    CAD (coronary artery disease)    Chronic pain of left knee    Essential hypertension    Olecranon bursitis of right elbow    Acquired hypothyroidism    Carotid stenosis, asymptomatic, bilateral    Right renal mass    Metabolic acidosis, normal anion gap (NAG)    Hypotension due to drugs    Hydronephrosis of right kidney    Idiopathic hypotension    Status post below-knee amputation of left lower extremity (HCC)    Atrial fibrillation     Ureteral stricture, right    Dizziness    Dermatophytoses    Acute bilateral low back pain without sciatica    Malignant neoplasm of right kidney [facility])    Spinal stenosis of lumbar region with neurogenic claudication    Other chest pain    Spinal stenosis of lumbar region without neurogenic claudication    Lumbar radiculopathy    Chest pain    Rule out acute coronary syndrome    Chronic diastolic heart failure with preserved EF    BPH     Employs prosthetic leg s/p left BKA    Hypertrophic cardiomyopathy (HCC)    Anginal equivalent (HCC)    Chronic renal disease, stage III (HCC) [973488]    Solid nodule of lung 6 mm to 8 mm in diameter    COVID-19 virus infection       Health Maintenance Due   Topic Date Due    HIV screen  Never done    DTaP/Tdap/Td vaccine (1 - Tdap) Never done    Shingles vaccine (1 of 2) 10/23/2014    Pneumococcal 0-64 years Vaccine (2 - PCV) 01/04/2015    Diabetic retinal exam  03/25/2022    COVID-19 Vaccine (5 - Booster for Pfizer series) 05/27/2022    Flu vaccine (1) 08/01/2022       Allergies   Allergen Reactions    Ramipril      Other reaction(s): swelling of the lips   Other reaction(s): swelling of the lips     Adhesive Tape      tegaderm causes blisters. Use paper tape         MEDICATIONS:     Current Outpatient Medications   Medication Sig Dispense Refill    tamsulosin (FLOMAX) 0.4 MG capsule TAKE 1 CAPSULE BY MOUTH EVERY DAY 30 capsule 11    OZEMPIC, 2 MG/DOSE, 8 MG/3ML SOPN INJECT 2 MG UNDER THE SKIN EVERY 7 DAYS. pregabalin (LYRICA) 100 MG capsule Take 1 capsule by mouth in the morning, at noon, and at bedtime for 90 days.  270 capsule 2    amLODIPine (NORVASC) 2.5 MG tablet TAKE 1 TABLET BY MOUTH EVERY DAY 30 tablet 3    metoprolol tartrate (LOPRESSOR) 50 MG tablet TAKE 1 TABLET BY MOUTH TWICE A DAY 60 tablet 3    levothyroxine (SYNTHROID) 175 MCG tablet TAKE 1 TABLET BY MOUTH EVERY DAY 30 tablet 3    losartan (COZAAR) 50 MG tablet TAKE 1 TABLET BY MOUTH EVERY DAY 90 tablet 1 Lactobacillus (ACIDOPHILUS PROBIOTIC) 100 MG CAPS TAKE 1 CAPSULE BY MOUTH TWICE A DAY      Lactobacillus Acid-Pectin (ACIDOPHILUS/CITRUS PECTIN) TABS Take 1 tablet by mouth daily (with breakfast)      clotrimazole-betamethasone (LOTRISONE) 1-0.05 % cream APPLY TO AFFECTED AREA TWICE A DAY 45 g 3    Probiotic Acidophilus (FLORANEX) TABS TAKE 1 TABLET BY MOUTH TWICE A DAY 60 tablet 0    Insulin Glargine, 2 Unit Dial, (TOUJEO MAX SOLOSTAR) 300 UNIT/ML SOPN Inject 110 Units into the skin daily 5 pen 5    aspirin (ASPIRIN LOW DOSE) 81 MG EC tablet Take 1 tablet by mouth in the morning. 30 tablet 5    budesonide-formoterol (SYMBICORT) 160-4.5 MCG/ACT AERO Inhale 2 puffs into the lungs 2 times daily 10.2 g 5    finasteride (PROSCAR) 5 MG tablet TAKE 1 TABLET BY MOUTH EVERY DAY 30 tablet 5    rOPINIRole (REQUIP) 2 MG tablet TAKE 1 TABLET BY MOUTH EVERY DAY 30 tablet 11    ELIQUIS 5 MG TABS tablet TAKE 1 TABLET BY MOUTH TWICE A DAY 60 tablet 8    gemfibrozil (LOPID) 600 MG tablet TAKE 1 TABLET BY MOUTH EVERY DAY 30 tablet 11    Insulin Pen Needle (COMFORT TOUCH INSULIN PEN NEED) 33G X 6 MM MISC 110 Units by Does not apply route daily 5 each 1    atorvastatin (LIPITOR) 40 MG tablet TAKE 1 TABLET BY MOUTH EVERY DAY 30 tablet 11    amiodarone (CORDARONE) 200 MG tablet Take 1 tablet by mouth 2 times daily 60 tablet 0    insulin lispro (HUMALOG) 100 UNIT/ML injection vial SLIDING SCALE (TAKE 5 UNITS IF SUGAR IS OVER 150) 1 each 3    isosorbide mononitrate (IMDUR) 30 MG extended release tablet TAKE 1 TABLET BY MOUTH EVERY DAY 90 tablet 3    budesonide-formoterol (SYMBICORT) 160-4.5 MCG/ACT AERO TAKE 2 PUFFS BY MOUTH TWICE A DAY 3 each 3    B-D UF III MINI PEN NEEDLES 31G X 5 MM MISC USE AS DIRECTED ONCE DAILY TO INJECT TOUJEO 100 each 3    Continuous Blood Gluc  (DEXCOM G6 ) DOUGIE USE AS DIRECTED TO MONITOR BLOOD SUGAR      blood glucose monitor strips T/S Contour.  Test 4 times a day 100 strip 3    albuterol sulfate  (90 Base) MCG/ACT inhaler INHALE 2 PUFFS INTO THE LUNGS EVERY 6 HOURS AS NEEDED FOR WHEEZING OR SHORTNESS OF BREATH 6.7 Inhaler 0    glucagon, rDNA, 1 MG injection Inject 1 mL into the muscle once as needed for Low blood sugar       Blood Pressure KIT 1 actuation by Does not apply route once a week 1 kit 0    Handicap Placard MISC by Does not apply route Duration - 5years  Reason- prosthetic leg 1 each 0    Omega-3 Fatty Acids (FISH OIL CONCENTRATE) 300 MG CAPS Take 300 mg by mouth nightly       Glucosamine Sulfate 500 MG TABS Take 500 mg by mouth 3 times daily       No current facility-administered medications for this visit. SOCIAL HISTORY    Reviewed and no change from previous record. Daniel Parker  reports that he quit smoking about 11 years ago. His smoking use included cigars and cigarettes. He started smoking about 44 years ago. He has a 50.00 pack-year smoking history. He has never used smokeless tobacco.    FAMILY HISTORY:    Reviewed and No change from previous visit  family history includes Alcohol Abuse in his father; COPD in his father; Diabetes in his brother and mother; Hypertension in his father and mother; Kidney Cancer in his father; Other in his sister; Stomach Cancer in his mother.     OF SYSTEMS:    General : Negative for fatigue, weight loss, appetite change  HEENT : Negative for nasal congestion, sneezing, runny nose, sinus pain  Respiratory : Negative for shortness of breath cough, congestion, wheezing  Cardiovascular: Negative for chest pain, palpitations, shortness of breath  GI: Negative for abdominal pain, nausea, vomiting, diarrhea, constipation  Genitourinary : negative for dysuria, urinary frequency, urinary urgency, hematuria  Neurological : Negative for headache, dizziness, gait change,   Psych : Negative for depression, anxiety  Skin: Positive for rash on the right foot  Musculoskeletal : Positive for right leg swelling    PHYSICAL EXAM:      Vitals:    11/01/22 0823   BP: 110/72   Site: Left Upper Arm   Position: Sitting   Cuff Size: Large Adult   Pulse: 85   SpO2: 96%   Weight: 261 lb (118.4 kg)   Height: 6' 4\" (1.93 m)     BP Readings from Last 3 Encounters:   11/01/22 110/72   10/18/22 130/72   10/10/22 132/76        Physical Exam  Constitutional:       General: He is not in acute distress. HENT:      Head: Normocephalic. Mouth/Throat:      Mouth: Mucous membranes are moist.   Eyes:      Pupils: Pupils are equal, round, and reactive to light. Cardiovascular:      Rate and Rhythm: Normal rate and regular rhythm. Pulmonary:      Effort: Pulmonary effort is normal.   Abdominal:      Palpations: Abdomen is soft. Musculoskeletal:      Cervical back: Normal range of motion and neck supple. Comments: Right leg Ace wrap and dressing in place, s/p left BKA   Neurological:      General: No focal deficit present. Mental Status: He is alert and oriented to person, place, and time. Lab Results   Component Value Date    LABA1C 6.9 08/24/2022     Lab Results   Component Value Date     05/04/2022      LABORATORY FINDINGS:    CBC:  Lab Results   Component Value Date/Time    WBC 5.6 12/31/2021 06:27 AM    HGB 14.2 12/31/2021 06:27 AM     12/31/2021 06:27 AM       BMP:    Lab Results   Component Value Date/Time     01/19/2022 01:46 PM    K 5.1 01/19/2022 01:46 PM     01/19/2022 01:46 PM    CO2 22 01/19/2022 01:46 PM    BUN 22 01/19/2022 01:46 PM    CREATININE 1.37 01/19/2022 01:46 PM    GLUCOSE 152 01/19/2022 01:46 PM       HEMOGLOBIN A1C:   Lab Results   Component Value Date/Time    LABA1C 6.9 08/24/2022 01:39 PM       FASTING LIPID PANEL:  Lab Results   Component Value Date    CHOL 110 08/10/2022    HDL 40 (L) 08/10/2022    TRIG 112 08/10/2022       ASSESSMENT AND PLAN:      1. Need for vaccination    - Influenza, FLUCELVAX, (age 10 mo+), IM, Preservative Free, 0.5 mL    2.  Stage 3b chronic kidney disease ([facility] [address]    - Comprehensive Metabolic Panel; Future    3. Cellulitis and abscess of toe of right foot  Patient finished course of doxycycline  Has appointment with podiatrist today  Did see podiatrist couple of days after the previous appointment    Complaining of right leg swelling since the infection  Patient was told of the swelling was noncompliant did not consider low-dose Lasix  No other symptoms currently        FOLLOW UP AND INSTRUCTIONS:   No follow-ups on file. Connordoug Lilly received counseling on the following healthy behaviors: exercise    Discussed use, benefit, and side effects of prescribed medications. Barriers to medication compliance addressed. All patient questions answered. Pt voiced understanding. Patient given educational materials - see patient instructions    MD NANNETTE [facility]  11/1/2022, 8:31 AM    Please note that this chart was generated using voice recognition Dragon dictation software. Although every effort was made to ensure the accuracy of this automatedtranscription, some errors in transcription may have occurred.

## 2022-11-01 NOTE — PROGRESS NOTES
[address] [place] [address] Podiatry  Return Patient Progress Note    Subjective: Nils Cole 58 y.o. male that presents for follow up evaluation of swelling and wound to the right leg. Chief Complaint   Patient presents with    Wound Check     Unna boot follow up     Patient's treatment thus far has included zinc Unna boot. Pain is rated 0 out of 10 and is described as none. Patient has been following my prescribed course of therapy as instructed. Review of Systems   Constitutional:  Negative for activity change, appetite change, chills, diaphoresis, fatigue and fever. Respiratory:  Negative for shortness of breath. Cardiovascular:  Positive for leg swelling. Gastrointestinal:  Negative for diarrhea and nausea. Endocrine: Negative for cold intolerance, heat intolerance and polyuria. Musculoskeletal:  Positive for arthralgias and gait problem. Negative for back pain, joint swelling and myalgias. Skin:  Positive for wound. Negative for color change, pallor and rash. Allergic/Immunologic: Negative for environmental allergies and food allergies. Neurological:  Positive for numbness. Negative for dizziness, weakness and light-headedness. Hematological:  Does not bruise/bleed easily. Psychiatric/Behavioral:  Negative for behavioral problems, confusion and self-injury. The patient is not nervous/anxious. Objective: Clinical evaluation of the patient reveals no remaining edema to the right leg. There is only one remaining wound to the right anterior medial leg, and this wound is nearly completely epithelialized. The other wound presents with an eschar today. There is no erythema, calor, drainage, or malodor noted. There is no pain with palpation or manipulation of the right leg. Assessment:    Diagnosis Orders   1. Ulcer of right lower extremity, limited to breakdown of skin (HCC)        2. Pain in right leg        3. Edema, lower extremity        4.  Type 2 diabetes mellitus with peripheral vascular disease ([facility] [address]        5. Type 2 diabetes mellitus with diabetic polyneuropathy, with long-term current use of insulin ([facility] [address]              Plan: 1. Clinical evaluation of the patient. 2. I applied a band aid to the wound of the right leg. Patient to change this dressing daily until the wound heals and is to return if the wound worsens. Patient to speak with his PCP regarding the edema in his leg. 3. Return if symptoms worsen or fail to improve.    11/1/2022      Alannah Solomon DPM

## 2022-11-09 ENCOUNTER — HOSPITAL ENCOUNTER (OUTPATIENT)
Dept: SLEEP CENTER | Age: 62
Discharge: HOME | End: 2022-11-11
Payer: COMMERCIAL

## 2022-11-09 DIAGNOSIS — G47.33 OSA (OBSTRUCTIVE SLEEP APNEA): ICD-10-CM

## 2022-11-09 PROCEDURE — G0399 HOME SLEEP TEST/TYPE 3 PORTA: HCPCS

## 2022-11-09 RX ORDER — FINASTERIDE 5 MG/1
TABLET, FILM COATED ORAL
Qty: 30 TABLET | Refills: 5 | Status: SHIPPED | OUTPATIENT
Start: 2022-11-09

## 2022-11-14 ENCOUNTER — HOSPITAL ENCOUNTER (OUTPATIENT)
Age: 62
Setting detail: SPECIMEN
Discharge: HOME | End: 2022-11-14

## 2022-11-14 DIAGNOSIS — N18.32 STAGE 3B CHRONIC KIDNEY DISEASE (HCC): ICD-10-CM

## 2022-11-14 LAB
ALBUMIN SERPL-MCNC: 4.1 G/DL (ref 3.5–5.2)
ALBUMIN/GLOBULIN RATIO: 1.4 (ref 1–2.5)
ALP BLD-CCNC: 160 U/L (ref 40–129)
ALT SERPL-CCNC: 35 U/L (ref 5–41)
ANION GAP SERPL CALCULATED.3IONS-SCNC: 11 MMOL/L (ref 9–17)
AST SERPL-CCNC: 27 U/L
BILIRUB SERPL-MCNC: 0.5 MG/DL (ref 0.3–1.2)
BUN BLDV-MCNC: 25 MG/DL (ref 8–23)
CALCIUM SERPL-MCNC: 8.9 MG/DL (ref 8.6–10.4)
CHLORIDE BLD-SCNC: 105 MMOL/L (ref 98–107)
CO2: 26 MMOL/L (ref 20–31)
CREAT SERPL-MCNC: 1.45 MG/DL (ref 0.7–1.2)
GFR SERPL CREATININE-BSD FRML MDRD: 54 ML/MIN/1.73M2
GLUCOSE BLD-MCNC: 170 MG/DL (ref 70–99)
POTASSIUM SERPL-SCNC: 4.4 MMOL/L (ref 3.7–5.3)
SODIUM BLD-SCNC: 142 MMOL/L (ref 135–144)
TOTAL PROTEIN: 7.1 G/DL (ref 6.4–8.3)

## 2022-11-22 LAB — STATUS: NORMAL

## 2022-11-23 ENCOUNTER — OFFICE VISIT (OUTPATIENT)
Dept: PULMONOLOGY | Age: 62
End: 2022-11-23
Payer: COMMERCIAL

## 2022-11-23 VITALS
BODY MASS INDEX: 31.78 KG/M2 | RESPIRATION RATE: 20 BRPM | OXYGEN SATURATION: 98 % | HEART RATE: 93 BPM | WEIGHT: 261 LBS | DIASTOLIC BLOOD PRESSURE: 74 MMHG | HEIGHT: 76 IN | SYSTOLIC BLOOD PRESSURE: 132 MMHG

## 2022-11-23 DIAGNOSIS — R91.1 LUNG NODULE: Primary | ICD-10-CM

## 2022-11-23 PROCEDURE — 99214 OFFICE O/P EST MOD 30 MIN: CPT | Performed by: INTERNAL MEDICINE

## 2022-11-23 PROCEDURE — G8417 CALC BMI ABV UP PARAM F/U: HCPCS | Performed by: INTERNAL MEDICINE

## 2022-11-23 PROCEDURE — 3078F DIAST BP <80 MM HG: CPT | Performed by: INTERNAL MEDICINE

## 2022-11-23 PROCEDURE — 3017F COLORECTAL CA SCREEN DOC REV: CPT | Performed by: INTERNAL MEDICINE

## 2022-11-23 PROCEDURE — 3074F SYST BP LT 130 MM HG: CPT | Performed by: INTERNAL MEDICINE

## 2022-11-23 PROCEDURE — G8427 DOCREV CUR MEDS BY ELIG CLIN: HCPCS | Performed by: INTERNAL MEDICINE

## 2022-11-23 PROCEDURE — G8482 FLU IMMUNIZE ORDER/ADMIN: HCPCS | Performed by: INTERNAL MEDICINE

## 2022-11-23 PROCEDURE — 1036F TOBACCO NON-USER: CPT | Performed by: INTERNAL MEDICINE

## 2022-11-23 NOTE — PROGRESS NOTES
Henny Ma REASON FOR THE CONSULTATION:  Obstructive sleep apnea syndrome  Lung nodule  Diabetes  Hypertension  Obesity  Coronary artery disease  HISTORY OF PRESENT ILLNESS:    Roseann Oreilly has symptoms of sleep apnea syndrome. Repeat sleep study was performed because he did not think that her COVID his CPAP machine was working for him well. His sleep study was done    Home sleep study which revealed an apnea hypopnea index of 9 only. On further questioning it seems that the patient is not sleepy or tired during the daytime. He might take a nap he does not work. But does not find himself sleepy most of the time. He has history of COPD which is under good control no evidence of acute exacerbation    Blood pressure is under good control    Blood sugar is well controlled. He does have below the amputation on the  left side. A repeat CT scan of the chest was performed. We had seen a nodule in the right lung last year. Repeat CT did not reveal any nodules. This was discussed with the patient. We will repeat another CT scan in a year. Already have had a COVID-vaccine and flu vaccine and Pneumovax.       LUNG CANCER SCREENING     CRITERIA MET    [x]     CT ORDERED  []      CRITERIA NOT MET   []      REFUSED                    []        REASON CRITERIA NOT MET     SMOKING LESS THAN 30 PY  []      AGE LESS THAN 55 or GREATER 77 YEARS  []      QUIT SMOKING 15 YEARS OR GREATER   []      RECENT CT WITH IN 11 MONTHS    []      LIFE EXPECTANCY < 5 YEARS   []      SIGNS  AND SYMPTOMS OF LUNG CANCER   []         Immunization   Immunization History   Administered Date(s) Administered    COVID-19, MODERNA Bivalent BOOSTER, (age 12y+), IM, 48 mcg/0.5 mL 11/18/2022    COVID-19, PFIZER GRAY top, DO NOT Dilute, (age 15 y+), IM, 30 mcg/0.3 mL 04/01/2022    COVID-19, PFIZER PURPLE top, DILUTE for use, (age 15 y+), 30mcg/0.3mL 01/23/2021, 02/13/2021, 09/27/2021    Influenza A ([id number]) Vaccine PF IM 11/25/2009    Influenza Virus Vaccine 12/19/2014, 08/19/2021    Influenza, FLUARIX, Thresa Dock (age 10 mo+) AND AFLURIA, (age 1 y+), PF, 0.5mL 12/19/2014, 01/10/2018, 10/22/2018, 10/07/2020, 08/19/2021    Influenza, FLUCELVAX, (age 10 mo+), MDCK, PF, 0.5mL 11/01/2022    Influenza, Triv, inactivated, subunit, adjuvanted, IM (Fluad 65 yrs and older) 10/07/2019    Pneumococcal Polysaccharide ([id number]) 09/09/2013, 01/04/2014    Varicella (Varivax) 08/28/2014      Have had COVID-vaccine pneumococcal Vaccine     [x] Up to date    [] Indicated   [] Refused  [] Contraindicated       Influenza Vaccine   [x] Up to date    [] Indicated   [] Refused  [] Contraindicated          PAST MEDICAL HISTORY:       Diagnosis Date    A-fib [facility])     Asymptomatic bilateral carotid artery stenosis     Boil, thigh 10/2019    10/30/19: right inner thigh region, says PCP Rxd Doxy for this, area is much better, has a couple days left to complete Doxy    CAD (coronary artery disease)     saw Dr. Tejinder Hill ([facility])     Charcot foot due to diabetes mellitus ([address] 2014    LEFT    COPD (chronic obstructive pulmonary disease) ([address] 2009    INHALER USE DAILY    Diabetes mellitus ([address] 1989    IDDM, On Insulin Dr. Marilee Jessica    Diabetic neuropathy ([address]     Difficult intravenous access     VEINS ROLL    Employs prosthetic leg     s/p left BKA    Foot ulcer ([address] PRIOR TO 2015    BILAT    Full dentures     UPPER ONLY    Hyperlipidemia 2004    ON RX    Hypertension 2004    ON RX, PCP Dr. Marilee Jessica, seen Oct. 2019    Hypoglycemia 4/2/2015    MRSA (methicillin resistant staph aureus) culture positive 4/16/2015    ankle    OA (osteoarthritis)     Osteomyelitis ([address]     left stump  BKA    Paroxysmal atrial fibrillation ([address]     Renal mass 09/2019    ACCIDENTAL  FINDING IS FOLLOWING UP WITH KIDNEY DR Ledezma     Suspected sleep apnea     Thyroid disease 2004    PT HAD HYPERTHYROIDISM-UNCONTROLED THYROID DESTROYED WITH RADI IODINE NOW HAS HYPOTHYRIDISM    Vision abnormalities 09/2019 LEFT NO VISION    Vitreous hemorrhage of left eye due to diabetes mellitus ([address] 09/2019    s/p Vitrectomy 9/2019    Wears glasses     Wears partial dentures     full upper, does not wear lower partial    Wellness examination     Dr. Kirt Lua seen within last 1 1/2 mos         Family History:       Problem Relation Age of Onset    Diabetes Mother     Hypertension Mother     Stomach Cancer Mother     Hypertension Father     Alcohol Abuse Father     COPD Father     Kidney Cancer Father     Diabetes Brother         IDDM-PUMP    Other Sister         BRAIN TUMOR       SURGICAL HISTORY:   Past Surgical History:   Procedure Laterality Date    CARDIAC CATHETERIZATION      no stenting    CARDIOVASCULAR STRESS TEST  06/28/2019    small fixed apical, likely normal, EF 48%    COLONOSCOPY  06/17/2016    poor prep, done up to the IC valve, redundant colon    COLONOSCOPY  01/23/2017    VIRTUAL COLONOSCOPY DONE-no polyps or masses    CYSTOSCOPY Bilateral 6/16/2020    CYSTOSCOPY RETROGRADE PYELOGRAM URETEROSCOPY performed by Korin Hillman MD at [place] [address] Right 7/30/2020    CYSTOSCOPY, RIGHT RETROGRADE PYELOGRAM,  URETERAL CATHETER  INSERTION performed by Korin Hillman MD at [place] [address] N/A 9/1/2020    CYSTOSCOPY RETROGRADE PYELOGRAM, RIGHT URETERAL STENT EXCHANGE performed by Korin Hillman MD at [address] [place] [address] Right     r-bone removed    HC  PICC [facility] DOUBLE  5/21/2018         KIDNEY CYST REMOVAL      KIDNEY SURGERY Right 11/13/2019    XI ROBOTIC PARTIAL NEPHRECTOMY, INTRAOP ULTRASOUND, RIGHT URETEROURETEROSTOMY, RIGHT URETERAL STENT PLACEMENT **SHORT STAY** performed by Korin Hillman MD at [address] [place] [address],[facility] Floor 7/30/2020    XI LAPAROSCOPIC ROBOTIC URETEROLYSIS, ROBOTIC ASSISTED BUCCAL URETEROPLASTY  (DR. COBIAN TO ASSIST) performed by Korin Hillman MD at [place] [id number] Left 4/29/15    OTHER SURGICAL HISTORY Left 5-5-15 and 11/2015    Revision BKA    OTHER SURGICAL HISTORY      left stump revision 5/30/2018    TN RE-AMPUTATION LOWER LEG Left 5/30/2018    LEG AMPUTATION BELOW KNEE REVISION performed by Asaf Luna MD at [address] Bilateral 80'S    TOE AMPUTATION      Right 2 and 4    VITRECTOMY Left 9/25/2019    PARS PLANA VITRECTOMY 25 GAUGE, MEMBRANE PEELING, ENDOLASER 200  MW 1044 SPOTS, INDIRECT LASER 236 SPOTS performed by Kolby Dozier MD at [address]:      There is no history of TB or TB exposure. There is no asbestos or silica dust exposure. The patient reports there is no coal, foundry, quarry or [employer] exposure. Travel history reveals negative  There is no history of recreational or IV drug use. There is no hot tube exposure. 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 His smoking use included cigars and cigarettes. He started smoking about 44 years ago. He has a 50.00 pack-year smoking history. He has never used smokeless tobacco.  ETOH:   reports current alcohol use. ALLERGIES:      Allergies   Allergen Reactions    Ramipril      Other reaction(s): swelling of the lips   Other reaction(s): swelling of the lips     Adhesive Tape      tegaderm causes blisters. Use paper tape         Home Meds:   Prior to Admission medications    Medication Sig Start Date End Date Taking? Authorizing Provider   finasteride (PROSCAR) 5 MG tablet TAKE 1 TABLET BY MOUTH EVERY DAY 11/9/22  Yes Altaf Kennedy MD   blood glucose test strips (CONTOUR NEXT TEST) strip TEST 4 TIMES A DAY 11/1/22  Yes Altaf Kennedy MD   tamsulosin (FLOMAX) 0.4 MG capsule TAKE 1 CAPSULE BY MOUTH EVERY DAY 10/17/22  Yes Altaf Kennedy MD   OZEMPIC, 2 MG/DOSE, 8 MG/3ML SOPN INJECT 2 MG UNDER THE SKIN EVERY 7 DAYS. 9/16/22  Yes Historical Provider, MD   pregabalin (LYRICA) 100 MG capsule Take 1 capsule by mouth in the morning, at noon, and at bedtime for 90 days. 10/10/22 1/8/23 Yes Matthew Ibrahim MD   amLODIPine (NORVASC) 2.5 MG tablet TAKE 1 TABLET BY MOUTH EVERY DAY 9/20/22  Yes Matthew Ibrahim MD   metoprolol tartrate (LOPRESSOR) 50 MG tablet TAKE 1 TABLET BY MOUTH TWICE A DAY 9/20/22  Yes Matthew Ibrahim MD   levothyroxine (SYNTHROID) 175 MCG tablet TAKE 1 TABLET BY MOUTH EVERY DAY 9/20/22  Yes Matthew Ibrahim MD   losartan (COZAAR) 50 MG tablet TAKE 1 TABLET BY MOUTH EVERY DAY 9/9/22  Yes Matthew Ibrahim MD   Lactobacillus (ACIDOPHILUS PROBIOTIC) 100 MG CAPS TAKE 1 CAPSULE BY MOUTH TWICE A DAY 8/5/22  Yes Historical Provider, MD   Lactobacillus Acid-Pectin (ACIDOPHILUS/CITRUS PECTIN) TABS Take 1 tablet by mouth daily (with breakfast)   Yes Historical Provider, MD   clotrimazole-betamethasone (LOTRISONE) 1-0.05 % cream APPLY TO AFFECTED AREA TWICE A DAY 8/5/22  Yes Matthew Ibrahim MD   Probiotic Acidophilus (FLORANEX) TABS TAKE 1 TABLET BY MOUTH TWICE A DAY 8/5/22  Yes Matthew Ibrahim MD   Insulin Glargine, 2 Unit Dial, (TOUJEO MAX SOLOSTAR) 300 UNIT/ML SOPN Inject 110 Units into the skin daily 8/1/22  Yes Matthew Ibrahim MD   aspirin (ASPIRIN LOW DOSE) 81 MG EC tablet Take 1 tablet by mouth in the morning.  7/15/22  Yes Matthew Ibrahim MD   budesonide-formoterol (SYMBICORT) 160-4.5 MCG/ACT AERO Inhale 2 puffs into the lungs 2 times daily 7/5/22  Yes Kari Beth MD   rOPINIRole (REQUIP) 2 MG tablet TAKE 1 TABLET BY MOUTH EVERY DAY 3/10/22  Yes Matthew Ibrahim MD   ELIQUIS 5 MG TABS tablet TAKE 1 TABLET BY MOUTH TWICE A DAY 3/10/22  Yes Matthew Ibrahim MD   gemfibrozil (LOPID) 600 MG tablet TAKE 1 TABLET BY MOUTH EVERY DAY 3/1/22  Yes Jonathon Leon MD   Insulin Pen Needle (COMFORT TOUCH INSULIN PEN NEED) 33G X 6 MM MISC 110 Units by Does not apply route daily 1/19/22  Yes Matthew Ibrahim MD atorvastatin (LIPITOR) 40 MG tablet TAKE 1 TABLET BY MOUTH EVERY DAY 1/13/22  Yes Chelsea Goldstein MD   insulin lispro (HUMALOG) 100 UNIT/ML injection vial SLIDING SCALE (TAKE 5 UNITS IF SUGAR IS OVER 150) 12/31/21  Yes Marii Padilla DO   isosorbide mononitrate (IMDUR) 30 MG extended release tablet TAKE 1 TABLET BY MOUTH EVERY DAY 12/28/21  Yes Chelsea Goldstein MD   budesonide-formoterol (SYMBICORT) 160-4.5 MCG/ACT AERO TAKE 2 PUFFS BY MOUTH TWICE A DAY 12/14/21  Yes Chelsea Goldstein MD   B-D UF III MINI PEN NEEDLES 31G X 5 MM MISC USE AS DIRECTED ONCE DAILY TO INJECT TOUJEO 11/16/21  Yes Chelsea Goldstein MD   Continuous Blood Gluc  (DEXCOM G6 ) [address] USE AS DIRECTED TO MONITOR BLOOD SUGAR 5/20/21  Yes Historical Provider, MD   albuterol sulfate  (90 Base) MCG/ACT inhaler INHALE 2 PUFFS INTO THE LUNGS EVERY 6 HOURS AS NEEDED FOR WHEEZING OR SHORTNESS OF BREATH 1/18/21  Yes Chelsea Goldstein MD   glucagon, rDNA, 1 MG injection Inject 1 mL into the muscle once as needed for Low blood sugar  11/13/19  Yes Historical Provider, MD   Blood Pressure KIT 1 actuation by Does not apply route once a week 3/25/20  Yes Lior Jim MD   Handicap Placard MISC by Does not apply route Duration - 5years  Reason- prosthetic leg 1/2/20  Yes Lior Jim MD   Omega-3 Fatty Acids (FISH OIL CONCENTRATE) 300 MG CAPS Take 300 mg by mouth nightly    Yes Tommy Goldman MD   Glucosamine Sulfate 500 MG TABS Take 500 mg by mouth 3 times daily   Yes Hui Herman MD   amiodarone (CORDARONE) 200 MG tablet Take 1 tablet by mouth 2 times daily 12/31/21 11/1/22  William Patten DO              REVIEW OF SYSTEMS: The system was conducted for all other 10 system no additional information was noted other than what is mentioned above.     CONSTITUTIONAL:  negative for  fevers, chills, sweats, fatigue, malaise, anorexia and weight loss  EYES:  negative for  double vision, blurred vision, dry eyes, eye discharge and redness  HEENT:  negative for  hearing loss, tinnitus, ear drainage, earaches and nasal congestion  RESPIRATORY:  See hpi  CARDIOVASCULAR:  negative for  chest pain,, palpitations, orthopnea, PND  GASTROINTESTINAL:  negative for nausea, vomiting, change in bowel habits, diarrhea, constipation, abdominal pain, pruritus, abdominal mass and abdominal distention  GENITOURINARY:  negative for frequency, dysuria, nocturia, urinary incontinence and hesitancy  INTEGUMENT  negative for rash, skin lesion(s), dryness, skin color change, changes in lesion, pruritus and changes in hair  HEMATOLOGIC/LYMPHATIC:  negative for easy bruising, bleeding, lymphadenopathy, petechiae and swelling/edema  ALLERGIC/IMMUNOLOGIC:  negative for recurrent infections, urticaria and drug reactions  ENDOCRINE:  negative for heat intolerance, cold intolerance, tremor, weight changes and change in bowel habits  MUSCULOSKELETAL:  negative for  myalgias, arthralgias, pain, joint swelling, stiff joints and decreased range of motion  NEUROLOGICAL:  negative for headaches, dizziness, seizures, memory problems, speech problems, visual disturbance and coordination problems  BEHAVIOR/PSYCH:  negative for poor appetite, increased appetite, decreased sleep, increased sleep, decreased energy level, increased energy level and poor concentration  Skin no rash no dermatitis  Vitals:  /74   Pulse 93   Resp 20   Ht 6' 4\" (1.93 m)   Wt 261 lb (118.4 kg)   SpO2 98% Comment: room air  BMI 31.77 kg/m²     PHYSICAL EXAM:  General Appearance:    Alert, cooperative, no distress, appears stated age he is obese no respiratory distress not using any accessory muscles.    Head:    Normocephalic, without obvious abnormality, atraumatic      Eyes:    PERRL, conjunctiva/corneas clear, EOM's intact no Monet syndrome no jaundice   Ears:    Normal  external ear canals, both ears   Nose:   Nares normal, septum midline, mucosa normal, no drainage        or sinus tenderness no nasal polyps   Throat:   Lips, mucosa, and tongue normal; teeth and gums normal throat is not compromised   Neck:   Supple, symmetrical, trachea midline, no adenopathy;     thyroid:  no enlargement/tenderness/nodules; no carotid    bruit , JVD not elevated peridural reflux negative neck is short and fat   Back:     Symmetric, no curvature, ROM normal, no CVA tenderness   Lungs:    AP diameter is not increased percussion note is normally resonant breathing vesicular expiration slightly prolonged no rails or rhonchi aortic borders audible no pleural friction rub is audible   Chest Wall:    No tenderness or deformity      Heart:   Irregular rate and rhythm, S1 and S2 normal, no murmur, rub        or gallop no rvh he has atrial fibrillation rate                          Abdomen:                                                 Pulses:                              Skin:                  Lymph nodes:                    Neurologic:                  Soft, non-tender, bowel sounds active all four quadrants,     no masses, no organomegaly         2+ and symmetric all extremities     Skin color, texture, turgor normal, no rashes or lesions       Cervical, supraclavicular not enlarged or matted or tender      CNII-XII intact, normal strength 5/5 . Sensation grossly normal  and reflexes normal 2+  throughout he does have evidence of peripheral neuropathy in the arms and lower limbs which is distal and very likely secondary to diabetes     Clubbing No  Lower ext edema absent  Upper ext edema absent         Musculoskeletal no synovitis. No joint swelling or tenderness does have below-knee amputation on the left side secondary to peripheral neuropathy. CBC: No results for input(s): WBC, HGB, PLT in the last 72 hours. BMP:  No results for input(s): NA, K, CL, CO2, BUN, CREATININE, GLUCOSE in the last 72 hours.   Hepatic: No results for input(s): AST, ALT, ALB, BILITOT, ALKPHOS in the last 72 hours. Amylase: No results found for: AMYLASE  Lipase: No results found for: LIPASE  Troponin: No results for input(s): TROPONINI in the last 72 hours. BNP: No results for input(s): BNP in the last 72 hours. Lipids: No results for input(s): CHOL, HDL in the last 72 hours. Invalid input(s): LDLCALCU  ABGs:   Lab Results   Component Value Date/Time    PHART 7.305 11/13/2019 10:20 AM    PO2ART 112.0 11/13/2019 10:20 AM    QVE2XBY 40.9 11/13/2019 10:20 AM     INR: No results for input(s): INR in the last 72 hours. Thyroid:   Lab Results   Component Value Date/Time    TSH 0.41 08/10/2022 12:20 PM     Urinalysis: No results for input(s): BACTERIA, BLOODU, CLARITYU, COLORU, PHUR, PROTEINU, RBCUA, SPECGRAV, BILIRUBINUR, NITRU, WBCUA, LEUKOCYTESUR, GLUCOSEU in the last 72 hours. Cultures:-  No cultures    CXR  Last chest x-ray reviewed unremarkable      CT Scans  CT scan suggestive of COPD or 7 mm nodule noted in the right lower lobe which was not present in 2021 no pleural effusion or large lymph nodes are noted    Echo    No recent echo            IMPRESSION:      Hypertension  Diabetes  Diabetic neuropathy  Coronary artery disease  Below-knee amputation on the left side  Peripheral vascular disease due to diabetes  Obesity  Possible COPD  :                PLAN:   COPD is under good control he will continue the same bronchodilator therapy as before    Discussed I discussed the results of the CT scan with the patient. The nodule seen in 21 has resolved. Is not visible anymore. Most likely it was a granuloma or inflammation. I discussed with the patient and reassured him. We will get a repeat CT scan in a year. He has a history of coronary artery disease denies any angina or PND    He has diabetic neuropathy which is being treated with he endocrinology.   He    He also has peripheral peripheral vascular disease with diabetes which is under control    Encouraged him to lose weight    Her blood pressure is under good control. Plan to see in follow-up in 6 months    Did not require any prescription of the bronchodilators    Continue Symbicort and albuterol.

## 2022-11-28 RX ORDER — APIXABAN 5 MG/1
TABLET, FILM COATED ORAL
Qty: 60 TABLET | Refills: 8 | Status: SHIPPED | OUTPATIENT
Start: 2022-11-28

## 2022-11-28 RX ORDER — ISOSORBIDE MONONITRATE 30 MG/1
TABLET, EXTENDED RELEASE ORAL
Qty: 30 TABLET | Refills: 11 | Status: SHIPPED | OUTPATIENT
Start: 2022-11-28

## 2022-11-29 ENCOUNTER — OFFICE VISIT (OUTPATIENT)
Dept: INTERNAL MEDICINE CLINIC | Age: 62
End: 2022-11-29
Payer: COMMERCIAL

## 2022-11-29 DIAGNOSIS — G89.29 CHRONIC RIGHT-SIDED LOW BACK PAIN WITH RIGHT-SIDED SCIATICA: ICD-10-CM

## 2022-11-29 DIAGNOSIS — J40 TRACHEOBRONCHITIS: ICD-10-CM

## 2022-11-29 DIAGNOSIS — E03.9 ACQUIRED HYPOTHYROIDISM: ICD-10-CM

## 2022-11-29 DIAGNOSIS — N18.30 STAGE 3 CHRONIC KIDNEY DISEASE, UNSPECIFIED WHETHER STAGE 3A OR 3B CKD (HCC): ICD-10-CM

## 2022-11-29 DIAGNOSIS — E11.42 TYPE 2 DIABETES MELLITUS WITH DIABETIC POLYNEUROPATHY, WITH LONG-TERM CURRENT USE OF INSULIN (HCC): Primary | ICD-10-CM

## 2022-11-29 DIAGNOSIS — M48.061 SPINAL STENOSIS OF LUMBAR REGION WITHOUT NEUROGENIC CLAUDICATION: ICD-10-CM

## 2022-11-29 DIAGNOSIS — M54.41 CHRONIC RIGHT-SIDED LOW BACK PAIN WITH RIGHT-SIDED SCIATICA: ICD-10-CM

## 2022-11-29 DIAGNOSIS — I10 ESSENTIAL HYPERTENSION: ICD-10-CM

## 2022-11-29 DIAGNOSIS — Z79.4 TYPE 2 DIABETES MELLITUS WITH DIABETIC POLYNEUROPATHY, WITH LONG-TERM CURRENT USE OF INSULIN (HCC): Primary | ICD-10-CM

## 2022-11-29 DIAGNOSIS — I50.32 CHRONIC DIASTOLIC HEART FAILURE (HCC): Chronic | ICD-10-CM

## 2022-11-29 DIAGNOSIS — I42.2 HYPERTROPHIC CARDIOMYOPATHY (HCC): ICD-10-CM

## 2022-11-29 PROBLEM — U07.1 COVID-19 VIRUS INFECTION: Status: RESOLVED | Noted: 2022-07-05 | Resolved: 2022-11-29

## 2022-11-29 LAB — HBA1C MFR BLD: 7.8 %

## 2022-11-29 PROCEDURE — 99214 OFFICE O/P EST MOD 30 MIN: CPT | Performed by: INTERNAL MEDICINE

## 2022-11-29 PROCEDURE — 1036F TOBACCO NON-USER: CPT | Performed by: INTERNAL MEDICINE

## 2022-11-29 PROCEDURE — G8482 FLU IMMUNIZE ORDER/ADMIN: HCPCS | Performed by: INTERNAL MEDICINE

## 2022-11-29 PROCEDURE — 3044F HG A1C LEVEL LT 7.0%: CPT | Performed by: INTERNAL MEDICINE

## 2022-11-29 PROCEDURE — 3017F COLORECTAL CA SCREEN DOC REV: CPT | Performed by: INTERNAL MEDICINE

## 2022-11-29 PROCEDURE — G8427 DOCREV CUR MEDS BY ELIG CLIN: HCPCS | Performed by: INTERNAL MEDICINE

## 2022-11-29 PROCEDURE — 83036 HEMOGLOBIN GLYCOSYLATED A1C: CPT | Performed by: INTERNAL MEDICINE

## 2022-11-29 PROCEDURE — G8417 CALC BMI ABV UP PARAM F/U: HCPCS | Performed by: INTERNAL MEDICINE

## 2022-11-29 PROCEDURE — 2022F DILAT RTA XM EVC RTNOPTHY: CPT | Performed by: INTERNAL MEDICINE

## 2022-11-29 RX ORDER — AZITHROMYCIN 250 MG/1
250 TABLET, FILM COATED ORAL SEE ADMIN INSTRUCTIONS
Qty: 6 TABLET | Refills: 0 | Status: SHIPPED | OUTPATIENT
Start: 2022-11-29 | End: 2022-12-04

## 2022-11-29 ASSESSMENT — ENCOUNTER SYMPTOMS
BLOOD IN STOOL: 0
ABDOMINAL DISTENTION: 0
TROUBLE SWALLOWING: 0
EYE DISCHARGE: 0
DIARRHEA: 0
WHEEZING: 0
SHORTNESS OF BREATH: 0
COUGH: 1
COLOR CHANGE: 0
EYE PAIN: 0

## 2022-11-29 NOTE — PROGRESS NOTES
[address] [facility] [address]  [place] [address]  Dept: [phone]  Dept Fax: [phone]    Bianca Najera is a 58 y.o. male who presents today for his medical conditions/complaintsas noted below. Bianca Najera is c/o of   Chief Complaint   Patient presents with    Diabetes    Cough     Cough and wheezing for weeks     Hip Pain     Right hip, no injury          HPI:     HTN  Onset more than 2 years ago  gustavo mild to mod  Controlled with current po meds  Not associated with headaches or blurry vision  No chest pain  Diabetes   Duration more than 7 years  Modifying factors on Glucophage and other med  Severity uncontrolled sever  Associated signs and symtoms neuropathy/ckd/ CAD. aggravated with sugar diet and better with low sugar diet             Hemoglobin A1C (%)   Date Value   08/24/2022 6.9   05/04/2022 7.8 (H)   12/14/2021 7.8             ( goal A1Cis < 7)   Microalb/Crt.  Ratio (mcg/mg creat)   Date Value   12/14/2021 35 (H)     LDL Cholesterol (mg/dL)   Date Value   08/10/2022 48   05/13/2021 55   01/13/2020 70       (goal LDL is <100)   AST (U/L)   Date Value   11/14/2022 27     ALT (U/L)   Date Value   11/14/2022 35     BUN (mg/dL)   Date Value   11/14/2022 25 (H)     BP Readings from Last 3 Encounters:   11/23/22 132/74   11/01/22 110/72   10/18/22 130/72          (goal 120/80)    Past Medical History:   Diagnosis Date    A-fib [facility])     Asymptomatic bilateral carotid artery stenosis     Boil, thigh 10/2019    10/30/19: right inner thigh region, says PCP Rxd Doxy for this, area is much better, has a couple days left to complete Doxy    CAD (coronary artery disease)     saw Dr[facility] Claudeen Penton [facility]     Charcot foot due to diabetes mellitus ([address] 2014    LEFT    COPD (chronic obstructive pulmonary disease) ([facility] 2009    INHALER USE DAILY    Diabetes mellitus ([facility] 1989    IDDM, On Insulin Dr. Roselynn Mariscal    Diabetic neuropathy [facility])     Difficult intravenous access     VEINS ROLL Employs prosthetic leg     s/p left BKA    Foot ulcer ([address] PRIOR TO 2015    BILAT    Full dentures     UPPER ONLY    Hyperlipidemia 2004    ON RX    Hypertension 2004    ON RX, PCP Dr. Johnna Jain, seen Oct. 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 Edward Roa seen within last 1 1/2 mos      Past Surgical History:   Procedure Laterality Date    CARDIAC CATHETERIZATION      no stenting    CARDIOVASCULAR STRESS TEST  06/28/2019    small fixed apical, likely normal, EF 48%    COLONOSCOPY  06/17/2016    poor prep, done up to the IC valve, redundant colon    COLONOSCOPY  01/23/2017    VIRTUAL COLONOSCOPY DONE-no polyps or masses    CYSTOSCOPY Bilateral 6/16/2020    CYSTOSCOPY RETROGRADE PYELOGRAM URETEROSCOPY performed by Thao Garcia MD at [address] Right 7/30/2020    CYSTOSCOPY, RIGHT RETROGRADE PYELOGRAM,  URETERAL CATHETER  INSERTION performed by Thao Garcia MD at [facility] [address] 9/1/2020    CYSTOSCOPY RETROGRADE PYELOGRAM, RIGHT URETERAL STENT EXCHANGE performed by Thao Garcia MD at [address] [facility] Right     r-bone removed    [facility].  PICC [facility] DOUBLE  5/21/2018         KIDNEY CYST REMOVAL      KIDNEY SURGERY Right 11/13/2019    XI ROBOTIC PARTIAL NEPHRECTOMY, INTRAOP ULTRASOUND, RIGHT URETEROURETEROSTOMY, RIGHT URETERAL STENT PLACEMENT **SHORT STAY** performed by Deni Amezquita MD at [address] [facility] Floor 2020    XI LAPAROSCOPIC ROBOTIC URETEROLYSIS, ROBOTIC ASSISTED BUCCAL URETEROPLASTY  (DR. COBIAN TO ASSIST) performed by Deni Amezquita MD at [address] Left 4/29/15    OTHER SURGICAL HISTORY Left 5-5-15 and 2015    Revision BKA    OTHER SURGICAL HISTORY      left stump revision 2018    CT RE-AMPUTATION LOWER LEG Left 2018    LEG AMPUTATION BELOW KNEE REVISION performed by Teresa Fiore MD at [address] [facility] Bilateral 80'S    TOE AMPUTATION      Right 2 and 4    VITRECTOMY Left 2019    PARS PLANA VITRECTOMY 25 GAUGE, MEMBRANE PEELING, ENDOLASER 200  MW 1044 SPOTS, INDIRECT LASER 26 SPOTS performed by Annalee Coates MD at [address] [facility] History   Problem Relation Age of Onset    Diabetes Mother     Hypertension Mother     Stomach Cancer Mother     Hypertension Father     Alcohol Abuse Father     COPD Father     Kidney Cancer Father     Diabetes Brother         IDDM-PUMP    Other Sister         BRAIN TUMOR       Social History     Tobacco Use    Smoking status: Former     Packs/day: 2.00     Years: 25.00     Pack years: 50.00     Types: Cigars, Cigarettes     Start date: 1     Quit date: 2011     Years since quittin.5    Smokeless tobacco: Never    Tobacco comments:     quit in    Substance Use Topics    Alcohol use:  Yes     Alcohol/week: 0.0 standard drinks     Comment: BEER 2 A MONTH      Current Outpatient Medications   Medication Sig Dispense Refill    azithromycin (ZITHROMAX) 250 MG tablet Take 1 tablet by mouth See Admin Instructions for 5 days 500mg on day 1 followed by 250mg on days 2 - 5 6 tablet 0    isosorbide mononitrate (IMDUR) 30 MG extended release tablet TAKE 1 TABLET BY MOUTH EVERY DAY 30 tablet 11    ELIQUIS 5 MG TABS tablet TAKE 1 TABLET BY MOUTH TWICE A DAY 60 tablet 8    finasteride (PROSCAR) 5 MG tablet TAKE 1 TABLET BY MOUTH EVERY DAY 30 tablet 5    blood glucose test strips (CONTOUR NEXT TEST) strip TEST 4 TIMES A  strip 3    tamsulosin (FLOMAX) 0.4 MG capsule TAKE 1 CAPSULE BY MOUTH EVERY DAY 30 capsule 11    OZEMPIC, 2 MG/DOSE, 8 MG/3ML SOPN INJECT 2 MG UNDER THE SKIN EVERY 7 DAYS. pregabalin (LYRICA) 100 MG capsule Take 1 capsule by mouth in the morning, at noon, and at bedtime for 90 days. 270 capsule 2    amLODIPine (NORVASC) 2.5 MG tablet TAKE 1 TABLET BY MOUTH EVERY DAY 30 tablet 3    metoprolol tartrate (LOPRESSOR) 50 MG tablet TAKE 1 TABLET BY MOUTH TWICE A DAY 60 tablet 3    levothyroxine (SYNTHROID) 175 MCG tablet TAKE 1 TABLET BY MOUTH EVERY DAY 30 tablet 3    losartan (COZAAR) 50 MG tablet TAKE 1 TABLET BY MOUTH EVERY DAY 90 tablet 1    Lactobacillus (ACIDOPHILUS PROBIOTIC) 100 MG CAPS TAKE 1 CAPSULE BY MOUTH TWICE A DAY      Lactobacillus Acid-Pectin (ACIDOPHILUS/CITRUS PECTIN) TABS Take 1 tablet by mouth daily (with breakfast)      clotrimazole-betamethasone (LOTRISONE) 1-0.05 % cream APPLY TO AFFECTED AREA TWICE A DAY 45 g 3    Probiotic Acidophilus (FLORANEX) TABS TAKE 1 TABLET BY MOUTH TWICE A DAY 60 tablet 0    Insulin Glargine, 2 Unit Dial, (TOUJEO MAX SOLOSTAR) 300 UNIT/ML SOPN Inject 110 Units into the skin daily 5 pen 5    aspirin (ASPIRIN LOW DOSE) 81 MG EC tablet Take 1 tablet by mouth in the morning.  30 tablet 5    budesonide-formoterol (SYMBICORT) 160-4.5 MCG/ACT AERO Inhale 2 puffs into the lungs 2 times daily 10.2 g 5    rOPINIRole (REQUIP) 2 MG tablet TAKE 1 TABLET BY MOUTH EVERY DAY 30 tablet 11    gemfibrozil (LOPID) 600 MG tablet TAKE 1 TABLET BY MOUTH EVERY DAY 30 tablet 11    Insulin Pen Needle (COMFORT TOUCH INSULIN PEN NEED) 33G X 6 MM MISC 110 Units by Does not apply route daily 5 each 1    atorvastatin (LIPITOR) 40 MG tablet TAKE 1 TABLET BY MOUTH EVERY DAY 30 tablet 11    insulin lispro (HUMALOG) 100 UNIT/ML injection vial SLIDING SCALE (TAKE 5 UNITS IF SUGAR IS OVER 150) 1 each 3    budesonide-formoterol (SYMBICORT) 160-4.5 MCG/ACT AERO TAKE 2 PUFFS BY MOUTH TWICE A DAY 3 each 3    B-D UF III MINI PEN NEEDLES 31G X 5 MM MISC USE AS DIRECTED ONCE DAILY TO INJECT TOUJEO 100 each 3    Continuous Blood Gluc  (DEXCOM G6 ) DOUGIE USE AS DIRECTED TO MONITOR BLOOD SUGAR      albuterol sulfate  (90 Base) MCG/ACT inhaler INHALE 2 PUFFS INTO THE LUNGS EVERY 6 HOURS AS NEEDED FOR WHEEZING OR SHORTNESS OF BREATH 6.7 Inhaler 0    glucagon, rDNA, 1 MG injection Inject 1 mL into the muscle once as needed for Low blood sugar       Blood Pressure KIT 1 actuation by Does not apply route once a week 1 kit 0    Handicap Placard MISC by Does not apply route Duration - 5years  Reason- prosthetic leg 1 each 0    Omega-3 Fatty Acids (FISH OIL CONCENTRATE) 300 MG CAPS Take 300 mg by mouth nightly       Glucosamine Sulfate 500 MG TABS Take 500 mg by mouth 3 times daily      amiodarone (CORDARONE) 200 MG tablet Take 1 tablet by mouth 2 times daily 60 tablet 0     No current facility-administered medications for this visit. Allergies   Allergen Reactions    Ramipril      Other reaction(s): swelling of the lips   Other reaction(s): swelling of the lips     Adhesive Tape      tegaderm causes blisters.  Use paper tape       Health Maintenance   Topic Date Due    HIV screen  Never done    DTaP/Tdap/Td vaccine (1 - Tdap) Never done    Shingles vaccine (1 of 2) 10/23/2014    Pneumococcal 0-64 years Vaccine (2 - PCV) 01/04/2015    Diabetic retinal exam  03/25/2022    Low dose CT lung screening  04/19/2023    Lipids  08/10/2023    A1C test (Diabetic or Prediabetic)  08/24/2023    Diabetic foot exam  08/26/2023    Annual Wellness Visit (AWV)  10/11/2023    Depression Screen  11/01/2023    Colorectal Cancer Screen  01/23/2027    Flu vaccine  Completed    COVID-19 Vaccine  Completed Hepatitis C screen  Completed    Hepatitis A vaccine  Aged Out    Hib vaccine  Aged Out    Meningococcal (ACWY) vaccine  Aged Out       Subjective:     Review of Systems   Constitutional:  Negative for appetite change, diaphoresis and fatigue. HENT:  Negative for ear discharge and trouble swallowing. Eyes:  Negative for pain and discharge. Respiratory:  Positive for cough. Negative for shortness of breath and wheezing. Cardiovascular:  Negative for chest pain and palpitations. Gastrointestinal:  Negative for abdominal distention, blood in stool and diarrhea. Endocrine: Negative for polydipsia and polyphagia. Genitourinary:  Negative for difficulty urinating and frequency. Musculoskeletal:  Positive for arthralgias. Negative for gait problem, myalgias and neck pain. Hx of left bka     Skin:  Negative for color change and rash. Allergic/Immunologic: Negative for environmental allergies and food allergies. Neurological:  Negative for dizziness and headaches. Hematological:  Negative for adenopathy. Does not bruise/bleed easily. Psychiatric/Behavioral:  Negative for behavioral problems and sleep disturbance. Objective:     Physical Exam  Constitutional:       Appearance: He is well-developed. He is not diaphoretic. HENT:      Head: Normocephalic and atraumatic. Eyes:      General:         Right eye: No discharge. Left eye: No discharge. Extraocular Movements:      Right eye: Normal extraocular motion. Left eye: Normal extraocular motion. Conjunctiva/sclera: Conjunctivae normal.      Right eye: Right conjunctiva is not injected. Left eye: Left conjunctiva is not injected. Neck:      Thyroid: No thyroid mass or thyromegaly. Vascular: No JVD. Cardiovascular:      Rate and Rhythm: Normal rate and regular rhythm. Heart sounds: No murmur heard. No friction rub.    Pulmonary:      Effort: Pulmonary effort is normal. No tachypnea, bradypnea, accessory muscle usage or respiratory distress. Breath sounds: Normal breath sounds. No wheezing or rales. Abdominal:      General: Bowel sounds are normal. There is no distension. Palpations: Abdomen is soft. Tenderness: There is no abdominal tenderness. There is no rebound. Musculoskeletal:         General: No tenderness. Normal range of motion. Cervical back: Normal range of motion and neck supple. No edema or erythema. Comments: Left bka     Lymphadenopathy:      Head:      Right side of head: No submental or submandibular adenopathy. Left side of head: No submental or submandibular adenopathy. Cervical: No cervical adenopathy. Skin:     General: Skin is warm. Coloration: Skin is not pale. Findings: No bruising, ecchymosis or rash. Neurological:      Mental Status: He is alert and oriented to person, place, and time. Cranial Nerves: No cranial nerve deficit. Sensory: No sensory deficit. Motor: No atrophy or abnormal muscle tone. Coordination: Coordination normal.   Psychiatric:         Mood and Affect: Mood is not anxious. Affect is not angry. Speech: Speech is not slurred. Behavior: Behavior normal. Behavior is not aggressive. Thought Content: Thought content does not include homicidal ideation. Cognition and Memory: Memory is not impaired. Wt 258 lb (117 kg)   BMI 31.40 kg/m²     Assessment:       Diagnosis Orders   1. Type 2 diabetes mellitus with diabetic polyneuropathy, with long-term current use of insulin   POCT glycosylated hemoglobin (Hb A1C)      2. Stage 3 chronic kidney disease, unspecified whether stage 3a or 3b CKD ([address]        3. Hypertrophic cardiomyopathy ([address]        4. Chronic diastolic heart failure with preserved EF        5. Spinal stenosis of lumbar region without neurogenic claudication        6. Essential hypertension        7. Acquired hypothyroidism        8.  Tracheobronchitis azithromycin (ZITHROMAX) 250 MG tablet      9. Chronic right-sided low back pain with right-sided sciatica  XR HIP RIGHT (2-3 VIEWS)    XR LUMBAR SPINE (2-3 VIEWS)                Plan:      No follow-ups on file. Orders Placed This Encounter   Procedures    XR HIP RIGHT (2-3 VIEWS)     Standing Status:   Future     Standing Expiration Date:   11/29/2023    XR LUMBAR SPINE (2-3 VIEWS)     Standing Status:   Future     Standing Expiration Date:   11/29/2023    POCT glycosylated hemoglobin (Hb A1C)     Orders Placed This Encounter   Medications    azithromycin (ZITHROMAX) 250 MG tablet     Sig: Take 1 tablet by mouth See Admin Instructions for 5 days 500mg on day 1 followed by 250mg on days 2 - 5     Dispense:  6 tablet     Refill:  0    Right hip pain  X ray back  And hip  Wt loss advised  A1c 7.8 today a1c  Diet advised     Patient given educational materials - see patient instructions. Discussed use, benefit, and side effects of prescribed medications. All patientquestions answered. Pt voiced understanding. Reviewed health maintenance. Instructedto continue current medications, diet and exercise. Patient agreed with treatmentplan. Follow up as directed. Please note that this chart was generated using voice recognition Dragon dictation software. Although every effort was made to ensure the accuracy of this automated transcription, some errors in transcription may have occurred.      Electronically signed by Matthew Ibrahim MD on 11/29/2022 at 3:29 PM

## 2022-11-29 NOTE — PROGRESS NOTES
Visit Information    Have you changed or started any medications since your last visit including any over-the-counter medicines, vitamins, or herbal medicines? no   Are you having any side effects from any of your medications? -  no  Have you stopped taking any of your medications? Is so, why? -  no    Have you seen any other physician or provider since your last visit? Yes - Records Obtained  Have you had any other diagnostic tests since your last visit? No  Have you been seen in the emergency room and/or had an admission to a hospital since we last saw you? No  Have you had your routine dental cleaning in the past 6 months? no    Have you activated your [employer] account? If not, what are your barriers?  Yes     Patient Care Team:  Matthew Ibrahim MD as PCP - Samuel Alonzo MD as PCP - [facility]  Purvi Kingston MD as Consulting Physician (Infectious Diseases)    Medical History Review  Past Medical, Family, and Social History reviewed and does not contribute to the patient presenting condition    Health Maintenance   Topic Date Due    HIV screen  Never done    DTaP/Tdap/Td vaccine (1 - Tdap) Never done    Shingles vaccine (1 of 2) 10/23/2014    Pneumococcal 0-64 years Vaccine (2 - PCV) 01/04/2015    Diabetic retinal exam  03/25/2022    Low dose CT lung screening  04/19/2023    Lipids  08/10/2023    A1C test (Diabetic or Prediabetic)  08/24/2023    Diabetic foot exam  08/26/2023    Annual Wellness Visit (AWV)  10/11/2023    Depression Screen  11/01/2023    Colorectal Cancer Screen  01/23/2027    Flu vaccine  Completed    COVID-19 Vaccine  Completed    Hepatitis C screen  Completed    Hepatitis A vaccine  Aged Out    Hib vaccine  Aged Out    Meningococcal (ACWY) vaccine  Aged Out

## 2022-11-30 ENCOUNTER — HOSPITAL ENCOUNTER (OUTPATIENT)
Facility: CLINIC | Age: 62
Discharge: HOME | End: 2022-12-02
Payer: COMMERCIAL

## 2022-11-30 ENCOUNTER — HOSPITAL ENCOUNTER (OUTPATIENT)
Dept: GENERAL RADIOLOGY | Facility: CLINIC | Age: 62
Discharge: HOME | End: 2022-12-02
Payer: COMMERCIAL

## 2022-11-30 VITALS — BODY MASS INDEX: 31.4 KG/M2 | DIASTOLIC BLOOD PRESSURE: 74 MMHG | SYSTOLIC BLOOD PRESSURE: 132 MMHG | WEIGHT: 258 LBS

## 2022-11-30 DIAGNOSIS — M54.41 CHRONIC RIGHT-SIDED LOW BACK PAIN WITH RIGHT-SIDED SCIATICA: ICD-10-CM

## 2022-11-30 DIAGNOSIS — G89.29 CHRONIC RIGHT-SIDED LOW BACK PAIN WITH RIGHT-SIDED SCIATICA: ICD-10-CM

## 2022-11-30 PROCEDURE — 72100 X-RAY EXAM L-S SPINE 2/3 VWS: CPT

## 2022-11-30 PROCEDURE — 73502 X-RAY EXAM HIP UNI 2-3 VIEWS: CPT

## 2022-12-02 ENCOUNTER — OFFICE VISIT (OUTPATIENT)
Dept: PODIATRY | Age: 62
End: 2022-12-02
Payer: MEDICARE

## 2022-12-02 VITALS — BODY MASS INDEX: 31.42 KG/M2 | HEIGHT: 76 IN | WEIGHT: 258 LBS

## 2022-12-02 DIAGNOSIS — M79.674 PAIN OF TOE OF RIGHT FOOT: ICD-10-CM

## 2022-12-02 DIAGNOSIS — Z79.4 TYPE 2 DIABETES MELLITUS WITH DIABETIC POLYNEUROPATHY, WITH LONG-TERM CURRENT USE OF INSULIN (HCC): ICD-10-CM

## 2022-12-02 DIAGNOSIS — E11.51 TYPE 2 DIABETES MELLITUS WITH PERIPHERAL VASCULAR DISEASE (HCC): ICD-10-CM

## 2022-12-02 DIAGNOSIS — E11.42 TYPE 2 DIABETES MELLITUS WITH DIABETIC POLYNEUROPATHY, WITH LONG-TERM CURRENT USE OF INSULIN (HCC): ICD-10-CM

## 2022-12-02 DIAGNOSIS — B35.1 ONYCHOMYCOSIS OF TOENAIL: Primary | ICD-10-CM

## 2022-12-02 PROCEDURE — 99999 PR OFFICE/OUTPT VISIT,PROCEDURE ONLY: CPT | Performed by: PODIATRIST

## 2022-12-02 PROCEDURE — 11720 DEBRIDE NAIL 1-5: CPT | Performed by: PODIATRIST

## 2022-12-05 ASSESSMENT — ENCOUNTER SYMPTOMS
SHORTNESS OF BREATH: 0
COLOR CHANGE: 0
NAUSEA: 0
DIARRHEA: 0
BACK PAIN: 0

## 2022-12-05 NOTE — PROGRESS NOTES
SUBJECTIVE: Karen Smith is a 58 y.o. male who returns to the office with chief complaint of painful fungal toenails. Patient relates toe nails are thickened/difficult to trim as well as painful with ambulation and with shoe gear. Chief Complaint   Patient presents with    Nail Problem     Nail trim/last saw Tim Guerrierling 11/29/22    Diabetes     Blood sugar 100     Review of Systems   Constitutional:  Negative for activity change, appetite change, chills, diaphoresis, fatigue and fever. Respiratory:  Negative for shortness of breath. Cardiovascular:  Negative for leg swelling. Gastrointestinal:  Negative for diarrhea and nausea. Endocrine: Negative for cold intolerance, heat intolerance and polyuria. Musculoskeletal:  Positive for arthralgias and gait problem. Negative for back pain, joint swelling and myalgias. Skin:  Negative for color change, pallor, rash and wound. Allergic/Immunologic: Negative for environmental allergies and food allergies. Neurological:  Positive for numbness. Negative for dizziness, weakness and light-headedness. Hematological:  Does not bruise/bleed easily. Psychiatric/Behavioral:  Negative for behavioral problems, confusion and self-injury. The patient is not nervous/anxious. OBJECTIVE: Clinical evaluation of patient reveals nails 1,4,5 of the right foot present with thickness, elongation, discoloration, brittleness, and subungual debris. There was pain with palpation and debridement of the toenails of the right foot No open lesions noted to the right foot today. The left leg and toes 2 and 3 of the right foot have been amputated. The right DP pulse is not palpable. The right PT pulse is not palpable. Protective sensation is absent to the right plantar foot as noted with a 5.07 [place] monofilament. Gluose: 100 mg/dl. Class A Findings (1 needed)   [x] Non-traumatic amputation of foot or integral skeleton portion thereof.    [x] Q7.      Class B Findings (2 needed)   1. [] Absent posterior tibial pulse   2. [] Absent dorsalis pedis pulse   3. [] Advanced trophic changes; three of the following are required:   ·         [] hair growth (decrease or absence)   ·         [] nail changes (thickening)   ·         [] pigmentary changes (discoloration)   ·         [] skin texture (thin, shiny)   ·         [] skin color (rubor or redness)   [] Q8.      Class C Findings (1 Class B, 2 Class C needed)   1. [] Claudication   2. [] Temperature changes   3. [] Edema   4. [] Paresthesia   5. [] Burning   [] Q9.     ASSESSMENT:    Diagnosis Orders   1. Onychomycosis of toenail  CA DEBRIDEMENT OF NAIL(S), 1-5    HM DIABETES FOOT EXAM      2. Pain of toe of right foot  CA DEBRIDEMENT OF NAIL(S), 1-5    HM DIABETES FOOT EXAM      3. Type 2 diabetes mellitus with peripheral vascular disease (HCC)  CA DEBRIDEMENT OF NAIL(S), 1-5    HM DIABETES FOOT EXAM      4. Type 2 diabetes mellitus with diabetic polyneuropathy, with long-term current use of insulin (HCC)  CA DEBRIDEMENT OF NAIL(S), 1-5    HM DIABETES FOOT EXAM        PLAN: Toenails 1,4,5 of the right foot were debrided in length and thickness using a nail nipper and a . Return for At risk diabetic foot care.    12/2/2022      Malka Zheng DPM

## 2022-12-09 RX ORDER — CLOTRIMAZOLE AND BETAMETHASONE DIPROPIONATE 10; .64 MG/G; MG/G
CREAM TOPICAL
Qty: 45 G | Refills: 3 | Status: SHIPPED | OUTPATIENT
Start: 2022-12-09

## 2022-12-14 ENCOUNTER — OFFICE VISIT (OUTPATIENT)
Dept: ORTHOPEDIC SURGERY | Age: 62
End: 2022-12-14

## 2022-12-14 VITALS — WEIGHT: 258 LBS | BODY MASS INDEX: 31.42 KG/M2 | HEIGHT: 76 IN

## 2022-12-14 DIAGNOSIS — M51.36 DDD (DEGENERATIVE DISC DISEASE), LUMBAR: ICD-10-CM

## 2022-12-14 DIAGNOSIS — G95.19 NEUROGENIC CLAUDICATION (HCC): ICD-10-CM

## 2022-12-14 DIAGNOSIS — M25.551 RIGHT HIP PAIN: Primary | ICD-10-CM

## 2022-12-14 DIAGNOSIS — M70.61 GREATER TROCHANTERIC BURSITIS OF RIGHT HIP: ICD-10-CM

## 2022-12-14 RX ORDER — BETAMETHASONE SODIUM PHOSPHATE AND BETAMETHASONE ACETATE 3; 3 MG/ML; MG/ML
12 INJECTION, SUSPENSION INTRA-ARTICULAR; INTRALESIONAL; INTRAMUSCULAR; SOFT TISSUE ONCE
Status: COMPLETED | OUTPATIENT
Start: 2022-12-14 | End: 2022-12-14

## 2022-12-14 RX ORDER — LIDOCAINE HYDROCHLORIDE 10 MG/ML
2 INJECTION, SOLUTION INFILTRATION; PERINEURAL ONCE
Status: COMPLETED | OUTPATIENT
Start: 2022-12-14 | End: 2022-12-14

## 2022-12-14 RX ORDER — BUPIVACAINE HYDROCHLORIDE 5 MG/ML
2 INJECTION, SOLUTION PERINEURAL ONCE
Status: COMPLETED | OUTPATIENT
Start: 2022-12-14 | End: 2022-12-14

## 2022-12-14 RX ADMIN — BUPIVACAINE HYDROCHLORIDE 10 MG: 5 INJECTION, SOLUTION PERINEURAL at 11:19

## 2022-12-14 RX ADMIN — LIDOCAINE HYDROCHLORIDE 2 ML: 10 INJECTION, SOLUTION INFILTRATION; PERINEURAL at 11:19

## 2022-12-14 RX ADMIN — BETAMETHASONE SODIUM PHOSPHATE AND BETAMETHASONE ACETATE 12 MG: 3; 3 INJECTION, SUSPENSION INTRA-ARTICULAR; INTRALESIONAL; INTRAMUSCULAR; SOFT TISSUE at 11:18

## 2022-12-14 NOTE — PROGRESS NOTES
[facility], [address] [place], [address] [place] [address].           Dept Phone: [phone]           Dept Fax:  [phone] [facility]           Radha Mendoza          Dept Phone: [phone]           Dept Fax:  [phone]      Chief Compliant:  Chief Complaint   Patient presents with    Hip Pain     right        History of Present Illness: This is a 58 y.o. male who presents to the clinic today for evaluation of had concerns including Hip Pain (right). Mr. Julissa Simmons is a 72-year-old gentleman who presents for 4-month history of posterior lateral right hip pain. Patient reports he had an initial injury approximately a year and a half ago while he was riding his riding mower hit a hole in his lung causing a jarring type motion and sudden onset of right hip pain. He states that pain did seem to improve however 4 months ago he had a similar incident where he hit the same hole while on the riding mower and since then has continued to have lateral right hip pain with minimal improvement. Patient reports occasional radiation pain down the right leg but no new paresthesias. However patient does admit that it is hard to tell as he has chronic neuropathy and history of left below-knee amputation. Patient also notes history of chronic low back pain and describes claudication-like symptoms. Unable to stand or walk for greater than about 15 minutes before severe back pain onsets and he has to sit down. Patient denies any bowel or bladder dysfunction no fever chills. Was evaluated by PCP ordered x-rays of the hip and low back which demonstrated mild degenerative changes of right hip and diffuse lumbar DDD.   Patient reports he has been evaluated by pain management in the past for his low back and underwent lumbar epidural steroid injections approximately 2 to 3 years ago with minimal improvement. Past History:    Current Outpatient Medications:     clotrimazole-betamethasone (LOTRISONE) 1-0.05 % cream, APPLY TO AFFECTED AREA TWICE A DAY, Disp: 45 g, Rfl: 3    isosorbide mononitrate (IMDUR) 30 MG extended release tablet, TAKE 1 TABLET BY MOUTH EVERY DAY, Disp: 30 tablet, Rfl: 11    ELIQUIS 5 MG TABS tablet, TAKE 1 TABLET BY MOUTH TWICE A DAY, Disp: 60 tablet, Rfl: 8    finasteride (PROSCAR) 5 MG tablet, TAKE 1 TABLET BY MOUTH EVERY DAY, Disp: 30 tablet, Rfl: 5    blood glucose test strips (CONTOUR NEXT TEST) strip, TEST 4 TIMES A DAY, Disp: 100 strip, Rfl: 3    tamsulosin (FLOMAX) 0.4 MG capsule, TAKE 1 CAPSULE BY MOUTH EVERY DAY, Disp: 30 capsule, Rfl: 11    OZEMPIC, 2 MG/DOSE, 8 MG/3ML SOPN, INJECT 2 MG UNDER THE SKIN EVERY 7 DAYS., Disp: , Rfl:     pregabalin (LYRICA) 100 MG capsule, Take 1 capsule by mouth in the morning, at noon, and at bedtime for 90 days. , Disp: 270 capsule, Rfl: 2    amLODIPine (NORVASC) 2.5 MG tablet, TAKE 1 TABLET BY MOUTH EVERY DAY, Disp: 30 tablet, Rfl: 3    metoprolol tartrate (LOPRESSOR) 50 MG tablet, TAKE 1 TABLET BY MOUTH TWICE A DAY, Disp: 60 tablet, Rfl: 3    levothyroxine (SYNTHROID) 175 MCG tablet, TAKE 1 TABLET BY MOUTH EVERY DAY, Disp: 30 tablet, Rfl: 3    losartan (COZAAR) 50 MG tablet, TAKE 1 TABLET BY MOUTH EVERY DAY, Disp: 90 tablet, Rfl: 1    Lactobacillus (ACIDOPHILUS PROBIOTIC) 100 MG CAPS, TAKE 1 CAPSULE BY MOUTH TWICE A DAY, Disp: , Rfl:     Lactobacillus Acid-Pectin (ACIDOPHILUS/CITRUS PECTIN) TABS, Take 1 tablet by mouth daily (with breakfast), Disp: , Rfl:     Probiotic Acidophilus (FLORANEX) TABS, TAKE 1 TABLET BY MOUTH TWICE A DAY, Disp: 60 tablet, Rfl: 0    Insulin Glargine, 2 Unit Dial, (TOUJEO MAX SOLOSTAR) 300 UNIT/ML SOPN, Inject 110 Units into the skin daily, Disp: 5 pen, Rfl: 5    aspirin (ASPIRIN LOW DOSE) 81 MG EC tablet, Take 1 tablet by mouth in the morning., Disp: 30 tablet, Rfl: 5 budesonide-formoterol (SYMBICORT) 160-4.5 MCG/ACT AERO, Inhale 2 puffs into the lungs 2 times daily, Disp: 10.2 g, Rfl: 5    rOPINIRole (REQUIP) 2 MG tablet, TAKE 1 TABLET BY MOUTH EVERY DAY, Disp: 30 tablet, Rfl: 11    gemfibrozil (LOPID) 600 MG tablet, TAKE 1 TABLET BY MOUTH EVERY DAY, Disp: 30 tablet, Rfl: 11    Insulin Pen Needle (COMFORT TOUCH INSULIN PEN NEED) 33G X 6 MM MISC, 110 Units by Does not apply route daily, Disp: 5 each, Rfl: 1    atorvastatin (LIPITOR) 40 MG tablet, TAKE 1 TABLET BY MOUTH EVERY DAY, Disp: 30 tablet, Rfl: 11    amiodarone (CORDARONE) 200 MG tablet, Take 1 tablet by mouth 2 times daily, Disp: 60 tablet, Rfl: 0    insulin lispro (HUMALOG) 100 UNIT/ML injection vial, SLIDING SCALE (TAKE 5 UNITS IF SUGAR IS OVER 150), Disp: 1 each, Rfl: 3    budesonide-formoterol (SYMBICORT) 160-4.5 MCG/ACT AERO, TAKE 2 PUFFS BY MOUTH TWICE A DAY, Disp: 3 each, Rfl: 3    B-D UF III MINI PEN NEEDLES 31G X 5 MM MISC, USE AS DIRECTED ONCE DAILY TO INJECT TOUJEO, Disp: 100 each, Rfl: 3    Continuous Blood Gluc  (DEXCOM G6 ) DOUGIE, USE AS DIRECTED TO MONITOR BLOOD SUGAR, Disp: , Rfl:     albuterol sulfate  (90 Base) MCG/ACT inhaler, INHALE 2 PUFFS INTO THE LUNGS EVERY 6 HOURS AS NEEDED FOR WHEEZING OR SHORTNESS OF BREATH, Disp: 6.7 Inhaler, Rfl: 0    glucagon, rDNA, 1 MG injection, Inject 1 mL into the muscle once as needed for Low blood sugar , Disp: , Rfl:     Blood Pressure KIT, 1 actuation by Does not apply route once a week, Disp: 1 kit, Rfl: 0    Handicap Placard MISC, by Does not apply route Duration - 5years Reason- prosthetic leg, Disp: 1 each, Rfl: 0    Omega-3 Fatty Acids (FISH OIL CONCENTRATE) 300 MG CAPS, Take 300 mg by mouth nightly , Disp: , Rfl:     Glucosamine Sulfate 500 MG TABS, Take 500 mg by mouth 3 times daily, Disp: , Rfl:   Allergies   Allergen Reactions    Ramipril      Other reaction(s): swelling of the lips   Other reaction(s): swelling of the lips Adhesive Tape      tegaderm causes blisters. Use paper tape     Social History     Socioeconomic History    Marital status:      Spouse name: Fabricio Aguilar    Number of children: 2    Years of education: Not on file    Highest education level: Not on file   Occupational History    Occupation: Disabled   Tobacco Use    Smoking status: Former     Packs/day: 2.00     Years: 25.00     Pack years: 50.00     Types: Cigars, Cigarettes     Start date:      Quit date: 2011     Years since quittin.5    Smokeless tobacco: Never    Tobacco comments:     quit in    Vaping Use    Vaping Use: Never used   Substance and Sexual Activity    Alcohol use: Yes     Alcohol/week: 0.0 standard drinks     Comment: BEER 2 A MONTH    Drug use: Not Currently     Types: Marijuana Katheren Vikki)     Comment: in 's    Sexual activity: Yes     Partners: Female   Other Topics Concern    Not on file   Social History Narrative    Not on file     Social Determinants of Health     Financial Resource Strain: Low Risk     Difficulty of Paying Living Expenses: Not hard at all   Food Insecurity: No Food Insecurity    Worried About [address] in the Last Year: Never true    [address]  [employer] in the Last Year: Never true   Transportation Needs: No Transportation Needs    Lack of Transportation (Medical): No    Lack of Transportation (Non-Medical):  No   Physical Activity: Insufficiently Active    Days of Exercise per Week: 1 day    Minutes of Exercise per Session: 60 min   Stress: Not on file   Social Connections: Not on file   Intimate Partner Violence: Not on file   Housing Stability: Not on file     Past Medical History:   Diagnosis Date    A-fib [facility])     Asymptomatic bilateral carotid artery stenosis     Boil, thigh 10/2019    10/30/19: right inner thigh region, says PCP Rxd Doxy for this, area is much better, has a couple days left to complete Doxy    CAD (coronary artery disease)     saw Dr. Aysha Ornelas ([facility])     Charcot foot due to diabetes mellitus ([address] 2014    LEFT    COPD (chronic obstructive pulmonary disease) ([address] 2009    INHALER USE DAILY    Diabetes mellitus ([address] 1989    IDDM, On Insulin Dr. [place]    Diabetic neuropathy [facility])     Difficult intravenous access     VEINS ROLL    Employs prosthetic leg     s/p left BKA    Foot ulcer ([address] PRIOR TO 2015    BILAT    Full dentures     UPPER ONLY    Hyperlipidemia 2004    ON RX    Hypertension 2004    ON RX, PCP Dr. [place], seen Oct. 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 Erica Baca seen within last 1 1/2 mos     Past Surgical History:   Procedure Laterality Date    CARDIAC CATHETERIZATION      no stenting    CARDIOVASCULAR STRESS TEST  06/28/2019    small fixed apical, likely normal, EF 48%    COLONOSCOPY  06/17/2016    poor prep, done up to the IC valve, redundant colon    COLONOSCOPY  01/23/2017    VIRTUAL COLONOSCOPY DONE-no polyps or masses    CYSTOSCOPY Bilateral 6/16/2020    CYSTOSCOPY RETROGRADE PYELOGRAM URETEROSCOPY performed by Justino Maddox MD at [address] [place] [address] Right 7/30/2020    CYSTOSCOPY, RIGHT RETROGRADE PYELOGRAM,  URETERAL CATHETER  INSERTION performed by Justino Maddox MD at [place] [address] 9/1/2020    CYSTOSCOPY RETROGRADE PYELOGRAM, RIGHT URETERAL STENT EXCHANGE performed by Justino Maddox MD at [address] [facility] Right     r-bone removed    HC  PICC [address] DOUBLE  5/21/2018         KIDNEY CYST REMOVAL      KIDNEY SURGERY Right 11/13/2019    XI ROBOTIC PARTIAL NEPHRECTOMY, INTRAOP ULTRASOUND, RIGHT URETEROURETEROSTOMY, RIGHT URETERAL STENT PLACEMENT **SHORT STAY** performed by Bettye Jama MD at [address] [facility] Floor 7/30/2020    XI LAPAROSCOPIC ROBOTIC URETEROLYSIS, ROBOTIC ASSISTED BUCCAL URETEROPLASTY  (DR. COBIAN TO ASSIST) performed by Bettye Jama MD at [address] 4/29/15    OTHER SURGICAL HISTORY Left 5-5-15 and 11/2015    Revision BKA    OTHER SURGICAL HISTORY      left stump revision 5/30/2018    NH RE-AMPUTATION LOWER LEG Left 5/30/2018    LEG AMPUTATION BELOW KNEE REVISION performed by Raj Glass MD at [address] [facility] Bilateral 80'S    TOE AMPUTATION      Right 2 and 4    VITRECTOMY Left 9/25/2019    PARS PLANA VITRECTOMY 25 GAUGE, MEMBRANE PEELING, ENDOLASER 200  MW 1044 SPOTS, INDIRECT LASER 26 SPOTS performed by Sloane Ledesma MD at [facility] History   Problem Relation Age of Onset    Diabetes Mother     Hypertension Mother     Stomach Cancer Mother     Hypertension Father     Alcohol Abuse Father     COPD Father     Kidney Cancer Father     Diabetes Brother         IDDM-PUMP    Other Sister         BRAIN TUMOR        Review of Systems   Constitutional: Negative for fever, chills, sweats. Eyes: Negative for changes in vision, or pain. HENT: Negative for ear ache, epistaxis, or sore throat. Respiratory/Cardio: Negative for Chest pain, palpitations, SOB, or cough. Gastrointestinal: Negative for abdominal pain, N/V/D. Genitourinary: Negative for dysuria, frequency, urgency, or hematuria. Neurological: Negative for headache, numbness, or weakness.    Integumentary: Negative for rash, itching, laceration, or abrasion. Musculoskeletal: Positive for Hip Pain (right)       Physical Exam:  Constitutional: Patient is oriented to person, place, and time. Patient appears well-developed and well nourished. HENT: Negative otherwise noted  Head: Normocephalic and Atraumatic  Nose: Normal  Eyes: Conjunctivae and EOM are normal  Neck: Normal range of motion Neck supple. Respiratory/Cardio: Effort normal. No respiratory distress. Musculoskeletal:    right Hip    Tenderness: Severe tenderness over the right greater trochanter. Mild tenderness over the proximal IT band. No tenderness to the anterior posterior hip       Range of Motion:      Extension: 10     Flexion: 120     Internal Rotation: 30     External Rotation: 40     Abduction: 40     Adduction:  30       Muscle Strength      Abduction: 5/5     Adduction: 5/5     Flexion: 5/5         Gait: Antalgic   Wolf Test: Negative   Portia Test: Positive     LUMBAR/SACRAL EXAMINATION:  Inspection: Local inspection shows no step-off or bruising. Lumbar alignment is normal.  Sagittal and Coronal balance is neutral.      Palpation:   No evidence of tenderness at the midline. No tenderness bilaterally at the paraspinal or trochanters. There is no step-off or paraspinal spasm. Range of Motion: Lumbar flexion, extension and rotation are mildly limited due to pain. Strength:   Strength testing is 5/5 in all muscle groups tested. Special Tests:   Straight leg raise and crossed SLR negative. Leg length and pelvis level.  0 out of 5 Fabrizio's signs. Skin: There are no rashes, ulcerations or lesions. Reflexes: Reflexes are symmetrically 2+ at the patellar and ankle tendons. Clonus absent bilaterally at the feet. Gait & station: normal, patient ambulates without assistance  Additional Examinations:   RIGHT LOWER EXTREMITY: Inspection/examination of the right lower extremity does not show any tenderness, deformity or injury. Range of motion is unremarkable.  There is no gross instability. There are no rashes, ulcerations or lesions. Strength and tone are normal.  LEFT LOWER EXTREMITY:  Inspection/examination of the left lower extremity does not show any tenderness, deformity or injury. Range of motion is unremarkable. There is no gross instability. There are no rashes, ulcerations or lesions. Strength and tone are normal.      Neurological: Patient is alert and oriented to person, place, and time. Normal strenght. No sensory deficit. Skin: Skin is warm and dry  Psychiatric: Behavior is normal. Thought content normal.  Nursing note and vitals reviewed. Labs and Imaging:     XR LUMBAR SPINE (2-3 VIEWS)  Narrative: EXAMINATION:  XRAY VIEWS OF THE LUMBAR SPINE    11/30/2022 7:23 am    COMPARISON:  None. HISTORY:  ORDERING SYSTEM PROVIDED HISTORY: Chronic right-sided low back pain with  right-sided sciatica  TECHNOLOGIST PROVIDED HISTORY:  Reason for Exam: Pt states right side low back and hip pain x 3 weeks pt  states no known injury    FINDINGS:  Lumbar spine alignment is anatomic. No acute osseous abnormality. Mild-to-moderate degenerative change most significant L3-4 L4-5 with loss  disc height endplate spondylosis. Moderate to advanced facet arthropathy  lower lumbar spine. Vertebral body axial heights maintained. Impression: No acute findings. Multilevel degenerative change. XR HIP RIGHT (2-3 VIEWS)  Narrative: EXAMINATION:  TWO XRAY VIEWS OF THE RIGHT HIP    11/30/2022 7:23 am    COMPARISON:  None. HISTORY:  ORDERING SYSTEM PROVIDED HISTORY: Chronic right-sided low back pain with  right-sided sciatica  TECHNOLOGIST PROVIDED HISTORY:  Reason for Exam: Pt states right side low back and hip pain x 3 weeks pt  states no known injury    FINDINGS:  No acute osseous abnormality. Right hip alignment anatomic. Mild  degenerative change with loss joint space and hypertrophic changes along the  acetabulum. Soft tissues unremarkable.   Impression: No acute findings. Mild right hip osteoarthritis. No new x-rays taken today however 3 views of the right hip with AP pelvis demonstrate mild femoral acetabular degenerative changes with early spurring. No evidence of acute fracture, AVN or subluxation. AP lateral views of the lumbar spine available for independent review from 11/30/2022 demonstrate multilevel lumbar DDD greatest from L3-L5. Orders Placed This Encounter   Procedures    20610 - DRAIN/INJECT LARGE JOINT BURSA       Assessment and Plan:  1. Right hip pain    2. Greater trochanteric bursitis of right hip    3. DDD (degenerative disc disease), lumbar    4. Neurogenic claudication ([address]          PLAN:  Alisha Martins is a 58 y.o. old male with right hip pain that is consistent with right trochanteric bursitis. I do question the possibility of lumbar radiculopathy given patient's chronic low back pain and fairly advanced lumbar DDD however given his severe tenderness over the trochanteric bursal area we will see how he responds to a corticosteroid injection however at follow-up if he fails to improve would recommend further evaluation with MRI of the lumbar spine given his chronic low back pain as well. I'll see him back my clinic in 4 months for reevaluation but he was encouraged to return or call earlier with questions and/or concerns. Procedure: right trochanteric bursal celestone injection  An informed verbal consent for the procedure was obtained and risks including, but not limited to: allergy to medications, injection, bleeding, stiffness of joint, recurrence of symptoms, loss of function, swelling, drainage, irrigation, need for surgery and pseudo-septic inflammation, were explained to the patient. Also, discussed was the potential for further injections, irrigation and debridement and surgery. Alternate means of treatment have also been discussed with the patient.       Following an appropriate discussion with the patient regarding the risks and benefits of the procedure he consented to proceed. his right trochanteric bursa was prepped using betadine solution. Using aseptic technique and through a lateral approach, his right trochanteric bursa was injected superficially with 4 cc of 1% lidocaine without epinephrine and subsequently with 2 cc of 6 mg/mL Celestone into the bursal space. A band aid was applied to the injection site. he tolerated the injection with no immediate adverse reactions. Electronically signed by Carolyn Molina on 12/14/22 at 12:16 PM EST        Please note that this chart was generated using voice recognition Dragon dictation software. Although every effort was made to ensure the accuracy of this automated transcription, some errors in transcription may have occurred.

## 2022-12-21 DIAGNOSIS — Z79.4 TYPE 2 DIABETES MELLITUS WITH DIABETIC POLYNEUROPATHY, WITH LONG-TERM CURRENT USE OF INSULIN (HCC): ICD-10-CM

## 2022-12-21 DIAGNOSIS — E11.42 TYPE 2 DIABETES MELLITUS WITH DIABETIC POLYNEUROPATHY, WITH LONG-TERM CURRENT USE OF INSULIN (HCC): ICD-10-CM

## 2022-12-21 RX ORDER — INSULIN LISPRO 100 [IU]/ML
12 INJECTION, SOLUTION INTRAVENOUS; SUBCUTANEOUS
Qty: 5 ADJUSTABLE DOSE PRE-FILLED PEN SYRINGE | Refills: 1 | Status: SHIPPED | OUTPATIENT
Start: 2022-12-21

## 2023-01-03 ENCOUNTER — TELEPHONE (OUTPATIENT)
Dept: PULMONOLOGY | Age: 63
End: 2023-01-03

## 2023-01-05 ENCOUNTER — OFFICE VISIT (OUTPATIENT)
Dept: INTERNAL MEDICINE CLINIC | Age: 63
End: 2023-01-05
Payer: COMMERCIAL

## 2023-01-05 VITALS
DIASTOLIC BLOOD PRESSURE: 60 MMHG | WEIGHT: 264 LBS | OXYGEN SATURATION: 97 % | HEART RATE: 86 BPM | SYSTOLIC BLOOD PRESSURE: 118 MMHG | BODY MASS INDEX: 32.14 KG/M2

## 2023-01-05 DIAGNOSIS — J01.90 ACUTE SINUSITIS, RECURRENCE NOT SPECIFIED, UNSPECIFIED LOCATION: Primary | ICD-10-CM

## 2023-01-05 DIAGNOSIS — H66.92 OM (OTITIS MEDIA), RECURRENT, LEFT: ICD-10-CM

## 2023-01-05 PROCEDURE — 3078F DIAST BP <80 MM HG: CPT | Performed by: INTERNAL MEDICINE

## 2023-01-05 PROCEDURE — G8417 CALC BMI ABV UP PARAM F/U: HCPCS | Performed by: INTERNAL MEDICINE

## 2023-01-05 PROCEDURE — 99213 OFFICE O/P EST LOW 20 MIN: CPT | Performed by: INTERNAL MEDICINE

## 2023-01-05 PROCEDURE — 3017F COLORECTAL CA SCREEN DOC REV: CPT | Performed by: INTERNAL MEDICINE

## 2023-01-05 PROCEDURE — 1036F TOBACCO NON-USER: CPT | Performed by: INTERNAL MEDICINE

## 2023-01-05 PROCEDURE — G8482 FLU IMMUNIZE ORDER/ADMIN: HCPCS | Performed by: INTERNAL MEDICINE

## 2023-01-05 PROCEDURE — 3074F SYST BP LT 130 MM HG: CPT | Performed by: INTERNAL MEDICINE

## 2023-01-05 PROCEDURE — G8427 DOCREV CUR MEDS BY ELIG CLIN: HCPCS | Performed by: INTERNAL MEDICINE

## 2023-01-05 RX ORDER — BENZONATATE 100 MG/1
100 CAPSULE ORAL 3 TIMES DAILY PRN
Qty: 30 CAPSULE | Refills: 0 | Status: SHIPPED | OUTPATIENT
Start: 2023-01-05 | End: 2023-01-12

## 2023-01-05 RX ORDER — AZITHROMYCIN 250 MG/1
TABLET, FILM COATED ORAL
Qty: 1 PACKET | Refills: 0 | Status: SHIPPED | OUTPATIENT
Start: 2023-01-05

## 2023-01-05 ASSESSMENT — PATIENT HEALTH QUESTIONNAIRE - PHQ9
SUM OF ALL RESPONSES TO PHQ QUESTIONS 1-9: 0
SUM OF ALL RESPONSES TO PHQ QUESTIONS 1-9: 0
1. LITTLE INTEREST OR PLEASURE IN DOING THINGS: 0
2. FEELING DOWN, DEPRESSED OR HOPELESS: 0
SUM OF ALL RESPONSES TO PHQ QUESTIONS 1-9: 0
SUM OF ALL RESPONSES TO PHQ QUESTIONS 1-9: 0
SUM OF ALL RESPONSES TO PHQ9 QUESTIONS 1 & 2: 0

## 2023-01-05 ASSESSMENT — ENCOUNTER SYMPTOMS
COUGH: 1
RHINORRHEA: 1

## 2023-01-05 NOTE — PROGRESS NOTES
[address] [facility] [address]  [place] [address]  Dept: [phone]  Dept Fax: [phone]    [place] is a 58 y.o. male who presents today for his medicalconditions/complaints as noted below. [place] is c/o of Cough (And congestion started 4 days ago, watery eyes and ear drainage)      HPI:     Cough  This is a new problem. The current episode started in the past 7 days. The cough is Productive of sputum. Associated symptoms include ear congestion, nasal congestion and rhinorrhea. Nothing aggravates the symptoms. He has tried nothing for the symptoms. The treatment provided no relief.      Past Medical History:   Diagnosis Date    A-fib [facility])     Asymptomatic bilateral carotid artery stenosis     Boil, thigh 10/2019    10/30/19: right inner thigh region, says PCP Rxd Doxy for this, area is much better, has a couple days left to complete Doxy    CAD (coronary artery disease)     saw Dr. Katlyn Lee ([facility] [address] [facility])     Charcot foot due to diabetes mellitus ([address] 2014    LEFT    COPD (chronic obstructive pulmonary disease) ([address] 2009    INHALER USE DAILY    Diabetes mellitus ([address] 1989    IDDM, On Insulin Dr. Christopher Espino    Diabetic neuropathy [facility])     Difficult intravenous access     VEINS ROLL    Employs prosthetic leg     s/p left BKA    Foot ulcer ([address] PRIOR TO 2015    BILAT    Full dentures     UPPER ONLY    Hyperlipidemia 2004    ON RX    Hypertension 2004    ON RX, PCP Dr. Christopher Espino, seen Oct. 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 [address] 09/2019    s/p Vitrectomy 9/2019    Wears glasses     Wears partial dentures     full upper, does not wear lower partial    Wellness examination     Dr. Ina Schwraz seen within last 1 1/2 mos        Current Outpatient Medications   Medication Sig Dispense Refill    azithromycin (ZITHROMAX) 250 MG tablet Take 2 tabs (500 mg) on Day 1, and take 1 tab (250 mg) on days 2 through 5. 1 packet 0    benzonatate (TESSALON) 100 MG capsule Take 1 capsule by mouth 3 times daily as needed for Cough 30 capsule 0    insulin lispro, 1 Unit Dial, (HUMALOG KWIKPEN) 100 UNIT/ML SOPN Inject 12 Units into the skin 3 times daily (before meals) 5 Adjustable Dose Pre-filled Pen Syringe 1    clotrimazole-betamethasone (LOTRISONE) 1-0.05 % cream APPLY TO AFFECTED AREA TWICE A DAY 45 g 3    isosorbide mononitrate (IMDUR) 30 MG extended release tablet TAKE 1 TABLET BY MOUTH EVERY DAY 30 tablet 11    ELIQUIS 5 MG TABS tablet TAKE 1 TABLET BY MOUTH TWICE A DAY 60 tablet 8    finasteride (PROSCAR) 5 MG tablet TAKE 1 TABLET BY MOUTH EVERY DAY 30 tablet 5    blood glucose test strips (CONTOUR NEXT TEST) strip TEST 4 TIMES A  strip 3    tamsulosin (FLOMAX) 0.4 MG capsule TAKE 1 CAPSULE BY MOUTH EVERY DAY 30 capsule 11    OZEMPIC, 2 MG/DOSE, 8 MG/3ML SOPN INJECT 2 MG UNDER THE SKIN EVERY 7 DAYS. pregabalin (LYRICA) 100 MG capsule Take 1 capsule by mouth in the morning, at noon, and at bedtime for 90 days.  270 capsule 2    amLODIPine (NORVASC) 2.5 MG tablet TAKE 1 TABLET BY MOUTH EVERY DAY 30 tablet 3    metoprolol tartrate (LOPRESSOR) 50 MG tablet TAKE 1 TABLET BY MOUTH TWICE A DAY 60 tablet 3    levothyroxine (SYNTHROID) 175 MCG tablet TAKE 1 TABLET BY MOUTH EVERY DAY 30 tablet 3    losartan (COZAAR) 50 MG tablet TAKE 1 TABLET BY MOUTH EVERY DAY 90 tablet 1    Lactobacillus (ACIDOPHILUS PROBIOTIC) 100 MG CAPS TAKE 1 CAPSULE BY MOUTH TWICE A DAY      Lactobacillus Acid-Pectin (ACIDOPHILUS/CITRUS PECTIN) TABS Take 1 tablet by mouth daily (with breakfast)      Probiotic Acidophilus (FLORANEX) TABS TAKE 1 TABLET BY MOUTH TWICE A DAY 60 tablet 0    Insulin Glargine, 2 Unit Dial, (TOUJEO MAX SOLOSTAR) 300 UNIT/ML SOPN Inject 110 Units into the skin daily 5 pen 5    aspirin (ASPIRIN LOW DOSE) 81 MG EC tablet Take 1 tablet by mouth in the morning.  30 tablet 5    budesonide-formoterol (SYMBICORT) 160-4.5 MCG/ACT AERO Inhale 2 puffs into the lungs 2 times daily 10.2 g 5    rOPINIRole (REQUIP) 2 MG tablet TAKE 1 TABLET BY MOUTH EVERY DAY 30 tablet 11    gemfibrozil (LOPID) 600 MG tablet TAKE 1 TABLET BY MOUTH EVERY DAY 30 tablet 11    Insulin Pen Needle (COMFORT TOUCH INSULIN PEN NEED) 33G X 6 MM MISC 110 Units by Does not apply route daily 5 each 1    atorvastatin (LIPITOR) 40 MG tablet TAKE 1 TABLET BY MOUTH EVERY DAY 30 tablet 11    insulin lispro (HUMALOG) 100 UNIT/ML injection vial SLIDING SCALE (TAKE 5 UNITS IF SUGAR IS OVER 150) 1 each 3    budesonide-formoterol (SYMBICORT) 160-4.5 MCG/ACT AERO TAKE 2 PUFFS BY MOUTH TWICE A DAY 3 each 3    B-D UF III MINI PEN NEEDLES 31G X 5 MM MISC USE AS DIRECTED ONCE DAILY TO INJECT TOUJEO 100 each 3    Continuous Blood Gluc  (DEXCOM G6 ) DOUGIE USE AS DIRECTED TO MONITOR BLOOD SUGAR      albuterol sulfate  (90 Base) MCG/ACT inhaler INHALE 2 PUFFS INTO THE LUNGS EVERY 6 HOURS AS NEEDED FOR WHEEZING OR SHORTNESS OF BREATH 6.7 Inhaler 0    glucagon, rDNA, 1 MG injection Inject 1 mL into the muscle once as needed for Low blood sugar       Blood Pressure KIT 1 actuation by Does not apply route once a week 1 kit 0    Handicap Placard MISC by Does not apply route Duration - 5years  Reason- prosthetic leg 1 each 0    Omega-3 Fatty Acids (FISH OIL CONCENTRATE) 300 MG CAPS Take 300 mg by mouth nightly       Glucosamine Sulfate 500 MG TABS Take 500 mg by mouth 3 times daily      amiodarone (CORDARONE) 200 MG tablet Take 1 tablet by mouth 2 times daily 60 tablet 0     No current facility-administered medications for this visit. Allergies   Allergen Reactions    Ramipril      Other reaction(s): swelling of the lips   Other reaction(s): swelling of the lips     Adhesive Tape      tegaderm causes blisters. Use paper tape       Health Maintenance   Topic Date Due    HIV screen  Never done    DTaP/Tdap/Td vaccine (1 - Tdap) Never done    Shingles vaccine (1 of 2) 10/23/2014    Pneumococcal 0-64 years Vaccine (2 - PCV) 01/04/2015    Diabetic retinal exam  03/25/2022    Diabetic Alb to Cr ratio (uACR) test  12/14/2022    Low dose CT lung screening  04/19/2023    Lipids  08/10/2023    Depression Screen  11/01/2023    GFR test (Diabetes, CKD 3-4, OR last GFR 15-59)  11/14/2023    A1C test (Diabetic or Prediabetic)  11/29/2023    Diabetic foot exam  12/05/2023    Colorectal Cancer Screen  01/23/2027    Flu vaccine  Completed    COVID-19 Vaccine  Completed    Hepatitis C screen  Completed    Hepatitis A vaccine  Aged Out    Hib vaccine  Aged Out    Meningococcal (ACWY) vaccine  Aged Out       Subjective:      Review of Systems   HENT:  Positive for rhinorrhea. Respiratory:  Positive for cough. All other systems reviewed and are negative. Objective:     Physical Exam  Vitals reviewed. Constitutional:       Appearance: He is well-developed. HENT:      Head: Normocephalic and atraumatic. Left Ear: Tympanic membrane is injected. Eyes:      Conjunctiva/sclera: Conjunctivae normal.      Pupils: Pupils are equal, round, and reactive to light. Neck:      Thyroid: No thyromegaly. Vascular: No JVD. Cardiovascular:      Rate and Rhythm: Normal rate and regular rhythm. Heart sounds: Normal heart sounds. No murmur heard. Pulmonary:      Effort: Pulmonary effort is normal.      Breath sounds: Normal breath sounds. Abdominal:      General: Bowel sounds are normal.      Palpations: Abdomen is soft.    Musculoskeletal: General: Normal range of motion. Cervical back: Normal range of motion and neck supple. Skin:     General: Skin is warm and dry. Neurological:      Mental Status: He is alert and oriented to person, place, and time. Deep Tendon Reflexes: Reflexes are normal and symmetric. /60 (Site: Right Upper Arm, Position: Sitting)   Pulse 86   Wt 264 lb (119.7 kg)   SpO2 97%   BMI 32.14 kg/m²       Assessment:       Diagnosis Orders   1. Acute sinusitis, recurrence not specified, unspecified location        2. OM (otitis media), recurrent, left  azithromycin (ZITHROMAX) 250 MG tablet    benzonatate (TESSALON) 100 MG capsule          Plan:      No follow-ups on file. Orders Placed This Encounter   Medications    azithromycin (ZITHROMAX) 250 MG tablet     Sig: Take 2 tabs (500 mg) on Day 1, and take 1 tab (250 mg) on days 2 through 5. Dispense:  1 packet     Refill:  0    benzonatate (TESSALON) 100 MG capsule     Sig: Take 1 capsule by mouth 3 times daily as needed for Cough     Dispense:  30 capsule     Refill:  0     No orders of the defined types were placed in this encounter. Patient given educational materials - see patient instructions. Discussed use, benefit, and side effects of prescribed medications. All patientquestions answered. Pt voiced understanding.     Electronically signed by Elizabeth Navarro MD on 1/5/2023at 10:54 AM

## 2023-01-05 NOTE — PROGRESS NOTES
Visit Information    Have you changed or started any medications since your last visit including any over-the-counter medicines, vitamins, or herbal medicines? no   Are you having any side effects from any of your medications? -  no  Have you stopped taking any of your medications? Is so, why? -  no    Have you seen any other physician or provider since your last visit? Yes - Records Obtained  Have you had any other diagnostic tests since your last visit? Yes - Records Obtained  Have you been seen in the emergency room and/or had an admission to a hospital since we last saw you? No  Have you had your routine dental cleaning in the past 6 months? no    Have you activated your [employer] account? If not, what are your barriers?  Yes     Patient Care Team:  Shahrzad Mason MD as PCP - Serafin Torres MD as PCP - [facility]  Della Kruse MD as Consulting Physician (Infectious Diseases)    Medical History Review  Past Medical, Family, and Social History reviewed and does contribute to the patient presenting condition    Health Maintenance   Topic Date Due    HIV screen  Never done    DTaP/Tdap/Td vaccine (1 - Tdap) Never done    Shingles vaccine (1 of 2) 10/23/2014    Pneumococcal 0-64 years Vaccine (2 - PCV) 01/04/2015    Diabetic retinal exam  03/25/2022    Diabetic Alb to Cr ratio (uACR) test  12/14/2022    Low dose CT lung screening  04/19/2023    Lipids  08/10/2023    Depression Screen  11/01/2023    GFR test (Diabetes, CKD 3-4, OR last GFR 15-59)  11/14/2023    A1C test (Diabetic or Prediabetic)  11/29/2023    Diabetic foot exam  12/05/2023    Colorectal Cancer Screen  01/23/2027    Flu vaccine  Completed    COVID-19 Vaccine  Completed    Hepatitis C screen  Completed    Hepatitis A vaccine  Aged Out    Hib vaccine  Aged Out    Meningococcal (ACWY) vaccine  Aged Out

## 2023-01-16 RX ORDER — ASPIRIN 81 MG/1
TABLET, COATED ORAL
Qty: 30 TABLET | Refills: 5 | Status: SHIPPED | OUTPATIENT
Start: 2023-01-16

## 2023-01-16 RX ORDER — ATORVASTATIN CALCIUM 40 MG/1
TABLET, FILM COATED ORAL
Qty: 90 TABLET | Refills: 3 | Status: SHIPPED | OUTPATIENT
Start: 2023-01-16

## 2023-01-18 ENCOUNTER — OFFICE VISIT (OUTPATIENT)
Dept: ORTHOPEDIC SURGERY | Age: 63
End: 2023-01-18
Payer: COMMERCIAL

## 2023-01-18 VITALS — HEIGHT: 76 IN | RESPIRATION RATE: 16 BRPM | WEIGHT: 264 LBS | BODY MASS INDEX: 32.15 KG/M2

## 2023-01-18 DIAGNOSIS — M70.61 GREATER TROCHANTERIC BURSITIS OF RIGHT HIP: ICD-10-CM

## 2023-01-18 DIAGNOSIS — M51.36 DDD (DEGENERATIVE DISC DISEASE), LUMBAR: Primary | ICD-10-CM

## 2023-01-18 DIAGNOSIS — G95.19 NEUROGENIC CLAUDICATION (HCC): ICD-10-CM

## 2023-01-18 PROCEDURE — G8482 FLU IMMUNIZE ORDER/ADMIN: HCPCS | Performed by: PHYSICIAN ASSISTANT

## 2023-01-18 PROCEDURE — G8417 CALC BMI ABV UP PARAM F/U: HCPCS | Performed by: PHYSICIAN ASSISTANT

## 2023-01-18 PROCEDURE — 99213 OFFICE O/P EST LOW 20 MIN: CPT | Performed by: PHYSICIAN ASSISTANT

## 2023-01-18 PROCEDURE — G8427 DOCREV CUR MEDS BY ELIG CLIN: HCPCS | Performed by: PHYSICIAN ASSISTANT

## 2023-01-18 PROCEDURE — 1036F TOBACCO NON-USER: CPT | Performed by: PHYSICIAN ASSISTANT

## 2023-01-18 PROCEDURE — 3017F COLORECTAL CA SCREEN DOC REV: CPT | Performed by: PHYSICIAN ASSISTANT

## 2023-01-18 NOTE — PROGRESS NOTES
[address] [facility], [address] [facility], [address] [place] [address], [address] [facility]           Dept Phone: [phone]           Dept Fax:  [phone] [facility]           Radha Mendoza          Dept Phone: [phone]           Dept Fax:  [phone]      Chief Compliant:  Chief Complaint   Patient presents with    Hip Pain     right        History of Present Illness: This is a 61 y.o. male who presents to the clinic today for evaluation of right-sided low back and right hip pain. Please see previous clinic note for more detailed history. Patient was last seen by me on 12/14/2022 found to have evidence of diffuse degenerative changes of the lumbar spine but did have evidence of trochanteric bursitis on exam.  We elected to proceed with a trial of a trochanteric bursal injection and recommend follow-up today for reevaluation. Patient reports unfortunately he has had minimal improvement of his right hip and right-sided low back pain. He states it does seem to wax and wane and today is actually a good day however over the weekend patient had significant increase in pain and had difficulty doing regular ADLs secondary to this pain. He states he does have chronic low back pain but states it has been much different than his baseline pain. He notes intermittent numbness and tingling in the right upper leg in addition to this chronic low back pain as well.     Past History:    Current Outpatient Medications:     atorvastatin (LIPITOR) 40 MG tablet, TAKE 1 TABLET BY MOUTH EVERY DAY, Disp: 90 tablet, Rfl: 3    ASPIRIN LOW DOSE 81 MG EC tablet, TAKE 1 TABLET BY MOUTH EVERY DAY IN THE MORNING, Disp: 30 tablet, Rfl: 5    azithromycin (ZITHROMAX) 250 MG tablet, Take 2 tabs (500 mg) on Day 1, and take 1 tab (250 mg) on days 2 through 5., Disp: 1 packet, Rfl: 0    insulin lispro, 1 Unit Dial, (HUMALOG KWIKPEN) 100 UNIT/ML SOPN, Inject 12 Units into the skin 3 times daily (before meals), Disp: 5 Adjustable Dose Pre-filled Pen Syringe, Rfl: 1    clotrimazole-betamethasone (LOTRISONE) 1-0.05 % cream, APPLY TO AFFECTED AREA TWICE A DAY, Disp: 45 g, Rfl: 3    isosorbide mononitrate (IMDUR) 30 MG extended release tablet, TAKE 1 TABLET BY MOUTH EVERY DAY, Disp: 30 tablet, Rfl: 11    ELIQUIS 5 MG TABS tablet, TAKE 1 TABLET BY MOUTH TWICE A DAY, Disp: 60 tablet, Rfl: 8    finasteride (PROSCAR) 5 MG tablet, TAKE 1 TABLET BY MOUTH EVERY DAY, Disp: 30 tablet, Rfl: 5    blood glucose test strips (CONTOUR NEXT TEST) strip, TEST 4 TIMES A DAY, Disp: 100 strip, Rfl: 3    tamsulosin (FLOMAX) 0.4 MG capsule, TAKE 1 CAPSULE BY MOUTH EVERY DAY, Disp: 30 capsule, Rfl: 11    OZEMPIC, 2 MG/DOSE, 8 MG/3ML SOPN, INJECT 2 MG UNDER THE SKIN EVERY 7 DAYS., Disp: , Rfl:     pregabalin (LYRICA) 100 MG capsule, Take 1 capsule by mouth in the morning, at noon, and at bedtime for 90 days. , Disp: 270 capsule, Rfl: 2    amLODIPine (NORVASC) 2.5 MG tablet, TAKE 1 TABLET BY MOUTH EVERY DAY, Disp: 30 tablet, Rfl: 3    metoprolol tartrate (LOPRESSOR) 50 MG tablet, TAKE 1 TABLET BY MOUTH TWICE A DAY, Disp: 60 tablet, Rfl: 3    levothyroxine (SYNTHROID) 175 MCG tablet, TAKE 1 TABLET BY MOUTH EVERY DAY, Disp: 30 tablet, Rfl: 3    losartan (COZAAR) 50 MG tablet, TAKE 1 TABLET BY MOUTH EVERY DAY, Disp: 90 tablet, Rfl: 1    Lactobacillus (ACIDOPHILUS PROBIOTIC) 100 MG CAPS, TAKE 1 CAPSULE BY MOUTH TWICE A DAY, Disp: , Rfl:     Lactobacillus Acid-Pectin (ACIDOPHILUS/CITRUS PECTIN) TABS, Take 1 tablet by mouth daily (with breakfast), Disp: , Rfl:     Probiotic Acidophilus (FLORANEX) TABS, TAKE 1 TABLET BY MOUTH TWICE A DAY, Disp: 60 tablet, Rfl: 0    Insulin Glargine, 2 Unit Dial, (TOUJEO MAX SOLOSTAR) 300 UNIT/ML SOPN, Inject 110 Units into the skin daily, Disp: 5 pen, Rfl: 5    budesonide-formoterol (SYMBICORT) 160-4.5 MCG/ACT AERO, Inhale 2 puffs into the lungs 2 times daily, Disp: 10.2 g, Rfl: 5    rOPINIRole (REQUIP) 2 MG tablet, TAKE 1 TABLET BY MOUTH EVERY DAY, Disp: 30 tablet, Rfl: 11    gemfibrozil (LOPID) 600 MG tablet, TAKE 1 TABLET BY MOUTH EVERY DAY, Disp: 30 tablet, Rfl: 11    Insulin Pen Needle (COMFORT TOUCH INSULIN PEN NEED) 33G X 6 MM MISC, 110 Units by Does not apply route daily, Disp: 5 each, Rfl: 1    amiodarone (CORDARONE) 200 MG tablet, Take 1 tablet by mouth 2 times daily, Disp: 60 tablet, Rfl: 0    insulin lispro (HUMALOG) 100 UNIT/ML injection vial, SLIDING SCALE (TAKE 5 UNITS IF SUGAR IS OVER 150), Disp: 1 each, Rfl: 3    budesonide-formoterol (SYMBICORT) 160-4.5 MCG/ACT AERO, TAKE 2 PUFFS BY MOUTH TWICE A DAY, Disp: 3 each, Rfl: 3    B-D UF III MINI PEN NEEDLES 31G X 5 MM MISC, USE AS DIRECTED ONCE DAILY TO INJECT TOUJEO, Disp: 100 each, Rfl: 3    Continuous Blood Gluc  (DEXCOM G6 ) DOUGIE, USE AS DIRECTED TO MONITOR BLOOD SUGAR, Disp: , Rfl:     albuterol sulfate  (90 Base) MCG/ACT inhaler, INHALE 2 PUFFS INTO THE LUNGS EVERY 6 HOURS AS NEEDED FOR WHEEZING OR SHORTNESS OF BREATH, Disp: 6.7 Inhaler, Rfl: 0    glucagon, rDNA, 1 MG injection, Inject 1 mL into the muscle once as needed for Low blood sugar , Disp: , Rfl:     Blood Pressure KIT, 1 actuation by Does not apply route once a week, Disp: 1 kit, Rfl: 0    Handicap Placard MISC, by Does not apply route Duration - 5years Reason- prosthetic leg, Disp: 1 each, Rfl: 0    Omega-3 Fatty Acids (FISH OIL CONCENTRATE) 300 MG CAPS, Take 300 mg by mouth nightly , Disp: , Rfl:     Glucosamine Sulfate 500 MG TABS, Take 500 mg by mouth 3 times daily, Disp: , Rfl:   Allergies   Allergen Reactions    Ramipril      Other reaction(s): swelling of the lips   Other reaction(s): swelling of the lips     Adhesive Tape      tegaderm causes blisters.  Use paper tape     Social History     Socioeconomic History    Marital status:      Spouse name: Tim Aguila Number of children: 2    Years of education: Not on file    Highest education level: Not on file   Occupational History    Occupation: Disabled   Tobacco Use    Smoking status: Former     Packs/day: 2.00     Years: 25.00     Pack years: 50.00     Types: Cigars, Cigarettes     Start date: 1     Quit date: 2011     Years since quittin.6    Smokeless tobacco: Never    Tobacco comments:     quit in    Vaping Use    Vaping Use: Never used   Substance and Sexual Activity    Alcohol use: Yes     Alcohol/week: 0.0 standard drinks     Comment: BEER 2 A MONTH    Drug use: Not Currently     Types: Marijuana [address] [employer])     Comment: in     Sexual activity: Yes     Partners: Female   Other Topics Concern    Not on file   Social History Narrative    Not on file     Social Determinants of Health     Financial Resource Strain: Low Risk     Difficulty of Paying Living Expenses: Not hard at all   Food Insecurity: No Food Insecurity    Worried About [employer] in the Last Year: Never true    [address] in the Last Year: Never true   Transportation Needs: No Transportation Needs    Lack of Transportation (Medical): No    Lack of Transportation (Non-Medical):  No   Physical Activity: Insufficiently Active    Days of Exercise per Week: 1 day    Minutes of Exercise per Session: 60 min   Stress: Not on file   Social Connections: Not on file   Intimate Partner Violence: Not on file   Housing Stability: Not on file     Past Medical History:   Diagnosis Date    A-fib [facility])     Asymptomatic bilateral carotid artery stenosis     Boil, thigh 10/2019    10/30/19: right inner thigh region, says PCP Rxd Doxy for this, area is much better, has a couple days left to complete Doxy    CAD (coronary artery disease)     saw Dr. Nadia Cardoso ([facility])     Charcot foot due to diabetes mellitus ([facility]     LEFT    COPD (chronic obstructive pulmonary disease) ([facility]     INHALER USE DAILY    Diabetes mellitus ([facility]     IDDM, On Insulin Dr. Emile Giron    Diabetic neuropathy [facility])     Difficult intravenous access     VEINS ROLL    Employs prosthetic leg     s/p left BKA    Foot ulcer ([address] PRIOR TO 2015    BILAT    Full dentures     UPPER ONLY    Hyperlipidemia 2004    ON RX    Hypertension 2004    ON RX, PCP Dr. Emile Giron, seen Oct. 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 Denny Mancilla seen within last 1 1/2 mos     Past Surgical History:   Procedure Laterality Date    CARDIAC CATHETERIZATION      no stenting    CARDIOVASCULAR STRESS TEST  06/28/2019    small fixed apical, likely normal, EF 48%    COLONOSCOPY  06/17/2016    poor prep, done up to the IC valve, redundant colon    COLONOSCOPY  01/23/2017    VIRTUAL COLONOSCOPY DONE-no polyps or masses    CYSTOSCOPY Bilateral 6/16/2020    CYSTOSCOPY RETROGRADE PYELOGRAM URETEROSCOPY performed by Augustine Rivas MD at [facility] 19 Right 7/30/2020    CYSTOSCOPY, RIGHT RETROGRADE PYELOGRAM,  URETERAL CATHETER  INSERTION performed by Augustine Rivas MD at [facility] 198 9/1/2020    CYSTOSCOPY RETROGRADE PYELOGRAM, RIGHT URETERAL STENT EXCHANGE performed by Augustine Rivas MD at [address] [place] [address] Right     r-bone removed    HC  PICC [facility] 5/21/2018         KIDNEY CYST REMOVAL      KIDNEY SURGERY Right 11/13/2019    XI ROBOTIC PARTIAL NEPHRECTOMY, INTRAOP ULTRASOUND, RIGHT URETEROURETEROSTOMY, RIGHT URETERAL STENT PLACEMENT **SHORT STAY** performed by Rafaela Cooper MD at [facility] N/A 7/30/2020    XI LAPAROSCOPIC ROBOTIC URETEROLYSIS, ROBOTIC ASSISTED BUCCAL URETEROPLASTY  (DR. COBIAN TO ASSIST) performed by Rafaela Cooper MD at [place] [address] Left 4/29/15    OTHER SURGICAL HISTORY Left 5-5-15 and 11/2015    Revision BKA    OTHER SURGICAL HISTORY      left stump revision 5/30/2018    AZ AMP LEG THRU TIBIA&FIBULA RE-AMPUTATION Left 5/30/2018    LEG AMPUTATION BELOW KNEE REVISION performed by Evelio Bryan MD at [facility] Bilateral 80'S    TOE AMPUTATION      Right 2 and 4    VITRECTOMY Left 9/25/2019    PARS PLANA VITRECTOMY 25 GAUGE, MEMBRANE PEELING, ENDOLASER 200  MW 1044 SPOTS, INDIRECT LASER 26 SPOTS performed by [place] MD at [address] [facility] History   Problem Relation Age of Onset    Diabetes Mother     Hypertension Mother     Stomach Cancer Mother     Hypertension Father     Alcohol Abuse Father     COPD Father     Kidney Cancer Father     Diabetes Brother         IDDM-PUMP    Other Sister         BRAIN TUMOR        Review of Systems   Constitutional: Negative for fever, chills, sweats. Eyes: Negative for changes in vision, or pain. HENT: Negative for ear ache, epistaxis, or sore throat. Respiratory/Cardio: Negative for Chest pain, palpitations, SOB, or cough. Gastrointestinal: Negative for abdominal pain, N/V/D. Genitourinary: Negative for dysuria, frequency, urgency, or hematuria. Neurological: Negative for headache, numbness, or weakness. Integumentary: Negative for rash, itching, laceration, or abrasion.    Musculoskeletal: Positive for Hip Pain (right)       Physical Exam:  Constitutional: Patient is oriented to person, place, and time. Patient appears well-developed and well nourished. HENT: Negative otherwise noted  Head: Normocephalic and Atraumatic  Nose: Normal  Eyes: Conjunctivae and EOM are normal  Neck: Normal range of motion Neck supple. Respiratory/Cardio: Effort normal. No respiratory distress. Musculoskeletal:    LUMBAR/SACRAL EXAMINATION:  Inspection: Local inspection shows no step-off or bruising. Lumbar alignment is normal.  Sagittal and Coronal balance is neutral.      Palpation:  Moderate tenderness bilaterally at the paraspinal and right trochanteric bursa. No evidence of tenderness at the midline. There is no step-off or paraspinal spasm. Range of Motion: Lumbar flexion, extension and rotation are mildly limited due to pain. Strength:   Strength testing is 5/5 in all muscle groups tested. Special Tests:   Straight leg raise and crossed SLR negative. Leg length and pelvis level.  0 out of 5 Fabrizio's signs. Skin: There are no rashes, ulcerations or lesions. Reflexes: Reflexes are symmetrically 2+ at the patellar and ankle tendons. Clonus absent bilaterally at the feet. Gait & station: normal, patient ambulates without assistance  Additional Examinations:   RIGHT LOWER EXTREMITY: Inspection/examination of the right lower extremity does not show any tenderness, deformity or injury. Range of motion is unremarkable. There is no gross instability. There are no rashes, ulcerations or lesions. Strength and tone are normal.  LEFT LOWER EXTREMITY:  Inspection/examination of the left lower extremity does not show any tenderness, deformity or injury. Range of motion is unremarkable. There is no gross instability. There are no rashes, ulcerations or lesions. Strength and tone are normal.    Neurological: Patient is alert and oriented to person, place, and time. Normal strenght. No sensory deficit.   Skin: Skin is warm and dry  Psychiatric: Behavior is normal. Thought content normal.  Nursing note and vitals reviewed. Labs and Imaging:     XR taken today:  No results found. No new x-rays taken today however those from 11/30/2022 are available for independent review  3 views lumbar spine demonstrate multilevel lumbar degenerative changes moderate to early severe at L3-4 and L4-5 with disc height loss at these levels. Advanced facet arthropathy of the lower lumbar spine. Orders Placed This Encounter   Procedures    MRI LUMBAR SPINE WO CONTRAST     Standing Status:   Future     Standing Expiration Date:   1/18/2024       Assessment and Plan:  1. DDD (degenerative disc disease), lumbar    2. Neurogenic claudication ([address]    3. Greater trochanteric bursitis of right hip          PLAN:  Aide Varela is a 61 y.o. old male who returns today for reevaluation of right-sided low back pain. Patient also with evidence of trochanteric bursitis however with trial of trochanteric bursal corticosteroid injection and minimal improvement of pain likely indicating pain is radicular in nature given her evidence of diffuse lumbar degenerative changes. Question possibility of lumbar stenosis versus lumbar disc herniation. Given patient's failure to respond to conservative management recommend further diagnostic evaluation with MRI of the lumbar spine. Will await MRI results and follow-up will be arranged accordingly. Please note that this chart was generated using voice recognition Dragon dictation software. Although every effort was made to ensure the accuracy of this automated transcription, some errors in transcription may have occurred.

## 2023-01-22 DIAGNOSIS — U07.1 COVID-19 VIRUS INFECTION: ICD-10-CM

## 2023-01-23 RX ORDER — BUDESONIDE AND FORMOTEROL FUMARATE DIHYDRATE 160; 4.5 UG/1; UG/1
AEROSOL RESPIRATORY (INHALATION)
Qty: 10.2 EACH | Refills: 5 | Status: SHIPPED | OUTPATIENT
Start: 2023-01-23

## 2023-01-23 RX ORDER — LEVOTHYROXINE SODIUM 175 UG/1
TABLET ORAL
Qty: 30 TABLET | Refills: 3 | Status: SHIPPED | OUTPATIENT
Start: 2023-01-23

## 2023-01-23 RX ORDER — AMLODIPINE BESYLATE 2.5 MG/1
TABLET ORAL
Qty: 30 TABLET | Refills: 3 | Status: SHIPPED | OUTPATIENT
Start: 2023-01-23

## 2023-01-23 RX ORDER — METOPROLOL TARTRATE 50 MG/1
TABLET, FILM COATED ORAL
Qty: 60 TABLET | Refills: 3 | Status: SHIPPED | OUTPATIENT
Start: 2023-01-23

## 2023-01-24 ENCOUNTER — OFFICE VISIT (OUTPATIENT)
Dept: INTERNAL MEDICINE CLINIC | Age: 63
End: 2023-01-24

## 2023-01-24 VITALS
SYSTOLIC BLOOD PRESSURE: 122 MMHG | WEIGHT: 273 LBS | HEART RATE: 67 BPM | DIASTOLIC BLOOD PRESSURE: 74 MMHG | HEIGHT: 76 IN | BODY MASS INDEX: 33.24 KG/M2 | OXYGEN SATURATION: 100 %

## 2023-01-24 DIAGNOSIS — N18.30 STAGE 3 CHRONIC KIDNEY DISEASE, UNSPECIFIED WHETHER STAGE 3A OR 3B CKD (HCC): ICD-10-CM

## 2023-01-24 DIAGNOSIS — I48.11 LONGSTANDING PERSISTENT ATRIAL FIBRILLATION (HCC): Chronic | ICD-10-CM

## 2023-01-24 DIAGNOSIS — C64.1 MALIGNANT NEOPLASM OF RIGHT KIDNEY (HCC): ICD-10-CM

## 2023-01-24 DIAGNOSIS — I20.8 ANGINAL EQUIVALENT (HCC): ICD-10-CM

## 2023-01-24 DIAGNOSIS — Z79.4 TYPE 2 DIABETES MELLITUS WITH DIABETIC POLYNEUROPATHY, WITH LONG-TERM CURRENT USE OF INSULIN (HCC): Primary | ICD-10-CM

## 2023-01-24 DIAGNOSIS — I95.0 IDIOPATHIC HYPOTENSION: ICD-10-CM

## 2023-01-24 DIAGNOSIS — I42.2 HYPERTROPHIC CARDIOMYOPATHY (HCC): ICD-10-CM

## 2023-01-24 DIAGNOSIS — R29.898 BILATERAL ARM WEAKNESS: ICD-10-CM

## 2023-01-24 DIAGNOSIS — Z89.512 STATUS POST BELOW-KNEE AMPUTATION OF LEFT LOWER EXTREMITY (HCC): ICD-10-CM

## 2023-01-24 DIAGNOSIS — I73.9 PVD (PERIPHERAL VASCULAR DISEASE) (HCC): ICD-10-CM

## 2023-01-24 DIAGNOSIS — E11.42 TYPE 2 DIABETES MELLITUS WITH DIABETIC POLYNEUROPATHY, WITH LONG-TERM CURRENT USE OF INSULIN (HCC): Primary | ICD-10-CM

## 2023-01-24 DIAGNOSIS — E08.41 DIABETIC MONONEUROPATHY ASSOCIATED WITH DIABETES MELLITUS DUE TO UNDERLYING CONDITION (HCC): ICD-10-CM

## 2023-01-24 PROBLEM — N28.89 RIGHT RENAL MASS: Status: RESOLVED | Noted: 2019-10-07 | Resolved: 2023-01-24

## 2023-01-24 PROBLEM — E87.20 METABOLIC ACIDOSIS, NORMAL ANION GAP (NAG): Status: RESOLVED | Noted: 2019-11-13 | Resolved: 2023-01-24

## 2023-01-24 SDOH — ECONOMIC STABILITY: FOOD INSECURITY: WITHIN THE PAST 12 MONTHS, THE FOOD YOU BOUGHT JUST DIDN'T LAST AND YOU DIDN'T HAVE MONEY TO GET MORE.: NEVER TRUE

## 2023-01-24 SDOH — ECONOMIC STABILITY: FOOD INSECURITY: WITHIN THE PAST 12 MONTHS, YOU WORRIED THAT YOUR FOOD WOULD RUN OUT BEFORE YOU GOT MONEY TO BUY MORE.: NEVER TRUE

## 2023-01-24 ASSESSMENT — ENCOUNTER SYMPTOMS
COLOR CHANGE: 0
ABDOMINAL DISTENTION: 0
SHORTNESS OF BREATH: 0
EYE PAIN: 0
WHEEZING: 0
TROUBLE SWALLOWING: 0
COUGH: 0
DIARRHEA: 0
EYE DISCHARGE: 0
BLOOD IN STOOL: 0

## 2023-01-24 ASSESSMENT — SOCIAL DETERMINANTS OF HEALTH (SDOH): HOW HARD IS IT FOR YOU TO PAY FOR THE VERY BASICS LIKE FOOD, HOUSING, MEDICAL CARE, AND HEATING?: NOT HARD AT ALL

## 2023-01-24 NOTE — PROGRESS NOTES
Visit Information    Have you changed or started any medications since your last visit including any over-the-counter medicines, vitamins, or herbal medicines? no   Are you having any side effects from any of your medications? -  no  Have you stopped taking any of your medications? Is so, why? -  no    Have you seen any other physician or provider since your last visit? No  Have you had any other diagnostic tests since your last visit? No  Have you been seen in the emergency room and/or had an admission to a hospital since we last saw you? No  Have you had your routine dental cleaning in the past 6 months? no    Have you activated your [employer] account? If not, what are your barriers?  Yes     Patient Care Team:  Candice Santo MD as PCP - Felicita Desir MD as PCP - [facility] Sheila Kelly Provider  Hanna Ross MD as Consulting Physician (Infectious Diseases)    Medical History Review  Past Medical, Family, and Social History reviewed and does contribute to the patient presenting condition    Health Maintenance   Topic Date Due    HIV screen  Never done    DTaP/Tdap/Td vaccine (1 - Tdap) Never done    Shingles vaccine (1 of 2) 10/23/2014    Pneumococcal 0-64 years Vaccine (2 - PCV) 01/04/2015    Diabetic retinal exam  03/25/2022    Diabetic Alb to Cr ratio (uACR) test  12/14/2022    Low dose CT lung screening  04/19/2023    Lipids  08/10/2023    Annual Wellness Visit (AWV)  10/11/2023    GFR test (Diabetes, CKD 3-4, OR last GFR 15-59)  11/14/2023    A1C test (Diabetic or Prediabetic)  11/29/2023    Diabetic foot exam  12/05/2023    Depression Screen  01/05/2024    Colorectal Cancer Screen  01/23/2027    Flu vaccine  Completed    COVID-19 Vaccine  Completed    Hepatitis C screen  Completed    Hepatitis A vaccine  Aged Out    Hib vaccine  Aged Out    Meningococcal (ACWY) vaccine  Aged Out

## 2023-01-24 NOTE — PROGRESS NOTES
[address] [facility] [address]  [place] [address]  Dept: [phone]  Dept Fax: [phone]    Sofiya Jj is a 61 y.o. male who presents today for his medical conditions/complaintsas noted below. Sofiya Jj is c/o of   Chief Complaint   Patient presents with    Wound Check     Right lower Leg     Arm Pain     Right arm weakness          HPI:     Arm weakness  Right more        Hemoglobin A1C (%)   Date Value   11/29/2022 7.8   08/24/2022 6.9   05/04/2022 7.8 (H)             ( goal A1Cis < 7)   Microalb/Crt.  Ratio (mcg/mg creat)   Date Value   12/14/2021 35 (H)     LDL Cholesterol (mg/dL)   Date Value   08/10/2022 48   05/13/2021 55   01/13/2020 70       (goal LDL is <100)   AST (U/L)   Date Value   11/14/2022 27     ALT (U/L)   Date Value   11/14/2022 35     BUN (mg/dL)   Date Value   11/14/2022 25 (H)     BP Readings from Last 3 Encounters:   01/24/23 122/74   01/05/23 118/60   11/29/22 132/74          (goal 120/80)    Past Medical History:   Diagnosis Date    A-fib [facility])     Asymptomatic bilateral carotid artery stenosis     Boil, thigh 10/2019    10/30/19: right inner thigh region, says PCP Rxd Doxy for this, area is much better, has a couple days left to complete Doxy    CAD (coronary artery disease)     saw Dr. Caleb Ann ([facility])     Charcot foot due to diabetes mellitus ([address] 2014    LEFT    COPD (chronic obstructive pulmonary disease) ([facility] 2009    INHALER USE DAILY    Diabetes mellitus ([address] 1989    IDDM, On Insulin Dr. Kendra Abel    Diabetic neuropathy ([facility]     Difficult intravenous access     VEINS ROLL    Employs prosthetic leg     s/p left BKA    Foot ulcer ([address] PRIOR TO 2015    BILAT    Full dentures     UPPER ONLY    Hyperlipidemia 2004    ON RX    Hypertension 2004    ON RX, PCP Dr. Kendra Abel, seen Oct. 2019    Hypoglycemia 4/2/2015    MRSA (methicillin resistant staph aureus) culture positive 4/16/2015    ankle    OA (osteoarthritis)     Osteomyelitis ([facility]     left stump  BKA    Paroxysmal atrial fibrillation (HCC)     Renal mass 09/2019    ACCIDENTAL  FINDING IS FOLLOWING UP WITH KIDNEY DR Ledezma     Suspected sleep apnea     Thyroid disease 2004    PT HAD HYPERTHYROIDISM-UNCONTROLED THYROID DESTROYED WITH RADI IODINE NOW HAS HYPOTHYRIDISM    Vision abnormalities 09/2019    LEFT NO VISION    Vitreous hemorrhage of left eye due to diabetes mellitus ([address] 09/2019    s/p Vitrectomy 9/2019    Wears glasses     Wears partial dentures     full upper, does not wear lower partial    Wellness examination     Dr. Vicki Burrell seen within last 1 1/2 mos      Past Surgical History:   Procedure Laterality Date    CARDIAC CATHETERIZATION      no stenting    CARDIOVASCULAR STRESS TEST  06/28/2019    small fixed apical, likely normal, EF 48%    COLONOSCOPY  06/17/2016    poor prep, done up to the IC valve, redundant colon    COLONOSCOPY  01/23/2017    VIRTUAL COLONOSCOPY DONE-no polyps or masses    CYSTOSCOPY Bilateral 6/16/2020    CYSTOSCOPY RETROGRADE PYELOGRAM URETEROSCOPY performed by Alona Matos MD at [facility] Right 7/30/2020    CYSTOSCOPY, RIGHT RETROGRADE PYELOGRAM,  URETERAL CATHETER  INSERTION performed by Alona Matos MD at [facility] 198 9/1/2020    CYSTOSCOPY RETROGRADE PYELOGRAM, RIGHT URETERAL STENT EXCHANGE performed by Alona Matos MD at [address] [place] [address] Right     r-bone removed    HC  PICC [facility] DOUBLE  5/21/2018         KIDNEY CYST REMOVAL      KIDNEY SURGERY Right 11/13/2019    XI ROBOTIC PARTIAL NEPHRECTOMY, INTRAOP ULTRASOUND, RIGHT URETEROURETEROSTOMY, RIGHT URETERAL STENT PLACEMENT **SHORT STAY** performed by Alona Matos MD at [address] [place] [address],[address] 7/30/2020    XI LAPAROSCOPIC ROBOTIC URETEROLYSIS, ROBOTIC ASSISTED BUCCAL URETEROPLASTY  (DR. COBIAN TO ASSIST) performed by Alona Matos MD at [facility] 91 Left 4/29/15    OTHER SURGICAL HISTORY Left 5-5-15 and 2015    Revision BKA    OTHER SURGICAL HISTORY      left stump revision 2018    WY AMP LEG THRU TIBIA&FIBULA RE-AMPUTATION Left 2018    LEG AMPUTATION BELOW KNEE REVISION performed by Alejandra Antunez MD at [address] [facility] Bilateral 80'S    TOE AMPUTATION      Right 2 and 4    VITRECTOMY Left 2019    PARS PLANA VITRECTOMY 25 GAUGE, MEMBRANE PEELING, ENDOLASER 200  MW 1044 SPOTS, INDIRECT LASER 26 SPOTS performed by Dara Chan MD at [address] History   Problem Relation Age of Onset    Diabetes Mother     Hypertension Mother     Stomach Cancer Mother     Hypertension Father     Alcohol Abuse Father     COPD Father     Kidney Cancer Father     Diabetes Brother         IDDM-PUMP    Other Sister         BRAIN TUMOR       Social History     Tobacco Use    Smoking status: Former     Packs/day: 2.00     Years: 25.00     Pack years: 50.00     Types: Cigars, Cigarettes     Start date: 1     Quit date: 2011     Years since quittin.6    Smokeless tobacco: Never    Tobacco comments:     quit in    Substance Use Topics    Alcohol use:  Yes     Alcohol/week: 0.0 standard drinks     Comment: BEER 2 A MONTH      Current Outpatient Medications   Medication Sig Dispense Refill    levothyroxine (SYNTHROID) 175 MCG tablet TAKE 1 TABLET BY MOUTH EVERY DAY 30 tablet 3    amLODIPine (NORVASC) 2.5 MG tablet TAKE 1 TABLET BY MOUTH EVERY DAY 30 tablet 3    metoprolol tartrate (LOPRESSOR) 50 MG tablet TAKE 1 TABLET BY MOUTH TWICE A DAY 60 tablet 3    SYMBICORT 160-4.5 MCG/ACT AERO INHALE 2 PUFFS INTO THE LUNGS TWICE A DAY 10.2 each 5    atorvastatin (LIPITOR) 40 MG tablet TAKE 1 TABLET BY MOUTH EVERY DAY 90 tablet 3    ASPIRIN LOW DOSE 81 MG EC tablet TAKE 1 TABLET BY MOUTH EVERY DAY IN THE MORNING 30 tablet 5    azithromycin (ZITHROMAX) 250 MG tablet Take 2 tabs (500 mg) on Day 1, and take 1 tab (250 mg) on days 2 through 5. 1 packet 0    insulin lispro, 1 Unit Dial, (HUMALOG KWIKPEN) 100 UNIT/ML SOPN Inject 12 Units into the skin 3 times daily (before meals) 5 Adjustable Dose Pre-filled Pen Syringe 1    clotrimazole-betamethasone (LOTRISONE) 1-0.05 % cream APPLY TO AFFECTED AREA TWICE A DAY 45 g 3    isosorbide mononitrate (IMDUR) 30 MG extended release tablet TAKE 1 TABLET BY MOUTH EVERY DAY 30 tablet 11    ELIQUIS 5 MG TABS tablet TAKE 1 TABLET BY MOUTH TWICE A DAY 60 tablet 8    finasteride (PROSCAR) 5 MG tablet TAKE 1 TABLET BY MOUTH EVERY DAY 30 tablet 5    blood glucose test strips (CONTOUR NEXT TEST) strip TEST 4 TIMES A  strip 3    tamsulosin (FLOMAX) 0.4 MG capsule TAKE 1 CAPSULE BY MOUTH EVERY DAY 30 capsule 11    OZEMPIC, 2 MG/DOSE, 8 MG/3ML SOPN INJECT 2 MG UNDER THE SKIN EVERY 7 DAYS. pregabalin (LYRICA) 100 MG capsule Take 1 capsule by mouth in the morning, at noon, and at bedtime for 90 days.  270 capsule 2    losartan (COZAAR) 50 MG tablet TAKE 1 TABLET BY MOUTH EVERY DAY 90 tablet 1    Lactobacillus (ACIDOPHILUS PROBIOTIC) 100 MG CAPS TAKE 1 CAPSULE BY MOUTH TWICE A DAY      Lactobacillus Acid-Pectin (ACIDOPHILUS/CITRUS PECTIN) TABS Take 1 tablet by mouth daily (with breakfast)      Probiotic Acidophilus (FLORANEX) TABS TAKE 1 TABLET BY MOUTH TWICE A DAY 60 tablet 0    Insulin Glargine, 2 Unit Dial, (TOUJEO MAX SOLOSTAR) 300 UNIT/ML SOPN Inject 110 Units into the skin daily 5 pen 5    rOPINIRole (REQUIP) 2 MG tablet TAKE 1 TABLET BY MOUTH EVERY DAY 30 tablet 11    gemfibrozil (LOPID) 600 MG tablet TAKE 1 TABLET BY MOUTH EVERY DAY 30 tablet 11    Insulin Pen Needle (COMFORT TOUCH INSULIN PEN NEED) 33G X 6 MM MISC 110 Units by Does not apply route daily 5 each 1    insulin lispro (HUMALOG) 100 UNIT/ML injection vial SLIDING SCALE (TAKE 5 UNITS IF SUGAR IS OVER 150) 1 each 3    budesonide-formoterol (SYMBICORT) 160-4.5 MCG/ACT AERO TAKE 2 PUFFS BY MOUTH TWICE A DAY 3 each 3    B-D UF III MINI PEN NEEDLES 31G X 5 MM MISC USE AS DIRECTED ONCE DAILY TO INJECT TOUJEO 100 each 3    Continuous Blood Gluc  (DEXCOM G6 ) DOUGIE USE AS DIRECTED TO MONITOR BLOOD SUGAR      albuterol sulfate  (90 Base) MCG/ACT inhaler INHALE 2 PUFFS INTO THE LUNGS EVERY 6 HOURS AS NEEDED FOR WHEEZING OR SHORTNESS OF BREATH 6.7 Inhaler 0    glucagon, rDNA, 1 MG injection Inject 1 mL into the muscle once as needed for Low blood sugar       Blood Pressure KIT 1 actuation by Does not apply route once a week 1 kit 0    Handicap Placard MISC by Does not apply route Duration - 5years  Reason- prosthetic leg 1 each 0    Omega-3 Fatty Acids (FISH OIL CONCENTRATE) 300 MG CAPS Take 300 mg by mouth nightly       Glucosamine Sulfate 500 MG TABS Take 500 mg by mouth 3 times daily      amiodarone (CORDARONE) 200 MG tablet Take 1 tablet by mouth 2 times daily 60 tablet 0     No current facility-administered medications for this visit. Allergies   Allergen Reactions    Ramipril      Other reaction(s): swelling of the lips   Other reaction(s): swelling of the lips     Adhesive Tape      tegaderm causes blisters.  Use paper tape       Health Maintenance   Topic Date Due    HIV screen  Never done    DTaP/Tdap/Td vaccine (1 - Tdap) Never done    Shingles vaccine (1 of 2) 10/23/2014    Pneumococcal 0-64 years Vaccine (2 - PCV) 01/04/2015    Diabetic retinal exam  03/25/2022    Diabetic Alb to Cr ratio (uACR) test  12/14/2022    Low dose CT lung screening  04/19/2023    Lipids  08/10/2023    Annual Wellness Visit (AWV)  10/11/2023    GFR test (Diabetes, CKD 3-4, OR last GFR 15-59)  11/14/2023    A1C test (Diabetic or Prediabetic)  11/29/2023    Diabetic foot exam  12/05/2023    Depression Screen  01/05/2024    Colorectal Cancer Screen  01/23/2027    Flu vaccine  Completed    COVID-19 Vaccine  Completed    Hepatitis C screen  Completed    Hepatitis A vaccine  Aged Out    Hib vaccine  Aged Out    Meningococcal (ACWY) vaccine  Aged Out Subjective:     Review of Systems   Constitutional:  Negative for appetite change, diaphoresis and fatigue. HENT:  Negative for ear discharge and trouble swallowing. Eyes:  Negative for pain and discharge. Respiratory:  Negative for cough, shortness of breath and wheezing. Cardiovascular:  Negative for chest pain and palpitations. Gastrointestinal:  Negative for abdominal distention, blood in stool and diarrhea. Endocrine: Negative for polydipsia and polyphagia. Genitourinary:  Negative for difficulty urinating and frequency. Musculoskeletal:  Positive for arthralgias. Negative for gait problem, myalgias and neck pain. Skin:  Negative for color change and rash. Allergic/Immunologic: Negative for environmental allergies and food allergies. Neurological:  Positive for weakness. Negative for dizziness and headaches. Hematological:  Negative for adenopathy. Does not bruise/bleed easily. Psychiatric/Behavioral:  Negative for behavioral problems and sleep disturbance. Objective:     Physical Exam  Constitutional:       Appearance: He is well-developed. He is not diaphoretic. HENT:      Head: Normocephalic and atraumatic. Eyes:      General:         Right eye: No discharge. Left eye: No discharge. Extraocular Movements:      Right eye: Normal extraocular motion. Left eye: Normal extraocular motion. Conjunctiva/sclera: Conjunctivae normal.      Right eye: Right conjunctiva is not injected. Left eye: Left conjunctiva is not injected. Neck:      Thyroid: No thyroid mass or thyromegaly. Vascular: No JVD. Cardiovascular:      Rate and Rhythm: Normal rate and regular rhythm. Heart sounds: No murmur heard. No friction rub. Pulmonary:      Effort: Pulmonary effort is normal. No tachypnea, bradypnea, accessory muscle usage or respiratory distress. Breath sounds: Normal breath sounds. No wheezing or rales.    Abdominal:      General: Bowel sounds are normal. There is no distension. Palpations: Abdomen is soft. Tenderness: There is no abdominal tenderness. There is no rebound. Musculoskeletal:         General: No tenderness. Normal range of motion. Cervical back: Normal range of motion and neck supple. No edema or erythema. Comments: Post lef bka  Muscle trip     Lymphadenopathy:      Head:      Right side of head: No submental or submandibular adenopathy. Left side of head: No submental or submandibular adenopathy. Cervical: No cervical adenopathy. Skin:     General: Skin is warm. Coloration: Skin is not pale. Findings: No bruising, ecchymosis or rash. Neurological:      Mental Status: He is alert and oriented to person, place, and time. Cranial Nerves: No cranial nerve deficit. Sensory: No sensory deficit. Motor: No atrophy or abnormal muscle tone. Coordination: Coordination normal.   Psychiatric:         Mood and Affect: Mood is not anxious. Affect is not angry. Speech: Speech is not slurred. Behavior: Behavior normal. Behavior is not aggressive. Thought Content: Thought content does not include homicidal ideation. Cognition and Memory: Memory is not impaired. /74   Pulse 67   Ht 6' 4\" (1.93 m)   Wt 273 lb (123.8 kg)   SpO2 100%   BMI 33.23 kg/m²     Assessment:       Diagnosis Orders   1. Type 2 diabetes mellitus with diabetic polyneuropathy, with long-term current use of insulin   Hemoglobin A1C      2. PVD (peripheral vascular disease) ([address]        3. Hypertrophic cardiomyopathy ([address]        4. Anginal equivalent ([address]        5. Diabetic mononeuropathy associated with diabetes mellitus due to underlying condition ([address]        6. Stage 3 chronic kidney disease, unspecified whether stage 3a or 3b CKD ([address]        7. Malignant neoplasm of right kidney ([address]        8.  Status post below-knee amputation of left lower extremity ([address] 9. Longstanding persistent atrial fibrillation ([facility]        10. Idiopathic hypotension        11. Bilateral arm weakness  MRI CERVICAL SPINE WO CONTRAST                Plan:      No follow-ups on file. Orders Placed This Encounter   Procedures    MRI CERVICAL SPINE WO CONTRAST     Standing Status:   Future     Standing Expiration Date:   4/27/2023    Hemoglobin A1C     Standing Status:   Future     Standing Expiration Date:   4/24/2023     No orders of the defined types were placed in this encounter. Right arm weakness  Some atrophy   Likely from cervical radiculopathy  A1c 7.8  Will repeat in a month  Seen ortho  Getting hip mri per ortho           Patient given educational materials - see patient instructions. Discussed use, benefit, and side effects of prescribed medications. All patientquestions answered. Pt voiced understanding. Reviewed health maintenance. Instructedto continue current medications, diet and exercise. Patient agreed with treatmentplan. Follow up as directed. Please note that this chart was generated using voice recognition Dragon dictation software. Although every effort was made to ensure the accuracy of this automated transcription, some errors in transcription may have occurred.      Electronically signed by Jessica Tate MD on 1/24/2023 at 8:55 AM

## 2023-02-03 ENCOUNTER — TELEPHONE (OUTPATIENT)
Dept: INTERNAL MEDICINE CLINIC | Age: 63
End: 2023-02-03

## 2023-02-03 ENCOUNTER — OFFICE VISIT (OUTPATIENT)
Dept: PODIATRY | Age: 63
End: 2023-02-03

## 2023-02-03 VITALS — WEIGHT: 273 LBS | HEIGHT: 76 IN | BODY MASS INDEX: 33.24 KG/M2

## 2023-02-03 DIAGNOSIS — M79.674 PAIN OF TOE OF RIGHT FOOT: ICD-10-CM

## 2023-02-03 DIAGNOSIS — E11.42 TYPE 2 DIABETES MELLITUS WITH DIABETIC POLYNEUROPATHY, WITH LONG-TERM CURRENT USE OF INSULIN (HCC): ICD-10-CM

## 2023-02-03 DIAGNOSIS — B35.1 ONYCHOMYCOSIS OF TOENAIL: Primary | ICD-10-CM

## 2023-02-03 DIAGNOSIS — E11.51 TYPE 2 DIABETES MELLITUS WITH PERIPHERAL VASCULAR DISEASE (HCC): ICD-10-CM

## 2023-02-03 DIAGNOSIS — Z79.4 TYPE 2 DIABETES MELLITUS WITH DIABETIC POLYNEUROPATHY, WITH LONG-TERM CURRENT USE OF INSULIN (HCC): ICD-10-CM

## 2023-02-03 NOTE — TELEPHONE ENCOUNTER
----- Message from Daniellene Amaury sent at 2/3/2023 11:16 AM EST -----  Subject: Message to Provider    QUESTIONS  Information for Provider? Héctor Beasley from [facility] calling to confirm office   received consult agreement (9 pages) for this PT. Doc needs to sign off   and fax back Fax# [phone]  ---------------------------------------------------------------------------  --------------  [phone] "[employer]  [phone]; OK to leave message on voicemail  ---------------------------------------------------------------------------  --------------  SCRIPT ANSWERS  Relationship to Patient? Third Party  Third Party Type? Pharmacy? Representative Name?  Héctor Beasley

## 2023-02-03 NOTE — TELEPHONE ENCOUNTER
Fax has been received and is awaiting a signature from PCP, please follow up MTM when completed and faxed back.

## 2023-02-04 ENCOUNTER — HOSPITAL ENCOUNTER (OUTPATIENT)
Dept: MRI IMAGING | Age: 63
End: 2023-02-04
Payer: COMMERCIAL

## 2023-02-04 DIAGNOSIS — R29.898 BILATERAL ARM WEAKNESS: ICD-10-CM

## 2023-02-04 DIAGNOSIS — M51.36 DDD (DEGENERATIVE DISC DISEASE), LUMBAR: ICD-10-CM

## 2023-02-04 DIAGNOSIS — G95.19 NEUROGENIC CLAUDICATION (HCC): ICD-10-CM

## 2023-02-04 PROCEDURE — 72148 MRI LUMBAR SPINE W/O DYE: CPT

## 2023-02-04 PROCEDURE — G1010 CDSM STANSON: HCPCS

## 2023-02-08 ENCOUNTER — TELEPHONE (OUTPATIENT)
Dept: ORTHOPEDIC SURGERY | Age: 63
End: 2023-02-08

## 2023-02-08 NOTE — TELEPHONE ENCOUNTER
----- Message from [place], [place] sent at 2/7/2023  8:20 AM EST -----  Multilevel lumbar DDD with mild lumbar stenosis from L2-3, L3-4 and L4-5.   Please have patient follow-up with Dr. Nati Dumont to discuss MRI results and treatment options

## 2023-02-09 ASSESSMENT — ENCOUNTER SYMPTOMS
SHORTNESS OF BREATH: 0
NAUSEA: 0
COLOR CHANGE: 0
BACK PAIN: 0
DIARRHEA: 0

## 2023-02-09 NOTE — PROGRESS NOTES
SUBJECTIVE: Gurvinder Gay is a 61 y.o. male who returns to the office with chief complaint of painful fungal toenails. Patient relates toe nails are thickened/difficult to trim as well as painful with ambulation and with shoe gear. Chief Complaint   Patient presents with    Nail Problem     Nail trim/last saw Cee Jimenez 1/24/23    Diabetes     Blood sugar 131     Review of Systems   Constitutional:  Negative for activity change, appetite change, chills, diaphoresis, fatigue and fever. Respiratory:  Negative for shortness of breath. Cardiovascular:  Negative for leg swelling. Gastrointestinal:  Negative for diarrhea and nausea. Endocrine: Negative for cold intolerance, heat intolerance and polyuria. Musculoskeletal:  Positive for arthralgias and gait problem. Negative for back pain, joint swelling and myalgias. Skin:  Negative for color change, pallor, rash and wound. Allergic/Immunologic: Negative for environmental allergies and food allergies. Neurological:  Positive for numbness. Negative for dizziness, weakness and light-headedness. Hematological:  Does not bruise/bleed easily. Psychiatric/Behavioral:  Negative for behavioral problems, confusion and self-injury. The patient is not nervous/anxious. OBJECTIVE: Clinical evaluation of patient reveals nails 1,4,5 of the right foot present with thickness, elongation, discoloration, brittleness, and subungual debris. There was pain with palpation and debridement of the toenails of the right foot No open lesions noted to the right foot today. The left leg and toes 2 and 3 of the right foot have been amputated. The right DP pulse is not palpable. The right PT pulse is not palpable. Protective sensation is absent to the right plantar foot as noted with a 5.07 Moneta-Gaudencio monofilament. Gluose: 131 mg/dl. Class A Findings (1 needed)   [x] Non-traumatic amputation of foot or integral skeleton portion thereof.    [x] Q7. Class B Findings (2 needed)   1. [] Absent posterior tibial pulse   2. [] Absent dorsalis pedis pulse   3. [] Advanced trophic changes; three of the following are required:   ·         [] hair growth (decrease or absence)   ·         [] nail changes (thickening)   ·         [] pigmentary changes (discoloration)   ·         [] skin texture (thin, shiny)   ·         [] skin color (rubor or redness)   [] Q8.      Class C Findings (1 Class B, 2 Class C needed)   1. [] Claudication   2. [] Temperature changes   3. [] Edema   4. [] Paresthesia   5. [] Burning   [] Q9.     ASSESSMENT:    Diagnosis Orders   1. Onychomycosis of toenail  NJ DEBRIDEMENT NAIL ANY METHOD 1-5     DIABETES FOOT EXAM      2. Pain of toe of right foot  NJ DEBRIDEMENT NAIL ANY METHOD 1-5     DIABETES FOOT EXAM      3. Type 2 diabetes mellitus with peripheral vascular disease (HCC)  NJ DEBRIDEMENT NAIL ANY METHOD 1-5     DIABETES FOOT EXAM      4. Type 2 diabetes mellitus with diabetic polyneuropathy, with long-term current use of insulin (HCC)  NJ DEBRIDEMENT NAIL ANY METHOD 1-5    HM DIABETES FOOT EXAM        PLAN: Toenails 1,4,5 of the right foot were debrided in length and thickness using a nail nipper and a . Return in about 9 weeks (around 4/7/2023) for At risk diabetic foot care.    2/3/2023      Rekha Sykes DPM

## 2023-02-09 NOTE — TELEPHONE ENCOUNTER
Patient called office to get clarification on his lumbar MRI results. I told him Marti's results and spoke to him briefly about DDD and stenosis. Patient asked about treatment options and I informed him that Dr. Pablito Agrawal would provide more insight on his results and what options are available going forward. Patient voiced understanding and was encouraged to call the office back with any other questions.

## 2023-02-20 DIAGNOSIS — R10.9 FLANK PAIN: Primary | ICD-10-CM

## 2023-02-20 DIAGNOSIS — C64.1 MALIGNANT NEOPLASM OF RIGHT KIDNEY (HCC): Primary | ICD-10-CM

## 2023-02-20 RX ORDER — INSULIN GLARGINE 300 U/ML
110 INJECTION, SOLUTION SUBCUTANEOUS DAILY
Qty: 1 ADJUSTABLE DOSE PRE-FILLED PEN SYRINGE | Refills: 5 | Status: SHIPPED | OUTPATIENT
Start: 2023-02-20

## 2023-02-20 NOTE — TELEPHONE ENCOUNTER
Left message with results. Told to call back if he has any questions.   Stacy Vega Please advise PCP out of office

## 2023-02-21 RX ORDER — GEMFIBROZIL 600 MG/1
TABLET, FILM COATED ORAL
Qty: 30 TABLET | Refills: 11 | Status: SHIPPED | OUTPATIENT
Start: 2023-02-21

## 2023-02-22 ENCOUNTER — HOSPITAL ENCOUNTER (OUTPATIENT)
Age: 63
End: 2023-02-22
Payer: COMMERCIAL

## 2023-02-22 ENCOUNTER — HOSPITAL ENCOUNTER (OUTPATIENT)
Dept: ULTRASOUND IMAGING | Age: 63
Discharge: HOME | End: 2023-02-24
Payer: COMMERCIAL

## 2023-02-22 ENCOUNTER — HOSPITAL ENCOUNTER (OUTPATIENT)
Age: 63
Discharge: HOME | End: 2023-02-22
Payer: COMMERCIAL

## 2023-02-22 DIAGNOSIS — Z79.4 TYPE 2 DIABETES MELLITUS WITH DIABETIC POLYNEUROPATHY, WITH LONG-TERM CURRENT USE OF INSULIN (HCC): ICD-10-CM

## 2023-02-22 DIAGNOSIS — Z12.5 PROSTATE CANCER SCREENING: Primary | ICD-10-CM

## 2023-02-22 DIAGNOSIS — R10.9 FLANK PAIN: ICD-10-CM

## 2023-02-22 DIAGNOSIS — E11.42 TYPE 2 DIABETES MELLITUS WITH DIABETIC POLYNEUROPATHY, WITH LONG-TERM CURRENT USE OF INSULIN (HCC): ICD-10-CM

## 2023-02-22 LAB
EST. AVERAGE GLUCOSE BLD GHB EST-MCNC: 151 MG/DL
HBA1C MFR BLD: 6.9 % (ref 4–6)

## 2023-02-22 PROCEDURE — 76770 US EXAM ABDO BACK WALL COMP: CPT

## 2023-02-22 PROCEDURE — 83036 HEMOGLOBIN GLYCOSYLATED A1C: CPT

## 2023-02-22 PROCEDURE — 36415 COLL VENOUS BLD VENIPUNCTURE: CPT

## 2023-02-23 ENCOUNTER — OFFICE VISIT (OUTPATIENT)
Dept: INTERNAL MEDICINE CLINIC | Age: 63
End: 2023-02-23

## 2023-02-23 VITALS
HEART RATE: 80 BPM | HEIGHT: 76 IN | WEIGHT: 273 LBS | DIASTOLIC BLOOD PRESSURE: 68 MMHG | SYSTOLIC BLOOD PRESSURE: 124 MMHG | OXYGEN SATURATION: 96 % | BODY MASS INDEX: 33.24 KG/M2

## 2023-02-23 DIAGNOSIS — Z87.891 PERSONAL HISTORY OF TOBACCO USE: ICD-10-CM

## 2023-02-23 DIAGNOSIS — R10.13 DYSPEPSIA: Primary | ICD-10-CM

## 2023-02-23 DIAGNOSIS — C64.1 MALIGNANT NEOPLASM OF RIGHT KIDNEY (HCC): ICD-10-CM

## 2023-02-23 DIAGNOSIS — G89.29 CHRONIC RIGHT SHOULDER PAIN: ICD-10-CM

## 2023-02-23 DIAGNOSIS — I42.2 HYPERTROPHIC CARDIOMYOPATHY (HCC): ICD-10-CM

## 2023-02-23 DIAGNOSIS — M25.511 CHRONIC RIGHT SHOULDER PAIN: ICD-10-CM

## 2023-02-23 DIAGNOSIS — Z79.4 TYPE 2 DIABETES MELLITUS WITH DIABETIC POLYNEUROPATHY, WITH LONG-TERM CURRENT USE OF INSULIN (HCC): ICD-10-CM

## 2023-02-23 DIAGNOSIS — I73.9 PVD (PERIPHERAL VASCULAR DISEASE) (HCC): ICD-10-CM

## 2023-02-23 DIAGNOSIS — N18.30 STAGE 3 CHRONIC KIDNEY DISEASE, UNSPECIFIED WHETHER STAGE 3A OR 3B CKD (HCC): ICD-10-CM

## 2023-02-23 DIAGNOSIS — Z89.512 STATUS POST BELOW-KNEE AMPUTATION OF LEFT LOWER EXTREMITY (HCC): ICD-10-CM

## 2023-02-23 DIAGNOSIS — I20.8 ANGINAL EQUIVALENT (HCC): ICD-10-CM

## 2023-02-23 DIAGNOSIS — E11.42 TYPE 2 DIABETES MELLITUS WITH DIABETIC POLYNEUROPATHY, WITH LONG-TERM CURRENT USE OF INSULIN (HCC): ICD-10-CM

## 2023-02-23 SDOH — ECONOMIC STABILITY: FOOD INSECURITY: WITHIN THE PAST 12 MONTHS, YOU WORRIED THAT YOUR FOOD WOULD RUN OUT BEFORE YOU GOT MONEY TO BUY MORE.: NEVER TRUE

## 2023-02-23 SDOH — ECONOMIC STABILITY: INCOME INSECURITY: HOW HARD IS IT FOR YOU TO PAY FOR THE VERY BASICS LIKE FOOD, HOUSING, MEDICAL CARE, AND HEATING?: NOT HARD AT ALL

## 2023-02-23 SDOH — ECONOMIC STABILITY: FOOD INSECURITY: WITHIN THE PAST 12 MONTHS, THE FOOD YOU BOUGHT JUST DIDN'T LAST AND YOU DIDN'T HAVE MONEY TO GET MORE.: NEVER TRUE

## 2023-02-23 SDOH — ECONOMIC STABILITY: HOUSING INSECURITY
IN THE LAST 12 MONTHS, WAS THERE A TIME WHEN YOU DID NOT HAVE A STEADY PLACE TO SLEEP OR SLEPT IN A SHELTER (INCLUDING NOW)?: NO

## 2023-02-23 ASSESSMENT — ENCOUNTER SYMPTOMS
BLOOD IN STOOL: 0
EYE PAIN: 0
COUGH: 0
COLOR CHANGE: 0
DIARRHEA: 0
WHEEZING: 0
ABDOMINAL DISTENTION: 0
EYE DISCHARGE: 0
TROUBLE SWALLOWING: 0
SHORTNESS OF BREATH: 0

## 2023-02-23 NOTE — PROGRESS NOTES
Visit Information    Have you changed or started any medications since your last visit including any over-the-counter medicines, vitamins, or herbal medicines? no   Are you having any side effects from any of your medications? -  no  Have you stopped taking any of your medications? Is so, why? -  no    Have you seen any other physician or provider since your last visit? No  Have you had any other diagnostic tests since your last visit? No  Have you been seen in the emergency room and/or had an admission to a hospital since we last saw you? No  Have you had your routine dental cleaning in the past 6 months? no    Have you activated your [employer] account? If not, what are your barriers?  Yes     Patient Care Team:  Sandra Mckeon MD as PCP - Hermelinda Carpenter MD as PCP - Empaneled Provider  Chandan Villalta MD as Consulting Physician (Infectious Diseases)    Medical History Review  Past Medical, Family, and Social History reviewed and does contribute to the patient presenting condition    Health Maintenance   Topic Date Due    HIV screen  Never done    DTaP/Tdap/Td vaccine (1 - Tdap) Never done    Shingles vaccine (1 of 2) 10/23/2014    Pneumococcal 0-64 years Vaccine (2 - PCV) 01/04/2015    Diabetic retinal exam  03/25/2022    Diabetic Alb to Cr ratio (uACR) test  12/14/2022    Low dose CT lung screening  04/19/2023    Lipids  08/10/2023    Annual Wellness Visit (AWV)  10/11/2023    GFR test (Diabetes, CKD 3-4, OR last GFR 15-59)  11/14/2023    Depression Screen  01/05/2024    Diabetic foot exam  02/09/2024    A1C test (Diabetic or Prediabetic)  02/22/2024    Colorectal Cancer Screen  01/23/2027    Flu vaccine  Completed    COVID-19 Vaccine  Completed    Hepatitis C screen  Completed    Hepatitis A vaccine  Aged Out    Hib vaccine  Aged Out    Meningococcal (ACWY) vaccine  Aged Out

## 2023-02-23 NOTE — PATIENT INSTRUCTIONS

## 2023-02-23 NOTE — PROGRESS NOTES
[address]  [place] [address]  Dept: [phone]  Dept Fax: [phone]    Viivan Palomo is a 61 y.o. male who presents today for his medical conditions/complaintsas noted below. Vivian Palomo is c/o of   Chief Complaint   Patient presents with    Diabetes     A1C- 2/22/22= 6.9    Discuss Labs     MRI neck         HPI:     HTN  Onset more than 2 years ago  gustavo mild to mod  Controlled with current po meds  Not associated with headaches or blurry vision  No chest pain    Diabetes   Duration more than 7 years  Modifying factors on Glucophage and other med  Severity uncontrolled sever  Associated signs and symtoms neuropathy/ckd/ CAD. aggravated with sugar diet and better with low sugar diet       Right shoulder pain and weakness in right arm  Muscle atrophy in right thumb        Hemoglobin A1C (%)   Date Value   02/22/2023 6.9 (H)   11/29/2022 7.8   08/24/2022 6.9             ( goal A1Cis < 7)   Microalb/Crt.  Ratio (mcg/mg creat)   Date Value   12/14/2021 35 (H)     LDL Cholesterol (mg/dL)   Date Value   08/10/2022 48   05/13/2021 55   01/13/2020 70       (goal LDL is <100)   AST (U/L)   Date Value   11/14/2022 27     ALT (U/L)   Date Value   11/14/2022 35     BUN (mg/dL)   Date Value   11/14/2022 25 (H)     BP Readings from Last 3 Encounters:   02/23/23 124/68   01/24/23 122/74   01/05/23 118/60          (goal 120/80)    Past Medical History:   Diagnosis Date    A-fib [facility])     Asymptomatic bilateral carotid artery stenosis     Boil, thigh 10/2019    10/30/19: right inner thigh region, says PCP Rxd Doxy for this, area is much better, has a couple days left to complete Doxy    CAD (coronary artery disease)     saw Dr. Ruel Dominguez ([facility])     Charcot foot due to diabetes mellitus ([address] 2014    LEFT    COPD (chronic obstructive pulmonary disease) ([address] 2009    INHALER USE DAILY    Diabetes mellitus ([address] 1989    IDDM, On Insulin Dr. Harrison Wilson    Diabetic neuropathy ([address]     Difficult intravenous access     VEINS ROLL    Employs prosthetic leg     s/p left BKA    Foot ulcer ([address] PRIOR TO 2015    BILAT    Full dentures     UPPER ONLY    Hyperlipidemia 2004    ON RX    Hypertension 2004    ON RX, PCP Dr. Rabia Peters, seen Oct. 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 Chang Ramirez seen within last 1 1/2 mos      Past Surgical History:   Procedure Laterality Date    CARDIAC CATHETERIZATION      no stenting    CARDIOVASCULAR STRESS TEST  06/28/2019    small fixed apical, likely normal, EF 48%    COLONOSCOPY  06/17/2016    poor prep, done up to the IC valve, redundant colon    COLONOSCOPY  01/23/2017    VIRTUAL COLONOSCOPY DONE-no polyps or masses    CYSTOSCOPY Bilateral 6/16/2020    CYSTOSCOPY RETROGRADE PYELOGRAM URETEROSCOPY performed by Melissa Ji MD at [facility] Right 7/30/2020    CYSTOSCOPY, RIGHT RETROGRADE PYELOGRAM,  URETERAL CATHETER  INSERTION performed by Melissa Ji MD at [facility] N/A 9/1/2020    CYSTOSCOPY RETROGRADE PYELOGRAM, RIGHT URETERAL STENT EXCHANGE performed by Melissa Ji MD at [address] [place] [address] Right     r-bone removed    HC  PICC [address] DOUBLE  5/21/2018         KIDNEY CYST REMOVAL KIDNEY SURGERY Right 2019    XI ROBOTIC PARTIAL NEPHRECTOMY, INTRAOP ULTRASOUND, RIGHT URETEROURETEROSTOMY, RIGHT URETERAL STENT PLACEMENT **SHORT STAY** performed by He Villarreal MD at [facility] N/ 2020    XI LAPAROSCOPIC ROBOTIC URETEROLYSIS, ROBOTIC ASSISTED BUCCAL URETEROPLASTY  (DR. COBIAN TO ASSIST) performed by He Villarreal MD at [place] [address] Left 4/29/15    OTHER SURGICAL HISTORY Left 5-5-15 and 2015    Revision BKA    OTHER SURGICAL HISTORY      left stump revision 2018    MI AMP LEG THRU TIBIA&FIBULA RE-AMPUTATION Left 2018    LEG AMPUTATION BELOW KNEE REVISION performed by Sudha Joseph MD at [address] [facility] Bilateral 80'S    TOE AMPUTATION      Right 2 and 4    VITRECTOMY Left 2019    PARS PLANA VITRECTOMY 25 GAUGE, MEMBRANE PEELING, ENDOLASER 200  MW 1044 SPOTS, INDIRECT LASER 26 SPOTS performed by Valerie Tejada MD at [place] History   Problem Relation Age of Onset    Diabetes Mother     Hypertension Mother     Stomach Cancer Mother     Hypertension Father     Alcohol Abuse Father     COPD Father     Kidney Cancer Father     Diabetes Brother         IDDM-PUMP    Other Sister         BRAIN TUMOR       Social History     Tobacco Use    Smoking status: Former     Packs/day: 2.00     Years: 25.00     Pack years: 50.00     Types: Cigars, Cigarettes     Start date: 1     Quit date: 2011     Years since quittin.7    Smokeless tobacco: Never    Tobacco comments:     quit in    Substance Use Topics    Alcohol use:  Yes     Alcohol/week: 0.0 standard drinks     Comment: BEER 2 A MONTH      Current Outpatient Medications   Medication Sig Dispense Refill    gemfibrozil (LOPID) 600 MG tablet TAKE 1 TABLET BY MOUTH EVERY DAY 30 tablet 11    TOUJEO MAX SOLOSTAR 300 UNIT/ML SOPN INJECT 110 UNITS INTO THE SKIN DAILY 1 Adjustable Dose Pre-filled Pen Syringe 5 levothyroxine (SYNTHROID) 175 MCG tablet TAKE 1 TABLET BY MOUTH EVERY DAY 30 tablet 3    amLODIPine (NORVASC) 2.5 MG tablet TAKE 1 TABLET BY MOUTH EVERY DAY 30 tablet 3    metoprolol tartrate (LOPRESSOR) 50 MG tablet TAKE 1 TABLET BY MOUTH TWICE A DAY 60 tablet 3    SYMBICORT 160-4.5 MCG/ACT AERO INHALE 2 PUFFS INTO THE LUNGS TWICE A DAY 10.2 each 5    atorvastatin (LIPITOR) 40 MG tablet TAKE 1 TABLET BY MOUTH EVERY DAY 90 tablet 3    ASPIRIN LOW DOSE 81 MG EC tablet TAKE 1 TABLET BY MOUTH EVERY DAY IN THE MORNING 30 tablet 5    azithromycin (ZITHROMAX) 250 MG tablet Take 2 tabs (500 mg) on Day 1, and take 1 tab (250 mg) on days 2 through 5. 1 packet 0    insulin lispro, 1 Unit Dial, (HUMALOG KWIKPEN) 100 UNIT/ML SOPN Inject 12 Units into the skin 3 times daily (before meals) 5 Adjustable Dose Pre-filled Pen Syringe 1    clotrimazole-betamethasone (LOTRISONE) 1-0.05 % cream APPLY TO AFFECTED AREA TWICE A DAY 45 g 3    isosorbide mononitrate (IMDUR) 30 MG extended release tablet TAKE 1 TABLET BY MOUTH EVERY DAY 30 tablet 11    ELIQUIS 5 MG TABS tablet TAKE 1 TABLET BY MOUTH TWICE A DAY 60 tablet 8    finasteride (PROSCAR) 5 MG tablet TAKE 1 TABLET BY MOUTH EVERY DAY 30 tablet 5    blood glucose test strips (CONTOUR NEXT TEST) strip TEST 4 TIMES A  strip 3    tamsulosin (FLOMAX) 0.4 MG capsule TAKE 1 CAPSULE BY MOUTH EVERY DAY 30 capsule 11    OZEMPIC, 2 MG/DOSE, 8 MG/3ML SOPN INJECT 2 MG UNDER THE SKIN EVERY 7 DAYS. pregabalin (LYRICA) 100 MG capsule Take 1 capsule by mouth in the morning, at noon, and at bedtime for 90 days.  270 capsule 2    losartan (COZAAR) 50 MG tablet TAKE 1 TABLET BY MOUTH EVERY DAY 90 tablet 1    Lactobacillus (ACIDOPHILUS PROBIOTIC) 100 MG CAPS TAKE 1 CAPSULE BY MOUTH TWICE A DAY      Lactobacillus Acid-Pectin (ACIDOPHILUS/CITRUS PECTIN) TABS Take 1 tablet by mouth daily (with breakfast)      Probiotic Acidophilus (FLORANEX) TABS TAKE 1 TABLET BY MOUTH TWICE A DAY 60 tablet 0 rOPINIRole (REQUIP) 2 MG tablet TAKE 1 TABLET BY MOUTH EVERY DAY 30 tablet 11    Insulin Pen Needle (COMFORT TOUCH INSULIN PEN NEED) 33G X 6 MM MISC 110 Units by Does not apply route daily 5 each 1    amiodarone (CORDARONE) 200 MG tablet Take 1 tablet by mouth 2 times daily 60 tablet 0    insulin lispro (HUMALOG) 100 UNIT/ML injection vial SLIDING SCALE (TAKE 5 UNITS IF SUGAR IS OVER 150) 1 each 3    budesonide-formoterol (SYMBICORT) 160-4.5 MCG/ACT AERO TAKE 2 PUFFS BY MOUTH TWICE A DAY 3 each 3    B-D UF III MINI PEN NEEDLES 31G X 5 MM MISC USE AS DIRECTED ONCE DAILY TO INJECT TOUJEO 100 each 3    Continuous Blood Gluc  (DEXCOM G6 ) ODUGIE USE AS DIRECTED TO MONITOR BLOOD SUGAR      albuterol sulfate  (90 Base) MCG/ACT inhaler INHALE 2 PUFFS INTO THE LUNGS EVERY 6 HOURS AS NEEDED FOR WHEEZING OR SHORTNESS OF BREATH 6.7 Inhaler 0    glucagon, rDNA, 1 MG injection Inject 1 mL into the muscle once as needed for Low blood sugar       Blood Pressure KIT 1 actuation by Does not apply route once a week 1 kit 0    Handicap Placard MISC by Does not apply route Duration - 5years  Reason- prosthetic leg 1 each 0    Omega-3 Fatty Acids (FISH OIL CONCENTRATE) 300 MG CAPS Take 300 mg by mouth nightly       Glucosamine Sulfate 500 MG TABS Take 500 mg by mouth 3 times daily       No current facility-administered medications for this visit. Allergies   Allergen Reactions    Ramipril      Other reaction(s): swelling of the lips   Other reaction(s): swelling of the lips     Adhesive Tape      tegaderm causes blisters.  Use paper tape       Health Maintenance   Topic Date Due    HIV screen  Never done    DTaP/Tdap/Td vaccine (1 - Tdap) Never done    Shingles vaccine (1 of 2) 10/23/2014    Pneumococcal 0-64 years Vaccine (2 - PCV) 01/04/2015    Diabetic retinal exam  03/25/2022    Diabetic Alb to Cr ratio (uACR) test  12/14/2022    Low dose CT lung screening  04/19/2023    Lipids  08/10/2023    Annual Wellness Visit (AWV)  10/11/2023    GFR test (Diabetes, CKD 3-4, OR last GFR 15-59)  11/14/2023    Depression Screen  01/05/2024    Diabetic foot exam  02/09/2024    A1C test (Diabetic or Prediabetic)  02/22/2024    Colorectal Cancer Screen  01/23/2027    Flu vaccine  Completed    COVID-19 Vaccine  Completed    Hepatitis C screen  Completed    Hepatitis A vaccine  Aged Out    Hib vaccine  Aged Out    Meningococcal (ACWY) vaccine  Aged Out       Subjective:     Review of Systems   Constitutional:  Negative for appetite change, diaphoresis and fatigue. HENT:  Negative for ear discharge and trouble swallowing. Eyes:  Negative for pain and discharge. Respiratory:  Negative for cough, shortness of breath and wheezing. Cardiovascular:  Negative for chest pain and palpitations. Gastrointestinal:  Negative for abdominal distention, blood in stool and diarrhea. Endocrine: Negative for polydipsia and polyphagia. Genitourinary:  Negative for difficulty urinating and frequency. Musculoskeletal:  Negative for gait problem, myalgias and neck pain. Skin:  Negative for color change and rash. Allergic/Immunologic: Negative for environmental allergies and food allergies. Neurological:  Positive for weakness and numbness. Negative for dizziness and headaches. Hematological:  Negative for adenopathy. Does not bruise/bleed easily. Psychiatric/Behavioral:  Negative for behavioral problems and sleep disturbance. Objective:     Physical Exam  Constitutional:       Appearance: He is well-developed. He is not diaphoretic. HENT:      Head: Normocephalic and atraumatic. Eyes:      General:         Right eye: No discharge. Left eye: No discharge. Extraocular Movements:      Right eye: Normal extraocular motion. Left eye: Normal extraocular motion. Conjunctiva/sclera: Conjunctivae normal.      Right eye: Right conjunctiva is not injected. Left eye: Left conjunctiva is not injected.    Neck: Thyroid: No thyroid mass or thyromegaly. Vascular: No JVD. Cardiovascular:      Rate and Rhythm: Normal rate and regular rhythm. Heart sounds: No murmur heard. No friction rub. Pulmonary:      Effort: Pulmonary effort is normal. No tachypnea, bradypnea, accessory muscle usage or respiratory distress. Breath sounds: Normal breath sounds. No wheezing or rales. Abdominal:      General: Bowel sounds are normal. There is no distension. Palpations: Abdomen is soft. Tenderness: There is no abdominal tenderness. There is no rebound. Musculoskeletal:         General: No tenderness. Normal range of motion. Cervical back: Normal range of motion and neck supple. No edema or erythema. Comments: Post left bka     Lymphadenopathy:      Head:      Right side of head: No submental or submandibular adenopathy. Left side of head: No submental or submandibular adenopathy. Cervical: No cervical adenopathy. Skin:     General: Skin is warm. Coloration: Skin is not pale. Findings: No bruising, ecchymosis or rash. Neurological:      Mental Status: He is alert and oriented to person, place, and time. Cranial Nerves: No cranial nerve deficit. Sensory: No sensory deficit. Motor: No atrophy or abnormal muscle tone. Coordination: Coordination normal.      Comments: Right shoulder pain and weakness in right arm  Muscle atrophy in right thumb     Psychiatric:         Mood and Affect: Mood is not anxious. Affect is not angry. Speech: Speech is not slurred. Behavior: Behavior normal. Behavior is not aggressive. Thought Content: Thought content does not include homicidal ideation. Cognition and Memory: Memory is not impaired. /68   Pulse 80   Ht 6' 4\" (1.93 m)   Wt 273 lb (123.8 kg)   SpO2 96%   BMI 33.23 kg/m²     Assessment:       Diagnosis Orders   1.  Dyspepsia  US GALLBLADDER RUQ      2. PVD (peripheral vascular disease) ([facility]        3. Hypertrophic cardiomyopathy ([facility]        4. Anginal equivalent ([facility]        5. Type 2 diabetes mellitus with diabetic polyneuropathy, with long-term current use of insulin         6. Stage 3 chronic kidney disease, unspecified whether stage 3a or 3b CKD ([facility]        7. Status post below-knee amputation of left lower extremity ([facility]        8. Malignant neoplasm of right kidney (HCC)        9. Personal history of tobacco use  MS VISIT TO DISCUSS LUNG CA SCREEN W LDCT    CT Lung Screen (Annual/Baseline)      10. Chronic right shoulder pain  Temitope Serrano MD, Orthopedic Surgery ([facility] [place] [place]                Plan:      Return in about 1 month (around 3/23/2023). Orders Placed This Encounter   Procedures    CT Lung Screen (Annual/Baseline)     Age: Patient is 61 y.o. Smoking History: Social History    Tobacco Use      Smoking status: Former        Packs/day: 2.00        Years: 25.00        Pack years: 48        Types: Cigars, Cigarettes        Start date: 1        Quit date: 2011        Years since quittin.7      Smokeless tobacco: Never      Tobacco comments: quit in     Vaping Use      Vaping Use: Never used    Alcohol use: Yes      Alcohol/week: 0.0 standard drinks      Comment: BEER 2 A MONTH    Drug use: Not Currently      Types: Marijuana Barreraángela Alvarez)      Comment: in 20's   Pack years: 48    Date of last lung cancer screenin2022     Standing Status:   Future     Standing Expiration Date:   2024     Order Specific Question:   Is there documentation of shared decision making? Answer:   Yes     Order Specific Question:   Is this a low dose CT or a routine CT? Answer:   Low Dose CT [1]     Order Specific Question:   Is this the first (baseline) CT or an annual exam?     Answer: Annual [2]     Order Specific Question:   Does the patient show any signs or symptoms of lung cancer?      Answer:   No     Order Specific Question:   Smoking Status? Answer: Former [4]     Order Specific Question:   Date quit smoking? (must be within 15 years)     Answer:   5/29/2011     Order Specific Question:   Smoking packs per day? Answer:   2     Order Specific Question:   Years smoking? Answer:   25    US GALLBLADDER RUQ     Standing Status:   Future     Standing Expiration Date:   2/23/2024    Andrade Bennett MD, Orthopedic Surgery ([facility]), [place]     Referral Priority:   Routine     Referral Type:   Eval and Treat     Referral Reason:   Specialty Services Required     Referred to Provider:   Eloy Walden MD     Requested Specialty:   Orthopedic Surgery     Number of Visits Requested:   1    MD VISIT TO DISCUSS LUNG CA SCREEN W LDCT     No orders of the defined types were placed in this encounter. Right shoulder pain and weakness in right arm  Muscle atrophy in right thumb  Ref to dr Anthony Stratton  May need EMG    Hx of lap nephrectomy  Has diarrhea  Bloating  Gas  Dyspepsia  Will do us gallbladder  A1c noted 6.9  Improved form 7.8     Patient given educational materials - see patient instructions. Discussed use, benefit, and side effects of prescribed medications. All patientquestions answered. Pt voiced understanding. Reviewed health maintenance. Instructedto continue current medications, diet and exercise. Patient agreed with treatmentplan. Follow up as directed. Please note that this chart was generated using voice recognition Dragon dictation software. Although every effort was made to ensure the accuracy of this automated transcription, some errors in transcription may have occurred. Electronically signed by Rafita Ahuja MD on 2/23/2023 at 8:56 AM  Discussed with the patient the current USPSTF guidelines released March 9, 2021 for screening for lung cancer.     For adults aged 48 to [de-identified] years who have a 20 pack-year smoking history and currently smoke or have quit within the past 15 years the grade B recommendation is to:  Screen for lung cancer with low-dose computed tomography (LDCT) every year. Stop screening once a person has not smoked for 15 years or has a health problem that limits life expectancy or the ability to have lung surgery. The patient  reports that he quit smoking about 11 years ago. His smoking use included cigars and cigarettes. He started smoking about 45 years ago. He has a 50.00 pack-year smoking history. He has never used smokeless tobacco.. Discussed with patient the risks and benefits of screening, including over-diagnosis, false positive rate, and total radiation exposure. The patient currently exhibits no signs or symptoms suggestive of lung cancer. Discussed with patient the importance of compliance with yearly annual lung cancer screenings and willingness to undergo diagnosis and treatment if screening scan is positive. In addition, the patient was counseled regarding the importance of remaining smoke free and/or total smoking cessation.     Also reviewed the following if the patient has Medicare that as of February 10, 2022, Medicare only covers LDCT screening in patients aged 51-72 with at least a 20 pack-year smoking history who currently smoke or have quit in the last 15 years

## 2023-02-28 ENCOUNTER — HOSPITAL ENCOUNTER (OUTPATIENT)
Dept: ULTRASOUND IMAGING | Age: 63
Discharge: HOME | End: 2023-03-02
Payer: COMMERCIAL

## 2023-02-28 DIAGNOSIS — R10.13 DYSPEPSIA: ICD-10-CM

## 2023-02-28 PROCEDURE — 76705 ECHO EXAM OF ABDOMEN: CPT

## 2023-03-06 ENCOUNTER — HOSPITAL ENCOUNTER (OUTPATIENT)
Age: 63
Setting detail: SPECIMEN
Discharge: HOME | End: 2023-03-06

## 2023-03-06 DIAGNOSIS — Z12.5 PROSTATE CANCER SCREENING: ICD-10-CM

## 2023-03-06 LAB — PROSTATE SPECIFIC ANTIGEN: 0.74 NG/ML

## 2023-03-07 ENCOUNTER — OFFICE VISIT (OUTPATIENT)
Dept: ORTHOPEDIC SURGERY | Age: 63
End: 2023-03-07
Payer: COMMERCIAL

## 2023-03-07 VITALS — BODY MASS INDEX: 33.24 KG/M2 | HEIGHT: 76 IN | WEIGHT: 273 LBS | RESPIRATION RATE: 16 BRPM

## 2023-03-07 DIAGNOSIS — M48.062 LUMBAR STENOSIS WITH NEUROGENIC CLAUDICATION: ICD-10-CM

## 2023-03-07 DIAGNOSIS — M48.10 DISH (DIFFUSE IDIOPATHIC SKELETAL HYPEROSTOSIS): ICD-10-CM

## 2023-03-07 DIAGNOSIS — M25.511 RIGHT SHOULDER PAIN, UNSPECIFIED CHRONICITY: ICD-10-CM

## 2023-03-07 DIAGNOSIS — M51.36 DDD (DEGENERATIVE DISC DISEASE), LUMBAR: Primary | ICD-10-CM

## 2023-03-07 PROCEDURE — 3017F COLORECTAL CA SCREEN DOC REV: CPT | Performed by: ORTHOPAEDIC SURGERY

## 2023-03-07 PROCEDURE — G8417 CALC BMI ABV UP PARAM F/U: HCPCS | Performed by: ORTHOPAEDIC SURGERY

## 2023-03-07 PROCEDURE — 1036F TOBACCO NON-USER: CPT | Performed by: ORTHOPAEDIC SURGERY

## 2023-03-07 PROCEDURE — G8482 FLU IMMUNIZE ORDER/ADMIN: HCPCS | Performed by: ORTHOPAEDIC SURGERY

## 2023-03-07 PROCEDURE — G8427 DOCREV CUR MEDS BY ELIG CLIN: HCPCS | Performed by: ORTHOPAEDIC SURGERY

## 2023-03-07 PROCEDURE — 99213 OFFICE O/P EST LOW 20 MIN: CPT | Performed by: ORTHOPAEDIC SURGERY

## 2023-03-07 NOTE — PROGRESS NOTES
Patient ID: Diogo Barrera is a 61 y.o. male    Chief Compliant:  Chief Complaint   Patient presents with    Lower Back Pain    Neck Pain        Diagnostic imagin views right shoulder reviewed some mild degenerative changes age-appropriate    Prior MRI had shown significant degeneration within the rotator cuff    Assessment and Plan:  1. DDD (degenerative disc disease), lumbar    2. Right shoulder pain, unspecified chronicity    3. DISH (diffuse idiopathic skeletal hyperostosis)    4. Lumbar stenosis with neurogenic claudication      Possible adhesive capsulitis    Low back pain worsened with standing for extended periods of time    Patient is a candidate for L4-5 PLIF, patient wishes to not undergo surgery at this time as his complaints about his arm is worse than his low back at this time    We discussed risks of surgery and recovery process. Risks include infection, bleeding, neurologic injury, systemic and anesthetic complications, no relief of pain, need for future surgery. Right upper extremity pain and weakness    Follow up 12 w    HPI:  This is a 61 y.o. male who presents to the clinic today as a new patient for MRI results. Patient ambulates with assistance via cane. Candidate for L2-3, L3-4 decompression with L4-5 PLIF    Back pain is tolerable shoulder pain is not    Patient will be following with Dr. Dexter Velez due to pain and weakness in his right upper extremity so severe that he cannot  a coffee cup without discomfort. He complains of pain in his low back with doing dishes, cooking, and working in the garden to such a degree that he uses a wheelchair to do these activities. Patient has a left prosthetic leg due to diabetic neuropathy. Review of Systems   All other systems reviewed and are negative.       Past History:    Current Outpatient Medications:     gemfibrozil (LOPID) 600 MG tablet, TAKE 1 TABLET BY MOUTH EVERY DAY, Disp: 30 tablet, Rfl: 11    TOUJEO MAX SOLOSTAR 300 UNIT/ML SOPN, INJECT 110 UNITS INTO THE SKIN DAILY, Disp: 1 Adjustable Dose Pre-filled Pen Syringe, Rfl: 5    levothyroxine (SYNTHROID) 175 MCG tablet, TAKE 1 TABLET BY MOUTH EVERY DAY, Disp: 30 tablet, Rfl: 3    amLODIPine (NORVASC) 2.5 MG tablet, TAKE 1 TABLET BY MOUTH EVERY DAY, Disp: 30 tablet, Rfl: 3    metoprolol tartrate (LOPRESSOR) 50 MG tablet, TAKE 1 TABLET BY MOUTH TWICE A DAY, Disp: 60 tablet, Rfl: 3    SYMBICORT 160-4.5 MCG/ACT AERO, INHALE 2 PUFFS INTO THE LUNGS TWICE A DAY, Disp: 10.2 each, Rfl: 5    atorvastatin (LIPITOR) 40 MG tablet, TAKE 1 TABLET BY MOUTH EVERY DAY, Disp: 90 tablet, Rfl: 3    ASPIRIN LOW DOSE 81 MG EC tablet, TAKE 1 TABLET BY MOUTH EVERY DAY IN THE MORNING, Disp: 30 tablet, Rfl: 5    azithromycin (ZITHROMAX) 250 MG tablet, Take 2 tabs (500 mg) on Day 1, and take 1 tab (250 mg) on days 2 through 5., Disp: 1 packet, Rfl: 0    insulin lispro, 1 Unit Dial, (HUMALOG KWIKPEN) 100 UNIT/ML SOPN, Inject 12 Units into the skin 3 times daily (before meals), Disp: 5 Adjustable Dose Pre-filled Pen Syringe, Rfl: 1    clotrimazole-betamethasone (LOTRISONE) 1-0.05 % cream, APPLY TO AFFECTED AREA TWICE A DAY, Disp: 45 g, Rfl: 3    isosorbide mononitrate (IMDUR) 30 MG extended release tablet, TAKE 1 TABLET BY MOUTH EVERY DAY, Disp: 30 tablet, Rfl: 11    ELIQUIS 5 MG TABS tablet, TAKE 1 TABLET BY MOUTH TWICE A DAY, Disp: 60 tablet, Rfl: 8    finasteride (PROSCAR) 5 MG tablet, TAKE 1 TABLET BY MOUTH EVERY DAY, Disp: 30 tablet, Rfl: 5    blood glucose test strips (CONTOUR NEXT TEST) strip, TEST 4 TIMES A DAY, Disp: 100 strip, Rfl: 3    tamsulosin (FLOMAX) 0.4 MG capsule, TAKE 1 CAPSULE BY MOUTH EVERY DAY, Disp: 30 capsule, Rfl: 11    OZEMPIC, 2 MG/DOSE, 8 MG/3ML SOPN, INJECT 2 MG UNDER THE SKIN EVERY 7 DAYS., Disp: , Rfl:     pregabalin (LYRICA) 100 MG capsule, Take 1 capsule by mouth in the morning, at noon, and at bedtime for 90 days. , Disp: 270 capsule, Rfl: 2    losartan (COZAAR) 50 MG tablet, TAKE 1 TABLET BY MOUTH EVERY DAY, Disp: 90 tablet, Rfl: 1    Lactobacillus (ACIDOPHILUS PROBIOTIC) 100 MG CAPS, TAKE 1 CAPSULE BY MOUTH TWICE A DAY, Disp: , Rfl:     Lactobacillus Acid-Pectin (ACIDOPHILUS/CITRUS PECTIN) TABS, Take 1 tablet by mouth daily (with breakfast), Disp: , Rfl:     Probiotic Acidophilus (FLORANEX) TABS, TAKE 1 TABLET BY MOUTH TWICE A DAY, Disp: 60 tablet, Rfl: 0    rOPINIRole (REQUIP) 2 MG tablet, TAKE 1 TABLET BY MOUTH EVERY DAY, Disp: 30 tablet, Rfl: 11    Insulin Pen Needle (COMFORT TOUCH INSULIN PEN NEED) 33G X 6 MM MISC, 110 Units by Does not apply route daily, Disp: 5 each, Rfl: 1    amiodarone (CORDARONE) 200 MG tablet, Take 1 tablet by mouth 2 times daily, Disp: 60 tablet, Rfl: 0    insulin lispro (HUMALOG) 100 UNIT/ML injection vial, SLIDING SCALE (TAKE 5 UNITS IF SUGAR IS OVER 150), Disp: 1 each, Rfl: 3    budesonide-formoterol (SYMBICORT) 160-4.5 MCG/ACT AERO, TAKE 2 PUFFS BY MOUTH TWICE A DAY, Disp: 3 each, Rfl: 3    B-D UF III MINI PEN NEEDLES 31G X 5 MM MISC, USE AS DIRECTED ONCE DAILY TO INJECT TOUJEO, Disp: 100 each, Rfl: 3    Continuous Blood Gluc  (DEXCOM G6 ) DOUGIE, USE AS DIRECTED TO MONITOR BLOOD SUGAR, Disp: , Rfl:     albuterol sulfate  (90 Base) MCG/ACT inhaler, INHALE 2 PUFFS INTO THE LUNGS EVERY 6 HOURS AS NEEDED FOR WHEEZING OR SHORTNESS OF BREATH, Disp: 6.7 Inhaler, Rfl: 0    glucagon, rDNA, 1 MG injection, Inject 1 mL into the muscle once as needed for Low blood sugar , Disp: , Rfl:     Blood Pressure KIT, 1 actuation by Does not apply route once a week, Disp: 1 kit, Rfl: 0    Handicap Placard MISC, by Does not apply route Duration - 5years Reason- prosthetic leg, Disp: 1 each, Rfl: 0    Omega-3 Fatty Acids (FISH OIL CONCENTRATE) 300 MG CAPS, Take 300 mg by mouth nightly , Disp: , Rfl:     Glucosamine Sulfate 500 MG TABS, Take 500 mg by mouth 3 times daily, Disp: , Rfl:   Allergies   Allergen Reactions    Ramipril      Other reaction(s): swelling of the lips   Other reaction(s): swelling of the lips     Adhesive Tape      tegaderm causes blisters. Use paper tape     Social History     Socioeconomic History    Marital status:      Spouse name: Yessy Gooden    Number of children: 2    Years of education: Not on file    Highest education level: Not on file   Occupational History    Occupation: Disabled   Tobacco Use    Smoking status: Former     Packs/day: 2.00     Years: 25.00     Pack years: 50.00     Types: Cigars, Cigarettes     Start date:      Quit date: 2011     Years since quittin.7    Smokeless tobacco: Never    Tobacco comments:     quit in    Vaping Use    Vaping Use: Never used   Substance and Sexual Activity    Alcohol use: Yes     Alcohol/week: 0.0 standard drinks     Comment: BEER 2 A MONTH    Drug use: Not Currently     Types: Marijuana Champ Cue)     Comment: in 's    Sexual activity: Yes     Partners: Female   Other Topics Concern    Not on file   Social History Narrative    Not on file     Social Determinants of Health     Financial Resource Strain: Low Risk     Difficulty of Paying Living Expenses: Not hard at all   Food Insecurity: No Food Insecurity    Worried About [address] in the Last Year: Never true    [address] in the Last Year: Never true   Transportation Needs: Unknown    Lack of Transportation (Medical): Not on file    Lack of Transportation (Non-Medical):  No   Physical Activity: Insufficiently Active    Days of Exercise per Week: 1 day    Minutes of Exercise per Session: 60 min   Stress: Not on file   Social Connections: Not on file   Intimate Partner Violence: Not on file   Housing Stability: Unknown    Unable to Pay for Housing in the Last Year: Not on file    Number of Places Lived in the Last Year: Not on file    Unstable Housing in the Last Year: No     Past Medical History:   Diagnosis Date    A-fib [facility])     Asymptomatic bilateral carotid artery stenosis     Boil, thigh 10/2019    10/30/19: right inner thigh region, says PCP Rxd Doxy for this, area is much better, has a couple days left to complete Doxy    CAD (coronary artery disease)     saw Dr. Caleb Ann ([facility])     Charcot foot due to diabetes mellitus ([address] 2014    LEFT    COPD (chronic obstructive pulmonary disease) ([address] 2009    INHALER USE DAILY    Diabetes mellitus ([address] 1989    IDDM, On Insulin Dr. Kendra Abel    Diabetic neuropathy [facility])     Difficult intravenous access     VEINS ROLL    Employs prosthetic leg     s/p left BKA    Foot ulcer ([address] PRIOR TO 2015    BILAT    Full dentures     UPPER ONLY    Hyperlipidemia 2004    ON RX    Hypertension 2004    ON RX, PCP Dr. Kendra Abel, seen Oct. 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 Macarena Mccord seen within last 1 1/2 mos     Past Surgical History:   Procedure Laterality Date    CARDIAC CATHETERIZATION      no stenting    CARDIOVASCULAR STRESS TEST  06/28/2019    small fixed apical, likely normal, EF 48%    COLONOSCOPY  06/17/2016    poor prep, done up to the IC valve, redundant colon    COLONOSCOPY  01/23/2017    VIRTUAL COLONOSCOPY DONE-no polyps or masses    CYSTOSCOPY Bilateral 6/16/2020    CYSTOSCOPY RETROGRADE PYELOGRAM URETEROSCOPY performed by Johnathan Kam MD at [address] Right 7/30/2020 CYSTOSCOPY, RIGHT RETROGRADE PYELOGRAM,  URETERAL CATHETER  INSERTION performed by Clara Reyes MD at [address] N/A 9/1/2020    CYSTOSCOPY RETROGRADE PYELOGRAM, RIGHT URETERAL STENT EXCHANGE performed by Clara Reyes MD at [facility] Right     r-bone removed    HC  PICC [address] DOUBLE  5/21/2018         KIDNEY CYST REMOVAL      KIDNEY SURGERY Right 11/13/2019    XI ROBOTIC PARTIAL NEPHRECTOMY, INTRAOP ULTRASOUND, RIGHT URETEROURETEROSTOMY, RIGHT URETERAL STENT PLACEMENT **SHORT STAY** performed by Clara Reyes MD at [facility] N/A 7/30/2020    XI LAPAROSCOPIC ROBOTIC URETEROLYSIS, ROBOTIC ASSISTED BUCCAL URETEROPLASTY  (DR. COBIAN TO ASSIST) performed by Clara Reyes MD at [place] [address] Left 4/29/15    OTHER SURGICAL HISTORY Left 5-5-15 and 11/2015    Revision BKA    OTHER SURGICAL HISTORY      left stump revision 5/30/2018    KS AMP LEG THRU TIBIA&FIBULA RE-AMPUTATION Left 5/30/2018    LEG AMPUTATION BELOW KNEE REVISION performed by Sebastian Henderson MD at [facility] Bilateral 80'S    TOE AMPUTATION      Right 2 and 4    VITRECTOMY Left 9/25/2019    PARS PLANA VITRECTOMY 25 GAUGE, MEMBRANE PEELING, ENDOLASER 200  MW 1044 SPOTS, INDIRECT LASER 26 SPOTS performed by Aaliyah Marin MD at [address] [place] [address] History   Problem Relation Age of Onset    Diabetes Mother     Hypertension Mother     Stomach Cancer Mother     Hypertension Father     Alcohol Abuse Father     COPD Father     Kidney Cancer Father     Diabetes Brother         IDDM-PUMP    Other Sister         BRAIN TUMOR        Physical Exam:  Vitals signs and nursing note reviewed. Constitutional:       Appearance: well-developed. HENT:      Head: Normocephalic and atraumatic.       Nose: Nose normal.   Eyes:      Conjunctiva/sclera: Conjunctivae normal.   Neck: Musculoskeletal: Normal range of motion and neck supple. Pulmonary:      Effort: Pulmonary effort is normal. No respiratory distress. Musculoskeletal:      Comments: Normal gait     Skin:     General: Skin is warm and dry. Neurological:      Mental Status: Alert and oriented to person, place, and time. Sensory: No sensory deficit. Psychiatric:         Behavior: Behavior normal.         Thought Content: Thought content normal.    Left below-knee amputation    Bilateral hand intrinsic atrophy    Bilateral Small Finger camptodactyly    Right shoulder moderate diminishment of external and internal rotation    Positive impingement signs      Provider Attestation:  Antonio Gallardo, personally performed the services described in this documentation. All medical record entries made by the scribe were at my direction and in my presence. I have reviewed the chart and discharge instructions and agree that the records reflect my personal performance and is accurate and complete. Tawana Willis MD 3/7/23       Scribe Attestation:  By signing my name below, Barbara Mason, attest that this documentation has been prepared under the direction and in the presence of Dr. Bakari Kruse. Electronically signed: Olivier Kulkarni, 3/7/23     Please note that this chart was generated using voice recognition Dragon dictation software. Although every effort was made to ensure the accuracy of this automated transcription, some errors in transcription may have occurred.

## 2023-03-09 ENCOUNTER — OFFICE VISIT (OUTPATIENT)
Dept: UROLOGY | Age: 63
End: 2023-03-09
Payer: MEDICARE

## 2023-03-09 VITALS — BODY MASS INDEX: 33.24 KG/M2 | HEIGHT: 76 IN | TEMPERATURE: 98 F | WEIGHT: 273 LBS

## 2023-03-09 DIAGNOSIS — N40.1 BENIGN PROSTATIC HYPERPLASIA WITH INCOMPLETE BLADDER EMPTYING: Primary | ICD-10-CM

## 2023-03-09 DIAGNOSIS — N13.5 URETERAL STRICTURE: ICD-10-CM

## 2023-03-09 DIAGNOSIS — R39.14 BENIGN PROSTATIC HYPERPLASIA WITH INCOMPLETE BLADDER EMPTYING: Primary | ICD-10-CM

## 2023-03-09 PROCEDURE — G8482 FLU IMMUNIZE ORDER/ADMIN: HCPCS | Performed by: UROLOGY

## 2023-03-09 PROCEDURE — 1036F TOBACCO NON-USER: CPT | Performed by: UROLOGY

## 2023-03-09 PROCEDURE — 99213 OFFICE O/P EST LOW 20 MIN: CPT | Performed by: UROLOGY

## 2023-03-09 PROCEDURE — G8427 DOCREV CUR MEDS BY ELIG CLIN: HCPCS | Performed by: UROLOGY

## 2023-03-09 PROCEDURE — 3017F COLORECTAL CA SCREEN DOC REV: CPT | Performed by: UROLOGY

## 2023-03-09 PROCEDURE — G8417 CALC BMI ABV UP PARAM F/U: HCPCS | Performed by: UROLOGY

## 2023-03-09 ASSESSMENT — ENCOUNTER SYMPTOMS
EYE REDNESS: 0
EYE PAIN: 0
COUGH: 0
CONSTIPATION: 0
NAUSEA: 0
BACK PAIN: 0
SHORTNESS OF BREATH: 0
DIARRHEA: 0
ABDOMINAL PAIN: 0
WHEEZING: 0
VOMITING: 0

## 2023-03-09 NOTE — PROGRESS NOTES
[address] [place] [address]  Dept: [address] [facility] Urology Office Note - Established    Patient:  Ren Melendrez  YOB: 1960  Date: 3/9/2023    The patient is a 61 y.o. male who presents todayfor evaluation of the following problems:   Chief Complaint   Patient presents with    1 Year Follow Up     PSA        HPI  Here for bph and ureteral stricture history. Cr is 1.45, psa is 0.74. urinating ok. No dysuria,    Summary of old records: N/A    Additional History: N/A    Procedures Today: N/A    Urinalysis today:  No results found for this visit on 03/09/23. Last several PSA's:  Lab Results   Component Value Date    PSA 0.74 03/06/2023    PSA 0.52 01/19/2022    PSA 3.56 09/10/2020     Last total testosterone:  Lab Results   Component Value Date    TESTOSTERONE 500 02/09/2022       AUA Symptom Score (3/9/2023):                                Last BUN and creatinine:  Lab Results   Component Value Date    BUN 25 (H) 11/14/2022     Lab Results   Component Value Date    CREATININE 1.45 (H) 11/14/2022       Additional Lab/Culture results: none    Imaging Reviewed during this Office Visit: none  (results were independently reviewed by physician and radiology report verified)    PAST MEDICAL, FAMILY AND SOCIAL HISTORY UPDATE:  Past Medical History:   Diagnosis Date    A-fib ([facility]     Asymptomatic bilateral carotid artery stenosis     Boil, thigh 10/2019    10/30/19: right inner thigh region, says PCP Rxd Doxy for this, area is much better, has a couple days left to complete Doxy    CAD (coronary artery disease)     saw Dr. Dianelys Jane ([facility])     Charcot foot due to diabetes mellitus ([address] 2014    LEFT    COPD (chronic obstructive pulmonary disease) ([facility] 2009    INHALER USE DAILY    Diabetes mellitus ([facility] 1989    IDDM, On Insulin Dr. Roseline Felty    Diabetic neuropathy [facility])     Difficult intravenous access VEINS ROLL    Employs prosthetic leg     s/p left BKA    Foot ulcer ([address] PRIOR TO 2015    BILAT    Full dentures     UPPER ONLY    Hyperlipidemia 2004    ON RX    Hypertension 2004    ON RX, PCP Dr. Vicki Phan, seen Oct. 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 Vito Freitas seen within last 1 1/2 mos     Past Surgical History:   Procedure Laterality Date    CARDIAC CATHETERIZATION      no stenting    CARDIOVASCULAR STRESS TEST  06/28/2019    small fixed apical, likely normal, EF 48%    COLONOSCOPY  06/17/2016    poor prep, done up to the IC valve, redundant colon    COLONOSCOPY  01/23/2017    VIRTUAL COLONOSCOPY DONE-no polyps or masses    CYSTOSCOPY Bilateral 6/16/2020    CYSTOSCOPY RETROGRADE PYELOGRAM URETEROSCOPY performed by Clifton Zamora MD at [place] [address] Right 7/30/2020    CYSTOSCOPY, RIGHT RETROGRADE PYELOGRAM,  URETERAL CATHETER  INSERTION performed by Clifton Zamora MD at [facility] N/A 9/1/2020    CYSTOSCOPY RETROGRADE PYELOGRAM, RIGHT URETERAL STENT EXCHANGE performed by Clifton Zamora MD at [facility] Right     r-bone removed    [facility].  PICC [facility] DOUBLE  5/21/2018         KIDNEY CYST REMOVAL      KIDNEY SURGERY Right 11/13/2019    XI ROBOTIC PARTIAL NEPHRECTOMY, INTRAOP ULTRASOUND, RIGHT URETEROURETEROSTOMY, RIGHT URETERAL STENT PLACEMENT **SHORT STAY** performed by Sridevi Bolton MD at [address] [facility] 7/30/2020    XI LAPAROSCOPIC ROBOTIC URETEROLYSIS, ROBOTIC ASSISTED BUCCAL URETEROPLASTY  (DR. COBIAN TO ASSIST) performed by Sridevi Bolton MD at [address] [facility] Left 4/29/15    OTHER SURGICAL HISTORY Left 5-5-15 and 11/2015    Revision BKA    OTHER SURGICAL HISTORY      left stump revision 5/30/2018    ND AMP LEG THRU TIBIA&FIBULA RE-AMPUTATION Left 5/30/2018    LEG AMPUTATION BELOW KNEE REVISION performed by Ulysses Heart, MD at [facility] Bilateral 80'S    TOE AMPUTATION      Right 2 and 4    VITRECTOMY Left 9/25/2019    PARS PLANA VITRECTOMY 25 GAUGE, MEMBRANE PEELING, ENDOLASER 200  MW 1044 SPOTS, INDIRECT LASER 26 SPOTS performed by Jacquelin Hazel MD at [address] [facility] History   Problem Relation Age of Onset    Diabetes Mother     Hypertension Mother     Stomach Cancer Mother     Hypertension Father     Alcohol Abuse Father     COPD Father     Kidney Cancer Father     Diabetes Brother         IDDM-PUMP    Other Sister         BRAIN TUMOR     Outpatient Medications Marked as Taking for the 3/9/23 encounter (Office Visit) with Sridevi Bolton MD   Medication Sig Dispense Refill    gemfibrozil (LOPID) 600 MG tablet TAKE 1 TABLET BY MOUTH EVERY DAY 30 tablet 11    TOUJEO MAX SOLOSTAR 300 UNIT/ML SOPN INJECT 110 UNITS INTO THE SKIN DAILY 1 Adjustable Dose Pre-filled Pen Syringe 5    levothyroxine (SYNTHROID) 175 MCG tablet TAKE 1 TABLET BY MOUTH EVERY DAY 30 tablet 3    amLODIPine (NORVASC) 2.5 MG tablet TAKE 1 TABLET BY MOUTH EVERY DAY 30 tablet 3    metoprolol tartrate (LOPRESSOR) 50 MG tablet TAKE 1 TABLET BY MOUTH TWICE A DAY 60 tablet 3    SYMBICORT 160-4.5 MCG/ACT AERO INHALE 2 PUFFS INTO THE LUNGS TWICE A DAY 10.2 each 5    atorvastatin (LIPITOR) 40 MG tablet TAKE 1 TABLET BY MOUTH EVERY DAY 90 tablet 3    ASPIRIN LOW DOSE 81 MG EC tablet TAKE 1 TABLET BY MOUTH EVERY DAY IN THE MORNING 30 tablet 5    azithromycin (ZITHROMAX) 250 MG tablet Take 2 tabs (500 mg) on Day 1, and take 1 tab (250 mg) on days 2 through 5. 1 packet 0    insulin lispro, 1 Unit Dial, (HUMALOG KWIKPEN) 100 UNIT/ML SOPN Inject 12 Units into the skin 3 times daily (before meals) 5 Adjustable Dose Pre-filled Pen Syringe 1    clotrimazole-betamethasone (LOTRISONE) 1-0.05 % cream APPLY TO AFFECTED AREA TWICE A DAY 45 g 3    isosorbide mononitrate (IMDUR) 30 MG extended release tablet TAKE 1 TABLET BY MOUTH EVERY DAY 30 tablet 11    ELIQUIS 5 MG TABS tablet TAKE 1 TABLET BY MOUTH TWICE A DAY 60 tablet 8    finasteride (PROSCAR) 5 MG tablet TAKE 1 TABLET BY MOUTH EVERY DAY 30 tablet 5    blood glucose test strips (CONTOUR NEXT TEST) strip TEST 4 TIMES A  strip 3    tamsulosin (FLOMAX) 0.4 MG capsule TAKE 1 CAPSULE BY MOUTH EVERY DAY 30 capsule 11    OZEMPIC, 2 MG/DOSE, 8 MG/3ML SOPN INJECT 2 MG UNDER THE SKIN EVERY 7 DAYS.      losartan (COZAAR) 50 MG tablet TAKE 1 TABLET BY MOUTH EVERY DAY 90 tablet 1    Lactobacillus (ACIDOPHILUS PROBIOTIC) 100 MG CAPS TAKE 1 CAPSULE BY MOUTH TWICE A DAY      Lactobacillus Acid-Pectin (ACIDOPHILUS/CITRUS PECTIN) TABS Take 1 tablet by mouth daily (with breakfast)      Probiotic Acidophilus (FLORANEX) TABS TAKE 1 TABLET BY MOUTH TWICE A DAY 60 tablet 0    rOPINIRole (REQUIP) 2 MG tablet TAKE 1 TABLET BY MOUTH EVERY DAY 30 tablet 11    Insulin Pen Needle (COMFORT TOUCH INSULIN PEN NEED) 33G X 6 MM MISC 110 Units by Does not apply route daily 5 each 1    insulin lispro (HUMALOG) 100 UNIT/ML injection vial SLIDING SCALE (TAKE 5 UNITS IF SUGAR IS OVER 150) 1 each 3    budesonide-formoterol (SYMBICORT) 160-4.5 MCG/ACT AERO TAKE 2 PUFFS BY MOUTH TWICE A DAY 3 each 3    B-D UF III MINI PEN NEEDLES 31G X 5 MM MISC USE AS DIRECTED ONCE DAILY TO INJECT TOUJEO 100 each 3     Continuous Blood Gluc  (DEXCOM G6 ) DOUGIE USE AS DIRECTED TO MONITOR BLOOD SUGAR      albuterol sulfate  (90 Base) MCG/ACT inhaler INHALE 2 PUFFS INTO THE LUNGS EVERY 6 HOURS AS NEEDED FOR WHEEZING OR SHORTNESS OF BREATH 6.7 Inhaler 0    glucagon, rDNA, 1 MG injection Inject 1 mL into the muscle once as needed for Low blood sugar       Blood Pressure KIT 1 actuation by Does not apply route once a week 1 kit 0    Handicap Placard MISC by Does not apply route Duration - 5years  Reason- prosthetic leg 1 each 0    Omega-3 Fatty Acids (FISH OIL CONCENTRATE) 300 MG CAPS Take 300 mg by mouth nightly       Glucosamine Sulfate 500 MG TABS Take 500 mg by mouth 3 times daily         Ramipril and Adhesive tape  Social History     Tobacco Use   Smoking Status Former    Packs/day: 2.00    Years: 25.00    Pack years: 50.00    Types: Cigars, Cigarettes    Start date: 1    Quit date: 2011    Years since quittin.7   Smokeless Tobacco Never   Tobacco Comments    quit in      (Ifpatient a smoker, smoking cessation counseling offered)    Social History     Substance and Sexual Activity   Alcohol Use Yes    Alcohol/week: 0.0 standard drinks    Comment: BEER 2 A MONTH       REVIEW OF SYSTEMS:  Review of Systems    Physical Exam:      Vitals:    23 1407   Temp: 98 °F (36.7 °C)     Body mass index is 33.23 kg/m². Patient is a 61 y.o. male in no acute distress and alert and oriented to person, place and time. Physical Exam  Constitutional: Patient in no acute distress. Neuro: Alert and oriented to person, place and time.   Psych: Mood normal, affect normal  Skin: No rash noted  HEENT: Head: Normocephalic andatraumatic  Conjunctivae and EOM are normal. Pupils are equal, round  Nose:Normal  Right External Ear: Normal; Left External Ear: Normal  Mouth: Mucosa Moist  Neck: Supple  Lungs: Respiratory effort is normal  Cardiovascular: Warm & Pink  Abdomen: Soft, non-tender, non-distended with no CVA, No flank tenderness,  Or hepatosplenomegaly       Assessment and Plan      1. Benign prostatic hyperplasia with incomplete bladder emptying    2. Ureteral stricture           Plan:     F/u in one year  Return in about 1 year (around 3/9/2024). Prescriptions Ordered:  No orders of the defined types were placed in this encounter. Orders Placed:  Orders Placed This Encounter   Procedures    PSA, Diagnostic     Standing Status:   Future     Standing Expiration Date:   3/8/2024           Petty Kuhn MD    Agree with the ROS entered by the MA.

## 2023-03-13 ENCOUNTER — OFFICE VISIT (OUTPATIENT)
Dept: ORTHOPEDIC SURGERY | Age: 63
End: 2023-03-13
Payer: COMMERCIAL

## 2023-03-13 VITALS — BODY MASS INDEX: 33.24 KG/M2 | RESPIRATION RATE: 16 BRPM | HEIGHT: 76 IN | WEIGHT: 273 LBS

## 2023-03-13 DIAGNOSIS — M25.511 RIGHT SHOULDER PAIN, UNSPECIFIED CHRONICITY: Primary | ICD-10-CM

## 2023-03-13 PROCEDURE — 3017F COLORECTAL CA SCREEN DOC REV: CPT | Performed by: ORTHOPAEDIC SURGERY

## 2023-03-13 PROCEDURE — G8417 CALC BMI ABV UP PARAM F/U: HCPCS | Performed by: ORTHOPAEDIC SURGERY

## 2023-03-13 PROCEDURE — 1036F TOBACCO NON-USER: CPT | Performed by: ORTHOPAEDIC SURGERY

## 2023-03-13 PROCEDURE — G8482 FLU IMMUNIZE ORDER/ADMIN: HCPCS | Performed by: ORTHOPAEDIC SURGERY

## 2023-03-13 PROCEDURE — G8427 DOCREV CUR MEDS BY ELIG CLIN: HCPCS | Performed by: ORTHOPAEDIC SURGERY

## 2023-03-13 PROCEDURE — 99213 OFFICE O/P EST LOW 20 MIN: CPT | Performed by: ORTHOPAEDIC SURGERY

## 2023-03-13 NOTE — PROGRESS NOTES
Orthopedic Shoulder Encounter Note     Chief complaint: right shoulder pain    HPI: Karen Smith is a 61 y.o.  right-hand dominant male who presents for evaluation of his right shoulder. He indicates that he has been dealing with pain for about a month now. He has been seen in the past by myself back in 2018 at which time he was diagnosed with rotator cuff tendinitis and partial-thickness tears. He was treated with a cortisone injection and physical therapy and did well up until a month ago. At this time he describes having pain which is fairly constant over the anterior aspect of the shoulder. Its worse when he attempts to pick things up with this arm or use it for daily activities. It is primarily localized to the anterior aspect the shoulder radiating distally across the biceps. He denies having any stiffness but does report having weakness in the shoulder. Previous treatment:    NSAIDs: None    Physical Therapy: Yes in the past    Injections: Right shoulder subacromial cortisone injection on 4/6/2018 by myself    Surgeries: None    Review of Systems:   Constitutional: Negative for fever, chills, sweats. Pain Score:  10 - Worst pain ever  Neurological: Negative for headache, numbness, or weakness.    Musculoskeletal: As noted in HPI     Past Medical History  Favian Montano  has a past medical history of A-fib [facility]), Asymptomatic bilateral carotid artery stenosis, Boil, thigh, CAD (coronary artery disease), Charcot foot due to diabetes mellitus ([address] COPD (chronic obstructive pulmonary disease) ([address] Diabetes mellitus ([address] Diabetic neuropathy ([address] Difficult intravenous access, Employs prosthetic leg, Foot ulcer ([address] Full dentures, Hyperlipidemia, Hypertension, Hypoglycemia, MRSA (methicillin resistant staph aureus) culture positive, OA (osteoarthritis), Osteomyelitis ([address] Paroxysmal atrial fibrillation ([address] Renal mass, Snores, Suspected sleep apnea, Thyroid disease, Vision abnormalities, Vitreous hemorrhage of left eye due to diabetes mellitus ([facility] Wears glasses, Wears partial dentures, and Wellness examination. Past Surgical History  Khadijah Blanco  has a past surgical history that includes Foot surgery (Right); Toe amputation; Finger amputation (Right, 1995); Leg amputation below knee (Left, 4/29/15); other surgical history (Left, 5-5-15 and 11/2015); Temporomandibular joint surgery (Bilateral, 80'S); Colonoscopy (06/17/2016); Colonoscopy (01/23/2017);   picc powerpicc double (5/21/2018); Cardiac catheterization; other surgical history; pr amp leg thru tibia&fibula re-amputation (Left, 5/30/2018); vitrectomy (Left, 9/25/2019); cardiovascular stress test (06/28/2019); Kidney surgery (Right, 11/13/2019); Cystoscopy (Bilateral, 6/16/2020); Kidney cyst removal; Kidney surgery (N/A, 7/30/2020); Cystoscopy (Right, 7/30/2020); and Cystoscopy (N/A, 9/1/2020).     Current Medications  Current Outpatient Medications   Medication Sig Dispense Refill    gemfibrozil (LOPID) 600 MG tablet TAKE 1 TABLET BY MOUTH EVERY DAY 30 tablet 11    TOUJEO MAX SOLOSTAR 300 UNIT/ML SOPN INJECT 110 UNITS INTO THE SKIN DAILY 1 Adjustable Dose Pre-filled Pen Syringe 5    levothyroxine (SYNTHROID) 175 MCG tablet TAKE 1 TABLET BY MOUTH EVERY DAY 30 tablet 3    amLODIPine (NORVASC) 2.5 MG tablet TAKE 1 TABLET BY MOUTH EVERY DAY 30 tablet 3    metoprolol tartrate (LOPRESSOR) 50 MG tablet TAKE 1 TABLET BY MOUTH TWICE A DAY 60 tablet 3    SYMBICORT 160-4.5 MCG/ACT AERO INHALE 2 PUFFS INTO THE LUNGS TWICE A DAY 10.2 each 5    atorvastatin (LIPITOR) 40 MG tablet TAKE 1 TABLET BY MOUTH EVERY DAY 90 tablet 3    ASPIRIN LOW DOSE 81 MG EC tablet TAKE 1 TABLET BY MOUTH EVERY DAY IN THE MORNING 30 tablet 5    azithromycin (ZITHROMAX) 250 MG tablet Take 2 tabs (500 mg) on Day 1, and take 1 tab (250 mg) on days 2 through 5. 1 packet 0    insulin lispro, 1 Unit Dial, (HUMALOG KWIKPEN) 100 UNIT/ML SOPN Inject 12 Units into the skin 3 times daily (before meals) 5 Adjustable Dose Pre-filled Pen Syringe 1    clotrimazole-betamethasone (LOTRISONE) 1-0.05 % cream APPLY TO AFFECTED AREA TWICE A DAY 45 g 3    isosorbide mononitrate (IMDUR) 30 MG extended release tablet TAKE 1 TABLET BY MOUTH EVERY DAY 30 tablet 11    ELIQUIS 5 MG TABS tablet TAKE 1 TABLET BY MOUTH TWICE A DAY 60 tablet 8    finasteride (PROSCAR) 5 MG tablet TAKE 1 TABLET BY MOUTH EVERY DAY 30 tablet 5    blood glucose test strips (CONTOUR NEXT TEST) strip TEST 4 TIMES A  strip 3    tamsulosin (FLOMAX) 0.4 MG capsule TAKE 1 CAPSULE BY MOUTH EVERY DAY 30 capsule 11    OZEMPIC, 2 MG/DOSE, 8 MG/3ML SOPN INJECT 2 MG UNDER THE SKIN EVERY 7 DAYS. pregabalin (LYRICA) 100 MG capsule Take 1 capsule by mouth in the morning, at noon, and at bedtime for 90 days.  270 capsule 2    losartan (COZAAR) 50 MG tablet TAKE 1 TABLET BY MOUTH EVERY DAY 90 tablet 1    Lactobacillus (ACIDOPHILUS PROBIOTIC) 100 MG CAPS TAKE 1 CAPSULE BY MOUTH TWICE A DAY      Lactobacillus Acid-Pectin (ACIDOPHILUS/CITRUS PECTIN) TABS Take 1 tablet by mouth daily (with breakfast)      Probiotic Acidophilus (FLORANEX) TABS TAKE 1 TABLET BY MOUTH TWICE A DAY 60 tablet 0    rOPINIRole (REQUIP) 2 MG tablet TAKE 1 TABLET BY MOUTH EVERY DAY 30 tablet 11    Insulin Pen Needle (COMFORT TOUCH INSULIN PEN NEED) 33G X 6 MM MISC 110 Units by Does not apply route daily 5 each 1    amiodarone (CORDARONE) 200 MG tablet Take 1 tablet by mouth 2 times daily 60 tablet 0    insulin lispro (HUMALOG) 100 UNIT/ML injection vial SLIDING SCALE (TAKE 5 UNITS IF SUGAR IS OVER 150) 1 each 3    budesonide-formoterol (SYMBICORT) 160-4.5 MCG/ACT AERO TAKE 2 PUFFS BY MOUTH TWICE A DAY 3 each 3    B-D UF III MINI PEN NEEDLES 31G X 5 MM MISC USE AS DIRECTED ONCE DAILY TO INJECT TOUJEO 100 each 3    Continuous Blood Gluc  (DEXCOM G6 ) DOUGIE USE AS DIRECTED TO MONITOR BLOOD SUGAR      albuterol sulfate  (90 Base) MCG/ACT inhaler INHALE 2 PUFFS INTO THE LUNGS EVERY 6 HOURS AS NEEDED FOR WHEEZING OR SHORTNESS OF BREATH 6.7 Inhaler 0    glucagon, rDNA, 1 MG injection Inject 1 mL into the muscle once as needed for Low blood sugar       Blood Pressure KIT 1 actuation by Does not apply route once a week 1 kit 0    Handicap Placard MISC by Does not apply route Duration - 5years  Reason- prosthetic leg 1 each 0    Omega-3 Fatty Acids (FISH OIL CONCENTRATE) 300 MG CAPS Take 300 mg by mouth nightly       Glucosamine Sulfate 500 MG TABS Take 500 mg by mouth 3 times daily       No current facility-administered medications for this visit. Allergies  Allergies have been reviewed. Romi Cano is allergic to ramipril and adhesive tape. Social History  Romi Cano  reports that he quit smoking about 11 years ago. His smoking use included cigars and cigarettes. He started smoking about 45 years ago. He has a 50.00 pack-year smoking history. He has never used smokeless tobacco. He reports current alcohol use. He reports that he does not currently use drugs after having used the following drugs: Marijuana Alfornia Cashharshal). Family History  Jay's family history includes Alcohol Abuse in his father; COPD in his father; Diabetes in his brother and mother; Hypertension in his father and mother; Kidney Cancer in his father; Other in his sister; Stomach Cancer in his mother. Physical Exam:     Resp 16   Ht 6' 4\" (1.93 m)   Wt 273 lb (123.8 kg)   BMI 33.23 kg/m²    Constitutional: Patient is oriented to person, place, and time. Patient appears well-developed and well nourished. Mental Status/psychiatric: Behavior is normal. Thought content normal.  HENT: Negative otherwise noted  Head: Normocephalic and Atraumatic  Nose: Normal  Respiratory/Cardio: Effort normal. No respiratory distress. Shoulder:    Skin: Skin is warm and dry; no swelling or obvious muscular atrophy.    Vasculature: 2+ radial pulses bilaterally  Neuro: Sensation grossly intact to light touch diffusely  Tenderness: Tender to palpation over the anterolateral corner of the right shoulder    ROM: (Degrees)    Right   A P   Left   A P    Elevation  125 135   Elevation  125   Abduction  115 125   Abduction  110    ER   25 25   ER   65   IR   L5    IR   L4   90 abd/ER      90 abd/ER     90 abd/IR      90 abd/IR     Crepitation  No    Crepitation No  Dyskenesia  No    Dyskenesia No      Muscle strength:    Right       Left    Deltoid   5    Deltoid   5  Supraspinatus  5p    Supraspinatus  5  ER   4    ER   5  IR   5    IR   5    Special tests    Right   Rotator Cuff    Left    y   Painful arc    n   n   Pain with ER    n    y   Neer's     n    y   Hawkin's    n    n   Drop Arm    n  n   Lift off/Belly Press   n  n   ER Lag    n          B[employer] Joint  n   AC tenderness   n  n   Cross-chest adduction  n       Labrum/biceps    n   Cherry's    n   n   Biceps sheer    n      y   Speed's/Yergason's   n    n   Tenderness Biceps Groove  n    n   Anderson's    n         Instability  n   Ant Apprehension   n    n   Post Apprehension   n    n   Ant Load shift    n    n   Post Load shift   n   n   Sulcus     n  n   Generalized Laxity   n  n   Relocation test   n  n   Crank test     n  n   Roddy-superior escape  n       Imaging:  Xrays: 4 views of the right shoulder obtained on 3/7/23 were independently reviewed  Indications: Right shoulder pain  Findings: Normal glenohumeral joint space. Mild inferior humeral head osteophyte. Mild osteophytic change involving the distal clavicle and acromion at the acromioclavicular joint. No obvious fracture, dislocation or subluxation. Impression: Right shoulder radiographs with mild degenerative changes at the acromioclavicular and glenohumeral joints as outlined above. Impression/Plan:     Jolene Harley is a 61 y.o. old male with right shoulder pain associated with some weakness. He has been dealing with pain now for a month as outlined above.   He has had an MRI in the past demonstrating him to have some rotator cuff tendinitis and low-grade partial-thickness tearing.  We had a discussion about the current state of his shoulder and reasons why he is currently painful.  This may represent a progression of his partial-thickness tears.  We discussed treatment options at this time including nonoperative and operative intervention.  An MRI has been ordered for the shoulder by Dr. Gallardo.  I recommended that we see what the MRI shows that this can help inform appropriate treatment decisions.  Consequently he will follow-up after the MRI is completed and we can review and discuss results proceeding with additional treatment recommendations at that time as indicated.  Of note he is on Eliquis and so is unable to take NSAIDs.    This note is created with the assistance of a speech recognition program.  While intending to generate adocument that actually reflects the content of the visit, the document can still have some errors including those of syntax and sound a like substitutions which may escape proof reading.  It such instances, actual meaningcan be extrapolated by contextual diversion.    NA = Not assessed  RTC = Rotator cuff  RCT = Rotator cuff tear  ER = External rotation  IR = Internal rotation  AC = Acromioclavicular  GH = Glenohumeral  n = No  y = Yes

## 2023-03-15 ENCOUNTER — TELEPHONE (OUTPATIENT)
Dept: INTERNAL MEDICINE CLINIC | Age: 63
End: 2023-03-15

## 2023-03-15 DIAGNOSIS — M54.50 LOW BACK PAIN, UNSPECIFIED BACK PAIN LATERALITY, UNSPECIFIED CHRONICITY, UNSPECIFIED WHETHER SCIATICA PRESENT: Primary | ICD-10-CM

## 2023-03-19 DIAGNOSIS — I10 ESSENTIAL HYPERTENSION: ICD-10-CM

## 2023-03-20 RX ORDER — ROPINIROLE 2 MG/1
TABLET, FILM COATED ORAL
Qty: 90 TABLET | Refills: 3 | Status: SHIPPED | OUTPATIENT
Start: 2023-03-20

## 2023-03-20 RX ORDER — LOSARTAN POTASSIUM 50 MG/1
TABLET ORAL
Qty: 90 TABLET | Refills: 1 | Status: SHIPPED | OUTPATIENT
Start: 2023-03-20

## 2023-03-22 ENCOUNTER — HOSPITAL ENCOUNTER (OUTPATIENT)
Dept: MRI IMAGING | Facility: CLINIC | Age: 63
Discharge: HOME | End: 2023-03-24
Payer: COMMERCIAL

## 2023-03-22 DIAGNOSIS — M25.511 RIGHT SHOULDER PAIN, UNSPECIFIED CHRONICITY: ICD-10-CM

## 2023-03-22 PROCEDURE — 73221 MRI JOINT UPR EXTREM W/O DYE: CPT

## 2023-03-23 ENCOUNTER — OFFICE VISIT (OUTPATIENT)
Dept: INTERNAL MEDICINE CLINIC | Age: 63
End: 2023-03-23
Payer: MEDICARE

## 2023-03-23 VITALS
DIASTOLIC BLOOD PRESSURE: 70 MMHG | OXYGEN SATURATION: 98 % | HEIGHT: 76 IN | SYSTOLIC BLOOD PRESSURE: 122 MMHG | BODY MASS INDEX: 33.24 KG/M2 | HEART RATE: 90 BPM | WEIGHT: 273 LBS

## 2023-03-23 DIAGNOSIS — R10.13 DYSPEPSIA: ICD-10-CM

## 2023-03-23 DIAGNOSIS — E11.42 TYPE 2 DIABETES MELLITUS WITH DIABETIC POLYNEUROPATHY, WITH LONG-TERM CURRENT USE OF INSULIN (HCC): Primary | ICD-10-CM

## 2023-03-23 DIAGNOSIS — R19.7 DIARRHEA, UNSPECIFIED TYPE: ICD-10-CM

## 2023-03-23 DIAGNOSIS — I48.11 LONGSTANDING PERSISTENT ATRIAL FIBRILLATION (HCC): Chronic | ICD-10-CM

## 2023-03-23 DIAGNOSIS — M48.062 SPINAL STENOSIS OF LUMBAR REGION WITH NEUROGENIC CLAUDICATION: ICD-10-CM

## 2023-03-23 DIAGNOSIS — Z79.4 TYPE 2 DIABETES MELLITUS WITH DIABETIC POLYNEUROPATHY, WITH LONG-TERM CURRENT USE OF INSULIN (HCC): Primary | ICD-10-CM

## 2023-03-23 PROCEDURE — G8427 DOCREV CUR MEDS BY ELIG CLIN: HCPCS | Performed by: INTERNAL MEDICINE

## 2023-03-23 PROCEDURE — 3044F HG A1C LEVEL LT 7.0%: CPT | Performed by: INTERNAL MEDICINE

## 2023-03-23 PROCEDURE — 99214 OFFICE O/P EST MOD 30 MIN: CPT | Performed by: INTERNAL MEDICINE

## 2023-03-23 PROCEDURE — 2022F DILAT RTA XM EVC RTNOPTHY: CPT | Performed by: INTERNAL MEDICINE

## 2023-03-23 PROCEDURE — 3074F SYST BP LT 130 MM HG: CPT | Performed by: INTERNAL MEDICINE

## 2023-03-23 PROCEDURE — 3017F COLORECTAL CA SCREEN DOC REV: CPT | Performed by: INTERNAL MEDICINE

## 2023-03-23 PROCEDURE — G8482 FLU IMMUNIZE ORDER/ADMIN: HCPCS | Performed by: INTERNAL MEDICINE

## 2023-03-23 PROCEDURE — 1036F TOBACCO NON-USER: CPT | Performed by: INTERNAL MEDICINE

## 2023-03-23 PROCEDURE — 3078F DIAST BP <80 MM HG: CPT | Performed by: INTERNAL MEDICINE

## 2023-03-23 PROCEDURE — G8417 CALC BMI ABV UP PARAM F/U: HCPCS | Performed by: INTERNAL MEDICINE

## 2023-03-23 RX ORDER — OMEPRAZOLE 20 MG/1
20 CAPSULE, DELAYED RELEASE ORAL DAILY
Qty: 30 CAPSULE | Refills: 0 | Status: SHIPPED | OUTPATIENT
Start: 2023-03-23

## 2023-03-23 ASSESSMENT — ENCOUNTER SYMPTOMS
NAUSEA: 1
DIARRHEA: 1
BLOOD IN STOOL: 0
TROUBLE SWALLOWING: 0
ABDOMINAL DISTENTION: 0
COLOR CHANGE: 0
COUGH: 0
SHORTNESS OF BREATH: 0
WHEEZING: 0
EYE PAIN: 0
EYE DISCHARGE: 0

## 2023-03-23 NOTE — PROGRESS NOTES
Visit Information    Have you changed or started any medications since your last visit including any over-the-counter medicines, vitamins, or herbal medicines? no   Are you having any side effects from any of your medications? -  no  Have you stopped taking any of your medications? Is so, why? -  no    Have you seen any other physician or provider since your last visit? Yes - Records Obtained  Have you had any other diagnostic tests since your last visit? No  Have you been seen in the emergency room and/or had an admission to a hospital since we last saw you? No  Have you had your routine dental cleaning in the past 6 months? no    Have you activated your [employer] account? If not, what are your barriers?  Yes     Patient Care Team:  Hortencia Weinstein MD as PCP - Mckenzie Costa MD as PCP - Empaneled Provider  Izabella Byrd MD as Consulting Physician (Infectious Diseases)    Medical History Review  Past Medical, Family, and Social History reviewed and does contribute to the patient presenting condition    Health Maintenance   Topic Date Due    HIV screen  Never done    DTaP/Tdap/Td vaccine (1 - Tdap) Never done    Shingles vaccine (1 of 2) 10/23/2014    Pneumococcal 0-64 years Vaccine (2 - PCV) 01/04/2015    Diabetic Alb to Cr ratio (uACR) test  12/14/2022    Low dose CT lung screening  04/19/2023    Diabetic retinal exam  05/19/2023    Lipids  08/10/2023    Annual Wellness Visit (AWV)  10/11/2023    GFR test (Diabetes, CKD 3-4, OR last GFR 15-59)  11/14/2023    Depression Screen  01/05/2024    Diabetic foot exam  02/09/2024    A1C test (Diabetic or Prediabetic)  02/22/2024    Colorectal Cancer Screen  01/23/2027    Flu vaccine  Completed    COVID-19 Vaccine  Completed    Hepatitis C screen  Completed    Hepatitis A vaccine  Aged Out    Hib vaccine  Aged Out    Meningococcal (ACWY) vaccine  Aged Out
([facility] 2009    INHALER USE DAILY    Diabetes mellitus ([address] 1989    IDDM, On Insulin Dr. Silvia Allred    Diabetic neuropathy [facility])     Difficult intravenous access     VEINS ROLL    Employs prosthetic leg     s/p left BKA    Foot ulcer ([address] PRIOR TO 2015    BILAT    Full dentures     UPPER ONLY    Hyperlipidemia 2004    ON RX    Hypertension 2004    ON RX, PCP Dr. Silvia Allred, seen Oct. 2019    Hypoglycemia 4/2/2015    MRSA (methicillin resistant staph aureus) culture positive 4/16/2015    ankle    OA (osteoarthritis)     Osteomyelitis (HCC)     left stump  BKA    Paroxysmal atrial fibrillation (HCC)     Renal mass 09/2019    ACCIDENTAL  FINDING IS FOLLOWING UP WITH KIDNEY DR Ledezma     Suspected sleep apnea     Thyroid disease 2004    PT HAD HYPERTHYROIDISM-UNCONTROLED THYROID DESTROYED WITH RADI IODINE NOW HAS HYPOTHYRIDISM    Vision abnormalities 09/2019    LEFT NO VISION    Vitreous hemorrhage of left eye due to diabetes mellitus ([facility] [address] 09/2019    s/p Vitrectomy 9/2019    Wears glasses     Wears partial dentures     full upper, does not wear lower partial    Wellness examination     Dr. Berg Prior seen within last 1 1/2 mos      Past Surgical History:   Procedure Laterality Date    CARDIAC CATHETERIZATION      no stenting    CARDIOVASCULAR STRESS TEST  06/28/2019    small fixed apical, likely normal, EF 48%    COLONOSCOPY  06/17/2016    poor prep, done up to the IC valve, redundant colon    COLONOSCOPY  01/23/2017    VIRTUAL COLONOSCOPY DONE-no polyps or masses    CYSTOSCOPY Bilateral 6/16/2020    CYSTOSCOPY RETROGRADE PYELOGRAM URETEROSCOPY performed by Darryl Torres MD at [address] 7/30/2020    CYSTOSCOPY, RIGHT RETROGRADE PYELOGRAM,  URETERAL CATHETER  INSERTION performed by Darryl Torres MD at [address] 9/1/2020    CYSTOSCOPY RETROGRADE PYELOGRAM, RIGHT URETERAL STENT EXCHANGE performed by Darryl Torres MD at [address] AT

## 2023-03-27 ENCOUNTER — OFFICE VISIT (OUTPATIENT)
Dept: ORTHOPEDIC SURGERY | Age: 63
End: 2023-03-27
Payer: MEDICARE

## 2023-03-27 VITALS — HEIGHT: 76 IN | RESPIRATION RATE: 16 BRPM | BODY MASS INDEX: 33.24 KG/M2 | WEIGHT: 273 LBS

## 2023-03-27 DIAGNOSIS — M75.111 INCOMPLETE TEAR OF RIGHT ROTATOR CUFF, UNSPECIFIED WHETHER TRAUMATIC: Primary | ICD-10-CM

## 2023-03-27 PROCEDURE — 99214 OFFICE O/P EST MOD 30 MIN: CPT | Performed by: ORTHOPAEDIC SURGERY

## 2023-03-27 PROCEDURE — G8427 DOCREV CUR MEDS BY ELIG CLIN: HCPCS | Performed by: ORTHOPAEDIC SURGERY

## 2023-03-27 PROCEDURE — G8482 FLU IMMUNIZE ORDER/ADMIN: HCPCS | Performed by: ORTHOPAEDIC SURGERY

## 2023-03-27 PROCEDURE — 20610 DRAIN/INJ JOINT/BURSA W/O US: CPT | Performed by: ORTHOPAEDIC SURGERY

## 2023-03-27 PROCEDURE — 1036F TOBACCO NON-USER: CPT | Performed by: ORTHOPAEDIC SURGERY

## 2023-03-27 PROCEDURE — 3017F COLORECTAL CA SCREEN DOC REV: CPT | Performed by: ORTHOPAEDIC SURGERY

## 2023-03-27 PROCEDURE — G8417 CALC BMI ABV UP PARAM F/U: HCPCS | Performed by: ORTHOPAEDIC SURGERY

## 2023-03-27 RX ORDER — TRIAMCINOLONE ACETONIDE 40 MG/ML
40 INJECTION, SUSPENSION INTRA-ARTICULAR; INTRAMUSCULAR ONCE
Status: COMPLETED | OUTPATIENT
Start: 2023-03-27 | End: 2023-03-27

## 2023-03-27 RX ORDER — LIDOCAINE HYDROCHLORIDE 10 MG/ML
3 INJECTION, SOLUTION INFILTRATION; PERINEURAL ONCE
Status: COMPLETED | OUTPATIENT
Start: 2023-03-27 | End: 2023-03-27

## 2023-03-27 RX ADMIN — LIDOCAINE HYDROCHLORIDE 3 ML: 10 INJECTION, SOLUTION INFILTRATION; PERINEURAL at 17:03

## 2023-03-27 RX ADMIN — TRIAMCINOLONE ACETONIDE 40 MG: 40 INJECTION, SUSPENSION INTRA-ARTICULAR; INTRAMUSCULAR at 17:04

## 2023-03-27 NOTE — PROGRESS NOTES
mixture of 1cc 40mg/ml kenalog and 3 cc of 1% lidocaine without epinephrine. A band aid was applied to the injection site. he tolerated the injection with no immediate adverse reactions.

## 2023-03-30 ENCOUNTER — HOSPITAL ENCOUNTER (OUTPATIENT)
Dept: PAIN MANAGEMENT | Age: 63
Discharge: HOME | End: 2023-03-30
Payer: MEDICARE

## 2023-03-30 VITALS — BODY MASS INDEX: 33.24 KG/M2 | TEMPERATURE: 97.3 F | WEIGHT: 273 LBS | HEIGHT: 76 IN

## 2023-03-30 DIAGNOSIS — M48.062 SPINAL STENOSIS OF LUMBAR REGION WITH NEUROGENIC CLAUDICATION: ICD-10-CM

## 2023-03-30 DIAGNOSIS — M47.817 LUMBOSACRAL SPONDYLOSIS WITHOUT MYELOPATHY: Primary | ICD-10-CM

## 2023-03-30 PROCEDURE — 99215 OFFICE O/P EST HI 40 MIN: CPT

## 2023-03-30 PROCEDURE — 99215 OFFICE O/P EST HI 40 MIN: CPT | Performed by: ANESTHESIOLOGY

## 2023-03-30 ASSESSMENT — ENCOUNTER SYMPTOMS
EYES NEGATIVE: 1
BACK PAIN: 1
SHORTNESS OF BREATH: 1

## 2023-03-30 ASSESSMENT — PAIN SCALES - GENERAL: PAINLEVEL_OUTOF10: 7

## 2023-03-30 NOTE — PROGRESS NOTES
The patient is a 61 y. o. Non- / non  male.     Chief Complaint   Patient presents with    Back Pain     Discuss injection        HPI    Pain  Chronic going on for many years progressively worsening located in the lower lumbar area across midline  Present at rest  Describes it as aching throbbing stabbing stiffness  Pain aggravated with standing walking and neck activity  Extends over the hip and gluteal region  No further radiation down the leg  No associated dermatomal numbness or paresthesia  No changes in bladder or bowel control  Did physical therapy in past with limited benefit  Tried home exercises and lifestyle modification  Tried NSAIDs but systemic use of NSAIDs contraindicated because of chronic anticoagulation with Eliquis for atrial fibrillation  Insulin dependent diabetes but well controlled    Patient had an epidural injection 2 years ago that provided him short-term relief  Had recent MRI lumbar spine  Was evaluated by orthospine surgeon and was offered surgery but want to avoid surgery because of comorbidities    Today here to discuss interventional treatment options  Patient is here today for: back pain  Pain level: 2/3  Character: shooting  Radiating:  no  Weakness or numbness: yes  Aggravating Factors:  walking  Alleviating Factors: sitting  Constant or intermitting:  intermitting  Bladder/bowel loss:  no      Past Medical History:   Diagnosis Date    A-fib ([facility]     Asymptomatic bilateral carotid artery stenosis     Boil, thigh 10/2019    10/30/19: right inner thigh region, says PCP Rxd Doxy for this, area is much better, has a couple days left to complete Doxy    CAD (coronary artery disease)     saw Dr. Ian Lucas ([facility])     Charcot foot due to diabetes mellitus ([facility] 2014    LEFT    COPD (chronic obstructive pulmonary disease) ([facility] 2009    INHALER USE DAILY    Diabetes mellitus ([facility] 1989    IDDM, On Insulin Dr. Ilan Vallejo    Diabetic neuropathy [facility])     Difficult intravenous access

## 2023-04-03 ENCOUNTER — OFFICE VISIT (OUTPATIENT)
Dept: ORTHOPEDIC SURGERY | Age: 63
End: 2023-04-03
Payer: COMMERCIAL

## 2023-04-03 VITALS — WEIGHT: 273 LBS | RESPIRATION RATE: 14 BRPM | HEIGHT: 76 IN | BODY MASS INDEX: 33.24 KG/M2

## 2023-04-03 DIAGNOSIS — S46.211A RUPTURE OF RIGHT PROXIMAL BICEPS TENDON, INITIAL ENCOUNTER: Primary | ICD-10-CM

## 2023-04-03 PROCEDURE — G8417 CALC BMI ABV UP PARAM F/U: HCPCS | Performed by: ORTHOPAEDIC SURGERY

## 2023-04-03 PROCEDURE — G8427 DOCREV CUR MEDS BY ELIG CLIN: HCPCS | Performed by: ORTHOPAEDIC SURGERY

## 2023-04-03 PROCEDURE — 3017F COLORECTAL CA SCREEN DOC REV: CPT | Performed by: ORTHOPAEDIC SURGERY

## 2023-04-03 PROCEDURE — 1036F TOBACCO NON-USER: CPT | Performed by: ORTHOPAEDIC SURGERY

## 2023-04-03 PROCEDURE — 99212 OFFICE O/P EST SF 10 MIN: CPT | Performed by: ORTHOPAEDIC SURGERY

## 2023-04-03 NOTE — PROGRESS NOTES
HPI: Mr. Rosa Elena Hollingsworth is a 20-year-old who was recently seen for his right shoulder and was sent to physical therapy and received a cortisone injection. He states that about a week ago he felt a pop in his shoulder and subsequently developed quite a bit of bruising along the biceps muscle and in addition to some cramping. On examination today he has a fair amount of ecchymosis over the anterior aspect of the shoulder and biceps muscle region with an obvious Anderson contour of the biceps muscle belly. Is mildly tender to palpation along the biceps muscle. He has a 2+ radial pulse with brisk capillary refill in his fingers. Impression and plan: Mr. Rosa Elena Hollingsworth is a 20-year-old who presents today with what appears to be proximal biceps tendon rupture. His MRI from 3/22/2023 was again reviewed. It is hard to adequately visualize his biceps tendon in the bicipital groove but may be there is a sliver of tendon still present. I explained to the patient that this likely gave way resulting in his current presentation. I educated him about proximal biceps tendon injuries and discussed treatment options and recommended proceeding conservatively at this time with symptomatic care and continued therapy. I will see him back in my clinic as needed as I anticipate gradual improvement and resolution of his current symptoms with time. He may return or call at anytime with persistent or worsening symptoms and with any questions or concerns.

## 2023-04-06 ENCOUNTER — TELEPHONE (OUTPATIENT)
Dept: ONCOLOGY | Age: 63
End: 2023-04-06

## 2023-04-06 NOTE — LETTER
4/6/2023        [facility],Building [address] [place] [address]    Dear Sabrina Colon: Your healthcare provider has ordered a low dose CT scan of the chest for lung cancer screening. Enclosed you will find information about CT lung screening and smoking cessation resources. If you are unable to keep you appointment for you CT lung screening, please call our scheduling department at [phone]. Keep in mind that CT lung screening does not take the place of smoking cessation. Please do not hesitate to contact me if you have any questions or concerns.     [address],      [facility] Screening [facility]  [phone]

## 2023-04-17 RX ORDER — OMEPRAZOLE 20 MG/1
CAPSULE, DELAYED RELEASE ORAL
Qty: 30 CAPSULE | Refills: 0 | Status: SHIPPED | OUTPATIENT
Start: 2023-04-17

## 2023-04-20 ENCOUNTER — HOSPITAL ENCOUNTER (OUTPATIENT)
Dept: CT IMAGING | Age: 63
Discharge: HOME | End: 2023-04-22
Payer: MEDICARE

## 2023-04-20 VITALS — HEIGHT: 76 IN | BODY MASS INDEX: 32.88 KG/M2 | WEIGHT: 270 LBS

## 2023-04-20 DIAGNOSIS — Z87.891 PERSONAL HISTORY OF TOBACCO USE: ICD-10-CM

## 2023-04-20 PROCEDURE — 71271 CT THORAX LUNG CANCER SCR C-: CPT

## 2023-04-21 ENCOUNTER — OFFICE VISIT (OUTPATIENT)
Dept: PODIATRY | Age: 63
End: 2023-04-21
Payer: COMMERCIAL

## 2023-04-21 VITALS — WEIGHT: 270 LBS | HEIGHT: 76 IN | BODY MASS INDEX: 32.88 KG/M2

## 2023-04-21 DIAGNOSIS — M79.674 PAIN OF TOE OF RIGHT FOOT: ICD-10-CM

## 2023-04-21 DIAGNOSIS — Z79.4 TYPE 2 DIABETES MELLITUS WITH DIABETIC POLYNEUROPATHY, WITH LONG-TERM CURRENT USE OF INSULIN (HCC): ICD-10-CM

## 2023-04-21 DIAGNOSIS — B35.1 ONYCHOMYCOSIS OF TOENAIL: Primary | ICD-10-CM

## 2023-04-21 DIAGNOSIS — E11.42 TYPE 2 DIABETES MELLITUS WITH DIABETIC POLYNEUROPATHY, WITH LONG-TERM CURRENT USE OF INSULIN (HCC): ICD-10-CM

## 2023-04-21 DIAGNOSIS — E11.51 TYPE 2 DIABETES MELLITUS WITH PERIPHERAL VASCULAR DISEASE (HCC): ICD-10-CM

## 2023-04-21 PROCEDURE — 99999 PR OFFICE/OUTPT VISIT,PROCEDURE ONLY: CPT | Performed by: PODIATRIST

## 2023-04-21 PROCEDURE — 11720 DEBRIDE NAIL 1-5: CPT | Performed by: PODIATRIST

## 2023-04-24 ENCOUNTER — HOSPITAL ENCOUNTER (OUTPATIENT)
Dept: PAIN MANAGEMENT | Facility: CLINIC | Age: 63
Discharge: HOME | End: 2023-04-24
Payer: MEDICARE

## 2023-04-24 VITALS
RESPIRATION RATE: 12 BRPM | DIASTOLIC BLOOD PRESSURE: 74 MMHG | TEMPERATURE: 97.5 F | SYSTOLIC BLOOD PRESSURE: 120 MMHG | HEART RATE: 77 BPM | OXYGEN SATURATION: 95 %

## 2023-04-24 DIAGNOSIS — R52 PAIN MANAGEMENT: ICD-10-CM

## 2023-04-24 DIAGNOSIS — M47.817 LUMBOSACRAL SPONDYLOSIS WITHOUT MYELOPATHY: Chronic | ICD-10-CM

## 2023-04-24 PROCEDURE — 64493 INJ PARAVERT F JNT L/S 1 LEV: CPT

## 2023-04-24 PROCEDURE — 6360000002 HC RX W HCPCS: Performed by: ANESTHESIOLOGY

## 2023-04-24 PROCEDURE — 64495 INJ PARAVERT F JNT L/S 3 LEV: CPT

## 2023-04-24 PROCEDURE — 6360000004 HC RX CONTRAST MEDICATION: Performed by: ANESTHESIOLOGY

## 2023-04-24 PROCEDURE — 2500000003 HC RX 250 WO HCPCS: Performed by: ANESTHESIOLOGY

## 2023-04-24 PROCEDURE — 64494 INJ PARAVERT F JNT L/S 2 LEV: CPT

## 2023-04-24 RX ORDER — MIDAZOLAM HYDROCHLORIDE 2 MG/2ML
INJECTION, SOLUTION INTRAMUSCULAR; INTRAVENOUS
Status: COMPLETED | OUTPATIENT
Start: 2023-04-24 | End: 2023-04-24

## 2023-04-24 RX ORDER — FENTANYL CITRATE 50 UG/ML
INJECTION, SOLUTION INTRAMUSCULAR; INTRAVENOUS
Status: COMPLETED | OUTPATIENT
Start: 2023-04-24 | End: 2023-04-24

## 2023-04-24 RX ORDER — LIDOCAINE HYDROCHLORIDE 10 MG/ML
INJECTION, SOLUTION EPIDURAL; INFILTRATION; INTRACAUDAL; PERINEURAL
Status: COMPLETED | OUTPATIENT
Start: 2023-04-24 | End: 2023-04-24

## 2023-04-24 RX ORDER — BUPIVACAINE HYDROCHLORIDE 5 MG/ML
INJECTION, SOLUTION EPIDURAL; INTRACAUDAL
Status: COMPLETED | OUTPATIENT
Start: 2023-04-24 | End: 2023-04-24

## 2023-04-24 RX ADMIN — IOHEXOL 10 ML: 180 INJECTION INTRAVENOUS at 14:49

## 2023-04-24 RX ADMIN — MIDAZOLAM HYDROCHLORIDE 1 MG: 1 INJECTION, SOLUTION INTRAMUSCULAR; INTRAVENOUS at 14:48

## 2023-04-24 RX ADMIN — FENTANYL CITRATE 50 MCG: 50 INJECTION, SOLUTION INTRAMUSCULAR; INTRAVENOUS at 14:48

## 2023-04-24 RX ADMIN — LIDOCAINE HYDROCHLORIDE 5 ML: 10 INJECTION, SOLUTION EPIDURAL; INFILTRATION; INTRACAUDAL at 14:50

## 2023-04-24 RX ADMIN — BUPIVACAINE HYDROCHLORIDE 10 ML: 5 INJECTION, SOLUTION EPIDURAL; INTRACAUDAL; PERINEURAL at 14:51

## 2023-04-24 ASSESSMENT — PAIN DESCRIPTION - DESCRIPTORS: DESCRIPTORS: SHARP;DISCOMFORT

## 2023-04-24 ASSESSMENT — PAIN DESCRIPTION - LOCATION: LOCATION: BACK

## 2023-04-24 ASSESSMENT — ENCOUNTER SYMPTOMS
DIARRHEA: 0
COLOR CHANGE: 0
SHORTNESS OF BREATH: 0
NAUSEA: 0
BACK PAIN: 0

## 2023-04-24 ASSESSMENT — PAIN DESCRIPTION - PAIN TYPE: TYPE: CHRONIC PAIN

## 2023-04-24 ASSESSMENT — PAIN DESCRIPTION - DIRECTION: RADIATING_TOWARDS: BILATERAL HIPS

## 2023-04-24 ASSESSMENT — PAIN DESCRIPTION - FREQUENCY: FREQUENCY: CONTINUOUS

## 2023-04-24 ASSESSMENT — PAIN - FUNCTIONAL ASSESSMENT
PAIN_FUNCTIONAL_ASSESSMENT: PREVENTS OR INTERFERES SOME ACTIVE ACTIVITIES AND ADLS
PAIN_FUNCTIONAL_ASSESSMENT: NONE - DENIES PAIN

## 2023-04-24 ASSESSMENT — PAIN DESCRIPTION - ORIENTATION: ORIENTATION: LOWER

## 2023-04-24 ASSESSMENT — PAIN SCALES - GENERAL: PAINLEVEL_OUTOF10: 2

## 2023-04-24 NOTE — OP NOTE
Patient Name: Jade Muñoz   YOB: 1960  Room/Bed: Room/bed info not found  Medical Record Number: [id number]  Date: 4/24/2023       Sedation/ Anesthesia Plan:   intravenous sedation   as needed. Medications Planned:   midazolam (Versed) / Fentanyl  Intravenously  as needed. Preoperative Diagnosis: Lumbar spondylosis w/o myelopathy or radiculopathy  Postoperative Diagnosis: Lumbar spondylosis w/o myelopathy or radiculopathy  Blood Loss: none    Procedure Performed:  Bilateral Lumbar Medial Branch nerve Blocks at the transverse processes of L3, L4, L5 under fluoroscopy guidance    Procedure: The Patient was seen in the preop area, chart was reviewed, informed consent was obtained. Patient was taken to procedure room and was placed in prone position. Vital signs were monitored through out the  Procedure. A time out was completed. The skin over the back was prepped and draped in sterile manner. The target point was marked at the junction of Transverse process and superior articular process at the target levels. Skin and deep tissues were anesthetized with 1 % lidocaine. A 25-gauge needlele was advanced to the target spots under fluoroscopy guidance in AP / Lateral and Oblique views. Then after negative aspiration contrast dye was injected with live fluoroscopy in AP views that showed  spread of the contrast with no epidural space and no vascular runoff or intrathecal spread. Finally 0.5 ml of treatment solution 0.5% BUPIVACAINE  was injected at each level. The needle was removed and a Band-Aid was placed over the needle  insertion site. The patient's vital signs remained stable and the patient tolerated the procedure well.     Electronically signed by Vivian Gongora MD on 4/24/2023 at 2:56 PM    SEDATION NOTE:    ASA CLASSIFICATION  3  MP   CLASSIFICATION  3    Moderate intravenous conscious sedation was supervised by Dr. Leonor Worthington  The patient was independently monitored by a

## 2023-04-24 NOTE — PROGRESS NOTES
SUBJECTIVE: Yoana Henderson is a 61 y.o. male who returns to the office with chief complaint of painful fungal toenails. Patient relates toe nails are thickened/difficult to trim as well as painful with ambulation and with shoe gear. Chief Complaint   Patient presents with    Nail Problem     Right foot nail trim, last seen Maryjane Guzman MD 3/23/23    Diabetes     Last blood sugar 153     Review of Systems   Constitutional:  Negative for activity change, appetite change, chills, diaphoresis, fatigue and fever. Respiratory:  Negative for shortness of breath. Cardiovascular:  Negative for leg swelling. Gastrointestinal:  Negative for diarrhea and nausea. Endocrine: Negative for cold intolerance, heat intolerance and polyuria. Musculoskeletal:  Positive for arthralgias and gait problem. Negative for back pain, joint swelling and myalgias. Skin:  Negative for color change, pallor, rash and wound. Allergic/Immunologic: Negative for environmental allergies and food allergies. Neurological:  Positive for numbness. Negative for dizziness, weakness and light-headedness. Hematological:  Does not bruise/bleed easily. Psychiatric/Behavioral:  Negative for behavioral problems, confusion and self-injury. The patient is not nervous/anxious. OBJECTIVE: Clinical evaluation of patient reveals nails 1,4,5 of the right foot present with thickness, elongation, discoloration, brittleness, and subungual debris. There was pain with palpation and debridement of the toenails of the right foot No open lesions noted to the right foot today. The left leg and toes 2 and 3 of the right foot have been amputated. The right DP pulse is not palpable. The right PT pulse is not palpable. Protective sensation is absent to the right plantar foot as noted with a 5.07 Dousman-Gaudencio monofilament. Glucose: 153 mg/dl.     Class A Findings (1 needed)   [x] Non-traumatic amputation of foot or integral skeleton portion

## 2023-04-24 NOTE — DISCHARGE INSTRUCTIONS
Discharge Instructions following Sedation or Anesthesia:  You have  received  a sedative/anesthetic therefore, you should not consume any alcoholic beverages for minimum of 12 hours. Do not drive or operate machinery for 24 hours. Do not sign legal documents for 24 hours. Dizziness, drowsiness, and unsteadiness may occur. Rest when need to. Increase diet as tolerated. Keep up on fluids if diet allows. General Instructions:  Do not take a tub bath for 72 hours after procedure (this includes hot tubs and swimming pools). You may shower, but avoid hot water to injection site. Avoid strenuous activity TODAY especially if you experience dizziness. Remove band-aid the next day. Wash off any residual iodine   Do not use heat, heating pad, or any other heating device over the injection site for 3 days after the procedure. If you experience pain after your procedure, you may continue with your current pain medication as prescribed. (DO NOT INCREASE YOUR PAIN MEDICATION WITHOUT TALKING TO DOCTOR)  Soreness and pain at injection site is common, may use ice to reduce soreness. Please complete pain diary as instructed.      Call Morris Becker at [phone] if you experience:   Fever, chills or temperature over 100    Vomiting, Headache, persistent stiff neck, nausea, blurred vision   Difficulty in urinating or unable to urinate with 8 hours   Increase in weakness, numbness or loss of function   Increased redness, swelling or drainage at the injection site

## 2023-04-24 NOTE — H&P
UPDATE:  Office visit pain clinic in [facility] with all required elements of H&P dated 03/30/2023  Patient seen preop, chart reviewed,   No changes in medical history and health assessment since last evaluation. PE:  AAO x 3, in NAD, VSS,. Resp: breathing on RA, no respiratory distress  Cardiac: rate normal    Risk / Benefits explained to patient, patient agree to proceed with plan.   ASA 3  MP 3

## 2023-04-26 ENCOUNTER — OFFICE VISIT (OUTPATIENT)
Dept: GASTROENTEROLOGY | Age: 63
End: 2023-04-26
Payer: MEDICARE

## 2023-04-26 VITALS
WEIGHT: 281 LBS | DIASTOLIC BLOOD PRESSURE: 71 MMHG | SYSTOLIC BLOOD PRESSURE: 135 MMHG | HEIGHT: 76 IN | BODY MASS INDEX: 34.22 KG/M2

## 2023-04-26 DIAGNOSIS — K21.9 GASTROESOPHAGEAL REFLUX DISEASE, UNSPECIFIED WHETHER ESOPHAGITIS PRESENT: Primary | ICD-10-CM

## 2023-04-26 DIAGNOSIS — Z12.11 SCREEN FOR COLON CANCER: ICD-10-CM

## 2023-04-26 DIAGNOSIS — R19.4 BOWEL HABIT CHANGES: ICD-10-CM

## 2023-04-26 PROCEDURE — G8427 DOCREV CUR MEDS BY ELIG CLIN: HCPCS | Performed by: INTERNAL MEDICINE

## 2023-04-26 PROCEDURE — 1036F TOBACCO NON-USER: CPT | Performed by: INTERNAL MEDICINE

## 2023-04-26 PROCEDURE — APPSS60 APP SPLIT SHARED TIME 46-60 MINUTES: Performed by: NURSE PRACTITIONER

## 2023-04-26 PROCEDURE — 3078F DIAST BP <80 MM HG: CPT | Performed by: INTERNAL MEDICINE

## 2023-04-26 PROCEDURE — 3074F SYST BP LT 130 MM HG: CPT | Performed by: INTERNAL MEDICINE

## 2023-04-26 PROCEDURE — 3017F COLORECTAL CA SCREEN DOC REV: CPT | Performed by: INTERNAL MEDICINE

## 2023-04-26 PROCEDURE — 99204 OFFICE O/P NEW MOD 45 MIN: CPT | Performed by: INTERNAL MEDICINE

## 2023-04-26 PROCEDURE — G8417 CALC BMI ABV UP PARAM F/U: HCPCS | Performed by: INTERNAL MEDICINE

## 2023-04-26 RX ORDER — BISACODYL 5 MG/1
TABLET, DELAYED RELEASE ORAL
Qty: 4 TABLET | Refills: 0 | Status: SHIPPED | OUTPATIENT
Start: 2023-04-26

## 2023-04-26 RX ORDER — SODIUM, POTASSIUM,MAG SULFATES 17.5-3.13G
SOLUTION, RECONSTITUTED, ORAL ORAL
Qty: 1 EACH | Refills: 0 | Status: SHIPPED | OUTPATIENT
Start: 2023-04-26

## 2023-04-26 ASSESSMENT — ENCOUNTER SYMPTOMS
ALLERGIC/IMMUNOLOGIC NEGATIVE: 1
ABDOMINAL DISTENTION: 1
RESPIRATORY NEGATIVE: 1
DIARRHEA: 1
CONSTIPATION: 1
ABDOMINAL PAIN: 1

## 2023-04-26 NOTE — PROGRESS NOTES
Reason for Referral:   MD Josefa Ricci [address] [place] [address],  [address] [place] [address]    Chief Complaint   Patient presents with    Diarrhea     Pt states he has diarrhea for long periods of time and then is constipated for a few days     Bloated     Pt states he has a lot of bloating, was put on xifaxan for 9 days. 1. Gastroesophageal reflux disease, unspecified whether esophagitis present    2. Screen for colon cancer    3. Bowel habit changes        HISTORY OF PRESENT ILLNESS:     Returning patient being seen for bowel habit change, bloating, early satiety, GERD. Patient states that symptoms started approximately a year and a half ago. He reports bloating after meals. Has early satiety. He was recently started on Xifaxan for bloating which helped while he was on the medication but quickly returned following discontinuation. He reports heartburn, dyspepsia. Recently started on omeprazole. Reports minimal improvement. Denies any dysphagia, odynophagia. Has history of chronic constipation however in the last year he states he has been having alternating diarrhea with constipation. Denies any overt GI bleeding. Upon chart review it appears he has been on Ozempic over the last 2 years per patient. It is possible his symptoms are related to this medication. Last colonoscopy appears to be 2016. Had fair preparation. Cecal base was not evaluated well due to redundancy. No weight loss  Reports good appetite      Past Medical,Family, and Social History reviewed and does contribute to the patient presentingcondition. Patient's PMH/PSH,SH,PSYCH Hx, MEDs, ALLERGIES, and ROS were all reviewed and updated in the appropriate sections.     PAST MEDICAL HISTORY:  Past Medical History:   Diagnosis Date    A-fib [facility])     Asymptomatic bilateral carotid artery stenosis     Boil, thigh 10/2019    10/30/19: right inner thigh region, says PCP Rxd Doxy for this, area is much better, has a couple Detail Level: Detailed

## 2023-04-27 ENCOUNTER — TELEPHONE (OUTPATIENT)
Dept: INTERNAL MEDICINE CLINIC | Age: 63
End: 2023-04-27

## 2023-04-27 ENCOUNTER — TELEPHONE (OUTPATIENT)
Dept: PAIN MANAGEMENT | Age: 63
End: 2023-04-27

## 2023-04-27 DIAGNOSIS — M47.817 LUMBOSACRAL SPONDYLOSIS WITHOUT MYELOPATHY: Primary | ICD-10-CM

## 2023-04-28 ENCOUNTER — TELEPHONE (OUTPATIENT)
Dept: PAIN MANAGEMENT | Age: 63
End: 2023-04-28

## 2023-04-28 NOTE — TELEPHONE ENCOUNTER
I LM on patient's VM to call and schedule procedure. To Note last procedure cancelled; patient wanted to hold off. I stated in my message if/when he is ready to reschedule, to please call the office.

## 2023-05-01 RX ORDER — AMLODIPINE BESYLATE 2.5 MG/1
TABLET ORAL
Qty: 30 TABLET | Refills: 3 | Status: SHIPPED | OUTPATIENT
Start: 2023-05-01

## 2023-05-03 ENCOUNTER — OFFICE VISIT (OUTPATIENT)
Dept: INTERNAL MEDICINE CLINIC | Age: 63
End: 2023-05-03

## 2023-05-03 VITALS
DIASTOLIC BLOOD PRESSURE: 80 MMHG | HEIGHT: 76 IN | SYSTOLIC BLOOD PRESSURE: 130 MMHG | HEART RATE: 94 BPM | BODY MASS INDEX: 33.36 KG/M2 | WEIGHT: 274 LBS | OXYGEN SATURATION: 95 %

## 2023-05-03 DIAGNOSIS — N18.30 STAGE 3 CHRONIC KIDNEY DISEASE, UNSPECIFIED WHETHER STAGE 3A OR 3B CKD (HCC): ICD-10-CM

## 2023-05-03 DIAGNOSIS — E11.42 TYPE 2 DIABETES MELLITUS WITH DIABETIC POLYNEUROPATHY, WITH LONG-TERM CURRENT USE OF INSULIN (HCC): Primary | ICD-10-CM

## 2023-05-03 DIAGNOSIS — C64.1 MALIGNANT NEOPLASM OF RIGHT KIDNEY (HCC): ICD-10-CM

## 2023-05-03 DIAGNOSIS — Z79.4 TYPE 2 DIABETES MELLITUS WITH DIABETIC POLYNEUROPATHY, WITH LONG-TERM CURRENT USE OF INSULIN (HCC): Primary | ICD-10-CM

## 2023-05-03 DIAGNOSIS — I73.9 PVD (PERIPHERAL VASCULAR DISEASE) (HCC): ICD-10-CM

## 2023-05-03 DIAGNOSIS — E08.41 DIABETIC MONONEUROPATHY ASSOCIATED WITH DIABETES MELLITUS DUE TO UNDERLYING CONDITION (HCC): ICD-10-CM

## 2023-05-03 RX ORDER — AMIODARONE HYDROCHLORIDE 200 MG/1
200 TABLET ORAL 2 TIMES DAILY
Qty: 60 TABLET | Refills: 0 | Status: SHIPPED | OUTPATIENT
Start: 2023-05-03 | End: 2023-06-02

## 2023-05-03 ASSESSMENT — ENCOUNTER SYMPTOMS
ABDOMINAL DISTENTION: 0
DIARRHEA: 0
WHEEZING: 0
SHORTNESS OF BREATH: 0
EYE DISCHARGE: 0
BLOOD IN STOOL: 0
COLOR CHANGE: 0
TROUBLE SWALLOWING: 0
COUGH: 0
EYE PAIN: 0

## 2023-05-03 NOTE — PROGRESS NOTES
Visit Information    Have you changed or started any medications since your last visit including any over-the-counter medicines, vitamins, or herbal medicines? no   Are you having any side effects from any of your medications? -  no  Have you stopped taking any of your medications? Is so, why? -  no    Have you seen any other physician or provider since your last visit? Yes - Records Obtained  Have you had any other diagnostic tests since your last visit? Yes - Records Obtained  Have you been seen in the emergency room and/or had an admission to a hospital since we last saw you? No  Have you had your routine dental cleaning in the past 6 months? no    Have you activated your [employer] account? If not, what are your barriers?  Yes     Patient Care Team:  Placido Patricio MD as PCP - Melba Cooper MD as PCP - Empaneled Provider  Kamar Meek MD as Consulting Physician (Infectious Diseases)    Medical History Review  Past Medical, Family, and Social History reviewed and does contribute to the patient presenting condition    Health Maintenance   Topic Date Due    HIV screen  Never done    DTaP/Tdap/Td vaccine (1 - Tdap) Never done    Shingles vaccine (1 of 2) 10/23/2014    Pneumococcal 0-64 years Vaccine (2 - PCV) 01/04/2015    Diabetic retinal exam  01/04/2017    Diabetic Alb to Cr ratio (uACR) test  12/14/2022    Lipids  08/10/2023    GFR test (Diabetes, CKD 3-4, OR last GFR 15-59)  11/14/2023    Depression Screen  01/05/2024    A1C test (Diabetic or Prediabetic)  02/22/2024    Low dose CT lung screening  04/20/2024    Diabetic foot exam  04/24/2024    Colorectal Cancer Screen  01/23/2027    Flu vaccine  Completed    COVID-19 Vaccine  Completed    Hepatitis C screen  Completed    Hepatitis A vaccine  Aged Out    Hib vaccine  Aged Out    Meningococcal (ACWY) vaccine  Aged Out    Prostate Specific Antigen (PSA) Screening or Monitoring  Discontinued
Head: Normocephalic and atraumatic. Eyes:      General:         Right eye: No discharge. Left eye: No discharge. Extraocular Movements:      Right eye: Normal extraocular motion. Left eye: Normal extraocular motion. Conjunctiva/sclera: Conjunctivae normal.      Right eye: Right conjunctiva is not injected. Left eye: Left conjunctiva is not injected. Neck:      Thyroid: No thyroid mass or thyromegaly. Vascular: No JVD. Cardiovascular:      Rate and Rhythm: Normal rate and regular rhythm. Heart sounds: No murmur heard. No friction rub. Pulmonary:      Effort: Pulmonary effort is normal. No tachypnea, bradypnea, accessory muscle usage or respiratory distress. Breath sounds: Normal breath sounds. No wheezing or rales. Abdominal:      General: Bowel sounds are normal. There is no distension. Palpations: Abdomen is soft. Tenderness: There is no abdominal tenderness. There is no rebound. Musculoskeletal:         General: No tenderness. Normal range of motion. Cervical back: Normal range of motion and neck supple. No edema or erythema. Comments: Hx of riht knee below amputation     Lymphadenopathy:      Head:      Right side of head: No submental or submandibular adenopathy. Left side of head: No submental or submandibular adenopathy. Cervical: No cervical adenopathy. Skin:     General: Skin is warm. Coloration: Skin is not pale. Findings: No bruising, ecchymosis or rash. Neurological:      Mental Status: He is alert and oriented to person, place, and time. Cranial Nerves: No cranial nerve deficit. Sensory: No sensory deficit. Motor: No atrophy or abnormal muscle tone. Coordination: Coordination normal.   Psychiatric:         Mood and Affect: Mood is not anxious. Affect is not angry. Speech: Speech is not slurred. Behavior: Behavior normal. Behavior is not aggressive.          Thought

## 2023-05-05 ENCOUNTER — TELEPHONE (OUTPATIENT)
Dept: GASTROENTEROLOGY | Age: 63
End: 2023-05-05

## 2023-05-05 NOTE — TELEPHONE ENCOUNTER
Rec'd eliquis clearance hold eliquis x 2 days start holding 5/24/23 rec'd insulin clearance pt is to cut insulin dose by 1/2 the days he is on CLD. Writer called pt and gave him all instructions, pt voices understanding.

## 2023-05-17 ENCOUNTER — HOSPITAL ENCOUNTER (OUTPATIENT)
Dept: PAIN MANAGEMENT | Facility: CLINIC | Age: 63
Discharge: HOME | End: 2023-05-17
Payer: MEDICARE

## 2023-05-17 VITALS
RESPIRATION RATE: 16 BRPM | OXYGEN SATURATION: 94 % | BODY MASS INDEX: 33.36 KG/M2 | HEART RATE: 76 BPM | HEIGHT: 76 IN | TEMPERATURE: 97.5 F | SYSTOLIC BLOOD PRESSURE: 129 MMHG | DIASTOLIC BLOOD PRESSURE: 70 MMHG | WEIGHT: 274 LBS

## 2023-05-17 DIAGNOSIS — R52 PAIN MANAGEMENT: ICD-10-CM

## 2023-05-17 DIAGNOSIS — M47.817 LUMBOSACRAL SPONDYLOSIS WITHOUT MYELOPATHY: Primary | Chronic | ICD-10-CM

## 2023-05-17 PROCEDURE — 64493 INJ PARAVERT F JNT L/S 1 LEV: CPT

## 2023-05-17 PROCEDURE — 6360000004 HC RX CONTRAST MEDICATION: Performed by: ANESTHESIOLOGY

## 2023-05-17 PROCEDURE — 6360000002 HC RX W HCPCS: Performed by: ANESTHESIOLOGY

## 2023-05-17 PROCEDURE — 64494 INJ PARAVERT F JNT L/S 2 LEV: CPT

## 2023-05-17 PROCEDURE — 64495 INJ PARAVERT F JNT L/S 3 LEV: CPT

## 2023-05-17 PROCEDURE — 2500000003 HC RX 250 WO HCPCS: Performed by: ANESTHESIOLOGY

## 2023-05-17 RX ORDER — OMEPRAZOLE 20 MG/1
CAPSULE, DELAYED RELEASE ORAL
Qty: 30 CAPSULE | Refills: 0 | Status: SHIPPED | OUTPATIENT
Start: 2023-05-17

## 2023-05-17 RX ORDER — FENTANYL CITRATE 50 UG/ML
INJECTION, SOLUTION INTRAMUSCULAR; INTRAVENOUS
Status: COMPLETED | OUTPATIENT
Start: 2023-05-17 | End: 2023-05-17

## 2023-05-17 RX ORDER — MIDAZOLAM HYDROCHLORIDE 2 MG/2ML
INJECTION, SOLUTION INTRAMUSCULAR; INTRAVENOUS
Status: COMPLETED | OUTPATIENT
Start: 2023-05-17 | End: 2023-05-17

## 2023-05-17 RX ORDER — LIDOCAINE HYDROCHLORIDE 10 MG/ML
INJECTION, SOLUTION EPIDURAL; INFILTRATION; INTRACAUDAL; PERINEURAL
Status: COMPLETED | OUTPATIENT
Start: 2023-05-17 | End: 2023-05-17

## 2023-05-17 RX ORDER — BUPIVACAINE HYDROCHLORIDE 5 MG/ML
INJECTION, SOLUTION EPIDURAL; INTRACAUDAL
Status: COMPLETED | OUTPATIENT
Start: 2023-05-17 | End: 2023-05-17

## 2023-05-17 RX ADMIN — LIDOCAINE HYDROCHLORIDE 5 ML: 10 INJECTION, SOLUTION EPIDURAL; INFILTRATION; INTRACAUDAL at 13:40

## 2023-05-17 RX ADMIN — BUPIVACAINE HYDROCHLORIDE 5 ML: 5 INJECTION, SOLUTION EPIDURAL; INTRACAUDAL; PERINEURAL at 13:44

## 2023-05-17 RX ADMIN — FENTANYL CITRATE 50 MCG: 50 INJECTION, SOLUTION INTRAMUSCULAR; INTRAVENOUS at 13:40

## 2023-05-17 RX ADMIN — MIDAZOLAM HYDROCHLORIDE 1 MG: 1 INJECTION, SOLUTION INTRAMUSCULAR; INTRAVENOUS at 13:40

## 2023-05-17 RX ADMIN — IOHEXOL 3 ML: 180 INJECTION INTRAVENOUS at 13:41

## 2023-05-17 ASSESSMENT — PAIN - FUNCTIONAL ASSESSMENT
PAIN_FUNCTIONAL_ASSESSMENT: PREVENTS OR INTERFERES SOME ACTIVE ACTIVITIES AND ADLS
PAIN_FUNCTIONAL_ASSESSMENT: 0-10
PAIN_FUNCTIONAL_ASSESSMENT: 0-10

## 2023-05-17 ASSESSMENT — PAIN DESCRIPTION - DESCRIPTORS: DESCRIPTORS: SHARP;DISCOMFORT

## 2023-05-17 NOTE — DISCHARGE INSTRUCTIONS
Discharge Instructions following Sedation or Anesthesia:  You have  received  a sedative/anesthetic therefore, you should not consume any alcoholic beverages for minimum of 12 hours. Do not drive or operate machinery for 24 hours. Do not sign legal documents for 24 hours. Dizziness, drowsiness, and unsteadiness may occur. Rest when need to. Increase diet as tolerated. Keep up on fluids if diet allows. General Instructions:  Do not take a tub bath for 72 hours after procedure (this includes hot tubs and swimming pools). You may shower, but avoid hot water to injection site. Avoid strenuous activity TODAY especially if you experience dizziness. Remove band-aid the next day. Wash off any residual iodine   Do not use heat, heating pad, or any other heating device over the injection site for 3 days after the procedure. If you experience pain after your procedure, you may continue with your current pain medication as prescribed. (DO NOT INCREASE YOUR PAIN MEDICATION WITHOUT TALKING TO DOCTOR)  Soreness and pain at injection site is common, may use ice to reduce soreness. Please complete pain diary as instructed.      Call Morris Becker at [phone] if you experience:   Fever, chills or temperature over 100    Vomiting, Headache, persistent stiff neck, nausea, blurred vision   Difficulty in urinating or unable to urinate with 8 hours   Increase in weakness, numbness or loss of function   Increased redness, swelling or drainage at the injection site

## 2023-05-17 NOTE — OP NOTE
Patient Name: Yuan Irizarry   YOB: 1960  Room/Bed: Room/bed info not found  Medical Record Number: [id number]  Date: 5/17/2023       Sedation/ Anesthesia Plan:   intravenous sedation   as needed. Medications Planned:   midazolam (Versed) / Fentanyl  Intravenously  as needed. Preoperative Diagnosis: Lumbar spondylosis w/o myelopathy or radiculopathy  Postoperative Diagnosis: Lumbar spondylosis w/o myelopathy or radiculopathy  Blood Loss: none    Procedure Performed:  Bilateral Lumbar Medial Branch nerve Blocks at the transverse processes of L3, L4, L5 under fluoroscopy guidance    Procedure: The Patient was seen in the preop area, chart was reviewed, informed consent was obtained. Patient was taken to procedure room and was placed in prone position. Vital signs were monitored through out the  Procedure. A time out was completed. The skin over the back was prepped and draped in sterile manner. The target point was marked at the junction of Transverse process and superior articular process at the target levels. Skin and deep tissues were anesthetized with 1 % lidocaine. A 25-gauge needlele was advanced to the target spots under fluoroscopy guidance in AP / Lateral and Oblique views. Then after negative aspiration contrast dye was injected with live fluoroscopy in AP views that showed  spread of the contrast with no epidural space and no vascular runoff or intrathecal spread. Finally 0.5 ml of treatment solution 0.5% BUPIVACAINE  was injected at each level. The needle was removed and a Band-Aid was placed over the needle  insertion site. The patient's vital signs remained stable and the patient tolerated the procedure well.       Electronically signed by James Sharp MD on 5/17/2023 at 1:47 PM    SEDATION NOTE:    ASA CLASSIFICATION  3  MP   CLASSIFICATION  3    Moderate intravenous conscious sedation was supervised by Dr. Wyatt Carias  The patient was independently monitored by a

## 2023-05-17 NOTE — H&P
(FLORANEX) TABS, TAKE 1 TABLET BY MOUTH TWICE A DAY, Disp: 60 tablet, Rfl: 0    Insulin Pen Needle (COMFORT TOUCH INSULIN PEN NEED) 33G X 6 MM MISC, 110 Units by Does not apply route daily, Disp: 5 each, Rfl: 1    insulin lispro (HUMALOG) 100 UNIT/ML injection vial, SLIDING SCALE (TAKE 5 UNITS IF SUGAR IS OVER 150), Disp: 1 each, Rfl: 3    budesonide-formoterol (SYMBICORT) 160-4.5 MCG/ACT AERO, TAKE 2 PUFFS BY MOUTH TWICE A DAY, Disp: 3 each, Rfl: 3    B-D UF III MINI PEN NEEDLES 31G X 5 MM MISC, USE AS DIRECTED ONCE DAILY TO INJECT TOUJEO, Disp: 100 each, Rfl: 3    Continuous Blood Gluc  (DEXCOM G6 ) DOUGIE, USE AS DIRECTED TO MONITOR BLOOD SUGAR, Disp: , Rfl:     albuterol sulfate  (90 Base) MCG/ACT inhaler, INHALE 2 PUFFS INTO THE LUNGS EVERY 6 HOURS AS NEEDED FOR WHEEZING OR SHORTNESS OF BREATH, Disp: 6.7 Inhaler, Rfl: 0    glucagon, rDNA, 1 MG injection, Inject 1 mL into the muscle once as needed for Low blood sugar , Disp: , Rfl:     Blood Pressure KIT, 1 actuation by Does not apply route once a week, Disp: 1 kit, Rfl: 0    Handicap Placard MISC, by Does not apply route Duration - 5years Reason- prosthetic leg, Disp: 1 each, Rfl: 0    Omega-3 Fatty Acids (FISH OIL CONCENTRATE) 300 MG CAPS, Take 300 mg by mouth nightly , Disp: , Rfl:     Glucosamine Sulfate 500 MG TABS, Take 500 mg by mouth 3 times daily, Disp: , Rfl:     Social History     Tobacco Use    Smoking status: Former     Packs/day: 2.00     Years: 25.00     Pack years: 50.00     Types: Cigars, Cigarettes     Start date: 1     Quit date: 2011     Years since quittin.9    Smokeless tobacco: Never    Tobacco comments:     quit in    Substance Use Topics    Alcohol use: Yes     Alcohol/week: 0.0 standard drinks     Comment: BEER 2 A MONTH       Review of Systems:   Focused review of systems was performed, and negative as pertinent to diagnosis, except as stated in HPI.       Physical Exam  Constitutional:

## 2023-05-19 ENCOUNTER — HOSPITAL ENCOUNTER (OUTPATIENT)
Dept: PREADMISSION TESTING | Age: 63
Discharge: HOME | End: 2023-05-23

## 2023-05-19 ENCOUNTER — TELEPHONE (OUTPATIENT)
Dept: PAIN MANAGEMENT | Age: 63
End: 2023-05-19

## 2023-05-19 VITALS — WEIGHT: 275 LBS | BODY MASS INDEX: 33.49 KG/M2 | HEIGHT: 76 IN

## 2023-05-19 DIAGNOSIS — M47.817 LUMBOSACRAL SPONDYLOSIS WITHOUT MYELOPATHY: Primary | ICD-10-CM

## 2023-05-23 RX ORDER — LEVOTHYROXINE SODIUM 175 UG/1
TABLET ORAL
Qty: 30 TABLET | Refills: 3 | Status: SHIPPED | OUTPATIENT
Start: 2023-05-23

## 2023-05-23 RX ORDER — METOPROLOL TARTRATE 50 MG/1
TABLET, FILM COATED ORAL
Qty: 60 TABLET | Refills: 3 | Status: SHIPPED | OUTPATIENT
Start: 2023-05-23

## 2023-05-23 RX ORDER — FINASTERIDE 5 MG/1
TABLET, FILM COATED ORAL
Qty: 30 TABLET | Refills: 5 | Status: SHIPPED | OUTPATIENT
Start: 2023-05-23

## 2023-05-24 ENCOUNTER — OFFICE VISIT (OUTPATIENT)
Dept: PULMONOLOGY | Age: 63
End: 2023-05-24
Payer: MEDICARE

## 2023-05-24 VITALS
HEART RATE: 69 BPM | SYSTOLIC BLOOD PRESSURE: 108 MMHG | BODY MASS INDEX: 33.49 KG/M2 | WEIGHT: 275 LBS | RESPIRATION RATE: 15 BRPM | TEMPERATURE: 97.8 F | OXYGEN SATURATION: 94 % | DIASTOLIC BLOOD PRESSURE: 79 MMHG | HEIGHT: 76 IN

## 2023-05-24 DIAGNOSIS — R91.8 PULMONARY INFILTRATE: Primary | ICD-10-CM

## 2023-05-24 PROCEDURE — G8417 CALC BMI ABV UP PARAM F/U: HCPCS | Performed by: INTERNAL MEDICINE

## 2023-05-24 PROCEDURE — 3017F COLORECTAL CA SCREEN DOC REV: CPT | Performed by: INTERNAL MEDICINE

## 2023-05-24 PROCEDURE — G8427 DOCREV CUR MEDS BY ELIG CLIN: HCPCS | Performed by: INTERNAL MEDICINE

## 2023-05-24 PROCEDURE — 99214 OFFICE O/P EST MOD 30 MIN: CPT | Performed by: INTERNAL MEDICINE

## 2023-05-24 PROCEDURE — 1036F TOBACCO NON-USER: CPT | Performed by: INTERNAL MEDICINE

## 2023-05-24 PROCEDURE — 3074F SYST BP LT 130 MM HG: CPT | Performed by: INTERNAL MEDICINE

## 2023-05-24 PROCEDURE — 3078F DIAST BP <80 MM HG: CPT | Performed by: INTERNAL MEDICINE

## 2023-05-24 ASSESSMENT — SLEEP AND FATIGUE QUESTIONNAIRES
HOW LIKELY ARE YOU TO NOD OFF OR FALL ASLEEP WHILE SITTING AND TALKING TO SOMEONE: 0
HOW LIKELY ARE YOU TO NOD OFF OR FALL ASLEEP WHILE WATCHING TV: 0
ESS TOTAL SCORE: 0
HOW LIKELY ARE YOU TO NOD OFF OR FALL ASLEEP WHILE LYING DOWN TO REST IN THE AFTERNOON WHEN CIRCUMSTANCES PERMIT: 0
HOW LIKELY ARE YOU TO NOD OFF OR FALL ASLEEP WHILE SITTING AND READING: 0
HOW LIKELY ARE YOU TO NOD OFF OR FALL ASLEEP WHILE SITTING INACTIVE IN A PUBLIC PLACE: 0
HOW LIKELY ARE YOU TO NOD OFF OR FALL ASLEEP WHILE SITTING QUIETLY AFTER LUNCH WITHOUT ALCOHOL: 0
HOW LIKELY ARE YOU TO NOD OFF OR FALL ASLEEP IN A CAR, WHILE STOPPED FOR A FEW MINUTES IN TRAFFIC: 0
HOW LIKELY ARE YOU TO NOD OFF OR FALL ASLEEP WHEN YOU ARE A PASSENGER IN A CAR FOR AN HOUR WITHOUT A BREAK: 0

## 2023-05-24 NOTE — PROGRESS NOTES
double vision, blurred vision, dry eyes, eye discharge and redness  HEENT:  negative for  hearing loss, tinnitus, ear drainage, earaches and nasal congestion  RESPIRATORY:  See hpi  CARDIOVASCULAR:  negative for  chest pain,, palpitations, orthopnea, PND  GASTROINTESTINAL:  negative for nausea, vomiting, change in bowel habits, diarrhea, constipation, abdominal pain, pruritus, abdominal mass and abdominal distention  GENITOURINARY:  negative for frequency, dysuria, nocturia, urinary incontinence and hesitancy  INTEGUMENT  negative for rash, skin lesion(s), dryness, skin color change, changes in lesion, pruritus and changes in hair  HEMATOLOGIC/LYMPHATIC:  negative for easy bruising, bleeding, lymphadenopathy, petechiae and swelling/edema  ALLERGIC/IMMUNOLOGIC:  negative for recurrent infections, urticaria and drug reactions  ENDOCRINE:  negative for heat intolerance, cold intolerance, tremor, weight changes and change in bowel habits  MUSCULOSKELETAL:  negative for  myalgias, arthralgias, pain, joint swelling, stiff joints and decreased range of motion  NEUROLOGICAL:  negative for headaches, dizziness, seizures, memory problems, speech problems, visual disturbance and coordination problems  BEHAVIOR/PSYCH:  negative for poor appetite, increased appetite, decreased sleep, increased sleep, decreased energy level, increased energy level and poor concentration  Skin no rash no dermatitis  Vitals:  /79   Pulse 69   Temp 97.8 °F (36.6 °C)   Resp 15   Ht 6' 4\" (1.93 m)   Wt 275 lb (124.7 kg)   SpO2 94%   BMI 33.47 kg/m²     PHYSICAL EXAM:  General Appearance:    Alert, cooperative, no distress, appears stated age he is obese no respiratory distress not using any accessory muscles.    Head:    Normocephalic, without obvious abnormality, atraumatic      Eyes:    PERRL, conjunctiva/corneas clear, EOM's intact no Monet syndrome no jaundice   Ears:    Normal  external ear canals, both ears   Nose:   Nares normal,

## 2023-05-24 NOTE — PRE-PROCEDURE INSTRUCTIONS
No answer, left message ? Unable to leave message ? When were you told to arrive at hospital ?  0530    Do you have a  ?yes    Are you on any blood thinners ? yes               If yes when did you stop taking ? ASA-5/20/23, Eliquis-5/22/23    Do you have your prep Rx filled and instruction ? yes    Nothing to eat the day before , only clear liquids. yes    Are you experiencing any covid symptoms ? no    Do you have any infections or rash we should be aware of ?no      Do you have the Hibiclens soap to use the night before and the morning of surgery ? Nothing to eat or drink after midnight, only a sip of water to take any medication instructed to take the night before. yes  Wear comfortable clothing, leave any valuables at home, remove any jewelry and body piercing .  yes

## 2023-05-25 ENCOUNTER — ANESTHESIA EVENT (OUTPATIENT)
Dept: ENDOSCOPY | Age: 63
End: 2023-05-25
Payer: MEDICARE

## 2023-05-26 ENCOUNTER — HOSPITAL ENCOUNTER (OUTPATIENT)
Age: 63
Setting detail: OUTPATIENT SURGERY
Discharge: HOME | End: 2023-05-26
Attending: INTERNAL MEDICINE | Admitting: INTERNAL MEDICINE
Payer: MEDICARE

## 2023-05-26 ENCOUNTER — ANESTHESIA (OUTPATIENT)
Dept: ENDOSCOPY | Age: 63
End: 2023-05-26
Payer: MEDICARE

## 2023-05-26 VITALS
HEIGHT: 76 IN | SYSTOLIC BLOOD PRESSURE: 95 MMHG | WEIGHT: 275 LBS | DIASTOLIC BLOOD PRESSURE: 59 MMHG | OXYGEN SATURATION: 92 % | HEART RATE: 98 BPM | TEMPERATURE: 97.8 F | BODY MASS INDEX: 33.49 KG/M2 | RESPIRATION RATE: 23 BRPM

## 2023-05-26 DIAGNOSIS — R19.4 BOWEL HABIT CHANGES: ICD-10-CM

## 2023-05-26 DIAGNOSIS — K21.9 GASTROESOPHAGEAL REFLUX DISEASE, UNSPECIFIED WHETHER ESOPHAGITIS PRESENT: ICD-10-CM

## 2023-05-26 LAB
GLUCOSE BLD-MCNC: 103 MG/DL (ref 75–110)
GLUCOSE BLD-MCNC: 107 MG/DL (ref 75–110)

## 2023-05-26 PROCEDURE — G0121 COLON CA SCRN NOT HI RSK IND: HCPCS | Performed by: INTERNAL MEDICINE

## 2023-05-26 PROCEDURE — 88305 TISSUE EXAM BY PATHOLOGIST: CPT

## 2023-05-26 PROCEDURE — 2709999900 HC NON-CHARGEABLE SUPPLY: Performed by: INTERNAL MEDICINE

## 2023-05-26 PROCEDURE — 3609027000 HC COLONOSCOPY: Performed by: INTERNAL MEDICINE

## 2023-05-26 PROCEDURE — 2580000003 HC RX 258: Performed by: ANESTHESIOLOGY

## 2023-05-26 PROCEDURE — 3700000000 HC ANESTHESIA ATTENDED CARE: Performed by: INTERNAL MEDICINE

## 2023-05-26 PROCEDURE — 3700000001 HC ADD 15 MINUTES (ANESTHESIA): Performed by: INTERNAL MEDICINE

## 2023-05-26 PROCEDURE — 43239 EGD BIOPSY SINGLE/MULTIPLE: CPT | Performed by: INTERNAL MEDICINE

## 2023-05-26 PROCEDURE — 6360000002 HC RX W HCPCS

## 2023-05-26 PROCEDURE — 3609012400 HC EGD TRANSORAL BIOPSY SINGLE/MULTIPLE: Performed by: INTERNAL MEDICINE

## 2023-05-26 PROCEDURE — 7100000010 HC PHASE II RECOVERY - FIRST 15 MIN: Performed by: INTERNAL MEDICINE

## 2023-05-26 PROCEDURE — 82947 ASSAY GLUCOSE BLOOD QUANT: CPT

## 2023-05-26 PROCEDURE — 7100000011 HC PHASE II RECOVERY - ADDTL 15 MIN: Performed by: INTERNAL MEDICINE

## 2023-05-26 PROCEDURE — 2500000003 HC RX 250 WO HCPCS

## 2023-05-26 RX ORDER — SODIUM CHLORIDE 0.9 % (FLUSH) 0.9 %
5-40 SYRINGE (ML) INJECTION PRN
Status: DISCONTINUED | OUTPATIENT
Start: 2023-05-26 | End: 2023-05-26 | Stop reason: HOSPADM

## 2023-05-26 RX ORDER — ONDANSETRON 2 MG/ML
4 INJECTION INTRAMUSCULAR; INTRAVENOUS
Status: CANCELLED | OUTPATIENT
Start: 2023-05-26 | End: 2023-05-27

## 2023-05-26 RX ORDER — DIPHENHYDRAMINE HYDROCHLORIDE 50 MG/ML
12.5 INJECTION INTRAMUSCULAR; INTRAVENOUS
Status: CANCELLED | OUTPATIENT
Start: 2023-05-26 | End: 2023-05-27

## 2023-05-26 RX ORDER — PROPOFOL 10 MG/ML
INJECTION, EMULSION INTRAVENOUS PRN
Status: DISCONTINUED | OUTPATIENT
Start: 2023-05-26 | End: 2023-05-26 | Stop reason: SDUPTHER

## 2023-05-26 RX ORDER — LIDOCAINE HYDROCHLORIDE 10 MG/ML
1 INJECTION, SOLUTION EPIDURAL; INFILTRATION; INTRACAUDAL; PERINEURAL
Status: DISCONTINUED | OUTPATIENT
Start: 2023-05-26 | End: 2023-05-26 | Stop reason: HOSPADM

## 2023-05-26 RX ORDER — LIDOCAINE HYDROCHLORIDE 10 MG/ML
INJECTION, SOLUTION EPIDURAL; INFILTRATION; INTRACAUDAL; PERINEURAL PRN
Status: DISCONTINUED | OUTPATIENT
Start: 2023-05-26 | End: 2023-05-26 | Stop reason: SDUPTHER

## 2023-05-26 RX ORDER — SODIUM CHLORIDE 0.9 % (FLUSH) 0.9 %
5-40 SYRINGE (ML) INJECTION PRN
Status: CANCELLED | OUTPATIENT
Start: 2023-05-26

## 2023-05-26 RX ORDER — SODIUM CHLORIDE 9 MG/ML
INJECTION, SOLUTION INTRAVENOUS CONTINUOUS
Status: DISCONTINUED | OUTPATIENT
Start: 2023-05-26 | End: 2023-05-26 | Stop reason: HOSPADM

## 2023-05-26 RX ORDER — SODIUM CHLORIDE 9 MG/ML
INJECTION, SOLUTION INTRAVENOUS PRN
Status: CANCELLED | OUTPATIENT
Start: 2023-05-26

## 2023-05-26 RX ORDER — FENTANYL CITRATE 0.05 MG/ML
25 INJECTION, SOLUTION INTRAMUSCULAR; INTRAVENOUS EVERY 5 MIN PRN
Status: CANCELLED | OUTPATIENT
Start: 2023-05-26

## 2023-05-26 RX ORDER — SODIUM CHLORIDE 0.9 % (FLUSH) 0.9 %
5-40 SYRINGE (ML) INJECTION EVERY 12 HOURS SCHEDULED
Status: DISCONTINUED | OUTPATIENT
Start: 2023-05-26 | End: 2023-05-26 | Stop reason: HOSPADM

## 2023-05-26 RX ORDER — SODIUM CHLORIDE 0.9 % (FLUSH) 0.9 %
5-40 SYRINGE (ML) INJECTION EVERY 12 HOURS SCHEDULED
Status: CANCELLED | OUTPATIENT
Start: 2023-05-26

## 2023-05-26 RX ORDER — FENTANYL CITRATE 0.05 MG/ML
50 INJECTION, SOLUTION INTRAMUSCULAR; INTRAVENOUS EVERY 5 MIN PRN
Status: CANCELLED | OUTPATIENT
Start: 2023-05-26

## 2023-05-26 RX ORDER — SODIUM CHLORIDE 9 MG/ML
INJECTION, SOLUTION INTRAVENOUS PRN
Status: DISCONTINUED | OUTPATIENT
Start: 2023-05-26 | End: 2023-05-26 | Stop reason: HOSPADM

## 2023-05-26 RX ADMIN — PHENYLEPHRINE HYDROCHLORIDE 200 MCG: 10 INJECTION INTRAVENOUS at 08:16

## 2023-05-26 RX ADMIN — PHENYLEPHRINE HYDROCHLORIDE 200 MCG: 10 INJECTION INTRAVENOUS at 08:23

## 2023-05-26 RX ADMIN — LIDOCAINE HYDROCHLORIDE 40 MG: 10 INJECTION, SOLUTION EPIDURAL; INFILTRATION; INTRACAUDAL; PERINEURAL at 07:38

## 2023-05-26 RX ADMIN — PHENYLEPHRINE HYDROCHLORIDE 100 MCG: 10 INJECTION INTRAVENOUS at 08:09

## 2023-05-26 RX ADMIN — PROPOFOL 100 MCG/KG/MIN: 10 INJECTION, EMULSION INTRAVENOUS at 07:41

## 2023-05-26 RX ADMIN — PROPOFOL 50 MG: 10 INJECTION, EMULSION INTRAVENOUS at 07:38

## 2023-05-26 RX ADMIN — PROPOFOL 150 MCG/KG/MIN: 10 INJECTION, EMULSION INTRAVENOUS at 07:38

## 2023-05-26 RX ADMIN — SODIUM CHLORIDE: 9 INJECTION, SOLUTION INTRAVENOUS at 07:04

## 2023-05-26 RX ADMIN — PHENYLEPHRINE HYDROCHLORIDE 100 MCG: 10 INJECTION INTRAVENOUS at 08:05

## 2023-05-26 RX ADMIN — PHENYLEPHRINE HYDROCHLORIDE 100 MCG: 10 INJECTION INTRAVENOUS at 08:13

## 2023-05-26 ASSESSMENT — PAIN DESCRIPTION - DESCRIPTORS: DESCRIPTORS: ACHING

## 2023-05-26 ASSESSMENT — PAIN - FUNCTIONAL ASSESSMENT: PAIN_FUNCTIONAL_ASSESSMENT: 0-10

## 2023-05-26 NOTE — OP NOTE
ESOPHAGOGASTRODUODENOSCOPY   ( EGD )  DATE OF PROCEDURE: 5/26/2023     SURGEON: Ro Lo MD    ASSISTANT: None    PREOPERATIVE DIAGNOSIS: Patient has chronic GERD, dyspepsia. Procedure performed to evaluate upper GI lesions    POSTOPERATIVE DIAGNOSIS: Gastroparesis. No endoscopic evidence of esophagitis. No outlet obstruction. OPERATION: Upper GI endoscopy with Biopsy    ANESTHESIA: MAC    ESTIMATED BLOOD LOSS: None    COMPLICATIONS: None. SPECIMENS:  Was Obtained: Random biopsies from the body of the esophagus to check for esophagitis    HISTORY: The patient is a 61y.o. year old male with history of above preop diagnosis. I recommended esophagogastroduodenoscopy with possible biopsy and I explained the risk, benefits, expected outcome, and alternatives to the procedure. Risks included but are not limited to bleeding, infection, respiratory distress, hypotension, and perforation of the esophagus, stomach, or duodenum. Patient understands and is in agreement. PROCEDURE: The patient was given IV conscious sedation. The patient's SPO2 remained above 90% throughout the procedure. Cetacaine spray given. Patient placed in left lateral position. Olympus  videogastroscope was inserted orally under vision into the esophagus without difficulty and advanced into the stomach then through the pylorus up to the second part of duodenum. Findings:    Retropharyngeal area was grossly normal appearing    Esophagus: normal.  May have esophageal dysmotility. No stricture seen. No signs of peptic esophagitis or Mckeon's mucosa seen. No hiatal hernia. Squamocolumnar junction is at about 40 cm and lower esophageal sphincter opens normally. Random biopsies taken from the esophagus to check for laparoscopy. Stomach:  Patient has retained solid food. Also mucosa and some area supported with material suggestive of gastroparesis. No gastritis. No outlet obstruction seen.     Duodenum:

## 2023-05-26 NOTE — ANESTHESIA POSTPROCEDURE EVALUATION
Department of Anesthesiology  Postprocedure Note    Patient: Sreekanth Agee  MRN: [id number]  YOB: 1960  Date of evaluation: 5/26/2023      Procedure Summary     Date: 05/26/23 Room / Location: [facility] ENDO 01 / [facility] ENDO    Anesthesia Start: 0734 Anesthesia Stop: 0827    Procedures:       EGD BIOPSY (Esophagus)      COLONOSCOPY DIAGNOSTIC TO RIGHT COLON ONLY Diagnosis:       Gastroesophageal reflux disease, unspecified whether esophagitis present      Bowel habit changes      (Gastroesophageal reflux disease, unspecified whether esophagitis present [K21.9])      (Bowel habit changes [R19.4])    Surgeons: Vidya Monge MD Responsible Provider: Edward Tran MD    Anesthesia Type: general ASA Status: 3          Anesthesia Type: No value filed.     Cass Phase I:      Cass Phase II: Cass Score: 10      Anesthesia Post Evaluation    Comments: POST- ANESTHESIA EVALUATION       Pt Name: Sreekanth Agee  MRN: [id number]  YOB: 1960  Date of evaluation: 5/26/2023  Time:  10:19 AM      /65   Pulse 98   Temp 97.8 °F (36.6 °C)   Resp 23   Ht 6' 4\" (1.93 m)   Wt 275 lb (124.7 kg)   SpO2 95%   BMI 33.47 kg/m²      Consciousness Level  Awake  Cardiopulmonary Status  Stable  Pain Adequately Treated YES  Nausea / Vomiting  NO  Adequate Hydration  YES  Anesthesia Related Complications NONE      Electronically signed by Edward Tran MD on 5/26/2023 at 10:19 AM

## 2023-05-26 NOTE — OP NOTE
COLONOSCOPY    DATE OF PROCEDURE: 5/26/2023    SURGEON: Satya Chi MD    ASSISTANT: None    PREOPERATIVE DIAGNOSIS: Screening colonoscopy history of constipation    POSTOPERATIVE DIAGNOSIS: Inadequate preparation procedure performed on the right colon    OPERATION: Procedure for probably abnormal right colon    ANESTHESIA: MAC    ESTIMATED BLOOD LOSS: None    COMPLICATIONS: None     SPECIMENS:  Was Not Obtained    HISTORY: The patient is a 61y.o. year old male with history of above preop diagnosis. I recommended colonoscopy with possible biopsy or polypectomy and I explained the risk, benefits, expected outcome, and alternatives to the procedure. Risks included but are not limited to bleeding, infection, respiratory distress, hypotension, and perforation of the colon and possibility of missing a lesion. The patient understands and is in agreement. PROCEDURE:  The patient's SPO2 remained above 90% throughout the procedure. Digital rectal exam was normal.  The colonoscope was inserted through the anus into the rectum and advanced under direct vision to probably right colon with difficulty. The prep was inadequate. Patient was expelling large amount of stool onto the examining table. Findings:      Cecum/Ascending colon: It appears only part of the right colon was examined. Because of body habitus I did not see very poor preparation procedure. .    Transverse colon: No obvious lesions seen within limitations because of inadequate preparation. Descending/Sigmoid colon: normal, with the limitations because of inadequate preparation. Rectum/Anus: examined in normal.  Retroflexion could not be done because of inadequate preparation    Withdrawal Time was (minutes): 15          The colon was decompressed and the scope was removed. The patient tolerated the procedure without unusual events.      During the procedure, the patient's blood pressure, pulse and oxygen saturation remained stable and

## 2023-05-26 NOTE — FLOWSHEET NOTE
BP REMAINS JYP-78'M SYSTOLIC. CRNA REMAINS AT BEDSIDE. IV FLUIDS TO WIDE OPEN. PLACED IN TRENDELENBURG. MED  IVP PER CRNA. PT DENIES CHEST PAIN , DIZZINESS.  STATES FEELS TIRED

## 2023-05-26 NOTE — H&P
HISTORY and [facility] [id number]       NAME:  Migue Ojeda  MRN: [id number]   YOB: 1960   Date: 5/26/2023   Age: 61 y.o. Gender: male       COMPLAINT AND PRESENT HISTORY:     Migue Ojeda is 61 y.o.,  male, will be having a   EGD ESOPHAGOGASTRODUODENOSCOPY, COLONOSCOPY. Pt is being seen for hx of :Pre-Op Diagnosis Codes:     * Gastroesophageal reflux disease, unspecified whether esophagitis present      * Bowel habit changes      Prior Colonoscopy done  last in 2016. No prior EGD was done. Patient admits to low mid abdominal pain usually around 4 am nightly associated with nausea and sometimes vomiting. also  including bloating/gas. No heartburn, indigestion or acid reflux. Pt is taking Tums as needed. Pt admits to mild  dysphagia or occasional regurgitation. Pt has  changes in bowel habits. He states that he has cyclic  diarrhea or constipation. Denies any  blood in stools, black tarry stools. Pt reports changes in appetite, he states that he doesn't have much of an appetite. No  unintended weight loss. Denies Family Hx of colon or esophageal cancer. Any significant medical hx: Afib, CAD, COPD, DM ,HTN, HLD, THYROID, DENNISE    Denies current chest pain,  pt reports SOB. No recent URI, fever or chills. Any Anticoagulants or blood thinners: aspirin, Eliquis    Patient denies any hx of blood clots. Patient has been NPO since midnight. No blood thinners in the past  5-7 days. ( Aspirin, eliquis)  Patient took his heart pills and thyroid  this am    Patient reports taking full bowel prep with clear outcome.      Patient denies any personal or family problems with anesthesia       PAST MEDICAL HISTORY     Past Medical History:   Diagnosis Date    A-fib [facility])     Asymptomatic bilateral carotid artery stenosis     Boil, thigh 10/2019    10/30/19: right inner thigh region, says PCP Rxd Doxy for this, area is much better, has a couple days

## 2023-05-26 NOTE — ANESTHESIA PRE PROCEDURE
Department of Anesthesiology  Preprocedure Note       Name:  Ines Horton   Age:  61 y.o.  :  1960                                          MRN:  [id number]         Date:  2023      Surgeon: Iram Valdez):  Luis Fernando Kim MD    Procedure: Procedure(s):  EGD BIOPSY  COLONOSCOPY DIAGNOSTIC    Medications prior to admission:   Prior to Admission medications    Medication Sig Start Date End Date Taking? Authorizing Provider   metoprolol tartrate (LOPRESSOR) 50 MG tablet TAKE 1 TABLET BY MOUTH TWICE A DAY 23   Yoly Yi MD   levothyroxine (SYNTHROID) 175 MCG tablet TAKE 1 TABLET BY MOUTH EVERY DAY 23   Yoly Yi MD   finasteride (PROSCAR) 5 MG tablet TAKE 1 TABLET BY MOUTH EVERY DAY 23   Yoly Yi MD   omeprazole (PRILOSEC) 20 MG delayed release capsule TAKE 1 CAPSULE BY MOUTH EVERY DAY 23   Yoly Yi MD   amiodarone (CORDARONE) 200 MG tablet Take 1 tablet by mouth 2 times daily 5/3/23 6/2/23  Yoly Yi MD   amLODIPine (NORVASC) 2.5 MG tablet TAKE 1 TABLET BY MOUTH EVERY DAY 23   Yoly Yi MD   sodium-potassium-mag sulfate (SUPREP BOWEL PREP KIT) 17.5-3.13-1.6 GM/177ML SOLN solution Please follow instructions given to you by your provider 23   Luis Fernando Kim MD   bisacodyl 5 MG EC tablet Please follow instructions given to you by your provider 23   Luis Fernando Kim MD   pregabalin (LYRICA) 100 MG capsule Take 1 capsule by mouth in the morning, at noon, and at bedtime for 90 days.  23  Nidia Raza MD   rOPINIRole (REQUIP) 2 MG tablet TAKE 1 TABLET BY MOUTH EVERY DAY 3/20/23   Yoly Yi MD   losartan (COZAAR) 50 MG tablet TAKE 1 TABLET BY MOUTH EVERY DAY 3/20/23   Yoly Yi MD   gemfibrozil (LOPID) 600 MG tablet TAKE 1 TABLET BY MOUTH EVERY DAY 23   Eli Brittle, MD   TOUJEO MAX SOLOSTAR 300 UNIT/ML SOPN INJECT 110 UNITS INTO THE SKIN DAILY 23   Ndiia Raza MD   atorvastatin

## 2023-05-30 LAB — SURGICAL PATHOLOGY REPORT: NORMAL

## 2023-06-01 RX ORDER — AMIODARONE HYDROCHLORIDE 200 MG/1
TABLET ORAL
Qty: 60 TABLET | Refills: 0 | Status: SHIPPED | OUTPATIENT
Start: 2023-06-01

## 2023-06-26 RX ORDER — AMIODARONE HYDROCHLORIDE 200 MG/1
TABLET ORAL
Qty: 60 TABLET | Refills: 0 | Status: SHIPPED | OUTPATIENT
Start: 2023-06-26

## 2023-06-27 ENCOUNTER — TELEPHONE (OUTPATIENT)
Dept: GASTROENTEROLOGY | Age: 63
End: 2023-06-27

## 2023-06-27 ENCOUNTER — OFFICE VISIT (OUTPATIENT)
Dept: GASTROENTEROLOGY | Age: 63
End: 2023-06-27
Payer: COMMERCIAL

## 2023-06-27 VITALS
DIASTOLIC BLOOD PRESSURE: 78 MMHG | TEMPERATURE: 97.5 F | WEIGHT: 273 LBS | SYSTOLIC BLOOD PRESSURE: 145 MMHG | BODY MASS INDEX: 33.23 KG/M2

## 2023-06-27 DIAGNOSIS — K58.2 IRRITABLE BOWEL SYNDROME WITH BOTH CONSTIPATION AND DIARRHEA: ICD-10-CM

## 2023-06-27 DIAGNOSIS — Z12.11 SCREEN FOR COLON CANCER: Primary | ICD-10-CM

## 2023-06-27 PROCEDURE — G8417 CALC BMI ABV UP PARAM F/U: HCPCS | Performed by: INTERNAL MEDICINE

## 2023-06-27 PROCEDURE — 1036F TOBACCO NON-USER: CPT | Performed by: INTERNAL MEDICINE

## 2023-06-27 PROCEDURE — 3017F COLORECTAL CA SCREEN DOC REV: CPT | Performed by: INTERNAL MEDICINE

## 2023-06-27 PROCEDURE — S0285 CNSLT BEFORE SCREEN COLONOSC: HCPCS | Performed by: INTERNAL MEDICINE

## 2023-06-27 PROCEDURE — 3078F DIAST BP <80 MM HG: CPT | Performed by: INTERNAL MEDICINE

## 2023-06-27 PROCEDURE — G8427 DOCREV CUR MEDS BY ELIG CLIN: HCPCS | Performed by: INTERNAL MEDICINE

## 2023-06-27 PROCEDURE — 3077F SYST BP >= 140 MM HG: CPT | Performed by: INTERNAL MEDICINE

## 2023-06-27 ASSESSMENT — ENCOUNTER SYMPTOMS
ABDOMINAL PAIN: 1
CONSTIPATION: 1
RECTAL PAIN: 0
VOMITING: 1
WHEEZING: 0
COLOR CHANGE: 0
CHOKING: 0
SHORTNESS OF BREATH: 0
COUGH: 0
ANAL BLEEDING: 0
BLOOD IN STOOL: 0
ABDOMINAL DISTENTION: 1
DIARRHEA: 1
NAUSEA: 1
SORE THROAT: 0
TROUBLE SWALLOWING: 0

## 2023-07-07 ENCOUNTER — OFFICE VISIT (OUTPATIENT)
Dept: PODIATRY | Age: 63
End: 2023-07-07
Payer: COMMERCIAL

## 2023-07-07 VITALS — WEIGHT: 273 LBS | HEIGHT: 76 IN | BODY MASS INDEX: 33.24 KG/M2

## 2023-07-07 DIAGNOSIS — M79.674 PAIN OF TOE OF RIGHT FOOT: ICD-10-CM

## 2023-07-07 DIAGNOSIS — E11.51 TYPE 2 DIABETES MELLITUS WITH PERIPHERAL VASCULAR DISEASE (HCC): ICD-10-CM

## 2023-07-07 DIAGNOSIS — Z79.4 TYPE 2 DIABETES MELLITUS WITH DIABETIC POLYNEUROPATHY, WITH LONG-TERM CURRENT USE OF INSULIN (HCC): ICD-10-CM

## 2023-07-07 DIAGNOSIS — E11.42 TYPE 2 DIABETES MELLITUS WITH DIABETIC POLYNEUROPATHY, WITH LONG-TERM CURRENT USE OF INSULIN (HCC): ICD-10-CM

## 2023-07-07 DIAGNOSIS — B35.1 ONYCHOMYCOSIS OF TOENAIL: Primary | ICD-10-CM

## 2023-07-07 PROCEDURE — 11720 DEBRIDE NAIL 1-5: CPT | Performed by: PODIATRIST

## 2023-07-07 PROCEDURE — 99999 PR OFFICE/OUTPT VISIT,PROCEDURE ONLY: CPT | Performed by: PODIATRIST

## 2023-07-10 RX ORDER — OMEPRAZOLE 20 MG/1
CAPSULE, DELAYED RELEASE ORAL
Qty: 30 CAPSULE | Refills: 0 | Status: SHIPPED | OUTPATIENT
Start: 2023-07-10

## 2023-07-10 ASSESSMENT — ENCOUNTER SYMPTOMS
DIARRHEA: 0
SHORTNESS OF BREATH: 0
NAUSEA: 0
COLOR CHANGE: 0
BACK PAIN: 0

## 2023-07-10 NOTE — PROGRESS NOTES
SUBJECTIVE: Leann Rodriguez is a 61 y.o. male who returns to the office with chief complaint of painful fungal toenails. Patient relates toe nails are thickened/difficult to trim as well as painful with ambulation and with shoe gear. Chief Complaint   Patient presents with    Nail Problem     Nail trim/last saw Camacho Espinosa 5/3/23    Diabetes     Blood sugar 154      Review of Systems   Constitutional:  Negative for activity change, appetite change, chills, diaphoresis, fatigue and fever. Respiratory:  Negative for shortness of breath. Cardiovascular:  Negative for leg swelling. Gastrointestinal:  Negative for diarrhea and nausea. Endocrine: Negative for cold intolerance, heat intolerance and polyuria. Musculoskeletal:  Positive for arthralgias and gait problem. Negative for back pain, joint swelling and myalgias. Skin:  Negative for color change, pallor, rash and wound. Allergic/Immunologic: Negative for environmental allergies and food allergies. Neurological:  Positive for numbness. Negative for dizziness, weakness and light-headedness. Hematological:  Does not bruise/bleed easily. Psychiatric/Behavioral:  Negative for behavioral problems, confusion and self-injury. The patient is not nervous/anxious. OBJECTIVE: Clinical evaluation of patient reveals nails 1,4,5 of the right foot present with thickness, elongation, discoloration, brittleness, and subungual debris. There was pain with palpation and debridement of the toenails of the right foot No open lesions noted to the right foot today. The left leg and toes 2 and 3 of the right foot have been amputated. The right DP pulse is not palpable. The right PT pulse is not palpable. Protective sensation is absent to the right plantar foot as noted with a 5.07 [place] monofilament. Glucose:  154 mg/dl. Class A Findings (1 needed)   [x] Non-traumatic amputation of foot or integral skeleton portion thereof.    [x] Q7.

## 2023-07-13 ENCOUNTER — HOSPITAL ENCOUNTER (OUTPATIENT)
Dept: PAIN MANAGEMENT | Age: 63
Discharge: HOME | End: 2023-07-13
Payer: MEDICARE

## 2023-07-13 VITALS
RESPIRATION RATE: 20 BRPM | HEART RATE: 72 BPM | WEIGHT: 273 LBS | OXYGEN SATURATION: 97 % | SYSTOLIC BLOOD PRESSURE: 138 MMHG | DIASTOLIC BLOOD PRESSURE: 68 MMHG | TEMPERATURE: 97.3 F | HEIGHT: 76 IN | BODY MASS INDEX: 33.24 KG/M2

## 2023-07-13 DIAGNOSIS — R69 SEVERE COMORBID ILLNESS: Chronic | ICD-10-CM

## 2023-07-13 DIAGNOSIS — M48.062 SPINAL STENOSIS OF LUMBAR REGION WITH NEUROGENIC CLAUDICATION: ICD-10-CM

## 2023-07-13 DIAGNOSIS — M47.817 LUMBOSACRAL SPONDYLOSIS WITHOUT MYELOPATHY: Primary | Chronic | ICD-10-CM

## 2023-07-13 DIAGNOSIS — Z89.512 STATUS POST BELOW-KNEE AMPUTATION OF LEFT LOWER EXTREMITY (HCC): ICD-10-CM

## 2023-07-13 PROCEDURE — 99213 OFFICE O/P EST LOW 20 MIN: CPT

## 2023-07-13 ASSESSMENT — ENCOUNTER SYMPTOMS
VOMITING: 1
SHORTNESS OF BREATH: 1
DIARRHEA: 1
NAUSEA: 1
COUGH: 0
CONSTIPATION: 1
BACK PAIN: 1

## 2023-07-13 NOTE — PROGRESS NOTES
The patient is a 61 y. o. Non- / non  male. Chief Complaint   Patient presents with    Follow Up After Procedure     Radiofrequency ablation of median branches at the Transverse processes of L3, L4 AND L5 for L3/4 AND L4/5 facet joints on Bilateral under fluoroscopic guidance with IV sedation    Back Pain        Back Pain  Pertinent negatives include no chest pain, fever, headaches, numbness or weakness.      This is a very pleasant 58-year-old gentleman with history of multiple major comorbidities including atrial fibrillation, on chronic anticoagulation with Eliquis  He is seen in our clinic for history of chronic low back pain  Describes it as aching throbbing stabbing stiffness that aggravates with standing and walking and improves with sitting  No dermatomal radiation of pain in leg  He is diagnosed with a lumbar spondylosis and lumbar spinal stenosis  He is followed with orthopedic spine surgeon Dr. Anibal Sage at [facility] and is advised first lumbar spine decompression surgery  Patient is very apprehensive about any major surgery considering his comorbidities  We will try diagnostic lumbar medial branch nerve block that provided him excellent relief  Today here for follow-up after lumbar RFA  Did not find it much helpful    -Advised patient to follow-up with Ortho surgeon  Advised him to discuss surgical treatment and risk and recovery after the surgery    He requires discontinuation of anticoagulation for epidural injection which increases the risk of thromboembolic event therefore we have tried intervention that do not require disruption of the anticoagulation therapy    If no surgery is decided May consider for peripheral nerve stimulation for medial branch nerve for axial back pain    S/P: Radiofrequency ablation of median branches at the Transverse processes of L3, L4 AND L5 for L3/4 AND L4/5 facet joints on Bilateral under fluoroscopic guidance with IV

## 2023-07-15 DIAGNOSIS — G89.29 CHRONIC PAIN OF LEFT KNEE: ICD-10-CM

## 2023-07-15 DIAGNOSIS — Z79.4 TYPE 2 DIABETES MELLITUS WITH DIABETIC POLYNEUROPATHY, WITH LONG-TERM CURRENT USE OF INSULIN (HCC): ICD-10-CM

## 2023-07-15 DIAGNOSIS — M25.562 CHRONIC PAIN OF LEFT KNEE: ICD-10-CM

## 2023-07-15 DIAGNOSIS — Z97.10 EMPLOYS PROSTHETIC LEG: ICD-10-CM

## 2023-07-15 DIAGNOSIS — E08.41 DIABETIC MONONEUROPATHY ASSOCIATED WITH DIABETES MELLITUS DUE TO UNDERLYING CONDITION (HCC): ICD-10-CM

## 2023-07-15 DIAGNOSIS — E11.42 TYPE 2 DIABETES MELLITUS WITH DIABETIC POLYNEUROPATHY, WITH LONG-TERM CURRENT USE OF INSULIN (HCC): ICD-10-CM

## 2023-07-17 RX ORDER — PREGABALIN 100 MG/1
100 CAPSULE ORAL 3 TIMES DAILY
Qty: 90 CAPSULE | Refills: 2 | Status: SHIPPED | OUTPATIENT
Start: 2023-07-17 | End: 2023-10-15

## 2023-07-17 NOTE — TELEPHONE ENCOUNTER
Medication Requested: Kalyna    Last visit: 5/3/23  Next visit: 8/8/2023  Last refill: 4/14/23    Med contract on file:  [] yes   [x] no    Last urine drug screen: 11/5/2019  Consistent with medication(s):    [x] yes   [] no    Last OARRS ran: unknown

## 2023-07-18 ENCOUNTER — HOSPITAL ENCOUNTER (OUTPATIENT)
Dept: NUCLEAR MEDICINE | Age: 63
Discharge: HOME | End: 2023-07-20
Attending: INTERNAL MEDICINE
Payer: COMMERCIAL

## 2023-07-18 DIAGNOSIS — R10.13 DYSPEPSIA: ICD-10-CM

## 2023-07-18 DIAGNOSIS — U07.1 COVID-19: ICD-10-CM

## 2023-07-18 PROCEDURE — A9537 TC99M MEBROFENIN: HCPCS | Performed by: INTERNAL MEDICINE

## 2023-07-18 PROCEDURE — 2580000003 HC RX 258: Performed by: INTERNAL MEDICINE

## 2023-07-18 PROCEDURE — 3430000000 HC RX DIAGNOSTIC RADIOPHARMACEUTICAL: Performed by: INTERNAL MEDICINE

## 2023-07-18 PROCEDURE — 78226 HEPATOBILIARY SYSTEM IMAGING: CPT

## 2023-07-18 RX ORDER — SODIUM CHLORIDE 0.9 % (FLUSH) 0.9 %
10 SYRINGE (ML) INJECTION PRN
Status: DISCONTINUED | OUTPATIENT
Start: 2023-07-18 | End: 2023-07-21 | Stop reason: HOSPADM

## 2023-07-18 RX ORDER — BUDESONIDE AND FORMOTEROL FUMARATE DIHYDRATE 160; 4.5 UG/1; UG/1
AEROSOL RESPIRATORY (INHALATION)
Qty: 10.2 EACH | Refills: 5 | Status: SHIPPED | OUTPATIENT
Start: 2023-07-18

## 2023-07-18 RX ORDER — ASPIRIN 81 MG/1
TABLET, COATED ORAL
Qty: 30 TABLET | Refills: 5 | Status: SHIPPED | OUTPATIENT
Start: 2023-07-18

## 2023-07-18 RX ADMIN — Medication 5.6 MILLICURIE: at 07:35

## 2023-07-18 RX ADMIN — SODIUM CHLORIDE, PRESERVATIVE FREE 10 ML: 5 INJECTION INTRAVENOUS at 07:39

## 2023-07-24 ENCOUNTER — OFFICE VISIT (OUTPATIENT)
Dept: PHYSICAL MEDICINE AND REHAB | Age: 63
End: 2023-07-24
Payer: COMMERCIAL

## 2023-07-24 VITALS
WEIGHT: 275 LBS | BODY MASS INDEX: 33.49 KG/M2 | DIASTOLIC BLOOD PRESSURE: 66 MMHG | HEART RATE: 68 BPM | TEMPERATURE: 97.3 F | SYSTOLIC BLOOD PRESSURE: 138 MMHG | HEIGHT: 76 IN

## 2023-07-24 DIAGNOSIS — M48.061 SPINAL STENOSIS OF LUMBAR REGION WITHOUT NEUROGENIC CLAUDICATION: ICD-10-CM

## 2023-07-24 DIAGNOSIS — G54.6 PHANTOM LIMB PAIN (HCC): ICD-10-CM

## 2023-07-24 DIAGNOSIS — Z89.512 STATUS POST BELOW-KNEE AMPUTATION OF LEFT LOWER EXTREMITY (HCC): ICD-10-CM

## 2023-07-24 DIAGNOSIS — Z97.10 EMPLOYS PROSTHETIC LEG: ICD-10-CM

## 2023-07-24 DIAGNOSIS — R26.2 AMBULATORY DYSFUNCTION: Primary | ICD-10-CM

## 2023-07-24 DIAGNOSIS — M54.16 LUMBAR RADICULOPATHY: ICD-10-CM

## 2023-07-24 PROCEDURE — 3017F COLORECTAL CA SCREEN DOC REV: CPT | Performed by: PHYSICAL MEDICINE & REHABILITATION

## 2023-07-24 PROCEDURE — 3078F DIAST BP <80 MM HG: CPT | Performed by: PHYSICAL MEDICINE & REHABILITATION

## 2023-07-24 PROCEDURE — G8428 CUR MEDS NOT DOCUMENT: HCPCS | Performed by: PHYSICAL MEDICINE & REHABILITATION

## 2023-07-24 PROCEDURE — 1036F TOBACCO NON-USER: CPT | Performed by: PHYSICAL MEDICINE & REHABILITATION

## 2023-07-24 PROCEDURE — 3075F SYST BP GE 130 - 139MM HG: CPT | Performed by: PHYSICAL MEDICINE & REHABILITATION

## 2023-07-24 PROCEDURE — 99213 OFFICE O/P EST LOW 20 MIN: CPT | Performed by: PHYSICAL MEDICINE & REHABILITATION

## 2023-07-24 PROCEDURE — G8417 CALC BMI ABV UP PARAM F/U: HCPCS | Performed by: PHYSICAL MEDICINE & REHABILITATION

## 2023-07-24 RX ORDER — AMIODARONE HYDROCHLORIDE 200 MG/1
TABLET ORAL
Qty: 60 TABLET | Refills: 0 | Status: SHIPPED | OUTPATIENT
Start: 2023-07-24

## 2023-07-24 NOTE — PROGRESS NOTES
monitoring the right foot for any breakdown skin irritation erythema, etc.     25 minutes spent evaluating patient reviewing records       Return in about 6 weeks (around 9/4/2023) for Followup. No orders of the defined types were placed in this encounter. No orders of the defined types were placed in this encounter. Electronically signed by Ashley Yu. Geovanni Hayes 7/24/2023 at 4:40 PM    Please note that this chart was generated usingvoice recognition Dragon dictation software. Although every effort was made toensure the accuracy of this automated transcription, some errors in transcriptionmay have occurred.

## 2023-07-25 RX ORDER — OMEPRAZOLE 20 MG/1
CAPSULE, DELAYED RELEASE ORAL
Qty: 30 CAPSULE | Refills: 0 | Status: SHIPPED | OUTPATIENT
Start: 2023-07-25

## 2023-07-26 ENCOUNTER — TELEPHONE (OUTPATIENT)
Dept: GASTROENTEROLOGY | Age: 63
End: 2023-07-26

## 2023-07-26 NOTE — TELEPHONE ENCOUNTER
Patient and wife called very upset. It took hem forever to get scheduled for the CT Colonography and was told by Dr Destin Werner that there was no prep involved. They are upset as yes he does have to do prep and the reason he is getting the CT Colonography is because he wasn't cleaned out at all. His trust is Dr Destin Werner says there is no Prep and [facility] says yes he has to go there and  prep. They want to know is there actually yes Prep for a CT Clonography and then if there is , will it be pointless to have the test done. Please advise.

## 2023-07-27 NOTE — TELEPHONE ENCOUNTER
Jennifer Portillo let me know that yes some prep is involved and he should do it and if he is not totally cleaned out the CT will be able to  things the Colonoscopy can't.

## 2023-08-07 NOTE — PROGRESS NOTES
Visit Information    Have you changed or started any medications since your last visit including any over-the-counter medicines, vitamins, or herbal medicines? no   Are you having any side effects from any of your medications? -  no  Have you stopped taking any of your medications? Is so, why? -  no    Have you seen any other physician or provider since your last visit? yes  Have you had any other diagnostic tests since your last visit? No  Have you been seen in the emergency room and/or had an admission to a hospital since we last saw you? Yes - Records Obtained  Have you had your routine dental cleaning in the past 6 months? no    Have you activated your [employer] account? If not, what are your barriers?  Yes     Patient Care Team:  Gideon Wilkinson MD as PCP - Christi Dorado MD as PCP - [facility] Provider  Cesar Herman MD as Consulting Physician (Infectious Diseases)  Radha Grider RN as Care Transitions Nurse    Medical History Review  Past Medical, Family, and Social History reviewed and does not contribute to the patient presenting condition    Health Maintenance   Topic Date Due    HIV screen  Never done    DTaP/Tdap/Td vaccine (1 - Tdap) Never done    Shingles Vaccine (1 of 2) 10/23/2014    Diabetic retinal exam  01/04/2017    Low dose CT lung screening  03/02/2022    Depression Screen  05/04/2022    Lipid screen  05/13/2022    A1C test (Diabetic or Prediabetic)  12/14/2022    Diabetic microalbuminuria test  12/14/2022    TSH testing  12/14/2022    Potassium monitoring  12/31/2022    Creatinine monitoring  12/31/2022    Diabetic foot exam  01/07/2023    Pneumococcal 0-64 years Vaccine (2 of 2 - PPSV23) 01/07/2025    Colon cancer screen colonoscopy  01/23/2027    Flu vaccine  Completed    COVID-19 Vaccine  Completed    Hepatitis C screen  Completed    Hepatitis A vaccine  Aged Out    Hib vaccine  Aged Out    Meningococcal (ACWY) vaccine  Aged Out Peripheral neuropathy

## 2023-08-08 ENCOUNTER — OFFICE VISIT (OUTPATIENT)
Dept: INTERNAL MEDICINE CLINIC | Age: 63
End: 2023-08-08
Payer: MEDICARE

## 2023-08-08 VITALS
WEIGHT: 273 LBS | BODY MASS INDEX: 33.24 KG/M2 | HEIGHT: 76 IN | SYSTOLIC BLOOD PRESSURE: 126 MMHG | DIASTOLIC BLOOD PRESSURE: 78 MMHG | OXYGEN SATURATION: 98 % | HEART RATE: 76 BPM

## 2023-08-08 DIAGNOSIS — Z89.512 STATUS POST BELOW-KNEE AMPUTATION OF LEFT LOWER EXTREMITY (HCC): ICD-10-CM

## 2023-08-08 DIAGNOSIS — E78.00 PURE HYPERCHOLESTEROLEMIA: ICD-10-CM

## 2023-08-08 DIAGNOSIS — N18.30 STAGE 3 CHRONIC KIDNEY DISEASE, UNSPECIFIED WHETHER STAGE 3A OR 3B CKD (HCC): ICD-10-CM

## 2023-08-08 DIAGNOSIS — E11.42 TYPE 2 DIABETES MELLITUS WITH DIABETIC POLYNEUROPATHY, WITH LONG-TERM CURRENT USE OF INSULIN (HCC): ICD-10-CM

## 2023-08-08 DIAGNOSIS — Z79.4 TYPE 2 DIABETES MELLITUS WITH DIABETIC POLYNEUROPATHY, WITH LONG-TERM CURRENT USE OF INSULIN (HCC): ICD-10-CM

## 2023-08-08 DIAGNOSIS — C64.1 MALIGNANT NEOPLASM OF RIGHT KIDNEY (HCC): ICD-10-CM

## 2023-08-08 DIAGNOSIS — I10 ESSENTIAL HYPERTENSION: Primary | ICD-10-CM

## 2023-08-08 LAB — HBA1C MFR BLD: 6.6 %

## 2023-08-08 PROCEDURE — 3044F HG A1C LEVEL LT 7.0%: CPT | Performed by: INTERNAL MEDICINE

## 2023-08-08 PROCEDURE — 3078F DIAST BP <80 MM HG: CPT | Performed by: INTERNAL MEDICINE

## 2023-08-08 PROCEDURE — G8427 DOCREV CUR MEDS BY ELIG CLIN: HCPCS | Performed by: INTERNAL MEDICINE

## 2023-08-08 PROCEDURE — 3017F COLORECTAL CA SCREEN DOC REV: CPT | Performed by: INTERNAL MEDICINE

## 2023-08-08 PROCEDURE — 99214 OFFICE O/P EST MOD 30 MIN: CPT | Performed by: INTERNAL MEDICINE

## 2023-08-08 PROCEDURE — 83037 HB GLYCOSYLATED A1C HOME DEV: CPT | Performed by: INTERNAL MEDICINE

## 2023-08-08 PROCEDURE — 3074F SYST BP LT 130 MM HG: CPT | Performed by: INTERNAL MEDICINE

## 2023-08-08 PROCEDURE — 1036F TOBACCO NON-USER: CPT | Performed by: INTERNAL MEDICINE

## 2023-08-08 PROCEDURE — 2022F DILAT RTA XM EVC RTNOPTHY: CPT | Performed by: INTERNAL MEDICINE

## 2023-08-08 PROCEDURE — G8417 CALC BMI ABV UP PARAM F/U: HCPCS | Performed by: INTERNAL MEDICINE

## 2023-08-08 ASSESSMENT — ENCOUNTER SYMPTOMS
COLOR CHANGE: 0
EYE DISCHARGE: 0
EYE PAIN: 0
DIARRHEA: 0
SHORTNESS OF BREATH: 0
TROUBLE SWALLOWING: 0
WHEEZING: 0
ABDOMINAL DISTENTION: 0
COUGH: 0
BLOOD IN STOOL: 0

## 2023-08-08 NOTE — PROGRESS NOTES
Visit Information    Have you changed or started any medications since your last visit including any over-the-counter medicines, vitamins, or herbal medicines? no   Are you having any side effects from any of your medications? -  no  Have you stopped taking any of your medications? Is so, why? -  no    Have you seen any other physician or provider since your last visit? No  Have you had any other diagnostic tests since your last visit? No  Have you been seen in the emergency room and/or had an admission to a hospital since we last saw you? No  Have you had your routine dental cleaning in the past 6 months? no    Have you activated your [employer] account? If not, what are your barriers?  Yes     Patient Care Team:  Lisbeth Rodriguez MD as PCP - Jesse Rush MD as PCP - Empaneled Provider  Mickie Mantilla MD as Consulting Physician (Infectious Diseases)    Medical History Review  Past Medical, Family, and Social History reviewed and does not contribute to the patient presenting condition    Health Maintenance   Topic Date Due    HIV screen  Never done    DTaP/Tdap/Td vaccine (1 - Tdap) Never done    Shingles vaccine (1 of 2) 10/23/2014    Pneumococcal 0-64 years Vaccine (2 - PCV) 01/04/2015    Diabetic retinal exam  01/04/2017    Diabetic Alb to Cr ratio (uACR) test  12/14/2022    Flu vaccine (1) 08/01/2023    Lipids  08/10/2023    Annual Wellness Visit (AWV)  10/11/2023    GFR test (Diabetes, CKD 3-4, OR last GFR 15-59)  11/14/2023    Depression Screen  01/05/2024    A1C test (Diabetic or Prediabetic)  02/22/2024    Low dose CT lung screening &/or counseling  04/20/2024    Diabetic foot exam  07/10/2024    Colorectal Cancer Screen  05/26/2033    COVID-19 Vaccine  Completed    Hepatitis C screen  Completed    Hepatitis A vaccine  Aged Out    Hib vaccine  Aged Out    Meningococcal (ACWY) vaccine  Aged Out    Prostate Specific Antigen (PSA) Screening or Monitoring  Discontinued

## 2023-08-08 NOTE — PROGRESS NOTES
[id number] [place] [address] [address] [id number]  Dept: [phone]  Dept Fax: [phone]    Hilaria Lora is a 61 y.o. male who presents today for his medical conditions/complaintsas noted below. Hilaria Lora is c/o of   Chief Complaint   Patient presents with    Diabetes    GI Problem     Review imaging results. Symptoms improving over the last week. HPI:     HTN  Onset more than 2 years ago  gustavo mild to mod  Controlled with current po meds  Not associated with headaches or blurry vision  No chest pain    Diabetes   Duration more than 7 years  Modifying factors on Glucophage and other med  Severity uncontrolled sever  Associated signs and symtoms neuropathy/ckd/ CAD.    aggravated with sugar diet and better with low sugar diet               Hemoglobin A1C (%)   Date Value   08/08/2023 6.6   02/22/2023 6.9 (H)   11/29/2022 7.8             ( goal A1Cis < 7)   No components found for: LABMICR  LDL Cholesterol (mg/dL)   Date Value   08/10/2022 48   05/13/2021 55   01/13/2020 70       (goal LDL is <100)   AST (U/L)   Date Value   11/14/2022 27     ALT (U/L)   Date Value   11/14/2022 35     BUN (mg/dL)   Date Value   11/14/2022 25 (H)     BP Readings from Last 3 Encounters:   08/08/23 126/78   07/24/23 138/66   07/13/23 138/68          (goal 120/80)    Past Medical History:   Diagnosis Date    A-fib [facility])     Asymptomatic bilateral carotid artery stenosis     Boil, thigh 10/2019    10/30/19: right inner thigh region, says PCP Rxd Doxy for this, area is much better, has a couple days left to complete Doxy    CAD (coronary artery disease)     saw Dr. Taylor Thomas ([facility])     Charcot foot due to diabetes mellitus ([address]) 2014    LEFT    COPD (chronic obstructive pulmonary disease) ([address]) 2009    INHALER USE DAILY    Diabetes mellitus ([address]) 1989    IDDM, On Insulin Dr. Kaylee Olivas    Diabetic neuropathy [facility])     Difficult intravenous access     VEINS ROLL    Employs prosthetic leg     s/p

## 2023-08-16 RX ORDER — OMEPRAZOLE 20 MG/1
20 CAPSULE, DELAYED RELEASE ORAL DAILY
Qty: 90 CAPSULE | Refills: 0 | Status: SHIPPED | OUTPATIENT
Start: 2023-08-16

## 2023-08-28 ENCOUNTER — HOSPITAL ENCOUNTER (OUTPATIENT)
Dept: CT IMAGING | Age: 63
Discharge: HOME | End: 2023-08-30
Payer: MEDICARE

## 2023-08-28 DIAGNOSIS — R91.8 PULMONARY INFILTRATE: ICD-10-CM

## 2023-08-28 PROCEDURE — 71250 CT THORAX DX C-: CPT

## 2023-08-29 DIAGNOSIS — R06.2 WHEEZING: ICD-10-CM

## 2023-08-29 DIAGNOSIS — U07.1 COVID-19: ICD-10-CM

## 2023-08-29 DIAGNOSIS — I10 ESSENTIAL HYPERTENSION: ICD-10-CM

## 2023-08-29 RX ORDER — LOSARTAN POTASSIUM 50 MG/1
50 TABLET ORAL DAILY
Qty: 90 TABLET | Refills: 10 | Status: SHIPPED | OUTPATIENT
Start: 2023-08-29

## 2023-08-29 RX ORDER — BLOOD PRESSURE TEST KIT
1 KIT MISCELLANEOUS WEEKLY
Qty: 1 KIT | Refills: 0 | Status: CANCELLED | OUTPATIENT
Start: 2023-08-29

## 2023-08-29 RX ORDER — ALBUTEROL SULFATE 90 UG/1
2 AEROSOL, METERED RESPIRATORY (INHALATION) EVERY 6 HOURS PRN
Qty: 6.7 EACH | Refills: 10 | Status: SHIPPED | OUTPATIENT
Start: 2023-08-29

## 2023-08-29 RX ORDER — CLOTRIMAZOLE AND BETAMETHASONE DIPROPIONATE 10; .64 MG/G; MG/G
CREAM TOPICAL
Qty: 45 G | Refills: 2 | Status: CANCELLED | OUTPATIENT
Start: 2023-08-29

## 2023-08-29 RX ORDER — AMIODARONE HYDROCHLORIDE 200 MG/1
200 TABLET ORAL 2 TIMES DAILY
Qty: 90 TABLET | Refills: 10 | Status: SHIPPED | OUTPATIENT
Start: 2023-08-29

## 2023-08-29 RX ORDER — PEN NEEDLE, DIABETIC 33 GX5/32"
110 NEEDLE, DISPOSABLE MISCELLANEOUS DAILY
Qty: 5 EACH | Refills: 10 | Status: CANCELLED | OUTPATIENT
Start: 2023-08-29

## 2023-08-29 RX ORDER — BISACODYL 5 MG/1
TABLET, DELAYED RELEASE ORAL
Qty: 90 TABLET | Refills: 10 | Status: SHIPPED | OUTPATIENT
Start: 2023-08-29

## 2023-08-29 RX ORDER — BUDESONIDE AND FORMOTEROL FUMARATE DIHYDRATE 160; 4.5 UG/1; UG/1
AEROSOL RESPIRATORY (INHALATION)
Qty: 10.2 EACH | Refills: 10 | Status: SHIPPED | OUTPATIENT
Start: 2023-08-29

## 2023-08-29 RX ORDER — FINASTERIDE 5 MG/1
5 TABLET, FILM COATED ORAL DAILY
Qty: 90 TABLET | Refills: 10 | Status: SHIPPED | OUTPATIENT
Start: 2023-08-29

## 2023-08-29 RX ORDER — PROCHLORPERAZINE 25 MG/1
SUPPOSITORY RECTAL
Qty: 10 EACH | Refills: 5 | Status: CANCELLED | OUTPATIENT
Start: 2023-08-29

## 2023-08-29 RX ORDER — GEMFIBROZIL 600 MG/1
600 TABLET, FILM COATED ORAL DAILY
Qty: 90 TABLET | Refills: 11 | Status: SHIPPED | OUTPATIENT
Start: 2023-08-29

## 2023-08-29 RX ORDER — FLURBIPROFEN SODIUM 0.3 MG/ML
SOLUTION/ DROPS OPHTHALMIC
Qty: 100 EACH | Refills: 10 | Status: SHIPPED | OUTPATIENT
Start: 2023-08-29

## 2023-08-29 NOTE — TELEPHONE ENCOUNTER
Medications originally were sent to [facility] now that it is closed they never transferred over pt medications to [facility]. Please send all medications over to new pharmacy.      Lov 8/8/23

## 2023-08-30 ENCOUNTER — OFFICE VISIT (OUTPATIENT)
Dept: PULMONOLOGY | Age: 63
End: 2023-08-30
Payer: MEDICARE

## 2023-08-30 VITALS
HEIGHT: 76 IN | SYSTOLIC BLOOD PRESSURE: 130 MMHG | RESPIRATION RATE: 16 BRPM | DIASTOLIC BLOOD PRESSURE: 60 MMHG | BODY MASS INDEX: 33.97 KG/M2 | WEIGHT: 279 LBS | OXYGEN SATURATION: 96 % | HEART RATE: 84 BPM

## 2023-08-30 DIAGNOSIS — G47.33 OSA (OBSTRUCTIVE SLEEP APNEA): ICD-10-CM

## 2023-08-30 DIAGNOSIS — R91.1 LUNG NODULE: Primary | ICD-10-CM

## 2023-08-30 PROCEDURE — 1036F TOBACCO NON-USER: CPT | Performed by: INTERNAL MEDICINE

## 2023-08-30 PROCEDURE — G8417 CALC BMI ABV UP PARAM F/U: HCPCS | Performed by: INTERNAL MEDICINE

## 2023-08-30 PROCEDURE — 3078F DIAST BP <80 MM HG: CPT | Performed by: INTERNAL MEDICINE

## 2023-08-30 PROCEDURE — 3017F COLORECTAL CA SCREEN DOC REV: CPT | Performed by: INTERNAL MEDICINE

## 2023-08-30 PROCEDURE — 99214 OFFICE O/P EST MOD 30 MIN: CPT | Performed by: INTERNAL MEDICINE

## 2023-08-30 PROCEDURE — G8427 DOCREV CUR MEDS BY ELIG CLIN: HCPCS | Performed by: INTERNAL MEDICINE

## 2023-08-30 PROCEDURE — 3075F SYST BP GE 130 - 139MM HG: CPT | Performed by: INTERNAL MEDICINE

## 2023-09-01 ENCOUNTER — OFFICE VISIT (OUTPATIENT)
Dept: INTERNAL MEDICINE CLINIC | Age: 63
End: 2023-09-01

## 2023-09-01 ENCOUNTER — HOSPITAL ENCOUNTER (OUTPATIENT)
Age: 63
Setting detail: SPECIMEN
Discharge: HOME | End: 2023-09-01

## 2023-09-01 VITALS
BODY MASS INDEX: 33.96 KG/M2 | WEIGHT: 279 LBS | OXYGEN SATURATION: 98 % | DIASTOLIC BLOOD PRESSURE: 84 MMHG | HEART RATE: 78 BPM | SYSTOLIC BLOOD PRESSURE: 124 MMHG

## 2023-09-01 DIAGNOSIS — E87.70 HYPERVOLEMIA, UNSPECIFIED HYPERVOLEMIA TYPE: ICD-10-CM

## 2023-09-01 DIAGNOSIS — E03.9 HYPOTHYROIDISM, UNSPECIFIED TYPE: ICD-10-CM

## 2023-09-01 DIAGNOSIS — E87.70 HYPERVOLEMIA, UNSPECIFIED HYPERVOLEMIA TYPE: Primary | ICD-10-CM

## 2023-09-01 LAB
BASOPHILS # BLD: 0.07 K/UL (ref 0–0.2)
BASOPHILS NFR BLD: 1 % (ref 0–2)
EOSINOPHIL # BLD: 0.09 K/UL (ref 0–0.44)
EOSINOPHILS RELATIVE PERCENT: 1 % (ref 1–4)
ERYTHROCYTE [DISTWIDTH] IN BLOOD BY AUTOMATED COUNT: 13.9 % (ref 11.8–14.4)
HCT VFR BLD AUTO: 45.1 % (ref 40.7–50.3)
HGB BLD-MCNC: 14.8 G/DL (ref 13–17)
IMM GRANULOCYTES # BLD AUTO: 0.05 K/UL (ref 0–0.3)
IMM GRANULOCYTES NFR BLD: 1 %
LYMPHOCYTES NFR BLD: 1 K/UL (ref 1.1–3.7)
LYMPHOCYTES RELATIVE PERCENT: 16 % (ref 24–43)
MCH RBC QN AUTO: 32.1 PG (ref 25.2–33.5)
MCHC RBC AUTO-ENTMCNC: 32.8 G/DL (ref 28.4–34.8)
MCV RBC AUTO: 97.8 FL (ref 82.6–102.9)
MONOCYTES NFR BLD: 0.62 K/UL (ref 0.1–1.2)
MONOCYTES NFR BLD: 10 % (ref 3–12)
NEUTROPHILS NFR BLD: 71 % (ref 36–65)
NEUTS SEG NFR BLD: 4.51 K/UL (ref 1.5–8.1)
NRBC BLD-RTO: 0 PER 100 WBC
PLATELET # BLD AUTO: ABNORMAL K/UL (ref 138–453)
PLATELET, FLUORESCENCE: 146 K/UL (ref 138–453)
PLATELETS.RETICULATED NFR BLD AUTO: 10.6 % (ref 1.1–10.3)
RBC # BLD AUTO: 4.61 M/UL (ref 4.21–5.77)
WBC OTHER # BLD: 6.3 K/UL (ref 3.5–11.3)

## 2023-09-01 RX ORDER — FUROSEMIDE 40 MG/1
40 TABLET ORAL DAILY
Qty: 7 TABLET | Refills: 0 | Status: SHIPPED | OUTPATIENT
Start: 2023-09-01

## 2023-09-01 RX ORDER — BACITRACIN ZINC AND POLYMYXIN B SULFATE 500; 1000 [USP'U]/G; [USP'U]/G
OINTMENT TOPICAL
Qty: 28.4 G | Refills: 1 | Status: SHIPPED | OUTPATIENT
Start: 2023-09-01 | End: 2023-09-08

## 2023-09-01 RX ORDER — POTASSIUM CHLORIDE 750 MG/1
10 TABLET, EXTENDED RELEASE ORAL DAILY
Qty: 7 TABLET | Refills: 0 | Status: SHIPPED | OUTPATIENT
Start: 2023-09-01

## 2023-09-01 RX ORDER — MAGNESIUM OXIDE 400 MG/1
400 TABLET ORAL DAILY
Qty: 7 TABLET | Refills: 0 | Status: SHIPPED | OUTPATIENT
Start: 2023-09-01 | End: 2023-09-08

## 2023-09-01 NOTE — PROGRESS NOTES
Visit Information    Have you changed or started any medications since your last visit including any over-the-counter medicines, vitamins, or herbal medicines? no   Are you having any side effects from any of your medications? -  no  Have you stopped taking any of your medications? Is so, why? -  no    Have you seen any other physician or provider since your last visit? No  Have you had any other diagnostic tests since your last visit? No  Have you been seen in the emergency room and/or had an admission to a hospital since we last saw you? No  Have you had your routine dental cleaning in the past 6 months? no    Have you activated your [employer] account? If not, what are your barriers?  Yes     Patient Care Team:  Iban Chavez MD as PCP - Migel Foy MD as PCP - Empaneled Provider  Yulia Mason MD as Consulting Physician (Infectious Diseases)    Medical History Review  Past Medical, Family, and Social History reviewed and does not contribute to the patient presenting condition    Health Maintenance   Topic Date Due    HIV screen  Never done    DTaP/Tdap/Td vaccine (1 - Tdap) Never done    Shingles vaccine (1 of 2) 10/23/2014    Pneumococcal 0-64 years Vaccine (2 - PCV) 01/04/2015    Diabetic retinal exam  01/04/2017    Diabetic Alb to Cr ratio (uACR) test  12/14/2022    Flu vaccine (1) 08/01/2023    Lipids  08/10/2023    GFR test (Diabetes, CKD 3-4, OR last GFR 15-59)  11/14/2023    Depression Screen  01/05/2024    Low dose CT lung screening &/or counseling  04/20/2024    Diabetic foot exam  07/10/2024    A1C test (Diabetic or Prediabetic)  08/08/2024    Colorectal Cancer Screen  05/26/2033    COVID-19 Vaccine  Completed    Hepatitis C screen  Completed    Hepatitis A vaccine  Aged Out    Hib vaccine  Aged Out    Meningococcal (ACWY) vaccine  Aged Out    Prostate Specific Antigen (PSA) Screening or Monitoring  Discontinued
LABA1C 6.6 08/08/2023     Lab Results   Component Value Date     02/22/2023      LABORATORY FINDINGS:    CBC:  Lab Results   Component Value Date/Time    WBC 5.6 12/31/2021 06:27 AM    HGB 14.2 12/31/2021 06:27 AM     12/31/2021 06:27 AM       BMP:    Lab Results   Component Value Date/Time     11/14/2022 08:26 AM    K 4.4 11/14/2022 08:26 AM     11/14/2022 08:26 AM    CO2 26 11/14/2022 08:26 AM    BUN 25 11/14/2022 08:26 AM    CREATININE 1.45 11/14/2022 08:26 AM    GLUCOSE 170 11/14/2022 08:26 AM       HEMOGLOBIN A1C:   Hemoglobin A1C (%)   Date Value   08/08/2023 6.6   02/22/2023 6.9 (H)   11/29/2022 7.8        FASTING LIPID PANEL:No results found for: Marcelino Foreman    TSH:No results found for: TSHREFFT4    No results found for: TSHREFFT4     No valid procedures specified. ASSESSMENT AND PLAN:      Diagnoses and all orders for this visit:  Hypervolemia, unspecified hypervolemia type  -     TSH With Reflex Ft4; Future  -     CBC with Auto Differential; Future  -     Comprehensive Metabolic Panel; Future  -     Magnesium; Future  -     Basic Metabolic Panel; Future  Hypothyroidism, unspecified type  -     TSH With Reflex Ft4; Future  Other orders  -     furosemide (LASIX) 40 MG tablet; Take 1 tablet by mouth daily  -     potassium chloride (KLOR-CON M) 10 MEQ extended release tablet; Take 1 tablet by mouth daily  -     magnesium oxide (MAG-OX) 400 MG tablet; Take 1 tablet by mouth daily for 7 days  -     bacitracin-polymyxin b (POLYSPORIN) 500-70926 UNIT/GM ointment; Apply topically 2 times daily to open lesion in right leg       Fluid overload with history of the right lower extremity suspect secondary to cardiac reasons. Echo from 2021 shows small cavity, EF of 55%, moderate left ventricular hypertrophy was noted. No reported diastolic heart failure. Normal right ventricular systolic pressure was reported. Also request CMP to evaluate blood albumin /protein levels.   The patient does

## 2023-09-01 NOTE — PATIENT INSTRUCTIONS
Continue sodium restriction < 2gms/day  Elevate leg when sitting or laying to above level of heart if possible  Use coban wrap after applying dressing to the open lesions in right leg  Monitor leg for worsening redness, warmth  If you have a fever, go to ER  Do not pick on blisters.

## 2023-09-02 LAB
ALBUMIN SERPL-MCNC: 4 G/DL (ref 3.5–5.2)
ALBUMIN/GLOB SERPL: 1.7 {RATIO} (ref 1–2.5)
ALP SERPL-CCNC: 179 U/L (ref 40–129)
ALT SERPL-CCNC: 36 U/L (ref 5–41)
ANION GAP SERPL CALCULATED.3IONS-SCNC: 14 MMOL/L (ref 9–17)
AST SERPL-CCNC: 30 U/L
BILIRUB SERPL-MCNC: 0.4 MG/DL (ref 0.3–1.2)
BUN SERPL-MCNC: 18 MG/DL (ref 8–23)
CALCIUM SERPL-MCNC: 8.7 MG/DL (ref 8.6–10.4)
CHLORIDE SERPL-SCNC: 109 MMOL/L (ref 98–107)
CO2 SERPL-SCNC: 23 MMOL/L (ref 20–31)
CREAT SERPL-MCNC: 1.3 MG/DL (ref 0.7–1.2)
GFR SERPL CREATININE-BSD FRML MDRD: >60 ML/MIN/1.73M2
GLUCOSE SERPL-MCNC: 225 MG/DL (ref 70–99)
MAGNESIUM SERPL-MCNC: 2 MG/DL (ref 1.6–2.6)
POTASSIUM SERPL-SCNC: 4.6 MMOL/L (ref 3.7–5.3)
PROT SERPL-MCNC: 6.4 G/DL (ref 6.4–8.3)
SODIUM SERPL-SCNC: 146 MMOL/L (ref 135–144)
TSH SERPL DL<=0.05 MIU/L-ACNC: 1.9 UIU/ML (ref 0.3–5)

## 2023-09-05 ENCOUNTER — TELEPHONE (OUTPATIENT)
Dept: ORTHOPEDIC SURGERY | Age: 63
End: 2023-09-05

## 2023-09-05 ENCOUNTER — OFFICE VISIT (OUTPATIENT)
Dept: ORTHOPEDIC SURGERY | Age: 63
End: 2023-09-05
Payer: MEDICARE

## 2023-09-05 VITALS — BODY MASS INDEX: 34.1 KG/M2 | RESPIRATION RATE: 14 BRPM | HEIGHT: 76 IN | WEIGHT: 280 LBS

## 2023-09-05 DIAGNOSIS — M54.50 CHRONIC MIDLINE LOW BACK PAIN, UNSPECIFIED WHETHER SCIATICA PRESENT: ICD-10-CM

## 2023-09-05 DIAGNOSIS — M48.062 SPINAL STENOSIS OF LUMBAR REGION WITH NEUROGENIC CLAUDICATION: Primary | ICD-10-CM

## 2023-09-05 DIAGNOSIS — G89.29 CHRONIC MIDLINE LOW BACK PAIN, UNSPECIFIED WHETHER SCIATICA PRESENT: ICD-10-CM

## 2023-09-05 PROCEDURE — 3017F COLORECTAL CA SCREEN DOC REV: CPT | Performed by: ORTHOPAEDIC SURGERY

## 2023-09-05 PROCEDURE — 1036F TOBACCO NON-USER: CPT | Performed by: ORTHOPAEDIC SURGERY

## 2023-09-05 PROCEDURE — G8417 CALC BMI ABV UP PARAM F/U: HCPCS | Performed by: ORTHOPAEDIC SURGERY

## 2023-09-05 PROCEDURE — 99213 OFFICE O/P EST LOW 20 MIN: CPT | Performed by: ORTHOPAEDIC SURGERY

## 2023-09-05 PROCEDURE — G8427 DOCREV CUR MEDS BY ELIG CLIN: HCPCS | Performed by: ORTHOPAEDIC SURGERY

## 2023-09-05 NOTE — PROGRESS NOTES
Father     Kidney Cancer Father     Diabetes Brother         IDDM-PUMP    Other Sister         BRAIN TUMOR        Physical Exam:  Vitals signs and nursing note reviewed. Constitutional:       Appearance: well-developed. HENT:      Head: Normocephalic and atraumatic. Nose: Nose normal.   Eyes:      Conjunctiva/sclera: Conjunctivae normal.   Neck:      Musculoskeletal: Normal range of motion and neck supple. Pulmonary:      Effort: Pulmonary effort is normal. No respiratory distress. Musculoskeletal:      Comments: Normal gait     Skin:     General: Skin is warm and dry. Neurological:      Mental Status: Alert and oriented to person, place, and time. Sensory: No sensory deficit. Psychiatric:         Behavior: Behavior normal.         Thought Content: Thought content normal.        Provider Attestation:  Michael Gallardo, personally performed the services described in this documentation. All medical record entries made by the scribe were at my direction and in my presence. I have reviewed the chart and discharge instructions and agree that the records reflect my personal performance and is accurate and complete. Isabelle Sánchez MD 9/5/23       Scribe Attestation:  By signing my name below, Nuha Salas attest that this documentation has been prepared under the direction and in the presence of Dr. Zoey Oscar. Electronically signed: Saintclair Kennedy, Scribe, 9/5/23     Please note that this chart was generated using voice recognition Dragon dictation software. Although every effort was made to ensure the accuracy of this automated transcription, some errors in transcription may have occurred.

## 2023-09-05 NOTE — TELEPHONE ENCOUNTER
Pt is wanting to know how much mobility will he have after surgery pt has a prosthetic leg and will needs to know if he will need everyday help to put on

## 2023-09-06 ENCOUNTER — OFFICE VISIT (OUTPATIENT)
Dept: INTERNAL MEDICINE CLINIC | Age: 63
End: 2023-09-06
Payer: COMMERCIAL

## 2023-09-06 VITALS
OXYGEN SATURATION: 96 % | WEIGHT: 273 LBS | SYSTOLIC BLOOD PRESSURE: 118 MMHG | HEIGHT: 76 IN | BODY MASS INDEX: 33.24 KG/M2 | DIASTOLIC BLOOD PRESSURE: 64 MMHG | HEART RATE: 64 BPM

## 2023-09-06 DIAGNOSIS — Z89.512 STATUS POST BELOW-KNEE AMPUTATION OF LEFT LOWER EXTREMITY (HCC): Primary | ICD-10-CM

## 2023-09-06 DIAGNOSIS — T14.8XXA BLISTER: ICD-10-CM

## 2023-09-06 DIAGNOSIS — D68.9 COAGULATION DEFECT (HCC): ICD-10-CM

## 2023-09-06 PROCEDURE — G8427 DOCREV CUR MEDS BY ELIG CLIN: HCPCS | Performed by: INTERNAL MEDICINE

## 2023-09-06 PROCEDURE — 3017F COLORECTAL CA SCREEN DOC REV: CPT | Performed by: INTERNAL MEDICINE

## 2023-09-06 PROCEDURE — G8417 CALC BMI ABV UP PARAM F/U: HCPCS | Performed by: INTERNAL MEDICINE

## 2023-09-06 PROCEDURE — 1036F TOBACCO NON-USER: CPT | Performed by: INTERNAL MEDICINE

## 2023-09-06 PROCEDURE — 99213 OFFICE O/P EST LOW 20 MIN: CPT | Performed by: INTERNAL MEDICINE

## 2023-09-06 PROCEDURE — 3078F DIAST BP <80 MM HG: CPT | Performed by: INTERNAL MEDICINE

## 2023-09-06 PROCEDURE — 3074F SYST BP LT 130 MM HG: CPT | Performed by: INTERNAL MEDICINE

## 2023-09-06 RX ORDER — LANOLIN ALCOHOL/MO/W.PET/CERES
CREAM (GRAM) TOPICAL
COMMUNITY
Start: 2023-09-01

## 2023-09-06 RX ORDER — PEN NEEDLE, DIABETIC 31 GX5/16"
NEEDLE, DISPOSABLE MISCELLANEOUS
COMMUNITY
Start: 2023-08-29

## 2023-09-06 ASSESSMENT — ENCOUNTER SYMPTOMS
COUGH: 0
BLOOD IN STOOL: 0
EYE DISCHARGE: 0
COLOR CHANGE: 0
EYE PAIN: 0
DIARRHEA: 0
TROUBLE SWALLOWING: 0
ABDOMINAL DISTENTION: 0
WHEEZING: 0
ROS SKIN COMMENTS: BLISTER
SHORTNESS OF BREATH: 0

## 2023-09-06 NOTE — PROGRESS NOTES
[id number] [place] [address] [address] [address]  Dept: [phone]  Dept Fax: [phone]    Salvador Dsouza is a 61 y.o. male who presents today for his medical conditions/complaintsas noted below.   Salvador Dsouza is c/o of   Chief Complaint   Patient presents with    Blister     Right lower leg blisters not getting any better     Bleeding/Bruising     Left upper thigh bruising above prosthesis          HPI:     Right leg blister with swelling  Post right below knee amputation        Hemoglobin A1C (%)   Date Value   08/08/2023 6.6   02/22/2023 6.9 (H)   11/29/2022 7.8             ( goal A1Cis < 7)   No components found for: LABMICR  LDL Cholesterol (mg/dL)   Date Value   08/10/2022 48   05/13/2021 55   01/13/2020 70       (goal LDL is <100)   AST (U/L)   Date Value   09/01/2023 30     ALT (U/L)   Date Value   09/01/2023 36     BUN (mg/dL)   Date Value   09/01/2023 18     BP Readings from Last 3 Encounters:   09/06/23 118/64   09/01/23 124/84   08/30/23 130/60          (goal 120/80)    Past Medical History:   Diagnosis Date    A-fib [facility])     Asymptomatic bilateral carotid artery stenosis     Boil, thigh 10/2019    10/30/19: right inner thigh region, says PCP Rxd Doxy for this, area is much better, has a couple days left to complete Doxy    CAD (coronary artery disease)     saw Dr. Rene Persaud ([facility])     Charcot foot due to diabetes mellitus ([address]) 2014    LEFT    COPD (chronic obstructive pulmonary disease) ([address]) 2009    INHALER USE DAILY    Diabetes mellitus ([address]) 1989    IDDM, On Insulin Dr. Xin Chandler    Diabetic neuropathy ([address])     Difficult intravenous access     VEINS ROLL    Employs prosthetic leg     s/p left BKA    Foot ulcer ([address]) PRIOR TO 2015    BILAT    Full dentures     UPPER ONLY    Hyperlipidemia 2004    ON RX    Hypertension 2004    ON RX, PCP Dr. Xin Chandler, seen Oct. 2019    Hypoglycemia 04/02/2015    Left below-knee amputee ([address])     MRSA (methicillin resistant staph

## 2023-09-06 NOTE — PROGRESS NOTES
Visit Information    Have you changed or started any medications since your last visit including any over-the-counter medicines, vitamins, or herbal medicines? no   Are you having any side effects from any of your medications? -  no  Have you stopped taking any of your medications? Is so, why? -  no    Have you seen any other physician or provider since your last visit? No  Have you had any other diagnostic tests since your last visit? No  Have you been seen in the emergency room and/or had an admission to a hospital since we last saw you? No  Have you had your routine dental cleaning in the past 6 months? no    Have you activated your [employer] account? If not, what are your barriers?  Yes     Patient Care Team:  Nan Diaz MD as PCP - Katie Joyce MD as PCP - Empaneled Provider  Mayra Ferguson MD as Consulting Physician (Infectious Diseases)    Medical History Review  Past Medical, Family, and Social History reviewed and does contribute to the patient presenting condition    Health Maintenance   Topic Date Due    HIV screen  Never done    DTaP/Tdap/Td vaccine (1 - Tdap) Never done    Shingles vaccine (1 of 2) 10/23/2014    Pneumococcal 0-64 years Vaccine (2 - PCV) 01/04/2015    Diabetic retinal exam  01/04/2017    Diabetic Alb to Cr ratio (uACR) test  12/14/2022    Flu vaccine (1) 08/01/2023    Lipids  08/10/2023    Annual Wellness Visit (AWV)  10/11/2023    Depression Screen  01/05/2024    Low dose CT lung screening &/or counseling  04/20/2024    Diabetic foot exam  07/10/2024    A1C test (Diabetic or Prediabetic)  08/08/2024    GFR test (Diabetes, CKD 3-4, OR last GFR 15-59)  09/01/2024    Colorectal Cancer Screen  05/26/2033    COVID-19 Vaccine  Completed    Hepatitis C screen  Completed    Hepatitis A vaccine  Aged Out    Hib vaccine  Aged Out    Meningococcal (ACWY) vaccine  Aged Out    Prostate Specific Antigen (PSA) Screening or Monitoring  Discontinued

## 2023-09-07 NOTE — TELEPHONE ENCOUNTER
I contacted the patient and let him know that putting on his prosthetic leg should not be  a problem.

## 2023-09-15 ENCOUNTER — OFFICE VISIT (OUTPATIENT)
Dept: PODIATRY | Age: 63
End: 2023-09-15
Payer: COMMERCIAL

## 2023-09-15 VITALS — HEIGHT: 76 IN | WEIGHT: 273 LBS | BODY MASS INDEX: 33.24 KG/M2

## 2023-09-15 DIAGNOSIS — E11.51 TYPE 2 DIABETES MELLITUS WITH PERIPHERAL VASCULAR DISEASE (HCC): ICD-10-CM

## 2023-09-15 DIAGNOSIS — B35.1 ONYCHOMYCOSIS OF TOENAIL: Primary | ICD-10-CM

## 2023-09-15 DIAGNOSIS — M79.674 PAIN OF TOE OF RIGHT FOOT: ICD-10-CM

## 2023-09-15 DIAGNOSIS — Z79.4 TYPE 2 DIABETES MELLITUS WITH DIABETIC POLYNEUROPATHY, WITH LONG-TERM CURRENT USE OF INSULIN (HCC): ICD-10-CM

## 2023-09-15 DIAGNOSIS — E11.42 TYPE 2 DIABETES MELLITUS WITH DIABETIC POLYNEUROPATHY, WITH LONG-TERM CURRENT USE OF INSULIN (HCC): ICD-10-CM

## 2023-09-15 PROCEDURE — 99999 PR OFFICE/OUTPT VISIT,PROCEDURE ONLY: CPT | Performed by: PODIATRIST

## 2023-09-15 PROCEDURE — 11720 DEBRIDE NAIL 1-5: CPT | Performed by: PODIATRIST

## 2023-09-15 RX ORDER — TIRZEPATIDE 2.5 MG/.5ML
INJECTION, SOLUTION SUBCUTANEOUS
COMMUNITY
Start: 2023-09-05

## 2023-09-18 ENCOUNTER — HOSPITAL ENCOUNTER (OUTPATIENT)
Dept: PREADMISSION TESTING | Age: 63
Discharge: HOME | End: 2023-09-22
Payer: COMMERCIAL

## 2023-09-18 VITALS
OXYGEN SATURATION: 94 % | RESPIRATION RATE: 20 BRPM | DIASTOLIC BLOOD PRESSURE: 63 MMHG | SYSTOLIC BLOOD PRESSURE: 140 MMHG | WEIGHT: 270 LBS | HEART RATE: 62 BPM | BODY MASS INDEX: 32.88 KG/M2 | TEMPERATURE: 97.9 F | HEIGHT: 76 IN

## 2023-09-18 LAB
ANION GAP SERPL CALCULATED.3IONS-SCNC: 7 MMOL/L (ref 9–17)
BASOPHILS # BLD: 0 K/UL (ref 0–0.2)
BASOPHILS NFR BLD: 1 % (ref 0–2)
BUN SERPL-MCNC: 18 MG/DL (ref 8–23)
CALCIUM SERPL-MCNC: 8.7 MG/DL (ref 8.6–10.4)
CHLORIDE SERPL-SCNC: 106 MMOL/L (ref 98–107)
CO2 SERPL-SCNC: 28 MMOL/L (ref 20–31)
CREAT SERPL-MCNC: 1.2 MG/DL (ref 0.7–1.2)
EOSINOPHIL # BLD: 0.1 K/UL (ref 0–0.4)
EOSINOPHILS RELATIVE PERCENT: 2 % (ref 0–4)
ERYTHROCYTE [DISTWIDTH] IN BLOOD BY AUTOMATED COUNT: 14.1 % (ref 11.5–14.9)
GFR SERPL CREATININE-BSD FRML MDRD: >60 ML/MIN/1.73M2
GLUCOSE SERPL-MCNC: 235 MG/DL (ref 70–99)
HCT VFR BLD AUTO: 42.9 % (ref 41–53)
HGB BLD-MCNC: 14.2 G/DL (ref 13.5–17.5)
LYMPHOCYTES NFR BLD: 0.8 K/UL (ref 1–4.8)
LYMPHOCYTES RELATIVE PERCENT: 12 % (ref 24–44)
MCH RBC QN AUTO: 31.9 PG (ref 26–34)
MCHC RBC AUTO-ENTMCNC: 33.2 G/DL (ref 31–37)
MCV RBC AUTO: 96.1 FL (ref 80–100)
MONOCYTES NFR BLD: 0.6 K/UL (ref 0.1–1.3)
MONOCYTES NFR BLD: 9 % (ref 1–7)
NEUTROPHILS NFR BLD: 76 % (ref 36–66)
NEUTS SEG NFR BLD: 4.7 K/UL (ref 1.3–9.1)
PLATELET # BLD AUTO: 159 K/UL (ref 150–450)
PMV BLD AUTO: 10.3 FL (ref 6–12)
POTASSIUM SERPL-SCNC: 4.5 MMOL/L (ref 3.7–5.3)
RBC # BLD AUTO: 4.47 M/UL (ref 4.5–5.9)
SODIUM SERPL-SCNC: 141 MMOL/L (ref 135–144)
WBC OTHER # BLD: 6.2 K/UL (ref 3.5–11)

## 2023-09-18 PROCEDURE — 36415 COLL VENOUS BLD VENIPUNCTURE: CPT

## 2023-09-18 PROCEDURE — 80048 BASIC METABOLIC PNL TOTAL CA: CPT

## 2023-09-18 PROCEDURE — 85025 COMPLETE CBC W/AUTO DIFF WBC: CPT

## 2023-09-18 ASSESSMENT — ENCOUNTER SYMPTOMS
BACK PAIN: 0
COLOR CHANGE: 0
SHORTNESS OF BREATH: 0
NAUSEA: 0
DIARRHEA: 0

## 2023-09-18 NOTE — DISCHARGE INSTRUCTIONS
Pre-op Instructions For Patient Being Admitted After Surgery    Medication Instructions:  Please stop herbs and any supplements now (includes vitamins and minerals). Please contact your surgeon and prescribing physician for pre-op instructions for any blood thinners. Aspirin & Eliquis     If you have inhalers/aerosol treatments at home, please use them the morning of your surgery and bring the inhalers with you to the hospital.    Please take the following medications the morning of your surgery with a sip of water:    Isosorbide, Amiodarone, Inhaler, Metoprolol, Amlodipine, Levothyroxine and Omeprazole. Surgery Instructions:  After midnight before surgery:  Do not eat or drink anything, including water, mints, gum, and hard candy. You may brush your teeth without swallowing. No smoking, chewing tobacco, or street drugs. Please shower or bathe before surgery. If you were given Surgical Scrub Chlorhexidine Gluconate Liquid (CHG), please shower the night before and the morning of your surgery following the detailed instructions you received during your pre-admission visit. Please do not wear any cologne, lotion, powder, deodorant, jewelry, piercings, perfume, makeup, nail polish, hair accessories, or hair spray on the day of surgery. Wear loose comfortable clothing. Leave your valuables at home. Bring a storage case for any glasses/contacts. The Day of Surgery:  Arrive at [facility] Surgery Entrance at the time directed by your surgeon and check in at the desk. If you have a living will or healthcare power of , please bring a copy. You will be taken to the pre-op holding area where you will be prepared for surgery. A physical assessment will be performed by a nurse practitioner or house officer.   Your IV will be started and you will meet your anesthesiologist.    When you go to surgery, your family will be directed to the surgical waiting

## 2023-09-18 NOTE — PROGRESS NOTES
SUBJECTIVE: Tremayne Rios is a 61 y.o. male who returns to the office with chief complaint of painful fungal toenails. Patient relates toe nails are thickened/difficult to trim as well as painful with ambulation and with shoe gear. Chief Complaint   Patient presents with    Nail Problem     Right foot nail trim, last seen Sudarshan Ibarra MD 9/6/23    Diabetes     Last blood sugar 179     Review of Systems   Constitutional:  Negative for activity change, appetite change, chills, diaphoresis, fatigue and fever. Respiratory:  Negative for shortness of breath. Cardiovascular:  Negative for leg swelling. Gastrointestinal:  Negative for diarrhea and nausea. Endocrine: Negative for cold intolerance, heat intolerance and polyuria. Musculoskeletal:  Positive for arthralgias and gait problem. Negative for back pain, joint swelling and myalgias. Skin:  Negative for color change, pallor, rash and wound. Allergic/Immunologic: Negative for environmental allergies and food allergies. Neurological:  Positive for numbness. Negative for dizziness, weakness and light-headedness. Hematological:  Does not bruise/bleed easily. Psychiatric/Behavioral:  Negative for behavioral problems, confusion and self-injury. The patient is not nervous/anxious. OBJECTIVE: Clinical evaluation of patient reveals nails 1,4,5 of the right foot present with thickness, elongation, discoloration, brittleness, and subungual debris. There was pain with palpation and debridement of the toenails of the right foot No open lesions noted to the right foot today. The left leg and toes 2 and 3 of the right foot have been amputated. The right DP pulse is not palpable. The right PT pulse is not palpable. Protective sensation is absent to the right plantar foot as noted with a 5.07 [place] monofilament.     Glucose:  179 mg/dl    Class A Findings (1 needed)   [x] Non-traumatic amputation of foot or integral skeleton portion

## 2023-09-18 NOTE — PROGRESS NOTES
Dr. Ruby Sales, anesthesia, was contacted and informed of patient's extensive medical history and planned surgery. Medcial clearance requested. Chart coordinator will notify Dr. Chichi Bateman office who will be responsible for making sure the clearance is obtained and is in the chart for surgery.

## 2023-09-19 ENCOUNTER — TELEPHONE (OUTPATIENT)
Dept: INTERNAL MEDICINE CLINIC | Age: 63
End: 2023-09-19

## 2023-09-19 ENCOUNTER — OFFICE VISIT (OUTPATIENT)
Dept: INTERNAL MEDICINE CLINIC | Age: 63
End: 2023-09-19
Payer: COMMERCIAL

## 2023-09-19 VITALS
BODY MASS INDEX: 33.49 KG/M2 | HEIGHT: 76 IN | WEIGHT: 275 LBS | SYSTOLIC BLOOD PRESSURE: 136 MMHG | DIASTOLIC BLOOD PRESSURE: 74 MMHG | HEART RATE: 65 BPM | OXYGEN SATURATION: 96 %

## 2023-09-19 DIAGNOSIS — N18.30 STAGE 3 CHRONIC KIDNEY DISEASE, UNSPECIFIED WHETHER STAGE 3A OR 3B CKD (HCC): ICD-10-CM

## 2023-09-19 DIAGNOSIS — I73.9 PVD (PERIPHERAL VASCULAR DISEASE) (HCC): Primary | ICD-10-CM

## 2023-09-19 DIAGNOSIS — G89.29 CHRONIC PAIN OF LEFT KNEE: ICD-10-CM

## 2023-09-19 DIAGNOSIS — Z79.4 TYPE 2 DIABETES MELLITUS WITH DIABETIC POLYNEUROPATHY, WITH LONG-TERM CURRENT USE OF INSULIN (HCC): ICD-10-CM

## 2023-09-19 DIAGNOSIS — E11.42 TYPE 2 DIABETES MELLITUS WITH DIABETIC POLYNEUROPATHY, WITH LONG-TERM CURRENT USE OF INSULIN (HCC): ICD-10-CM

## 2023-09-19 DIAGNOSIS — E08.41 DIABETIC MONONEUROPATHY ASSOCIATED WITH DIABETES MELLITUS DUE TO UNDERLYING CONDITION (HCC): ICD-10-CM

## 2023-09-19 DIAGNOSIS — M25.562 CHRONIC PAIN OF LEFT KNEE: ICD-10-CM

## 2023-09-19 DIAGNOSIS — Z97.10 EMPLOYS PROSTHETIC LEG: ICD-10-CM

## 2023-09-19 PROCEDURE — 2022F DILAT RTA XM EVC RTNOPTHY: CPT | Performed by: INTERNAL MEDICINE

## 2023-09-19 PROCEDURE — 1036F TOBACCO NON-USER: CPT | Performed by: INTERNAL MEDICINE

## 2023-09-19 PROCEDURE — 3078F DIAST BP <80 MM HG: CPT | Performed by: INTERNAL MEDICINE

## 2023-09-19 PROCEDURE — G8427 DOCREV CUR MEDS BY ELIG CLIN: HCPCS | Performed by: INTERNAL MEDICINE

## 2023-09-19 PROCEDURE — G8417 CALC BMI ABV UP PARAM F/U: HCPCS | Performed by: INTERNAL MEDICINE

## 2023-09-19 PROCEDURE — 3017F COLORECTAL CA SCREEN DOC REV: CPT | Performed by: INTERNAL MEDICINE

## 2023-09-19 PROCEDURE — 3044F HG A1C LEVEL LT 7.0%: CPT | Performed by: INTERNAL MEDICINE

## 2023-09-19 PROCEDURE — 99214 OFFICE O/P EST MOD 30 MIN: CPT | Performed by: INTERNAL MEDICINE

## 2023-09-19 PROCEDURE — 3075F SYST BP GE 130 - 139MM HG: CPT | Performed by: INTERNAL MEDICINE

## 2023-09-19 RX ORDER — PREGABALIN 100 MG/1
100 CAPSULE ORAL 3 TIMES DAILY
Qty: 270 CAPSULE | Refills: 2 | Status: SHIPPED | OUTPATIENT
Start: 2023-09-19 | End: 2023-12-18

## 2023-09-19 ASSESSMENT — ENCOUNTER SYMPTOMS
WHEEZING: 0
ABDOMINAL DISTENTION: 0
EYE PAIN: 0
DIARRHEA: 0
COLOR CHANGE: 0
SHORTNESS OF BREATH: 0
TROUBLE SWALLOWING: 0
EYE DISCHARGE: 0
BLOOD IN STOOL: 0
COUGH: 0

## 2023-09-19 NOTE — TELEPHONE ENCOUNTER
Medical surgical clearance request received 09/19/23    Surgeon: Dr Aurea Sarmiento    Procedure: L4-5 Decompression and fusion    Date of Procedure: 10/02/2023    Last appt: 9/19/2023    Next appt: 11/27/2023    PATs received:    [] yes   [x] no

## 2023-09-19 NOTE — PROGRESS NOTES
[id number] [place] [address] [address] [address]  Dept: [phone]  Dept Fax: [phone]    Michael Roa is a 61 y.o. male who presents today for his medical conditions/complaintsas noted below. Michael Roa is c/o of   Chief Complaint   Patient presents with    Blister     Right lower leg blisters getting better, left leg above prothesis bruising getting better          HPI:     HTN  Onset more than 2 years ago  gustavo mild to mod  Controlled with current po meds  Not associated with headaches or blurry vision  No chest pain    Diabetes   Duration more than 7 years  Modifying factors on Glucophage and other med  Severity uncontrolled sever  Associated signs and symtoms neuropathy/ckd/ CAD.    aggravated with sugar diet and better with low sugar diet               Hemoglobin A1C (%)   Date Value   08/08/2023 6.6   02/22/2023 6.9 (H)   11/29/2022 7.8             ( goal A1Cis < 7)   No components found for: \"LABMICR\"  LDL Cholesterol (mg/dL)   Date Value   08/10/2022 48   05/13/2021 55   01/13/2020 70       (goal LDL is <100)   AST (U/L)   Date Value   09/01/2023 30     ALT (U/L)   Date Value   09/01/2023 36     BUN (mg/dL)   Date Value   09/18/2023 18     BP Readings from Last 3 Encounters:   09/19/23 136/74   09/18/23 (!) 140/63   09/06/23 118/64          (goal 120/80)    Past Medical History:   Diagnosis Date    A-fib [facility])     Asymptomatic bilateral carotid artery stenosis     Boil, thigh 10/2019    10/30/19: right inner thigh region, says PCP Rxd Doxy for this, area is much better, has a couple days left to complete Doxy    CAD (coronary artery disease)     saw Dr. Hank Montenegro ([facility])     Charcot foot due to diabetes mellitus ([address]) 2014    LEFT    COPD (chronic obstructive pulmonary disease) ([address]) 2009    INHALER USE DAILY    Diabetes mellitus ([address]) 1989    IDDM, On Insulin Dr. Yoon Minus    Diabetic neuropathy [facility])     Difficult intravenous access     VEINS ROLL    Employs
Screening or Monitoring  Discontinued

## 2023-09-26 RX ORDER — AMLODIPINE BESYLATE 2.5 MG/1
TABLET ORAL
Qty: 30 TABLET | Refills: 3 | Status: SHIPPED | OUTPATIENT
Start: 2023-09-26

## 2023-09-26 RX ORDER — LEVOTHYROXINE SODIUM 175 UG/1
TABLET ORAL
Qty: 30 TABLET | Refills: 3 | Status: SHIPPED | OUTPATIENT
Start: 2023-09-26

## 2023-09-29 ENCOUNTER — ANESTHESIA EVENT (OUTPATIENT)
Dept: OPERATING ROOM | Age: 63
DRG: 460 | End: 2023-09-29
Payer: COMMERCIAL

## 2023-09-29 NOTE — PRE-PROCEDURE INSTRUCTIONS
No answer, left message ? N/A                      Unable to leave message ? N/A    When were you told to arrive at hospital ?  0815    Do you have a  ? YES    Are you on any blood thinners ? YES               If yes when did you stop taking ? STOPPED    Do you have your prep Rx filled and instruction ? N/A     Nothing to eat the day before , only clear liquids. N/A    Are you experiencing any covid symptoms ? NO    Do you have any infections or rash we should be aware of ? NO      Do you have the Hibiclens soap to use the night before and the morning of surgery ? YES    Nothing to eat or drink after midnight, only a sip of water to take any medication instructed to take the night before. Wear comfortable clothing, leave any valuables at home, remove any jewelry and body piercing .  INSTRUCTED

## 2023-10-02 ENCOUNTER — APPOINTMENT (OUTPATIENT)
Dept: GENERAL RADIOLOGY | Age: 63
DRG: 460 | End: 2023-10-02
Attending: ORTHOPAEDIC SURGERY
Payer: COMMERCIAL

## 2023-10-02 ENCOUNTER — ANESTHESIA (OUTPATIENT)
Dept: OPERATING ROOM | Age: 63
DRG: 460 | End: 2023-10-02
Payer: COMMERCIAL

## 2023-10-02 ENCOUNTER — HOSPITAL ENCOUNTER (INPATIENT)
Age: 63
LOS: 2 days | Discharge: HOME | DRG: 460 | End: 2023-10-04
Attending: ORTHOPAEDIC SURGERY | Admitting: ORTHOPAEDIC SURGERY
Payer: COMMERCIAL

## 2023-10-02 DIAGNOSIS — R52 PAIN: ICD-10-CM

## 2023-10-02 DIAGNOSIS — M47.817 LUMBOSACRAL SPONDYLOSIS WITHOUT MYELOPATHY: Primary | Chronic | ICD-10-CM

## 2023-10-02 PROBLEM — M48.062 LUMBAR STENOSIS WITH NEUROGENIC CLAUDICATION: Status: ACTIVE | Noted: 2021-06-11

## 2023-10-02 LAB
GLUCOSE BLD-MCNC: 264 MG/DL (ref 75–110)
GLUCOSE BLD-MCNC: 274 MG/DL (ref 75–110)
GLUCOSE BLD-MCNC: 338 MG/DL (ref 75–110)

## 2023-10-02 PROCEDURE — 7100000000 HC PACU RECOVERY - FIRST 15 MIN: Performed by: ORTHOPAEDIC SURGERY

## 2023-10-02 PROCEDURE — 2500000003 HC RX 250 WO HCPCS: Performed by: ORTHOPAEDIC SURGERY

## 2023-10-02 PROCEDURE — 3600000013 HC SURGERY LEVEL 3 ADDTL 15MIN: Performed by: ORTHOPAEDIC SURGERY

## 2023-10-02 PROCEDURE — C1889 IMPLANT/INSERT DEVICE, NOC: HCPCS | Performed by: ORTHOPAEDIC SURGERY

## 2023-10-02 PROCEDURE — 6360000002 HC RX W HCPCS: Performed by: ORTHOPAEDIC SURGERY

## 2023-10-02 PROCEDURE — 22853 INSJ BIOMECHANICAL DEVICE: CPT | Performed by: ORTHOPAEDIC SURGERY

## 2023-10-02 PROCEDURE — 22840 INSERT SPINE FIXATION DEVICE: CPT | Performed by: PHYSICIAN ASSISTANT

## 2023-10-02 PROCEDURE — 63053 LAM FACTC/FRMT ARTHRD LUM EA: CPT | Performed by: ORTHOPAEDIC SURGERY

## 2023-10-02 PROCEDURE — 6370000000 HC RX 637 (ALT 250 FOR IP): Performed by: ANESTHESIOLOGY

## 2023-10-02 PROCEDURE — 63048 LAM FACETEC &FORAMOT EA ADDL: CPT | Performed by: ORTHOPAEDIC SURGERY

## 2023-10-02 PROCEDURE — 22633 ARTHRD CMBN 1NTRSPC LUMBAR: CPT | Performed by: ORTHOPAEDIC SURGERY

## 2023-10-02 PROCEDURE — P9041 ALBUMIN (HUMAN),5%, 50ML: HCPCS | Performed by: NURSE ANESTHETIST, CERTIFIED REGISTERED

## 2023-10-02 PROCEDURE — 6360000002 HC RX W HCPCS: Performed by: NURSE ANESTHETIST, CERTIFIED REGISTERED

## 2023-10-02 PROCEDURE — 3700000001 HC ADD 15 MINUTES (ANESTHESIA): Performed by: ORTHOPAEDIC SURGERY

## 2023-10-02 PROCEDURE — 63052 LAM FACETC/FRMT ARTHRD LUM 1: CPT | Performed by: PHYSICIAN ASSISTANT

## 2023-10-02 PROCEDURE — 2700000000 HC OXYGEN THERAPY PER DAY

## 2023-10-02 PROCEDURE — 2720000010 HC SURG SUPPLY STERILE: Performed by: ORTHOPAEDIC SURGERY

## 2023-10-02 PROCEDURE — 82947 ASSAY GLUCOSE BLOOD QUANT: CPT

## 2023-10-02 PROCEDURE — 1200000000 HC SEMI PRIVATE

## 2023-10-02 PROCEDURE — C1713 ANCHOR/SCREW BN/BN,TIS/BN: HCPCS | Performed by: ORTHOPAEDIC SURGERY

## 2023-10-02 PROCEDURE — 22853 INSJ BIOMECHANICAL DEVICE: CPT | Performed by: PHYSICIAN ASSISTANT

## 2023-10-02 PROCEDURE — 6370000000 HC RX 637 (ALT 250 FOR IP): Performed by: ORTHOPAEDIC SURGERY

## 2023-10-02 PROCEDURE — 3700000000 HC ANESTHESIA ATTENDED CARE: Performed by: ORTHOPAEDIC SURGERY

## 2023-10-02 PROCEDURE — 2580000003 HC RX 258: Performed by: ANESTHESIOLOGY

## 2023-10-02 PROCEDURE — 2580000003 HC RX 258: Performed by: NURSE ANESTHETIST, CERTIFIED REGISTERED

## 2023-10-02 PROCEDURE — 0SG00AJ FUSION OF LUMBAR VERTEBRAL JOINT WITH INTERBODY FUSION DEVICE, POSTERIOR APPROACH, ANTERIOR COLUMN, OPEN APPROACH: ICD-10-PCS | Performed by: ORTHOPAEDIC SURGERY

## 2023-10-02 PROCEDURE — 63052 LAM FACETC/FRMT ARTHRD LUM 1: CPT | Performed by: ORTHOPAEDIC SURGERY

## 2023-10-02 PROCEDURE — 63047 LAM FACETEC & FORAMOT LUMBAR: CPT | Performed by: ORTHOPAEDIC SURGERY

## 2023-10-02 PROCEDURE — 2500000003 HC RX 250 WO HCPCS: Performed by: NURSE ANESTHETIST, CERTIFIED REGISTERED

## 2023-10-02 PROCEDURE — 7100000001 HC PACU RECOVERY - ADDTL 15 MIN: Performed by: ORTHOPAEDIC SURGERY

## 2023-10-02 PROCEDURE — 3600000003 HC SURGERY LEVEL 3 BASE: Performed by: ORTHOPAEDIC SURGERY

## 2023-10-02 PROCEDURE — 63053 LAM FACTC/FRMT ARTHRD LUM EA: CPT | Performed by: PHYSICIAN ASSISTANT

## 2023-10-02 PROCEDURE — 63047 LAM FACETEC & FORAMOT LUMBAR: CPT | Performed by: PHYSICIAN ASSISTANT

## 2023-10-02 PROCEDURE — 94640 AIRWAY INHALATION TREATMENT: CPT

## 2023-10-02 PROCEDURE — 2580000003 HC RX 258: Performed by: ORTHOPAEDIC SURGERY

## 2023-10-02 PROCEDURE — 22840 INSERT SPINE FIXATION DEVICE: CPT | Performed by: ORTHOPAEDIC SURGERY

## 2023-10-02 PROCEDURE — 22633 ARTHRD CMBN 1NTRSPC LUMBAR: CPT | Performed by: PHYSICIAN ASSISTANT

## 2023-10-02 PROCEDURE — 63048 LAM FACETEC &FORAMOT EA ADDL: CPT | Performed by: PHYSICIAN ASSISTANT

## 2023-10-02 PROCEDURE — 2709999900 HC NON-CHARGEABLE SUPPLY: Performed by: ORTHOPAEDIC SURGERY

## 2023-10-02 PROCEDURE — 01NB0ZZ RELEASE LUMBAR NERVE, OPEN APPROACH: ICD-10-PCS | Performed by: ORTHOPAEDIC SURGERY

## 2023-10-02 PROCEDURE — 0SG00AJ FUSION OF LUMBAR VERTEBRAL JOINT WITH INTERBODY FUSION DEVICE, POSTERIOR APPROACH, ANTERIOR COLUMN, OPEN APPROACH: ICD-10-PCS

## 2023-10-02 DEVICE — GRAFT BNE CHIP FRZ DRY CANC CORT 1MM 8MM RANG 30CC READIGRFT: Type: IMPLANTABLE DEVICE | Site: SPINE LUMBAR | Status: FUNCTIONAL

## 2023-10-02 DEVICE — 6.5MM REVERE PEDICLE SCREW 40MM
Type: IMPLANTABLE DEVICE | Site: SPINE LUMBAR | Status: FUNCTIONAL
Brand: REVERE

## 2023-10-02 DEVICE — 5.5MM CURVED ROD, 50MM
Type: IMPLANTABLE DEVICE | Site: SPINE LUMBAR | Status: FUNCTIONAL
Brand: REVERE

## 2023-10-02 DEVICE — REVERE LOCKING CAP
Type: IMPLANTABLE DEVICE | Site: SPINE LUMBAR | Status: FUNCTIONAL
Brand: REVERE

## 2023-10-02 DEVICE — SABLE SPACER, 10X22, 10-16MM, 15°
Type: IMPLANTABLE DEVICE | Site: SPINE LUMBAR | Status: FUNCTIONAL
Brand: SABLE

## 2023-10-02 RX ORDER — DEXTROSE MONOHYDRATE 100 MG/ML
INJECTION, SOLUTION INTRAVENOUS CONTINUOUS PRN
Status: DISCONTINUED | OUTPATIENT
Start: 2023-10-02 | End: 2023-10-04 | Stop reason: HOSPADM

## 2023-10-02 RX ORDER — SODIUM CHLORIDE 9 MG/ML
INJECTION, SOLUTION INTRAVENOUS CONTINUOUS
Status: DISCONTINUED | OUTPATIENT
Start: 2023-10-02 | End: 2023-10-02 | Stop reason: HOSPADM

## 2023-10-02 RX ORDER — VANCOMYCIN HYDROCHLORIDE 1 G/20ML
INJECTION, POWDER, LYOPHILIZED, FOR SOLUTION INTRAVENOUS PRN
Status: DISCONTINUED | OUTPATIENT
Start: 2023-10-02 | End: 2023-10-02 | Stop reason: ALTCHOICE

## 2023-10-02 RX ORDER — LOSARTAN POTASSIUM 50 MG/1
50 TABLET ORAL DAILY
Status: DISCONTINUED | OUTPATIENT
Start: 2023-10-02 | End: 2023-10-04 | Stop reason: HOSPADM

## 2023-10-02 RX ORDER — MIDAZOLAM HYDROCHLORIDE 1 MG/ML
INJECTION INTRAMUSCULAR; INTRAVENOUS PRN
Status: DISCONTINUED | OUTPATIENT
Start: 2023-10-02 | End: 2023-10-02 | Stop reason: SDUPTHER

## 2023-10-02 RX ORDER — INSULIN LISPRO 100 [IU]/ML
0-8 INJECTION, SOLUTION INTRAVENOUS; SUBCUTANEOUS
Status: DISCONTINUED | OUTPATIENT
Start: 2023-10-02 | End: 2023-10-04 | Stop reason: HOSPADM

## 2023-10-02 RX ORDER — SODIUM CHLORIDE 0.9 % (FLUSH) 0.9 %
5-40 SYRINGE (ML) INJECTION PRN
Status: DISCONTINUED | OUTPATIENT
Start: 2023-10-02 | End: 2023-10-02 | Stop reason: HOSPADM

## 2023-10-02 RX ORDER — LABETALOL HYDROCHLORIDE 5 MG/ML
10 INJECTION, SOLUTION INTRAVENOUS
Status: DISCONTINUED | OUTPATIENT
Start: 2023-10-02 | End: 2023-10-02 | Stop reason: HOSPADM

## 2023-10-02 RX ORDER — MORPHINE SULFATE 4 MG/ML
4 INJECTION, SOLUTION INTRAMUSCULAR; INTRAVENOUS
Status: DISCONTINUED | OUTPATIENT
Start: 2023-10-02 | End: 2023-10-04 | Stop reason: HOSPADM

## 2023-10-02 RX ORDER — ONDANSETRON 2 MG/ML
4 INJECTION INTRAMUSCULAR; INTRAVENOUS EVERY 6 HOURS PRN
Status: DISCONTINUED | OUTPATIENT
Start: 2023-10-02 | End: 2023-10-04 | Stop reason: HOSPADM

## 2023-10-02 RX ORDER — ONDANSETRON 2 MG/ML
INJECTION INTRAMUSCULAR; INTRAVENOUS PRN
Status: DISCONTINUED | OUTPATIENT
Start: 2023-10-02 | End: 2023-10-02 | Stop reason: SDUPTHER

## 2023-10-02 RX ORDER — PANTOPRAZOLE SODIUM 40 MG/1
40 TABLET, DELAYED RELEASE ORAL
Status: DISCONTINUED | OUTPATIENT
Start: 2023-10-03 | End: 2023-10-04 | Stop reason: HOSPADM

## 2023-10-02 RX ORDER — PREGABALIN 100 MG/1
100 CAPSULE ORAL 3 TIMES DAILY
Status: DISCONTINUED | OUTPATIENT
Start: 2023-10-02 | End: 2023-10-04 | Stop reason: HOSPADM

## 2023-10-02 RX ORDER — SODIUM CHLORIDE 9 MG/ML
INJECTION, SOLUTION INTRAVENOUS PRN
Status: DISCONTINUED | OUTPATIENT
Start: 2023-10-02 | End: 2023-10-02 | Stop reason: HOSPADM

## 2023-10-02 RX ORDER — VASOPRESSIN 20 [USP'U]/ML
INJECTION, SOLUTION INTRAMUSCULAR; SUBCUTANEOUS PRN
Status: DISCONTINUED | OUTPATIENT
Start: 2023-10-02 | End: 2023-10-02 | Stop reason: SDUPTHER

## 2023-10-02 RX ORDER — PROPOFOL 10 MG/ML
INJECTION, EMULSION INTRAVENOUS CONTINUOUS PRN
Status: DISCONTINUED | OUTPATIENT
Start: 2023-10-02 | End: 2023-10-02 | Stop reason: SDUPTHER

## 2023-10-02 RX ORDER — SODIUM CHLORIDE 9 MG/ML
INJECTION, SOLUTION INTRAVENOUS PRN
Status: DISCONTINUED | OUTPATIENT
Start: 2023-10-02 | End: 2023-10-04 | Stop reason: HOSPADM

## 2023-10-02 RX ORDER — METOPROLOL TARTRATE 50 MG/1
50 TABLET, FILM COATED ORAL 2 TIMES DAILY
Status: DISCONTINUED | OUTPATIENT
Start: 2023-10-02 | End: 2023-10-04 | Stop reason: HOSPADM

## 2023-10-02 RX ORDER — SODIUM CHLORIDE 0.9 % (FLUSH) 0.9 %
5-40 SYRINGE (ML) INJECTION PRN
Status: DISCONTINUED | OUTPATIENT
Start: 2023-10-02 | End: 2023-10-04 | Stop reason: HOSPADM

## 2023-10-02 RX ORDER — ISOSORBIDE MONONITRATE 30 MG/1
30 TABLET, EXTENDED RELEASE ORAL DAILY
Status: DISCONTINUED | OUTPATIENT
Start: 2023-10-03 | End: 2023-10-04 | Stop reason: HOSPADM

## 2023-10-02 RX ORDER — OXYCODONE HYDROCHLORIDE 5 MG/1
5 TABLET ORAL EVERY 4 HOURS PRN
Status: DISCONTINUED | OUTPATIENT
Start: 2023-10-02 | End: 2023-10-04 | Stop reason: HOSPADM

## 2023-10-02 RX ORDER — DIPHENHYDRAMINE HYDROCHLORIDE 50 MG/ML
12.5 INJECTION INTRAMUSCULAR; INTRAVENOUS
Status: DISCONTINUED | OUTPATIENT
Start: 2023-10-02 | End: 2023-10-02 | Stop reason: HOSPADM

## 2023-10-02 RX ORDER — CLOTRIMAZOLE AND BETAMETHASONE DIPROPIONATE 10; .64 MG/G; MG/G
CREAM TOPICAL 2 TIMES DAILY
Status: DISCONTINUED | OUTPATIENT
Start: 2023-10-02 | End: 2023-10-04 | Stop reason: HOSPADM

## 2023-10-02 RX ORDER — AMIODARONE HYDROCHLORIDE 200 MG/1
200 TABLET ORAL 2 TIMES DAILY
Status: DISCONTINUED | OUTPATIENT
Start: 2023-10-02 | End: 2023-10-04 | Stop reason: HOSPADM

## 2023-10-02 RX ORDER — LEVOTHYROXINE SODIUM 0.07 MG/1
150 TABLET ORAL DAILY
Status: DISCONTINUED | OUTPATIENT
Start: 2023-10-03 | End: 2023-10-04 | Stop reason: HOSPADM

## 2023-10-02 RX ORDER — LIDOCAINE HYDROCHLORIDE 10 MG/ML
INJECTION, SOLUTION EPIDURAL; INFILTRATION; INTRACAUDAL; PERINEURAL PRN
Status: DISCONTINUED | OUTPATIENT
Start: 2023-10-02 | End: 2023-10-02 | Stop reason: SDUPTHER

## 2023-10-02 RX ORDER — LIDOCAINE HYDROCHLORIDE 10 MG/ML
1 INJECTION, SOLUTION EPIDURAL; INFILTRATION; INTRACAUDAL; PERINEURAL
Status: DISCONTINUED | OUTPATIENT
Start: 2023-10-02 | End: 2023-10-02 | Stop reason: HOSPADM

## 2023-10-02 RX ORDER — PROPOFOL 10 MG/ML
INJECTION, EMULSION INTRAVENOUS PRN
Status: DISCONTINUED | OUTPATIENT
Start: 2023-10-02 | End: 2023-10-02 | Stop reason: SDUPTHER

## 2023-10-02 RX ORDER — GEMFIBROZIL 600 MG/1
600 TABLET, FILM COATED ORAL DAILY
Status: DISCONTINUED | OUTPATIENT
Start: 2023-10-02 | End: 2023-10-04 | Stop reason: HOSPADM

## 2023-10-02 RX ORDER — SODIUM CHLORIDE 0.9 % (FLUSH) 0.9 %
5-40 SYRINGE (ML) INJECTION EVERY 12 HOURS SCHEDULED
Status: DISCONTINUED | OUTPATIENT
Start: 2023-10-02 | End: 2023-10-02 | Stop reason: HOSPADM

## 2023-10-02 RX ORDER — SODIUM CHLORIDE 9 MG/ML
INJECTION, SOLUTION INTRAVENOUS CONTINUOUS PRN
Status: DISCONTINUED | OUTPATIENT
Start: 2023-10-02 | End: 2023-10-02 | Stop reason: SDUPTHER

## 2023-10-02 RX ORDER — ACETAMINOPHEN 325 MG/1
650 TABLET ORAL
Status: DISCONTINUED | OUTPATIENT
Start: 2023-10-02 | End: 2023-10-02 | Stop reason: HOSPADM

## 2023-10-02 RX ORDER — INSULIN LISPRO 100 [IU]/ML
0-4 INJECTION, SOLUTION INTRAVENOUS; SUBCUTANEOUS NIGHTLY
Status: DISCONTINUED | OUTPATIENT
Start: 2023-10-02 | End: 2023-10-04 | Stop reason: HOSPADM

## 2023-10-02 RX ORDER — CEFAZOLIN SODIUM 1 G/3ML
INJECTION, POWDER, FOR SOLUTION INTRAMUSCULAR; INTRAVENOUS PRN
Status: DISCONTINUED | OUTPATIENT
Start: 2023-10-02 | End: 2023-10-02 | Stop reason: SDUPTHER

## 2023-10-02 RX ORDER — SODIUM CHLORIDE 0.9 % (FLUSH) 0.9 %
5-40 SYRINGE (ML) INJECTION EVERY 12 HOURS SCHEDULED
Status: DISCONTINUED | OUTPATIENT
Start: 2023-10-02 | End: 2023-10-04 | Stop reason: HOSPADM

## 2023-10-02 RX ORDER — METHYLPREDNISOLONE 4 MG
500 TABLET, DOSE PACK ORAL 3 TIMES DAILY
Status: DISCONTINUED | OUTPATIENT
Start: 2023-10-02 | End: 2023-10-02

## 2023-10-02 RX ORDER — ONDANSETRON 2 MG/ML
4 INJECTION INTRAMUSCULAR; INTRAVENOUS
Status: DISCONTINUED | OUTPATIENT
Start: 2023-10-02 | End: 2023-10-02 | Stop reason: HOSPADM

## 2023-10-02 RX ORDER — ATORVASTATIN CALCIUM 40 MG/1
40 TABLET, FILM COATED ORAL NIGHTLY
Status: DISCONTINUED | OUTPATIENT
Start: 2023-10-02 | End: 2023-10-04 | Stop reason: HOSPADM

## 2023-10-02 RX ORDER — BUDESONIDE AND FORMOTEROL FUMARATE DIHYDRATE 160; 4.5 UG/1; UG/1
2 AEROSOL RESPIRATORY (INHALATION)
Status: DISCONTINUED | OUTPATIENT
Start: 2023-10-02 | End: 2023-10-04 | Stop reason: HOSPADM

## 2023-10-02 RX ORDER — EPHEDRINE SULFATE/0.9% NACL/PF 50 MG/5 ML
SYRINGE (ML) INTRAVENOUS PRN
Status: DISCONTINUED | OUTPATIENT
Start: 2023-10-02 | End: 2023-10-02 | Stop reason: SDUPTHER

## 2023-10-02 RX ORDER — GABAPENTIN 300 MG/1
300 CAPSULE ORAL ONCE
Status: COMPLETED | OUTPATIENT
Start: 2023-10-02 | End: 2023-10-02

## 2023-10-02 RX ORDER — FENTANYL CITRATE 50 UG/ML
INJECTION, SOLUTION INTRAMUSCULAR; INTRAVENOUS PRN
Status: DISCONTINUED | OUTPATIENT
Start: 2023-10-02 | End: 2023-10-02 | Stop reason: SDUPTHER

## 2023-10-02 RX ORDER — ROPINIROLE 0.5 MG/1
2 TABLET, FILM COATED ORAL NIGHTLY
Status: DISCONTINUED | OUTPATIENT
Start: 2023-10-02 | End: 2023-10-04 | Stop reason: HOSPADM

## 2023-10-02 RX ORDER — ROCURONIUM BROMIDE 10 MG/ML
INJECTION, SOLUTION INTRAVENOUS PRN
Status: DISCONTINUED | OUTPATIENT
Start: 2023-10-02 | End: 2023-10-02 | Stop reason: SDUPTHER

## 2023-10-02 RX ORDER — TAMSULOSIN HYDROCHLORIDE 0.4 MG/1
0.4 CAPSULE ORAL DAILY
Status: DISCONTINUED | OUTPATIENT
Start: 2023-10-02 | End: 2023-10-04 | Stop reason: HOSPADM

## 2023-10-02 RX ORDER — ALBUTEROL SULFATE 90 UG/1
2 AEROSOL, METERED RESPIRATORY (INHALATION) EVERY 6 HOURS PRN
Status: DISCONTINUED | OUTPATIENT
Start: 2023-10-02 | End: 2023-10-04 | Stop reason: HOSPADM

## 2023-10-02 RX ORDER — BUPIVACAINE HYDROCHLORIDE AND EPINEPHRINE 2.5; 5 MG/ML; UG/ML
INJECTION, SOLUTION EPIDURAL; INFILTRATION; INTRACAUDAL; PERINEURAL PRN
Status: DISCONTINUED | OUTPATIENT
Start: 2023-10-02 | End: 2023-10-02 | Stop reason: ALTCHOICE

## 2023-10-02 RX ORDER — LANOLIN ALCOHOL/MO/W.PET/CERES
400 CREAM (GRAM) TOPICAL DAILY
Status: DISCONTINUED | OUTPATIENT
Start: 2023-10-02 | End: 2023-10-02

## 2023-10-02 RX ORDER — ACETAMINOPHEN 500 MG
1000 TABLET ORAL ONCE
Status: COMPLETED | OUTPATIENT
Start: 2023-10-02 | End: 2023-10-02

## 2023-10-02 RX ORDER — INSULIN GLARGINE 100 [IU]/ML
44 INJECTION, SOLUTION SUBCUTANEOUS 2 TIMES DAILY
Status: DISCONTINUED | OUTPATIENT
Start: 2023-10-02 | End: 2023-10-03

## 2023-10-02 RX ORDER — FINASTERIDE 5 MG/1
5 TABLET, FILM COATED ORAL DAILY
Status: DISCONTINUED | OUTPATIENT
Start: 2023-10-02 | End: 2023-10-04 | Stop reason: HOSPADM

## 2023-10-02 RX ORDER — METOCLOPRAMIDE HYDROCHLORIDE 5 MG/ML
10 INJECTION INTRAMUSCULAR; INTRAVENOUS
Status: DISCONTINUED | OUTPATIENT
Start: 2023-10-02 | End: 2023-10-02 | Stop reason: HOSPADM

## 2023-10-02 RX ORDER — OXYCODONE HYDROCHLORIDE 10 MG/1
10 TABLET ORAL EVERY 4 HOURS PRN
Status: DISCONTINUED | OUTPATIENT
Start: 2023-10-02 | End: 2023-10-04 | Stop reason: HOSPADM

## 2023-10-02 RX ORDER — FENTANYL CITRATE 0.05 MG/ML
25 INJECTION, SOLUTION INTRAMUSCULAR; INTRAVENOUS EVERY 5 MIN PRN
Status: DISCONTINUED | OUTPATIENT
Start: 2023-10-02 | End: 2023-10-02 | Stop reason: HOSPADM

## 2023-10-02 RX ORDER — MORPHINE SULFATE 2 MG/ML
2 INJECTION, SOLUTION INTRAMUSCULAR; INTRAVENOUS
Status: DISCONTINUED | OUTPATIENT
Start: 2023-10-02 | End: 2023-10-04 | Stop reason: HOSPADM

## 2023-10-02 RX ORDER — HYDRALAZINE HYDROCHLORIDE 20 MG/ML
10 INJECTION INTRAMUSCULAR; INTRAVENOUS
Status: DISCONTINUED | OUTPATIENT
Start: 2023-10-02 | End: 2023-10-02 | Stop reason: HOSPADM

## 2023-10-02 RX ORDER — DEXAMETHASONE SODIUM PHOSPHATE 4 MG/ML
INJECTION, SOLUTION INTRA-ARTICULAR; INTRALESIONAL; INTRAMUSCULAR; INTRAVENOUS; SOFT TISSUE PRN
Status: DISCONTINUED | OUTPATIENT
Start: 2023-10-02 | End: 2023-10-02 | Stop reason: SDUPTHER

## 2023-10-02 RX ORDER — PROMETHAZINE HYDROCHLORIDE 25 MG/1
12.5 TABLET ORAL EVERY 6 HOURS PRN
Status: DISCONTINUED | OUTPATIENT
Start: 2023-10-02 | End: 2023-10-04 | Stop reason: HOSPADM

## 2023-10-02 RX ORDER — ALBUMIN, HUMAN INJ 5% 5 %
SOLUTION INTRAVENOUS PRN
Status: DISCONTINUED | OUTPATIENT
Start: 2023-10-02 | End: 2023-10-02 | Stop reason: SDUPTHER

## 2023-10-02 RX ORDER — TRANEXAMIC ACID 100 MG/ML
INJECTION, SOLUTION INTRAVENOUS PRN
Status: DISCONTINUED | OUTPATIENT
Start: 2023-10-02 | End: 2023-10-02 | Stop reason: SDUPTHER

## 2023-10-02 RX ORDER — MEPERIDINE HYDROCHLORIDE 25 MG/ML
12.5 INJECTION INTRAMUSCULAR; INTRAVENOUS; SUBCUTANEOUS EVERY 5 MIN PRN
Status: DISCONTINUED | OUTPATIENT
Start: 2023-10-02 | End: 2023-10-02 | Stop reason: HOSPADM

## 2023-10-02 RX ORDER — AMLODIPINE BESYLATE 2.5 MG/1
2.5 TABLET ORAL DAILY
Status: DISCONTINUED | OUTPATIENT
Start: 2023-10-03 | End: 2023-10-04 | Stop reason: HOSPADM

## 2023-10-02 RX ADMIN — ONDANSETRON 4 MG: 2 INJECTION INTRAMUSCULAR; INTRAVENOUS at 14:33

## 2023-10-02 RX ADMIN — TRANEXAMIC ACID 1000 MG: 100 INJECTION, SOLUTION INTRAVENOUS at 10:46

## 2023-10-02 RX ADMIN — ROCURONIUM BROMIDE 50 MG: 10 INJECTION, SOLUTION INTRAVENOUS at 10:27

## 2023-10-02 RX ADMIN — Medication 10 MG: at 11:22

## 2023-10-02 RX ADMIN — SODIUM CHLORIDE: 9 INJECTION, SOLUTION INTRAVENOUS at 10:04

## 2023-10-02 RX ADMIN — SODIUM CHLORIDE: 9 INJECTION, SOLUTION INTRAVENOUS at 11:01

## 2023-10-02 RX ADMIN — PHENYLEPHRINE HYDROCHLORIDE 100 MCG: 10 INJECTION INTRAVENOUS at 13:56

## 2023-10-02 RX ADMIN — FINASTERIDE 5 MG: 5 TABLET, FILM COATED ORAL at 18:33

## 2023-10-02 RX ADMIN — INSULIN LISPRO 8 UNITS: 100 INJECTION, SOLUTION INTRAVENOUS; SUBCUTANEOUS at 18:31

## 2023-10-02 RX ADMIN — INSULIN GLARGINE 44 UNITS: 100 INJECTION, SOLUTION SUBCUTANEOUS at 20:29

## 2023-10-02 RX ADMIN — PREGABALIN 100 MG: 100 CAPSULE ORAL at 20:30

## 2023-10-02 RX ADMIN — Medication 10 MG: at 11:18

## 2023-10-02 RX ADMIN — FENTANYL CITRATE 25 MCG: 50 INJECTION, SOLUTION INTRAMUSCULAR; INTRAVENOUS at 13:55

## 2023-10-02 RX ADMIN — Medication 3000 MG: at 22:51

## 2023-10-02 RX ADMIN — ACETAMINOPHEN 1000 MG: 500 TABLET ORAL at 09:22

## 2023-10-02 RX ADMIN — PHENYLEPHRINE HYDROCHLORIDE 200 MCG: 10 INJECTION INTRAVENOUS at 14:18

## 2023-10-02 RX ADMIN — TAMSULOSIN HYDROCHLORIDE 0.4 MG: 0.4 CAPSULE ORAL at 18:33

## 2023-10-02 RX ADMIN — Medication 10 MG: at 10:36

## 2023-10-02 RX ADMIN — CEFAZOLIN 2 G: 1 INJECTION, POWDER, FOR SOLUTION INTRAMUSCULAR; INTRAVENOUS at 10:40

## 2023-10-02 RX ADMIN — BUDESONIDE AND FORMOTEROL FUMARATE DIHYDRATE 2 PUFF: 160; 4.5 AEROSOL RESPIRATORY (INHALATION) at 18:52

## 2023-10-02 RX ADMIN — LIDOCAINE HYDROCHLORIDE 40 MG: 10 INJECTION, SOLUTION EPIDURAL; INFILTRATION; INTRACAUDAL; PERINEURAL at 10:27

## 2023-10-02 RX ADMIN — SODIUM CHLORIDE: 9 INJECTION, SOLUTION INTRAVENOUS at 22:49

## 2023-10-02 RX ADMIN — ROPINIROLE HYDROCHLORIDE 2 MG: 0.5 TABLET, FILM COATED ORAL at 20:26

## 2023-10-02 RX ADMIN — FENTANYL CITRATE 25 MCG: 50 INJECTION, SOLUTION INTRAMUSCULAR; INTRAVENOUS at 10:46

## 2023-10-02 RX ADMIN — SODIUM CHLORIDE: 9 INJECTION, SOLUTION INTRAVENOUS at 15:26

## 2023-10-02 RX ADMIN — AMIODARONE HYDROCHLORIDE 200 MG: 200 TABLET ORAL at 21:32

## 2023-10-02 RX ADMIN — PROPOFOL 40 MG: 10 INJECTION, EMULSION INTRAVENOUS at 13:42

## 2023-10-02 RX ADMIN — PROPOFOL 75 MCG/KG/MIN: 10 INJECTION, EMULSION INTRAVENOUS at 10:33

## 2023-10-02 RX ADMIN — VASOPRESSIN 1 UNITS: 20 INJECTION INTRAVENOUS at 14:23

## 2023-10-02 RX ADMIN — VASOPRESSIN 1 UNITS: 20 INJECTION INTRAVENOUS at 14:42

## 2023-10-02 RX ADMIN — CEFAZOLIN 2 G: 1 INJECTION, POWDER, FOR SOLUTION INTRAMUSCULAR; INTRAVENOUS at 14:20

## 2023-10-02 RX ADMIN — METOPROLOL TARTRATE 50 MG: 50 TABLET, FILM COATED ORAL at 21:32

## 2023-10-02 RX ADMIN — PROPOFOL 200 MG: 10 INJECTION, EMULSION INTRAVENOUS at 10:27

## 2023-10-02 RX ADMIN — DEXAMETHASONE SODIUM PHOSPHATE 8 MG: 4 INJECTION INTRA-ARTICULAR; INTRALESIONAL; INTRAMUSCULAR; INTRAVENOUS; SOFT TISSUE at 10:27

## 2023-10-02 RX ADMIN — ATORVASTATIN CALCIUM 40 MG: 40 TABLET, FILM COATED ORAL at 20:27

## 2023-10-02 RX ADMIN — Medication 10 MG: at 13:37

## 2023-10-02 RX ADMIN — INSULIN HUMAN 15 UNITS: 100 INJECTION, SOLUTION PARENTERAL at 15:59

## 2023-10-02 RX ADMIN — GABAPENTIN 300 MG: 300 CAPSULE ORAL at 09:22

## 2023-10-02 RX ADMIN — SODIUM CHLORIDE: 9 INJECTION, SOLUTION INTRAVENOUS at 15:27

## 2023-10-02 RX ADMIN — Medication 10 MG: at 10:38

## 2023-10-02 RX ADMIN — GEMFIBROZIL 600 MG: 600 TABLET ORAL at 18:32

## 2023-10-02 RX ADMIN — LOSARTAN POTASSIUM 50 MG: 50 TABLET, FILM COATED ORAL at 18:33

## 2023-10-02 RX ADMIN — SODIUM CHLORIDE: 9 INJECTION, SOLUTION INTRAVENOUS at 09:34

## 2023-10-02 RX ADMIN — MIDAZOLAM 2 MG: 1 INJECTION INTRAMUSCULAR; INTRAVENOUS at 09:59

## 2023-10-02 RX ADMIN — SUGAMMADEX 200 MG: 100 INJECTION, SOLUTION INTRAVENOUS at 11:49

## 2023-10-02 RX ADMIN — ALBUMIN (HUMAN) 25 G: 12.5 INJECTION, SOLUTION INTRAVENOUS at 14:40

## 2023-10-02 RX ADMIN — PREGABALIN 100 MG: 100 CAPSULE ORAL at 18:33

## 2023-10-02 RX ADMIN — FENTANYL CITRATE 25 MCG: 50 INJECTION, SOLUTION INTRAMUSCULAR; INTRAVENOUS at 10:27

## 2023-10-02 ASSESSMENT — ENCOUNTER SYMPTOMS
STRIDOR: 0
SHORTNESS OF BREATH: 0

## 2023-10-02 ASSESSMENT — PAIN - FUNCTIONAL ASSESSMENT: PAIN_FUNCTIONAL_ASSESSMENT: 0-10

## 2023-10-02 ASSESSMENT — LIFESTYLE VARIABLES: SMOKING_STATUS: 0

## 2023-10-02 ASSESSMENT — COPD QUESTIONNAIRES: CAT_SEVERITY: NO INTERVAL CHANGE

## 2023-10-02 NOTE — ANESTHESIA PRE PROCEDURE
Department of Anesthesiology  Preprocedure Note       Name:  Esperanza Villafuerte   Age:  61 y.o.  :  1960                                          MRN:  [id number]         Date:  10/2/2023      Surgeon: Leon Mcgarry):  MD Ariadna Pierre PA    Procedure: Procedure(s):  LUMBAR POSTERIOR DECOMPRESSION L2-5, L4-5 POSTERIOR INSTRUMENTED FUSION    Medications prior to admission:   Prior to Admission medications    Medication Sig Start Date End Date Taking? Authorizing Provider   amLODIPine (NORVASC) 2.5 MG tablet take 1 tablet by mouth once daily 23   Caro Burrell MD   levothyroxine (SYNTHROID) 175 MCG tablet take 1 tablet by mouth once daily 23   Caro Burrell MD   pregabalin (LYRICA) 100 MG capsule Take 1 capsule by mouth in the morning, at noon, and at bedtime for 90 days.  23  Caro Burrell MD   MOUNJARO 2.5 MG/0.5ML SOPN SC injection  23   Historical Provider, MD   DROPLET PEN NEEDLES 31G X 8 MM MISC use 1 PEN NEEDLE to inject MEDICATION subcutaneously once daily 23   Historical Provider, MD   magnesium oxide (MAG-OX) 400 (240 Mg) MG tablet take 1 tablet by mouth once daily for 7 days  Patient not taking: Reported on 2023   Historical Provider, MD   albuterol sulfate HFA (PROVENTIL;VENTOLIN;PROAIR) 108 (90 Base) MCG/ACT inhaler Inhale 2 puffs into the lungs every 6 hours as needed for Wheezing or Shortness of Breath 23   Caro Burrell MD   amiodarone (CORDARONE) 200 MG tablet Take 1 tablet by mouth 2 times daily 23   Caro Burrell MD   bisacodyl 5 MG EC tablet Please follow instructions given to you by your provider  Patient not taking: Reported on 2023   Caro Burrell MD   budesonide-formoterol (SYMBICORT) 160-4.5 MCG/ACT AERO INHALE 2 PUFFS INTO THE LUNGS TWICE A DAY 23   Caro Burrell MD   finasteride (PROSCAR) 5 MG tablet Take 1 tablet by mouth daily 23   Caro Burrell MD   gemfibrozil (LOPID)

## 2023-10-02 NOTE — H&P
HISTORY and [id number]       NAME:  Charlie Calle  MRN: [id number]   YOB: 1960   Date: 10/2/2023   Age: 61 y.o. Gender: male       COMPLAINT AND PRESENT HISTORY:       Charlie Calle is 61 y.o.,  male, undergoing for :Pre-Op Diagnosis Codes:     * Spinal stenosis, unspecified spinal region [M48.00]    Patient will be scheduled to have:  LUMBAR POSTERIOR DECOMPRESSION L2-5, L4-5 POSTERIOR INSTRUMENTED FUSION    Office visit per  on 9/5/23  HPI:  This is a 61 y.o. male who presents to the clinic today for follow up of low back pain. Patient was last seen 6 months ago for his lower back pain. He was not interested in having surgery at the time as his shoulder pain was his main concern, which has been resolved. He is now interested in surgery. He followed with pain management without any resolution of his pain. Patient ambulates with a cane    UPDATE:   Patient did agree to have above surgery. Pt had Cortisone injection to his shoulder. Patient has a long term history of low back pain. Pt denies any prior injury to back and denies any prior back surgeries. Pt has DDD, complains of pain in the lower back with out any significant radiation . The symptoms started  1 year ago. Pt complains of pain, deformity, limitation in the range of motion. The problem has been gradually worsening since onset. Patient rates the pain at 10/10 with 5-10min standing. Pt states he has no pain sitting     Patient reports that  standing aggravates sxs. The most.   Pt  denies any   paresthesia to upper or lower extremities. Patient has used  pain management and has had ablation, epidural interventions to help with the pain. Patient is cane using assistive device to aide in ambulation. Pt is left above knee amputee and wears an prosthetic leg. No recent falls or trauma. .  Pt   denies any bowel or  bladder dysfunction. Medical History reviewed.  Hx cyanosis or jaundice. HEAD:  Normocephalic, atraumatic, no swelling or tenderness. EYES:  Pupils equal, reactive to light. EARS:  No discharge, no marked hearing loss. NOSE:  No rhinorrhea, epistaxis or septal deformity. THROAT:  Not congested. No ulceration bleeding or discharge. NECK:  No stiffness, trachea central.  No palpable masses or L.N.                 CHEST:  Symmetrical and equal on expansion. HEART:  RRR . No audible murmurs or gallops. LUNGS:  Equal on expansion, normal breath sounds. No adventitious sounds. ABDOMEN:  Obese. Soft on palpation. No dysphagia, No localized tenderness. No guarding or rigidity. LYMPHATICS:  No palpable cervical lymphadenopathy. LOCOMOTOR, BACK AND SPINE:  No tenderness or deformities. Left  below the knee amputation with prosthetic leg. EXTREMITIES:  Symmetrical, no pretibial edema. No discoloration or ulcerations. NEUROLOGIC:  The patient is conscious, alert, oriented,Cranial nerve II-XII intact, taste and smell were not examined. No apparent focal sensory or motor deficits.              PROVISIONAL DIAGNOSES / SURGERY:        LUMBAR POSTERIOR DECOMPRESSION L2-5, L4-5 POSTERIOR INSTRUMENTED FUSION    Pre-Op Diagnosis Codes:     * Spinal stenosis, unspecified spinal region [M48.00]       Patient Active Problem List    Diagnosis Date Noted    Solid nodule of lung 6 mm to 8 mm in diameter 05/17/2022    Chronic renal disease, stage III ([address]) [291455] 05/04/2022    Coagulation defect ([address]) 09/06/2023    Severe comorbid illness 07/13/2023    Lumbosacral spondylosis without myelopathy 04/24/2023    Anginal equivalent 01/19/2022    Hypertrophic cardiomyopathy ([address]) 12/30/2021    Chest pain 12/28/2021    Rule out acute coronary syndrome 12/28/2021    Chronic diastolic heart failure with preserved EF 12/28/2021    BPH

## 2023-10-02 NOTE — PROGRESS NOTES
Herbal and Nutritional Product Restrictions      The following herbal, alternative, and/or nutritional/dietary supplement product(s) has been discontinued per [facility] approved policy:      Glucosamine tablets    Please reorder upon discharge if appropriate.     Thank you,  Liane Fernandez, [facility]  10/2/2023 4:56 PM

## 2023-10-02 NOTE — BRIEF OP NOTE
Brief Postoperative Note      Patient: Thuy Beal  YOB: 1960  MRN: [id number]    Date of Procedure: 10/2/2023    Pre-Op Diagnosis Codes:     * Spinal stenosis, unspecified spinal region [M48.00]    Post-Op Diagnosis: Same       Procedure(s):  LUMBAR POSTERIOR DECOMPRESSION L2-5, L4-5 POSTERIOR INSTRUMENTED FUSION    Surgeon(s):  Mariya Lucas MD    Assistant:  * No surgical staff found *    Anesthesia: General    Estimated Blood Loss (mL): 5629    Complications: None    Specimens:   * No specimens in log *    Implants:  Implant Name Type Inv.  Item Serial No.  Lot No. LRB No. Used Action   SPACER SPNL 15 DEG [id number] MM SABLE - [id number]  SPACER SPNL 15 DEG [id number] MM SABLE  [employer] [id number] N/A 1 Implanted   GRAFT BNE CHIP FRZ DRY CANC BEN 1MM 8MM RANG 30CC READIGRFT - [id number]  GRAFT BNE CHIP FRZ DRY CANC BEN 1MM 8MM RANG 30CC READIGRFT  [facility] TISSUE BANK-WD [de-identified] N/A 1 Implanted         Drains:   Closed/Suction Drain Left Back Bulb (Active)       Nephrostomy 1 Right (Active)       Findings: see dictation      Electronically signed by Nancy Correa MD on 10/2/2023 at 3:13 PM

## 2023-10-02 NOTE — OP NOTE
[address] [facility]                 [address] [place], [address] [facility]                                OPERATIVE REPORT    PATIENT NAME: Tremayne Rios                   :        1960  MED REC NO:   [id number]                              ROOM:  ACCOUNT NO:   [de-identified]                           ADMIT DATE: 10/02/2023  PROVIDER:     Maxine Garrido MD    DATE OF PROCEDURE:  10/02/2023    PREOPERATIVE DIAGNOSES:  Lumbar spinal stenosis with neurogenic  claudication and chronic low back pain. POSTOPERATIVE DIAGNOSES:  Lumbar spinal stenosis with neurogenic  claudication and chronic low back pain    OPERATION PERFORMED:    OPERATING SURGEON:  Misa Suarez. Agustina Jonas MD    FIRST ASSISTANT:  Elwin Favre, PA, utilized for patient positioning,  wound retraction, suctioning, and wound closure. ANESTHESIA:  General.    ESTIMATED BLOOD LOSS:  1000 mL. COMPLICATIONS:  None. SPECIMENS:  None. IMPLANTS:  Globus screw jeny construct with 15-degree expandable  intervertebral body fusion cage plus nonstructural cortical cancellous  allograft. DRAINS:  None. PROCEDURE PERFORMED:  1. L2-L3, L3-L4, and L4-L5 decompression with laminotomies, bilateral  partial medial facetectomies, and neural foraminotomies. Actually, at  L4-L5, complete laminectomy at L4 completed. 2. L4-L5 posterior instrumented fusion. 3.  Posterior interbody fusion M5-M2 with application of vertebral body  fusion cage. 4.  Local autograft bone graft treated with nonstructural allograft bone  grafting. 5.  Fluoroscopic assistance. FINDINGS:  1. Fluoroscopy utilized for level localization, aid in pedicle screw  placement, post pedicle screw placement. Screws were checked AP,  lateral, and en face. Final AP and lateral fluoroscopic images  demonstrated acceptable graft hardware placement at L4-L5 plus the  extent of the laminotomies at L2-L3 and L3-L4.   2.  Neuromonitoring performed throughout case.  No significant  abnormalities. Low signaling at L5 screw on the right required us to  reposition this further lateral; although, when I was inspecting the  pedicle, I could not feel nor see it. Although, I anticipate the  patient had a bit of the sacral flare to the intradural space and I  suspect we just had a couple screws nick on medial side and potentially  pass through the pedicle prior to repositioning. PROCEDURE IN DETAIL:  The patient was taken to the operating room,  placed under general anesthesia, transferred to the [address] [place] [address]  table, and checked for padding and positioning. Back was prepped and  draped in the usual sterile fashion. An 18-gauge spinal needle was  advanced from the skin to the L4 transverse process. Back injected with  local anesthetic. This was completed bilaterally. Midline incision  made. Dissection was carried down to expose the transverse process of  the L4-L5 to the lateral aspect of facet joints at L2-L3. Lateral gutter was decorticated at L4-L5 and pedicle entry positions  were established. Pedicle screws were placed at L4 and L5 bilaterally; although, the  pedicles were poorly visualized especially at L5. Post placement of the screws, the screws were checked AP, lateral, and  en face and they appeared to be acceptable; although, the patient had  quite medial pedicles and the L5 pedicles were exceedingly difficult to  visualize. The screws were then stimulated with neuromonitoring and the  L5 screw on the right stimulated out low. Later in the case, we would restart the screw about 5 mm lateral to the  initial starting point, replaced the screw at which point it stemmed out  normally. At this juncture, L4 laminectomy was completed as well as an L3  laminotomy.   Bulk of the laminectomy was completed with a combination of  rongeurs; however, a significant amount of the lateral initial  decompression partial medial facetectomy was completed

## 2023-10-03 LAB
GLUCOSE BLD-MCNC: 248 MG/DL (ref 75–110)
GLUCOSE BLD-MCNC: 271 MG/DL (ref 75–110)
GLUCOSE BLD-MCNC: 273 MG/DL (ref 75–110)
GLUCOSE BLD-MCNC: 287 MG/DL (ref 75–110)

## 2023-10-03 PROCEDURE — 99223 1ST HOSP IP/OBS HIGH 75: CPT | Performed by: INTERNAL MEDICINE

## 2023-10-03 PROCEDURE — 97530 THERAPEUTIC ACTIVITIES: CPT

## 2023-10-03 PROCEDURE — 82947 ASSAY GLUCOSE BLOOD QUANT: CPT

## 2023-10-03 PROCEDURE — 94761 N-INVAS EAR/PLS OXIMETRY MLT: CPT

## 2023-10-03 PROCEDURE — 6370000000 HC RX 637 (ALT 250 FOR IP): Performed by: ORTHOPAEDIC SURGERY

## 2023-10-03 PROCEDURE — 6360000002 HC RX W HCPCS: Performed by: ORTHOPAEDIC SURGERY

## 2023-10-03 PROCEDURE — 94640 AIRWAY INHALATION TREATMENT: CPT

## 2023-10-03 PROCEDURE — 97162 PT EVAL MOD COMPLEX 30 MIN: CPT

## 2023-10-03 PROCEDURE — 1200000000 HC SEMI PRIVATE

## 2023-10-03 PROCEDURE — 6370000000 HC RX 637 (ALT 250 FOR IP): Performed by: INTERNAL MEDICINE

## 2023-10-03 PROCEDURE — 99024 POSTOP FOLLOW-UP VISIT: CPT | Performed by: ORTHOPAEDIC SURGERY

## 2023-10-03 PROCEDURE — 2580000003 HC RX 258: Performed by: ORTHOPAEDIC SURGERY

## 2023-10-03 RX ORDER — OXYCODONE HYDROCHLORIDE AND ACETAMINOPHEN 5; 325 MG/1; MG/1
1 TABLET ORAL EVERY 6 HOURS PRN
Qty: 28 TABLET | Refills: 0 | Status: SHIPPED | OUTPATIENT
Start: 2023-10-03 | End: 2023-10-10

## 2023-10-03 RX ORDER — INSULIN GLARGINE 100 [IU]/ML
48 INJECTION, SOLUTION SUBCUTANEOUS 2 TIMES DAILY
Status: DISCONTINUED | OUTPATIENT
Start: 2023-10-03 | End: 2023-10-04 | Stop reason: HOSPADM

## 2023-10-03 RX ADMIN — TAMSULOSIN HYDROCHLORIDE 0.4 MG: 0.4 CAPSULE ORAL at 07:53

## 2023-10-03 RX ADMIN — BUDESONIDE AND FORMOTEROL FUMARATE DIHYDRATE 2 PUFF: 160; 4.5 AEROSOL RESPIRATORY (INHALATION) at 07:05

## 2023-10-03 RX ADMIN — GEMFIBROZIL 600 MG: 600 TABLET ORAL at 07:53

## 2023-10-03 RX ADMIN — AMIODARONE HYDROCHLORIDE 200 MG: 200 TABLET ORAL at 20:51

## 2023-10-03 RX ADMIN — PREGABALIN 100 MG: 100 CAPSULE ORAL at 20:51

## 2023-10-03 RX ADMIN — ISOSORBIDE MONONITRATE 30 MG: 30 TABLET, EXTENDED RELEASE ORAL at 07:53

## 2023-10-03 RX ADMIN — LEVOTHYROXINE SODIUM 150 MCG: 0.07 TABLET ORAL at 05:52

## 2023-10-03 RX ADMIN — PREGABALIN 100 MG: 100 CAPSULE ORAL at 07:53

## 2023-10-03 RX ADMIN — ATORVASTATIN CALCIUM 40 MG: 40 TABLET, FILM COATED ORAL at 20:51

## 2023-10-03 RX ADMIN — ROPINIROLE HYDROCHLORIDE 2 MG: 0.5 TABLET, FILM COATED ORAL at 20:51

## 2023-10-03 RX ADMIN — SODIUM CHLORIDE, PRESERVATIVE FREE 10 ML: 5 INJECTION INTRAVENOUS at 20:52

## 2023-10-03 RX ADMIN — PANTOPRAZOLE SODIUM 40 MG: 40 TABLET, DELAYED RELEASE ORAL at 05:52

## 2023-10-03 RX ADMIN — SODIUM CHLORIDE: 9 INJECTION, SOLUTION INTRAVENOUS at 05:57

## 2023-10-03 RX ADMIN — INSULIN LISPRO 2 UNITS: 100 INJECTION, SOLUTION INTRAVENOUS; SUBCUTANEOUS at 16:59

## 2023-10-03 RX ADMIN — METOPROLOL TARTRATE 50 MG: 50 TABLET, FILM COATED ORAL at 20:51

## 2023-10-03 RX ADMIN — SODIUM CHLORIDE, PRESERVATIVE FREE 10 ML: 5 INJECTION INTRAVENOUS at 08:02

## 2023-10-03 RX ADMIN — Medication 3000 MG: at 05:58

## 2023-10-03 RX ADMIN — METOPROLOL TARTRATE 50 MG: 50 TABLET, FILM COATED ORAL at 07:53

## 2023-10-03 RX ADMIN — LOSARTAN POTASSIUM 50 MG: 50 TABLET, FILM COATED ORAL at 07:53

## 2023-10-03 RX ADMIN — INSULIN GLARGINE 48 UNITS: 100 INJECTION, SOLUTION SUBCUTANEOUS at 20:51

## 2023-10-03 RX ADMIN — INSULIN LISPRO 4 UNITS: 100 INJECTION, SOLUTION INTRAVENOUS; SUBCUTANEOUS at 11:51

## 2023-10-03 RX ADMIN — INSULIN LISPRO 4 UNITS: 100 INJECTION, SOLUTION INTRAVENOUS; SUBCUTANEOUS at 07:53

## 2023-10-03 RX ADMIN — PREGABALIN 100 MG: 100 CAPSULE ORAL at 14:07

## 2023-10-03 RX ADMIN — AMLODIPINE BESYLATE 2.5 MG: 2.5 TABLET ORAL at 07:53

## 2023-10-03 RX ADMIN — FINASTERIDE 5 MG: 5 TABLET, FILM COATED ORAL at 07:53

## 2023-10-03 RX ADMIN — BUDESONIDE AND FORMOTEROL FUMARATE DIHYDRATE 2 PUFF: 160; 4.5 AEROSOL RESPIRATORY (INHALATION) at 19:11

## 2023-10-03 RX ADMIN — INSULIN GLARGINE 44 UNITS: 100 INJECTION, SOLUTION SUBCUTANEOUS at 07:54

## 2023-10-03 RX ADMIN — AMIODARONE HYDROCHLORIDE 200 MG: 200 TABLET ORAL at 07:53

## 2023-10-03 NOTE — PROGRESS NOTES
Cystoscopy (Right, 07/30/2020); Cystoscopy (N/A, 09/01/2020); Upper gastrointestinal endoscopy (N/A, 05/26/2023); Colonoscopy (N/A, 05/26/2023); Pain management procedure; and lumbar fusion (N/A, 10/2/2023). Assessment   Assessment: Patient shows a decline in level of functioning s/p back surgery done 10/02/23 and would benefit from PT intervention to improve strength, functional mobility and balance. Treatment Diagnosis: impaired mobility and strength  Specific Instructions for Next Treatment: ther exs and ther activites as tolerated  Therapy Prognosis: Good  Decision Making: Medium Complexity  Exam: MMT,ROM, sensations and functional mobility testing  Clinical Presentation: Pt was alert,cooperative,oriented and motivated. Requires PT Follow-Up: Yes  Activity Tolerance  Activity Tolerance: Patient tolerated evaluation without incident     Plan   Physcial Therapy Plan  General Plan: 2 times a day 7 days a week  Specific Instructions for Next Treatment: ther exs and ther activites as tolerated  Current Treatment Recommendations: Strengthening, Balance training, Functional mobility training, Transfer training, Gait training, Stair training  Safety Devices  Type of Devices:  All fall risk precautions in place, Call light within reach, Left in bed, Nurse notified     Restrictions  Restrictions/Precautions  Restrictions/Precautions:  (activity as tolerated)  Required Braces or Orthoses?: Yes (left below  knee prosthesis)  Implants present? :  (hardware from back surgery done 10/2/23)     Subjective   Pain: 1/10 (low back)  General  Chart Reviewed: Yes  Patient assessed for rehabilitation services?: Yes  Response To Previous Treatment: Not applicable (PT eval done today)  Family / Caregiver Present: No  Referring Practitioner: Phi Amezquita  Referral Date : 10/02/23  Diagnosis: s/p LUMBAR POSTERIOR DECOMPRESSION L2-5, L4-5 POSTERIOR INSTRUMENTED FUSION done 10/03/23  Follows Commands: Within Functional nanette drain present (several small open wounds on R leg anteriorly)        AROM RLE (degrees)  RLE AROM: WFL  AROM LLE (degrees)  LLE AROM : WFL (knee 15 - 100)  AROM RUE (degrees)  RUE AROM : WFL  AROM LUE (degrees)  LUE AROM : WFL  Strength RLE  Strength RLE: WFL  Comment: grossly 4/5  Strength LLE  Strength LLE: WFL  Comment: grossly 4/5  Strength RUE  Strength RUE: WFL  Comment: grossly 4/5  Strength LUE  Strength LUE: WFL  Comment: grossly 4/5     Sensation  Overall Sensation Status:  (phantom pain in left leg, tingling and numbness both hands and right foot)     Bed mobility  Rolling to Left: Minimal assistance  Rolling to Right: Minimal assistance  Supine to Sit: Minimal assistance  Sit to Supine: Minimal assistance  Bed Mobility Comments: head of bed raised for ease and used bed rails  Transfers  Sit to Stand: Minimal Assistance  Stand to Sit: Minimal Assistance  Comment: raised height of bed since pt is very tall 6'4\"  Ambulation  Surface: Level tile  Device: Rolling Walker  Other Apparatus:  (L below knee prosthesis which pt donned and doffed independently)  Assistance: Minimal assistance;2 Person assistance  Gait Deviations: Slow Cynthia;Decreased step length;Decreased step height  Distance: 40 ft x 2  Stairs/Curb  Stairs?: No     Balance  Posture: Fair  Sitting - Static: Fair  Sitting - Dynamic: Fair  Standing - Static: Fair (with rolling walker)  Standing - Dynamic: Fair (with rolling walker)                                                             AM-PAC Score  AM-PAC Inpatient Mobility Raw Score : 14 (10/03/23 1030)  AM-PAC Inpatient T-Scale Score : 38.1 (10/03/23 1030)  Mobility Inpatient CMS 0-100% Score: 61.29 (10/03/23 1030)  Mobility Inpatient CMS G-Code Modifier : CL (10/03/23 1030)                 Goals  Short Term Goals  Time Frame for Short Term Goals: 2 sessions  Short Term Goal 1: Improve strength in both lower extremities to 4+/5  Short Term Goal 2:  Independent in bed mobility  Short Term

## 2023-10-03 NOTE — PROGRESS NOTES
CLINICAL PHARMACY NOTE: MEDS TO BEDS    Total # of Prescriptions Filled: 1   The following medications were delivered to the patient:  Oxycodone 5/325    Additional Documentation:  Delivered medications to patients room

## 2023-10-03 NOTE — PLAN OF CARE
Problem: Pain  Goal: Verbalizes/displays adequate comfort level or baseline comfort level  Outcome: Progressing, Patient denies need for any pain medication at this time. Patient has orders for Oxycodone every 4 hours as needed for pain. Problem: Safety - Adult  Goal: Free from fall injury  Outcome: Progressing, Patient remains free of incidence/ injury. Bed remains in low position. Side rails up x2.          Problem: Chronic Conditions and Co-morbidities  Goal: Patient's chronic conditions and co-morbidity symptoms are monitored and maintained or improved  Outcome: Progressing  Flowsheets (Taken 10/2/2023 2030)  Care Plan - Patient's Chronic Conditions and Co-Morbidity Symptoms are Monitored and Maintained or Improved:   Monitor and assess patient's chronic conditions and comorbid symptoms for stability, deterioration, or improvement   Update acute care plan with appropriate goals if chronic or comorbid symptoms are exacerbated and prevent overall improvement and discharge

## 2023-10-03 NOTE — CARE COORDINATION
Case Management Assessment  Initial Evaluation    Date/Time of Evaluation: 10/3/2023 11:16 AM  Assessment Completed by: Kodi Guevara RN    If patient is discharged prior to next notation, then this note serves as note for discharge by case management. Patient Name: Jennifer Chavez                   YOB: 1960  Diagnosis: Spinal stenosis, unspecified spinal region [M48.00]  Lumbar stenosis with neurogenic claudication [M48.062]                   Date / Time: 10/2/2023  8:26 AM    Patient Admission Status: Inpatient   Readmission Risk (Low < 19, Mod (19-27), High > 27): Readmission Risk Score: 11.4    Current PCP: Renata Charles MD  PCP verified by CM? Yes    Chart Reviewed: Yes      History Provided by: Patient  Patient Orientation: Alert and Oriented    Patient Cognition: Alert    Hospitalization in the last 30 days (Readmission):  No    If yes, Readmission Assessment in CM Navigator will be completed. Advance Directives:      Code Status: Full Code   Patient's Primary Decision Maker is: Legal Next of Kin      Discharge Planning:    Patient lives with: Spouse/Significant Other Type of Home: House  Primary Care Giver: Self  Patient Support Systems include: Spouse/Significant Other   Current Financial resources: Medicare  Current community resources: None  Current services prior to admission: Durable Medical Equipment            Current DME: Ty Ore, Wheelchair, Glucometer            Type of Home Care services:  None    ADLS  Prior functional level: Independent in ADLs/IADLs  Current functional level: Independent in ADLs/IADLs    PT AM-PAC: 14 /24  OT AM-PAC:   /24    Family can provide assistance at DC: Yes  Would you like Case Management to discuss the discharge plan with any other family members/significant others, and if so, who?  Yes (Spouse; Hina Peralta)  Plans to Return to Present Housing: Yes  Other Identified Issues/Barriers to RETURNING to current housing: NA  Potential Assistance

## 2023-10-03 NOTE — ANESTHESIA POSTPROCEDURE EVALUATION
Department of Anesthesiology  Postprocedure Note    Patient: Augustine Del Rosario  MRN: [id number]  YOB: 1960  Date of evaluation: 10/3/2023      Procedure Summary     Date: 10/02/23 Room / Location: [address] [place] [address] / [facility]: GISELLA GIRALDO    Anesthesia Start: 1004 Anesthesia Stop: 2979    Procedure: LUMBAR POSTERIOR DECOMPRESSION L2-5, L4-5 POSTERIOR INSTRUMENTED FUSION (Spine Lumbar) Diagnosis:       Spinal stenosis, unspecified spinal region      (Spinal stenosis, unspecified spinal region [M48.00])    Surgeons: Keron Garcia MD Responsible Provider: Winter Dangelo MD    Anesthesia Type: general ASA Status: 3          Anesthesia Type: No value filed. Cass Phase I: Cass Score: 8    Cass Phase II:        Anesthesia Post Evaluation    Comments: POD #1 Patient seen lying in bed. States he had a blister around the chin area which he felt was due to the use of the wrong type of tape he was allergic to. Patient was advised, it was most likely due to being prone for surgery and his head though on a soft foam headrest had that result. Apologies tendered and received. No other anesthesia related issues.

## 2023-10-03 NOTE — PROGRESS NOTES
Physical Therapy  Facility/Department: [facility] MED SURG  Daily Treatment Note  NAME: Salvador Dsouza  : 1960  MRN: [id number]    Date of Service: 10/3/2023    Discharge Recommendations:  Patient would benefit from continued therapy after discharge        Patient Diagnosis(es): The primary encounter diagnosis was Lumbosacral spondylosis without myelopathy. A diagnosis of Pain was also pertinent to this visit. Assessment   Assessment: Pt was able to ambulate 132 ft with a rolling walker and minimal assistance of 1. Activity Tolerance: Patient tolerated treatment well     Plan    Physcial Therapy Plan  General Plan: 2 times a day 7 days a week  Specific Instructions for Next Treatment: ther exs and ther activites as tolerated  Current Treatment Recommendations: Strengthening;Balance training;Functional mobility training;Transfer training;Gait training;Stair training     Restrictions  Restrictions/Precautions  Restrictions/Precautions:  (activity as tolerated)  Required Braces or Orthoses?: Yes (left below  knee prosthesis)  Implants present? :  (hardware from back surgery done 10/2/23)     Subjective    Subjective  Subjective: Pt was agreeable for PT treatment.   Pain: 1/10  Orientation  Overall Orientation Status: Within Normal Limits  Cognition  Overall Cognitive Status: WFL     Objective        Bed Mobility Training  Bed Mobility Training: No (Pt seen sitting at edge of bed.)  Balance  Sitting: Intact  Transfer Training  Transfer Training: Yes (Pt is able to don and doff his prosthesis independently)  Overall Level of Assistance: Minimum assistance  Sit to Stand: Minimum assistance  Stand to Sit: Minimum assistance  Gait Training  Gait Training: Yes  Gait  Overall Level of Assistance: Minimum assistance (another person as a standby assistant since pt has a below knee prosthesis, recent back surgery and is very tall 6'4\")  Interventions: Safety awareness training  Speed/Cynthia: Slow  Step Length: Left

## 2023-10-03 NOTE — DISCHARGE INSTR - COC
Continuity of Care Form    Patient Name: Ness Martell   :  1960  MRN:  [id number]    Admit date:  10/2/2023  Discharge date:  ***    Code Status Order: Full Code   Advance Directives:   Advance Care Flowsheet Documentation       Date/Time Healthcare Directive Type of Healthcare Directive Copy in [address] Agent's Name Healthcare Agent's Phone Number    10/02/23 0913 No, patient does not have an advance directive for healthcare treatment -- -- -- -- --            Admitting Physician:  Demi Barksdale MD  PCP: Bairon Rain MD    Discharging Nurse: [facility] Unit/Room#: 2066  Discharging Unit Phone Number: [phone]    Emergency Contact:   Extended Emergency Contact Information  Primary Emergency Contact: Segun Colon  Address: [facility], [address] [place] [address] [place] [address] [address] Phone: [phone]  Work Phone: [phone]  Mobile Phone: [phone]  Relation: Spouse   needed?  No  Secondary Emergency Contact: KrishnaallisonChapito  [place] Phone: [phone]  Work Phone: [phone]  Mobile Phone: [phone]  Relation: Child    Past Surgical History:  Past Surgical History:   Procedure Laterality Date    CARDIAC CATHETERIZATION      no stenting    CARDIOVASCULAR STRESS TEST  2019    small fixed apical, likely normal, EF 48%    COLONOSCOPY  2016    poor prep, done up to the IC valve, redundant colon    COLONOSCOPY  2017    VIRTUAL COLONOSCOPY DONE-no polyps or masses    COLONOSCOPY N/A 2023    COLONOSCOPY DIAGNOSTIC TO RIGHT COLON ONLY performed by Hiren Peterson MD at [address] [facility] Bilateral 2020    CYSTOSCOPY RETROGRADE PYELOGRAM URETEROSCOPY performed by Nader Wilder MD at [address] [facility] Right 2020    CYSTOSCOPY, RIGHT RETROGRADE PYELOGRAM,  URETERAL CATHETER  INSERTION performed by Nader Wilder MD at [facility] 2020    CYSTOSCOPY RETROGRADE PYELOGRAM, RIGHT URETERAL STENT EXCHANGE performed by Roderick Jha MD at [address] [facility] [address] Right     r-bone removed    HC  PICC [address] DOUBLE  05/21/2018         KIDNEY CYST REMOVAL      KIDNEY SURGERY Right 11/13/2019    XI ROBOTIC PARTIAL NEPHRECTOMY, INTRAOP ULTRASOUND, RIGHT URETEROURETEROSTOMY, RIGHT URETERAL STENT PLACEMENT **SHORT STAY** performed by Roderick Jha MD at [facility] 07/30/2020    XI LAPAROSCOPIC ROBOTIC URETEROLYSIS, ROBOTIC ASSISTED BUCCAL URETEROPLASTY  (DR. COBIAN TO ASSIST) performed by Roderick Jha MD at [address] Left 04/29/2015    LUMBAR FUSION N/A 10/2/2023    LUMBAR POSTERIOR DECOMPRESSION L2-5, L4-5 POSTERIOR INSTRUMENTED FUSION performed by Prisca Garcia MD at [address] Left 5-5-15 and 11/2015    Revision BKA    OTHER SURGICAL HISTORY      left stump revision 5/30/2018    PAIN MANAGEMENT PROCEDURE      MN AMP LEG THRU TIBIA&FIBULA RE-AMPUTATION Left 05/30/2018    LEG AMPUTATION BELOW KNEE REVISION performed by Akin Humphrey MD at [address] [facility] Bilateral 80'S    TOE AMPUTATION      Right 2 and 4    UPPER GASTROINTESTINAL ENDOSCOPY N/A 05/26/2023    EGD BIOPSY performed by Karol Barron MD at [facility] ENDO    VITRECTOMY Left 09/25/2019    PARS PLANA VITRECTOMY 25 GAUGE, MEMBRANE PEELING, ENDOLASER 200  MW 1044 SPOTS, INDIRECT LASER 26 SPOTS performed by Karan Tineo MD at [address] History:   Immunization History   Administered Date(s) Administered    COVID-19, MODERNA Bivalent, (age 12y+), IM, 48 mcg/0.5 mL 11/18/2022    COVID-19, PFIZER GRAY top, DO NOT Dilute, (age 15 y+), IM, 30 mcg/0.3 mL 04/01/2022    COVID-19, PFIZER PURPLE top, DILUTE for use, (age 15 y+), 30mcg/0.3mL 01/23/2021, 02/13/2021, 09/27/2021    Influenza A ([id number]) Vaccine PF IM 11/25/2009

## 2023-10-03 NOTE — CONSULTS
[address] [facility]  [address] [facility]  Clarisa Kennedy MD; Zabrina Tracey MD; Kalin Mari MD; Winslow Dubin, MD Melynda Dura, MD; Waldemar Darnell MD; Lakesha Garcia MD        CONSULTATION / HISTORY AND PHYSICAL EXAMINATION            Date:   10/3/2023  Patient name:  Alona Gonzáles  Date of admission:  10/2/2023  8:26 AM  MRN:   [id number]  Account:  [de-identified]  YOB: 1960  PCP:    Tiburcio Pearce MD  Room:   2066/2066-01  Code Status:    Full Code    Physician Requesting Consult: Clayton Hernandez MD    Reason for Consult:  Medical management    Chief Complaint:     No chief complaint on file. Spinal stenosis    History Obtained From:     Patient, EMR, nursing staff    History of Present Illness:     60-year-old male admitted for scheduled lumbar posterior decompression and fusion surgery. History of left above-knee amputation appears prosthetic leg  History of low back pain several months. Did not get relief from pain management ablation epidural injections.   Worsening, has been ambulating with cane    Medical history relevant for A-fib on Eliquis, coronary artery disease COPD, T2DM, diabetic neuropathy, HTN, HLD, legally blind left eye, right robotic partial nephrectomy    Past Medical History:     Past Medical History:   Diagnosis Date    A-fib [facility])     Asymptomatic bilateral carotid artery stenosis     Boil, thigh 10/2019    10/30/19: right inner thigh region, says PCP Rxd Doxy for this, area is much better, has a couple days left to complete Doxy    CAD (coronary artery disease)     saw Dr. Bartolo Quezada ([facility])     Charcot foot due to diabetes mellitus ([address]) 2014    LEFT    COPD (chronic obstructive pulmonary disease) ([address]) 2009    INHALER USE DAILY    Diabetes mellitus ([address]) 1989    IDDM, On Insulin Dr. Vito Deleon    Diabetic neuropathy [facility])     Difficult intravenous access     VEINS ROLL    Employs prosthetic leg     s/p left BKA    Foot

## 2023-10-04 VITALS
HEIGHT: 76 IN | BODY MASS INDEX: 33.49 KG/M2 | DIASTOLIC BLOOD PRESSURE: 55 MMHG | TEMPERATURE: 98.9 F | RESPIRATION RATE: 16 BRPM | WEIGHT: 275 LBS | OXYGEN SATURATION: 95 % | HEART RATE: 70 BPM | SYSTOLIC BLOOD PRESSURE: 101 MMHG

## 2023-10-04 LAB — GLUCOSE BLD-MCNC: 117 MG/DL (ref 75–110)

## 2023-10-04 PROCEDURE — 94761 N-INVAS EAR/PLS OXIMETRY MLT: CPT

## 2023-10-04 PROCEDURE — 99232 SBSQ HOSP IP/OBS MODERATE 35: CPT | Performed by: INTERNAL MEDICINE

## 2023-10-04 PROCEDURE — 6370000000 HC RX 637 (ALT 250 FOR IP): Performed by: ORTHOPAEDIC SURGERY

## 2023-10-04 PROCEDURE — 94640 AIRWAY INHALATION TREATMENT: CPT

## 2023-10-04 PROCEDURE — 82947 ASSAY GLUCOSE BLOOD QUANT: CPT

## 2023-10-04 PROCEDURE — 2580000003 HC RX 258: Performed by: ORTHOPAEDIC SURGERY

## 2023-10-04 PROCEDURE — 97110 THERAPEUTIC EXERCISES: CPT

## 2023-10-04 PROCEDURE — 6370000000 HC RX 637 (ALT 250 FOR IP): Performed by: INTERNAL MEDICINE

## 2023-10-04 PROCEDURE — 97116 GAIT TRAINING THERAPY: CPT

## 2023-10-04 RX ADMIN — PANTOPRAZOLE SODIUM 40 MG: 40 TABLET, DELAYED RELEASE ORAL at 06:10

## 2023-10-04 RX ADMIN — OXYCODONE HYDROCHLORIDE 10 MG: 10 TABLET ORAL at 01:23

## 2023-10-04 RX ADMIN — BUDESONIDE AND FORMOTEROL FUMARATE DIHYDRATE 2 PUFF: 160; 4.5 AEROSOL RESPIRATORY (INHALATION) at 07:03

## 2023-10-04 RX ADMIN — SODIUM CHLORIDE, PRESERVATIVE FREE 10 ML: 5 INJECTION INTRAVENOUS at 08:16

## 2023-10-04 RX ADMIN — PREGABALIN 100 MG: 100 CAPSULE ORAL at 08:02

## 2023-10-04 RX ADMIN — TAMSULOSIN HYDROCHLORIDE 0.4 MG: 0.4 CAPSULE ORAL at 08:02

## 2023-10-04 RX ADMIN — GEMFIBROZIL 600 MG: 600 TABLET ORAL at 08:02

## 2023-10-04 RX ADMIN — AMIODARONE HYDROCHLORIDE 200 MG: 200 TABLET ORAL at 08:02

## 2023-10-04 RX ADMIN — METOPROLOL TARTRATE 50 MG: 50 TABLET, FILM COATED ORAL at 08:02

## 2023-10-04 RX ADMIN — LEVOTHYROXINE SODIUM 150 MCG: 0.07 TABLET ORAL at 06:10

## 2023-10-04 RX ADMIN — PREGABALIN 100 MG: 100 CAPSULE ORAL at 14:14

## 2023-10-04 RX ADMIN — INSULIN GLARGINE 48 UNITS: 100 INJECTION, SOLUTION SUBCUTANEOUS at 08:12

## 2023-10-04 RX ADMIN — FINASTERIDE 5 MG: 5 TABLET, FILM COATED ORAL at 08:02

## 2023-10-04 ASSESSMENT — PAIN SCALES - GENERAL: PAINLEVEL_OUTOF10: 10

## 2023-10-04 ASSESSMENT — PAIN DESCRIPTION - LOCATION: LOCATION: LEG

## 2023-10-04 ASSESSMENT — PAIN DESCRIPTION - ORIENTATION: ORIENTATION: LEFT

## 2023-10-04 NOTE — PLAN OF CARE
Problem: Discharge Planning  Goal: Discharge to home or other facility with appropriate resources  Outcome: Progressing  Flowsheets (Taken 10/3/2023 2000)  Discharge to home or other facility with appropriate resources: Identify barriers to discharge with patient and caregiver     Problem: Chronic Conditions and Co-morbidities  Goal: Patient's chronic conditions and co-morbidity symptoms are monitored and maintained or improved  Outcome: Progressing  Flowsheets (Taken 10/3/2023 2000)  Care Plan - Patient's Chronic Conditions and Co-Morbidity Symptoms are Monitored and Maintained or Improved: Monitor and assess patient's chronic conditions and comorbid symptoms for stability, deterioration, or improvement     Problem: Pain  Goal: Verbalizes/displays adequate comfort level or baseline comfort level  Outcome: Progressing     Problem: Safety - Adult  Goal: Free from fall injury  Outcome: Progressing

## 2023-10-04 NOTE — DISCHARGE SUMMARY
Discharge Summary    Attending Physician: Chase Najera MD  Admit Date: 10/2/2023  Discharge Date:    Primary Care Physician: Michelle Baron MD    Admitting Diagnosis:  Principal Problem:    Lumbar stenosis with neurogenic claudication  Active Problems:    Lumbar radiculopathy  Resolved Problems:    * No resolved hospital problems. *        Discharge Diagnoses:  Principal Problem:    Lumbar stenosis with neurogenic claudication  Active Problems:    Lumbar radiculopathy  Resolved Problems:    * No resolved hospital problems.  *         Past Medical History:   Diagnosis Date    A-fib [facility])     Asymptomatic bilateral carotid artery stenosis     Boil, thigh 10/2019    10/30/19: right inner thigh region, says PCP Rxd Doxy for this, area is much better, has a couple days left to complete Doxy    CAD (coronary artery disease)     saw Dr. Alyce Garvey ([facility])     Charcot foot due to diabetes mellitus ([address]) 2014    LEFT    COPD (chronic obstructive pulmonary disease) ([address]) 2009    INHALER USE DAILY    Diabetes mellitus ([address]) 1989    IDDM, On Insulin Dr. Eduarda Delvalle    Diabetic neuropathy ([address])     Difficult intravenous access     VEINS ROLL    Employs prosthetic leg     s/p left BKA    Foot ulcer ([address]) PRIOR TO 2015    BILAT    Full dentures     UPPER ONLY    Hyperlipidemia 2004    ON RX    Hypertension 2004    ON RX, PCP Dr. Eduarda Delvalle, seen Oct. 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  [address] [place] [address]      Phone: [phone]   oxyCODONE-acetaminophen 5-325 MG per tablet          Discharge Condition  Stable       Activity on Discharge  As tolerated       Discharge Disposition:  Home    Discharge Instructions  See Orders    Follow-Up Scheduled    No follow-up provider specified.     Electronically signed by Darinel Branch MD on 10/4/2023 at 4:23 PM

## 2023-10-04 NOTE — PROGRESS NOTES
Physical Therapy  Facility/Department: [facility] MED SURG  Daily Treatment Note  NAME: Amalia Walker  : 1960  MRN: [id number]    Date of Service: 10/4/2023    Discharge Recommendations:  Patient would benefit from continued therapy after discharge        Patient Diagnosis(es): The primary encounter diagnosis was Lumbosacral spondylosis without myelopathy. A diagnosis of Pain was also pertinent to this visit. Activity Tolerance: Patient tolerated treatment well     Plan    Physcial Therapy Plan  General Plan: 2 times a day 7 days a week  Specific Instructions for Next Treatment: ther exs and ther activites as tolerated  Current Treatment Recommendations: Strengthening;Balance training;Functional mobility training;Transfer training;Gait training;Stair training     Restrictions  Restrictions/Precautions  Restrictions/Precautions:  (activity as tolerated)  Required Braces or Orthoses?: Yes (left below  knee prosthesis)  Implants present? :  (hardware from back surgery done 10/2/23)     Subjective    Pt was unavailable in a.m., nursing needs, pt pleasant and agreeable to PT in p.m.   Pain: denies pain     Objective      Bed Mobility Training  Bed Mobility Training: Yes  Rolling: Stand-by assistance (to L side)  Supine to Sit: Stand-by assistance (education for log rolling)  Sit to Supine: Stand-by assistance    Balance  Sitting: Intact    Transfer Training  Transfer Training: Yes  Overall Level of Assistance: Contact-guard assistance  Sit to Stand: Contact-guard assistance (education for hand placement)  Stand to Sit: Contact-guard assistance    Gait Training  Left Side Weight Bearing:  (BKA)  Gait  Overall Level of Assistance: Minimum assistance  Interventions: Safety awareness training  Step Length: Left lengthened;Right lengthened  Gait Abnormalities:  (Pt having difficult time c fitting of prothetic, stating they are currently making him a new one, Pt had one LOB requiring MIN A to correct.)  Distance (ft): 120 Feet  Assistive Device: Walker, rolling       PT Exercises  A/AROM Exercises: (B) LE Quad sets, Hip Abd, SLR, hip flexion all SBA 10 reps. Circulation/Endurance Exercises: R 10 Ap's SBA  Dynamic Sitting Balance Exercises: Pt able to sit @ EOB and boris/doff L LE prothesis, CGA           [facility] Basic Mobility - Inpatient   How much help is needed turning from your back to your side while in a flat bed without using bedrails?: A Little  How much help is needed moving from lying on your back to sitting on the side of a flat bed without using bedrails?: A Little  How much help is needed moving to and from a bed to a chair?: A Little  How much help is needed standing up from a chair using your arms?: A Little  How much help is needed walking in hospital room?: A Little  How much help is needed climbing 3-5 steps with a railing?: Total  AM-PAC Inpatient Mobility Raw Score : 16  AM-PAC Inpatient T-Scale Score : 40.78  Mobility Inpatient CMS 0-100% Score: 54.16  Mobility Inpatient CMS G-Code Modifier : CK      Goals  Short Term Goals  Time Frame for Short Term Goals: 2 sessions  Short Term Goal 1: Improve strength in both lower extremities to 4+/5  Short Term Goal 2: Independent in bed mobility  Short Term Goal 3: Independent in sit to stand transfers  Short Term Goal 4: Independent ambulation 120 ft with rolling walker. Long Term Goals  Time Frame for Long Term Goals : 4 sessions  Long Term Goal 1: Independent ambulation 150 f with st. cane  Long Term Goal 2: Independent going up and down one step  holding on to side rail  Patient Goals   Patient Goals : Return home    Education  Patient Education  Education Given To: Patient  Education Provided: Role of Therapy;Home Exercise Program;Transfer Training; Fall Prevention Strategies (gait)  Education Method: Demonstration;Verbal  Barriers to Learning: None  Education Outcome: Verbalized understanding;Demonstrated understanding;Continued education needed    Therapy Time

## 2023-10-04 NOTE — PLAN OF CARE
Problem: Discharge Planning  Goal: Discharge to home or other facility with appropriate resources  10/4/2023 1528 by Maribeth Yu RN  Outcome: Progressing  Flowsheets (Taken 10/4/2023 7671)  Discharge to home or other facility with appropriate resources:   Identify barriers to discharge with patient and caregiver   Arrange for needed discharge resources and transportation as appropriate   Identify discharge learning needs (meds, wound care, etc)   Refer to discharge planning if patient needs post-hospital services based on physician order or complex needs related to functional status, cognitive ability or social support system     Problem: Chronic Conditions and Co-morbidities  Goal: Patient's chronic conditions and co-morbidity symptoms are monitored and maintained or improved  10/4/2023 1528 by Maribeth Yu RN  Outcome: Progressing  Flowsheets (Taken 10/4/2023 0851)  Care Plan - Patient's Chronic Conditions and Co-Morbidity Symptoms are Monitored and Maintained or Improved:   Monitor and assess patient's chronic conditions and comorbid symptoms for stability, deterioration, or improvement   Collaborate with multidisciplinary team to address chronic and comorbid conditions and prevent exacerbation or deterioration   Update acute care plan with appropriate goals if chronic or comorbid symptoms are exacerbated and prevent overall improvement and discharge

## 2023-10-04 NOTE — CONSULTS
[address] [facility]  [facility]  Oli Scales MD; Bryant Mora MD; Ember Burt MD; MD Douglas Francisco MD; Dillan Marsh MD; Stanley Ordoñez MD        CONSULTATION / HISTORY AND PHYSICAL EXAMINATION            Date:   10/4/2023  Patient name:  Bryanna Gay  Date of admission:  10/2/2023  8:26 AM  MRN:   [id number]  Account:  [de-identified]  YOB: 1960  PCP:    Solo Sellers MD  Room:   2066/2066-01  Code Status:    Full Code    Physician Requesting Consult: Tiana Romero MD    Reason for Consult:  Medical management    Chief Complaint:     No chief complaint on file. Spinal stenosis    History Obtained From:     Patient, EMR, nursing staff    History of Present Illness:     57-year-old male admitted for scheduled lumbar posterior decompression and fusion surgery. History of left above-knee amputation appears prosthetic leg  History of low back pain several months. Did not get relief from pain management ablation epidural injections.   Worsening, has been ambulating with cane    Medical history relevant for A-fib on Eliquis, coronary artery disease COPD, T2DM, diabetic neuropathy, HTN, HLD, legally blind left eye, right robotic partial nephrectomy    Past Medical History:     Past Medical History:   Diagnosis Date    A-fib [facility])     Asymptomatic bilateral carotid artery stenosis     Boil, thigh 10/2019    10/30/19: right inner thigh region, says PCP Rxd Doxy for this, area is much better, has a couple days left to complete Doxy    CAD (coronary artery disease)     saw Dr. Darinel Fonseca ([facility])     Charcot foot due to diabetes mellitus ([address]) 2014    LEFT    COPD (chronic obstructive pulmonary disease) ([address]) 2009    INHALER USE DAILY    Diabetes mellitus ([address]) 1989    IDDM, On Insulin Dr. Lacey Espinoza    Diabetic neuropathy [facility])     Difficult intravenous access     VEINS ROLL    Employs prosthetic leg     s/p left BKA    Foot r-bone removed      PICC [address] DOUBLE  05/21/2018         KIDNEY CYST REMOVAL      KIDNEY SURGERY Right 11/13/2019    XI ROBOTIC PARTIAL NEPHRECTOMY, INTRAOP ULTRASOUND, RIGHT URETEROURETEROSTOMY, RIGHT URETERAL STENT PLACEMENT **SHORT STAY** performed by Shirley Duncan MD at [address] [facility] 07/30/2020    XI LAPAROSCOPIC ROBOTIC URETEROLYSIS, ROBOTIC ASSISTED BUCCAL URETEROPLASTY  (DR. COBIAN TO ASSIST) performed by Shirley Duncan MD at [address] [facility] Left 04/29/2015    LUMBAR FUSION N/A 10/2/2023    LUMBAR POSTERIOR DECOMPRESSION L2-5, L4-5 POSTERIOR INSTRUMENTED FUSION performed by Ronan Noonan MD at [address] Left 5-5-15 and 11/2015    Revision BKA    OTHER SURGICAL HISTORY      left stump revision 5/30/2018    PAIN MANAGEMENT PROCEDURE      ME AMP LEG THRU TIBIA&FIBULA RE-AMPUTATION Left 05/30/2018    LEG AMPUTATION BELOW KNEE REVISION performed by See Leigh MD at [address] [facility] Bilateral 80'S    TOE AMPUTATION      Right 2 and 4    UPPER GASTROINTESTINAL ENDOSCOPY N/A 05/26/2023    EGD BIOPSY performed by Zenia Jones MD at [facility]    VITRECTOMY Left 09/25/2019    PARS PLANA VITRECTOMY 25 GAUGE, MEMBRANE PEELING, ENDOLASER 200  MW 1044 SPOTS, INDIRECT LASER 236 SPOTS [facility] [address]        Medications Prior to Admission:     Prior to Admission medications    Medication Sig Start Date End Date Taking? Authorizing Provider   oxyCODONE-acetaminophen (PERCOCET) 5-325 MG per tablet Take 1 tablet by mouth every 6 hours as needed for Pain for up to 7 days. Intended supply: 7 days.  Take lowest dose possible to manage pain Max Daily Amount: 4 tablets 10/3/23 10/10/23 Yes Ronan Noonan MD   amLODIPine (NORVASC) 2.5 MG tablet take 1 tablet by mouth once daily 9/26/23   Bhakti Nunes MD   levothyroxine (SYNTHROID) 175 MCG tablet take 1 tablet by mouth once daily

## 2023-10-04 NOTE — CARE COORDINATION
ONGOING DISCHARGE PLAN:    Patient is alert and oriented x4. Spoke with patient regarding discharge plan and patient confirms that plan is still to return home with spouse, denies needs. DME: janis Rosario, W/C, glucometer. VNS: None. POD #2 lumbar decompression and fusion. Eliquis PTA. Will continue to follow for additional discharge needs. If patient is discharged prior to next notation, then this note serves as note for discharge by case management.     Electronically signed by Jyoti Sena RN on 10/4/2023 at 9:49 AM

## 2023-10-04 NOTE — PLAN OF CARE
Problem: Discharge Planning  Goal: Discharge to home or other facility with appropriate resources  10/4/2023 1648 by Azul Munguia RN  Outcome: Completed     Problem: Chronic Conditions and Co-morbidities  Goal: Patient's chronic conditions and co-morbidity symptoms are monitored and maintained or improved  10/4/2023 1648 by Azul Munguia RN  Outcome: Completed     Problem: Pain  Goal: Verbalizes/displays adequate comfort level or baseline comfort level  10/4/2023 1648 by Azul Munguia RN  Outcome: Completed

## 2023-10-04 NOTE — PROGRESS NOTES
All discharge instructions given, patient verbalized understanding. Patient stated that he had his wife take his percocet home a few days ago. IV removed, sent with all belongings.

## 2023-10-06 ENCOUNTER — CLINICAL DOCUMENTATION ONLY (OUTPATIENT)
Facility: CLINIC | Age: 63
End: 2023-10-06

## 2023-10-09 NOTE — PROGRESS NOTES
Physician Progress Note      PATIENT:               Janell Melendrez  CSN #:                  [id number]  :                       1960  ADMIT DATE:       10/2/2023 8:26 AM  DISCH DATE:        10/4/2023 6:47 PM  RESPONDING  PROVIDER #:        Arely Lanier MD          QUERY TEXT:    Patient admitted with lumbar posterior decompression and fusion surgery,     noted to have atrial fibrillation and is maintained on Eliquis. If possible, please document in progress notes and discharge summary if you   are evaluating and/or treating any of the following: The medical record reflects the following:  Risk Factors:  CAD, COPD, DM2, HTN, HLD  Clinical Indicators: [id number] Score for Atrial Fibrillation Stroke Risk -   positive for components HTN, DM  Treatment: Eliquis home med,  currently on hold - per 10/323 IM consult: Need   to resume Eliquis for A-fib whenever okay with orthopedics    Nelly Pascal, MSN, RN, CCDS, CRCR  Clinical   Options provided:  -- Secondary hypercoagulable state in a patient with atrial fibrillation  -- Secondary hypercoagulable state ruled out  -- Other - I will add my own diagnosis  -- Disagree - Not applicable / Not valid  -- Disagree - Clinically unable to determine / Unknown  -- Refer to Clinical Documentation Reviewer    PROVIDER RESPONSE TEXT:    This patient has secondary hypercoagulable state in a patient with atrial   fibrillation.     Query created by: Nelly Pascal on 10/4/2023 3:10 PM      Electronically signed by:  Arely Lanier MD 10/9/2023 1:43 PM

## 2023-10-17 ENCOUNTER — OFFICE VISIT (OUTPATIENT)
Dept: ORTHOPEDIC SURGERY | Age: 63
End: 2023-10-17

## 2023-10-17 VITALS — HEIGHT: 76 IN | BODY MASS INDEX: 33.49 KG/M2 | RESPIRATION RATE: 14 BRPM | WEIGHT: 275 LBS

## 2023-10-17 DIAGNOSIS — M54.50 CHRONIC MIDLINE LOW BACK PAIN, UNSPECIFIED WHETHER SCIATICA PRESENT: ICD-10-CM

## 2023-10-17 DIAGNOSIS — G89.29 CHRONIC MIDLINE LOW BACK PAIN, UNSPECIFIED WHETHER SCIATICA PRESENT: ICD-10-CM

## 2023-10-17 DIAGNOSIS — M54.16 LUMBAR RADICULAR PAIN: ICD-10-CM

## 2023-10-17 DIAGNOSIS — M48.062 SPINAL STENOSIS OF LUMBAR REGION WITH NEUROGENIC CLAUDICATION: Primary | ICD-10-CM

## 2023-10-17 PROCEDURE — 99024 POSTOP FOLLOW-UP VISIT: CPT | Performed by: ORTHOPAEDIC SURGERY

## 2023-10-17 NOTE — PROGRESS NOTES
Patient ID: Jennifer Solis is a 61 y.o. male. Chief Complaint   Patient presents with    Lower Back Pain     L4-5 Fusion        HPI    Status post L2-5 decompression with L4-5 PLIF. Patient is complaining of some left radicular leg pain    Back actually is doing better.     Patient reports she is having some weakness with flexing the left hip and is actually walking with a walker today    Past Medical History:   Diagnosis Date    A-fib [facility])     Asymptomatic bilateral carotid artery stenosis     Boil, thigh 10/2019    10/30/19: right inner thigh region, says PCP Rxd Doxy for this, area is much better, has a couple days left to complete Doxy    CAD (coronary artery disease)     saw Dr. Kennedy Huntley ([facility])     Charcot foot due to diabetes mellitus ([address]) 2014    LEFT    COPD (chronic obstructive pulmonary disease) ([address]) 2009    INHALER USE DAILY    Diabetes mellitus ([address]) 1989    IDDM, On Insulin Dr. Bobby Ny    Diabetic neuropathy ([address])     Difficult intravenous access     VEINS ROLL    Employs prosthetic leg     s/p left BKA    Foot ulcer ([address]) PRIOR TO 2015    BILAT    Full dentures     UPPER ONLY    Hyperlipidemia 2004    ON RX    Hypertension 2004    ON RX, PCP Dr. Bobby Ny, seen Oct. 2019    Hypoglycemia 04/02/2015    Left below-knee amputee ([address])     MRSA (methicillin resistant staph aureus) culture positive 04/16/2015    ankle    OA (osteoarthritis)     Osteomyelitis ([address])     left stump  BKA    Paroxysmal atrial fibrillation ([address])     Renal mass 09/2019    ACCIDENTAL  FINDING IS FOLLOWING UP WITH KIDNEY DR Ledezma     Suspected sleep apnea     Thyroid disease 2004    PT HAD HYPERTHYROIDISM-UNCONTROLED THYROID DESTROYED WITH RADI IODINE NOW HAS HYPOTHYRIDISM    Vision abnormalities 09/2019    LEFT NO VISION    Vitreous hemorrhage of left eye due to diabetes mellitus ([address]) 09/2019    s/p Vitrectomy 9/2019    Wears glasses     Wears partial dentures     full upper, does not wear lower partial    Wellness

## 2023-10-19 RX ORDER — TAMSULOSIN HYDROCHLORIDE 0.4 MG/1
0.4 CAPSULE ORAL DAILY
Qty: 60 CAPSULE | Refills: 2 | Status: SHIPPED | OUTPATIENT
Start: 2023-10-19

## 2023-10-31 ENCOUNTER — OFFICE VISIT (OUTPATIENT)
Dept: ORTHOPEDIC SURGERY | Age: 63
End: 2023-10-31

## 2023-10-31 VITALS — RESPIRATION RATE: 16 BRPM | HEIGHT: 76 IN | BODY MASS INDEX: 33.48 KG/M2 | WEIGHT: 274.91 LBS

## 2023-10-31 DIAGNOSIS — M48.062 SPINAL STENOSIS OF LUMBAR REGION WITH NEUROGENIC CLAUDICATION: Primary | ICD-10-CM

## 2023-10-31 PROCEDURE — 99024 POSTOP FOLLOW-UP VISIT: CPT | Performed by: ORTHOPAEDIC SURGERY

## 2023-10-31 RX ORDER — METOPROLOL TARTRATE 50 MG/1
TABLET, FILM COATED ORAL
Qty: 60 TABLET | Refills: 3 | Status: SHIPPED | OUTPATIENT
Start: 2023-10-31

## 2023-10-31 NOTE — PROGRESS NOTES
Patient ID: Bigg Benoit is a 61 y.o. male    Chief Compliant:  Chief Complaint   Patient presents with    Lower Back Pain     L2-5 Fusion        Diagnostic imaging:  AP lateral lumbar spine multilevel lumbar spondylosis status post L2-5 decompression L4-5 PLIF question of lateral screw at L5 on the right    Assessment and Plan:  1. Spinal stenosis of lumbar region with neurogenic claudication      S/p L2-5 posterior lumbar decompression and L4-5 posterior instrumented fusion, 10/2/23. Patient continues to have weak hip flexor on the left pain is largely unchanged with respect to preoperatively it may be a little bit more leg pain    CT Myelogram    Follow up after imaging    HPI:  This is a 61 y.o. male who presents to the clinic today for follow up low back pain. S/p L2-5 posterior lumbar decompression and L4-5 posterior instrumented fusion. Patient reports that he continues to experience lower back pain and left leg weakness. He is walking better but does not trust himself walking without his walker. Review of Systems   All other systems reviewed and are negative. Past History:    Current Outpatient Medications:     tamsulosin (FLOMAX) 0.4 MG capsule, take 1 capsule by mouth once daily, Disp: 60 capsule, Rfl: 2    amLODIPine (NORVASC) 2.5 MG tablet, take 1 tablet by mouth once daily, Disp: 30 tablet, Rfl: 3    levothyroxine (SYNTHROID) 175 MCG tablet, take 1 tablet by mouth once daily, Disp: 30 tablet, Rfl: 3    pregabalin (LYRICA) 100 MG capsule, Take 1 capsule by mouth in the morning, at noon, and at bedtime for 90 days. , Disp: 270 capsule, Rfl: 2    MOUNJARO 2.5 MG/0.5ML SOPN SC injection, , Disp: , Rfl:     DROPLET PEN NEEDLES 31G X 8 MM MISC, use 1 PEN NEEDLE to inject MEDICATION subcutaneously once daily, Disp: , Rfl:     magnesium oxide (MAG-OX) 400 (240 Mg) MG tablet, take 1 tablet by mouth once daily for 7 days (Patient not taking: Reported on 9/18/2023), Disp: , Rfl:     albuterol

## 2023-11-02 ENCOUNTER — TELEPHONE (OUTPATIENT)
Dept: ORTHOPEDIC SURGERY | Age: 63
End: 2023-11-02

## 2023-11-02 NOTE — TELEPHONE ENCOUNTER
Patient calls today requesting an order for an electric scooter. If you approve this patient states order needs to be submitted to his insurance company.

## 2023-11-07 NOTE — TELEPHONE ENCOUNTER
Pt calls in requesting update on electric scooter. Pt informed that we are awaiting a response from Dr. Charli Carrillo. Pt advised he would be called when Dr. Charli Carrillo responds to the message. Pt voices understanding.

## 2023-11-10 NOTE — TELEPHONE ENCOUNTER
Pt called in for update on request. Communicated Dr. Topher Jaramillo response below. Script was prepped and placed in Dr. Topher Jaramillo folder to sign.

## 2023-11-15 ENCOUNTER — HOSPITAL ENCOUNTER (OUTPATIENT)
Dept: INTERVENTIONAL RADIOLOGY/VASCULAR | Age: 63
Discharge: HOME | End: 2023-11-17
Payer: MEDICARE

## 2023-11-15 ENCOUNTER — HOSPITAL ENCOUNTER (OUTPATIENT)
Dept: CT IMAGING | Age: 63
Discharge: HOME | End: 2023-11-17
Attending: ORTHOPAEDIC SURGERY
Payer: MEDICARE

## 2023-11-15 ENCOUNTER — TELEPHONE (OUTPATIENT)
Dept: ORTHOPEDIC SURGERY | Age: 63
End: 2023-11-15

## 2023-11-15 VITALS
WEIGHT: 272 LBS | DIASTOLIC BLOOD PRESSURE: 74 MMHG | HEART RATE: 60 BPM | SYSTOLIC BLOOD PRESSURE: 141 MMHG | HEIGHT: 75 IN | OXYGEN SATURATION: 96 % | BODY MASS INDEX: 33.82 KG/M2 | RESPIRATION RATE: 14 BRPM | TEMPERATURE: 97.5 F

## 2023-11-15 DIAGNOSIS — M48.062 SPINAL STENOSIS OF LUMBAR REGION WITH NEUROGENIC CLAUDICATION: ICD-10-CM

## 2023-11-15 LAB
INR PPP: 1
PARTIAL THROMBOPLASTIN TIME: 27.7 SEC (ref 24–36)
PLATELET # BLD AUTO: 189 K/UL (ref 150–450)
PROTHROMBIN TIME: 13.4 SEC (ref 11.8–14.6)

## 2023-11-15 PROCEDURE — 2709999900 IR MYELOGRAM LUMBOSACRAL

## 2023-11-15 PROCEDURE — 72132 CT LUMBAR SPINE W/DYE: CPT

## 2023-11-15 PROCEDURE — 85610 PROTHROMBIN TIME: CPT

## 2023-11-15 PROCEDURE — 62304 MYELOGRAPHY LUMBAR INJECTION: CPT

## 2023-11-15 PROCEDURE — 85730 THROMBOPLASTIN TIME PARTIAL: CPT

## 2023-11-15 PROCEDURE — 85049 AUTOMATED PLATELET COUNT: CPT

## 2023-11-15 PROCEDURE — 6360000004 HC RX CONTRAST MEDICATION: Performed by: RADIOLOGY

## 2023-11-15 PROCEDURE — 36415 COLL VENOUS BLD VENIPUNCTURE: CPT

## 2023-11-15 RX ORDER — IOPAMIDOL 408 MG/ML
INJECTION, SOLUTION INTRATHECAL PRN
Status: COMPLETED | OUTPATIENT
Start: 2023-11-15 | End: 2023-11-15

## 2023-11-15 RX ADMIN — IOPAMIDOL 15 ML: 408 INJECTION, SOLUTION INTRATHECAL at 09:36

## 2023-11-15 ASSESSMENT — PAIN - FUNCTIONAL ASSESSMENT: PAIN_FUNCTIONAL_ASSESSMENT: NONE - DENIES PAIN

## 2023-11-15 NOTE — TELEPHONE ENCOUNTER
Called pt to let him know Dr. Laurie Day signed his script for his electric scooter. Script scanned in and put up front.

## 2023-11-15 NOTE — DISCHARGE INSTRUCTIONS
POST MYELOGRAM INSTRUCTIONS    1. Do not lie flat until 12 hours after the myelogram.      2. Sleep with 2 pillows on the first night after the myelogram.    3. Do not drive on the day of the myelogram.    4. Drink at least 1 quart of fluids (milk, juice, or water) on the day of the myelogram.    5. If nausea or vomiting occurs, notify the physician who performed the myelogram.    6. Rest as much as possible until 24 hours after the myelogram.    7. If a headache occurs, use your usual headache remedies. 8. If the headache cannot be controlled by rest and available medications, notify the       Physician who performed the myelogram.    9.  Keep your follow up appointment.       IF YOU [address], GO TO THE NEAREST EMERGENCY FACILITY    Phone:  Interventional Radiology  [phone] Dr. Gera Dean  After hours Radiology  [phone]

## 2023-11-15 NOTE — PROGRESS NOTES
Patient tolerated lumbar myelogram without distress. Dressing to site. Patient To Ct for further imaging. Nurse updated.

## 2023-11-15 NOTE — OP NOTE
Brief Postoperative Note    Cesar Blackmon  YOB: 1960  [id number]    Pre-operative Diagnosis: back pain    Post-operative Diagnosis: Same    Procedure: lumbar myelogram    Anesthesia: Local   Surgeons/Assistants: Scarlett Cabrera MD     Estimated Blood Loss: minimal    Complications: none immediate    Specimens: were not obtained      Electronically signed by Scarlett Cabrera MD on 11/15/2023 at 9:52 AM

## 2023-11-15 NOTE — H&P
male, moderately obese, nourished, conscious, alert. Does not appear to be distress or pain at this time. SKIN:  Warm, dry, no cyanosis or jaundice. RLE with redness and bandaids from tears. HEAD:  Normocephalic, atraumatic, no swelling or tenderness. EYES:  Pupils equal, reactive to light. EARS:  No discharge, no marked hearing loss. NOSE:  No rhinorrhea, epistaxis or septal deformity. THROAT:  Not congested. No ulceration bleeding or discharge. NECK:  No stiffness, trachea central.  No palpable masses or L.N.                 CHEST:  Symmetrical and equal on expansion. HEART:  RRR . No audible murmurs or gallops. LUNGS:  Equal on expansion, normal breath sounds. No adventitious sounds. ABDOMEN:  Obese. Soft on palpation. No dysphagia, No localized tenderness. No guarding or rigidity. LYMPHATICS:  No palpable cervical lymphadenopathy. LOCOMOTOR, BACK AND SPINE:  No tenderness or deformities. LBKA with prosthesis in place. EXTREMITIES:  Symmetrical, no pretibial edema. No discoloration or ulcerations. NEUROLOGIC:  The patient is conscious, alert, oriented,Cranial nerve II-XII intact, taste and smell were not examined. No apparent focal sensory or motor deficits.              PROVISIONAL DIAGNOSES / SURGERY:      IR MYELOGRAM LUMBOSACRAL     Spinal stenosis of lumbar region with neurogenic claudication    Patient Active Problem List    Diagnosis Date Noted    Solid nodule of lung 6 mm to 8 mm in diameter 05/17/2022    Chronic renal disease, stage III [facility]) [[id number]] 05/04/2022    Coagulation defect ([address]) 09/06/2023    Severe comorbid illness 07/13/2023    Lumbosacral spondylosis without myelopathy 04/24/2023    Anginal equivalent 01/19/2022    Hypertrophic cardiomyopathy ([address]) 12/30/2021    Chest pain 12/28/2021    Rule

## 2023-11-21 ENCOUNTER — OFFICE VISIT (OUTPATIENT)
Dept: ORTHOPEDIC SURGERY | Age: 63
End: 2023-11-21

## 2023-11-21 VITALS — HEIGHT: 75 IN | BODY MASS INDEX: 33.83 KG/M2 | RESPIRATION RATE: 16 BRPM | WEIGHT: 272.05 LBS

## 2023-11-21 DIAGNOSIS — M48.062 SPINAL STENOSIS OF LUMBAR REGION WITH NEUROGENIC CLAUDICATION: Primary | ICD-10-CM

## 2023-11-21 DIAGNOSIS — G89.29 CHRONIC MIDLINE LOW BACK PAIN, UNSPECIFIED WHETHER SCIATICA PRESENT: ICD-10-CM

## 2023-11-21 DIAGNOSIS — M54.50 CHRONIC MIDLINE LOW BACK PAIN, UNSPECIFIED WHETHER SCIATICA PRESENT: ICD-10-CM

## 2023-11-21 DIAGNOSIS — M54.16 LUMBAR RADICULAR PAIN: ICD-10-CM

## 2023-11-21 PROCEDURE — 99024 POSTOP FOLLOW-UP VISIT: CPT | Performed by: ORTHOPAEDIC SURGERY

## 2023-11-21 NOTE — PROGRESS NOTES
KIDNEY SURGERY N/A 07/30/2020    XI LAPAROSCOPIC ROBOTIC URETEROLYSIS, ROBOTIC ASSISTED BUCCAL URETEROPLASTY  (DR. COBIAN TO ASSIST) performed by Say Hartman MD [facility] [address] [facility] Left 04/29/2015    LUMBAR FUSION N/A 10/2/2023    LUMBAR POSTERIOR DECOMPRESSION L2-5, L4-5 POSTERIOR INSTRUMENTED FUSION performed by Tiana Romero MD at [address] [facility] [address] Left 5-5-15 and 11/2015    Revision BKA    OTHER SURGICAL HISTORY      left stump revision 5/30/2018    PAIN MANAGEMENT PROCEDURE      HI AMP LEG THRU TIBIA&FIBULA RE-AMPUTATION Left 05/30/2018    LEG AMPUTATION BELOW KNEE REVISION performed by Raul Bronson MD at [address] [facility] Bilateral 80'S    TOE AMPUTATION      Right 2 and 4    UPPER GASTROINTESTINAL ENDOSCOPY N/A 05/26/2023    EGD BIOPSY performed by Brian Abbasi MD at [facility]    VITRECTOMY Left 09/25/2019    PARS PLANA VITRECTOMY 25 GAUGE, MEMBRANE PEELING, ENDOLASER 200  MW 1044 SPOTS, INDIRECT LASER 26 SPOTS performed by Herminio Cochran MD at [address] History   Problem Relation Age of Onset    Diabetes Mother     Hypertension Mother     Stomach Cancer Mother     Hypertension Father     Alcohol Abuse Father     COPD Father     Kidney Cancer Father     Diabetes Brother         IDDM-PUMP    Other Sister         BRAIN TUMOR        Physical Exam:  Vitals signs and nursing note reviewed. Constitutional:       Appearance: well-developed. HENT:      Head: Normocephalic and atraumatic. Nose: Nose normal.   Eyes:      Conjunctiva/sclera: Conjunctivae normal.   Neck:      Musculoskeletal: Normal range of motion and neck supple. Pulmonary:      Effort: Pulmonary effort is normal. No respiratory distress. Musculoskeletal:      Comments: Normal gait     Skin:     General: Skin is warm and dry. Neurological:      Mental Status: Alert and oriented to person, place, and time.       Sensory: No sensory

## 2023-11-22 ENCOUNTER — HOSPITAL ENCOUNTER (OUTPATIENT)
Dept: PREADMISSION TESTING | Age: 63
Discharge: HOME | End: 2023-11-22
Payer: MEDICARE

## 2023-11-22 VITALS
HEIGHT: 76 IN | SYSTOLIC BLOOD PRESSURE: 102 MMHG | OXYGEN SATURATION: 96 % | HEART RATE: 67 BPM | BODY MASS INDEX: 32.88 KG/M2 | WEIGHT: 270 LBS | TEMPERATURE: 96.8 F | DIASTOLIC BLOOD PRESSURE: 59 MMHG | RESPIRATION RATE: 18 BRPM

## 2023-11-22 LAB
ANION GAP SERPL CALCULATED.3IONS-SCNC: 11 MMOL/L (ref 9–17)
BASOPHILS # BLD: 0.1 K/UL (ref 0–0.2)
BASOPHILS NFR BLD: 1 % (ref 0–2)
BUN SERPL-MCNC: 17 MG/DL (ref 8–23)
CALCIUM SERPL-MCNC: 8.5 MG/DL (ref 8.6–10.4)
CHLORIDE SERPL-SCNC: 106 MMOL/L (ref 98–107)
CO2 SERPL-SCNC: 27 MMOL/L (ref 20–31)
CREAT SERPL-MCNC: 1.4 MG/DL (ref 0.7–1.2)
EOSINOPHIL # BLD: 0.1 K/UL (ref 0–0.4)
EOSINOPHILS RELATIVE PERCENT: 2 % (ref 0–4)
ERYTHROCYTE [DISTWIDTH] IN BLOOD BY AUTOMATED COUNT: 14.6 % (ref 11.5–14.9)
GFR SERPL CREATININE-BSD FRML MDRD: 56 ML/MIN/1.73M2
GLUCOSE SERPL-MCNC: 194 MG/DL (ref 70–99)
HCT VFR BLD AUTO: 40.1 % (ref 41–53)
HGB BLD-MCNC: 13.5 G/DL (ref 13.5–17.5)
LYMPHOCYTES NFR BLD: 1 K/UL (ref 1–4.8)
LYMPHOCYTES RELATIVE PERCENT: 15 % (ref 24–44)
MCH RBC QN AUTO: 31.6 PG (ref 26–34)
MCHC RBC AUTO-ENTMCNC: 33.6 G/DL (ref 31–37)
MCV RBC AUTO: 94.1 FL (ref 80–100)
MONOCYTES NFR BLD: 0.7 K/UL (ref 0.1–1.3)
MONOCYTES NFR BLD: 11 % (ref 1–7)
NEUTROPHILS NFR BLD: 71 % (ref 36–66)
NEUTS SEG NFR BLD: 4.8 K/UL (ref 1.3–9.1)
PLATELET # BLD AUTO: 190 K/UL (ref 150–450)
PMV BLD AUTO: 10.5 FL (ref 6–12)
POTASSIUM SERPL-SCNC: 4 MMOL/L (ref 3.7–5.3)
RBC # BLD AUTO: 4.26 M/UL (ref 4.5–5.9)
SODIUM SERPL-SCNC: 144 MMOL/L (ref 135–144)
WBC OTHER # BLD: 6.7 K/UL (ref 3.5–11)

## 2023-11-22 PROCEDURE — 80048 BASIC METABOLIC PNL TOTAL CA: CPT

## 2023-11-22 PROCEDURE — 36415 COLL VENOUS BLD VENIPUNCTURE: CPT

## 2023-11-22 PROCEDURE — 85025 COMPLETE CBC W/AUTO DIFF WBC: CPT

## 2023-11-22 NOTE — H&P (VIEW-ONLY)
in the past    Significant Medical History: Atrial Fib, CAD, COPD, HTN, HLD, DM,  DENNISE. LBKA    Associated medications: Eliquis, aspirin, amiodarone, Cozaar, Lopressor, Lipitor, Symbicort, albuterol, Ozempic    Anticoagulant /blood thinner: Eliquis, aspirin  Pt denies any chest pain, palpitations or diaphoresis. Pt denies any  SOB. No recent URI,  fever or chills. Patient denies any personal or family problems with anesthesia . Xrays or Imaging:  CT OF THE LUMBAR SPINE WITH INTRATHECAL CONTRAST 11/15/2023   IMPRESSION:  Previous PLIF L4-L5 with partial posterior decompression at L3. Lower lumbar spinal canal is narrowed on a congenital basis, particularly at  the L5 level. Moderate to severe spinal stenosis L4-L5 and L2-L3.      PAST MEDICAL HISTORY     Past Medical History:   Diagnosis Date    A-fib [facility])     Asymptomatic bilateral carotid artery stenosis     Boil, thigh 10/2019    10/30/19: right inner thigh region, says PCP Rxd Doxy for this, area is much better, has a couple days left to complete Doxy    CAD (coronary artery disease)     saw Dr. Jessi Mcnally ([facility])     Charcot foot due to diabetes mellitus ([address]) 2014    LEFT    COPD (chronic obstructive pulmonary disease) ([address]) 2009    INHALER USE DAILY    Diabetes mellitus ([address]) 1989    IDDM, On Insulin Dr. Nancy Vogel    Diabetic neuropathy [facility])     Difficult intravenous access     VEINS ROLL    Employs prosthetic leg     s/p left BKA    Foot ulcer ([address]) PRIOR TO 2015    BILAT    Full dentures     UPPER ONLY    Hyperlipidemia 2004    ON RX    Hypertension 2004    ON RX, PCP Dr. Nancy Vogel, seen Oct. 2019    Hypoglycemia 04/02/2015    Left below-knee amputee ([address])     MRSA (methicillin resistant staph aureus) culture positive 04/16/2015    ankle    OA (osteoarthritis)     Osteomyelitis ([facility])     left stump  BKA    Paroxysmal atrial fibrillation ([address])     Renal mass 09/2019    ACCIDENTAL  FINDING IS FOLLOWING UP WITH KIDNEY DR Ledezma     Suspected sleep apnea hours as needed for Wheezing or Shortness of Breath 6.7 each 10    amiodarone (CORDARONE) 200 MG tablet Take 1 tablet by mouth 2 times daily 90 tablet 10    budesonide-formoterol (SYMBICORT) 160-4.5 MCG/ACT AERO INHALE 2 PUFFS INTO THE LUNGS TWICE A DAY 10.2 each 10    finasteride (PROSCAR) 5 MG tablet Take 1 tablet by mouth daily 90 tablet 10    gemfibrozil (LOPID) 600 MG tablet Take 1 tablet by mouth daily 90 tablet 11    losartan (COZAAR) 50 MG tablet Take 1 tablet by mouth daily 90 tablet 10    omeprazole (PRILOSEC) 20 MG delayed release capsule Take 1 capsule by mouth daily 90 capsule 0    ASPIRIN LOW DOSE 81 MG EC tablet TAKE 1 TABLET BY MOUTH EVERY DAY IN THE MORNING 30 tablet 5    rOPINIRole (REQUIP) 2 MG tablet TAKE 1 TABLET BY MOUTH EVERY DAY 90 tablet 3    TOUJEO MAX SOLOSTAR 300 UNIT/ML SOPN INJECT 110 UNITS INTO THE SKIN DAILY 1 Adjustable Dose Pre-filled Pen Syringe 5    atorvastatin (LIPITOR) 40 MG tablet TAKE 1 TABLET BY MOUTH EVERY DAY 90 tablet 3    clotrimazole-betamethasone (LOTRISONE) 1-0.05 % cream APPLY TO AFFECTED AREA TWICE A DAY 45 g 3    isosorbide mononitrate (IMDUR) 30 MG extended release tablet TAKE 1 TABLET BY MOUTH EVERY DAY 30 tablet 11    ELIQUIS 5 MG TABS tablet TAKE 1 TABLET BY MOUTH TWICE A DAY 60 tablet 8    blood glucose test strips (CONTOUR NEXT TEST) strip TEST 4 TIMES A  strip 3    insulin lispro (HUMALOG) 100 UNIT/ML injection vial SLIDING SCALE (TAKE 5 UNITS IF SUGAR IS OVER 150) 1 each 3    Continuous Blood Gluc  (DEXCOM G6 ) DOUGIE USE AS DIRECTED TO MONITOR BLOOD SUGAR      glucagon, rDNA, 1 MG injection Inject 1 mL into the muscle once as needed for Low blood sugar       Blood Pressure KIT 1 actuation by Does not apply route once a week 1 kit 0    Handicap Placard MISC by Does not apply route Duration - 5years  Reason- prosthetic leg 1 each 0     No current facility-administered medications on file prior to encounter.         General health:  Fairly

## 2023-11-22 NOTE — DISCHARGE INSTRUCTIONS
Pre-op Instructions For Out-Patient Surgery    Medication Instructions:  Please stop herbs and any supplements now (includes vitamins and minerals). Please contact your surgeon and prescribing physician for pre-op instructions for any blood thinners. Aspirin and eliquis as directed. If you have inhalers/aerosol treatments at home, please use them the morning of your surgery and bring the inhalers with you to the hospital.    Please take the following medications the morning of your surgery with a sip of water:    isosorbide, amiodarone, inhaler, metoprolol, amlodipine, levothyroxine, omeprazole    Surgery Instructions:  After midnight before surgery:  Do not eat or drink anything, including water, mints, gum, and hard candy. You may brush your teeth without swallowing. No smoking, chewing tobacco, or street drugs. Please shower or bathe before surgery. If you were given Surgical Scrub Chlorhexidine Gluconate Liquid (CHG), please shower the night before and the morning of your surgery following the detailed instructions you received during your pre-admission visit. Please do not wear any cologne, lotion, powder, deodorant, jewelry, piercings, perfume, makeup, nail polish, hair accessories, or hair spray on the day of surgery. Wear loose comfortable clothing. Leave your valuables at home but bring a payment source for any after-surgery prescriptions you plan to fill at [facility]. Bring a storage case for any glasses/contacts. An adult who is responsible for you MUST drive you home and should be with you for the first 24 hours after surgery. If having out-patient knee and foot surgeries, please arrange for planned crutches, walker, or wheelchair before arriving to the hospital.    The Day of Surgery:  Arrive at [facility] at the time directed by your surgeon and check in at the desk.      If you have a living will or

## 2023-11-22 NOTE — H&P
HISTORY and [address]       NAME:  Esperanza Villafuerte  MRN: [id number]   YOB: 1960   Date: 11/24/2023   Age: 61 y.o. Gender: male       COMPLAINT AND PRESENT HISTORY:       Esperanza Villafuerte is 61 y.o.,  male, undergoing preadmission testing for : Painful orthopaedic hardware    Patient will be scheduled to have:  Juliocesar Paulino 15 L4-L5 LEFT    Office note per Dr. Laurie De La Torre on 11/21/23  HPI:  This is a 61 y.o. male who presents to the clinic today for follow up of CT myelogram lumbar spine. S/p L2-5 posterior lumbar decompression and L4-5 posterior instrumented fusion, 10/2/23. He reports that he is experiencing the same pain that he was pre-operatively. Patient reports he has pain when walking and he does not get relief with leaning on a walker like he did pre-operatively. Above reviewed with patient and he  concurs  UPDATE:     Patient is s/p : Lumbar fusion 10/2/23  Patient states that the onset of his pain started a day post his surgery. Patient states he formerly was able to stand with his walker at least 15 minutes before having to sit, but now he states that when he stands he has immediate pain and sitting is the only thing that alleviates the pain. Pt rates his pain at 10/10 when standing. The symptoms started 1 month ago. Pt complains of pain, deformity, limitation in the range of motion. The problem has been gradually worsening since onset. Patient has left below knee amputation. Pt states that he is not able to lift up his prosthestic leg as he formerly was, he has to use his hands to lift it. Patient reports that he does not have much sensation to legs down. Patient reports that he has not  used any thing to help with pain. Patient is using assistive device wheelchair or walker to aide in ambulation. Pt admit to recent fall  the day of surgery over one step. Pt  denies any bowel or  bladder dysfunction.    Patient has hx of MRSA

## 2023-11-28 ENCOUNTER — ANESTHESIA EVENT (OUTPATIENT)
Dept: OPERATING ROOM | Age: 63
End: 2023-11-28
Payer: COMMERCIAL

## 2023-11-28 NOTE — PRE-PROCEDURE INSTRUCTIONS
Nothing to eat after midnight. Y  Are you taking any blood thinners? When was the last day? STOPPED  Make sure to use Hibiclens prior to surgery. Y  Remove any jewelry and body piercings. Y  Do you wear glasses? If so, please bring a case to store them in. Are you having any Covid symptoms? N  Do you have any new rashes, infections, etc. that we should be aware of?N  Do you have a ride home the day of surgery? It cannot be a cab or medical transportation. Y  Verify surgery time and what time to arrive at hospital.[id number]

## 2023-11-29 ENCOUNTER — APPOINTMENT (OUTPATIENT)
Dept: GENERAL RADIOLOGY | Age: 63
DRG: 496 | End: 2023-11-29
Attending: ORTHOPAEDIC SURGERY
Payer: COMMERCIAL

## 2023-11-29 ENCOUNTER — HOSPITAL ENCOUNTER (INPATIENT)
Age: 63
LOS: 2 days | Discharge: HOME | DRG: 496 | End: 2023-12-01
Attending: ORTHOPAEDIC SURGERY | Admitting: ORTHOPAEDIC SURGERY
Payer: COMMERCIAL

## 2023-11-29 ENCOUNTER — ANESTHESIA (OUTPATIENT)
Dept: OPERATING ROOM | Age: 63
End: 2023-11-29
Payer: COMMERCIAL

## 2023-11-29 DIAGNOSIS — T84.84XA PAINFUL ORTHOPAEDIC HARDWARE (HCC): Primary | ICD-10-CM

## 2023-11-29 LAB
GLUCOSE BLD-MCNC: 158 MG/DL (ref 75–110)
GLUCOSE BLD-MCNC: 207 MG/DL (ref 75–110)

## 2023-11-29 PROCEDURE — 3700000001 HC ADD 15 MINUTES (ANESTHESIA): Performed by: ORTHOPAEDIC SURGERY

## 2023-11-29 PROCEDURE — 1200000000 HC SEMI PRIVATE

## 2023-11-29 PROCEDURE — 2709999900 HC NON-CHARGEABLE SUPPLY: Performed by: ORTHOPAEDIC SURGERY

## 2023-11-29 PROCEDURE — 20680 REMOVAL OF IMPLANT DEEP: CPT | Performed by: ORTHOPAEDIC SURGERY

## 2023-11-29 PROCEDURE — 2500000003 HC RX 250 WO HCPCS: Performed by: ANESTHESIOLOGY

## 2023-11-29 PROCEDURE — 2500000003 HC RX 250 WO HCPCS: Performed by: NURSE ANESTHETIST, CERTIFIED REGISTERED

## 2023-11-29 PROCEDURE — 7100000000 HC PACU RECOVERY - FIRST 15 MIN: Performed by: ORTHOPAEDIC SURGERY

## 2023-11-29 PROCEDURE — 94640 AIRWAY INHALATION TREATMENT: CPT

## 2023-11-29 PROCEDURE — 2580000003 HC RX 258: Performed by: ANESTHESIOLOGY

## 2023-11-29 PROCEDURE — G0378 HOSPITAL OBSERVATION PER HR: HCPCS

## 2023-11-29 PROCEDURE — 3600000012 HC SURGERY LEVEL 2 ADDTL 15MIN: Performed by: ORTHOPAEDIC SURGERY

## 2023-11-29 PROCEDURE — 3700000000 HC ANESTHESIA ATTENDED CARE: Performed by: ORTHOPAEDIC SURGERY

## 2023-11-29 PROCEDURE — 6360000002 HC RX W HCPCS: Performed by: NURSE ANESTHETIST, CERTIFIED REGISTERED

## 2023-11-29 PROCEDURE — 3600000002 HC SURGERY LEVEL 2 BASE: Performed by: ORTHOPAEDIC SURGERY

## 2023-11-29 PROCEDURE — 6370000000 HC RX 637 (ALT 250 FOR IP): Performed by: ORTHOPAEDIC SURGERY

## 2023-11-29 PROCEDURE — 6360000002 HC RX W HCPCS: Performed by: ANESTHESIOLOGY

## 2023-11-29 PROCEDURE — 7100000001 HC PACU RECOVERY - ADDTL 15 MIN: Performed by: ORTHOPAEDIC SURGERY

## 2023-11-29 PROCEDURE — 82947 ASSAY GLUCOSE BLOOD QUANT: CPT

## 2023-11-29 PROCEDURE — 6360000002 HC RX W HCPCS: Performed by: ORTHOPAEDIC SURGERY

## 2023-11-29 PROCEDURE — 0QP004Z REMOVAL OF INTERNAL FIXATION DEVICE FROM LUMBAR VERTEBRA, OPEN APPROACH: ICD-10-PCS | Performed by: ORTHOPAEDIC SURGERY

## 2023-11-29 RX ORDER — OXYCODONE HYDROCHLORIDE 10 MG/1
10 TABLET ORAL EVERY 4 HOURS PRN
Status: DISCONTINUED | OUTPATIENT
Start: 2023-11-29 | End: 2023-12-01 | Stop reason: HOSPADM

## 2023-11-29 RX ORDER — AMIODARONE HYDROCHLORIDE 200 MG/1
200 TABLET ORAL 2 TIMES DAILY
Status: DISCONTINUED | OUTPATIENT
Start: 2023-11-29 | End: 2023-12-01 | Stop reason: HOSPADM

## 2023-11-29 RX ORDER — ACETAMINOPHEN 325 MG/1
650 TABLET ORAL EVERY 6 HOURS
Status: DISCONTINUED | OUTPATIENT
Start: 2023-11-29 | End: 2023-12-01 | Stop reason: HOSPADM

## 2023-11-29 RX ORDER — INSULIN GLARGINE 100 [IU]/ML
44 INJECTION, SOLUTION SUBCUTANEOUS 2 TIMES DAILY
Status: DISCONTINUED | OUTPATIENT
Start: 2023-11-29 | End: 2023-11-30

## 2023-11-29 RX ORDER — ROPINIROLE 0.5 MG/1
2 TABLET, FILM COATED ORAL DAILY
Status: DISCONTINUED | OUTPATIENT
Start: 2023-11-29 | End: 2023-12-01 | Stop reason: HOSPADM

## 2023-11-29 RX ORDER — SODIUM CHLORIDE 0.9 % (FLUSH) 0.9 %
5-40 SYRINGE (ML) INJECTION EVERY 12 HOURS SCHEDULED
Status: DISCONTINUED | OUTPATIENT
Start: 2023-11-29 | End: 2023-12-01 | Stop reason: HOSPADM

## 2023-11-29 RX ORDER — LOSARTAN POTASSIUM 50 MG/1
50 TABLET ORAL DAILY
Status: DISCONTINUED | OUTPATIENT
Start: 2023-11-29 | End: 2023-12-01 | Stop reason: HOSPADM

## 2023-11-29 RX ORDER — DEXTROSE MONOHYDRATE 100 MG/ML
INJECTION, SOLUTION INTRAVENOUS CONTINUOUS PRN
Status: DISCONTINUED | OUTPATIENT
Start: 2023-11-29 | End: 2023-12-01 | Stop reason: HOSPADM

## 2023-11-29 RX ORDER — INSULIN LISPRO 100 [IU]/ML
0-4 INJECTION, SOLUTION INTRAVENOUS; SUBCUTANEOUS NIGHTLY
Status: DISCONTINUED | OUTPATIENT
Start: 2023-11-29 | End: 2023-11-30

## 2023-11-29 RX ORDER — SODIUM CHLORIDE 0.9 % (FLUSH) 0.9 %
5-40 SYRINGE (ML) INJECTION EVERY 12 HOURS SCHEDULED
Status: DISCONTINUED | OUTPATIENT
Start: 2023-11-29 | End: 2023-11-29 | Stop reason: HOSPADM

## 2023-11-29 RX ORDER — TAMSULOSIN HYDROCHLORIDE 0.4 MG/1
0.4 CAPSULE ORAL DAILY
Status: DISCONTINUED | OUTPATIENT
Start: 2023-11-29 | End: 2023-12-01 | Stop reason: HOSPADM

## 2023-11-29 RX ORDER — DIPHENHYDRAMINE HYDROCHLORIDE 50 MG/ML
12.5 INJECTION INTRAMUSCULAR; INTRAVENOUS
Status: DISCONTINUED | OUTPATIENT
Start: 2023-11-29 | End: 2023-11-29 | Stop reason: HOSPADM

## 2023-11-29 RX ORDER — LIDOCAINE HYDROCHLORIDE 20 MG/ML
INJECTION, SOLUTION EPIDURAL; INFILTRATION; INTRACAUDAL; PERINEURAL PRN
Status: DISCONTINUED | OUTPATIENT
Start: 2023-11-29 | End: 2023-11-29 | Stop reason: SDUPTHER

## 2023-11-29 RX ORDER — AMLODIPINE BESYLATE 2.5 MG/1
2.5 TABLET ORAL DAILY
Status: DISCONTINUED | OUTPATIENT
Start: 2023-11-30 | End: 2023-12-01 | Stop reason: HOSPADM

## 2023-11-29 RX ORDER — PROPOFOL 10 MG/ML
INJECTION, EMULSION INTRAVENOUS PRN
Status: DISCONTINUED | OUTPATIENT
Start: 2023-11-29 | End: 2023-11-29 | Stop reason: SDUPTHER

## 2023-11-29 RX ORDER — GLYCOPYRROLATE 0.2 MG/ML
INJECTION INTRAMUSCULAR; INTRAVENOUS PRN
Status: DISCONTINUED | OUTPATIENT
Start: 2023-11-29 | End: 2023-11-29 | Stop reason: SDUPTHER

## 2023-11-29 RX ORDER — MIDAZOLAM HYDROCHLORIDE 1 MG/ML
INJECTION INTRAMUSCULAR; INTRAVENOUS PRN
Status: DISCONTINUED | OUTPATIENT
Start: 2023-11-29 | End: 2023-11-29 | Stop reason: SDUPTHER

## 2023-11-29 RX ORDER — SUCCINYLCHOLINE/SOD CL,ISO/PF 200MG/10ML
SYRINGE (ML) INTRAVENOUS PRN
Status: DISCONTINUED | OUTPATIENT
Start: 2023-11-29 | End: 2023-11-29 | Stop reason: SDUPTHER

## 2023-11-29 RX ORDER — PHENYLEPHRINE HYDROCHLORIDE 10 MG/ML
INJECTION INTRAVENOUS PRN
Status: DISCONTINUED | OUTPATIENT
Start: 2023-11-29 | End: 2023-11-29 | Stop reason: SDUPTHER

## 2023-11-29 RX ORDER — SODIUM CHLORIDE 9 MG/ML
INJECTION, SOLUTION INTRAVENOUS PRN
Status: DISCONTINUED | OUTPATIENT
Start: 2023-11-29 | End: 2023-12-01 | Stop reason: HOSPADM

## 2023-11-29 RX ORDER — FINASTERIDE 5 MG/1
5 TABLET, FILM COATED ORAL DAILY
Status: DISCONTINUED | OUTPATIENT
Start: 2023-11-29 | End: 2023-12-01 | Stop reason: HOSPADM

## 2023-11-29 RX ORDER — GEMFIBROZIL 600 MG/1
600 TABLET, FILM COATED ORAL DAILY
Status: DISCONTINUED | OUTPATIENT
Start: 2023-11-29 | End: 2023-12-01 | Stop reason: HOSPADM

## 2023-11-29 RX ORDER — LIDOCAINE HYDROCHLORIDE 10 MG/ML
1 INJECTION, SOLUTION EPIDURAL; INFILTRATION; INTRACAUDAL; PERINEURAL
Status: COMPLETED | OUTPATIENT
Start: 2023-11-29 | End: 2023-11-29

## 2023-11-29 RX ORDER — SODIUM CHLORIDE 9 MG/ML
INJECTION, SOLUTION INTRAVENOUS PRN
Status: DISCONTINUED | OUTPATIENT
Start: 2023-11-29 | End: 2023-11-29 | Stop reason: HOSPADM

## 2023-11-29 RX ORDER — OXYCODONE HYDROCHLORIDE 5 MG/1
5 TABLET ORAL EVERY 4 HOURS PRN
Status: DISCONTINUED | OUTPATIENT
Start: 2023-11-29 | End: 2023-12-01 | Stop reason: HOSPADM

## 2023-11-29 RX ORDER — MORPHINE SULFATE 4 MG/ML
4 INJECTION, SOLUTION INTRAMUSCULAR; INTRAVENOUS
Status: DISCONTINUED | OUTPATIENT
Start: 2023-11-29 | End: 2023-12-01 | Stop reason: HOSPADM

## 2023-11-29 RX ORDER — SODIUM CHLORIDE 0.9 % (FLUSH) 0.9 %
5-40 SYRINGE (ML) INJECTION PRN
Status: DISCONTINUED | OUTPATIENT
Start: 2023-11-29 | End: 2023-11-29 | Stop reason: HOSPADM

## 2023-11-29 RX ORDER — CLOTRIMAZOLE AND BETAMETHASONE DIPROPIONATE 10; .64 MG/G; MG/G
CREAM TOPICAL 2 TIMES DAILY
Status: DISCONTINUED | OUTPATIENT
Start: 2023-11-29 | End: 2023-12-01 | Stop reason: HOSPADM

## 2023-11-29 RX ORDER — SODIUM CHLORIDE 0.9 % (FLUSH) 0.9 %
5-40 SYRINGE (ML) INJECTION PRN
Status: DISCONTINUED | OUTPATIENT
Start: 2023-11-29 | End: 2023-12-01 | Stop reason: HOSPADM

## 2023-11-29 RX ORDER — PANTOPRAZOLE SODIUM 40 MG/1
40 TABLET, DELAYED RELEASE ORAL
Status: DISCONTINUED | OUTPATIENT
Start: 2023-11-30 | End: 2023-12-01 | Stop reason: HOSPADM

## 2023-11-29 RX ORDER — ALBUTEROL SULFATE 90 UG/1
2 AEROSOL, METERED RESPIRATORY (INHALATION) EVERY 6 HOURS PRN
Status: DISCONTINUED | OUTPATIENT
Start: 2023-11-29 | End: 2023-12-01 | Stop reason: HOSPADM

## 2023-11-29 RX ORDER — CEFAZOLIN SODIUM 1 G/3ML
INJECTION, POWDER, FOR SOLUTION INTRAMUSCULAR; INTRAVENOUS PRN
Status: DISCONTINUED | OUTPATIENT
Start: 2023-11-29 | End: 2023-11-29 | Stop reason: SDUPTHER

## 2023-11-29 RX ORDER — BUDESONIDE AND FORMOTEROL FUMARATE DIHYDRATE 160; 4.5 UG/1; UG/1
2 AEROSOL RESPIRATORY (INHALATION) 2 TIMES DAILY
Status: DISCONTINUED | OUTPATIENT
Start: 2023-11-29 | End: 2023-12-01 | Stop reason: HOSPADM

## 2023-11-29 RX ORDER — SODIUM CHLORIDE, SODIUM LACTATE, POTASSIUM CHLORIDE, CALCIUM CHLORIDE 600; 310; 30; 20 MG/100ML; MG/100ML; MG/100ML; MG/100ML
INJECTION, SOLUTION INTRAVENOUS CONTINUOUS
Status: DISCONTINUED | OUTPATIENT
Start: 2023-11-29 | End: 2023-11-29

## 2023-11-29 RX ORDER — METOPROLOL TARTRATE 50 MG/1
50 TABLET, FILM COATED ORAL 2 TIMES DAILY
Status: DISCONTINUED | OUTPATIENT
Start: 2023-11-29 | End: 2023-12-01 | Stop reason: HOSPADM

## 2023-11-29 RX ORDER — MORPHINE SULFATE 2 MG/ML
2 INJECTION, SOLUTION INTRAMUSCULAR; INTRAVENOUS
Status: DISCONTINUED | OUTPATIENT
Start: 2023-11-29 | End: 2023-12-01 | Stop reason: HOSPADM

## 2023-11-29 RX ORDER — SODIUM CHLORIDE 9 MG/ML
INJECTION, SOLUTION INTRAVENOUS CONTINUOUS
Status: DISCONTINUED | OUTPATIENT
Start: 2023-11-29 | End: 2023-11-29 | Stop reason: HOSPADM

## 2023-11-29 RX ORDER — FENTANYL CITRATE 50 UG/ML
INJECTION, SOLUTION INTRAMUSCULAR; INTRAVENOUS PRN
Status: DISCONTINUED | OUTPATIENT
Start: 2023-11-29 | End: 2023-11-29 | Stop reason: SDUPTHER

## 2023-11-29 RX ORDER — EPHEDRINE SULFATE/0.9% NACL/PF 50 MG/5 ML
SYRINGE (ML) INTRAVENOUS PRN
Status: DISCONTINUED | OUTPATIENT
Start: 2023-11-29 | End: 2023-11-29 | Stop reason: SDUPTHER

## 2023-11-29 RX ORDER — ONDANSETRON 2 MG/ML
4 INJECTION INTRAMUSCULAR; INTRAVENOUS
Status: DISCONTINUED | OUTPATIENT
Start: 2023-11-29 | End: 2023-11-29 | Stop reason: HOSPADM

## 2023-11-29 RX ORDER — TRANEXAMIC ACID 100 MG/ML
INJECTION, SOLUTION INTRAVENOUS PRN
Status: DISCONTINUED | OUTPATIENT
Start: 2023-11-29 | End: 2023-11-29 | Stop reason: SDUPTHER

## 2023-11-29 RX ORDER — ATORVASTATIN CALCIUM 40 MG/1
40 TABLET, FILM COATED ORAL DAILY
Status: DISCONTINUED | OUTPATIENT
Start: 2023-11-29 | End: 2023-12-01 | Stop reason: HOSPADM

## 2023-11-29 RX ORDER — INSULIN LISPRO 100 [IU]/ML
0-8 INJECTION, SOLUTION INTRAVENOUS; SUBCUTANEOUS
Status: DISCONTINUED | OUTPATIENT
Start: 2023-11-29 | End: 2023-11-30

## 2023-11-29 RX ORDER — PREGABALIN 100 MG/1
100 CAPSULE ORAL 3 TIMES DAILY
Status: DISCONTINUED | OUTPATIENT
Start: 2023-11-29 | End: 2023-12-01 | Stop reason: HOSPADM

## 2023-11-29 RX ORDER — ROCURONIUM BROMIDE 10 MG/ML
INJECTION, SOLUTION INTRAVENOUS PRN
Status: DISCONTINUED | OUTPATIENT
Start: 2023-11-29 | End: 2023-11-29 | Stop reason: SDUPTHER

## 2023-11-29 RX ADMIN — SODIUM CHLORIDE: 9 INJECTION, SOLUTION INTRAVENOUS at 13:34

## 2023-11-29 RX ADMIN — HYDROMORPHONE HYDROCHLORIDE 0.25 MG: 1 INJECTION, SOLUTION INTRAMUSCULAR; INTRAVENOUS; SUBCUTANEOUS at 17:40

## 2023-11-29 RX ADMIN — GLYCOPYRROLATE 0.2 MG: 0.2 INJECTION INTRAMUSCULAR; INTRAVENOUS at 15:50

## 2023-11-29 RX ADMIN — Medication 3000 MG: at 23:01

## 2023-11-29 RX ADMIN — MIDAZOLAM 2 MG: 1 INJECTION INTRAMUSCULAR; INTRAVENOUS at 15:37

## 2023-11-29 RX ADMIN — LIDOCAINE HYDROCHLORIDE 1 ML: 10 INJECTION, SOLUTION EPIDURAL; INFILTRATION; INTRACAUDAL; PERINEURAL at 13:35

## 2023-11-29 RX ADMIN — TRANEXAMIC ACID 1000 MG: 100 INJECTION, SOLUTION INTRAVENOUS at 15:49

## 2023-11-29 RX ADMIN — PHENYLEPHRINE HYDROCHLORIDE 100 MCG: 10 INJECTION INTRAVENOUS at 16:23

## 2023-11-29 RX ADMIN — SODIUM CHLORIDE: 9 INJECTION, SOLUTION INTRAVENOUS at 18:16

## 2023-11-29 RX ADMIN — FENTANYL CITRATE 50 MCG: 50 INJECTION, SOLUTION INTRAMUSCULAR; INTRAVENOUS at 16:43

## 2023-11-29 RX ADMIN — FINASTERIDE 5 MG: 5 TABLET, FILM COATED ORAL at 19:02

## 2023-11-29 RX ADMIN — SUGAMMADEX 200 MG: 100 INJECTION, SOLUTION INTRAVENOUS at 17:19

## 2023-11-29 RX ADMIN — PROPOFOL 150 MG: 10 INJECTION, EMULSION INTRAVENOUS at 15:44

## 2023-11-29 RX ADMIN — BUDESONIDE AND FORMOTEROL FUMARATE DIHYDRATE 2 PUFF: 160; 4.5 AEROSOL RESPIRATORY (INHALATION) at 19:39

## 2023-11-29 RX ADMIN — TAMSULOSIN HYDROCHLORIDE 0.4 MG: 0.4 CAPSULE ORAL at 19:02

## 2023-11-29 RX ADMIN — ROCURONIUM BROMIDE 50 MG: 10 INJECTION, SOLUTION INTRAVENOUS at 15:49

## 2023-11-29 RX ADMIN — CLOTRIMAZOLE AND BETAMETHASONE DIPROPIONATE: 10; .5 CREAM TOPICAL at 21:14

## 2023-11-29 RX ADMIN — GEMFIBROZIL 600 MG: 600 TABLET ORAL at 19:02

## 2023-11-29 RX ADMIN — Medication 160 MG: at 15:44

## 2023-11-29 RX ADMIN — AMIODARONE HYDROCHLORIDE 200 MG: 200 TABLET ORAL at 21:13

## 2023-11-29 RX ADMIN — Medication 10 MG: at 16:06

## 2023-11-29 RX ADMIN — Medication 20 MG: at 16:07

## 2023-11-29 RX ADMIN — Medication 10 MG: at 16:12

## 2023-11-29 RX ADMIN — ROPINIROLE HYDROCHLORIDE 2 MG: 0.5 TABLET, FILM COATED ORAL at 19:01

## 2023-11-29 RX ADMIN — ATORVASTATIN CALCIUM 40 MG: 40 TABLET, FILM COATED ORAL at 19:02

## 2023-11-29 RX ADMIN — LIDOCAINE HYDROCHLORIDE 50 MG: 20 INJECTION, SOLUTION EPIDURAL; INFILTRATION; INTRACAUDAL; PERINEURAL at 15:44

## 2023-11-29 RX ADMIN — PREGABALIN 100 MG: 100 CAPSULE ORAL at 21:13

## 2023-11-29 RX ADMIN — CEFAZOLIN 2 G: 1 INJECTION, POWDER, FOR SOLUTION INTRAMUSCULAR; INTRAVENOUS at 15:43

## 2023-11-29 RX ADMIN — ACETAMINOPHEN 650 MG: 325 TABLET ORAL at 19:01

## 2023-11-29 RX ADMIN — Medication 10 MG: at 16:05

## 2023-11-29 RX ADMIN — FENTANYL CITRATE 50 MCG: 50 INJECTION, SOLUTION INTRAMUSCULAR; INTRAVENOUS at 15:44

## 2023-11-29 RX ADMIN — ACETAMINOPHEN 650 MG: 325 TABLET ORAL at 23:03

## 2023-11-29 RX ADMIN — PHENYLEPHRINE HYDROCHLORIDE 100 MCG: 10 INJECTION INTRAVENOUS at 16:09

## 2023-11-29 RX ADMIN — METOPROLOL TARTRATE 50 MG: 50 TABLET ORAL at 21:13

## 2023-11-29 RX ADMIN — PHENYLEPHRINE HYDROCHLORIDE 100 MCG: 10 INJECTION INTRAVENOUS at 16:52

## 2023-11-29 ASSESSMENT — PAIN DESCRIPTION - DESCRIPTORS
DESCRIPTORS: ACHING;SORE
DESCRIPTORS: THROBBING

## 2023-11-29 ASSESSMENT — PAIN DESCRIPTION - PAIN TYPE
TYPE: SURGICAL PAIN
TYPE: SURGICAL PAIN

## 2023-11-29 ASSESSMENT — PAIN DESCRIPTION - FREQUENCY: FREQUENCY: CONTINUOUS

## 2023-11-29 ASSESSMENT — PAIN DESCRIPTION - LOCATION
LOCATION: BACK
LOCATION: BACK

## 2023-11-29 ASSESSMENT — PAIN DESCRIPTION - ORIENTATION: ORIENTATION: MID

## 2023-11-29 ASSESSMENT — PAIN SCALES - GENERAL
PAINLEVEL_OUTOF10: 6
PAINLEVEL_OUTOF10: 4
PAINLEVEL_OUTOF10: 8
PAINLEVEL_OUTOF10: 8

## 2023-11-29 NOTE — PROGRESS NOTES
Patient admitted to room 2073 from OR. Vital signs assessed, no signs of distress noted at this time. Bed is in lowest position, brake is locked, and call light is within reach. Dr. Pilo Arzate notified of new admission, he will see patient tomorrow.

## 2023-11-29 NOTE — BRIEF OP NOTE
Brief Postoperative Note      Patient: Amalia Walker  YOB: 1960  MRN: [id number]    Date of Procedure: 11/29/2023    Pre-Op Diagnosis Codes:     * Painful orthopaedic hardware [facility]) Fadi Llanos    Post-Op Diagnosis: Same       Procedure(s):  LUMBAR HARDWARE REMOVAL L4-L5 LEFT    Surgeon(s):  MD Dariusz Richmond PA    Assistant:  * No surgical staff found *    Anesthesia: General    Estimated Blood Loss (mL): less than 50     Complications: None    Specimens:   * No specimens in log *    Implants:  Implant Name Type Inv. Item Serial No.  Lot No. LRB No. Used Action   JOSE E SPNL L50MM Hwang Sears. 5MM POST THORLUM TI CRV SMOOTH NTHRD - [id number] Spine JOSE E SPNL L50MM Hwang Sears. 5MM POST THORLUM TI CRV SMOOTH NTHRD  [employer]  N/A 1 Explanted   SCREW SPINAL L40MM DIA6.5MM CANC PEDCL THORLUM TI NTHRD TACOS - [id number] Spine SCREW SPINAL L40MM DIA6.5MM CANC PEDCL THORLUM TI NTHRD TACOS  [employer]  N/A 2 Explanted   ENDCAP SPNL TACOS FOR SCR REVERE - [id number] Spine ENDCAP SPNL TACOS FOR SCR REVERE  [employer]  N/A 2 Explanted       Implants are the implants removed no new implants  Drains:   Closed/Suction Drain Midline Back Bulb (Active)     7mm nanette  Findings: see dictation      Electronically signed by Alberto Loredo MD on 11/29/2023 at 5:14 PM

## 2023-11-29 NOTE — INTERVAL H&P NOTE
Update History & Physical    The patient's History and Physical of November 24, 2023 was reviewed with the patient and I examined the patient. Any changes are as documented below. Here today for LUMBAR HARDWARE REMOVAL L4-L5 LEFT per Dr. Naveed Landa. Pt AAO x 3 in NAD. Pt presents today in wheelchair. Pt left BKA with prosthetic in place. Reports he has lower back pain with weakness to BLE with left greater than right. HRRR. No adventitious lung sounds. No respiratory distress. Denies any new open wounds or rash over surgical site. NPO p MN. Took isosorbide, amiodarone, inhaler, metoprolol, amlodipine, levothyroxine, omeprazole this am with sip of water. Stopped aspirin and eliquis one week ago. Denies taking any other blood thinning medications. Denies recent or current chest pain/pressure, palpitations, SOB, recent URI, fever or chills. Review vitals per RN flowsheet.        Electronically signed by RADHA Osei CNP on 11/29/2023 at 12:42 PM

## 2023-11-29 NOTE — ANESTHESIA PRE PROCEDURE
Department of Anesthesiology  Preprocedure Note       Name:  Janna Nagel   Age:  61 y.o.  :  1960                                          MRN:  [id number]         Date:  2023      Surgeon: Jeanne Hutson):  Benjamin Sawant MD    Procedure: Procedure(s):  LUMBAR HARDWARE REMOVAL L4-L5 LEFT    Medications prior to admission:   Prior to Admission medications    Medication Sig Start Date End Date Taking? Authorizing Provider   metoprolol tartrate (LOPRESSOR) 50 MG tablet take 1 tablet by mouth twice a day 10/31/23   Shanda Freitas MD   tamsulosin [facility]) 0.4 MG capsule take 1 capsule by mouth once daily 10/19/23   Shanda Freitas MD   amLODIPine (NORVASC) 2.5 MG tablet take 1 tablet by mouth once daily 23   Shanda rFeitas MD   levothyroxine (SYNTHROID) 175 MCG tablet take 1 tablet by mouth once daily 23   Shanda Freitas MD   pregabalin (LYRICA) 100 MG capsule Take 1 capsule by mouth in the morning, at noon, and at bedtime for 90 days.  23  Shanda Freitas MD   MOUNJARO 2.5 MG/0.5ML SOPN SC injection Inject 0.5 mLs into the skin once a week Takes on 23   Kiera Henderson MD   DROPLET PEN NEEDLES 31G X 8 MM MISC use 1 PEN NEEDLE to inject MEDICATION subcutaneously once daily 23   Kiera Henderson MD   albuterol sulfate HFA (PROVENTIL;VENTOLIN;PROAIR) 108 (90 Base) MCG/ACT inhaler Inhale 2 puffs into the lungs every 6 hours as needed for Wheezing or Shortness of Breath 23   Shanda Freitas MD   amiodarone (CORDARONE) 200 MG tablet Take 1 tablet by mouth 2 times daily 23   Shanda Freitas MD   budesonide-formoterol (SYMBICORT) 160-4.5 MCG/ACT AERO INHALE 2 PUFFS INTO THE LUNGS TWICE A DAY 23   Shanda Freitas MD   finasteride (PROSCAR) 5 MG tablet Take 1 tablet by mouth daily 23   Shanda Freitas MD   gemfibrozil (LOPID) 600 MG tablet Take 1 tablet by mouth daily 23   Shanda Freitas MD   losartan (COZAAR)

## 2023-11-30 LAB
ANION GAP SERPL CALCULATED.3IONS-SCNC: 10 MMOL/L (ref 9–17)
BUN SERPL-MCNC: 21 MG/DL (ref 8–23)
CALCIUM SERPL-MCNC: 8.2 MG/DL (ref 8.6–10.4)
CHLORIDE SERPL-SCNC: 103 MMOL/L (ref 98–107)
CO2 SERPL-SCNC: 26 MMOL/L (ref 20–31)
CREAT SERPL-MCNC: 1.4 MG/DL (ref 0.7–1.2)
ERYTHROCYTE [DISTWIDTH] IN BLOOD BY AUTOMATED COUNT: 14.9 % (ref 11.5–14.9)
GFR SERPL CREATININE-BSD FRML MDRD: 56 ML/MIN/1.73M2
GLUCOSE BLD-MCNC: 164 MG/DL (ref 75–110)
GLUCOSE BLD-MCNC: 244 MG/DL (ref 75–110)
GLUCOSE BLD-MCNC: 313 MG/DL (ref 75–110)
GLUCOSE BLD-MCNC: 335 MG/DL (ref 75–110)
GLUCOSE SERPL-MCNC: 352 MG/DL (ref 70–99)
HCT VFR BLD AUTO: 40.1 % (ref 41–53)
HGB BLD-MCNC: 13.4 G/DL (ref 13.5–17.5)
MCH RBC QN AUTO: 31.3 PG (ref 26–34)
MCHC RBC AUTO-ENTMCNC: 33.4 G/DL (ref 31–37)
MCV RBC AUTO: 93.7 FL (ref 80–100)
PLATELET # BLD AUTO: 140 K/UL (ref 150–450)
PMV BLD AUTO: 10.4 FL (ref 6–12)
POTASSIUM SERPL-SCNC: 4.2 MMOL/L (ref 3.7–5.3)
RBC # BLD AUTO: 4.28 M/UL (ref 4.5–5.9)
SODIUM SERPL-SCNC: 139 MMOL/L (ref 135–144)
WBC OTHER # BLD: 8.8 K/UL (ref 3.5–11)

## 2023-11-30 PROCEDURE — 1200000000 HC SEMI PRIVATE

## 2023-11-30 PROCEDURE — 99223 1ST HOSP IP/OBS HIGH 75: CPT | Performed by: INTERNAL MEDICINE

## 2023-11-30 PROCEDURE — 36415 COLL VENOUS BLD VENIPUNCTURE: CPT

## 2023-11-30 PROCEDURE — 94640 AIRWAY INHALATION TREATMENT: CPT

## 2023-11-30 PROCEDURE — G0378 HOSPITAL OBSERVATION PER HR: HCPCS

## 2023-11-30 PROCEDURE — 6370000000 HC RX 637 (ALT 250 FOR IP): Performed by: ORTHOPAEDIC SURGERY

## 2023-11-30 PROCEDURE — 6370000000 HC RX 637 (ALT 250 FOR IP): Performed by: INTERNAL MEDICINE

## 2023-11-30 PROCEDURE — 85027 COMPLETE CBC AUTOMATED: CPT

## 2023-11-30 PROCEDURE — 82947 ASSAY GLUCOSE BLOOD QUANT: CPT

## 2023-11-30 PROCEDURE — 94760 N-INVAS EAR/PLS OXIMETRY 1: CPT

## 2023-11-30 PROCEDURE — 6360000002 HC RX W HCPCS: Performed by: ORTHOPAEDIC SURGERY

## 2023-11-30 PROCEDURE — 0QP004Z REMOVAL OF INTERNAL FIXATION DEVICE FROM LUMBAR VERTEBRA, OPEN APPROACH: ICD-10-PCS | Performed by: ORTHOPAEDIC SURGERY

## 2023-11-30 PROCEDURE — 2580000003 HC RX 258: Performed by: ORTHOPAEDIC SURGERY

## 2023-11-30 PROCEDURE — 97116 GAIT TRAINING THERAPY: CPT

## 2023-11-30 PROCEDURE — 97162 PT EVAL MOD COMPLEX 30 MIN: CPT

## 2023-11-30 PROCEDURE — 80048 BASIC METABOLIC PNL TOTAL CA: CPT

## 2023-11-30 RX ORDER — OXYCODONE HYDROCHLORIDE AND ACETAMINOPHEN 5; 325 MG/1; MG/1
1 TABLET ORAL EVERY 6 HOURS PRN
Qty: 28 TABLET | Refills: 0 | Status: SHIPPED | OUTPATIENT
Start: 2023-11-30 | End: 2023-12-07

## 2023-11-30 RX ORDER — ISOSORBIDE MONONITRATE 30 MG/1
30 TABLET, EXTENDED RELEASE ORAL DAILY
Qty: 120 TABLET | Refills: 1 | Status: SHIPPED | OUTPATIENT
Start: 2023-11-30

## 2023-11-30 RX ORDER — INSULIN LISPRO 100 [IU]/ML
0-4 INJECTION, SOLUTION INTRAVENOUS; SUBCUTANEOUS NIGHTLY
Status: DISCONTINUED | OUTPATIENT
Start: 2023-11-30 | End: 2023-12-01 | Stop reason: HOSPADM

## 2023-11-30 RX ORDER — ISOSORBIDE MONONITRATE 30 MG/1
30 TABLET, EXTENDED RELEASE ORAL DAILY
Status: DISCONTINUED | OUTPATIENT
Start: 2023-11-30 | End: 2023-12-01 | Stop reason: HOSPADM

## 2023-11-30 RX ORDER — INSULIN LISPRO 100 [IU]/ML
0-16 INJECTION, SOLUTION INTRAVENOUS; SUBCUTANEOUS
Status: DISCONTINUED | OUTPATIENT
Start: 2023-11-30 | End: 2023-12-01 | Stop reason: HOSPADM

## 2023-11-30 RX ORDER — INSULIN GLARGINE 100 [IU]/ML
48 INJECTION, SOLUTION SUBCUTANEOUS 2 TIMES DAILY
Status: DISCONTINUED | OUTPATIENT
Start: 2023-11-30 | End: 2023-12-01 | Stop reason: HOSPADM

## 2023-11-30 RX ADMIN — ACETAMINOPHEN 650 MG: 325 TABLET ORAL at 11:22

## 2023-11-30 RX ADMIN — INSULIN LISPRO 12 UNITS: 100 INJECTION, SOLUTION INTRAVENOUS; SUBCUTANEOUS at 16:18

## 2023-11-30 RX ADMIN — APIXABAN 5 MG: 5 TABLET, FILM COATED ORAL at 21:11

## 2023-11-30 RX ADMIN — PANTOPRAZOLE SODIUM 40 MG: 40 TABLET, DELAYED RELEASE ORAL at 07:16

## 2023-11-30 RX ADMIN — GEMFIBROZIL 600 MG: 600 TABLET ORAL at 09:12

## 2023-11-30 RX ADMIN — ISOSORBIDE MONONITRATE 30 MG: 30 TABLET, EXTENDED RELEASE ORAL at 14:33

## 2023-11-30 RX ADMIN — OXYCODONE HYDROCHLORIDE 5 MG: 5 TABLET ORAL at 14:36

## 2023-11-30 RX ADMIN — INSULIN GLARGINE 44 UNITS: 100 INJECTION, SOLUTION SUBCUTANEOUS at 09:10

## 2023-11-30 RX ADMIN — LEVOTHYROXINE SODIUM 175 MCG: 125 TABLET ORAL at 07:16

## 2023-11-30 RX ADMIN — ACETAMINOPHEN 650 MG: 325 TABLET ORAL at 21:10

## 2023-11-30 RX ADMIN — INSULIN LISPRO 6 UNITS: 100 INJECTION, SOLUTION INTRAVENOUS; SUBCUTANEOUS at 11:24

## 2023-11-30 RX ADMIN — ACETAMINOPHEN 650 MG: 325 TABLET ORAL at 07:16

## 2023-11-30 RX ADMIN — BUDESONIDE AND FORMOTEROL FUMARATE DIHYDRATE 2 PUFF: 160; 4.5 AEROSOL RESPIRATORY (INHALATION) at 08:46

## 2023-11-30 RX ADMIN — ROPINIROLE HYDROCHLORIDE 2 MG: 0.5 TABLET, FILM COATED ORAL at 09:11

## 2023-11-30 RX ADMIN — Medication 3000 MG: at 07:16

## 2023-11-30 RX ADMIN — APIXABAN 5 MG: 5 TABLET, FILM COATED ORAL at 09:12

## 2023-11-30 RX ADMIN — PREGABALIN 100 MG: 100 CAPSULE ORAL at 09:12

## 2023-11-30 RX ADMIN — FINASTERIDE 5 MG: 5 TABLET, FILM COATED ORAL at 09:12

## 2023-11-30 RX ADMIN — AMIODARONE HYDROCHLORIDE 200 MG: 200 TABLET ORAL at 09:11

## 2023-11-30 RX ADMIN — ATORVASTATIN CALCIUM 40 MG: 40 TABLET, FILM COATED ORAL at 09:12

## 2023-11-30 RX ADMIN — METOPROLOL TARTRATE 50 MG: 50 TABLET ORAL at 21:11

## 2023-11-30 RX ADMIN — PREGABALIN 100 MG: 100 CAPSULE ORAL at 14:33

## 2023-11-30 RX ADMIN — AMLODIPINE BESYLATE 2.5 MG: 2.5 TABLET ORAL at 09:12

## 2023-11-30 RX ADMIN — TAMSULOSIN HYDROCHLORIDE 0.4 MG: 0.4 CAPSULE ORAL at 09:11

## 2023-11-30 RX ADMIN — METOPROLOL TARTRATE 50 MG: 50 TABLET ORAL at 09:11

## 2023-11-30 RX ADMIN — LOSARTAN POTASSIUM 50 MG: 50 TABLET, FILM COATED ORAL at 09:12

## 2023-11-30 RX ADMIN — SODIUM CHLORIDE, PRESERVATIVE FREE 10 ML: 5 INJECTION INTRAVENOUS at 21:12

## 2023-11-30 RX ADMIN — PREGABALIN 100 MG: 100 CAPSULE ORAL at 21:11

## 2023-11-30 RX ADMIN — INSULIN GLARGINE 48 UNITS: 100 INJECTION, SOLUTION SUBCUTANEOUS at 21:11

## 2023-11-30 RX ADMIN — BUDESONIDE AND FORMOTEROL FUMARATE DIHYDRATE 2 PUFF: 160; 4.5 AEROSOL RESPIRATORY (INHALATION) at 20:45

## 2023-11-30 RX ADMIN — AMIODARONE HYDROCHLORIDE 200 MG: 200 TABLET ORAL at 21:11

## 2023-11-30 ASSESSMENT — PAIN DESCRIPTION - LOCATION
LOCATION: BACK

## 2023-11-30 ASSESSMENT — PAIN SCALES - GENERAL
PAINLEVEL_OUTOF10: 8
PAINLEVEL_OUTOF10: 6
PAINLEVEL_OUTOF10: 6
PAINLEVEL_OUTOF10: 4

## 2023-11-30 ASSESSMENT — PAIN DESCRIPTION - DESCRIPTORS
DESCRIPTORS: STABBING;SHOOTING
DESCRIPTORS: SHARP
DESCRIPTORS: BURNING;SHARP

## 2023-11-30 ASSESSMENT — PAIN DESCRIPTION - ORIENTATION: ORIENTATION: LOWER

## 2023-11-30 NOTE — DISCHARGE SUMMARY
upper, does not wear lower partial    Wellness examination     Dr. Manuel Arias seen within last 1 1/2 mos       Procedures Performed and Findings  Procedure(s) with comments:  Removal of left lumbar hardware to include two screws, jeny and two cap screws - GLOBUS HARDWARE REMOVAL     Consultations Obtained  IP CONSULT TO PRIMARY CARE PROVIDER  IP CONSULT TO [address] [place] [address] Course  uncomplicated    Discharge Medications       Medication List        START taking these medications      oxyCODONE-acetaminophen 5-325 MG per tablet  Commonly known as: Percocet  Take 1 tablet by mouth every 6 hours as needed for Pain for up to 7 days. Intended supply: 7 days.  Take lowest dose possible to manage pain Max Daily Amount: 4 tablets            CONTINUE taking these medications      albuterol sulfate  (90 Base) MCG/ACT inhaler  Commonly known as: PROVENTIL;VENTOLIN;PROAIR  Inhale 2 puffs into the lungs every 6 hours as needed for Wheezing or Shortness of Breath     amiodarone 200 MG tablet  Commonly known as: CORDARONE  Take 1 tablet by mouth 2 times daily     amLODIPine 2.5 MG tablet  Commonly known as: NORVASC  take 1 tablet by mouth once daily     Aspirin Low Dose 81 MG EC tablet  Generic drug: aspirin  TAKE 1 TABLET BY MOUTH EVERY DAY IN THE MORNING     atorvastatin 40 MG tablet  Commonly known as: LIPITOR  TAKE 1 TABLET BY MOUTH EVERY DAY     Blood Pressure Kit  1 actuation by Does not apply route once a week     budesonide-formoterol 160-4.5 MCG/ACT Aero  Commonly known as: Symbicort  INHALE 2 PUFFS INTO THE LUNGS TWICE A DAY     clotrimazole-betamethasone 1-0.05 % cream  Commonly known as: LOTRISONE  APPLY TO AFFECTED AREA TWICE A DAY     Contour Next Test strip  Generic drug: blood glucose test strips  TEST 4 TIMES A DAY     Dexcom G6  Lory     Droplet Pen Needles 31G X 8 MM Misc  Generic drug: Insulin Pen Needle     Eliquis 5 MG Tabs tablet  Generic drug: apixaban  TAKE 1 TABLET BY MOUTH TWICE Scheduled    No follow-up provider specified.     Electronically signed by Genna Marie MD on 11/30/2023 at 1:51 PM

## 2023-11-30 NOTE — PLAN OF CARE
Problem: Discharge Planning  Goal: Discharge to home or other facility with appropriate resources  Flowsheets (Taken 11/30/2023 1825)  Discharge to home or other facility with appropriate resources:   Identify barriers to discharge with patient and caregiver   Arrange for needed discharge resources and transportation as appropriate   Identify discharge learning needs (meds, wound care, etc)   Arrange for interpreters to assist at discharge as needed   Refer to discharge planning if patient needs post-hospital services based on physician order or complex needs related to functional status, cognitive ability or social support system  Note: Discharge planners following for further discharge needs      Problem: Safety - Adult  Goal: Free from fall injury  Flowsheets (Taken 11/30/2023 1825)  Free From Fall Injury:   Instruct family/caregiver on patient safety   Based on caregiver fall risk screen, instruct family/caregiver to ask for assistance with transferring infant if caregiver noted to have fall risk factors  Note: No falls this shift. Call light with in reach, side rails up x2, bed in lowest postion. Patients safety maintained. Problem: ABCDS Injury Assessment  Goal: Absence of physical injury  Flowsheets (Taken 11/30/2023 1825)  Absence of Physical Injury: Implement safety measures based on patient assessment  Note: No injury this shift. Patients safety maintained.       Problem: Pain  Goal: Verbalizes/displays adequate comfort level or baseline comfort level  Flowsheets (Taken 11/30/2023 1825)  Verbalizes/displays adequate comfort level or baseline comfort level:   Encourage patient to monitor pain and request assistance   Assess pain using appropriate pain scale   Administer analgesics based on type and severity of pain and evaluate response   Implement non-pharmacological measures as appropriate and evaluate response   Consider cultural and social influences on pain and pain management   Notify

## 2023-11-30 NOTE — OP NOTE
[facility]                 [address] [place], [address] [facility]                                OPERATIVE REPORT    PATIENT NAME: Jose Payan                   :        1960  MED REC NO:   [id number]                              ROOM:         ACCOUNT NO:   [de-identified]                           ADMIT DATE: 2023  PROVIDER:     Prisca Garcia MD    DATE OF PROCEDURE:  2023    PREOPERATIVE DIAGNOSIS:  Painful hardware of lumbar spine, post lumbar  fusion. POSTOPERATIVE DIAGNOSIS:  Painful hardware of lumbar spine, post lumbar  fusion. OPERATION PERFORMED:  1. Removal of left lumbar hardware to include two screws, jeny and two  cap screws. 2.  Fluoroscopic assistance. OPERATING SURGEON:  Karol Lee. Mango Carpio MD    FIRST ASSISTANT:  José Manuel Clifford[address].    ANESTHESIA:  General.    BLOOD LOSS:  Less than 50. COMPLICATIONS:  None. SPECIMENS:  None. IMPLANTS:  No new implants placed. Globus screw jeny construct on the  left, removed. DRAIN:  A 7-mm YAZ was placed in the wounds depth. FINDINGS:  1. The patient with very aggressive scar healing. I had thought about  potentially releasing seromas from the interlaminar space; however, this  proved to be completely scarred in at the time of the surgery. 2.  Screw jeny construct removed without difficulty. Left screw palpably  was probably loose anyway. PROCEDURE IN DETAIL:  The patient was taken to the operating room,  placed under general anesthesia, transferred to the [facility]  table, checked for padding and positioning. Back was prepped and draped  in the usual sterile fashion. Previous midline incision was opened,  carried down through the skin and subcutaneous tissue. Spinous  processes were identified. Dissection was carried down.   The remained  of the spinous processes, the patient was noted to have very thick an  aggressive scar tissue for this closed juncture

## 2023-11-30 NOTE — CONSULTS
[facility] Internal Medicine  [facility]  Alison Cm MD; Christopher Diggs MD; Valerio Raymundo MD; MD Russ Romero MD; Robert Aranda MD; Travis Medrano MD        CONSULTATION / HISTORY AND PHYSICAL EXAMINATION            Date:   11/30/2023  Patient name:  Shanna Peralta  Date of admission:  11/29/2023 12:38 PM  MRN:   [id number]  Account:  [de-identified]  YOB: 1960  PCP:    Radha Lindsey MD  Room:   2073/2073-01  Code Status:    Full Code    Physician Requesting Consult: Darinel Branch MD    Reason for Consult:  Medical management    Chief Complaint:     No chief complaint on file. Medical management after surgery for lumbar hardware removal.    History Obtained From:     Patient, EMR, nursing staff    History of Present Illness:     Patient admitted for elective lumbar hardware removal L4-L5 left  Patient underwent lumbar fusion surgery on 10/2 but reported increased pain after the surgery. Medical history includes A-fib, coronary artery disease, COPD, T2DM with diabetic neuropathy, HTN, HLD, left BKA, hypothyroid peripheral vascular disease    HTN  Onset more than 2 years ago  Ngozi: mild to mod  Usually controlled with current po meds  Not associated with headaches or blurry vision  No chest pain    Diabetes   Duration more than 5 years  Modifying factors on med:   Severity: un/controlled   Associated signs and symtoms: neuropathy/ckd/ CAD.    aggravated with sugar diet and better with low sugar diet        Past Medical History:     Past Medical History:   Diagnosis Date    A-fib [facility])     Asymptomatic bilateral carotid artery stenosis     Boil, thigh 10/2019    10/30/19: right inner thigh region, says PCP Rxd Doxy for this, area is much better, has a couple days left to complete Doxy    CAD (coronary artery disease)     saw Dr. Joanne Hammans ([facility])     Charcot foot due to diabetes mellitus ([address]) 2014    LEFT    COPD (chronic Bilateral equal air entry, clear to ausculation, no wheezing, rales or rhonchi, normal effort  Cardiovascular: normal rate, regular rhythm, no murmur, gallop, rub. Abdomen: Soft, nontender, nondistended, normal bowel sounds, no hepatomegaly or splenomegaly, postop changes to lumbar area   neurologic: There are no new focal motor or sensory deficits, normal muscle tone and bulk, no abnormal sensation, normal speech, cranial nerves II through XII grossly intact  Skin: No gross lesions, rashes, bruising or bleeding on exposed skin area  Extremities: BKA, peripheral pulses palpable, no pedal edema or calf pain with palpation  Psych: normal affect    Investigations:      Laboratory Testing:  Recent Results (from the past 24 hour(s))   POC Glucose Fingerstick    Collection Time: 11/29/23  1:28 PM   Result Value Ref Range    POC Glucose 207 (H) 75 - 110 mg/dL   POC Glucose Fingerstick    Collection Time: 11/29/23  5:34 PM   Result Value Ref Range    POC Glucose 158 (H) 75 - 110 mg/dL   POC Glucose Fingerstick    Collection Time: 11/30/23  6:08 AM   Result Value Ref Range    POC Glucose 164 (H) 75 - 110 mg/dL   POC Glucose Fingerstick    Collection Time: 11/30/23 11:15 AM   Result Value Ref Range    POC Glucose 313 (H) 75 - 110 mg/dL       Imaging/Diagonstics:  Recent data reviewed    Assessment :      Primary Problem  Painful orthopaedic hardware [facility])    Active Hospital Problems    Diagnosis Date Noted    Painful orthopaedic hardware [facility]) Maria Isabel Adler 11/29/2023       Plan:     Patient admitted for elective lumbar hardware removal L4-L5 left  Patient underwent lumbar fusion surgery on 10/2 but reported increased pain after the surgery.   Medical history includes A-fib, coronary artery disease, COPD, T2DM with diabetic neuropathy, HTN, HLD, left BKA, hypothyroid peripheral vascular disease    A-fib continue amiodarone, Eliquis has been resumed  Hypertension blood pressure currently controlled continue amlodipine, clonidine,

## 2023-11-30 NOTE — PROGRESS NOTES
Physical Therapy  Facility/Department: [facility] MED SURG  Physical Therapy Initial Assessment    Name: Jael Rodriguez  : 1960  MRN: [id number]  Date of Service: 2023    Discharge Recommendations:  Patient would benefit from continued therapy after discharge, Therapy recommended at discharge   PT Equipment Recommendations  Equipment Needed: No      Patient Diagnosis(es): There were no encounter diagnoses. Past Medical History:  has a past medical history of [facility]), Asymptomatic bilateral carotid artery stenosis, Boil, thigh, CAD (coronary artery disease), Charcot foot due to diabetes mellitus ([address]), COPD (chronic obstructive pulmonary disease) ([address]), Diabetes mellitus ([address]), Diabetic neuropathy ([address]), Difficult intravenous access, Employs prosthetic leg, Foot ulcer ([address]), Full dentures, Hyperlipidemia, Hypertension, Hypoglycemia, Left below-knee amputee ([address]), MRSA (methicillin resistant staph aureus) culture positive, OA (osteoarthritis), Osteomyelitis ([address]), Paroxysmal atrial fibrillation ([address]), Renal mass, Snores, Suspected sleep apnea, Thyroid disease, Vision abnormalities, Vitreous hemorrhage of left eye due to diabetes mellitus ([address]), Wears glasses, Wears partial dentures, and Wellness examination. Past Surgical History:  has a past surgical history that includes Foot surgery (Right); Toe amputation; Finger amputation (Right, ); Leg amputation below knee (Left, 2015); other surgical history (Left, -5-15 and 2015); Temporomandibular joint surgery (Bilateral, 80'S); Colonoscopy (2016); Colonoscopy (2017);   picc powerpicc double (2018); Cardiac catheterization; other surgical history; pr amp leg thru tibia&fibula re-amputation (Left, 2018); vitrectomy (Left, 2019); cardiovascular stress test (2019); Kidney surgery (Right, 2019); Cystoscopy (Bilateral, 2020); Kidney cyst removal; Kidney surgery (N/A, 2020);  Cystoscopy (Right, Goals: daily x 3 days  Short Term Goal 1: pt to demonstrate independent rolling in bed from a flat surface for position change  Short Term Goal 2: pt to demonstrate transfers supine <> sit using log rolling technique w/ SBA x 1  Short Term Goal 3: pt to demonstrate transfers sit <> stand and bed <> chair using a wheeled walker and left BK prosthesis w/ SBA x 1  Short Term Goal 4: pt to demonstrate gait 50-80' w/ wheeled walker and left BK prosthesis w/ SBA x 1  Short Term Goal 5: pt to demonstrate good ambulatory balance using wheeled walker and left BK prosthesis  Short Term Goal 6: pt to demonstrate ability to ascend/ descend 2 Steps w/ right grab bar and left door frame w/ CGA x 1 for home entry  Short Term Goal 7: pt to demonstrate good technique forl right LE  and left BK residual limb strengthening to assist transfers and gait  Patient Goals   Patient Goals : return home w/ spouse       Education  Patient Education  Education Given To: Patient  Education Provided: Role of Therapy;Plan of Care  Education Method: Demonstration;Verbal  Barriers to Learning: None  Education Outcome: Verbalized understanding;Continued education needed      Therapy Time   Individual Concurrent Group Co-treatment   Time In 0830         Time Out 0909         Minutes 39         Timed Code Treatment Minutes: [address], PT

## 2023-11-30 NOTE — CARE COORDINATION
Case Management Assessment  Initial Evaluation    Date/Time of Evaluation: 11/30/2023 9:18 AM  Assessment Completed by: Jyoti Sena RN    If patient is discharged prior to next notation, then this note serves as note for discharge by case management. Patient Name: Kishan Velez                   YOB: 1960  Diagnosis: Painful orthopaedic hardware [facility]) Ranjan Peoples                   Date / Time: 11/29/2023 12:38 PM    Patient Admission Status: Inpatient   Readmission Risk (Low < 19, Mod (19-27), High > 27): Readmission Risk Score: 14.9    Current PCP: Romero Meza MD  PCP verified by CM? Yes    Chart Reviewed: Yes      History Provided by: Patient  Patient Orientation: Alert and Oriented    Patient Cognition: Alert    Hospitalization in the last 30 days (Readmission):  No    If yes, Readmission Assessment in  Navigator will be completed. Advance Directives:      Code Status: Full Code   Patient's Primary Decision Maker is: Legal Next of Kin      Discharge Planning:    Patient lives with: Spouse/Significant Other Type of Home: House  Primary Care Giver: Self  Patient Support Systems include: Spouse/Significant Other   Current Financial resources: Medicare  Current community resources: None  Current services prior to admission: Durable Medical Equipment            Current DME: Opal Sis, Other (Comment), [employer], Wheelchair, Glucometer (Electric scooter)            Type of Home Care services:  OT, PT    ADLS  Prior functional level: Independent in ADLs/IADLs  Current functional level: Independent in ADLs/IADLs    PT AM-PAC: 9 /24  OT AM-PAC:   /24    Family can provide assistance at DC: Yes  Would you like Case Management to discuss the discharge plan with any other family members/significant others, and if so, who?  Yes Martha Bostoner, spouse)  Plans to Return to Present Housing: Yes  Other Identified Issues/Barriers to RETURNING to current housing: NA  Potential Assistance needed at discharge:

## 2023-12-01 VITALS
BODY MASS INDEX: 32.88 KG/M2 | TEMPERATURE: 98.8 F | WEIGHT: 270 LBS | DIASTOLIC BLOOD PRESSURE: 67 MMHG | HEART RATE: 68 BPM | HEIGHT: 76 IN | OXYGEN SATURATION: 94 % | SYSTOLIC BLOOD PRESSURE: 122 MMHG | RESPIRATION RATE: 16 BRPM

## 2023-12-01 LAB
GLUCOSE BLD-MCNC: 253 MG/DL (ref 75–110)
GLUCOSE BLD-MCNC: 295 MG/DL (ref 75–110)

## 2023-12-01 PROCEDURE — 94761 N-INVAS EAR/PLS OXIMETRY MLT: CPT

## 2023-12-01 PROCEDURE — 82947 ASSAY GLUCOSE BLOOD QUANT: CPT

## 2023-12-01 PROCEDURE — G0378 HOSPITAL OBSERVATION PER HR: HCPCS

## 2023-12-01 PROCEDURE — 6370000000 HC RX 637 (ALT 250 FOR IP): Performed by: ORTHOPAEDIC SURGERY

## 2023-12-01 PROCEDURE — 97116 GAIT TRAINING THERAPY: CPT

## 2023-12-01 PROCEDURE — 2580000003 HC RX 258: Performed by: ORTHOPAEDIC SURGERY

## 2023-12-01 PROCEDURE — 99024 POSTOP FOLLOW-UP VISIT: CPT | Performed by: ORTHOPAEDIC SURGERY

## 2023-12-01 PROCEDURE — 6370000000 HC RX 637 (ALT 250 FOR IP): Performed by: INTERNAL MEDICINE

## 2023-12-01 PROCEDURE — 99232 SBSQ HOSP IP/OBS MODERATE 35: CPT | Performed by: INTERNAL MEDICINE

## 2023-12-01 PROCEDURE — 94640 AIRWAY INHALATION TREATMENT: CPT

## 2023-12-01 RX ADMIN — AMIODARONE HYDROCHLORIDE 200 MG: 200 TABLET ORAL at 09:53

## 2023-12-01 RX ADMIN — ROPINIROLE HYDROCHLORIDE 2 MG: 0.5 TABLET, FILM COATED ORAL at 09:51

## 2023-12-01 RX ADMIN — TAMSULOSIN HYDROCHLORIDE 0.4 MG: 0.4 CAPSULE ORAL at 09:50

## 2023-12-01 RX ADMIN — AMLODIPINE BESYLATE 2.5 MG: 2.5 TABLET ORAL at 09:53

## 2023-12-01 RX ADMIN — METOPROLOL TARTRATE 50 MG: 50 TABLET ORAL at 09:51

## 2023-12-01 RX ADMIN — BUDESONIDE AND FORMOTEROL FUMARATE DIHYDRATE 2 PUFF: 160; 4.5 AEROSOL RESPIRATORY (INHALATION) at 11:26

## 2023-12-01 RX ADMIN — SODIUM CHLORIDE, PRESERVATIVE FREE 10 ML: 5 INJECTION INTRAVENOUS at 09:50

## 2023-12-01 RX ADMIN — ACETAMINOPHEN 650 MG: 325 TABLET ORAL at 03:35

## 2023-12-01 RX ADMIN — ATORVASTATIN CALCIUM 40 MG: 40 TABLET, FILM COATED ORAL at 09:53

## 2023-12-01 RX ADMIN — PREGABALIN 100 MG: 100 CAPSULE ORAL at 09:52

## 2023-12-01 RX ADMIN — INSULIN LISPRO 8 UNITS: 100 INJECTION, SOLUTION INTRAVENOUS; SUBCUTANEOUS at 07:40

## 2023-12-01 RX ADMIN — FINASTERIDE 5 MG: 5 TABLET, FILM COATED ORAL at 09:50

## 2023-12-01 RX ADMIN — INSULIN GLARGINE 48 UNITS: 100 INJECTION, SOLUTION SUBCUTANEOUS at 09:52

## 2023-12-01 RX ADMIN — ACETAMINOPHEN 650 MG: 325 TABLET ORAL at 09:50

## 2023-12-01 RX ADMIN — LOSARTAN POTASSIUM 50 MG: 50 TABLET, FILM COATED ORAL at 09:51

## 2023-12-01 RX ADMIN — GEMFIBROZIL 600 MG: 600 TABLET ORAL at 09:52

## 2023-12-01 RX ADMIN — ISOSORBIDE MONONITRATE 30 MG: 30 TABLET, EXTENDED RELEASE ORAL at 09:51

## 2023-12-01 RX ADMIN — PANTOPRAZOLE SODIUM 40 MG: 40 TABLET, DELAYED RELEASE ORAL at 06:13

## 2023-12-01 RX ADMIN — LEVOTHYROXINE SODIUM 175 MCG: 125 TABLET ORAL at 06:12

## 2023-12-01 RX ADMIN — APIXABAN 5 MG: 5 TABLET, FILM COATED ORAL at 09:53

## 2023-12-01 ASSESSMENT — PAIN SCALES - GENERAL
PAINLEVEL_OUTOF10: 5
PAINLEVEL_OUTOF10: 0

## 2023-12-01 ASSESSMENT — PAIN DESCRIPTION - DESCRIPTORS: DESCRIPTORS: SHARP

## 2023-12-01 ASSESSMENT — PAIN DESCRIPTION - LOCATION: LOCATION: BACK

## 2023-12-01 NOTE — PLAN OF CARE
Problem: Discharge Planning  Goal: Discharge to home or other facility with appropriate resources  Outcome: Adequate for Discharge     Problem: Discharge Planning  Goal: Discharge to home or other facility with appropriate resources  Outcome: Adequate for Discharge     Problem: Safety - Adult  Goal: Free from fall injury  Outcome: Adequate for Discharge  Flowsheets (Taken 11/30/2023 2216 by Jose L Carson RN)  Free From Fall Injury: Instruct family/caregiver on patient safety     Problem: ABCDS Injury Assessment  Goal: Absence of physical injury  Outcome: Adequate for Discharge  Flowsheets (Taken 11/30/2023 2216 by Jose L Carson RN)  Absence of Physical Injury: Implement safety measures based on patient assessment     Problem: Pain  Goal: Verbalizes/displays adequate comfort level or baseline comfort level  Outcome: Adequate for Discharge     Problem: Chronic Conditions and Co-morbidities  Goal: Patient's chronic conditions and co-morbidity symptoms are monitored and maintained or improved  Outcome: Adequate for Discharge

## 2023-12-01 NOTE — PROGRESS NOTES
Physical Therapy  [address]    Date: 23  Patient Name: Kishan Velez       Room: [address] [place], [place] [id number]  MRN: [id number]   Account: [de-identified]   : 1960  (61 y.o.) Gender: male           Past Medical History:  has a past medical history of A-fib ([address]), Asymptomatic bilateral carotid artery stenosis, Boil, thigh, CAD (coronary artery disease), Charcot foot due to diabetes mellitus ([address]), COPD (chronic obstructive pulmonary disease) ([address]), Diabetes mellitus ([address]), Diabetic neuropathy ([address]), Difficult intravenous access, Employs prosthetic leg, Foot ulcer ([address]), Full dentures, Hyperlipidemia, Hypertension, Hypoglycemia, Left below-knee amputee ([address]), MRSA (methicillin resistant staph aureus) culture positive, OA (osteoarthritis), Osteomyelitis ([address]), Paroxysmal atrial fibrillation ([address]), Renal mass, Snores, Suspected sleep apnea, Thyroid disease, Vision abnormalities, Vitreous hemorrhage of left eye due to diabetes mellitus ([address]), Wears glasses, Wears partial dentures, and Wellness examination. Past Surgical History:   has a past surgical history that includes Foot surgery (Right); Toe amputation; Finger amputation (Right, ); Leg amputation below knee (Left, 2015); other surgical history (Left, 5-5-15 and 2015); Temporomandibular joint surgery (Bilateral, 80'S); Colonoscopy (2016); Colonoscopy (2017);   picc powerpicc double (2018); Cardiac catheterization; other surgical history; pr amp leg thru tibia&fibula re-amputation (Left, 2018); vitrectomy (Left, 2019); cardiovascular stress test (2019); Kidney surgery (Right, 2019); Cystoscopy (Bilateral, 2020); Kidney cyst removal; Kidney surgery (N/A, 2020); Cystoscopy (Right, 2020); Cystoscopy (N/A, 2020); Upper gastrointestinal endoscopy (N/A, 2023); Colonoscopy (N/A, 2023);  Pain management procedure; lumbar fusion (N/A, 10/2/2023); and Spine surgery

## 2023-12-01 NOTE — DISCHARGE INSTR - DIET
Good nutrition is important when healing from an illness, injury, or surgery. Follow any nutrition recommendations given to you during your hospital stay. If you were given an oral nutrition supplement while in the hospital, continue to take this supplement at home. You can take it with meals, in-between meals, and/or before bedtime. These supplements can be purchased at most local grocery stores, pharmacies, and chain [employer]-stores. If you have any questions about your diet or nutrition, call the hospital and ask for the dietitian. Carb control diet.

## 2023-12-01 NOTE — DISCHARGE SUMMARY
Discharge Summary    Attending Physician: Trecia Kussmaul, MD  Admit Date: 11/29/2023  Discharge Date:    Primary Care Physician: Guicho Whalen MD    Admitting Diagnosis:  Principal Problem:    Painful orthopaedic hardware [facility])  Resolved Problems:    * No resolved hospital problems. *        Discharge Diagnoses:  Principal Problem:    Painful orthopaedic hardware [facility])  Resolved Problems:    * No resolved hospital problems.  *         Past Medical History:   Diagnosis Date    A-fib [facility])     Asymptomatic bilateral carotid artery stenosis     Boil, thigh 10/2019    10/30/19: right inner thigh region, says PCP Rxd Doxy for this, area is much better, has a couple days left to complete Doxy    CAD (coronary artery disease)     saw Dr. Jessi Jordan ([facility])     Charcot foot due to diabetes mellitus ([address]) 2014    LEFT    COPD (chronic obstructive pulmonary disease) ([address]) 2009    INHALER USE DAILY    Diabetes mellitus ([address]) 1989    IDDM, On Insulin Dr. Marietta Berman    Diabetic neuropathy ([address])     Difficult intravenous access     VEINS ROLL    Employs prosthetic leg     s/p left BKA    Foot ulcer ([address]) PRIOR TO 2015    BILAT    Full dentures     UPPER ONLY    Hyperlipidemia 2004    ON RX    Hypertension 2004    ON RX, PCP Dr. Marietta Berman, seen Oct. 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 Moreira seen within last 1 1/2 mos       Procedures Performed and Findings  Procedure(s) with comments:  Removal of left lumbar hardware to include two screws, jeny and two cap screws - GLOBUS HARDWARE REMOVAL     Consultations Obtained  IP CONSULT TO PRIMARY CARE PROVIDER  IP CONSULT TO [address] [place] [address] Course  uncomplicated    Discharge Medications       Medication List        START taking these medications      oxyCODONE-acetaminophen 5-325 MG per tablet  Commonly known as: Percocet  Take 1 tablet by mouth every 6 hours as needed for Pain for up to 7 days. Intended supply: 7 days.  Take lowest dose possible to manage pain Max Daily Amount: 4 tablets            CONTINUE taking these medications      albuterol sulfate  (90 Base) MCG/ACT inhaler  Commonly known as: PROVENTIL;VENTOLIN;PROAIR  Inhale 2 puffs into the lungs every 6 hours as needed for Wheezing or Shortness of Breath     amiodarone 200 MG tablet  Commonly known as: CORDARONE  Take 1 tablet by mouth 2 times daily     amLODIPine 2.5 MG tablet  Commonly known as: NORVASC  take 1 tablet by mouth once daily     Aspirin Low Dose 81 MG EC tablet  Generic drug: aspirin  TAKE 1 TABLET BY MOUTH EVERY DAY IN THE MORNING     atorvastatin 40 MG tablet  Commonly known as: LIPITOR  TAKE 1 TABLET BY MOUTH EVERY DAY     Blood Pressure Kit  1 actuation by Does not apply route once a week     budesonide-formoterol 160-4.5 MCG/ACT Aero  Commonly known as: Symbicort  INHALE 2 PUFFS INTO THE LUNGS TWICE A DAY     clotrimazole-betamethasone 1-0.05 % cream  Commonly known as: LOTRISONE  APPLY TO AFFECTED AREA TWICE A DAY     Contour Next Test strip  Generic drug: blood glucose test strips  TEST 4 TIMES A DAY     Dexcom G6  Lory     Droplet Pen Needles 31G X 8 MM Misc  Generic drug: Insulin Pen Needle     Eliquis 5 MG Tabs tablet  Generic drug: apixaban  TAKE 1 TABLET BY MOUTH TWICE A DAY

## 2023-12-01 NOTE — CARE COORDINATION
ONGOING DISCHARGE PLAN:    Patient is alert and oriented x4. Spoke with patient regarding discharge plan and patient confirms that plan is still to return to home w/ Spouse. Denies VNS. Pt. Is POD # 2, Removal of Hardware w/ Ortho. PT/OT on board. Rec home w/ Assist. Pt. Denies needs. Pt has F/U apt already scheduled w/ Dr. Charli Carrillo for 12/1/23  3:20 PM.     Will continue to follow for additional discharge needs. If patient is discharged prior to next notation, then this note serves as note for discharge by case management.     Electronically signed by Alma Mcgovern RN on 12/1/2023 at 10:58 AM

## 2023-12-01 NOTE — CONSULTS
(PERCOCET) 5-325 MG per tablet Take 1 tablet by mouth every 6 hours as needed for Pain for up to 7 days. Intended supply: 7 days. Take lowest dose possible to manage pain Max Daily Amount: 4 tablets 11/30/23 12/7/23 Yes Elvis Montes MD   isosorbide mononitrate (IMDUR) 30 MG extended release tablet take 1 tablet by mouth once daily 11/30/23   Pato Estrada MD   metoprolol tartrate (LOPRESSOR) 50 MG tablet take 1 tablet by mouth twice a day 10/31/23   Pato Estrada MD   tamsulosin (FLOMAX) 0.4 MG capsule take 1 capsule by mouth once daily 10/19/23   Pato Estrada MD   amLODIPine (NORVASC) 2.5 MG tablet take 1 tablet by mouth once daily 9/26/23   Pato Estrada MD   levothyroxine (SYNTHROID) 175 MCG tablet take 1 tablet by mouth once daily 9/26/23   Pato Estrada MD   pregabalin (LYRICA) 100 MG capsule Take 1 capsule by mouth in the morning, at noon, and at bedtime for 90 days.  9/19/23 12/18/23  Pato Estrada MD   MOUNJARO 2.5 MG/0.5ML SOPN SC injection Inject 0.5 mLs into the skin once a week Takes on Saturday 9/5/23   Kiera Henderson MD   DROPLET PEN NEEDLES 31G X 8 MM MISC use 1 PEN NEEDLE to inject MEDICATION subcutaneously once daily 8/29/23   Kiera Henderson MD   albuterol sulfate HFA (PROVENTIL;VENTOLIN;PROAIR) 108 (90 Base) MCG/ACT inhaler Inhale 2 puffs into the lungs every 6 hours as needed for Wheezing or Shortness of Breath 8/29/23   Pato Estrada MD   amiodarone (CORDARONE) 200 MG tablet Take 1 tablet by mouth 2 times daily 8/29/23   Pato Estrada MD   budesonide-formoterol (SYMBICORT) 160-4.5 MCG/ACT AERO INHALE 2 PUFFS INTO THE LUNGS TWICE A DAY 8/29/23   Pato Estrada MD   finasteride (PROSCAR) 5 MG tablet Take 1 tablet by mouth daily 8/29/23   Pato Estrada MD   gemfibrozil (LOPID) 600 MG tablet Take 1 tablet by mouth daily 8/29/23   Pato Estrada MD   losartan (COZAAR) 50 MG tablet Take 1 tablet by mouth daily 8/29/23   Nina icterus, redness, pupils equal and reactive, extraocular eye movements intact, conjunctiva clear  Ear: normal external ear, no discharge, hearing intact  Nose:  no drainage noted  Mouth: mucous membranes moist  Neck: supple, no carotid bruits, thyroid not palpable  Lungs: Bilateral equal air entry, clear to ausculation, no wheezing, rales or rhonchi, normal effort  Cardiovascular: normal rate, regular rhythm, no murmur, gallop, rub.   Abdomen: Soft, nontender, nondistended, normal bowel sounds, no hepatomegaly or splenomegaly, postop changes to lumbar area   neurologic: There are no new focal motor or sensory deficits, normal muscle tone and bulk, no abnormal sensation, normal speech, cranial nerves II through XII grossly intact  Skin: No gross lesions, rashes, bruising or bleeding on exposed skin area  Extremities: BKA, peripheral pulses palpable, no pedal edema or calf pain with palpation  Psych: normal affect    Investigations:      Laboratory Testing:  Recent Results (from the past 24 hour(s))   CBC    Collection Time: 11/30/23  1:49 PM   Result Value Ref Range    WBC 8.8 3.5 - 11.0 k/uL    RBC 4.28 (L) 4.5 - 5.9 m/uL    Hemoglobin 13.4 (L) 13.5 - 17.5 g/dL    Hematocrit 40.1 (L) 41 - 53 %    MCV 93.7 80 - 100 fL    MCH 31.3 26 - 34 pg    MCHC 33.4 31 - 37 g/dL    RDW 14.9 11.5 - 14.9 %    Platelets 139 (L) 433 - 450 k/uL    MPV 10.4 6.0 - 12.0 fL   Basic Metabolic Panel    Collection Time: 11/30/23  1:49 PM   Result Value Ref Range    Sodium 139 135 - 144 mmol/L    Potassium 4.2 3.7 - 5.3 mmol/L    Chloride 103 98 - 107 mmol/L    CO2 26 20 - 31 mmol/L    Anion Gap 10 9 - 17 mmol/L    Glucose 352 (H) 70 - 99 mg/dL    BUN 21 8 - 23 mg/dL    Creatinine 1.4 (H) 0.7 - 1.2 mg/dL    Est, Glom Filt Rate 56 (L) >60 mL/min/1.73m2    Calcium 8.2 (L) 8.6 - 10.4 mg/dL   POC Glucose Fingerstick    Collection Time: 11/30/23  3:49 PM   Result Value Ref Range    POC Glucose 335 (H) 75 - 110 mg/dL   POC Glucose Fingerstick

## 2023-12-01 NOTE — PROGRESS NOTES
Physician Progress Note      PATIENT:               Bigg Benoit  CSN #:                  [id number]  :                       1960  ADMIT DATE:       2023 12:38 PM  [address] [facility] ER DATE:        2023 12:04 PM  RESPONDING  PROVIDER #:        Pamela Nogueira MD          QUERY TEXT:    Patient admitted with painful ortho hardward with planned removal, noted to   have atrial fibrillation and is maintained on Eliquis. If possible, please   document in progress notes and discharge summary if you are evaluating and/or   treating any of the following: The medical record reflects the following:  Risk Factors: Hx afib  Clinical Indicators: per IM consult-->A-fib continue amiodarone, Eliquis has   been resumed;  Treatment: PO Eliquis home med continued  Options provided:  -- Secondary hypercoagulable state in a patient with atrial fibrillation  -- Other - I will add my own diagnosis  -- Disagree - Not applicable / Not valid  -- Disagree - Clinically unable to determine / Unknown  -- Refer to Clinical Documentation Reviewer    PROVIDER RESPONSE TEXT:    This patient has secondary hypercoagulable state in a patient with atrial   fibrillation.     Query created by: Carlos Mckeon on 2023 2:41 PM      Electronically signed by:  Pamela Nogueira MD 2023 4:43 PM

## 2023-12-05 ENCOUNTER — CLINICAL DOCUMENTATION ONLY (OUTPATIENT)
Facility: CLINIC | Age: 63
End: 2023-12-05

## 2023-12-12 ENCOUNTER — OFFICE VISIT (OUTPATIENT)
Dept: ORTHOPEDIC SURGERY | Age: 63
End: 2023-12-12

## 2023-12-12 VITALS — RESPIRATION RATE: 16 BRPM | WEIGHT: 270.06 LBS | BODY MASS INDEX: 32.89 KG/M2 | HEIGHT: 76 IN

## 2023-12-12 DIAGNOSIS — M48.062 SPINAL STENOSIS OF LUMBAR REGION WITH NEUROGENIC CLAUDICATION: Primary | ICD-10-CM

## 2023-12-12 PROCEDURE — 99024 POSTOP FOLLOW-UP VISIT: CPT | Performed by: ORTHOPAEDIC SURGERY

## 2023-12-12 RX ORDER — APIXABAN 5 MG/1
TABLET, FILM COATED ORAL
Qty: 120 TABLET | Refills: 1 | Status: SHIPPED | OUTPATIENT
Start: 2023-12-12

## 2023-12-12 NOTE — PROGRESS NOTES
Left 11/29/2023    Removal of left lumbar hardware to include two screws, jeny and two cap screws performed by Margoth Infante MD at [facility] Bilateral 80'S    TOE AMPUTATION      Right 2 and 4    UPPER GASTROINTESTINAL ENDOSCOPY N/A 05/26/2023    EGD BIOPSY performed by Nico Cook MD at [facility] ENDO    VITRECTOMY Left 09/25/2019    PARS PLANA VITRECTOMY 25 GAUGE, MEMBRANE PEELING, ENDOLASER 200  MW 1044 SPOTS, INDIRECT LASER 26 SPOTS performed by Olivier Carpenter MD at [address]  History   Problem Relation Age of Onset    Diabetes Mother     Hypertension Mother     Stomach Cancer Mother     Hypertension Father     Alcohol Abuse Father     COPD Father     Kidney Cancer Father     Diabetes Brother         IDDM-PUMP    Other Sister         BRAIN TUMOR        Physical Exam:  Vitals signs and nursing note reviewed. Constitutional:       Appearance: well-developed. HENT:      Head: Normocephalic and atraumatic. Nose: Nose normal.   Eyes:      Conjunctiva/sclera: Conjunctivae normal.   Neck:      Musculoskeletal: Normal range of motion and neck supple. Pulmonary:      Effort: Pulmonary effort is normal. No respiratory distress. Musculoskeletal:      Comments: Normal gait     Skin:     General: Skin is warm and dry. Neurological:      Mental Status: Alert and oriented to person, place, and time. Sensory: No sensory deficit. Psychiatric:         Behavior: Behavior normal.         Thought Content: Thought content normal.    Sutures discontinued. Incision healing well, no signs of infection. Provider Attestation:  Osmany Gallardo, personally performed the services described in this documentation. All medical record entries made by the scribe were at my direction and in my presence. I have reviewed the chart and discharge instructions and agree that the records reflect my personal performance and is accurate and complete. Madhavi Gilbert MD

## 2023-12-14 RX ORDER — OMEPRAZOLE 20 MG/1
20 CAPSULE, DELAYED RELEASE ORAL DAILY
Qty: 30 CAPSULE | Refills: 1 | Status: SHIPPED | OUTPATIENT
Start: 2023-12-14

## 2024-01-05 ENCOUNTER — OFFICE VISIT (OUTPATIENT)
Dept: PODIATRY | Age: 64
End: 2024-01-05
Payer: COMMERCIAL

## 2024-01-05 VITALS — BODY MASS INDEX: 32.88 KG/M2 | WEIGHT: 270 LBS | HEIGHT: 76 IN

## 2024-01-05 DIAGNOSIS — Z79.4 TYPE 2 DIABETES MELLITUS WITH DIABETIC POLYNEUROPATHY, WITH LONG-TERM CURRENT USE OF INSULIN (HCC): ICD-10-CM

## 2024-01-05 DIAGNOSIS — M79.674 PAIN OF TOE OF RIGHT FOOT: ICD-10-CM

## 2024-01-05 DIAGNOSIS — B35.1 ONYCHOMYCOSIS OF TOENAIL: Primary | ICD-10-CM

## 2024-01-05 DIAGNOSIS — E11.42 TYPE 2 DIABETES MELLITUS WITH DIABETIC POLYNEUROPATHY, WITH LONG-TERM CURRENT USE OF INSULIN (HCC): ICD-10-CM

## 2024-01-05 DIAGNOSIS — E11.51 TYPE 2 DIABETES MELLITUS WITH PERIPHERAL VASCULAR DISEASE (HCC): ICD-10-CM

## 2024-01-05 PROCEDURE — 99999 PR OFFICE/OUTPT VISIT,PROCEDURE ONLY: CPT | Performed by: PODIATRIST

## 2024-01-05 PROCEDURE — 11720 DEBRIDE NAIL 1-5: CPT | Performed by: PODIATRIST

## 2024-01-08 ENCOUNTER — OFFICE VISIT (OUTPATIENT)
Dept: INTERNAL MEDICINE CLINIC | Age: 64
End: 2024-01-08
Payer: COMMERCIAL

## 2024-01-08 VITALS
HEIGHT: 76 IN | WEIGHT: 264 LBS | BODY MASS INDEX: 32.15 KG/M2 | DIASTOLIC BLOOD PRESSURE: 66 MMHG | HEART RATE: 76 BPM | SYSTOLIC BLOOD PRESSURE: 128 MMHG | OXYGEN SATURATION: 97 %

## 2024-01-08 DIAGNOSIS — G95.19 OTHER VASCULAR MYELOPATHIES (HCC): ICD-10-CM

## 2024-01-08 DIAGNOSIS — C64.1 MALIGNANT NEOPLASM OF RIGHT KIDNEY (HCC): ICD-10-CM

## 2024-01-08 DIAGNOSIS — I48.0 PAF (PAROXYSMAL ATRIAL FIBRILLATION) (HCC): ICD-10-CM

## 2024-01-08 DIAGNOSIS — I42.2 HYPERTROPHIC CARDIOMYOPATHY (HCC): ICD-10-CM

## 2024-01-08 DIAGNOSIS — N18.32 STAGE 3B CHRONIC KIDNEY DISEASE (HCC): ICD-10-CM

## 2024-01-08 DIAGNOSIS — Z89.512 STATUS POST BELOW-KNEE AMPUTATION OF LEFT LOWER EXTREMITY (HCC): ICD-10-CM

## 2024-01-08 DIAGNOSIS — E11.42 TYPE 2 DIABETES MELLITUS WITH DIABETIC POLYNEUROPATHY, WITH LONG-TERM CURRENT USE OF INSULIN (HCC): Primary | ICD-10-CM

## 2024-01-08 DIAGNOSIS — I50.32 CHRONIC DIASTOLIC HEART FAILURE (HCC): ICD-10-CM

## 2024-01-08 DIAGNOSIS — I73.9 PVD (PERIPHERAL VASCULAR DISEASE) (HCC): ICD-10-CM

## 2024-01-08 DIAGNOSIS — D68.9 COAGULATION DEFECT (HCC): ICD-10-CM

## 2024-01-08 DIAGNOSIS — Z79.4 TYPE 2 DIABETES MELLITUS WITH DIABETIC POLYNEUROPATHY, WITH LONG-TERM CURRENT USE OF INSULIN (HCC): Primary | ICD-10-CM

## 2024-01-08 DIAGNOSIS — E03.9 ACQUIRED HYPOTHYROIDISM: ICD-10-CM

## 2024-01-08 DIAGNOSIS — F51.01 PRIMARY INSOMNIA: ICD-10-CM

## 2024-01-08 DIAGNOSIS — N18.30 STAGE 3 CHRONIC KIDNEY DISEASE, UNSPECIFIED WHETHER STAGE 3A OR 3B CKD (HCC): ICD-10-CM

## 2024-01-08 LAB — HBA1C MFR BLD: 7.6 %

## 2024-01-08 PROCEDURE — 3078F DIAST BP <80 MM HG: CPT | Performed by: INTERNAL MEDICINE

## 2024-01-08 PROCEDURE — 1036F TOBACCO NON-USER: CPT | Performed by: INTERNAL MEDICINE

## 2024-01-08 PROCEDURE — 83036 HEMOGLOBIN GLYCOSYLATED A1C: CPT | Performed by: INTERNAL MEDICINE

## 2024-01-08 PROCEDURE — 3051F HG A1C>EQUAL 7.0%<8.0%: CPT | Performed by: INTERNAL MEDICINE

## 2024-01-08 PROCEDURE — G8427 DOCREV CUR MEDS BY ELIG CLIN: HCPCS | Performed by: INTERNAL MEDICINE

## 2024-01-08 PROCEDURE — 3074F SYST BP LT 130 MM HG: CPT | Performed by: INTERNAL MEDICINE

## 2024-01-08 PROCEDURE — G8484 FLU IMMUNIZE NO ADMIN: HCPCS | Performed by: INTERNAL MEDICINE

## 2024-01-08 PROCEDURE — 3017F COLORECTAL CA SCREEN DOC REV: CPT | Performed by: INTERNAL MEDICINE

## 2024-01-08 PROCEDURE — 99214 OFFICE O/P EST MOD 30 MIN: CPT | Performed by: INTERNAL MEDICINE

## 2024-01-08 PROCEDURE — 2022F DILAT RTA XM EVC RTNOPTHY: CPT | Performed by: INTERNAL MEDICINE

## 2024-01-08 PROCEDURE — G8417 CALC BMI ABV UP PARAM F/U: HCPCS | Performed by: INTERNAL MEDICINE

## 2024-01-08 RX ORDER — HYDROXYZINE HYDROCHLORIDE 25 MG/1
25 TABLET, FILM COATED ORAL
Qty: 90 TABLET | Refills: 0 | Status: SHIPPED | OUTPATIENT
Start: 2024-01-08 | End: 2024-01-18

## 2024-01-08 ASSESSMENT — ENCOUNTER SYMPTOMS
ABDOMINAL DISTENTION: 0
WHEEZING: 0
TROUBLE SWALLOWING: 0
DIARRHEA: 0
EYE DISCHARGE: 0
NAUSEA: 0
SHORTNESS OF BREATH: 0
DIARRHEA: 0
COUGH: 0
COLOR CHANGE: 0
EYE PAIN: 0
BLOOD IN STOOL: 0
COLOR CHANGE: 0
SHORTNESS OF BREATH: 0
BACK PAIN: 0

## 2024-01-08 ASSESSMENT — PATIENT HEALTH QUESTIONNAIRE - PHQ9
SUM OF ALL RESPONSES TO PHQ QUESTIONS 1-9: 0
SUM OF ALL RESPONSES TO PHQ QUESTIONS 1-9: 0
2. FEELING DOWN, DEPRESSED OR HOPELESS: 0
SUM OF ALL RESPONSES TO PHQ QUESTIONS 1-9: 0
SUM OF ALL RESPONSES TO PHQ QUESTIONS 1-9: 0
SUM OF ALL RESPONSES TO PHQ9 QUESTIONS 1 & 2: 0
1. LITTLE INTEREST OR PLEASURE IN DOING THINGS: 0

## 2024-01-08 NOTE — PROGRESS NOTES
[facility]  [address] [address]  Dept: [phone]  Dept Fax: [phone]    Jay Colon is a 64 y.o. male who presents today for his medical conditions/complaintsas noted below.  Jay Colon is c/o of   Chief Complaint   Patient presents with    Diabetes     A1C- 8/8/23= 6.6         HPI:     HTN  Onset more than 2 years ago  gustavo mild to mod  Controlled with current po meds  Not associated with headaches or blurry vision  No chest pain    Diabetes   Duration more than 7 years  Modifying factors on insulin and other med  Severity uncontrolled sever  Associated signs and symtoms neuropathy/ckd/ CAD.   aggravated with sugar diet and better with low sugar diet               Hemoglobin A1C (%)   Date Value   01/08/2024 7.6   08/08/2023 6.6   02/22/2023 6.9 (H)             ( goal A1Cis < 7)   No components found for: \"LABMICR\"  LDL Cholesterol (mg/dL)   Date Value   08/10/2022 48   05/13/2021 55   01/13/2020 70       (goal LDL is <100)   AST (U/L)   Date Value   09/01/2023 30     ALT (U/L)   Date Value   09/01/2023 36     BUN (mg/dL)   Date Value   11/30/2023 21     BP Readings from Last 3 Encounters:   01/08/24 128/66   12/01/23 122/67   11/22/23 (!) 102/59          (goal 120/80)    Past Medical History:   Diagnosis Date    A-fib ([facility])     Asymptomatic bilateral carotid artery stenosis     Boil, thigh 10/2019    10/30/19: right inner thigh region, says PCP Rxd Doxy for this, area is much better, has a couple days left to complete Doxy    CAD (coronary artery disease)     saw Dr. Bhatia ([facility])     Charcot foot due to diabetes mellitus ([facility]) 2014    LEFT    COPD (chronic obstructive pulmonary disease) ([facility]) 2009    INHALER USE DAILY    Diabetes mellitus ([facility]) 1989    IDDM, On Insulin Dr. Wood    Diabetic neuropathy ([facility])     Difficult intravenous access     VEINS ROLL    Employs prosthetic leg     s/p left BKA    Foot ulcer ([facility]) PRIOR TO 2015    BILAT    Full 
Visit Information    Have you changed or started any medications since your last visit including any over-the-counter medicines, vitamins, or herbal medicines? no   Are you having any side effects from any of your medications? -  no  Have you stopped taking any of your medications? Is so, why? -  no    Have you seen any other physician or provider since your last visit? No  Have you had any other diagnostic tests since your last visit? No  Have you been seen in the emergency room and/or had an admission to a hospital since we last saw you? No  Have you had your routine dental cleaning in the past 6 months? no    Have you activated your [employer] account? If not, what are your barriers? Yes     Patient Care Team:  Renny Wood MD as PCP - General  Renny Wood MD as PCP - Empaneled Provider  Nupur Licea MD as Consulting Physician (Infectious Diseases)    Medical History Review  Past Medical, Family, and Social History reviewed and does contribute to the patient presenting condition    Health Maintenance   Topic Date Due    HIV screen  Never done    DTaP/Tdap/Td vaccine (1 - Tdap) Never done    Shingles vaccine (1 of 2) 10/23/2014    Pneumococcal 0-64 years Vaccine (2 - PCV) 01/04/2015    Diabetic retinal exam  01/04/2017    Respiratory Syncytial Virus (RSV) Pregnant or age 60 yrs+ (1 - 1-dose 60+ series) Never done    Diabetic Alb to Cr ratio (uACR) test  12/14/2022    Lipids  08/10/2023    Annual Wellness Visit (Medicare)  11/27/2023    Depression Screen  01/05/2024    Low dose CT lung screening &/or counseling  04/20/2024    A1C test (Diabetic or Prediabetic)  08/08/2024    GFR test (Diabetes, CKD 3-4, OR last GFR 15-59)  11/30/2024    Diabetic foot exam  01/08/2025    Colorectal Cancer Screen  05/26/2033    Flu vaccine  Completed    COVID-19 Vaccine  Completed    Hepatitis C screen  Completed    Hepatitis A vaccine  Aged Out    Hepatitis B vaccine  Aged Out    Hib vaccine  Aged Out    Polio vaccine 
English

## 2024-01-09 ENCOUNTER — OFFICE VISIT (OUTPATIENT)
Dept: ORTHOPEDIC SURGERY | Age: 64
End: 2024-01-09

## 2024-01-09 VITALS — WEIGHT: 263.89 LBS | RESPIRATION RATE: 16 BRPM | HEIGHT: 76 IN | BODY MASS INDEX: 32.14 KG/M2

## 2024-01-09 DIAGNOSIS — M16.12 ARTHRITIS OF LEFT HIP: ICD-10-CM

## 2024-01-09 DIAGNOSIS — G89.29 CHRONIC MIDLINE LOW BACK PAIN, UNSPECIFIED WHETHER SCIATICA PRESENT: Primary | ICD-10-CM

## 2024-01-09 DIAGNOSIS — M54.16 LUMBAR RADICULAR PAIN: ICD-10-CM

## 2024-01-09 DIAGNOSIS — M48.062 SPINAL STENOSIS OF LUMBAR REGION WITH NEUROGENIC CLAUDICATION: ICD-10-CM

## 2024-01-09 DIAGNOSIS — M54.50 CHRONIC MIDLINE LOW BACK PAIN, UNSPECIFIED WHETHER SCIATICA PRESENT: Primary | ICD-10-CM

## 2024-01-09 DIAGNOSIS — M25.552 LEFT HIP PAIN: ICD-10-CM

## 2024-01-09 PROCEDURE — 99024 POSTOP FOLLOW-UP VISIT: CPT | Performed by: ORTHOPAEDIC SURGERY

## 2024-01-09 NOTE — PROGRESS NOTES
Patient ID: Jay Colon is a 64 y.o. male    Chief Compliant:  Chief Complaint   Patient presents with    Lower Back Pain     Hardware removal        Diagnostic imaging:    AP lateral lumbar spine again reviewed patient status post L3-4 PLIF subsequent removal of screw jeny from the left-hand side patient with moderate left hip arthritis significant marginal osteophytes although joint space really is not horrible    Assessment and Plan:  1. Chronic midline low back pain, unspecified whether sciatica present    2. Spinal stenosis of lumbar region with neurogenic claudication    3. Lumbar radicular pain    4. Left hip pain    5. Arthritis of left hip        S/p removal of left lumbar hardware, 11/29/23.    PT    Left hip IR injection    Follow up 6 weeks    HPI:  This is a 64 y.o. male who presents to the clinic today for follow up of low back pain.     S/p removal of left lumbar hardware, 11/29/23.    Patient reports that he is walking better and has stopped using the walker but he notes that he is not walking as well as he was pre-operatively.     He continues to experience radicular leg pain which is unchanged compared to pre-operatively.    Patient is ambulatory with a cane    Review of Systems   All other systems reviewed and are negative.      Past History:    Current Outpatient Medications:     hydrOXYzine HCl (ATARAX) 25 MG tablet, Take 1 tablet by mouth nightly as needed for Anxiety (insomnia), Disp: 90 tablet, Rfl: 0    omeprazole (PRILOSEC) 20 MG delayed release capsule, take 1 capsule by mouth once daily, Disp: 30 capsule, Rfl: 1    apixaban (ELIQUIS) 5 MG TABS tablet, take 1 tablet by mouth twice a day, Disp: 120 tablet, Rfl: 1    isosorbide mononitrate (IMDUR) 30 MG extended release tablet, take 1 tablet by mouth once daily, Disp: 120 tablet, Rfl: 1    metoprolol tartrate (LOPRESSOR) 50 MG tablet, take 1 tablet by mouth twice a day, Disp: 60 tablet, Rfl: 3    tamsulosin (FLOMAX) 0.4 MG capsule, take 1

## 2024-01-10 RX ORDER — AMLODIPINE BESYLATE 2.5 MG/1
TABLET ORAL
Qty: 30 TABLET | Refills: 3 | Status: SHIPPED | OUTPATIENT
Start: 2024-01-10

## 2024-01-10 RX ORDER — LEVOTHYROXINE SODIUM 175 UG/1
TABLET ORAL
Qty: 30 TABLET | Refills: 3 | Status: SHIPPED | OUTPATIENT
Start: 2024-01-10

## 2024-01-19 RX ORDER — ATORVASTATIN CALCIUM 40 MG/1
TABLET, FILM COATED ORAL
Qty: 150 TABLET | Refills: 0 | Status: SHIPPED | OUTPATIENT
Start: 2024-01-19

## 2024-01-22 ENCOUNTER — HOSPITAL ENCOUNTER (OUTPATIENT)
Dept: INTERVENTIONAL RADIOLOGY/VASCULAR | Age: 64
Discharge: HOME | End: 2024-01-24
Payer: MEDICARE

## 2024-01-22 DIAGNOSIS — M54.16 LUMBAR RADICULAR PAIN: ICD-10-CM

## 2024-01-22 DIAGNOSIS — M54.50 CHRONIC MIDLINE LOW BACK PAIN, UNSPECIFIED WHETHER SCIATICA PRESENT: ICD-10-CM

## 2024-01-22 DIAGNOSIS — G89.29 CHRONIC MIDLINE LOW BACK PAIN, UNSPECIFIED WHETHER SCIATICA PRESENT: ICD-10-CM

## 2024-01-22 DIAGNOSIS — M16.12 ARTHRITIS OF LEFT HIP: ICD-10-CM

## 2024-01-22 DIAGNOSIS — M48.062 SPINAL STENOSIS OF LUMBAR REGION WITH NEUROGENIC CLAUDICATION: ICD-10-CM

## 2024-01-22 DIAGNOSIS — M25.552 LEFT HIP PAIN: ICD-10-CM

## 2024-01-22 PROCEDURE — 2709999900 IR ARTHR/ASP/INJ MAJOR JT/BURSA LEFT WO US

## 2024-01-22 PROCEDURE — 6360000002 HC RX W HCPCS: Performed by: ORTHOPAEDIC SURGERY

## 2024-01-22 PROCEDURE — 20610 DRAIN/INJ JOINT/BURSA W/O US: CPT

## 2024-01-22 PROCEDURE — 6360000004 HC RX CONTRAST MEDICATION: Performed by: RADIOLOGY

## 2024-01-22 PROCEDURE — 77002 NEEDLE LOCALIZATION BY XRAY: CPT

## 2024-01-22 PROCEDURE — 2500000003 HC RX 250 WO HCPCS: Performed by: ORTHOPAEDIC SURGERY

## 2024-01-22 RX ORDER — BUPIVACAINE HYDROCHLORIDE 5 MG/ML
4 INJECTION, SOLUTION EPIDURAL; INTRACAUDAL ONCE
Status: COMPLETED | OUTPATIENT
Start: 2024-01-22 | End: 2024-01-22

## 2024-01-22 RX ORDER — METHYLPREDNISOLONE ACETATE 80 MG/ML
80 INJECTION, SUSPENSION INTRA-ARTICULAR; INTRALESIONAL; INTRAMUSCULAR; SOFT TISSUE ONCE
Status: COMPLETED | OUTPATIENT
Start: 2024-01-22 | End: 2024-01-22

## 2024-01-22 RX ORDER — LIDOCAINE HYDROCHLORIDE 10 MG/ML
4 INJECTION, SOLUTION EPIDURAL; INFILTRATION; INTRACAUDAL; PERINEURAL ONCE
Status: COMPLETED | OUTPATIENT
Start: 2024-01-22 | End: 2024-01-22

## 2024-01-22 RX ADMIN — METHYLPREDNISOLONE ACETATE 80 MG: 80 INJECTION, SUSPENSION INTRA-ARTICULAR; INTRALESIONAL; INTRAMUSCULAR; SOFT TISSUE at 09:45

## 2024-01-22 RX ADMIN — LIDOCAINE HYDROCHLORIDE 4 ML: 10 INJECTION, SOLUTION EPIDURAL; INFILTRATION; INTRACAUDAL; PERINEURAL at 09:45

## 2024-01-22 RX ADMIN — IOPAMIDOL 2 ML: 612 INJECTION, SOLUTION INTRAVENOUS at 09:46

## 2024-01-22 RX ADMIN — BUPIVACAINE HYDROCHLORIDE 20 MG: 5 INJECTION, SOLUTION EPIDURAL; INTRACAUDAL; PERINEURAL at 09:45

## 2024-01-22 NOTE — PROGRESS NOTES
Patient tolerated left hip injection without distress. Dressing to site. Discharge instructions given, no questions at this time. Patient discharged home via private auto.

## 2024-01-30 ENCOUNTER — OFFICE VISIT (OUTPATIENT)
Dept: ORTHOPEDIC SURGERY | Age: 64
End: 2024-01-30
Payer: COMMERCIAL

## 2024-01-30 VITALS — RESPIRATION RATE: 16 BRPM | WEIGHT: 263 LBS | HEIGHT: 76 IN | BODY MASS INDEX: 32.03 KG/M2

## 2024-01-30 DIAGNOSIS — M25.552 LEFT HIP PAIN: ICD-10-CM

## 2024-01-30 DIAGNOSIS — M54.50 CHRONIC MIDLINE LOW BACK PAIN, UNSPECIFIED WHETHER SCIATICA PRESENT: ICD-10-CM

## 2024-01-30 DIAGNOSIS — G89.29 CHRONIC MIDLINE LOW BACK PAIN, UNSPECIFIED WHETHER SCIATICA PRESENT: ICD-10-CM

## 2024-01-30 DIAGNOSIS — M16.12 ARTHRITIS OF LEFT HIP: Primary | ICD-10-CM

## 2024-01-30 PROCEDURE — G8484 FLU IMMUNIZE NO ADMIN: HCPCS | Performed by: ORTHOPAEDIC SURGERY

## 2024-01-30 PROCEDURE — G8417 CALC BMI ABV UP PARAM F/U: HCPCS | Performed by: ORTHOPAEDIC SURGERY

## 2024-01-30 PROCEDURE — 99213 OFFICE O/P EST LOW 20 MIN: CPT | Performed by: ORTHOPAEDIC SURGERY

## 2024-01-30 PROCEDURE — 3017F COLORECTAL CA SCREEN DOC REV: CPT | Performed by: ORTHOPAEDIC SURGERY

## 2024-01-30 PROCEDURE — G8427 DOCREV CUR MEDS BY ELIG CLIN: HCPCS | Performed by: ORTHOPAEDIC SURGERY

## 2024-01-30 PROCEDURE — 1036F TOBACCO NON-USER: CPT | Performed by: ORTHOPAEDIC SURGERY

## 2024-01-30 NOTE — PROGRESS NOTES
Patient ID: Jay Colon is a 64 y.o. male    Chief Compliant:  Chief Complaint   Patient presents with    Hip Pain     Left hip        Diagnostic imaging:  AP lateral left hip end-stage degenerative arthritis significant joint space narrowing advanced marginal osteophytes subchondral sclerosis with a little bit of subchondral cyst formation      Assessment and Plan:  1. Arthritis of left hip    2. Left hip pain    3. Chronic midline low back pain, unspecified whether sciatica present      Patient reports 2 hours or at least 1 hour of significant pain relief and increase function following left hip injection    Patient is a future surgical candidate for a left YORDAN and is agreeable to surgical intervention at this time. He recently had a left hip IR injection.     We discussed risks of surgery and the recovery process. Risks include infection, neurovascular injury, systemic and anesthetic complication, leg length discrepancy, and hip dislocation.      Follow up 4 weeks    HPI:  This is a 64 y.o. male who presents to the clinic today for follow up of left hip IR injection.     Patient reports that his left hip pain was substantially relieved for an hour after his IR injection and he notes that he was able to be more functional.     He reports that his pain is primarily in his low back but he has sharp pain in his lateral left hip when he sits up in the morning or lays down at night.     Patient is ambulatory with a cane.     Review of Systems   All other systems reviewed and are negative.      Past History:    Current Outpatient Medications:     atorvastatin (LIPITOR) 40 MG tablet, take 1 tablet by mouth once daily, Disp: 150 tablet, Rfl: 0    amLODIPine (NORVASC) 2.5 MG tablet, take 1 tablet by mouth once daily, Disp: 30 tablet, Rfl: 3    levothyroxine (SYNTHROID) 175 MCG tablet, take 1 tablet by mouth once daily, Disp: 30 tablet, Rfl: 3    omeprazole (PRILOSEC) 20 MG delayed release capsule, take 1 capsule by

## 2024-02-19 RX ORDER — METOPROLOL TARTRATE 50 MG/1
TABLET, FILM COATED ORAL
Qty: 60 TABLET | Refills: 3 | Status: SHIPPED | OUTPATIENT
Start: 2024-02-19

## 2024-02-19 RX ORDER — OMEPRAZOLE 20 MG/1
20 CAPSULE, DELAYED RELEASE ORAL DAILY
Qty: 30 CAPSULE | Refills: 1 | Status: SHIPPED | OUTPATIENT
Start: 2024-02-19

## 2024-02-20 ENCOUNTER — OFFICE VISIT (OUTPATIENT)
Dept: ORTHOPEDIC SURGERY | Age: 64
End: 2024-02-20
Payer: MEDICARE

## 2024-02-20 VITALS — BODY MASS INDEX: 32.03 KG/M2 | WEIGHT: 263 LBS | HEIGHT: 76 IN | RESPIRATION RATE: 16 BRPM

## 2024-02-20 DIAGNOSIS — M16.12 ARTHRITIS OF LEFT HIP: ICD-10-CM

## 2024-02-20 DIAGNOSIS — M25.552 LEFT HIP PAIN: ICD-10-CM

## 2024-02-20 DIAGNOSIS — M25.551 RIGHT HIP PAIN: Primary | ICD-10-CM

## 2024-02-20 PROCEDURE — 99213 OFFICE O/P EST LOW 20 MIN: CPT | Performed by: ORTHOPAEDIC SURGERY

## 2024-02-20 PROCEDURE — G8417 CALC BMI ABV UP PARAM F/U: HCPCS | Performed by: ORTHOPAEDIC SURGERY

## 2024-02-20 PROCEDURE — G8484 FLU IMMUNIZE NO ADMIN: HCPCS | Performed by: ORTHOPAEDIC SURGERY

## 2024-02-20 PROCEDURE — 3017F COLORECTAL CA SCREEN DOC REV: CPT | Performed by: ORTHOPAEDIC SURGERY

## 2024-02-20 PROCEDURE — 1036F TOBACCO NON-USER: CPT | Performed by: ORTHOPAEDIC SURGERY

## 2024-02-20 PROCEDURE — G8427 DOCREV CUR MEDS BY ELIG CLIN: HCPCS | Performed by: ORTHOPAEDIC SURGERY

## 2024-02-20 NOTE — PROGRESS NOTES
THRU TIBIA&FIBULA RE-AMPUTATION Left 05/30/2018    LEG AMPUTATION BELOW KNEE REVISION performed by Desiree Hartman MD at [facility] OR    SPINE SURGERY Left 11/29/2023    Removal of left lumbar hardware to include two screws, jeny and two cap screws performed by Raul Gallardo MD at [facility] OR    TEMPOROMANDIBULAR JOINT SURGERY Bilateral 80'S    TOE AMPUTATION      Right 2 and 4    UPPER GASTROINTESTINAL ENDOSCOPY N/A 05/26/2023    EGD BIOPSY performed by Calos Soto MD at [facility] ENDO    VITRECTOMY Left 09/25/2019    PARS PLANA VITRECTOMY 25 GAUGE, MEMBRANE PEELING, ENDOLASER 200  MW 1044 SPOTS, INDIRECT LASER 236 SPOTS performed by Earnest Marino MD at [facility] OR     Family History   Problem Relation Age of Onset    Diabetes Mother     Hypertension Mother     Stomach Cancer Mother     Hypertension Father     Alcohol Abuse Father     COPD Father     Kidney Cancer Father     Diabetes Brother         IDDM-PUMP    Other Sister         BRAIN TUMOR        Physical Exam:  Vitals signs and nursing note reviewed.   Constitutional:       Appearance: well-developed.   HENT:      Head: Normocephalic and atraumatic.      Nose: Nose normal.   Eyes:      Conjunctiva/sclera: Conjunctivae normal.   Neck:      Musculoskeletal: Normal range of motion and neck supple.   Pulmonary:      Effort: Pulmonary effort is normal. No respiratory distress.   Musculoskeletal:      Comments: Normal gait     Skin:     General: Skin is warm and dry.   Neurological:      Mental Status: Alert and oriented to person, place, and time.      Sensory: No sensory deficit.   Psychiatric:         Behavior: Behavior normal.         Thought Content: Thought content normal.        Provider Attestation:  I, Raul Gallardo, personally performed the services described in this documentation. All medical record entries made by the scribe were at my direction and in my presence. I have reviewed the chart and discharge instructions and agree that the

## 2024-02-26 RX ORDER — ASPIRIN 81 MG/1
81 TABLET, COATED ORAL EVERY MORNING
Qty: 150 TABLET | Refills: 3 | Status: SHIPPED | OUTPATIENT
Start: 2024-02-26

## 2024-02-29 ENCOUNTER — HOSPITAL ENCOUNTER (OUTPATIENT)
Dept: MRI IMAGING | Age: 64
Discharge: HOME | End: 2024-03-02
Attending: ORTHOPAEDIC SURGERY
Payer: MEDICARE

## 2024-02-29 ENCOUNTER — TELEPHONE (OUTPATIENT)
Dept: ORTHOPEDIC SURGERY | Age: 64
End: 2024-02-29

## 2024-02-29 DIAGNOSIS — M25.551 RIGHT HIP PAIN: ICD-10-CM

## 2024-02-29 PROCEDURE — 73721 MRI JNT OF LWR EXTRE W/O DYE: CPT

## 2024-02-29 NOTE — TELEPHONE ENCOUNTER
Patient has an MRI, right hip today and is asking to be seen prior to 4/2/24 for a follow up if possible.     He is aware he has been added to a wait list for any possible cancellations.    Thank you.

## 2024-03-11 ENCOUNTER — TELEPHONE (OUTPATIENT)
Dept: INTERNAL MEDICINE CLINIC | Age: 64
End: 2024-03-11

## 2024-03-11 ENCOUNTER — HOSPITAL ENCOUNTER (EMERGENCY)
Age: 64
Discharge: HOME | End: 2024-03-11
Attending: STUDENT IN AN ORGANIZED HEALTH CARE EDUCATION/TRAINING PROGRAM
Payer: COMMERCIAL

## 2024-03-11 VITALS
TEMPERATURE: 97.8 F | OXYGEN SATURATION: 96 % | WEIGHT: 270 LBS | HEART RATE: 70 BPM | BODY MASS INDEX: 32.88 KG/M2 | HEIGHT: 76 IN | DIASTOLIC BLOOD PRESSURE: 57 MMHG | RESPIRATION RATE: 18 BRPM | SYSTOLIC BLOOD PRESSURE: 139 MMHG

## 2024-03-11 DIAGNOSIS — L03.115 CELLULITIS OF RIGHT LOWER EXTREMITY: Primary | ICD-10-CM

## 2024-03-11 LAB
ANION GAP SERPL CALCULATED.3IONS-SCNC: 10 MMOL/L (ref 9–17)
BASOPHILS # BLD: 0 K/UL (ref 0–0.2)
BASOPHILS NFR BLD: 1 % (ref 0–2)
BUN SERPL-MCNC: 15 MG/DL (ref 8–23)
CALCIUM SERPL-MCNC: 8.7 MG/DL (ref 8.6–10.4)
CHLORIDE SERPL-SCNC: 107 MMOL/L (ref 98–107)
CO2 SERPL-SCNC: 26 MMOL/L (ref 20–31)
CREAT SERPL-MCNC: 1.4 MG/DL (ref 0.7–1.2)
CRP SERPL HS-MCNC: 3.5 MG/L (ref 0–5)
EOSINOPHIL # BLD: 0.1 K/UL (ref 0–0.4)
EOSINOPHILS RELATIVE PERCENT: 2 % (ref 0–4)
ERYTHROCYTE [DISTWIDTH] IN BLOOD BY AUTOMATED COUNT: 16.9 % (ref 11.5–14.9)
ERYTHROCYTE [SEDIMENTATION RATE] IN BLOOD BY PHOTOMETRIC METHOD: 9 MM/HR (ref 0–20)
GFR SERPL CREATININE-BSD FRML MDRD: 56 ML/MIN/1.73M2
GLUCOSE SERPL-MCNC: 173 MG/DL (ref 70–99)
HCT VFR BLD AUTO: 43.1 % (ref 41–53)
HGB BLD-MCNC: 14.2 G/DL (ref 13.5–17.5)
LYMPHOCYTES NFR BLD: 0.7 K/UL (ref 1–4.8)
LYMPHOCYTES RELATIVE PERCENT: 11 % (ref 24–44)
MCH RBC QN AUTO: 30.7 PG (ref 26–34)
MCHC RBC AUTO-ENTMCNC: 33 G/DL (ref 31–37)
MCV RBC AUTO: 93.2 FL (ref 80–100)
MONOCYTES NFR BLD: 0.6 K/UL (ref 0.1–1.3)
MONOCYTES NFR BLD: 10 % (ref 1–7)
NEUTROPHILS NFR BLD: 76 % (ref 36–66)
NEUTS SEG NFR BLD: 4.8 K/UL (ref 1.3–9.1)
PLATELET # BLD AUTO: 160 K/UL (ref 150–450)
PMV BLD AUTO: 10.1 FL (ref 6–12)
POTASSIUM SERPL-SCNC: 4 MMOL/L (ref 3.7–5.3)
PROCALCITONIN SERPL-MCNC: 0.07 NG/ML
RBC # BLD AUTO: 4.63 M/UL (ref 4.5–5.9)
SODIUM SERPL-SCNC: 143 MMOL/L (ref 135–144)
WBC OTHER # BLD: 6.2 K/UL (ref 3.5–11)

## 2024-03-11 PROCEDURE — 84145 PROCALCITONIN (PCT): CPT

## 2024-03-11 PROCEDURE — 6370000000 HC RX 637 (ALT 250 FOR IP): Performed by: STUDENT IN AN ORGANIZED HEALTH CARE EDUCATION/TRAINING PROGRAM

## 2024-03-11 PROCEDURE — 86140 C-REACTIVE PROTEIN: CPT

## 2024-03-11 PROCEDURE — 85025 COMPLETE CBC W/AUTO DIFF WBC: CPT

## 2024-03-11 PROCEDURE — 36415 COLL VENOUS BLD VENIPUNCTURE: CPT

## 2024-03-11 PROCEDURE — 85652 RBC SED RATE AUTOMATED: CPT

## 2024-03-11 PROCEDURE — 80048 BASIC METABOLIC PNL TOTAL CA: CPT

## 2024-03-11 PROCEDURE — 99283 EMERGENCY DEPT VISIT LOW MDM: CPT

## 2024-03-11 RX ORDER — SULFAMETHOXAZOLE AND TRIMETHOPRIM 800; 160 MG/1; MG/1
1 TABLET ORAL 2 TIMES DAILY
Qty: 14 TABLET | Refills: 0 | Status: SHIPPED | OUTPATIENT
Start: 2024-03-11 | End: 2024-03-18

## 2024-03-11 RX ORDER — CEFDINIR 300 MG/1
300 CAPSULE ORAL 2 TIMES DAILY
Qty: 14 CAPSULE | Refills: 0 | Status: SHIPPED | OUTPATIENT
Start: 2024-03-11 | End: 2024-03-18

## 2024-03-11 RX ORDER — CEFDINIR 300 MG/1
300 CAPSULE ORAL ONCE
Status: COMPLETED | OUTPATIENT
Start: 2024-03-11 | End: 2024-03-11

## 2024-03-11 RX ORDER — SULFAMETHOXAZOLE AND TRIMETHOPRIM 800; 160 MG/1; MG/1
1 TABLET ORAL ONCE
Status: COMPLETED | OUTPATIENT
Start: 2024-03-11 | End: 2024-03-11

## 2024-03-11 RX ADMIN — CEFDINIR 300 MG: 300 CAPSULE ORAL at 12:45

## 2024-03-11 RX ADMIN — SULFAMETHOXAZOLE AND TRIMETHOPRIM 1 TABLET: 800; 160 TABLET ORAL at 12:45

## 2024-03-11 ASSESSMENT — ENCOUNTER SYMPTOMS
NAUSEA: 0
SHORTNESS OF BREATH: 0
COUGH: 0
VOMITING: 0
ABDOMINAL PAIN: 0

## 2024-03-11 ASSESSMENT — PAIN - FUNCTIONAL ASSESSMENT: PAIN_FUNCTIONAL_ASSESSMENT: NONE - DENIES PAIN

## 2024-03-11 NOTE — DISCHARGE INSTRUCTIONS
Please take the Bactrim and cefdinir as prescribed  Please also use the Bactroban ointment over the wounds, 3 times a day  I would recommend keeping bandages over the wounds as well for protection  Please follow-up with your primary care provider  If you notice any spreading redness, worsening pain, fever, chills, purulent discharge, or any other concerning symptoms for you, return to the ER immediately

## 2024-03-11 NOTE — ED PROVIDER NOTES
Osteomyelitis (HCC), Paroxysmal atrial fibrillation (HCC), Renal mass, Snores, Suspected sleep apnea, Thyroid disease, Vision abnormalities, Vitreous hemorrhage of left eye due to diabetes mellitus (HCC), Wears glasses, Wears partial dentures, and Wellness examination.     has a past surgical history that includes Foot surgery (Right); Toe amputation; Finger amputation (Right, ); Leg amputation below knee (Left, 2015); other surgical history (Left, 5-5-15 and 2015); Temporomandibular joint surgery (Bilateral, 80'S); Colonoscopy (2016); Colonoscopy (2017);   picc powerpicc double (2018); Cardiac catheterization; other surgical history; pr amp leg thru tibia&fibula re-amputation (Left, 2018); vitrectomy (Left, 2019); cardiovascular stress test (2019); Kidney surgery (Right, 2019); Cystoscopy (Bilateral, 2020); Kidney cyst removal; Kidney surgery (N/A, 2020); Cystoscopy (Right, 2020); Cystoscopy (N/A, 2020); Upper gastrointestinal endoscopy (N/A, 2023); Colonoscopy (N/A, 2023); Pain management procedure; lumbar fusion (N/A, 10/2/2023); and Spine surgery (Left, 2023).    Social History     Socioeconomic History    Marital status:      Spouse name: Segun    Number of children: 2    Years of education: Not on file    Highest education level: Not on file   Occupational History    Occupation: Disabled   Tobacco Use    Smoking status: Former     Current packs/day: 0.00     Average packs/day: 2.0 packs/day for 33.4 years (66.8 ttl pk-yrs)     Types: Cigars, Cigarettes     Start date:      Quit date: 2011     Years since quittin.7    Smokeless tobacco: Never    Tobacco comments:     quit in    Vaping Use    Vaping Use: Never used   Substance and Sexual Activity    Alcohol use: Yes     Comment: BEER 2 A MONTH    Drug use: Not Currently     Types: Marijuana (Weed)     Comment: in 20's    Sexual activity:

## 2024-03-11 NOTE — TELEPHONE ENCOUNTER
Patient called stating he has a water blister on his leg. I advised patient to go to urgent care or er as we have no available appts

## 2024-03-19 ENCOUNTER — OFFICE VISIT (OUTPATIENT)
Dept: ORTHOPEDIC SURGERY | Age: 64
End: 2024-03-19
Payer: COMMERCIAL

## 2024-03-19 VITALS — BODY MASS INDEX: 32.88 KG/M2 | RESPIRATION RATE: 16 BRPM | HEIGHT: 76 IN | WEIGHT: 270 LBS

## 2024-03-19 DIAGNOSIS — G89.29 CHRONIC MIDLINE LOW BACK PAIN, UNSPECIFIED WHETHER SCIATICA PRESENT: ICD-10-CM

## 2024-03-19 DIAGNOSIS — M25.551 RIGHT HIP PAIN: Primary | ICD-10-CM

## 2024-03-19 DIAGNOSIS — M54.50 CHRONIC MIDLINE LOW BACK PAIN, UNSPECIFIED WHETHER SCIATICA PRESENT: ICD-10-CM

## 2024-03-19 DIAGNOSIS — M16.12 ARTHRITIS OF LEFT HIP: ICD-10-CM

## 2024-03-19 PROCEDURE — 1036F TOBACCO NON-USER: CPT | Performed by: ORTHOPAEDIC SURGERY

## 2024-03-19 PROCEDURE — 99213 OFFICE O/P EST LOW 20 MIN: CPT | Performed by: ORTHOPAEDIC SURGERY

## 2024-03-19 PROCEDURE — G8417 CALC BMI ABV UP PARAM F/U: HCPCS | Performed by: ORTHOPAEDIC SURGERY

## 2024-03-19 PROCEDURE — G8484 FLU IMMUNIZE NO ADMIN: HCPCS | Performed by: ORTHOPAEDIC SURGERY

## 2024-03-19 PROCEDURE — 3017F COLORECTAL CA SCREEN DOC REV: CPT | Performed by: ORTHOPAEDIC SURGERY

## 2024-03-19 PROCEDURE — G8427 DOCREV CUR MEDS BY ELIG CLIN: HCPCS | Performed by: ORTHOPAEDIC SURGERY

## 2024-03-19 NOTE — PROGRESS NOTES
Patient ID: Jay Colon is a 64 y.o. male    Chief Compliant:  Chief Complaint   Patient presents with    Hip Pain     Rt hip MRI review        Diagnostic imaging:    MRI bilateral hips moderate right hip arthritis moderately severe left hip arthritis    Assessment and Plan:  1. Right hip pain    2. Arthritis of left hip    3. Chronic midline low back pain, unspecified whether sciatica present        Aggravation of right groin pain post fall    Patient continues to mediocre but at this time not so severe that he needs surgical intervention    Follow up 6 weeks    HPI:  This is a 64 y.o. male who presents to the clinic today for MRI results.     Patient was complaining of significant aggravation of right groin pain post fall.     Patient is ambulating with assistance via cane. Patient with left below the knee prosthetic.     His right hip will present intermittently. He notes that his left hip is providing him some pain, but his primary concern is that his ambulation is abnormal.     Review of Systems   All other systems reviewed and are negative.      Past History:    Current Outpatient Medications:     MOUNJARO 5 MG/0.5ML SOPN SC injection, inject 5 milligram UNDER CLEAN, DRY, AND INTACT SKIN every 7 days, Disp: , Rfl:     aspirin (ASPIRIN LOW DOSE) 81 MG EC tablet, take 1 tablet by mouth IN THE MORNING, Disp: 150 tablet, Rfl: 3    metoprolol tartrate (LOPRESSOR) 50 MG tablet, take 1 tablet by mouth twice a day, Disp: 60 tablet, Rfl: 3    omeprazole (PRILOSEC) 20 MG delayed release capsule, take 1 capsule by mouth once daily, Disp: 30 capsule, Rfl: 1    atorvastatin (LIPITOR) 40 MG tablet, take 1 tablet by mouth once daily, Disp: 150 tablet, Rfl: 0    amLODIPine (NORVASC) 2.5 MG tablet, take 1 tablet by mouth once daily, Disp: 30 tablet, Rfl: 3    levothyroxine (SYNTHROID) 175 MCG tablet, take 1 tablet by mouth once daily, Disp: 30 tablet, Rfl: 3    apixaban (ELIQUIS) 5 MG TABS tablet, take 1 tablet by mouth  Spiral Flap Text: The defect edges were debeveled with a #15 scalpel blade.  Given the location of the defect, shape of the defect and the proximity to free margins a spiral flap was deemed most appropriate.  Using a sterile surgical marker, an appropriate rotation flap was drawn incorporating the defect and placing the expected incisions within the relaxed skin tension lines where possible. The area thus outlined was incised deep to adipose tissue with a #15 scalpel blade.  The skin margins were undermined to an appropriate distance in all directions utilizing iris scissors.

## 2024-04-01 DIAGNOSIS — M25.562 CHRONIC PAIN OF LEFT KNEE: ICD-10-CM

## 2024-04-01 DIAGNOSIS — Z79.4 TYPE 2 DIABETES MELLITUS WITH DIABETIC POLYNEUROPATHY, WITH LONG-TERM CURRENT USE OF INSULIN (HCC): ICD-10-CM

## 2024-04-01 DIAGNOSIS — Z97.10 EMPLOYS PROSTHETIC LEG: ICD-10-CM

## 2024-04-01 DIAGNOSIS — E08.41 DIABETIC MONONEUROPATHY ASSOCIATED WITH DIABETES MELLITUS DUE TO UNDERLYING CONDITION (HCC): ICD-10-CM

## 2024-04-01 DIAGNOSIS — G89.29 CHRONIC PAIN OF LEFT KNEE: ICD-10-CM

## 2024-04-01 DIAGNOSIS — E11.42 TYPE 2 DIABETES MELLITUS WITH DIABETIC POLYNEUROPATHY, WITH LONG-TERM CURRENT USE OF INSULIN (HCC): ICD-10-CM

## 2024-04-01 RX ORDER — PREGABALIN 100 MG/1
100 CAPSULE ORAL 3 TIMES DAILY
Qty: 270 CAPSULE | Refills: 0 | Status: SHIPPED | OUTPATIENT
Start: 2024-04-01 | End: 2024-06-30

## 2024-04-01 NOTE — TELEPHONE ENCOUNTER
Medication Requested: Kalyna    Last visit: 1/8/24  Next visit: 4/3/2024  Last refill: 9/9/23    Med contract on file:  [] yes   [x] no    Last urine drug screen: 11/5/2019  Consistent with medication(s):    [] yes   [] no    Last OARRS ran: unknown    Pharmacy if escribed: [facility]

## 2024-04-02 ENCOUNTER — HOSPITAL ENCOUNTER (OUTPATIENT)
Age: 64
Setting detail: SPECIMEN
Discharge: HOME | End: 2024-04-02

## 2024-04-02 DIAGNOSIS — E11.42 TYPE 2 DIABETES MELLITUS WITH DIABETIC POLYNEUROPATHY, WITH LONG-TERM CURRENT USE OF INSULIN (HCC): ICD-10-CM

## 2024-04-02 DIAGNOSIS — Z79.4 TYPE 2 DIABETES MELLITUS WITH DIABETIC POLYNEUROPATHY, WITH LONG-TERM CURRENT USE OF INSULIN (HCC): ICD-10-CM

## 2024-04-02 DIAGNOSIS — E03.9 ACQUIRED HYPOTHYROIDISM: ICD-10-CM

## 2024-04-02 LAB
EST. AVERAGE GLUCOSE BLD GHB EST-MCNC: 157 MG/DL
HBA1C MFR BLD: 7.1 % (ref 4–6)
T4 FREE SERPL-MCNC: 1.3 NG/DL (ref 0.92–1.68)
TSH SERPL DL<=0.05 MIU/L-ACNC: 6.52 UIU/ML (ref 0.27–4.2)

## 2024-04-10 RX ORDER — INSULIN LISPRO 100 [IU]/ML
10 INJECTION, SOLUTION INTRAVENOUS; SUBCUTANEOUS
Qty: 5 ADJUSTABLE DOSE PRE-FILLED PEN SYRINGE | Refills: 2 | Status: SHIPPED | OUTPATIENT
Start: 2024-04-10

## 2024-04-10 RX ORDER — CLOTRIMAZOLE AND BETAMETHASONE DIPROPIONATE 10; .64 MG/G; MG/G
CREAM TOPICAL
Qty: 45 G | Refills: 3 | Status: SHIPPED | OUTPATIENT
Start: 2024-04-10

## 2024-04-12 ENCOUNTER — OFFICE VISIT (OUTPATIENT)
Dept: PODIATRY | Age: 64
End: 2024-04-12
Payer: COMMERCIAL

## 2024-04-12 VITALS — BODY MASS INDEX: 33.49 KG/M2 | WEIGHT: 275 LBS | HEIGHT: 76 IN

## 2024-04-12 DIAGNOSIS — Z79.4 TYPE 2 DIABETES MELLITUS WITH DIABETIC POLYNEUROPATHY, WITH LONG-TERM CURRENT USE OF INSULIN (HCC): ICD-10-CM

## 2024-04-12 DIAGNOSIS — E11.42 TYPE 2 DIABETES MELLITUS WITH DIABETIC POLYNEUROPATHY, WITH LONG-TERM CURRENT USE OF INSULIN (HCC): ICD-10-CM

## 2024-04-12 DIAGNOSIS — M79.674 PAIN OF TOE OF RIGHT FOOT: ICD-10-CM

## 2024-04-12 DIAGNOSIS — E11.51 TYPE 2 DIABETES MELLITUS WITH PERIPHERAL VASCULAR DISEASE (HCC): ICD-10-CM

## 2024-04-12 DIAGNOSIS — B35.1 ONYCHOMYCOSIS OF TOENAIL: Primary | ICD-10-CM

## 2024-04-12 PROCEDURE — 99999 PR OFFICE/OUTPT VISIT,PROCEDURE ONLY: CPT | Performed by: PODIATRIST

## 2024-04-12 PROCEDURE — 11720 DEBRIDE NAIL 1-5: CPT | Performed by: PODIATRIST

## 2024-04-15 ASSESSMENT — ENCOUNTER SYMPTOMS
NAUSEA: 0
DIARRHEA: 0
SHORTNESS OF BREATH: 0
COLOR CHANGE: 0
BACK PAIN: 0

## 2024-04-15 NOTE — PROGRESS NOTES
SUBJECTIVE: Jay Colon is a 64 y.o. male who returns to the office with chief complaint of painful fungal toenails to the right foot. Patient relates toe nails are thickened/difficult to trim as well as painful with ambulation and with shoe gear.  Patient's left lower extremity was previously amputated.  Chief Complaint   Patient presents with    Nail Problem     Right foot nail trim,last seen Renny Wood MD 1/8/24    Diabetes     Last blood sugar 223     Review of Systems   Constitutional:  Negative for activity change, appetite change, chills, diaphoresis, fatigue and fever.   Respiratory:  Negative for shortness of breath.    Cardiovascular:  Negative for leg swelling.   Gastrointestinal:  Negative for diarrhea and nausea.   Endocrine: Negative for cold intolerance, heat intolerance and polyuria.   Musculoskeletal:  Positive for arthralgias and gait problem. Negative for back pain, joint swelling and myalgias.   Skin:  Negative for color change, pallor, rash and wound.   Allergic/Immunologic: Negative for environmental allergies and food allergies.   Neurological:  Positive for numbness. Negative for dizziness, weakness and light-headedness.   Hematological:  Does not bruise/bleed easily.   Psychiatric/Behavioral:  Negative for behavioral problems, confusion and self-injury. The patient is not nervous/anxious.      OBJECTIVE: Clinical evaluation of patient reveals nails 1,4,5 of the right foot present with thickness, elongation, discoloration, brittleness, and subungual debris. There was pain with palpation and debridement of the toenails of the right foot No open lesions noted to the right foot today. The left leg and toes 2 and 3 of the right foot have been amputated.   The right DP pulse is not palpable.   The right PT pulse is not palpable.    Protective sensation is absent to the right plantar foot as noted with a 5.07 Neskowin-Gaudencio monofilament.    Glucose: 223 mg/dl    Class A Findings (1

## 2024-04-16 RX ORDER — APIXABAN 5 MG/1
TABLET, FILM COATED ORAL
Qty: 120 TABLET | Refills: 1 | Status: SHIPPED | OUTPATIENT
Start: 2024-04-16

## 2024-04-16 RX ORDER — TAMSULOSIN HYDROCHLORIDE 0.4 MG/1
0.4 CAPSULE ORAL DAILY
Qty: 60 CAPSULE | Refills: 2 | Status: SHIPPED | OUTPATIENT
Start: 2024-04-16

## 2024-04-16 RX ORDER — ROPINIROLE 2 MG/1
TABLET, FILM COATED ORAL
Qty: 210 TABLET | Refills: 1 | Status: SHIPPED | OUTPATIENT
Start: 2024-04-16

## 2024-04-17 ENCOUNTER — OFFICE VISIT (OUTPATIENT)
Dept: INTERNAL MEDICINE CLINIC | Age: 64
End: 2024-04-17
Payer: COMMERCIAL

## 2024-04-17 VITALS
SYSTOLIC BLOOD PRESSURE: 128 MMHG | OXYGEN SATURATION: 94 % | DIASTOLIC BLOOD PRESSURE: 68 MMHG | WEIGHT: 282.2 LBS | BODY MASS INDEX: 34.36 KG/M2 | HEIGHT: 76 IN | HEART RATE: 64 BPM

## 2024-04-17 DIAGNOSIS — G20.A1 PARKINSON'S DISEASE WITHOUT FLUCTUATING MANIFESTATIONS, UNSPECIFIED WHETHER DYSKINESIA PRESENT (HCC): ICD-10-CM

## 2024-04-17 DIAGNOSIS — Z13.220 SCREENING FOR HYPERLIPIDEMIA: ICD-10-CM

## 2024-04-17 DIAGNOSIS — R53.83 FATIGUE, UNSPECIFIED TYPE: ICD-10-CM

## 2024-04-17 DIAGNOSIS — E08.41 DIABETIC MONONEUROPATHY ASSOCIATED WITH DIABETES MELLITUS DUE TO UNDERLYING CONDITION (HCC): ICD-10-CM

## 2024-04-17 DIAGNOSIS — G95.19 OTHER VASCULAR MYELOPATHIES (HCC): ICD-10-CM

## 2024-04-17 DIAGNOSIS — I73.9 PVD (PERIPHERAL VASCULAR DISEASE) (HCC): Primary | ICD-10-CM

## 2024-04-17 PROCEDURE — 99214 OFFICE O/P EST MOD 30 MIN: CPT | Performed by: INTERNAL MEDICINE

## 2024-04-17 PROCEDURE — G8427 DOCREV CUR MEDS BY ELIG CLIN: HCPCS | Performed by: INTERNAL MEDICINE

## 2024-04-17 PROCEDURE — 3017F COLORECTAL CA SCREEN DOC REV: CPT | Performed by: INTERNAL MEDICINE

## 2024-04-17 PROCEDURE — G8417 CALC BMI ABV UP PARAM F/U: HCPCS | Performed by: INTERNAL MEDICINE

## 2024-04-17 PROCEDURE — 1036F TOBACCO NON-USER: CPT | Performed by: INTERNAL MEDICINE

## 2024-04-17 PROCEDURE — 3078F DIAST BP <80 MM HG: CPT | Performed by: INTERNAL MEDICINE

## 2024-04-17 PROCEDURE — 3074F SYST BP LT 130 MM HG: CPT | Performed by: INTERNAL MEDICINE

## 2024-04-17 SDOH — ECONOMIC STABILITY: FOOD INSECURITY: WITHIN THE PAST 12 MONTHS, YOU WORRIED THAT YOUR FOOD WOULD RUN OUT BEFORE YOU GOT MONEY TO BUY MORE.: NEVER TRUE

## 2024-04-17 SDOH — ECONOMIC STABILITY: INCOME INSECURITY: HOW HARD IS IT FOR YOU TO PAY FOR THE VERY BASICS LIKE FOOD, HOUSING, MEDICAL CARE, AND HEATING?: NOT HARD AT ALL

## 2024-04-17 SDOH — ECONOMIC STABILITY: FOOD INSECURITY: WITHIN THE PAST 12 MONTHS, THE FOOD YOU BOUGHT JUST DIDN'T LAST AND YOU DIDN'T HAVE MONEY TO GET MORE.: NEVER TRUE

## 2024-04-17 ASSESSMENT — ENCOUNTER SYMPTOMS
DIARRHEA: 0
SHORTNESS OF BREATH: 0
EYE PAIN: 0
ABDOMINAL DISTENTION: 0
EYE DISCHARGE: 0
BLOOD IN STOOL: 0
COUGH: 0
COLOR CHANGE: 0
WHEEZING: 0
TROUBLE SWALLOWING: 0

## 2024-04-17 ASSESSMENT — PATIENT HEALTH QUESTIONNAIRE - PHQ9
1. LITTLE INTEREST OR PLEASURE IN DOING THINGS: NOT AT ALL
SUM OF ALL RESPONSES TO PHQ QUESTIONS 1-9: 0
SUM OF ALL RESPONSES TO PHQ9 QUESTIONS 1 & 2: 0
SUM OF ALL RESPONSES TO PHQ QUESTIONS 1-9: 0
2. FEELING DOWN, DEPRESSED OR HOPELESS: NOT AT ALL

## 2024-04-17 NOTE — PROGRESS NOTES
Visit Information    Have you changed or started any medications since your last visit including any over-the-counter medicines, vitamins, or herbal medicines? no   Are you having any side effects from any of your medications? -  no  Have you stopped taking any of your medications? Is so, why? -  no    Have you seen any other physician or provider since your last visit? Yes - Records Obtained  Have you had any other diagnostic tests since your last visit? Yes - Records Obtained  Have you been seen in the emergency room and/or had an admission to a hospital since we last saw you? Yes - Records Obtained  Have you had your routine dental cleaning in the past 6 months? no    Have you activated your [employer] account? If not, what are your barriers? Yes     Patient Care Team:  Renny Wood MD as PCP - General  Renny Wood MD as PCP - Empaneled Provider  Nupur Licea MD as Consulting Physician (Infectious Diseases)    Medical History Review  Past Medical, Family, and Social History reviewed and does contribute to the patient presenting condition    Health Maintenance   Topic Date Due    HIV screen  Never done    DTaP/Tdap/Td vaccine (1 - Tdap) Never done    Shingles vaccine (1 of 2) 10/23/2014    Pneumococcal 0-64 years Vaccine (2 of 2 - PCV) 01/04/2015    Diabetic retinal exam  01/04/2017    Respiratory Syncytial Virus (RSV) Pregnant or age 60 yrs+ (1 - 1-dose 60+ series) Never done    Diabetic Alb to Cr ratio (uACR) test  12/14/2022    Lipids  08/10/2023    Annual Wellness Visit (Medicare)  11/27/2023    Low dose CT lung screening &/or counseling  04/20/2024    Depression Screen  01/08/2025    GFR test (Diabetes, CKD 3-4, OR last GFR 15-59)  03/11/2025    A1C test (Diabetic or Prediabetic)  04/02/2025    Diabetic foot exam  04/15/2025    Colorectal Cancer Screen  05/26/2033    Flu vaccine  Completed    COVID-19 Vaccine  Completed    Hepatitis C screen  Completed    Hepatitis A vaccine  Aged Out    
     Left eye: No discharge.      Extraocular Movements:      Right eye: Normal extraocular motion.      Left eye: Normal extraocular motion.      Conjunctiva/sclera: Conjunctivae normal.      Right eye: Right conjunctiva is not injected.      Left eye: Left conjunctiva is not injected.   Neck:      Thyroid: No thyroid mass or thyromegaly.      Vascular: No JVD.   Cardiovascular:      Rate and Rhythm: Normal rate and regular rhythm.      Heart sounds: No murmur heard.     No friction rub.   Pulmonary:      Effort: Pulmonary effort is normal. No tachypnea, bradypnea, accessory muscle usage or respiratory distress.      Breath sounds: Normal breath sounds. No wheezing or rales.   Abdominal:      General: Bowel sounds are normal. There is no distension.      Palpations: Abdomen is soft.      Tenderness: There is no abdominal tenderness. There is no rebound.   Musculoskeletal:         General: No tenderness. Normal range of motion.      Cervical back: Normal range of motion and neck supple. No edema or erythema.      Comments: Post bka     Lymphadenopathy:      Head:      Right side of head: No submental or submandibular adenopathy.      Left side of head: No submental or submandibular adenopathy.      Cervical: No cervical adenopathy.   Skin:     General: Skin is warm.      Coloration: Skin is not pale.      Findings: No bruising, ecchymosis or rash.   Neurological:      Mental Status: He is alert and oriented to person, place, and time.      Cranial Nerves: No cranial nerve deficit.      Sensory: No sensory deficit.      Motor: No atrophy or abnormal muscle tone.      Coordination: Coordination normal.   Psychiatric:         Mood and Affect: Mood is not anxious. Affect is not angry.         Speech: Speech is not slurred.         Behavior: Behavior normal. Behavior is not aggressive.         Thought Content: Thought content does not include homicidal ideation.         Cognition and Memory: Memory is not impaired.

## 2024-04-24 RX ORDER — OMEPRAZOLE 20 MG/1
20 CAPSULE, DELAYED RELEASE ORAL DAILY
Qty: 30 CAPSULE | Refills: 1 | Status: SHIPPED | OUTPATIENT
Start: 2024-04-24

## 2024-04-25 ENCOUNTER — TELEPHONE (OUTPATIENT)
Dept: PULMONOLOGY | Age: 64
End: 2024-04-25

## 2024-04-25 DIAGNOSIS — R91.1 LUNG NODULE: Primary | ICD-10-CM

## 2024-04-25 NOTE — TELEPHONE ENCOUNTER
We had called patient to get his appt moved 24 with Dr Looney.  Patient asked about a CT and I do see an order for a high res CT but it is .  I did pend a new order if your willing to sign in Dr Looney's absence please sign

## 2024-04-26 ENCOUNTER — HOSPITAL ENCOUNTER (OUTPATIENT)
Dept: NUCLEAR MEDICINE | Age: 64
Discharge: HOME | End: 2024-04-28
Attending: INTERNAL MEDICINE
Payer: COMMERCIAL

## 2024-04-26 ENCOUNTER — HOSPITAL ENCOUNTER (OUTPATIENT)
Age: 64
Discharge: HOME | End: 2024-04-28
Attending: INTERNAL MEDICINE
Payer: COMMERCIAL

## 2024-04-26 DIAGNOSIS — R53.83 FATIGUE, UNSPECIFIED TYPE: ICD-10-CM

## 2024-04-26 LAB
NUC STRESS EJECTION FRACTION: 61 %
STRESS BASELINE DIAS BP: 71 MMHG
STRESS BASELINE HR: 58 BPM
STRESS BASELINE SYS BP: 147 MMHG
STRESS ESTIMATED WORKLOAD: 1 METS
STRESS PEAK DIAS BP: 69 MMHG
STRESS PEAK SYS BP: 144 MMHG
STRESS PERCENT HR ACHIEVED: 46 %
STRESS POST PEAK HR: 72 BPM
STRESS RATE PRESSURE PRODUCT: NORMAL BPM*MMHG
STRESS STAGE RECOVERY 1 BP: NORMAL MMHG
STRESS STAGE RECOVERY 1 DURATION: 1 MIN:SEC
STRESS STAGE RECOVERY 1 HR: 71 BPM
STRESS STAGE RECOVERY 2 BP: NORMAL MMHG
STRESS STAGE RECOVERY 2 DURATION: 5 MIN:SEC
STRESS STAGE RECOVERY 2 HR: 70 BPM
STRESS TARGET HR: 156 BPM
TID: 1.15

## 2024-04-26 PROCEDURE — A9500 TC99M SESTAMIBI: HCPCS | Performed by: INTERNAL MEDICINE

## 2024-04-26 PROCEDURE — 93017 CV STRESS TEST TRACING ONLY: CPT

## 2024-04-26 PROCEDURE — 2580000003 HC RX 258: Performed by: INTERNAL MEDICINE

## 2024-04-26 PROCEDURE — 93016 CV STRESS TEST SUPVJ ONLY: CPT | Performed by: RADIOLOGY

## 2024-04-26 PROCEDURE — 93018 CV STRESS TEST I&R ONLY: CPT | Performed by: RADIOLOGY

## 2024-04-26 PROCEDURE — 3430000000 HC RX DIAGNOSTIC RADIOPHARMACEUTICAL: Performed by: INTERNAL MEDICINE

## 2024-04-26 PROCEDURE — 6360000002 HC RX W HCPCS: Performed by: INTERNAL MEDICINE

## 2024-04-26 PROCEDURE — 78452 HT MUSCLE IMAGE SPECT MULT: CPT

## 2024-04-26 RX ORDER — SODIUM CHLORIDE 9 MG/ML
500 INJECTION, SOLUTION INTRAVENOUS CONTINUOUS PRN
Status: ACTIVE | OUTPATIENT
Start: 2024-04-26 | End: 2024-04-26

## 2024-04-26 RX ORDER — ATROPINE SULFATE 0.1 MG/ML
0.5 INJECTION INTRAVENOUS EVERY 5 MIN PRN
Status: ACTIVE | OUTPATIENT
Start: 2024-04-26 | End: 2024-04-26

## 2024-04-26 RX ORDER — REGADENOSON 0.08 MG/ML
0.4 INJECTION, SOLUTION INTRAVENOUS
Status: COMPLETED | OUTPATIENT
Start: 2024-04-26 | End: 2024-04-26

## 2024-04-26 RX ORDER — NITROGLYCERIN 0.4 MG/1
0.4 TABLET SUBLINGUAL EVERY 5 MIN PRN
Status: ACTIVE | OUTPATIENT
Start: 2024-04-26 | End: 2024-04-26

## 2024-04-26 RX ORDER — AMINOPHYLLINE 25 MG/ML
50 INJECTION, SOLUTION INTRAVENOUS PRN
Status: ACTIVE | OUTPATIENT
Start: 2024-04-26 | End: 2024-04-26

## 2024-04-26 RX ORDER — METOPROLOL TARTRATE 1 MG/ML
5 INJECTION, SOLUTION INTRAVENOUS EVERY 5 MIN PRN
Status: ACTIVE | OUTPATIENT
Start: 2024-04-26 | End: 2024-04-26

## 2024-04-26 RX ORDER — ALBUTEROL SULFATE 90 UG/1
2 AEROSOL, METERED RESPIRATORY (INHALATION) PRN
Status: ACTIVE | OUTPATIENT
Start: 2024-04-26 | End: 2024-04-26

## 2024-04-26 RX ORDER — TETRAKIS(2-METHOXYISOBUTYLISOCYANIDE)COPPER(I) TETRAFLUOROBORATE 1 MG/ML
30 INJECTION, POWDER, LYOPHILIZED, FOR SOLUTION INTRAVENOUS
Status: COMPLETED | OUTPATIENT
Start: 2024-04-26 | End: 2024-04-26

## 2024-04-26 RX ORDER — TETRAKIS(2-METHOXYISOBUTYLISOCYANIDE)COPPER(I) TETRAFLUOROBORATE 1 MG/ML
10 INJECTION, POWDER, LYOPHILIZED, FOR SOLUTION INTRAVENOUS
Status: COMPLETED | OUTPATIENT
Start: 2024-04-26 | End: 2024-04-26

## 2024-04-26 RX ORDER — SODIUM CHLORIDE 0.9 % (FLUSH) 0.9 %
10 SYRINGE (ML) INJECTION PRN
Status: DISCONTINUED | OUTPATIENT
Start: 2024-04-26 | End: 2024-04-29 | Stop reason: HOSPADM

## 2024-04-26 RX ORDER — SODIUM CHLORIDE 0.9 % (FLUSH) 0.9 %
5-40 SYRINGE (ML) INJECTION PRN
Status: ACTIVE | OUTPATIENT
Start: 2024-04-26 | End: 2024-04-26

## 2024-04-26 RX ADMIN — Medication 36.2 MILLICURIE: at 08:50

## 2024-04-26 RX ADMIN — SODIUM CHLORIDE, PRESERVATIVE FREE 10 ML: 5 INJECTION INTRAVENOUS at 07:14

## 2024-04-26 RX ADMIN — Medication 10.9 MILLICURIE: at 07:14

## 2024-04-26 RX ADMIN — REGADENOSON 0.4 MG: 0.08 INJECTION, SOLUTION INTRAVENOUS at 08:49

## 2024-05-13 ENCOUNTER — HOSPITAL ENCOUNTER (OUTPATIENT)
Age: 64
Discharge: HOME | End: 2024-05-13
Payer: COMMERCIAL

## 2024-05-13 ENCOUNTER — HOSPITAL ENCOUNTER (OUTPATIENT)
Dept: CT IMAGING | Age: 64
Discharge: HOME | End: 2024-05-15
Payer: COMMERCIAL

## 2024-05-13 DIAGNOSIS — R60.0 LOWER EXTREMITY EDEMA: ICD-10-CM

## 2024-05-13 DIAGNOSIS — Z13.220 SCREENING FOR HYPERLIPIDEMIA: ICD-10-CM

## 2024-05-13 DIAGNOSIS — R91.1 LUNG NODULE: ICD-10-CM

## 2024-05-13 DIAGNOSIS — I50.32 CHRONIC DIASTOLIC HEART FAILURE (HCC): ICD-10-CM

## 2024-05-13 DIAGNOSIS — R06.02 SHORTNESS OF BREATH: ICD-10-CM

## 2024-05-13 DIAGNOSIS — E66.01 SEVERE OBESITY (BMI 35.0-39.9) WITH COMORBIDITY (HCC): ICD-10-CM

## 2024-05-13 DIAGNOSIS — E08.41 DIABETIC MONONEUROPATHY ASSOCIATED WITH DIABETES MELLITUS DUE TO UNDERLYING CONDITION (HCC): ICD-10-CM

## 2024-05-13 DIAGNOSIS — I48.0 PAF (PAROXYSMAL ATRIAL FIBRILLATION) (HCC): ICD-10-CM

## 2024-05-13 DIAGNOSIS — R06.09 DOE (DYSPNEA ON EXERTION): ICD-10-CM

## 2024-05-13 DIAGNOSIS — R53.83 FATIGUE, UNSPECIFIED TYPE: ICD-10-CM

## 2024-05-13 DIAGNOSIS — E78.00 PURE HYPERCHOLESTEROLEMIA: ICD-10-CM

## 2024-05-13 DIAGNOSIS — R06.09 DYSPNEA ON EXERTION: ICD-10-CM

## 2024-05-13 LAB
25(OH)D3 SERPL-MCNC: 20 NG/ML (ref 30–100)
ANION GAP SERPL CALCULATED.3IONS-SCNC: 12 MMOL/L (ref 9–17)
BUN SERPL-MCNC: 26 MG/DL (ref 8–23)
CALCIUM SERPL-MCNC: 9.3 MG/DL (ref 8.6–10.4)
CHLORIDE SERPL-SCNC: 105 MMOL/L (ref 98–107)
CHOLEST SERPL-MCNC: 140 MG/DL
CHOLESTEROL/HDL RATIO: 2.5
CO2 SERPL-SCNC: 25 MMOL/L (ref 20–31)
CREAT SERPL-MCNC: 1.4 MG/DL (ref 0.7–1.2)
FOLATE SERPL-MCNC: 7.3 NG/ML (ref 4.8–24.2)
GFR, ESTIMATED: 56 ML/MIN/1.73M2
GLUCOSE SERPL-MCNC: 298 MG/DL (ref 70–99)
HDLC SERPL-MCNC: 57 MG/DL
LDLC SERPL CALC-MCNC: 59 MG/DL (ref 0–130)
POTASSIUM SERPL-SCNC: 4.3 MMOL/L (ref 3.7–5.3)
SODIUM SERPL-SCNC: 142 MMOL/L (ref 135–144)
TRIGL SERPL-MCNC: 122 MG/DL
VIT B12 SERPL-MCNC: 334 PG/ML (ref 232–1245)

## 2024-05-13 PROCEDURE — 71250 CT THORAX DX C-: CPT

## 2024-05-13 PROCEDURE — 82746 ASSAY OF FOLIC ACID SERUM: CPT

## 2024-05-13 PROCEDURE — 80048 BASIC METABOLIC PNL TOTAL CA: CPT

## 2024-05-13 PROCEDURE — 80061 LIPID PANEL: CPT

## 2024-05-13 PROCEDURE — 36415 COLL VENOUS BLD VENIPUNCTURE: CPT

## 2024-05-13 PROCEDURE — 82607 VITAMIN B-12: CPT

## 2024-05-13 PROCEDURE — 82306 VITAMIN D 25 HYDROXY: CPT

## 2024-05-13 RX ORDER — ERGOCALCIFEROL 1.25 MG/1
50000 CAPSULE ORAL WEEKLY
Qty: 12 CAPSULE | Refills: 1 | Status: SHIPPED | OUTPATIENT
Start: 2024-05-13

## 2024-05-14 ENCOUNTER — OFFICE VISIT (OUTPATIENT)
Dept: ORTHOPEDIC SURGERY | Age: 64
End: 2024-05-14
Payer: COMMERCIAL

## 2024-05-14 VITALS — BODY MASS INDEX: 35.19 KG/M2 | WEIGHT: 289 LBS | RESPIRATION RATE: 14 BRPM | HEIGHT: 76 IN

## 2024-05-14 DIAGNOSIS — M16.12 ARTHRITIS OF LEFT HIP: Primary | ICD-10-CM

## 2024-05-14 DIAGNOSIS — M48.062 SPINAL STENOSIS OF LUMBAR REGION WITH NEUROGENIC CLAUDICATION: ICD-10-CM

## 2024-05-14 PROCEDURE — G8427 DOCREV CUR MEDS BY ELIG CLIN: HCPCS | Performed by: ORTHOPAEDIC SURGERY

## 2024-05-14 PROCEDURE — 99213 OFFICE O/P EST LOW 20 MIN: CPT | Performed by: ORTHOPAEDIC SURGERY

## 2024-05-14 PROCEDURE — 1036F TOBACCO NON-USER: CPT | Performed by: ORTHOPAEDIC SURGERY

## 2024-05-14 PROCEDURE — 3017F COLORECTAL CA SCREEN DOC REV: CPT | Performed by: ORTHOPAEDIC SURGERY

## 2024-05-14 PROCEDURE — G8417 CALC BMI ABV UP PARAM F/U: HCPCS | Performed by: ORTHOPAEDIC SURGERY

## 2024-05-14 NOTE — PROGRESS NOTES
Patient ID: Jay Colon is a 64 y.o. male    Chief Compliant:  Chief Complaint   Patient presents with    Hip Pain     Bilateral hip pain        Diagnostic imaging:  Previous x-rays CT myelogram's MRIs reviewed patient with moderate left greater than right hip arthritis      Assessment and Plan:  1. Arthritis of left hip    2. Spinal stenosis of lumbar region with neurogenic claudication        Last time patient was complaining more of his right hip today is complaining more of his left hip    Patient complains of bilateral hip pain, with the left worse than the right.    IR injection of left hip     Follow up same day as left hip injection    HPI:  This is a 64 y.o. male who presents to the clinic today for follow up of right hip pain.     Patient complains of bilateral hip pain.    He reports difficulty standing from a seated position and that he has to hold on to something until he can get a hold of his cane.     Patient has difficulty ambulating long distances    Patient is ambulatory with a cane.    Review of Systems   All other systems reviewed and are negative.      Past History:    Current Outpatient Medications:     vitamin D (ERGOCALCIFEROL) 1.25 MG (51895 UT) CAPS capsule, Take 1 capsule by mouth once a week, Disp: 12 capsule, Rfl: 1    flecainide (TAMBOCOR) 50 MG tablet, Take 1 tablet by mouth 2 times daily, Disp: 180 tablet, Rfl: 3    furosemide (LASIX) 20 MG tablet, Take 1 tablet by mouth daily, Disp: 90 tablet, Rfl: 3    omeprazole (PRILOSEC) 20 MG delayed release capsule, Take 1 capsule by mouth daily, Disp: 30 capsule, Rfl: 1    rOPINIRole (REQUIP) 2 MG tablet, take 1 tablet by mouth once daily, Disp: 210 tablet, Rfl: 1    tamsulosin (FLOMAX) 0.4 MG capsule, take 1 capsule by mouth once daily, Disp: 60 capsule, Rfl: 2    ELIQUIS 5 MG TABS tablet, take 1 tablet by mouth twice a day, Disp: 120 tablet, Rfl: 1    clotrimazole-betamethasone (LOTRISONE) 1-0.05 % cream, Bid topical, Disp: 45 g, Rfl: 3

## 2024-05-16 ENCOUNTER — OFFICE VISIT (OUTPATIENT)
Dept: INTERNAL MEDICINE CLINIC | Age: 64
End: 2024-05-16
Payer: COMMERCIAL

## 2024-05-16 ENCOUNTER — TELEPHONE (OUTPATIENT)
Dept: INTERVENTIONAL RADIOLOGY/VASCULAR | Age: 64
End: 2024-05-16

## 2024-05-16 VITALS
DIASTOLIC BLOOD PRESSURE: 70 MMHG | BODY MASS INDEX: 34.83 KG/M2 | OXYGEN SATURATION: 95 % | HEIGHT: 76 IN | WEIGHT: 286 LBS | SYSTOLIC BLOOD PRESSURE: 120 MMHG | HEART RATE: 64 BPM

## 2024-05-16 DIAGNOSIS — I73.9 PVD (PERIPHERAL VASCULAR DISEASE) (HCC): ICD-10-CM

## 2024-05-16 DIAGNOSIS — E11.42 TYPE 2 DIABETES MELLITUS WITH DIABETIC POLYNEUROPATHY, WITH LONG-TERM CURRENT USE OF INSULIN (HCC): ICD-10-CM

## 2024-05-16 DIAGNOSIS — Z79.4 TYPE 2 DIABETES MELLITUS WITH DIABETIC POLYNEUROPATHY, WITH LONG-TERM CURRENT USE OF INSULIN (HCC): ICD-10-CM

## 2024-05-16 DIAGNOSIS — L03.115 CELLULITIS OF RIGHT LEG: Primary | ICD-10-CM

## 2024-05-16 PROBLEM — I24.9 ACUTE CORONARY SYNDROME (HCC): Status: RESOLVED | Noted: 2021-12-28 | Resolved: 2024-05-16

## 2024-05-16 PROCEDURE — G8417 CALC BMI ABV UP PARAM F/U: HCPCS | Performed by: NURSE PRACTITIONER

## 2024-05-16 PROCEDURE — 3074F SYST BP LT 130 MM HG: CPT | Performed by: NURSE PRACTITIONER

## 2024-05-16 PROCEDURE — 99214 OFFICE O/P EST MOD 30 MIN: CPT | Performed by: NURSE PRACTITIONER

## 2024-05-16 PROCEDURE — G8427 DOCREV CUR MEDS BY ELIG CLIN: HCPCS | Performed by: NURSE PRACTITIONER

## 2024-05-16 PROCEDURE — 3078F DIAST BP <80 MM HG: CPT | Performed by: NURSE PRACTITIONER

## 2024-05-16 PROCEDURE — 1036F TOBACCO NON-USER: CPT | Performed by: NURSE PRACTITIONER

## 2024-05-16 PROCEDURE — 3051F HG A1C>EQUAL 7.0%<8.0%: CPT | Performed by: NURSE PRACTITIONER

## 2024-05-16 PROCEDURE — 2022F DILAT RTA XM EVC RTNOPTHY: CPT | Performed by: NURSE PRACTITIONER

## 2024-05-16 PROCEDURE — 3017F COLORECTAL CA SCREEN DOC REV: CPT | Performed by: NURSE PRACTITIONER

## 2024-05-16 RX ORDER — CEPHALEXIN 500 MG/1
500 CAPSULE ORAL 3 TIMES DAILY
Qty: 21 CAPSULE | Refills: 0 | Status: SHIPPED | OUTPATIENT
Start: 2024-05-16 | End: 2024-05-23

## 2024-05-16 RX ORDER — TIRZEPATIDE 2.5 MG/.5ML
2.5 INJECTION, SOLUTION SUBCUTANEOUS WEEKLY
Qty: 2 ML | Refills: 0 | Status: SHIPPED | OUTPATIENT
Start: 2024-05-16

## 2024-05-16 RX ORDER — TIRZEPATIDE 5 MG/.5ML
INJECTION, SOLUTION SUBCUTANEOUS
Qty: 2 ML | Refills: 0 | Status: CANCELLED | OUTPATIENT
Start: 2024-05-16

## 2024-05-16 NOTE — PROGRESS NOTES
Visit Information    Have you changed or started any medications since your last visit including any over-the-counter medicines, vitamins, or herbal medicines? no   Are you having any side effects from any of your medications? -  no  Have you stopped taking any of your medications? Is so, why? -  no    Have you seen any other physician or provider since your last visit? Yes - Records Obtained  Have you had any other diagnostic tests since your last visit? Yes - Records Obtained  Have you been seen in the emergency room and/or had an admission to a hospital since we last saw you? No  Have you had your routine dental cleaning in the past 6 months? no    Have you activated your [employer] account? If not, what are your barriers? Yes     Patient Care Team:  Renny Wood MD as PCP - General  Renny Wood MD as PCP - Empaneled Provider  Nupur Licea MD as Consulting Physician (Infectious Diseases)    Medical History Review  Past Medical, Family, and Social History reviewed and does contribute to the patient presenting condition    Health Maintenance   Topic Date Due    HIV screen  Never done    DTaP/Tdap/Td vaccine (1 - Tdap) Never done    Shingles vaccine (1 of 2) 10/23/2014    Pneumococcal 0-64 years Vaccine (2 of 2 - PCV) 01/04/2015    Diabetic retinal exam  01/04/2017    Respiratory Syncytial Virus (RSV) Pregnant or age 60 yrs+ (1 - 1-dose 60+ series) Never done    Diabetic Alb to Cr ratio (uACR) test  12/14/2022    Annual Wellness Visit (Medicare)  11/27/2023    Low dose CT lung screening &/or counseling  04/20/2024    A1C test (Diabetic or Prediabetic)  04/02/2025    Diabetic foot exam  04/15/2025    Depression Screen  04/17/2025    Lipids  05/13/2025    GFR test (Diabetes, CKD 3-4, OR last GFR 15-59)  05/13/2025    Colorectal Cancer Screen  05/26/2033    Flu vaccine  Completed    COVID-19 Vaccine  Completed    Hepatitis C screen  Completed    Hepatitis A vaccine  Aged Out    Hepatitis B vaccine  Aged

## 2024-05-21 RX ORDER — AMLODIPINE BESYLATE 2.5 MG/1
TABLET ORAL
Qty: 30 TABLET | Refills: 3 | Status: SHIPPED | OUTPATIENT
Start: 2024-05-21

## 2024-05-21 RX ORDER — LEVOTHYROXINE SODIUM 175 UG/1
TABLET ORAL
Qty: 30 TABLET | Refills: 3 | Status: SHIPPED | OUTPATIENT
Start: 2024-05-21

## 2024-05-24 ENCOUNTER — TELEPHONE (OUTPATIENT)
Dept: INTERNAL MEDICINE CLINIC | Age: 64
End: 2024-05-24

## 2024-05-24 ENCOUNTER — OFFICE VISIT (OUTPATIENT)
Dept: FAMILY MEDICINE CLINIC | Age: 64
End: 2024-05-24

## 2024-05-24 VITALS
SYSTOLIC BLOOD PRESSURE: 138 MMHG | TEMPERATURE: 97.2 F | OXYGEN SATURATION: 95 % | DIASTOLIC BLOOD PRESSURE: 73 MMHG | HEART RATE: 63 BPM

## 2024-05-24 DIAGNOSIS — L03.115 CELLULITIS OF RIGHT LOWER EXTREMITY: Primary | ICD-10-CM

## 2024-05-24 DIAGNOSIS — M79.89 RIGHT LEG SWELLING: ICD-10-CM

## 2024-05-24 RX ORDER — SULFAMETHOXAZOLE AND TRIMETHOPRIM 800; 160 MG/1; MG/1
1 TABLET ORAL 2 TIMES DAILY
Qty: 20 TABLET | Refills: 0 | Status: SHIPPED | OUTPATIENT
Start: 2024-05-24 | End: 2024-06-03

## 2024-05-24 RX ORDER — CEFDINIR 300 MG/1
300 CAPSULE ORAL 2 TIMES DAILY
Qty: 14 CAPSULE | Refills: 0 | Status: SHIPPED | OUTPATIENT
Start: 2024-05-24 | End: 2024-05-31

## 2024-05-24 ASSESSMENT — ENCOUNTER SYMPTOMS
SHORTNESS OF BREATH: 0
COLOR CHANGE: 1
VOMITING: 0

## 2024-05-24 NOTE — TELEPHONE ENCOUNTER
Patient called with sores on right leg. No open appointments. Advised patient to go to [facility].

## 2024-05-24 NOTE — PROGRESS NOTES
[facility]  [address]  [address]  [facility] [address]  Dept: [phone]  Dept Fax: [phone]    Jay Colon is a 64 y.o. male who presents to the urgent care today for his medical conditions/complaints as notedbelow.  Jay Colon is c/o of Leg Swelling (Right , has sore and redness on lower right leg has completed abx prescribed by PCP office with no improvement )      HPI:     64 yr old male presents for open sores to right lower leg, some new redness no change in swelling  Swelling goes down at night but once up and standing then swelling returns  Taking lasix as prescribed  Same sx for almost a year  Hx MRSA, saw PCP and told cellulitis, prescribed keflex, no change in sx, finished all  Reports when went to ER in past prescribed omnicef and bactrim and some relief in sx   No sensation to lower leg - neuropathy  Diabetic Hgba1c last month 7.1  Hx left bka, hx PVD, quit smoking 12 yrs ago  Feels well, no spike in blood sugars, no fevers, chills  Has appt for vascular duplex and then next day f/u with pcp  Concerned may be getting infected again    Rash  This is a chronic problem. The current episode started more than 1 month ago (close to a year, gradually worsening). The problem has been waxing and waning since onset. The affected locations include the right lower leg. The rash is characterized by blistering, redness and draining (no increased swelling). He was exposed to nothing. Pertinent negatives include no fatigue, fever, shortness of breath or vomiting. Past treatments include antibiotics (keflex). The treatment provided no relief.       Past Medical History:   Diagnosis Date    A-fib ([facility])     Asymptomatic bilateral carotid artery stenosis     Boil, thigh 10/2019    10/30/19: right inner thigh region, says PCP Rxd Doxy for this, area is much better, has a couple days left to complete Doxy    CAD

## 2024-05-28 ASSESSMENT — ENCOUNTER SYMPTOMS
TROUBLE SWALLOWING: 0
SHORTNESS OF BREATH: 0
ABDOMINAL PAIN: 0
COUGH: 0
WHEEZING: 0
COLOR CHANGE: 0
NAUSEA: 0
DIARRHEA: 0

## 2024-05-29 ENCOUNTER — HOSPITAL ENCOUNTER (OUTPATIENT)
Dept: VASCULAR LAB | Age: 64
Discharge: HOME | End: 2024-05-31
Payer: COMMERCIAL

## 2024-05-29 VITALS — BODY MASS INDEX: 34.83 KG/M2 | HEIGHT: 76 IN | WEIGHT: 286 LBS

## 2024-05-29 DIAGNOSIS — I73.9 PVD (PERIPHERAL VASCULAR DISEASE) (HCC): ICD-10-CM

## 2024-05-29 PROCEDURE — 93971 EXTREMITY STUDY: CPT

## 2024-05-30 ENCOUNTER — OFFICE VISIT (OUTPATIENT)
Dept: INTERNAL MEDICINE CLINIC | Age: 64
End: 2024-05-30
Payer: COMMERCIAL

## 2024-05-30 VITALS
OXYGEN SATURATION: 92 % | BODY MASS INDEX: 35.34 KG/M2 | HEART RATE: 61 BPM | WEIGHT: 290.2 LBS | DIASTOLIC BLOOD PRESSURE: 68 MMHG | SYSTOLIC BLOOD PRESSURE: 116 MMHG | HEIGHT: 76 IN

## 2024-05-30 DIAGNOSIS — Z79.4 TYPE 2 DIABETES MELLITUS WITH DIABETIC POLYNEUROPATHY, WITH LONG-TERM CURRENT USE OF INSULIN (HCC): ICD-10-CM

## 2024-05-30 DIAGNOSIS — Z13.220 SCREENING FOR HYPERLIPIDEMIA: ICD-10-CM

## 2024-05-30 DIAGNOSIS — E11.42 TYPE 2 DIABETES MELLITUS WITH DIABETIC POLYNEUROPATHY, WITH LONG-TERM CURRENT USE OF INSULIN (HCC): ICD-10-CM

## 2024-05-30 DIAGNOSIS — G95.19 OTHER VASCULAR MYELOPATHIES (HCC): ICD-10-CM

## 2024-05-30 DIAGNOSIS — I73.9 PVD (PERIPHERAL VASCULAR DISEASE) (HCC): Primary | ICD-10-CM

## 2024-05-30 DIAGNOSIS — N18.30 STAGE 3 CHRONIC KIDNEY DISEASE, UNSPECIFIED WHETHER STAGE 3A OR 3B CKD (HCC): ICD-10-CM

## 2024-05-30 LAB
ECHO BSA: 2.64 M2
VAS RIGHT CFV RFX: 0.2 S
VAS RIGHT FV RFX: 0.6 S
VAS RIGHT GSV AK RFX: 0.2 S
VAS RIGHT GSV AT KNEE DIAM: 4.7 MM
VAS RIGHT GSV BK DIST DIAM: 3.2 MM
VAS RIGHT GSV BK MID DIAM: 3.8 MM
VAS RIGHT GSV BK MID RFX: 0.4 S
VAS RIGHT GSV BK PROX DIAM: 4.3 MM
VAS RIGHT GSV BK PROX RFX: 0.7 S
VAS RIGHT GSV JUNC DIAM: 9.8 MM
VAS RIGHT GSV JUNC RFX: 0.5 S
VAS RIGHT GSV THIGH DIST DIAM: 4.6 MM
VAS RIGHT GSV THIGH DIST RFX: 0.2 S
VAS RIGHT GSV THIGH MID DIAM: 4.9 MM
VAS RIGHT GSV THIGH MID RFX: 0.2 S
VAS RIGHT GSV THIGH PROX DIAM: 5.3 MM
VAS RIGHT GSV THIGH PROX RFX: 0.2 S
VAS RIGHT POP RFX: 0.3 S
VAS RIGHT SSV MID DIAM: 3.2 MM
VAS RIGHT SSV MID RFX: 0.2 S
VAS RIGHT SSV PROX DIAM: 3.1 MM
VAS RIGHT SSV PROX RFX: 0.2 S

## 2024-05-30 PROCEDURE — 3078F DIAST BP <80 MM HG: CPT | Performed by: INTERNAL MEDICINE

## 2024-05-30 PROCEDURE — 1036F TOBACCO NON-USER: CPT | Performed by: INTERNAL MEDICINE

## 2024-05-30 PROCEDURE — 99214 OFFICE O/P EST MOD 30 MIN: CPT | Performed by: INTERNAL MEDICINE

## 2024-05-30 PROCEDURE — 3074F SYST BP LT 130 MM HG: CPT | Performed by: INTERNAL MEDICINE

## 2024-05-30 PROCEDURE — G8427 DOCREV CUR MEDS BY ELIG CLIN: HCPCS | Performed by: INTERNAL MEDICINE

## 2024-05-30 PROCEDURE — 3017F COLORECTAL CA SCREEN DOC REV: CPT | Performed by: INTERNAL MEDICINE

## 2024-05-30 PROCEDURE — 3051F HG A1C>EQUAL 7.0%<8.0%: CPT | Performed by: INTERNAL MEDICINE

## 2024-05-30 PROCEDURE — G8417 CALC BMI ABV UP PARAM F/U: HCPCS | Performed by: INTERNAL MEDICINE

## 2024-05-30 PROCEDURE — 2022F DILAT RTA XM EVC RTNOPTHY: CPT | Performed by: INTERNAL MEDICINE

## 2024-05-30 RX ORDER — TRAZODONE HYDROCHLORIDE 150 MG/1
150 TABLET ORAL NIGHTLY
Qty: 90 TABLET | Refills: 1 | Status: SHIPPED | OUTPATIENT
Start: 2024-05-30

## 2024-05-30 ASSESSMENT — ENCOUNTER SYMPTOMS
WHEEZING: 0
ABDOMINAL DISTENTION: 0
TROUBLE SWALLOWING: 0
EYE DISCHARGE: 0
BLOOD IN STOOL: 0
COUGH: 0
SHORTNESS OF BREATH: 0
DIARRHEA: 0
COLOR CHANGE: 0
EYE PAIN: 0

## 2024-05-30 ASSESSMENT — PATIENT HEALTH QUESTIONNAIRE - PHQ9
1. LITTLE INTEREST OR PLEASURE IN DOING THINGS: NOT AT ALL
SUM OF ALL RESPONSES TO PHQ QUESTIONS 1-9: 0
2. FEELING DOWN, DEPRESSED OR HOPELESS: NOT AT ALL
SUM OF ALL RESPONSES TO PHQ QUESTIONS 1-9: 0
SUM OF ALL RESPONSES TO PHQ9 QUESTIONS 1 & 2: 0

## 2024-05-30 NOTE — PROGRESS NOTES
Visit Information    Have you changed or started any medications since your last visit including any over-the-counter medicines, vitamins, or herbal medicines? no   Are you having any side effects from any of your medications? -  no  Have you stopped taking any of your medications? Is so, why? -  no    Have you seen any other physician or provider since your last visit? Yes - Records Obtained  Have you had any other diagnostic tests since your last visit? Yes - Records Obtained  Have you been seen in the emergency room and/or had an admission to a hospital since we last saw you? Yes - Records Obtained  Have you had your routine dental cleaning in the past 6 months? no    Have you activated your [email or web address] account? If not, what are your barriers? Yes     Patient Care Team:  Renny Wood MD as PCP - General  Renny Wood MD as PCP - Empaneled Provider  Nupur Licea MD as Consulting Physician (Infectious Diseases)    Medical History Review  Past Medical, Family, and Social History reviewed and does contribute to the patient presenting condition    Health Maintenance   Topic Date Due    HIV screen  Never done    DTaP/Tdap/Td vaccine (1 - Tdap) Never done    Shingles vaccine (1 of 2) 10/23/2014    Pneumococcal 0-64 years Vaccine (2 of 2 - PCV) 01/04/2015    Diabetic retinal exam  01/04/2017    Respiratory Syncytial Virus (RSV) Pregnant or age 60 yrs+ (1 - 1-dose 60+ series) Never done    Diabetic Alb to Cr ratio (uACR) test  12/14/2022    Annual Wellness Visit (Medicare)  11/27/2023    Low dose CT lung screening &/or counseling  04/20/2024    A1C test (Diabetic or Prediabetic)  04/02/2025    Diabetic foot exam  04/15/2025    Depression Screen  04/17/2025    Lipids  05/13/2025    GFR test (Diabetes, CKD 3-4, OR last GFR 15-59)  05/13/2025    Colorectal Cancer Screen  05/26/2033    Flu vaccine  Completed    COVID-19 Vaccine  Completed    Hepatitis C screen  Completed    Hepatitis A vaccine  Aged Out    
sensory deficit.      Motor: No atrophy or abnormal muscle tone.      Coordination: Coordination normal.   Psychiatric:         Mood and Affect: Mood is not anxious. Affect is not angry.         Speech: Speech is not slurred.         Behavior: Behavior normal. Behavior is not aggressive.         Thought Content: Thought content does not include homicidal ideation.         Cognition and Memory: Memory is not impaired.       /68 (Site: Left Upper Arm, Position: Sitting, Cuff Size: Large Adult)   Pulse 61   Ht 1.93 m (6' 4\")   Wt 131.6 kg (290 lb 3.2 oz)   SpO2 92%   BMI 35.32 kg/m²     Assessment:       Diagnosis Orders   1. PVD (peripheral vascular disease) ([facility])  Raul Huston MD, Vascular Surgery, [place]      2. Other vascular myelopathies ([facility])        3. Stage 3 chronic kidney disease, unspecified whether stage 3a or 3b CKD ([facility])  Microalbumin, Ur      4. Screening for hyperlipidemia        5. Type 2 diabetes mellitus with diabetic polyneuropathy, with long-term current use of insulin ([facility])  Hemoglobin A1C                Plan:   Assessment & Plan   Return in about 2 months (around 7/30/2024).    Orders Placed This Encounter   Procedures    Microalbumin, Ur     Standing Status:   Future     Standing Expiration Date:   5/3/2025    Hemoglobin A1C     Standing Status:   Future     Standing Expiration Date:   8/30/2024    Raul Huston MD, Vascular Surgery, [place]     Referral Priority:   Routine     Referral Type:   Eval and Treat     Referral Reason:   Specialty Services Required     Referred to Provider:   Raul Montero MD     Requested Specialty:   Vascular Surgery     Number of Visits Requested:   1     Orders Placed This Encounter   Medications    traZODone (DESYREL) 150 MG tablet     Sig: Take 1 tablet by mouth nightly     Dispense:  90 tablet     Refill:  1     Dvt scan negative  Mounajro coverd but on backlog  A1c is good  But pt off above for three months  Gained wt and

## 2024-06-03 ENCOUNTER — HOSPITAL ENCOUNTER (OUTPATIENT)
Dept: INTERVENTIONAL RADIOLOGY/VASCULAR | Age: 64
Discharge: HOME | End: 2024-06-05
Payer: COMMERCIAL

## 2024-06-03 ENCOUNTER — TELEPHONE (OUTPATIENT)
Dept: INTERNAL MEDICINE CLINIC | Age: 64
End: 2024-06-03

## 2024-06-03 DIAGNOSIS — I73.9 PVD (PERIPHERAL VASCULAR DISEASE) (HCC): Primary | ICD-10-CM

## 2024-06-03 DIAGNOSIS — M16.12 ARTHRITIS OF LEFT HIP: ICD-10-CM

## 2024-06-03 PROCEDURE — 2709999900 IR ARTHR/ASP/INJ MAJOR JT/BURSA LEFT WO US

## 2024-06-03 PROCEDURE — 77002 NEEDLE LOCALIZATION BY XRAY: CPT

## 2024-06-03 PROCEDURE — 20610 DRAIN/INJ JOINT/BURSA W/O US: CPT

## 2024-06-03 PROCEDURE — 6360000004 HC RX CONTRAST MEDICATION: Performed by: RADIOLOGY

## 2024-06-03 PROCEDURE — 6360000002 HC RX W HCPCS: Performed by: ORTHOPAEDIC SURGERY

## 2024-06-03 PROCEDURE — 2500000003 HC RX 250 WO HCPCS: Performed by: ORTHOPAEDIC SURGERY

## 2024-06-03 RX ORDER — METHYLPREDNISOLONE ACETATE 80 MG/ML
80 INJECTION, SUSPENSION INTRA-ARTICULAR; INTRALESIONAL; INTRAMUSCULAR; SOFT TISSUE ONCE
Status: COMPLETED | OUTPATIENT
Start: 2024-06-03 | End: 2024-06-03

## 2024-06-03 RX ORDER — LIDOCAINE HYDROCHLORIDE 10 MG/ML
4 INJECTION, SOLUTION EPIDURAL; INFILTRATION; INTRACAUDAL; PERINEURAL ONCE
Status: COMPLETED | OUTPATIENT
Start: 2024-06-03 | End: 2024-06-03

## 2024-06-03 RX ORDER — BUPIVACAINE HYDROCHLORIDE 5 MG/ML
4 INJECTION, SOLUTION EPIDURAL; INTRACAUDAL ONCE
Status: COMPLETED | OUTPATIENT
Start: 2024-06-03 | End: 2024-06-03

## 2024-06-03 RX ADMIN — BUPIVACAINE HYDROCHLORIDE 4 ML: 5 INJECTION, SOLUTION EPIDURAL; INTRACAUDAL; PERINEURAL at 09:45

## 2024-06-03 RX ADMIN — LIDOCAINE HYDROCHLORIDE 4 ML: 10 INJECTION, SOLUTION EPIDURAL; INFILTRATION; INTRACAUDAL; PERINEURAL at 09:46

## 2024-06-03 RX ADMIN — IOPAMIDOL 3 ML: 612 INJECTION, SOLUTION INTRAVENOUS at 09:45

## 2024-06-03 RX ADMIN — METHYLPREDNISOLONE ACETATE 80 MG: 80 INJECTION, SUSPENSION INTRA-ARTICULAR; INTRALESIONAL; INTRAMUSCULAR; SOFT TISSUE at 09:46

## 2024-06-03 NOTE — OR NURSING
Patient brought to the IR suite via wheelchair, after consent obtained, patient placed on the procedure table.  Patient then positioned/prepped/draped.

## 2024-06-03 NOTE — TELEPHONE ENCOUNTER
Patient was referred to a Vascular Surgeon and called for an appt. He was informed that he needed a test called a PVR ordered by his PCP so the insurance covers it. They will not schedule him an appt until is done.    Please advise

## 2024-06-04 ENCOUNTER — OFFICE VISIT (OUTPATIENT)
Dept: ORTHOPEDIC SURGERY | Age: 64
End: 2024-06-04
Payer: COMMERCIAL

## 2024-06-04 VITALS — BODY MASS INDEX: 35.33 KG/M2 | RESPIRATION RATE: 16 BRPM | WEIGHT: 290.13 LBS | HEIGHT: 76 IN

## 2024-06-04 DIAGNOSIS — M16.12 ARTHRITIS OF LEFT HIP: Primary | ICD-10-CM

## 2024-06-04 PROCEDURE — 1036F TOBACCO NON-USER: CPT | Performed by: ORTHOPAEDIC SURGERY

## 2024-06-04 PROCEDURE — G8417 CALC BMI ABV UP PARAM F/U: HCPCS | Performed by: ORTHOPAEDIC SURGERY

## 2024-06-04 PROCEDURE — G8427 DOCREV CUR MEDS BY ELIG CLIN: HCPCS | Performed by: ORTHOPAEDIC SURGERY

## 2024-06-04 PROCEDURE — 99213 OFFICE O/P EST LOW 20 MIN: CPT | Performed by: ORTHOPAEDIC SURGERY

## 2024-06-04 PROCEDURE — 3017F COLORECTAL CA SCREEN DOC REV: CPT | Performed by: ORTHOPAEDIC SURGERY

## 2024-06-04 NOTE — PROGRESS NOTES
Patient ID: Jay Colon is a 64 y.o. male    Chief Compliant:  Chief Complaint   Patient presents with    Hip Pain     left        Diagnostic imaging:        Assessment and Plan:  1. Arthritis of left hip      Patient reports he seems to be getting around better post joint injection really not having much pain    Patient reports that his left hip pain has improved since receiving the left hip IR injection.     He continues to have diminished range of motion, however his pain with motion has resolved following the injection.    Follow up 4 weeks    HPI:  This is a 64 y.o. male who presents to the clinic today for follow up of left hip IR injection.     Patient reports that he received the left hip IR injection yesterday.    He reports that 4 hours after his injection he began to receive relief and he notes that his wife told him he was moving better.    He continues to have diminished range of motion.     Patient is ambulatory with a cane.     Review of Systems   All other systems reviewed and are negative.      Past History:    Current Outpatient Medications:     traZODone (DESYREL) 150 MG tablet, Take 1 tablet by mouth nightly, Disp: 90 tablet, Rfl: 1    amLODIPine (NORVASC) 2.5 MG tablet, take 1 tablet by mouth once daily, Disp: 30 tablet, Rfl: 3    levothyroxine (SYNTHROID) 175 MCG tablet, take 1 tablet by mouth once daily, Disp: 30 tablet, Rfl: 3    MOUNJARO 2.5 MG/0.5ML SOPN SC injection, Inject 0.5 mLs into the skin once a week Takes on Saturday, Disp: 2 mL, Rfl: 0    vitamin D (ERGOCALCIFEROL) 1.25 MG (62638 UT) CAPS capsule, Take 1 capsule by mouth once a week, Disp: 12 capsule, Rfl: 1    flecainide (TAMBOCOR) 50 MG tablet, Take 1 tablet by mouth 2 times daily, Disp: 180 tablet, Rfl: 3    furosemide (LASIX) 20 MG tablet, Take 1 tablet by mouth daily, Disp: 90 tablet, Rfl: 3    omeprazole (PRILOSEC) 20 MG delayed release capsule, Take 1 capsule by mouth daily, Disp: 30 capsule, Rfl: 1    rOPINIRole

## 2024-06-13 ENCOUNTER — HOSPITAL ENCOUNTER (OUTPATIENT)
Dept: VASCULAR LAB | Age: 64
Discharge: HOME | End: 2024-06-15
Attending: INTERNAL MEDICINE
Payer: COMMERCIAL

## 2024-06-13 DIAGNOSIS — I73.9 PVD (PERIPHERAL VASCULAR DISEASE) (HCC): ICD-10-CM

## 2024-06-13 PROCEDURE — 93924 LWR XTR VASC STDY BILAT: CPT

## 2024-06-14 LAB
VAS LEFT ARM BP: 118 MMHG
VAS RIGHT ABI: 1.21
VAS RIGHT ARM BP: 119 MMHG
VAS RIGHT CALF PRESSURE: 134 MMHG
VAS RIGHT DORSALIS PEDIS BP: 139 MMHG
VAS RIGHT LOW THIGH PRESSURE: 176 MMHG
VAS RIGHT PTA BP: 144 MMHG
VAS RIGHT TBI: 0.9
VAS RIGHT TOE PRESSURE: 107 MMHG

## 2024-07-01 ENCOUNTER — INITIAL CONSULT (OUTPATIENT)
Dept: VASCULAR SURGERY | Age: 64
End: 2024-07-01
Payer: COMMERCIAL

## 2024-07-01 VITALS
WEIGHT: 290 LBS | OXYGEN SATURATION: 90 % | HEIGHT: 75 IN | RESPIRATION RATE: 16 BRPM | BODY MASS INDEX: 36.06 KG/M2 | DIASTOLIC BLOOD PRESSURE: 61 MMHG | HEART RATE: 75 BPM | SYSTOLIC BLOOD PRESSURE: 97 MMHG

## 2024-07-01 DIAGNOSIS — I89.0 LYMPHEDEMA: Primary | ICD-10-CM

## 2024-07-01 PROCEDURE — G8417 CALC BMI ABV UP PARAM F/U: HCPCS | Performed by: SURGERY

## 2024-07-01 PROCEDURE — 3017F COLORECTAL CA SCREEN DOC REV: CPT | Performed by: SURGERY

## 2024-07-01 PROCEDURE — 99203 OFFICE O/P NEW LOW 30 MIN: CPT | Performed by: SURGERY

## 2024-07-01 PROCEDURE — 3074F SYST BP LT 130 MM HG: CPT | Performed by: SURGERY

## 2024-07-01 PROCEDURE — 3078F DIAST BP <80 MM HG: CPT | Performed by: SURGERY

## 2024-07-01 PROCEDURE — G8427 DOCREV CUR MEDS BY ELIG CLIN: HCPCS | Performed by: SURGERY

## 2024-07-01 PROCEDURE — 1036F TOBACCO NON-USER: CPT | Performed by: SURGERY

## 2024-07-01 RX ORDER — AMIODARONE HYDROCHLORIDE 200 MG/1
200 TABLET ORAL 2 TIMES DAILY
COMMUNITY
Start: 2024-05-21

## 2024-07-01 ASSESSMENT — ENCOUNTER SYMPTOMS
VOMITING: 0
VOICE CHANGE: 0
ABDOMINAL DISTENTION: 0
BACK PAIN: 1
COUGH: 0
TROUBLE SWALLOWING: 0
CHEST TIGHTNESS: 0
ABDOMINAL PAIN: 0
COLOR CHANGE: 0
SHORTNESS OF BREATH: 0
EYE PAIN: 0

## 2024-07-01 NOTE — PROGRESS NOTES
[facility] HEART AND VASCULAR  [address] [facility] [address]  Dept: [phone]     Patient: Jay Colon  : 1960  MRN: [id number]  DOS: 2024    Referring provider:  No ref. provider found         HPI:  Jay Colon is a 64 y.o. male who comes to the office for the first time regarding right lower extremity edema and blistering.  He has had blisters in the past that crop up approximately every 2 to 3 weeks.  These wax and wane and right now he is not having significant blisters.  He has had edema in the right lower extremity for quite some time which I think may be the culprit associated with cellulitis causing his blistering.  He had an JOSE F and a PVR revealing no significant arterial insufficiency of the right.  He has lost his left lower extremity below the level of the knee from a process similar to this in his foot.  He does have atrial fibrillation.  He is also diabetic.  He has stage III kidney disease and had a malignant neoplasm of the right kidney.  He has significant low back pain with radiculopathy.  Past Medical History:   Diagnosis Date    A-fib ([facility])     Asymptomatic bilateral carotid artery stenosis     Boil, thigh 10/2019    10/30/19: right inner thigh region, says PCP Rxd Doxy for this, area is much better, has a couple days left to complete Doxy    CAD (coronary artery disease)     saw Dr. Bhatia ([facility])     Charcot foot due to diabetes mellitus ([facility])     LEFT    COPD (chronic obstructive pulmonary disease) ([facility])     INHALER USE DAILY    Diabetes mellitus ([facility])     IDDM, On Insulin Dr. Wood    Diabetic neuropathy ([facility])     Difficult intravenous access     VEINS ROLL    Employs prosthetic leg     s/p left BKA    Foot ulcer ([facility]) PRIOR TO     BILAT    Full dentures     UPPER ONLY    Hyperlipidemia 2004    ON RX    Hypertension 2004    ON RX, PCP Dr. Wodo, seen Oct. 2019    Hypoglycemia

## 2024-07-08 DIAGNOSIS — M25.562 CHRONIC PAIN OF LEFT KNEE: ICD-10-CM

## 2024-07-08 DIAGNOSIS — E08.41 DIABETIC MONONEUROPATHY ASSOCIATED WITH DIABETES MELLITUS DUE TO UNDERLYING CONDITION (HCC): ICD-10-CM

## 2024-07-08 DIAGNOSIS — Z97.10 EMPLOYS PROSTHETIC LEG: ICD-10-CM

## 2024-07-08 DIAGNOSIS — E11.42 TYPE 2 DIABETES MELLITUS WITH DIABETIC POLYNEUROPATHY, WITH LONG-TERM CURRENT USE OF INSULIN (HCC): ICD-10-CM

## 2024-07-08 DIAGNOSIS — G89.29 CHRONIC PAIN OF LEFT KNEE: ICD-10-CM

## 2024-07-08 DIAGNOSIS — Z79.4 TYPE 2 DIABETES MELLITUS WITH DIABETIC POLYNEUROPATHY, WITH LONG-TERM CURRENT USE OF INSULIN (HCC): ICD-10-CM

## 2024-07-08 RX ORDER — AMIODARONE HYDROCHLORIDE 200 MG/1
200 TABLET ORAL 2 TIMES DAILY
Qty: 90 TABLET | Refills: 3 | Status: SHIPPED | OUTPATIENT
Start: 2024-07-08

## 2024-07-08 RX ORDER — ATORVASTATIN CALCIUM 40 MG/1
TABLET, FILM COATED ORAL
Qty: 90 TABLET | Refills: 3 | Status: SHIPPED | OUTPATIENT
Start: 2024-07-08

## 2024-07-08 RX ORDER — PREGABALIN 100 MG/1
100 CAPSULE ORAL 3 TIMES DAILY
Qty: 42 CAPSULE | Refills: 0 | Status: SHIPPED | OUTPATIENT
Start: 2024-07-08 | End: 2024-07-22

## 2024-07-08 RX ORDER — TRAZODONE HYDROCHLORIDE 150 MG/1
150 TABLET ORAL NIGHTLY
Qty: 90 TABLET | Refills: 3 | Status: SHIPPED | OUTPATIENT
Start: 2024-07-08

## 2024-07-08 RX ORDER — PREGABALIN 100 MG/1
100 CAPSULE ORAL 3 TIMES DAILY
Qty: 270 CAPSULE | Refills: 0 | Status: SHIPPED | OUTPATIENT
Start: 2024-07-08 | End: 2024-10-06

## 2024-07-08 RX ORDER — PREGABALIN 100 MG/1
100 CAPSULE ORAL 3 TIMES DAILY
Qty: 270 CAPSULE | Refills: 0 | Status: SHIPPED | OUTPATIENT
Start: 2024-07-08 | End: 2024-07-08 | Stop reason: SDUPTHER

## 2024-07-08 NOTE — TELEPHONE ENCOUNTER
Patient had to switch pharmacies due to [facility] closing. Also patient needs a small supply of Pregablin sent to [facility]

## 2024-07-11 RX ORDER — ISOSORBIDE MONONITRATE 30 MG/1
30 TABLET, EXTENDED RELEASE ORAL DAILY
Qty: 30 TABLET | Refills: 11 | Status: SHIPPED | OUTPATIENT
Start: 2024-07-11

## 2024-07-15 RX ORDER — FLURBIPROFEN SODIUM 0.3 MG/ML
SOLUTION/ DROPS OPHTHALMIC
Qty: 90 EACH | Refills: 3 | Status: SHIPPED | OUTPATIENT
Start: 2024-07-15

## 2024-07-23 ENCOUNTER — OFFICE VISIT (OUTPATIENT)
Dept: ORTHOPEDIC SURGERY | Age: 64
End: 2024-07-23
Payer: COMMERCIAL

## 2024-07-23 VITALS — WEIGHT: 289.9 LBS | RESPIRATION RATE: 16 BRPM | BODY MASS INDEX: 36.05 KG/M2 | HEIGHT: 75 IN

## 2024-07-23 DIAGNOSIS — G89.29 CHRONIC MIDLINE LOW BACK PAIN, UNSPECIFIED WHETHER SCIATICA PRESENT: ICD-10-CM

## 2024-07-23 DIAGNOSIS — M48.062 SPINAL STENOSIS OF LUMBAR REGION WITH NEUROGENIC CLAUDICATION: ICD-10-CM

## 2024-07-23 DIAGNOSIS — M16.12 ARTHRITIS OF LEFT HIP: Primary | ICD-10-CM

## 2024-07-23 DIAGNOSIS — M54.50 CHRONIC MIDLINE LOW BACK PAIN, UNSPECIFIED WHETHER SCIATICA PRESENT: ICD-10-CM

## 2024-07-23 PROCEDURE — G8417 CALC BMI ABV UP PARAM F/U: HCPCS | Performed by: ORTHOPAEDIC SURGERY

## 2024-07-23 PROCEDURE — 99213 OFFICE O/P EST LOW 20 MIN: CPT | Performed by: ORTHOPAEDIC SURGERY

## 2024-07-23 PROCEDURE — 3017F COLORECTAL CA SCREEN DOC REV: CPT | Performed by: ORTHOPAEDIC SURGERY

## 2024-07-23 PROCEDURE — G8427 DOCREV CUR MEDS BY ELIG CLIN: HCPCS | Performed by: ORTHOPAEDIC SURGERY

## 2024-07-23 PROCEDURE — 1036F TOBACCO NON-USER: CPT | Performed by: ORTHOPAEDIC SURGERY

## 2024-07-23 NOTE — PROGRESS NOTES
used   Substance and Sexual Activity    Alcohol use: Yes     Comment: BEER 2 A MONTH    Drug use: Not Currently     Types: Marijuana (Weed)     Comment: in 20's    Sexual activity: Yes     Partners: Female   Other Topics Concern    Not on file   Social History Narrative    Not on file     Social Determinants of Health     Financial Resource Strain: Low Risk  (4/17/2024)    Overall Financial Resource Strain (CARDIA)     Difficulty of Paying Living Expenses: Not hard at all   Food Insecurity: No Food Insecurity (4/17/2024)    Hunger Vital Sign     Worried About Running Out of Food in the Last Year: Never true     Ran Out of Food in the Last Year: Never true   Transportation Needs: No Transportation Needs (4/17/2024)    PRAPARE - Transportation     Lack of Transportation (Medical): No     Lack of Transportation (Non-Medical): No   Physical Activity: Insufficiently Active (10/10/2022)    Exercise Vital Sign     Days of Exercise per Week: 1 day     Minutes of Exercise per Session: 60 min   Stress: Not on file   Social Connections: Not on file   Intimate Partner Violence: Not on file   Housing Stability: Low Risk  (4/17/2024)    Housing Stability Vital Sign     Unable to Pay for Housing in the Last Year: No     Number of Places Lived in the Last Year: 1     Unstable Housing in the Last Year: No     Past Medical History:   Diagnosis Date    A-fib ([facility])     Asymptomatic bilateral carotid artery stenosis     Boil, thigh 10/2019    10/30/19: right inner thigh region, says PCP Rxd Doxy for this, area is much better, has a couple days left to complete Doxy    CAD (coronary artery disease)     saw Dr. Bhatia ([facility])     Charcot foot due to diabetes mellitus ([facility]) 2014    LEFT    COPD (chronic obstructive pulmonary disease) ([facility]) 2009    INHALER USE DAILY    Diabetes mellitus ([facility]) 1989    IDDM, On Insulin Dr. Wood    Diabetic neuropathy ([facility])     Difficult intravenous access     VEINS ROLL    Employs prosthetic leg     s/p left

## 2024-07-30 ENCOUNTER — HOSPITAL ENCOUNTER (OUTPATIENT)
Dept: MRI IMAGING | Age: 64
Discharge: HOME | End: 2024-08-01
Attending: ORTHOPAEDIC SURGERY
Payer: COMMERCIAL

## 2024-07-30 DIAGNOSIS — G89.29 CHRONIC MIDLINE LOW BACK PAIN, UNSPECIFIED WHETHER SCIATICA PRESENT: ICD-10-CM

## 2024-07-30 DIAGNOSIS — M48.062 SPINAL STENOSIS OF LUMBAR REGION WITH NEUROGENIC CLAUDICATION: ICD-10-CM

## 2024-07-30 DIAGNOSIS — M16.12 ARTHRITIS OF LEFT HIP: ICD-10-CM

## 2024-07-30 DIAGNOSIS — M54.50 CHRONIC MIDLINE LOW BACK PAIN, UNSPECIFIED WHETHER SCIATICA PRESENT: ICD-10-CM

## 2024-07-30 PROCEDURE — 72148 MRI LUMBAR SPINE W/O DYE: CPT

## 2024-08-01 RX ORDER — APIXABAN 5 MG/1
5 TABLET, FILM COATED ORAL 2 TIMES DAILY
Qty: 120 TABLET | Refills: 5 | Status: SHIPPED | OUTPATIENT
Start: 2024-08-01

## 2024-08-06 ENCOUNTER — OFFICE VISIT (OUTPATIENT)
Dept: ORTHOPEDIC SURGERY | Age: 64
End: 2024-08-06
Payer: COMMERCIAL

## 2024-08-06 VITALS — WEIGHT: 275 LBS | BODY MASS INDEX: 33.49 KG/M2 | RESPIRATION RATE: 14 BRPM | HEIGHT: 76 IN

## 2024-08-06 DIAGNOSIS — M48.062 SPINAL STENOSIS OF LUMBAR REGION WITH NEUROGENIC CLAUDICATION: ICD-10-CM

## 2024-08-06 DIAGNOSIS — M54.50 CHRONIC MIDLINE LOW BACK PAIN, UNSPECIFIED WHETHER SCIATICA PRESENT: Primary | ICD-10-CM

## 2024-08-06 DIAGNOSIS — G89.29 CHRONIC MIDLINE LOW BACK PAIN, UNSPECIFIED WHETHER SCIATICA PRESENT: Primary | ICD-10-CM

## 2024-08-06 DIAGNOSIS — M16.12 ARTHRITIS OF LEFT HIP: ICD-10-CM

## 2024-08-06 PROCEDURE — G8428 CUR MEDS NOT DOCUMENT: HCPCS | Performed by: ORTHOPAEDIC SURGERY

## 2024-08-06 PROCEDURE — G8417 CALC BMI ABV UP PARAM F/U: HCPCS | Performed by: ORTHOPAEDIC SURGERY

## 2024-08-06 PROCEDURE — 3017F COLORECTAL CA SCREEN DOC REV: CPT | Performed by: ORTHOPAEDIC SURGERY

## 2024-08-06 PROCEDURE — 1036F TOBACCO NON-USER: CPT | Performed by: ORTHOPAEDIC SURGERY

## 2024-08-06 PROCEDURE — 99213 OFFICE O/P EST LOW 20 MIN: CPT | Performed by: ORTHOPAEDIC SURGERY

## 2024-08-06 NOTE — PROGRESS NOTES
Diabetes Mother     Hypertension Mother     Stomach Cancer Mother     Hypertension Father     Alcohol Abuse Father     COPD Father     Kidney Cancer Father     Diabetes Brother         IDDM-PUMP    Other Sister         BRAIN TUMOR        Physical Exam:  Vitals signs and nursing note reviewed.   Constitutional:       Appearance: well-developed.   HENT:      Head: Normocephalic and atraumatic.      Nose: Nose normal.   Eyes:      Conjunctiva/sclera: Conjunctivae normal.   Neck:      Musculoskeletal: Normal range of motion and neck supple.   Pulmonary:      Effort: Pulmonary effort is normal. No respiratory distress.   Musculoskeletal:      Comments: Normal gait     Skin:     General: Skin is warm and dry.   Neurological:      Mental Status: Alert and oriented to person, place, and time.      Sensory: No sensory deficit.   Psychiatric:         Behavior: Behavior normal.         Thought Content: Thought content normal.        Provider Attestation:  I, Raul Gallardo, personally performed the services described in this documentation. All medical record entries made by the scribe were at my direction and in my presence. I have reviewed the chart and discharge instructions and agree that the records reflect my personal performance and is accurate and complete. Raul Gallardo MD 8/6/24       Scribe Attestation:  By signing my name below, I, Saima Johnson, attest that this documentation has been prepared under the direction and in the presence of Dr. Raul Gallardo. Electronically signed: Olivier Goff, 8/6/24     Please note that this chart was generated using voice recognition Dragon dictation software.  Although every effort was made to ensure the accuracy of this automated transcription, some errors in transcription may have occurred.

## 2024-08-07 RX ORDER — INSULIN LISPRO 100 [IU]/ML
INJECTION, SOLUTION INTRAVENOUS; SUBCUTANEOUS
Qty: 15 ML | Refills: 6 | Status: SHIPPED | OUTPATIENT
Start: 2024-08-07

## 2024-08-14 RX ORDER — METOPROLOL TARTRATE 50 MG/1
TABLET, FILM COATED ORAL
Qty: 180 TABLET | Refills: 0 | Status: SHIPPED | OUTPATIENT
Start: 2024-08-14

## 2024-08-15 ENCOUNTER — OFFICE VISIT (OUTPATIENT)
Dept: PHYSICAL MEDICINE AND REHAB | Age: 64
End: 2024-08-15
Payer: COMMERCIAL

## 2024-08-15 VITALS
HEART RATE: 80 BPM | SYSTOLIC BLOOD PRESSURE: 122 MMHG | BODY MASS INDEX: 33.82 KG/M2 | DIASTOLIC BLOOD PRESSURE: 70 MMHG | WEIGHT: 272 LBS | HEIGHT: 75 IN

## 2024-08-15 DIAGNOSIS — G54.6 PHANTOM LIMB PAIN (HCC): ICD-10-CM

## 2024-08-15 DIAGNOSIS — S88.112S BELOW-KNEE AMPUTATION OF LEFT LOWER EXTREMITY, SEQUELA (HCC): Primary | ICD-10-CM

## 2024-08-15 PROCEDURE — 3078F DIAST BP <80 MM HG: CPT | Performed by: PHYSICAL MEDICINE & REHABILITATION

## 2024-08-15 PROCEDURE — 3017F COLORECTAL CA SCREEN DOC REV: CPT | Performed by: PHYSICAL MEDICINE & REHABILITATION

## 2024-08-15 PROCEDURE — G8417 CALC BMI ABV UP PARAM F/U: HCPCS | Performed by: PHYSICAL MEDICINE & REHABILITATION

## 2024-08-15 PROCEDURE — G8427 DOCREV CUR MEDS BY ELIG CLIN: HCPCS | Performed by: PHYSICAL MEDICINE & REHABILITATION

## 2024-08-15 PROCEDURE — 3074F SYST BP LT 130 MM HG: CPT | Performed by: PHYSICAL MEDICINE & REHABILITATION

## 2024-08-15 PROCEDURE — 99214 OFFICE O/P EST MOD 30 MIN: CPT | Performed by: PHYSICAL MEDICINE & REHABILITATION

## 2024-08-15 PROCEDURE — 1036F TOBACCO NON-USER: CPT | Performed by: PHYSICAL MEDICINE & REHABILITATION

## 2024-08-15 NOTE — PROGRESS NOTES
OR    CYSTOSCOPY Right 07/30/2020    CYSTOSCOPY, RIGHT RETROGRADE PYELOGRAM,  URETERAL CATHETER  INSERTION performed by Pipo Carreon MD at [facility] OR    CYSTOSCOPY N/A 09/01/2020    CYSTOSCOPY RETROGRADE PYELOGRAM, RIGHT URETERAL STENT EXCHANGE performed by Pipo Carreon MD at [facility] OR    FINGER AMPUTATION Right 1995    middle-CRUSHING INJURY AT WORK    FOOT SURGERY Right     r-bone removed    HC  PICC POWERPICC DOUBLE  05/21/2018         KIDNEY CYST REMOVAL      KIDNEY SURGERY Right 11/13/2019    XI ROBOTIC PARTIAL NEPHRECTOMY, INTRAOP ULTRASOUND, RIGHT URETEROURETEROSTOMY, RIGHT URETERAL STENT PLACEMENT **SHORT STAY** performed by Pipo Carreon MD at [facility] OR    KIDNEY SURGERY N/A 07/30/2020    XI LAPAROSCOPIC ROBOTIC URETEROLYSIS, ROBOTIC ASSISTED BUCCAL URETEROPLASTY  (DR. COBIAN TO ASSIST) performed by Pipo Carreon MD at [facility] OR    LEG AMPUTATION BELOW KNEE Left 04/29/2015    LUMBAR FUSION N/A 10/2/2023    LUMBAR POSTERIOR DECOMPRESSION L2-5, L4-5 POSTERIOR INSTRUMENTED FUSION performed by Raul Gallardo MD at [facility] OR    OTHER SURGICAL HISTORY Left 5-5-15 and 11/2015    Revision BKA    OTHER SURGICAL HISTORY      left stump revision 5/30/2018    PAIN MANAGEMENT PROCEDURE      AL AMP LEG THRU TIBIA&FIBULA RE-AMPUTATION Left 05/30/2018    LEG AMPUTATION BELOW KNEE REVISION performed by Desiree Hartman MD at [facility] OR    SPINE SURGERY Left 11/29/2023    Removal of left lumbar hardware to include two screws, jeny and two cap screws performed by Raul Gallardo MD at [facility] OR    TEMPOROMANDIBULAR JOINT SURGERY Bilateral 80'S    TOE AMPUTATION      Right 2 and 4    UPPER GASTROINTESTINAL ENDOSCOPY N/A 05/26/2023    EGD BIOPSY performed by Calos Soto MD at [facility] ENDO    VITRECTOMY Left 09/25/2019    PARS PLANA VITRECTOMY 25 GAUGE, MEMBRANE PEELING, ENDOLASER 200  MW 1044 SPOTS, INDIRECT LASER 236 SPOTS performed by Earnest Marino MD at [facility] OR     Family History   Problem Relation Age of Onset

## 2024-08-22 RX ORDER — TAMSULOSIN HYDROCHLORIDE 0.4 MG/1
0.4 CAPSULE ORAL DAILY
Qty: 90 CAPSULE | Refills: 3 | Status: SHIPPED | OUTPATIENT
Start: 2024-08-22

## 2024-08-22 RX ORDER — AMLODIPINE BESYLATE 2.5 MG/1
TABLET ORAL
Qty: 90 TABLET | Refills: 3 | Status: SHIPPED | OUTPATIENT
Start: 2024-08-22

## 2024-08-22 RX ORDER — LEVOTHYROXINE SODIUM 175 UG/1
TABLET ORAL
Qty: 90 TABLET | Refills: 3 | Status: SHIPPED | OUTPATIENT
Start: 2024-08-22

## 2024-09-04 ENCOUNTER — OFFICE VISIT (OUTPATIENT)
Dept: INTERNAL MEDICINE CLINIC | Age: 64
End: 2024-09-04

## 2024-09-04 VITALS
SYSTOLIC BLOOD PRESSURE: 110 MMHG | HEART RATE: 76 BPM | OXYGEN SATURATION: 95 % | BODY MASS INDEX: 33 KG/M2 | WEIGHT: 271 LBS | DIASTOLIC BLOOD PRESSURE: 60 MMHG | HEIGHT: 76 IN

## 2024-09-04 DIAGNOSIS — R53.82 CHRONIC FATIGUE: ICD-10-CM

## 2024-09-04 DIAGNOSIS — E11.42 TYPE 2 DIABETES MELLITUS WITH DIABETIC POLYNEUROPATHY, WITH LONG-TERM CURRENT USE OF INSULIN (HCC): Primary | ICD-10-CM

## 2024-09-04 DIAGNOSIS — Z00.00 MEDICARE ANNUAL WELLNESS VISIT, SUBSEQUENT: ICD-10-CM

## 2024-09-04 DIAGNOSIS — N18.32 STAGE 3B CHRONIC KIDNEY DISEASE (HCC): ICD-10-CM

## 2024-09-04 DIAGNOSIS — Z79.4 TYPE 2 DIABETES MELLITUS WITH DIABETIC POLYNEUROPATHY, WITH LONG-TERM CURRENT USE OF INSULIN (HCC): Primary | ICD-10-CM

## 2024-09-04 DIAGNOSIS — F01.A0 MILD VASCULAR DEMENTIA WITHOUT BEHAVIORAL DISTURBANCE, PSYCHOTIC DISTURBANCE, MOOD DISTURBANCE, OR ANXIETY (HCC): ICD-10-CM

## 2024-09-04 DIAGNOSIS — N18.30 STAGE 3 CHRONIC KIDNEY DISEASE, UNSPECIFIED WHETHER STAGE 3A OR 3B CKD (HCC): ICD-10-CM

## 2024-09-04 DIAGNOSIS — E03.9 HYPOTHYROIDISM, UNSPECIFIED TYPE: ICD-10-CM

## 2024-09-04 DIAGNOSIS — I20.89 ANGINAL EQUIVALENT (HCC): ICD-10-CM

## 2024-09-04 DIAGNOSIS — I73.9 PVD (PERIPHERAL VASCULAR DISEASE) (HCC): ICD-10-CM

## 2024-09-04 RX ORDER — LOSARTAN POTASSIUM 25 MG/1
25 TABLET ORAL DAILY
Qty: 90 TABLET | Refills: 1 | Status: SHIPPED | OUTPATIENT
Start: 2024-09-04

## 2024-09-04 RX ORDER — DONEPEZIL HYDROCHLORIDE 5 MG/1
5 TABLET, FILM COATED ORAL NIGHTLY
Qty: 90 TABLET | Refills: 3 | Status: SHIPPED | OUTPATIENT
Start: 2024-09-04

## 2024-09-04 ASSESSMENT — PATIENT HEALTH QUESTIONNAIRE - PHQ9
2. FEELING DOWN, DEPRESSED OR HOPELESS: NOT AT ALL
SUM OF ALL RESPONSES TO PHQ QUESTIONS 1-9: 0
SUM OF ALL RESPONSES TO PHQ9 QUESTIONS 1 & 2: 0
SUM OF ALL RESPONSES TO PHQ QUESTIONS 1-9: 0
1. LITTLE INTEREST OR PLEASURE IN DOING THINGS: NOT AT ALL

## 2024-09-04 ASSESSMENT — LIFESTYLE VARIABLES
HOW OFTEN DO YOU HAVE A DRINK CONTAINING ALCOHOL: NEVER
HOW MANY STANDARD DRINKS CONTAINING ALCOHOL DO YOU HAVE ON A TYPICAL DAY: PATIENT DOES NOT DRINK

## 2024-09-04 NOTE — PROGRESS NOTES
Visit Information    Have you changed or started any medications since your last visit including any over-the-counter medicines, vitamins, or herbal medicines? no   Are you having any side effects from any of your medications? -  no  Have you stopped taking any of your medications? Is so, why? -  no    Have you seen any other physician or provider since your last visit? Yes - Records Obtained  Have you had any other diagnostic tests since your last visit? No  Have you been seen in the emergency room and/or had an admission to a hospital since we last saw you? No  Have you had your routine dental cleaning in the past 6 months? no    Have you activated your [employer] account? If not, what are your barriers? Yes     Patient Care Team:  Renny Wood MD as PCP - General  Renny Wood MD as PCP - Empaneled Provider  Nupur Licea MD as Consulting Physician (Infectious Diseases)  Pravin Looney MD as Consulting Physician (Pulmonary Disease)    Medical History Review  Past Medical, Family, and Social History reviewed and does contribute to the patient presenting condition    Health Maintenance   Topic Date Due    HIV screen  Never done    DTaP/Tdap/Td vaccine (1 - Tdap) Never done    Shingles vaccine (1 of 2) 10/23/2014    Pneumococcal 0-64 years Vaccine (2 of 2 - PCV) 01/04/2015    Diabetic retinal exam  01/04/2017    Respiratory Syncytial Virus (RSV) Pregnant or age 60 yrs+ (1 - 1-dose 60+ series) Never done    Diabetic Alb to Cr ratio (uACR) test  12/14/2022    Annual Wellness Visit (Medicare)  11/27/2023    Flu vaccine (1) 08/01/2024    A1C test (Diabetic or Prediabetic)  04/02/2025    Diabetic foot exam  04/15/2025    Lipids  05/13/2025    GFR test (Diabetes, CKD 3-4, OR last GFR 15-59)  05/13/2025    Lung Cancer Screening &/or Counseling  05/13/2025    Depression Screen  05/30/2025    Colorectal Cancer Screen  05/26/2033    COVID-19 Vaccine  Completed    Hepatitis C screen  Completed    Hepatitis A 
comments:      General HRA Questions:  Select all that apply: (!) New or Increased Fatigue  Interventions Fatigue:  Patient declined any further interventions or treatment      Inactivity:  On average, how many days per week do you engage in moderate to strenuous exercise (like a brisk walk)?: 0 days (!) Abnormal  On average, how many minutes do you engage in exercise at this level?: 0 min  Interventions:  Patient declined any further interventions or treatment     Abnormal BMI (obese):  Body mass index is 32.99 kg/m². (!) Abnormal  Interventions:  Patient advised to follow-up in this office for further evaluation and treatment        Dentist Screen:  Have you seen the dentist within the past year?: (!) No    Intervention:  Patient comments:    Patient declines any further evaluation or treatment        Advanced Directives:  Do you have a Living Will?: (!) No    Intervention:  see ACP note    Advance Care Planning   Discussed the patient’s choices for care and treatment preferences in case of a health event that adversely affects decision-making abilities or is life-limiting. Recommended the patient document care preferences in state-specific advance directives. Also reviewed the process of designating a trusted capable adult as an Agent (or Health Care Power of ) to make health care decisions for the patient if the patient becomes unable due to incapacity. Patient was asked to complete advance directive forms, if not already done, and to provide a dated, signed and witnessed (or notarized) copy, per the forms' requirements, to the practice office.    Time spent (minutes): 35 minutes        Lung Cancer Screening:  LDCT Screening: Discussed with patient the benefits and harms of screening, follow-up diagnostic testing, over-diagnosis, false positive rate, and total radiation exposure. Counseled on the importance of adherence to annual lung cancer LDCT screening, impact of comorbidities, ability and

## 2024-09-04 NOTE — PATIENT INSTRUCTIONS
state your wishes at the end of your life. It tells your family and your doctor what to do if you can't say what you want.  There are two main types of advance directives. You can change them any time your wishes change.  Living will.  This form tells your family and your doctor your wishes about life support and other treatment. The form is also called a declaration.  Medical power of .  This form lets you name a person to make treatment decisions for you when you can't speak for yourself. This person is called a health care agent (health care proxy, health care surrogate). The form is also called a durable power of  for health care.  If you do not have an advance directive, decisions about your medical care may be made by a family member, or by a doctor or a  who doesn't know you.  It may help to think of an advance directive as a gift to the people who care for you. If you have one, they won't have to make tough decisions by themselves.  For more information, including forms for your state, see the [employer] website (www.caringinfo.org/planning/advance-directives/).  Follow-up care is a key part of your treatment and safety. Be sure to make and go to all appointments, and call your doctor if you are having problems. It's also a good idea to know your test results and keep a list of the medicines you take.  What should you include in an advance directive?  Many states have a unique advance directive form. (It may ask you to address specific issues.) Or you might use a universal form that's approved by many states.  If your form doesn't tell you what to address, it may be hard to know what to include in your advance directive. Use the questions below to help you get started.  Who do you want to make decisions about your medical care if you are not able to?  What life-support measures do you want if you have a serious illness that gets worse over time or can't be cured?  What are you most

## 2024-09-06 ENCOUNTER — OFFICE VISIT (OUTPATIENT)
Dept: PODIATRY | Age: 64
End: 2024-09-06
Payer: COMMERCIAL

## 2024-09-06 VITALS — HEIGHT: 76 IN | BODY MASS INDEX: 32.88 KG/M2 | WEIGHT: 270 LBS

## 2024-09-06 DIAGNOSIS — E11.42 TYPE 2 DIABETES MELLITUS WITH DIABETIC POLYNEUROPATHY, WITH LONG-TERM CURRENT USE OF INSULIN (HCC): ICD-10-CM

## 2024-09-06 DIAGNOSIS — B35.1 ONYCHOMYCOSIS OF TOENAIL: Primary | ICD-10-CM

## 2024-09-06 DIAGNOSIS — E11.51 TYPE 2 DIABETES MELLITUS WITH PERIPHERAL VASCULAR DISEASE (HCC): ICD-10-CM

## 2024-09-06 DIAGNOSIS — M79.674 PAIN OF TOE OF RIGHT FOOT: ICD-10-CM

## 2024-09-06 DIAGNOSIS — Z79.4 TYPE 2 DIABETES MELLITUS WITH DIABETIC POLYNEUROPATHY, WITH LONG-TERM CURRENT USE OF INSULIN (HCC): ICD-10-CM

## 2024-09-06 PROCEDURE — 11720 DEBRIDE NAIL 1-5: CPT | Performed by: PODIATRIST

## 2024-09-06 PROCEDURE — 99999 PR OFFICE/OUTPT VISIT,PROCEDURE ONLY: CPT | Performed by: PODIATRIST

## 2024-09-09 ASSESSMENT — ENCOUNTER SYMPTOMS
DIARRHEA: 0
BACK PAIN: 0
SHORTNESS OF BREATH: 0
NAUSEA: 0
COLOR CHANGE: 0

## 2024-09-25 ENCOUNTER — TELEPHONE (OUTPATIENT)
Dept: INTERNAL MEDICINE CLINIC | Age: 64
End: 2024-09-25

## 2024-09-25 ENCOUNTER — HOSPITAL ENCOUNTER (OUTPATIENT)
Dept: PAIN MANAGEMENT | Age: 64
Discharge: HOME | End: 2024-09-25
Payer: COMMERCIAL

## 2024-09-25 VITALS — WEIGHT: 265 LBS | BODY MASS INDEX: 32.27 KG/M2 | HEIGHT: 76 IN

## 2024-09-25 DIAGNOSIS — M47.817 LUMBOSACRAL SPONDYLOSIS WITHOUT MYELOPATHY: ICD-10-CM

## 2024-09-25 DIAGNOSIS — M51.36 LUMBAR DEGENERATIVE DISC DISEASE: ICD-10-CM

## 2024-09-25 DIAGNOSIS — M54.16 LUMBAR RADICULOPATHY: Primary | ICD-10-CM

## 2024-09-25 DIAGNOSIS — Z79.01 LONG TERM (CURRENT) USE OF ANTICOAGULANTS: ICD-10-CM

## 2024-09-25 PROBLEM — M51.369 LUMBAR DEGENERATIVE DISC DISEASE: Status: ACTIVE | Noted: 2024-09-25

## 2024-09-25 PROCEDURE — 99215 OFFICE O/P EST HI 40 MIN: CPT | Performed by: STUDENT IN AN ORGANIZED HEALTH CARE EDUCATION/TRAINING PROGRAM

## 2024-09-25 PROCEDURE — 99215 OFFICE O/P EST HI 40 MIN: CPT

## 2024-09-25 ASSESSMENT — PAIN SCALES - GENERAL: PAINLEVEL_OUTOF10: 2

## 2024-09-30 RX ORDER — METOPROLOL TARTRATE 50 MG
TABLET ORAL
Qty: 180 TABLET | Refills: 3 | OUTPATIENT
Start: 2024-09-30

## 2024-10-02 ENCOUNTER — OFFICE VISIT (OUTPATIENT)
Dept: INTERNAL MEDICINE CLINIC | Age: 64
End: 2024-10-02

## 2024-10-02 VITALS
HEIGHT: 76 IN | SYSTOLIC BLOOD PRESSURE: 128 MMHG | WEIGHT: 267.4 LBS | HEART RATE: 62 BPM | DIASTOLIC BLOOD PRESSURE: 64 MMHG | OXYGEN SATURATION: 92 % | BODY MASS INDEX: 32.56 KG/M2

## 2024-10-02 DIAGNOSIS — E08.41 DIABETIC MONONEUROPATHY ASSOCIATED WITH DIABETES MELLITUS DUE TO UNDERLYING CONDITION (HCC): ICD-10-CM

## 2024-10-02 DIAGNOSIS — N18.30 STAGE 3 CHRONIC KIDNEY DISEASE, UNSPECIFIED WHETHER STAGE 3A OR 3B CKD (HCC): Primary | ICD-10-CM

## 2024-10-02 DIAGNOSIS — G30.9 ALZHEIMER'S DISEASE, UNSPECIFIED (CODE) (HCC): ICD-10-CM

## 2024-10-02 DIAGNOSIS — Z79.4 TYPE 2 DIABETES MELLITUS WITH DIABETIC POLYNEUROPATHY, WITH LONG-TERM CURRENT USE OF INSULIN (HCC): ICD-10-CM

## 2024-10-02 DIAGNOSIS — I73.9 PVD (PERIPHERAL VASCULAR DISEASE) (HCC): ICD-10-CM

## 2024-10-02 DIAGNOSIS — E03.9 ACQUIRED HYPOTHYROIDISM: ICD-10-CM

## 2024-10-02 DIAGNOSIS — E11.42 TYPE 2 DIABETES MELLITUS WITH DIABETIC POLYNEUROPATHY, WITH LONG-TERM CURRENT USE OF INSULIN (HCC): ICD-10-CM

## 2024-10-02 ASSESSMENT — ENCOUNTER SYMPTOMS
EYE PAIN: 0
BLOOD IN STOOL: 0
WHEEZING: 0
BACK PAIN: 1
TROUBLE SWALLOWING: 0
DIARRHEA: 0
COUGH: 0
EYE DISCHARGE: 0
COLOR CHANGE: 0
SHORTNESS OF BREATH: 0
ABDOMINAL DISTENTION: 0

## 2024-10-02 ASSESSMENT — PATIENT HEALTH QUESTIONNAIRE - PHQ9
SUM OF ALL RESPONSES TO PHQ QUESTIONS 1-9: 0
SUM OF ALL RESPONSES TO PHQ9 QUESTIONS 1 & 2: 0
SUM OF ALL RESPONSES TO PHQ QUESTIONS 1-9: 0
1. LITTLE INTEREST OR PLEASURE IN DOING THINGS: NOT AT ALL
2. FEELING DOWN, DEPRESSED OR HOPELESS: NOT AT ALL

## 2024-10-02 NOTE — PROGRESS NOTES
Visit Information    Have you changed or started any medications since your last visit including any over-the-counter medicines, vitamins, or herbal medicines? no   Are you having any side effects from any of your medications? -  no  Have you stopped taking any of your medications? Is so, why? -  no    Have you seen any other physician or provider since your last visit? Yes - Records Obtained  Have you had any other diagnostic tests since your last visit? Yes - Records Obtained  Have you been seen in the emergency room and/or had an admission to a hospital since we last saw you? Yes - Records Obtained  Have you had your routine dental cleaning in the past 6 months? no    Have you activated your [employer] account? If not, what are your barriers? Yes     Patient Care Team:  Renny Wood MD as PCP - General  Renny Wood MD as PCP - Empaneled Provider  Nupur Lciea MD as Consulting Physician (Infectious Diseases)  Pravin Looney MD as Consulting Physician (Pulmonary Disease)    Medical History Review  Past Medical, Family, and Social History reviewed and does contribute to the patient presenting condition    Health Maintenance   Topic Date Due    HIV screen  Never done    DTaP/Tdap/Td vaccine (1 - Tdap) Never done    Shingles vaccine (1 of 2) 10/23/2014    Pneumococcal 0-64 years Vaccine (2 of 2 - PCV) 01/04/2015    Diabetic retinal exam  01/04/2017    Respiratory Syncytial Virus (RSV) Pregnant or age 60 yrs+ (1 - 1-dose 60+ series) Never done    Diabetic Alb to Cr ratio (uACR) test  12/14/2022    Flu vaccine (1) 08/01/2024    A1C test (Diabetic or Prediabetic)  04/02/2025    Lipids  05/13/2025    GFR test (Diabetes, CKD 3-4, OR last GFR 15-59)  05/13/2025    Lung Cancer Screening &/or Counseling  05/13/2025    Depression Screen  09/04/2025    Annual Wellness Visit (Medicare)  09/05/2025    Diabetic foot exam  09/09/2025    Colorectal Cancer Screen  05/26/2033    COVID-19 Vaccine  Completed

## 2024-10-02 NOTE — PROGRESS NOTES
[facility]  [address] [address]  Dept: [phone]  Dept Fax: [phone]    Jay Colon is a 64 y.o. male who presents today for his medical conditions/complaintsas noted below.  Jay Colon is c/o of   Chief Complaint   Patient presents with    Diabetes     Type 2 diabetes mellitus with diabetic polyneuropathy, with long-term current use of insulin    Pre-op Exam     Lumbar steroid injection         HPI:     Pre op for lumbar injection  Pt on oral NOAC          Hemoglobin A1C (%)   Date Value   04/02/2024 7.1 (H)   01/08/2024 7.6   08/08/2023 6.6             ( goal A1Cis < 7)   No components found for: \"LABMICR\"  No components found for: \"LDLCHOLESTEROL\", \"LDLCALC\"    (goal LDL is <100)   AST (U/L)   Date Value   09/01/2023 30     ALT (U/L)   Date Value   09/01/2023 36     BUN (mg/dL)   Date Value   05/13/2024 26 (H)     BP Readings from Last 3 Encounters:   10/02/24 128/64   09/04/24 110/60   08/15/24 122/70          (goal 120/80)    Past Medical History:   Diagnosis Date    A-fib ([facility])     Asymptomatic bilateral carotid artery stenosis     Boil, thigh 10/2019    10/30/19: right inner thigh region, says PCP Rxd Doxy for this, area is much better, has a couple days left to complete Doxy    CAD (coronary artery disease)     [facility] ([facility])     Charcot foot due to diabetes mellitus ([facility]) 2014    LEFT    COPD (chronic obstructive pulmonary disease) ([facility]) 2009    INHALER USE DAILY    Diabetes mellitus ([facility]) 1989    IDDM, On Insulin Dr. Wood    Diabetic neuropathy ([facility])     Difficult intravenous access     VEINS ROLL    Employs prosthetic leg     s/p left BKA    Foot ulcer ([facility]) PRIOR TO 2015    BILAT    Full dentures     UPPER ONLY    Hyperlipidemia 2004    ON RX    Hypertension 2004    ON RX, PCP Dr. Wood, seen Oct. 2019    Hypoglycemia 04/02/2015    Left below-knee amputee ([facility])     MRSA (methicillin resistant staph aureus) culture positive

## 2024-10-09 DIAGNOSIS — E11.42 TYPE 2 DIABETES MELLITUS WITH DIABETIC POLYNEUROPATHY, WITH LONG-TERM CURRENT USE OF INSULIN (HCC): ICD-10-CM

## 2024-10-09 DIAGNOSIS — Z79.4 TYPE 2 DIABETES MELLITUS WITH DIABETIC POLYNEUROPATHY, WITH LONG-TERM CURRENT USE OF INSULIN (HCC): ICD-10-CM

## 2024-10-09 RX ORDER — PREGABALIN 100 MG/1
100 CAPSULE ORAL 3 TIMES DAILY
Qty: 42 CAPSULE | Refills: 0 | Status: SHIPPED | OUTPATIENT
Start: 2024-10-09 | End: 2024-10-23

## 2024-10-09 NOTE — TELEPHONE ENCOUNTER
Medication Requested: Kalyna    Last visit: 9/4/2024  Next visit: 12/11/2024  Last refill: 7/8/2024    Med contract on file:  [] yes   [x] no    Last urine drug screen: unknown  Consistent with medication(s):    [] yes   [] no    Last OARRS ran: unknown     Quantity of medication remaining: 10    Who will be picking rx up: Patient     Pharmacy if escribed: Mail in service - [facility]

## 2024-10-10 ENCOUNTER — TELEPHONE (OUTPATIENT)
Dept: INTERNAL MEDICINE CLINIC | Age: 64
End: 2024-10-10

## 2024-10-10 NOTE — TELEPHONE ENCOUNTER
Medical surgical clearance request received 10/10/24    Surgeon: [facility]    Procedure: Lumbar Epidural Steroid Injection     Date of Procedure:     Last appt: 10/2/2024    Next appt: 12/11/2024    PATs received:    [] yes   [x] no

## 2024-10-11 NOTE — TELEPHONE ENCOUNTER
Provider instructions have been written on clearance, Pain Center states that the procedure will be scheduled upon the receipt of signed clearance. Provider will sign upon return to clinic on 10/14/24.

## 2024-10-11 NOTE — TELEPHONE ENCOUNTER
Clear for procedure  Inter risk  Hold asa plavix for a week  And oral anticoag for three days like coumadin or eliquis

## 2024-10-14 ENCOUNTER — TELEPHONE (OUTPATIENT)
Dept: INTERNAL MEDICINE CLINIC | Age: 64
End: 2024-10-14

## 2024-10-14 RX ORDER — FINASTERIDE 5 MG/1
5 TABLET, FILM COATED ORAL DAILY
Qty: 90 TABLET | Refills: 3 | Status: SHIPPED | OUTPATIENT
Start: 2024-10-14

## 2024-10-14 RX ORDER — GEMFIBROZIL 600 MG/1
600 TABLET, FILM COATED ORAL DAILY
Qty: 90 TABLET | Refills: 3 | Status: SHIPPED | OUTPATIENT
Start: 2024-10-14

## 2024-10-14 NOTE — TELEPHONE ENCOUNTER
[facility] faxed refill request for Finasteride and Gemfibrozil   Last office visit 10/2/2024

## 2024-10-18 DIAGNOSIS — Z79.4 TYPE 2 DIABETES MELLITUS WITH DIABETIC POLYNEUROPATHY, WITH LONG-TERM CURRENT USE OF INSULIN (HCC): ICD-10-CM

## 2024-10-18 DIAGNOSIS — E11.42 TYPE 2 DIABETES MELLITUS WITH DIABETIC POLYNEUROPATHY, WITH LONG-TERM CURRENT USE OF INSULIN (HCC): ICD-10-CM

## 2024-10-18 RX ORDER — PREGABALIN 100 MG/1
CAPSULE ORAL
Qty: 42 CAPSULE | OUTPATIENT
Start: 2024-10-18

## 2024-10-22 DIAGNOSIS — E11.42 TYPE 2 DIABETES MELLITUS WITH DIABETIC POLYNEUROPATHY, WITH LONG-TERM CURRENT USE OF INSULIN (HCC): ICD-10-CM

## 2024-10-22 DIAGNOSIS — M25.562 CHRONIC PAIN OF LEFT KNEE: ICD-10-CM

## 2024-10-22 DIAGNOSIS — E08.41 DIABETIC MONONEUROPATHY ASSOCIATED WITH DIABETES MELLITUS DUE TO UNDERLYING CONDITION (HCC): ICD-10-CM

## 2024-10-22 DIAGNOSIS — Z79.4 TYPE 2 DIABETES MELLITUS WITH DIABETIC POLYNEUROPATHY, WITH LONG-TERM CURRENT USE OF INSULIN (HCC): ICD-10-CM

## 2024-10-22 DIAGNOSIS — Z97.10 EMPLOYS PROSTHETIC LEG: ICD-10-CM

## 2024-10-22 DIAGNOSIS — G89.29 CHRONIC PAIN OF LEFT KNEE: ICD-10-CM

## 2024-10-23 RX ORDER — PREGABALIN 100 MG/1
100 CAPSULE ORAL 3 TIMES DAILY
Qty: 270 CAPSULE | Refills: 0 | Status: SHIPPED | OUTPATIENT
Start: 2024-10-23 | End: 2025-01-21

## 2024-10-28 ENCOUNTER — OFFICE VISIT (OUTPATIENT)
Dept: VASCULAR SURGERY | Age: 64
End: 2024-10-28
Payer: COMMERCIAL

## 2024-10-28 VITALS
RESPIRATION RATE: 16 BRPM | DIASTOLIC BLOOD PRESSURE: 68 MMHG | HEART RATE: 76 BPM | OXYGEN SATURATION: 93 % | WEIGHT: 293 LBS | HEIGHT: 76 IN | BODY MASS INDEX: 35.68 KG/M2 | SYSTOLIC BLOOD PRESSURE: 136 MMHG

## 2024-10-28 DIAGNOSIS — I87.331 CHRONIC VENOUS HYPERTENSION WITH ULCER AND INFLAMMATION INVOLVING RIGHT SIDE (HCC): Primary | ICD-10-CM

## 2024-10-28 PROCEDURE — G8417 CALC BMI ABV UP PARAM F/U: HCPCS | Performed by: SURGERY

## 2024-10-28 PROCEDURE — G8484 FLU IMMUNIZE NO ADMIN: HCPCS | Performed by: SURGERY

## 2024-10-28 PROCEDURE — G8427 DOCREV CUR MEDS BY ELIG CLIN: HCPCS | Performed by: SURGERY

## 2024-10-28 PROCEDURE — 3075F SYST BP GE 130 - 139MM HG: CPT | Performed by: SURGERY

## 2024-10-28 PROCEDURE — 99213 OFFICE O/P EST LOW 20 MIN: CPT | Performed by: SURGERY

## 2024-10-28 PROCEDURE — 3078F DIAST BP <80 MM HG: CPT | Performed by: SURGERY

## 2024-10-28 PROCEDURE — 1036F TOBACCO NON-USER: CPT | Performed by: SURGERY

## 2024-10-28 PROCEDURE — 3017F COLORECTAL CA SCREEN DOC REV: CPT | Performed by: SURGERY

## 2024-10-28 NOTE — PROGRESS NOTES
[facility] HEART AND VASCULAR  [address] [place] [address]  Dept: [phone]     Patient: Jay Colon  : 1960  MRN: [id number]  DOS: 10/28/2024            HPI:  Jay Colon is a 64 y.o. male who returns to the office regarding his pretibial ulcers on the right side.  I placed him in a compression stocking since he explained that these ulcers are result of water blisters that occur every so often.  The stocking seems to have helped except for some residual ulcers over the pretibial region.    Review of Systems    Vitals:    10/28/24 1010   BP: 136/68   Site: Left Upper Arm   Position: Sitting   Cuff Size: Medium Adult   Pulse: 76   Resp: 16   SpO2: 93%   Weight: 132.9 kg (293 lb)   Height: 1.93 m (6' 4\")          Physical Exam  On examination he has 3 separate ulcers on the right pretibial area measuring approximately 1 to 1-1/2 cm in diameter.  They are superficial and erosive type ulcers.  There is no bone involved.  He is wearing his compression stocking.    Assessment:  1. Chronic venous hypertension with ulcer and inflammation involving right side (HCC)          Plan:  At this point I would continue to wear compression.  I think this does improve his overall condition of the right lower extremity.  The left is an amputation.  We will see him every 6 months to make certain that he is doing well.    Electronically signed by:  Raul Montero MD

## 2024-11-18 ENCOUNTER — HOSPITAL ENCOUNTER (OUTPATIENT)
Dept: PAIN MANAGEMENT | Facility: CLINIC | Age: 64
Discharge: HOME | End: 2024-11-18
Payer: COMMERCIAL

## 2024-11-18 VITALS
HEART RATE: 71 BPM | OXYGEN SATURATION: 95 % | BODY MASS INDEX: 30.44 KG/M2 | WEIGHT: 250 LBS | SYSTOLIC BLOOD PRESSURE: 138 MMHG | RESPIRATION RATE: 14 BRPM | DIASTOLIC BLOOD PRESSURE: 69 MMHG | TEMPERATURE: 97.3 F | HEIGHT: 76 IN

## 2024-11-18 DIAGNOSIS — R52 PAIN MANAGEMENT: ICD-10-CM

## 2024-11-18 PROCEDURE — 6360000002 HC RX W HCPCS: Performed by: STUDENT IN AN ORGANIZED HEALTH CARE EDUCATION/TRAINING PROGRAM

## 2024-11-18 PROCEDURE — 64483 NJX AA&/STRD TFRM EPI L/S 1: CPT

## 2024-11-18 PROCEDURE — 6360000004 HC RX CONTRAST MEDICATION: Performed by: STUDENT IN AN ORGANIZED HEALTH CARE EDUCATION/TRAINING PROGRAM

## 2024-11-18 PROCEDURE — 64483 NJX AA&/STRD TFRM EPI L/S 1: CPT | Performed by: STUDENT IN AN ORGANIZED HEALTH CARE EDUCATION/TRAINING PROGRAM

## 2024-11-18 PROCEDURE — 2580000003 HC RX 258: Performed by: STUDENT IN AN ORGANIZED HEALTH CARE EDUCATION/TRAINING PROGRAM

## 2024-11-18 PROCEDURE — 2500000003 HC RX 250 WO HCPCS: Performed by: STUDENT IN AN ORGANIZED HEALTH CARE EDUCATION/TRAINING PROGRAM

## 2024-11-18 RX ORDER — SODIUM CHLORIDE 0.9 % (FLUSH) 0.9 %
SYRINGE (ML) INJECTION
Status: COMPLETED | OUTPATIENT
Start: 2024-11-18 | End: 2024-11-18

## 2024-11-18 RX ORDER — LIDOCAINE HYDROCHLORIDE 10 MG/ML
INJECTION, SOLUTION EPIDURAL; INFILTRATION; INTRACAUDAL; PERINEURAL
Status: COMPLETED | OUTPATIENT
Start: 2024-11-18 | End: 2024-11-18

## 2024-11-18 RX ORDER — DEXAMETHASONE SODIUM PHOSPHATE 10 MG/ML
INJECTION, SOLUTION INTRAMUSCULAR; INTRAVENOUS
Status: COMPLETED | OUTPATIENT
Start: 2024-11-18 | End: 2024-11-18

## 2024-11-18 RX ADMIN — IOHEXOL 1 ML: 180 INJECTION INTRAVENOUS at 09:27

## 2024-11-18 RX ADMIN — LIDOCAINE HYDROCHLORIDE 5 ML: 10 INJECTION, SOLUTION EPIDURAL; INFILTRATION; INTRACAUDAL at 09:29

## 2024-11-18 RX ADMIN — LIDOCAINE HYDROCHLORIDE 5 ML: 10 INJECTION, SOLUTION EPIDURAL; INFILTRATION; INTRACAUDAL at 09:28

## 2024-11-18 RX ADMIN — Medication 4 ML: at 09:28

## 2024-11-18 RX ADMIN — DEXAMETHASONE SODIUM PHOSPHATE 20 MG: 10 INJECTION, SOLUTION INTRAMUSCULAR; INTRAVENOUS at 09:29

## 2024-11-18 ASSESSMENT — PAIN - FUNCTIONAL ASSESSMENT
PAIN_FUNCTIONAL_ASSESSMENT: PREVENTS OR INTERFERES WITH ALL ACTIVE AND SOME PASSIVE ACTIVITIES
PAIN_FUNCTIONAL_ASSESSMENT: 0-10
PAIN_FUNCTIONAL_ASSESSMENT: NONE - DENIES PAIN

## 2024-11-18 ASSESSMENT — PAIN DESCRIPTION - DESCRIPTORS: DESCRIPTORS: SHARP;THROBBING

## 2024-11-18 NOTE — OP NOTE
PROCEDURE PERFORMED: Bilateral Lumbar Transforaminal Epidural Steroid Injection using Fluoroscopy    PREOPERATIVE DIAGNOSIS: Bilateral lumbosacral radiculopathy    INDICATIONS: Bilateral radicular leg pain    The patient's history and physical exam were reviewed.  The risk, benefits, and alternatives of the procedure were discussed and all questions were answered to the patient's satisfaction.  The patient agreed to proceed and written informed consent was obtained.    POSTOPERATIVE DIAGNOSIS: Same    PHYSICIAN:  Dr. Jorge Ward DO    ANESTHESIA:  LOCAL    ASSISTANT:  NONE    PATHOLOGY:  NONE    ESTIMATED BLOOD LOSS:  N/A    IMPLANTS:  NONE    PROCEDURE DESCRIPTION: Bilateral lumbar transforaminal epidural injection using fluoroscopy    Targeted levels: Bilateral lumbar L5 transforaminal epidural space    The patient was placed on the operative bed in prone position.  The area was prepped with  Chlorhexidine.  The area was then draped in a sterile fashion.  An AP fluoroscopic  film was taken to identify the L5 vertebral body, pedicle and superior articulating process.  The skin and subcutaneous tissues were anesthetized with 1% lidocaine.  Then a 22-gauge 3-1/2 inch Quincke spinal needle was advanced under fluoroscopic guidance using an oblique view just inferior to the pedicle at L5.  Then utilizing AP and lateral fluoroscopic views, I confirmed the position of the needle tip to be within the neural foramen.  After negative aspiration, Omnipaque was injected under AP view at each level.  There was adequate contrast spread along the nerve root and in the epidural space.  There was no evidence of intravascular uptake or intrathecal spread in imaging.  At this point, after negative aspiration, a total volume of treatment injectate consisting of 1 mL of dexamethasone 10 mg/mL and 2 mL of normal saline preservative-free was easily injected.  The needle was then removed.  There was applied pressure to the needle

## 2024-11-18 NOTE — H&P
Pain Pre-Op H&P Note    SUBJECTIVE:  No chief complaint on file.      History of Present Illness:   Jay Colon is a 64 y.o. male who presents with leg pain.    Past Medical History:   Diagnosis Date    A-fib ([facility])     Asymptomatic bilateral carotid artery stenosis     Boil, thigh 10/2019    10/30/19: right inner thigh region, says PCP Rxd Doxy for this, area is much better, has a couple days left to complete Doxy    CAD (coronary artery disease)     saw Dr. Bhatia ([facility])     Charcot foot due to diabetes mellitus ([facility]) 2014    LEFT    COPD (chronic obstructive pulmonary disease) ([facility]) 2009    INHALER USE DAILY    Diabetes mellitus ([facility]) 1989    IDDM, On Insulin Dr. Wood    Diabetic neuropathy ([facility])     Difficult intravenous access     VEINS ROLL    Employs prosthetic leg     s/p left BKA    Foot ulcer ([facility]) PRIOR TO 2015    BILAT    Full dentures     UPPER ONLY    Hyperlipidemia 2004    ON RX    Hypertension 2004    ON RX, PCP Dr. Wood, seen Oct. 2019    Hypoglycemia 04/02/2015    Left below-knee amputee ([facility])     MRSA (methicillin resistant staph aureus) culture positive 04/16/2015    ankle    OA (osteoarthritis)     Osteomyelitis     left stump  BKA    Paroxysmal atrial fibrillation ([facility])     Renal mass 09/2019    ACCIDENTAL  FINDING IS FOLLOWING UP WITH KIDNEY DR Ledezma     Suspected sleep apnea     Thyroid disease 2004    PT HAD HYPERTHYROIDISM-UNCONTROLED THYROID DESTROYED WITH RADI IODINE NOW HAS HYPOTHYRIDISM    Vision abnormalities 09/2019    LEFT NO VISION    Vitreous hemorrhage of left eye due to diabetes mellitus ([facility]) 09/2019    s/p Vitrectomy 9/2019    Wears glasses     Wears partial dentures     full upper, does not wear lower partial    Wellness examination     Dr. Wood-PCP seen within last 1 1/2 mos       Past Surgical History:   Procedure Laterality Date    CARDIAC CATHETERIZATION      no stenting    CARDIOVASCULAR STRESS TEST  06/28/2019    small fixed apical, likely normal, EF 48%

## 2024-11-18 NOTE — DISCHARGE INSTRUCTIONS

## 2024-11-22 RX ORDER — AMIODARONE HYDROCHLORIDE 200 MG/1
200 TABLET ORAL 2 TIMES DAILY
Qty: 180 TABLET | Refills: 3 | Status: SHIPPED | OUTPATIENT
Start: 2024-11-22

## 2024-11-26 RX ORDER — CLOTRIMAZOLE AND BETAMETHASONE DIPROPIONATE 10; .64 MG/G; MG/G
CREAM TOPICAL
Qty: 45 G | Refills: 3 | Status: SHIPPED | OUTPATIENT
Start: 2024-11-26

## 2024-11-26 RX ORDER — ERGOCALCIFEROL 1.25 MG/1
50000 CAPSULE, LIQUID FILLED ORAL WEEKLY
Qty: 12 CAPSULE | Refills: 1 | Status: SHIPPED | OUTPATIENT
Start: 2024-11-26

## 2024-11-26 RX ORDER — METOPROLOL TARTRATE 50 MG
TABLET ORAL
Qty: 180 TABLET | Refills: 3 | Status: SHIPPED | OUTPATIENT
Start: 2024-11-26

## 2024-11-26 NOTE — TELEPHONE ENCOUNTER
[facility] faxed refill request for Clotrim/ beta meth and Vit D2 CP  Last office visit 10/02/2024

## 2024-11-29 DIAGNOSIS — U07.1 COVID-19: ICD-10-CM

## 2024-11-29 RX ORDER — BUDESONIDE AND FORMOTEROL FUMARATE DIHYDRATE 160; 4.5 UG/1; UG/1
AEROSOL RESPIRATORY (INHALATION)
Qty: 10.2 EACH | Refills: 10 | Status: SHIPPED | OUTPATIENT
Start: 2024-11-29

## 2024-12-09 ENCOUNTER — HOSPITAL ENCOUNTER (OUTPATIENT)
Dept: PAIN MANAGEMENT | Age: 64
Discharge: HOME | End: 2024-12-09
Payer: MEDICARE

## 2024-12-09 VITALS — HEIGHT: 76 IN | WEIGHT: 250 LBS | BODY MASS INDEX: 30.44 KG/M2

## 2024-12-09 DIAGNOSIS — M54.16 LUMBAR RADICULOPATHY: Primary | ICD-10-CM

## 2024-12-09 DIAGNOSIS — M47.817 LUMBOSACRAL SPONDYLOSIS WITHOUT MYELOPATHY: ICD-10-CM

## 2024-12-09 DIAGNOSIS — M51.369 DEGENERATION OF INTERVERTEBRAL DISC OF LUMBAR REGION, UNSPECIFIED WHETHER PAIN PRESENT: ICD-10-CM

## 2024-12-09 DIAGNOSIS — Z79.01 LONG TERM (CURRENT) USE OF ANTICOAGULANTS: ICD-10-CM

## 2024-12-09 PROCEDURE — 99213 OFFICE O/P EST LOW 20 MIN: CPT | Performed by: STUDENT IN AN ORGANIZED HEALTH CARE EDUCATION/TRAINING PROGRAM

## 2024-12-09 PROCEDURE — 99213 OFFICE O/P EST LOW 20 MIN: CPT

## 2024-12-09 NOTE — PROGRESS NOTES
[place] San[place]ago SoloStar 110 Units, SubCUTAneous, DAILY    traZODone (DESYREL) 150 mg, Oral, NIGHTLY    vitamin D (ERGOCALCIFEROL) 50,000 Units, Oral, WEEKLY       Allergies   Allergen Reactions    Ramipril      Other reaction(s): swelling of the lips   Other reaction(s): swelling of the lips     Adhesive Tape      tegaderm causes blisters. Use paper tape       Review of Systems:   Genitourinary: negative for bowel/bladder incontinence   Musculoskeletal: positive for low back pain  Neurological: negative for radicular leg pain, leg weakness or numbness/tingling    OBJECTIVE:    There were no vitals filed for this visit.    PHYSICAL EXAM    GENERAL: No acute distress, pleasant, well-appearing  HEENT: Normocephalic, atraumatic, Pupils equal and round  CARDIOVASCULAR: Well perfused, No peripheral cyanosis  PULMONARY: Good chest wall excursion, breathing unlabored  PSYCH: Appropriate affect and insight, non-pressured speech  SKIN: No rashes or lesions  MUSCULOSKELETAL:  Inspection: The back and extremities are symmetric and aligned. Muscle bulk is normal in appearance.  Palpation: There is tenderness to palpation along the lumbar paraspinal musculature bilaterally  Lumbar range of motion is full  NEUROMUSCULAR:  Patient ambulates with wheelchair  Gait is not assessed  Sensation to light touch is grossly intact in lower extremities  Strength is grossly intact in lower extremities  No ankle clonus    Special Tests:  Lumbar facet loading is positive bilaterally  Seated straight leg raise is positive bilaterally      DIAGNOSIS:    ICD-10-CM    1. Lumbar radiculopathy  M54.16       2. Lumbosacral spondylosis without myelopathy  M47.817       3. Degeneration of intervertebral disc of lumbar region, unspecified whether pain present  M51.369       4. Long term (current) use of anticoagulants  Z79.01         ASSESSMENT:    Jay Colon is a 64 y.o.male who presents with chronic low back pain and leg pain. To review, patient has

## 2024-12-11 ENCOUNTER — OFFICE VISIT (OUTPATIENT)
Dept: INTERNAL MEDICINE CLINIC | Age: 64
End: 2024-12-11

## 2024-12-11 VITALS
WEIGHT: 251 LBS | DIASTOLIC BLOOD PRESSURE: 72 MMHG | OXYGEN SATURATION: 98 % | SYSTOLIC BLOOD PRESSURE: 120 MMHG | HEIGHT: 76 IN | HEART RATE: 74 BPM | BODY MASS INDEX: 30.56 KG/M2

## 2024-12-11 DIAGNOSIS — Z79.4 TYPE 2 DIABETES MELLITUS WITH DIABETIC POLYNEUROPATHY, WITH LONG-TERM CURRENT USE OF INSULIN (HCC): Primary | ICD-10-CM

## 2024-12-11 DIAGNOSIS — E08.41 DIABETIC MONONEUROPATHY ASSOCIATED WITH DIABETES MELLITUS DUE TO UNDERLYING CONDITION (HCC): ICD-10-CM

## 2024-12-11 DIAGNOSIS — N18.30 STAGE 3 CHRONIC KIDNEY DISEASE, UNSPECIFIED WHETHER STAGE 3A OR 3B CKD (HCC): ICD-10-CM

## 2024-12-11 DIAGNOSIS — E03.9 ACQUIRED HYPOTHYROIDISM: ICD-10-CM

## 2024-12-11 DIAGNOSIS — R15.1 FECAL SMEARING: ICD-10-CM

## 2024-12-11 DIAGNOSIS — E11.42 TYPE 2 DIABETES MELLITUS WITH DIABETIC POLYNEUROPATHY, WITH LONG-TERM CURRENT USE OF INSULIN (HCC): Primary | ICD-10-CM

## 2024-12-11 ASSESSMENT — ENCOUNTER SYMPTOMS
EYE PAIN: 0
SHORTNESS OF BREATH: 0
ABDOMINAL DISTENTION: 0
TROUBLE SWALLOWING: 0
ABDOMINAL PAIN: 1
COUGH: 0
COLOR CHANGE: 0
BLOOD IN STOOL: 0
WHEEZING: 0
DIARRHEA: 0
EYE DISCHARGE: 0

## 2024-12-11 NOTE — PROGRESS NOTES
\"Have you been to the ER, urgent care clinic since your last visit?  Hospitalized since your last visit?\"    NO    “Have you seen or consulted any other health care providers outside our system since your last visit?”    YES      “Have you had a diabetic eye exam?”    NO     Date of last diabetic eye exam: 1/4/2016           
Appearance: He is well-developed. He is not diaphoretic.   HENT:      Head: Normocephalic and atraumatic.   Eyes:      General:         Right eye: No discharge.         Left eye: No discharge.      Extraocular Movements:      Right eye: Normal extraocular motion.      Left eye: Normal extraocular motion.      Conjunctiva/sclera: Conjunctivae normal.      Right eye: Right conjunctiva is not injected.      Left eye: Left conjunctiva is not injected.   Neck:      Thyroid: No thyroid mass or thyromegaly.      Vascular: No JVD.   Cardiovascular:      Rate and Rhythm: Normal rate and regular rhythm.      Heart sounds: No murmur heard.     No friction rub.   Pulmonary:      Effort: Pulmonary effort is normal. No tachypnea, bradypnea, accessory muscle usage or respiratory distress.      Breath sounds: Normal breath sounds. No wheezing or rales.   Abdominal:      General: Bowel sounds are normal. There is no distension.      Palpations: Abdomen is soft.      Tenderness: There is no abdominal tenderness. There is no rebound.   Musculoskeletal:         General: No tenderness. Normal range of motion.      Cervical back: Normal range of motion and neck supple. No edema or erythema.   Lymphadenopathy:      Head:      Right side of head: No submental or submandibular adenopathy.      Left side of head: No submental or submandibular adenopathy.      Cervical: No cervical adenopathy.   Skin:     General: Skin is warm.      Coloration: Skin is not pale.      Findings: No bruising, ecchymosis or rash.   Neurological:      Mental Status: He is alert and oriented to person, place, and time.      Cranial Nerves: No cranial nerve deficit.      Sensory: No sensory deficit.      Motor: No atrophy or abnormal muscle tone.      Coordination: Coordination normal.   Psychiatric:         Mood and Affect: Mood is not anxious. Affect is not angry.         Speech: Speech is not slurred.         Behavior: Behavior normal. Behavior is not aggressive.

## 2024-12-13 ENCOUNTER — HOSPITAL ENCOUNTER (EMERGENCY)
Age: 64
Discharge: HOME | End: 2024-12-13
Attending: EMERGENCY MEDICINE
Payer: COMMERCIAL

## 2024-12-13 ENCOUNTER — APPOINTMENT (OUTPATIENT)
Dept: CT IMAGING | Age: 64
End: 2024-12-13
Payer: COMMERCIAL

## 2024-12-13 ENCOUNTER — TELEPHONE (OUTPATIENT)
Dept: INTERNAL MEDICINE CLINIC | Age: 64
End: 2024-12-13

## 2024-12-13 VITALS
HEART RATE: 77 BPM | WEIGHT: 245 LBS | SYSTOLIC BLOOD PRESSURE: 125 MMHG | OXYGEN SATURATION: 94 % | DIASTOLIC BLOOD PRESSURE: 64 MMHG | BODY MASS INDEX: 29.83 KG/M2 | RESPIRATION RATE: 20 BRPM | HEIGHT: 76 IN | TEMPERATURE: 97.7 F

## 2024-12-13 DIAGNOSIS — R15.9 INCONTINENCE OF FECES, UNSPECIFIED FECAL INCONTINENCE TYPE: Primary | ICD-10-CM

## 2024-12-13 DIAGNOSIS — R10.84 GENERALIZED ABDOMINAL PAIN: Primary | ICD-10-CM

## 2024-12-13 LAB
ALBUMIN SERPL-MCNC: 3.9 G/DL (ref 3.5–5.2)
ALP SERPL-CCNC: 203 U/L (ref 40–129)
ALT SERPL-CCNC: 53 U/L (ref 10–50)
ANION GAP SERPL CALCULATED.3IONS-SCNC: 10 MMOL/L (ref 9–16)
AST SERPL-CCNC: 45 U/L (ref 10–50)
BACTERIA URNS QL MICRO: ABNORMAL
BASOPHILS # BLD: 0.1 K/UL (ref 0–0.2)
BASOPHILS NFR BLD: 1 % (ref 0–2)
BILIRUB SERPL-MCNC: 0.6 MG/DL (ref 0–1.2)
BILIRUB UR QL STRIP: NEGATIVE
BUN SERPL-MCNC: 17 MG/DL (ref 8–23)
CALCIUM SERPL-MCNC: 8.9 MG/DL (ref 8.6–10.4)
CASTS #/AREA URNS LPF: ABNORMAL /LPF
CHLORIDE SERPL-SCNC: 108 MMOL/L (ref 98–107)
CLARITY UR: CLEAR
CO2 SERPL-SCNC: 26 MMOL/L (ref 20–31)
COLOR UR: YELLOW
CREAT SERPL-MCNC: 1.4 MG/DL (ref 0.7–1.2)
EOSINOPHIL # BLD: 0 K/UL (ref 0–0.4)
EOSINOPHILS RELATIVE PERCENT: 1 % (ref 0–4)
EPI CELLS #/AREA URNS HPF: ABNORMAL /HPF
ERYTHROCYTE [DISTWIDTH] IN BLOOD BY AUTOMATED COUNT: 13.9 % (ref 11.5–14.9)
GFR, ESTIMATED: 56 ML/MIN/1.73M2
GLUCOSE SERPL-MCNC: 140 MG/DL (ref 74–99)
GLUCOSE UR STRIP-MCNC: NEGATIVE MG/DL
HCT VFR BLD AUTO: 41.9 % (ref 41–53)
HGB BLD-MCNC: 14.6 G/DL (ref 13.5–17.5)
HGB UR QL STRIP.AUTO: NEGATIVE
KETONES UR STRIP-MCNC: NEGATIVE MG/DL
LEUKOCYTE ESTERASE UR QL STRIP: NEGATIVE
LIPASE SERPL-CCNC: 22 U/L (ref 13–60)
LYMPHOCYTES NFR BLD: 1 K/UL (ref 1–4.8)
LYMPHOCYTES RELATIVE PERCENT: 12 % (ref 24–44)
MCH RBC QN AUTO: 33.1 PG (ref 26–34)
MCHC RBC AUTO-ENTMCNC: 34.8 G/DL (ref 31–37)
MCV RBC AUTO: 95.1 FL (ref 80–100)
MONOCYTES NFR BLD: 0.9 K/UL (ref 0.1–1.3)
MONOCYTES NFR BLD: 11 % (ref 1–7)
NEUTROPHILS NFR BLD: 75 % (ref 36–66)
NEUTS SEG NFR BLD: 6.2 K/UL (ref 1.3–9.1)
NITRITE UR QL STRIP: NEGATIVE
PH UR STRIP: 5.5 [PH] (ref 5–8)
PLATELET # BLD AUTO: 146 K/UL (ref 150–450)
PMV BLD AUTO: 10.2 FL (ref 6–12)
POTASSIUM SERPL-SCNC: 3.8 MMOL/L (ref 3.7–5.3)
PROT SERPL-MCNC: 6.4 G/DL (ref 6.6–8.7)
PROT UR STRIP-MCNC: ABNORMAL MG/DL
RBC # BLD AUTO: 4.4 M/UL (ref 4.5–5.9)
RBC #/AREA URNS HPF: ABNORMAL /HPF
SODIUM SERPL-SCNC: 144 MMOL/L (ref 136–145)
SP GR UR STRIP: 1.02 (ref 1–1.03)
T4 FREE SERPL-MCNC: 1.3 NG/DL (ref 0.9–1.7)
TSH SERPL DL<=0.05 MIU/L-ACNC: 5.21 UIU/ML (ref 0.27–4.2)
UROBILINOGEN UR STRIP-ACNC: NORMAL EU/DL (ref 0–1)
WBC #/AREA URNS HPF: ABNORMAL /HPF
WBC OTHER # BLD: 8.1 K/UL (ref 3.5–11)

## 2024-12-13 PROCEDURE — 99285 EMERGENCY DEPT VISIT HI MDM: CPT

## 2024-12-13 PROCEDURE — 84439 ASSAY OF FREE THYROXINE: CPT

## 2024-12-13 PROCEDURE — 6360000002 HC RX W HCPCS: Performed by: EMERGENCY MEDICINE

## 2024-12-13 PROCEDURE — 36415 COLL VENOUS BLD VENIPUNCTURE: CPT

## 2024-12-13 PROCEDURE — 83690 ASSAY OF LIPASE: CPT

## 2024-12-13 PROCEDURE — 81001 URINALYSIS AUTO W/SCOPE: CPT

## 2024-12-13 PROCEDURE — 6360000004 HC RX CONTRAST MEDICATION: Performed by: EMERGENCY MEDICINE

## 2024-12-13 PROCEDURE — 96372 THER/PROPH/DIAG INJ SC/IM: CPT

## 2024-12-13 PROCEDURE — 84443 ASSAY THYROID STIM HORMONE: CPT

## 2024-12-13 PROCEDURE — 74177 CT ABD & PELVIS W/CONTRAST: CPT

## 2024-12-13 PROCEDURE — 96374 THER/PROPH/DIAG INJ IV PUSH: CPT

## 2024-12-13 PROCEDURE — 80053 COMPREHEN METABOLIC PANEL: CPT

## 2024-12-13 PROCEDURE — 85025 COMPLETE CBC W/AUTO DIFF WBC: CPT

## 2024-12-13 PROCEDURE — 2580000003 HC RX 258: Performed by: EMERGENCY MEDICINE

## 2024-12-13 RX ORDER — IOPAMIDOL 755 MG/ML
75 INJECTION, SOLUTION INTRAVASCULAR
Status: COMPLETED | OUTPATIENT
Start: 2024-12-13 | End: 2024-12-13

## 2024-12-13 RX ORDER — SODIUM CHLORIDE 0.9 % (FLUSH) 0.9 %
10 SYRINGE (ML) INJECTION PRN
Status: DISCONTINUED | OUTPATIENT
Start: 2024-12-13 | End: 2024-12-13 | Stop reason: HOSPADM

## 2024-12-13 RX ORDER — ONDANSETRON 2 MG/ML
4 INJECTION INTRAMUSCULAR; INTRAVENOUS ONCE
Status: COMPLETED | OUTPATIENT
Start: 2024-12-13 | End: 2024-12-13

## 2024-12-13 RX ORDER — 0.9 % SODIUM CHLORIDE 0.9 %
100 INTRAVENOUS SOLUTION INTRAVENOUS ONCE
Status: COMPLETED | OUTPATIENT
Start: 2024-12-13 | End: 2024-12-13

## 2024-12-13 RX ORDER — DICYCLOMINE HYDROCHLORIDE 10 MG/ML
20 INJECTION INTRAMUSCULAR ONCE
Status: COMPLETED | OUTPATIENT
Start: 2024-12-13 | End: 2024-12-13

## 2024-12-13 RX ORDER — DICYCLOMINE HYDROCHLORIDE 10 MG/1
10 CAPSULE ORAL EVERY 6 HOURS PRN
Qty: 20 CAPSULE | Refills: 0 | Status: SHIPPED | OUTPATIENT
Start: 2024-12-13 | End: 2024-12-18

## 2024-12-13 RX ORDER — ONDANSETRON 4 MG/1
4 TABLET, ORALLY DISINTEGRATING ORAL EVERY 8 HOURS PRN
Qty: 20 TABLET | Refills: 0 | Status: SHIPPED | OUTPATIENT
Start: 2024-12-13 | End: 2024-12-20

## 2024-12-13 RX ADMIN — IOPAMIDOL 75 ML: 755 INJECTION, SOLUTION INTRAVENOUS at 15:48

## 2024-12-13 RX ADMIN — ONDANSETRON 4 MG: 2 INJECTION, SOLUTION INTRAMUSCULAR; INTRAVENOUS at 14:24

## 2024-12-13 RX ADMIN — DICYCLOMINE HYDROCHLORIDE 20 MG: 10 INJECTION, SOLUTION INTRAMUSCULAR at 14:23

## 2024-12-13 RX ADMIN — SODIUM CHLORIDE, PRESERVATIVE FREE 10 ML: 5 INJECTION INTRAVENOUS at 15:48

## 2024-12-13 RX ADMIN — SODIUM CHLORIDE 100 ML: 9 INJECTION, SOLUTION INTRAVENOUS at 15:48

## 2024-12-13 ASSESSMENT — ENCOUNTER SYMPTOMS
FACIAL SWELLING: 0
CONSTIPATION: 0
CHEST TIGHTNESS: 0
BACK PAIN: 0
COUGH: 0
EYE PAIN: 0
SINUS PRESSURE: 0
RHINORRHEA: 0
EYE DISCHARGE: 0
COLOR CHANGE: 0
EYE REDNESS: 0
SORE THROAT: 0
WHEEZING: 0
ABDOMINAL PAIN: 1
DIARRHEA: 1
TROUBLE SWALLOWING: 0
BLOOD IN STOOL: 0
NAUSEA: 1
VOMITING: 1
SHORTNESS OF BREATH: 0

## 2024-12-13 ASSESSMENT — PAIN - FUNCTIONAL ASSESSMENT: PAIN_FUNCTIONAL_ASSESSMENT: 0-10

## 2024-12-13 ASSESSMENT — PAIN SCALES - GENERAL: PAINLEVEL_OUTOF10: 7

## 2024-12-13 NOTE — TELEPHONE ENCOUNTER
Report to [facility] emergency room  I can see him on this admission  Will get surg consult and imaging as needed

## 2024-12-13 NOTE — TELEPHONE ENCOUNTER
The referral for Dr. Madsen the   Associated Dx: Fecal smearing (R15.1)   which is a behavioral diagnosis. They wanted to make sure that is correct.    Also The physician will be leaving from January 23rd to February 23rd. Any Colonoscopy or surgical procedure will not be until March .    Please advise

## 2024-12-13 NOTE — TELEPHONE ENCOUNTER
Patient called in because he was told to inform you if his abdominal pain is getting worse and he states it is getting worse, please advise. Patient had appt 12/11/24.

## 2024-12-16 ENCOUNTER — TELEPHONE (OUTPATIENT)
Dept: INTERNAL MEDICINE CLINIC | Age: 64
End: 2024-12-16

## 2024-12-16 NOTE — TELEPHONE ENCOUNTER
Called [facility] dept @ [phone] and spoke with Edilma regarding a duplicate fax we received again on pt today. Explained to Edilma that the order was signed by Dr franco and that we tried faxing it 3x on Friday 12/13/24 but kept getting an error/unsuccessful fax confirmation and whoever I spoke with at the office on Friday 12/13 gave me an alternate fax # of [phone]. Edilma stated that she has no idea who that fax # is for but its not theirs. After discussion we figured out that the [phone] fax # was for a dermatology office.     Edilma asked that I try and refax the order again and call office back if it does not go through. I faxed the order again to [phone] and it went through successfully. I called the office again and spoke with Isabel who confirmed that they received the fax.     I scanned a copy of the paperwork again in the chart along with the fax confirmation. .

## 2024-12-18 ENCOUNTER — HOSPITAL ENCOUNTER (OUTPATIENT)
Dept: MRI IMAGING | Age: 64
Discharge: HOME | End: 2024-12-20
Attending: EMERGENCY MEDICINE
Payer: MEDICARE

## 2024-12-18 DIAGNOSIS — R10.84 GENERALIZED ABDOMINAL PAIN: ICD-10-CM

## 2024-12-18 PROCEDURE — 6360000004 HC RX CONTRAST MEDICATION: Performed by: EMERGENCY MEDICINE

## 2024-12-18 PROCEDURE — 2500000003 HC RX 250 WO HCPCS: Performed by: EMERGENCY MEDICINE

## 2024-12-18 PROCEDURE — A9579 GAD-BASE MR CONTRAST NOS,1ML: HCPCS | Performed by: EMERGENCY MEDICINE

## 2024-12-18 PROCEDURE — 76377 3D RENDER W/INTRP POSTPROCES: CPT

## 2024-12-18 PROCEDURE — 2580000003 HC RX 258: Performed by: EMERGENCY MEDICINE

## 2024-12-18 RX ORDER — 0.9 % SODIUM CHLORIDE 0.9 %
50 INTRAVENOUS SOLUTION INTRAVENOUS ONCE
Status: COMPLETED | OUTPATIENT
Start: 2024-12-18 | End: 2024-12-18

## 2024-12-18 RX ORDER — SODIUM CHLORIDE 0.9 % (FLUSH) 0.9 %
10 SYRINGE (ML) INJECTION PRN
Status: DISCONTINUED | OUTPATIENT
Start: 2024-12-18 | End: 2024-12-21 | Stop reason: HOSPADM

## 2024-12-18 RX ADMIN — SODIUM CHLORIDE 50 ML: 9 INJECTION, SOLUTION INTRAVENOUS at 10:35

## 2024-12-18 RX ADMIN — GADOTERIDOL 20 ML: 279.3 INJECTION, SOLUTION INTRAVENOUS at 10:35

## 2024-12-18 RX ADMIN — SODIUM CHLORIDE, PRESERVATIVE FREE 10 ML: 5 INJECTION INTRAVENOUS at 10:35

## 2024-12-26 ENCOUNTER — TELEPHONE (OUTPATIENT)
Dept: GASTROENTEROLOGY | Age: 64
End: 2024-12-26

## 2024-12-26 ENCOUNTER — HOSPITAL ENCOUNTER (OUTPATIENT)
Age: 64
Discharge: HOME | End: 2024-12-26
Payer: COMMERCIAL

## 2024-12-26 ENCOUNTER — TELEPHONE (OUTPATIENT)
Dept: INTERNAL MEDICINE CLINIC | Age: 64
End: 2024-12-26

## 2024-12-26 ENCOUNTER — HOSPITAL ENCOUNTER (OUTPATIENT)
Age: 64
Discharge: HOME | End: 2024-12-26
Payer: MEDICARE

## 2024-12-26 ENCOUNTER — OFFICE VISIT (OUTPATIENT)
Dept: GASTROENTEROLOGY | Age: 64
End: 2024-12-26
Payer: MEDICARE

## 2024-12-26 VITALS
WEIGHT: 249 LBS | HEIGHT: 76 IN | DIASTOLIC BLOOD PRESSURE: 57 MMHG | BODY MASS INDEX: 30.32 KG/M2 | TEMPERATURE: 97.7 F | HEART RATE: 84 BPM | SYSTOLIC BLOOD PRESSURE: 99 MMHG

## 2024-12-26 DIAGNOSIS — N18.32 STAGE 3B CHRONIC KIDNEY DISEASE (HCC): ICD-10-CM

## 2024-12-26 DIAGNOSIS — K86.9 PANCREATIC LESION: Primary | ICD-10-CM

## 2024-12-26 DIAGNOSIS — K58.2 IRRITABLE BOWEL SYNDROME WITH BOTH CONSTIPATION AND DIARRHEA: ICD-10-CM

## 2024-12-26 DIAGNOSIS — K86.9 PANCREATIC LESION: ICD-10-CM

## 2024-12-26 DIAGNOSIS — G89.3 NEOPLASM RELATED PAIN (ACUTE) (CHRONIC): ICD-10-CM

## 2024-12-26 DIAGNOSIS — R19.7 DIARRHEA, UNSPECIFIED TYPE: ICD-10-CM

## 2024-12-26 DIAGNOSIS — K76.0 FATTY LIVER: ICD-10-CM

## 2024-12-26 DIAGNOSIS — R53.82 CHRONIC FATIGUE: ICD-10-CM

## 2024-12-26 DIAGNOSIS — E03.9 HYPOTHYROIDISM, UNSPECIFIED TYPE: ICD-10-CM

## 2024-12-26 DIAGNOSIS — R19.4 CHANGE IN BOWEL HABITS: ICD-10-CM

## 2024-12-26 LAB
25(OH)D3 SERPL-MCNC: 43.3 NG/ML (ref 30–100)
CANCER AG19-9 SERPL IA-ACNC: 8 U/ML (ref 0–35)
CEA SERPL-MCNC: 2 NG/ML (ref 0–3.8)
TSH SERPL DL<=0.05 MIU/L-ACNC: 2.42 UIU/ML (ref 0.27–4.2)

## 2024-12-26 PROCEDURE — 99214 OFFICE O/P EST MOD 30 MIN: CPT | Performed by: PHYSICIAN ASSISTANT

## 2024-12-26 PROCEDURE — 82306 VITAMIN D 25 HYDROXY: CPT

## 2024-12-26 PROCEDURE — 3017F COLORECTAL CA SCREEN DOC REV: CPT | Performed by: PHYSICIAN ASSISTANT

## 2024-12-26 PROCEDURE — 36415 COLL VENOUS BLD VENIPUNCTURE: CPT

## 2024-12-26 PROCEDURE — 86301 IMMUNOASSAY TUMOR CA 19-9: CPT

## 2024-12-26 PROCEDURE — G8427 DOCREV CUR MEDS BY ELIG CLIN: HCPCS | Performed by: PHYSICIAN ASSISTANT

## 2024-12-26 PROCEDURE — 3074F SYST BP LT 130 MM HG: CPT | Performed by: PHYSICIAN ASSISTANT

## 2024-12-26 PROCEDURE — 3078F DIAST BP <80 MM HG: CPT | Performed by: PHYSICIAN ASSISTANT

## 2024-12-26 PROCEDURE — G8417 CALC BMI ABV UP PARAM F/U: HCPCS | Performed by: PHYSICIAN ASSISTANT

## 2024-12-26 PROCEDURE — 84443 ASSAY THYROID STIM HORMONE: CPT

## 2024-12-26 PROCEDURE — 82378 CARCINOEMBRYONIC ANTIGEN: CPT

## 2024-12-26 PROCEDURE — G8484 FLU IMMUNIZE NO ADMIN: HCPCS | Performed by: PHYSICIAN ASSISTANT

## 2024-12-26 PROCEDURE — 1036F TOBACCO NON-USER: CPT | Performed by: PHYSICIAN ASSISTANT

## 2024-12-26 ASSESSMENT — ENCOUNTER SYMPTOMS
NAUSEA: 1
ABDOMINAL PAIN: 1

## 2024-12-26 NOTE — TELEPHONE ENCOUNTER
ARTHUR for EUS/ [place].  Dx: Pancreatic lesion.    Eliquis and ASA clearance request faxed to Dr. Wood's office.

## 2024-12-26 NOTE — PROGRESS NOTES
studies for likely pancreatic insufficiency    Encouraged attempting CT colonography however pt is hesitant to repeat at this time due to needing to do the bowel prep. States he will continue to consider it and discuss w his wife. Patient understands the risks associated with pushing colon cancer screening out further to include possible missed cancerous lesion      Medications and potential side effects were discussed with patient. GI rx refilled.   Diet/life style/natural hx /complication of the dx were all explained in detail     He should alert me if there are persistent or worsening symptoms, dysphagia, weight loss or GI bleeding.     Past medical, past surgical, social history, psychiatric history, medications or allergies, all reviewed and updated. I personally reviewed all labs results and imaging studies of the abdomen available which were ordered by the primary care physician, and the other consultants. I did review all the pathology from the biopsies done on the previous endoscopies.  I have reviewed and agree with the ROS entered by the MA/RN.     Thank you for allowing me to participate in the care of Mr. Colon. For any further questions please do not hesitate to contact me.      Electronically signed by VINNY Martinez on 12/26/2024 at 9:55 AM  [facility]  P: [phone]  F: [phone]

## 2024-12-26 NOTE — TELEPHONE ENCOUNTER
Medical surgical clearance request received 12/26/24    Surgeon: Dr Richard    Procedure: EUS    Date of Procedure: TBD    Last appt: 12/11/2024    Next appt: 3/11/2025    PATs received:    [] yes   [x] no

## 2024-12-27 ENCOUNTER — HOSPITAL ENCOUNTER (OUTPATIENT)
Age: 64
Discharge: HOME | End: 2024-12-27
Payer: COMMERCIAL

## 2024-12-27 DIAGNOSIS — R19.4 CHANGE IN BOWEL HABITS: ICD-10-CM

## 2024-12-27 DIAGNOSIS — R19.7 DIARRHEA, UNSPECIFIED TYPE: ICD-10-CM

## 2024-12-27 PROCEDURE — 82710 FATS/LIPIDS FECES QUANT: CPT

## 2024-12-27 PROCEDURE — 82653 EL-1 FECAL QUANTITATIVE: CPT

## 2024-12-27 NOTE — TELEPHONE ENCOUNTER
Eliquis and ASA clearance request received, patient to hold medications 3 days prior to the procedure and resume after.

## 2024-12-30 ENCOUNTER — PREP FOR PROCEDURE (OUTPATIENT)
Dept: GASTROENTEROLOGY | Age: 64
End: 2024-12-30

## 2024-12-30 DIAGNOSIS — K86.9 PANCREATIC LESION: ICD-10-CM

## 2024-12-30 LAB
FAT QUALITATIVE SPLIT STOOL: NORMAL
FECAL NEUTRAL FAT: NORMAL
FECAL PANCREATIC ELASTASE-1: >800 UG/G

## 2025-01-08 DIAGNOSIS — M25.562 CHRONIC PAIN OF LEFT KNEE: ICD-10-CM

## 2025-01-08 DIAGNOSIS — E08.41 DIABETIC MONONEUROPATHY ASSOCIATED WITH DIABETES MELLITUS DUE TO UNDERLYING CONDITION (HCC): ICD-10-CM

## 2025-01-08 DIAGNOSIS — E11.42 TYPE 2 DIABETES MELLITUS WITH DIABETIC POLYNEUROPATHY, WITH LONG-TERM CURRENT USE OF INSULIN (HCC): ICD-10-CM

## 2025-01-08 DIAGNOSIS — G89.29 CHRONIC PAIN OF LEFT KNEE: ICD-10-CM

## 2025-01-08 DIAGNOSIS — Z97.10 EMPLOYS PROSTHETIC LEG: ICD-10-CM

## 2025-01-08 DIAGNOSIS — Z79.4 TYPE 2 DIABETES MELLITUS WITH DIABETIC POLYNEUROPATHY, WITH LONG-TERM CURRENT USE OF INSULIN (HCC): ICD-10-CM

## 2025-01-08 RX ORDER — PREGABALIN 100 MG/1
100 CAPSULE ORAL 3 TIMES DAILY
Qty: 270 CAPSULE | Refills: 3 | Status: SHIPPED | OUTPATIENT
Start: 2025-01-08 | End: 2025-04-08

## 2025-01-13 ENCOUNTER — TELEPHONE (OUTPATIENT)
Dept: INTERNAL MEDICINE CLINIC | Age: 65
End: 2025-01-13

## 2025-01-13 NOTE — TELEPHONE ENCOUNTER
Patient called to inform that last night he experienced extreme sweating that lasted for at least for 4 hours but with no other symptoms.    Also informing that this happened in the past when he was having A-Fib episode. Patient would like to be advised

## 2025-01-14 ENCOUNTER — NURSE ONLY (OUTPATIENT)
Dept: INTERNAL MEDICINE CLINIC | Age: 65
End: 2025-01-14
Payer: MEDICARE

## 2025-01-14 VITALS — HEART RATE: 84 BPM | OXYGEN SATURATION: 97 % | TEMPERATURE: 97.1 F

## 2025-01-14 DIAGNOSIS — R05.3 CHRONIC COUGHING: ICD-10-CM

## 2025-01-14 DIAGNOSIS — R61 EXCESS, SECRETION, SWEAT: ICD-10-CM

## 2025-01-14 DIAGNOSIS — R05.9 COUGH, UNSPECIFIED TYPE: Primary | ICD-10-CM

## 2025-01-14 LAB
EXP DATE SOLUTION: NORMAL
EXP DATE SWAB: NORMAL
EXPIRATION DATE: NORMAL
INFLUENZA A RNA, POC: NORMAL
INFLUENZA B RNA, POC: NORMAL
LOT NUMBER POC: NORMAL
LOT NUMBER SOLUTION: NORMAL
LOT NUMBER SWAB: NORMAL
SARS-COV-2 RNA, POC: NEGATIVE
VALID INTERNAL CONTROL: NORMAL

## 2025-01-14 PROCEDURE — 87636 SARSCOV2 & INF A&B AMP PRB: CPT

## 2025-01-20 ENCOUNTER — TELEPHONE (OUTPATIENT)
Dept: SURGERY | Age: 65
End: 2025-01-20

## 2025-01-21 RX ORDER — LOSARTAN POTASSIUM 25 MG/1
25 TABLET ORAL DAILY
Qty: 90 TABLET | Refills: 3 | Status: SHIPPED | OUTPATIENT
Start: 2025-01-21

## 2025-01-21 NOTE — TELEPHONE ENCOUNTER
Spoke to patient to schedule clinic visit. Patient states he is already being seen by Dr. Tan to address the concern he was having. Referral will be noted and sent back to PCP office.

## 2025-01-22 ENCOUNTER — HOSPITAL ENCOUNTER (OUTPATIENT)
Dept: PREADMISSION TESTING | Age: 65
Discharge: HOME | End: 2025-01-26

## 2025-01-22 VITALS — HEIGHT: 75 IN | BODY MASS INDEX: 29.84 KG/M2 | WEIGHT: 240 LBS

## 2025-01-22 RX ORDER — ERGOCALCIFEROL 1.25 MG/1
50000 CAPSULE, LIQUID FILLED ORAL WEEKLY
Qty: 13 CAPSULE | Refills: 3 | Status: SHIPPED | OUTPATIENT
Start: 2025-01-22

## 2025-01-22 NOTE — PROGRESS NOTES
Pre-op Instructions For Out-Patient Endoscopy Surgery    Medication Instructions:  Please stop herbs and any supplements now (includes vitamins and minerals).    For these medications:  Dulaglutide (Trulicity), Exenatide (Byetta and Bydureon, Liraglutide (Victoza), Lixisenatide (Adlyxin), Semaglutide (Ozempic and Rybelsus), Tirzepatide (Mounjaro, Zepbound)- Stop 1 week prior if taking weekly or 1 day prior if taking every 12 hours or daily.     Please contact your surgeon and prescribing physician for pre-op instructions for any blood thinners. STOP ASPIRIN AND ELIQUIS AS DIRECTED    If you have inhalers/aerosol treatments at home, please use them the morning of your surgery and bring the inhalers with you to the hospital.    Please take the following medications the morning of your surgery with a sip of water:    Inhalers, Amiodarone, Amlodipine, Isosorbide mononitrate, Levothyroxine, Tambocor, and Metoprolol tartrate    Surgery Instructions:  After midnight before surgery:  Do not eat or drink anything, including water, mints, gum, and hard candy.  You may brush your teeth without swallowing.  No smoking, chewing tobacco, or street drugs.     ** Please Follow Bowel Prep instructions if given by surgeon's office**    Please shower or bathe before surgery.       Please do not wear any cologne, lotion, powder, jewelry, piercings, perfume, makeup, nail polish, hair accessories, or hair spray on the day of surgery.  Wear loose comfortable clothing.    Leave your valuables at home but bring a payment source for any after-surgery prescriptions you plan to fill at [facility].  Bring a storage case for any glasses/contacts.    An adult who is responsible for you MUST drive you home and should be with you for the first 24 hours after surgery.     The Day of Surgery:  Arrive at [facility] Surgery Entrance at the time directed by your surgeon and check in at the desk.     If you have a living will or

## 2025-02-03 NOTE — PRE-PROCEDURE INSTRUCTIONS
No answer, left message ?  YES                           Unable to leave message ?    When were you told to arrive at hospital ?  -1230    Do you have a  ?    Are you on any blood thinners ?                     If yes when did you stop taking ?    Do you have your prep Rx filled and instruction ?      Nothing to eat the day before , only clear liquids.    Are you experiencing any covid symptoms ?     Do you have any infections or rash we should be aware of ?      Do you have the Hibiclens soap to use the night before and the morning of surgery ?    Nothing to eat or drink after midnight, only a sip of water to take any medication instructed to take the night before.  Wear comfortable clothing, leave any valuables at home, remove any jewelry and body piercing .

## 2025-02-04 ENCOUNTER — ANESTHESIA EVENT (OUTPATIENT)
Dept: ENDOSCOPY | Age: 65
End: 2025-02-04
Payer: MEDICARE

## 2025-02-05 ENCOUNTER — HOSPITAL ENCOUNTER (OUTPATIENT)
Age: 65
Setting detail: OUTPATIENT SURGERY
Discharge: HOME | End: 2025-02-05
Attending: INTERNAL MEDICINE | Admitting: INTERNAL MEDICINE
Payer: MEDICARE

## 2025-02-05 ENCOUNTER — ANESTHESIA (OUTPATIENT)
Dept: ENDOSCOPY | Age: 65
End: 2025-02-05
Payer: MEDICARE

## 2025-02-05 VITALS
HEART RATE: 67 BPM | SYSTOLIC BLOOD PRESSURE: 126 MMHG | DIASTOLIC BLOOD PRESSURE: 61 MMHG | OXYGEN SATURATION: 96 % | BODY MASS INDEX: 29.22 KG/M2 | TEMPERATURE: 97.3 F | HEIGHT: 75 IN | RESPIRATION RATE: 17 BRPM | WEIGHT: 235 LBS

## 2025-02-05 LAB — GLUCOSE BLD-MCNC: 149 MG/DL (ref 75–110)

## 2025-02-05 PROCEDURE — 6360000002 HC RX W HCPCS: Performed by: NURSE ANESTHETIST, CERTIFIED REGISTERED

## 2025-02-05 PROCEDURE — C1753 CATH, INTRAVAS ULTRASOUND: HCPCS | Performed by: INTERNAL MEDICINE

## 2025-02-05 PROCEDURE — 82947 ASSAY GLUCOSE BLOOD QUANT: CPT

## 2025-02-05 PROCEDURE — 7100000010 HC PHASE II RECOVERY - FIRST 15 MIN: Performed by: INTERNAL MEDICINE

## 2025-02-05 PROCEDURE — 3700000001 HC ADD 15 MINUTES (ANESTHESIA): Performed by: INTERNAL MEDICINE

## 2025-02-05 PROCEDURE — 2709999900 HC NON-CHARGEABLE SUPPLY: Performed by: INTERNAL MEDICINE

## 2025-02-05 PROCEDURE — 43235 EGD DIAGNOSTIC BRUSH WASH: CPT | Performed by: INTERNAL MEDICINE

## 2025-02-05 PROCEDURE — 2580000003 HC RX 258: Performed by: ANESTHESIOLOGY

## 2025-02-05 PROCEDURE — 3609017100 HC EGD: Performed by: INTERNAL MEDICINE

## 2025-02-05 PROCEDURE — 7100000011 HC PHASE II RECOVERY - ADDTL 15 MIN: Performed by: INTERNAL MEDICINE

## 2025-02-05 PROCEDURE — 3700000000 HC ANESTHESIA ATTENDED CARE: Performed by: INTERNAL MEDICINE

## 2025-02-05 PROCEDURE — C1726 CATH, BAL DIL, NON-VASCULAR: HCPCS | Performed by: INTERNAL MEDICINE

## 2025-02-05 RX ORDER — METOCLOPRAMIDE 10 MG/1
10 TABLET ORAL
Qty: 120 TABLET | Refills: 3 | Status: SHIPPED | OUTPATIENT
Start: 2025-02-05

## 2025-02-05 RX ORDER — LIDOCAINE HYDROCHLORIDE 10 MG/ML
1 INJECTION, SOLUTION EPIDURAL; INFILTRATION; INTRACAUDAL; PERINEURAL
Status: DISCONTINUED | OUTPATIENT
Start: 2025-02-05 | End: 2025-02-05 | Stop reason: HOSPADM

## 2025-02-05 RX ORDER — LIDOCAINE HYDROCHLORIDE 20 MG/ML
INJECTION, SOLUTION EPIDURAL; INFILTRATION; INTRACAUDAL; PERINEURAL
Status: DISCONTINUED | OUTPATIENT
Start: 2025-02-05 | End: 2025-02-05 | Stop reason: SDUPTHER

## 2025-02-05 RX ORDER — SODIUM CHLORIDE 9 MG/ML
INJECTION, SOLUTION INTRAVENOUS CONTINUOUS
Status: DISCONTINUED | OUTPATIENT
Start: 2025-02-05 | End: 2025-02-05 | Stop reason: HOSPADM

## 2025-02-05 RX ORDER — SODIUM CHLORIDE 0.9 % (FLUSH) 0.9 %
5-40 SYRINGE (ML) INJECTION EVERY 12 HOURS SCHEDULED
Status: DISCONTINUED | OUTPATIENT
Start: 2025-02-05 | End: 2025-02-05 | Stop reason: HOSPADM

## 2025-02-05 RX ORDER — PROPOFOL 10 MG/ML
INJECTION, EMULSION INTRAVENOUS
Status: DISCONTINUED | OUTPATIENT
Start: 2025-02-05 | End: 2025-02-05 | Stop reason: SDUPTHER

## 2025-02-05 RX ORDER — SODIUM CHLORIDE 9 MG/ML
INJECTION, SOLUTION INTRAVENOUS PRN
Status: DISCONTINUED | OUTPATIENT
Start: 2025-02-05 | End: 2025-02-05 | Stop reason: HOSPADM

## 2025-02-05 RX ORDER — SODIUM CHLORIDE 0.9 % (FLUSH) 0.9 %
5-40 SYRINGE (ML) INJECTION PRN
Status: DISCONTINUED | OUTPATIENT
Start: 2025-02-05 | End: 2025-02-05 | Stop reason: HOSPADM

## 2025-02-05 RX ADMIN — LIDOCAINE HYDROCHLORIDE 100 MG: 20 INJECTION, SOLUTION EPIDURAL; INFILTRATION; INTRACAUDAL; PERINEURAL at 15:42

## 2025-02-05 RX ADMIN — PROPOFOL 50 MG: 10 INJECTION, EMULSION INTRAVENOUS at 15:42

## 2025-02-05 RX ADMIN — SODIUM CHLORIDE: 9 INJECTION, SOLUTION INTRAVENOUS at 13:50

## 2025-02-05 ASSESSMENT — ENCOUNTER SYMPTOMS
ABDOMINAL PAIN: 1
COUGH: 1
WHEEZING: 1
BACK PAIN: 1
SHORTNESS OF BREATH: 1

## 2025-02-05 ASSESSMENT — PAIN DESCRIPTION - DESCRIPTORS: DESCRIPTORS: ACHING

## 2025-02-05 ASSESSMENT — PAIN - FUNCTIONAL ASSESSMENT
PAIN_FUNCTIONAL_ASSESSMENT: 0-10
PAIN_FUNCTIONAL_ASSESSMENT: NONE - DENIES PAIN

## 2025-02-05 NOTE — ANESTHESIA PRE PROCEDURE
Department of Anesthesiology  Preprocedure Note       Name:  Jay Colon   Age:  65 y.o.  :  1960                                          MRN:  [id number]         Date:  2025      Surgeon: Surgeon(s):  Patricia Tan MD    Procedure: Procedure(s):  ESOPHAGOGASTRODUODENOSCOPY ULTRASOUND, LINEAR SCOPE    Medications prior to admission:   Prior to Admission medications    Medication Sig Start Date End Date Taking? Authorizing Provider   furosemide (LASIX) 20 MG tablet TAKE 1 TABLET BY MOUTH ONCE  DAILY 25  Yes Clinton Bhatia DO   vitamin D (ERGOCALCIFEROL) 1.25 MG (21508 UT) CAPS capsule TAKE 1 CAPSULE BY MOUTH ONCE  WEEKLY 25  Yes Renny Wood MD   omeprazole (PRILOSEC) 20 MG delayed release capsule TAKE 1 CAPSULE BY MOUTH DAILY 25  Yes Renny Wood MD   losartan (COZAAR) 25 MG tablet TAKE 1 TABLET BY MOUTH DAILY 25  Yes Renny Wood MD   pregabalin (LYRICA) 100 MG capsule Take 1 capsule by mouth in the morning, at noon, and at bedtime for 90 days. 25 Yes Renny Wood MD   dicyclomine (BENTYL) 10 MG capsule Take 1 capsule by mouth every 6 hours as needed (Bowel spasms) 24 Yes Ken Smith MD   budesonide-formoterol (SYMBICORT) 160-4.5 MCG/ACT AERO INHALE 2 PUFFS INTO THE LUNGS TWICE A DAY 24  Yes Buffy Marques MD   metoprolol tartrate (LOPRESSOR) 50 MG tablet TAKE 1 TABLET BY MOUTH TWICE  DAILY 24  Yes Buffy Marques MD   clotrimazole-betamethasone (LOTRISONE) 1-0.05 % cream Bid topical 24  Yes Magdaleno Duarte MD   amiodarone (CORDARONE) 200 MG tablet TAKE 1 TABLET BY MOUTH TWICE  DAILY 24  Yes Magdaleno Duarte MD   finasteride (PROSCAR) 5 MG tablet Take 1 tablet by mouth daily 10/14/24  Yes Renny Wood MD   gemfibrozil (LOPID) 600 MG tablet Take 1 tablet by mouth daily 10/14/24  Yes Renny Wood MD   donepezil (ARICEPT) 5 MG tablet Take 1 tablet by mouth nightly 24  Yes Renny Wood MD

## 2025-02-05 NOTE — H&P
Cervical back: Normal range of motion and neck supple.      Right lower leg: No edema.      Comments: left leg amputation below the knee and he is wearing prosthetic leg   Skin:     General: Skin is warm and dry.      Findings: No bruising or erythema.   Neurological:      General: No focal deficit present.      Mental Status: He is alert and oriented to person, place, and time.      Gait: Gait abnormal (pt present today on wheelchair).   Psychiatric:         Mood and Affect: Mood normal.         Behavior: Behavior normal.                   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[facility]) 01/08/2024    Stage 3b chronic kidney disease ([facility]) 01/08/2024    Painful orthopaedic hardware ([facility]) 11/29/2023    Coagulation defect ([facility]) 09/06/2023    Severe comorbid illness 07/13/2023    Lumbosacral spondylosis without myelopathy 04/24/2023    Anginal equivalent ([facility]) 01/19/2022    Hypertrophic cardiomyopathy ([facility]) 12/30/2021    Chest pain 12/28/2021    Chronic diastolic heart failure with preserved EF 12/28/2021    BPH  12/28/2021    Employs prosthetic leg s/p left BKA 12/28/2021    Lumbar radiculopathy     Lumbar stenosis with neurogenic claudication 06/11/2021    Malignant neoplasm of right kidney ([facility]) 05/04/2021    Spinal stenosis of lumbar region with neurogenic claudication 05/04/2021    Other chest pain 05/04/2021    Acute bilateral low back pain without sciatica 01/11/2021    Dermatophytoses 10/07/2020    Dizziness

## 2025-02-05 NOTE — ANESTHESIA POSTPROCEDURE EVALUATION
Department of Anesthesiology  Postprocedure Note    Patient: Jay Colon  MRN: [id number]  YOB: 1960  Date of evaluation: 2/5/2025    Procedure Summary       Date: 02/05/25 Room / Location: [place] [address] / [facility]    Anesthesia Start: 1538 Anesthesia Stop: 1608    Procedure: ESOPHAGOGASTRODUODENOSCOPY Diagnosis:       Pancreatic lesion      (Pancreatic lesion [K86.9])    Surgeons: Patricia Tan MD Responsible Provider: Paulette Chiang MD    Anesthesia Type: General ASA Status: 3            Anesthesia Type: General    Cass Phase I: Cass Score: 10    Cass Phase II: Cass Score: 10    Anesthesia Post Evaluation    Comments: POST- ANESTHESIA EVALUATION       Pt Name: Jay Colon  MRN: [id number]  YOB: 1960  Date of evaluation: 2/5/2025  Time:  4:35 PM      /61   Pulse 67   Temp 97.3 °F (36.3 °C)   Resp 17   Ht 1.905 m (6' 3\")   Wt 106.6 kg (235 lb)   SpO2 96%   BMI 29.37 kg/m²      Consciousness Level  Awake  Cardiopulmonary Status  Stable  Pain Adequately Treated YES  Nausea / Vomiting  NO  Adequate Hydration  YES  Anesthesia Related Complications NONE      Patient had foreign body/food in the lower esophagus and so only had EGD done as EUS procedure could not be done. Patient to be done next time under GETA.  On further questioning - patient stated he stopped Mounjaro more than one week prior to the procedure.    Electronically signed by Paulette Chiang MD on 2/5/2025 at 4:35 PM           No notable events documented.

## 2025-02-06 ENCOUNTER — TELEPHONE (OUTPATIENT)
Dept: GASTROENTEROLOGY | Age: 65
End: 2025-02-06

## 2025-02-06 NOTE — TELEPHONE ENCOUNTER
Patient called and stated that Dr Tan could not do procedure because he was full of food.  So patient wants to reschedule the procedure. Please call back

## 2025-02-12 ENCOUNTER — PATIENT MESSAGE (OUTPATIENT)
Dept: GASTROENTEROLOGY | Age: 65
End: 2025-02-12

## 2025-02-12 ENCOUNTER — PREP FOR PROCEDURE (OUTPATIENT)
Dept: GASTROENTEROLOGY | Age: 65
End: 2025-02-12

## 2025-02-14 ENCOUNTER — HOSPITAL ENCOUNTER (OUTPATIENT)
Dept: PREADMISSION TESTING | Age: 65
Discharge: HOME | End: 2025-02-18

## 2025-02-14 ENCOUNTER — TELEPHONE (OUTPATIENT)
Dept: INTERNAL MEDICINE CLINIC | Age: 65
End: 2025-02-14

## 2025-02-14 VITALS — WEIGHT: 225 LBS | BODY MASS INDEX: 27.98 KG/M2 | HEIGHT: 75 IN

## 2025-02-14 NOTE — PROGRESS NOTES
Pre-op Instructions For Out-Patient Endoscopy Surgery    Medication Instructions:  Please stop herbs and any supplements now (includes vitamins and minerals).    For these medications:  Dulaglutide (Trulicity), Exenatide (Byetta and Bydureon, Liraglutide (Victoza), Lixisenatide (Adlyxin), Semaglutide (Ozempic and Rybelsus), Tirzepatide (Mounjaro, Zepbound)- Stop 1 week prior if taking weekly or 1 day prior if taking every 12 hours or daily. (Patient claims he is no longer taking Mounjaro)     Please contact your surgeon and prescribing physician for pre-op instructions for any blood thinners. Stop Aspirin, Eliquis, Trujeo as directed.     If you have inhalers/aerosol treatments at home, please use them the morning of your surgery and bring the inhalers with you to the hospital.    Please take the following medications the morning of your surgery with a sip of water:    Inhaler, Flecainide, Amiodarone, Imdur, Amlodipine, Metoprolol, Levothyroxine     Surgery Instructions:  After midnight before surgery:  Do not eat or drink anything, including water, mints, gum, and hard candy.  You may brush your teeth without swallowing.  No smoking, chewing tobacco, or street drugs.    Please shower or bathe before surgery.       Please do not wear any cologne, lotion, powder, jewelry, piercings, perfume, makeup, nail polish, hair accessories, or hair spray on the day of surgery.  Wear loose comfortable clothing.    Leave your valuables at home but bring a payment source for any after-surgery prescriptions you plan to fill at [facility].  Bring a storage case for any glasses/contacts.    An adult who is responsible for you MUST drive you home and should be with you for the first 24 hours after surgery.     The Day of Surgery: 2/19/25 @ 11:30 am    Arrive at [facility] Surgery Entrance at 9:30 am and check in at the desk.     If you have a living will or healthcare power of , please bring a

## 2025-02-14 NOTE — TELEPHONE ENCOUNTER
Medical surgical clearance request received 02/14/25    Surgeon: Dr. Tan    Procedure: EUS    Date of Procedure: 2/19/2025    Last appt: 12/11/2024    Next appt: 3/11/2025    PATs received:  No    Placed in Dr. Wood's box.

## 2025-02-17 NOTE — TELEPHONE ENCOUNTER
Taisha from [facility] called inquiring about surgical clearance. She states it should have been put in as a stat.     Surgery is scheduled for Wednesday.    Please advise

## 2025-02-17 NOTE — PRE-PROCEDURE INSTRUCTIONS
Have you received your Prep? Any questions with prep instructions? NA  Only Clear Liquid Diet day before.NA  Nothing to eat after midnight day before procedure.  Are you taking any blood thinners? If so, you need to Stop.  Remove any jewelry and body piercings.  Do you wear glasses? If so, please bring a case to store them in.  Are you having any Covid symptoms?  Do you have any new rashes, infections, etc. that we should be aware of?  Do you have a ride home the day of surgery? It cannot be a cab or medical transportation.  Verify surgery time/date and what time to arrive at hospital.  NO ANSWER,  LEFT TO ARRIVE AT 0930

## 2025-02-18 ENCOUNTER — OFFICE VISIT (OUTPATIENT)
Dept: INTERNAL MEDICINE CLINIC | Age: 65
End: 2025-02-18

## 2025-02-18 ENCOUNTER — ANESTHESIA EVENT (OUTPATIENT)
Dept: ENDOSCOPY | Age: 65
End: 2025-02-18
Payer: MEDICARE

## 2025-02-18 VITALS — BODY MASS INDEX: 30 KG/M2 | WEIGHT: 240 LBS | SYSTOLIC BLOOD PRESSURE: 110 MMHG | DIASTOLIC BLOOD PRESSURE: 62 MMHG

## 2025-02-18 DIAGNOSIS — E78.00 PURE HYPERCHOLESTEROLEMIA: ICD-10-CM

## 2025-02-18 DIAGNOSIS — N18.30 STAGE 3 CHRONIC KIDNEY DISEASE, UNSPECIFIED WHETHER STAGE 3A OR 3B CKD (HCC): Primary | ICD-10-CM

## 2025-02-18 DIAGNOSIS — G95.19 OTHER VASCULAR MYELOPATHIES (HCC): ICD-10-CM

## 2025-02-18 DIAGNOSIS — N18.32 STAGE 3B CHRONIC KIDNEY DISEASE (HCC): ICD-10-CM

## 2025-02-18 DIAGNOSIS — I48.0 PAF (PAROXYSMAL ATRIAL FIBRILLATION) (HCC): ICD-10-CM

## 2025-02-18 DIAGNOSIS — E08.41 DIABETIC MONONEUROPATHY ASSOCIATED WITH DIABETES MELLITUS DUE TO UNDERLYING CONDITION (HCC): ICD-10-CM

## 2025-02-18 DIAGNOSIS — I87.331 CHRONIC VENOUS HYPERTENSION WITH ULCER AND INFLAMMATION INVOLVING RIGHT SIDE (HCC): ICD-10-CM

## 2025-02-18 DIAGNOSIS — G30.9 ALZHEIMER'S DISEASE, UNSPECIFIED (CODE) (HCC): ICD-10-CM

## 2025-02-18 DIAGNOSIS — C64.1 MALIGNANT NEOPLASM OF RIGHT KIDNEY (HCC): ICD-10-CM

## 2025-02-18 DIAGNOSIS — E11.42 TYPE 2 DIABETES MELLITUS WITH DIABETIC POLYNEUROPATHY, WITH LONG-TERM CURRENT USE OF INSULIN (HCC): ICD-10-CM

## 2025-02-18 DIAGNOSIS — Z79.4 TYPE 2 DIABETES MELLITUS WITH DIABETIC POLYNEUROPATHY, WITH LONG-TERM CURRENT USE OF INSULIN (HCC): ICD-10-CM

## 2025-02-18 DIAGNOSIS — I50.32 CHRONIC DIASTOLIC HEART FAILURE (HCC): ICD-10-CM

## 2025-02-18 DIAGNOSIS — G20.A1 PARKINSON'S DISEASE WITHOUT FLUCTUATING MANIFESTATIONS, UNSPECIFIED WHETHER DYSKINESIA PRESENT (HCC): ICD-10-CM

## 2025-02-18 DIAGNOSIS — G54.6 PHANTOM LIMB PAIN (HCC): ICD-10-CM

## 2025-02-18 SDOH — ECONOMIC STABILITY: FOOD INSECURITY: WITHIN THE PAST 12 MONTHS, THE FOOD YOU BOUGHT JUST DIDN'T LAST AND YOU DIDN'T HAVE MONEY TO GET MORE.: NEVER TRUE

## 2025-02-18 SDOH — ECONOMIC STABILITY: FOOD INSECURITY: WITHIN THE PAST 12 MONTHS, YOU WORRIED THAT YOUR FOOD WOULD RUN OUT BEFORE YOU GOT MONEY TO BUY MORE.: NEVER TRUE

## 2025-02-18 ASSESSMENT — ENCOUNTER SYMPTOMS
SHORTNESS OF BREATH: 0
BLOOD IN STOOL: 0
TROUBLE SWALLOWING: 0
WHEEZING: 0
COUGH: 0
ABDOMINAL PAIN: 1
DIARRHEA: 0
EYE PAIN: 0
COLOR CHANGE: 0
ABDOMINAL DISTENTION: 1
EYE DISCHARGE: 0

## 2025-02-18 NOTE — PROGRESS NOTES
Current Outpatient Medications   Medication Sig Dispense Refill    metoclopramide (REGLAN) 10 MG tablet Take 1 tablet by mouth 4 times daily (before meals and nightly) 120 tablet 3    furosemide (LASIX) 20 MG tablet TAKE 1 TABLET BY MOUTH ONCE  DAILY 90 tablet 3    vitamin D (ERGOCALCIFEROL) 1.25 MG (72288 UT) CAPS capsule TAKE 1 CAPSULE BY MOUTH ONCE  WEEKLY 13 capsule 3    omeprazole (PRILOSEC) 20 MG delayed release capsule TAKE 1 CAPSULE BY MOUTH DAILY 90 capsule 3    losartan (COZAAR) 25 MG tablet TAKE 1 TABLET BY MOUTH DAILY 90 tablet 3    pregabalin (LYRICA) 100 MG capsule Take 1 capsule by mouth in the morning, at noon, and at bedtime for 90 days. 270 capsule 3    budesonide-formoterol (SYMBICORT) 160-4.5 MCG/ACT AERO INHALE 2 PUFFS INTO THE LUNGS TWICE A DAY 10.2 each 10    metoprolol tartrate (LOPRESSOR) 50 MG tablet TAKE 1 TABLET BY MOUTH TWICE  DAILY 180 tablet 3    clotrimazole-betamethasone (LOTRISONE) 1-0.05 % cream Bid topical 45 g 3    amiodarone (CORDARONE) 200 MG tablet TAKE 1 TABLET BY MOUTH TWICE  DAILY 180 tablet 3    donepezil (ARICEPT) 5 MG tablet Take 1 tablet by mouth nightly 90 tablet 3    amLODIPine (NORVASC) 2.5 MG tablet TAKE 1 TABLET BY MOUTH ONCE  DAILY 90 tablet 3    levothyroxine (SYNTHROID) 175 MCG tablet TAKE 1 TABLET BY MOUTH ONCE  DAILY 90 tablet 3    tamsulosin (FLOMAX) 0.4 MG capsule TAKE 1 CAPSULE BY MOUTH ONCE  DAILY 90 capsule 3    insulin lispro, 1 Unit Dial, (HUMALOG/ADMELOG) 100 UNIT/ML SOPN INJECT SUBCUTANEOUSLY 10 UNITS 3 TIMES DAILY BEFORE MEALS 15 mL 6    ELIQUIS 5 MG TABS tablet TAKE 1 TABLET BY MOUTH TWICE  DAILY 120 tablet 5    B-D UF III MINI PEN NEEDLES 31G X 5 MM MISC USE AS DIRECTED ONCE DAILY  TO INJECT TOUJEO 90 each 3    isosorbide mononitrate (IMDUR) 30 MG extended release tablet TAKE 1 TABLET BY MOUTH ONCE  DAILY 30 tablet 11    atorvastatin (LIPITOR) 40 MG tablet take 1 tablet by mouth once daily 90 tablet 3    traZODone (DESYREL) 150 MG tablet Take 1

## 2025-02-18 NOTE — TELEPHONE ENCOUNTER
Patient states he believed he was already cleared for surgery and is going to call the GI office and let us know.

## 2025-02-19 ENCOUNTER — HOSPITAL ENCOUNTER (OUTPATIENT)
Age: 65
Setting detail: OUTPATIENT SURGERY
Discharge: HOME | End: 2025-02-19
Attending: INTERNAL MEDICINE | Admitting: INTERNAL MEDICINE
Payer: MEDICARE

## 2025-02-19 ENCOUNTER — ANESTHESIA (OUTPATIENT)
Dept: ENDOSCOPY | Age: 65
End: 2025-02-19
Payer: MEDICARE

## 2025-02-19 VITALS
WEIGHT: 240 LBS | DIASTOLIC BLOOD PRESSURE: 53 MMHG | BODY MASS INDEX: 29.84 KG/M2 | OXYGEN SATURATION: 93 % | HEART RATE: 61 BPM | HEIGHT: 75 IN | SYSTOLIC BLOOD PRESSURE: 92 MMHG | TEMPERATURE: 98 F | RESPIRATION RATE: 12 BRPM

## 2025-02-19 LAB — GLUCOSE BLD-MCNC: 300 MG/DL (ref 75–110)

## 2025-02-19 PROCEDURE — 43259 EGD US EXAM DUODENUM/JEJUNUM: CPT | Performed by: INTERNAL MEDICINE

## 2025-02-19 PROCEDURE — 2500000003 HC RX 250 WO HCPCS: Performed by: NURSE ANESTHETIST, CERTIFIED REGISTERED

## 2025-02-19 PROCEDURE — C1753 CATH, INTRAVAS ULTRASOUND: HCPCS | Performed by: INTERNAL MEDICINE

## 2025-02-19 PROCEDURE — 2709999900 HC NON-CHARGEABLE SUPPLY: Performed by: INTERNAL MEDICINE

## 2025-02-19 PROCEDURE — 3609018500 HC EGD US SCOPE W/ADJACENT STRUCTURES: Performed by: INTERNAL MEDICINE

## 2025-02-19 PROCEDURE — 2580000003 HC RX 258: Performed by: ANESTHESIOLOGY

## 2025-02-19 PROCEDURE — 7100000011 HC PHASE II RECOVERY - ADDTL 15 MIN: Performed by: INTERNAL MEDICINE

## 2025-02-19 PROCEDURE — 7100000010 HC PHASE II RECOVERY - FIRST 15 MIN: Performed by: INTERNAL MEDICINE

## 2025-02-19 PROCEDURE — 2500000003 HC RX 250 WO HCPCS: Performed by: ANESTHESIOLOGY

## 2025-02-19 PROCEDURE — 6360000002 HC RX W HCPCS: Performed by: ANESTHESIOLOGY

## 2025-02-19 PROCEDURE — C1726 CATH, BAL DIL, NON-VASCULAR: HCPCS | Performed by: INTERNAL MEDICINE

## 2025-02-19 PROCEDURE — 3700000000 HC ANESTHESIA ATTENDED CARE: Performed by: INTERNAL MEDICINE

## 2025-02-19 PROCEDURE — 3700000001 HC ADD 15 MINUTES (ANESTHESIA): Performed by: INTERNAL MEDICINE

## 2025-02-19 PROCEDURE — 6360000002 HC RX W HCPCS: Performed by: NURSE ANESTHETIST, CERTIFIED REGISTERED

## 2025-02-19 PROCEDURE — 82947 ASSAY GLUCOSE BLOOD QUANT: CPT

## 2025-02-19 RX ORDER — SODIUM CHLORIDE 0.9 % (FLUSH) 0.9 %
5-40 SYRINGE (ML) INJECTION EVERY 12 HOURS SCHEDULED
Status: DISCONTINUED | OUTPATIENT
Start: 2025-02-19 | End: 2025-02-19 | Stop reason: HOSPADM

## 2025-02-19 RX ORDER — SODIUM CHLORIDE 9 MG/ML
INJECTION, SOLUTION INTRAVENOUS PRN
Status: DISCONTINUED | OUTPATIENT
Start: 2025-02-19 | End: 2025-02-19 | Stop reason: HOSPADM

## 2025-02-19 RX ORDER — LIDOCAINE HYDROCHLORIDE 20 MG/ML
INJECTION, SOLUTION INFILTRATION; PERINEURAL
Status: DISCONTINUED | OUTPATIENT
Start: 2025-02-19 | End: 2025-02-19 | Stop reason: SDUPTHER

## 2025-02-19 RX ORDER — PROPOFOL 10 MG/ML
INJECTION, EMULSION INTRAVENOUS
Status: DISCONTINUED | OUTPATIENT
Start: 2025-02-19 | End: 2025-02-19 | Stop reason: SDUPTHER

## 2025-02-19 RX ORDER — LIDOCAINE HYDROCHLORIDE 10 MG/ML
1 INJECTION, SOLUTION EPIDURAL; INFILTRATION; INTRACAUDAL; PERINEURAL
Status: DISCONTINUED | OUTPATIENT
Start: 2025-02-19 | End: 2025-02-19 | Stop reason: HOSPADM

## 2025-02-19 RX ORDER — SODIUM CHLORIDE 9 MG/ML
INJECTION, SOLUTION INTRAVENOUS CONTINUOUS
Status: DISCONTINUED | OUTPATIENT
Start: 2025-02-19 | End: 2025-02-19 | Stop reason: HOSPADM

## 2025-02-19 RX ORDER — METOCLOPRAMIDE HYDROCHLORIDE 5 MG/ML
10 INJECTION INTRAMUSCULAR; INTRAVENOUS ONCE
Status: COMPLETED | OUTPATIENT
Start: 2025-02-19 | End: 2025-02-19

## 2025-02-19 RX ORDER — SODIUM CHLORIDE 0.9 % (FLUSH) 0.9 %
5-40 SYRINGE (ML) INJECTION PRN
Status: DISCONTINUED | OUTPATIENT
Start: 2025-02-19 | End: 2025-02-19 | Stop reason: HOSPADM

## 2025-02-19 RX ORDER — FENTANYL CITRATE 50 UG/ML
INJECTION, SOLUTION INTRAMUSCULAR; INTRAVENOUS
Status: DISCONTINUED | OUTPATIENT
Start: 2025-02-19 | End: 2025-02-19 | Stop reason: SDUPTHER

## 2025-02-19 RX ORDER — EPHEDRINE SULFATE/0.9% NACL/PF 25 MG/5 ML
SYRINGE (ML) INTRAVENOUS
Status: DISCONTINUED | OUTPATIENT
Start: 2025-02-19 | End: 2025-02-19 | Stop reason: SDUPTHER

## 2025-02-19 RX ADMIN — EPHEDRINE SULFATE 10 MG: 5 INJECTION INTRAVENOUS at 12:15

## 2025-02-19 RX ADMIN — PROPOFOL 50 MG: 10 INJECTION, EMULSION INTRAVENOUS at 12:28

## 2025-02-19 RX ADMIN — PROPOFOL 100 MG: 10 INJECTION, EMULSION INTRAVENOUS at 12:10

## 2025-02-19 RX ADMIN — PHENYLEPHRINE HYDROCHLORIDE 100 MCG: 10 INJECTION INTRAVENOUS at 12:24

## 2025-02-19 RX ADMIN — FAMOTIDINE 20 MG: 10 INJECTION, SOLUTION INTRAVENOUS at 12:01

## 2025-02-19 RX ADMIN — FENTANYL CITRATE 25 MCG: 50 INJECTION INTRAMUSCULAR; INTRAVENOUS at 12:15

## 2025-02-19 RX ADMIN — SODIUM CHLORIDE: 0.9 INJECTION, SOLUTION INTRAVENOUS at 10:54

## 2025-02-19 RX ADMIN — EPHEDRINE SULFATE 15 MG: 5 INJECTION INTRAVENOUS at 12:13

## 2025-02-19 RX ADMIN — LIDOCAINE HYDROCHLORIDE 50 MG: 20 INJECTION, SOLUTION INFILTRATION; PERINEURAL at 12:10

## 2025-02-19 RX ADMIN — PROPOFOL 50 MG: 10 INJECTION, EMULSION INTRAVENOUS at 12:14

## 2025-02-19 RX ADMIN — METOCLOPRAMIDE 10 MG: 5 INJECTION, SOLUTION INTRAMUSCULAR; INTRAVENOUS at 11:59

## 2025-02-19 RX ADMIN — PROPOFOL 50 MG: 10 INJECTION, EMULSION INTRAVENOUS at 12:21

## 2025-02-19 RX ADMIN — PHENYLEPHRINE HYDROCHLORIDE 100 MCG: 10 INJECTION INTRAVENOUS at 12:31

## 2025-02-19 RX ADMIN — PHENYLEPHRINE HYDROCHLORIDE 100 MCG: 10 INJECTION INTRAVENOUS at 12:39

## 2025-02-19 RX ADMIN — FENTANYL CITRATE 75 MCG: 50 INJECTION INTRAMUSCULAR; INTRAVENOUS at 12:08

## 2025-02-19 ASSESSMENT — PAIN - FUNCTIONAL ASSESSMENT
PAIN_FUNCTIONAL_ASSESSMENT: NONE - DENIES PAIN
PAIN_FUNCTIONAL_ASSESSMENT: 0-10

## 2025-02-19 ASSESSMENT — ENCOUNTER SYMPTOMS
SORE THROAT: 0
BACK PAIN: 1
SHORTNESS OF BREATH: 0
COUGH: 0
TROUBLE SWALLOWING: 1

## 2025-02-19 ASSESSMENT — PAIN DESCRIPTION - DESCRIPTORS: DESCRIPTORS: ACHING

## 2025-02-19 NOTE — DISCHARGE INSTRUCTIONS
you are agreeing to.   Follow-up care is a key part of your treatment and safety. Be sure to make and go to all appointments, and call your doctor if you are having problems. It's also a good idea to know your test results and keep a list of the medicines you take.  When should you call for help?   Call 911 anytime you think you may need emergency care. For example, call if:    You passed out (lost consciousness).     You have trouble breathing.     You vomit blood or what looks like coffee grounds.     Your stools are maroon or very bloody.   Call your doctor now or seek immediate medical care if:    You are vomiting.     You have new or worse belly pain.     You have a fever.     You cannot pass stools or gas.     Your stools are black and look like tar, or they have streaks of blood.   Watch closely for any changes in your health, and be sure to contact your doctor if:    You do not get better as expected.   Current as of: October 19, 2023  Content Version: 14.3  © 2024 [employer].   Care instructions adapted under license by [employer]. If you have questions about a medical condition or this instruction, always ask your healthcare professional. [employer] disclaims any warranty or liability for your use of this information.

## 2025-02-19 NOTE — ANESTHESIA PRE PROCEDURE
Department of Anesthesiology  Preprocedure Note       Name:  Jay Colon   Age:  65 y.o.  :  1960                                          MRN:  [id number]         Date:  2025      Surgeon: Surgeon(s):  Patricia Tan MD    Procedure: Procedure(s):  ESOPHAGOGASTRODUODENOSCOPY ULTRASOUND-LINEAR SCOPE    Medications prior to admission:   Prior to Admission medications    Medication Sig Start Date End Date Taking? Authorizing Provider   metoclopramide (REGLAN) 10 MG tablet Take 1 tablet by mouth 4 times daily (before meals and nightly) 25   Patricia Tan MD   furosemide (LASIX) 20 MG tablet TAKE 1 TABLET BY MOUTH ONCE  DAILY 25   Clinton Bhatia DO   vitamin D (ERGOCALCIFEROL) 1.25 MG (48075 UT) CAPS capsule TAKE 1 CAPSULE BY MOUTH ONCE  WEEKLY 25   Renny Wood MD   omeprazole (PRILOSEC) 20 MG delayed release capsule TAKE 1 CAPSULE BY MOUTH DAILY 25   Renny Wood MD   losartan (COZAAR) 25 MG tablet TAKE 1 TABLET BY MOUTH DAILY 25   Renny Wood MD   pregabalin (LYRICA) 100 MG capsule Take 1 capsule by mouth in the morning, at noon, and at bedtime for 90 days. 25  Renny Wood MD   dicyclomine (BENTYL) 10 MG capsule Take 1 capsule by mouth every 6 hours as needed (Bowel spasms) 24  Ken Smith MD   budesonide-formoterol (SYMBICORT) 160-4.5 MCG/ACT AERO INHALE 2 PUFFS INTO THE LUNGS TWICE A DAY 24   Buffy Marques MD   metoprolol tartrate (LOPRESSOR) 50 MG tablet TAKE 1 TABLET BY MOUTH TWICE  DAILY 24   Buffy Marques MD   clotrimazole-betamethasone (LOTRISONE) 1-0.05 % cream Bid topical 24   Magdaleno Duarte MD   amiodarone (CORDARONE) 200 MG tablet TAKE 1 TABLET BY MOUTH TWICE  DAILY 24   Magdaleno Duarte MD   finasteride (PROSCAR) 5 MG tablet Take 1 tablet by mouth daily  Patient not taking: Reported on 2025 10/14/24   Renny Wood MD   gemfibrozil (LOPID) 600 MG tablet Take 1 tablet by mouth daily

## 2025-02-19 NOTE — OP NOTE
PROCEDURE NOTE    DATE OF PROCEDURE: 2/19/2025     SURGEON: Patricia Tan MD  Facility: \"[facility]  ASSISTANT: None  Anesthesia: Monitored anesthesia care  PREOPERATIVE DIAGNOSIS: Pancreatic cyst    Diagnosis:    POSTOPERATIVE DIAGNOSIS: As described below    OPERATION: Upper GI endoscopy with Endoscopic ultrasound with FNA    ANESTHESIA: Moderate Sedation     ESTIMATED BLOOD LOSS: Less than 50 ml    COMPLICATIONS: None.     SPECIMENS:  Was Not Obtained    HISTORY: The patient is a 65 y.o. year old male with history of above preop diagnosis.  I recommended esophagogastroduodenoscopy with possible biopsy and I explained the risk, benefits, expected outcome, and alternatives to the procedure.  Risks included but are not limited to bleeding, infection, respiratory distress, hypotension, and perforation of the esophagus, stomach, or duodenum.  Patient understands and is in agreement.      PROCEDURE: The patient was given IV conscious sedation.  The patient's SPO2 remained above 90% throughout the procedure.The gastroscope was inserted orally and advanced under direct vision through the esophagus, through the stomach, through the pylorus, and into the descending duodenum.      Post sedation note :The patient's SPO2 remained above 90% throughout the procedure.the vital signs remained stable , and no immediate complication form the procedure noted, patient will be ready for d/c when criteria is met .      Findings:    Retropharyngeal area was grossly normal appearing    Esophagus: normal    Esophagogastric markings: Diaphragmatic hiatus- 41 cm; GE junction- 41 cm; Squamo-columnar junction- 41 cm    Stomach:    Fundus: normal    Body: normal    Antrum: normal    Duodenum:     Descending: normal    Bulb: normal    The scope was removed and the patient tolerated the procedure well.     With the patient lying in left lateral position, Olympus linear echoendoscope was introduced orally into esophagus, stomach and

## 2025-02-19 NOTE — PROGRESS NOTES
Belonging bags and prosthetic to pacu via wheelchair, pt cane remains with pt at [facility], also glasses/case and denture container with pt

## 2025-02-19 NOTE — H&P
01/08/2024    Painful orthopaedic hardware 11/29/2023    Coagulation defect 09/06/2023    Severe comorbid illness 07/13/2023    Lumbosacral spondylosis without myelopathy 04/24/2023    Anginal equivalent 01/19/2022    Hypertrophic cardiomyopathy (HCC) 12/30/2021    Chest pain 12/28/2021    Chronic diastolic heart failure with preserved EF 12/28/2021    BPH  12/28/2021    Employs prosthetic leg s/p left BKA 12/28/2021    Lumbar radiculopathy     Lumbar stenosis with neurogenic claudication 06/11/2021    Malignant neoplasm of right kidney (HCC) 05/04/2021    Spinal stenosis of lumbar region with neurogenic claudication 05/04/2021    Other chest pain 05/04/2021    Acute bilateral low back pain without sciatica 01/11/2021    Dermatophytoses 10/07/2020    Dizziness 08/17/2020    Ureteral stricture, right 07/30/2020    Hydronephrosis of right kidney 07/17/2020    Idiopathic hypotension 07/17/2020    Status post below-knee amputation of left lower extremity (HCC) 07/17/2020    Atrial fibrillation  07/17/2020    Hypotension due to drugs 11/21/2019    Carotid stenosis, asymptomatic, bilateral 06/17/2019    Acquired hypothyroidism 01/23/2019    Olecranon bursitis of right elbow     Essential hypertension 05/18/2018    Chronic pain of left knee     CAD (coronary artery disease)     PSA elevation 11/02/2017    Constipation 06/02/2016    Pure hypercholesterolemia 12/10/2015    Type 2 diabetes mellitus with diabetic polyneuropathy, with long-term current use of insulin  12/03/2015    PVD (peripheral vascular disease) 05/08/2015    Charcot ankle 04/28/2015    Neuropathy in diabetes (HCC)     Anemia 07/02/2013           RADHA Barrett - CNP on 2/19/2025 at 11:00 AM

## 2025-02-19 NOTE — ANESTHESIA POSTPROCEDURE EVALUATION
Department of Anesthesiology  Postprocedure Note    Patient: Jay Colon  MRN: [id number]  YOB: 1960  Date of evaluation: 2/19/2025    Procedure Summary       Date: 02/19/25 Room / Location: [place] [address] / [facility]    Anesthesia Start: 1208 Anesthesia Stop: 1240    Procedure: ESOPHAGOGASTRODUODENOSCOPY ULTRASOUND-LINEAR SCOPE Diagnosis:       Pancreatic lesion      (Pancreatic lesion [K86.9])    Surgeons: Patricia Tan MD Responsible Provider: Braden Crow MD    Anesthesia Type: General ASA Status: 3            Anesthesia Type: General    Cass Phase I: Cass Score: 8    Cass Phase II:      Anesthesia Post Evaluation    Patient location during evaluation: PACU  Patient participation: complete - patient participated  Level of consciousness: awake and alert  Airway patency: patent  Nausea & Vomiting: no vomiting  Cardiovascular status: hemodynamically stable  Respiratory status: acceptable  Hydration status: euvolemic  Comments: POST- ANESTHESIA EVALUATION       Pt Name: Jay Colon  MRN: [id number]  YOB: 1960  Date of evaluation: 2/19/2025  Time:  1:20 PM      BP (!) 90/53   Pulse 59   Temp 97.9 °F (36.6 °C) (Infrared)   Resp 15   Ht 1.905 m (6' 3\")   Wt 108.9 kg (240 lb)   SpO2 94%   BMI 30.00 kg/m²      Consciousness Level  Awake  Cardiopulmonary Status  Stable  Pain Adequately Treated YES  Nausea / Vomiting  NO  Adequate Hydration  YES  Anesthesia Related Complications NONE      Electronically signed by Braden Crow MD on 2/19/2025 at 1:20 PM         Pain management: satisfactory to patient    No notable events documented.

## 2025-02-21 ENCOUNTER — TELEPHONE (OUTPATIENT)
Dept: INTERNAL MEDICINE CLINIC | Age: 65
End: 2025-02-21

## 2025-02-21 NOTE — TELEPHONE ENCOUNTER
Patient called in concerned about his blood pressure. He states he has been dizzy and lightheaded and when taking his blood pressure this morning it was 91/54. He states last night he took his blood pressure and it was in the high 80's and that his blood pressure has been running low for over a week.    Patient takes Losartan, Metoprolol, Amlodipine.    He has not taken his blood pressure this morning as his blood pressure is 91/54 and I let the patient know to hold off until I speak with Dr. Wood.

## 2025-02-24 NOTE — TELEPHONE ENCOUNTER
Patient called with update of Blood pressure readings ranging mostly  80/53  Patient has been experiencing symptoms of dizziness.

## 2025-02-26 NOTE — TELEPHONE ENCOUNTER
Continue to hold antihypertensives and if symptoms are persistent, please go to emergency department

## 2025-02-27 ENCOUNTER — OFFICE VISIT (OUTPATIENT)
Dept: INTERNAL MEDICINE CLINIC | Age: 65
End: 2025-02-27

## 2025-02-27 VITALS
OXYGEN SATURATION: 98 % | WEIGHT: 240 LBS | BODY MASS INDEX: 30 KG/M2 | SYSTOLIC BLOOD PRESSURE: 102 MMHG | HEART RATE: 57 BPM | DIASTOLIC BLOOD PRESSURE: 50 MMHG

## 2025-02-27 DIAGNOSIS — R10.9 RIGHT FLANK PAIN: ICD-10-CM

## 2025-02-27 DIAGNOSIS — E03.9 ACQUIRED HYPOTHYROIDISM: ICD-10-CM

## 2025-02-27 DIAGNOSIS — E11.42 TYPE 2 DIABETES MELLITUS WITH DIABETIC POLYNEUROPATHY, WITH LONG-TERM CURRENT USE OF INSULIN (HCC): ICD-10-CM

## 2025-02-27 DIAGNOSIS — I95.9 HYPOTENSION, UNSPECIFIED HYPOTENSION TYPE: ICD-10-CM

## 2025-02-27 DIAGNOSIS — N18.30 STAGE 3 CHRONIC KIDNEY DISEASE, UNSPECIFIED WHETHER STAGE 3A OR 3B CKD (HCC): Primary | ICD-10-CM

## 2025-02-27 DIAGNOSIS — Z79.4 TYPE 2 DIABETES MELLITUS WITH DIABETIC POLYNEUROPATHY, WITH LONG-TERM CURRENT USE OF INSULIN (HCC): ICD-10-CM

## 2025-02-27 DIAGNOSIS — E08.41 DIABETIC MONONEUROPATHY ASSOCIATED WITH DIABETES MELLITUS DUE TO UNDERLYING CONDITION (HCC): ICD-10-CM

## 2025-02-27 ASSESSMENT — ENCOUNTER SYMPTOMS
TROUBLE SWALLOWING: 0
DIARRHEA: 0
COLOR CHANGE: 0
WHEEZING: 0
EYE PAIN: 0
COUGH: 0
SHORTNESS OF BREATH: 0
ABDOMINAL DISTENTION: 0
BLOOD IN STOOL: 0
EYE DISCHARGE: 0
ABDOMINAL PAIN: 1

## 2025-02-27 ASSESSMENT — PATIENT HEALTH QUESTIONNAIRE - PHQ9
SUM OF ALL RESPONSES TO PHQ9 QUESTIONS 1 & 2: 0
SUM OF ALL RESPONSES TO PHQ QUESTIONS 1-9: 0
2. FEELING DOWN, DEPRESSED OR HOPELESS: NOT AT ALL
SUM OF ALL RESPONSES TO PHQ QUESTIONS 1-9: 0
1. LITTLE INTEREST OR PLEASURE IN DOING THINGS: NOT AT ALL
SUM OF ALL RESPONSES TO PHQ QUESTIONS 1-9: 0
SUM OF ALL RESPONSES TO PHQ QUESTIONS 1-9: 0

## 2025-02-27 NOTE — TELEPHONE ENCOUNTER
Patient scheduled sooner appt with Dr. Marques for 3/3 as his blood pressure continues to be low and patient does not want to go to the ER.

## 2025-02-27 NOTE — PROGRESS NOTES
[facility]  [address] [address]  Dept: [phone]  Dept Fax: [phone]    Jay Colon is a 65 y.o. male who presents today for his medical conditions/complaintsas noted below.  Jay Colon is c/o of   Chief Complaint   Patient presents with    Hypotension         HPI:     Low bp  Dizzy  Off multiple meds now fo same          Hemoglobin A1C (%)   Date Value   04/02/2024 7.1 (H)   01/08/2024 7.6   08/08/2023 6.6             ( goal A1Cis < 7)   No components found for: \"LABMICR\"  No components found for: \"LDLCHOLESTEROL\", \"LDLCALC\"    (goal LDL is <100)   AST (U/L)   Date Value   12/13/2024 45     ALT (U/L)   Date Value   12/13/2024 53 (H)     BUN (mg/dL)   Date Value   12/13/2024 17     BP Readings from Last 3 Encounters:   02/27/25 (!) 102/50   02/19/25 (!) 92/53   02/18/25 110/62          (goal 120/80)    Past Medical History:   Diagnosis Date    A-fib ([facility])     Asymptomatic bilateral carotid artery stenosis     Boil, thigh 10/2019    10/30/19: right inner thigh region, says PCP Rxd Doxy for this, area is much better, has a couple days left to complete Doxy    CAD (coronary artery disease)     saw Dr. Bhatia ([facility])     Charcot foot due to diabetes mellitus ([facility]) 2014    LEFT    COPD (chronic obstructive pulmonary disease) ([facility]) 2009    INHALER USE DAILY    Diabetes mellitus ([facility]) 1989    IDDM, On Insulin Dr. Wood    Diabetic neuropathy ([facility])     Difficult intravenous access     VEINS ROLL    Employs prosthetic leg     s/p left BKA    Foot ulcer ([facility]) PRIOR TO 2015    BILAT    Full dentures     UPPER ONLY    Hyperlipidemia 2004    ON RX    Hypertension 2004    ON RX, PCP Dr. Wood, seen Oct. 2019    Hypoglycemia 04/02/2015    Left below-knee amputee ([facility])     MRSA (methicillin resistant staph aureus) culture positive 04/16/2015    ankle    OA (osteoarthritis)     Osteomyelitis ([facility])     left stump  BKA    Paroxysmal atrial fibrillation ([facility])     Renal

## 2025-02-27 NOTE — TELEPHONE ENCOUNTER
----- Message from Olu Mullen MD sent at 7/23/2024  7:31 PM EDT -----  Her urine does not show evidence of blood advise pt.   Ok to call in today  I can see him

## 2025-02-27 NOTE — PROGRESS NOTES
\"Have you been to the ER, urgent care clinic since your last visit?  Hospitalized since your last visit?\"    NO    “Have you seen or consulted any other health care providers outside our system since your last visit?”    NO      “Have you had a diabetic eye exam?”    NO     Date of last diabetic eye exam: 1/4/2016

## 2025-03-05 ENCOUNTER — HOSPITAL ENCOUNTER (OUTPATIENT)
Dept: ULTRASOUND IMAGING | Age: 65
Discharge: HOME | End: 2025-03-07
Attending: INTERNAL MEDICINE
Payer: COMMERCIAL

## 2025-03-05 ENCOUNTER — HOSPITAL ENCOUNTER (OUTPATIENT)
Age: 65
Discharge: HOME | End: 2025-03-05
Payer: COMMERCIAL

## 2025-03-05 DIAGNOSIS — R10.9 RIGHT FLANK PAIN: ICD-10-CM

## 2025-03-05 DIAGNOSIS — I95.9 HYPOTENSION, UNSPECIFIED HYPOTENSION TYPE: ICD-10-CM

## 2025-03-05 LAB
CORTIS SERPL-MCNC: 14.5 UG/DL (ref 2.5–19.5)
CORTISOL COLLECTION INFO: NORMAL

## 2025-03-05 PROCEDURE — 82533 TOTAL CORTISOL: CPT

## 2025-03-05 PROCEDURE — 36415 COLL VENOUS BLD VENIPUNCTURE: CPT

## 2025-03-05 PROCEDURE — 76770 US EXAM ABDO BACK WALL COMP: CPT

## 2025-03-10 ENCOUNTER — HOSPITAL ENCOUNTER (OUTPATIENT)
Dept: PAIN MANAGEMENT | Age: 65
Discharge: HOME | End: 2025-03-10
Payer: COMMERCIAL

## 2025-03-10 VITALS — WEIGHT: 240 LBS | BODY MASS INDEX: 29.22 KG/M2 | HEIGHT: 76 IN

## 2025-03-10 DIAGNOSIS — M51.369 DEGENERATION OF INTERVERTEBRAL DISC OF LUMBAR REGION, UNSPECIFIED WHETHER PAIN PRESENT: ICD-10-CM

## 2025-03-10 DIAGNOSIS — M47.817 LUMBOSACRAL SPONDYLOSIS WITHOUT MYELOPATHY: ICD-10-CM

## 2025-03-10 DIAGNOSIS — M54.16 LUMBAR RADICULOPATHY: Primary | ICD-10-CM

## 2025-03-10 DIAGNOSIS — Z79.01 LONG TERM (CURRENT) USE OF ANTICOAGULANTS: ICD-10-CM

## 2025-03-10 PROCEDURE — 99213 OFFICE O/P EST LOW 20 MIN: CPT

## 2025-03-10 PROCEDURE — 99213 OFFICE O/P EST LOW 20 MIN: CPT | Performed by: STUDENT IN AN ORGANIZED HEALTH CARE EDUCATION/TRAINING PROGRAM

## 2025-03-10 ASSESSMENT — PAIN SCALES - GENERAL: PAINLEVEL_OUTOF10: 5

## 2025-03-10 NOTE — PROGRESS NOTES
Chronic Pain Clinic Note     Encounter Date: 3/10/2025     SUBJECTIVE:  No chief complaint on file.      History of Present Illness:   Jay Colon is a 65 y.o. male who presents with back pain    Medication Refill: n/a    Current Complaints of Pain:   Location: back   Radiation:  Into legs  Severity: Moderate  Pain Numerical Score - 0 today  Average:  6     Highest: 10  Lowest: 2  Character/Quality: Complains of pain that is aching  Timing: Constant  Associated symptoms: none  Numbness:  no  Weakness: no  Exacerbating factors: walking   Alleviating factors: sitting   Length of time pain has been present: Started about 15 years ago  Inciting event/injury: no  Bowel/Bladder incontinence: no  Falls: none in the past month  Physical Therapy: Yes    History of Interventions:   Surgery: 2  Injections: Yes    Imaging:    MRI Lumbar 8/5/2024    Past Medical History:   Diagnosis Date    A-fib ([facility])     Asymptomatic bilateral carotid artery stenosis     Boil, thigh 10/2019    10/30/19: right inner thigh region, says PCP Rxd Doxy for this, area is much better, has a couple days left to complete Doxy    CAD (coronary artery disease)     saw Dr. Bhatia ([facility])     Charcot foot due to diabetes mellitus ([facility]) 2014    LEFT    COPD (chronic obstructive pulmonary disease) ([facility]) 2009    INHALER USE DAILY    Diabetes mellitus ([facility]) 1989    IDDM, On Insulin Dr. Wood    Diabetic neuropathy ([facility])     Difficult intravenous access     VEINS ROLL    Employs prosthetic leg     s/p left BKA    Foot ulcer ([facility]) PRIOR TO 2015    BILAT    Full dentures     UPPER ONLY    Hyperlipidemia 2004    ON RX    Hypertension 2004    ON RX, PCP Dr. Wood, seen Oct. 2019    Hypoglycemia 04/02/2015    Left below-knee amputee ([facility])     MRSA (methicillin resistant staph aureus) culture positive 04/16/2015    ankle    OA (osteoarthritis)     Osteomyelitis ([facility])     left stump  BKA    Paroxysmal atrial fibrillation ([facility])     Renal mass 09/2019    ACCIDENTAL

## 2025-03-11 ENCOUNTER — OFFICE VISIT (OUTPATIENT)
Dept: INTERNAL MEDICINE CLINIC | Age: 65
End: 2025-03-11

## 2025-03-11 VITALS
HEART RATE: 70 BPM | HEIGHT: 76 IN | WEIGHT: 246 LBS | SYSTOLIC BLOOD PRESSURE: 124 MMHG | OXYGEN SATURATION: 97 % | DIASTOLIC BLOOD PRESSURE: 62 MMHG | BODY MASS INDEX: 29.96 KG/M2

## 2025-03-11 DIAGNOSIS — I50.32 CHRONIC DIASTOLIC HEART FAILURE (HCC): ICD-10-CM

## 2025-03-11 DIAGNOSIS — Z79.4 TYPE 2 DIABETES MELLITUS WITH DIABETIC POLYNEUROPATHY, WITH LONG-TERM CURRENT USE OF INSULIN (HCC): ICD-10-CM

## 2025-03-11 DIAGNOSIS — I48.0 PAF (PAROXYSMAL ATRIAL FIBRILLATION) (HCC): ICD-10-CM

## 2025-03-11 DIAGNOSIS — E11.42 TYPE 2 DIABETES MELLITUS WITH DIABETIC POLYNEUROPATHY, WITH LONG-TERM CURRENT USE OF INSULIN (HCC): ICD-10-CM

## 2025-03-11 DIAGNOSIS — E08.41 DIABETIC MONONEUROPATHY ASSOCIATED WITH DIABETES MELLITUS DUE TO UNDERLYING CONDITION (HCC): Primary | ICD-10-CM

## 2025-03-11 DIAGNOSIS — E03.9 ACQUIRED HYPOTHYROIDISM: ICD-10-CM

## 2025-03-11 RX ORDER — METOPROLOL SUCCINATE 25 MG/1
25 TABLET, EXTENDED RELEASE ORAL DAILY
Qty: 90 TABLET | Refills: 5 | Status: SHIPPED | OUTPATIENT
Start: 2025-03-11 | End: 2025-03-12 | Stop reason: SDUPTHER

## 2025-03-11 ASSESSMENT — ENCOUNTER SYMPTOMS
TROUBLE SWALLOWING: 0
EYE PAIN: 0
ABDOMINAL DISTENTION: 0
COUGH: 0
COLOR CHANGE: 0
WHEEZING: 0
DIARRHEA: 0
EYE DISCHARGE: 0
SHORTNESS OF BREATH: 0
BLOOD IN STOOL: 0

## 2025-03-11 NOTE — PROGRESS NOTES
\"Have you been to the ER, urgent care clinic since your last visit?  Hospitalized since your last visit?\"    YES    “Have you seen or consulted any other health care providers outside our system since your last visit?”    YES      “Have you had a diabetic eye exam?”    NO     Date of last diabetic eye exam: 1/4/2016          
15 mL 6    ELIQUIS 5 MG TABS tablet TAKE 1 TABLET BY MOUTH TWICE  DAILY 120 tablet 5    B-D UF III MINI PEN NEEDLES 31G X 5 MM MISC USE AS DIRECTED ONCE DAILY  TO INJECT TOUJEO 90 each 3    atorvastatin (LIPITOR) 40 MG tablet take 1 tablet by mouth once daily 90 tablet 3    traZODone (DESYREL) 150 MG tablet Take 1 tablet by mouth nightly 90 tablet 3    flecainide (TAMBOCOR) 50 MG tablet Take 1 tablet by mouth 2 times daily 180 tablet 3    rOPINIRole (REQUIP) 2 MG tablet take 1 tablet by mouth once daily 210 tablet 1    aspirin (ASPIRIN LOW DOSE) 81 MG EC tablet take 1 tablet by mouth IN THE MORNING 150 tablet 3    DROPLET PEN NEEDLES 31G X 8 MM MISC use 1 PEN NEEDLE to inject MEDICATION subcutaneously once daily      albuterol sulfate HFA (PROVENTIL;VENTOLIN;PROAIR) 108 (90 Base) MCG/ACT inhaler Inhale 2 puffs into the lungs every 6 hours as needed for Wheezing or Shortness of Breath 6.7 each 10    TOUJEO MAX SOLOSTAR 300 UNIT/ML SOPN INJECT 110 UNITS INTO THE SKIN DAILY 1 Adjustable Dose Pre-filled Pen Syringe 5    blood glucose test strips (CONTOUR NEXT TEST) strip TEST 4 TIMES A  strip 3    insulin lispro (HUMALOG) 100 UNIT/ML injection vial SLIDING SCALE (TAKE 5 UNITS IF SUGAR IS OVER 150) 1 each 3    Continuous Blood Gluc  (DEXCOM G6 ) DOUGIE USE AS DIRECTED TO MONITOR BLOOD SUGAR      glucagon, rDNA, 1 MG injection Inject 1 mL into the muscle once as needed for Low blood sugar       Blood Pressure KIT 1 actuation by Does not apply route once a week 1 kit 0    Handicap Placard MISC by Does not apply route Duration - 5years  Reason- prosthetic leg 1 each 0    dicyclomine (BENTYL) 10 MG capsule Take 1 capsule by mouth every 6 hours as needed (Bowel spasms) (Patient not taking: Reported on 3/11/2025) 20 capsule 0     No current facility-administered medications for this visit.     Allergies   Allergen Reactions    Ramipril      Other reaction(s): swelling of the lips   Other reaction(s):

## 2025-03-12 DIAGNOSIS — I48.0 PAF (PAROXYSMAL ATRIAL FIBRILLATION) (HCC): ICD-10-CM

## 2025-03-12 RX ORDER — METOPROLOL SUCCINATE 25 MG/1
25 TABLET, EXTENDED RELEASE ORAL DAILY
Qty: 90 TABLET | Refills: 3 | Status: SHIPPED | OUTPATIENT
Start: 2025-03-12

## 2025-03-16 ENCOUNTER — HOSPITAL ENCOUNTER (EMERGENCY)
Age: 65
Discharge: HOME | End: 2025-03-16
Attending: STUDENT IN AN ORGANIZED HEALTH CARE EDUCATION/TRAINING PROGRAM
Payer: COMMERCIAL

## 2025-03-16 ENCOUNTER — APPOINTMENT (OUTPATIENT)
Dept: GENERAL RADIOLOGY | Age: 65
End: 2025-03-16
Payer: COMMERCIAL

## 2025-03-16 ENCOUNTER — APPOINTMENT (OUTPATIENT)
Dept: CT IMAGING | Age: 65
End: 2025-03-16
Payer: COMMERCIAL

## 2025-03-16 VITALS
SYSTOLIC BLOOD PRESSURE: 133 MMHG | HEART RATE: 69 BPM | RESPIRATION RATE: 19 BRPM | OXYGEN SATURATION: 98 % | TEMPERATURE: 98.7 F | DIASTOLIC BLOOD PRESSURE: 72 MMHG

## 2025-03-16 DIAGNOSIS — M25.551 RIGHT HIP PAIN: Primary | ICD-10-CM
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
## 2025-03-16 PROCEDURE — 73552 X-RAY EXAM OF FEMUR 2/>: CPT

## 2025-03-16 PROCEDURE — 73700 CT LOWER EXTREMITY W/O DYE: CPT

## 2025-03-16 PROCEDURE — 96372 THER/PROPH/DIAG INJ SC/IM: CPT

## 2025-03-16 PROCEDURE — 99284 EMERGENCY DEPT VISIT MOD MDM: CPT

## 2025-03-16 PROCEDURE — 6360000002 HC RX W HCPCS: Performed by: STUDENT IN AN ORGANIZED HEALTH CARE EDUCATION/TRAINING PROGRAM

## 2025-03-16 PROCEDURE — 6370000000 HC RX 637 (ALT 250 FOR IP): Performed by: STUDENT IN AN ORGANIZED HEALTH CARE EDUCATION/TRAINING PROGRAM

## 2025-03-16 PROCEDURE — 73502 X-RAY EXAM HIP UNI 2-3 VIEWS: CPT

## 2025-03-16 RX ORDER — OXYCODONE HYDROCHLORIDE 5 MG/1
5 TABLET ORAL ONCE
Refills: 0 | Status: COMPLETED | OUTPATIENT
Start: 2025-03-16 | End: 2025-03-16

## 2025-03-16 RX ORDER — OXYCODONE HYDROCHLORIDE 5 MG/1
5 TABLET ORAL EVERY 6 HOURS PRN
Qty: 10 TABLET | Refills: 0 | Status: SHIPPED | OUTPATIENT
Start: 2025-03-16 | End: 2025-03-19

## 2025-03-16 RX ORDER — METHOCARBAMOL 500 MG/1
500 TABLET, FILM COATED ORAL 3 TIMES DAILY PRN
Qty: 21 TABLET | Refills: 0 | Status: SHIPPED | OUTPATIENT
Start: 2025-03-16 | End: 2025-03-23

## 2025-03-16 RX ORDER — KETOROLAC TROMETHAMINE 30 MG/ML
30 INJECTION, SOLUTION INTRAMUSCULAR; INTRAVENOUS ONCE
Status: COMPLETED | OUTPATIENT
Start: 2025-03-16 | End: 2025-03-16

## 2025-03-16 RX ORDER — MORPHINE SULFATE 4 MG/ML
6 INJECTION, SOLUTION INTRAMUSCULAR; INTRAVENOUS ONCE
Status: DISCONTINUED | OUTPATIENT
Start: 2025-03-16 | End: 2025-03-16

## 2025-03-16 RX ORDER — MORPHINE SULFATE 4 MG/ML
6 INJECTION, SOLUTION INTRAMUSCULAR; INTRAVENOUS ONCE
Status: COMPLETED | OUTPATIENT
Start: 2025-03-16 | End: 2025-03-16

## 2025-03-16 RX ORDER — ACETAMINOPHEN 500 MG
1000 TABLET ORAL ONCE
Status: COMPLETED | OUTPATIENT
Start: 2025-03-16 | End: 2025-03-16

## 2025-03-16 RX ORDER — METHOCARBAMOL 500 MG/1
1000 TABLET, FILM COATED ORAL ONCE
Status: COMPLETED | OUTPATIENT
Start: 2025-03-16 | End: 2025-03-16

## 2025-03-16 RX ADMIN — OXYCODONE HYDROCHLORIDE 5 MG: 5 TABLET ORAL at 16:39

## 2025-03-16 RX ADMIN — KETOROLAC TROMETHAMINE 30 MG: 30 INJECTION, SOLUTION INTRAMUSCULAR at 16:39

## 2025-03-16 RX ADMIN — MORPHINE SULFATE 6 MG: 4 INJECTION, SOLUTION INTRAMUSCULAR; INTRAVENOUS at 13:25

## 2025-03-16 RX ADMIN — ACETAMINOPHEN 1000 MG: 500 TABLET ORAL at 13:26

## 2025-03-16 RX ADMIN — METHOCARBAMOL 1000 MG: 500 TABLET ORAL at 16:39

## 2025-03-16 ASSESSMENT — LIFESTYLE VARIABLES
HOW MANY STANDARD DRINKS CONTAINING ALCOHOL DO YOU HAVE ON A TYPICAL DAY: PATIENT DOES NOT DRINK
HOW OFTEN DO YOU HAVE A DRINK CONTAINING ALCOHOL: NEVER

## 2025-03-16 ASSESSMENT — PAIN SCALES - GENERAL
PAINLEVEL_OUTOF10: 10
PAINLEVEL_OUTOF10: 8

## 2025-03-16 ASSESSMENT — PAIN DESCRIPTION - ORIENTATION: ORIENTATION: RIGHT

## 2025-03-16 ASSESSMENT — PAIN DESCRIPTION - LOCATION: LOCATION: HIP

## 2025-03-16 NOTE — ED PROVIDER NOTES
existing medications or new prescriptions were reviewed with patient/patient representative and they expressed understanding of how to correctly take their medications and the possible side effects. I have reviewed the disposition diagnosis with the patient/patient representative.  I have answered their questions and given discharge instructions.  They voiced understanding of these instructions and did not have any further questions or complaints. They were updated on all incidental findings.      PATIENT REFERRED TO:  Renny Wood MD  [facility] [place] [address]  [phone]    Schedule an appointment as soon as possible for a visit   As needed      DISCHARGE MEDICATIONS:  Discharge Medication List as of 3/16/2025  5:17 PM        START taking these medications    Details   oxyCODONE (ROXICODONE) 5 MG immediate release tablet Take 1 tablet by mouth every 6 hours as needed for Pain for up to 3 days. Max Daily Amount: 20 mg, Disp-10 tablet, R-0Normal      methocarbamol (ROBAXIN) 500 MG tablet Take 1 tablet by mouth 3 times daily as needed (hip pain), Disp-21 tablet, R-0Normal             Tone Vora DO  Emergency Medicine Attending    (Please note that portions of this note were completed with a voice recognition program.  Efforts were made to edit the dictations but occasionally words are mis-transcribed.)          Tone Vora DO  03/16/25 2013

## 2025-03-16 NOTE — DISCHARGE INSTRUCTIONS
Please follow up with your family doctor  You may take Tylenol up to 1 g every 8 hours as needed for pain for up to 4 days  You may also take the Robaxin as prescribed  If that does not work please take the Roxicodone as prescribed  You may also use over-the-counter Voltaren gel  If you have any severe worsening of symptoms, return to the ER  It is imperative that you do some small range of motion exercises to your right leg and right hip at least every hour.

## 2025-03-18 ENCOUNTER — TELEPHONE (OUTPATIENT)
Dept: INTERNAL MEDICINE CLINIC | Age: 65
End: 2025-03-18

## 2025-03-18 NOTE — TELEPHONE ENCOUNTER
Patient had a fall over the weekend and was seen in the ER. They told him nothing was broken but he is in a severe amount of pain. No available appts     Please advise what you would like patient to do

## 2025-03-19 ENCOUNTER — OFFICE VISIT (OUTPATIENT)
Dept: INTERNAL MEDICINE CLINIC | Age: 65
End: 2025-03-19

## 2025-03-19 VITALS
OXYGEN SATURATION: 95 % | SYSTOLIC BLOOD PRESSURE: 108 MMHG | HEIGHT: 76 IN | BODY MASS INDEX: 29.96 KG/M2 | DIASTOLIC BLOOD PRESSURE: 48 MMHG | HEART RATE: 70 BPM

## 2025-03-19 DIAGNOSIS — G54.6 PHANTOM LIMB PAIN: ICD-10-CM

## 2025-03-19 DIAGNOSIS — N18.32 STAGE 3B CHRONIC KIDNEY DISEASE (HCC): ICD-10-CM

## 2025-03-19 DIAGNOSIS — N18.30 STAGE 3 CHRONIC KIDNEY DISEASE, UNSPECIFIED WHETHER STAGE 3A OR 3B CKD (HCC): ICD-10-CM

## 2025-03-19 DIAGNOSIS — E11.42 TYPE 2 DIABETES MELLITUS WITH DIABETIC POLYNEUROPATHY, WITH LONG-TERM CURRENT USE OF INSULIN (HCC): ICD-10-CM

## 2025-03-19 DIAGNOSIS — C64.1 MALIGNANT NEOPLASM OF RIGHT KIDNEY (HCC): ICD-10-CM

## 2025-03-19 DIAGNOSIS — E08.41 DIABETIC MONONEUROPATHY ASSOCIATED WITH DIABETES MELLITUS DUE TO UNDERLYING CONDITION: ICD-10-CM

## 2025-03-19 DIAGNOSIS — I48.0 PAF (PAROXYSMAL ATRIAL FIBRILLATION) (HCC): Primary | ICD-10-CM

## 2025-03-19 DIAGNOSIS — Z79.4 TYPE 2 DIABETES MELLITUS WITH DIABETIC POLYNEUROPATHY, WITH LONG-TERM CURRENT USE OF INSULIN (HCC): ICD-10-CM

## 2025-03-19 DIAGNOSIS — E03.9 ACQUIRED HYPOTHYROIDISM: ICD-10-CM

## 2025-03-19 DIAGNOSIS — I50.32 CHRONIC DIASTOLIC HEART FAILURE (HCC): ICD-10-CM

## 2025-03-19 ASSESSMENT — ENCOUNTER SYMPTOMS
COUGH: 0
WHEEZING: 0
EYE PAIN: 0
EYE DISCHARGE: 0
SHORTNESS OF BREATH: 0
TROUBLE SWALLOWING: 0
ABDOMINAL DISTENTION: 0
DIARRHEA: 0
BLOOD IN STOOL: 0
COLOR CHANGE: 0

## 2025-03-19 NOTE — PROGRESS NOTES
\"Have you been to the ER, urgent care clinic since your last visit?  Hospitalized since your last visit?\"    NO    “Have you seen or consulted any other health care providers outside our system since your last visit?”    NO      “Have you had a diabetic eye exam?”    NO     Date of last diabetic eye exam: 1/4/2016          
Sunday  Uneven ground  \seen in ed  No fracture on ct hip  Pt able to bear wt  Walks with walker  Low suspicion   But can get repeat ct hip in two weeks in not better  Pt agrees with plan  Pt has pain meds  Form ED     Patient given educational materials - see patient instructions.Discussed use, benefit, and side effects of prescribed medications.  All patientquestions answered. Pt voiced understanding. Reviewed health maintenance.  Instructedto continue current medications, diet and exercise.  Patient agreed with treatmentplan. Follow up as directed.       Please note that this chart was generated using voice recognition Dragon dictation software.  Although every effort was made to ensure the accuracy of this automated transcription, some errors in transcription may have occurred.     Electronically signed by Renny Wood MD on 3/19/2025 at 8:54 AM

## 2025-03-24 RX ORDER — INSULIN LISPRO 100 [IU]/ML
INJECTION, SOLUTION INTRAVENOUS; SUBCUTANEOUS
Qty: 30 ML | Refills: 3 | Status: SHIPPED | OUTPATIENT
Start: 2025-03-24

## 2025-03-25 ASSESSMENT — ENCOUNTER SYMPTOMS
ABDOMINAL PAIN: 1
NAUSEA: 1

## 2025-03-25 NOTE — PROGRESS NOTES
disease, unspecified whether esophagitis present          Continue to monitor panc lesion and tumor markers q6 months in addition to MRI/MRCP  Fatty liver - likelydue to metabolic disease - r/o AIH/A1AT as well  Continue to consider CT colonography  F/u 6 mo or sooner as needed      Medications and potential side effects were discussed with patient. GI rx refilled.   Diet/life style/natural hx /complication of the dx were all explained in detail     He should alert me if there are persistent or worsening symptoms, dysphagia, weight loss or GI bleeding.     Past medical, past surgical, social history, psychiatric history, medications or allergies, all reviewed and updated. I personally reviewed all labs results and imaging studies of the abdomen available which were ordered by the primary care physician, and the other consultants. I did review all the pathology from the biopsies done on the previous endoscopies.  I have reviewed and agree with the ROS entered by the MA/RN.     Thank you for allowing me to participate in the care of Mr. Colon. For any further questions please do not hesitate to contact me.      Electronically signed by Khadijah Saldivar PA-C on 3/26/2025 at 10:27 AM  [facility]  P: [phone]  F: [phone]

## 2025-03-26 ENCOUNTER — OFFICE VISIT (OUTPATIENT)
Dept: GASTROENTEROLOGY | Age: 65
End: 2025-03-26
Payer: COMMERCIAL

## 2025-03-26 VITALS
HEART RATE: 68 BPM | HEIGHT: 76 IN | BODY MASS INDEX: 31.29 KG/M2 | SYSTOLIC BLOOD PRESSURE: 131 MMHG | WEIGHT: 257 LBS | TEMPERATURE: 98.3 F | DIASTOLIC BLOOD PRESSURE: 60 MMHG

## 2025-03-26 DIAGNOSIS — K76.0 FATTY LIVER: ICD-10-CM

## 2025-03-26 DIAGNOSIS — K21.9 GASTROESOPHAGEAL REFLUX DISEASE, UNSPECIFIED WHETHER ESOPHAGITIS PRESENT: ICD-10-CM

## 2025-03-26 DIAGNOSIS — K58.2 IRRITABLE BOWEL SYNDROME WITH BOTH CONSTIPATION AND DIARRHEA: ICD-10-CM

## 2025-03-26 DIAGNOSIS — K86.9 PANCREATIC LESION: Primary | ICD-10-CM

## 2025-03-26 PROCEDURE — 1036F TOBACCO NON-USER: CPT | Performed by: PHYSICIAN ASSISTANT

## 2025-03-26 PROCEDURE — 1123F ACP DISCUSS/DSCN MKR DOCD: CPT | Performed by: PHYSICIAN ASSISTANT

## 2025-03-26 PROCEDURE — G8427 DOCREV CUR MEDS BY ELIG CLIN: HCPCS | Performed by: PHYSICIAN ASSISTANT

## 2025-03-26 PROCEDURE — 99214 OFFICE O/P EST MOD 30 MIN: CPT | Performed by: PHYSICIAN ASSISTANT

## 2025-03-26 PROCEDURE — G8417 CALC BMI ABV UP PARAM F/U: HCPCS | Performed by: PHYSICIAN ASSISTANT

## 2025-03-26 PROCEDURE — 3017F COLORECTAL CA SCREEN DOC REV: CPT | Performed by: PHYSICIAN ASSISTANT

## 2025-03-26 PROCEDURE — 3075F SYST BP GE 130 - 139MM HG: CPT | Performed by: PHYSICIAN ASSISTANT

## 2025-03-26 PROCEDURE — 3078F DIAST BP <80 MM HG: CPT | Performed by: PHYSICIAN ASSISTANT

## 2025-04-15 RX ORDER — ATORVASTATIN CALCIUM 40 MG/1
40 TABLET, FILM COATED ORAL DAILY
Qty: 90 TABLET | Refills: 3 | Status: SHIPPED | OUTPATIENT
Start: 2025-04-15

## 2025-04-15 RX ORDER — ROPINIROLE 2 MG/1
2 TABLET, FILM COATED ORAL DAILY
Qty: 90 TABLET | Refills: 3 | Status: SHIPPED | OUTPATIENT
Start: 2025-04-15

## 2025-04-17 RX ORDER — TRAZODONE HYDROCHLORIDE 150 MG/1
150 TABLET ORAL NIGHTLY
Qty: 90 TABLET | Refills: 3 | Status: SHIPPED | OUTPATIENT
Start: 2025-04-17

## 2025-04-28 ENCOUNTER — OFFICE VISIT (OUTPATIENT)
Dept: VASCULAR SURGERY | Age: 65
End: 2025-04-28
Payer: COMMERCIAL

## 2025-04-28 VITALS
DIASTOLIC BLOOD PRESSURE: 73 MMHG | WEIGHT: 250 LBS | SYSTOLIC BLOOD PRESSURE: 158 MMHG | HEIGHT: 75 IN | BODY MASS INDEX: 31.08 KG/M2 | OXYGEN SATURATION: 95 % | HEART RATE: 58 BPM

## 2025-04-28 DIAGNOSIS — I89.0 LYMPHEDEMA: Primary | ICD-10-CM

## 2025-04-28 PROCEDURE — 1123F ACP DISCUSS/DSCN MKR DOCD: CPT | Performed by: SURGERY

## 2025-04-28 PROCEDURE — 3078F DIAST BP <80 MM HG: CPT | Performed by: SURGERY

## 2025-04-28 PROCEDURE — 1036F TOBACCO NON-USER: CPT | Performed by: SURGERY

## 2025-04-28 PROCEDURE — G8417 CALC BMI ABV UP PARAM F/U: HCPCS | Performed by: SURGERY

## 2025-04-28 PROCEDURE — G8427 DOCREV CUR MEDS BY ELIG CLIN: HCPCS | Performed by: SURGERY

## 2025-04-28 PROCEDURE — 3077F SYST BP >= 140 MM HG: CPT | Performed by: SURGERY

## 2025-04-28 PROCEDURE — 3017F COLORECTAL CA SCREEN DOC REV: CPT | Performed by: SURGERY

## 2025-04-28 PROCEDURE — 99213 OFFICE O/P EST LOW 20 MIN: CPT | Performed by: SURGERY

## 2025-04-28 NOTE — PROGRESS NOTES
[facility], [facility] HEART AND VASCULAR  [address] [address]  Dept: [phone]     Patient: Jay Colon  : 1960  MRN: [id number]  DOS: 2025            HPI:  Jay Colon is a 65 y.o. male who returns to the office regarding his right lower extremity lymphatic edema.  Recall that we had seen him for some blistering in the past.  This is all healed.  His lymphedema is adequately controlled.  He still has some edema.  He has a scrape on his shin which is not related to true venous stasis type ulceration.    Review of Systems    Vitals:    25 1002   BP: (!) 158/73   BP Site: Right Upper Arm   Patient Position: Sitting   BP Cuff Size: Medium Adult   Pulse: 58   SpO2: 95%   Weight: 113.4 kg (250 lb)   Height: 1.905 m (6' 3\")          Physical Exam  On examination the traumatic scrape lesion on his shin measures approximately 2 cm in greatest diameter.  His lymphatic edema is present but not severe.  It is pitting in nature.  He has no other venous stasis ulceration.  He has a palpable distal pulse which is strong.    Assessment:  1. Lymphedema          Plan:  At this point we will see him on a as needed basis.  I do not see a pressing need for him to follow-up with us on a yearly basis.  He understands that if he has issues he is certainly welcome back at any time.    Electronically signed by:  Raul Montero MD

## 2025-05-02 ENCOUNTER — OFFICE VISIT (OUTPATIENT)
Dept: PODIATRY | Age: 65
End: 2025-05-02

## 2025-05-02 VITALS — BODY MASS INDEX: 29.84 KG/M2 | HEIGHT: 75 IN | WEIGHT: 240 LBS

## 2025-05-02 DIAGNOSIS — M79.674 PAIN OF TOE OF RIGHT FOOT: ICD-10-CM

## 2025-05-02 DIAGNOSIS — B35.1 ONYCHOMYCOSIS OF TOENAIL: Primary | ICD-10-CM

## 2025-05-02 DIAGNOSIS — M79.671 PAIN IN RIGHT FOOT: ICD-10-CM

## 2025-05-02 DIAGNOSIS — B35.3 TINEA PEDIS OF RIGHT FOOT: ICD-10-CM

## 2025-05-02 DIAGNOSIS — Z79.4 TYPE 2 DIABETES MELLITUS WITH DIABETIC POLYNEUROPATHY, WITH LONG-TERM CURRENT USE OF INSULIN (HCC): ICD-10-CM

## 2025-05-02 DIAGNOSIS — E11.42 TYPE 2 DIABETES MELLITUS WITH DIABETIC POLYNEUROPATHY, WITH LONG-TERM CURRENT USE OF INSULIN (HCC): ICD-10-CM

## 2025-05-02 DIAGNOSIS — E11.51 TYPE 2 DIABETES MELLITUS WITH PERIPHERAL VASCULAR DISEASE (HCC): ICD-10-CM

## 2025-05-02 RX ORDER — CICLOPIROX OLAMINE 7.7 MG/G
CREAM TOPICAL
Qty: 90 G | Refills: 3 | Status: SHIPPED | OUTPATIENT
Start: 2025-05-02

## 2025-05-02 NOTE — PROGRESS NOTES
SUBJECTIVE: Jay Colon is a 65 y.o. male who returns to the office with chief complaint of painful fungal toenails. Patient relates toe nails are thickened/difficult to trim as well as painful with ambulation and with shoe gear.  Patient has a history of amputation of the left lower extremity.  Chief Complaint   Patient presents with    Nail Problem     Nail trim/last saw Dr.Ranvir Wood 3/19/25    Diabetes     Last blood sugar 122     Review of Systems   Constitutional:  Negative for activity change, appetite change, chills, diaphoresis, fatigue and fever.   Respiratory:  Negative for shortness of breath.    Cardiovascular:  Negative for leg swelling.   Gastrointestinal:  Negative for diarrhea and nausea.   Endocrine: Negative for cold intolerance, heat intolerance and polyuria.   Musculoskeletal:  Positive for arthralgias and gait problem. Negative for back pain, joint swelling and myalgias.   Skin:  Negative for color change, pallor, rash and wound.   Allergic/Immunologic: Negative for environmental allergies and food allergies.   Neurological:  Positive for numbness. Negative for dizziness, weakness and light-headedness.   Hematological:  Does not bruise/bleed easily.   Psychiatric/Behavioral:  Negative for behavioral problems, confusion and self-injury. The patient is not nervous/anxious.      OBJECTIVE: Clinical evaluation of patient reveals nails 1,4,5 of the right foot present with thickness, elongation, discoloration, brittleness, and subungual debris. There was pain with palpation and debridement of the toenails of the right foot No open lesions noted to the right foot today. The left leg and toes 2 and 3 of the right foot have been amputated.  There is dry, scaly, erythematous skin in a moccasin type distribution to the bilateral plantar feet.   The right DP pulse is not palpable.   The right PT pulse is not palpable.    Protective sensation is absent to the right plantar foot as noted with a 5.07

## 2025-05-07 ASSESSMENT — ENCOUNTER SYMPTOMS
COLOR CHANGE: 0
BACK PAIN: 0
SHORTNESS OF BREATH: 0
DIARRHEA: 0
NAUSEA: 0

## 2025-05-12 RX ORDER — APIXABAN 5 MG/1
5 TABLET, FILM COATED ORAL 2 TIMES DAILY
Qty: 180 TABLET | Refills: 3 | Status: SHIPPED | OUTPATIENT
Start: 2025-05-12

## 2025-05-12 RX ORDER — CLOTRIMAZOLE AND BETAMETHASONE DIPROPIONATE 10; .64 MG/G; MG/G
CREAM TOPICAL
Qty: 90 G | Refills: 1 | Status: SHIPPED | OUTPATIENT
Start: 2025-05-12

## 2025-05-27 ENCOUNTER — OFFICE VISIT (OUTPATIENT)
Dept: INTERNAL MEDICINE CLINIC | Age: 65
End: 2025-05-27
Payer: MEDICARE

## 2025-05-27 VITALS
WEIGHT: 251 LBS | OXYGEN SATURATION: 90 % | DIASTOLIC BLOOD PRESSURE: 60 MMHG | BODY MASS INDEX: 31.21 KG/M2 | HEART RATE: 64 BPM | HEIGHT: 75 IN | SYSTOLIC BLOOD PRESSURE: 108 MMHG

## 2025-05-27 DIAGNOSIS — N18.30 STAGE 3 CHRONIC KIDNEY DISEASE, UNSPECIFIED WHETHER STAGE 3A OR 3B CKD (HCC): ICD-10-CM

## 2025-05-27 DIAGNOSIS — E11.42 TYPE 2 DIABETES MELLITUS WITH DIABETIC POLYNEUROPATHY, WITH LONG-TERM CURRENT USE OF INSULIN (HCC): ICD-10-CM

## 2025-05-27 DIAGNOSIS — E03.9 HYPOTHYROIDISM, UNSPECIFIED TYPE: ICD-10-CM

## 2025-05-27 DIAGNOSIS — G25.2 RESTING TREMOR: ICD-10-CM

## 2025-05-27 DIAGNOSIS — I50.32 CHRONIC DIASTOLIC HEART FAILURE (HCC): ICD-10-CM

## 2025-05-27 DIAGNOSIS — E78.00 PURE HYPERCHOLESTEROLEMIA: ICD-10-CM

## 2025-05-27 DIAGNOSIS — I48.0 PAF (PAROXYSMAL ATRIAL FIBRILLATION) (HCC): ICD-10-CM

## 2025-05-27 DIAGNOSIS — Z89.512 ACQUIRED ABSENCE OF LEFT LEG BELOW KNEE (HCC): ICD-10-CM

## 2025-05-27 DIAGNOSIS — Z79.4 TYPE 2 DIABETES MELLITUS WITH DIABETIC POLYNEUROPATHY, WITH LONG-TERM CURRENT USE OF INSULIN (HCC): ICD-10-CM

## 2025-05-27 DIAGNOSIS — E03.9 ACQUIRED HYPOTHYROIDISM: ICD-10-CM

## 2025-05-27 DIAGNOSIS — E08.41 DIABETIC MONONEUROPATHY ASSOCIATED WITH DIABETES MELLITUS DUE TO UNDERLYING CONDITION (HCC): Primary | ICD-10-CM

## 2025-05-27 DIAGNOSIS — Z71.89 ACP (ADVANCE CARE PLANNING): ICD-10-CM

## 2025-05-27 PROCEDURE — 3074F SYST BP LT 130 MM HG: CPT | Performed by: INTERNAL MEDICINE

## 2025-05-27 PROCEDURE — 3046F HEMOGLOBIN A1C LEVEL >9.0%: CPT | Performed by: INTERNAL MEDICINE

## 2025-05-27 PROCEDURE — 3017F COLORECTAL CA SCREEN DOC REV: CPT | Performed by: INTERNAL MEDICINE

## 2025-05-27 PROCEDURE — G8427 DOCREV CUR MEDS BY ELIG CLIN: HCPCS | Performed by: INTERNAL MEDICINE

## 2025-05-27 PROCEDURE — G8417 CALC BMI ABV UP PARAM F/U: HCPCS | Performed by: INTERNAL MEDICINE

## 2025-05-27 PROCEDURE — 1036F TOBACCO NON-USER: CPT | Performed by: INTERNAL MEDICINE

## 2025-05-27 PROCEDURE — 2022F DILAT RTA XM EVC RTNOPTHY: CPT | Performed by: INTERNAL MEDICINE

## 2025-05-27 PROCEDURE — 1123F ACP DISCUSS/DSCN MKR DOCD: CPT | Performed by: INTERNAL MEDICINE

## 2025-05-27 PROCEDURE — 99214 OFFICE O/P EST MOD 30 MIN: CPT | Performed by: INTERNAL MEDICINE

## 2025-05-27 PROCEDURE — 3078F DIAST BP <80 MM HG: CPT | Performed by: INTERNAL MEDICINE

## 2025-05-27 RX ORDER — AMIODARONE HYDROCHLORIDE 200 MG/1
200 TABLET ORAL DAILY
Qty: 30 TABLET | Refills: 0
Start: 2025-05-27

## 2025-05-27 RX ORDER — ACYCLOVIR 400 MG/1
TABLET ORAL
COMMUNITY

## 2025-05-27 NOTE — PATIENT INSTRUCTIONS

## 2025-05-27 NOTE — PROGRESS NOTES
Jay Colon provided verbal consent for the provider to use the CM recording tool during their visit.  
URETEROURETEROSTOMY, RIGHT URETERAL STENT PLACEMENT **SHORT STAY** performed by Pipo Carreon MD at [facility] OR    KIDNEY SURGERY N/A 07/30/2020    XI LAPAROSCOPIC ROBOTIC URETEROLYSIS, ROBOTIC ASSISTED BUCCAL URETEROPLASTY  (DR. COBIAN TO ASSIST) performed by Pipo Carreon MD at [facility] OR    LEG AMPUTATION BELOW KNEE Left 04/29/2015    LUMBAR FUSION N/A 10/2/2023    LUMBAR POSTERIOR DECOMPRESSION L2-5, L4-5 POSTERIOR INSTRUMENTED FUSION performed by Raul Gallardo MD at [facility] OR    OTHER SURGICAL HISTORY Left 5-5-15 and 11/2015    Revision BKA    OTHER SURGICAL HISTORY      left stump revision 5/30/2018    PAIN MANAGEMENT PROCEDURE      MS AMP LEG THRU TIBIA&FIBULA RE-AMPUTATION Left 05/30/2018    LEG AMPUTATION BELOW KNEE REVISION performed by Desiree Hartman MD at [facility] OR    SPINE SURGERY Left 11/29/2023    Removal of left lumbar hardware to include two screws, jeny and two cap screws performed by Raul Gallardo MD at [facility] OR    TEMPOROMANDIBULAR JOINT SURGERY Bilateral 80'S    TOE AMPUTATION      Right 2 and 4    UPPER GASTROINTESTINAL ENDOSCOPY N/A 05/26/2023    EGD BIOPSY performed by Calos Soto MD at [facility] ENDO    UPPER GASTROINTESTINAL ENDOSCOPY N/A 2/5/2025    ESOPHAGOGASTRODUODENOSCOPY performed by Patricia Tan MD at [facility] ENDO    UPPER GASTROINTESTINAL ENDOSCOPY N/A 2/19/2025    ESOPHAGOGASTRODUODENOSCOPY ULTRASOUND-LINEAR SCOPE performed by Patricia Tan MD at [facility] ENDO    VITRECTOMY Left 09/25/2019    PARS PLANA VITRECTOMY 25 GAUGE, MEMBRANE PEELING, ENDOLASER 200  MW 1044 SPOTS, INDIRECT LASER 236 SPOTS performed by Earnest Marino MD at [facility] OR        Medications:      Current Outpatient Medications:     Continuous Glucose Sensor (DEXCOM G7 SENSOR) MISC, by Does not apply route, Disp: , Rfl:     amiodarone (CORDARONE) 200 MG tablet, Take 1 tablet by mouth daily, Disp: 30 tablet, Rfl: 0    clotrimazole-betamethasone (LOTRISONE) 1-0.05 % cream, APPLY TWICE DAILY TOPICALLY, Disp: 90 g,

## 2025-05-28 ENCOUNTER — TELEPHONE (OUTPATIENT)
Dept: INTERNAL MEDICINE CLINIC | Age: 65
End: 2025-05-28

## 2025-05-28 DIAGNOSIS — G25.2 RESTING TREMOR: Primary | ICD-10-CM

## 2025-05-28 NOTE — TELEPHONE ENCOUNTER
Patient is requesting a new referral for Neuro. Dr. Rapp cannot see patient for several months.     Referral pending per patient preference, please review and sign if appropriate.

## 2025-05-30 ENCOUNTER — TELEPHONE (OUTPATIENT)
Dept: INTERNAL MEDICINE CLINIC | Age: 65
End: 2025-05-30

## 2025-05-30 NOTE — TELEPHONE ENCOUNTER
Patient called back and states he spoke with the pharmacy and was told it was not his medication that was affected. The lot number is not one of them.

## 2025-05-30 NOTE — TELEPHONE ENCOUNTER
Patient received a call stating that the     metoclopramide (REGLAN) 10 MG tablet   Has been recalled. Patient is wondering if there is a substitute for it.    A new script will be needed.    [employer]

## 2025-06-12 ENCOUNTER — OFFICE VISIT (OUTPATIENT)
Dept: UROLOGY | Age: 65
End: 2025-06-12
Payer: MEDICARE

## 2025-06-12 VITALS
WEIGHT: 251 LBS | HEIGHT: 75 IN | HEART RATE: 68 BPM | BODY MASS INDEX: 31.21 KG/M2 | DIASTOLIC BLOOD PRESSURE: 76 MMHG | TEMPERATURE: 97.5 F | SYSTOLIC BLOOD PRESSURE: 120 MMHG | OXYGEN SATURATION: 95 %

## 2025-06-12 DIAGNOSIS — R39.14 BENIGN PROSTATIC HYPERPLASIA WITH INCOMPLETE BLADDER EMPTYING: Primary | ICD-10-CM

## 2025-06-12 DIAGNOSIS — N40.1 BENIGN PROSTATIC HYPERPLASIA WITH INCOMPLETE BLADDER EMPTYING: Primary | ICD-10-CM

## 2025-06-12 PROCEDURE — G8417 CALC BMI ABV UP PARAM F/U: HCPCS | Performed by: UROLOGY

## 2025-06-12 PROCEDURE — 99214 OFFICE O/P EST MOD 30 MIN: CPT | Performed by: UROLOGY

## 2025-06-12 PROCEDURE — 1123F ACP DISCUSS/DSCN MKR DOCD: CPT | Performed by: UROLOGY

## 2025-06-12 PROCEDURE — 3017F COLORECTAL CA SCREEN DOC REV: CPT | Performed by: UROLOGY

## 2025-06-12 PROCEDURE — G8427 DOCREV CUR MEDS BY ELIG CLIN: HCPCS | Performed by: UROLOGY

## 2025-06-12 PROCEDURE — 1036F TOBACCO NON-USER: CPT | Performed by: UROLOGY

## 2025-06-12 PROCEDURE — 3078F DIAST BP <80 MM HG: CPT | Performed by: UROLOGY

## 2025-06-12 PROCEDURE — 3074F SYST BP LT 130 MM HG: CPT | Performed by: UROLOGY

## 2025-06-12 ASSESSMENT — ENCOUNTER SYMPTOMS
EYES NEGATIVE: 1
BACK PAIN: 0
RESPIRATORY NEGATIVE: 1
NAUSEA: 0
COUGH: 0
VOMITING: 0
SHORTNESS OF BREATH: 0
COLOR CHANGE: 0
EYE PAIN: 0
ABDOMINAL PAIN: 0
ALLERGIC/IMMUNOLOGIC NEGATIVE: 1
WHEEZING: 0
GASTROINTESTINAL NEGATIVE: 1
EYE REDNESS: 0

## 2025-06-12 NOTE — PROGRESS NOTES
Review of Systems   Constitutional: Negative.  Negative for appetite change, chills and fever.   HENT: Negative.     Eyes: Negative.  Negative for pain, redness and visual disturbance.   Respiratory: Negative.  Negative for cough, shortness of breath and wheezing.    Cardiovascular: Negative.  Negative for chest pain and leg swelling.   Gastrointestinal: Negative.  Negative for abdominal pain, nausea and vomiting.   Endocrine: Negative.    Genitourinary:  Positive for frequency and urgency. Negative for difficulty urinating, dysuria, flank pain, hematuria and testicular pain.   Musculoskeletal: Negative.  Negative for back pain, joint swelling and myalgias.   Skin: Negative.  Negative for color change, rash and wound.   Allergic/Immunologic: Negative.    Neurological: Negative.  Negative for dizziness, tremors, weakness, numbness and headaches.   Hematological: Negative.  Negative for adenopathy. Does not bruise/bleed easily.     
      REVIEW OF SYSTEMS:  Review of Systems    Physical Exam:      Vitals:    06/12/25 1135   BP: 120/76   Pulse: 68   Temp: 97.5 °F (36.4 °C)   SpO2: 95%     Body mass index is 31.37 kg/m².  Patient is a 65 y.o. male in no acute distress and alert and oriented to person, place and time.  Physical Exam  Constitutional: Patient in no acute distress.  Neuro: Alert and oriented to person, place and time.  Psych: Mood normal, affect normal  Skin: No rash noted  HEENT: Head: Normocephalic andatraumatic  Conjunctivae and EOM are normal. Pupils are equal, round  Nose:Normal  Right External Ear: Normal; Left External Ear: Normal  Mouth: Mucosa Moist  Neck: Supple  Lungs: Respiratory effort is normal  Cardiovascular: Warm & Pink  Abdomen: Soft, non-tender, non-distended with no CVA,  No flank tenderness,  Or hepatosplenomegaly   Lymphatics: No palpablelymphadenopathy.      Assessment and Plan      1. Benign prostatic hyperplasia with incomplete bladder emptying           Plan: cysto  Double tamsulosin   Assessment & Plan   Return for Cystoscopy.    Prescriptions Ordered:  No orders of the defined types were placed in this encounter.    Orders Placed:  No orders of the defined types were placed in this encounter.          Pipo Carreon MD    Agree with the ROS entered by the MA.

## 2025-06-26 ENCOUNTER — HOSPITAL ENCOUNTER (OUTPATIENT)
Age: 65
Setting detail: SPECIMEN
Discharge: HOME | End: 2025-06-26

## 2025-06-26 ENCOUNTER — PROCEDURE VISIT (OUTPATIENT)
Dept: UROLOGY | Age: 65
End: 2025-06-26
Payer: COMMERCIAL

## 2025-06-26 ENCOUNTER — TELEPHONE (OUTPATIENT)
Dept: UROLOGY | Age: 65
End: 2025-06-26

## 2025-06-26 VITALS — TEMPERATURE: 98.2 F | DIASTOLIC BLOOD PRESSURE: 77 MMHG | HEART RATE: 71 BPM | SYSTOLIC BLOOD PRESSURE: 135 MMHG

## 2025-06-26 DIAGNOSIS — R39.14 BENIGN PROSTATIC HYPERPLASIA WITH INCOMPLETE BLADDER EMPTYING: ICD-10-CM

## 2025-06-26 DIAGNOSIS — N40.1 BENIGN PROSTATIC HYPERPLASIA WITH INCOMPLETE BLADDER EMPTYING: Primary | ICD-10-CM

## 2025-06-26 DIAGNOSIS — N40.1 BENIGN PROSTATIC HYPERPLASIA WITH INCOMPLETE BLADDER EMPTYING: ICD-10-CM

## 2025-06-26 DIAGNOSIS — R39.14 BENIGN PROSTATIC HYPERPLASIA WITH INCOMPLETE BLADDER EMPTYING: Primary | ICD-10-CM

## 2025-06-26 PROCEDURE — 52000 CYSTOURETHROSCOPY: CPT | Performed by: UROLOGY

## 2025-06-26 RX ORDER — TAMSULOSIN HYDROCHLORIDE 0.4 MG/1
0.4 CAPSULE ORAL 2 TIMES DAILY
Qty: 180 CAPSULE | Refills: 3 | Status: SHIPPED | OUTPATIENT
Start: 2025-06-26

## 2025-06-26 NOTE — PROGRESS NOTES
Cystoscopy Operative Note (6/26/25)  Surgeon: Pipo Carreon MD  Anesthesia: Urethral 2% Xylocaine   Indications: BPH  Position: Dorsal Lithotomy    Findings:   Risks and Benefits discussed with patient prior to procedure.  The patient was prepped and draped in the usual sterile fashion.  The flexible cystoscope was advanced through the urethra and into the bladder.  The bladder was thoroughly inspected and the following was noted:    Residual Urine: mild  Urethra: normal appearing urethra with no masses, tenderness or lesions  Prostate: partially obstructing lateral lobes of prostate; median lobe present? no.   Bladder: No tumors or CIS noted.  No bladder diverticulum.  There was mild trabeculation noted.  Ureters: Clear efflux from both ureters.  Orifices with normal configuration and location.    The cystoscope was removed.  The patient tolerated the procedure well.       Cysto green stc  Agree with the ROS entered by the MA.

## 2025-06-27 LAB
MICROORGANISM SPEC CULT: NORMAL
SERVICE CMNT-IMP: NORMAL
SPECIMEN DESCRIPTION: NORMAL

## 2025-06-30 ENCOUNTER — PREP FOR PROCEDURE (OUTPATIENT)
Dept: UROLOGY | Age: 65
End: 2025-06-30

## 2025-06-30 DIAGNOSIS — N40.1 BENIGN PROSTATIC HYPERPLASIA WITH INCOMPLETE BLADDER EMPTYING: ICD-10-CM

## 2025-06-30 DIAGNOSIS — R39.14 BENIGN PROSTATIC HYPERPLASIA WITH INCOMPLETE BLADDER EMPTYING: ICD-10-CM

## 2025-06-30 NOTE — TELEPHONE ENCOUNTER
Bettye Ng @ [facility] 07/31/25 1:45pm   **STOP BLOOD THINNERS ON 07/24/25**    PAT: 07/17/25 @ 9:30am     Spoke with patient, procedure information sent via [employer].

## 2025-07-10 DIAGNOSIS — U07.1 COVID-19: ICD-10-CM

## 2025-07-11 RX ORDER — GEMFIBROZIL 600 MG/1
600 TABLET, FILM COATED ORAL DAILY
Qty: 90 TABLET | Refills: 1 | Status: SHIPPED | OUTPATIENT
Start: 2025-07-11

## 2025-07-11 RX ORDER — BUDESONIDE AND FORMOTEROL FUMARATE DIHYDRATE 160; 4.5 UG/1; UG/1
AEROSOL RESPIRATORY (INHALATION)
Qty: 30.6 G | Refills: 3 | Status: SHIPPED | OUTPATIENT
Start: 2025-07-11

## 2025-07-11 RX ORDER — FINASTERIDE 5 MG/1
5 TABLET, FILM COATED ORAL DAILY
Qty: 90 TABLET | Refills: 1 | Status: SHIPPED | OUTPATIENT
Start: 2025-07-11

## 2025-07-11 RX ORDER — DONEPEZIL HYDROCHLORIDE 5 MG/1
5 TABLET, FILM COATED ORAL NIGHTLY
Qty: 90 TABLET | Refills: 1 | Status: SHIPPED | OUTPATIENT
Start: 2025-07-11

## 2025-07-11 RX ORDER — LEVOTHYROXINE SODIUM 175 UG/1
175 TABLET ORAL DAILY
Qty: 90 TABLET | Refills: 1 | Status: SHIPPED | OUTPATIENT
Start: 2025-07-11

## 2025-07-15 ENCOUNTER — TRANSCRIBE ORDERS (OUTPATIENT)
Dept: ADMINISTRATIVE | Age: 65
End: 2025-07-15

## 2025-07-15 DIAGNOSIS — R25.1 INVOLUNTARY QUIVER: ICD-10-CM

## 2025-07-15 DIAGNOSIS — G20.A1 PARKINSON'S DISEASE, UNSPECIFIED WHETHER DYSKINESIA PRESENT, UNSPECIFIED WHETHER MANIFESTATIONS FLUCTUATE (HCC): ICD-10-CM

## 2025-07-15 DIAGNOSIS — R25.1 TREMOR: Primary | ICD-10-CM

## 2025-07-16 ASSESSMENT — ENCOUNTER SYMPTOMS
SINUS PRESSURE: 0
NAUSEA: 0
ABDOMINAL PAIN: 0
SHORTNESS OF BREATH: 0

## 2025-07-16 NOTE — H&P (VIEW-ONLY)
URETEROLYSIS, ROBOTIC ASSISTED BUCCAL URETEROPLASTY  (DR. COBIAN TO ASSIST) performed by Pipo Carreon MD at [facility] OR    LEG AMPUTATION BELOW KNEE Left 04/29/2015    LUMBAR FUSION N/A 10/2/2023    LUMBAR POSTERIOR DECOMPRESSION L2-5, L4-5 POSTERIOR INSTRUMENTED FUSION performed by Raul Gallardo MD at [facility] OR    OTHER SURGICAL HISTORY Left 5-5-15 and 11/2015    Revision BKA    OTHER SURGICAL HISTORY      left stump revision 5/30/2018    PAIN MANAGEMENT PROCEDURE      NH AMP LEG THRU TIBIA&FIBULA RE-AMPUTATION Left 05/30/2018    LEG AMPUTATION BELOW KNEE REVISION performed by Desiree Hartman MD at [facility] OR    SPINE SURGERY Left 11/29/2023    Removal of left lumbar hardware to include two screws, jeny and two cap screws performed by Raul Gallardo MD at [facility] OR    TEMPOROMANDIBULAR JOINT SURGERY Bilateral 80'S    TOE AMPUTATION      Right 2 and 4    UPPER GASTROINTESTINAL ENDOSCOPY N/A 05/26/2023    EGD BIOPSY performed by Calos Soto MD at [facility] ENDO    UPPER GASTROINTESTINAL ENDOSCOPY N/A 2/5/2025    ESOPHAGOGASTRODUODENOSCOPY performed by Patricia Tan MD at [facility] ENDO    UPPER GASTROINTESTINAL ENDOSCOPY N/A 2/19/2025    ESOPHAGOGASTRODUODENOSCOPY ULTRASOUND-LINEAR SCOPE performed by Patricia Tan MD at [facility] ENDO    VITRECTOMY Left 09/25/2019    PARS PLANA VITRECTOMY 25 GAUGE, MEMBRANE PEELING, ENDOLASER 200  MW 1044 SPOTS, INDIRECT LASER 236 SPOTS performed by Earnest Marino MD at [facility] OR       FAMILY HISTORY  Family History   Problem Relation Age of Onset    Diabetes Mother     Hypertension Mother     Stomach Cancer Mother     Hypertension Father     Alcohol Abuse Father     COPD Father     Kidney Cancer Father     Diabetes Brother         IDDM-PUMP        SOCIAL HISTORY       Social History     Socioeconomic History    Marital status:      Spouse name: Segun    Number of children: 2    Years of education: None    Highest education level: None   Occupational History    Occupation:

## 2025-07-17 ENCOUNTER — HOSPITAL ENCOUNTER (OUTPATIENT)
Dept: PREADMISSION TESTING | Age: 65
Discharge: HOME | End: 2025-07-21
Attending: UROLOGY | Admitting: UROLOGY
Payer: MEDICARE

## 2025-07-17 VITALS
HEART RATE: 71 BPM | RESPIRATION RATE: 20 BRPM | TEMPERATURE: 97.1 F | DIASTOLIC BLOOD PRESSURE: 64 MMHG | HEIGHT: 76 IN | BODY MASS INDEX: 28.62 KG/M2 | OXYGEN SATURATION: 96 % | SYSTOLIC BLOOD PRESSURE: 102 MMHG | WEIGHT: 235 LBS

## 2025-07-17 LAB
ANION GAP SERPL CALCULATED.3IONS-SCNC: 11 MMOL/L (ref 9–16)
BASOPHILS # BLD: 0.07 K/UL (ref 0–0.2)
BASOPHILS NFR BLD: 1 % (ref 0–2)
BUN SERPL-MCNC: 17 MG/DL (ref 8–23)
CALCIUM SERPL-MCNC: 9.4 MG/DL (ref 8.6–10.4)
CHLORIDE SERPL-SCNC: 104 MMOL/L (ref 98–107)
CO2 SERPL-SCNC: 28 MMOL/L (ref 20–31)
CREAT SERPL-MCNC: 1.4 MG/DL (ref 0.7–1.2)
EKG ATRIAL RATE: 65 BPM
EKG P AXIS: 87 DEGREES
EKG P-R INTERVAL: 194 MS
EKG Q-T INTERVAL: 440 MS
EKG QRS DURATION: 100 MS
EKG QTC CALCULATION (BAZETT): 457 MS
EKG R AXIS: 68 DEGREES
EKG T AXIS: 66 DEGREES
EKG VENTRICULAR RATE: 65 BPM
EOSINOPHIL # BLD: 0.05 K/UL (ref 0–0.44)
EOSINOPHILS RELATIVE PERCENT: 1 % (ref 0–4)
ERYTHROCYTE [DISTWIDTH] IN BLOOD BY AUTOMATED COUNT: 12.8 % (ref 11.5–14.9)
GFR, ESTIMATED: 56 ML/MIN/1.73M2
GLUCOSE SERPL-MCNC: 273 MG/DL (ref 74–99)
HCT VFR BLD AUTO: 42.2 % (ref 41–53)
HGB BLD-MCNC: 14.3 G/DL (ref 13.5–17.5)
IMM GRANULOCYTES # BLD AUTO: 0.03 K/UL (ref 0–0.3)
IMM GRANULOCYTES NFR BLD: 0 %
LYMPHOCYTES NFR BLD: 0.86 K/UL (ref 1.1–3.7)
LYMPHOCYTES RELATIVE PERCENT: 12 % (ref 24–44)
MCH RBC QN AUTO: 32 PG (ref 26–34)
MCHC RBC AUTO-ENTMCNC: 33.9 G/DL (ref 31–37)
MCV RBC AUTO: 94.4 FL (ref 80–100)
MONOCYTES NFR BLD: 0.61 K/UL (ref 0.1–1.2)
MONOCYTES NFR BLD: 8 % (ref 3–12)
NEUTROPHILS NFR BLD: 78 % (ref 36–66)
NEUTS SEG NFR BLD: 5.8 K/UL (ref 1.5–8.1)
NRBC BLD-RTO: 0 PER 100 WBC
PLATELET # BLD AUTO: 159 K/UL (ref 150–450)
PMV BLD AUTO: 12.1 FL (ref 8–13.5)
POTASSIUM SERPL-SCNC: 4.2 MMOL/L (ref 3.7–5.3)
RBC # BLD AUTO: 4.47 M/UL (ref 4.21–5.77)
SODIUM SERPL-SCNC: 143 MMOL/L (ref 136–145)
WBC OTHER # BLD: 7.4 K/UL (ref 3.5–11)

## 2025-07-17 PROCEDURE — 80048 BASIC METABOLIC PNL TOTAL CA: CPT

## 2025-07-17 PROCEDURE — 86403 PARTICLE AGGLUT ANTBDY SCRN: CPT

## 2025-07-17 PROCEDURE — 36415 COLL VENOUS BLD VENIPUNCTURE: CPT

## 2025-07-17 PROCEDURE — 87186 SC STD MICRODIL/AGAR DIL: CPT

## 2025-07-17 PROCEDURE — 93005 ELECTROCARDIOGRAM TRACING: CPT | Performed by: ANESTHESIOLOGY

## 2025-07-17 PROCEDURE — APPSS45 APP SPLIT SHARED TIME 31-45 MINUTES: Performed by: NURSE PRACTITIONER

## 2025-07-17 PROCEDURE — 87086 URINE CULTURE/COLONY COUNT: CPT

## 2025-07-17 PROCEDURE — 85025 COMPLETE CBC W/AUTO DIFF WBC: CPT

## 2025-07-17 PROCEDURE — 93010 ELECTROCARDIOGRAM REPORT: CPT | Performed by: INTERNAL MEDICINE

## 2025-07-17 NOTE — DISCHARGE INSTRUCTIONS
Pre-op Instructions For Out-Patient Surgery    Medication Instructions:  Please stop herbs and any supplements now (includes vitamins and minerals).    For these medications:  Dulaglutide (Trulicity), Exenatide (Byetta and Bydureon, Liraglutide (Victoza), Lixisenatide (Adlyxin), Semaglutide (Ozempic and Rybelsus), Tirzepatide (Mounjaro, Zepbound, Wegovy)- Stop 1 week prior if taking weekly or 1 day prior if taking every 12 hours or daily.     Please contact your surgeon and prescribing physician for pre-op instructions for any blood thinners. Stop Eliquis & Aspirin as directed    If you have inhalers/aerosol treatments at home, please use them the morning of your surgery and bring the inhalers with you to the hospital.    Please take the following medications the morning of your surgery with a sip of water:    Amiodarone, Flecainide, Metoprolol, Levothyroxine, Omeprazole, Inhaler    Surgery Instructions:  After midnight before surgery:  Do not eat or drink anything, including water, mints, gum, and hard candy.  You may brush your teeth without swallowing.  No smoking, chewing tobacco, or street drugs.    Please shower or bathe before surgery.  If you were given Surgical Scrub Chlorhexidine Gluconate Liquid (CHG), please shower the night before and the morning of your surgery following the detailed instructions you received during your pre-admission visit.     Please do not wear any cologne, lotion, powder, deodorant, jewelry, piercings, perfume, makeup, nail polish, hair accessories, or hair spray on the day of surgery.  Wear loose comfortable clothing.    Leave your valuables at home but bring a payment source for any after-surgery prescriptions you plan to fill at [facility].  Bring a storage case for any glasses/contacts.    An adult who is responsible for you MUST remain the in the hospital and drive you home and should be with you for the first 24 hours after surgery.

## 2025-07-19 LAB
MICROORGANISM SPEC CULT: ABNORMAL
SPECIMEN DESCRIPTION: ABNORMAL

## 2025-07-21 ENCOUNTER — TELEPHONE (OUTPATIENT)
Dept: UROLOGY | Age: 65
End: 2025-07-21

## 2025-07-21 ENCOUNTER — RESULTS FOLLOW-UP (OUTPATIENT)
Dept: UROLOGY | Age: 65
End: 2025-07-21

## 2025-07-21 ENCOUNTER — TELEPHONE (OUTPATIENT)
Dept: INTERNAL MEDICINE CLINIC | Age: 65
End: 2025-07-21

## 2025-07-21 DIAGNOSIS — N30.00 ACUTE CYSTITIS WITHOUT HEMATURIA: Primary | ICD-10-CM

## 2025-07-21 RX ORDER — DOXYCYCLINE HYCLATE 100 MG
100 TABLET ORAL 2 TIMES DAILY
Qty: 14 TABLET | Refills: 0 | Status: SHIPPED | OUTPATIENT
Start: 2025-07-21 | End: 2025-07-21

## 2025-07-21 RX ORDER — DOXYCYCLINE HYCLATE 100 MG
100 TABLET ORAL 2 TIMES DAILY
Qty: 14 TABLET | Refills: 0 | Status: SHIPPED | OUTPATIENT
Start: 2025-07-21 | End: 2025-07-28

## 2025-07-21 NOTE — TELEPHONE ENCOUNTER
Patient called asking if it was okay for him to stop taking his blood thinners for his upcoming procedure with Dr. Carreon on 7/31/25.    I spoke with Urology who states that patient does not need a medical clearance but needs an okay to stop Eliquis for 7 days starting on the 24th of July. Or whatever you decide is okay.    Please advise

## 2025-07-21 NOTE — RESULT ENCOUNTER NOTE
Pt was notified of results and informed to start medication as soon as possible. Pt verbalized understanding.

## 2025-07-21 NOTE — PROGRESS NOTES
Voice mail message for surgery schedulers at Dr. Palomino office regarding positive urine culture collected 7/17/2025

## 2025-07-23 ENCOUNTER — TELEPHONE (OUTPATIENT)
Dept: INTERNAL MEDICINE CLINIC | Age: 65
End: 2025-07-23

## 2025-07-23 NOTE — TELEPHONE ENCOUNTER
Medical surgical clearance request received 07/23/25    Surgeon: Dr. Carreon    Procedure: Cysto,Greenlight    Date of Procedure: 7/31/2025    Last appt: 5/27/2025    Next appt: 8/13/2025    PATs received:  No    Placed in Dr. Wood's box.

## 2025-07-24 ENCOUNTER — OFFICE VISIT (OUTPATIENT)
Dept: INTERNAL MEDICINE CLINIC | Age: 65
End: 2025-07-24
Payer: MEDICARE

## 2025-07-24 VITALS
HEART RATE: 65 BPM | SYSTOLIC BLOOD PRESSURE: 120 MMHG | BODY MASS INDEX: 29.21 KG/M2 | OXYGEN SATURATION: 98 % | DIASTOLIC BLOOD PRESSURE: 62 MMHG | WEIGHT: 240 LBS

## 2025-07-24 DIAGNOSIS — Z01.818 PRE-OPERATIVE CLEARANCE: Primary | ICD-10-CM

## 2025-07-24 PROCEDURE — G8427 DOCREV CUR MEDS BY ELIG CLIN: HCPCS | Performed by: NURSE PRACTITIONER

## 2025-07-24 PROCEDURE — 3078F DIAST BP <80 MM HG: CPT | Performed by: NURSE PRACTITIONER

## 2025-07-24 PROCEDURE — 1123F ACP DISCUSS/DSCN MKR DOCD: CPT | Performed by: NURSE PRACTITIONER

## 2025-07-24 PROCEDURE — G8417 CALC BMI ABV UP PARAM F/U: HCPCS | Performed by: NURSE PRACTITIONER

## 2025-07-24 PROCEDURE — 3074F SYST BP LT 130 MM HG: CPT | Performed by: NURSE PRACTITIONER

## 2025-07-24 PROCEDURE — 1036F TOBACCO NON-USER: CPT | Performed by: NURSE PRACTITIONER

## 2025-07-24 PROCEDURE — 3017F COLORECTAL CA SCREEN DOC REV: CPT | Performed by: NURSE PRACTITIONER

## 2025-07-24 PROCEDURE — 99213 OFFICE O/P EST LOW 20 MIN: CPT | Performed by: NURSE PRACTITIONER

## 2025-07-24 NOTE — PROGRESS NOTES
carotid artery stenosis     Boil, thigh 10/2019    10/30/19: right inner thigh region, says PCP Rxd Doxy for this, area is much better, has a couple days left to complete Doxy    BPH (benign prostatic hyperplasia)     CAD (coronary artery disease)     saw Dr. Bhatia ([facility])     Charcot foot due to diabetes mellitus ([facility]) 2014    LEFT    CHF (congestive heart failure) ([facility])     CKD (chronic kidney disease) stage 3, GFR 30-59 ml/min ([facility])     COPD (chronic obstructive pulmonary disease) ([facility]) 2009    INHALER USE DAILY    Diabetes mellitus ([facility]) 1989    IDDM, On Insulin Dr. Wood    Diabetic neuropathy ([facility])     Difficult intravenous access     VEINS ROLL    Employs prosthetic leg     s/p left BKA    Foot ulcer ([facility]) PRIOR TO 2015    BILAT    Full dentures     UPPER ONLY    Hyperlipidemia 2004    ON RX    Hypertension 2004    ON RX, PCP Dr. Wood, seen Oct. 2019    Hypoglycemia 04/02/2015    Left below-knee amputee ([facility])     MRSA (methicillin resistant staph aureus) culture positive 04/16/2015    ankle    OA (osteoarthritis)     Osteomyelitis ([facility])     left stump  BKA    Paroxysmal atrial fibrillation ([facility])     Renal mass 09/2019    ACCIDENTAL  FINDING IS FOLLOWING UP WITH KIDNEY DR Ledezma     Suspected sleep apnea     Thyroid disease 2004    PT HAD HYPERTHYROIDISM-UNCONTROLED THYROID DESTROYED WITH RADI IODINE NOW HAS HYPOTHYRIDISM    Vision abnormalities 09/2019    LEFT NO VISION    Vitreous hemorrhage of left eye due to diabetes mellitus (HCC) 09/2019    s/p Vitrectomy 9/2019    Wears glasses     Wears partial dentures     full upper, does not wear lower partial    Wellness examination     Dr. Wood-PCP seen within last 1 1/2 mos     Past Surgical History:   Procedure Laterality Date    CARDIAC CATHETERIZATION      no stenting    CARDIOVASCULAR STRESS TEST  06/28/2019    small fixed apical, likely normal, EF 48%    COLONOSCOPY  06/17/2016    poor prep, done up to the IC valve, redundant colon    COLONOSCOPY

## 2025-07-25 ENCOUNTER — HOSPITAL ENCOUNTER (OUTPATIENT)
Dept: NUCLEAR MEDICINE | Age: 65
Discharge: HOME | End: 2025-07-27
Payer: COMMERCIAL

## 2025-07-25 DIAGNOSIS — E03.9 HYPOTHYROIDISM, ADULT: ICD-10-CM

## 2025-07-25 DIAGNOSIS — G20.A1 PARKINSON'S DISEASE WITHOUT FLUCTUATING MANIFESTATIONS, UNSPECIFIED WHETHER DYSKINESIA PRESENT (HCC): ICD-10-CM

## 2025-07-25 DIAGNOSIS — Z89.512: ICD-10-CM

## 2025-07-25 DIAGNOSIS — E08.42 DIABETIC POLYNEUROPATHY ASSOCIATED WITH DIABETES MELLITUS DUE TO UNDERLYING CONDITION (HCC): ICD-10-CM

## 2025-07-25 DIAGNOSIS — R25.1 TREMOR: ICD-10-CM

## 2025-07-25 DIAGNOSIS — E55.9 VITAMIN D DEFICIENCY: ICD-10-CM

## 2025-07-25 PROCEDURE — A9584 IODINE I-123 IOFLUPANE: HCPCS | Performed by: PSYCHIATRY & NEUROLOGY

## 2025-07-25 PROCEDURE — 78803 RP LOCLZJ TUM SPECT 1 AREA: CPT

## 2025-07-25 PROCEDURE — 3430000000 HC RX DIAGNOSTIC RADIOPHARMACEUTICAL: Performed by: PSYCHIATRY & NEUROLOGY

## 2025-07-25 PROCEDURE — 6370000000 HC RX 637 (ALT 250 FOR IP): Performed by: PSYCHIATRY & NEUROLOGY

## 2025-07-25 RX ORDER — SODIUM CHLORIDE 0.9 % (FLUSH) 0.9 %
10 SYRINGE (ML) INJECTION PRN
Status: DISCONTINUED | OUTPATIENT
Start: 2025-07-25 | End: 2025-07-28 | Stop reason: HOSPADM

## 2025-07-25 RX ORDER — POTASSIUM IODIDE 65 MG/ML
130 SOLUTION ORAL ONCE
Status: COMPLETED | OUTPATIENT
Start: 2025-07-25 | End: 2025-07-25

## 2025-07-25 RX ADMIN — IOFLUPANE I-123 5 MILLICURIE: 2 INJECTION, SOLUTION INTRAVENOUS at 10:12

## 2025-07-25 RX ADMIN — POTASSIUM IODIDE 130 MG: 65 SOLUTION ORAL at 09:07

## 2025-07-28 ENCOUNTER — HOSPITAL ENCOUNTER (OUTPATIENT)
Dept: MRI IMAGING | Age: 65
Discharge: HOME | End: 2025-07-30
Payer: COMMERCIAL

## 2025-07-28 DIAGNOSIS — R25.1 TREMOR: ICD-10-CM

## 2025-07-28 DIAGNOSIS — R25.1 INVOLUNTARY QUIVER: ICD-10-CM

## 2025-07-28 DIAGNOSIS — G20.A1 PARKINSON'S DISEASE, UNSPECIFIED WHETHER DYSKINESIA PRESENT, UNSPECIFIED WHETHER MANIFESTATIONS FLUCTUATE (HCC): ICD-10-CM

## 2025-07-28 PROCEDURE — 70551 MRI BRAIN STEM W/O DYE: CPT

## 2025-07-29 ASSESSMENT — ENCOUNTER SYMPTOMS
WHEEZING: 0
COUGH: 0
ABDOMINAL PAIN: 0
DIARRHEA: 0
SHORTNESS OF BREATH: 0
EYE DISCHARGE: 0
CONSTIPATION: 0
TROUBLE SWALLOWING: 0

## 2025-07-30 ENCOUNTER — ANESTHESIA EVENT (OUTPATIENT)
Dept: OPERATING ROOM | Age: 65
End: 2025-07-30
Payer: MEDICARE

## 2025-07-30 NOTE — PRE-PROCEDURE INSTRUCTIONS
No answer, left message ?      N/A                       Unable to leave message ?N/A    When were you told to arrive at hospital ?  1467    Do you have a  ?YES    Are you on any blood thinners ? YES    If yes when did you stop taking ?STOPPED    Do you have your prep Rx filled and instruction ?  N/A    Nothing to eat the day before , only clear liquids.N/A    Are you experiencing any covid symptoms ? NO    Do you have any infections or rash we should be aware of ?NO      Do you have the Hibiclens soap to use the night before and the morning of surgery ?N/A    Nothing to eat or drink after midnight, only a sip of water to take any medication instructed to take the night before.INSTRUCTED  Wear comfortable clothing, leave any valuables at home, remove any jewelry and body piercing .INSTRUCTED

## 2025-07-31 ENCOUNTER — HOSPITAL ENCOUNTER (OUTPATIENT)
Age: 65
Setting detail: OUTPATIENT SURGERY
Discharge: HOME | End: 2025-07-31
Attending: UROLOGY | Admitting: UROLOGY
Payer: MEDICARE

## 2025-07-31 ENCOUNTER — ANESTHESIA (OUTPATIENT)
Dept: OPERATING ROOM | Age: 65
End: 2025-07-31
Payer: MEDICARE

## 2025-07-31 VITALS
BODY MASS INDEX: 28.62 KG/M2 | DIASTOLIC BLOOD PRESSURE: 54 MMHG | HEIGHT: 76 IN | SYSTOLIC BLOOD PRESSURE: 108 MMHG | WEIGHT: 235 LBS | TEMPERATURE: 97 F | RESPIRATION RATE: 19 BRPM | OXYGEN SATURATION: 92 % | HEART RATE: 56 BPM

## 2025-07-31 DIAGNOSIS — N40.1 BENIGN PROSTATIC HYPERPLASIA WITH INCOMPLETE BLADDER EMPTYING: Primary | ICD-10-CM

## 2025-07-31 DIAGNOSIS — R39.14 BENIGN PROSTATIC HYPERPLASIA WITH INCOMPLETE BLADDER EMPTYING: Primary | ICD-10-CM

## 2025-07-31 LAB
GLUCOSE BLD-MCNC: 336 MG/DL (ref 75–110)
GLUCOSE BLD-MCNC: 344 MG/DL (ref 75–110)

## 2025-07-31 PROCEDURE — 7100000031 HC ASPR PHASE II RECOVERY - ADDTL 15 MIN: Performed by: UROLOGY

## 2025-07-31 PROCEDURE — 2580000003 HC RX 258: Performed by: ANESTHESIOLOGY

## 2025-07-31 PROCEDURE — 3700000000 HC ANESTHESIA ATTENDED CARE: Performed by: UROLOGY

## 2025-07-31 PROCEDURE — 3700000001 HC ADD 15 MINUTES (ANESTHESIA): Performed by: UROLOGY

## 2025-07-31 PROCEDURE — 6370000000 HC RX 637 (ALT 250 FOR IP): Performed by: ANESTHESIOLOGY

## 2025-07-31 PROCEDURE — 6360000002 HC RX W HCPCS: Performed by: UROLOGY

## 2025-07-31 PROCEDURE — 82947 ASSAY GLUCOSE BLOOD QUANT: CPT

## 2025-07-31 PROCEDURE — 2709999900 HC NON-CHARGEABLE SUPPLY: Performed by: UROLOGY

## 2025-07-31 PROCEDURE — 6360000002 HC RX W HCPCS: Performed by: ANESTHESIOLOGY

## 2025-07-31 PROCEDURE — 7100000001 HC PACU RECOVERY - ADDTL 15 MIN: Performed by: UROLOGY

## 2025-07-31 PROCEDURE — 7100000030 HC ASPR PHASE II RECOVERY - FIRST 15 MIN: Performed by: UROLOGY

## 2025-07-31 PROCEDURE — 2720000010 HC SURG SUPPLY STERILE: Performed by: UROLOGY

## 2025-07-31 PROCEDURE — 7100000010 HC PHASE II RECOVERY - FIRST 15 MIN: Performed by: UROLOGY

## 2025-07-31 PROCEDURE — 7100000000 HC PACU RECOVERY - FIRST 15 MIN: Performed by: UROLOGY

## 2025-07-31 PROCEDURE — 6360000002 HC RX W HCPCS: Performed by: NURSE ANESTHETIST, CERTIFIED REGISTERED

## 2025-07-31 PROCEDURE — 7100000011 HC PHASE II RECOVERY - ADDTL 15 MIN: Performed by: UROLOGY

## 2025-07-31 PROCEDURE — 3600000003 HC SURGERY LEVEL 3 BASE: Performed by: UROLOGY

## 2025-07-31 PROCEDURE — 3600000013 HC SURGERY LEVEL 3 ADDTL 15MIN: Performed by: UROLOGY

## 2025-07-31 RX ORDER — SODIUM CHLORIDE 0.9 % (FLUSH) 0.9 %
5-40 SYRINGE (ML) INJECTION PRN
Status: DISCONTINUED | OUTPATIENT
Start: 2025-07-31 | End: 2025-07-31 | Stop reason: HOSPADM

## 2025-07-31 RX ORDER — INSULIN LISPRO 100 [IU]/ML
8 INJECTION, SOLUTION INTRAVENOUS; SUBCUTANEOUS ONCE
Status: COMPLETED | OUTPATIENT
Start: 2025-07-31 | End: 2025-07-31

## 2025-07-31 RX ORDER — LABETALOL HYDROCHLORIDE 5 MG/ML
10 INJECTION, SOLUTION INTRAVENOUS
Status: DISCONTINUED | OUTPATIENT
Start: 2025-07-31 | End: 2025-07-31 | Stop reason: HOSPADM

## 2025-07-31 RX ORDER — SODIUM CHLORIDE 0.9 % (FLUSH) 0.9 %
5-40 SYRINGE (ML) INJECTION EVERY 12 HOURS SCHEDULED
Status: DISCONTINUED | OUTPATIENT
Start: 2025-07-31 | End: 2025-07-31 | Stop reason: HOSPADM

## 2025-07-31 RX ORDER — ACETAMINOPHEN 500 MG
1000 TABLET ORAL ONCE
Status: COMPLETED | OUTPATIENT
Start: 2025-07-31 | End: 2025-07-31

## 2025-07-31 RX ORDER — SODIUM CHLORIDE 9 MG/ML
INJECTION, SOLUTION INTRAVENOUS CONTINUOUS
Status: DISCONTINUED | OUTPATIENT
Start: 2025-07-31 | End: 2025-07-31 | Stop reason: HOSPADM

## 2025-07-31 RX ORDER — LIDOCAINE HYDROCHLORIDE 10 MG/ML
1 INJECTION, SOLUTION EPIDURAL; INFILTRATION; INTRACAUDAL; PERINEURAL
Status: COMPLETED | OUTPATIENT
Start: 2025-07-31 | End: 2025-07-31

## 2025-07-31 RX ORDER — SODIUM CHLORIDE 9 MG/ML
INJECTION, SOLUTION INTRAVENOUS PRN
Status: DISCONTINUED | OUTPATIENT
Start: 2025-07-31 | End: 2025-07-31 | Stop reason: HOSPADM

## 2025-07-31 RX ORDER — ONDANSETRON 2 MG/ML
INJECTION INTRAMUSCULAR; INTRAVENOUS
Status: DISCONTINUED | OUTPATIENT
Start: 2025-07-31 | End: 2025-07-31 | Stop reason: SDUPTHER

## 2025-07-31 RX ORDER — FENTANYL CITRATE 50 UG/ML
INJECTION, SOLUTION INTRAMUSCULAR; INTRAVENOUS
Status: DISCONTINUED | OUTPATIENT
Start: 2025-07-31 | End: 2025-07-31 | Stop reason: SDUPTHER

## 2025-07-31 RX ORDER — HYDRALAZINE HYDROCHLORIDE 20 MG/ML
10 INJECTION INTRAMUSCULAR; INTRAVENOUS
Status: DISCONTINUED | OUTPATIENT
Start: 2025-07-31 | End: 2025-07-31 | Stop reason: HOSPADM

## 2025-07-31 RX ORDER — FENTANYL CITRATE 0.05 MG/ML
25 INJECTION, SOLUTION INTRAMUSCULAR; INTRAVENOUS EVERY 5 MIN PRN
Status: DISCONTINUED | OUTPATIENT
Start: 2025-07-31 | End: 2025-07-31 | Stop reason: HOSPADM

## 2025-07-31 RX ORDER — DIPHENHYDRAMINE HYDROCHLORIDE 50 MG/ML
12.5 INJECTION, SOLUTION INTRAMUSCULAR; INTRAVENOUS
Status: DISCONTINUED | OUTPATIENT
Start: 2025-07-31 | End: 2025-07-31 | Stop reason: HOSPADM

## 2025-07-31 RX ORDER — ONDANSETRON 2 MG/ML
4 INJECTION INTRAMUSCULAR; INTRAVENOUS
Status: DISCONTINUED | OUTPATIENT
Start: 2025-07-31 | End: 2025-07-31 | Stop reason: HOSPADM

## 2025-07-31 RX ORDER — HYDROCODONE BITARTRATE AND ACETAMINOPHEN 5; 325 MG/1; MG/1
1 TABLET ORAL EVERY 4 HOURS PRN
Qty: 10 TABLET | Refills: 0 | Status: SHIPPED | OUTPATIENT
Start: 2025-07-31 | End: 2025-08-03

## 2025-07-31 RX ORDER — PROPOFOL 10 MG/ML
INJECTION, EMULSION INTRAVENOUS
Status: DISCONTINUED | OUTPATIENT
Start: 2025-07-31 | End: 2025-07-31 | Stop reason: SDUPTHER

## 2025-07-31 RX ORDER — METOCLOPRAMIDE HYDROCHLORIDE 5 MG/ML
10 INJECTION INTRAMUSCULAR; INTRAVENOUS
Status: DISCONTINUED | OUTPATIENT
Start: 2025-07-31 | End: 2025-07-31 | Stop reason: HOSPADM

## 2025-07-31 RX ORDER — HYDROCODONE BITARTRATE AND ACETAMINOPHEN 5; 325 MG/1; MG/1
1 TABLET ORAL EVERY 6 HOURS PRN
Status: DISCONTINUED | OUTPATIENT
Start: 2025-07-31 | End: 2025-07-31 | Stop reason: HOSPADM

## 2025-07-31 RX ORDER — CEPHALEXIN 500 MG/1
500 CAPSULE ORAL 3 TIMES DAILY
Qty: 9 CAPSULE | Refills: 0 | Status: SHIPPED | OUTPATIENT
Start: 2025-07-31

## 2025-07-31 RX ORDER — LIDOCAINE HYDROCHLORIDE 10 MG/ML
INJECTION, SOLUTION EPIDURAL; INFILTRATION; INTRACAUDAL; PERINEURAL
Status: DISCONTINUED | OUTPATIENT
Start: 2025-07-31 | End: 2025-07-31 | Stop reason: SDUPTHER

## 2025-07-31 RX ADMIN — SODIUM CHLORIDE: 9 INJECTION, SOLUTION INTRAVENOUS at 12:24

## 2025-07-31 RX ADMIN — ONDANSETRON 4 MG: 2 INJECTION, SOLUTION INTRAMUSCULAR; INTRAVENOUS at 14:26

## 2025-07-31 RX ADMIN — LIDOCAINE HYDROCHLORIDE 1 ML: 10 INJECTION, SOLUTION EPIDURAL; INFILTRATION; INTRACAUDAL; PERINEURAL at 12:24

## 2025-07-31 RX ADMIN — INSULIN LISPRO 8 UNITS: 100 INJECTION, SOLUTION INTRAVENOUS; SUBCUTANEOUS at 13:32

## 2025-07-31 RX ADMIN — ACETAMINOPHEN 1000 MG: 500 TABLET ORAL at 12:31

## 2025-07-31 RX ADMIN — LIDOCAINE HYDROCHLORIDE 50 MG: 10 INJECTION, SOLUTION EPIDURAL; INFILTRATION; INTRACAUDAL; PERINEURAL at 14:10

## 2025-07-31 RX ADMIN — FENTANYL CITRATE 50 MCG: 50 INJECTION INTRAMUSCULAR; INTRAVENOUS at 14:10

## 2025-07-31 RX ADMIN — Medication 2000 MG: at 14:15

## 2025-07-31 RX ADMIN — PROPOFOL 180 MG: 10 INJECTION, EMULSION INTRAVENOUS at 14:10

## 2025-07-31 ASSESSMENT — PAIN - FUNCTIONAL ASSESSMENT
PAIN_FUNCTIONAL_ASSESSMENT: NONE - DENIES PAIN
PAIN_FUNCTIONAL_ASSESSMENT: NONE - DENIES PAIN
PAIN_FUNCTIONAL_ASSESSMENT: 0-10

## 2025-07-31 ASSESSMENT — ENCOUNTER SYMPTOMS: SHORTNESS OF BREATH: 0

## 2025-07-31 NOTE — BRIEF OP NOTE
Brief Postoperative Note      Patient: Jay Colon  YOB: 1960  MRN: [id number]    Date of Procedure: 7/31/2025    Pre-Op Diagnosis Codes:      * Benign prostatic hyperplasia with incomplete bladder emptying [N40.1, R39.14]    Post-Op Diagnosis: Same       Procedure(s):  CYSTOSCOPY GREENLIGHT LASER VAPORIZATION OF PROSTATE    Surgeon(s):  Pipo Carreon MD    Assistant:  * No surgical staff found *    Anesthesia: General    Estimated Blood Loss (mL): Minimal    Complications: None    Specimens:   * No specimens in log *    Implants:  * No implants in log *      Drains: * No LDAs found *    Findings:  Present At Time Of Surgery (PATOS) (choose all levels that have infection present):  No infection present  Other Findings: d    Electronically signed by Pipo Carreon MD on 7/31/2025 at 12:17 PM

## 2025-07-31 NOTE — PROGRESS NOTES
Accu check 336   Spoke with pt regarding her sleep study test, with moderate sleep apnea, cpap recommended. I contacted PCP office, her  Has left the practice and she will be setting up with a new pcp in the [facility] family practice.  I recommended she see Dr. Paloma Lewis on co

## 2025-07-31 NOTE — PROGRESS NOTES
7/31/25 4:48pm family Segun p/anne at [facility]    CLINICAL PHARMACY NOTE: MEDS TO BEDS    Total # of Prescriptions Filled: 2   The following medications were delivered to the patient:  Hydrocodone/APAP 5/325mg  Cephalexin 500mg    Additional Documentation:

## 2025-07-31 NOTE — INTERVAL H&P NOTE
Update History & Physical    The patient's History and Physical of July 31, h/p was reviewed with the patient and I examined the patient. There was no change. The surgical site was confirmed by the patient and me.     Plan: The risks, benefits, expected outcome, and alternative to the recommended procedure have been discussed with the patient. Patient understands and wants to proceed with the procedure.     Electronically signed by Pipo Carreon MD on 7/31/2025 at 12:16 PM

## 2025-07-31 NOTE — ANESTHESIA PRE PROCEDURE
Department of Anesthesiology  Preprocedure Note       Name:  Jay Colon   Age:  65 y.o.  :  1960                                          MRN:  [id number]         Date:  2025      Surgeon: Surgeon(s):  Pipo Carreon MD    Procedure: Procedure(s):  CYSTOSCOPY GREENLIGHT LASER VAPORIZATION OF PROSTATE    Medications prior to admission:   Prior to Admission medications    Medication Sig Start Date End Date Taking? Authorizing Provider   HYDROcodone-acetaminophen (NORCO) 5-325 MG per tablet Take 1 tablet by mouth every 4 hours as needed for Pain for up to 3 days. Intended supply: 3 days. Take lowest dose possible to manage pain Max Daily Amount: 6 tablets 7/31/25 8/3/25 Yes Pipo Carreon MD   cephALEXin (KEFLEX) 500 MG capsule Take 1 capsule by mouth 3 times daily 25  Yes Pipo Carreon MD   gemfibrozil (LOPID) 600 MG tablet TAKE 1 TABLET BY MOUTH DAILY 25  Yes Dalton Davison MD   donepezil (ARICEPT) 5 MG tablet TAKE 1 TABLET BY MOUTH EVERY  NIGHT 25  Yes Dalton Davison MD   levothyroxine (SYNTHROID) 175 MCG tablet TAKE 1 TABLET BY MOUTH ONCE  DAILY 25  Yes Dalton Davison MD   budesonide-formoterol (SYMBICORT) 160-4.5 MCG/ACT AERO USE 2 INHALATIONS BY MOUTH TWICE DAILY 25  Yes Dalton Davison MD   tamsulosin (FLOMAX) 0.4 MG capsule Take 1 capsule by mouth in the morning and at bedtime 25  Yes Pipo Carreon MD   amiodarone (CORDARONE) 200 MG tablet Take 1 tablet by mouth daily 25  Yes Renny Wood MD   traZODone (DESYREL) 150 MG tablet TAKE 1 TABLET BY MOUTH EVERY  NIGHT 25  Yes Renny Wood MD   atorvastatin (LIPITOR) 40 MG tablet TAKE 1 TABLET BY MOUTH ONCE  DAILY 4/15/25  Yes Renny Wood MD   rOPINIRole (REQUIP) 2 MG tablet TAKE 1 TABLET BY MOUTH ONCE  DAILY 4/15/25  Yes Renny Wood MD   insulin lispro, 1 Unit Dial, (HUMALOG/ADMELOG) 100 UNIT/ML SOPN INJECT SUBCUTANEOUSLY 10 UNITS 3 TIMES DAILY BEFORE

## 2025-07-31 NOTE — OP NOTE
Operative Note      Patient: Jay Colon  YOB: 1960  MRN: [id number]    Date of Procedure: 7/31/2025    Pre-Op Diagnosis Codes:      * Benign prostatic hyperplasia with incomplete bladder emptying [N40.1, R39.14]    Post-Op Diagnosis: Same       Procedure(s):  CYSTOSCOPY GREENLIGHT LASER VAPORIZATION OF PROSTATE    Surgeon(s):  Pipo Carreon MD    Assistant:   * No surgical staff found *    Anesthesia: General    Estimated Blood Loss (mL): Minimal    Complications: None    Specimens:   * No specimens in log *    Implants:  * No implants in log *      Drains: * No LDAs found *    Findings:  Present At Time Of Surgery (PATOS) (choose all levels that have infection present):  No infection present  Other Findings: d    Detailed Description of Procedure:        Pipo Burrell MD          DATE:  07/31/25    SURGEON:  Surgeon(s):  Pipo Carreon MD          PREOPERATIVE DIAGNOSIS:  Bph,bladder outlet obstruction.     POSTOPERATIVE DIAGNOSIS:  same     PROCEDURES:  1. Cystoscopy.  2. Transurethral GreenLight photovaporization of the prostate.  3. Patrick catheter placement     ANESTHESIA:  General.     COMPLICATIONS:  None.     DRAINS:  A 22 Citizen of [place] 3-way Patrick catheter.     SPECIMEN:  None.    ESTIMATED BLOOD LOSS:  20mL    POWER SETTINGS:  80- 180W         INDICATIONS:  The patient is a 68 y.o. male with a history of benign prostatic hyperplasia resulting in bladder outlet obstruction despite being on medical therapy. Treatment options were discussed and he elected to undergo the abovementioned procedure. Risks, benefits, alternatives, goals and possible complications of the procedure were explained to the patient. Informed consent was obtained.     DETAILS OF THE PROCEDURE:  The patient was brought back to the operating room, and general endotracheal anesthesia was administered.  The patient was placed in the dorsal lithotomy position. EPC cuffs were placed on and functioning. Antibiotics were

## 2025-07-31 NOTE — INTERVAL H&P NOTE
Update History & Physical    The patient's History and Physical of   July 17, 2025    Patient will be having : Procedure(s):  CYSTOSCOPY GREENLIGHT LASER VAPORIZATION OF PROSTATE      There was  no change. Or updates at this time.     Patient did not require any medical clearance.     Blood thinners/ Anticoagulant:  Eliquis and Aspirin.  Pt held Toujeo today.     Patient denies any personal or family problems with anesthesia.    Patient was physically assessed, including cardiovascular and respiratory. Denies any chest pain or SOB. Denies any recent URI, no fever or chills.     Patient understands and wants to proceed with the procedure.     Vitals:  /66   Pulse 64   Temp 96.9 °F (36.1 °C) (Infrared)   Resp 18   Ht 1.93 m (6' 4\")   Wt 106.6 kg (235 lb)   SpO2 95%   BMI 28.61 kg/m²  Body mass index is 28.61 kg/m².    Electronically signed by RADHA BARNETT CNP on 7/31/2025 at 12:18 PM      Note marked for cosign by provider

## 2025-07-31 NOTE — ANESTHESIA POSTPROCEDURE EVALUATION
Department of Anesthesiology  Postprocedure Note    Patient: Jay Colon  MRN: [id number]  YOB: 1960  Date of evaluation: 7/31/2025    Procedure Summary       Date: 07/31/25 Room / Location: [place] [id number] / [facility]    Anesthesia Start: 1406 Anesthesia Stop: 1456    Procedure: CYSTOSCOPY GREENLIGHT LASER VAPORIZATION OF PROSTATE Diagnosis:       Benign prostatic hyperplasia with incomplete bladder emptying      (Benign prostatic hyperplasia with incomplete bladder emptying [N40.1, R39.14])    Surgeons: Pipo Carreon MD Responsible Provider: Sandhya Baldwin MD    Anesthesia Type: general ASA Status: 3            Anesthesia Type: No value filed.    Cass Phase I: Cass Score: 10    Cass Phase II:      Anesthesia Post Evaluation    Comments: POST- ANESTHESIA EVALUATION       Pt Name: Jay Colon  MRN: [id number]  YOB: 1960  Date of evaluation: 7/31/2025  Time:  4:07 PM      BP (!) 108/54   Pulse 56   Temp 97 °F (36.1 °C)   Resp 19   Ht 1.93 m (6' 4\")   Wt 106.6 kg (235 lb)   SpO2 92%   BMI 28.61 kg/m²      Consciousness Level  Awake  Cardiopulmonary Status  Stable  Pain Adequately Treated YES  Nausea / Vomiting  NO  Adequate Hydration  YES  Anesthesia Related Complications NONE      Electronically signed by Sandhya Baldwin MD on 7/31/2025 at 4:07 PM      No notable events documented.

## 2025-08-01 DIAGNOSIS — Z79.4 TYPE 2 DIABETES MELLITUS WITH DIABETIC POLYNEUROPATHY, WITH LONG-TERM CURRENT USE OF INSULIN (HCC): ICD-10-CM

## 2025-08-01 DIAGNOSIS — E11.42 TYPE 2 DIABETES MELLITUS WITH DIABETIC POLYNEUROPATHY, WITH LONG-TERM CURRENT USE OF INSULIN (HCC): ICD-10-CM

## 2025-08-01 DIAGNOSIS — M25.562 CHRONIC PAIN OF LEFT KNEE: ICD-10-CM

## 2025-08-01 DIAGNOSIS — G89.29 CHRONIC PAIN OF LEFT KNEE: ICD-10-CM

## 2025-08-01 DIAGNOSIS — E08.41 DIABETIC MONONEUROPATHY ASSOCIATED WITH DIABETES MELLITUS DUE TO UNDERLYING CONDITION (HCC): ICD-10-CM

## 2025-08-01 DIAGNOSIS — Z97.10 EMPLOYS PROSTHETIC LEG: ICD-10-CM

## 2025-08-01 RX ORDER — PREGABALIN 100 MG/1
CAPSULE ORAL
Qty: 270 CAPSULE | Refills: 0 | Status: SHIPPED | OUTPATIENT
Start: 2025-08-01 | End: 2025-10-30

## 2025-08-08 ENCOUNTER — TELEPHONE (OUTPATIENT)
Dept: UROLOGY | Age: 65
End: 2025-08-08

## 2025-08-14 ENCOUNTER — TELEPHONE (OUTPATIENT)
Dept: INTERNAL MEDICINE CLINIC | Age: 65
End: 2025-08-14

## 2025-08-21 ENCOUNTER — HOSPITAL ENCOUNTER (OUTPATIENT)
Age: 65
Setting detail: SPECIMEN
Discharge: HOME | End: 2025-08-21

## 2025-08-21 DIAGNOSIS — R31.9 HEMATURIA, UNSPECIFIED TYPE: Primary | ICD-10-CM

## 2025-08-21 DIAGNOSIS — R39.89 ABNORMAL URINE COLOR: ICD-10-CM

## 2025-08-21 DIAGNOSIS — R31.9 HEMATURIA, UNSPECIFIED TYPE: ICD-10-CM

## 2025-08-23 LAB
MICROORGANISM SPEC CULT: ABNORMAL
SERVICE CMNT-IMP: ABNORMAL
SPECIMEN DESCRIPTION: ABNORMAL

## 2025-09-02 ENCOUNTER — OFFICE VISIT (OUTPATIENT)
Dept: UROLOGY | Age: 65
End: 2025-09-02
Payer: MEDICARE

## 2025-09-02 VITALS
HEART RATE: 76 BPM | WEIGHT: 235 LBS | DIASTOLIC BLOOD PRESSURE: 74 MMHG | OXYGEN SATURATION: 98 % | BODY MASS INDEX: 28.62 KG/M2 | TEMPERATURE: 97.2 F | SYSTOLIC BLOOD PRESSURE: 116 MMHG | HEIGHT: 76 IN

## 2025-09-02 DIAGNOSIS — R39.14 BENIGN PROSTATIC HYPERPLASIA WITH INCOMPLETE BLADDER EMPTYING: Primary | ICD-10-CM

## 2025-09-02 DIAGNOSIS — N30.00 ACUTE CYSTITIS WITHOUT HEMATURIA: ICD-10-CM

## 2025-09-02 DIAGNOSIS — Z12.5 PROSTATE CANCER SCREENING: ICD-10-CM

## 2025-09-02 DIAGNOSIS — N40.1 BENIGN PROSTATIC HYPERPLASIA WITH INCOMPLETE BLADDER EMPTYING: Primary | ICD-10-CM

## 2025-09-02 PROCEDURE — 99024 POSTOP FOLLOW-UP VISIT: CPT | Performed by: NURSE PRACTITIONER

## 2025-09-02 PROCEDURE — 51798 US URINE CAPACITY MEASURE: CPT | Performed by: NURSE PRACTITIONER

## 2025-09-02 ASSESSMENT — ENCOUNTER SYMPTOMS
ABDOMINAL PAIN: 0
BACK PAIN: 0
COLOR CHANGE: 0
RESPIRATORY NEGATIVE: 1
EYE PAIN: 0
COUGH: 0
EYE REDNESS: 0
SHORTNESS OF BREATH: 0
ALLERGIC/IMMUNOLOGIC NEGATIVE: 1
EYES NEGATIVE: 1
WHEEZING: 0
NAUSEA: 0
VOMITING: 0
GASTROINTESTINAL NEGATIVE: 1

## 2025-09-04 RX ORDER — AMIODARONE HYDROCHLORIDE 200 MG/1
200 TABLET ORAL 2 TIMES DAILY
Qty: 180 TABLET | Refills: 3 | Status: SHIPPED | OUTPATIENT
Start: 2025-09-04

## 2025-09-04 RX ORDER — CLOTRIMAZOLE AND BETAMETHASONE DIPROPIONATE 10; .64 MG/G; MG/G
CREAM TOPICAL
Qty: 180 G | Refills: 2 | Status: SHIPPED | OUTPATIENT
Start: 2025-09-04

## 2025-09-05 RX ORDER — ACYCLOVIR 400 MG/1
1 TABLET ORAL 3 TIMES DAILY
Qty: 6 EACH | Refills: 3 | Status: SHIPPED | OUTPATIENT
Start: 2025-09-05

## (undated) DEVICE — GAUZE,SPONGE,FLUFF,6"X6.75",STRL,5/TRAY: Brand: MEDLINE

## (undated) DEVICE — ST CHARLES CYSTO: Brand: MEDLINE INDUSTRIES, INC.

## (undated) DEVICE — ST CHARLES MINOR ABDOMINAL PK: Brand: MEDLINE INDUSTRIES, INC.

## (undated) DEVICE — ENDO KIT W/SYRINGE: Brand: MEDLINE INDUSTRIES, INC.

## (undated) DEVICE — DUAL HOSE W/CPC CONNECTORS: Brand: A.T.S.® TOURNIQUET SYSTEM

## (undated) DEVICE — SUTURE PLN GUT SZ 6-0 L18IN ABSRB YELLOWISH TAN L6.5MM 1735G

## (undated) DEVICE — 25 GA FLEXIBLE TIP LASER PROBE: Brand: ALCON, ENGAUGE

## (undated) DEVICE — SINGLE PORT MANIFOLD: Brand: NEPTUNE 2

## (undated) DEVICE — Device: Brand: MEDICAL ACTION INDUSTRIES

## (undated) DEVICE — Device: Brand: BALLOON3

## (undated) DEVICE — SET ADMIN 25ML L117IN PMP MOD CK VLV RLER CLMP 2 SMRTSITE

## (undated) DEVICE — GLOVE ORTHO 8   MSG9480

## (undated) DEVICE — GOWN,AURORA,NONRNF,XL,30/CS: Brand: MEDLINE

## (undated) DEVICE — LOOP VES W25MM THK1MM MAXI RED SIL FLD REPELLENT 100 PER

## (undated) DEVICE — COUNTER NDL 10 COUNT HLD 20 FOAM BLK SGL MAG

## (undated) DEVICE — DRESSING TRNSPAR W5XL4.5IN FLM SHT SEMIPERMEABLE WIND

## (undated) DEVICE — SOLUTION IRRIG 1000ML 0.9% SOD CHL USP POUR PLAS BTL

## (undated) DEVICE — Z INACTIVE USE 2641839 CLIP INT M L POLYMER LOK LIG HEM O LOK

## (undated) DEVICE — SOLUTION IRRIG 1000ML H2O PIC PLAS SHATTERPROOF CONTAINER

## (undated) DEVICE — BLANKET WRM W40.2XL55.9IN IORT LO BODY + MISTRAL AIR

## (undated) DEVICE — WAX SURG 2.5GM HEMSTAT BNE BEESWAX PARAFFIN ISO PALMITATE

## (undated) DEVICE — NEEDLE, QUINCKE, 18GX3.5": Brand: MEDLINE

## (undated) DEVICE — TROCAR: Brand: KII FIOS FIRST ENTRY

## (undated) DEVICE — SYRINGE,PISTON,IRRIGATION,60ML,STERILE: Brand: MEDLINE

## (undated) DEVICE — FORCEPS BX L240CM WRK CHN 2.8MM STD CAP W/ NDL MIC MESH

## (undated) DEVICE — BLADELESS OBTURATOR: Brand: WECK VISTA

## (undated) DEVICE — TIP APPL L45CM FLX FOR SEAL EVICEL

## (undated) DEVICE — SUTURE MCRYL 5-0 L27IN ABSRB VLT RB-1 L17MM 1/2 CIR Y303H

## (undated) DEVICE — CANNULA SEAL

## (undated) DEVICE — APPLICATOR MEDICATED 26 CC SOLUTION HI LT ORNG CHLORAPREP

## (undated) DEVICE — SUTURE VCRL SZ 2-0 L12X18IN ABSRB UD POLYGLACTIN 910 BRAID J911T

## (undated) DEVICE — SOLUTION IRRIG 1000ML STRL H2O USP PLAS POUR BTL

## (undated) DEVICE — ESMARK: Brand: DEROYAL

## (undated) DEVICE — CATHETER URET 5FR L70CM TIP 8FR OPN END CONE TIP INJ HUB

## (undated) DEVICE — DRESSING TRNSPAR W2XL2.75IN FLM SHT SEMIPERMEABLE WIND

## (undated) DEVICE — BITEBLOCK 54FR W/ DENT RIM BLOX

## (undated) DEVICE — GLOVE ORANGE PI 7 1/2   MSG9075

## (undated) DEVICE — ADHESIVE SKIN CLSR 0.7ML TOP DERMBND ADV

## (undated) DEVICE — CONTAINER,SPECIMEN,4OZ,OR STRL: Brand: MEDLINE

## (undated) DEVICE — ST CHARLES DR BEEKS SPINE: Brand: MEDLINE INDUSTRIES, INC.

## (undated) DEVICE — SYRINGE MED 30ML STD CLR PLAS LUERLOCK TIP N CTRL DISP

## (undated) DEVICE — ARM DRAPE

## (undated) DEVICE — PROTECTOR ULN NRV PUR FOAM HK LOOP STRP ANATOMICALLY

## (undated) DEVICE — COUNTER NDL 40 COUNT HLD 70 FOAM BLK ADH W/ MAG

## (undated) DEVICE — ST CHARLES CYSTO PACK: Brand: MEDLINE INDUSTRIES, INC.

## (undated) DEVICE — GUIDEWIRE URO L150CM DIA0.035IN STIFF NIT HYDRPHLC STR TIP

## (undated) DEVICE — SUTURE MCRYL SZ 4-0 L18IN ABSRB UD L16MM PC-3 3/8 CIR PRIM Y845G

## (undated) DEVICE — CHLORAPREP 26ML ORANGE

## (undated) DEVICE — SUTURE CHROMIC GUT SZ 4-0 L27IN ABSRB BRN L17MM RB-1 1/2 U203H

## (undated) DEVICE — SYRINGE MED 10ML LUERLOCK TIP W/O SFTY DISP

## (undated) DEVICE — TOWEL OR BLUEE 16X26IN ST 8 PACK ORB08 16X26ORTWL

## (undated) DEVICE — DRAPE,REIN 53X77,STERILE: Brand: MEDLINE

## (undated) DEVICE — Device

## (undated) DEVICE — SCISSOR SURG METZ CRV TIP

## (undated) DEVICE — DRAINBAG,ANTI-REFLUX TOWER,L/F,2000ML,LL: Brand: MEDLINE

## (undated) DEVICE — GAUZE,SPONGE,4"X4",16PLY,XRAY,STRL,LF: Brand: MEDLINE

## (undated) DEVICE — PACK PROCEDURE SURG ROBOTIC KID SVMMC

## (undated) DEVICE — SUTURE VCRL SZ 1 L27IN ABSRB UD L36MM CT-1 1/2 CIR JJ40G

## (undated) DEVICE — SYRINGE 20ML LL S/C 50

## (undated) DEVICE — BAG SPEC REM 224ML W4XL6IN DIA10MM 1 HND GYN DISP ENDOPCH

## (undated) DEVICE — SUTURE ETHLN SZ 3-0 L18IN NONABSORBABLE BLK L24MM PS-1 3/8 1663G

## (undated) DEVICE — SOLUTION ANTIFOG VIS SYS CLEARIFY LAPSCP

## (undated) DEVICE — DUAL LUMEN URETERAL CATHETER

## (undated) DEVICE — SUTURE VCRL + SZ 2-0 L27IN ABSRB UD CP-1 1/2 CIR REV CUT VCP266H

## (undated) DEVICE — COVER,LIGHT HANDLE,FLX,2/PK: Brand: MEDLINE INDUSTRIES, INC.

## (undated) DEVICE — SYSTEM PMP VAC SYR 10CC CONT FLO SGL ACT 1 W VLV SAPS

## (undated) DEVICE — CATHETER URET 5FR L70CM OPN END SGL LUMN INJ HUB FLEXIMA

## (undated) DEVICE — CORD ES L12FT BPLR FRCP

## (undated) DEVICE — TOWEL,OR,DSP,ST,NATURAL,DLX,4/PK,20PK/CS: Brand: MEDLINE

## (undated) DEVICE — METER,URINE,400ML,DRAIN BAG,L/F,LL: Brand: MEDLINE

## (undated) DEVICE — APPLIER SUT CLP FOR 2-0 3-0 4-0 VCRL ABSRB SUT

## (undated) DEVICE — ELECTRO LUBE IS A SINGLE PATIENT USE DEVICE THAT IS INTENDED TO BE USED ON ELECTROSURGICAL ELECTRODES TO REDUCE STICKING.: Brand: KEY SURGICAL ELECTRO LUBE

## (undated) DEVICE — NEEDLE HYPO 25GA L1.5IN BLU POLYPR HUB S STL REG BVL STR

## (undated) DEVICE — SEALANT HEMSTAT 5ML HUM FIBRIN THROM 2 VI APPL DEV EVICEL

## (undated) DEVICE — ELECTRODE ES L3IN S STL BLDE INSUL DISP VALLEYLAB EDGE

## (undated) DEVICE — INTENDED FOR TISSUE SEPARATION, AND OTHER PROCEDURES THAT REQUIRE A SHARP SURGICAL BLADE TO PUNCTURE OR CUT.: Brand: BARD-PARKER ® CARBON RIB-BACK BLADES

## (undated) DEVICE — CATHETER URETH 16FR BLLN 5CC SIL ALLY W/ SIL HYDRGEL 2 W F

## (undated) DEVICE — ISPAN SF6 SULPHUR HEXAFLUORIDEISPAN* SF6 IS A FLUORINATED GREENHOUSE GAS COVERED BY THE KYOTO PROTOCOL AND HAS A GLOBAL WARMING POTENTIAL (GWP) OF 22,200. RESIDUAL ISPAN* SF6 GAS SHOULD BE RECOVERED BY APPROPRIATELY QUALIFIED PERSONNEL AND RECYCLED, RECLAIMED OR DESTROYED IN ACCORDANCE WITH LOCAL ORDINANCES.: Brand: ISPAN

## (undated) DEVICE — Y-TYPE TUR/BLADDER IRRIGATION SET, REGULATING CLAMP

## (undated) DEVICE — AIRSEAL 12 MM ACCESS PORT AND PALM GRIP OBTURATOR WITH BLADELESS OPTICAL TIP, 120 MM LENGTH: Brand: AIRSEAL

## (undated) DEVICE — MARKER,SKIN,WI/RULER AND LABELS: Brand: MEDLINE

## (undated) DEVICE — CATHETER FOL 2 W 16 FR 5 CC URETH SPEC TIEM BARDX IC

## (undated) DEVICE — SPONGE LAP W18XL18IN WHT COT 4 PLY FLD STRUNG RADPQ DISP ST

## (undated) DEVICE — SURGIFOAM SPNG SZ 100

## (undated) DEVICE — SOLUTION SCRB 4OZ 10% POVIDONE IOD ANTIMIC BTL

## (undated) DEVICE — BLADE CLIPPER GEN PURP NS

## (undated) DEVICE — BANDAGE,GAUZE,BULKEE II,4.5"X4.1YD,STRL: Brand: MEDLINE

## (undated) DEVICE — SUTURE V-LOC 180 SZ 3-0 L6IN ABSRB GRN V-20 L26MM 1/2 CIR VLOCL0604

## (undated) DEVICE — RECIPROCATING BLADE HEAVY DUTY LONG, OFFSET  (77.6 X 0.77 X 11.2MM)

## (undated) DEVICE — SKIN AFFIX SURG ADHESIVE 72/CS 0.55ML: Brand: MEDLINE

## (undated) DEVICE — TRI-LUMEN FILTERED TUBE SET WITH ACTIVATED CHARCOAL FILTER: Brand: AIRSEAL

## (undated) DEVICE — KIT DRN FLAT W/ 100CC EVAC 7MM FULL PERF

## (undated) DEVICE — GOWN,AURORA,NONREINFORCED,LARGE: Brand: MEDLINE

## (undated) DEVICE — SYRINGE, LUER LOCK, 10ML: Brand: MEDLINE

## (undated) DEVICE — TIP COVER ACCESSORY

## (undated) DEVICE — RENTAL LASER XPS CASE

## (undated) DEVICE — SUTURE VCRL + SZ 2 L27IN ABSRB UD L40MM CP 1/2 CIR REV CUT VCP195H

## (undated) DEVICE — 5.0MM X-COARSE DIAMOND

## (undated) DEVICE — DRAIN,WOUND,15FR,3/16,FULL-FLUTED: Brand: MEDLINE

## (undated) DEVICE — GLOVE ORANGE PI 7   MSG9070

## (undated) DEVICE — 25 GAUGE ENTRY SYSTEM, ENHANCED, 3 EA: Brand: ALCON

## (undated) DEVICE — RESERVOIR,SUCTION,100CC,SILICONE: Brand: MEDLINE

## (undated) DEVICE — SURGICAL PROCEDURE PACK 25 GA VITRECTOMY

## (undated) DEVICE — FIBER LASER MOXY 532NM WAVELENGTH LIQUID COOLED SINGLE-USE F/GREENLIGHT XPS SYSTEM

## (undated) DEVICE — BALLOON ENDO FOR CLR VISIBILITY OF EUS PROC FITS OLY PENTAX

## (undated) DEVICE — SUTURE PERMAHAND SZ 0 L30IN NONABSORBABLE BLK FSL L30MM 3/8 680H

## (undated) DEVICE — SPONGE,NEURO,0.5"X0.5",XR,STRL,10/PK: Brand: MEDLINE

## (undated) DEVICE — 25 GA TROCAR PLUGS: Brand: ALCON

## (undated) DEVICE — 3M™ IOBAN™ 2 ANTIMICROBIAL INCISE DRAPE 6650EZ: Brand: IOBAN™ 2

## (undated) DEVICE — OCCLUDER EYE POLYETH HYPOALRG ADH TAPE W/O PINHOLE

## (undated) DEVICE — PAD,ABDOMINAL,8"X7.5",ST,LF,20/BX: Brand: MEDLINE INDUSTRIES, INC.

## (undated) DEVICE — GLOVE SURG SZ 8 L12IN FNGR THK79MIL GRN LTX FREE

## (undated) DEVICE — ADAPTER URO SCP UROLOK LL

## (undated) DEVICE — SOL IRR SOD CHL 0.9% TITAN XL CNTNR 3000ML

## (undated) DEVICE — ELECTRODE PT RET AD L9FT HI MOIST COND ADH HYDRGEL CORDED

## (undated) DEVICE — SUTURE NONABSORBABLE MONOFILAMENT 3-0 PS-1 18 IN BLK ETHILON 1663H

## (undated) DEVICE — DEFENDO AIR WATER SUCTION AND BIOPSY VALVE KIT FOR  OLYMPUS: Brand: DEFENDO AIR/WATER/SUCTION AND BIOPSY VALVE

## (undated) DEVICE — SUTURE VCRL SZ 7-0 L18IN ABSRB VLT L6.5MM TG140-8 3/8 CIR J546G

## (undated) DEVICE — SOLUTION IV IRRIG POUR BRL 0.9% SODIUM CHL 2F7124

## (undated) DEVICE — SUTURE VCRL + SZ 1 L36IN ABSRB UD L36MM CT-1 1/2 CIR VCP947H

## (undated) DEVICE — FILTER CLP DISP FOR 5513E CLIPVAC

## (undated) DEVICE — GUIDEWIRE VASC ANGLED 035X150 STIFF ZIPWIRE

## (undated) DEVICE — 25GA EZPASS SOFT TIP CANNULA BOX OF 5: Brand: VORTEX SURGICAL INC

## (undated) DEVICE — SOLUTION IRRIG 1000ML PENTALYTE PLAS POUR BTL TIS U SOL

## (undated) DEVICE — SUTURE VCRL SZ 1 L18IN ABSRB VLT CT-1 L36MM 1/2 CIR J741D

## (undated) DEVICE — 3M™ STERI-DRAPE™ U-DRAPE 1015: Brand: STERI-DRAPE™

## (undated) DEVICE — STANDARD HYPODERMIC NEEDLE,ALUMINUM HUB: Brand: MONOJECT

## (undated) DEVICE — SOLUTION PREP POVIDONE IOD FOR SKIN MUCOUS MEM PRIOR TO

## (undated) DEVICE — GARMENT,MEDLINE,DVT,INT,CALF,MED, GEN2: Brand: MEDLINE

## (undated) DEVICE — AGENT VISCOELASTIC 1ML 2% HYDROXYPROPYL METHCELL PRELD GLS

## (undated) DEVICE — SUTURE VCRL + SZ 2-0 L27IN ABSRB CLR CT-1 1/2 CIR TAPERCUT VCP259H

## (undated) DEVICE — STRAP,CATHETER,ELASTIC,HOOK&LOOP: Brand: MEDLINE

## (undated) DEVICE — TOTAL TRAY, 16FR 10ML SIL FOLEY, URN: Brand: MEDLINE

## (undated) DEVICE — TROCAR: Brand: KII® SLEEVE

## (undated) DEVICE — Z DISCONTINUED BY MEDLINE USE 2711682 TRAY SKIN PREP DRY W/ PREM GLV

## (undated) DEVICE — 1 EACH 40411 STERILE DISPOSABLE SUPER VIEW® LENS SET & 1 EACH 40100 STERILE MICROSCOPE DRAPE: Brand: SUPER VIEW® PACK

## (undated) DEVICE — SNAP KAP: Brand: UNBRANDED

## (undated) DEVICE — MONITORING NEURO WO INTERPRETATION SYS PRICING

## (undated) DEVICE — Z INACTIVE USE 2660664 SOLUTION IRRIG 3000ML 0.9% SOD CHL USP UROMATIC PLAS CONT

## (undated) DEVICE — GLOVE SURG SZ 65 THK91MIL LTX FREE SYN POLYISOPRENE

## (undated) DEVICE — CONE TIP URETERAL CATHETER WITH OPEN-END: Brand: CONE TIP

## (undated) DEVICE — CATHETER URETH 22FR BLLN 30CC 3 W F SPEC INF CTRL BARDX

## (undated) DEVICE — SPONGE DRN W4XL4IN RAYON/POLYESTER 6 PLY NONWOVEN PRECUT 2 PER PK

## (undated) DEVICE — GLOVE ORTHO 7 1/2   MSG9475

## (undated) DEVICE — AGENT HEMSTAT W2XL14IN OXIDIZED REGENERATED CELOS ABSRB FOR

## (undated) DEVICE — AIRSEAL 8 MM ACCESS PORT AND LOW PROFILE OBTURATOR WITH BLADELESS OPTICAL TIP, 120 MM LENGTH: Brand: AIRSEAL

## (undated) DEVICE — SUTURE MCRYL SZ 4-0 L27IN ABSRB VLT RB-1 L17MM 1/2 CIR Y304H

## (undated) DEVICE — DISPOSABLE BIPOLAR CABLE, 12FT (3.6M): Brand: KIRWAN

## (undated) DEVICE — GLOVE SURG SZ 6 THK91MIL LTX FREE SYN POLYISOPRENE ANTI

## (undated) DEVICE — MICROSURGICAL INSTRUMENT 25GA SOFT TIP NEEDLE: Brand: ALCON

## (undated) DEVICE — PACK PROCEDURE SURG CYSTO SVMMC LF

## (undated) DEVICE — GLOVE SURG SZ 65 STD WHT LTX SYN POLYMER BEAD REINF ANTI RL

## (undated) DEVICE — RETINA PK

## (undated) DEVICE — GLOVE EXAM M L95IN FNGR THK35MIL PALM THK24MIL OFF WHT

## (undated) DEVICE — Z INACTIVE USE 2527070 DRAPE SURG W40XL44IN UNDERBUTTOCK SMS POLYPR W/ PCH BK DISP

## (undated) DEVICE — SHEET, T, LAPAROTOMY, STERILE: Brand: MEDLINE

## (undated) DEVICE — SUTURE ABSORBABLE MONOFILAMENT 6-0 G-1 18 IN PLN GUT 770G

## (undated) DEVICE — YANKAUER,FLEXIBLE HANDLE,FINE CAPACITY: Brand: MEDLINE

## (undated) DEVICE — C-ARMOR C-ARM EQUIPMENT COVERS CLEAR STERILE UNIVERSAL FIT 12 PER CASE: Brand: C-ARMOR

## (undated) DEVICE — TUBING, SUCTION, 9/32" X 20', STRAIGHT: Brand: MEDLINE INDUSTRIES, INC.

## (undated) DEVICE — CUFF REPROCESS BP TOURN 2 PORT SING BLDR 4X30IN

## (undated) DEVICE — GLOVE SURG 7.5 PF POLYMER WHT STRL SIGN LTX ESSENTIAL LTX

## (undated) DEVICE — DRESSING HYDROCOLLOID BORDER 35X10 IN ALUM PRIMASEAL

## (undated) DEVICE — AGENT HEMOSTATIC SURGIFLOW MATRIX KIT W/THROMBIN